# Patient Record
Sex: MALE | Race: WHITE | NOT HISPANIC OR LATINO | Employment: FULL TIME | URBAN - METROPOLITAN AREA
[De-identification: names, ages, dates, MRNs, and addresses within clinical notes are randomized per-mention and may not be internally consistent; named-entity substitution may affect disease eponyms.]

---

## 2018-01-11 ENCOUNTER — GENERIC CONVERSION - ENCOUNTER (OUTPATIENT)
Dept: OTHER | Facility: OTHER | Age: 53
End: 2018-01-11

## 2018-01-11 DIAGNOSIS — E66.01 MORBID (SEVERE) OBESITY DUE TO EXCESS CALORIES (HCC): ICD-10-CM

## 2018-01-11 DIAGNOSIS — E78.5 HYPERLIPIDEMIA: ICD-10-CM

## 2018-01-11 DIAGNOSIS — I10 ESSENTIAL (PRIMARY) HYPERTENSION: ICD-10-CM

## 2018-01-11 DIAGNOSIS — Z12.5 ENCOUNTER FOR SCREENING FOR MALIGNANT NEOPLASM OF PROSTATE: ICD-10-CM

## 2018-01-11 DIAGNOSIS — E55.9 VITAMIN D DEFICIENCY: ICD-10-CM

## 2018-01-24 VITALS
WEIGHT: 315 LBS | TEMPERATURE: 98.2 F | HEIGHT: 66 IN | RESPIRATION RATE: 20 BRPM | OXYGEN SATURATION: 99 % | BODY MASS INDEX: 50.62 KG/M2 | HEART RATE: 75 BPM | SYSTOLIC BLOOD PRESSURE: 138 MMHG | DIASTOLIC BLOOD PRESSURE: 80 MMHG

## 2018-07-29 ENCOUNTER — APPOINTMENT (EMERGENCY)
Dept: RADIOLOGY | Facility: HOSPITAL | Age: 53
End: 2018-07-29
Payer: OTHER MISCELLANEOUS

## 2018-07-29 ENCOUNTER — HOSPITAL ENCOUNTER (EMERGENCY)
Facility: HOSPITAL | Age: 53
Discharge: HOME | End: 2018-07-29
Admitting: EMERGENCY MEDICINE
Payer: OTHER MISCELLANEOUS

## 2018-07-29 VITALS
SYSTOLIC BLOOD PRESSURE: 139 MMHG | RESPIRATION RATE: 18 BRPM | BODY MASS INDEX: 55.51 KG/M2 | WEIGHT: 315 LBS | OXYGEN SATURATION: 96 % | DIASTOLIC BLOOD PRESSURE: 84 MMHG | TEMPERATURE: 97.5 F | HEART RATE: 89 BPM

## 2018-07-29 DIAGNOSIS — S39.012A LUMBAR STRAIN, INITIAL ENCOUNTER: Primary | ICD-10-CM

## 2018-07-29 PROCEDURE — 72100 X-RAY EXAM L-S SPINE 2/3 VWS: CPT

## 2018-07-29 PROCEDURE — 99283 EMERGENCY DEPT VISIT LOW MDM: CPT

## 2018-07-29 RX ORDER — CYCLOBENZAPRINE HCL 10 MG
10 TABLET ORAL 3 TIMES DAILY PRN
Qty: 20 TABLET | Refills: 0 | Status: SHIPPED | OUTPATIENT
Start: 2018-07-29 | End: 2019-07-15

## 2018-07-29 RX ORDER — TAMSULOSIN HYDROCHLORIDE 0.4 MG/1
CAPSULE ORAL
COMMUNITY
Start: 2015-05-27 | End: 2018-12-07 | Stop reason: SDUPTHER

## 2018-07-29 RX ORDER — NAPROXEN 500 MG/1
500 TABLET ORAL 2 TIMES DAILY WITH MEALS
Qty: 20 TABLET | Refills: 0 | Status: SHIPPED | OUTPATIENT
Start: 2018-07-29 | End: 2019-07-15

## 2018-07-29 NOTE — ED PROVIDER NOTES
History  Chief Complaint   Patient presents with    Back Pain     slipped on floor at work last night  complaining of low back pain     Patient presents to the emergency department today for evaluation of left lower back pain  He states while at work at 0 100 hours this morning he was ambulating into a bathroom, slipped on a wet floor, twisted his back  He states he did not fall to the ground  He states since that time he has been experiencing left lower back pain radiating toward the center of the back  Patient states he went home and took 600 mg of anti inflammatory and fell asleep and awoke at 0 500 hours with increased pain  He denies any incontinence of urine or stool  He denies lower extremity weakness  Denies saddle anesthesia  No history suggesting cauda equina syndrome  He denies fevers  No problems urinating  He states pain is worse with change in position such as standing up  He denies any prior back injuries  No history of current radiculopathies  Prior to Admission Medications   Prescriptions Last Dose Informant Patient Reported? Taking?   tamsulosin (FLOMAX) 0 4 mg   Yes Yes   Sig: Take by mouth      Facility-Administered Medications: None       Past Medical History:   Diagnosis Date    Kidney stone        History reviewed  No pertinent surgical history  History reviewed  No pertinent family history  I have reviewed and agree with the history as documented  Social History   Substance Use Topics    Smoking status: Never Smoker    Smokeless tobacco: Never Used    Alcohol use No        Review of Systems   Constitutional: Negative  HENT: Negative  Eyes: Negative  Respiratory: Negative  Cardiovascular: Negative  Gastrointestinal: Negative  Endocrine: Negative  Genitourinary: Negative  Musculoskeletal: Positive for back pain  Negative for arthralgias, gait problem, joint swelling, myalgias, neck pain and neck stiffness  Skin: Negative  Allergic/Immunologic: Negative  Neurological: Negative  Hematological: Negative  Psychiatric/Behavioral: Negative  All other systems reviewed and are negative  Physical Exam  Physical Exam   Constitutional: He is oriented to person, place, and time  He appears well-developed and well-nourished  No distress  HENT:   Head: Normocephalic  Right Ear: External ear normal    Left Ear: External ear normal    Nose: Nose normal    Mouth/Throat: Oropharynx is clear and moist  No oropharyngeal exudate  Eyes: EOM are normal  Pupils are equal, round, and reactive to light  Neck: Normal range of motion  Cardiovascular: Normal rate  Pulmonary/Chest: Effort normal    Abdominal: Soft  There is no tenderness  Musculoskeletal: He exhibits no edema or deformity  No evidence of central spinal tenderness contusion abrasion swelling step-offs or crepitus  Patient notes area of pain in the left paravertebral musculature in the area of L3-L4 however there is no reproducible tenderness  Normal patellar reflexes  Neurological: He is alert and oriented to person, place, and time  Skin: Skin is warm  He is not diaphoretic  Vitals reviewed  Vital Signs  ED Triage Vitals [07/29/18 1127]   Temperature Pulse Respirations Blood Pressure SpO2   97 5 °F (36 4 °C) 76 20 (!) 185/110 97 %      Temp Source Heart Rate Source Patient Position - Orthostatic VS BP [facility] FiO2 (%)   Temporal Right Sitting Right arm --      Pain Score       Worst Possible Pain           Vitals:    07/29/18 1127 07/29/18 1251   BP: (!) 185/110 139/84   Pulse: 76 89   Patient Position - Orthostatic VS: Sitting Sitting       Visual Acuity      ED Medications  Medications - No data to display    Diagnostic Studies  Results Reviewed     None                 XR spine lumbar 2 or 3 views injury   ED Interpretation by Imtiaz Mccall PA-C (07/29 1254)   No evidence of acute osseous abnormalities  Procedures  Procedures       Phone Contacts  ED Phone Contact    ED Course  ED Course as of Jul 29 1257   Sun Jul 29, 2018   1225 Pt at X-Ray    1231 Blood Pressure: (!) 185/110   1231 Temperature: 97 5 °F (36 4 °C)   1231 Pulse: 76   1231 Respirations: 20   1231 SpO2: 97 %                               MDM  CritCare Time    Disposition  Final diagnoses:   Lumbar strain, initial encounter     Time reflects when diagnosis was documented in both MDM as applicable and the Disposition within this note     Time User Action Codes Description Comment    7/29/2018 12:55 PM Julius Poster Add [S39 012A] Lumbar strain, initial encounter       ED Disposition     ED Disposition Condition Comment    Discharge  [place] discharge to home/self care  Condition at discharge: Good        Follow-up Information     Follow up With Specialties Details Why Contact Info    Occupational medicine  In 1 day            Patient's Medications   Discharge Prescriptions    CYCLOBENZAPRINE (FLEXERIL) 10 MG TABLET    Take 1 tablet (10 mg total) by mouth 3 (three) times a day as needed for muscle spasms       Start Date: 7/29/2018 End Date: --       Order Dose: 10 mg       Quantity: 20 tablet    Refills: 0    NAPROXEN (EC NAPROSYN) 500 MG EC TABLET    Take 1 tablet (500 mg total) by mouth 2 (two) times a day with meals       Start Date: 7/29/2018 End Date: --       Order Dose: 500 mg       Quantity: 20 tablet    Refills: 0     No discharge procedures on file      ED Provider  Electronically Signed by           Andreea Hamilton PA-C  07/29/18 1257

## 2018-07-29 NOTE — DISCHARGE INSTRUCTIONS
Low Back Strain, Ambulatory Care   GENERAL INFORMATION:   Low back strain  is an injury to your lower back muscles or tendons  Tendons are strong tissues that connect muscles to bones  The lower back supports most of your body weight and helps you move, twist, and bend  Low back strain is usually caused by activities that increase stress on the lower back, such as exercise or injury  Common symptoms include the following:   · Low back pain or muscle spasms    · Stiffness or limited movement    · Pain that goes down to the buttocks, groin, or legs    · Pain that is worse with activity  Seek immediate care for the following symptoms:   · A pop in your lower back    · Increased swelling or pain in your lower back    · Trouble moving your legs    · Numbness in your legs  Treatment for low back strain:   · NSAIDs  help decrease swelling and pain or fever  This medicine is available with or without a doctor's order  NSAIDs can cause stomach bleeding or kidney problems in certain people  If you take blood thinner medicine, always ask your healthcare provider if NSAIDs are safe for you  Always read the medicine label and follow directions  · Muscle relaxers  help decrease muscle spasms pain  · Prescription pain medicine  may be given  Ask how to take this medicine safely  Manage your symptoms:   · Rest  in bed after your injury  Slowly start to increase your activity as the pain decreases, or as directed  · Apply ice  on your lower back for 15 to 20 minutes every hour or as directed  Use an ice pack, or put crushed ice in a plastic bag  Cover it with a towel  Ice helps prevent tissue damage and decreases swelling and pain  You can alternate ice and heat  · Apply heat  on your lower back for 20 to 30 minutes every 2 hours for as many days as directed  Heat helps decrease pain and muscle spasms  Prevent another low back strain:   · Use proper body mechanics        ¨ Bend at the hips and knees when you  objects  Do not bend from the waist  Use your leg muscles as you lift the load  Do not use your back  Keep the object close to your chest as you lift it  Try not to twist or lift anything above your waist     ¨ Change your position often when you stand for long periods of time  Rest one foot on a small box or footrest, and then switch to the other foot often  ¨ Try not to sit for long periods of time  When you do, sit in a straight-backed chair with your feet flat on the floor  Never reach, pull, or push while you are sitting  · Exercise regularly  Warm up before you exercise  Do exercises that strengthen your back muscles  Ask about the best exercise plan for you  · Maintain a healthy weight  Ask your healthcare provider how much you should weigh  Ask him to help you create a weight loss plan if you are overweight  Follow up with your healthcare provider as directed:  Write down your questions so you remember to ask them during your visits  CARE AGREEMENT:   You have the right to help plan your care  Learn about your health condition and how it may be treated  Discuss treatment options with your caregivers to decide what care you want to receive  You always have the right to refuse treatment  The above information is an  only  It is not intended as medical advice for individual conditions or treatments  Talk to your doctor, nurse or pharmacist before following any medical regimen to see if it is safe and effective for you  © 2014 [address] is for End User's use only and may not be sold, redistributed or otherwise used for commercial purposes  All illustrations and images included in CareNotes® are the copyrighted property of [employer]  or Garfield Dugan

## 2018-12-07 DIAGNOSIS — N40.0 BENIGN PROSTATIC HYPERPLASIA, UNSPECIFIED WHETHER LOWER URINARY TRACT SYMPTOMS PRESENT: Primary | ICD-10-CM

## 2018-12-07 RX ORDER — TAMSULOSIN HYDROCHLORIDE 0.4 MG/1
CAPSULE ORAL
Qty: 90 CAPSULE | Refills: 3 | Status: SHIPPED | OUTPATIENT
Start: 2018-12-07 | End: 2019-11-11 | Stop reason: SDUPTHER

## 2019-07-11 ENCOUNTER — APPOINTMENT (EMERGENCY)
Dept: RADIOLOGY | Facility: HOSPITAL | Age: 54
End: 2019-07-11
Payer: COMMERCIAL

## 2019-07-11 ENCOUNTER — HOSPITAL ENCOUNTER (EMERGENCY)
Facility: HOSPITAL | Age: 54
Discharge: HOME | End: 2019-07-11
Attending: EMERGENCY MEDICINE | Admitting: EMERGENCY MEDICINE
Payer: COMMERCIAL

## 2019-07-11 VITALS
SYSTOLIC BLOOD PRESSURE: 172 MMHG | WEIGHT: 315 LBS | TEMPERATURE: 98.2 F | HEIGHT: 66 IN | DIASTOLIC BLOOD PRESSURE: 102 MMHG | RESPIRATION RATE: 20 BRPM | BODY MASS INDEX: 50.62 KG/M2 | HEART RATE: 72 BPM | OXYGEN SATURATION: 97 %

## 2019-07-11 DIAGNOSIS — L02.519 ABSCESS OF FINGER: ICD-10-CM

## 2019-07-11 DIAGNOSIS — M79.89 FINGER SWELLING: Primary | ICD-10-CM

## 2019-07-11 PROCEDURE — 99283 EMERGENCY DEPT VISIT LOW MDM: CPT | Performed by: PHYSICIAN ASSISTANT

## 2019-07-11 PROCEDURE — 87070 CULTURE OTHR SPECIMN AEROBIC: CPT | Performed by: PHYSICIAN ASSISTANT

## 2019-07-11 PROCEDURE — 26010 DRAINAGE OF FINGER ABSCESS: CPT | Performed by: PHYSICIAN ASSISTANT

## 2019-07-11 PROCEDURE — 87205 SMEAR GRAM STAIN: CPT | Performed by: PHYSICIAN ASSISTANT

## 2019-07-11 PROCEDURE — 73140 X-RAY EXAM OF FINGER(S): CPT

## 2019-07-11 PROCEDURE — 99283 EMERGENCY DEPT VISIT LOW MDM: CPT

## 2019-07-11 RX ORDER — CEPHALEXIN 500 MG/1
500 CAPSULE ORAL EVERY 6 HOURS SCHEDULED
Qty: 28 CAPSULE | Refills: 0 | Status: SHIPPED | OUTPATIENT
Start: 2019-07-11 | End: 2019-07-18

## 2019-07-11 RX ORDER — SULFAMETHOXAZOLE AND TRIMETHOPRIM 800; 160 MG/1; MG/1
1 TABLET ORAL 2 TIMES DAILY
Qty: 14 TABLET | Refills: 0 | Status: SHIPPED | OUTPATIENT
Start: 2019-07-11 | End: 2019-07-18

## 2019-07-11 RX ORDER — SULFAMETHOXAZOLE AND TRIMETHOPRIM 800; 160 MG/1; MG/1
1 TABLET ORAL ONCE
Status: COMPLETED | OUTPATIENT
Start: 2019-07-11 | End: 2019-07-11

## 2019-07-11 RX ORDER — LIDOCAINE HYDROCHLORIDE 10 MG/ML
5 INJECTION, SOLUTION EPIDURAL; INFILTRATION; INTRACAUDAL; PERINEURAL ONCE
Status: COMPLETED | OUTPATIENT
Start: 2019-07-11 | End: 2019-07-11

## 2019-07-11 RX ORDER — CEPHALEXIN 250 MG/1
500 CAPSULE ORAL ONCE
Status: COMPLETED | OUTPATIENT
Start: 2019-07-11 | End: 2019-07-11

## 2019-07-11 RX ADMIN — SULFAMETHOXAZOLE AND TRIMETHOPRIM 1 TABLET: 800; 160 TABLET ORAL at 13:22

## 2019-07-11 RX ADMIN — CEPHALEXIN 500 MG: 250 CAPSULE ORAL at 13:22

## 2019-07-11 RX ADMIN — LIDOCAINE HYDROCHLORIDE 5 ML: 10 INJECTION, SOLUTION EPIDURAL; INFILTRATION; INTRACAUDAL; PERINEURAL at 13:11

## 2019-07-11 NOTE — ED PROVIDER NOTES
History  Chief Complaint   Patient presents with    Finger Swelling     right third digit swelling since last wednesday  hx of gout  limited ROM  Patient presents to the emergency department today for evaluation of right 3rd finger pain and swelling  He states he traditionally has some pain and swelling of this digit chronically which he attributes to arthritis however never has this formally evaluated  States he also has a history of gout  He states about 7-10 days ago he was opening up a door on a tractor trailer when his hand slipped since that time has had increasing pain of the right 3rd digit and increasing swelling  Denies sensation deficits distally  Admits to increased range of motion deficits  No fever or other joint involvement  Prior to Admission Medications   Prescriptions Last Dose Informant Patient Reported? Taking? cyclobenzaprine (FLEXERIL) 10 mg tablet Not Taking at Unknown time  No No   Sig: Take 1 tablet (10 mg total) by mouth 3 (three) times a day as needed for muscle spasms   Patient not taking: Reported on 7/11/2019   naproxen (EC NAPROSYN) 500 MG EC tablet Not Taking at Unknown time  No No   Sig: Take 1 tablet (500 mg total) by mouth 2 (two) times a day with meals   Patient not taking: Reported on 7/11/2019   tamsulosin (FLOMAX) 0 4 mg   No Yes   Sig: TAKE 1 CAPSULE BY MOUTH EVERY 24 HOURS      Facility-Administered Medications: None       Past Medical History:   Diagnosis Date    Kidney stone        History reviewed  No pertinent surgical history  History reviewed  No pertinent family history  I have reviewed and agree with the history as documented  Social History     Tobacco Use    Smoking status: Never Smoker    Smokeless tobacco: Never Used   Substance Use Topics    Alcohol use: No    Drug use: No        Review of Systems   Constitutional: Negative  Respiratory: Negative  Cardiovascular: Negative      Musculoskeletal:        3rd finger pain swelling   Skin: Negative for rash  Neurological: Negative  Psychiatric/Behavioral: Negative  All other systems reviewed and are negative  Physical Exam  Physical Exam   Constitutional: He is oriented to person, place, and time  He appears well-developed and well-nourished  No distress  HENT:   Head: Normocephalic  Eyes: Pupils are equal, round, and reactive to light  Cardiovascular: Normal rate  Pulmonary/Chest: Effort normal and breath sounds normal    Abdominal: Soft  Musculoskeletal: He exhibits edema and tenderness  Edema and tenderness right 3rd digit  Normal sensation distally  No active bleeding or drainage  Neurological: He is alert and oriented to person, place, and time  Skin: Capillary refill takes less than 2 seconds  He is not diaphoretic  Right 3rd finger swelling  No warmth however on the palmar aspect there does appear to be area of potential developing abscess in the form of white papular region  Psychiatric: He has a normal mood and affect  Vitals reviewed        Vital Signs  ED Triage Vitals [07/11/19 1150]   Temperature Pulse Respirations Blood Pressure SpO2   98 2 °F (36 8 °C) 94 20 (!) 197/115 97 %      Temp Source Heart Rate Source Patient Position - Orthostatic VS BP [facility] FiO2 (%)   Temporal Monitor Sitting Right arm --      Pain Score       5           Vitals:    07/11/19 1150 07/11/19 1200   BP: (!) 197/115 (!) 206/105   Pulse: 94 77   Patient Position - Orthostatic VS: Sitting Sitting         Visual Acuity      ED Medications  Medications   lidocaine (PF) (XYLOCAINE-MPF) 1 % injection 5 mL (5 mL Infiltration Given by Other 7/11/19 1311)   cephalexin (KEFLEX) capsule 500 mg (500 mg Oral Given 7/11/19 1322)   sulfamethoxazole-trimethoprim (BACTRIM DS) 800-160 mg per tablet 1 tablet (1 tablet Oral Given 7/11/19 1322)       Diagnostic Studies  Results Reviewed     None                 XR finger third digit-middle RIGHT   ED Interpretation by Yary Tapia Tray Blanco PA-C (07/11 1896)   Soft tissue swelling without evidence of acute osseous abnormalities      Final Result by Israel Lake MD (07/11 1324)      Marked soft tissue swelling of the 3rd digit  No fracture or cortical destruction  Workstation performed: [id number]                    Procedures  Incision and drain  Date/Time: 7/11/2019 1:26 PM  Performed by: Leonora Kruse PA-C  Authorized by: Leonora Kruse PA-C     Patient location:  Bedside  Other Assisting Provider: No    Consent:     Consent obtained:  Verbal    Consent given by:  Patient    Risks discussed:  Bleeding    Alternatives discussed:  No treatment  Universal protocol:     Procedure explained and questions answered to patient or proxy's satisfaction: yes      Site/side marked: yes      Patient identity confirmed:  Verbally with patient and arm band  [facility]:     Type:  Abscess    Size:  3cm    [facility]:  Upper extremity    Upper extremity location:  R long finger  Pre-procedure details:     Skin preparation:  Betadine  Anesthesia (see MAR for exact dosages): Anesthesia method:  Local infiltration    Local anesthetic:  Lidocaine 1% w/o epi  Procedure details:     Complexity:  Simple    Needle aspiration: no      Incision types:  Stab incision    Scalpel blade:  11    Approach:  Open    Incision depth:  Subcutaneous    Wound management:  Probed and deloculated and irrigated with saline    Drainage:  Purulent    Drainage amount: Moderate    Wound treatment:  Wound left open    Packing materials:  None  Post-procedure details:     Patient tolerance of procedure:   Tolerated well, no immediate complications           ED Course  ED Course as of Jul 11 1327   Thu Jul 11, 2019   1155 Blood Pressure(!): 197/115   1155 Temperature: 98 2 °F (36 8 °C)   1155 Pulse: 94   1155 Respirations: 20   1155 SpO2: 97 %                               MDM    Disposition  Final diagnoses:   Finger swelling - right third   Abscess of finger     Time reflects when diagnosis was documented in both MDM as applicable and the Disposition within this note     Time User Action Codes Description Comment    7/11/2019 12:52 PM Patricia Fide Add [M79 89] Finger swelling     7/11/2019 12:52 PM Patricia Fide Modify [O44 74] Finger swelling right third    7/11/2019  1:26 PM Patricia Fide Add [L02 519] Abscess of finger       ED Disposition     ED Disposition Condition Date/Time Comment    Discharge Stable Thu Jul 11, 2019 12:53 PM Glorious Corona discharge to home/self care  Follow-up Information     Follow up With Specialties Details Why Arie Waite MD [facility] Schedule an appointment as soon as possible for a visit in 2 days For wound re-check [address] [place] [address]  [phone]            Patient's Medications   Discharge Prescriptions    CEPHALEXIN (KEFLEX) 500 MG CAPSULE    Take 1 capsule (500 mg total) by mouth every 6 (six) hours for 7 days       Start Date: 7/11/2019 End Date: 7/18/2019       Order Dose: 500 mg       Quantity: 28 capsule    Refills: 0    SULFAMETHOXAZOLE-TRIMETHOPRIM (BACTRIM DS) 800-160 MG PER TABLET    Take 1 tablet by mouth 2 (two) times a day for 7 days smx-tmp DS (BACTRIM) 800-160 mg tabs (1tab q12 D10)       Start Date: 7/11/2019 End Date: 7/18/2019       Order Dose: 1 tablet       Quantity: 14 tablet    Refills: 0     No discharge procedures on file      ED Provider  Electronically Signed by           Bonnie Palomo PA-C  07/11/19 6410

## 2019-07-14 LAB
BACTERIA WND AEROBE CULT: NO GROWTH
GRAM STN SPEC: NORMAL

## 2019-07-15 ENCOUNTER — HOSPITAL ENCOUNTER (OUTPATIENT)
Dept: RADIOLOGY | Facility: HOSPITAL | Age: 54
Discharge: HOME | End: 2019-07-15
Payer: COMMERCIAL

## 2019-07-15 ENCOUNTER — APPOINTMENT (OUTPATIENT)
Dept: LAB | Facility: HOSPITAL | Age: 54
End: 2019-07-15
Payer: COMMERCIAL

## 2019-07-15 ENCOUNTER — OFFICE VISIT (OUTPATIENT)
Dept: INTERNAL MEDICINE CLINIC | Facility: CLINIC | Age: 54
End: 2019-07-15
Payer: COMMERCIAL

## 2019-07-15 VITALS
HEIGHT: 66 IN | HEART RATE: 124 BPM | OXYGEN SATURATION: 96 % | BODY MASS INDEX: 50.62 KG/M2 | SYSTOLIC BLOOD PRESSURE: 166 MMHG | TEMPERATURE: 99.5 F | WEIGHT: 315 LBS | DIASTOLIC BLOOD PRESSURE: 100 MMHG

## 2019-07-15 DIAGNOSIS — M10.9 ACUTE GOUT OF MULTIPLE SITES, UNSPECIFIED CAUSE: Primary | ICD-10-CM

## 2019-07-15 DIAGNOSIS — Z87.39 HISTORY OF GOUT: ICD-10-CM

## 2019-07-15 DIAGNOSIS — L02.511 ABSCESS OF FINGER OF RIGHT HAND: ICD-10-CM

## 2019-07-15 DIAGNOSIS — M79.89 SWELLING OF LEFT HAND: ICD-10-CM

## 2019-07-15 LAB
ALBUMIN SERPL BCP-MCNC: 3.6 G/DL (ref 3.5–5)
ALP SERPL-CCNC: 80 U/L (ref 46–116)
ALT SERPL W P-5'-P-CCNC: 27 U/L (ref 12–78)
ANION GAP SERPL CALCULATED.3IONS-SCNC: 13 MMOL/L (ref 4–13)
AST SERPL W P-5'-P-CCNC: 16 U/L (ref 5–45)
BASOPHILS # BLD AUTO: 0.09 THOUSANDS/ΜL (ref 0–0.1)
BASOPHILS NFR BLD AUTO: 1 % (ref 0–1)
BILIRUB SERPL-MCNC: 0.6 MG/DL (ref 0.2–1)
BUN SERPL-MCNC: 12 MG/DL (ref 5–25)
CALCIUM SERPL-MCNC: 9.3 MG/DL (ref 8.3–10.1)
CHLORIDE SERPL-SCNC: 105 MMOL/L (ref 100–108)
CO2 SERPL-SCNC: 22 MMOL/L (ref 21–32)
CREAT SERPL-MCNC: 1.41 MG/DL (ref 0.6–1.3)
EOSINOPHIL # BLD AUTO: 0.32 THOUSAND/ΜL (ref 0–0.61)
EOSINOPHIL NFR BLD AUTO: 3 % (ref 0–6)
ERYTHROCYTE [DISTWIDTH] IN BLOOD BY AUTOMATED COUNT: 12.7 % (ref 11.6–15.1)
GFR SERPL CREATININE-BSD FRML MDRD: 56 ML/MIN/1.73SQ M
GLUCOSE SERPL-MCNC: 105 MG/DL (ref 65–140)
HCT VFR BLD AUTO: 50.6 % (ref 36.5–49.3)
HGB BLD-MCNC: 16.2 G/DL (ref 12–17)
IMM GRANULOCYTES # BLD AUTO: 0.06 THOUSAND/UL (ref 0–0.2)
IMM GRANULOCYTES NFR BLD AUTO: 1 % (ref 0–2)
LYMPHOCYTES # BLD AUTO: 2.06 THOUSANDS/ΜL (ref 0.6–4.47)
LYMPHOCYTES NFR BLD AUTO: 17 % (ref 14–44)
MCH RBC QN AUTO: 31.1 PG (ref 26.8–34.3)
MCHC RBC AUTO-ENTMCNC: 32 G/DL (ref 31.4–37.4)
MCV RBC AUTO: 97 FL (ref 82–98)
MONOCYTES # BLD AUTO: 0.89 THOUSAND/ΜL (ref 0.17–1.22)
MONOCYTES NFR BLD AUTO: 8 % (ref 4–12)
NEUTROPHILS # BLD AUTO: 8.42 THOUSANDS/ΜL (ref 1.85–7.62)
NEUTS SEG NFR BLD AUTO: 70 % (ref 43–75)
NRBC BLD AUTO-RTO: 0 /100 WBCS
PLATELET # BLD AUTO: 255 THOUSANDS/UL (ref 149–390)
PMV BLD AUTO: 9.9 FL (ref 8.9–12.7)
POTASSIUM SERPL-SCNC: 4.1 MMOL/L (ref 3.5–5.3)
PROT SERPL-MCNC: 8 G/DL (ref 6.4–8.2)
RBC # BLD AUTO: 5.21 MILLION/UL (ref 3.88–5.62)
SODIUM SERPL-SCNC: 140 MMOL/L (ref 136–145)
URATE SERPL-MCNC: 8.3 MG/DL (ref 4.2–8)
WBC # BLD AUTO: 11.84 THOUSAND/UL (ref 4.31–10.16)

## 2019-07-15 PROCEDURE — 36415 COLL VENOUS BLD VENIPUNCTURE: CPT

## 2019-07-15 PROCEDURE — 85025 COMPLETE CBC W/AUTO DIFF WBC: CPT

## 2019-07-15 PROCEDURE — 99214 OFFICE O/P EST MOD 30 MIN: CPT | Performed by: NURSE PRACTITIONER

## 2019-07-15 PROCEDURE — 84550 ASSAY OF BLOOD/URIC ACID: CPT

## 2019-07-15 PROCEDURE — 80053 COMPREHEN METABOLIC PANEL: CPT

## 2019-07-15 PROCEDURE — 73130 X-RAY EXAM OF HAND: CPT

## 2019-07-15 RX ORDER — COLCHICINE 0.6 MG/1
TABLET ORAL
Qty: 10 TABLET | Refills: 0 | Status: SHIPPED | OUTPATIENT
Start: 2019-07-15 | End: 2019-07-27 | Stop reason: SDUPTHER

## 2019-07-15 NOTE — PROGRESS NOTES
Assessment/Plan: Will discontinue Bactrim and continue patient on Keflex  Will obtain lab work with Uric acid  Symptoms are consistent with Gout  Will start on Colchicine 0 6 mg take one tablet now may repeat in one hour if still having pain may repeat again in one hour  BP up at 166/100 pulse 124 and temperature 99 5  Will also send for XR of the right hand  He was advised if any issues while taking the medication to call the office and will bring him back Friday for a recheck  No problem-specific Assessment & Plan notes found for this encounter  Problem List Items Addressed This Visit        Musculoskeletal and Integument    Acute gout of multiple sites - Primary    Relevant Medications    colchicine (COLCRYS) 0 6 mg tablet       Other    Abscess of finger of right hand    Relevant Orders    Comprehensive metabolic panel    CBC and differential    Swelling of left hand    Relevant Orders    XR hand 3+ vw left    Uric acid    Comprehensive metabolic panel    CBC and differential    History of gout    Relevant Orders    Uric acid    Comprehensive metabolic panel    CBC and differential            Subjective:      Patient ID: Britney Bowens is a 49 y o  male  Millyarnulfo Chandra is here today for an ER follow up visit  He was seen in the ER on 7/11 He states he traditionally has some pain and swelling of this digit chronically which he attributes to arthritis however never has this formally evaluated  States he also has a history of gout  He states about 7-10 days ago he was opening up a door on a tractor trailer when his hand slipped since that time has had increasing pain of the right 3rd digit and increasing swelling  Denies sensation deficits distally  Admits to increased range of motion deficits  No fever or other joint involvement  He was started on Keflex and bactrim  He is having continued swelling and pain now in his left hand   He states he is not sure what is going on and states in the past he did have gout  He state he was told in the ER that one of the antibiotics can cause gout  He state the right hand is now feeling better and is not having any pain  He offers no other issues             The following portions of the patient's history were reviewed and updated as appropriate:   He  has a past medical history of Gout and Kidney stone  He   Patient Active Problem List    Diagnosis Date Noted    Abscess of finger of right hand 07/15/2019    Swelling of left hand 07/15/2019    History of gout 07/15/2019    Acute gout of multiple sites 07/15/2019    Hyperlipidemia 09/15/2015    Vitamin D deficiency 06/10/2015    Hypertension 03/23/2015    Morbid obesity ([address] ) 03/23/2015    Cellulitis of hand 09/23/2013     He  has a past surgical history that includes No past surgeries (03/23/2015)  His family history includes Cancer in his mother  He  reports that he has never smoked  He has never used smokeless tobacco  He reports that he does not drink alcohol or use drugs  Current Outpatient Medications   Medication Sig Dispense Refill    cephalexin (KEFLEX) 500 mg capsule Take 1 capsule (500 mg total) by mouth every 6 (six) hours for 7 days 28 capsule 0    sulfamethoxazole-trimethoprim (BACTRIM DS) 800-160 mg per tablet Take 1 tablet by mouth 2 (two) times a day for 7 days smx-tmp DS (BACTRIM) 800-160 mg tabs (1tab q12 D10) 14 tablet 0    tamsulosin (FLOMAX) 0 4 mg TAKE 1 CAPSULE BY MOUTH EVERY 24 HOURS 90 capsule 3    colchicine (COLCRYS) 0 6 mg tablet Take one tablet by mouth now then repeat in one hour if still having pain may repeat again in one hour 10 tablet 0     No current facility-administered medications for this visit        Current Outpatient Medications on File Prior to Visit   Medication Sig    cephalexin (KEFLEX) 500 mg capsule Take 1 capsule (500 mg total) by mouth every 6 (six) hours for 7 days    sulfamethoxazole-trimethoprim (BACTRIM DS) 800-160 mg per tablet Take 1 tablet by mouth 2 (two) times a day for 7 days smx-tmp DS (BACTRIM) 800-160 mg tabs (1tab q12 D10)    tamsulosin (FLOMAX) 0 4 mg TAKE 1 CAPSULE BY MOUTH EVERY 24 HOURS    [DISCONTINUED] cyclobenzaprine (FLEXERIL) 10 mg tablet Take 1 tablet (10 mg total) by mouth 3 (three) times a day as needed for muscle spasms (Patient not taking: Reported on 7/11/2019)    [DISCONTINUED] naproxen (EC NAPROSYN) 500 MG EC tablet Take 1 tablet (500 mg total) by mouth 2 (two) times a day with meals (Patient not taking: Reported on 7/11/2019)     No current facility-administered medications on file prior to visit  He has No Known Allergies       Review of Systems   Constitutional: Positive for fever  HENT: Negative  Eyes: Negative  Respiratory: Negative  Cardiovascular: Negative  Gastrointestinal: Negative  Endocrine: Negative  Musculoskeletal: Negative  Skin: Positive for wound  Allergic/Immunologic: Negative  Neurological: Negative  Hematological: Negative  Psychiatric/Behavioral: Negative  Objective:      /100 (BP [facility]: Right arm, Patient Position: Sitting, Cuff Size: Large)   Pulse (!) 124   Temp 99 5 °F (37 5 °C) (Oral)   Ht 5' 6" (1 676 m)   Wt (!) 153 kg (337 lb 4 8 oz)   SpO2 96%   BMI 54 44 kg/m²          Physical Exam   Constitutional: He is oriented to person, place, and time  He appears well-developed and well-nourished  HENT:   Head: Normocephalic and atraumatic  Right Ear: External ear normal    Left Ear: External ear normal    Nose: Nose normal    Mouth/Throat: Oropharynx is clear and moist    Eyes: Pupils are equal, round, and reactive to light  Conjunctivae and EOM are normal    Neck: Normal range of motion  Neck supple  Cardiovascular: Normal rate, regular rhythm, normal heart sounds and intact distal pulses  Pulmonary/Chest: Effort normal and breath sounds normal    Abdominal: Soft  Bowel sounds are normal    Musculoskeletal: Normal range of motion     Neurological: He is alert and oriented to person, place, and time  Skin: Skin is warm and dry  Capillary refill takes less than 2 seconds  Small wound noted to right middle finger on posterior side, with tophi noted, left hand swollen and red to touch   Psychiatric: He has a normal mood and affect  His behavior is normal  Judgment and thought content normal    Vitals reviewed

## 2019-07-15 NOTE — PATIENT INSTRUCTIONS
Abscess   WHAT YOU NEED TO KNOW:   What is an abscess? An abscess is an area under the skin where pus (infected fluid) collects  An abscess is often caused by bacteria, fungi or other germs that get into an open wound  You can get an abscess anywhere on your body  What increases my risk for an abscess? · An animal bite    · A foreign object lodged under your skin    · Heavy or frequent sweating    · A health problem, such as diabetes or obesity    · Injecting illegal drugs  What are the signs and symptoms of an abscess? You may have a swollen mass that is red and painful  Pus may leak out of the mass  The pus will be white or yellow and may smell bad  You may have redness and pain days before the mass appears  You may have a fever and chills if the infection spreads  How is an abscess diagnosed? Your healthcare provider will examine the area  He will check to see if your abscess is draining  A sample of fluid from your abscess may show what is causing your infection  How is an abscess treated? · Incision and drainage  is a procedure used to remove pus and fluid from the abscess  Your healthcare provider will make a cut in the abscess so it can drain  Then gauze will be put into the wound and it will be covered with a bandage  · Surgery  may be needed to remove your abscess  Your healthcare provider may do this if the abscess is on your hands or buttocks  Surgery can decrease the risk that the abscess will come back  What can I do to care for my self? · Apply a warm compress to your abscess  This will help it open and drain  Wet a washcloth in warm, but not hot, water  Apply the compress for 10 minutes  Repeat this 4 times each day  Do not  press on an abscess or try to open it with a needle  You may push the bacteria deeper or into your blood  · Do not share your clothes, towels, or sheets with anyone  This can spread the infection to others  · Wash your hands often    This can help prevent the spread of germs  Use soap and water or an alcohol-based hand rub  What can I do to care for my wound after it is drained? · Care for your wound as directed  If your healthcare provider says it is okay, carefully remove the bandage and gauze packing  You may need to soak the gauze to get it out of your wound  Clean your wound and the area around it as directed  Dry the area and put on new, clean bandages  Change your bandages when they get wet or dirty  · Ask your healthcare provider how to change the gauze in your wound  Keep track of how many pieces of gauze are placed inside the wound  Do not put too much packing in the wound  Do not pack the gauze too tightly in your wound  When should I seek immediate care? · The area around your abscess becomes very painful, warm, or has red streaks  · You have a fever and chills  · Your heart is beating faster than usual      · You feel faint or confused  When should I contact my healthcare provider? · Your abscess gets bigger  · Your abscess returns  · You have questions or concerns about your condition or care  CARE AGREEMENT:   You have the right to help plan your care  Learn about your health condition and how it may be treated  Discuss treatment options with your caregivers to decide what care you want to receive  You always have the right to refuse treatment  The above information is an  only  It is not intended as medical advice for individual conditions or treatments  Talk to your doctor, nurse or pharmacist before following any medical regimen to see if it is safe and effective for you  © 2017 [employer]  Information is for End User's use only and may not be sold, redistributed or otherwise used for commercial purposes  All illustrations and images included in CareNotes® are the copyrighted property of [employer]  or [employer]

## 2019-07-15 NOTE — LETTER
July 15, 2019     Patient: Britney Bowens   YOB: 1965   Date of Visit: 7/15/2019       To Whom it May Concern:    Britney Bowens is under my professional care  He was seen in my office on 7/15/2019  Please excuse Diamond Loges from work  He may return to work on 7/22/19  If you have any questions or concerns, please don't hesitate to call           Sincerely,                Edith Chand

## 2019-07-16 DIAGNOSIS — M10.9 ACUTE GOUT OF MULTIPLE SITES, UNSPECIFIED CAUSE: Primary | ICD-10-CM

## 2019-07-16 RX ORDER — PREDNISONE 10 MG/1
TABLET ORAL
Qty: 18 TABLET | Refills: 0 | Status: ON HOLD | OUTPATIENT
Start: 2019-07-16 | End: 2019-08-05

## 2019-07-19 ENCOUNTER — OFFICE VISIT (OUTPATIENT)
Dept: INTERNAL MEDICINE CLINIC | Facility: CLINIC | Age: 54
End: 2019-07-19
Payer: COMMERCIAL

## 2019-07-19 VITALS
HEART RATE: 97 BPM | WEIGHT: 315 LBS | BODY MASS INDEX: 50.62 KG/M2 | HEIGHT: 66 IN | TEMPERATURE: 99.1 F | SYSTOLIC BLOOD PRESSURE: 140 MMHG | OXYGEN SATURATION: 98 % | DIASTOLIC BLOOD PRESSURE: 88 MMHG

## 2019-07-19 DIAGNOSIS — M10.9 ACUTE GOUT OF MULTIPLE SITES, UNSPECIFIED CAUSE: Primary | ICD-10-CM

## 2019-07-19 DIAGNOSIS — R79.89 ELEVATED SERUM CREATININE: ICD-10-CM

## 2019-07-19 DIAGNOSIS — L03.119 CELLULITIS OF HAND: ICD-10-CM

## 2019-07-19 DIAGNOSIS — I10 ESSENTIAL HYPERTENSION: ICD-10-CM

## 2019-07-19 DIAGNOSIS — E66.01 MORBID OBESITY WITH BMI OF 50.0-59.9, ADULT (HCC): ICD-10-CM

## 2019-07-19 PROCEDURE — 99213 OFFICE O/P EST LOW 20 MIN: CPT | Performed by: NURSE PRACTITIONER

## 2019-07-19 NOTE — PATIENT INSTRUCTIONS

## 2019-07-19 NOTE — PROGRESS NOTES
Assessment/Plan: Patient will  Prednisone tapered dose at [facility]   He was advised to finish his course of Keflex  Bp was up at 140/88 will bring back for a 3 week BP recheck  Patient is deferring Colonoscopy  Will check Bmp creatinine was up at 1 41  He was advised to cute back on his NSAID usage as this can be elevating his kidney function  He was advised if any worsening of symptoms to call the office  No problem-specific Assessment & Plan notes found for this encounter  Problem List Items Addressed This Visit        Cardiovascular and Mediastinum    Hypertension       Musculoskeletal and Integument    Acute gout of multiple sites - Primary       Other    Cellulitis of hand    Morbid obesity with BMI of 50 0-59 9, adult ([facility])    Elevated serum creatinine    Relevant Orders    Comprehensive metabolic panel            Subjective:      Patient ID: Dano Brannon is a 49 y o  male  Marin Powell is here today a follow up visit  He was diagnosed with Gout around one week ago and did have an I and D done of his right middle finger on his left hand in the ER  He did take Colchicine and did tolerate this well  He did not pick his Prednisone up due to not getting a call from [facility]   He states his hand is feeling much better but is still having some pain  He is going back to work Monday  He states he is taking Aleve 3 tablets every morning to get thru the day for his knee pain and joint pain in general  He denies any fever and states his pain is much better  He is deferring a colonoscopy  He offers no other issues  The following portions of the patient's history were reviewed and updated as appropriate:   He  has a past medical history of Gout and Kidney stone    He   Patient Active Problem List    Diagnosis Date Noted    Morbid obesity with BMI of 50 0-59 9, adult ([facility] [address] ) 07/19/2019    Elevated serum creatinine 07/19/2019    Abscess of finger of right hand 07/15/2019    Swelling of left hand 07/15/2019    History of gout 07/15/2019    Acute gout of multiple sites 07/15/2019    Hyperlipidemia 09/15/2015    Vitamin D deficiency 06/10/2015    Hypertension 2015    Morbid obesity ([address] ) 2015    Cellulitis of hand 2013     He  has a past surgical history that includes No past surgeries (2015)  His family history includes Cancer in his mother  He  reports that he has never smoked  He has never used smokeless tobacco  He reports that he does not drink alcohol or use drugs  Current Outpatient Medications   Medication Sig Dispense Refill    colchicine (COLCRYS) 0 6 mg tablet Take one tablet by mouth now then repeat in one hour if still having pain may repeat again in one hour 10 tablet 0    predniSONE 10 mg tablet 30 mg by mouth daily for 3 days, then 20 mg by mouth daily for 3 days, then 10 mg by mouth daily for 3 days, then stop 18 tablet 0    tamsulosin (FLOMAX) 0 4 mg TAKE 1 CAPSULE BY MOUTH EVERY 24 HOURS 90 capsule 3     No current facility-administered medications for this visit  Current Outpatient Medications on File Prior to Visit   Medication Sig    [] cephalexin (KEFLEX) 500 mg capsule Take 1 capsule (500 mg total) by mouth every 6 (six) hours for 7 days    colchicine (COLCRYS) 0 6 mg tablet Take one tablet by mouth now then repeat in one hour if still having pain may repeat again in one hour    predniSONE 10 mg tablet 30 mg by mouth daily for 3 days, then 20 mg by mouth daily for 3 days, then 10 mg by mouth daily for 3 days, then stop    [] sulfamethoxazole-trimethoprim (BACTRIM DS) 800-160 mg per tablet Take 1 tablet by mouth 2 (two) times a day for 7 days smx-tmp DS (BACTRIM) 800-160 mg tabs (1tab q12 D10)    tamsulosin (FLOMAX) 0 4 mg TAKE 1 CAPSULE BY MOUTH EVERY 24 HOURS     No current facility-administered medications on file prior to visit  He has No Known Allergies       Review of Systems   Constitutional: Negative  HENT: Negative      Eyes: Negative  Respiratory: Negative  Cardiovascular: Negative  Gastrointestinal: Negative  Endocrine: Negative  Genitourinary: Negative  Musculoskeletal: Negative  Skin:        Gout left hand    Allergic/Immunologic: Negative  Neurological: Negative  Hematological: Negative  Psychiatric/Behavioral: Negative  Below is the patient's most recent value for Albumin, ALT, AST, BUN, Calcium, Chloride, Cholesterol, CO2, Creatinine, GFR, Glucose, HDL, Hematocrit, Hemoglobin, Hemoglobin A1C, LDL, Magnesium, Phosphorus, Platelets, Potassium, PSA, Sodium, Triglycerides, and WBC  Lab Results   Component Value Date    ALT 27 07/15/2019    AST 16 07/15/2019    BUN 12 07/15/2019    CALCIUM 9 3 07/15/2019     07/15/2019    CHOL 235 08/31/2015    CO2 22 07/15/2019    CREATININE 1 41 (H) 07/15/2019    HDL 37 08/31/2015    HCT 50 6 (H) 07/15/2019    HGB 16 2 07/15/2019    HGBA1C 5 4 03/24/2015     07/15/2019    K 4 1 07/15/2019    PSA 0 4 03/24/2015     (L) 04/08/2015    TRIG 150 08/31/2015    WBC 11 84 (H) 07/15/2019     Note: for a comprehensive list of the patient's lab results, access the Results Review activity  Objective:      /88 (BP [facility]: Right arm, Patient Position: Sitting, Cuff Size: Large)   Pulse 97   Temp 99 1 °F (37 3 °C) (Temporal)   Ht 5' 6" (1 676 m)   Wt (!) 154 kg (339 lb 11 2 oz)   SpO2 98%   BMI 54 83 kg/m²          Physical Exam   Constitutional: He is oriented to person, place, and time  He appears well-developed and well-nourished  Cardiovascular: Normal rate, regular rhythm, normal heart sounds and intact distal pulses  Pulmonary/Chest: Effort normal and breath sounds normal    Musculoskeletal: Normal range of motion  Neurological: He is alert and oriented to person, place, and time  Skin: Skin is warm and dry  Capillary refill takes less than 2 seconds     Trace edema to left hand, small open area to posterior right middle finger no redness noted   Psychiatric: He has a normal mood and affect  His behavior is normal  Judgment and thought content normal    Vitals reviewed  BMI Counseling: Body mass index is 54 83 kg/m²  Discussed the patient's BMI with him  The BMI is above average  BMI counseling and education was provided to the patient  Nutrition recommendations include reducing portion sizes, decreasing overall calorie intake, 3-5 servings of fruits/vegetables daily, reducing fast food intake, consuming healthier snacks, decreasing soda and/or juice intake, moderation in carbohydrate intake, increasing intake of lean protein, reducing intake of saturated fat and trans fat and reducing intake of cholesterol

## 2019-07-22 ENCOUNTER — TELEPHONE (OUTPATIENT)
Dept: INTERNAL MEDICINE CLINIC | Facility: CLINIC | Age: 54
End: 2019-07-22

## 2019-07-22 NOTE — TELEPHONE ENCOUNTER
Patient called stating that he just picked up his prednisone yesterday and started taking it yesterday  He wanted to know if he takes all of the daily pills at once or spread them out through the day  I told him how to take it and then patient stated that he is in so much pain and that it's worse than before  He is not taking colchicin  After speaking with Dr Katt Phelps I told patient, as instructed, to start cholchicin now and take another one in 2 hours but also make sure that he takes his prednisone every day  He has not taken today's dose of prednisone but will take it with his supper  Dr Katt Phelps wants him to see you again this week but patient said no he can't be bothered going back and forth but will take his medications and just let him do that and he'll see you at his appointment on 8/9

## 2019-07-26 ENCOUNTER — TELEPHONE (OUTPATIENT)
Dept: OTHER | Facility: OTHER | Age: 54
End: 2019-07-26

## 2019-07-26 NOTE — TELEPHONE ENCOUNTER
Caller Name:  Relationship To  Patient: Self  Return Phone Number: [phone] (Cell)  Presenting Problem: "I have gout in my hand "  Service Type: Triage  Charged Service 1: N/A  Pharmacy Name and  Number: [facility] [address]  Nurse Assessment  Nurse: DARRELL Hatch Kandis Maki Date/Time: 7/26/2019 6:33:08 PM  Type of assessment required:  ---General (Adult or Child)  Duration of Current S/S  ---1 week  [facility]/Radiation  ---Left hand and index finger  Temperature (F) and route:  ---Not warm, not taken  Symptom Specific Meds (Dose/Time):  ---Colchcine, 0 6 mg @ 1530  Last dose of Prednisone, was yesterday @ 1030  Other S/S  ---Julian sized swelling on index finger  The lump is not red  Denies redness or  swelling of the rest of his hand  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, manual labor, 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  He has to work again tomorrow  Pain Scale on scale of 1-10, 10 being the worst:  ---Pain in finger 5/10  Symptom progression:  ---worse  Intake and Output  ---Normal appetite, fluid intake and urine output  Protocols  Protocol Title Nurse Date/Time  Finger Pain DARRELL Hatch, Kaylin Durán 7/26/2019 6:43:00 PM  Question Caller Affirmed  Disp  Time Disposition Final User  7/26/2019 6:51:04 PM See PCP When Office is Open (within 3  days)  Yes DARRELL Hatch, IKER BHATIA  [facility] Advice Given Per Protocol  SEE PCP WITHIN 3 DAYS: * You need to be seen within 2 or 3 days  Call your doctor during regular office hours and make an  appointment  An urgent care center is often the best source of care if your doctor's office is closed or you can't get an appointment  NOTE: If office will be open tomorrow, tell caller to call then, not in 3 days  CALL BACK IF: * Fever occurs * Redness appears * You  become worse  CARE ADVICE given per Finger Pain (Adult) guideline  Dr Santos Chaudhry was paged via AP @ [phone]: [phone]/ Kaylin Durán RN/ healthCall/ Darshan Maxwell [place]/ 1965/ flare of gout, wants refill of prednisone  Kolton Aviles returned call at [facility]   She said that Dr Gayle Salguero sent her the message because this is her patient  I relayed patient's symptoms and that he is requesting a refill of Prednisone  Ms Antolin Onofre said she will call the patient directly and give him care advice    Caller Understands: Yes  Caller Disagree/Comply: Comply  PreDisposition: Home Care

## 2019-07-27 DIAGNOSIS — M10.9 ACUTE GOUT OF MULTIPLE SITES, UNSPECIFIED CAUSE: ICD-10-CM

## 2019-07-27 RX ORDER — COLCHICINE 0.6 MG/1
TABLET ORAL
Qty: 3 TABLET | Refills: 0 | Status: SHIPPED | OUTPATIENT
Start: 2019-07-27 | End: 2019-08-15

## 2019-08-03 ENCOUNTER — HOSPITAL ENCOUNTER (EMERGENCY)
Facility: HOSPITAL | Age: 54
End: 2019-08-04
Attending: EMERGENCY MEDICINE | Admitting: EMERGENCY MEDICINE
Payer: COMMERCIAL

## 2019-08-03 ENCOUNTER — APPOINTMENT (EMERGENCY)
Dept: RADIOLOGY | Facility: HOSPITAL | Age: 54
End: 2019-08-03
Payer: COMMERCIAL

## 2019-08-03 ENCOUNTER — APPOINTMENT (EMERGENCY)
Dept: CT IMAGING | Facility: HOSPITAL | Age: 54
End: 2019-08-03
Payer: COMMERCIAL

## 2019-08-03 DIAGNOSIS — N20.0 RIGHT KIDNEY STONE: ICD-10-CM

## 2019-08-03 DIAGNOSIS — A41.9 SEPTIC SHOCK (HCC): Primary | ICD-10-CM

## 2019-08-03 DIAGNOSIS — N17.9 ACUTE RENAL FAILURE (ARF) (HCC): ICD-10-CM

## 2019-08-03 DIAGNOSIS — N39.0 URINARY TRACT INFECTION: ICD-10-CM

## 2019-08-03 DIAGNOSIS — R65.21 SEPTIC SHOCK (HCC): Primary | ICD-10-CM

## 2019-08-03 LAB
ALBUMIN SERPL BCP-MCNC: 2.8 G/DL (ref 3.5–5)
ALP SERPL-CCNC: 155 U/L (ref 46–116)
ALT SERPL W P-5'-P-CCNC: 39 U/L (ref 12–78)
ANION GAP SERPL CALCULATED.3IONS-SCNC: 19 MMOL/L (ref 4–13)
ANISOCYTOSIS BLD QL SMEAR: PRESENT
AST SERPL W P-5'-P-CCNC: 40 U/L (ref 5–45)
BACTERIA UR QL AUTO: ABNORMAL /HPF
BASOPHILS # BLD MANUAL: 0 THOUSAND/UL (ref 0–0.1)
BASOPHILS NFR MAR MANUAL: 0 % (ref 0–1)
BILIRUB SERPL-MCNC: 0.8 MG/DL (ref 0.2–1)
BILIRUB UR QL STRIP: NEGATIVE
BUN SERPL-MCNC: 28 MG/DL (ref 5–25)
CALCIUM SERPL-MCNC: 8.9 MG/DL (ref 8.3–10.1)
CHLORIDE SERPL-SCNC: 103 MMOL/L (ref 100–108)
CLARITY UR: ABNORMAL
CO2 SERPL-SCNC: 16 MMOL/L (ref 21–32)
COLOR UR: YELLOW
CREAT SERPL-MCNC: 3.58 MG/DL (ref 0.6–1.3)
EOSINOPHIL # BLD MANUAL: 0 THOUSAND/UL (ref 0–0.4)
EOSINOPHIL NFR BLD MANUAL: 0 % (ref 0–6)
ERYTHROCYTE [DISTWIDTH] IN BLOOD BY AUTOMATED COUNT: 12.8 % (ref 11.6–15.1)
GFR SERPL CREATININE-BSD FRML MDRD: 18 ML/MIN/1.73SQ M
GLUCOSE SERPL-MCNC: 129 MG/DL (ref 65–140)
GLUCOSE UR STRIP-MCNC: NEGATIVE MG/DL
HCT VFR BLD AUTO: 48.8 % (ref 36.5–49.3)
HGB BLD-MCNC: 15.7 G/DL (ref 12–17)
HGB UR QL STRIP.AUTO: ABNORMAL
KETONES UR STRIP-MCNC: NEGATIVE MG/DL
LACTATE SERPL-SCNC: 6.2 MMOL/L (ref 0.5–2)
LEUKOCYTE ESTERASE UR QL STRIP: ABNORMAL
LYMPHOCYTES # BLD AUTO: 0.44 THOUSAND/UL (ref 0.6–4.47)
LYMPHOCYTES # BLD AUTO: 8 % (ref 14–44)
MCH RBC QN AUTO: 30.7 PG (ref 26.8–34.3)
MCHC RBC AUTO-ENTMCNC: 32.2 G/DL (ref 31.4–37.4)
MCV RBC AUTO: 95 FL (ref 82–98)
MONOCYTES # BLD AUTO: 0.16 THOUSAND/UL (ref 0–1.22)
MONOCYTES NFR BLD: 3 % (ref 4–12)
NEUTROPHILS # BLD MANUAL: 4.85 THOUSAND/UL (ref 1.85–7.62)
NEUTS BAND NFR BLD MANUAL: 15 % (ref 0–8)
NEUTS SEG NFR BLD AUTO: 74 % (ref 43–75)
NITRITE UR QL STRIP: NEGATIVE
NON-SQ EPI CELLS URNS QL MICRO: ABNORMAL /HPF
NRBC BLD AUTO-RTO: 0 /100 WBCS
NT-PROBNP SERPL-MCNC: 2563 PG/ML
PH UR STRIP.AUTO: 6 [PH]
PLATELET # BLD AUTO: 128 THOUSANDS/UL (ref 149–390)
PLATELET BLD QL SMEAR: ABNORMAL
PMV BLD AUTO: 10.1 FL (ref 8.9–12.7)
POTASSIUM SERPL-SCNC: 3.1 MMOL/L (ref 3.5–5.3)
PROT SERPL-MCNC: 7.2 G/DL (ref 6.4–8.2)
PROT UR STRIP-MCNC: ABNORMAL MG/DL
RBC # BLD AUTO: 5.12 MILLION/UL (ref 3.88–5.62)
RBC #/AREA URNS AUTO: ABNORMAL /HPF
SODIUM SERPL-SCNC: 138 MMOL/L (ref 136–145)
SP GR UR STRIP.AUTO: 1.02 (ref 1–1.03)
TOTAL CELLS COUNTED SPEC: 100
TROPONIN I SERPL-MCNC: 0.03 NG/ML
UROBILINOGEN UR QL STRIP.AUTO: 0.2 E.U./DL
WBC # BLD AUTO: 5.45 THOUSAND/UL (ref 4.31–10.16)
WBC #/AREA URNS AUTO: ABNORMAL /HPF

## 2019-08-03 PROCEDURE — 81001 URINALYSIS AUTO W/SCOPE: CPT | Performed by: EMERGENCY MEDICINE

## 2019-08-03 PROCEDURE — 36415 COLL VENOUS BLD VENIPUNCTURE: CPT

## 2019-08-03 PROCEDURE — 99285 EMERGENCY DEPT VISIT HI MDM: CPT

## 2019-08-03 PROCEDURE — 87186 SC STD MICRODIL/AGAR DIL: CPT | Performed by: EMERGENCY MEDICINE

## 2019-08-03 PROCEDURE — 83880 ASSAY OF NATRIURETIC PEPTIDE: CPT | Performed by: EMERGENCY MEDICINE

## 2019-08-03 PROCEDURE — 83605 ASSAY OF LACTIC ACID: CPT | Performed by: EMERGENCY MEDICINE

## 2019-08-03 PROCEDURE — 84484 ASSAY OF TROPONIN QUANT: CPT

## 2019-08-03 PROCEDURE — 87077 CULTURE AEROBIC IDENTIFY: CPT | Performed by: EMERGENCY MEDICINE

## 2019-08-03 PROCEDURE — 99291 CRITICAL CARE FIRST HOUR: CPT | Performed by: EMERGENCY MEDICINE

## 2019-08-03 PROCEDURE — 80053 COMPREHEN METABOLIC PANEL: CPT

## 2019-08-03 PROCEDURE — 96361 HYDRATE IV INFUSION ADD-ON: CPT

## 2019-08-03 PROCEDURE — 93005 ELECTROCARDIOGRAM TRACING: CPT

## 2019-08-03 PROCEDURE — 74176 CT ABD & PELVIS W/O CONTRAST: CPT

## 2019-08-03 PROCEDURE — 87040 BLOOD CULTURE FOR BACTERIA: CPT | Performed by: EMERGENCY MEDICINE

## 2019-08-03 PROCEDURE — 85027 COMPLETE CBC AUTOMATED: CPT

## 2019-08-03 PROCEDURE — 85379 FIBRIN DEGRADATION QUANT: CPT | Performed by: EMERGENCY MEDICINE

## 2019-08-03 PROCEDURE — 71250 CT THORAX DX C-: CPT

## 2019-08-03 PROCEDURE — 85007 BL SMEAR W/DIFF WBC COUNT: CPT

## 2019-08-03 PROCEDURE — 96365 THER/PROPH/DIAG IV INF INIT: CPT

## 2019-08-03 PROCEDURE — 87086 URINE CULTURE/COLONY COUNT: CPT | Performed by: EMERGENCY MEDICINE

## 2019-08-03 RX ORDER — CEFEPIME HYDROCHLORIDE 2 G/50ML
2000 INJECTION, SOLUTION INTRAVENOUS ONCE
Status: COMPLETED | OUTPATIENT
Start: 2019-08-03 | End: 2019-08-03

## 2019-08-03 RX ADMIN — SODIUM CHLORIDE 2500 ML: 0.9 INJECTION, SOLUTION INTRAVENOUS at 22:06

## 2019-08-03 RX ADMIN — SODIUM CHLORIDE 250 ML: 0.9 INJECTION, SOLUTION INTRAVENOUS at 21:35

## 2019-08-03 RX ADMIN — CEFEPIME HYDROCHLORIDE 2000 MG: 2 INJECTION, SOLUTION INTRAVENOUS at 23:16

## 2019-08-03 RX ADMIN — SODIUM CHLORIDE 2250 ML: 0.9 INJECTION, SOLUTION INTRAVENOUS at 22:48

## 2019-08-04 ENCOUNTER — APPOINTMENT (INPATIENT)
Dept: RADIOLOGY | Facility: HOSPITAL | Age: 54
DRG: 853 | End: 2019-08-04
Payer: COMMERCIAL

## 2019-08-04 ENCOUNTER — ANESTHESIA EVENT (INPATIENT)
Dept: PERIOP | Facility: HOSPITAL | Age: 54
DRG: 853 | End: 2019-08-04
Payer: COMMERCIAL

## 2019-08-04 ENCOUNTER — HOSPITAL ENCOUNTER (INPATIENT)
Facility: HOSPITAL | Age: 54
LOS: 4 days | Discharge: HOME | DRG: 853 | End: 2019-08-08
Attending: EMERGENCY MEDICINE | Admitting: EMERGENCY MEDICINE
Payer: COMMERCIAL

## 2019-08-04 ENCOUNTER — ANESTHESIA (INPATIENT)
Dept: PERIOP | Facility: HOSPITAL | Age: 54
DRG: 853 | End: 2019-08-04
Payer: COMMERCIAL

## 2019-08-04 VITALS
OXYGEN SATURATION: 98 % | SYSTOLIC BLOOD PRESSURE: 127 MMHG | TEMPERATURE: 97.7 F | DIASTOLIC BLOOD PRESSURE: 64 MMHG | HEART RATE: 134 BPM | BODY MASS INDEX: 50.62 KG/M2 | RESPIRATION RATE: 22 BRPM | WEIGHT: 315 LBS | HEIGHT: 66 IN

## 2019-08-04 DIAGNOSIS — N17.9 AKI (ACUTE KIDNEY INJURY) (HCC): ICD-10-CM

## 2019-08-04 DIAGNOSIS — A41.9 SEPTIC SHOCK (HCC): Primary | ICD-10-CM

## 2019-08-04 DIAGNOSIS — R65.21 SEPTIC SHOCK (HCC): Primary | ICD-10-CM

## 2019-08-04 DIAGNOSIS — N20.1 RIGHT URETERAL STONE: ICD-10-CM

## 2019-08-04 PROBLEM — Z96.0 STATUS POST PLACEMENT OF URETERAL STENT: Status: ACTIVE | Noted: 2019-08-04

## 2019-08-04 PROBLEM — E87.2 METABOLIC ACIDOSIS: Status: ACTIVE | Noted: 2019-08-04

## 2019-08-04 PROBLEM — R78.81 GRAM-NEGATIVE BACTEREMIA: Status: ACTIVE | Noted: 2019-08-04

## 2019-08-04 PROBLEM — E87.20 LACTIC ACIDOSIS: Status: ACTIVE | Noted: 2019-08-04

## 2019-08-04 PROBLEM — E87.2 LACTIC ACIDOSIS: Status: ACTIVE | Noted: 2019-08-04

## 2019-08-04 PROBLEM — E87.20 METABOLIC ACIDOSIS: Status: ACTIVE | Noted: 2019-08-04

## 2019-08-04 LAB
ALBUMIN SERPL BCP-MCNC: 2.3 G/DL (ref 3.5–5)
ALP SERPL-CCNC: 79 U/L (ref 46–116)
ALT SERPL W P-5'-P-CCNC: 39 U/L (ref 12–78)
ANION GAP SERPL CALCULATED.3IONS-SCNC: 12 MMOL/L (ref 4–13)
ANION GAP SERPL CALCULATED.3IONS-SCNC: 12 MMOL/L (ref 4–13)
ANION GAP SERPL CALCULATED.3IONS-SCNC: 13 MMOL/L (ref 4–13)
AST SERPL W P-5'-P-CCNC: 51 U/L (ref 5–45)
BASE EX.OXY STD BLDV CALC-SCNC: 75.4 % (ref 60–80)
BASE EXCESS BLDA CALC-SCNC: -11.8 MMOL/L
BASE EXCESS BLDA CALC-SCNC: -13 MMOL/L (ref -2–3)
BASE EXCESS BLDA CALC-SCNC: -13.9 MMOL/L
BASE EXCESS BLDV CALC-SCNC: -5.6 MMOL/L
BASOPHILS # BLD AUTO: 0.02 THOUSANDS/ΜL (ref 0–0.1)
BASOPHILS # BLD MANUAL: 0 THOUSAND/UL (ref 0–0.1)
BASOPHILS NFR BLD AUTO: 1 % (ref 0–1)
BASOPHILS NFR MAR MANUAL: 0 % (ref 0–1)
BILIRUB DIRECT SERPL-MCNC: 0.33 MG/DL (ref 0–0.2)
BILIRUB SERPL-MCNC: 0.54 MG/DL (ref 0.2–1)
BUN SERPL-MCNC: 31 MG/DL (ref 5–25)
BUN SERPL-MCNC: 31 MG/DL (ref 5–25)
BUN SERPL-MCNC: 39 MG/DL (ref 5–25)
CA-I BLD-SCNC: 1.05 MMOL/L (ref 1.12–1.32)
CA-I BLD-SCNC: 1.16 MMOL/L (ref 1.12–1.32)
CALCIUM SERPL-MCNC: 6.9 MG/DL (ref 8.3–10.1)
CALCIUM SERPL-MCNC: 7.3 MG/DL (ref 8.3–10.1)
CALCIUM SERPL-MCNC: 7.4 MG/DL (ref 8.3–10.1)
CHLORIDE SERPL-SCNC: 112 MMOL/L (ref 100–108)
CHLORIDE SERPL-SCNC: 113 MMOL/L (ref 100–108)
CHLORIDE SERPL-SCNC: 115 MMOL/L (ref 100–108)
CO2 SERPL-SCNC: 14 MMOL/L (ref 21–32)
CREAT SERPL-MCNC: 3.28 MG/DL (ref 0.6–1.3)
CREAT SERPL-MCNC: 3.63 MG/DL (ref 0.6–1.3)
CREAT SERPL-MCNC: 3.7 MG/DL (ref 0.6–1.3)
DEPRECATED D DIMER PPP: ABNORMAL NG/ML (FEU)
EOSINOPHIL # BLD AUTO: 0.01 THOUSAND/ΜL (ref 0–0.61)
EOSINOPHIL # BLD MANUAL: 0 THOUSAND/UL (ref 0–0.4)
EOSINOPHIL NFR BLD AUTO: 0 % (ref 0–6)
EOSINOPHIL NFR BLD MANUAL: 0 % (ref 0–6)
ERYTHROCYTE [DISTWIDTH] IN BLOOD BY AUTOMATED COUNT: 13.1 % (ref 11.6–15.1)
ERYTHROCYTE [DISTWIDTH] IN BLOOD BY AUTOMATED COUNT: 13.1 % (ref 11.6–15.1)
GFR SERPL CREATININE-BSD FRML MDRD: 18 ML/MIN/1.73SQ M
GFR SERPL CREATININE-BSD FRML MDRD: 18 ML/MIN/1.73SQ M
GFR SERPL CREATININE-BSD FRML MDRD: 20 ML/MIN/1.73SQ M
GLUCOSE SERPL-MCNC: 106 MG/DL (ref 65–140)
GLUCOSE SERPL-MCNC: 120 MG/DL (ref 65–140)
GLUCOSE SERPL-MCNC: 123 MG/DL (ref 65–140)
GLUCOSE SERPL-MCNC: 169 MG/DL (ref 65–140)
HCO3 BLDA-SCNC: 11.8 MMOL/L (ref 22–28)
HCO3 BLDA-SCNC: 12.7 MMOL/L (ref 22–28)
HCO3 BLDA-SCNC: 13 MMOL/L (ref 24–30)
HCO3 BLDV-SCNC: 19.3 MMOL/L (ref 24–30)
HCT VFR BLD AUTO: 39.7 % (ref 36.5–49.3)
HCT VFR BLD AUTO: 40.3 % (ref 36.5–49.3)
HCT VFR BLD CALC: 35 % (ref 36.5–49.3)
HGB BLD-MCNC: 12.8 G/DL (ref 12–17)
HGB BLD-MCNC: 12.9 G/DL (ref 12–17)
HGB BLDA-MCNC: 11.9 G/DL (ref 12–17)
IMM GRANULOCYTES # BLD AUTO: 0.02 THOUSAND/UL (ref 0–0.2)
IMM GRANULOCYTES NFR BLD AUTO: 1 % (ref 0–2)
LACTATE SERPL-SCNC: 2.2 MMOL/L (ref 0.5–2)
LACTATE SERPL-SCNC: 3.2 MMOL/L (ref 0.5–2)
LACTATE SERPL-SCNC: 3.5 MMOL/L (ref 0.5–2)
LACTATE SERPL-SCNC: 4 MMOL/L (ref 0.5–2)
LACTATE SERPL-SCNC: 5 MMOL/L (ref 0.5–2)
LACTATE SERPL-SCNC: 5.8 MMOL/L (ref 0.5–2)
LG PLATELETS BLD QL SMEAR: PRESENT
LYMPHOCYTES # BLD AUTO: 0.31 THOUSAND/UL (ref 0.6–4.47)
LYMPHOCYTES # BLD AUTO: 0.39 THOUSANDS/ΜL (ref 0.6–4.47)
LYMPHOCYTES # BLD AUTO: 3 % (ref 14–44)
LYMPHOCYTES NFR BLD AUTO: 9 % (ref 14–44)
MAGNESIUM SERPL-MCNC: 1.3 MG/DL (ref 1.6–2.6)
MAGNESIUM SERPL-MCNC: 2.9 MG/DL (ref 1.6–2.6)
MCH RBC QN AUTO: 30.4 PG (ref 26.8–34.3)
MCH RBC QN AUTO: 30.6 PG (ref 26.8–34.3)
MCHC RBC AUTO-ENTMCNC: 31.8 G/DL (ref 31.4–37.4)
MCHC RBC AUTO-ENTMCNC: 32.5 G/DL (ref 31.4–37.4)
MCV RBC AUTO: 94 FL (ref 82–98)
MCV RBC AUTO: 96 FL (ref 82–98)
METAMYELOCYTES NFR BLD MANUAL: 4 % (ref 0–1)
MONOCYTES # BLD AUTO: 0 THOUSAND/UL (ref 0–1.22)
MONOCYTES # BLD AUTO: 0.03 THOUSAND/ΜL (ref 0.17–1.22)
MONOCYTES NFR BLD AUTO: 1 % (ref 4–12)
MONOCYTES NFR BLD: 0 % (ref 4–12)
NEUTROPHILS # BLD AUTO: 3.77 THOUSANDS/ΜL (ref 1.85–7.62)
NEUTROPHILS # BLD MANUAL: 9.75 THOUSAND/UL (ref 1.85–7.62)
NEUTS BAND NFR BLD MANUAL: 1 % (ref 0–8)
NEUTS SEG NFR BLD AUTO: 88 % (ref 43–75)
NEUTS SEG NFR BLD AUTO: 92 % (ref 43–75)
NRBC BLD AUTO-RTO: 0 /100 WBCS
NRBC BLD AUTO-RTO: 0 /100 WBCS
O2 CT BLDA-SCNC: 18.8 ML/DL (ref 16–23)
O2 CT BLDA-SCNC: 19.7 ML/DL (ref 16–23)
O2 CT BLDV-SCNC: 14.7 ML/DL
OXYHGB MFR BLDA: 94.6 % (ref 94–97)
OXYHGB MFR BLDA: 95.6 % (ref 94–97)
PCO2 BLD: 14 MMOL/L (ref 21–32)
PCO2 BLD: 28.7 MM HG (ref 42–50)
PCO2 BLDA: 22.7 MM HG (ref 36–44)
PCO2 BLDA: 32.1 MM HG (ref 36–44)
PCO2 BLDV: 36 MM HG (ref 42–50)
PH BLD: 7.26 [PH] (ref 7.3–7.4)
PH BLDA: 7.21 [PH] (ref 7.35–7.45)
PH BLDA: 7.33 [PH] (ref 7.35–7.45)
PH BLDV: 7.35 [PH] (ref 7.3–7.4)
PHOSPHATE SERPL-MCNC: 1.8 MG/DL (ref 2.7–4.5)
PHOSPHATE SERPL-MCNC: 5.4 MG/DL (ref 2.7–4.5)
PLATELET # BLD AUTO: 84 THOUSANDS/UL (ref 149–390)
PLATELET BLD QL SMEAR: ABNORMAL
PMV BLD AUTO: 10.6 FL (ref 8.9–12.7)
PMV BLD AUTO: 11.4 FL (ref 8.9–12.7)
PO2 BLD: 69 MM HG (ref 35–45)
PO2 BLDA: 76 MM HG (ref 75–129)
PO2 BLDA: 95.3 MM HG (ref 75–129)
PO2 BLDV: 41.9 MM HG (ref 35–45)
POLYCHROMASIA BLD QL SMEAR: PRESENT
POTASSIUM BLD-SCNC: 3.4 MMOL/L (ref 3.5–5.3)
POTASSIUM SERPL-SCNC: 3 MMOL/L (ref 3.5–5.3)
POTASSIUM SERPL-SCNC: 3.4 MMOL/L (ref 3.5–5.3)
POTASSIUM SERPL-SCNC: 4.8 MMOL/L (ref 3.5–5.3)
PROCALCITONIN SERPL-MCNC: 182.07 NG/ML
PROCALCITONIN SERPL-MCNC: 260.77 NG/ML
PROT SERPL-MCNC: 6.6 G/DL (ref 6.4–8.2)
RBC # BLD AUTO: 4.21 MILLION/UL (ref 3.88–5.62)
RBC # BLD AUTO: 4.21 MILLION/UL (ref 3.88–5.62)
RBC MORPH BLD: PRESENT
SAO2 % BLD FROM PO2: 91 % (ref 95–98)
SODIUM BLD-SCNC: 141 MMOL/L (ref 136–145)
SODIUM SERPL-SCNC: 138 MMOL/L (ref 136–145)
SODIUM SERPL-SCNC: 139 MMOL/L (ref 136–145)
SODIUM SERPL-SCNC: 142 MMOL/L (ref 136–145)
SPECIMEN SOURCE: ABNORMAL
WBC # BLD AUTO: 10.48 THOUSAND/UL (ref 4.31–10.16)
WBC # BLD AUTO: 4.24 THOUSAND/UL (ref 4.31–10.16)

## 2019-08-04 PROCEDURE — 83735 ASSAY OF MAGNESIUM: CPT | Performed by: EMERGENCY MEDICINE

## 2019-08-04 PROCEDURE — 80048 BASIC METABOLIC PNL TOTAL CA: CPT | Performed by: EMERGENCY MEDICINE

## 2019-08-04 PROCEDURE — 83605 ASSAY OF LACTIC ACID: CPT | Performed by: EMERGENCY MEDICINE

## 2019-08-04 PROCEDURE — BT1DYZZ FLUOROSCOPY OF RIGHT KIDNEY, URETER AND BLADDER USING OTHER CONTRAST: ICD-10-PCS | Performed by: UROLOGY

## 2019-08-04 PROCEDURE — 84100 ASSAY OF PHOSPHORUS: CPT | Performed by: PHYSICIAN ASSISTANT

## 2019-08-04 PROCEDURE — C1769 GUIDE WIRE: HCPCS | Performed by: UROLOGY

## 2019-08-04 PROCEDURE — 84132 ASSAY OF SERUM POTASSIUM: CPT

## 2019-08-04 PROCEDURE — 36415 COLL VENOUS BLD VENIPUNCTURE: CPT | Performed by: EMERGENCY MEDICINE

## 2019-08-04 PROCEDURE — 52332 CYSTOSCOPY AND TREATMENT: CPT | Performed by: UROLOGY

## 2019-08-04 PROCEDURE — NC001 PR NO CHARGE: Performed by: INTERNAL MEDICINE

## 2019-08-04 PROCEDURE — 82330 ASSAY OF CALCIUM: CPT | Performed by: PHYSICIAN ASSISTANT

## 2019-08-04 PROCEDURE — NC001 PR NO CHARGE: Performed by: EMERGENCY MEDICINE

## 2019-08-04 PROCEDURE — 85027 COMPLETE CBC AUTOMATED: CPT | Performed by: EMERGENCY MEDICINE

## 2019-08-04 PROCEDURE — 85014 HEMATOCRIT: CPT

## 2019-08-04 PROCEDURE — 80076 HEPATIC FUNCTION PANEL: CPT | Performed by: EMERGENCY MEDICINE

## 2019-08-04 PROCEDURE — 84100 ASSAY OF PHOSPHORUS: CPT | Performed by: EMERGENCY MEDICINE

## 2019-08-04 PROCEDURE — 99291 CRITICAL CARE FIRST HOUR: CPT | Performed by: EMERGENCY MEDICINE

## 2019-08-04 PROCEDURE — 87086 URINE CULTURE/COLONY COUNT: CPT | Performed by: UROLOGY

## 2019-08-04 PROCEDURE — 80048 BASIC METABOLIC PNL TOTAL CA: CPT | Performed by: PHYSICIAN ASSISTANT

## 2019-08-04 PROCEDURE — 03HY32Z INSERTION OF MONITORING DEVICE INTO UPPER ARTERY, PERCUTANEOUS APPROACH: ICD-10-PCS | Performed by: EMERGENCY MEDICINE

## 2019-08-04 PROCEDURE — 99292 CRITICAL CARE ADDL 30 MIN: CPT | Performed by: INTERNAL MEDICINE

## 2019-08-04 PROCEDURE — 87186 SC STD MICRODIL/AGAR DIL: CPT | Performed by: UROLOGY

## 2019-08-04 PROCEDURE — 71045 X-RAY EXAM CHEST 1 VIEW: CPT

## 2019-08-04 PROCEDURE — 82805 BLOOD GASES W/O2 SATURATION: CPT | Performed by: PHYSICIAN ASSISTANT

## 2019-08-04 PROCEDURE — 85007 BL SMEAR W/DIFF WBC COUNT: CPT | Performed by: EMERGENCY MEDICINE

## 2019-08-04 PROCEDURE — 74420 UROGRAPHY RTRGR +-KUB: CPT

## 2019-08-04 PROCEDURE — 0T768DZ DILATION OF RIGHT URETER WITH INTRALUMINAL DEVICE, VIA NATURAL OR ARTIFICIAL OPENING ENDOSCOPIC: ICD-10-PCS | Performed by: UROLOGY

## 2019-08-04 PROCEDURE — 36620 INSERTION CATHETER ARTERY: CPT | Performed by: EMERGENCY MEDICINE

## 2019-08-04 PROCEDURE — 99245 OFF/OP CONSLTJ NEW/EST HI 55: CPT | Performed by: UROLOGY

## 2019-08-04 PROCEDURE — 83605 ASSAY OF LACTIC ACID: CPT | Performed by: PHYSICIAN ASSISTANT

## 2019-08-04 PROCEDURE — 99024 POSTOP FOLLOW-UP VISIT: CPT | Performed by: UROLOGY

## 2019-08-04 PROCEDURE — 82947 ASSAY GLUCOSE BLOOD QUANT: CPT

## 2019-08-04 PROCEDURE — 82330 ASSAY OF CALCIUM: CPT

## 2019-08-04 PROCEDURE — 82803 BLOOD GASES ANY COMBINATION: CPT

## 2019-08-04 PROCEDURE — 84295 ASSAY OF SERUM SODIUM: CPT

## 2019-08-04 PROCEDURE — 87077 CULTURE AEROBIC IDENTIFY: CPT | Performed by: UROLOGY

## 2019-08-04 PROCEDURE — 36556 INSERT NON-TUNNEL CV CATH: CPT | Performed by: EMERGENCY MEDICINE

## 2019-08-04 PROCEDURE — 4A133J1 MONITORING OF ARTERIAL PULSE, PERIPHERAL, PERCUTANEOUS APPROACH: ICD-10-PCS | Performed by: EMERGENCY MEDICINE

## 2019-08-04 PROCEDURE — 02HV33Z INSERTION OF INFUSION DEVICE INTO SUPERIOR VENA CAVA, PERCUTANEOUS APPROACH: ICD-10-PCS | Performed by: EMERGENCY MEDICINE

## 2019-08-04 PROCEDURE — 84145 PROCALCITONIN (PCT): CPT | Performed by: EMERGENCY MEDICINE

## 2019-08-04 PROCEDURE — 82805 BLOOD GASES W/O2 SATURATION: CPT | Performed by: EMERGENCY MEDICINE

## 2019-08-04 PROCEDURE — NC001 PR NO CHARGE: Performed by: UROLOGY

## 2019-08-04 PROCEDURE — 85025 COMPLETE CBC W/AUTO DIFF WBC: CPT | Performed by: EMERGENCY MEDICINE

## 2019-08-04 PROCEDURE — 83735 ASSAY OF MAGNESIUM: CPT | Performed by: PHYSICIAN ASSISTANT

## 2019-08-04 PROCEDURE — 4A133B1 MONITORING OF ARTERIAL PRESSURE, PERIPHERAL, PERCUTANEOUS APPROACH: ICD-10-PCS | Performed by: EMERGENCY MEDICINE

## 2019-08-04 RX ORDER — NOREPINEPHRINE BITARTRATE 1 MG/ML
INJECTION, SOLUTION INTRAVENOUS
Status: DISPENSED
Start: 2019-08-04 | End: 2019-08-04

## 2019-08-04 RX ORDER — CALCIUM CHLORIDE 100 MG/ML
1 INJECTION INTRAVENOUS; INTRAVENTRICULAR ONCE
Status: DISCONTINUED | OUTPATIENT
Start: 2019-08-04 | End: 2019-08-04

## 2019-08-04 RX ORDER — LIDOCAINE HYDROCHLORIDE 10 MG/ML
INJECTION, SOLUTION EPIDURAL; INFILTRATION; INTRACAUDAL; PERINEURAL
Status: COMPLETED
Start: 2019-08-04 | End: 2019-08-04

## 2019-08-04 RX ORDER — ACETAMINOPHEN 650 MG/1
650 SUPPOSITORY RECTAL EVERY 4 HOURS PRN
Status: DISCONTINUED | OUTPATIENT
Start: 2019-08-04 | End: 2019-08-05

## 2019-08-04 RX ORDER — POTASSIUM CHLORIDE 29.8 MG/ML
40 INJECTION INTRAVENOUS ONCE
Status: COMPLETED | OUTPATIENT
Start: 2019-08-04 | End: 2019-08-04

## 2019-08-04 RX ORDER — FENTANYL CITRATE 50 UG/ML
25 INJECTION, SOLUTION INTRAMUSCULAR; INTRAVENOUS ONCE
Status: COMPLETED | OUTPATIENT
Start: 2019-08-04 | End: 2019-08-04

## 2019-08-04 RX ORDER — MAGNESIUM SULFATE HEPTAHYDRATE 40 MG/ML
4 INJECTION, SOLUTION INTRAVENOUS ONCE
Status: COMPLETED | OUTPATIENT
Start: 2019-08-04 | End: 2019-08-04

## 2019-08-04 RX ORDER — MAGNESIUM HYDROXIDE 1200 MG/15ML
LIQUID ORAL AS NEEDED
Status: DISCONTINUED | OUTPATIENT
Start: 2019-08-04 | End: 2019-08-04 | Stop reason: HOSPADM

## 2019-08-04 RX ORDER — FENTANYL CITRATE 50 UG/ML
INJECTION, SOLUTION INTRAMUSCULAR; INTRAVENOUS AS NEEDED
Status: DISCONTINUED | OUTPATIENT
Start: 2019-08-04 | End: 2019-08-04 | Stop reason: SURG

## 2019-08-04 RX ORDER — LANOLIN ALCOHOL/MO/W.PET/CERES
6 CREAM (GRAM) TOPICAL
Status: DISCONTINUED | OUTPATIENT
Start: 2019-08-04 | End: 2019-08-05

## 2019-08-04 RX ORDER — ONDANSETRON 2 MG/ML
INJECTION INTRAMUSCULAR; INTRAVENOUS AS NEEDED
Status: DISCONTINUED | OUTPATIENT
Start: 2019-08-04 | End: 2019-08-04 | Stop reason: SURG

## 2019-08-04 RX ORDER — KETAMINE HYDROCHLORIDE 50 MG/ML
INJECTION, SOLUTION, CONCENTRATE INTRAMUSCULAR; INTRAVENOUS AS NEEDED
Status: DISCONTINUED | OUTPATIENT
Start: 2019-08-04 | End: 2019-08-04 | Stop reason: SURG

## 2019-08-04 RX ORDER — MIDAZOLAM HYDROCHLORIDE 1 MG/ML
INJECTION INTRAMUSCULAR; INTRAVENOUS AS NEEDED
Status: DISCONTINUED | OUTPATIENT
Start: 2019-08-04 | End: 2019-08-04 | Stop reason: SURG

## 2019-08-04 RX ORDER — VASOPRESSIN 20 U/ML
INJECTION PARENTERAL AS NEEDED
Status: DISCONTINUED | OUTPATIENT
Start: 2019-08-04 | End: 2019-08-04 | Stop reason: SURG

## 2019-08-04 RX ORDER — SODIUM CHLORIDE, SODIUM GLUCONATE, SODIUM ACETATE, POTASSIUM CHLORIDE, MAGNESIUM CHLORIDE, SODIUM PHOSPHATE, DIBASIC, AND POTASSIUM PHOSPHATE .53; .5; .37; .037; .03; .012; .00082 G/100ML; G/100ML; G/100ML; G/100ML; G/100ML; G/100ML; G/100ML
1000 INJECTION, SOLUTION INTRAVENOUS ONCE
Status: COMPLETED | OUTPATIENT
Start: 2019-08-04 | End: 2019-08-04

## 2019-08-04 RX ORDER — SODIUM CHLORIDE 9 MG/ML
INJECTION, SOLUTION INTRAVENOUS CONTINUOUS PRN
Status: DISCONTINUED | OUTPATIENT
Start: 2019-08-04 | End: 2019-08-04 | Stop reason: SURG

## 2019-08-04 RX ORDER — FENTANYL CITRATE 50 UG/ML
INJECTION, SOLUTION INTRAMUSCULAR; INTRAVENOUS
Status: COMPLETED
Start: 2019-08-04 | End: 2019-08-04

## 2019-08-04 RX ORDER — HEPARIN SODIUM 5000 [USP'U]/ML
5000 INJECTION, SOLUTION INTRAVENOUS; SUBCUTANEOUS EVERY 8 HOURS SCHEDULED
Status: DISCONTINUED | OUTPATIENT
Start: 2019-08-04 | End: 2019-08-08 | Stop reason: HOSPADM

## 2019-08-04 RX ORDER — LANOLIN ALCOHOL/MO/W.PET/CERES
3 CREAM (GRAM) TOPICAL
Status: DISCONTINUED | OUTPATIENT
Start: 2019-08-04 | End: 2019-08-04

## 2019-08-04 RX ORDER — SODIUM CHLORIDE, SODIUM GLUCONATE, SODIUM ACETATE, POTASSIUM CHLORIDE, MAGNESIUM CHLORIDE, SODIUM PHOSPHATE, DIBASIC, AND POTASSIUM PHOSPHATE .53; .5; .37; .037; .03; .012; .00082 G/100ML; G/100ML; G/100ML; G/100ML; G/100ML; G/100ML; G/100ML
125 INJECTION, SOLUTION INTRAVENOUS CONTINUOUS
Status: DISCONTINUED | OUTPATIENT
Start: 2019-08-04 | End: 2019-08-04

## 2019-08-04 RX ORDER — CALCIUM CARBONATE 200(500)MG
500 TABLET,CHEWABLE ORAL DAILY PRN
Status: DISCONTINUED | OUTPATIENT
Start: 2019-08-04 | End: 2019-08-08 | Stop reason: HOSPADM

## 2019-08-04 RX ORDER — LABETALOL 20 MG/4 ML (5 MG/ML) INTRAVENOUS SYRINGE
10 EVERY 4 HOURS PRN
Status: DISCONTINUED | OUTPATIENT
Start: 2019-08-04 | End: 2019-08-08 | Stop reason: HOSPADM

## 2019-08-04 RX ORDER — SODIUM CHLORIDE, SODIUM GLUCONATE, SODIUM ACETATE, POTASSIUM CHLORIDE, MAGNESIUM CHLORIDE, SODIUM PHOSPHATE, DIBASIC, AND POTASSIUM PHOSPHATE .53; .5; .37; .037; .03; .012; .00082 G/100ML; G/100ML; G/100ML; G/100ML; G/100ML; G/100ML; G/100ML
1000 INJECTION, SOLUTION INTRAVENOUS ONCE
Status: DISCONTINUED | OUTPATIENT
Start: 2019-08-04 | End: 2019-08-04

## 2019-08-04 RX ADMIN — KETAMINE HYDROCHLORIDE 10 MG: 50 INJECTION, SOLUTION INTRAMUSCULAR; INTRAVENOUS at 02:48

## 2019-08-04 RX ADMIN — KETAMINE HYDROCHLORIDE 10 MG: 50 INJECTION, SOLUTION INTRAMUSCULAR; INTRAVENOUS at 02:44

## 2019-08-04 RX ADMIN — POTASSIUM CHLORIDE 40 MEQ: 400 INJECTION, SOLUTION INTRAVENOUS at 07:30

## 2019-08-04 RX ADMIN — SODIUM CHLORIDE, SODIUM GLUCONATE, SODIUM ACETATE, POTASSIUM CHLORIDE, MAGNESIUM CHLORIDE, SODIUM PHOSPHATE, DIBASIC, AND POTASSIUM PHOSPHATE 75 ML/HR: .53; .5; .37; .037; .03; .012; .00082 INJECTION, SOLUTION INTRAVENOUS at 14:46

## 2019-08-04 RX ADMIN — VASOPRESSIN 1 UNITS: 20 INJECTION INTRAVENOUS at 03:07

## 2019-08-04 RX ADMIN — Medication 50 MEQ: at 17:19

## 2019-08-04 RX ADMIN — ACETAMINOPHEN 650 MG: 650 SUPPOSITORY RECTAL at 03:54

## 2019-08-04 RX ADMIN — FENTANYL CITRATE 50 MCG: 50 INJECTION, SOLUTION INTRAMUSCULAR; INTRAVENOUS at 02:49

## 2019-08-04 RX ADMIN — KETAMINE HYDROCHLORIDE 20 MG: 50 INJECTION, SOLUTION INTRAMUSCULAR; INTRAVENOUS at 02:43

## 2019-08-04 RX ADMIN — LABETALOL 20 MG/4 ML (5 MG/ML) INTRAVENOUS SYRINGE 10 MG: at 20:32

## 2019-08-04 RX ADMIN — SODIUM CHLORIDE: 0.9 INJECTION, SOLUTION INTRAVENOUS at 02:33

## 2019-08-04 RX ADMIN — FENTANYL CITRATE 25 MCG: 50 INJECTION, SOLUTION INTRAMUSCULAR; INTRAVENOUS at 02:00

## 2019-08-04 RX ADMIN — ONDANSETRON 4 MG: 2 INJECTION INTRAMUSCULAR; INTRAVENOUS at 02:49

## 2019-08-04 RX ADMIN — VASOPRESSIN 0.04 UNITS/MIN: 20 INJECTION INTRAVENOUS at 03:10

## 2019-08-04 RX ADMIN — Medication 50 MEQ: at 11:45

## 2019-08-04 RX ADMIN — CEFEPIME HYDROCHLORIDE 2000 MG: 2 INJECTION, POWDER, FOR SOLUTION INTRAVENOUS at 11:45

## 2019-08-04 RX ADMIN — MIDAZOLAM 2 MG: 1 INJECTION INTRAMUSCULAR; INTRAVENOUS at 02:41

## 2019-08-04 RX ADMIN — SODIUM CHLORIDE, SODIUM GLUCONATE, SODIUM ACETATE, POTASSIUM CHLORIDE, MAGNESIUM CHLORIDE, SODIUM PHOSPHATE, DIBASIC, AND POTASSIUM PHOSPHATE 1000 ML: .53; .5; .37; .037; .03; .012; .00082 INJECTION, SOLUTION INTRAVENOUS at 08:58

## 2019-08-04 RX ADMIN — KETAMINE HYDROCHLORIDE 10 MG: 50 INJECTION, SOLUTION INTRAMUSCULAR; INTRAVENOUS at 02:41

## 2019-08-04 RX ADMIN — SODIUM CHLORIDE, SODIUM GLUCONATE, SODIUM ACETATE, POTASSIUM CHLORIDE, MAGNESIUM CHLORIDE, SODIUM PHOSPHATE, DIBASIC, AND POTASSIUM PHOSPHATE 75 ML/HR: .53; .5; .37; .037; .03; .012; .00082 INJECTION, SOLUTION INTRAVENOUS at 02:43

## 2019-08-04 RX ADMIN — NOREPINEPHRINE BITARTRATE 5 MCG/MIN: 1 INJECTION INTRAVENOUS at 08:30

## 2019-08-04 RX ADMIN — SODIUM BICARBONATE 125 ML/HR: 84 INJECTION, SOLUTION INTRAVENOUS at 20:46

## 2019-08-04 RX ADMIN — EPINEPHRINE 50 MCG: 0.1 INJECTION, SOLUTION ENDOTRACHEAL; INTRACARDIAC; INTRAVENOUS at 03:15

## 2019-08-04 RX ADMIN — MELATONIN 6 MG: 3 TAB ORAL at 22:50

## 2019-08-04 RX ADMIN — CALCIUM CHLORIDE 1 G: 100 INJECTION, SOLUTION INTRAVENOUS at 16:19

## 2019-08-04 RX ADMIN — Medication 50 MEQ: at 15:19

## 2019-08-04 RX ADMIN — EPINEPHRINE 50 MCG: 0.1 INJECTION, SOLUTION ENDOTRACHEAL; INTRACARDIAC; INTRAVENOUS at 03:10

## 2019-08-04 RX ADMIN — LIDOCAINE HYDROCHLORIDE 30 MG: 10 INJECTION, SOLUTION EPIDURAL; INFILTRATION; INTRACAUDAL; PERINEURAL at 02:41

## 2019-08-04 RX ADMIN — NOREPINEPHRINE BITARTRATE 2 MCG/MIN: 1 INJECTION INTRAVENOUS at 05:51

## 2019-08-04 RX ADMIN — FENTANYL CITRATE 50 MCG: 50 INJECTION, SOLUTION INTRAMUSCULAR; INTRAVENOUS at 02:51

## 2019-08-04 RX ADMIN — MAGNESIUM SULFATE HEPTAHYDRATE 4 G: 40 INJECTION, SOLUTION INTRAVENOUS at 07:30

## 2019-08-04 RX ADMIN — VASOPRESSIN 0.04 UNITS/MIN: 20 INJECTION INTRAVENOUS at 03:08

## 2019-08-04 RX ADMIN — NOREPINEPHRINE BITARTRATE 0.5 MCG/MIN: 1 INJECTION INTRAVENOUS at 02:00

## 2019-08-04 RX ADMIN — HEPARIN SODIUM 5000 UNITS: 5000 INJECTION INTRAVENOUS; SUBCUTANEOUS at 14:52

## 2019-08-04 RX ADMIN — SODIUM CHLORIDE, SODIUM GLUCONATE, SODIUM ACETATE, POTASSIUM CHLORIDE, MAGNESIUM CHLORIDE, SODIUM PHOSPHATE, DIBASIC, AND POTASSIUM PHOSPHATE 1000 ML: .53; .5; .37; .037; .03; .012; .00082 INJECTION, SOLUTION INTRAVENOUS at 15:20

## 2019-08-04 RX ADMIN — MIDAZOLAM 2 MG: 1 INJECTION INTRAMUSCULAR; INTRAVENOUS at 02:43

## 2019-08-04 RX ADMIN — POTASSIUM PHOSPHATE, MONOBASIC AND POTASSIUM PHOSPHATE, DIBASIC 30 MMOL: 224; 236 INJECTION, SOLUTION INTRAVENOUS at 08:28

## 2019-08-04 RX ADMIN — CEFEPIME HYDROCHLORIDE 2000 MG: 2 INJECTION, POWDER, FOR SOLUTION INTRAVENOUS at 23:56

## 2019-08-04 RX ADMIN — HEPARIN SODIUM 5000 UNITS: 5000 INJECTION INTRAVENOUS; SUBCUTANEOUS at 22:11

## 2019-08-04 NOTE — OP NOTE
Operative Note     PATIENT:  Twyla Rollins (MRN [id number])    DATE OF PROCEDURE:   8/4/2019    PRE-OP DIAGNOSIS:   1) right hydronephrosis  2) right ureteral calculus  3) septic shock    POST-OP DIAGNOSIS:   1) right hydronephrosis  2) right ureteral calculus  3) septic shock      PROCEDURES PERFORMED:  1) Cystoscopy  2) right retrograde pyelography with fluoroscopic interpretation  3)  right ureteral stent placement     SURGEON:  Brennan Kulkarni MD    ASSISTANTS:      NOTE:  There were no qualified teaching residents to assist with this case    ANESTHESIA: IV sedation    COMPLICATIONS:   None    ANTIBIOTICS:  Cefepime    INTRAOPERATIVE THROMBOEMBOLISM PROPHYLAXIS:  Pneumatic compression stockings     FINDINGS:  The stone was non radiopaque on fluoroscopy  Retrograde pyelogram reveals minimal hydroureteronephrosis  There was drainage of thick turbid semi purulence urine following successful stent placement which was submitted for culture    PROCEDURE IN DETAIL:   The patient was identified and brought to the OR  Antibiotic prophylaxis and DVT prophylaxis were administered  They were placed in the comfortable dorsal lithotomy position with care to pad all pressure points  They were prepped and draped in the usual sterile fashion using hibiclens  A surgical time out was performed with all in the room in agreement with the correct patient, procedure, indications, and laterality  A 21-Yi rigid cystoscope was used to enter the bladder  The bladder was inspected in its entirety and there were no lesions noted  The ureteral orifices were identified in their normal orthotopic positions  The right ureteral orifice was identified and a 5 Fr open ended catheter was placed into the ureteral orifice  The stone was not visible on  fluoroscopic guidance  A retrograde pyelogram was performed with injection of 50/50 Isovue which demonstrated findings as described above    A Sensor wire was up to the kidney under fluoroscopic guidance  A  JJ stent was then passed up the wire  under fluoroscopic guidance into the right  kidney with a good curl noted in the kidney and in the bladder  The bladder was drained  The patient was placed back supine, awakened from general anesthesia and brought to recovery room in stable condition  SPECIMENS:   Order Name Source Comment Collection Info Order Time   URINE CULTURE Urine, Other  Collected By: Symone Byers MD 8/4/2019  3:00 AM        IMPLANTS:   6 x 24 cm right double-J ureteral stent    COMPLICATIONS: none    DISPOSITION: extubated and stable to ICU    PLAN:   Patient will return to the medical ICU  He will require continued management of his urosepsis  Please follow up on the urine culture submitted from the operating room today  Ultimately will require a 14 day course of culture directed antibiotics  He will require secondary procedure to extract the stone via ureteroscopy  This will be coordinated as an outpatient when he has recovered from this acute septic event

## 2019-08-04 NOTE — H&P
History and Physical - Critical Care   Rosmery Prasad 49 y o  male MRN: [id number]  Unit/Bed#: MICU 12 Encounter: [id number]      Reason for Admission/Chief Complaint   Urosepsis/septic shock/obstructing ureteral stone      History of Present Illness   Rosmery Prasad is a 49 y o  male who presents as a transfer from [facility]  Patient presented to their ED today stating that for the last 2 days he has been having progressively worsening R flank pain, chills/sweats/difficulty urinating/dysuria with a hx of kidney stones  In ED found to have 4mm R sided obstructing stone in mid ureter with mild-moderate right-sided hydroureteronephrosis  Received fluid resuscitation in ED, flown to [place] for stabilization in MICU and urologic intervention tonight  Upon presentation to micu patient awake and alert, received CVC RIJ, R radial arterial line and placed on levophed/vasopressin prior to surgical intervention  History obtained from chart review and the patient      Past Medical History     Past Medical History:   Diagnosis Date    Gout     Kidney stone        Past Surgical History     Past Surgical History:   Procedure Laterality Date    NO PAST SURGERIES  03/23/2015    Last assessed         Family History     Family History   Problem Relation Age of Onset    Cancer Mother        Social History     Social History     Tobacco Use   Smoking Status Never Smoker   Smokeless Tobacco Never Used     Social History     Substance and Sexual Activity   Alcohol Use No     Social History     Substance and Sexual Activity   Drug Use No     Marital Status: Single    Medications:  Current Facility-Administered Medications   Medication Dose Route Frequency    cefepime (MAXIPIME) 2,000 mg in dextrose 5 % 50 mL IVPB  2,000 mg Intravenous Q12H    fentanyl citrate (PF) 100 MCG/2ML **ADS Override Pull**        fentanyl citrate (PF) 100 MCG/2ML 25 mcg  25 mcg Intravenous Once    lidocaine (PF) (XYLOCAINE-MPF) 1 % injection **ADS Override Pull**        multi-electrolyte (ISOLYTE-S PH 7 4 equivalent) IV solution  75 mL/hr Intravenous Continuous    norepinephrine (LEVOPHED) 1 mg/mL injection **ADS Override Pull**        norepinephrine (LEVOPHED) 4 mg (STANDARD CONCENTRATION) IV in sodium chloride 0 9% 250 mL  0 5 mcg/min Intravenous Continuous    vasopressin (PITRESSIN) 20 Units in sodium chloride 0 9 % 100 mL infusion  0 04 Units/min Intravenous Continuous       Medications Prior to Admission     Prior to Admission medications    Medication Sig Start Date End Date Taking? Authorizing Provider   colchicine (COLCRYS) 0 6 mg tablet Take one tablet by mouth now then repeat in one hour if still having pain may repeat again in one hour  Patient not taking: Reported on 8/3/2019 7/27/19   Claudean Stai Seitzinger, CRNP   predniSONE 10 mg tablet 30 mg by mouth daily for 3 days, then 20 mg by mouth daily for 3 days, then 10 mg by mouth daily for 3 days, then stop  Patient not taking: Reported on 8/3/2019 7/16/19   Claudean Stai Seitzinger, CRNP   tamsulosin (FLOMAX) 0 4 mg TAKE 1 CAPSULE BY MOUTH EVERY 24 HOURS 12/7/18   Claudean Stai Seitzinger, CRNP       Allergies   No Known Allergies    Review of Systems   Review of Systems   Constitutional: Positive for chills, diaphoresis and fatigue  Negative for fever  HENT: Negative  Respiratory: Positive for shortness of breath  Negative for chest tightness, wheezing and stridor  Cardiovascular: Negative for chest pain, palpitations and leg swelling  Gastrointestinal: Negative for abdominal distention, abdominal pain, constipation, diarrhea, nausea and vomiting  Genitourinary: Positive for decreased urine volume, difficulty urinating, dysuria, flank pain and urgency  Negative for frequency and hematuria  Skin: Negative  Neurological: Positive for dizziness and light-headedness  Negative for syncope, weakness and headaches  Psychiatric/Behavioral: Negative          Temperature:   Temp (24hrs), Av 4 °F (36 9 °C), Min:97 7 °F (36 5 °C), Max:99 °F (37 2 °C)    Current Temperature: 99 °F (37 2 °C)  Vitals:  Vitals:    19 0120 19 0130   BP: (!) 51/41 (!) 74/51   BP [facility]: Left arm    Pulse: (!) 137 (!) 134   Resp: (!) 25 (!) 37   Temp: 99 °F (37 2 °C)    TempSrc: Oral    SpO2: 95%    Height: 5' 6" (1 676 m)          Weights:   IBW: 63 8 kg  Body mass index is 54 98 kg/m²  Hemodynamic Monitoring:  N/A     Non-Invasive/Invasive Ventilation Settings:  Respiratory    Lab Data (Last 4 hours)    None         O2/Vent Data (Last 4 hours)    None              No results found for: PHART, KYU9ASN, PO2ART, HRH2GLV, [id number], BEART, SOURCE  SpO2: SpO2: 95 %, SpO2 Device: O2 Device: Nasal cannula       Physical Exam   Physical Exam   Constitutional: He is oriented to person, place, and time  He appears well-developed and well-nourished  No distress  HENT:   Head: Normocephalic and atraumatic  Right Ear: External ear normal    Left Ear: External ear normal    Nose: Nose normal    Mouth/Throat: Oropharynx is clear and moist  No oropharyngeal exudate  Eyes: Conjunctivae are normal  Right eye exhibits no discharge  Left eye exhibits no discharge  No scleral icterus  Neck: Normal range of motion  Neck supple  Cardiovascular: Regular rhythm, normal heart sounds and intact distal pulses  Tachycardia present  Exam reveals no gallop and no friction rub  No murmur heard  Pulmonary/Chest: Effort normal and breath sounds normal  No stridor  No respiratory distress  He has no wheezes  He has no rales  Abdominal: Soft  Normal appearance and bowel sounds are normal  He exhibits no distension and no mass  There is no tenderness  There is no rigidity, no rebound, no guarding and no CVA tenderness  Musculoskeletal: He exhibits no edema, tenderness or deformity  Neurological: He is alert and oriented to person, place, and time  No cranial nerve deficit or sensory deficit  Skin: Skin is warm  Capillary refill takes less than 2 seconds  No rash noted  He is diaphoretic  No erythema  No pallor  Psychiatric: He has a normal mood and affect  His behavior is normal  Judgment and thought content normal        Labs:  Results from last 7 days   Lab Units 19  2141   WBC Thousand/uL 5 45   HEMOGLOBIN g/dL 15 7   HEMATOCRIT % 48 8   PLATELETS Thousands/uL 128*   MONO PCT % 3*      Results from last 7 days   Lab Units 19  2141   SODIUM mmol/L 138   POTASSIUM mmol/L 3 1*   CHLORIDE mmol/L 103   CO2 mmol/L 16*   BUN mg/dL 28*   CREATININE mg/dL 3 58*   CALCIUM mg/dL 8 9   ALK PHOS U/L 155*   ALT U/L 39   AST U/L 40   ALBUMIN g/dL 2 8*                  Results from last 7 days   Lab Units 19  0008 19  2159   LACTIC ACID mmol/L 5 0* 6 2*     0   Lab Value Date/Time    TROPONINI 0 03 2019         Imagin/3/19 CT chest/abd/pelvis: Mild to moderate right-sided hydroureteronephrosis with a 4 mm obstructing stone in the mid right ureter  Nonobstructing bilateral intrarenal calculi  I have personally reviewed pertinent reports  ECG 12 Lead Documentation Only  Date/Time: 8/3/2019 10:12 PM  Performed by: Betty Monterroso MD  Authorized by: Betty Monterroso MD     Comments:      Sinus tachycardia at 132 beats per minute  T-wave abnormality laterally  No ST elevations  No ectopy  No old EKG  Micro:    Lab Results   Component Value Date    WOUNDCULT No growth 2019       ______________________________________________________________________    Assessment and Plan     Active Problems:    * No active hospital problems  *  Resolved Problems:    * No resolved hospital problems   *        Plan:  Neuro:   · Serial Neuro Exams  · Management of PAD  · Analgesia  · Sedation plan: Propofol will determine on return from OR mcg/kg/min  · RASS goal: 0 Alert and Calm  · Delirium monitoring/management  · Regulate Sleep-wake cycle/CAM-ICU daily  · Daily SAT  CV:   · Cardiac infusions: Levophed, 16 mcg/min, Vasopressin,  04 Units/min  · Wean as able  · MAP goal > 65  · Rhythm: Sinus Tachycardia  · Follow rhythm on telemetry  Lung:   · Assess daily readiness to extubate: SBT in coordination with SAT  · SBT plan:   · Chlorhexidine ordered: no  · Pulmonary toileting  GI:   · Stress ulcer prophylaxis: Protonix IV and No prophylaxis needed  · Bowel regimen: none needed at this time  FEN:   · Fluid/Diuretic plan:   · Goal 24 hour fluid balance: positive  · Nutrition/diet plan: NPO for OR  · Replete electrolytes with goals: K >4 0, Mag >2 0, and Phos >3 0  :   · Indwelling Berumen present: no   · Trend UOP and BUN/creat  · Strict I and O  · Obstructing R sided ureterolithiasis with mild-moderate hydronephrosis  · Urology taking to OR for stenting, and following  · ADRIANNE likely secondary to sepsis/hydro  · Creatinine 3 58/ GFR 18  · Most recent 7/15/2019: 1 41/56  ID:   · Abx ordered: Cefepime  · Receiving ureteral stenting  · Trend temps and WBC count  · Afebrile at this time initial WBC 5 45  · Lactic --> 6 2 initially down to 5 0 after 5L administered in ED  · UA: moderate epithelials, innumerable bacteria  Heme:   · Trend hgb and plts  · Transfuse as needed for goal hgb >7  Endo:   · Glycemic control plan: Blood glucose controlled on current regimen  · Goal -180mg/dL  · No hx of DM  MSK/Skin:  · Early Mobility/Exercise  · PT consult: yes  · OT consult: yes  · Turn and position patient Q2 hours  · Off load pressure points  · Allevyn preventative per protocol  Family:  · Family updated within 24 hours: no   VTE Prophylaxis:  · Pharmacologic Prophylaxis: Heparin  · Mechanical Prophylaxis: sequential compression device    Disposition: Continue ICU care      ______________________________________________________________________    Invasive lines and devices:   Invasive Devices     Central Venous Catheter Line            CVC Central Lines 08/04/19 Triple Right Internal jugular less than 1 day Peripheral Intravenous Line            Peripheral IV 08/03/19 Right Antecubital less than 1 day    Peripheral IV 08/03/19 Right Forearm less than 1 day          Arterial Line            Arterial Line 08/04/19 Right Radial less than 1 day                Code Status: Level 1 - Full Code  POA:    POLST:      Given critical illness, patient length of stay will require greater than two midnights  Portions of the record may have been created with voice recognition software  Occasional wrong word or "sound a like" substitutions may have occurred due to the inherent limitations of voice recognition software  Read the chart carefully and recognize, using context, where substitutions have occurred        Ángel Hitchcock DO

## 2019-08-04 NOTE — PROGRESS NOTES
Patient meets SIRS criteria with urinary source of infection, received fluid resuscitation of 5L in ED and given Cefepime for likely Urosepsis due to R mid ureter obstructing stone

## 2019-08-04 NOTE — PROGRESS NOTES
UROLOGY PROGRESS NOTE   Patient Identifiers: Sailaja Medrano (MRN [id number])  Date of Service: 8/4/2019        Assessment:     1  4 mm right proximal ureteral calculus  - status post ureteral stent insertion emergently this morning    2  Urosepsis with septic shock  - secondary to the above  - improving following decompression    I met with the patient's father at the bedside this morning  He is doing remarkably well given the acuity of his clinical status upon admission early this morning  We discussed that his ureteral stent will remain in place for the forseable future  We discussed that he will need a secondary procedure to extract the stone by ureteroscopy  This will be performed in the elective outpatient setting after he has completed a full 14 day course of antibiotics and has a negative preoperative urine culture  Plan:   -antibiotics per primary team   Please follow up on urine cultures  Patient will ultimately require a 14 day course of culture directed antibiotics  -appreciate excellent ICU management  -Berumen catheter can be removed when medically stable  -his ureteral stent will remain in place  - I requested follow-up with our office staff in [facility]  We will schedule him for a preoperative visit in approximately 2 weeks  At that visit we will check a urine culture and schedule him for elective right ureteroscopy    Thank you for allowing us participate in patient's care  Please not hesitate contact with any additional questions during his hospital stay        Subjective:     24 HR EVENTS:   no significant events  Patient has  no complaints        Objective:     VITALS:    Vitals:    08/04/19 1130   BP:    Pulse: (!) 110   Resp: (!) 33   Temp:    SpO2: 95%       INS & OUTS:  [unfilled]    LABS:  Lab Results   Component Value Date    HGB 12 9 08/04/2019    HCT 39 7 08/04/2019    WBC 10 48 (H) 08/04/2019    PLT 84 (L) 08/04/2019   ]    Lab Results   Component Value Date     (L) 04/08/2015    K 3 0 (L) 08/04/2019     (H) 08/04/2019    CO2 14 (L) 08/04/2019    BUN 31 (H) 08/04/2019    CREATININE 3 63 (H) 08/04/2019    CALCIUM 7 4 (L) 08/04/2019    GLUCOSE 120 08/04/2019   ]    INPATIENT MEDS:    Current Facility-Administered Medications:     acetaminophen (TYLENOL) rectal suppository 650 mg, 650 mg, Rectal, Q4H PRN, Manuel Alejo, , 650 mg at 08/04/19 0354    cefepime (MAXIPIME) 2,000 mg in dextrose 5 % 50 mL IVPB, 2,000 mg, Intravenous, Q12H, Lavonne Gram PA-C, Last Rate: 100 mL/hr at 08/04/19 1145, 2,000 mg at 08/04/19 1145    heparin (porcine) subcutaneous injection 5,000 Units, 5,000 Units, Subcutaneous, Q8H [facility], Tony Alejo DO    multi-electrolyte (ISOLYTE-S PH 7 4 equivalent) IV solution, 75 mL/hr, Intravenous, Continuous, Kate Lei DO, Last Rate: 75 mL/hr at 08/04/19 0701, 75 mL/hr at 08/04/19 0701    norepinephrine (LEVOPHED) 1 mg/mL injection **ADS Override Pull**, , , , , Stopped at 08/04/19 0243    norepinephrine (LEVOPHED) 4 mg (STANDARD CONCENTRATION) IV in sodium chloride 0 9% 250 mL, 1-30 mcg/min, Intravenous, Titrated, Love Burleson DO, Stopped at 08/04/19 1138    potassium phosphate 30 mmol in sodium chloride 0 9 % 250 mL infusion, 30 mmol, Intravenous, Once, Lavonne Gram, PA-C, Last Rate: 41 7 mL/hr at 08/04/19 0828, 30 mmol at 08/04/19 0828    sodium bicarbonate 8 4 % injection **ADS Override Pull**, , , ,       Physical Exam:   /67   Pulse (!) 110   Temp 98 3 °F (36 8 °C) (Rectal)   Resp (!) 33   Ht 5' 6" (1 676 m)   Wt (!) 154 kg (340 lb 9 8 oz)   SpO2 95%   BMI 54 98 kg/m²   GEN: resting comfortably  RESP: labored breathing  CV: bilateral peripheral edema  ABD: soft, non-tender, non-distended  INCISION: none  DRAINS: none  CRISOSTOMO: in place draining clear yellow urine

## 2019-08-04 NOTE — PROGRESS NOTES
Post-op evaluation     Patient presenting after ureteral stenting, in PACU patient requiring increased pressors to maintain BP, temperature in PACU 103 rectally, 104 in MICU  Patient received rectal tylenol and placed on cooling blanket  Maintaining airway on non-rebreather, did not require intubation for procedure  Patient's main complaint at this time is that he feels cold  Review of Systems - General ROS: positive for  - chills, fatigue and fever  Respiratory ROS: positive for - shortness of breath  Cardiovascular ROS: no chest pain or dyspnea on exertion  Gastrointestinal ROS: no abdominal pain, change in bowel habits, or black or bloody stools  Genito-Urinary ROS: positive for - cook in place, but no pain per patient  Neurological ROS: negative    Physical Exam   Constitutional: He is oriented to person, place, and time  He appears well-developed and well-nourished  No distress  He is not intubated  Face mask in place  HENT:   Head: Normocephalic and atraumatic  Right Ear: External ear normal    Left Ear: External ear normal    Nose: Nose normal    Mouth/Throat: Oropharynx is clear and moist  No oropharyngeal exudate  Eyes: Conjunctivae are normal  Right eye exhibits no discharge  Left eye exhibits no discharge  No scleral icterus  Neck: Normal range of motion  Neck supple  Cardiovascular: Regular rhythm, S1 normal, S2 normal and normal heart sounds  Tachycardia present  Exam reveals no distant heart sounds and no friction rub  Pulmonary/Chest: Effort normal and breath sounds normal  No accessory muscle usage or stridor  Tachypnea noted  No apnea and no bradypnea  He is not intubated  No respiratory distress  He has no decreased breath sounds  He has no wheezes  He has no rhonchi  He has no rales  Abdominal: Soft  Normal appearance  There is no tenderness     Genitourinary:   Genitourinary Comments: Cook in place with blood tinged urine noted   Neurological: He is alert and oriented to person, place, and time  He is not disoriented  No cranial nerve deficit  GCS eye subscore is 4  GCS verbal subscore is 5  GCS motor subscore is 6  Skin: He is diaphoretic  Nursing note and vitals reviewed

## 2019-08-04 NOTE — ANESTHESIA PREPROCEDURE EVALUATION
Review of Systems/Medical History  Patient summary reviewed  Chart reviewed  No history of anesthetic complications     Cardiovascular  Negative cardio ROS    Pulmonary  Shortness of breath,        GI/Hepatic  Negative GI/hepatic ROS          Kidney stones, Kidney disease ARF,   Comment: Severe urosepsis     Endo/Other  Negative endo/other ROS   Obesity (BMI 55)  super morbid obesity   GYN       Hematology  Negative hematology ROS      Musculoskeletal  Gout,        Neurology  Negative neurology ROS      Psychology   Negative psychology ROS              Physical Exam    Airway    Mallampati score: II  TM Distance: >3 FB       Dental   No notable dental hx     Cardiovascular  Comment: Negative ROS, Rate: abnormal,     Pulmonary      Other Findings      Lab Results   Component Value Date    WBC 5 45 08/03/2019    HGB 15 7 08/03/2019     (L) 08/03/2019     Lab Results   Component Value Date    SODIUM 138 08/03/2019    K 3 1 (L) 08/03/2019    BUN 28 (H) 08/03/2019    CREATININE 3 58 (H) 08/03/2019    GLUCOSE 104 04/08/2015     Lab Results   Component Value Date    HGBA1C 5 4 03/24/2015 08/03/19 2222   CT chest abdomen pelvis wo contrast (Final result)      Impression      Mild to moderate right-sided hydroureteronephrosis with a 4 mm obstructing stone in the mid right ureter  Nonobstructing bilateral intrarenal calculi  Anesthesia Plan  ASA Score- 4 Emergent    Anesthesia Type- IV sedation with anesthesia with ASA Monitors  Additional Monitors: central venous line and arterial line  Airway Plan:     Comment: Backup GETA  Plan Factors-    Induction- intravenous  Postoperative Plan-     Informed Consent- Anesthetic plan and risks discussed with patient  I personally reviewed this patient with the CRNA  Discussed and agreed on the Anesthesia Plan with the CRNA  Bennie Pierson

## 2019-08-04 NOTE — ANESTHESIA POSTPROCEDURE EVALUATION
Post-Op Assessment Note    CV Status:  Stable  Pain Score: 0    Pain management: adequate     Mental Status:  Alert and awake   Hydration Status:  Euvolemic and stable   PONV Controlled:  None   Airway Patency:  Patent   Post Op Vitals Reviewed: Yes      Staff: CRNA           BP (!) 104/41 (08/04/19 0304)    Temp      Pulse (!) 140 (08/04/19 0304)   Resp 19 (08/04/19 0304)    SpO2 96 % (08/04/19 0304)

## 2019-08-04 NOTE — EMTALA/ACUTE CARE TRANSFER
[facility] EMERGENCY DEPARTMENT  [address] [place] [id number]  Dept: [address] [place] TRANSFER CONSENT    NAME Luana Lancaster                                         1965                              MRN [id number]    I have been informed of my rights regarding examination, treatment, and transfer   by Dr Esteban Greer MD    Benefits: Specialized equipment and/or services available at the receiving facility (Include comment)________________________(Urology)    Risks: Potential for delay in receiving treatment, Potential deterioration of medical condition, Loss of IV, Increased discomfort during transfer, Possible worsening of condition or death during transfer      Consent for Transfer:  I acknowledge that my medical condition has been evaluated and explained to me by the emergency department physician or other qualified medical person and/or my attending physician, who has recommended that I be transferred to the service of  Accepting Physician: Isak Sheth at [address], [address] : [facility]  The above potential benefits of such transfer, the potential risks associated with such transfer, and the probable risks of not being transferred have been explained to me, and I fully understand them  The doctor has explained that, in my case, the benefits of transfer outweigh the risks  I agree to be transferred  I authorize the performance of emergency medical procedures and treatments upon me in both transit and upon arrival at the receiving facility  Additionally, I authorize the release of any and all medical records to the receiving facility and request they be transported with me, if possible  I understand that the safest mode of transportation during a medical emergency is an ambulance and that the Hospital advocates the use of this mode of transport   Risks of traveling to the receiving facility by car, including absence of medical control, life sustaining equipment, such as oxygen, and medical personnel has been explained to me and I fully understand them  (DESI CORRECT BOX BELOW)  [  ]  I consent to the stated transfer and to be transported by ambulance/helicopter  [  ]  I consent to the stated transfer, but refuse transportation by ambulance and accept full responsibility for my transportation by car  I understand the risks of non-ambulance transfers and I exonerate the Hospital and its staff from any deterioration in my condition that results from this refusal     X___________________________________________    DATE  19  TIME________  Signature of patient or legally responsible individual signing on patient behalf           RELATIONSHIP TO PATIENT_________________________          Provider Certification    NAME Britney Bowens                                        [facility] 1965                              MRN [id number]    A medical screening exam was performed on the above named patient  Based on the examination:    Condition Necessitating Transfer The primary encounter diagnosis was Septic shock ([address] )  Diagnoses of Urinary tract infection, Right kidney stone, and Acute renal failure (ARF) ([address] ) were also pertinent to this visit  Patient Condition:  An emergency transfer is being made prior to stabilization due to the need for definitive care and the benefit of transfer outweighs the risk    Reason for Transfer: Level of Care needed not available at this facility    Transfer Requirements: [facility]   · Space available and qualified personnel available for treatment as acknowledged by    · Agreed to accept transfer and to provide appropriate medical treatment as acknowledged by       Do  · Appropriate medical records of the examination and treatment of the patient are provided at the time of transfer   [address], [address] _______  · Transfer will be performed by qualified personnel from    and appropriate transfer equipment as required, including the use of necessary and appropriate life support measures  Provider Certification: I have examined the patient and explained the following risks and benefits of being transferred/refusing transfer to the patient/family:  General risk, such as traffic hazards, adverse weather conditions, rough terrain or turbulence, possible failure of equipment (including vehicle or aircraft), or consequences of actions of persons outside the control of the transport personnel, Unanticipated needs of medical equipment and personnel during transport, Risk of worsening condition, The possibility of a transport vehicle being unavailable      Based on these reasonable risks and benefits to the patient and/or the unborn child(raiza), and based upon the information available at the time of the patients examination, I certify that the medical benefits reasonably to be expected from the provision of appropriate medical treatments at another medical facility outweigh the increasing risks, if any, to the individuals medical condition, and in the case of labor to the unborn child, from effecting the transfer      X____________________________________________ DATE 08/03/19        TIME_______      ORIGINAL - SEND TO MEDICAL RECORDS   COPY - SEND WITH PATIENT DURING TRANSFER

## 2019-08-04 NOTE — PLAN OF CARE
Problem: Prexisting or High Potential for Compromised Skin Integrity  Goal: Skin integrity is maintained or improved  Description  INTERVENTIONS:  - Identify patients at risk for skin breakdown  - Assess and monitor skin integrity  - Assess and monitor nutrition and hydration status  - Monitor labs (i e  albumin)  - Assess for incontinence   - Turn and reposition patient  - Assist with mobility/ambulation  - Relieve pressure over bony prominences  - Avoid friction and shearing  - Provide appropriate hygiene as needed including keeping skin clean and dry  - Evaluate need for skin moisturizer/barrier cream  - Collaborate with interdisciplinary team (i e  Nutrition, Rehabilitation, etc )   - Patient/family teaching  Outcome: Not Progressing     Problem: Potential for Falls  Goal: Patient will remain free of falls  Description  INTERVENTIONS:  - Assess patient frequently for physical needs  -  Identify cognitive and physical deficits and behaviors that affect risk of falls    -  [place] fall precautions as indicated by assessment   - Educate patient/family on patient safety including physical limitations  - Instruct patient to call for assistance with activity based on assessment  - Modify environment to reduce risk of injury  - Consider OT/PT consult to assist with strengthening/mobility  Outcome: Not Progressing     Problem: PAIN - ADULT  Goal: Verbalizes/displays adequate comfort level or baseline comfort level  Description  Interventions:  - Encourage patient to monitor pain and request assistance  - Assess pain using appropriate pain scale  - Administer analgesics based on type and severity of pain and evaluate response  - Implement non-pharmacological measures as appropriate and evaluate response  - Consider cultural and social influences on pain and pain management  - Notify physician/advanced practitioner if interventions unsuccessful or patient reports new pain  Outcome: Not Progressing     Problem: INFECTION - ADULT  Goal: Absence or prevention of progression during hospitalization  Description  INTERVENTIONS:  - Assess and monitor for signs and symptoms of infection  - Monitor lab/diagnostic results  - Monitor all insertion sites, i e  indwelling lines, tubes, and drains  - Monitor endotracheal (as able) and nasal secretions for changes in amount and color  - [place] appropriate cooling/warming therapies per order  - Administer medications as ordered  - Instruct and encourage patient and family to use good hand hygiene technique  - Identify and instruct in appropriate isolation precautions for identified infection/condition  Outcome: Not Progressing  Goal: Absence of fever/infection during neutropenic period  Description  INTERVENTIONS:  - Monitor WBC  - Implement neutropenic guidelines  Outcome: Not Progressing     Problem: SAFETY ADULT  Goal: Maintain or return to baseline ADL function  Description  INTERVENTIONS:  -  Assess patient's ability to carry out ADLs; assess patient's baseline for ADL function and identify physical deficits which impact ability to perform ADLs (bathing, care of mouth/teeth, toileting, grooming, dressing, etc )  - Assess/evaluate cause of self-care deficits   - Assess range of motion  - Assess patient's mobility; develop plan if impaired  - Assess patient's need for assistive devices and provide as appropriate  - Encourage maximum independence but intervene and supervise when necessary  ¯ Involve family in performance of ADLs  ¯ Assess for home care needs following discharge   ¯ Request OT consult to assist with ADL evaluation and planning for discharge  ¯ Provide patient education as appropriate  Outcome: Not Progressing  Goal: Maintain or return mobility status to optimal level  Description  INTERVENTIONS:  - Assess patient's baseline mobility status (ambulation, transfers, stairs, etc )    - Identify cognitive and physical deficits and behaviors that affect mobility  - Identify mobility aids required to assist with transfers and/or ambulation (gait belt, sit-to-stand, lift, walker, cane, etc )  - [place] fall precautions as indicated by assessment  - Record patient progress and toleration of activity level on Mobility SBAR; progress patient to next Phase/Stage  - Instruct patient to call for assistance with activity based on assessment  - Request Rehabilitation consult to assist with strengthening/weightbearing, etc   Outcome: Not Progressing     Problem: DISCHARGE PLANNING  Goal: Discharge to home or other facility with appropriate resources  Description  INTERVENTIONS:  - Identify barriers to discharge w/patient and caregiver  - Arrange for needed discharge resources and transportation as appropriate  - Identify discharge learning needs (meds, wound care, etc )  - Arrange for interpretive services to assist at discharge as needed  - Refer to Case Management Department for coordinating discharge planning if the patient needs post-hospital services based on physician/advanced practitioner order or complex needs related to functional status, cognitive ability, or social support system  Outcome: Not Progressing     Problem: Knowledge Deficit  Goal: Patient/family/caregiver demonstrates understanding of disease process, treatment plan, medications, and discharge instructions  Description  Complete learning assessment and assess knowledge base    Interventions:  - Provide teaching at level of understanding  - Provide teaching via preferred learning methods  Outcome: Not Progressing

## 2019-08-04 NOTE — SOCIAL WORK
51yo male transferred from [address] with chest pain, SOB, fever, abdominal pain  Found to have obstructed right ureter  Temp was 104 8 last night  Currently oob in chair, is morbidly obese--340 lb  He is alert and oriented, independent ADLS, employed full time, drives  He is very SOB with any questioning  His father is present and is : Jaz Pabon phone [phone]  Pt  Has no hx of DME, HHC or rehab  He denies mental illness or D&A  His PCP is Dr Maricruz Smith and he uses [facility] in [place]  He does have a Rx plan  He is interested in [facility] meds to beds a discharge  Father able to transport when ready  Discharge needs to be determined:  Anticipate may need DME/HHC or rehab  CM following  CM reviewed d/c planning process including the following: identifying help at home, patient preference for d/c planning needs, Discharge Lounge, [facility] program, availability of treatment team to discuss questions or concerns patient and/or family may have regarding understanding medications and recognizing signs and symptoms once discharged  CM also encouraged patient to follow up with all recommended appointments after discharge  Patient advised of importance for patient and family to participate in managing patients medical well being  Patient/caregiver received discharge checklist  Content reviewed  Patient/caregiver encouraged to participate in discharge plan of care prior to discharge home

## 2019-08-04 NOTE — QUICK NOTE
Spoke w Dr Radha Fitzpatrick and the transfer center regarding Colten Ray at 23:42  Patient w morbid obesity, 4mm right ureteral calculus, sepsis with fluid-responsive hypotension and significant lactic acidosis  He clearly requires emergent decompression with a ureteral stent      Plan is for helicopter transport to [place] MICU followed immediately by cystoscopy with right ureteral stent placement upon arrival

## 2019-08-04 NOTE — PERIOPERATIVE NURSING NOTE
Patient arrived to pacu on monitor accompanied by anesthesia, stable upon arrival  Episode of hypotension and brief period of ariway obstruction noted, anesthesia remains at bedside throughout patients PACU stay  Levo running, vaso started  Vital signs managed by anesthesia, refer to their charting  Pt  had loose stool bowl movement  Patient responsive and oriented upon transfer to the MICU accompanies by 4 RNs

## 2019-08-04 NOTE — ED PROVIDER NOTES
History  Chief Complaint   Patient presents with    Shortness of Breath     pt c/o shortness of breath and abdominal pain since friday  pt also c/o diarrhea  pt states he feels like he is "burning up"     Patient is a 70-year-old male  Yesterday he developed fevers and chills  It was associated with shortness of breath  There is no cough  He is experiencing some chest tightness  He has also been experiencing some central abdominal pain  Today at 1 bout of diarrhea  No melena or hematochezia  There has been no nausea or vomiting  He has no history of cardiac or pulmonary disease  No history of thromboembolic disease  There has been no unilateral leg swelling or hemoptysis  No calf pain  Yesterday thought he might have a kidney stone  He was experiencing some flank pain and dysuria  He has a history of kidney stones  Symptoms today are severe  No aggravating or relieving factors  Prior to Admission Medications   Prescriptions Last Dose Informant Patient Reported? Taking?   colchicine (COLCRYS) 0 6 mg tablet Not Taking at Unknown time  No No   Sig: Take one tablet by mouth now then repeat in one hour if still having pain may repeat again in one hour   Patient not taking: Reported on 8/3/2019   predniSONE 10 mg tablet Not Taking at Unknown time  No No   Si mg by mouth daily for 3 days, then 20 mg by mouth daily for 3 days, then 10 mg by mouth daily for 3 days, then stop   Patient not taking: Reported on 8/3/2019   tamsulosin (FLOMAX) 0 4 mg   No Yes   Sig: TAKE 1 CAPSULE BY MOUTH EVERY 24 HOURS      Facility-Administered Medications: None       Past Medical History:   Diagnosis Date    Gout     Kidney stone        Past Surgical History:   Procedure Laterality Date    NO PAST SURGERIES  2015    Last assessed       Family History   Problem Relation Age of Onset    Cancer Mother      I have reviewed and agree with the history as documented      Social History     Tobacco Use    Smoking status: Never Smoker    Smokeless tobacco: Never Used   Substance Use Topics    Alcohol use: No    Drug use: No        Review of Systems   Constitutional: Positive for chills and fever  HENT: Negative for rhinorrhea and sore throat  Eyes: Negative for pain, redness and visual disturbance  Respiratory: Positive for shortness of breath  Negative for cough  Cardiovascular: Positive for chest pain  Negative for leg swelling  Gastrointestinal: Positive for abdominal pain and diarrhea  Negative for vomiting  Endocrine: Negative for polydipsia and polyuria  Genitourinary: Positive for flank pain  Negative for dysuria, frequency and hematuria  Musculoskeletal: Positive for back pain  Negative for neck pain  Skin: Negative for rash and wound  Allergic/Immunologic: Negative for immunocompromised state  Neurological: Negative for weakness, numbness and headaches  Psychiatric/Behavioral: Negative for hallucinations and suicidal ideas  All other systems reviewed and are negative  Physical Exam  Physical Exam   Constitutional: He is oriented to person, place, and time  He appears well-developed and well-nourished  No distress  Obese male  HENT:   Head: Normocephalic and atraumatic  Eyes: Right eye exhibits no discharge  Left eye exhibits no discharge  No scleral icterus  Neck: Normal range of motion  Neck supple  Cardiovascular: Regular rhythm, normal heart sounds and intact distal pulses  Exam reveals no gallop and no friction rub  No murmur heard  Tachycardic rate  Pulmonary/Chest: Effort normal and breath sounds normal  No respiratory distress  He has no wheezes  He has no rales  Abdominal: Soft  Bowel sounds are normal  He exhibits no distension  There is tenderness  There is no rebound and no guarding  Tenderness is localized to the right upper quadrant  Patient has a Crockett sign  There is no CVA tenderness  Musculoskeletal: Normal range of motion   He exhibits no edema, tenderness or deformity  Right lower leg: He exhibits no tenderness and no edema  Left lower leg: He exhibits no tenderness and no edema  Neurological: He is alert and oriented to person, place, and time  He has normal strength  No sensory deficit  GCS eye subscore is 4  GCS verbal subscore is 5  GCS motor subscore is 6  Skin: Skin is warm and dry  No rash noted  He is not diaphoretic  Psychiatric: He has a normal mood and affect  His behavior is normal    Vitals reviewed  Vital Signs  ED Triage Vitals [08/03/19 2122]   Temperature Pulse Respirations Blood Pressure SpO2   97 7 °F (36 5 °C) (!) 132 (!) 24 99/58 93 %      Temp Source Heart Rate Source Patient Position - Orthostatic VS BP [facility] FiO2 (%)   Oral Monitor Lying Left arm --      Pain Score       No Pain           Vitals:    08/03/19 2300 08/03/19 2315 08/03/19 2330 08/03/19 2345   BP: 93/56 98/60 (!) 89/51 92/52   Pulse: (!) 118 (!) 124 (!) 125 (!) 126   Patient Position - Orthostatic VS:             Visual Acuity      ED Medications  Medications   sodium chloride 0 9 % bolus 250 mL (0 mL Intravenous Stopped 8/3/19 2159)   sodium chloride 0 9 % bolus 2,500 mL (0 mL Intravenous Stopped 8/3/19 2248)   sodium chloride 0 9 % bolus 2,250 mL (0 mL Intravenous Stopped 8/3/19 2348)   cefepime (MAXIPIME) IVPB (premix) 2,000 mg (0 mg Intravenous Stopped 8/3/19 2347)       Diagnostic Studies  Results Reviewed     Procedure Component Value Units Date/Time    Urine Microscopic [[id number]]  (Abnormal) Collected:  08/03/19 2229    Lab Status:  Final result Specimen:  Urine, Clean Catch Updated:  08/03/19 2257     RBC, UA 20-30 /hpf      WBC, UA 30-50 /hpf      Epithelial Cells Moderate /hpf      Bacteria, UA Innumerable /hpf     Urine culture [[id number]] Collected:  08/03/19 2229    Lab Status:   In process Specimen:  Urine, Clean Catch Updated:  08/03/19 2257    UA w Reflex to Microscopic w Reflex to Culture [[id number]] (Abnormal) Collected:  08/03/19 2229    Lab Status:  Final result Specimen:  Urine, Clean Catch Updated:  08/03/19 2237     Color, UA Yellow     Clarity, UA Cloudy     Specific Gravity, UA 1 025     pH, UA 6 0     Leukocytes, UA Moderate     Nitrite, UA Negative     Protein,  (2+) mg/dl      Glucose, UA Negative mg/dl      Ketones, UA Negative mg/dl      Urobilinogen, UA 0 2 E U /dl      Bilirubin, UA Negative     Blood, UA Large    B-type natriuretic peptide [[id number]]  (Abnormal) Collected:  08/03/19 2141    Lab Status:  Final result Specimen:  Blood from Arm, Right Updated:  08/03/19 2229     NT-proBNP 2,563 pg/mL     Lactic acid, plasma x2 [[id number]]  (Abnormal) Collected:  08/03/19 2159    Lab Status:  Final result Specimen:  Blood from Arm, Right Updated:  08/03/19 2226     LACTIC ACID 6 2 mmol/L     Narrative:       Result may be elevated if tourniquet was used during collection  CBC and differential [[id number]]  (Abnormal) Collected:  08/03/19 2141    Lab Status:  Final result Specimen:  Blood from Arm, Right Updated:  08/03/19 2218     WBC 5 45 Thousand/uL      RBC 5 12 Million/uL      Hemoglobin 15 7 g/dL      Hematocrit 48 8 %      MCV 95 fL      MCH 30 7 pg      MCHC 32 2 g/dL      RDW 12 8 %      MPV 10 1 fL      Platelets 425 Thousands/uL      nRBC 0 /100 WBCs     D-Dimer [[id number]] Collected:  08/03/19 2141    Lab Status: In process Specimen:  Blood from Arm, Right Updated:  08/03/19 2209    Troponin I [[id number]]  (Normal) Collected:  08/03/19 2141    Lab Status:  Final result Specimen:  Blood from Arm, Right Updated:  08/03/19 2205     Troponin I 0 03 ng/mL     Blood culture #2 [[id number]] Collected:  08/03/19 2159    Lab Status: In process Specimen:  Blood from Arm, Right Updated:  08/03/19 2204    Blood culture #1 [[id number]] Collected:  08/03/19 2159    Lab Status:   In process Specimen:  Blood from Arm, Right Updated:  08/03/19 2204    Comprehensive metabolic panel [[id number]] (Abnormal) Collected:  08/03/19 2141    Lab Status:  Final result Specimen:  Blood from Arm, Right Updated:  08/03/19 2203     Sodium 138 mmol/L      Potassium 3 1 mmol/L      Chloride 103 mmol/L      CO2 16 mmol/L      ANION GAP 19 mmol/L      BUN 28 mg/dL      Creatinine 3 58 mg/dL      Glucose 129 mg/dL      Calcium 8 9 mg/dL      AST 40 U/L      ALT 39 U/L      Alkaline Phosphatase 155 U/L      Total Protein 7 2 g/dL      Albumin 2 8 g/dL      Total Bilirubin 0 80 mg/dL      eGFR 18 ml/min/1 73sq m     Narrative:       National Kidney Disease Foundation guidelines for Chronic Kidney Disease (CKD):     Stage 1 with normal or high GFR (GFR > 90 mL/min/1 73 square meters)    Stage 2 Mild CKD (GFR = 60-89 mL/min/1 73 square meters)    Stage 3A Moderate CKD (GFR = 45-59 mL/min/1 73 square meters)    Stage 3B Moderate CKD (GFR = 30-44 mL/min/1 73 square meters)    Stage 4 Severe CKD (GFR = 15-29 mL/min/1 73 square meters)    Stage 5 End Stage CKD (GFR <15 mL/min/1 73 square meters)  Note: GFR calculation is accurate only with a steady state creatinine    Lactic acid, plasma x2 [[id number]]     Lab Status:  No result Specimen:  Blood                  CT chest abdomen pelvis wo contrast   Final Result by Sugar Bowens DO (08/03 2247)      Mild to moderate right-sided hydroureteronephrosis with a 4 mm obstructing stone in the mid right ureter  Nonobstructing bilateral intrarenal calculi  Workstation performed: [id number]                    Procedures  ECG 12 Lead Documentation Only  Date/Time: 8/3/2019 10:12 PM  Performed by: Jackie Woods MD  Authorized by: Jackie Woods MD     ECG reviewed by me, the ED Provider: yes    Comments:      Sinus tachycardia at 132 beats per minute  T-wave abnormality laterally  No ST elevations  No ectopy  No old EKG      CriticalCare Time  Performed by: Jackie Woods MD  Authorized by: Jackie Woods MD     Critical care provider statement:     Critical care time (minutes):  75    Critical care time was exclusive of:  Separately billable procedures and treating other patients    Critical care was necessary to treat or prevent imminent or life-threatening deterioration of the following conditions:  Sepsis and shock    Critical care was time spent personally by me on the following activities:  Obtaining history from patient or surrogate, development of treatment plan with patient or surrogate, discussions with consultants, evaluation of patient's response to treatment, examination of patient, ordering and performing treatments and interventions, ordering and review of laboratory studies, ordering and review of radiographic studies and re-evaluation of patient's condition    I assumed direction of critical care for this patient from another provider in my specialty: no             ED Course  ED Course as of Aug 04 0005   Sat Aug 03, 2019   2345 Comprehensive metabolic panel(!)                               MDM  Number of Diagnoses or Management Options  Acute renal failure (ARF) [facility]):   Right kidney stone:   Septic shock [facility]):   Urinary tract infection:   Diagnosis management comments: Shortly after arrival in the emergency room patient became hypotensive  Patient has history of kidney stones and described renal colic yesterday  Patient ended up having a septic kidney stone  Urine is infected  There is a bandemia  He has a 4 mm obstructing stone in the mid right ureter  He was hydrated  Mean blood pressure currently is 70  Empiric antibiotics were ordered  There is no Urology here tonight  Patient needs an emergency stent  Consulted with Urology down at [facility] (Dr Gisell Pinzon)  Patient accepted for transfer  Will also consult with Critical Care  Patient had right upper quadrant tenderness  However there is no evidence of cholecystitis on the CT scan  There was no ruptured AAA    As patient did complain of some shortness of breath and was tachycardic considered pulmonary embolism  Could not pursue this because of acute renal failure  I did send a D-dimer, but I do believe that patient's symptoms are clearly due to a septic kidney stone and not pulmonary embolism  There is no STEMI on the EKG         Amount and/or Complexity of Data Reviewed  Clinical lab tests: ordered and reviewed  Tests in the radiology section of CPT®: ordered and reviewed  Discuss the patient with other providers: yes  Independent visualization of images, tracings, or specimens: yes        Disposition  Final diagnoses:   Septic shock ([facility] [address] )   Urinary tract infection   Right kidney stone   Acute renal failure (ARF) ([facility] [address] )     Time reflects when diagnosis was documented in both MDM as applicable and the Disposition within this note     Time User Action Codes Description Comment    8/3/2019 11:58 PM Param Dunbar [A41 9,  R65 21] Septic shock ([facility] [address] )     8/3/2019 11:58 PM Nini Kruger Add [N39 0] Urinary tract infection     8/3/2019 11:58 PM Shefalir Song Add [N20 0] Right kidney stone     8/3/2019 11:58 PM Nini Song Add [N17 9] Acute renal failure (ARF) [facility])       ED Disposition     ED Disposition Condition Date/Time Comment    Transfer to Another Facility-In Network  JRR Aug 3, 2019 11:57 PM Patrickfely Lancaster should be transferred out to [facility] MD Documentation      Most Recent Value   Patient Condition  An emergency transfer is being made prior to stabilization due to the need for definitive care and the benefit of transfer outweighs the risk   Reason for Transfer  Level of Care needed not available at this facility   Benefits of Transfer  Specialized equipment and/or services available at the receiving facility (Include comment)________________________ [Urology]   Risks of Transfer  Potential for delay in receiving treatment, Potential deterioration of medical condition, Loss of IV, Increased discomfort during transfer, Possible worsening of condition or death during transfer   Accepting Physician  [address] Name, [address] [facility]   Sending MD Garcia American   Provider Certification  General risk, such as traffic hazards, adverse weather conditions, rough terrain or turbulence, possible failure of equipment (including vehicle or aircraft), or consequences of actions of persons outside the control of the transport personnel, Unanticipated needs of medical equipment and personnel during transport, Risk of worsening condition, The possibility of a transport vehicle being unavailable      RN Documentation      [facility] Name, [facility]      Follow-up Information    None         Patient's Medications   Discharge Prescriptions    No medications on file     No discharge procedures on file      ED Provider  Electronically Signed by           Dorian Runner, MD  08/04/19 5623

## 2019-08-04 NOTE — PROCEDURES
Central Line Insertion  Date/Time: 8/4/2019 2:08 AM  Performed by: J Carlos Leary DO  Authorized by: J Carlos Leary DO     Patient location:  ED  Consent:     Consent obtained:  Verbal    Consent given by:  Patient    Risks discussed:  Arterial puncture, incorrect placement, bleeding, infection, pneumothorax and nerve damage    Alternatives discussed:  No treatment  Universal protocol:     Patient identity confirmed:  Verbally with patient, arm band and provided demographic data  Pre-procedure details:     Hand hygiene: Hand hygiene performed prior to insertion      Sterile barrier technique: All elements of maximal sterile technique followed      Skin preparation:  ChloraPrep  Indications:     Central line indications: medications requiring central line and hemodynamic monitoring    Anesthesia (see MAR for exact dosages): Anesthesia method:  Local infiltration    Local anesthetic:  Lidocaine 1% w/o epi  Procedure details:     [facility]:  Right internal jugular    Vessel type: vein      Laterality:  Right    Approach: percutaneous technique used      Patient position:  Trendelenburg    Catheter type:  Triple lumen 16cm    Catheter size:  7 Fr    Landmarks identified: yes      Ultrasound guidance: yes      Sterile ultrasound techniques: Sterile gel and sterile probe covers were used      Number of attempts:  1    Successful placement: yes    Post-procedure details:     Post-procedure:  Dressing applied and line sutured    Assessment:  Blood return through all ports, no pneumothorax on x-ray, free fluid flow and placement verified by x-ray    Post-procedure complications: none      Patient tolerance of procedure:   Tolerated well, no immediate complications

## 2019-08-04 NOTE — PLAN OF CARE
Problem: Prexisting or High Potential for Compromised Skin Integrity  Goal: Skin integrity is maintained or improved  Description  INTERVENTIONS:  - Identify patients at risk for skin breakdown  - Assess and monitor skin integrity  - Assess and monitor nutrition and hydration status  - Monitor labs (i e  albumin)  - Assess for incontinence   - Turn and reposition patient  - Assist with mobility/ambulation  - Relieve pressure over bony prominences  - Avoid friction and shearing  - Provide appropriate hygiene as needed including keeping skin clean and dry  - Evaluate need for skin moisturizer/barrier cream  - Collaborate with interdisciplinary team (i e  Nutrition, Rehabilitation, etc )   - Patient/family teaching  Outcome: Progressing     Problem: Potential for Falls  Goal: Patient will remain free of falls  Description  INTERVENTIONS:  - Assess patient frequently for physical needs  -  Identify cognitive and physical deficits and behaviors that affect risk of falls    -  [place] fall precautions as indicated by assessment   - Educate patient/family on patient safety including physical limitations  - Instruct patient to call for assistance with activity based on assessment  - Modify environment to reduce risk of injury  - Consider OT/PT consult to assist with strengthening/mobility  Outcome: Progressing     Problem: PAIN - ADULT  Goal: Verbalizes/displays adequate comfort level or baseline comfort level  Description  Interventions:  - Encourage patient to monitor pain and request assistance  - Assess pain using appropriate pain scale  - Administer analgesics based on type and severity of pain and evaluate response  - Implement non-pharmacological measures as appropriate and evaluate response  - Consider cultural and social influences on pain and pain management  - Notify physician/advanced practitioner if interventions unsuccessful or patient reports new pain  Outcome: Progressing     Problem: INFECTION - ADULT  Goal: Absence or prevention of progression during hospitalization  Description  INTERVENTIONS:  - Assess and monitor for signs and symptoms of infection  - Monitor lab/diagnostic results  - Monitor all insertion sites, i e  indwelling lines, tubes, and drains  - Monitor endotracheal (as able) and nasal secretions for changes in amount and color  - [place] appropriate cooling/warming therapies per order  - Administer medications as ordered  - Instruct and encourage patient and family to use good hand hygiene technique  - Identify and instruct in appropriate isolation precautions for identified infection/condition  Outcome: Progressing  Goal: Absence of fever/infection during neutropenic period  Description  INTERVENTIONS:  - Monitor WBC  - Implement neutropenic guidelines  Outcome: Progressing     Problem: SAFETY ADULT  Goal: Maintain or return to baseline ADL function  Description  INTERVENTIONS:  -  Assess patient's ability to carry out ADLs; assess patient's baseline for ADL function and identify physical deficits which impact ability to perform ADLs (bathing, care of mouth/teeth, toileting, grooming, dressing, etc )  - Assess/evaluate cause of self-care deficits   - Assess range of motion  - Assess patient's mobility; develop plan if impaired  - Assess patient's need for assistive devices and provide as appropriate  - Encourage maximum independence but intervene and supervise when necessary  ¯ Involve family in performance of ADLs  ¯ Assess for home care needs following discharge   ¯ Request OT consult to assist with ADL evaluation and planning for discharge  ¯ Provide patient education as appropriate  Outcome: Progressing  Goal: Maintain or return mobility status to optimal level  Description  INTERVENTIONS:  - Assess patient's baseline mobility status (ambulation, transfers, stairs, etc )    - Identify cognitive and physical deficits and behaviors that affect mobility  - Identify mobility aids required to assist with transfers and/or ambulation (gait belt, sit-to-stand, lift, walker, cane, etc )  - [place] fall precautions as indicated by assessment  - Record patient progress and toleration of activity level on Mobility SBAR; progress patient to next Phase/Stage  - Instruct patient to call for assistance with activity based on assessment  - Request Rehabilitation consult to assist with strengthening/weightbearing, etc   Outcome: Progressing     Problem: DISCHARGE PLANNING  Goal: Discharge to home or other facility with appropriate resources  Description  INTERVENTIONS:  - Identify barriers to discharge w/patient and caregiver  - Arrange for needed discharge resources and transportation as appropriate  - Identify discharge learning needs (meds, wound care, etc )  - Arrange for interpretive services to assist at discharge as needed  - Refer to Case Management Department for coordinating discharge planning if the patient needs post-hospital services based on physician/advanced practitioner order or complex needs related to functional status, cognitive ability, or social support system  Outcome: Progressing     Problem: Knowledge Deficit  Goal: Patient/family/caregiver demonstrates understanding of disease process, treatment plan, medications, and discharge instructions  Description  Complete learning assessment and assess knowledge base    Interventions:  - Provide teaching at level of understanding  - Provide teaching via preferred learning methods  Outcome: Progressing

## 2019-08-04 NOTE — CONSULTS
UROLOGY CONSULTATION NOTE     Patient Identifiers: Earl Huston (MRN [id number])  Service Requesting Consultation:  Emergency department  Service Providing Consultation:  Urology, Normajean Mortimer, MD    Date of Service: 8/4/2019    Reason for Consultation:  Obstructing right ureteral calculus with fulminant sepsis      ASSESSMENT:     This is a 59-year-old morbidly obese man with a 4 mm right proximal ureteral calculus, hydronephrosis, with secondary fulminant sepsis bordering on septic shock    Patient requires emergent decompression in the form of either a ureteral stent or a right nephrostomy tube  Given his significant obesity, tenuous respiratory status, and the relatively minor stone burden by size criteria, believe a ureteral stent would be the safest option for the patient  Given his critical status he has been assessed by the critical care intensivist   He has received a central line in order to optimize his perioperative management    At this point I have recommended the patient proceed to the operating room urgently for cystoscopy with right retrograde pyelogram and ureteral stent placement  Discussed the procedure in detail  He understands that he will require secondary procedure to extract the stone when and if he has recovered from this acute septic event  He understands the risks of surgery include but are not limited to worsening sepsis parameters, complications with the stent, damage to the bladder or ureter, possible need for nephrostomy  We also discussed that due to his critically ill status he is at an increased risk of severe complications including ventilatory dependent respiratory failure, venous thromboembolic events, cerebral vascular accident, myocardial infarction and possible death         PLAN:       - to OR emergently ASAP  - patient return to the medical ICU, possibly intubated following surgery    Please note that I coordinated care with the attending physician in the minor some urgency Department, the transfer center, critical care medicine at [facility], anesthesia, in the operating room team managing this critically ill patient    I have spent 60 minutes with Patient  today in which greater than 50% of this time was spent in counseling/coordination of care regarding Diagnostic results, Prognosis, Risks and benefits of tx options and Impressions  Thank you for allowing me to participate in this patients care  Please do not hesitate to call with any additional questions  Soledad Goodell, MD    History of Present Illness:     Boris Banks is a 49 y o  old with a history of morbid obesity and gout who developed right flank pain 24 hours ago  He ultimately presented to the emergency department at the [facility] with fever  On admission he was found to be markedly hypotensive, with lactic acidosis  He received fluid resuscitation the required no vasopressors at that time  I was contacted reviewed the clinical scenario with the attending physician  I recommended emergent transfer to the [facility] for prompt critical care optimization, and immediate surgical intervention in the form of a right ureteral stent  This was performed by ambulance  On evaluation the patient is rigorous though he is mentating well  Past Medical, Past Surgical History:     Past Medical History:   Diagnosis Date    Gout     Kidney stone    :    Past Surgical History:   Procedure Laterality Date    NO PAST SURGERIES  03/23/2015    Last assessed   :    Medications, Allergies:   No current facility-administered medications for this encounter  No current outpatient medications on file      Facility-Administered Medications Ordered in Other Encounters:     lidocaine (PF) (XYLOCAINE-MPF) 1 % injection **ADS Override Pull**, , , ,     norepinephrine (LEVOPHED) 1 mg/mL injection **ADS Override Pull**, , , ,     Allergies:  No Known Allergies:    Social and Family History:   Social History:   Social History     Tobacco Use    Smoking status: Never Smoker    Smokeless tobacco: Never Used   Substance Use Topics    Alcohol use: No    Drug use: No        Social History     Tobacco Use   Smoking Status Never Smoker   Smokeless Tobacco Never Used       Family History:  Family History   Problem Relation Age of Onset   Rafaela Lozoya Cancer Mother    :     Review of Systems:     General: Fever, chills, or night sweats: positive  Cardiac: Negative for chest pain  Pulmonary: Negative for shortness of breath  Gastrointestinal: Abdominal pain positive  Nausea, vomiting, or diarrhea positive,  Genitourinary: See HPI above  Patient does not have hematuria  All other systems queried were negative  Physical Exam:   General: Patient is pleasant and in NAD  Awake and alert  /64   Pulse (!) 134   Temp 97 7 °F (36 5 °C) (Oral)   Resp 22   Ht 5' 6" (1 676 m)   Wt (!) 154 kg (340 lb 9 8 oz)   SpO2 98%   BMI 54 98 kg/m²   Cardiac: Peripheral edema: positive  Pulmonary: Non-labored breathing  Abdomen: Soft, non-tender, non-distended  No surgical scars  No masses, tenderness, hernias noted  Genitourinary: Positive CVA tenderness, positive suprapubic tenderness  Labs:     Lab Results   Component Value Date    HGB 15 7 08/03/2019    HCT 48 8 08/03/2019    WBC 5 45 08/03/2019     (L) 08/03/2019   ]    Lab Results   Component Value Date     (L) 04/08/2015    K 3 1 (L) 08/03/2019     08/03/2019    CO2 16 (L) 08/03/2019    BUN 28 (H) 08/03/2019    CREATININE 3 58 (H) 08/03/2019    CALCIUM 8 9 08/03/2019    GLUCOSE 104 04/08/2015   ]    Imaging:   I personally reviewed the images and report of the following studies, and reviewed them with the patient:    FINDINGS:     CHEST     LUNGS:  Lungs are clear    There is no tracheal or endobronchial lesion      PLEURA:  Unremarkable      HEART/GREAT VESSELS:  Unremarkable for patient's age      MEDIASTINUM AND KRISTY:  Unremarkable      CHEST WALL AND LOWER NECK:   Unremarkable      ABDOMEN     LIVER/BILIARY TREE:  Hepatic steatosis  Moderate hepatomegaly      GALLBLADDER:  No calcified gallstones  No pericholecystic inflammatory change      SPLEEN:  Unremarkable      PANCREAS:  Unremarkable      ADRENAL GLANDS:  Unremarkable      KIDNEYS/URETERS:  Mild to moderate right-sided hydroureteronephrosis with a 4 mm obstructing stone in the mid right ureter  Nonobstructing bilateral intrarenal calculi are seen      STOMACH AND BOWEL:  Liquid stool is seen within the colon      APPENDIX:  No findings to suggest appendicitis      ABDOMINOPELVIC CAVITY:  Inflammatory changes in the right lower quadrant adjacent to the ureter is visualized      VESSELS:  Unremarkable for patient's age      PELVIS     REPRODUCTIVE ORGANS:  Unremarkable for patient's age      URINARY BLADDER:  Unremarkable      ABDOMINAL WALL/INGUINAL REGIONS:  Unremarkable      OSSEOUS STRUCTURES:  No acute fracture or destructive osseous lesion      IMPRESSION:     Mild to moderate right-sided hydroureteronephrosis with a 4 mm obstructing stone in the mid right ureter  Nonobstructing bilateral intrarenal calculi

## 2019-08-04 NOTE — PROCEDURES
Arterial Line Insertion  Date/Time: 8/4/2019 2:02 AM  Performed by: Caprice Puckett DO  Authorized by: Caprice Puckett DO     Patient location:  Bedside  Other Assisting Provider: No    Consent:     Consent obtained:  Verbal    Consent given by:  Patient    Risks discussed:  Bleeding, ischemia and infection  Universal protocol:     Procedure explained and questions answered to patient or proxy's satisfaction: yes      Immediately prior to procedure a time out was called: yes      Patient identity confirmed:  Verbally with patient  Indications:     Indications: hemodynamic monitoring, continuous blood pressure monitoring and frequent labs / infusion    Pre-procedure details:     Skin preparation:  Chlorhexidine    Preparation: Patient was prepped and draped in sterile fashion    Anesthesia (see MAR for exact dosages): Anesthesia method:  Local infiltration    Local anesthetic:  Lidocaine 1% w/o epi  Procedure details:     [facility] / Tip of Catheter:  Radial    Laterality:  Right    Cristobal's test performed: yes      Placement technique:  Percutaneous    Number of attempts:  1    Successful placement: yes      Transducer: waveform confirmed    Post-procedure details:     Post-procedure:  Sterile dressing applied and sutured    CMS:  Normal    Patient tolerance of procedure:   Tolerated well, no immediate complications  Comments:      Procedure does not include critical care time

## 2019-08-04 NOTE — RESPIRATORY THERAPY NOTE
RT Protocol Note  Dano Brannon 49 y o  male MRN: [id number]  Unit/Bed#: MICU 12 Encounter: [id number]    Assessment    Principal Problem:    Septic shock ([address] )      Home Pulmonary Medications:  None       Past Medical History:   Diagnosis Date    Gout     Kidney stone      Social History     Socioeconomic History    Marital status: Single     Spouse name: Not on file    Number of children: Not on file    Years of education: Not on file    Highest education level: Not on file   Occupational History    Not on file   Social Needs    Financial resource strain: Not on file    Food insecurity:     Worry: Not on file     Inability: Not on file    Transportation needs:     Medical: Not on file     Non-medical: Not on file   Tobacco Use    Smoking status: Never Smoker    Smokeless tobacco: Never Used   Substance and Sexual Activity    Alcohol use: No    Drug use: No    Sexual activity: Not on file   Lifestyle    Physical activity:     Days per week: Not on file     Minutes per session: Not on file    Stress: Not on file   Relationships    Social connections:     Talks on phone: Not on file     Gets together: Not on file     Attends Mandaeism service: Not on file     Active member of club or organization: Not on file     Attends meetings of clubs or organizations: Not on file     Relationship status: Not on file    Intimate partner violence:     Fear of current or ex partner: Not on file     Emotionally abused: Not on file     Physically abused: Not on file     Forced sexual activity: Not on file   Other Topics Concern    Not on file   Social History Narrative    Caffeine use        Subjective         Objective    Physical Exam:   Assessment Type: Assess only  General Appearance: Alert, Awake  Respiratory Pattern: Tachypneic, Dyspnea with exertion  Chest Assessment: Chest expansion symmetrical  Bilateral Breath Sounds: Diminished, Clear  Cough: None  O2 Device: simple mask    Vitals:  Blood pressure 157/67, pulse (!) 146, temperature (!) 102 4 °F (39 1 °C), resp  rate (!) 42, height 5' 6" (1 676 m), SpO2 97 %  Imaging and other studies: I have personally reviewed pertinent reports  O2 Device: simple mask     Plan    Respiratory Plan: No distress/Pulmonary history        Resp Comments: Pt states he was never a smoker, no pulmonary hx, no home respiratory meds  Pt states he does not have MARCE, does not wear cpap at home HS  However pt states he can not sleep on his back due to postnasal drip symptoms  Pt is currently tachypneic upron return from OR  Pt temp 104 5 deg  Will continue to monitor and assess for respiratory needs

## 2019-08-04 NOTE — PROGRESS NOTES
Progress Note - Critical Care   Angely Mask 49 y o  male MRN: [id number]  Unit/Bed#: MICU 12 Encounter: [id number]    ____________________________________________________________  Assessment and Plan:   · Gram-negative sepsis with shock secondary to urinary tract infection  Will continue fluid resuscitation  Continue cefepime  Follow-up culture sensitivity  Titrate norepinephrine to maintain mean arterial pressure of 65    · Obstructive uropathy secondary to ureteral stone  Status post stent placement  · Acute kidney injury  This is secondary to the sepsis and ATN  Will monitor urine output and creatinine  · Metabolic acidosis secondary to lactic acidosis  This is due to sepsis  The patient is compensating with tachypnea  Will start the patient on sodium bicarbonate IV infusion  · Hypocalcemia replete calcium  · DVT prophylaxis  Critical care time is 34 minutes excluding procedures  Code Status: Level 1 - Full Code    ____________________________________________________________    HPI/24hr events:   Events leading to the admission are noted  Events after the patient went to the OR also were reviewed  The patient is awake and alert  He denies pain  He is tachypneic   ____________________________________________________________    Physical Exam:  HEENT:  Normocephalic  Pupils are equal round and reactive to light  Oropharynx is clear  Lungs:  Coarse breath sounds  No wheezing  Heart:  Regular rate and rhythm  Abdomen:  Soft and nontender  Extremities:  No edema  Skin:  No rash    Neuro:  Grossly nonfocal     Lab Results   Component Value Date    WBC 10 48 (H) 08/04/2019    HGB 12 9 08/04/2019    HCT 39 7 08/04/2019    MCV 94 08/04/2019    PLT 84 (L) 08/04/2019     Lab Results   Component Value Date    SODIUM 139 08/04/2019    K 4 8 08/04/2019     (H) 08/04/2019    CO2 14 (L) 08/04/2019    BUN 39 (H) 08/04/2019    CREATININE 3 28 (H) 08/04/2019    GLUC 169 (H) 08/04/2019    CALCIUM 7 3 (L) 2019     Procalcitonin to 60  Blood gas, arterial [[id number]] (Abnormal) Collected: 19 1440   Lab Status: Final result Specimen: Blood, Arterial from Line, Arterial Updated: 19 1505    pH, Arterial 7 334Low     pCO2, Arterial 22 7Low Panic   mm Hg     pO2, Arterial 76 0 mm Hg     HCO3, Arterial 11 8Low  mmol/L     Base Excess, Arterial -11 8 mmol/L     O2 Content, Arterial 19 7 mL/dL     O2 HGB,Arterial  94 6 %     SOURCE Line, Arterial     Lactic acid 3 2   ______________________________________________________________________  Vitals:    19 1620 19 1630 19 1632 19 1645   BP: 143/87      BP [facility]:       Pulse: (!) 108      Resp: (!) 38      Temp:       TempSrc:       SpO2: 91% (!) 85% (!) 88% 97%   Weight:       Height:         Arterial Line BP: 138/84  Arterial Line MAP (mmHg): 102 mmHg     Temperature:   Temp (24hrs), Av 5 °F (38 6 °C), Min:97 7 °F (36 5 °C), Max:104 8 °F (40 4 °C)    Current Temperature: 98 3 °F (36 8 °C)  Weights:   IBW: 63 8 kg    Body mass index is 54 98 kg/m²    Weight (last 2 days)     Date/Time   Weight    19 0500   (!) 154 (340 61)               Non-Invasive/Invasive Ventilation Settings:  Respiratory    Lab Data (Last 4 hours)       1440            pH, Arterial       7 334           pCO2, Arterial       22 7           pO2, Arterial       76 0           HCO3, Arterial       11 8           Base Excess, Arterial       -11 8                O2/Vent Data     None              Lab Results   Component Value Date    PHART 7 334 (L) 2019    [id number] 22 7 (LL) 2019    PO2ART 76 0 2019    ZNQ4OAR 11 8 (L) 2019    BEART -11 8 2019    SOURCE Line, Arterial 2019       Intake and Outputs:  I/O       701 -  07 -  -  07    I V  (mL/kg)  300 (1 9) 2333 (15 1)    IV Piggyback   554 4    Total Intake(mL/kg)  300 (1 9) 2887 4 (18 7)    Urine (mL/kg/hr) 200 595 (0 4)    Stool   0    Blood  0     Total Output  200 595    Net  +100 +2292 4           Unmeasured Stool Occurrence   2 x        Labs:   Results from last 7 days   Lab Units 08/04/19  0551 08/04/19  0355 08/04/19 0322 08/03/19  2141   WBC Thousand/uL 10 48* 4 24*  --  5 45   HEMOGLOBIN g/dL 12 9 12 8  --  15 7   I STAT HEMOGLOBIN g/dl  --   --  11 9*  --    HEMATOCRIT % 39 7 40 3  --  48 8   HEMATOCRIT, ISTAT %  --   --  35*  --    PLATELETS Thousands/uL 84*  --   --  128*   NEUTROS PCT %  --  88*  --   --    MONOS PCT %  --  1*  --   --    MONO PCT % 0*  --   --  3*     Results from last 7 days   Lab Units 08/04/19  1441 08/04/19  0551 08/04/19  0355 08/04/19 0322 08/03/19  2141   POTASSIUM mmol/L 4 8 3 0* 3 4*  --  3 1*   CHLORIDE mmol/L 113* 112* 115*  --  103   CO2 mmol/L 14* 14* 14*  --  16*   CO2, I-STAT mmol/L  --   --   --  14*  --    BUN mg/dL 39* 31* 31*  --  28*   CREATININE mg/dL 3 28* 3 63* 3 70*  --  3 58*   CALCIUM mg/dL 7 3* 7 4* 6 9*  --  8 9   ALK PHOS U/L  --   --   --   --  155*   ALT U/L  --   --   --   --  39   AST U/L  --   --   --   --  40   GLUCOSE, ISTAT mg/dl  --   --   --  120  --      Results from last 7 days   Lab Units 08/04/19  1441 08/04/19  0551   MAGNESIUM mg/dL 2 9* 1 3*     Lab Results   Component Value Date    PHOS 5 4 (H) 08/04/2019    PHOS 1 8 (L) 08/04/2019          0   Lab Value Date/Time    TROPONINI 0 03 08/03/2019 2141     Results from last 7 days   Lab Units 08/04/19  1441 08/04/19  0853 08/04/19  0355   LACTIC ACID mmol/L 3 5* 3 2* 5 8*     ABG:  Lab Results   Component Value Date    PHART 7 334 (L) 08/04/2019    [id number] 22 7 (LL) 08/04/2019    PO2ART 76 0 08/04/2019    [id number] 11 8 (L) 08/04/2019    BEART -11 8 08/04/2019    SOURCE Line, Arterial 08/04/2019     Imaging:  Chest x-rays reviewed on the [facility] system  The central line catheter tip is in appropriate position  No acute pulmonary findings      Micro:  Lab Results   Component Value Date    Андрей Lobo No growth 07/11/2019     Allergies: No Known Allergies  Medications:   Scheduled Meds:  Current Facility-Administered Medications:  acetaminophen 650 mg Rectal Q4H PRN Jackson RageTank, DO    calcium carbonate 500 mg Oral Daily PRN Brigitte Marroquin PA-C    calcium chloride 1 g Intravenous Once MILO [place]    cefepime 2,000 mg Intravenous Q12H MILO [place] Last Rate: 2,000 mg (08/04/19 1145)   heparin (porcine) 5,000 Units Subcutaneous Q8H [address] Jackson RageTank, DO    multi-electrolyte 125 mL/hr Intravenous Continuous Brigitte Marroquin PA-C Last Rate: 125 mL/hr (08/04/19 1519)   norepinephrine 1-30 mcg/min Intravenous Titrated Tony Alejo DO Last Rate: Stopped (08/04/19 1138)   sodium bicarbonate        sodium bicarbonate        sodium bicarbonate 50 mEq Intravenous Once MILO [place]      Continuous Infusions:  multi-electrolyte 125 mL/hr Last Rate: 125 mL/hr (08/04/19 1519)   norepinephrine 1-30 mcg/min Last Rate: Stopped (08/04/19 1138)     PRN Meds:    acetaminophen 650 mg Q4H PRN   calcium carbonate 500 mg Daily PRN     Invasive lines and devices: Invasive Devices     Central Venous Catheter Line            CVC Central Lines 08/04/19 Triple Right Internal jugular less than 1 day          Peripheral Intravenous Line            Peripheral IV 08/03/19 Right Antecubital less than 1 day    Peripheral IV 08/03/19 Right Forearm less than 1 day          Drain            Ureteral Drain/Stent Right ureter 6 Fr  less than 1 day    Urethral Catheter Non-latex; Double-lumen 18 Fr  less than 1 day                     Portions of the record may have been created with voice recognition software  Occasional wrong word or "sound a like" substitutions may have occurred due to the inherent limitations of voice recognition software  Read the chart carefully and recognize, using context, where substitutions have occurred      Leonardo Pereira MD

## 2019-08-05 ENCOUNTER — TELEPHONE (OUTPATIENT)
Dept: UROLOGY | Facility: CLINIC | Age: 54
End: 2019-08-05

## 2019-08-05 PROBLEM — E83.51 HYPOCALCEMIA: Status: ACTIVE | Noted: 2019-08-05

## 2019-08-05 PROBLEM — N20.1 RIGHT URETERAL STONE: Status: RESOLVED | Noted: 2019-08-04 | Resolved: 2019-08-05

## 2019-08-05 PROBLEM — D69.6 THROMBOCYTOPENIA (HCC): Status: ACTIVE | Noted: 2019-08-05

## 2019-08-05 LAB
ALBUMIN SERPL BCP-MCNC: 1.9 G/DL (ref 3.5–5)
ALP SERPL-CCNC: 67 U/L (ref 46–116)
ALT SERPL W P-5'-P-CCNC: 34 U/L (ref 12–78)
ANION GAP SERPL CALCULATED.3IONS-SCNC: 9 MMOL/L (ref 4–13)
AST SERPL W P-5'-P-CCNC: 56 U/L (ref 5–45)
ATRIAL RATE: 132 BPM
BASE EX.OXY STD BLDV CALC-SCNC: 75.2 % (ref 60–80)
BASE EXCESS BLDV CALC-SCNC: -2 MMOL/L
BASOPHILS # BLD AUTO: 0.05 THOUSANDS/ΜL (ref 0–0.1)
BASOPHILS NFR BLD AUTO: 0 % (ref 0–1)
BILIRUB DIRECT SERPL-MCNC: 0.14 MG/DL (ref 0–0.2)
BILIRUB SERPL-MCNC: 0.49 MG/DL (ref 0.2–1)
BUN SERPL-MCNC: 35 MG/DL (ref 5–25)
CA-I BLD-SCNC: 1.05 MMOL/L (ref 1.12–1.32)
CALCIUM SERPL-MCNC: 8 MG/DL (ref 8.3–10.1)
CHLORIDE SERPL-SCNC: 108 MMOL/L (ref 100–108)
CO2 SERPL-SCNC: 22 MMOL/L (ref 21–32)
CREAT SERPL-MCNC: 2.43 MG/DL (ref 0.6–1.3)
EOSINOPHIL # BLD AUTO: 0.08 THOUSAND/ΜL (ref 0–0.61)
EOSINOPHIL NFR BLD AUTO: 1 % (ref 0–6)
ERYTHROCYTE [DISTWIDTH] IN BLOOD BY AUTOMATED COUNT: 13.4 % (ref 11.6–15.1)
GFR SERPL CREATININE-BSD FRML MDRD: 29 ML/MIN/1.73SQ M
GLUCOSE SERPL-MCNC: 124 MG/DL (ref 65–140)
HCO3 BLDV-SCNC: 22.3 MMOL/L (ref 24–30)
HCT VFR BLD AUTO: 37.7 % (ref 36.5–49.3)
HGB BLD-MCNC: 12.6 G/DL (ref 12–17)
IMM GRANULOCYTES # BLD AUTO: >0.5 THOUSAND/UL (ref 0–0.2)
IMM GRANULOCYTES NFR BLD AUTO: 5 % (ref 0–2)
LACTATE SERPL-SCNC: 2.1 MMOL/L (ref 0.5–2)
LACTATE SERPL-SCNC: 2.1 MMOL/L (ref 0.5–2)
LYMPHOCYTES # BLD AUTO: 0.56 THOUSANDS/ΜL (ref 0.6–4.47)
LYMPHOCYTES NFR BLD AUTO: 4 % (ref 14–44)
MAGNESIUM SERPL-MCNC: 2.6 MG/DL (ref 1.6–2.6)
MCH RBC QN AUTO: 31.4 PG (ref 26.8–34.3)
MCHC RBC AUTO-ENTMCNC: 33.4 G/DL (ref 31.4–37.4)
MCV RBC AUTO: 94 FL (ref 82–98)
MONOCYTES # BLD AUTO: 0.18 THOUSAND/ΜL (ref 0.17–1.22)
MONOCYTES NFR BLD AUTO: 1 % (ref 4–12)
NEUTROPHILS # BLD AUTO: 13.52 THOUSANDS/ΜL (ref 1.85–7.62)
NEUTS SEG NFR BLD AUTO: 89 % (ref 43–75)
NRBC BLD AUTO-RTO: 0 /100 WBCS
O2 CT BLDV-SCNC: 14.2 ML/DL
P AXIS: 56 DEGREES
PCO2 BLDV: 36.9 MM HG (ref 42–50)
PH BLDV: 7.4 [PH] (ref 7.3–7.4)
PHOSPHATE SERPL-MCNC: 3.5 MG/DL (ref 2.7–4.5)
PLATELET # BLD AUTO: 55 THOUSANDS/UL (ref 149–390)
PMV BLD AUTO: 11.9 FL (ref 8.9–12.7)
PO2 BLDV: 40.3 MM HG (ref 35–45)
POTASSIUM SERPL-SCNC: 3.8 MMOL/L (ref 3.5–5.3)
PR INTERVAL: 128 MS
PROT SERPL-MCNC: 5.8 G/DL (ref 6.4–8.2)
QRS AXIS: 1 DEGREES
QRSD INTERVAL: 82 MS
QT INTERVAL: 302 MS
QTC INTERVAL: 447 MS
RBC # BLD AUTO: 4.01 MILLION/UL (ref 3.88–5.62)
SODIUM SERPL-SCNC: 139 MMOL/L (ref 136–145)
T WAVE AXIS: 104 DEGREES
VENTRICULAR RATE: 132 BPM
WBC # BLD AUTO: 15.16 THOUSAND/UL (ref 4.31–10.16)

## 2019-08-05 PROCEDURE — 82330 ASSAY OF CALCIUM: CPT | Performed by: PHYSICIAN ASSISTANT

## 2019-08-05 PROCEDURE — 80076 HEPATIC FUNCTION PANEL: CPT | Performed by: NURSE PRACTITIONER

## 2019-08-05 PROCEDURE — 99232 SBSQ HOSP IP/OBS MODERATE 35: CPT | Performed by: PHYSICIAN ASSISTANT

## 2019-08-05 PROCEDURE — 87040 BLOOD CULTURE FOR BACTERIA: CPT | Performed by: PHYSICIAN ASSISTANT

## 2019-08-05 PROCEDURE — 80048 BASIC METABOLIC PNL TOTAL CA: CPT | Performed by: PHYSICIAN ASSISTANT

## 2019-08-05 PROCEDURE — 84100 ASSAY OF PHOSPHORUS: CPT | Performed by: PHYSICIAN ASSISTANT

## 2019-08-05 PROCEDURE — 85025 COMPLETE CBC W/AUTO DIFF WBC: CPT | Performed by: PHYSICIAN ASSISTANT

## 2019-08-05 PROCEDURE — 93010 ELECTROCARDIOGRAM REPORT: CPT | Performed by: INTERNAL MEDICINE

## 2019-08-05 PROCEDURE — 82805 BLOOD GASES W/O2 SATURATION: CPT | Performed by: EMERGENCY MEDICINE

## 2019-08-05 PROCEDURE — 99233 SBSQ HOSP IP/OBS HIGH 50: CPT | Performed by: INTERNAL MEDICINE

## 2019-08-05 PROCEDURE — 83735 ASSAY OF MAGNESIUM: CPT | Performed by: PHYSICIAN ASSISTANT

## 2019-08-05 PROCEDURE — 83605 ASSAY OF LACTIC ACID: CPT | Performed by: PHYSICIAN ASSISTANT

## 2019-08-05 RX ORDER — LANOLIN ALCOHOL/MO/W.PET/CERES
3 CREAM (GRAM) TOPICAL
Status: DISCONTINUED | OUTPATIENT
Start: 2019-08-05 | End: 2019-08-08 | Stop reason: HOSPADM

## 2019-08-05 RX ORDER — ACETAMINOPHEN 325 MG/1
975 TABLET ORAL EVERY 6 HOURS PRN
Status: DISCONTINUED | OUTPATIENT
Start: 2019-08-05 | End: 2019-08-08 | Stop reason: HOSPADM

## 2019-08-05 RX ORDER — AMOXICILLIN 250 MG
1 CAPSULE ORAL
Status: DISCONTINUED | OUTPATIENT
Start: 2019-08-05 | End: 2019-08-08 | Stop reason: HOSPADM

## 2019-08-05 RX ORDER — ACETAMINOPHEN 325 MG/1
650 TABLET ORAL EVERY 6 HOURS PRN
Status: DISCONTINUED | OUTPATIENT
Start: 2019-08-05 | End: 2019-08-05

## 2019-08-05 RX ORDER — POLYETHYLENE GLYCOL 3350 17 G/17G
17 POWDER, FOR SOLUTION ORAL DAILY PRN
Status: DISCONTINUED | OUTPATIENT
Start: 2019-08-05 | End: 2019-08-08 | Stop reason: HOSPADM

## 2019-08-05 RX ORDER — BISACODYL 10 MG
10 SUPPOSITORY, RECTAL RECTAL DAILY PRN
Status: DISCONTINUED | OUTPATIENT
Start: 2019-08-05 | End: 2019-08-08 | Stop reason: HOSPADM

## 2019-08-05 RX ADMIN — ACETAMINOPHEN 975 MG: 325 TABLET ORAL at 19:01

## 2019-08-05 RX ADMIN — SODIUM BICARBONATE 125 ML/HR: 84 INJECTION, SOLUTION INTRAVENOUS at 02:25

## 2019-08-05 RX ADMIN — CEFEPIME HYDROCHLORIDE 2000 MG: 2 INJECTION, POWDER, FOR SOLUTION INTRAVENOUS at 13:29

## 2019-08-05 RX ADMIN — HEPARIN SODIUM 5000 UNITS: 5000 INJECTION INTRAVENOUS; SUBCUTANEOUS at 21:47

## 2019-08-05 RX ADMIN — MELATONIN 3 MG: 3 TAB ORAL at 21:46

## 2019-08-05 RX ADMIN — HEPARIN SODIUM 5000 UNITS: 5000 INJECTION INTRAVENOUS; SUBCUTANEOUS at 13:29

## 2019-08-05 RX ADMIN — HEPARIN SODIUM 5000 UNITS: 5000 INJECTION INTRAVENOUS; SUBCUTANEOUS at 05:23

## 2019-08-05 RX ADMIN — CALCIUM GLUCONATE 1 G: 98 INJECTION, SOLUTION INTRAVENOUS at 07:48

## 2019-08-05 NOTE — UTILIZATION REVIEW
Initial Clinical Review    Admission: Date/Time/Statement: 8/4/19 @ 0120     Orders Placed This Encounter   Procedures    Inpatient Admission     Standing Status:   Standing     Number of Occurrences:   1     Order Specific Question:   Admitting Physician     Answer:   Veverly Mom [[id number]]     Order Specific Question:   Level of Care     Answer:   Critical Care [15]     Order Specific Question:   Bed Type     Answer:   Bariatric [1]     Order Specific Question:   Estimated length of stay     Answer:   More than 2 Midnights     Order Specific Question:   Certification     Answer:   I certify that inpatient services are medically necessary for this patient for a duration of greater than two midnights  See H&P and MD Progress Notes for additional information about the patient's course of treatment  Assessment/Plan:  48 yo m presented to the ER at [address] [place] [address] with complaints of R flank pain, chills , sweats, difficulty urinating, dysuria and a hx of kidney stones, ER found a 4 mm R side obstructing stone in mid ureter with mild - mod right side hydronephrosis  He was transferred to [address] [place] [address] for urology intervention  He was noted to have a HR of 130 and BP that began to decline  He received 5L IVF bolus  But remained hypotensive  Norepinephrine gtt started - and also required Vasopressin   Lactic acid 6 2-5 0 -  On Cefepime for sepsis, Bld Cx pending  He was admitted  Inpatient with a diagnosis of Septic  shock  - gram negative bacteremia - R  hydronephrosis  Urology - 8/4 -  46 yo morbidly obese man with a 4 mm proximal ureteral calculus, hydronephrosis  And secondary fulminant sepsis  Bordering on septic shock  Recommendation is for a ureteral stent  He was taken urgently to the OR on 8/4 @ 2:47 am  for cysto with right pyelogram and ureteral stent placement       Vitals [08/04/19 0120]   Temperature Pulse Respirations Blood Pressure SpO2   99 °F (37 2 °C) (!) 137 (!) 25 (!) 51/41 95 % Temp Source Heart Rate Source Patient Position - Orthostatic VS BP [facility] FiO2 (%)   Oral Monitor Lying Left arm --      Pain Score       No Pain        Wt Readings from Last 1 Encounters:   08/04/19 (!) 154 kg (340 lb 9 8 oz)     Additional Vital Signs:   Date/Time  Temp  Pulse  Resp  BP  MAP (mmHg)  Arterial Line BP  MAP  SpO2  O2 Device  Cardiac (WDL)  Patient Position - Orthostatic VS   08/05/19 0840  97 9 °F (36 6 °C)  102  29Abnormal           94 %  None (Room air)       08/05/19 0800    104  24Abnormal   117/84  96      96 %  Nasal cannula       08/05/19 0700    72  23Abnormal   107/60  82      97 %         08/05/19 0600    90  25Abnormal   106/61  82      96 %         08/05/19 0500  100 2 °F (37 9 °C)  106Abnormal   32Abnormal   130/72  91      96 %  Nasal cannula    Lying   08/05/19 0300    92  23Abnormal   117/79  95      99 %         08/05/19 0200    92  22  125/75  88      97 %         08/05/19 0100    92  24Abnormal   120/72  88      94 %         08/05/19 0000    98  26Abnormal   118/82  97      95 %  Nasal cannula       08/04/19 2300    104  30Abnormal   141/78  97      94 %         08/04/19 2200    82  28Abnormal   149/87  104      95 %         08/04/19 2100    100  38Abnormal   141/94  111      95 %         08/04/19 2051    102  36Abnormal   139/89  108      94 %         08/04/19 2000  99 3 °F (37 4 °C)  106Abnormal   34Abnormal   188/106Abnormal   141      95 %  Nasal cannula    Lying   08/04/19 1800    112Abnormal   33Abnormal   174/111Abnormal   129      90 %  None (Room air)       08/04/19 1730                  None (Room air)       08/04/19 1700    112Abnormal   33Abnormal   161/93  118      95 %         08/04/19 1645                97 %         08/04/19 1632                88 %Abnormal          08/04/19 1630                85 %Abnormal           SpO2: Dr Terrazas Alu aware keep pt o2 sats 88% greater at 08/04/19 1630   08/04/19 1620    108Abnormal   38Abnormal   143/87  112      91 %         08/04/19 1500  98 3 °F (36 8 °C)  108Abnormal   38Abnormal       138/84  102 mmHg  98 %         08/04/19 1400    108Abnormal   40Abnormal       132/80  98 mmHg  98 %         08/04/19 1200  98 3 °F (36 8 °C)  112Abnormal   35Abnormal       138/80  100 mmHg  95 %         08/04/19 1130    110Abnormal   33Abnormal       120/70  88 mmHg  95 %         08/04/19 1120    114Abnormal   37Abnormal       112/70  88 mmHg  95 %         08/04/19 1110    110Abnormal   33Abnormal       110/68  84 mmHg  97 %         08/04/19 1100    110Abnormal   38Abnormal       108/68  82 mmHg  96 %         08/04/19 1000  98 3 °F (36 8 °C)  106Abnormal   26Abnormal       94/54  70 mmHg  94 %  None (Room air)       08/04/19 0900    116Abnormal   34Abnormal       96/66  78 mmHg  97 %         08/04/19 0851  98 6 °F (37 °C)  112Abnormal   32Abnormal       104/64  78 mmHg  98 %         08/04/19 0800  98 9 °F (37 2 °C)  120Abnormal   32Abnormal       98/70  82 mmHg  96 %  None (Room air)       08/04/19 0756  98 9 °F (37 2 °C)  122Abnormal   36Abnormal       108/72  84 mmHg  98 %         08/04/19 0729  100 2 °F (37 9 °C)                       08/04/19 0630  101 4 °F (38 6 °C)Abnormal           84/54  66 mmHg           08/04/19 0615  101 8 °F (38 8 °C)Abnormal           82/50  64 mmHg           08/04/19 0600  102 1 °F (38 9 °C)Abnormal           84/52  64 mmHg           08/04/19 0545  102 3 °F (39 1 °C)Abnormal           92/56  70 mmHg           08/04/19 0530  102 5 °F (39 2 °C)Abnormal           90/58  70 mmHg           08/04/19 0525            92/58  72 mmHg           08/04/19 0520            96/60  74 mmHg           08/04/19 0515  102 7 °F (39 3 °C)Abnormal           102/64  78 mmHg          08/04/19 0510            108/68  82 mmHg           08/04/19 0505            106/68  80 mmHg           08/04/19 0500  103 °F (39 4 °C)Abnormal           104/68  80 mmHg           08/04/19 0455            112/72  86 mmHg           08/04/19 0450            104/68  80 mmHg           08/04/19 0445  103 6 °F (39 8 °C)Abnormal           106/66  80 mmHg           08/04/19 0430  103 8 °F (39 9 °C)Abnormal           120/72  90 mmHg           08/04/19 0415  104 °F (40 °C)Abnormal           132/74  94 mmHg           08/04/19 0400  104 6 °F (40 3 °C)Abnormal   142Abnormal   43Abnormal       122/68  88 mmHg  98 %  Nasal cannula       08/04/19 0355                  Nasal cannula       08/04/19 0345  104 8 °F (40 4 °C)Abnormal                        08/04/19 0334  102 4 °F (39 1 °C)Abnormal   146Abnormal   42Abnormal       180/60  86 mmHg  97 %  Simple mask       08/04/19 0330    146Abnormal   42Abnormal       176/54  90 mmHg  96 %         08/04/19 0315    138Abnormal   35Abnormal   157/67        95 %  Simple mask       08/04/19 0305  103 3 °F (39 6 °C)Abnormal   134Abnormal   38Abnormal   98/40Abnormal         95 %  Simple mask  X         Pertinent Labs/Diagnostic Test Results:   Results from last 7 days   Lab Units 08/05/19  0519 08/04/19  0551 08/04/19  0355 08/04/19  0322 08/03/19  2141   WBC Thousand/uL 15 16* 10 48* 4 24*  --  5 45   HEMOGLOBIN g/dL 12 6 12 9 12 8  --  15 7   I STAT HEMOGLOBIN g/dl  --   --   --  11 9*  --    HEMATOCRIT % 37 7 39 7 40 3  --  48 8   HEMATOCRIT, ISTAT %  --   --   --  35*  --    PLATELETS Thousands/uL 55* 84*  --   --  128*   NEUTROS ABS Thousands/µL 13 52*  --  3 77  --   --    BANDS PCT %  --  1  --   --  15*     Results from last 7 days   Lab Units 08/05/19  0519 08/04/19  1441 08/04/19  0551 08/04/19  0355 08/04/19  0322 08/03/19  2141   SODIUM mmol/L 139 139 138 142  -- 138   POTASSIUM mmol/L 3 8 4 8 3 0* 3 4*  --  3 1*   CHLORIDE mmol/L 108 113* 112* 115*  --  103   CO2 mmol/L 22 14* 14* 14*  --  16*   CO2, I-STAT mmol/L  --   --   --   --  14*  --    ANION GAP mmol/L 9 12 12 13  --  19*   BUN mg/dL 35* 39* 31* 31*  --  28*   CREATININE mg/dL 2 43* 3 28* 3 63* 3 70*  --  3 58*   EGFR ml/min/1 73sq m 29 20 18 18  --  18   CALCIUM mg/dL 8 0* 7 3* 7 4* 6 9*  --  8 9   CALCIUM, IONIZED mmol/L 1 05* 1 05*  --   --   --   --    CALCIUM, IONIZED, ISTAT mmol/L  --   --   --   --  1 16  --    MAGNESIUM mg/dL 2 6 2 9* 1 3*  --   --   --    PHOSPHORUS mg/dL 3 5 5 4* 1 8*  --   --   --      Results from last 7 days   Lab Units 08/05/19 0519 08/04/19  1441 08/03/19  2141   AST U/L 56* 51* 40   ALT U/L 34 39 39   ALK PHOS U/L 67 79 155*   TOTAL PROTEIN g/dL 5 8* 6 6 7 2   ALBUMIN g/dL 1 9* 2 3* 2 8*   TOTAL BILIRUBIN mg/dL 0 49 0 54 0 80   BILIRUBIN DIRECT mg/dL 0 14 0 33*  --      Results from last 7 days   Lab Units 08/05/19  0519 08/04/19  1441 08/04/19  0551 08/04/19  0355 08/03/19  2141   GLUCOSE RANDOM mg/dL 124 169* 106 123 129     Results from last 7 days   Lab Units 08/04/19  1440 08/04/19  0355   PH ART  7 334* 7 215*   PCO2 ART mm Hg 22 7* 32 1*   PO2 ART mm Hg 76 0 95 3   HCO3 ART mmol/L 11 8* 12 7*   BASE EXC ART mmol/L -11 8 -13 9   O2 CONTENT ART mL/dL 19 7 18 8   O2 HGB, ARTERIAL % 94 6 95 6   ABG SOURCE  Line, Arterial Line, Arterial     Results from last 7 days   Lab Units 08/05/19  0519 08/04/19  2251   PH DOLORES  7 400 7 348   PCO2 DOLORES mm Hg 36 9* 36 0*   PO2 DOLORES mm Hg 40 3 41 9   HCO3 DOLORES mmol/L 22 3* 19 3*   BASE EXC DOLORES mmol/L -2 0 -5 6   O2 CONTENT DOLORES ml/dL 14 2 14 7   O2 HGB, VENOUS % 75 2 75 4     Results from last 7 days   Lab Units 08/04/19  0322   PH, DOLORES I-STAT  7 265*   PCO2, DOLORES ISTAT mm HG 28 7*   PO2, DOLORES ISTAT mm HG 69 0*   HCO3, DOLORES ISTAT mmol/L 13 0*   I STAT BASE EXC mmol/L -13*   I STAT O2 SAT % 91*     Results from last 7 days   Lab Units 08/03/19  7080 TROPONIN I ng/mL 0 03     Results from last 7 days   Lab Units 08/03/19  2141   D DIMER QUANT ng/ml (FEU) >10,000*     Results from last 7 days   Lab Units 08/04/19  0551 08/04/19  0355   PROCALCITONIN ng/ml 260 77* 182 07*     Results from last 7 days   Lab Units 08/05/19  0521 08/05/19  0226 08/04/19  2211 08/04/19  1740 08/04/19  1441 08/04/19  0853 08/04/19  0355   LACTIC ACID mmol/L 2 1* 2 1* 2 2* 4 0* 3 5* 3 2* 5 8*     Results from last 7 days   Lab Units 08/03/19  2141   NT-PRO BNP pg/mL 2,563*     Results from last 7 days   Lab Units 08/03/19  2229   CLARITY UA  Cloudy   COLOR UA  Yellow   SPEC GRAV UA  1 025   PH UA  6 0   GLUCOSE UA mg/dl Negative   KETONES UA mg/dl Negative   BLOOD UA  Large*   PROTEIN UA mg/dl 100 (2+)*   NITRITE UA  Negative   BILIRUBIN UA  Negative   UROBILINOGEN UA E U /dl 0 2   LEUKOCYTES UA  Moderate*   WBC UA /hpf 30-50*   RBC UA /hpf 20-30*   BACTERIA UA /hpf Innumerable*   EPITHELIAL CELLS WET PREP /hpf Moderate*       Results from last 7 days   Lab Units 08/03/19  2159   GRAM STAIN RESULT  Gram negative rods*  Gram negative rods*     Results from last 7 days   Lab Units 08/03/19  2141   TOTAL COUNTED  100     CT A & P - 8/3 - Mild to moderate right-sided hydroureteronephrosis with a 4 mm obstructing stone in the mid right ureter   Nonobstructing bilateral intrarenal calculi  CXR 8/4 - No acute cardiopulmonary disease   Interval insertion of a right IJ catheter with tip in the region of the cavoatrial junction        Past Medical History:   Diagnosis Date    Gout     Kidney stone      Present on Admission:  **None**      Admitting Diagnosis: Septic shock ([facility] [address] ) [A41 9, R65 21]  Age/Sex: 49 y o  male  Admission Orders:    Current Facility-Administered Medications:  acetaminophen 650 mg Rectal Q4H PRN 8/4 x 1    bisacodyl 10 mg Rectal Daily PRN    calcium carbonate 500 mg Oral Daily PRN    cefepime 2,000 mg Intravenous Q12H    heparin (porcine) 5,000 Units Subcutaneous Q8H [address]    Labetalol HCl 10 mg Intravenous Q4H PRN 8/4 x 1    melatonin 6 mg Oral HS    polyethylene glycol 17 g Oral Daily PRN    senna-docusate sodium 1 tablet Oral HS     Calcium Chloride  1G IV Once  8/4  X 1  8/5 x 1      Fentanyl 25 mcg IV  Once 8/4 x 1     Mag Sulfate   4 G` IV Once  8/4     Isolyte 1000 ml IV Once  8/4 x 3   Potassium Chloride 40 meq IV Once  8/4   Potassium Phosphate  30 mmol IV Once  8/4Sodium Bicarb    Sodium Bicarbonate  50 meq IV  Once  8/4 x 3    LEVOPHED  4 mg IV Continuous  D/c;d  8/5 @ 07:51     Sodium Bicarbonate 150 MEQ IV  Continuous  D/c' d 8/5 @ 07:25        Vasopressin  20 units IV Continuous  d/c' d 8/4 @ 10:28              Nursing Orders -  VS q 1 - daily weights - I & O q shift  - Incentive spirometry -  O2 NC  - diet regular house     Network Utilization Review Department  Phone: [phone]; Fax [phone]  [email or web address]  ATTENTION: Please call with any questions or concerns to [phone]  and carefully listen to the prompts so that you are directed to the right person  Send all requests for admission clinical reviews, approved or denied determinations and any other requests to fax [phone]   All voicemails are confidential

## 2019-08-05 NOTE — PLAN OF CARE
Problem: Prexisting or High Potential for Compromised Skin Integrity  Goal: Skin integrity is maintained or improved  Description  INTERVENTIONS:  - Identify patients at risk for skin breakdown  - Assess and monitor skin integrity  - Assess and monitor nutrition and hydration status  - Monitor labs (i e  albumin)  - Assess for incontinence   - Turn and reposition patient  - Assist with mobility/ambulation  - Relieve pressure over bony prominences  - Avoid friction and shearing  - Provide appropriate hygiene as needed including keeping skin clean and dry  - Evaluate need for skin moisturizer/barrier cream  - Collaborate with interdisciplinary team (i e  Nutrition, Rehabilitation, etc )   - Patient/family teaching  Outcome: Not Progressing     Problem: Potential for Falls  Goal: Patient will remain free of falls  Description  INTERVENTIONS:  - Assess patient frequently for physical needs  -  Identify cognitive and physical deficits and behaviors that affect risk of falls    -  [place] fall precautions as indicated by assessment   - Educate patient/family on patient safety including physical limitations  - Instruct patient to call for assistance with activity based on assessment  - Modify environment to reduce risk of injury  - Consider OT/PT consult to assist with strengthening/mobility  Outcome: Not Progressing     Problem: PAIN - ADULT  Goal: Verbalizes/displays adequate comfort level or baseline comfort level  Description  Interventions:  - Encourage patient to monitor pain and request assistance  - Assess pain using appropriate pain scale  - Administer analgesics based on type and severity of pain and evaluate response  - Implement non-pharmacological measures as appropriate and evaluate response  - Consider cultural and social influences on pain and pain management  - Notify physician/advanced practitioner if interventions unsuccessful or patient reports new pain  Outcome: Not Progressing     Problem: INFECTION - ADULT  Goal: Absence or prevention of progression during hospitalization  Description  INTERVENTIONS:  - Assess and monitor for signs and symptoms of infection  - Monitor lab/diagnostic results  - Monitor all insertion sites, i e  indwelling lines, tubes, and drains  - Monitor endotracheal (as able) and nasal secretions for changes in amount and color  - [place] appropriate cooling/warming therapies per order  - Administer medications as ordered  - Instruct and encourage patient and family to use good hand hygiene technique  - Identify and instruct in appropriate isolation precautions for identified infection/condition  Outcome: Not Progressing  Goal: Absence of fever/infection during neutropenic period  Description  INTERVENTIONS:  - Monitor WBC  - Implement neutropenic guidelines  Outcome: Not Progressing     Problem: SAFETY ADULT  Goal: Maintain or return to baseline ADL function  Description  INTERVENTIONS:  -  Assess patient's ability to carry out ADLs; assess patient's baseline for ADL function and identify physical deficits which impact ability to perform ADLs (bathing, care of mouth/teeth, toileting, grooming, dressing, etc )  - Assess/evaluate cause of self-care deficits   - Assess range of motion  - Assess patient's mobility; develop plan if impaired  - Assess patient's need for assistive devices and provide as appropriate  - Encourage maximum independence but intervene and supervise when necessary  ¯ Involve family in performance of ADLs  ¯ Assess for home care needs following discharge   ¯ Request OT consult to assist with ADL evaluation and planning for discharge  ¯ Provide patient education as appropriate  Outcome: Not Progressing  Goal: Maintain or return mobility status to optimal level  Description  INTERVENTIONS:  - Assess patient's baseline mobility status (ambulation, transfers, stairs, etc )    - Identify cognitive and physical deficits and behaviors that affect mobility  - Identify mobility aids required to assist with transfers and/or ambulation (gait belt, sit-to-stand, lift, walker, cane, etc )  - [place] fall precautions as indicated by assessment  - Record patient progress and toleration of activity level on Mobility SBAR; progress patient to next Phase/Stage  - Instruct patient to call for assistance with activity based on assessment  - Request Rehabilitation consult to assist with strengthening/weightbearing, etc   Outcome: Not Progressing     Problem: DISCHARGE PLANNING  Goal: Discharge to home or other facility with appropriate resources  Description  INTERVENTIONS:  - Identify barriers to discharge w/patient and caregiver  - Arrange for needed discharge resources and transportation as appropriate  - Identify discharge learning needs (meds, wound care, etc )  - Arrange for interpretive services to assist at discharge as needed  - Refer to Case Management Department for coordinating discharge planning if the patient needs post-hospital services based on physician/advanced practitioner order or complex needs related to functional status, cognitive ability, or social support system  Outcome: Not Progressing     Problem: Knowledge Deficit  Goal: Patient/family/caregiver demonstrates understanding of disease process, treatment plan, medications, and discharge instructions  Description  Complete learning assessment and assess knowledge base    Interventions:  - Provide teaching at level of understanding  - Provide teaching via preferred learning methods  Outcome: Not Progressing

## 2019-08-05 NOTE — ASSESSMENT & PLAN NOTE
Urology following  S/p ureteral stent  Will need follow up as outpt for stone removal  This will be arranged by Urology

## 2019-08-05 NOTE — TELEPHONE ENCOUNTER
New stone patient, 4mm right obstructing stone w sepsis  Stent placed    He will need URS at [facility]  Please schedule preop with AP in 1-2 weeks for preop culture and to be scheduled for surgery    Thank you

## 2019-08-05 NOTE — ASSESSMENT & PLAN NOTE
Likely related to sepsis, less likely HIT as level was lower on admission compared to level in July 2019  Repeat level in am

## 2019-08-05 NOTE — ASSESSMENT & PLAN NOTE
Likely related to urosepsis  Lactic trended down to 2 1  No longer on pressors  Continue cefepime 2 g q 12 hours  Monitor cultures

## 2019-08-05 NOTE — UTILIZATION REVIEW
Notification of Inpatient Admission/Inpatient Authorization Request  This is a Notification of Inpatient Admission/Request for Inpatient Authorization for our facility [place] [address]  Be advised that this patient was admitted to our facility under Inpatient Status  Please contact the Utilization Review Department where the patient is receiving care services for additional admission information  Place of Service Code: 24   Place of Service Name: Inpatient Hospital  Presentation Date & Time: 8/4/2019  1:20 AM  Inpatient Admission Date & Time: 8/4/19 0120  Discharge Date & Time: No discharge date for patient encounter  Discharge Disposition (if discharged): [facility]  Attending Physician: Tamara Sue [[id number]]  Admission Orders (From admission, onward)     Ordered        08/04/19 0153  Inpatient Admission  Once                   Facility: [address])  [facility] Utilization Review Department  Phone: [phone]; Fax [phone]  [email or web address]  ATTENTION: Please call with any questions or concerns to [phone]  and carefully listen to the prompts so that you are directed to the right person  Send all requests for admission clinical reviews, approved or denied determinations and any other requests to fax [phone]   All voicemails are confidential

## 2019-08-05 NOTE — ASSESSMENT & PLAN NOTE
Urology following  S/P placement of right ureteral stent on 8/4/2019  Continue cefepime, will need total of 14 days abx per Urology  Plan for outpt stone removal

## 2019-08-05 NOTE — ASSESSMENT & PLAN NOTE
Urology following  S/P placement of right ureteral stent on 8/4/2019  Continue cefepime, will need total of 14 days abx per Urology

## 2019-08-05 NOTE — TRANSFER OF CARE
Progress Note - ICU to Med Surg transfer note   Carson Anderson 1965, 49 y o  male MRN: [id number]    Unit/Bed#: MICU 12 Encounter: [id number]    Primary Care Provider: Ruma Maya MD   Date and time admitted to hospital: 8/4/2019  1:20 AM        Thrombocytopenia ([facility] [address] )  Assessment & Plan  Likely related to sepsis, less likely HIT as level was lower on admission compared to level in July 2019  Repeat level in am    Hypocalcemia  Assessment & Plan  Repleted this am  For repeat level in am    Lactic acidosis  Assessment & Plan  Received fluid resuscitation  Level trended down, now 2 1  No further checks at this time    Metabolic acidosis  Assessment & Plan  Resolved  Bicarb gtt stopped this am     Status post placement of ureteral stent  Assessment & Plan  Urology following  Continue Cefepime 2gm q12  Follow cultures      Right ureteral stone  Assessment & Plan  Urology following  S/p ureteral stent  Will need follow up as outpt for stone removal  This will be arranged by Urology    Gram-negative bacteremia  Assessment & Plan  Follow-up with blood cultures  Repeat cultures pending  Continue cefepime 2 g q 12 hours    * Septic shock ([address] )  Assessment & Plan  Likely related to urosepsis  Lactic trended down to 2 1  No longer on pressors  Continue cefepime 2 g q 12 hours  Monitor cultures        Code Status: Level 1 - Full Code       Reason for ICU admission:   Sepsis     Active problems:   Principal Problem:    Septic shock ([address] )  Active Problems:    Gram-negative bacteremia    Right ureteral stone    Status post placement of ureteral stent    Metabolic acidosis    Lactic acidosis    Hypocalcemia    Thrombocytopenia (HCC)  Resolved Problems:    * No resolved hospital problems  *      Consultants:   Urology    History of Present Illness:   Patient is a 49 yr old male w PMH of gout, kidney stones, who presented to [address] [place] [address] with 3 day history of abdominal pain and fevers   Pt became hypotensive and received total of 5L NSS for Lactic acid of 6 2, which improved to 5 0  CT scan of the abd showed 4mm right side obstructing stone with mild to moderate hydroureteronephrosis  He was transferred to [facility] for urologic care not available at [facility]  Summary of clinical course:   Pt was transferred to [facility] MICU  Central access was obtained and Levo & Vasopressin started for BP support  He was taken to the OR and underwent cystoscopy and placement of right ureteral stent  He recovered back in MICU  Abx were continued  Levo was able to be weaned off  He continued with persisten lactic acidosis, received additional 2L Isolyte and 3 amps of Bicarb  He was started on bicarb gtt  His had improvement in his tachypnea, his lactic acid improved and bicarb gtt was stopped on am of 8/5  He had no further episodes of hypotension  Platelet count was low on admission and trended down to 55 on 8/5  This was likely due to sepsis and will continue to trend  He also had some intermittent episodes of desaturation while sleeping, concerning for MARCE  Will need outpt follow up with Pulm and possibly sleep study  He was medically stable for transfer to Med Surg unit  Recent or scheduled procedures:   Right  Ureteral stent placed 8/4    Outstanding/pending diagnostics:   None    Cultures:   Blood cultures positive for Gram negative rods, 8/3/2049  Repeat cultures pending from 8/5/2019  Urine culture prelim > 100,000 GNR       Mobilization Plan:   OOB with assistance  No therapy needs    Nutrition Plan:   Regular diet    VTE Pharmacologic Prophylaxis: Heparin  VTE Mechanical Prophylaxis: sequential compression device    Discharge Plan:   Patient should be ready for discharge, pending cultures  Will need outpt Pulmonary evaluation for possible MARCE    Initial /Plan: plan for return to home    Home medications that are not reordered and reason why:   Flomax: held preop, may be restarted when appropriate   Pt stated was not taking at home prior to admission because he needs new prescription  Spoke with Dr Trung Ang  regarding transfer  Please call [phone] any questions or concerns  Portions of the record may have been created with voice recognition software  Occasional wrong word or "sound a like" substitutions may have occurred due to the inherent limitations of voice recognition software  Read the chart carefully and recognize, using context, where substitutions have occurred

## 2019-08-05 NOTE — PROGRESS NOTES
Progress Note - Critical Care   Rosa Elena Coelho 49 y o  male MRN: [id number]  Unit/Bed#: [facility] 12 Encounter: [id number]    Attending Physician: Dar Israel DO      ______________________________________________________________________  Assessment and Plan:   Principal Problem:    Septic shock ([address] )  Active Problems:    Gram-negative bacteremia    Right ureteral stone    Status post placement of ureteral stent    Metabolic acidosis    Lactic acidosis    Hypocalcemia    Thrombocytopenia (HCC)  Resolved Problems:    * No resolved hospital problems  *        Neuro:   · Pain controlled with:  P r n  Tylenol  · Regulate sleep/wake cycle  · Sleep hygiene  · Melatonin at HS, will need to decrease dose as he feels too sleepy this am    · Delirium precautions  · CAM-ICU daily while in unit  CV:   · Cardiac infusions: None  · Weaned levo off yesterday at 1130  · MAP goal > 65  · Received additional 2L isolyte yesterday  · Rhythm: NSR  · Follow rhythm on telemetry  Lung:   · Wean nasal cannula as able  · SpO2 goal >92%  · Pulmonary toileting with resp protocol, IS  · Likely has component of MARCE, will need outpt follow-up and possible sleep study  GI:   · Stress ulcer prophylaxis: No prophylaxis needed  · Bowel regimen:none needed  Having BM  Will add PRN's  · Add LFT's to am labs due to continued elevated lactic  FEN:   · Nutrition/diet plan: Regular diet  · Hold bicarb gtt this am, (VBG 7 4/36 9/ 40 3/22  3)  · Replete electrolytes with goals: K >4 0, Mag >2 0, and Phos >3 0  · Replete calcium this morning, repeat level in am  :   · Indwelling Berumen present: yes   · Urinary catheter no longer needed  Will place order to D/C  · Trend UOP and BUN/creat  · Strict I and O  · Urology following  · Plan for 14 day course of antibiotic and outpt follow up     ID:   · Source of infection: Urosepsis, gram negative bacteremia  · Abx ordered: cefepime 2gm q12,  · Trend temps and WBC count  · Maintain normothermia  · Tmax 100 2  @ 0500 this am, otherwise afebrile  Heme:   · Trend hgb and plts  · Transfuse as needed for goal hgb >7 0  · SQ Heparin, SCD'd  · Thrombocytopenia, less likely HIT, more likely related to sepsis as was low on admission compared to platelet level in 2/7650  · Repeat CBC in am    Endo:   · Glycemic control plan: no issues  MSK/Skin:  · Mobility goal: mobilize out of bed  · No therapies evaluation at this time  · Frequent turning and pressure off-loading  · Local wound care as needed  Lines:  · Central venous access: Assessed  No longer needed  Order will be discontinued  VTE Prophylaxis:  · Pharmacologic Prophylaxis: Heparin  · Mechanical Prophylaxis: sequential compression device    Code Status: Level 1 - Full Code     Dispo: Downgrade later med/surg if next set of labs stable  Counseling / Coordination of Care  Total time spent today 38 minutes  Greater than 50% of total time was spent with the patient and / or family counseling and / or coordination of care  A description of the counseling / coordination of care:    ______________________________________________________________________    Date of admission: 8/4/2019    Reason for Admission: Urosepsis, septic shock, obstructing ureteral stone    HPI/24hr events:  Was able to be weaned off the Levophed yesterday  Continued to have a metabolic acidosis, lactic acidosis  Given 2 Liters of additional isolyte for elevated lactic acid  Review of Systems   Constitutional: Negative  HENT: Negative for sore throat and trouble swallowing  Respiratory: Negative for choking, chest tightness and shortness of breath  Cardiovascular: Negative for chest pain and palpitations  Gastrointestinal: Negative for abdominal distention, abdominal pain, diarrhea, nausea and vomiting  Genitourinary: Negative for flank pain  Musculoskeletal: Negative for neck pain and neck stiffness     Neurological: Negative for dizziness, speech difficulty, weakness, light-headedness, numbness and headaches  Psychiatric/Behavioral: Positive for sleep disturbance  Negative for confusion and hallucinations  States having "weird dreams"  Improved with better sleep after melatonin last evenign     ______________________________________________________________________    Physical Exam   Constitutional: He is oriented to person, place, and time  He appears well-developed and well-nourished  HENT:   Head: Normocephalic and atraumatic  Mouth/Throat: Oropharynx is clear and moist  No oropharyngeal exudate  Eyes: Pupils are equal, round, and reactive to light  Conjunctivae and EOM are normal  Right eye exhibits no discharge  Left eye exhibits no discharge  No scleral icterus  Neck: Normal range of motion  Neck supple  No tracheal deviation present  Cardiovascular: Normal rate, regular rhythm, normal heart sounds and intact distal pulses  Exam reveals no friction rub  No murmur heard  Pulmonary/Chest: Effort normal and breath sounds normal  No respiratory distress  He has no wheezes  He has no rales  Abdominal: Soft  He exhibits no distension  There is no tenderness  There is no guarding  Hypoactive bowel sounds   Musculoskeletal: He exhibits no edema  MORENO with ease, strength equal bilaterally   Lymphadenopathy:     He has no cervical adenopathy  Neurological: He is alert and oriented to person, place, and time  No cranial nerve deficit  Skin: Skin is warm and dry  Capillary refill takes less than 2 seconds  No rash noted  He is not diaphoretic     Psychiatric: He has a normal mood and affect      ______________________________________________________________________  Vitals:    08/05/19 0200 08/05/19 0300 08/05/19 0500 08/05/19 0600   BP: 125/75 117/79 130/72 106/61   BP [facility]:   Left arm    Pulse: 92 92 (!) 106 90   Resp: 22 (!) 23 (!) 32 (!) 25   Temp:   100 2 °F (37 9 °C)    TempSrc:   Oral    SpO2: 97% 99% 96% 96%   Weight:       Height: Temperature:   Temp (24hrs), Av 8 °F (37 1 °C), Min:98 3 °F (36 8 °C), Max:100 2 °F (37 9 °C)    Current Temperature: 100 2 °F (37 9 °C)    Weights:   IBW: 63 8 kg    Body mass index is 54 98 kg/m²  Weight (last 2 days)     Date/Time   Weight    19 0500   (!) 154 (340 61)              Hemodynamic Monitoring:  N/A       Non-Invasive/Invasive Ventilation Settings:  Respiratory    Lab Data (Last 4 hours)    None         O2/Vent Data (Last 4 hours)    None              Lab Results   Component Value Date    PHART 7 334 (L) 2019    SSJ8LFH 22 7 (LL) 2019    PO2ART 76 0 2019    DHH0LWF 11 8 (L) 2019    BEART -11 8 2019    SOURCE Line, Arterial 2019     SpO2: SpO2: 96 %, SpO2 Activity: SpO2 Activity: At Rest, SpO2 Device: O2 Device: Nasal cannula    Invasive lines and devices: Invasive Devices     Central Venous Catheter Line            CVC Central Lines 19 Triple Right Internal jugular 1 day          Peripheral Intravenous Line            Peripheral IV 19 Right Antecubital 1 day    Peripheral IV 19 Right Forearm 1 day          Drain            Ureteral Drain/Stent Right ureter 6 Fr  1 day    Urethral Catheter Non-latex; Double-lumen 18 Fr  1 day                     Intake and Outputs:    Intake/Output Summary (Last 24 hours) at 2019 0809  Last data filed at 2019 0525  Gross per 24 hour   Intake 3759 2 ml   Output 2045 ml   Net 1714 2 ml     I/O last 24 hours: In: 4463 3 [I V :3708 9; IV Piggyback:754 4]  Out: 0 [Urine:]    UOP: 100-150/hour   BM: 3 over last 24 hours    Nutrition:        Diet Orders   (From admission, onward)             Start     Ordered    19 1037  Diet Regular; Regular House  Diet effective now     Question Answer Comment   Diet Type Regular    Regular Regular House    RD to adjust diet per protocol?  Yes        19 1036              Labs:   Results from last 7 days   Lab Units 19  0519 19  0547 08/04/19  0355  08/03/19  2141   WBC Thousand/uL 15 16* 10 48* 4 24*  --  5 45   HEMOGLOBIN g/dL 12 6 12 9 12 8  --  15 7   I STAT HEMOGLOBIN   --   --   --    < >  --    HEMATOCRIT % 37 7 39 7 40 3  --  48 8   HEMATOCRIT, ISTAT   --   --   --    < >  --    PLATELETS Thousands/uL 55* 84*  --   --  128*   NEUTROS PCT % 89*  --  88*  --   --    MONOS PCT % 1*  --  1*  --   --    MONO PCT %  --  0*  --   --  3*    < > = values in this interval not displayed  Results from last 7 days   Lab Units 08/05/19  0519 08/04/19  1441 08/04/19  0551  08/04/19  0322 08/03/19  2141   SODIUM mmol/L 139 139 138   < >  --  138   POTASSIUM mmol/L 3 8 4 8 3 0*   < >  --  3 1*   CHLORIDE mmol/L 108 113* 112*   < >  --  103   CO2 mmol/L 22 14* 14*   < >  --  16*   CO2, I-STAT mmol/L  --   --   --   --  14*  --    BUN mg/dL 35* 39* 31*   < >  --  28*   CREATININE mg/dL 2 43* 3 28* 3 63*   < >  --  3 58*   CALCIUM mg/dL 8 0* 7 3* 7 4*   < >  --  8 9   ALK PHOS U/L  --  79  --   --   --  155*   ALT U/L  --  39  --   --   --  39   AST U/L  --  51*  --   --   --  40   GLUCOSE, ISTAT mg/dl  --   --   --   --  120  --     < > = values in this interval not displayed       Results from last 7 days   Lab Units 08/05/19  0519 08/04/19  1441 08/04/19  0551   MAGNESIUM mg/dL 2 6 2 9* 1 3*   PHOSPHORUS mg/dL 3 5 5 4* 1 8*          Results from last 7 days   Lab Units 08/05/19  0521 08/05/19  0226 08/04/19  2211 08/04/19  1740 08/04/19  1441   LACTIC ACID mmol/L 2 1* 2 1* 2 2* 4 0* 3 5*     Results from last 7 days   Lab Units 08/03/19  2141   TROPONIN I ng/mL 0 03     Results from last 7 days   Lab Units 08/04/19  1440 08/04/19  0355   PH ART  7 334* 7 215*   PCO2 ART mm Hg 22 7* 32 1*   PO2 ART mm Hg 76 0 95 3   HCO3 ART mmol/L 11 8* 12 7*   BASE EXC ART mmol/L -11 8 -13 9   ABG SOURCE  Line, Arterial Line, Arterial     VBG:  Results from last 7 days   Lab Units 08/05/19  0519  08/04/19  1440   PH DOLORES  7 400   < >  --    PCO2 DOLORES mm Hg 36 9*   < > --    PO2 DOLORES mm Hg 40 3   < >  --    HCO3 DOLORES mmol/L 22 3*   < >  --    BASE EXC DOLORES mmol/L -2 0   < >  --    ABG SOURCE   --   --  Line, Arterial    < > = values in this interval not displayed  Results from last 7 days   Lab Units 08/04/19  0551 08/04/19  0355   PROCALCITONIN ng/ml 260 77* 182 07*     No results found for: [facility]             Imaging: No new imaging to review    EKG: NSR on tele  Micro:  Results from last 7 days   Lab Units 08/03/19  2159   GRAM STAIN RESULT  Gram negative rods*  Gram negative rods*       Allergies: No Known Allergies    Medications:   Scheduled Meds:    Current Facility-Administered Medications:  acetaminophen 650 mg Rectal Q4H PRN Yon Alejo DO    bisacodyl 10 mg Rectal Daily PRN Jake Kevin, CRNP    calcium carbonate 500 mg Oral Daily PRN Zoraida Nightelvia, PA-C    calcium gluconate 1 g Intravenous Once JakeJOON GriffithNP Last Rate: 1 g (08/05/19 0748)   cefepime 2,000 mg Intravenous Q12H Zoraida Nightingale, PA-C Last Rate: Stopped (08/05/19 0100)   heparin (porcine) 5,000 Units Subcutaneous Q8H [facility] Tony Alejo DO    Labetalol HCl 10 mg Intravenous Q4H PRN Marlin Bal MD    melatonin 6 mg Oral HS Marlin Bal MD    polyethylene glycol 17 g Oral Daily PRN JakeRITA Griffith    senna-docusate sodium 1 tablet Oral HS RITA Garay      Continuous Infusions:   PRN Meds:    acetaminophen 650 mg Q4H PRN   bisacodyl 10 mg Daily PRN   calcium carbonate 500 mg Daily PRN   Labetalol HCl 10 mg Q4H PRN   polyethylene glycol 17 g Daily PRN             Portions of the record may have been created with voice recognition software  Occasional wrong word or "sound a like" substitutions may have occurred due to the inherent limitations of voice recognition software  Read the chart carefully and recognize, using context, where substitutions have occurred      [address] [facility]

## 2019-08-05 NOTE — PROGRESS NOTES
UROLOGY PROGRESS NOTE   Patient Identifiers: Ino Anaya (MRN [id number])  Date of Service: 8/5/2019    Subjective:     Awake and alert  Sitting out of bed in a chair  No complaints  Patient has  no complaints        Objective:     VITALS:    Vitals:    08/05/19 1200   BP: 111/80   Pulse: (!) 106   Resp: (!) 30   Temp:    SpO2: 95%           LABS:  Lab Results   Component Value Date    HGB 12 6 08/05/2019    HCT 37 7 08/05/2019    WBC 15 16 (H) 08/05/2019    PLT 55 (L) 08/05/2019   ]    Lab Results   Component Value Date     (L) 04/08/2015    K 3 8 08/05/2019     08/05/2019    CO2 22 08/05/2019    BUN 35 (H) 08/05/2019    CREATININE 2 43 (H) 08/05/2019    CALCIUM 8 0 (L) 08/05/2019    GLUCOSE 120 08/04/2019   ]        INPATIENT MEDS:    Current Facility-Administered Medications:     acetaminophen (TYLENOL) rectal suppository 650 mg, 650 mg, Rectal, Q4H PRN, Alena Alejo DO, 650 mg at 08/04/19 0354    bisacodyl (DULCOLAX) rectal suppository 10 mg, 10 mg, Rectal, Daily PRN, RITA Sepulveda    calcium carbonate (TUMS) chewable tablet 500 mg, 500 mg, Oral, Daily PRN, Severiano Loose, PA-C    cefepime (MAXIPIME) 2,000 mg in dextrose 5 % 50 mL IVPB, 2,000 mg, Intravenous, Q12H, Severiano Loose, PA-C, Stopped at 08/05/19 0100    heparin (porcine) subcutaneous injection 5,000 Units, 5,000 Units, Subcutaneous, Q8H [address], Arbie GibranDO, 5,000 Units at 08/05/19 0523    Labetalol HCl (NORMODYNE) injection 10 mg, 10 mg, Intravenous, Q4H PRN, Mimi Mcclendon MD, 10 mg at 08/04/19 2032    melatonin tablet 3 mg, 3 mg, Oral, HS, Lucy Brooms, CRNP    polyethylene glycol (MIRALAX) packet 17 g, 17 g, Oral, Daily PRN, Lucy Corbyoms, CRNP    senna-docusate sodium (SENOKOT S) 8 6-50 mg per tablet 1 tablet, 1 tablet, Oral, HS, RITA Sepulveda      Physical Exam:   /80   Pulse (!) 106   Temp 97 9 °F (36 6 °C) (Oral)   Resp (!) 30   Ht 5' 6" (1 676 m)   Wt (!) 154 kg (340 lb 9 8 oz)   SpO2 95%   BMI 54 98 kg/m²   GEN: no acute distress    RESP: breathing comfortably with no accessory muscle use    ABD: soft, non-tender, non-distended   INCISION:    EXT: no significant peripheral edema     CRISOSTOMO: in place draining clear yellow urine  no clots and       RADIOLOGY:   CT CHEST, ABDOMEN AND PELVIS WITHOUT IV CONTRAST      IMPRESSION:     Mild to moderate right-sided hydroureteronephrosis with a 4 mm obstructing stone in the mid right ureter  Nonobstructing bilateral intrarenal calculi      Assessment:     1  Sepsis from urinary tract source   2  Right ureteral calculus with hydronephrosis   3  Small bilateral nephrolithiasis   4  Morbid obesity   5  Acute kidney injury   6   Chronic kidney disease    Plan:   - currently stable from urologic standpoint  - outpatient urology followup for staged procedure  - repeat cysto ureteroscopy stone removal stent replacement in the outpatient setting  -  ongoing medical management and treatment for sepsis and UTI

## 2019-08-06 ENCOUNTER — TELEPHONE (OUTPATIENT)
Dept: OTHER | Facility: HOSPITAL | Age: 54
End: 2019-08-06

## 2019-08-06 PROBLEM — R51.9 HEADACHE: Status: ACTIVE | Noted: 2019-08-06

## 2019-08-06 PROBLEM — N17.9 ACUTE KIDNEY INJURY (HCC): Status: ACTIVE | Noted: 2019-08-06

## 2019-08-06 LAB
ALBUMIN SERPL BCP-MCNC: 1.9 G/DL (ref 3.5–5)
ALP SERPL-CCNC: 112 U/L (ref 46–116)
ALT SERPL W P-5'-P-CCNC: 44 U/L (ref 12–78)
ANION GAP SERPL CALCULATED.3IONS-SCNC: 7 MMOL/L (ref 4–13)
AST SERPL W P-5'-P-CCNC: 49 U/L (ref 5–45)
BACTERIA BLD CULT: ABNORMAL
BACTERIA BLD CULT: ABNORMAL
BACTERIA UR CULT: ABNORMAL
BACTERIA UR CULT: ABNORMAL
BILIRUB SERPL-MCNC: 0.45 MG/DL (ref 0.2–1)
BUN SERPL-MCNC: 35 MG/DL (ref 5–25)
CA-I BLD-SCNC: 1.09 MMOL/L (ref 1.12–1.32)
CALCIUM SERPL-MCNC: 8.5 MG/DL (ref 8.3–10.1)
CHLORIDE SERPL-SCNC: 106 MMOL/L (ref 100–108)
CO2 SERPL-SCNC: 23 MMOL/L (ref 21–32)
CREAT SERPL-MCNC: 2.16 MG/DL (ref 0.6–1.3)
ERYTHROCYTE [DISTWIDTH] IN BLOOD BY AUTOMATED COUNT: 13.3 % (ref 11.6–15.1)
GFR SERPL CREATININE-BSD FRML MDRD: 34 ML/MIN/1.73SQ M
GLUCOSE SERPL-MCNC: 122 MG/DL (ref 65–140)
GRAM STN SPEC: ABNORMAL
GRAM STN SPEC: ABNORMAL
HCT VFR BLD AUTO: 38.6 % (ref 36.5–49.3)
HGB BLD-MCNC: 12.5 G/DL (ref 12–17)
MAGNESIUM SERPL-MCNC: 2.6 MG/DL (ref 1.6–2.6)
MCH RBC QN AUTO: 30.3 PG (ref 26.8–34.3)
MCHC RBC AUTO-ENTMCNC: 32.4 G/DL (ref 31.4–37.4)
MCV RBC AUTO: 94 FL (ref 82–98)
PHOSPHATE SERPL-MCNC: 2.3 MG/DL (ref 2.7–4.5)
PLATELET # BLD AUTO: 57 THOUSANDS/UL (ref 149–390)
PMV BLD AUTO: 12.3 FL (ref 8.9–12.7)
POTASSIUM SERPL-SCNC: 3.5 MMOL/L (ref 3.5–5.3)
PROT SERPL-MCNC: 6.3 G/DL (ref 6.4–8.2)
RBC # BLD AUTO: 4.12 MILLION/UL (ref 3.88–5.62)
SODIUM SERPL-SCNC: 136 MMOL/L (ref 136–145)
WBC # BLD AUTO: 9.86 THOUSAND/UL (ref 4.31–10.16)

## 2019-08-06 PROCEDURE — 80053 COMPREHEN METABOLIC PANEL: CPT | Performed by: NURSE PRACTITIONER

## 2019-08-06 PROCEDURE — 82330 ASSAY OF CALCIUM: CPT | Performed by: NURSE PRACTITIONER

## 2019-08-06 PROCEDURE — 85027 COMPLETE CBC AUTOMATED: CPT | Performed by: NURSE PRACTITIONER

## 2019-08-06 PROCEDURE — 99223 1ST HOSP IP/OBS HIGH 75: CPT | Performed by: INTERNAL MEDICINE

## 2019-08-06 PROCEDURE — 99255 IP/OBS CONSLTJ NEW/EST HI 80: CPT | Performed by: INTERNAL MEDICINE

## 2019-08-06 PROCEDURE — 99232 SBSQ HOSP IP/OBS MODERATE 35: CPT | Performed by: INTERNAL MEDICINE

## 2019-08-06 PROCEDURE — 83735 ASSAY OF MAGNESIUM: CPT | Performed by: NURSE PRACTITIONER

## 2019-08-06 PROCEDURE — 84100 ASSAY OF PHOSPHORUS: CPT | Performed by: NURSE PRACTITIONER

## 2019-08-06 PROCEDURE — 99232 SBSQ HOSP IP/OBS MODERATE 35: CPT | Performed by: PHYSICIAN ASSISTANT

## 2019-08-06 RX ORDER — METOCLOPRAMIDE HYDROCHLORIDE 5 MG/ML
10 INJECTION INTRAMUSCULAR; INTRAVENOUS EVERY 8 HOURS SCHEDULED
Status: DISCONTINUED | OUTPATIENT
Start: 2019-08-06 | End: 2019-08-08

## 2019-08-06 RX ORDER — SODIUM CHLORIDE 9 MG/ML
60 INJECTION, SOLUTION INTRAVENOUS CONTINUOUS
Status: DISCONTINUED | OUTPATIENT
Start: 2019-08-06 | End: 2019-08-07

## 2019-08-06 RX ORDER — DIPHENHYDRAMINE HYDROCHLORIDE 50 MG/ML
25 INJECTION INTRAMUSCULAR; INTRAVENOUS EVERY 8 HOURS PRN
Status: DISCONTINUED | OUTPATIENT
Start: 2019-08-06 | End: 2019-08-08

## 2019-08-06 RX ORDER — TAMSULOSIN HYDROCHLORIDE 0.4 MG/1
0.4 CAPSULE ORAL
Status: DISCONTINUED | OUTPATIENT
Start: 2019-08-06 | End: 2019-08-08 | Stop reason: HOSPADM

## 2019-08-06 RX ORDER — MAGNESIUM SULFATE HEPTAHYDRATE 40 MG/ML
2 INJECTION, SOLUTION INTRAVENOUS
Status: DISCONTINUED | OUTPATIENT
Start: 2019-08-06 | End: 2019-08-08

## 2019-08-06 RX ORDER — HYDRALAZINE HYDROCHLORIDE 20 MG/ML
5 INJECTION INTRAMUSCULAR; INTRAVENOUS ONCE
Status: COMPLETED | OUTPATIENT
Start: 2019-08-06 | End: 2019-08-06

## 2019-08-06 RX ADMIN — SODIUM CHLORIDE 500 MG: 0.9 INJECTION, SOLUTION INTRAVENOUS at 13:05

## 2019-08-06 RX ADMIN — TAMSULOSIN HYDROCHLORIDE 0.4 MG: 0.4 CAPSULE ORAL at 17:25

## 2019-08-06 RX ADMIN — CEFTRIAXONE SODIUM 2000 MG: 10 INJECTION, POWDER, FOR SOLUTION INTRAVENOUS at 01:53

## 2019-08-06 RX ADMIN — HYDRALAZINE HYDROCHLORIDE 5 MG: 20 INJECTION INTRAMUSCULAR; INTRAVENOUS at 12:01

## 2019-08-06 RX ADMIN — HEPARIN SODIUM 5000 UNITS: 5000 INJECTION INTRAVENOUS; SUBCUTANEOUS at 14:51

## 2019-08-06 RX ADMIN — ACETAMINOPHEN 975 MG: 325 TABLET ORAL at 11:47

## 2019-08-06 RX ADMIN — SODIUM CHLORIDE 100 ML/HR: 0.9 INJECTION, SOLUTION INTRAVENOUS at 10:40

## 2019-08-06 RX ADMIN — HEPARIN SODIUM 5000 UNITS: 5000 INJECTION INTRAVENOUS; SUBCUTANEOUS at 21:07

## 2019-08-06 RX ADMIN — HEPARIN SODIUM 5000 UNITS: 5000 INJECTION INTRAVENOUS; SUBCUTANEOUS at 06:07

## 2019-08-06 RX ADMIN — MAGNESIUM SULFATE HEPTAHYDRATE 2 G: 40 INJECTION, SOLUTION INTRAVENOUS at 10:43

## 2019-08-06 RX ADMIN — METOCLOPRAMIDE 10 MG: 5 INJECTION, SOLUTION INTRAMUSCULAR; INTRAVENOUS at 10:39

## 2019-08-06 NOTE — ASSESSMENT & PLAN NOTE
· Presented with RLQ pain and nausea/vomiting in setting of septic shock  · CT showed mild-moderate R sided hydroureteronephrosis with a 4 mm obstructing stone in mid right ureter  · Urology following  S/P urgent ureteral stent placement 8/4/19 with Dr Reyes Comment  · Will need follow up as outpt for stone removal 1-2 weeks post dc   This will be arranged by Urology

## 2019-08-06 NOTE — TREATMENT PLAN
Treatment Plan - Urology   Luana Lancaster 49 y o  male MRN: [id number]  [facility] Encounter: [id number]    Treatment Plan:  Voiding well with cook out, on Flomax  PVR 87  Outpatient urology follow up arranged  Urology will sign off but remain available for any further inpatient needs  Please feel free to call with questions or concerns       Viv Garcia PA-C  Date: 8/6/2019 Time: 2:01 PM

## 2019-08-06 NOTE — PLAN OF CARE
Problem: Prexisting or High Potential for Compromised Skin Integrity  Goal: Skin integrity is maintained or improved  Description  INTERVENTIONS:  - Identify patients at risk for skin breakdown  - Assess and monitor skin integrity  - Assess and monitor nutrition and hydration status  - Monitor labs (i e  albumin)  - Assess for incontinence   - Turn and reposition patient  - Assist with mobility/ambulation  - Relieve pressure over bony prominences  - Avoid friction and shearing  - Provide appropriate hygiene as needed including keeping skin clean and dry  - Evaluate need for skin moisturizer/barrier cream  - Collaborate with interdisciplinary team (i e  Nutrition, Rehabilitation, etc )   - Patient/family teaching  Outcome: Progressing     Problem: Potential for Falls  Goal: Patient will remain free of falls  Description  INTERVENTIONS:  - Assess patient frequently for physical needs  -  Identify cognitive and physical deficits and behaviors that affect risk of falls    -  [place] fall precautions as indicated by assessment   - Educate patient/family on patient safety including physical limitations  - Instruct patient to call for assistance with activity based on assessment  - Modify environment to reduce risk of injury  - Consider OT/PT consult to assist with strengthening/mobility  Outcome: Progressing     Problem: PAIN - ADULT  Goal: Verbalizes/displays adequate comfort level or baseline comfort level  Description  Interventions:  - Encourage patient to monitor pain and request assistance  - Assess pain using appropriate pain scale  - Administer analgesics based on type and severity of pain and evaluate response  - Implement non-pharmacological measures as appropriate and evaluate response  - Consider cultural and social influences on pain and pain management  - Notify physician/advanced practitioner if interventions unsuccessful or patient reports new pain  Outcome: Progressing     Problem: INFECTION - ADULT  Goal: Absence or prevention of progression during hospitalization  Description  INTERVENTIONS:  - Assess and monitor for signs and symptoms of infection  - Monitor lab/diagnostic results  - Monitor all insertion sites, i e  indwelling lines, tubes, and drains  - Monitor endotracheal (as able) and nasal secretions for changes in amount and color  - [place] appropriate cooling/warming therapies per order  - Administer medications as ordered  - Instruct and encourage patient and family to use good hand hygiene technique  - Identify and instruct in appropriate isolation precautions for identified infection/condition  Outcome: Progressing  Goal: Absence of fever/infection during neutropenic period  Description  INTERVENTIONS:  - Monitor WBC  - Implement neutropenic guidelines  Outcome: Progressing     Problem: SAFETY ADULT  Goal: Maintain or return to baseline ADL function  Description  INTERVENTIONS:  -  Assess patient's ability to carry out ADLs; assess patient's baseline for ADL function and identify physical deficits which impact ability to perform ADLs (bathing, care of mouth/teeth, toileting, grooming, dressing, etc )  - Assess/evaluate cause of self-care deficits   - Assess range of motion  - Assess patient's mobility; develop plan if impaired  - Assess patient's need for assistive devices and provide as appropriate  - Encourage maximum independence but intervene and supervise when necessary  ¯ Involve family in performance of ADLs  ¯ Assess for home care needs following discharge   ¯ Request OT consult to assist with ADL evaluation and planning for discharge  ¯ Provide patient education as appropriate  Outcome: Progressing  Goal: Maintain or return mobility status to optimal level  Description  INTERVENTIONS:  - Assess patient's baseline mobility status (ambulation, transfers, stairs, etc )    - Identify cognitive and physical deficits and behaviors that affect mobility  - Identify mobility aids required to assist with transfers and/or ambulation (gait belt, sit-to-stand, lift, walker, cane, etc )  - [place] fall precautions as indicated by assessment  - Record patient progress and toleration of activity level on Mobility SBAR; progress patient to next Phase/Stage  - Instruct patient to call for assistance with activity based on assessment  - Request Rehabilitation consult to assist with strengthening/weightbearing, etc   Outcome: Progressing     Problem: DISCHARGE PLANNING  Goal: Discharge to home or other facility with appropriate resources  Description  INTERVENTIONS:  - Identify barriers to discharge w/patient and caregiver  - Arrange for needed discharge resources and transportation as appropriate  - Identify discharge learning needs (meds, wound care, etc )  - Arrange for interpretive services to assist at discharge as needed  - Refer to Case Management Department for coordinating discharge planning if the patient needs post-hospital services based on physician/advanced practitioner order or complex needs related to functional status, cognitive ability, or social support system  Outcome: Progressing     Problem: Knowledge Deficit  Goal: Patient/family/caregiver demonstrates understanding of disease process, treatment plan, medications, and discharge instructions  Description  Complete learning assessment and assess knowledge base    Interventions:  - Provide teaching at level of understanding  - Provide teaching via preferred learning methods  Outcome: Progressing     Problem: GENITOURINARY - ADULT  Goal: Maintains or returns to baseline urinary function  Description  INTERVENTIONS:  - Assess urinary function  - Encourage oral fluids to ensure adequate hydration  - Administer IV fluids as ordered to ensure adequate hydration  - Administer ordered medications as needed  - Offer frequent toileting  - Follow urinary retention protocol if ordered  Outcome: Progressing  Goal: Absence of urinary retention  Description  INTERVENTIONS:  - Assess patients ability to void and empty bladder  - Monitor I/O  - Bladder scan as needed  - Discuss with physician/AP medications to alleviate retention as needed  - Discuss catheterization for long term situations as appropriate  Outcome: Progressing     Problem: METABOLIC, FLUID AND ELECTROLYTES - ADULT  Goal: Electrolytes maintained within normal limits  Description  INTERVENTIONS:  - Monitor labs and assess patient for signs and symptoms of electrolyte imbalances  - Administer electrolyte replacement as ordered  - Monitor response to electrolyte replacements, including repeat lab results as appropriate  - Instruct patient on fluid and nutrition as appropriate  Outcome: Progressing  Goal: Fluid balance maintained  Description  INTERVENTIONS:  - Monitor labs and assess for signs and symptoms of volume excess or deficit  - Monitor I/O and WT  - Instruct patient on fluid and nutrition as appropriate  Outcome: Progressing

## 2019-08-06 NOTE — ASSESSMENT & PLAN NOTE
· POA with creat of 3 58, peaked at 3 7  · Likely in setting of sepsis/ATN and obstructing renal stone  · Now improving but still not back to baseline  · Will ask for renal input  Start on IVF   Monitor bladder scans  · Avoid hypotension/nephrotoxins

## 2019-08-06 NOTE — SOCIAL WORK
Cm reviewed patient during care coordination rounds  Patient is not medically stable for d/c today  CM informed that blood cultures are pending for patient  Anticipated that patient will transition to PO antibiotics  Cm awaiting finale medical clearance and discharge recommendations

## 2019-08-06 NOTE — ASSESSMENT & PLAN NOTE
· 2/2 initial blood cultures + for e coli, same as urine culture   Follow-up repeat blood cultures  · Was on IV cefepime, transitioned to IV ceftriaxone 8/5 by ICU team  · Will need 2 weeks total abx, can likely switch to oral once repeat blood cultures are negative and procal improved   · Will hold off on ID consultation for now unless clinically worsening

## 2019-08-06 NOTE — PROGRESS NOTES
UROLOGY PROGRESS NOTE   Patient Identifiers: Iram Oviedo (MRN [id number])  Date of Service: 8/6/2019    Subjective:     Resting comfortably      Patient has  complaints of   Difficulty starting the urine stream        Objective:     VITALS:    Vitals:    08/06/19 1144   BP: (!) 168/107   Pulse: (!) 108   Resp: 19   Temp: (!) 102 8 °F (39 3 °C)   SpO2: 98%           LABS:  Lab Results   Component Value Date    HGB 12 5 08/06/2019    HCT 38 6 08/06/2019    WBC 9 86 08/06/2019    PLT 57 (L) 08/06/2019   ]    Lab Results   Component Value Date     (L) 04/08/2015    K 3 5 08/06/2019     08/06/2019    CO2 23 08/06/2019    BUN 35 (H) 08/06/2019    CREATININE 2 16 (H) 08/06/2019    CALCIUM 8 5 08/06/2019    GLUCOSE 120 08/04/2019   ]        INPATIENT MEDS:    Current Facility-Administered Medications:     acetaminophen (TYLENOL) tablet 975 mg, 975 mg, Oral, Q6H PRN, Geni Thompson PA-C, 975 mg at 08/06/19 1147    bisacodyl (DULCOLAX) rectal suppository 10 mg, 10 mg, Rectal, Daily PRN, RITA Martino    calcium carbonate (TUMS) chewable tablet 500 mg, 500 mg, Oral, Daily PRN, RTIA Martino    cefepime (MAXIPIME) 2,000 mg in dextrose 5 % 50 mL IVPB, 2,000 mg, Intravenous, Q12H, Khalida Ramirez PA-C    diphenhydrAMINE (BENADRYL) injection 25 mg, 25 mg, Intravenous, Q8H PRN, VisionGaterandy Capps PA-C    heparin (porcine) subcutaneous injection 5,000 Units, 5,000 Units, Subcutaneous, Q8H [address], RITA Martino, 5,000 Units at 08/06/19 0607    hydrALAZINE (APRESOLINE) injection 5 mg, 5 mg, Intravenous, Once, Lexrandy Capps PA-C    Labetalol HCl (NORMODYNE) injection 10 mg, 10 mg, Intravenous, Q4H PRN, RITA Martino, 10 mg at 08/04/19 2032    magnesium sulfate 2 g/50 mL IVPB (premix) 2 g, 2 g, Intravenous, Q24H [address], Khalida Ramirez PA-C, 2 g at 08/06/19 1043    melatonin tablet 3 mg, 3 mg, Oral, HS, RITA Garay, 3 mg at 08/05/19 2146    methylPREDNISolone sodium succinate (Solu-MEDROL) 500 mg in sodium chloride 0 9 % 250 mL IVPB, 500 mg, Intravenous, Once, Rick Gant PA-C    metoclopramide (REGLAN) injection 10 mg, 10 mg, Intravenous, Q8H LATRICE, Khalida Ramirez PA-C, 10 mg at 08/06/19 1039    polyethylene glycol (MIRALAX) packet 17 g, 17 g, Oral, Daily PRN, RITA Rebolledo    senna-docusate sodium (SENOKOT S) 8 6-50 mg per tablet 1 tablet, 1 tablet, Oral, HS, RITA Garay    sodium chloride 0 9 % infusion, 60 mL/hr, Intravenous, Continuous, Hugo Buck PA-C, Last Rate: 60 mL/hr at 08/06/19 1142, 60 mL/hr at 08/06/19 1142      Physical Exam:   BP (!) 168/107 (BP [facility]: Left arm)   Pulse (!) 108   Temp (!) 102 8 °F (39 3 °C) (Oral)   Resp 19   Ht 5' 6" (1 676 m)   Wt (!) 153 kg (336 lb 6 8 oz)   SpO2 98%   BMI 54 30 kg/m²   GEN: no acute distress    RESP: breathing comfortably with no accessory muscle use    ABD: soft, non-tender, non-distended   INCISION:    EXT: no significant peripheral edema     RADIOLOGY:    none     Assessment:   1  Sepsis from urinary tract source   2  Right ureteral calculus with hydronephrosis   3  Small bilateral nephrolithiasis   4  Morbid obesity   5  Acute kidney injury   6  Chronic kidney disease   7   BPH      Plan:   - will add tamsulosin which he takes at home  - outpatient urology follow-up for repeat ureteroscopy stone removal and stent replacement  - ongoing medical management and treatment for sepsis UTI and acute kidney injury  - check PVR and call if greater than 350 mL

## 2019-08-06 NOTE — PLAN OF CARE
Problem: Prexisting or High Potential for Compromised Skin Integrity  Goal: Skin integrity is maintained or improved  Description  INTERVENTIONS:  - Identify patients at risk for skin breakdown  - Assess and monitor skin integrity  - Assess and monitor nutrition and hydration status  - Monitor labs (i e  albumin)  - Assess for incontinence   - Turn and reposition patient  - Assist with mobility/ambulation  - Relieve pressure over bony prominences  - Avoid friction and shearing  - Provide appropriate hygiene as needed including keeping skin clean and dry  - Evaluate need for skin moisturizer/barrier cream  - Collaborate with interdisciplinary team (i e  Nutrition, Rehabilitation, etc )   - Patient/family teaching  Outcome: Progressing     Problem: Potential for Falls  Goal: Patient will remain free of falls  Description  INTERVENTIONS:  - Assess patient frequently for physical needs  -  Identify cognitive and physical deficits and behaviors that affect risk of falls    -  [place] fall precautions as indicated by assessment   - Educate patient/family on patient safety including physical limitations  - Instruct patient to call for assistance with activity based on assessment  - Modify environment to reduce risk of injury  - Consider OT/PT consult to assist with strengthening/mobility  Outcome: Progressing     Problem: PAIN - ADULT  Goal: Verbalizes/displays adequate comfort level or baseline comfort level  Description  Interventions:  - Encourage patient to monitor pain and request assistance  - Assess pain using appropriate pain scale  - Administer analgesics based on type and severity of pain and evaluate response  - Implement non-pharmacological measures as appropriate and evaluate response  - Consider cultural and social influences on pain and pain management  - Notify physician/advanced practitioner if interventions unsuccessful or patient reports new pain  Outcome: Progressing     Problem: INFECTION - ADULT  Goal: Absence or prevention of progression during hospitalization  Description  INTERVENTIONS:  - Assess and monitor for signs and symptoms of infection  - Monitor lab/diagnostic results  - Monitor all insertion sites, i e  indwelling lines, tubes, and drains  - Monitor endotracheal (as able) and nasal secretions for changes in amount and color  - [place] appropriate cooling/warming therapies per order  - Administer medications as ordered  - Instruct and encourage patient and family to use good hand hygiene technique  - Identify and instruct in appropriate isolation precautions for identified infection/condition  Outcome: Progressing  Goal: Absence of fever/infection during neutropenic period  Description  INTERVENTIONS:  - Monitor WBC  - Implement neutropenic guidelines  Outcome: Progressing     Problem: SAFETY ADULT  Goal: Maintain or return to baseline ADL function  Description  INTERVENTIONS:  -  Assess patient's ability to carry out ADLs; assess patient's baseline for ADL function and identify physical deficits which impact ability to perform ADLs (bathing, care of mouth/teeth, toileting, grooming, dressing, etc )  - Assess/evaluate cause of self-care deficits   - Assess range of motion  - Assess patient's mobility; develop plan if impaired  - Assess patient's need for assistive devices and provide as appropriate  - Encourage maximum independence but intervene and supervise when necessary  ¯ Involve family in performance of ADLs  ¯ Assess for home care needs following discharge   ¯ Request OT consult to assist with ADL evaluation and planning for discharge  ¯ Provide patient education as appropriate  Outcome: Progressing  Goal: Maintain or return mobility status to optimal level  Description  INTERVENTIONS:  - Assess patient's baseline mobility status (ambulation, transfers, stairs, etc )    - Identify cognitive and physical deficits and behaviors that affect mobility  - Identify mobility aids required to assist with transfers and/or ambulation (gait belt, sit-to-stand, lift, walker, cane, etc )  - [place] fall precautions as indicated by assessment  - Record patient progress and toleration of activity level on Mobility SBAR; progress patient to next Phase/Stage  - Instruct patient to call for assistance with activity based on assessment  - Request Rehabilitation consult to assist with strengthening/weightbearing, etc   Outcome: Progressing     Problem: DISCHARGE PLANNING  Goal: Discharge to home or other facility with appropriate resources  Description  INTERVENTIONS:  - Identify barriers to discharge w/patient and caregiver  - Arrange for needed discharge resources and transportation as appropriate  - Identify discharge learning needs (meds, wound care, etc )  - Arrange for interpretive services to assist at discharge as needed  - Refer to Case Management Department for coordinating discharge planning if the patient needs post-hospital services based on physician/advanced practitioner order or complex needs related to functional status, cognitive ability, or social support system  Outcome: Progressing     Problem: Knowledge Deficit  Goal: Patient/family/caregiver demonstrates understanding of disease process, treatment plan, medications, and discharge instructions  Description  Complete learning assessment and assess knowledge base    Interventions:  - Provide teaching at level of understanding  - Provide teaching via preferred learning methods  Outcome: Progressing     Problem: GENITOURINARY - ADULT  Goal: Maintains or returns to baseline urinary function  Description  INTERVENTIONS:  - Assess urinary function  - Encourage oral fluids to ensure adequate hydration  - Administer IV fluids as ordered to ensure adequate hydration  - Administer ordered medications as needed  - Offer frequent toileting  - Follow urinary retention protocol if ordered  Outcome: Progressing  Goal: Absence of urinary retention  Description  INTERVENTIONS:  - Assess patients ability to void and empty bladder  - Monitor I/O  - Bladder scan as needed  - Discuss with physician/AP medications to alleviate retention as needed  - Discuss catheterization for long term situations as appropriate  Outcome: Progressing     Problem: METABOLIC, FLUID AND ELECTROLYTES - ADULT  Goal: Electrolytes maintained within normal limits  Description  INTERVENTIONS:  - Monitor labs and assess patient for signs and symptoms of electrolyte imbalances  - Administer electrolyte replacement as ordered  - Monitor response to electrolyte replacements, including repeat lab results as appropriate  - Instruct patient on fluid and nutrition as appropriate  Outcome: Progressing  Goal: Fluid balance maintained  Description  INTERVENTIONS:  - Monitor labs and assess for signs and symptoms of volume excess or deficit  - Monitor I/O and WT  - Instruct patient on fluid and nutrition as appropriate  Outcome: Progressing

## 2019-08-06 NOTE — CONSULTS
Consultation - Infectious Disease   Stella Bueno 49 y o  male MRN: [id number]  Unit/Bed#: [facility] 629-01 Encounter: [id number]      Consults      Assessment/Recommendations     1  Septic shock on admission:  Secondary to pyelonephritis, secondary to obstructing right-sided mid ureter stone  On presentation patient had acute kidney injury, was acidotic with leukocytosis  Now shock state resolved, but he remains febrile    2  Right-sided pyelonephritis:  Secondary to obstructing stone located in the right mid ureter  Urine analysis with 30-50 white blood cells, and urine culture growing E coli, sensitive to 1st generation cephalosporins and to fluoroquinolones  Pyelonephritis was complicated by bacteremia  Patient is status post cystoscopy and pyelogram done August 4th and right-sided double-J stent has been inserted  June preston, to be removed in the future as per urology note  3  E coli bacteremia:  Secondary to pyelonephritis and infected kidney stone  Blood culture August 3, 2019 positive in both sets for E coli  Repeat blood culture done on August 5th is still negative to date    - Patient remains febrile, 2 days after double-J stent insertion and initiation of antibiotics  This might be part of normal process of recovering from pyelonephritis and infected kidney stone complicated by bacteremia  E coli is sensitive to ceftriaxone and cefepime that the patient has been receiving the past 48 hours  - stop cefepime and start ceftriaxone 2g iv q24h   - will monitor clinically for the next 48 hours  - if fever persists beyond the next 74-52 hours complication such as formation of renal or perinephric abscess is to be considered and ruled out by imaging   - also stone needs to be extracted as it forms a nidus for further infections  Thank you for involving me in the care of your patient   Please call with questions, change in clinical status or if tests recommended above are abnormal      Discussed with the primary service  History:     HISTORY OF PRESENT ILLNESS:    Reason for Consult: fever, right pyelonephritis, obstructing stone, bacteremia  HPI: Lori King is a 49y o  year old male with history of BPH and kidney stones admitted as transfer from [facility] on August 4th after he presented there for fever chills and right-sided abdominal pain  Patient was acidotic on admission with a pH of 7 2, he had an ADRIANNE with a creatinine that peaked to 3 7 mg/dL, a leukocytosis was noted and the imaging including CT scan chest abdomen and pelvis showed a nonobstructing right mid ureter stone with right-sided hydroureteronephrosis  Patient was diagnosed to be in septic shock secondary to right side acute pyelonephritis complicating an obstructing kidney stone  He was taken to the OR and he underwent pyelogram and cystoscopy with right-sided double-J stent placement  He was started on cefepime and later was found to be bacteremic with E coli growing on the blood culture from August 3, 2019 E coli also grew in his urine culture  Patient now moved to general floor, his right IJ central line has been removed and overall he has been feeling better  Berumen  was also removed, and today he denies dysuria  His ADRIANNE has been slowly improving, and his leukocytosis has resolved  His fever persist and he has a T-max of 102 7° today, associated with chills and warmth  Denies nausea, vomiting, diarrhea or rash and is tolerating antibiotics well  Infectious disease is being consulted for diagnostic work up and antibiotic management  Review of Systems  A [id number] point system-based review of systems is otherwise negative      PAST MEDICAL HISTORY:  Past Medical History:   Diagnosis Date    Arthritis     Gout     Hypertension     Kidney stone      Past Surgical History:   Procedure Laterality Date    FL RETROGRADE PYELOGRAM  8/4/2019    NO PAST SURGERIES  03/23/2015    Last assessed    NC CYSTOURETHROSCOPY,URETER CATHETER Right 2019    Procedure: CYSTOSCOPY RETROGRADE PYELOGRAM WITH INSERTION STENT URETERAL;  Surgeon: Silvina Herron MD;  [facility];  Service: Urology       FAMILY HISTORY:  Non-contributory    SOCIAL HISTORY:  Social History   Single  Social History     Substance and Sexual Activity   Alcohol Use No    Frequency: Never    Binge frequency: Never     Social History     Substance and Sexual Activity   Drug Use No     Social History     Tobacco Use   Smoking Status Never Smoker   Smokeless Tobacco Never Used       ALLERGIES:  No Known Allergies    MEDICATIONS:  All current active medications have been reviewed  Physical exam:     Temp:  [98 1 °F (36 7 °C)-102 8 °F (39 3 °C)] 98 6 °F (37 °C)  HR:  [] 105  Resp:  [19-20] 19  BP: (116-168)/() 116/66  SpO2:  [90 %-100 %] 94 %  Temp (24hrs), Av 8 °F (37 7 °C), Min:98 1 °F (36 7 °C), Max:102 8 °F (39 3 °C)  Current: Temperature: 98 6 °F (37 °C)    Intake/Output Summary (Last 24 hours) at 2019 1408  Last data filed at 2019 1304  Gross per 24 hour   Intake 1228 33 ml   Output 1587 ml   Net -358 67 ml           General Appearance:  Appearing well, nontoxic, and in no distress, appears stated age   Head:  Normocephalic, without obvious abnormality, atraumatic   Eyes:  PERRL, conjunctiva pink and sclera anicteric, both eyes   Nose: Nares normal, mucosa normal, no drainage   Throat: Oropharynx moist without lesions; lips, mucosa, and tongue normal; teeth and gums normal   Neck: Supple, symmetrical, trachea midline, no adenopathy, no tenderness/mass/nodules  Right IJ CVC has been removed the site is not tender or warm there is no purulence     Back:   Symmetric, no curvature, ROM normal, no CVA tenderness   Lungs:   Clear to auscultation bilaterally, no audible wheezes, rhonchi and rales, respirations unlabored   Chest Wall:  No tenderness or deformity   Heart:  Regular rate and rhythm, S1, S2 normal, no murmur, rub or gallop   Abdomen:   Soft, non-tender, non-distended, positive bowel sounds, no masses, no organomegaly  Scattered ecchymosis noted on the abdominal wall    No CVA tenderness, no Berumen  Extremities: Extremities normal, atraumatic, no cyanosis, clubbing or edema   Skin: Skin color, texture, turgor normal, no rashes or lesions  No draining wounds noted  Lymph nodes: Cervical, supraclavicular, and axillary nodes normal   Neurologic: Alert and oriented times 3, extremity strength 5/5 and symmetric       Invasive Devices:   Peripheral IV 08/03/19 Right Antecubital (Active)   Site Assessment Clean;Dry; Intact 8/6/2019 10:00 AM   Dressing Type Transparent 8/6/2019 10:00 AM   Line Status Saline locked; Flushed 8/6/2019 10:00 AM   Dressing Status Clean;Dry; Intact 8/6/2019 10:00 AM   Dressing Change Due 08/07/19 8/6/2019 10:00 AM   Reason Not Rotated Anticipated discharge 8/6/2019 12:00 AM       Ureteral Drain/Stent Right ureter 6 Fr  (Active)   Dressing Status Clean;Dry; Intact 8/4/2019  4:00 AM   Collection Container Standard drainage bag 8/4/2019  4:00 AM   Securement Method Securing device (Describe) 8/4/2019  4:00 AM         Labs, Imaging, & Other Studies     Lab Results:    I have personally reviewed pertinent labs      Results from last 7 days   Lab Units 08/06/19  0602 08/05/19  0519 08/04/19  0551   WBC Thousand/uL 9 86 15 16* 10 48*   HEMOGLOBIN g/dL 12 5 12 6 12 9   PLATELETS Thousands/uL 57* 55* 84*     Results from last 7 days   Lab Units 08/06/19  0602 08/05/19  0519 08/04/19  1441  08/04/19  0322   POTASSIUM mmol/L 3 5 3 8 4 8   < >  --    CHLORIDE mmol/L 106 108 113*   < >  --    CO2 mmol/L 23 22 14*   < >  --    CO2, I-STAT mmol/L  --   --   --   --  14*   BUN mg/dL 35* 35* 39*   < >  --    CREATININE mg/dL 2 16* 2 43* 3 28*   < >  --    EGFR ml/min/1 73sq m 34 29 20   < >  --    GLUCOSE, ISTAT mg/dl  --   --   --   --  120   CALCIUM mg/dL 8 5 8 0* 7 3*   < >  --    AST U/L 49* 56* 51*  -- --    ALT U/L 44 34 39  --   --    ALK PHOS U/L 112 67 79  --   --     < > = values in this interval not displayed  Results from last 7 days   Lab Units 08/05/19  0520 08/04/19  0151 08/03/19  2229 08/03/19  2159   BLOOD CULTURE  No Growth at 24 hrs  No Growth at 24 hrs   --   --  Escherichia coli*  Escherichia coli*   GRAM STAIN RESULT   --   --   --  Gram negative rods*  Gram negative rods*   URINE CULTURE   --  >100,000 cfu/ml Escherichia coli* >100,000 cfu/ml Escherichia coli*  --        Imaging Studies:   I have personally reviewed pertinent imaging study reports and images in PACS  EKG, Pathology, and Other Studies:   I have personally reviewed pertinent reports  Counseling/Coordination of care:       Labs, medical tests and imaging studies were independently reviewed by me as noted above in HPI and old records were obtained and summarized as noted above in HPI

## 2019-08-06 NOTE — QUICK NOTE
Given persistent fever, will narrow abx again to IV cefepime  Continue IVF started earlier today  consult ID  Add PRN medication for BP       Treasure Chaidez PA-C

## 2019-08-06 NOTE — ASSESSMENT & PLAN NOTE
· POA, fever, tachycardia, hypotension, lactic acidosis  Secondary to urosepsis and bacteremia  Clinically improving  · No longer on pressors  Tx out of ICU 8/5  · Continue IV ceftriaxone  F/U repeat blood cultures   Will likely need 14 days total of antibiotics following urologic procedure (8/4)  · Trend procalcitonin  · Monitor temperature curve and cbc

## 2019-08-06 NOTE — CONSULTS
Consultation - Nephrology   Luana Lancaster 49 y o  male MRN: [id number]  Unit/Bed#: [facility] 629-01 Encounter: [id number]    ASSESSMENT/PLAN:   1  Acute kidney injury, POA:  Due to ATN from septic shock+ NSAIDs at home + obstruction with R hydroureteronephrosis   · Creatinine was 3 5 on admission and peaked at 3 7 and then started to improve  · Creatinine continues to improve down to 2 1 today  · UA on admission: large blood, moderate leuks, 2+ protein, 20-30 RBC, 30-50 WBC, moderate epithelial cells, innumerable bacteria   · Would repeat UA once ADRIANNE resolves to reassess proteinuria/hematuria   · CT:  Mild to moderate right hydroureteronephrosis with a 4 mm obstructing stone in the mid right ureter, bilateral intrarenal nonobstructing calculi  · Currently on saline at 100cc/h -- eating and drinking well and does not seem dry so will decrease rate to 60cc/h and trend  · Check am BMP   2  Nephrolithiasis with right obstructing stone and hydroureteronephrosis:  Status post ureteral stent placement by Urology  ·  recommend outpatient follow-up with 24 hour urine stone profile  · Needs outpatient Urology follow-up for stone removal  3  Septic shock with gram-negative selina bacteremia:  Due to urosepsis and currently on IV ceftriaxone  4  Anion gap metabolic acidosis due to lactic acidosis and renal failure:  Now improved with lactic acid down to 2 1 yesterday  · Previously on a bicarb drip which has been stopped and bicarb is 23 today  5  Hypokalemia:  Potassium 3 5 today and magnesium 2 6 so will continue to trend  6  BPH: on home flomax which is currently on hold      HISTORY OF PRESENT ILLNESS:  Requesting Physician: Preston Alonso MD  Reason for Consult:  Acute kidney injury    Luana Lancaster is a 49y o  year old male who originally presented to [facility] with 2 days of worsening right flank pain, fever/chills, difficulty urinating and dysuria    He does have a history of kidney stones and was found to have a 4 mm right-sided obstructing stone with moderate right-sided hydroureteronephrosis  He was also in septic shock and was transferred to [facility] for higher level of care and urgent urologic intervention  Patient was started on pressors and was taken to the OR for emergent ureteral stent insertion  On admission he was also found to be in acute renal failure with a creatinine of 3 5  His creatinine is now down to 2 1 but nephrology was consulted to help with management of his renal failure  Patient denies any history of medical renal disease but he does have a history of kidney stones in the past   Patient tells me he has uric acid stones  He was taking some NSAIDs at home and took 4 ibuprofen on the day of admission  He was not eating or drinking well at home because of the severe pain  He had no vomiting or diarrhea  He currently feels he is urinating normally although he is concerned because he is normally on Flomax but this has been on hold    He has some shortness of breath with exertion which he feels is getting better but no chest pain or shortness of breath at rest       PAST MEDICAL HISTORY:  Past Medical History:   Diagnosis Date    Arthritis     Gout     Hypertension     Kidney stone        PAST SURGICAL HISTORY:  Past Surgical History:   Procedure Laterality Date    FL RETROGRADE PYELOGRAM  8/4/2019    NO PAST SURGERIES  03/23/2015    Last assessed    MS CYSTOURETHROSCOPY,URETER CATHETER Right 8/4/2019    Procedure: CYSTOSCOPY RETROGRADE PYELOGRAM WITH INSERTION STENT URETERAL;  Surgeon: Jerry Contreras MD;  [facility];  Service: Urology       ALLERGIES:  No Known Allergies    SOCIAL HISTORY:  Social History     Substance and Sexual Activity   Alcohol Use No    Frequency: Never    Binge frequency: Never     Social History     Substance and Sexual Activity   Drug Use No     Social History     Tobacco Use   Smoking Status Never Smoker   Smokeless Tobacco Never Used FAMILY HISTORY:  Family History   Problem Relation Age of Onset    Cancer Mother        MEDICATIONS:  Scheduled Meds:  Current Facility-Administered Medications:  acetaminophen 975 mg Oral Q6H PRN Ismael Benavides PA-C    bisacodyl 10 mg Rectal Daily PRN Spencer Arnt, CRFINN    calcium carbonate 500 mg Oral Daily PRN Spencer Santiagot, CRNP    cefTRIAXone 2,000 mg Intravenous Q24H Spencer Loera, CRNP Last Rate: Stopped (08/06/19 0240)   diphenhydrAMINE 25 mg Intravenous Q8H PRN Khalida Ramirez PA-C    heparin (porcine) 5,000 Units Subcutaneous Q8H [facility] RITA Garay    Labetalol HCl 10 mg Intravenous Q4H PRN Spencer Arnt, RITA    magnesium sulfate 2 g Intravenous Q24H [facility] Khalida Ramirez PA-C    melatonin 3 mg Oral HS RITA Desouza    methylPREDNISolone sodium succinate 500 mg Intravenous Once Ashvin Carrasco PA-C    metoclopramide 10 mg Intravenous Q8H [facility] Khalida Ramirez PA-C    polyethylene glycol 17 g Oral Daily PRN Spencer Loera, RITA    senna-docusate sodium 1 tablet Oral HS RITA Barrientos    sodium chloride 100 mL/hr Intravenous Continuous Ashvin Carrasco PA-C Last Rate: 100 mL/hr (08/06/19 1040)       PRN Meds:   acetaminophen    bisacodyl    calcium carbonate    diphenhydrAMINE    Labetalol HCl    polyethylene glycol    Continuous Infusions:  sodium chloride 100 mL/hr Last Rate: 100 mL/hr (08/06/19 1040)       REVIEW OF SYSTEMS:  A complete review of systems was done  Pertinent positives and negatives noted in the HPI but otherwise the review of systems is negative      PHYSICAL EXAM:  Current Weight: Weight - Scale: (!) 153 kg (336 lb 6 8 oz)  First Weight: Weight - Scale: (!) 154 kg (340 lb 9 8 oz)  Vitals:    08/06/19 1023   BP:    Pulse: (!) 106   Resp:    Temp:    SpO2: 96%       Intake/Output Summary (Last 24 hours) at 8/6/2019 1109  Last data filed at 8/6/2019 1038  Gross per 24 hour   Intake 698 33 ml   Output 1712 ml   Net -1013 67 ml     General:  Obese male in no acute distress  Skin:  No rash  Eyes:  Sclerae anicteric  ENT:  Moist mucous membranes  Neck:  Supple, symmetric  Chest:  Decreased breath sounds  CVS:  Tachycardic  Abdomen:  Soft, obese, nontender  Extremities:  No pitting edema  Neuro:  Awake and alert  Psych:  Appropriate affect    Lab Results:   Results from last 7 days   Lab Units 08/06/19  0602 08/05/19  0519 08/04/19  1441 08/04/19  0551 08/04/19  0355 08/04/19  0322 08/03/19  2141   WBC Thousand/uL 9 86 15 16*  --  10 48* 4 24*  --  5 45   HEMOGLOBIN g/dL 12 5 12 6  --  12 9 12 8  --  15 7   I STAT HEMOGLOBIN g/dl  --   --   --   --   --  11 9*  --    HEMATOCRIT % 38 6 37 7  --  39 7 40 3  --  48 8   HEMATOCRIT, ISTAT %  --   --   --   --   --  35*  --    PLATELETS Thousands/uL 57* 55*  --  84*  --   --  128*   SODIUM mmol/L 136 139 139 138 142  --  138   POTASSIUM mmol/L 3 5 3 8 4 8 3 0* 3 4*  --  3 1*   CHLORIDE mmol/L 106 108 113* 112* 115*  --  103   CO2 mmol/L 23 22 14* 14* 14*  --  16*   CO2, I-STAT mmol/L  --   --   --   --   --  14*  --    BUN mg/dL 35* 35* 39* 31* 31*  --  28*   CREATININE mg/dL 2 16* 2 43* 3 28* 3 63* 3 70*  --  3 58*   CALCIUM mg/dL 8 5 8 0* 7 3* 7 4* 6 9*  --  8 9   MAGNESIUM mg/dL 2 6 2 6 2 9* 1 3*  --   --   --    PHOSPHORUS mg/dL 2 3* 3 5 5 4* 1 8*  --   --   --    GLUCOSE, ISTAT mg/dl  --   --   --   --   --  120  --

## 2019-08-06 NOTE — ASSESSMENT & PLAN NOTE
· Likely related to sepsis  · Will order headache cocktail and monitor response  · Start on IVF  · Consider further w/u if not improving

## 2019-08-06 NOTE — PROGRESS NOTES
Progress Note - Deisy Case 1965, 49 y o  male MRN: [id number]    Unit/Bed#: [facility] 629-01 Encounter: [id number]    Primary Care Provider: Leah Sunshine MD   Date and time admitted to hospital: 8/4/2019  1:20 AM    * Septic shock (HCC)  Assessment & Plan  · POA, fever, tachycardia, hypotension, lactic acidosis  Secondary to urosepsis and bacteremia  Clinically improving  · No longer on pressors  Tx out of ICU 8/5  · Continue IV ceftriaxone  F/U repeat blood cultures  Will likely need 14 days total of antibiotics following urologic procedure (8/4)  · Trend procalcitonin  · Monitor temperature curve and cbc    Headache  Assessment & Plan  · Likely related to sepsis  · Will order headache cocktail and monitor response  · Start on IVF  · Consider further w/u if not improving     Acute kidney injury ([facility] [address] )  Assessment & Plan  · POA with creat of 3 58, peaked at 3 7  · Likely in setting of sepsis/ATN and obstructing renal stone  · Now improving but still not back to baseline  · Will ask for renal input  Start on IVF  Monitor bladder scans  · Avoid hypotension/nephrotoxins     Right ureteral stone  Assessment & Plan  · Presented with RLQ pain and nausea/vomiting in setting of septic shock  · CT showed mild-moderate R sided hydroureteronephrosis with a 4 mm obstructing stone in mid right ureter  · Urology following  S/P urgent ureteral stent placement 8/4/19 with Dr Zo Gresham  · Will need follow up as outpt for stone removal 1-2 weeks post dc  This will be arranged by Urology    Gram-negative bacteremia  Assessment & Plan  · 2/2 initial blood cultures + for e coli, same as urine culture   Follow-up repeat blood cultures  · Was on IV cefepime, transitioned to IV ceftriaxone 8/5 by ICU team  · Will need 2 weeks total abx, can likely switch to oral once repeat blood cultures are negative and procal improved   · Will hold off on ID consultation for now unless clinically worsening     Thrombocytopenia ([facility] [address] )  Assessment & Plan  · Likely related to sepsis  · No evidence of bleeding  · Monitor platelets  · Consider stop dvt ppx if worsening     Metabolic acidosis  Assessment & Plan  · Resolved  Secondary to sepsis  · Bicarb gtt stopped     Morbid obesity ([facility] [address] )  Assessment & Plan  · Body mass index is 54 98 kg/m²  · Encourage weight loss via diet and exercise     VTE Pharmacologic Prophylaxis:   Pharmacologic: Heparin  Mechanical VTE Prophylaxis in Place: Yes    Patient Centered Rounds: I have performed bedside rounds with nursing staff today  Discussions with Specialists or Other Care Team Provider: case management  Appreciate specialists  Education and Discussions with Family / Patient: patient  Time Spent for Care: 30 minutes  More than 50% of total time spent on counseling and coordination of care as described above  Current Length of Stay: 2 day(s)    Current Patient Status: Inpatient   Certification Statement: The patient will continue to require additional inpatient hospital stay due to ongoing IV abx, following of cultures, etc     Discharge Plan: once clinically improved  Tent 48-72 hours? Code Status: Level 1 - Full Code      Subjective:   Doing okay today  C/O headache behind b/l eyes this AM  Denies nausea, vomiting, abdominal pain  No diarrhea but stools are mushy  Appetite improved  Admits to mild RAMÍREZ  Objective:     Vitals:   Temp (24hrs), Av 5 °F (37 5 °C), Min:98 1 °F (36 7 °C), Max:101 5 °F (38 6 °C)    Temp:  [98 1 °F (36 7 °C)-101 5 °F (38 6 °C)] 98 1 °F (36 7 °C)  HR:  [] 95  Resp:  [19-30] 19  BP: (111-138)/(65-90) 133/86  SpO2:  [92 %-97 %] 96 %  Body mass index is 54 98 kg/m²  Input and Output Summary (last 24 hours):        Intake/Output Summary (Last 24 hours) at 2019 0945  Last data filed at 2019 0500  Gross per 24 hour   Intake 1121 66 ml   Output 1500 ml   Net -378 34 ml       Physical Exam:     Physical Exam   Constitutional: He is oriented to person, place, and time  No distress  Morbidly obese, on 2L NC    HENT:   R neck bandaged    Cardiovascular: Normal rate and regular rhythm  Pulmonary/Chest: Effort normal and breath sounds normal    Abdominal: Soft  Bowel sounds are normal    Musculoskeletal: He exhibits no edema  Neurological: He is alert and oriented to person, place, and time  Psychiatric: He has a normal mood and affect  Nursing note and vitals reviewed  Additional Data:     Labs:    Results from last 7 days   Lab Units 08/06/19  0602 08/05/19  0519   WBC Thousand/uL 9 86 15 16*   HEMOGLOBIN g/dL 12 5 12 6   HEMATOCRIT % 38 6 37 7   PLATELETS Thousands/uL 57* 55*   NEUTROS PCT %  --  89*   LYMPHS PCT %  --  4*   MONOS PCT %  --  1*   EOS PCT %  --  1     Results from last 7 days   Lab Units 08/06/19  0602  08/04/19  0322   POTASSIUM mmol/L 3 5   < >  --    CHLORIDE mmol/L 106   < >  --    CO2 mmol/L 23   < >  --    CO2, I-STAT mmol/L  --   --  14*   BUN mg/dL 35*   < >  --    CREATININE mg/dL 2 16*   < >  --    CALCIUM mg/dL 8 5   < >  --    ALK PHOS U/L 112   < >  --    ALT U/L 44   < >  --    AST U/L 49*   < >  --    GLUCOSE, ISTAT mg/dl  --   --  120    < > = values in this interval not displayed  * I Have Reviewed All Lab Data Listed Above  * Additional Pertinent Lab Tests Reviewed:  All Labs Within Last 24 Hours Reviewed    Imaging:    Imaging Reports Reviewed Today Include: all  Imaging Personally Reviewed by Myself Includes:  none    Recent Cultures (last 7 days):     Results from last 7 days   Lab Units 08/04/19  0151 08/03/19  2229 08/03/19  2159   BLOOD CULTURE   --   --  Escherichia coli*  Escherichia coli*   GRAM STAIN RESULT   --   --  Gram negative rods*  Gram negative rods*   URINE CULTURE  >100,000 cfu/ml Escherichia coli* >100,000 cfu/ml Escherichia coli*  --        Last 24 Hours Medication List:     Current Facility-Administered Medications:  acetaminophen 975 mg Oral Q6H PRN Ajith Elliott PA-C    bisacodyl 10 mg Rectal Daily PRN Patience Frankel, RITA    calcium carbonate 500 mg Oral Daily PRN Patience Frankel, RITA    cefTRIAXone 2,000 mg Intravenous Q24H RITA Brown Last Rate: Stopped (08/06/19 0240)   diphenhydrAMINE 25 mg Intravenous Q8H PRN Khalida Ramirez PA-C    heparin (porcine) 5,000 Units Subcutaneous Q8H [facility] RITA Garay    Labetalol HCl 10 mg Intravenous Q4H PRN RITA Brown    magnesium sulfate 2 g Intravenous Q24H [facility] Khalida Ramirez PA-C    melatonin 3 mg Oral HS RITA Brown    methylPREDNISolone sodium succinate 500 mg Intravenous Once Jessika Arthur PA-C    metoclopramide 10 mg Intravenous Q8H [facility] Khalida Ramirez PA-C    polyethylene glycol 17 g Oral Daily PRN Patience Frankel, RITA    senna-docusate sodium 1 tablet Oral HS RITA Garay    sodium chloride 100 mL/hr Intravenous Continuous Jessika Arthur PA-C         Today, Patient Was Seen By: Jessika Arthur PA-C    ** Please Note: Dictation voice to text software may have been used in the creation of this document   **

## 2019-08-06 NOTE — ASSESSMENT & PLAN NOTE
· Likely related to sepsis  · No evidence of bleeding  · Monitor platelets  · Consider stop dvt ppx if worsening

## 2019-08-07 LAB
ANION GAP SERPL CALCULATED.3IONS-SCNC: 10 MMOL/L (ref 4–13)
BASOPHILS # BLD AUTO: 0.01 THOUSANDS/ΜL (ref 0–0.1)
BASOPHILS NFR BLD AUTO: 0 % (ref 0–1)
BUN SERPL-MCNC: 37 MG/DL (ref 5–25)
CALCIUM SERPL-MCNC: 8.1 MG/DL (ref 8.3–10.1)
CHLORIDE SERPL-SCNC: 108 MMOL/L (ref 100–108)
CO2 SERPL-SCNC: 23 MMOL/L (ref 21–32)
CREAT SERPL-MCNC: 1.94 MG/DL (ref 0.6–1.3)
EOSINOPHIL # BLD AUTO: 0 THOUSAND/ΜL (ref 0–0.61)
EOSINOPHIL NFR BLD AUTO: 0 % (ref 0–6)
ERYTHROCYTE [DISTWIDTH] IN BLOOD BY AUTOMATED COUNT: 13.3 % (ref 11.6–15.1)
GFR SERPL CREATININE-BSD FRML MDRD: 38 ML/MIN/1.73SQ M
GLUCOSE SERPL-MCNC: 182 MG/DL (ref 65–140)
HCT VFR BLD AUTO: 38.6 % (ref 36.5–49.3)
HGB BLD-MCNC: 12.5 G/DL (ref 12–17)
IMM GRANULOCYTES # BLD AUTO: 0.06 THOUSAND/UL (ref 0–0.2)
IMM GRANULOCYTES NFR BLD AUTO: 1 % (ref 0–2)
LYMPHOCYTES # BLD AUTO: 0.82 THOUSANDS/ΜL (ref 0.6–4.47)
LYMPHOCYTES NFR BLD AUTO: 13 % (ref 14–44)
MCH RBC QN AUTO: 30.1 PG (ref 26.8–34.3)
MCHC RBC AUTO-ENTMCNC: 32.4 G/DL (ref 31.4–37.4)
MCV RBC AUTO: 93 FL (ref 82–98)
MONOCYTES # BLD AUTO: 0.32 THOUSAND/ΜL (ref 0.17–1.22)
MONOCYTES NFR BLD AUTO: 5 % (ref 4–12)
NEUTROPHILS # BLD AUTO: 5.29 THOUSANDS/ΜL (ref 1.85–7.62)
NEUTS SEG NFR BLD AUTO: 81 % (ref 43–75)
NRBC BLD AUTO-RTO: 0 /100 WBCS
PLATELET # BLD AUTO: 51 THOUSANDS/UL (ref 149–390)
PMV BLD AUTO: 13.1 FL (ref 8.9–12.7)
POTASSIUM SERPL-SCNC: 3.5 MMOL/L (ref 3.5–5.3)
PROCALCITONIN SERPL-MCNC: 43.83 NG/ML
RBC # BLD AUTO: 4.15 MILLION/UL (ref 3.88–5.62)
SODIUM SERPL-SCNC: 141 MMOL/L (ref 136–145)
WBC # BLD AUTO: 6.5 THOUSAND/UL (ref 4.31–10.16)

## 2019-08-07 PROCEDURE — 85025 COMPLETE CBC W/AUTO DIFF WBC: CPT | Performed by: INTERNAL MEDICINE

## 2019-08-07 PROCEDURE — 99232 SBSQ HOSP IP/OBS MODERATE 35: CPT | Performed by: INTERNAL MEDICINE

## 2019-08-07 PROCEDURE — G8980 MOBILITY D/C STATUS: HCPCS

## 2019-08-07 PROCEDURE — 97163 PT EVAL HIGH COMPLEX 45 MIN: CPT

## 2019-08-07 PROCEDURE — G8979 MOBILITY GOAL STATUS: HCPCS

## 2019-08-07 PROCEDURE — G8978 MOBILITY CURRENT STATUS: HCPCS

## 2019-08-07 PROCEDURE — 84145 PROCALCITONIN (PCT): CPT | Performed by: INTERNAL MEDICINE

## 2019-08-07 PROCEDURE — 80048 BASIC METABOLIC PNL TOTAL CA: CPT | Performed by: INTERNAL MEDICINE

## 2019-08-07 RX ADMIN — HEPARIN SODIUM 5000 UNITS: 5000 INJECTION INTRAVENOUS; SUBCUTANEOUS at 13:41

## 2019-08-07 RX ADMIN — MELATONIN 3 MG: 3 TAB ORAL at 21:18

## 2019-08-07 RX ADMIN — CEFTRIAXONE SODIUM 2000 MG: 10 INJECTION, POWDER, FOR SOLUTION INTRAVENOUS at 00:44

## 2019-08-07 RX ADMIN — HEPARIN SODIUM 5000 UNITS: 5000 INJECTION INTRAVENOUS; SUBCUTANEOUS at 21:18

## 2019-08-07 RX ADMIN — MAGNESIUM SULFATE HEPTAHYDRATE 2 G: 40 INJECTION, SOLUTION INTRAVENOUS at 09:02

## 2019-08-07 RX ADMIN — SODIUM CHLORIDE 60 ML/HR: 0.9 INJECTION, SOLUTION INTRAVENOUS at 03:14

## 2019-08-07 RX ADMIN — TAMSULOSIN HYDROCHLORIDE 0.4 MG: 0.4 CAPSULE ORAL at 17:34

## 2019-08-07 RX ADMIN — HEPARIN SODIUM 5000 UNITS: 5000 INJECTION INTRAVENOUS; SUBCUTANEOUS at 06:13

## 2019-08-07 NOTE — SOCIAL WORK
CM reviewed patient during care coordination rounds, pt is not ready for d/c today  CM will continue to monitor

## 2019-08-07 NOTE — PROGRESS NOTES
NEPHROLOGY PROGRESS NOTE   Otis [place] 49 y o  male MRN: [id number]  Unit/Bed#: [facility] 629-01 Encounter: [id number]      ASSESSMENT/PLAN:  1  Acute kidney injury, POA on suspected CKD III:  Due to ATN from septic shock+ NSAIDs at home + obstruction with R hydroureteronephrosis   ? Old labs reveal creatinine was 1 4 in July 2019 and 1 8 in 2015   ? Creatinine was 3 5 on admission and peaked at 3 7 and then started to improve  ? Creatinine continues to improve down to 1 9 today  ? UA on admission: large blood, moderate leuks, 2+ protein, 20-30 RBC, 30-50 WBC, moderate epithelial cells, innumerable bacteria   ? Would repeat UA once ADRIANNE resolves to reassess proteinuria/hematuria   ? CT:  Mild to moderate right hydroureteronephrosis with a 4 mm obstructing stone in the mid right ureter, bilateral intrarenal nonobstructing calculi  ? Will d/c IVF as he has very good oral intake and he does not appear to have prerenal ADRIANNE   ? Check am BMP   2  Nephrolithiasis with right obstructing stone and hydroureteronephrosis:  Status post ureteral stent placement by Urology  ? recommend outpatient follow-up with 24 hour urine stone profile  ? Needs outpatient Urology follow-up for stone removal  3  Septic shock with gram-negative selina bacteremia:  Due to urosepsis and currently on IV ceftriaxone  · ID on board   4  Anion gap metabolic acidosis due to lactic acidosis and renal failure:  Now improved with lactic acid down to 2 1 yesterday  ? Previously on a bicarb drip which has been stopped and bicarb is 23 today  5  BPH: on home flomax which is currently on hold  but urinating well   6  Hypertension: in shock on admission and was on pressors but now BP on the high side  · D/c saline   · Has prn labetalol       SUBJECTIVE:  Feeling well today  Good urine output  No chest pain or shortness of breath  Eating and drinking well       OBJECTIVE:  Current Weight: Weight - Scale: (!) 153 kg (336 lb 6 8 oz)  Vitals:    08/07/19 0740   BP: (!) 154/106   Pulse: 90   Resp: 20   Temp: 97 5 °F (36 4 °C)   SpO2: 96%       Intake/Output Summary (Last 24 hours) at 8/7/2019 0943  Last data filed at 8/7/2019 0701  Gross per 24 hour   Intake 3444 33 ml   Output 3137 ml   Net 307 33 ml       General: no acute distres   Skin: no rash   Eyes: sclera anicteric   ENT: moist mucous membranes   Neck: supple ,symmetric   Chest: clear to auscultation    CVS: regular rate and rhythm  Abdomen: soft, non-tender, non-distended  Extremities: no edema    Neuro: awake and alert  Psych: appropriate affect    Medications:  Scheduled Meds:  Current Facility-Administered Medications:  acetaminophen 975 mg Oral Q6H PRN Josefina Green PA-C    bisacodyl 10 mg Rectal Daily PRN Christinia Lunch, CRNP    calcium carbonate 500 mg Oral Daily PRN Christinia Lunch, CRNP    cefTRIAXone 2,000 mg Intravenous Q24H Jere Wiley MD Last Rate: Stopped (08/07/19 0115)   diphenhydrAMINE 25 mg Intravenous Q8H PRN Khalida Ramirez PA-C    heparin (porcine) 5,000 Units Subcutaneous Q8H [facility] RITA Garay    Labetalol HCl 10 mg Intravenous Q4H PRN Christinia Lunch, CRNP    magnesium sulfate 2 g Intravenous Q24H [facility] Khalida Ramirez PA-C Last Rate: 0 g (08/06/19 1304)   melatonin 3 mg Oral HS RITA Garay    metoclopramide 10 mg Intravenous Q8H [facility] Khalida Ramirez PA-C    polyethylene glycol 17 g Oral Daily PRN Christinia Lunch, CRNP    senna-docusate sodium 1 tablet Oral HS RITA Garay    sodium chloride 60 mL/hr Intravenous Continuous Sandy Zavaleta PA-C Last Rate: 60 mL/hr (08/07/19 0314)   tamsulosin 0 4 mg Oral Daily With Ede Brumfield PA-C        PRN Meds:   acetaminophen    bisacodyl    calcium carbonate    diphenhydrAMINE    Labetalol HCl    polyethylene glycol    Continuous Infusions:  sodium chloride 60 mL/hr Last Rate: 60 mL/hr (08/07/19 0314)       Laboratory Results:  Results from last 7 days   Lab Units 08/07/19  0504 08/06/19  0602 08/05/19  5653 08/04/19  1441 08/04/19  0551 08/04/19  0355 08/04/19  0322 08/03/19  2141   WBC Thousand/uL 6 50 9 86 15 16*  --  10 48* 4 24*  --  5 45   HEMOGLOBIN g/dL 12 5 12 5 12 6  --  12 9 12 8  --  15 7   I STAT HEMOGLOBIN g/dl  --   --   --   --   --   --  11 9*  --    HEMATOCRIT % 38 6 38 6 37 7  --  39 7 40 3  --  48 8   HEMATOCRIT, ISTAT %  --   --   --   --   --   --  35*  --    PLATELETS Thousands/uL 51* 57* 55*  --  84*  --   --  128*   SODIUM mmol/L 141 136 139 139 138 142  --  138   POTASSIUM mmol/L 3 5 3 5 3 8 4 8 3 0* 3 4*  --  3 1*   CHLORIDE mmol/L 108 106 108 113* 112* 115*  --  103   CO2 mmol/L 23 23 22 14* 14* 14*  --  16*   CO2, I-STAT mmol/L  --   --   --   --   --   --  14*  --    BUN mg/dL 37* 35* 35* 39* 31* 31*  --  28*   CREATININE mg/dL 1 94* 2 16* 2 43* 3 28* 3 63* 3 70*  --  3 58*   CALCIUM mg/dL 8 1* 8 5 8 0* 7 3* 7 4* 6 9*  --  8 9   MAGNESIUM mg/dL  --  2 6 2 6 2 9* 1 3*  --   --   --    PHOSPHORUS mg/dL  --  2 3* 3 5 5 4* 1 8*  --   --   --    GLUCOSE, ISTAT mg/dl  --   --   --   --   --   --  120  --

## 2019-08-07 NOTE — PHYSICAL THERAPY NOTE
PHYSICAL THERAPY EVALUATION  NAME:  Janelle Ryan  DATE: 08/07/19    AGE:   49 y o  Mrn:   [id number]  ADMIT DX:  Septic shock ([address] ) [A41 9, R65 21]    Past Medical History:   Diagnosis Date    Arthritis     Gout     Hypertension     Kidney stone        Past Surgical History:   Procedure Laterality Date    FL RETROGRADE PYELOGRAM  8/4/2019    NO PAST SURGERIES  03/23/2015    Last assessed    GA CYSTOURETHROSCOPY,URETER CATHETER Right 8/4/2019    Procedure: CYSTOSCOPY RETROGRADE PYELOGRAM WITH INSERTION STENT URETERAL;  Surgeon: Vega Mcknight MD;  [facility];  Service: Urology       Length Of Stay: 3    PHYSICAL THERAPY EVALUATION:        08/07/19 1245   Note Type   Note type Eval only   Pain Assessment   Pain Assessment No/denies pain   Home Living   Type of [address] Two level;Stairs to enter with rails  (4 JENNY )   Home Equipment   (none as per pt )   Additional Comments Pt reports living alone, however has local parents who can assist pt if needed    Prior Function   Level of [place] Independent with ADLs and functional mobility   Lives With Alone   Receives Help From Family;Friend(s)   ADL Assistance Independent   Falls in the last 6 months 0   Vocational Full time employment   Comments Pt denies the use of an AD for ambulation PTA    Restrictions/Precautions   Weight Bearing Precautions Per Order No   Other Precautions Multiple lines; Fall Risk   General   Family/Caregiver Present Yes   Cognition   Overall Cognitive Status WFL   Arousal/Participation Alert   Orientation Level Oriented to person;Oriented to place;Oriented to time   Memory Within functional limits   Following Commands Follows one step commands without difficulty   RUE Assessment   RUE Assessment WFL   LUE Assessment   LUE Assessment WFL   RLE Assessment   RLE Assessment WFL   Strength RLE   RLE Overall Strength 4+/5   LLE Assessment   LLE Assessment WFL   Strength LLE   LLE Overall Strength 4+/5   Bed Mobility   Supine to Sit 6  Modified independent   Sit to Supine 6  Modified independent   Transfers   Sit to Stand 5  Supervision   Additional items Increased time required   Stand to Sit 5  Supervision   Additional items Increased time required   Additional Comments VC and TC needed for hand placement    Ambulation/Elevation   Gait pattern Short stride   Gait Assistance 5  Supervision   Assistive Device None   Distance 150ft   Stair Management Assistance 5  Supervision   Stair Management Technique One rail R;Nonreciprocal   Number of Stairs 7   Balance   Static Sitting Fair +   Static Standing Fair   Ambulatory Fair -   Endurance Deficit   Endurance Deficit No   Activity Tolerance   Activity Tolerance Patient tolerated treatment well   Nurse Made Aware Pt appropriate to be seen and mobilize per nsg    Assessment   Prognosis Good   Problem List Obesity; Decreased endurance   Assessment Pt is 49 y o  male seen for PT evaluation s/p admit [facility] on 8/4/2019  Two pt identifiers were used to confirm  Pt presented with R flank pain, and fever/ chills  Pt was admitted with a primary dx of: septic shock, R uretal stone, thrombocytopenia   Pt underwent   PT now consulted for assessment of mobility and d/c needs  Pts current co morbidities effecting treatment include: arthritis, gout, HTN, kidney stone, and personal factors including JENNY home and living alone  Pts current clinical presentation is Unstable/ Unpredictable (high complexity) due to Ongoing medical management for primary dx, Decreased activity tolerance compared to baseline, Trending lab values, Continuous pulse oximetry monitoring     Prior to admission, pt was I with ambulation without the use of an AD as per pt  Upon evaluation, pt currently is requiring mod I for bed mobility; S for transfers and S for ambulation w/ no AD   Pt denies any lightheadedness or dizziness with ambulation   Pt presents at PT eval functioning below baseline and currently w/ overall mobility deficits 2* to: decreased endurance, decreased activity tolerance compared to baseline  Pt denies any further questions at this time  PT is currently recommending home with family support  Pt/ family agreeable to plan and goals as stated on evaluation  D/C acute care PT at this time due to pt being at/ near baseline in terms of functional mobility  Pt denies any concerns about returning home      Barriers to Discharge None   Barriers to Discharge Comments Pt denies any mobility or safety concerns about returning home    Goals   Patient Goals " to get better"   Recommendation   Recommendation Home with family support   Equipment Recommended   (none at this time)   PT - OK to Discharge Yes  (when medically cleared )   Modified Teri Scale   Modified Teri Scale 2   Barthel Index   Feeding 10   Bathing 0   Grooming Score 5   Dressing Score 10   Bladder Score 10   Bowels Score 10   Toilet Use Score 10   Transfers (Bed/Chair) Score 15   Mobility (Level Surface) Score 10   Stairs Score 5   Barthel Index Score 85   Aure Gupta, PT

## 2019-08-07 NOTE — ASSESSMENT & PLAN NOTE
· 2/2 initial blood cultures + for e coli, same as urine culture   Repeat blood cultures negative   · Was on IV cefepime, transitioned to IV ceftriaxone 8/5 by ICU team   · Will need 2 weeks total abx, can likely switch to oral once repeat blood cultures are negative and procal improved   · Appreciate ID input

## 2019-08-07 NOTE — ASSESSMENT & PLAN NOTE
· POA, fever, tachycardia, hypotension, lactic acidosis  Secondary to urosepsis and bacteremia  Clinically improving  · No longer on pressors  Tx out of ICU 8/5  · Continue IV ceftriaxone  Repeat blood cultures negative   Will likely need 14 days total of antibiotics following urologic procedure (8/4)  · Trend procalcitonin, noted to be improving   · Monitor temperature curve and cbc

## 2019-08-07 NOTE — ASSESSMENT & PLAN NOTE
· Presented with RLQ pain and nausea/vomiting in setting of septic shock  · CT showed mild-moderate R sided hydroureteronephrosis with a 4 mm obstructing stone in mid right ureter  · Urology following  S/P urgent ureteral stent placement 8/4/19 with Dr Ramona Bhakta  · Will need follow up as outpt for stone removal 1-2 weeks post dc   This will be arranged by Urology

## 2019-08-07 NOTE — ASSESSMENT & PLAN NOTE
· Likely related to sepsis  · No evidence of bleeding  · Platelets low but relatively stable   · Consider stop dvt ppx if worsening

## 2019-08-07 NOTE — PROGRESS NOTES
Progress Note - Infectious Disease   Fredrica Stager 49 y o  male MRN: [id number]  Unit/Bed#: [facility] 629-01 Encounter: [id number]    Assessment:  1- septic shock on presentation now resolved  2- right-sided pyelonephritis:  Secondary to obstructing stone in the right mid ureter  3- E coli bacteremia  4- ADRIANNE    Patient has been afebrile in the past 24 hours, he denies pain, he denies dysuria or difficulty passing urine  His white count normal sized today it is 9800; his ADRIANNE is slowly improving today's creatinine is 1 9 mg/dL  He is tolerating IV antibiotics he denies diarrhea rash or other related side effects    Plan:  - continue ceftriaxone 2 g IV Q 24  - monitor fever curve and clinically  - urology follow-up to plan stone extraction  - if he remains afebrile in the next 24 hours, transition him to p o  Antibiotic to finish 2 weeks course (levaquin 750 mg po q24h, end date 19)    Subjective/Objective   Chief Complaint:  Septic shock E coli bacteremia E coli pyelonephritis complicating kidney stone    Subjective:   Today he denies fever chills denies any pain denies dysuria    Objective:     Temp:  [97 5 °F (36 4 °C)-102 8 °F (39 3 °C)] 97 5 °F (36 4 °C)  HR:  [] 90  Resp:  [19-20] 20  BP: (116-168)/() 154/106  SpO2:  [91 %-98 %] 96 %  Temp (24hrs), Av 9 °F (37 2 °C), Min:97 5 °F (36 4 °C), Max:102 8 °F (39 3 °C)  Current: Temperature: 97 5 °F (36 4 °C)    Physical Exam: BP (!) 154/106   Pulse 90   Temp 97 5 °F (36 4 °C)   Resp 20   Ht 5' 6" (1 676 m)   Wt (!) 153 kg (336 lb 6 8 oz)   SpO2 96%   BMI 54 30 kg/m²     General Appearance:    Alert, cooperative, no distress, appears stated age   Head:    Normocephalic, without obvious abnormality, atraumatic   Eyes:    PERRL, conjunctiva/corneas clear, EOM's intact, fundi     benign, both eyes        Ears:    Normal TM's and external ear canals, both ears   Nose:   Nares normal, septum midline, mucosa normal, no drainage    or sinus tenderness Throat:   Lips, mucosa, and tongue normal; teeth and gums normal   Neck:   Supple, symmetrical, trachea midline, no adenopathy;        thyroid:  No enlargement/tenderness/nodules; no carotid    bruit or JVD   Back:     Symmetric, no curvature, ROM normal, no CVA tenderness   Lungs:     Clear to auscultation bilaterally, respirations unlabored   Chest wall:    No tenderness or deformity   Heart:    Regular rate and rhythm, S1 and S2 normal, no murmur, rub   or gallop   Abdomen:     Soft, non-tender, bowel sounds active all four quadrants,     no masses, no organomegaly   Genitalia:    Normal male without lesion, discharge or tenderness   Rectal:    Normal tone, normal prostate, no masses or tenderness;    guaiac negative stool   Extremities:   Extremities normal, atraumatic, no cyanosis or edema   Pulses:   2+ and symmetric all extremities   Skin:   Skin color, texture, turgor normal, no rashes or lesions   Lymph nodes:   Cervical, supraclavicular, and axillary nodes normal   Neurologic:   CNII-XII intact   Normal strength, sensation and reflexes       throughout       Invasive Devices     Peripheral Intravenous Line            Peripheral IV 08/06/19 Right Forearm less than 1 day          Drain            Ureteral Drain/Stent Right ureter 6 Fr  3 days                Lab, Imaging and other studies: I have personally reviewed pertinent films in PACS

## 2019-08-07 NOTE — ASSESSMENT & PLAN NOTE
· POA with creat of 3 58, peaked at 3 7  · Likely in setting of sepsis/ATN and obstructing renal stone  · Now improving  · Appreciate nephrology input  · AM BMP   DC IVF per them   · Avoid hypotension/nephrotoxins

## 2019-08-07 NOTE — PLAN OF CARE
Problem: Prexisting or High Potential for Compromised Skin Integrity  Goal: Skin integrity is maintained or improved  Description  INTERVENTIONS:  - Identify patients at risk for skin breakdown  - Assess and monitor skin integrity  - Assess and monitor nutrition and hydration status  - Monitor labs (i e  albumin)  - Assess for incontinence   - Turn and reposition patient  - Assist with mobility/ambulation  - Relieve pressure over bony prominences  - Avoid friction and shearing  - Provide appropriate hygiene as needed including keeping skin clean and dry  - Evaluate need for skin moisturizer/barrier cream  - Collaborate with interdisciplinary team (i e  Nutrition, Rehabilitation, etc )   - Patient/family teaching  Outcome: Progressing     Problem: Potential for Falls  Goal: Patient will remain free of falls  Description  INTERVENTIONS:  - Assess patient frequently for physical needs  -  Identify cognitive and physical deficits and behaviors that affect risk of falls    -  [place] fall precautions as indicated by assessment   - Educate patient/family on patient safety including physical limitations  - Instruct patient to call for assistance with activity based on assessment  - Modify environment to reduce risk of injury  - Consider OT/PT consult to assist with strengthening/mobility  Outcome: Progressing     Problem: PAIN - ADULT  Goal: Verbalizes/displays adequate comfort level or baseline comfort level  Description  Interventions:  - Encourage patient to monitor pain and request assistance  - Assess pain using appropriate pain scale  - Administer analgesics based on type and severity of pain and evaluate response  - Implement non-pharmacological measures as appropriate and evaluate response  - Consider cultural and social influences on pain and pain management  - Notify physician/advanced practitioner if interventions unsuccessful or patient reports new pain  Outcome: Progressing     Problem: INFECTION - ADULT  Goal: Absence or prevention of progression during hospitalization  Description  INTERVENTIONS:  - Assess and monitor for signs and symptoms of infection  - Monitor lab/diagnostic results  - Monitor all insertion sites, i e  indwelling lines, tubes, and drains  - Monitor endotracheal (as able) and nasal secretions for changes in amount and color  - [place] appropriate cooling/warming therapies per order  - Administer medications as ordered  - Instruct and encourage patient and family to use good hand hygiene technique  - Identify and instruct in appropriate isolation precautions for identified infection/condition  Outcome: Progressing  Goal: Absence of fever/infection during neutropenic period  Description  INTERVENTIONS:  - Monitor WBC  - Implement neutropenic guidelines  Outcome: Progressing     Problem: SAFETY ADULT  Goal: Maintain or return to baseline ADL function  Description  INTERVENTIONS:  -  Assess patient's ability to carry out ADLs; assess patient's baseline for ADL function and identify physical deficits which impact ability to perform ADLs (bathing, care of mouth/teeth, toileting, grooming, dressing, etc )  - Assess/evaluate cause of self-care deficits   - Assess range of motion  - Assess patient's mobility; develop plan if impaired  - Assess patient's need for assistive devices and provide as appropriate  - Encourage maximum independence but intervene and supervise when necessary  ¯ Involve family in performance of ADLs  ¯ Assess for home care needs following discharge   ¯ Request OT consult to assist with ADL evaluation and planning for discharge  ¯ Provide patient education as appropriate  Outcome: Progressing  Goal: Maintain or return mobility status to optimal level  Description  INTERVENTIONS:  - Assess patient's baseline mobility status (ambulation, transfers, stairs, etc )    - Identify cognitive and physical deficits and behaviors that affect mobility  - Identify mobility aids required to assist with transfers and/or ambulation (gait belt, sit-to-stand, lift, walker, cane, etc )  - [place] fall precautions as indicated by assessment  - Record patient progress and toleration of activity level on Mobility SBAR; progress patient to next Phase/Stage  - Instruct patient to call for assistance with activity based on assessment  - Request Rehabilitation consult to assist with strengthening/weightbearing, etc   Outcome: Progressing     Problem: DISCHARGE PLANNING  Goal: Discharge to home or other facility with appropriate resources  Description  INTERVENTIONS:  - Identify barriers to discharge w/patient and caregiver  - Arrange for needed discharge resources and transportation as appropriate  - Identify discharge learning needs (meds, wound care, etc )  - Arrange for interpretive services to assist at discharge as needed  - Refer to Case Management Department for coordinating discharge planning if the patient needs post-hospital services based on physician/advanced practitioner order or complex needs related to functional status, cognitive ability, or social support system  Outcome: Progressing     Problem: Knowledge Deficit  Goal: Patient/family/caregiver demonstrates understanding of disease process, treatment plan, medications, and discharge instructions  Description  Complete learning assessment and assess knowledge base    Interventions:  - Provide teaching at level of understanding  - Provide teaching via preferred learning methods  Outcome: Progressing     Problem: GENITOURINARY - ADULT  Goal: Maintains or returns to baseline urinary function  Description  INTERVENTIONS:  - Assess urinary function  - Encourage oral fluids to ensure adequate hydration  - Administer IV fluids as ordered to ensure adequate hydration  - Administer ordered medications as needed  - Offer frequent toileting  - Follow urinary retention protocol if ordered  Outcome: Progressing  Goal: Absence of urinary retention  Description  INTERVENTIONS:  - Assess patients ability to void and empty bladder  - Monitor I/O  - Bladder scan as needed  - Discuss with physician/AP medications to alleviate retention as needed  - Discuss catheterization for long term situations as appropriate  Outcome: Progressing     Problem: METABOLIC, FLUID AND ELECTROLYTES - ADULT  Goal: Electrolytes maintained within normal limits  Description  INTERVENTIONS:  - Monitor labs and assess patient for signs and symptoms of electrolyte imbalances  - Administer electrolyte replacement as ordered  - Monitor response to electrolyte replacements, including repeat lab results as appropriate  - Instruct patient on fluid and nutrition as appropriate  Outcome: Progressing  Goal: Fluid balance maintained  Description  INTERVENTIONS:  - Monitor labs and assess for signs and symptoms of volume excess or deficit  - Monitor I/O and WT  - Instruct patient on fluid and nutrition as appropriate  Outcome: Progressing

## 2019-08-07 NOTE — PROGRESS NOTES
Progress Note - Twyla Rollins 1965, 49 y o  male MRN: [id number]    Unit/Bed#: [facility] 629-01 Encounter: [id number]    Primary Care Provider: Kendra Devi MD   Date and time admitted to hospital: 8/4/2019  1:20 AM    * Septic shock (HCC)  Assessment & Plan  · POA, fever, tachycardia, hypotension, lactic acidosis  Secondary to urosepsis and bacteremia  Clinically improving  · No longer on pressors  Tx out of ICU 8/5  · Continue IV ceftriaxone  Repeat blood cultures negative  Will likely need 14 days total of antibiotics following urologic procedure (8/4)  · Trend procalcitonin, noted to be improving   · Monitor temperature curve and cbc    Headache  Assessment & Plan  · Likely related to sepsis  Resolved with IVF and headache cocktail yesterday   · No further w/u for now     Acute kidney injury ([address] )  Assessment & Plan  · POA with creat of 3 58, peaked at 3 7  · Likely in setting of sepsis/ATN and obstructing renal stone  · Now improving  · Appreciate nephrology input  · AM BMP  DC IVF per them   · Avoid hypotension/nephrotoxins     Right ureteral stone  Assessment & Plan  · Presented with RLQ pain and nausea/vomiting in setting of septic shock  · CT showed mild-moderate R sided hydroureteronephrosis with a 4 mm obstructing stone in mid right ureter  · Urology following  S/P urgent ureteral stent placement 8/4/19 with Dr Vik Dela Cruz  · Will need follow up as outpt for stone removal 1-2 weeks post dc  This will be arranged by Urology    Gram-negative bacteremia  Assessment & Plan  · 2/2 initial blood cultures + for e coli, same as urine culture   Repeat blood cultures negative   · Was on IV cefepime, transitioned to IV ceftriaxone 8/5 by ICU team   · Will need 2 weeks total abx, can likely switch to oral once repeat blood cultures are negative and procal improved   · Appreciate ID input     Thrombocytopenia (HCC)  Assessment & Plan  · Likely related to sepsis  · No evidence of bleeding  · Platelets low but relatively stable   · Consider stop dvt ppx if worsening     Metabolic acidosis  Assessment & Plan  · Resolved  Secondary to sepsis  · Bicarb gtt stopped     Morbid obesity ([address] )  Assessment & Plan  · Body mass index is 54 3 kg/m²  · Encourage weight loss via diet and exercise       VTE Pharmacologic Prophylaxis:   Pharmacologic: Heparin  Mechanical VTE Prophylaxis in Place: Yes    Patient Centered Rounds: I have performed bedside rounds with nursing staff today  Discussions with Specialists or Other Care Team Provider: appreciate nephrology, ID input  dw Case management  Education and Discussions with Family / Patient: patient  Time Spent for Care: 30 minutes  More than 50% of total time spent on counseling and coordination of care as described above  Current Length of Stay: 3 day(s)    Current Patient Status: Inpatient   Certification Statement: The patient will continue to require additional inpatient hospital stay due to monitoring of creatinine and sepsis     Discharge Plan: when ok with specialists  Possibly tomorrow if continues to improve? Code Status: Level 1 - Full Code      Subjective:   Looks much better today  Denies headache or any other symptoms  Has been weaned off oxygen  Ambulating independently  Objective:     Vitals:   Temp (24hrs), Av 9 °F (37 2 °C), Min:97 5 °F (36 4 °C), Max:102 8 °F (39 3 °C)    Temp:  [97 5 °F (36 4 °C)-102 8 °F (39 3 °C)] 97 5 °F (36 4 °C)  HR:  [] 90  Resp:  [19-20] 20  BP: (116-168)/() 154/106  SpO2:  [91 %-98 %] 96 %  Body mass index is 54 3 kg/m²  Input and Output Summary (last 24 hours): Intake/Output Summary (Last 24 hours) at 2019 1006  Last data filed at 2019 0701  Gross per 24 hour   Intake 3444 33 ml   Output 2887 ml   Net 557 33 ml       Physical Exam:     Physical Exam   Constitutional:   Morbidly obese, no acute distress    Cardiovascular: Normal rate and regular rhythm     Pulmonary/Chest: Effort normal and breath sounds normal    Abdominal: Soft  Bowel sounds are normal  He exhibits no distension  Musculoskeletal: He exhibits no edema  Skin: Skin is warm and dry  Psychiatric: He has a normal mood and affect  Nursing note and vitals reviewed  Additional Data:     Labs:    Results from last 7 days   Lab Units 08/07/19  0504   WBC Thousand/uL 6 50   HEMOGLOBIN g/dL 12 5   HEMATOCRIT % 38 6   PLATELETS Thousands/uL 51*   NEUTROS PCT % 81*   LYMPHS PCT % 13*   MONOS PCT % 5   EOS PCT % 0     Results from last 7 days   Lab Units 08/07/19  0504 08/06/19  0602  08/04/19  0322   POTASSIUM mmol/L 3 5 3 5   < >  --    CHLORIDE mmol/L 108 106   < >  --    CO2 mmol/L 23 23   < >  --    CO2, I-STAT mmol/L  --   --   --  14*   BUN mg/dL 37* 35*   < >  --    CREATININE mg/dL 1 94* 2 16*   < >  --    CALCIUM mg/dL 8 1* 8 5   < >  --    ALK PHOS U/L  --  112   < >  --    ALT U/L  --  44   < >  --    AST U/L  --  49*   < >  --    GLUCOSE, ISTAT mg/dl  --   --   --  120    < > = values in this interval not displayed  * I Have Reviewed All Lab Data Listed Above  * Additional Pertinent Lab Tests Reviewed: All Labs Within Last 24 Hours Reviewed    Imaging:    Imaging Reports Reviewed Today Include: all  Imaging Personally Reviewed by Myself Includes:  none    Recent Cultures (last 7 days):     Results from last 7 days   Lab Units 08/05/19  0520 08/04/19  0151 08/03/19  2229 08/03/19  2159   BLOOD CULTURE  No Growth at 48 hrs    No Growth at 48 hrs   --   --  Escherichia coli*  Escherichia coli*   GRAM STAIN RESULT   --   --   --  Gram negative rods*  Gram negative rods*   URINE CULTURE   --  >100,000 cfu/ml Escherichia coli* >100,000 cfu/ml Escherichia coli*  --        Last 24 Hours Medication List:     Current Facility-Administered Medications:  acetaminophen 975 mg Oral Q6H PRN Jareth Andrews PA-C    bisacodyl 10 mg Rectal Daily PRN Kandi Conde CRNP    calcium carbonate 500 mg Oral Daily PRN Barbara Mcclure RITA Rosen    cefTRIAXone 2,000 mg Intravenous Q24H Jere Wiley MD Last Rate: Stopped (08/07/19 0115)   diphenhydrAMINE 25 mg Intravenous Q8H PRN Khalida Ramirez PA-C    heparin (porcine) 5,000 Units Subcutaneous Q8H [address] RITA Garay    Labetalol HCl 10 mg Intravenous Q4H PRN Isabela HailRITA    magnesium sulfate 2 g Intravenous Q24H [address] Khalida Ramirez PA-C Last Rate: 0 g (08/06/19 1304)   melatonin 3 mg Oral HS RITA Garay    metoclopramide 10 mg Intravenous Q8H [address] Khalida Ramirez PA-C    polyethylene glycol 17 g Oral Daily PRN Isabela HailRITA    senna-docusate sodium 1 tablet Oral HS Isabela RITA Jaeger    tamsulosin 0 4 mg Oral Daily With Emerson Brumfield PA-C         Today, Patient Was Seen By: Iza Downing PA-C    ** Please Note: Dictation voice to text software may have been used in the creation of this document   **

## 2019-08-07 NOTE — ASSESSMENT & PLAN NOTE
· Likely related to sepsis   Resolved with IVF and headache cocktail yesterday   · No further w/u for now

## 2019-08-08 ENCOUNTER — TRANSITIONAL CARE MANAGEMENT (OUTPATIENT)
Dept: INTERNAL MEDICINE CLINIC | Facility: CLINIC | Age: 54
End: 2019-08-08

## 2019-08-08 VITALS
WEIGHT: 315 LBS | DIASTOLIC BLOOD PRESSURE: 97 MMHG | TEMPERATURE: 97.8 F | RESPIRATION RATE: 20 BRPM | OXYGEN SATURATION: 95 % | SYSTOLIC BLOOD PRESSURE: 156 MMHG | BODY MASS INDEX: 50.62 KG/M2 | HEIGHT: 66 IN | HEART RATE: 85 BPM

## 2019-08-08 LAB
ANION GAP SERPL CALCULATED.3IONS-SCNC: 10 MMOL/L (ref 4–13)
BUN SERPL-MCNC: 49 MG/DL (ref 5–25)
CALCIUM SERPL-MCNC: 8.8 MG/DL (ref 8.3–10.1)
CHLORIDE SERPL-SCNC: 109 MMOL/L (ref 100–108)
CO2 SERPL-SCNC: 22 MMOL/L (ref 21–32)
CREAT SERPL-MCNC: 1.77 MG/DL (ref 0.6–1.3)
GFR SERPL CREATININE-BSD FRML MDRD: 43 ML/MIN/1.73SQ M
GLUCOSE SERPL-MCNC: 141 MG/DL (ref 65–140)
PLATELET # BLD AUTO: 73 THOUSANDS/UL (ref 149–390)
PMV BLD AUTO: 13.2 FL (ref 8.9–12.7)
POTASSIUM SERPL-SCNC: 3.4 MMOL/L (ref 3.5–5.3)
SODIUM SERPL-SCNC: 141 MMOL/L (ref 136–145)

## 2019-08-08 PROCEDURE — 99239 HOSP IP/OBS DSCHRG MGMT >30: CPT | Performed by: INTERNAL MEDICINE

## 2019-08-08 PROCEDURE — 85049 AUTOMATED PLATELET COUNT: CPT | Performed by: INTERNAL MEDICINE

## 2019-08-08 PROCEDURE — 99232 SBSQ HOSP IP/OBS MODERATE 35: CPT | Performed by: INTERNAL MEDICINE

## 2019-08-08 PROCEDURE — 80048 BASIC METABOLIC PNL TOTAL CA: CPT | Performed by: PHYSICIAN ASSISTANT

## 2019-08-08 RX ORDER — POTASSIUM CHLORIDE 20 MEQ/1
40 TABLET, EXTENDED RELEASE ORAL ONCE
Status: COMPLETED | OUTPATIENT
Start: 2019-08-08 | End: 2019-08-08

## 2019-08-08 RX ORDER — LEVOFLOXACIN 750 MG/1
750 TABLET ORAL EVERY 24 HOURS
Qty: 9 TABLET | Refills: 0 | Status: SHIPPED | OUTPATIENT
Start: 2019-08-09 | End: 2019-08-08 | Stop reason: SDUPTHER

## 2019-08-08 RX ORDER — LEVOFLOXACIN 750 MG/1
750 TABLET ORAL EVERY 24 HOURS
Qty: 9 TABLET | Refills: 0 | Status: SHIPPED | OUTPATIENT
Start: 2019-08-09 | End: 2019-08-18

## 2019-08-08 RX ADMIN — HEPARIN SODIUM 5000 UNITS: 5000 INJECTION INTRAVENOUS; SUBCUTANEOUS at 06:49

## 2019-08-08 RX ADMIN — CEFTRIAXONE SODIUM 2000 MG: 10 INJECTION, POWDER, FOR SOLUTION INTRAVENOUS at 00:50

## 2019-08-08 RX ADMIN — POTASSIUM CHLORIDE 40 MEQ: 1500 TABLET, EXTENDED RELEASE ORAL at 08:26

## 2019-08-08 NOTE — ASSESSMENT & PLAN NOTE
· 2/2 initial blood cultures + for e coli, same as urine culture   Repeat blood cultures negative   · Was on IV cefepime, transitioned to IV ceftriaxone 8/5 by ICU team   · ID okay with switching to oral levaquin at discharge through 8/17

## 2019-08-08 NOTE — SOCIAL WORK
Cm reviewed patient during care coordination rounds  Patient is medically stable for d/c today  Cm informed that patient is cleared to d/c home with no identified skilled needs  Per rounding, patient's father to transport him home  No other needs identified at time of d/c      CM reviewed d/c planning process including the following: identifying help at home, patient preference for d/c planning needs, Discharge Lounge, [facility] Meds to Bed program, availability of treatment team to discuss questions or concerns patient and/or family may have regarding understanding medications and recognizing signs and symptoms once discharged  CM also encouraged patient to follow up with all recommended appointments after discharge  Patient advised of importance for patient and family to participate in managing patients medical well being

## 2019-08-08 NOTE — ASSESSMENT & PLAN NOTE
· POA, fever, tachycardia, hypotension, lactic acidosis  Secondary to urosepsis and bacteremia  Initially on pressors in ICU   Clinically improving  · Tx out of ICU 8/5  · Per ID, okay to transition to oral levaquin 750 mg q24 through 8/17   · Repeat blood cultures negative  · Procalcitonin improving

## 2019-08-08 NOTE — PROGRESS NOTES
Progress Note - Infectious Disease   Luana Lancaster 49 y o  male MRN: [id number]  Unit/Bed#: [facility] 629-01 Encounter: [id number]    Assessment and plan:    1- septic shock on presentation now resolved:  Secondary to obstructing kidney stone and right-sided pyelonephritis    2- E coli bacteremia:  Secondary to #3  Repeat blood culture negative at 48 hours of incubation    3- right-sided pyelonephritis secondary to obstructing stone in right mid ureter  Status post cystoscopy pyelogram and a right-sided double-J stent placement  Pain now resolved patient has been afebrile for 48 hours, hemodynamically stable, leukocytosis resolved with WBC count today 6500  His urine and blood culture grew E coli  He is current on ceftriaxone IV  - urology follow-up for stone extraction  - continue antibiotic  - at discharge can switch him to levofloxacin 750 mg p o  Q 24 hours  End day of antibiotic 2019  Side effects of levofloxacin including tendinopathy, seizure, AAA have been discussed with the patient  Patient also advised to avoid calcium magnesium or develops signs while on levofloxacin to avoid malabsorption  4- ADRIANNE:      Subjective/Objective   Chief Complaint:  Right-sided abdominal pain fever    Right-sided pyelonephritis and obstructing stone    Subjective:  Denies pain denies dysuria or difficulty voiding denies fever or chills    Objective:     Temp:  [97 8 °F (36 6 °C)-98 °F (36 7 °C)] 97 8 °F (36 6 °C)  HR:  [] 85  Resp:  [20] 20  BP: (142-158)/() 156/97  SpO2:  [92 %-98 %] 95 %  Temp (24hrs), Av 9 °F (36 6 °C), Min:97 8 °F (36 6 °C), Max:98 °F (36 7 °C)  Current: Temperature: 97 8 °F (36 6 °C)    Physical Exam: /97   Pulse 85   Temp 97 8 °F (36 6 °C)   Resp 20   Ht 5' 6" (1 676 m)   Wt (!) 153 kg (336 lb 6 8 oz)   SpO2 95%   BMI 54 30 kg/m²     General Appearance:    Alert, cooperative, no distress, appears stated age   Head:    Normocephalic, without obvious abnormality, atraumatic   Eyes:    PERRL, conjunctiva/corneas clear, EOM's intact, fundi     benign, both eyes        Ears:    Normal TM's and external ear canals, both ears   Nose:   Nares normal, septum midline, mucosa normal, no drainage    or sinus tenderness   Throat:   Lips, mucosa, and tongue normal; teeth and gums normal   Neck:   Supple, symmetrical, trachea midline, no adenopathy;        thyroid:  No enlargement/tenderness/nodules; no carotid    bruit or JVD   Back:     Symmetric, no curvature, ROM normal, no CVA tenderness   Lungs:     Clear to auscultation bilaterally, respirations unlabored   Chest wall:    No tenderness or deformity   Heart:    Regular rate and rhythm, S1 and S2 normal, no murmur, rub   or gallop   Abdomen:     Soft, non-tender, bowel sounds active all four quadrants,     no masses, no organomegaly   Genitalia:    Normal male without lesion, discharge or tenderness   Rectal:    Normal tone, normal prostate, no masses or tenderness;    guaiac negative stool   Extremities:   Extremities normal, atraumatic, no cyanosis or edema   Pulses:   2+ and symmetric all extremities   Skin:   Skin color, texture, turgor normal, no rashes or lesions   Lymph nodes:   Cervical, supraclavicular, and axillary nodes normal   Neurologic:   CNII-XII intact   Normal strength, sensation and reflexes       throughout       Invasive Devices     Peripheral Intravenous Line            Peripheral IV 08/08/19 Right Antecubital less than 1 day          Drain            Ureteral Drain/Stent Right ureter 6 Fr  4 days                Lab, Imaging and other studies: I have personally reviewed pertinent films in PACS

## 2019-08-08 NOTE — ASSESSMENT & PLAN NOTE
· POA with creat of 3 58, peaked at 3 7  · Likely in setting of sepsis/ATN and obstructing renal stone  · Now improving and is 1 77 today   Nephrology signed off   · Avoid hypotension/nephrotoxins

## 2019-08-08 NOTE — DISCHARGE INSTRUCTIONS
Please call for follow up appointment with PCP  Urology office should contact you to schedule follow up appointment   Take antibiotics as directed

## 2019-08-08 NOTE — PROGRESS NOTES
NEPHROLOGY PROGRESS NOTE   Boris Banks 49 y o  male MRN: [id number]  Unit/Bed#: [facility] 629-01 Encounter: [id number]  Reason for Consult: ADRIANNE    ASSESSMENT/PLAN:  1  Acute kidney injury (POA):  Suspect ATN secondary to septic shock with E coli, home NSAID use and obstruction with renal stone with right hydroureteronephrosis   -peak creatinine 3 7 with improvement status post urgent ureteral stent placement on 08/04/2019 and IV fluids  -current creatinine 1 77 today off IV fluids  -CT scan reported;  mild to moderate right right hydroureteronephrosis with a 4 mm obstructing stone in the mid right ureter, bilateral intrarenal nonobstructing calculi-now status post urgent stent placement  -on admission UA revealed large blood, moderate leuks, +2 protein, 20-30 RBC, 30-50 WBCs  -recommend repeating urinalysis in steady state as outpatient  -avoid nephrotoxins/NSAID use  -avoid hypotension  -continue trend I/O, lab values in volume status while admitted    2  Suspected Chronic Kidney Disease III:  Suspect secondary to hypertension, obesity, kidney stones in the setting NSAID use  -baseline creatinine 1 4 in July 2019 as high as 1 8 in 2015  -no other blood values noted in between  -will need follow-up on discharge  -patient will see local nephrologist Dr Chriss Jimenez    3  Nephrolithiasis with right obstructing stone and hydroureteronephrosis:  -urology following a will need follow-up on discharge  -status post urgent ureteral stent placement on 8/4/2019  -will need 24 hour urine stone profile as outpatient with recurrent nephrolithiasis    4  Septic shock:  Gram-negative bacteremia with E coli  -status post IV antibiotic course  -currently on oral antibiotics for two weeks total-per primary team  -patient will require repeat blood cultures once completed per ID recommendation    5  Anion gap metabolic acidosis:  Lactic acidosis and renal failure  -improved and resolved  -previously treated with bicarb drip now off    6   BPH: On home Flomax and recently resumed on 08/06/2019  -currently no issues with urinating    7  Hypertension:  BP improved off pressors  -however, currently more on the higher side 140s to 150s/90s  -was not on home blood pressure medications  -will continue to follow as outpatient with primary care as well as nephrology follow-up      Disposition:  Renal function continues to improve and remains stable  Okay for discharge from renal standpoint with local follow-up with Nephrology  Spoke with office of Dr Maryse Patel and they will call patient to make a hospital follow-up appointment once information received from his hospitalization here at [facility] nephrology office made aware and will assist in sending information as requested  SUBJECTIVE:  Patient seen and examined  Denies chest pain shortness of breath  Looking forward to going home    Requests hospital follow up closer to his home town in [address] [place] [address]:  Current Weight: Weight - Scale: (!) 153 kg (336 lb 6 8 oz)  Vitals:    08/07/19 1500 08/07/19 1909 08/07/19 2349 08/08/19 0734   BP:  142/100 158/90 156/97   Pulse:  (!) 107 92 85   Resp:   20 20   Temp:   98 °F (36 7 °C) 97 8 °F (36 6 °C)   TempSrc:       SpO2: 98%  94% 95%   Weight:       Height:           Intake/Output Summary (Last 24 hours) at 8/8/2019 1123  Last data filed at 8/8/2019 0915  Gross per 24 hour   Intake 1030 ml   Output 1250 ml   Net -220 ml     General:  No acute distress, cooperative, lying flat in bed  Skin:  Warm and dry without rash  HEENT:  Mucous membranes moist  Neck:  Supple without JVD  Chest:  Essentially clear on auscultation without crackles, rhonchi, wheezes  Heart:  Regular rate and rhythm without rub  Abdomen:   Obese, soft, nontender, no distention, positive bowel sounds  Extremities: No significant edema noted bilaterally  Neuro:  Alert oriented and awake  Psych:  Appropriate affect    Medications:    Current Facility-Administered Medications:     acetaminophen (TYLENOL) tablet 975 mg, 975 mg, Oral, Q6H PRN, Mayank Valdez PA-C, 975 mg at 08/06/19 1147    bisacodyl (DULCOLAX) rectal suppository 10 mg, 10 mg, Rectal, Daily PRN, JOON JulianNP    calcium carbonate (TUMS) chewable tablet 500 mg, 500 mg, Oral, Daily PRN, Isabela Amadeol, CRNP    cefTRIAXone (ROCEPHIN) 2,000 mg in dextrose 5 % 50 mL IVPB, 2,000 mg, Intravenous, Q24H, Jere Wiley MD, Stopped at 08/08/19 0200    heparin (porcine) subcutaneous injection 5,000 Units, 5,000 Units, Subcutaneous, Q8H [address], Isabela Hail, CRNP, 5,000 Units at 08/08/19 1316    Labetalol HCl (NORMODYNE) injection 10 mg, 10 mg, Intravenous, Q4H PRN, Isabela Amadeol, CRNP, 10 mg at 08/04/19 2032    melatonin tablet 3 mg, 3 mg, Oral, HS, Isabela Hail, CRNP, 3 mg at 08/07/19 2118    polyethylene glycol (MIRALAX) packet 17 g, 17 g, Oral, Daily PRN, IsabelaJOON DanNP    senna-docusate sodium (SENOKOT S) 8 6-50 mg per tablet 1 tablet, 1 tablet, Oral, HS, Isabela Hail, CRNP    tamsulosin [facility]) capsule 0 4 mg, 0 4 mg, Oral, Daily With Lyudmila Brumfield PA-C, 0 4 mg at 08/07/19 1734    Laboratory Results:  Results from last 7 days   Lab Units 08/08/19  0512 08/07/19  0504 08/06/19  0602 08/05/19  0519 08/04/19  1441 08/04/19  0551 08/04/19  0355 08/04/19  0322 08/03/19  2141   WBC Thousand/uL  --  6 50 9 86 15 16*  --  10 48* 4 24*  --  5 45   HEMOGLOBIN g/dL  --  12 5 12 5 12 6  --  12 9 12 8  --  15 7   I STAT HEMOGLOBIN g/dl  --   --   --   --   --   --   --  11 9*  --    HEMATOCRIT %  --  38 6 38 6 37 7  --  39 7 40 3  --  48 8   HEMATOCRIT, ISTAT %  --   --   --   --   --   --   --  35*  --    PLATELETS Thousands/uL 73* 51* 57* 55*  --  84*  --   --  128*   POTASSIUM mmol/L 3 4* 3 5 3 5 3 8 4 8 3 0* 3 4*  --  3 1*   CHLORIDE mmol/L 109* 108 106 108 113* 112* 115*  --  103   CO2 mmol/L 22 23 23 22 14* 14* 14*  --  16*   CO2, I-STAT mmol/L  -- --   --   --   --   --   --  14*  --    BUN mg/dL 49* 37* 35* 35* 39* 31* 31*  --  28*   CREATININE mg/dL 1 77* 1 94* 2 16* 2 43* 3 28* 3 63* 3 70*  --  3 58*   CALCIUM mg/dL 8 8 8 1* 8 5 8 0* 7 3* 7 4* 6 9*  --  8 9   MAGNESIUM mg/dL  --   --  2 6 2 6 2 9* 1 3*  --   --   --    PHOSPHORUS mg/dL  --   --  2 3* 3 5 5 4* 1 8*  --   --   --    GLUCOSE, ISTAT mg/dl  --   --   --   --   --   --   --  120  --

## 2019-08-08 NOTE — PLAN OF CARE
Problem: Prexisting or High Potential for Compromised Skin Integrity  Goal: Skin integrity is maintained or improved  Description  INTERVENTIONS:  - Identify patients at risk for skin breakdown  - Assess and monitor skin integrity  - Assess and monitor nutrition and hydration status  - Monitor labs (i e  albumin)  - Assess for incontinence   - Turn and reposition patient  - Assist with mobility/ambulation  - Relieve pressure over bony prominences  - Avoid friction and shearing  - Provide appropriate hygiene as needed including keeping skin clean and dry  - Evaluate need for skin moisturizer/barrier cream  - Collaborate with interdisciplinary team (i e  Nutrition, Rehabilitation, etc )   - Patient/family teaching  Outcome: Progressing     Problem: Potential for Falls  Goal: Patient will remain free of falls  Description  INTERVENTIONS:  - Assess patient frequently for physical needs  -  Identify cognitive and physical deficits and behaviors that affect risk of falls    -  [place] fall precautions as indicated by assessment   - Educate patient/family on patient safety including physical limitations  - Instruct patient to call for assistance with activity based on assessment  - Modify environment to reduce risk of injury  - Consider OT/PT consult to assist with strengthening/mobility  Outcome: Progressing     Problem: PAIN - ADULT  Goal: Verbalizes/displays adequate comfort level or baseline comfort level  Description  Interventions:  - Encourage patient to monitor pain and request assistance  - Assess pain using appropriate pain scale  - Administer analgesics based on type and severity of pain and evaluate response  - Implement non-pharmacological measures as appropriate and evaluate response  - Consider cultural and social influences on pain and pain management  - Notify physician/advanced practitioner if interventions unsuccessful or patient reports new pain  Outcome: Progressing     Problem: INFECTION - ADULT  Goal: Absence or prevention of progression during hospitalization  Description  INTERVENTIONS:  - Assess and monitor for signs and symptoms of infection  - Monitor lab/diagnostic results  - Monitor all insertion sites, i e  indwelling lines, tubes, and drains  - Monitor endotracheal (as able) and nasal secretions for changes in amount and color  - [place] appropriate cooling/warming therapies per order  - Administer medications as ordered  - Instruct and encourage patient and family to use good hand hygiene technique  - Identify and instruct in appropriate isolation precautions for identified infection/condition  Outcome: Progressing  Goal: Absence of fever/infection during neutropenic period  Description  INTERVENTIONS:  - Monitor WBC  - Implement neutropenic guidelines  Outcome: Progressing     Problem: SAFETY ADULT  Goal: Maintain or return to baseline ADL function  Description  INTERVENTIONS:  -  Assess patient's ability to carry out ADLs; assess patient's baseline for ADL function and identify physical deficits which impact ability to perform ADLs (bathing, care of mouth/teeth, toileting, grooming, dressing, etc )  - Assess/evaluate cause of self-care deficits   - Assess range of motion  - Assess patient's mobility; develop plan if impaired  - Assess patient's need for assistive devices and provide as appropriate  - Encourage maximum independence but intervene and supervise when necessary  ¯ Involve family in performance of ADLs  ¯ Assess for home care needs following discharge   ¯ Request OT consult to assist with ADL evaluation and planning for discharge  ¯ Provide patient education as appropriate  Outcome: Progressing  Goal: Maintain or return mobility status to optimal level  Description  INTERVENTIONS:  - Assess patient's baseline mobility status (ambulation, transfers, stairs, etc )    - Identify cognitive and physical deficits and behaviors that affect mobility  - Identify mobility aids required to assist with transfers and/or ambulation (gait belt, sit-to-stand, lift, walker, cane, etc )  - [place] fall precautions as indicated by assessment  - Record patient progress and toleration of activity level on Mobility SBAR; progress patient to next Phase/Stage  - Instruct patient to call for assistance with activity based on assessment  - Request Rehabilitation consult to assist with strengthening/weightbearing, etc   Outcome: Progressing     Problem: DISCHARGE PLANNING  Goal: Discharge to home or other facility with appropriate resources  Description  INTERVENTIONS:  - Identify barriers to discharge w/patient and caregiver  - Arrange for needed discharge resources and transportation as appropriate  - Identify discharge learning needs (meds, wound care, etc )  - Arrange for interpretive services to assist at discharge as needed  - Refer to Case Management Department for coordinating discharge planning if the patient needs post-hospital services based on physician/advanced practitioner order or complex needs related to functional status, cognitive ability, or social support system  Outcome: Progressing     Problem: Knowledge Deficit  Goal: Patient/family/caregiver demonstrates understanding of disease process, treatment plan, medications, and discharge instructions  Description  Complete learning assessment and assess knowledge base    Interventions:  - Provide teaching at level of understanding  - Provide teaching via preferred learning methods  Outcome: Progressing     Problem: GENITOURINARY - ADULT  Goal: Maintains or returns to baseline urinary function  Description  INTERVENTIONS:  - Assess urinary function  - Encourage oral fluids to ensure adequate hydration  - Administer IV fluids as ordered to ensure adequate hydration  - Administer ordered medications as needed  - Offer frequent toileting  - Follow urinary retention protocol if ordered  Outcome: Progressing  Goal: Absence of urinary retention  Description  INTERVENTIONS:  - Assess patients ability to void and empty bladder  - Monitor I/O  - Bladder scan as needed  - Discuss with physician/AP medications to alleviate retention as needed  - Discuss catheterization for long term situations as appropriate  Outcome: Progressing     Problem: METABOLIC, FLUID AND ELECTROLYTES - ADULT  Goal: Electrolytes maintained within normal limits  Description  INTERVENTIONS:  - Monitor labs and assess patient for signs and symptoms of electrolyte imbalances  - Administer electrolyte replacement as ordered  - Monitor response to electrolyte replacements, including repeat lab results as appropriate  - Instruct patient on fluid and nutrition as appropriate  Outcome: Progressing  Goal: Fluid balance maintained  Description  INTERVENTIONS:  - Monitor labs and assess for signs and symptoms of volume excess or deficit  - Monitor I/O and WT  - Instruct patient on fluid and nutrition as appropriate  Outcome: Progressing

## 2019-08-08 NOTE — DISCHARGE SUMMARY
Discharge- Navi Lara 1965, 49 y o  male MRN: [id number]    Unit/Bed#: [facility] 629-01 Encounter: [id number]    Primary Care Provider: Marcos Osgood, MD   Date and time admitted to hospital: 8/4/2019  1:20 AM    * Septic shock (HCC)  Assessment & Plan  · POA, fever, tachycardia, hypotension, lactic acidosis  Secondary to urosepsis and bacteremia  Initially on pressors in ICU  Clinically improving  · Tx out of ICU 8/5  · Per ID, okay to transition to oral levaquin 750 mg q24 through 8/17   · Repeat blood cultures negative  · Procalcitonin improving     Headache  Assessment & Plan  · Likely related to sepsis  Resolved with IVF and headache cocktail   · No further w/u    Acute kidney injury ([facility] [address] )  Assessment & Plan  · POA with creat of 3 58, peaked at 3 7  · Likely in setting of sepsis/ATN and obstructing renal stone  · Now improving and is 1 77 today  Nephrology signed off   · Avoid hypotension/nephrotoxins     Right ureteral stone  Assessment & Plan  · Presented with RLQ pain and nausea/vomiting in setting of septic shock  · CT showed mild-moderate R sided hydroureteronephrosis with a 4 mm obstructing stone in mid right ureter  · Urology following  S/P urgent ureteral stent placement 8/4/19 with Dr Chucky Valdez  · Will need follow up as outpt for stone removal 1-2 weeks post dc  This will be arranged by Urology    Gram-negative bacteremia  Assessment & Plan  · 2/2 initial blood cultures + for e coli, same as urine culture  Repeat blood cultures negative   · Was on IV cefepime, transitioned to IV ceftriaxone 8/5 by ICU team   · ID okay with switching to oral levaquin at discharge through 8/17    Thrombocytopenia [facility])  Assessment & Plan  · Likely related to sepsis  · No evidence of bleeding  · Platelets improving       Metabolic acidosis  Assessment & Plan  · Resolved  Secondary to sepsis  · Bicarb gtt stopped 8/5    Morbid obesity ([facility] [address] )  Assessment & Plan  · Body mass index is 54 3 kg/m²     · Encourage weight loss via diet and exercise       Discharging Physician / Practitioner: Javier Julian PA-C  PCP: Ajith Kerns MD  Admission Date:   Admission Orders (From admission, onward)     Ordered        08/04/19 0153  Inpatient Admission  Once                   Discharge Date: 08/08/19    Resolved Problems  Date Reviewed: 8/8/2019    None          Consultations During Hospital Stay:  · ID   · Urology    Procedures Performed:   · 08/04/2019:  Cystoscopy, right retrograde pyelography with fluoroscopic interpretation, right ureteral stent placement  · Chest x-ray:  No acute cardiopulmonary disease  · CT chest abdomen pelvis without contrast: Mild to moderate right-sided hydroureteronephrosis with a 4 mm obstructing stone in the mid right ureter   Nonobstructing bilateral intrarenal calculi  · Blood cultures 8/5:  No growth times 72 hours x2  · Urine culture 8/4: >100,000 cfu/mL e coli  · Blood culture 8/3: E coli x 2  · EKG:  Sinus tach, nonspecific T-wave abnormality    Significant Findings / Test Results:   · See above    Incidental Findings:   · See above     Test Results Pending at Discharge (will require follow up): · None     Outpatient Tests Requested:  · Follow-up with Urology for cysto  · Follow up with PCP    Complications:  None    Reason for Admission:  Myriam Monique Course:     Saad Winchester is a 49 y o  male patient with past medical history of kidney stones who originally presented to the hospital on 8/4/2019 as a transfer from [facility] due to urosepsis  Patient had been complaining of 2 day history of progressively worsening right flank pain, chills/diaphoresis/dysuria  In ED found to have 4mm R sided obstructing stone in mid ureter with mild-moderate right-sided hydroureteronephrosis  Received fluid resuscitation in ED, flown to [place] for stabilization in MICU and urgent urologic intervention  Patient was initially quite ill in the ICU requiring vasopressors due to hypotension  He responded well after intervention along with antibiotics  He was found to be bacteremic  He was seen by infectious disease was transferred to the floor and was eventually medically stable and able to be discharged home on oral antibiotics  He will need follow-up with Urology in about 1-2 weeks for staged procedure  Please see above list of diagnoses and related plan for additional information  Condition at Discharge: stable     Discharge Day Visit / Exam:     Subjective:  Doing well today  Denies any acute complaints  Anxious to be discharged  Vitals: Blood Pressure: 156/97 (08/08/19 0734)  Pulse: 85 (08/08/19 0734)  Temperature: 97 8 °F (36 6 °C) (08/08/19 0734)  Temp Source: Oral (08/07/19 0318)  Respirations: 20 (08/08/19 0734)  Height: 5' 6" (167 6 cm) (08/04/19 0120)  Weight - Scale: (!) 153 kg (336 lb 6 8 oz) (08/06/19 1023)  SpO2: 95 % (08/08/19 0734)  Exam:   Physical Exam   Constitutional: He is oriented to person, place, and time  No distress  Morbidly obese   Cardiovascular: Normal rate and regular rhythm  Pulmonary/Chest: Effort normal and breath sounds normal    Abdominal: Soft  Bowel sounds are normal  He exhibits no distension  There is no tenderness  Musculoskeletal: He exhibits no edema  Neurological: He is alert and oriented to person, place, and time  Psychiatric: He has a normal mood and affect  Nursing note and vitals reviewed  Discussion with Family:  Patient    Discharge instructions/Information to patient and family:   See after visit summary for information provided to patient and family  Provisions for Follow-Up Care:  See after visit summary for information related to follow-up care and any pertinent home health orders        Disposition:     Home    For Discharges to [facility] SNF:   · Not Applicable to this Patient - Not Applicable to this Patient    Planned Readmission:  No     Discharge Statement:  I spent 37 minutes discharging the patient  This time was spent on the day of discharge  I had direct contact with the patient on the day of discharge  Greater than 50% of the total time was spent examining patient, answering all patient questions, arranging and discussing plan of care with patient as well as directly providing post-discharge instructions  Additional time then spent on discharge activities  Discharge Medications:  See after visit summary for reconciled discharge medications provided to patient and family        ** Please Note: This note has been constructed using a voice recognition system **

## 2019-08-08 NOTE — ASSESSMENT & PLAN NOTE
· Presented with RLQ pain and nausea/vomiting in setting of septic shock  · CT showed mild-moderate R sided hydroureteronephrosis with a 4 mm obstructing stone in mid right ureter  · Urology following  S/P urgent ureteral stent placement 8/4/19 with Dr Lenny Marin  · Will need follow up as outpt for stone removal 1-2 weeks post dc   This will be arranged by Urology

## 2019-08-08 NOTE — NURSING NOTE
Reviewed discharge instructions re: medications, diet, activity limits, and follow up appointments to be made  Patient indicates understanding of same

## 2019-08-09 NOTE — TELEPHONE ENCOUNTER
Called and left message for patient to give office a call back to schedule appointment  Patient tentatively scheduled 8/22/19 at 2:30 pm with Shelley Goldberg in [facility]

## 2019-08-09 NOTE — UTILIZATION REVIEW
Notification of Discharge  This is a Notification of Discharge from our facility [address]  Please be advised that this patient has been discharge from our facility  Below you will find the admission and discharge date and time including the patients disposition  PRESENTATION DATE: 8/4/2019  1:20 AM  IP ADMISSION DATE: 8/4/19 0120   DISCHARGE DATE: 8/8/2019  1:04 PM  DISPOSITION: Home/Self Care Home/Self Care   Admission Orders listed below:  Admission Orders (From admission, onward)     Ordered        08/04/19 0153  Inpatient Admission  Once                   Please contact the UR Department if additional information is required to close this patient's authorization/case  [address]  Utilization Review Department  Phone: [phone]; Fax [phone]  [email or web address]  ATTENTION: Please call with any questions or concerns to [phone]  and carefully listen to the prompts so that you are directed to the right person  Send all requests for admission clinical reviews, approved or denied determinations and any other requests to fax [phone]   All voicemails are confidential

## 2019-08-10 LAB
BACTERIA BLD CULT: NORMAL
BACTERIA BLD CULT: NORMAL

## 2019-08-15 ENCOUNTER — OFFICE VISIT (OUTPATIENT)
Dept: INTERNAL MEDICINE CLINIC | Facility: CLINIC | Age: 54
End: 2019-08-15
Payer: COMMERCIAL

## 2019-08-15 ENCOUNTER — APPOINTMENT (OUTPATIENT)
Dept: LAB | Facility: CLINIC | Age: 54
End: 2019-08-15
Payer: COMMERCIAL

## 2019-08-15 VITALS
DIASTOLIC BLOOD PRESSURE: 82 MMHG | OXYGEN SATURATION: 97 % | HEIGHT: 66 IN | BODY MASS INDEX: 50.62 KG/M2 | TEMPERATURE: 98.9 F | RESPIRATION RATE: 14 BRPM | HEART RATE: 98 BPM | SYSTOLIC BLOOD PRESSURE: 126 MMHG | WEIGHT: 315 LBS

## 2019-08-15 DIAGNOSIS — Z87.39 HISTORY OF GOUT: ICD-10-CM

## 2019-08-15 DIAGNOSIS — N20.1 RIGHT URETERAL STONE: ICD-10-CM

## 2019-08-15 DIAGNOSIS — A41.9 SEPTIC SHOCK (HCC): ICD-10-CM

## 2019-08-15 DIAGNOSIS — R65.21 SEPTIC SHOCK (HCC): ICD-10-CM

## 2019-08-15 DIAGNOSIS — R78.81 GRAM-NEGATIVE BACTEREMIA: ICD-10-CM

## 2019-08-15 DIAGNOSIS — N17.9 AKI (ACUTE KIDNEY INJURY) (HCC): ICD-10-CM

## 2019-08-15 DIAGNOSIS — N17.9 ACUTE KIDNEY INJURY (HCC): Primary | ICD-10-CM

## 2019-08-15 LAB
ANION GAP SERPL CALCULATED.3IONS-SCNC: 9 MMOL/L (ref 4–13)
BUN SERPL-MCNC: 19 MG/DL (ref 5–25)
CALCIUM SERPL-MCNC: 8.8 MG/DL (ref 8.3–10.1)
CHLORIDE SERPL-SCNC: 109 MMOL/L (ref 100–108)
CO2 SERPL-SCNC: 21 MMOL/L (ref 21–32)
CREAT SERPL-MCNC: 1.62 MG/DL (ref 0.6–1.3)
GFR SERPL CREATININE-BSD FRML MDRD: 48 ML/MIN/1.73SQ M
GLUCOSE P FAST SERPL-MCNC: 111 MG/DL (ref 65–99)
POTASSIUM SERPL-SCNC: 3.7 MMOL/L (ref 3.5–5.3)
SODIUM SERPL-SCNC: 139 MMOL/L (ref 136–145)

## 2019-08-15 PROCEDURE — 80048 BASIC METABOLIC PNL TOTAL CA: CPT

## 2019-08-15 PROCEDURE — 99496 TRANSJ CARE MGMT HIGH F2F 7D: CPT | Performed by: NURSE PRACTITIONER

## 2019-08-15 PROCEDURE — 36415 COLL VENOUS BLD VENIPUNCTURE: CPT

## 2019-08-15 PROCEDURE — 1111F DSCHRG MED/CURRENT MED MERGE: CPT | Performed by: NURSE PRACTITIONER

## 2019-08-15 NOTE — PATIENT INSTRUCTIONS
Gout   WHAT YOU NEED TO KNOW:   What is gout? Gout is a form of arthritis that causes severe joint pain and stiffness  Acute gout pain starts suddenly, gets worse quickly, and stops on its own  Acute gout can become chronic and cause permanent damage to your joints  What causes gout? Gout develops when uric acid builds up in your joints  Uric acid is made when your body breaks down purines  Purines are found in some medicines and foods  Your body gets rid of most uric acid through your urine  When your body cannot get rid of enough uric acid, it can build up and form crystals in your joints  The crystals cause your joints to become swollen and painful  This is called a gout attack  What increases my risk for gout? You may have been born with a decreased ability to break down and get rid of purines  Your body's ability to break down purines may be very slow  Gout is more common in men than in women  Any of the following can also increase your risk:  · A family history of gout    · Kidney disease or problems with how your kidneys work     · Eating foods that are high in purines, such as red meat    · Alcohol or tobacco use    · Diuretic medicine (water pills), or aspirin    · A medical condition such as diabetes, high blood pressure, or high cholesterol    · A condition such as an irregular heartbeat or a blood clot in your lungs  What are the stages of gout? · Hyperuricemia  is a high level of uric acid  Hyperuricemia is not gout, but it increases your risk for gout  You may have no symptoms at this stage, and it usually does not need treatment  · Acute gouty arthritis  starts with a sudden attack of pain and swelling, usually in 1 joint  This may be in your big toe  The attack may last from a few days to 2 weeks  · Intercritical gout  is the time between attacks  You may go months or years without another attack  You will not have joint pain or stiffness, but this does not mean your gout is cured  You will still need treatment to prevent chronic gout  · Chronic tophaceous gout  develops if gout is not treated  Large amounts of uric acid crystals, called tophi, collect around your joints  The crystals can destroy or deform the joints  Gout attacks occur more often, and last hours to weeks  More than 1 joint may be painful and swollen  At this stage, gout symptoms do not go away on their own  How is gout diagnosed? Your healthcare provider will ask about your medicines, health problems, and allergies  Tell him or her when your joint pain and swelling started  He or she will need to know if the swelling and pain were worst within 1 day or if got worse over time  He or she will check the skin over your joints for redness  You may also need any of the following:  · Blood tests  are used to check the level of uric acid  You may need to have blood tested more than once  · A synovial fluid test  is used to collect a sample of fluid from around your painful joint  The fluid is sent to a lab to check for uric acid crystals  Synovial fluid surrounds and protects your joints  · An x-ray, ultrasound, CT, or MRI  may show the gout or damage to bones caused by gout  You may be given contrast liquid to help your joints show up better in the pictures  Tell the healthcare provider if you have ever had an allergic reaction to contrast liquid  Do not enter the MRI room with anything metal  Metal can cause serious injury  Tell the healthcare provider if you have any metal in or on your body  How is gout treated? The following can make your symptoms stop sooner, prevent attacks, and decrease your risk for joint damage:  · Medicines:      ¨ NSAIDs , such as ibuprofen, help decrease swelling, pain, and fever  This medicine is available with or without a doctor's order  NSAIDs can cause stomach bleeding or kidney problems in certain people   If you take blood thinner medicine, always ask your healthcare provider if NSAIDs are safe for you  Always read the medicine label and follow directions  ¨ Gout medicine  decreases joint pain and swelling  It may also be given to prevent new gout attacks  ¨ Steroids  reduce inflammation and can help your joint stiffness and pain during gout attacks  ¨ Uric acid medicine  may be given to reduce the amount of uric acid your body makes  Some medicines may help you pass more uric acid when you urinate  · Surgery  called a bone graft may be needed for tophi that are painful or infected  Bone in the joint may be replaced with bone taken from another place in your body  Ask your healthcare provider for more information about bone graft surgery  How can I manage gout? · Rest your painful joint so it can heal   Your healthcare provider may recommend crutches or a walker if the affected joint is in a leg  · Apply ice to your joint  Ice decreases pain and swelling  Use an ice pack, or put crushed ice in a plastic bag  Cover the ice pack or bag with a towel before you apply it to your painful joint  Apply ice for 15 to 20 minutes every hour, or as directed  · Elevate your joint  Elevation helps reduce swelling and pain  Raise your joint above the level of your heart as often as you can  Prop your painful joint on pillows to keep it above your heart comfortably  · Go to physical therapy if directed  A physical therapist can teach you exercises to improve flexibility and range of motion  How can I prevent gout attacks? · Do not eat high-purine foods  These foods include meats, seafood, asparagus, spinach, cauliflower, and some types of beans  Healthcare providers may tell you to eat more low-fat milk products, such as yogurt  Milk products may decrease your risk for gout attacks  Vitamin C and coffee may also help  Your healthcare provider or dietitian can help you create a meal plan  · Drink more liquids as directed    Liquids such as water help remove uric acid from your body  Ask how much liquid to drink each day and which liquids are best for you  · Manage your weight  Weight loss may decrease the amount of uric acid in your body  Exercise can help you lose weight  Talk to your healthcare provider about the best exercises for you  · Control your blood sugar level if you have diabetes  Keep your blood sugar level in a normal range  This can help prevent gout attacks  · Limit or do not drink alcohol as directed  Alcohol can trigger a gout attack  Alcohol also increases your risk for dehydration  Ask your healthcare provider if alcohol is safe for you  When should I seek immediate care? · You have severe joint pain that you cannot tolerate  · You have a fever or redness that spreads beyond the joint area  When should I contact my healthcare provider? · You have a fever, chills, or body aches  · You are confused or more tired than usual      · You have new symptoms, such as a rash, after you start gout treatment  · Your joint pain and swelling do not go away, even after treatment  · You are not urinating as much or as often as you usually do  · You have trouble taking your gout medicines  CARE AGREEMENT:   You have the right to help plan your care  Learn about your health condition and how it may be treated  Discuss treatment options with your caregivers to decide what care you want to receive  You always have the right to refuse treatment  The above information is an  only  It is not intended as medical advice for individual conditions or treatments  Talk to your doctor, nurse or pharmacist before following any medical regimen to see if it is safe and effective for you  © 2017 [address] [employer]  Information is for End User's use only and may not be sold, redistributed or otherwise used for commercial purposes   All illustrations and images included in CareNotes® are the copyrighted property of A D [employer] M [employer]  or [employer] [facility] [employer] [facility] [employer] [employer]

## 2019-08-15 NOTE — PROGRESS NOTES
Assessment/Plan: Septic Shock- POA, fever, tachycardia, hypotension, lactic acidosis  Secondary to urosepsis and bacteremia  Was send home on Levaquin 750 mg q 24 hour through 8/17  Blood cultures are negative  Headache-resolved while inpatient with headache cocktail and IVF  Acute Kidney Injury-Elevated creat on admission 3 58 peaking at 3 7 most likely in the setting of sepsis and obstructing renal stone  Avoid nephrotoxin agents  Right Urethral Stone-Presented with RLQ pain and nausea/vomiting in setting in septic shock  CT showed mild moderate right sided hydroureteronephrosis with a 4 MM obstructing stone in mid right ureter  Urology following S/P urgent stent placement on 8/14 with Dr Mattie Yang  Will need follow up as outpatient for stone removal 1-2 weeks post discharge  This will be arranged by Urology  Gram Negative Bacteremia-2/2 initial blood cultures + ecoli same as urine culture  Repeat blood cultures negative  On Levaquin PO does have two more doses left  Is following up with Nephrology on the 23rd and Urology as well  Vital signs signs stable in no acute distress today in the office  Patient is deferring PT at this time but feels if he can not get up and do some walking on his own will let us know  He was advised to wear his compression stockings during the day and keep his legs elevated  He was advised to cut down on his fluid intake to avoid water intoxication  He was advised if any SOB, chest pain, or palpitations to go back to ER  Will follow up with him in one month or sooner if need be      Procedures Performed:   · 08/04/2019:  Cystoscopy, right retrograde pyelography with fluoroscopic interpretation, right ureteral stent placement  · Chest x-ray:  No acute cardiopulmonary disease  · CT chest abdomen pelvis without contrast: Mild to moderate right-sided hydroureteronephrosis with a 4 mm obstructing stone in the mid right ureter   Nonobstructing bilateral intrarenal calculi  · Blood cultures 8/5:  No growth times 72 hours x2  · Urine culture 8/4: >100,000 cfu/mL e coli  · Blood culture 8/3: E coli x 2  · EKG:  Sinus tach, nonspecific T-wave abnormality    No problem-specific Assessment & Plan notes found for this encounter  Problem List Items Addressed This Visit        Genitourinary    Right ureteral stone    Acute kidney injury ([address] ) - Primary       Other    History of gout    Septic shock (HCC)    Gram-negative bacteremia           Subjective:     Patient ID: Dano Brannon is a 49 y o  male  Marin Powell is here today for a ZEYAD visit  He was admitted from 8/4 to 8/8 with a past medical history of kidney stones who originally presented to the hospital on 8/4/2019 [facility]  Patient had been complaining of 2 day history of progressively worsening right flank pain, chills/diaphoresis/dysuria  In ED found to have 4mm R sided obstructing stone in mid ureter with mild-moderate right-sided hydroureteronephrosis  Received fluid resuscitation in ED, flown to [place] for stabilization in MICU and urgent urologic intervention  Patient was initially quite ill in the ICU requiring vasopressors due to hypotension  He responded well after intervention along with antibiotics  He was found to be bacteremic  He was seen by infectious disease was transferred to the floor and was eventually medically stable and able to be discharged home on oral antibiotics  He will need follow-up with Urology in about 1-2 weeks for staged procedure  He states since he is home he is feeling better but did start with yesterday some leg swelling and weakness  He is using a cane and wants to start exercising at home on his exercise bike  He states his legs are not warm or hot but just starting with some swelling  He is drinking around 1 gallon of water a day but is constantly up voiding at night   He is voiding in a jug and thinks he did pass a kidney stone but did drop this on his carpet and did try and get most of it up in a cup but is not sure if it is the stone  He denies any SOB, palpitations, or chest pain  He states he is feeling much better but just wants to get up and moving  He states was consuming a lot of ice tea and is now cutting this out completely he is drinking some diet sprite due to being sick of drinking water  He states he is going food shopping to get some new things to eat  He offers no other issues  Review of Systems   Constitutional: Negative  HENT: Negative  Eyes: Negative  Respiratory: Negative  Cardiovascular: Positive for leg swelling  Gastrointestinal: Negative  Genitourinary: Negative  Musculoskeletal: Negative  Skin: Negative  Allergic/Immunologic: Negative  Neurological: Negative  Hematological: Negative  Psychiatric/Behavioral: Negative  Below is the patient's most recent value for Albumin, ALT, AST, BUN, Calcium, Chloride, Cholesterol, CO2, Creatinine, GFR, Glucose, HDL, Hematocrit, Hemoglobin, Hemoglobin A1C, LDL, Magnesium, Phosphorus, Platelets, Potassium, PSA, Sodium, Triglycerides, and WBC  Lab Results   Component Value Date    ALT 44 08/06/2019    AST 49 (H) 08/06/2019    BUN 49 (H) 08/08/2019    CALCIUM 8 8 08/08/2019     (H) 08/08/2019    CHOL 235 08/31/2015    CO2 22 08/08/2019    CREATININE 1 77 (H) 08/08/2019    HDL 37 08/31/2015    HCT 38 6 08/07/2019    HGB 12 5 08/07/2019    HGBA1C 5 4 03/24/2015    MG 2 6 08/06/2019    PHOS 2 3 (L) 08/06/2019    PLT 73 (L) 08/08/2019    K 3 4 (L) 08/08/2019    PSA 0 4 03/24/2015     (L) 04/08/2015    TRIG 150 08/31/2015    WBC 6 50 08/07/2019     Note: for a comprehensive list of the patient's lab results, access the Results Review activity        Current Outpatient Medications:     levofloxacin (LEVAQUIN) 750 mg tablet, Take 1 tablet (750 mg total) by mouth every 24 hours for 9 days, Disp: 9 tablet, Rfl: 0    tamsulosin (FLOMAX) 0 4 mg, TAKE 1 CAPSULE BY MOUTH EVERY 24 HOURS, Disp: 90 capsule, Rfl: 3  Objective:     Physical Exam   Constitutional: He is oriented to person, place, and time  He appears well-developed and well-nourished  HENT:   Head: Normocephalic and atraumatic  Right Ear: External ear normal    Left Ear: External ear normal    Nose: Nose normal    Mouth/Throat: Oropharynx is clear and moist    Eyes: Pupils are equal, round, and reactive to light  Conjunctivae and EOM are normal    Neck: Normal range of motion  Neck supple  Cardiovascular: Normal rate, regular rhythm, normal heart sounds and intact distal pulses  Pulmonary/Chest: Effort normal and breath sounds normal    Abdominal: Soft  Bowel sounds are normal    Musculoskeletal: Normal range of motion  He exhibits edema  Trace LLE   Neurological: He is alert and oriented to person, place, and time  Skin: Skin is warm and dry  Capillary refill takes less than 2 seconds  Resolving ecchymosis to arms and lower abdomen   Psychiatric: He has a normal mood and affect  His behavior is normal  Judgment and thought content normal    Vitals reviewed  There were no vitals filed for this visit  Transitional Care Management Review:  Delonte Loera is a 49 y o  male here for TCM follow up  During the TCM phone call patient stated:    TCM Call (since 7/15/2019)     Date and time call was made  8/8/2019  3:17 PM    Hospital care reviewed  Records reviewed    Patient was hospitialized at  [facility]    Date of Admission  08/04/19    Date of discharge  08/08/19    Diagnosis  Septic Shock    Disposition  Home    Were the patients medications reviewed and updated  Yes    Current Symptoms  -- <img src='C:FILES (X86)      TCM Call (since 7/15/2019)     Post hospital issues  Reduced activity    Should patient be enrolled in anticoag monitoring? No    Scheduled for follow up?   Yes    Did you obtain your prescribed medications  Yes    Do you need help managing your prescriptions or medications  No    Is transportation to your appointment needed  No    I have advised the patient to call PCP with any new or worsening symptoms  Nicolas Llanes, [facility]  Parent    The type of support provided  Emotional; Physical; Financial    Do you have social support  Yes, as much as I need    Are you recieving any outpatient services  No    Are you recieving home care services  No    Are you using any community resources  No    Current waiver services  No    Have you fallen in the last 12 months  No    Interperter language line needed  No    Counseling  Patient    Counseling topics  patient and family education    Comments  Patient does have some leg swelling  patient did not want to keep his appointment for tomorrow, he did schedule for 1 week  He was advised to call or go directly to ER if there are any issues or concerns            Michael Sandovalor

## 2019-08-19 ENCOUNTER — TELEPHONE (OUTPATIENT)
Dept: INTERNAL MEDICINE CLINIC | Facility: CLINIC | Age: 54
End: 2019-08-19

## 2019-08-19 DIAGNOSIS — N20.0 KIDNEY STONE: Primary | ICD-10-CM

## 2019-08-19 NOTE — TELEPHONE ENCOUNTER
Pt states he's still having problems with his feet  His ankles are still swollen, and they are painful  Pt is wondering if there is anything that he may take for this to help  I did ask him if he wanted a referral to PT, and he stated "not yet, if my ankles would clear up, then I can go on this exercise bike I have here  That would help make my knees and ankles and everything work better "      He states that he would like to just see if there's something he can take  Please advise  Also he will be dropping his fmla paperwork off  He's attempted to have them sent multiple times, as well as us calling to have them sent

## 2019-08-19 NOTE — TELEPHONE ENCOUNTER
I would try PT is he having any other symptoms SOBor chest pain   I would have him let Nephrology know today about his legs it can be renal related is he up walking around??

## 2019-08-19 NOTE — TELEPHONE ENCOUNTER
Pt reviewed with Nephrology, and doesn't want to do PT until he's in less pain  Denies sob and chest pain         Pt did discuss further with pal when he came into the office

## 2019-08-22 ENCOUNTER — OFFICE VISIT (OUTPATIENT)
Dept: UROLOGY | Facility: CLINIC | Age: 54
End: 2019-08-22
Payer: COMMERCIAL

## 2019-08-22 VITALS
DIASTOLIC BLOOD PRESSURE: 70 MMHG | WEIGHT: 305 LBS | SYSTOLIC BLOOD PRESSURE: 138 MMHG | HEART RATE: 96 BPM | BODY MASS INDEX: 49.23 KG/M2

## 2019-08-22 DIAGNOSIS — N20.1 URETERAL STONE: Primary | ICD-10-CM

## 2019-08-22 PROCEDURE — 99204 OFFICE O/P NEW MOD 45 MIN: CPT | Performed by: PHYSICIAN ASSISTANT

## 2019-08-22 PROCEDURE — 82360 CALCULUS ASSAY QUANT: CPT | Performed by: PHYSICIAN ASSISTANT

## 2019-08-22 RX ORDER — ACETAMINOPHEN 500 MG
500 TABLET ORAL EVERY 6 HOURS PRN
COMMUNITY
End: 2019-10-14

## 2019-08-22 RX ORDER — COLCHICINE 0.6 MG/1
0.6 TABLET ORAL DAILY
COMMUNITY
End: 2019-08-30

## 2019-08-22 NOTE — PATIENT INSTRUCTIONS
Ureteroscopy   WHAT YOU NEED TO KNOW:   A ureteroscopy is a procedure to examine in the inside of your urinary tract, which includes your urethra, bladder, ureters, and kidneys  A ureteroscope is a small, thin tube with a light and camera on the end  Ureteroscopy can help your healthcare provider diagnose and treat problems in your urinary tract, such as kidney stones  HOW TO PREPARE:   The week before your procedure:   · Write down the correct date, time, and location of your procedure  · Ask your caregiver if you need to stop using aspirin or any other prescribed or over-the-counter medicine before your procedure or surgery  · Bring your medicine bottles or a list of your medicines when you see your caregiver  Tell your caregiver if you are allergic to any medicine  Tell your caregiver if you use any herbs, food supplements, or over-the-counter medicine  · Arrange a ride home  Ask a family member or friend to drive you home after your surgery or procedure  Do not drive yourself home  · You may need blood or urine tests before your procedure  You may also need an EKG  Ask your healthcare provider for more information about these and other tests that you may need  Write down the date, time, and location of each test   The night before your procedure:  Ask caregivers about directions for eating and drinking  The day of your procedure:   · Ask your caregiver before taking any medicine on the day of your procedure  These medicines include insulin, diabetic pills, high blood pressure pills, or heart pills  Bring a list of all the medicines you take, or your pill bottles, with you to the hospital     · You or a close family member will be asked to sign a legal document called a consent form  It gives caregivers permission to do the procedure or surgery  It also explains the problems that may happen, and your choices  Make sure all your questions are answered before you sign this form      · An anesthesiologist will talk to you before your surgery  You may need medicine to keep you asleep or numb an area of your body during surgery  Tell caregivers if you or anyone in your family has had a problem with anesthesia in the past     · Caregivers may insert an intravenous tube (IV) into your vein  A vein in the arm is usually chosen  Through the IV tube, you may be given liquids and medicine  WHAT WILL HAPPEN:   What will happen: Your healthcare provider will place the ureteroscope into your urethra  He will pass it through your bladder and into your ureters and kidneys  Your healthcare provider may place tools through the scope that will help him remove tissue or stones  The tools may also help him place stents or sheaths to help keep your ureters open  After your procedure: You will be taken to a room to rest until you are fully awake  Healthcare providers will monitor you closely for any problems  Do not get out of bed until your healthcare provider says it is okay  When your healthcare provider sees that you are okay, you will be able to go home or be taken to your hospital room  CONTACT YOUR HEALTHCARE PROVIDER IF:   · You cannot make it to your procedure  · You have a fever  · You get a cold or the flu  · You have questions or concerns about your procedure  SEEK CARE IMMEDIATELY IF:   · Your symptoms get worse  RISKS:   You may bleed more than expected or get an infection  One of your ureters may be injured  You may have a blockage in one of your ureters  You may need another procedure or surgery  CARE AGREEMENT:   You have the right to help plan your care  Learn about your health condition and how it may be treated  Discuss treatment options with your caregivers to decide what care you want to receive  You always have the right to refuse treatment     © 2017 [employer] [employer] Information is for End User's use only and may not be sold, redistributed or otherwise used for commercial purposes  All illustrations and images included in CareNotes® are the copyrighted property of [employer]  or [employer]  The above information is an  only  It is not intended as medical advice for individual conditions or treatments  Talk to your doctor, nurse or pharmacist before following any medical regimen to see if it is safe and effective for you

## 2019-08-22 NOTE — PROGRESS NOTES
8/22/2019      Chief Complaint   Patient presents with    Nephrolithiasis         Assessment and Plan    49 y o  male managed by - NEW PATIENT consulted in hospital    1  Right ureteral stone- 4mm, mid  - presenting with septic shock 8/3/19 [facility] [place] [facility] x 2 days  - s/p urgent right ureteral stent 8/4/19  - urine culture 8/4/9 >100k E  Coli  - completed oral levaquin 8/17  - need repeat preop ucx  - schedule definitive stone surgery    ** passed 3 grey/brown gravel appearing stones between 8/10-8/19 ** see photo above, largest is 4mm stone    Case requested for tentative right ureteroscopy laser lithotripsy however also Check CT stone study to assess stone presence  Consider office cystoscopic stent removal if no ureteral stone still present    History of Present Illness  Angely Guardado is a 49 y o  male here for evaluation of hospital followup  Admitted to [facility]U evening of 8/13/19 with septic shock on bipap and vasopressors, secondary to infected and obstructing right 4mm mid ureteral stone  Urgently stented 8/14/19  ADRIANNE creatine 3 7, down to 1 7  Discharged on oral levaquin  Here today to discuss and schedule definitive stone surgery  Since d/c from hospital he passed 3 grey/brown gravel appearing stones between 8/16-8/19 ** see photo above, largest is measuring 4mm in the office today  Patient has history of gout including chronic gouty arthropathy  History of stones- yes  History of prior stone surgeries- no        Review of Systems   Constitutional: Negative  Respiratory: Negative  Cardiovascular: Negative  Genitourinary: Negative for decreased urine volume, difficulty urinating, dysuria, flank pain, frequency, hematuria, scrotal swelling, testicular pain and urgency  Musculoskeletal: Positive for arthralgias               Urine Culture >100,000 cfu/ml Escherichia coli         Susceptibility      Escherichia coli     CASPER     Amoxicillin + Clavulanate 8/4 ug/ml Susceptible     Ampicillin ($$) >16 00 ug/ml Resistant     Ampicillin + Sulbactam ($) 16/8 ug/ml Intermediate     Aztreonam ($$$)  <=4 ug/ml Susceptible     Cefazolin ($) 4 00 ug/ml Susceptible     Ciprofloxacin ($)  <=1 00 ug/ml Susceptible     Ertapenem ($$$) <=0 5 ug/ml Susceptible     Gentamicin ($$) <=1 ug/ml Susceptible     Levofloxacin ($) 0 50 ug/ml Susceptible     Nitrofurantoin <=32 ug/ml Susceptible     Tetracycline >8 ug/ml Resistant     Tobramycin ($) <=1 ug/ml Susceptible     Trimethoprim + Sulfamethoxazole ($$$) >2/38 ug/ml Resistant     ZID Performed Yes                  Past Medical History  Past Medical History:   Diagnosis Date    Arthritis     Gout     Hypertension     Kidney stone      Past Social History  Past Surgical History:   Procedure Laterality Date    FL RETROGRADE PYELOGRAM  8/4/2019    NO PAST SURGERIES  03/23/2015    Last assessed    [place] CYSTOURETHROSCOPY,URETER CATHETER Right 8/4/2019    Procedure: CYSTOSCOPY RETROGRADE PYELOGRAM WITH INSERTION STENT URETERAL;  Surgeon: Carson Schneider MD;  [facility];  Service: Urology     Social History     Tobacco Use   Smoking Status Never Smoker   Smokeless Tobacco Never Used     Past Family History  Family History   Problem Relation Age of Onset    Cancer Mother      Past Social history  Social History     Socioeconomic History    Marital status: Single     Spouse name: Not on file    Number of children: Not on file    Years of education: Not on file    Highest education level: Not on file   Occupational History    Not on file   Social Needs    Financial resource strain: Not on file    Food insecurity:     Worry: Not on file     Inability: Not on file    Transportation needs:     Medical: Not on file     Non-medical: Not on file   Tobacco Use    Smoking status: Never Smoker    Smokeless tobacco: Never Used   Substance and Sexual Activity    Alcohol use: No     Frequency: Never     Binge frequency: Never    Drug use: No    Sexual activity: Not on file   Lifestyle    Physical activity:     Days per week: Not on file     Minutes per session: Not on file    Stress: Not on file   Relationships    Social connections:     Talks on phone: Not on file     Gets together: Not on file     Attends Synagogue service: Not on file     Active member of club or organization: Not on file     Attends meetings of clubs or organizations: Not on file     Relationship status: Not on file    Intimate partner violence:     Fear of current or ex partner: Not on file     Emotionally abused: Not on file     Physically abused: Not on file     Forced sexual activity: Not on file   Other Topics Concern    Not on file   Social History Narrative    Caffeine use      Current Medications  Current Outpatient Medications   Medication Sig Dispense Refill    acetaminophen (TYLENOL) 500 mg tablet Take 500 mg by mouth every 6 (six) hours as needed for mild pain      colchicine (COLCRYS) 0 6 mg tablet Take 0 6 mg by mouth daily      tamsulosin (FLOMAX) 0 4 mg TAKE 1 CAPSULE BY MOUTH EVERY 24 HOURS 90 capsule 3     No current facility-administered medications for this visit  Allergies  No Known Allergies      The following portions of the patient's history were reviewed and updated as appropriate: allergies, current medications, past medical history, past social history, past surgical history and problem list       Vitals  Vitals:    08/22/19 1348   BP: 138/70   Pulse: 96   Weight: (!) 138 kg (305 lb)       Physical Exam   Constitutional: He is oriented to person, place, and time  He appears well-developed and well-nourished  Obese white male   HENT:   Head: Normocephalic and atraumatic  Cardiovascular: Normal rate, regular rhythm and normal heart sounds  Pulmonary/Chest: Effort normal and breath sounds normal    Abdominal: Soft  Bowel sounds are normal    No suprapubic or CVA tenderness   Musculoskeletal: Normal range of motion  He exhibits no edema  Gouty/nodular arthropathy multiple fingers; trace edema bilateral lower extremities, nonpitting- walking with a walker secondary to joint pain   Neurological: He is alert and oriented to person, place, and time  Skin: Skin is warm and dry  Capillary refill takes less than 2 seconds  No pallor  Nursing note and vitals reviewed  Results  No results found for this or any previous visit (from the past 1 hour(s)) ]  Lab Results   Component Value Date    PSA 0 4 03/24/2015     Lab Results   Component Value Date    GLUCOSE 120 08/04/2019    CALCIUM 8 8 08/15/2019     (L) 04/08/2015    K 3 7 08/15/2019    CO2 21 08/15/2019     (H) 08/15/2019    BUN 19 08/15/2019    CREATININE 1 62 (H) 08/15/2019     Lab Results   Component Value Date    WBC 6 50 08/07/2019    HGB 12 5 08/07/2019    HCT 38 6 08/07/2019    MCV 93 08/07/2019    PLT 73 (L) 08/08/2019         Orders  Orders Placed This Encounter   Procedures    CT renal stone study abdomen pelvis wo contrast     Standing Status:   Future     Standing Expiration Date:   8/22/2023     Scheduling Instructions:      Nothing to eat 3 hours prior to your test   Clear liquids are also permitted up until the time of the scan  Clear liquids include water, black coffee or tea, apple juice or clear broth  If possible wear clothing without any metal in the abdomen area  Sweat suit, shorts, sports bra or bra without underwire may eliminate the need to change  Please bring your insurance cards, a form of photo ID and a list of your medications with you  Arrive 15 minutes prior to your appointment time in order to register  On the day of your test, please bring any prior CT or MRI studies of this area with you that were not performed at a [facility]  To schedule this appointment, please contact Central Scheduling at [phone]  Order Specific Question:   What is the patient's sedation requirement?      Answer:   No Sedation    Stone analysis

## 2019-08-23 ENCOUNTER — TELEPHONE (OUTPATIENT)
Dept: INTERNAL MEDICINE CLINIC | Facility: CLINIC | Age: 54
End: 2019-08-23

## 2019-08-23 ENCOUNTER — APPOINTMENT (EMERGENCY)
Dept: CT IMAGING | Facility: HOSPITAL | Age: 54
DRG: 698 | End: 2019-08-23
Payer: COMMERCIAL

## 2019-08-23 ENCOUNTER — HOSPITAL ENCOUNTER (INPATIENT)
Facility: HOSPITAL | Age: 54
LOS: 4 days | Discharge: HOME | DRG: 698 | End: 2019-08-27
Attending: EMERGENCY MEDICINE | Admitting: INTERNAL MEDICINE
Payer: COMMERCIAL

## 2019-08-23 DIAGNOSIS — I10 HYPERTENSION: ICD-10-CM

## 2019-08-23 DIAGNOSIS — E55.9 VITAMIN D DEFICIENCY: ICD-10-CM

## 2019-08-23 DIAGNOSIS — N39.0 ACUTE UTI: ICD-10-CM

## 2019-08-23 DIAGNOSIS — A41.9 SEPSIS (HCC): Primary | ICD-10-CM

## 2019-08-23 PROBLEM — N18.30 CHRONIC KIDNEY DISEASE, STAGE 3 (HCC): Chronic | Status: ACTIVE | Noted: 2019-08-23

## 2019-08-23 PROBLEM — N20.1 RIGHT URETERAL STONE: Status: RESOLVED | Noted: 2019-08-04 | Resolved: 2019-08-23

## 2019-08-23 PROBLEM — R65.21 SEPTIC SHOCK (HCC): Status: RESOLVED | Noted: 2019-08-03 | Resolved: 2019-08-23

## 2019-08-23 PROBLEM — D69.6 THROMBOCYTOPENIA (HCC): Status: RESOLVED | Noted: 2019-08-05 | Resolved: 2019-08-23

## 2019-08-23 LAB
ALBUMIN SERPL BCP-MCNC: 2.5 G/DL (ref 3.5–5)
ALP SERPL-CCNC: 92 U/L (ref 46–116)
ALT SERPL W P-5'-P-CCNC: 37 U/L (ref 12–78)
ANION GAP SERPL CALCULATED.3IONS-SCNC: 19 MMOL/L (ref 4–13)
APTT PPP: 32 SECONDS (ref 23–37)
AST SERPL W P-5'-P-CCNC: 47 U/L (ref 5–45)
BACTERIA UR QL AUTO: ABNORMAL /HPF
BASOPHILS # BLD AUTO: 0.06 THOUSANDS/ΜL (ref 0–0.1)
BASOPHILS NFR BLD AUTO: 0 % (ref 0–1)
BILIRUB SERPL-MCNC: 0.8 MG/DL (ref 0.2–1)
BILIRUB UR QL STRIP: ABNORMAL
BUN SERPL-MCNC: 23 MG/DL (ref 5–25)
CALCIUM SERPL-MCNC: 9.2 MG/DL (ref 8.3–10.1)
CHLORIDE SERPL-SCNC: 98 MMOL/L (ref 100–108)
CLARITY UR: ABNORMAL
CO2 SERPL-SCNC: 18 MMOL/L (ref 21–32)
COLOR UR: YELLOW
CREAT SERPL-MCNC: 2.14 MG/DL (ref 0.6–1.3)
EOSINOPHIL # BLD AUTO: 0.03 THOUSAND/ΜL (ref 0–0.61)
EOSINOPHIL NFR BLD AUTO: 0 % (ref 0–6)
ERYTHROCYTE [DISTWIDTH] IN BLOOD BY AUTOMATED COUNT: 13.3 % (ref 11.6–15.1)
GFR SERPL CREATININE-BSD FRML MDRD: 34 ML/MIN/1.73SQ M
GLUCOSE SERPL-MCNC: 102 MG/DL (ref 65–140)
GLUCOSE UR STRIP-MCNC: NEGATIVE MG/DL
HCT VFR BLD AUTO: 39 % (ref 36.5–49.3)
HGB BLD-MCNC: 12.2 G/DL (ref 12–17)
HGB UR QL STRIP.AUTO: ABNORMAL
HOLD SPECIMEN: NORMAL
IMM GRANULOCYTES # BLD AUTO: 0.13 THOUSAND/UL (ref 0–0.2)
IMM GRANULOCYTES NFR BLD AUTO: 1 % (ref 0–2)
INR PPP: 1.16 (ref 0.84–1.19)
KETONES UR STRIP-MCNC: ABNORMAL MG/DL
LACTATE SERPL-SCNC: 1.6 MMOL/L (ref 0.5–2)
LACTATE SERPL-SCNC: 1.7 MMOL/L (ref 0.5–2)
LEUKOCYTE ESTERASE UR QL STRIP: ABNORMAL
LYMPHOCYTES # BLD AUTO: 0.63 THOUSANDS/ΜL (ref 0.6–4.47)
LYMPHOCYTES NFR BLD AUTO: 4 % (ref 14–44)
MCH RBC QN AUTO: 30 PG (ref 26.8–34.3)
MCHC RBC AUTO-ENTMCNC: 31.3 G/DL (ref 31.4–37.4)
MCV RBC AUTO: 96 FL (ref 82–98)
MONOCYTES # BLD AUTO: 0.56 THOUSAND/ΜL (ref 0.17–1.22)
MONOCYTES NFR BLD AUTO: 4 % (ref 4–12)
NEUTROPHILS # BLD AUTO: 13.56 THOUSANDS/ΜL (ref 1.85–7.62)
NEUTS SEG NFR BLD AUTO: 91 % (ref 43–75)
NITRITE UR QL STRIP: NEGATIVE
NON-SQ EPI CELLS URNS QL MICRO: ABNORMAL /HPF
NRBC BLD AUTO-RTO: 0 /100 WBCS
PH UR STRIP.AUTO: 6 [PH]
PLATELET # BLD AUTO: 331 THOUSANDS/UL (ref 149–390)
PMV BLD AUTO: 10.3 FL (ref 8.9–12.7)
POTASSIUM SERPL-SCNC: 3.6 MMOL/L (ref 3.5–5.3)
PROT SERPL-MCNC: 7.7 G/DL (ref 6.4–8.2)
PROT UR STRIP-MCNC: >=300 MG/DL
PROTHROMBIN TIME: 14.9 SECONDS (ref 11.6–14.5)
RBC # BLD AUTO: 4.07 MILLION/UL (ref 3.88–5.62)
RBC #/AREA URNS AUTO: ABNORMAL /HPF
SODIUM SERPL-SCNC: 135 MMOL/L (ref 136–145)
SP GR UR STRIP.AUTO: 1.02 (ref 1–1.03)
UROBILINOGEN UR QL STRIP.AUTO: 0.2 E.U./DL
WBC # BLD AUTO: 14.97 THOUSAND/UL (ref 4.31–10.16)
WBC #/AREA URNS AUTO: ABNORMAL /HPF

## 2019-08-23 PROCEDURE — 83605 ASSAY OF LACTIC ACID: CPT

## 2019-08-23 PROCEDURE — 99223 1ST HOSP IP/OBS HIGH 75: CPT | Performed by: INTERNAL MEDICINE

## 2019-08-23 PROCEDURE — 85610 PROTHROMBIN TIME: CPT

## 2019-08-23 PROCEDURE — 74176 CT ABD & PELVIS W/O CONTRAST: CPT

## 2019-08-23 PROCEDURE — 96365 THER/PROPH/DIAG IV INF INIT: CPT

## 2019-08-23 PROCEDURE — 93005 ELECTROCARDIOGRAM TRACING: CPT

## 2019-08-23 PROCEDURE — 87086 URINE CULTURE/COLONY COUNT: CPT | Performed by: EMERGENCY MEDICINE

## 2019-08-23 PROCEDURE — 81001 URINALYSIS AUTO W/SCOPE: CPT | Performed by: EMERGENCY MEDICINE

## 2019-08-23 PROCEDURE — 85025 COMPLETE CBC W/AUTO DIFF WBC: CPT

## 2019-08-23 PROCEDURE — 36415 COLL VENOUS BLD VENIPUNCTURE: CPT

## 2019-08-23 PROCEDURE — 85730 THROMBOPLASTIN TIME PARTIAL: CPT

## 2019-08-23 PROCEDURE — 99285 EMERGENCY DEPT VISIT HI MDM: CPT | Performed by: EMERGENCY MEDICINE

## 2019-08-23 PROCEDURE — 83605 ASSAY OF LACTIC ACID: CPT | Performed by: EMERGENCY MEDICINE

## 2019-08-23 PROCEDURE — 96366 THER/PROPH/DIAG IV INF ADDON: CPT

## 2019-08-23 PROCEDURE — 99291 CRITICAL CARE FIRST HOUR: CPT

## 2019-08-23 PROCEDURE — 96361 HYDRATE IV INFUSION ADD-ON: CPT

## 2019-08-23 PROCEDURE — 84145 PROCALCITONIN (PCT): CPT | Performed by: INTERNAL MEDICINE

## 2019-08-23 PROCEDURE — 87040 BLOOD CULTURE FOR BACTERIA: CPT

## 2019-08-23 PROCEDURE — 71250 CT THORAX DX C-: CPT

## 2019-08-23 PROCEDURE — 80053 COMPREHEN METABOLIC PANEL: CPT

## 2019-08-23 PROCEDURE — 87077 CULTURE AEROBIC IDENTIFY: CPT | Performed by: EMERGENCY MEDICINE

## 2019-08-23 PROCEDURE — 87186 SC STD MICRODIL/AGAR DIL: CPT | Performed by: EMERGENCY MEDICINE

## 2019-08-23 RX ORDER — TAMSULOSIN HYDROCHLORIDE 0.4 MG/1
0.4 CAPSULE ORAL
Status: DISCONTINUED | OUTPATIENT
Start: 2019-08-23 | End: 2019-08-27 | Stop reason: HOSPADM

## 2019-08-23 RX ORDER — MAGNESIUM HYDROXIDE/ALUMINUM HYDROXICE/SIMETHICONE 120; 1200; 1200 MG/30ML; MG/30ML; MG/30ML
30 SUSPENSION ORAL EVERY 6 HOURS PRN
Status: DISCONTINUED | OUTPATIENT
Start: 2019-08-23 | End: 2019-08-27 | Stop reason: HOSPADM

## 2019-08-23 RX ORDER — COLCHICINE 0.6 MG/1
0.6 TABLET ORAL DAILY
Status: DISCONTINUED | OUTPATIENT
Start: 2019-08-24 | End: 2019-08-24

## 2019-08-23 RX ORDER — ACETAMINOPHEN 325 MG/1
500 TABLET ORAL EVERY 6 HOURS PRN
Status: DISCONTINUED | OUTPATIENT
Start: 2019-08-23 | End: 2019-08-24

## 2019-08-23 RX ORDER — SODIUM CHLORIDE 9 MG/ML
125 INJECTION, SOLUTION INTRAVENOUS CONTINUOUS
Status: DISCONTINUED | OUTPATIENT
Start: 2019-08-23 | End: 2019-08-25

## 2019-08-23 RX ORDER — CEFEPIME HYDROCHLORIDE 2 G/50ML
2000 INJECTION, SOLUTION INTRAVENOUS ONCE
Status: COMPLETED | OUTPATIENT
Start: 2019-08-23 | End: 2019-08-23

## 2019-08-23 RX ORDER — ACETAMINOPHEN 325 MG/1
650 TABLET ORAL ONCE
Status: COMPLETED | OUTPATIENT
Start: 2019-08-23 | End: 2019-08-23

## 2019-08-23 RX ADMIN — SODIUM CHLORIDE 1000 ML: 0.9 INJECTION, SOLUTION INTRAVENOUS at 12:50

## 2019-08-23 RX ADMIN — ACETAMINOPHEN 650 MG: 325 TABLET, FILM COATED ORAL at 12:50

## 2019-08-23 RX ADMIN — VANCOMYCIN HYDROCHLORIDE 2000 MG: 1 INJECTION, POWDER, LYOPHILIZED, FOR SOLUTION INTRAVENOUS at 13:26

## 2019-08-23 RX ADMIN — CEFEPIME HYDROCHLORIDE 2000 MG: 2 INJECTION, SOLUTION INTRAVENOUS at 12:50

## 2019-08-23 RX ADMIN — SODIUM CHLORIDE 125 ML/HR: 0.9 INJECTION, SOLUTION INTRAVENOUS at 18:05

## 2019-08-23 NOTE — TELEPHONE ENCOUNTER
Received a call from Johann Coelho stating that he was having his father come to him to take him to the hospital because at 4am he started having chills  He was laying on right side, and got pain in right side  So then he decided to sit up like he was in the hospital and it was still painful  RITA Edwards then took the phone and spoke with him quickly, and determined he needed to have the ambulance  They called 911, and I continued to speak with Johann Coelho  Drinking a lot of water, about a 1/2 gallon  When he went to the bathroom it hurt really bad  Was able to have a bm  [place] is [place] [address], and he's sweating and feels like he can't catch his breath  His feet feel pretty good  OX 96  Pulse 157    He states he feels a bit light headed  Advised him to stay sitting  He wanted to go to the door and sit outside because it's a little cooler outside  I advised him to not move because his breathing was not normal      Did stay on the phone with him until the police showed up  Johann Coelho was on speaker phone and I was able to let them know that I was from the PCP office and that he was in good hands with the officer  The officer did let me know that they would be there within a few minutes with the ambulance  Did then disconnect the phone

## 2019-08-23 NOTE — H&P
H&P- Navi Lara 1965, 49 y o  male MRN: [id number]    Unit/Bed#: 417-01 Encounter: [id number]    Primary Care Provider: Marcos Osgood, MD   Date and time admitted to hospital: 8/23/2019 11:41 AM        * Sepsis [facility])  Assessment & Plan  Sepsis, present on admission, suspected acute urinary tract infection, CT reviewed, follow up urine culture and blood cultures  Trend procalcitonin  Continue IV cefepime, patient received 1 dose of cefepime and vancomycin in the emergency room  Patient had transient hypotension which responded to IV fluid in the emergency room  Acute UTI  Assessment & Plan  Continue IV cefepime and follow up culture    Chronic kidney disease, stage 3 ([facility] [address] )  Assessment & Plan  Patient currently with mild acute kidney injury, baseline creatinine appears to be 1 6, currently elevated 2 1    Metabolic acidosis  Assessment & Plan  Patient with mild metabolic acidosis, supportive care, daily labs  History of gout  Assessment & Plan  Continue colchicine    Morbid obesity ([facility] [address] )  Assessment & Plan  BMI 51 2      VTE Prophylaxis: Heparin  / sequential compression device   Code Status:  Full code  POLST: POLST form is on file already (pre-hospital)  Discussion with family:  Plan of care discussed with his mother who was present at bedside    Anticipated Length of Stay:  Patient will be admitted on an Inpatient basis with an anticipated length of stay of  greater than 2 midnights  Justification for Hospital Stay:  Patient presents with sepsis, secondary to acute urinary tract infection  Total Time for Visit, including Counseling / Coordination of Care: 1 hour  Greater than 50% of this total time spent on direct patient counseling and coordination of care  Chief Complaint:   Fever    History of Present Illness:    Navi Lara is a 49 y o  male who presents with fever, decreased urination, right flank pain with urination    Patient was admitted to [facility] month ago, had bacteremia secondary to UTI, obstructing ureteral stone, status post right ureteral stent  Patient presented to the ER with fever chills, generalized weakness and fatigue, sudden onset day of admission  Review of Systems:    Review of Systems   Genitourinary: Positive for dysuria and flank pain  Negative for hematuria  Neurological: Positive for dizziness and weakness  All other systems reviewed and are negative  Past Medical and Surgical History:     Past Medical History:   Diagnosis Date    Arthritis     Gout     Hypertension     Kidney stone        Past Surgical History:   Procedure Laterality Date    FL RETROGRADE PYELOGRAM  8/4/2019    NO PAST SURGERIES  03/23/2015    Last assessed    [place] CYSTOURETHROSCOPY,URETER CATHETER Right 8/4/2019    Procedure: CYSTOSCOPY RETROGRADE PYELOGRAM WITH INSERTION STENT URETERAL;  Surgeon: Carmelina Bates MD;  [facility];  Service: Urology       Meds/Allergies:    Prior to Admission medications    Medication Sig Start Date End Date Taking? Authorizing Provider   acetaminophen (TYLENOL) 500 mg tablet Take 500 mg by mouth every 6 (six) hours as needed for mild pain   Yes Historical Provider, MD   colchicine (COLCRYS) 0 6 mg tablet Take 0 6 mg by mouth daily   Yes Historical Provider, MD   tamsulosin (FLOMAX) 0 4 mg TAKE 1 CAPSULE BY MOUTH EVERY 24 HOURS 12/7/18  Yes RITA Cabral     I have reviewed home medications with patient personally      Allergies: No Known Allergies    Social History:     Marital Status: Single   Substance Use History:   Social History     Substance and Sexual Activity   Alcohol Use Not Currently    Alcohol/week: 0 0 standard drinks    Frequency: Never    Binge frequency: Never     Social History     Tobacco Use   Smoking Status Never Smoker   Smokeless Tobacco Never Used     Social History     Substance and Sexual Activity   Drug Use No       Family History:    non-contributory    Physical Exam:     Vitals:   Blood Pressure: 133/85 (08/23/19 1653)  Pulse: (!) 110 (08/23/19 1653)  Temperature: 98 4 °F (36 9 °C) (08/23/19 1653)  Temp Source: Oral (08/23/19 1653)  Respirations: 20 (08/23/19 1653)  Height: 5' 6" (167 6 cm) (08/23/19 1653)  Weight - Scale: (!) 144 kg (317 lb 10 9 oz) (08/23/19 1653)  SpO2: 100 % (08/23/19 1653)    Physical Exam   Constitutional: He is oriented to person, place, and time  He appears well-developed and well-nourished  No distress  HENT:   Head: Normocephalic and atraumatic  Right Ear: External ear normal    Left Ear: External ear normal    Cardiovascular: Exam reveals no gallop and no friction rub  No murmur heard  Increased rate, regular   Pulmonary/Chest: Effort normal and breath sounds normal  No stridor  No respiratory distress  Abdominal: Soft  Bowel sounds are normal  He exhibits no distension  There is no tenderness  There is no guarding  Musculoskeletal: Normal range of motion  He exhibits no edema, tenderness or deformity  Neurological: He is alert and oriented to person, place, and time  No cranial nerve deficit  Coordination normal    Skin: Skin is warm  No rash noted  He is diaphoretic  No erythema  Psychiatric: He has a normal mood and affect  His behavior is normal  Judgment and thought content normal    Nursing note and vitals reviewed  Additional Data:     Lab Results: I have personally reviewed pertinent reports        Results from last 7 days   Lab Units 08/23/19  1207   WBC Thousand/uL 14 97*   HEMOGLOBIN g/dL 12 2   HEMATOCRIT % 39 0   PLATELETS Thousands/uL 331   NEUTROS PCT % 91*   LYMPHS PCT % 4*   MONOS PCT % 4   EOS PCT % 0     Results from last 7 days   Lab Units 08/23/19  1207   SODIUM mmol/L 135*   POTASSIUM mmol/L 3 6   CHLORIDE mmol/L 98*   CO2 mmol/L 18*   BUN mg/dL 23   CREATININE mg/dL 2 14*   ANION GAP mmol/L 19*   CALCIUM mg/dL 9 2   ALBUMIN g/dL 2 5*   TOTAL BILIRUBIN mg/dL 0 80   ALK PHOS U/L 92   ALT U/L 37   AST U/L 47*   GLUCOSE RANDOM mg/dL 102     Results from last 7 days   Lab Units 08/23/19  1207   INR  1 16             Results from last 7 days   Lab Units 08/23/19  1358 08/23/19  1207   LACTIC ACID mmol/L 1 6 1 7       Imaging: I have personally reviewed pertinent reports  CT chest abdomen pelvis wo contrast   Final Result by Tami Golden MD (08/23 1533)      No acute cardiopulmonary abnormality  Interval placement of right ureteral stent  No hydronephrosis identified  Bilateral nonobstructing renal calculi  Workstation performed: [id number]             Allscripts / Epic Records Reviewed: Yes     ** Please Note: This note has been constructed using a voice recognition system   **

## 2019-08-23 NOTE — ASSESSMENT & PLAN NOTE
Sepsis, present on admission, suspected acute urinary tract infection, CT reviewed, follow up urine culture and blood cultures  Trend procalcitonin  Continue IV cefepime, patient received 1 dose of cefepime and vancomycin in the emergency room  Patient had transient hypotension which responded to IV fluid in the emergency room

## 2019-08-23 NOTE — ED PROVIDER NOTES
History  Chief Complaint   Patient presents with    Fever - 76 years or older     pt was septic from kidney stone on 8-4-2019  today chills fever decreased urination and sob  has kidney stent in place       Fatigue   Severity:  Moderate  Onset quality:  Gradual  Duration:  8 hours  Timing:  Constant  Progression:  Worsening  Chronicity:  New  Context: recent infection    Relieved by:  Rest  Worsened by: Activity and standing  Ineffective treatments:  None tried  Associated symptoms: fever and shortness of breath    Associated symptoms: no abdominal pain, no arthralgias, no chest pain, no cough, no diarrhea, no dizziness, no headaches, no myalgias, no nausea, no seizures and no vomiting         Pt presents from home c/o fever, chills, generalized weakness and fatigue that began earlier today  Pt has a history of a renal stent placed on 8/4/2019, and he states that he had been feeling better  Pt has some mild sob  Pt is febrile and tachycardic here in the ED  Pt denies ha, cough, cp, n/v/d/c, abd pain, dysuria, focal def or syncope  Prior to Admission Medications   Prescriptions Last Dose Informant Patient Reported?  Taking?   acetaminophen (TYLENOL) 500 mg tablet 8/23/2019 at Unknown time  Yes Yes   Sig: Take 500 mg by mouth every 6 (six) hours as needed for mild pain   colchicine (COLCRYS) 0 6 mg tablet 8/23/2019 at Unknown time  Yes Yes   Sig: Take 0 6 mg by mouth daily   tamsulosin (FLOMAX) 0 4 mg 8/23/2019 at Unknown time  No Yes   Sig: TAKE 1 CAPSULE BY MOUTH EVERY 24 HOURS      Facility-Administered Medications: None       Past Medical History:   Diagnosis Date    Arthritis     Gout     Hypertension     Kidney stone        Past Surgical History:   Procedure Laterality Date    FL RETROGRADE PYELOGRAM  8/4/2019    NO PAST SURGERIES  03/23/2015    Last assessed    PA CYSTOURETHROSCOPY,URETER CATHETER Right 8/4/2019    Procedure: CYSTOSCOPY RETROGRADE PYELOGRAM WITH INSERTION STENT URETERAL; Surgeon: Jerry Contreras MD;  [facility];  Service: Urology       Family History   Problem Relation Age of Onset    Cancer Mother      I have reviewed and agree with the history as documented  Social History     Tobacco Use    Smoking status: Never Smoker    Smokeless tobacco: Never Used   Substance Use Topics    Alcohol use: Not Currently     Alcohol/week: 0 0 standard drinks     Frequency: Never     Binge frequency: Never    Drug use: No        Review of Systems   Constitutional: Positive for fatigue and fever  Negative for activity change and appetite change  HENT: Negative for congestion, nosebleeds and sore throat  Eyes: Negative for photophobia and discharge  Respiratory: Positive for shortness of breath  Negative for cough, wheezing and stridor  Cardiovascular: Negative for chest pain  Gastrointestinal: Negative for abdominal pain, constipation, diarrhea, nausea and vomiting  Endocrine: Negative for cold intolerance and polydipsia  Genitourinary: Negative for discharge, hematuria and penile pain  Musculoskeletal: Negative for arthralgias, myalgias and neck stiffness  Skin: Negative for color change and rash  Allergic/Immunologic: Negative for immunocompromised state  Neurological: Negative for dizziness, seizures and headaches  Hematological: Negative for adenopathy  Psychiatric/Behavioral: Negative for confusion and self-injury  The patient is not nervous/anxious  Physical Exam  Physical Exam   Constitutional: He is oriented to person, place, and time  He appears well-developed and well-nourished  No distress  Febrile but non-toxic appearing in NAD   HENT:   Head: Normocephalic and atraumatic  Right Ear: External ear normal    Left Ear: External ear normal    Nose: Nose normal    Mouth/Throat: Oropharynx is clear and moist  No oropharyngeal exudate  Eyes: Pupils are equal, round, and reactive to light   Conjunctivae and EOM are normal  Right eye exhibits no discharge  Left eye exhibits no discharge  No scleral icterus  Neck: Normal range of motion  Neck supple  No JVD present  No tracheal deviation present  No thyromegaly present  Cardiovascular: Regular rhythm, normal heart sounds and intact distal pulses  Tachycardia present  Exam reveals no gallop and no friction rub  No murmur heard  Pulmonary/Chest: Breath sounds normal  No stridor  No respiratory distress  He has no wheezes  He has no rales  He exhibits no tenderness  Abdominal: Soft  Bowel sounds are normal  He exhibits no distension and no mass  There is no tenderness  There is no rebound and no guarding  Musculoskeletal: Normal range of motion  He exhibits no edema, tenderness or deformity  Lymphadenopathy:     He has no cervical adenopathy  Neurological: He is alert and oriented to person, place, and time  He has normal reflexes  He displays normal reflexes  No cranial nerve deficit  He exhibits normal muscle tone  Coordination normal    Skin: Skin is warm and dry  No rash noted  He is not diaphoretic  No erythema  No pallor  Psychiatric: He has a normal mood and affect  His behavior is normal  Judgment and thought content normal    Nursing note and vitals reviewed        Vital Signs  ED Triage Vitals [08/23/19 1147]   Temperature Pulse Respirations Blood Pressure SpO2   99 7 °F (37 6 °C) (!) 134 20 106/72 98 %      Temp Source Heart Rate Source Patient Position - Orthostatic VS BP [facility] FiO2 (%)   Temporal Monitor Sitting Left arm --      Pain Score       3           Vitals:    08/23/19 1653 08/24/19 0000 08/24/19 0711 08/24/19 1516   BP: 133/85 146/91 116/86 117/84   Pulse: (!) 110 (!) 119 (!) 109 104   Patient Position - Orthostatic VS: Lying Lying           Visual Acuity      ED Medications  Medications   tamsulosin (FLOMAX) capsule 0 4 mg (0 4 mg Oral Given 8/24/19 0845)   aluminum-magnesium hydroxide-simethicone (MYLANTA) 200-200-20 mg/5 mL oral suspension 30 mL (has no administration in time range)   cefepime (MAXIPIME) 2,000 mg in dextrose 5 % 50 mL IVPB (2,000 mg Intravenous New Bag 8/24/19 1313)   sodium chloride 0 9 % infusion (125 mL/hr Intravenous New Bag 8/24/19 1035)   heparin (porcine) subcutaneous injection 5,000 Units (5,000 Units Subcutaneous Given 8/24/19 1121)   acetaminophen (TYLENOL) tablet 650 mg (has no administration in time range)   sodium chloride 0 9 % bolus 1,000 mL (0 mL Intravenous Stopped 8/23/19 1530)   sodium chloride 0 9 % bolus 1,000 mL (0 mL Intravenous Stopped 8/23/19 1530)   vancomycin (VANCOCIN) 2,000 mg in sodium chloride 0 9 % 500 mL IVPB (0 mg Intravenous Stopped 8/23/19 1544)   cefepime (MAXIPIME) IVPB (premix) 2,000 mg (0 mg Intravenous Stopped 8/23/19 1320)   acetaminophen (TYLENOL) tablet 650 mg (650 mg Oral Given 8/23/19 1250)       Diagnostic Studies  Results Reviewed     Procedure Component Value Units Date/Time    Urine culture [[id number]]  (Abnormal) Collected:  08/23/19 1252    Lab Status:  Preliminary result Specimen:  Urine, Clean Catch Updated:  08/24/19 1812     Urine Culture >100,000 cfu/ml Non lactose fermenting gram negative selina    Blood culture #1 [[id number]] Collected:  08/23/19 1207    Lab Status:  Preliminary result Specimen:  Blood from Arm, Left Updated:  08/24/19 1601     Blood Culture No Growth at 24 hrs  Blood culture #2 [[id number]] Collected:  08/23/19 1207    Lab Status:  Preliminary result Specimen:  Blood from Arm, Right Updated:  08/24/19 1601     Blood Culture No Growth at 24 hrs  Lactic acid x2 [[id number]]  (Normal) Collected:  08/23/19 1358    Lab Status:  Final result Specimen:  Blood from Hand, Right Updated:  08/23/19 1422     LACTIC ACID 1 6 mmol/L     Narrative:       Result may be elevated if tourniquet was used during collection      Urine Microscopic [[id number]]  (Abnormal) Collected:  08/23/19 1252    Lab Status:  Final result Specimen:  Urine, Clean Catch Updated:  08/23/19 1312     RBC, UA 10-20 /hpf      WBC, UA Innumerable /hpf      Epithelial Cells Moderate /hpf      Bacteria, UA Innumerable /hpf     UA w Reflex to Microscopic w Reflex to Culture [[id number]]  (Abnormal) Collected:  08/23/19 1252    Lab Status:  Final result Specimen:  Urine, Clean Catch Updated:  08/23/19 1259     Color, UA Yellow     Clarity, UA Cloudy     Specific Gravity, UA 1 025     pH, UA 6 0     Leukocytes, UA Small     Nitrite, UA Negative     Protein, UA >=300 mg/dl      Glucose, UA Negative mg/dl      Ketones, UA 15 (1+) mg/dl      Urobilinogen, UA 0 2 E U /dl      Bilirubin, UA Interference- unable to analyze     Blood, UA Moderate    CBC and differential [[id number]]  (Abnormal) Collected:  08/23/19 1207    Lab Status:  Final result Specimen:  Blood from Arm, Left Updated:  08/23/19 1240     WBC 14 97 Thousand/uL      RBC 4 07 Million/uL      Hemoglobin 12 2 g/dL      Hematocrit 39 0 %      MCV 96 fL      MCH 30 0 pg      MCHC 31 3 g/dL      RDW 13 3 %      MPV 10 3 fL      Platelets 853 Thousands/uL      nRBC 0 /100 WBCs      Neutrophils Relative 91 %      Immat GRANS % 1 %      Lymphocytes Relative 4 %      Monocytes Relative 4 %      Eosinophils Relative 0 %      Basophils Relative 0 %      Neutrophils Absolute 13 56 Thousands/µL      Immature Grans Absolute 0 13 Thousand/uL      Lymphocytes Absolute 0 63 Thousands/µL      Monocytes Absolute 0 56 Thousand/µL      Eosinophils Absolute 0 03 Thousand/µL      Basophils Absolute 0 06 Thousands/µL     Narrative: This is an appended report  These results have been appended to a previously verified report      APTT [[id number]]  (Normal) Collected:  08/23/19 1207    Lab Status:  Final result Specimen:  Blood from Arm, Left Updated:  08/23/19 1236     PTT 32 seconds     Protime-INR [[id number]]  (Abnormal) Collected:  08/23/19 1207    Lab Status:  Final result Specimen:  Blood from Arm, Left Updated:  08/23/19 1236     Protime 14 9 seconds      INR 1 16    Lactic acid x2 [[id number]]  (Normal) Collected:  08/23/19 1207    Lab Status:  Final result Specimen:  Blood from Arm, Left Updated:  08/23/19 1236     LACTIC ACID 1 7 mmol/L     Narrative:       Result may be elevated if tourniquet was used during collection  Comprehensive metabolic panel [[id number]]  (Abnormal) Collected:  08/23/19 1207    Lab Status:  Final result Specimen:  Blood from Arm, Left Updated:  08/23/19 1232     Sodium 135 mmol/L      Potassium 3 6 mmol/L      Chloride 98 mmol/L      CO2 18 mmol/L      ANION GAP 19 mmol/L      BUN 23 mg/dL      Creatinine 2 14 mg/dL      Glucose 102 mg/dL      Calcium 9 2 mg/dL      AST 47 U/L      ALT 37 U/L      Alkaline Phosphatase 92 U/L      Total Protein 7 7 g/dL      Albumin 2 5 g/dL      Total Bilirubin 0 80 mg/dL      eGFR 34 ml/min/1 73sq m     Narrative:       [place] guidelines for Chronic Kidney Disease (CKD):     Stage 1 with normal or high GFR (GFR > 90 mL/min/1 73 square meters)    Stage 2 Mild CKD (GFR = 60-89 mL/min/1 73 square meters)    Stage 3A Moderate CKD (GFR = 45-59 mL/min/1 73 square meters)    Stage 3B Moderate CKD (GFR = 30-44 mL/min/1 73 square meters)    Stage 4 Severe CKD (GFR = 15-29 mL/min/1 73 square meters)    Stage 5 End Stage CKD (GFR <15 mL/min/1 73 square meters)  Note: GFR calculation is accurate only with a steady state creatinine             EKG: sinus tachycardia, no acute ischemia  CT chest abdomen pelvis wo contrast   Final Result by Chelsea Mullins MD (08/23 1533)      No acute cardiopulmonary abnormality  Interval placement of right ureteral stent  No hydronephrosis identified  Bilateral nonobstructing renal calculi  Workstation performed: [id number]                    Procedures  Procedures       ED Course     4:30 PM - Urology will see the patient and pull his stent in the event that he is not responding to the antibiotic treatment    Pt is improved at this time on re-eval               Initial Sepsis Screening     Row Name 08/23/19 1824                Is the patient's history suggestive of a new or worsening infection? (!) Yes (Proceed)  -MS        Suspected source of infection  urinary tract infection  -MS        Are two or more of the following signs & symptoms of infection both present and new to the patient? (!) Yes (Proceed)  -MS        Indicate SIRS criteria  Hyperthemia > 38 3C (100 9F); Tachycardia > 90 bpm;Leukocytosis (WBC > 26670 IJL)  -MS        If the answer is yes to both questions, suspicion of sepsis is present          If severe sepsis is present AND tissue hypoperfusion perists in the hour after fluid resuscitation or lactate > 4, the patient meets criteria for SEPTIC SHOCK          Are any of the following organ dysfunction criteria present within 6 hours of suspected infection and SIRS criteria that are NOT considered to be chronic conditions? (!) Yes  -MS        Organ dysfunction          Date of presentation of severe sepsis          Time of presentation of severe sepsis          Tissue hypoperfusion persists in the hour after crystalloid fluid administration, evidenced, by either:          Was hypotension present within one hour of the conclusion of crystalloid fluid administration?         Date of presentation of septic shock          Time of presentation of septic shock            User Key  (r) = Recorded By, (t) = Taken By, (c) = Cosigned By    Initials Name Provider Type    MS Cecelia Whelan DO Physician                  MDM  Number of Diagnoses or Management Options  Sepsis [facility]):   Diagnosis management comments: IMP: uti versus post-op infection, bacteremia, pneumonia  Doubt acs, pe, dissection, tamponade, cva, bacteremia, surgical abd process    Plan: cardiac labs, ekg, cxr, urinalysis, give ivf, iv abx and iv narcotic pain meds prn   - leukocytosis 14 9  - acute on chronic CKD with Cr 2 1  - urinalysis shows wbc's innumerable  - ekg sinus tachy but no acute ischemia  - Pt with SIRS and sepsis from a UTI and indwelling stent  Will admit to medicine  Amount and/or Complexity of Data Reviewed  Clinical lab tests: reviewed and ordered  Tests in the radiology section of CPT®: ordered and reviewed  Tests in the medicine section of CPT®: ordered and reviewed  Decide to obtain previous medical records or to obtain history from someone other than the patient: yes  Review and summarize past medical records: yes  Discuss the patient with other providers: yes ([facility] and Urology)  Independent visualization of images, tracings, or specimens: yes    Risk of Complications, Morbidity, and/or Mortality  Presenting problems: high  Diagnostic procedures: high  Management options: high    Patient Progress  Patient progress: stable      Disposition  Final diagnoses:   Sepsis ([address] )     Time reflects when diagnosis was documented in both MDM as applicable and the Disposition within this note     Time User Action Codes Description Comment    8/23/2019  3:46 PM Hanna Llanes Add [A41 9] Sepsis [facility])       ED Disposition     ED Disposition Condition Date/Time Comment    Admit Stable Fri Aug 23, 2019  3:46 PM Case was discussed with [facility] and the patient's admission status was agreed to be Admission Status: inpatient status to the service of Dr Fredo Park  Follow-up Information    None         Current Discharge Medication List      CONTINUE these medications which have NOT CHANGED    Details   acetaminophen (TYLENOL) 500 mg tablet Take 500 mg by mouth every 6 (six) hours as needed for mild pain      colchicine (COLCRYS) 0 6 mg tablet Take 0 6 mg by mouth daily      tamsulosin (FLOMAX) 0 4 mg TAKE 1 CAPSULE BY MOUTH EVERY 24 HOURS  Qty: 90 capsule, Refills: 3    Associated Diagnoses: Benign prostatic hyperplasia, unspecified whether lower urinary tract symptoms present           No discharge procedures on file      ED Provider  Electronically Signed by           Yuliana Cabrales,   08/24/19 2028

## 2019-08-23 NOTE — ASSESSMENT & PLAN NOTE
Patient currently with mild acute kidney injury, baseline creatinine appears to be 1 6, currently elevated 2 1

## 2019-08-24 LAB
ALBUMIN SERPL BCP-MCNC: 2.2 G/DL (ref 3.5–5)
ALP SERPL-CCNC: 75 U/L (ref 46–116)
ALT SERPL W P-5'-P-CCNC: 34 U/L (ref 12–78)
ANION GAP SERPL CALCULATED.3IONS-SCNC: 15 MMOL/L (ref 4–13)
AST SERPL W P-5'-P-CCNC: 41 U/L (ref 5–45)
BASOPHILS # BLD AUTO: 0.06 THOUSANDS/ΜL (ref 0–0.1)
BASOPHILS NFR BLD AUTO: 1 % (ref 0–1)
BILIRUB SERPL-MCNC: 0.4 MG/DL (ref 0.2–1)
BUN SERPL-MCNC: 22 MG/DL (ref 5–25)
CALCIUM SERPL-MCNC: 8.8 MG/DL (ref 8.3–10.1)
CHLORIDE SERPL-SCNC: 104 MMOL/L (ref 100–108)
CO2 SERPL-SCNC: 18 MMOL/L (ref 21–32)
CREAT SERPL-MCNC: 2.31 MG/DL (ref 0.6–1.3)
EOSINOPHIL # BLD AUTO: 0.17 THOUSAND/ΜL (ref 0–0.61)
EOSINOPHIL NFR BLD AUTO: 1 % (ref 0–6)
ERYTHROCYTE [DISTWIDTH] IN BLOOD BY AUTOMATED COUNT: 13.7 % (ref 11.6–15.1)
GFR SERPL CREATININE-BSD FRML MDRD: 31 ML/MIN/1.73SQ M
GLUCOSE SERPL-MCNC: 103 MG/DL (ref 65–140)
HCT VFR BLD AUTO: 32.8 % (ref 36.5–49.3)
HGB BLD-MCNC: 10.1 G/DL (ref 12–17)
IMM GRANULOCYTES # BLD AUTO: 0.06 THOUSAND/UL (ref 0–0.2)
IMM GRANULOCYTES NFR BLD AUTO: 1 % (ref 0–2)
INR PPP: 1.33 (ref 0.84–1.19)
LYMPHOCYTES # BLD AUTO: 0.6 THOUSANDS/ΜL (ref 0.6–4.47)
LYMPHOCYTES NFR BLD AUTO: 5 % (ref 14–44)
MAGNESIUM SERPL-MCNC: 1.8 MG/DL (ref 1.6–2.6)
MCH RBC QN AUTO: 29.9 PG (ref 26.8–34.3)
MCHC RBC AUTO-ENTMCNC: 30.8 G/DL (ref 31.4–37.4)
MCV RBC AUTO: 97 FL (ref 82–98)
MONOCYTES # BLD AUTO: 0.6 THOUSAND/ΜL (ref 0.17–1.22)
MONOCYTES NFR BLD AUTO: 5 % (ref 4–12)
NEUTROPHILS # BLD AUTO: 10.9 THOUSANDS/ΜL (ref 1.85–7.62)
NEUTS SEG NFR BLD AUTO: 87 % (ref 43–75)
NRBC BLD AUTO-RTO: 0 /100 WBCS
PHOSPHATE SERPL-MCNC: 2.9 MG/DL (ref 2.7–4.5)
PLATELET # BLD AUTO: 258 THOUSANDS/UL (ref 149–390)
PMV BLD AUTO: 10.6 FL (ref 8.9–12.7)
POTASSIUM SERPL-SCNC: 3.9 MMOL/L (ref 3.5–5.3)
PROCALCITONIN SERPL-MCNC: 4.67 NG/ML
PROCALCITONIN SERPL-MCNC: 7.17 NG/ML
PROT SERPL-MCNC: 7.2 G/DL (ref 6.4–8.2)
PROTHROMBIN TIME: 16.6 SECONDS (ref 11.6–14.5)
RBC # BLD AUTO: 3.38 MILLION/UL (ref 3.88–5.62)
SODIUM SERPL-SCNC: 137 MMOL/L (ref 136–145)
WBC # BLD AUTO: 12.39 THOUSAND/UL (ref 4.31–10.16)

## 2019-08-24 PROCEDURE — 80053 COMPREHEN METABOLIC PANEL: CPT | Performed by: INTERNAL MEDICINE

## 2019-08-24 PROCEDURE — 84100 ASSAY OF PHOSPHORUS: CPT | Performed by: INTERNAL MEDICINE

## 2019-08-24 PROCEDURE — 85610 PROTHROMBIN TIME: CPT | Performed by: INTERNAL MEDICINE

## 2019-08-24 PROCEDURE — 84145 PROCALCITONIN (PCT): CPT | Performed by: INTERNAL MEDICINE

## 2019-08-24 PROCEDURE — 83735 ASSAY OF MAGNESIUM: CPT | Performed by: INTERNAL MEDICINE

## 2019-08-24 PROCEDURE — 85025 COMPLETE CBC W/AUTO DIFF WBC: CPT | Performed by: INTERNAL MEDICINE

## 2019-08-24 PROCEDURE — 99232 SBSQ HOSP IP/OBS MODERATE 35: CPT | Performed by: PHYSICIAN ASSISTANT

## 2019-08-24 RX ORDER — ACETAMINOPHEN 325 MG/1
650 TABLET ORAL EVERY 6 HOURS PRN
Status: DISCONTINUED | OUTPATIENT
Start: 2019-08-24 | End: 2019-08-27 | Stop reason: HOSPADM

## 2019-08-24 RX ORDER — HEPARIN SODIUM 5000 [USP'U]/ML
5000 INJECTION, SOLUTION INTRAVENOUS; SUBCUTANEOUS EVERY 8 HOURS SCHEDULED
Status: DISCONTINUED | OUTPATIENT
Start: 2019-08-24 | End: 2019-08-27 | Stop reason: HOSPADM

## 2019-08-24 RX ADMIN — HEPARIN SODIUM 5000 UNITS: 5000 INJECTION INTRAVENOUS; SUBCUTANEOUS at 21:02

## 2019-08-24 RX ADMIN — COLCHICINE 0.6 MG: 0.6 TABLET, FILM COATED ORAL at 08:45

## 2019-08-24 RX ADMIN — SODIUM CHLORIDE 125 ML/HR: 0.9 INJECTION, SOLUTION INTRAVENOUS at 02:24

## 2019-08-24 RX ADMIN — CEFEPIME HYDROCHLORIDE 2000 MG: 2 INJECTION, POWDER, FOR SOLUTION INTRAVENOUS at 13:13

## 2019-08-24 RX ADMIN — TAMSULOSIN HYDROCHLORIDE 0.4 MG: 0.4 CAPSULE ORAL at 08:45

## 2019-08-24 RX ADMIN — SODIUM CHLORIDE 125 ML/HR: 0.9 INJECTION, SOLUTION INTRAVENOUS at 10:35

## 2019-08-24 RX ADMIN — HEPARIN SODIUM 5000 UNITS: 5000 INJECTION INTRAVENOUS; SUBCUTANEOUS at 11:21

## 2019-08-24 RX ADMIN — SODIUM CHLORIDE 125 ML/HR: 0.9 INJECTION, SOLUTION INTRAVENOUS at 23:39

## 2019-08-24 NOTE — PROGRESS NOTES
Progress Note - Nataly Easley 1965, 49 y o  male MRN: [id number]  Unit/Bed#: 417-01 Encounter: [id number]  Primary Care Provider: Sarah Beltran MD   Date and time admitted to hospital: 8/23/2019 11:41 AM    * Sepsis [facility])  Assessment & Plan  Sepsis, present on admission, suspected acute urinary tract infection, CT reviewed, follow up urine culture and blood cultures  Trend procalcitonin  Continue IV cefepime, patient received 1 dose of cefepime and vancomycin in the emergency room  Patient had transient hypotension which responded to IV fluid in the emergency room  Chronic kidney disease, stage 3 (HCC)  Assessment & Plan  Patient currently with mild acute kidney injury, baseline creatinine appears to be 1 6, currently elevated 2 3  Monitor BMP  Hold colchicine and nephrotoxins     Acute UTI  Assessment & Plan  Continue IV cefepime and follow up culture    Metabolic acidosis  Assessment & Plan  Patient with mild metabolic acidosis, supportive care, daily labs  History of gout  Assessment & Plan  Will hold colchicine given sight ADRIANNE    Morbid obesity ([facility] [address] )  Assessment & Plan  BMI 51 2      VTE Pharmacologic Prophylaxis:   Pharmacologic: Heparin  Mechanical VTE Prophylaxis in Place: Yes    Patient Centered Rounds: I have performed bedside rounds with nursing staff today  Discussions with Specialists or Other Care Team Provider: d/w nursing    Education and Discussions with Family / Patient: d/w patient and patient's mother at bedside     Time Spent for Care: 30 minutes  More than 50% of total time spent on counseling and coordination of care as described above      Current Length of Stay: 1 day(s)    Current Patient Status: Inpatient   Certification Statement: The patient will continue to require additional inpatient hospital stay due to sepsis 2/2 UTI    Discharge Plan: pending medical course, resolution of ADRIANNE    Code Status: Level 1 - Full Code      Subjective:   Patient feels better than yesterday  Had an episode of dysuria during the night however this has resolved  Denies abdominal pain  Denies feeling feverish  Denies chest pain; no longer with shortness of breath  Objective:     Vitals:   Temp (24hrs), Av 1 °F (37 3 °C), Min:98 4 °F (36 9 °C), Max:99 8 °F (37 7 °C)    Temp:  [98 4 °F (36 9 °C)-99 8 °F (37 7 °C)] 98 6 °F (37 °C)  HR:  [104-149] 109  Resp:  [17-28] 18  BP: ()/(47-91) 116/86  SpO2:  [93 %-100 %] 96 %  Body mass index is 51 28 kg/m²  Input and Output Summary (last 24 hours): Intake/Output Summary (Last 24 hours) at 2019 1225  Last data filed at 2019 1035  Gross per 24 hour   Intake 4568 75 ml   Output 600 ml   Net 3968 75 ml       Physical Exam:     Physical Exam   Constitutional: He appears well-developed and well-nourished  No distress  HENT:   Head: Normocephalic and atraumatic  Mouth/Throat: No oropharyngeal exudate  Neck:   Short neck    Cardiovascular: Regular rhythm  Exam reveals no gallop and no friction rub  No murmur heard  Pulmonary/Chest: Effort normal and breath sounds normal  No respiratory distress  He has no wheezes  He has no rales  He exhibits no tenderness  Abdominal: Soft  Bowel sounds are normal  He exhibits no distension  There is no tenderness  There is no rebound  Obese abdomen    Musculoskeletal: He exhibits no edema, tenderness or deformity  Skin: Skin is warm and dry  He is not diaphoretic  No erythema  No pallor  Psychiatric: He has a normal mood and affect   His behavior is normal        Additional Data:     Labs:    Results from last 7 days   Lab Units 19  0459   WBC Thousand/uL 12 39*   HEMOGLOBIN g/dL 10 1*   HEMATOCRIT % 32 8*   PLATELETS Thousands/uL 258   NEUTROS PCT % 87*   LYMPHS PCT % 5*   MONOS PCT % 5   EOS PCT % 1     Results from last 7 days   Lab Units 19  0459   SODIUM mmol/L 137   POTASSIUM mmol/L 3 9   CHLORIDE mmol/L 104   CO2 mmol/L 18*   BUN mg/dL 22   CREATININE mg/dL 2 31*   ANION GAP mmol/L 15*   CALCIUM mg/dL 8 8   ALBUMIN g/dL 2 2*   TOTAL BILIRUBIN mg/dL 0 40   ALK PHOS U/L 75   ALT U/L 34   AST U/L 41   GLUCOSE RANDOM mg/dL 103     Results from last 7 days   Lab Units 08/24/19  0504   INR  1 33*             Results from last 7 days   Lab Units 08/23/19  1358 08/23/19  1207   LACTIC ACID mmol/L 1 6 1 7           * I Have Reviewed All Lab Data Listed Above  * Additional Pertinent Lab Tests Reviewed: All Labs Within Last 24 Hours Reviewed      Recent Cultures (last 7 days):           Last 24 Hours Medication List:     Current Facility-Administered Medications:  acetaminophen 650 mg Oral Q6H PRN Claudia Senegal, DO    aluminum-magnesium hydroxide-simethicone 30 mL Oral Q6H PRN Claudia Senegal, DO    cefepime 2,000 mg Intravenous Q24H Claudia Senegal, DO    colchicine 0 6 mg Oral Daily Claudia Senegal, DO    heparin (porcine) 5,000 Units Subcutaneous [facility] Claudia Senegal, DO    sodium chloride 125 mL/hr Intravenous Continuous Claudia Senegal, DO Last Rate: 125 mL/hr (08/24/19 1035)   tamsulosin 0 4 mg Oral Daily With Fadi Pierre DO         Today, Patient Was Seen By: Florentin Nieves PA-C    ** Please Note: Dictation voice to text software may have been used in the creation of this document   **

## 2019-08-24 NOTE — ASSESSMENT & PLAN NOTE
Patient currently with mild acute kidney injury, baseline creatinine appears to be 1 6, currently elevated 2 3  Monitor BMP  Hold colchicine and nephrotoxins

## 2019-08-24 NOTE — RESPIRATORY THERAPY NOTE
RT Protocol Note  Stella Bueno 49 y o  male MRN: [id number]  Unit/Bed#: 417-01 Encounter: [id number]    Assessment    Principal Problem:    Sepsis [facility])  Active Problems:     Morbid obesity ([address] )    History of gout    Metabolic acidosis    Acute UTI    Chronic kidney disease, stage 3 (HCC)      Home Pulmonary Medications:  N/A       Past Medical History:   Diagnosis Date    Arthritis     Gout     Hypertension     Kidney stone      Social History     Socioeconomic History    Marital status: Single     Spouse name: None    Number of children: None    Years of education: None    Highest education level: None   Occupational History    None   Social Needs    Financial resource strain: None    Food insecurity:     Worry: None     Inability: None    Transportation needs:     Medical: None     Non-medical: None   Tobacco Use    Smoking status: Never Smoker    Smokeless tobacco: Never Used   Substance and Sexual Activity    Alcohol use: Not Currently     Alcohol/week: 0 0 standard drinks     Frequency: Never     Binge frequency: Never    Drug use: No    Sexual activity: None   Lifestyle    Physical activity:     Days per week: None     Minutes per session: None    Stress: None   Relationships    Social connections:     Talks on phone: None     Gets together: None     Attends [employer] service: None     Active member of club or organization: None     Attends meetings of clubs or organizations: None     Relationship status: None    Intimate partner violence:     Fear of current or ex partner: None     Emotionally abused: None     Physically abused: None     Forced sexual activity: None   Other Topics Concern    None   Social History Narrative    Caffeine use        Subjective         Objective    Physical Exam:   Assessment Type: Assess only  General Appearance: Alert, Awake  Respiratory Pattern: Normal  Chest Assessment: Chest expansion symmetrical  Bilateral Breath Sounds: Diminished    Vitals:  Blood pressure 133/85, pulse (!) 110, temperature 98 4 °F (36 9 °C), temperature source Oral, resp  rate 18, height 5' 6" (1 676 m), weight (!) 144 kg (317 lb 10 9 oz), SpO2 100 %  Imaging and other studies: I have personally reviewed pertinent reports  Plan    Respiratory Plan: Discontinue Protocol          D/C protocol   Pt does not take any home respiratory meds or wear home O2 or CPAP

## 2019-08-24 NOTE — UTILIZATION REVIEW
Initial Clinical Review    Admission: Date/Time/Statement: Inpatient Admission Orders (From admission, onward)     Ordered        08/23/19 1543  Inpatient Admission (expected length of stay for this patient Order details is greater than two midnights)  Once                   Orders Placed This Encounter   Procedures    Inpatient Admission (expected length of stay for this patient Order details is greater than two midnights)     Standing Status:   Standing     Number of Occurrences:   1     Order Specific Question:   Admitting Physician     Answer:   Madison Pedraza     Order Specific Question:   Level of Care     Answer:   Med Surg [16]     Order Specific Question:   Estimated length of stay     Answer:   More than 2 Midnights     Order Specific Question:   Certification     Answer:   I certify that inpatient services are medically necessary for this patient for a duration of greater than two midnights  See H&P and MD Progress Notes for additional information about the patient's course of treatment  ED Arrival Information     Expected Arrival Acuity Means of Arrival Escorted By Service Admission Type    - 8/23/2019 11:38 Urgent [facility] WITH [facility] Hospitalist Urgent    Arrival Complaint    -        Chief Complaint   Patient presents with    Fever - 76 years or older     pt was septic from kidney stone on 8-4-2019  today chills fever decreased urination and sob  has kidney stent in place     Assessment/Plan: 50 y/o male presents to ED from home by EMS with fever, chills, generalized weakness, fatigue beginning earlier today  Recent hospitalization for sepsis due to urosepsis, bacteremia  Tachycardia, diaphoresis on exam   Admitted as inpatient due to sepsis due to suspected acute UTI, metabolic acidosis  Urine cx, blood cx pending  Continue IV antibiotic  Continue IVF  Mild ADRIANNE with baseline Cr 1 6, now 2 1      ED Triage Vitals [08/23/19 1147]   Temperature Pulse Respirations Blood Pressure SpO2   99 7 °F (37 6 °C) (!) 134 20 106/72 98 %      Temp Source Heart Rate Source Patient Position - Orthostatic VS BP [facility] FiO2 (%)   Temporal Monitor Sitting Left arm --      Pain Score       3        Wt Readings from Last 1 Encounters:   08/23/19 (!) 144 kg (317 lb 10 9 oz)     Additional Vital Signs:   08/24/19 07:11:40 98 6 °F (37 °C) 109Abnormal  18 116/86 96 %    08/24/19 00:00:09 99 8 °F (37 7 °C) 119Abnormal  17 146/91 100 % None (Room air)   08/23/19 16:53:49 98 4 °F (36 9 °C) 110Abnormal  20 133/85 100 % None (Room air)   08/23/19 1630  109Abnormal  19 111/64 99 % None (Room air)   08/23/19 1615  108Abnormal  19 112/63 99 % None (Room air)   08/23/19 1600  111Abnormal  18 113/61 100 % None (Room air)   08/23/19 1545  110Abnormal  19 110/63 98 % None (Room air)   08/23/19 1515  107Abnormal  19 109/64 98 % None (Room air)   08/23/19 1500  108Abnormal  19 109/65 97 % None (Room air)   08/23/19 1445  110Abnormal  19 107/59 94 % None (Room air)   08/23/19 1430  111Abnormal  19 107/57 93 % None (Room air)   08/23/19 1415  111Abnormal  18 107/55 95 % None (Room air)   08/23/19 1400  114Abnormal  19 101/59 95 % None (Room air)   08/23/19 1345 99 6 °F (37 6 °C) 124Abnormal  18 106/58 96 % None (Room air)   08/23/19 1315  125Abnormal  19 71/47Abnormal  95 % None (Room air)   08/23/19 1300  132Abnormal  23 89/51Abnormal  95 % None (Room air)   08/23/19 1245  149Abnormal  28 83/53Abnormal  95 % None (Room air)   08/23/19 1230  130Abnormal  20 89/50Abnormal  94 % None (Room air)   08/23/19 1215  127Abnormal  20 100/51 95 % None (Room air)   08/23/19 1204 101 9 °F (38 8 °C)Abnormal             Pertinent Labs/Diagnostic Test Results:   Results from last 7 days   Lab Units 08/24/19  0459 08/23/19  1207   WBC Thousand/uL 12 39* 14 97*   HEMOGLOBIN g/dL 10 1* 12 2   HEMATOCRIT % 32 8* 39 0   PLATELETS Thousands/uL 258 331   NEUTROS ABS Thousands/µL 10 90* 13 56*     Results from last 7 days   Lab Units 08/24/19  0459 08/23/19  1207   SODIUM mmol/L 137 135*   POTASSIUM mmol/L 3 9 3 6   CHLORIDE mmol/L 104 98*   CO2 mmol/L 18* 18*   ANION GAP mmol/L 15* 19*   BUN mg/dL 22 23   CREATININE mg/dL 2 31* 2 14*   EGFR ml/min/1 73sq m 31 34   CALCIUM mg/dL 8 8 9 2   MAGNESIUM mg/dL 1 8  --    PHOSPHORUS mg/dL 2 9  --      Results from last 7 days   Lab Units 08/24/19  0459 08/23/19  1207   AST U/L 41 47*   ALT U/L 34 37   ALK PHOS U/L 75 92   TOTAL PROTEIN g/dL 7 2 7 7   ALBUMIN g/dL 2 2* 2 5*   TOTAL BILIRUBIN mg/dL 0 40 0 80     Results from last 7 days   Lab Units 08/24/19  0459 08/23/19  1207   GLUCOSE RANDOM mg/dL 103 102     Results from last 7 days   Lab Units 08/24/19  0504 08/23/19  1207   PROTIME seconds 16 6* 14 9*   INR  1 33* 1 16   PTT seconds  --  32     Results from last 7 days   Lab Units 08/23/19  1358 08/23/19  1207   LACTIC ACID mmol/L 1 6 1 7     Results from last 7 days   Lab Units 08/23/19  1252   CLARITY UA  Cloudy   COLOR UA  Yellow   SPEC GRAV UA  1 025   PH UA  6 0   GLUCOSE UA mg/dl Negative   KETONES UA mg/dl 15 (1+)*   BLOOD UA  Moderate*   PROTEIN UA mg/dl >=300*   NITRITE UA  Negative   BILIRUBIN UA  Interference- unable to analyze*   UROBILINOGEN UA E U /dl 0 2   LEUKOCYTES UA  Small*   WBC UA /hpf Innumerable*   RBC UA /hpf 10-20*   BACTERIA UA /hpf Innumerable*   EPITHELIAL CELLS WET PREP /hpf Moderate*     8/23 CT chest/abd/pelvis:  No acute cardiopulmonary abnormality  Interval placement of right ureteral stent   No hydronephrosis identified   Bilateral nonobstructing renal calculi      ED Treatment:   Medication Administration from 08/23/2019 1138 to 08/23/2019 1650       Date/Time Order Dose Route Action     08/23/2019 1250 sodium chloride 0 9 % bolus 1,000 mL 1,000 mL Intravenous New Bag     08/23/2019 1250 sodium chloride 0 9 % bolus 1,000 mL 1,000 mL Intravenous New Bag     08/23/2019 1326 vancomycin (VANCOCIN) 2,000 mg in sodium chloride 0 9 % 500 mL IVPB 2,000 mg Intravenous New Bag     08/23/2019 1250 cefepime (MAXIPIME) IVPB (premix) 2,000 mg 2,000 mg Intravenous New Bag     08/23/2019 1250 acetaminophen (TYLENOL) tablet 650 mg 650 mg Oral Given        Past Medical History:   Diagnosis Date    Arthritis     Gout     Hypertension     Kidney stone      Present on Admission:   Sepsis ([facility] )   History of gout   Metabolic acidosis   Morbid obesity ([facility] )   Acute UTI   Chronic kidney disease, stage 3 (HCC)      Admitting Diagnosis: Fever [R50 9]  Sepsis ([facility] ) [A41 9]  Age/Sex: 49 y o  male  Admission Orders:    Current Facility-Administered Medications:  acetaminophen 650 mg Oral Q6H PRN   aluminum-magnesium hydroxide-simethicone 30 mL Oral Q6H PRN   cefepime 2,000 mg Intravenous Q24H   colchicine 0 6 mg Oral Daily   heparin (porcine) 5,000 Units Subcutaneous Q8H [facility]   sodium chloride 125 mL/hr Intravenous Continuous   tamsulosin 0 4 mg Oral Daily With Dinner       SCDs  VS  Blood cx x2  Urine cx    Network Utilization Review Department  Phone: [phone]; Fax [phone]  [email or web address]  ATTENTION: Please call with any questions or concerns to [phone]  and carefully listen to the prompts so that you are directed to the right person  Send all requests for admission clinical reviews, approved or denied determinations and any other requests to fax [phone]   All voicemails are confidential

## 2019-08-24 NOTE — SOCIAL WORK
Cm met with the patient to evaluate the patients prior function and living situation and any barriers to d/c and form a safe d/c plan  Cm also evaluated the patient for any services in the home or needs for services  Pt resides at home alone in a 2 story house  1 [address] then has a 1/2 bath on the 1st floor, full bath and bedroom on the 2nd floor (12 steps)  Pt is independent with his adls and ambulation  No services and doesn't use any DME (states theres an old walker and cane there that were his mothers)  Pt's father has been doing the driving at present, pt typically can drive but since he was dc'd from [facility] his father has been doing the driving  PCP is Leah Sunshine and pharmacy is [facility] in [place]  Pt plans home on dc with outpatient follow up  Pt is a 30 day readmission  Was at [facility] from 8/4/19-8/8/19 for septic shock secondary to urosepsis and bacteremia ( was dc'd home on po Levaquin to continue through 8/17); ADRIANNE; right ureteral stone-s/p right ureteral stent; gram negative bacteremia  Pt did follow up with Urology as well as his PCP after dc from [facility]  Pt now being admitted with sepsis likely due to acute UTI; CKD stage III  Admissions are related due to pt's recurrent UTI's/sepsis

## 2019-08-24 NOTE — UTILIZATION REVIEW
Notification of Inpatient Admission/Inpatient Authorization Request  This is a Notification of Inpatient Admission/Request for Inpatient Authorization for our facility [address] [facility] [address]  Be advised that this patient was admitted to our facility under Inpatient Status  Please contact the Utilization Review Department where the patient is receiving care services for additional admission information  Place of Service Code: 24   Place of Service Name: Inpatient Hospital  Presentation Date & Time: 8/23/2019 11:41 AM  Inpatient Admission Date & Time: 8/23/19 1543  Discharge Date & Time: No discharge date for patient encounter  Discharge Disposition (if discharged): Home/Self Care  Attending Physician: Marjan Gallagher [[id number]]  Admission Orders (From admission, onward)     Ordered        08/23/19 1543  Inpatient Admission (expected length of stay for this patient Order details is greater than two midnights)  Once                   Facility: [facility]   Utilization Review Department  Phone: [phone]; Fax [phone]  [email or web address]  ATTENTION: Please call with any questions or concerns to [phone]  and carefully listen to the prompts so that you are directed to the right person  Send all requests for admission clinical reviews, approved or denied determinations and any other requests to fax [phone]   All voicemails are confidential

## 2019-08-25 LAB
ANION GAP SERPL CALCULATED.3IONS-SCNC: 11 MMOL/L (ref 4–13)
ATRIAL RATE: 129 BPM
BACTERIA UR CULT: ABNORMAL
BASOPHILS # BLD AUTO: 0.04 THOUSANDS/ΜL (ref 0–0.1)
BASOPHILS NFR BLD AUTO: 0 % (ref 0–1)
BUN SERPL-MCNC: 18 MG/DL (ref 5–25)
CALCIUM SERPL-MCNC: 8.7 MG/DL (ref 8.3–10.1)
CHLORIDE SERPL-SCNC: 109 MMOL/L (ref 100–108)
CO2 SERPL-SCNC: 21 MMOL/L (ref 21–32)
CREAT SERPL-MCNC: 1.78 MG/DL (ref 0.6–1.3)
EOSINOPHIL # BLD AUTO: 0.72 THOUSAND/ΜL (ref 0–0.61)
EOSINOPHIL NFR BLD AUTO: 8 % (ref 0–6)
ERYTHROCYTE [DISTWIDTH] IN BLOOD BY AUTOMATED COUNT: 13.5 % (ref 11.6–15.1)
GFR SERPL CREATININE-BSD FRML MDRD: 43 ML/MIN/1.73SQ M
GLUCOSE SERPL-MCNC: 136 MG/DL (ref 65–140)
HCT VFR BLD AUTO: 32.2 % (ref 36.5–49.3)
HGB BLD-MCNC: 9.9 G/DL (ref 12–17)
IMM GRANULOCYTES # BLD AUTO: 0.04 THOUSAND/UL (ref 0–0.2)
IMM GRANULOCYTES NFR BLD AUTO: 0 % (ref 0–2)
LYMPHOCYTES # BLD AUTO: 0.74 THOUSANDS/ΜL (ref 0.6–4.47)
LYMPHOCYTES NFR BLD AUTO: 8 % (ref 14–44)
MCH RBC QN AUTO: 30.2 PG (ref 26.8–34.3)
MCHC RBC AUTO-ENTMCNC: 30.7 G/DL (ref 31.4–37.4)
MCV RBC AUTO: 98 FL (ref 82–98)
MONOCYTES # BLD AUTO: 0.55 THOUSAND/ΜL (ref 0.17–1.22)
MONOCYTES NFR BLD AUTO: 6 % (ref 4–12)
NEUTROPHILS # BLD AUTO: 7.51 THOUSANDS/ΜL (ref 1.85–7.62)
NEUTS SEG NFR BLD AUTO: 78 % (ref 43–75)
NRBC BLD AUTO-RTO: 0 /100 WBCS
P AXIS: 53 DEGREES
PLATELET # BLD AUTO: 241 THOUSANDS/UL (ref 149–390)
PMV BLD AUTO: 10.7 FL (ref 8.9–12.7)
POTASSIUM SERPL-SCNC: 3.3 MMOL/L (ref 3.5–5.3)
PR INTERVAL: 128 MS
PROCALCITONIN SERPL-MCNC: 1.04 NG/ML
QRS AXIS: -6 DEGREES
QRSD INTERVAL: 74 MS
QT INTERVAL: 294 MS
QTC INTERVAL: 430 MS
RBC # BLD AUTO: 3.28 MILLION/UL (ref 3.88–5.62)
SODIUM SERPL-SCNC: 141 MMOL/L (ref 136–145)
T WAVE AXIS: 58 DEGREES
VENTRICULAR RATE: 129 BPM
WBC # BLD AUTO: 9.6 THOUSAND/UL (ref 4.31–10.16)

## 2019-08-25 PROCEDURE — 99232 SBSQ HOSP IP/OBS MODERATE 35: CPT | Performed by: PHYSICIAN ASSISTANT

## 2019-08-25 PROCEDURE — 85025 COMPLETE CBC W/AUTO DIFF WBC: CPT | Performed by: PHYSICIAN ASSISTANT

## 2019-08-25 PROCEDURE — 80048 BASIC METABOLIC PNL TOTAL CA: CPT | Performed by: PHYSICIAN ASSISTANT

## 2019-08-25 PROCEDURE — 84145 PROCALCITONIN (PCT): CPT | Performed by: INTERNAL MEDICINE

## 2019-08-25 PROCEDURE — 93010 ELECTROCARDIOGRAM REPORT: CPT | Performed by: INTERNAL MEDICINE

## 2019-08-25 RX ORDER — POTASSIUM CHLORIDE 20 MEQ/1
40 TABLET, EXTENDED RELEASE ORAL ONCE
Status: COMPLETED | OUTPATIENT
Start: 2019-08-25 | End: 2019-08-25

## 2019-08-25 RX ADMIN — POTASSIUM CHLORIDE 40 MEQ: 20 TABLET, EXTENDED RELEASE ORAL at 17:47

## 2019-08-25 RX ADMIN — SODIUM CHLORIDE 125 ML/HR: 0.9 INJECTION, SOLUTION INTRAVENOUS at 13:47

## 2019-08-25 RX ADMIN — HEPARIN SODIUM 5000 UNITS: 5000 INJECTION INTRAVENOUS; SUBCUTANEOUS at 13:46

## 2019-08-25 RX ADMIN — HEPARIN SODIUM 5000 UNITS: 5000 INJECTION INTRAVENOUS; SUBCUTANEOUS at 05:36

## 2019-08-25 RX ADMIN — HEPARIN SODIUM 5000 UNITS: 5000 INJECTION INTRAVENOUS; SUBCUTANEOUS at 23:13

## 2019-08-25 RX ADMIN — ACETAMINOPHEN 650 MG: 325 TABLET, FILM COATED ORAL at 18:05

## 2019-08-25 RX ADMIN — CEFEPIME HYDROCHLORIDE 2000 MG: 2 INJECTION, POWDER, FOR SOLUTION INTRAVENOUS at 11:44

## 2019-08-25 RX ADMIN — TAMSULOSIN HYDROCHLORIDE 0.4 MG: 0.4 CAPSULE ORAL at 08:38

## 2019-08-25 RX ADMIN — SODIUM CHLORIDE 125 ML/HR: 0.9 INJECTION, SOLUTION INTRAVENOUS at 05:40

## 2019-08-25 RX ADMIN — ACETAMINOPHEN 650 MG: 325 TABLET, FILM COATED ORAL at 08:38

## 2019-08-25 NOTE — ASSESSMENT & PLAN NOTE
Preliminary urine culture results shows->100,000 cfu/ml Non lactose fermenting gram negative rodAbnormal   Will Continue IV cefepime   Monitor for final urine culture report

## 2019-08-25 NOTE — PLAN OF CARE
Problem: Potential for Falls  Goal: Patient will remain free of falls  Description  INTERVENTIONS:  - Assess patient frequently for physical needs  -  Identify cognitive and physical deficits and behaviors that affect risk of falls  -  [place] fall precautions as indicated by assessment  Medium fall risk    - Educate patient/family on patient safety including physical limitations  - Instruct patient to call for assistance with activity based on assessment  - Modify environment to reduce risk of injury  - Consider OT/PT consult to assist with strengthening/mobility   Outcome: Progressing     Problem: PAIN - ADULT  Goal: Verbalizes/displays adequate comfort level or baseline comfort level  Description  Interventions:  - Encourage patient to monitor pain and request assistance  - Assess pain using appropriate pain scale  - Administer analgesics based on type and severity of pain and evaluate response  - Implement non-pharmacological measures as appropriate and evaluate response  - Consider cultural and social influences on pain and pain management  - Notify physician/advanced practitioner if interventions unsuccessful or patient reports new pain  Outcome: Progressing     Problem: INFECTION - ADULT  Goal: Absence or prevention of progression during hospitalization  Description  INTERVENTIONS:  - Assess and monitor for signs and symptoms of infection  - Monitor lab/diagnostic results  - Monitor all insertion sites, i e  indwelling lines, tubes, and drains  - Administer medications as ordered  - Instruct and encourage patient and family to use good hand hygiene technique  - Identify and instruct in appropriate isolation precautions for identified infection/condition   Outcome: Progressing     Problem: SAFETY ADULT  Goal: Patient will remain free of falls  Description  INTERVENTIONS:  - Assess patient frequently for physical needs  -  Identify cognitive and physical deficits and behaviors that affect risk of falls    - [place] fall precautions as indicated by assessment   Medium fall risk    - Educate patient/family on patient safety including physical limitations  - Instruct patient to call for assistance with activity based on assessment  - Modify environment to reduce risk of injury  - Consider OT/PT consult to assist with strengthening/mobility   Outcome: Progressing  Goal: Maintain or return to baseline ADL function  Description  INTERVENTIONS:  -  Assess patient's ability to carry out ADLs; assess patient's baseline for ADL function and identify physical deficits which impact ability to perform ADLs (bathing, care of mouth/teeth, toileting, grooming, dressing, etc )  - Assess/evaluate cause of self-care deficits   - Assess range of motion  - Assess patient's mobility; develop plan if impaired  - Assess patient's need for assistive devices and provide as appropriate  - Encourage maximum independence but intervene and supervise when necessary  - Involve family in performance of ADLs  - Assess for home care needs following discharge   - Consider OT consult to assist with ADL evaluation and planning for discharge  - Provide patient education as appropriate  Outcome: Progressing  Goal: Maintain or return mobility status to optimal level  Description  INTERVENTIONS:  - Assess patient's baseline mobility status (ambulation, transfers, stairs, etc )    - Identify cognitive and physical deficits and behaviors that affect mobility  - Identify mobility aids required to assist with transfers and/or ambulation (gait belt, sit-to-stand, lift, walker, cane, etc )  - [place] fall precautions as indicated by assessment  - Record patient progress and toleration of activity level on Mobility SBAR; progress patient to next Phase/Stage  - Instruct patient to call for assistance with activity based on assessment  - Consider rehabilitation consult to assist with strengthening/weightbearing, etc   Outcome: Progressing     Problem: DISCHARGE PLANNING  Goal: Discharge to home or other facility with appropriate resources  Description  INTERVENTIONS:  - Identify barriers to discharge w/patient and caregiver  - Arrange for needed discharge resources and transportation as appropriate  - Identify discharge learning needs (meds, wound care, etc )  - Arrange for interpretive services to assist at discharge as needed  - Refer to Case Management Department for coordinating discharge planning if the patient needs post-hospital services based on physician/advanced practitioner order or complex needs related to functional status, cognitive ability, or social support system  Outcome: Progressing     Problem: Knowledge Deficit  Goal: Patient/family/caregiver demonstrates understanding of disease process, treatment plan, medications, and discharge instructions  Description  Complete learning assessment and assess knowledge base    Interventions:  - Provide teaching at level of understanding  - Provide teaching via preferred learning methods  Outcome: Progressing     Problem: DISCHARGE PLANNING - CARE MANAGEMENT  Goal: Discharge to post-acute care or home with appropriate resources  Description  INTERVENTIONS:  - Conduct assessment to determine patient/family and health care team treatment goals, and need for post-acute services based on payer coverage, community resources, and patient preferences, and barriers to discharge  - Address psychosocial, clinical, and financial barriers to discharge as identified in assessment in conjunction with the patient/family and health care team  - Arrange appropriate level of post-acute services according to patient's   needs and preference and payer coverage in collaboration with the physician and health care team  - Communicate with and update the patient/family, physician, and health care team regarding progress on the discharge plan  - Arrange appropriate transportation to post-acute venues  -out patient follow up after dc Outcome: Progressing     Problem: METABOLIC, FLUID AND ELECTROLYTES - ADULT  Goal: Electrolytes maintained within normal limits  Description  INTERVENTIONS:  - Monitor labs and assess patient for signs and symptoms of electrolyte imbalances  - Administer electrolyte replacement as ordered  - Monitor response to electrolyte replacements, including repeat lab results as appropriate  - Instruct patient on fluid and nutrition as appropriate  Outcome: Progressing     Problem: HEMATOLOGIC - ADULT  Goal: Maintains hematologic stability  Description  INTERVENTIONS  - Assess for signs and symptoms of bleeding or hemorrhage  - Monitor labs  - Administer supportive blood products/factors as ordered and appropriate  Outcome: Progressing     Problem: GENITOURINARY - ADULT  Goal: Maintains or returns to baseline urinary function  Description  INTERVENTIONS:  - Assess urinary function  - Encourage oral fluids to ensure adequate hydration if ordered  - Administer IV fluids as ordered to ensure adequate hydration  - Administer ordered medications as needed  - Offer frequent toileting  - Follow urinary retention protocol if ordered  Outcome: Progressing

## 2019-08-25 NOTE — ASSESSMENT & PLAN NOTE
Sepsis, present on admission, suspected acute urinary tract infection  Probably resolved   Blood cultures pending  Preliminary report for urine cultures shows->100,000 cfu/ml Non lactose fermenting gram negative rodAbnormal   Procalcitonin trending down  WBC within normal limits  Continue IV cefepime,  DC IV fluids  Continue to monitor

## 2019-08-25 NOTE — PROGRESS NOTES
[facility] - [facility] Internal Medicine  Progress Note - Saad Winchester 1965, 49 y o  male MRN: [id number]    Unit/Bed#: 417-01 Encounter: [id number]    Primary Care Provider: Ajith Kerns MD   Date and time admitted to hospital: 8/23/2019 11:41 AM        * Sepsis [facility])  Assessment & Plan  Sepsis, present on admission, suspected acute urinary tract infection  Probably resolved   Blood cultures pending  Preliminary report for urine cultures shows->100,000 cfu/ml Non lactose fermenting gram negative rodAbnormal   Procalcitonin trending down  WBC within normal limits  Continue IV cefepime,  DC IV fluids  Continue to monitor        Chronic kidney disease, stage 3 (HCC)  Assessment & Plan  Patient currently with mild acute kidney injury, baseline creatinine appears to be 1 6, currently elevated 2 3  Monitor BMP  Hold colchicine and nephrotoxins     Acute UTI  Assessment & Plan  Preliminary urine culture results shows->100,000 cfu/ml Non lactose fermenting gram negative rodAbnormal   Will Continue IV cefepime   Monitor for final urine culture report     Metabolic acidosis  Assessment & Plan  Patient with mild metabolic acidosis  Resolved        History of gout  Assessment & Plan  Will hold colchicine given sight ADRIANNE    Morbid obesity (HCC)  Assessment & Plan  BMI 51 2  Will benefit from Nutrition consult        VTE Pharmacologic Prophylaxis:   Pharmacologic: Heparin  Mechanical VTE Prophylaxis in Place: Yes    Patient Centered Rounds: I have performed bedside rounds with nursing staff today  Discussions with Specialists or Other Care Team Provider: SLIM Attending    Education and Discussions with Family / Patient: Discuss with patient plan to continue IV antibiotics pending urine culture results    Time Spent for Care: 20 minutes  More than 50% of total time spent on counseling and coordination of care as described above      Current Length of Stay: 2 day(s)    Current Patient Status: Inpatient   Certification Statement: The patient will continue to require additional inpatient hospital stay due to Continue IV antibiotics pending culture results transition to p o  Discharge Plan:  Possible discharge within 24-48 hours    Code Status: Level 1 - Full Code      Subjective:   Patient seen and examined at bedside  Has no specific complaints reports that he feels much better this morning  Objective:     Vitals:   Temp (24hrs), Av 8 °F (37 1 °C), Min:97 9 °F (36 6 °C), Max:99 7 °F (37 6 °C)    Temp:  [97 9 °F (36 6 °C)-99 7 °F (37 6 °C)] 97 9 °F (36 6 °C)  HR:  [] 93  Resp:  [18-20] 18  BP: (117-170)/() 170/104  SpO2:  [95 %-99 %] 95 %  Body mass index is 51 28 kg/m²  Input and Output Summary (last 24 hours): Intake/Output Summary (Last 24 hours) at 2019 1514  Last data filed at 2019 1355  Gross per 24 hour   Intake 3304 58 ml   Output 1375 ml   Net 1929 58 ml       Physical Exam:     Physical Exam    Additional Data:     Labs:    Results from last 7 days   Lab Units 19  0459   WBC Thousand/uL 9 60   HEMOGLOBIN g/dL 9 9*   HEMATOCRIT % 32 2*   PLATELETS Thousands/uL 241   NEUTROS PCT % 78*   LYMPHS PCT % 8*   MONOS PCT % 6   EOS PCT % 8*     Results from last 7 days   Lab Units 19  0459 19  0459   POTASSIUM mmol/L 3 3* 3 9   CHLORIDE mmol/L 109* 104   CO2 mmol/L 21 18*   BUN mg/dL 18 22   CREATININE mg/dL 1 78* 2 31*   CALCIUM mg/dL 8 7 8 8   ALK PHOS U/L  --  75   ALT U/L  --  34   AST U/L  --  41     Results from last 7 days   Lab Units 19  0504   INR  1 33*       * I Have Reviewed All Lab Data Listed Above  * Additional Pertinent Lab Tests Reviewed: All Labs Within Last 24 Hours Reviewed    Imaging:    Imaging Reports Reviewed Today Include:  Urine culture  Imaging Personally Reviewed by Myself Includes: None    Recent Cultures (last 7 days):     Results from last 7 days   Lab Units 19  1252 19  1207   BLOOD CULTURE   --  No Growth at 24 hrs    No Growth at 24 hrs  URINE CULTURE  >100,000 cfu/ml Non lactose fermenting gram negative selina*  --        Last 24 Hours Medication List:     Current Facility-Administered Medications:  acetaminophen 650 mg Oral Q6H PRN Claudia Senegal, DO    aluminum-magnesium hydroxide-simethicone 30 mL Oral Q6H PRN Claudia [place], DO    cefepime 2,000 mg Intravenous Q24H Claudia Senegal, DO Last Rate: 2,000 mg (08/25/19 1144)   heparin (porcine) 5,000 Units Subcutaneous [facility] Claudia Senegal, DO    potassium chloride 40 mEq Oral Once Claudia Senegal, DO    tamsulosin 0 4 mg Oral Daily With Fadi Pierre DO         Today, Patient Was Seen By: Arianna Palacio PA-C    ** Please Note: Dictation voice to text software may have been used in the creation of this document   **

## 2019-08-26 LAB
ANION GAP SERPL CALCULATED.3IONS-SCNC: 9 MMOL/L (ref 4–13)
BASOPHILS # BLD AUTO: 0.06 THOUSANDS/ΜL (ref 0–0.1)
BASOPHILS NFR BLD AUTO: 1 % (ref 0–1)
BUN SERPL-MCNC: 13 MG/DL (ref 5–25)
CALCIUM SERPL-MCNC: 8.9 MG/DL (ref 8.3–10.1)
CHLORIDE SERPL-SCNC: 108 MMOL/L (ref 100–108)
CO2 SERPL-SCNC: 23 MMOL/L (ref 21–32)
CREAT SERPL-MCNC: 1.6 MG/DL (ref 0.6–1.3)
EOSINOPHIL # BLD AUTO: 0.8 THOUSAND/ΜL (ref 0–0.61)
EOSINOPHIL NFR BLD AUTO: 10 % (ref 0–6)
ERYTHROCYTE [DISTWIDTH] IN BLOOD BY AUTOMATED COUNT: 13.6 % (ref 11.6–15.1)
GFR SERPL CREATININE-BSD FRML MDRD: 48 ML/MIN/1.73SQ M
GLUCOSE SERPL-MCNC: 101 MG/DL (ref 65–140)
HCT VFR BLD AUTO: 33.7 % (ref 36.5–49.3)
HGB BLD-MCNC: 10.4 G/DL (ref 12–17)
IMM GRANULOCYTES # BLD AUTO: 0.06 THOUSAND/UL (ref 0–0.2)
IMM GRANULOCYTES NFR BLD AUTO: 1 % (ref 0–2)
LYMPHOCYTES # BLD AUTO: 0.71 THOUSANDS/ΜL (ref 0.6–4.47)
LYMPHOCYTES NFR BLD AUTO: 9 % (ref 14–44)
MAGNESIUM SERPL-MCNC: 1.8 MG/DL (ref 1.6–2.6)
MCH RBC QN AUTO: 30 PG (ref 26.8–34.3)
MCHC RBC AUTO-ENTMCNC: 30.9 G/DL (ref 31.4–37.4)
MCV RBC AUTO: 97 FL (ref 82–98)
MONOCYTES # BLD AUTO: 0.53 THOUSAND/ΜL (ref 0.17–1.22)
MONOCYTES NFR BLD AUTO: 7 % (ref 4–12)
NEUTROPHILS # BLD AUTO: 5.8 THOUSANDS/ΜL (ref 1.85–7.62)
NEUTS SEG NFR BLD AUTO: 72 % (ref 43–75)
NRBC BLD AUTO-RTO: 0 /100 WBCS
PHOSPHATE SERPL-MCNC: 2.4 MG/DL (ref 2.7–4.5)
PLATELET # BLD AUTO: 249 THOUSANDS/UL (ref 149–390)
PMV BLD AUTO: 10.5 FL (ref 8.9–12.7)
POTASSIUM SERPL-SCNC: 3.6 MMOL/L (ref 3.5–5.3)
RBC # BLD AUTO: 3.47 MILLION/UL (ref 3.88–5.62)
SODIUM SERPL-SCNC: 140 MMOL/L (ref 136–145)
WBC # BLD AUTO: 7.96 THOUSAND/UL (ref 4.31–10.16)

## 2019-08-26 PROCEDURE — 83735 ASSAY OF MAGNESIUM: CPT | Performed by: INTERNAL MEDICINE

## 2019-08-26 PROCEDURE — 84100 ASSAY OF PHOSPHORUS: CPT | Performed by: INTERNAL MEDICINE

## 2019-08-26 PROCEDURE — 99254 IP/OBS CNSLTJ NEW/EST MOD 60: CPT | Performed by: PHYSICIAN ASSISTANT

## 2019-08-26 PROCEDURE — 80048 BASIC METABOLIC PNL TOTAL CA: CPT | Performed by: INTERNAL MEDICINE

## 2019-08-26 PROCEDURE — 99232 SBSQ HOSP IP/OBS MODERATE 35: CPT | Performed by: PHYSICIAN ASSISTANT

## 2019-08-26 PROCEDURE — 85025 COMPLETE CBC W/AUTO DIFF WBC: CPT | Performed by: INTERNAL MEDICINE

## 2019-08-26 RX ORDER — HYDRALAZINE HYDROCHLORIDE 20 MG/ML
10 INJECTION INTRAMUSCULAR; INTRAVENOUS EVERY 6 HOURS PRN
Status: DISCONTINUED | OUTPATIENT
Start: 2019-08-26 | End: 2019-08-27 | Stop reason: HOSPADM

## 2019-08-26 RX ORDER — CEFAZOLIN SODIUM 2 G/50ML
2000 SOLUTION INTRAVENOUS EVERY 8 HOURS
Status: DISCONTINUED | OUTPATIENT
Start: 2019-08-26 | End: 2019-08-27 | Stop reason: HOSPADM

## 2019-08-26 RX ORDER — MELATONIN
1000 DAILY
Status: DISCONTINUED | OUTPATIENT
Start: 2019-08-26 | End: 2019-08-27 | Stop reason: HOSPADM

## 2019-08-26 RX ADMIN — HYDRALAZINE HYDROCHLORIDE 10 MG: 20 INJECTION INTRAMUSCULAR; INTRAVENOUS at 20:37

## 2019-08-26 RX ADMIN — TAMSULOSIN HYDROCHLORIDE 0.4 MG: 0.4 CAPSULE ORAL at 10:33

## 2019-08-26 RX ADMIN — HEPARIN SODIUM 5000 UNITS: 5000 INJECTION INTRAVENOUS; SUBCUTANEOUS at 15:43

## 2019-08-26 RX ADMIN — HEPARIN SODIUM 5000 UNITS: 5000 INJECTION INTRAVENOUS; SUBCUTANEOUS at 05:28

## 2019-08-26 RX ADMIN — HEPARIN SODIUM 5000 UNITS: 5000 INJECTION INTRAVENOUS; SUBCUTANEOUS at 21:30

## 2019-08-26 RX ADMIN — ACETAMINOPHEN 650 MG: 325 TABLET, FILM COATED ORAL at 05:27

## 2019-08-26 RX ADMIN — CEFEPIME HYDROCHLORIDE 2000 MG: 2 INJECTION, POWDER, FOR SOLUTION INTRAVENOUS at 15:43

## 2019-08-26 RX ADMIN — CEFAZOLIN SODIUM 2000 MG: 2 SOLUTION INTRAVENOUS at 20:14

## 2019-08-26 RX ADMIN — VITAMIN D, TAB 1000IU (100/BT) 1000 UNITS: 25 TAB at 15:43

## 2019-08-26 NOTE — CONSULTS
Assessment   49 y o  male who was admitted on 8/23/19 with sepsis and UTI  Nephrolithiasis  History of Vitamin D def, last level checked about 5 years ago  Hypokalemia    Plan    CT Renal stone study today   Continue antibiotics per primary  Maintain stent for now  I/O's  Follow AM labs to include CBC and CMP  Check Vitamin D level  K+ supplementation ordered      Subjective   Sailaja Medrano is a 49 y o  male known to the urology service who was admitted for sepsis and suspected UTI on 8/23/19  Patient was stented earlier this month (8/4/19) in MICU for right ureteral stone  He completed a 7 day  course of Levaquin PO  He was seen in clinic and he passed a stone last week  He is currently awaiting uereteroscopy once infection clears  Patient admits to history of CKD stage 3, gout, morbid obesity and HTN  Patient denies history of bladder CA  Urine culture this admission grew E  Coli  He is currently on Cefepime IV as well as Flomax  He denies fevers/chills  Denies SOB and cough  No urinary complaints at this time  No bowel complaints  Review of Systems    A comprehensive ROS was negative except for right flank pain, dysuria, and weakness    Objective   Physicial Exam  Gen: A&O x3, no distress  Mouth: clear and moist  Heart: RRR, no murmur  Lungs: CTA without WRR  Abd: obese, soft, non tender, BS positive, no masses  Back: Minimal right CVA tenderness  Extremities: 1+ edema bilateral ankles, no tenderness or deformity  Skin: warm and dry      Imaging - pertinent reports reviewed  8/23/19  CT chest/abd/pelvis without contrast  Impression:       No acute cardiopulmonary abnormality  Interval placement of right ureteral stent   No hydronephrosis identified   Bilateral nonobstructing renal calculi          Labs - pertinent labs reviewed  Cr at baseline: 1 60  WBC: 7 96  K: 3 3  Procalcitonin 1 04    VTE Prophylaxis:  Heparin  SCD's    Flaca Hopson PA-C

## 2019-08-26 NOTE — ASSESSMENT & PLAN NOTE
Urine culture: >100,000 cfu/ml Escherichia coli  Will discontinue IV cefepime and switch to IV ancef per urine culture results  Urology consultation appreciated     Ureteral stent to stay in place  Will need outpatient follow-up with Urology on discharge

## 2019-08-26 NOTE — PROGRESS NOTES
Progress Note - [place] 1965, 49 y o  male MRN: [id number]    Unit/Bed#: 417-01 Encounter: [id number]    Primary Care Provider: Nithin Mckeon MD   Date and time admitted to hospital: 8/23/2019 11:41 AM        Acute UTI  Assessment & Plan  Urine culture: >100,000 cfu/ml Escherichia coli  Will discontinue IV cefepime and switch to IV ancef per urine culture results  Urology consultation appreciated  Ureteral stent to stay in place  Will need outpatient follow-up with Urology on discharge    * Sepsis [facility])  Assessment & Plan  Resolved  Sepsis, present on admission, suspected acute urinary tract infection  Blood cultures: no growth at 72 hours  Urine cultures: >100,000 cfu/ml Escherichia coli  Procalcitonin trending down  WBC within normal limits  Will discontinue IV cefepime and switch to IV Ancef per culture results  Metabolic acidosis  Assessment & Plan  Patient with mild metabolic acidosis  Resolved        Chronic kidney disease, stage 3 (HCC)  Assessment & Plan  Baseline creatinine appears to be 1 8 to 2 0  Creatinine at baseline  Monitor BMP  avoid nephrotoxins     Morbid obesity (HCC)  Assessment & Plan  BMI 51 2  Will benefit from Nutrition consult      VTE Pharmacologic Prophylaxis:   Pharmacologic: Heparin  Mechanical VTE Prophylaxis in Place: Yes    Patient Centered Rounds: I have performed bedside rounds with nursing staff today  Discussions with Specialists or Other Care Team Provider: SLIM attending      Time Spent for Care: 30 minutes  More than 50% of total time spent on counseling and coordination of care as described above  Current Length of Stay: 3 day(s)    Current Patient Status: Inpatient   Certification Statement: The patient will continue to require additional inpatient hospital stay due to continued need for IV antibiotics    Discharge Plan: likely discharge tomorrow    Code Status: Level 1 - Full Code      Subjective: The patient was seen and examined   The patient denies any complaints    Objective:     Vitals:   Temp (24hrs), Av 8 °F (37 1 °C), Min:98 4 °F (36 9 °C), Max:99 1 °F (37 3 °C)    Temp:  [98 4 °F (36 9 °C)-99 1 °F (37 3 °C)] 98 7 °F (37 1 °C)  HR:  [] 105  Resp:  [18] 18  BP: (137-178)/() 178/112  SpO2:  [96 %-100 %] 99 %  Body mass index is 51 24 kg/m²  Input and Output Summary (last 24 hours): Intake/Output Summary (Last 24 hours) at 2019 1834  Last data filed at 2019 1738  Gross per 24 hour   Intake 2385 ml   Output 1700 ml   Net 685 ml       Physical Exam:     Physical Exam   Constitutional: He is oriented to person, place, and time  He is active and cooperative  Morbidly obese   Cardiovascular: Normal rate and regular rhythm  Pulmonary/Chest: Effort normal and breath sounds normal  He has no wheezes  He has no rhonchi  He has no rales  Abdominal: Soft  Normal appearance and bowel sounds are normal  There is no tenderness  Neurological: He is alert and oriented to person, place, and time  No cranial nerve deficit  Skin: Skin is warm, dry and intact  Nursing note and vitals reviewed  Additional Data:     Labs:    Results from last 7 days   Lab Units 19  0502   WBC Thousand/uL 7 96   HEMOGLOBIN g/dL 10 4*   HEMATOCRIT % 33 7*   PLATELETS Thousands/uL 249   NEUTROS PCT % 72   LYMPHS PCT % 9*   MONOS PCT % 7   EOS PCT % 10*     Results from last 7 days   Lab Units 19  0502  19  0459   SODIUM mmol/L 140   < > 137   POTASSIUM mmol/L 3 6   < > 3 9   CHLORIDE mmol/L 108   < > 104   CO2 mmol/L 23   < > 18*   BUN mg/dL 13   < > 22   CREATININE mg/dL 1 60*   < > 2 31*   ANION GAP mmol/L 9   < > 15*   CALCIUM mg/dL 8 9   < > 8 8   ALBUMIN g/dL  --   --  2 2*   TOTAL BILIRUBIN mg/dL  --   --  0 40   ALK PHOS U/L  --   --  75   ALT U/L  --   --  34   AST U/L  --   --  41   GLUCOSE RANDOM mg/dL 101   < > 103    < > = values in this interval not displayed       Results from last 7 days   Lab Units 08/24/19  0504   INR  1 33*             Results from last 7 days   Lab Units 08/25/19  0459 08/24/19  0459 08/23/19  1856 08/23/19  1358 08/23/19  1207   LACTIC ACID mmol/L  --   --   --  1 6 1 7   PROCALCITONIN ng/ml 1 04* 4 67* 7 17*  --   --            * I Have Reviewed All Lab Data Listed Above  * Additional Pertinent Lab Tests Reviewed: All Labs Within Last 24 Hours Reviewed    Imaging:    Imaging Reports Reviewed Today Include: none  Imaging Personally Reviewed by Myself Includes:  none    Recent Cultures (last 7 days):     Results from last 7 days   Lab Units 08/23/19  1252 08/23/19  1207   BLOOD CULTURE   --  No Growth at 72 hrs  No Growth at 72 hrs  URINE CULTURE  >100,000 cfu/ml Escherichia coli*  --        Last 24 Hours Medication List:     Current Facility-Administered Medications:  acetaminophen 650 mg Oral Q6H PRN Jessie Lockwood DO   aluminum-magnesium hydroxide-simethicone 30 mL Oral Q6H PRN Jessie Lockwood DO   cefazolin 2,000 mg Intravenous Q8H Franki Caba PA-C   cholecalciferol 1,000 Units Oral Daily Negrito Mac PA-C   heparin (porcine) 5,000 Units Subcutaneous [facility] Jessie Lockwood DO   hydrALAZINE 10 mg Intravenous Q6H PRN Franki Caba PA-C   tamsulosin 0 4 mg Oral Daily With Kandi Reyna DO        Today, Patient Was Seen By: Franki Caba PA-C    ** Please Note: Dictation voice to text software may have been used in the creation of this document   **

## 2019-08-26 NOTE — ASSESSMENT & PLAN NOTE
Baseline creatinine appears to be 1 8 to 2 0  Creatinine at baseline  Monitor BMP  avoid nephrotoxins

## 2019-08-26 NOTE — ASSESSMENT & PLAN NOTE
Resolved  Sepsis, present on admission, suspected acute urinary tract infection  Blood cultures: no growth at 72 hours  Urine cultures: >100,000 cfu/ml Escherichia coli  Procalcitonin trending down  WBC within normal limits  Will discontinue IV cefepime and switch to IV Ancef per culture results

## 2019-08-27 ENCOUNTER — TELEPHONE (OUTPATIENT)
Dept: UROLOGY | Facility: MEDICAL CENTER | Age: 54
End: 2019-08-27

## 2019-08-27 VITALS
OXYGEN SATURATION: 96 % | HEIGHT: 66 IN | RESPIRATION RATE: 18 BRPM | BODY MASS INDEX: 50.62 KG/M2 | HEART RATE: 96 BPM | WEIGHT: 315 LBS | SYSTOLIC BLOOD PRESSURE: 164 MMHG | TEMPERATURE: 98.3 F | DIASTOLIC BLOOD PRESSURE: 82 MMHG

## 2019-08-27 PROBLEM — I10 HYPERTENSION: Status: ACTIVE | Noted: 2019-08-27

## 2019-08-27 PROBLEM — N20.1 URETERAL STONE: Status: ACTIVE | Noted: 2019-08-27

## 2019-08-27 LAB
25(OH)D3 SERPL-MCNC: 18.6 NG/ML (ref 30–100)
ANION GAP SERPL CALCULATED.3IONS-SCNC: 11 MMOL/L (ref 4–13)
BASOPHILS # BLD AUTO: 0.04 THOUSANDS/ΜL (ref 0–0.1)
BASOPHILS NFR BLD AUTO: 1 % (ref 0–1)
BUN SERPL-MCNC: 12 MG/DL (ref 5–25)
CALCIUM SERPL-MCNC: 8.9 MG/DL (ref 8.3–10.1)
CHLORIDE SERPL-SCNC: 104 MMOL/L (ref 100–108)
CO2 SERPL-SCNC: 23 MMOL/L (ref 21–32)
CREAT SERPL-MCNC: 1.65 MG/DL (ref 0.6–1.3)
EOSINOPHIL # BLD AUTO: 0.93 THOUSAND/ΜL (ref 0–0.61)
EOSINOPHIL NFR BLD AUTO: 12 % (ref 0–6)
ERYTHROCYTE [DISTWIDTH] IN BLOOD BY AUTOMATED COUNT: 13.4 % (ref 11.6–15.1)
GFR SERPL CREATININE-BSD FRML MDRD: 47 ML/MIN/1.73SQ M
GLUCOSE SERPL-MCNC: 106 MG/DL (ref 65–140)
HCT VFR BLD AUTO: 32.9 % (ref 36.5–49.3)
HGB BLD-MCNC: 10.1 G/DL (ref 12–17)
IMM GRANULOCYTES # BLD AUTO: 0.15 THOUSAND/UL (ref 0–0.2)
IMM GRANULOCYTES NFR BLD AUTO: 2 % (ref 0–2)
LYMPHOCYTES # BLD AUTO: 1.17 THOUSANDS/ΜL (ref 0.6–4.47)
LYMPHOCYTES NFR BLD AUTO: 15 % (ref 14–44)
MCH RBC QN AUTO: 29.4 PG (ref 26.8–34.3)
MCHC RBC AUTO-ENTMCNC: 30.7 G/DL (ref 31.4–37.4)
MCV RBC AUTO: 96 FL (ref 82–98)
MONOCYTES # BLD AUTO: 0.63 THOUSAND/ΜL (ref 0.17–1.22)
MONOCYTES NFR BLD AUTO: 8 % (ref 4–12)
NEUTROPHILS # BLD AUTO: 4.83 THOUSANDS/ΜL (ref 1.85–7.62)
NEUTS SEG NFR BLD AUTO: 62 % (ref 43–75)
NRBC BLD AUTO-RTO: 0 /100 WBCS
PLATELET # BLD AUTO: 253 THOUSANDS/UL (ref 149–390)
PMV BLD AUTO: 10.4 FL (ref 8.9–12.7)
POTASSIUM SERPL-SCNC: 3.2 MMOL/L (ref 3.5–5.3)
RBC # BLD AUTO: 3.44 MILLION/UL (ref 3.88–5.62)
SODIUM SERPL-SCNC: 138 MMOL/L (ref 136–145)
WBC # BLD AUTO: 7.75 THOUSAND/UL (ref 4.31–10.16)

## 2019-08-27 PROCEDURE — 99238 HOSP IP/OBS DSCHRG MGMT 30/<: CPT | Performed by: PHYSICIAN ASSISTANT

## 2019-08-27 PROCEDURE — 80048 BASIC METABOLIC PNL TOTAL CA: CPT | Performed by: PHYSICIAN ASSISTANT

## 2019-08-27 PROCEDURE — 85025 COMPLETE CBC W/AUTO DIFF WBC: CPT | Performed by: PHYSICIAN ASSISTANT

## 2019-08-27 PROCEDURE — 82306 VITAMIN D 25 HYDROXY: CPT | Performed by: PHYSICIAN ASSISTANT

## 2019-08-27 RX ORDER — METOPROLOL SUCCINATE 25 MG/1
25 TABLET, EXTENDED RELEASE ORAL DAILY
Qty: 30 TABLET | Refills: 0 | Status: SHIPPED | OUTPATIENT
Start: 2019-08-27 | End: 2019-10-14

## 2019-08-27 RX ORDER — CEPHALEXIN 500 MG/1
500 CAPSULE ORAL EVERY 6 HOURS SCHEDULED
Qty: 28 CAPSULE | Refills: 0 | Status: SHIPPED | OUTPATIENT
Start: 2019-08-27 | End: 2019-09-03

## 2019-08-27 RX ADMIN — CEFAZOLIN SODIUM 2000 MG: 2 SOLUTION INTRAVENOUS at 12:29

## 2019-08-27 RX ADMIN — HEPARIN SODIUM 5000 UNITS: 5000 INJECTION INTRAVENOUS; SUBCUTANEOUS at 14:04

## 2019-08-27 RX ADMIN — ACETAMINOPHEN 650 MG: 325 TABLET, FILM COATED ORAL at 09:48

## 2019-08-27 RX ADMIN — CEFAZOLIN SODIUM 2000 MG: 2 SOLUTION INTRAVENOUS at 03:17

## 2019-08-27 RX ADMIN — VITAMIN D, TAB 1000IU (100/BT) 1000 UNITS: 25 TAB at 09:47

## 2019-08-27 RX ADMIN — TAMSULOSIN HYDROCHLORIDE 0.4 MG: 0.4 CAPSULE ORAL at 09:47

## 2019-08-27 RX ADMIN — HEPARIN SODIUM 5000 UNITS: 5000 INJECTION INTRAVENOUS; SUBCUTANEOUS at 05:24

## 2019-08-27 RX ADMIN — ACETAMINOPHEN 650 MG: 325 TABLET, FILM COATED ORAL at 01:22

## 2019-08-27 NOTE — NURSING NOTE
Patient discharged to home in stable condition  All discharge instructions explained to patient prior to discharge with patient stating verbal understanding of same  Patient then transported to the hospital exit via wheelchair and was escorted there by Astrid Ribeiro

## 2019-08-27 NOTE — TELEPHONE ENCOUNTER
Patient managed by Dr Manisha Gottlieb at the [facility] office  Patient seen 08/22/2019 by Nika Bright for right ureteral stone 4mm, s/p urgent right ureteral stent 08/04/2019 due to septic shock  Patient admitted to [facility] 08/23/2019 for sepsis  CT obtained 08/23/2019 results showing bilateral nonobstructing renal calculi  Ureteral stone not seen  Patient scheduled for ureteroscopy 09/26/2019 by Dr Manisha Gottlieb  's requesting a one week follow up  Will route to Vickie Auguste to determine if surgery is still indicated   If not, should patient be scheduled for stent removal

## 2019-08-27 NOTE — ASSESSMENT & PLAN NOTE
Urine culture: >100,000 cfu/ml Escherichia coli  The patient was started on IV cefepime on admission and this was switch to IV ancef per urine culture results  He was transitioned to PO Keflex x 7 days to complete a total 10 day course per culture results  Urology consultation appreciated  Urology recommended a 10 day course of antibiotics  Ureteral stent to stay in place  The patient will be scheduled for outpatient cystoscopy with stent removal on Sept 26

## 2019-08-27 NOTE — DISCHARGE SUMMARY
Discharge- [facility] 1965, 49 y o  male MRN: [id number]    Unit/Bed#: 417-01 Encounter: [id number]    Primary Care Provider: Radha Rhoades MD   Date and time admitted to hospital: 8/23/2019 11:41 AM        Acute UTI  Assessment & Plan  Urine culture: >100,000 cfu/ml Escherichia coli  The patient was started on IV cefepime on admission and this was switch to IV ancef per urine culture results  He was transitioned to PO Keflex x 7 days to complete a total 10 day course per culture results  Urology consultation appreciated  Urology recommended a 10 day course of antibiotics  Ureteral stent to stay in place  The patient will be scheduled for outpatient cystoscopy with stent removal on Sept 26  * Sepsis ([facility] [address] )  Assessment & Plan  Resolved  Sepsis, present on admission, suspected acute urinary tract infection  Blood cultures: no growth at 72 hours  Urine cultures: >100,000 cfu/ml Escherichia coli  The patient was started on IV cefepime on admission add switch to IV Ancef per culture results  He was discharged on PO Keflex x 7 days to complete a total 10 day course  Metabolic acidosis  Assessment & Plan  Patient with mild metabolic acidosis  Resolved        Chronic kidney disease, stage 3 (HCC)  Assessment & Plan  Baseline creatinine appears to be 1 8 to 2 0  Creatinine at baseline  avoid nephrotoxins     Morbid obesity (HCC)  Assessment & Plan  BMI 51 2  Will benefit from Nutrition consult    Hypertension  Assessment & Plan  The patient was noted to have a consistently elevated blood pressure ranging from 528'E to 551'E systolic and up to 800 diastolic  He was started on Toprol XL 25 mg po daily  Outpatient follow-up with PCP for continued management       Discharging Physician / Practitioner: Tucker Ness PA-C  PCP: Radha Rhoades MD  Admission Date:   Admission Orders (From admission, onward)     Ordered        08/23/19 1543  Inpatient Admission (expected length of stay for this patient Order details is greater than two midnights)  Once                   Discharge Date: 08/27/19    Resolved Problems  Date Reviewed: 8/27/2019    None          Consultations During Hospital Stay:  · Urology    Procedures Performed:   · none    Significant Findings / Test Results:   Ct Chest Abdomen Pelvis Wo Contrast    Result Date: 8/23/2019  · Impression: No acute cardiopulmonary abnormality  Interval placement of right ureteral stent  No hydronephrosis identified  Bilateral nonobstructing renal calculi  Workstation performed: [id number]     Incidental Findings:   · none     Test Results Pending at Discharge (will require follow up):   · none     Outpatient Tests Requested:  · none    Complications:  none    Reason for Admission: Sepsis, UTI    Hospital Course:     Britney Bowens is a 49 y o  male patient who originally presented to the hospital on 8/23/2019 due to  fever, decreased urination, right flank pain with urination  Patient was admitted to [facility] about month ago, had bacteremia secondary to UTI, obstructing ureteral stone, status post right ureteral stent      Patient presented to the ER with fever chills, generalized weakness and fatigue, sudden onset day of admission  Please see above list of diagnoses and related plan for additional information  Condition at Discharge: good     Discharge Day Visit / Exam:     Subjective:    Vitals: Blood Pressure: 164/82 (08/27/19 1146)  Pulse: 96 (08/27/19 1141)  Temperature: 98 3 °F (36 8 °C) (08/27/19 1141)  Temp Source: Oral (08/27/19 1141)  Respirations: 18 (08/27/19 1141)  Height: 5' 6" (167 6 cm) (08/26/19 1425)  Weight - Scale: (!) 144 kg (317 lb 7 4 oz) (08/26/19 1425)  SpO2: 96 % (08/27/19 1141)  Exam:   Physical Exam   Constitutional: He is oriented to person, place, and time  Morbidly obese   HENT:   Head: Normocephalic and atraumatic  Cardiovascular: Normal rate and regular rhythm     Pulmonary/Chest: Effort normal and breath sounds normal  He has no wheezes  He has no rales  Abdominal: Soft  Bowel sounds are normal  He exhibits no distension  There is no tenderness  Neurological: He is alert and oriented to person, place, and time  No cranial nerve deficit  Skin: Skin is warm and dry  Nursing note and vitals reviewed  Discussion with Family: mother updated at bedside prior to discharge    Discharge instructions/Information to patient and family:   See after visit summary for information provided to patient and family  Provisions for Follow-Up Care:  See after visit summary for information related to follow-up care and any pertinent home health orders  Disposition:     Home    For Discharges to [facility] SNF:   · Not Applicable to this Patient - Not Applicable to this Patient    Planned Readmission: no     Discharge Statement:  I spent 25 minutes discharging the patient  This time was spent on the day of discharge  I had direct contact with the patient on the day of discharge  Greater than 50% of the total time was spent examining patient, answering all patient questions, arranging and discussing plan of care with patient as well as directly providing post-discharge instructions  Additional time then spent on discharge activities  Discharge Medications:  See after visit summary for reconciled discharge medications provided to patient and family        ** Please Note: This note has been constructed using a voice recognition system **

## 2019-08-27 NOTE — SOCIAL WORK
Spoke with [facility] Urology department who will forward to their clinical department Adonay Sorto) and schedule a follow up appointment for pt as their schedule is booked and they need to see where they can fit pt in  CM also called pt's PCP office, got pt a sooner appointment   Appointment will be on Friday August 30th at 12:45pm

## 2019-08-27 NOTE — TELEPHONE ENCOUNTER
Lucy BRENNAN Case Management calling to schedule 1 week follow up appointment please review to schedule

## 2019-08-27 NOTE — ASSESSMENT & PLAN NOTE
The patient was noted to have a consistently elevated blood pressure ranging from 754'C to 313'W systolic and up to 509 diastolic  He was started on Toprol XL 25 mg po daily  Outpatient follow-up with PCP for continued management

## 2019-08-27 NOTE — SOCIAL WORK
Discussed with patient preferences on discharge;understanding how to manage health at home; purpose of taking medications; importance of follow up care/appointments; and symptoms to watch out for once discharged home  Pt is being dc'd home on this date and needs outpatient follow up with Urology as well as his [address] [facility]  Pt doesn't have an appointment until September 16th with his PCP, CM is calling to get pt a sooner appointment and also will be calling [facility] Urology to schedule a follow up appointment for pt

## 2019-08-27 NOTE — UTILIZATION REVIEW
Continued Stay Review    Date: 8/27/2019                        Current Patient Class: inpatient  Current Level of Care: m/s    HPI:54 y o  male initially admitted on 7 19 46    Assessment/Plan: 49 yr old with acute uti -->100,000 e coli   Switch iv abx to ancef per urine culture results, urology consult  Resolved needed to stay due to iv abx and may d/c today  Pertinent Labs/Diagnostic Results:   Results from last 7 days   Lab Units 08/27/19  0448 08/26/19  0502 08/25/19 0459 08/24/19 0459 08/23/19  1207   WBC Thousand/uL 7 75 7 96 9 60 12 39* 14 97*   HEMOGLOBIN g/dL 10 1* 10 4* 9 9* 10 1* 12 2   HEMATOCRIT % 32 9* 33 7* 32 2* 32 8* 39 0   PLATELETS Thousands/uL 253 249 241 258 331   NEUTROS ABS Thousands/µL 4 83 5 80 7 51 10 90* 13 56*         Results from last 7 days   Lab Units 08/27/19 0448 08/26/19  0502 08/25/19 0459 08/24/19  0459 08/23/19  1207   SODIUM mmol/L 138 140 141 137 135*   POTASSIUM mmol/L 3 2* 3 6 3 3* 3 9 3 6   CHLORIDE mmol/L 104 108 109* 104 98*   CO2 mmol/L 23 23 21 18* 18*   ANION GAP mmol/L 11 9 11 15* 19*   BUN mg/dL 12 13 18 22 23   CREATININE mg/dL 1 65* 1 60* 1 78* 2 31* 2 14*   EGFR ml/min/1 73sq m 47 48 43 31 34   CALCIUM mg/dL 8 9 8 9 8 7 8 8 9 2   MAGNESIUM mg/dL  --  1 8  --  1 8  --    PHOSPHORUS mg/dL  --  2 4*  --  2 9  --      Results from last 7 days   Lab Units 08/24/19  0459 08/23/19  1207   AST U/L 41 47*   ALT U/L 34 37   ALK PHOS U/L 75 92   TOTAL PROTEIN g/dL 7 2 7 7   ALBUMIN g/dL 2 2* 2 5*   TOTAL BILIRUBIN mg/dL 0 40 0 80         Results from last 7 days   Lab Units 08/27/19  0448 08/26/19  0502 08/25/19  0459 08/24/19  0459 08/23/19  1207   GLUCOSE RANDOM mg/dL 106 101 136 103 102           Results from last 7 days   Lab Units 08/24/19  0504 08/23/19  1207   PROTIME seconds 16 6* 14 9*   INR  1 33* 1 16   PTT seconds  --  32         Results from last 7 days   Lab Units 08/25/19  0459 08/24/19  0459 08/23/19  1856   PROCALCITONIN ng/ml 1 04* 4 67* 7 17* Results from last 7 days   Lab Units 08/23/19  1358 08/23/19  1207   LACTIC ACID mmol/L 1 6 1 7     Results from last 7 days   Lab Units 08/23/19  1252   CLARITY UA  Cloudy   COLOR UA  Yellow   SPEC GRAV UA  1 025   PH UA  6 0   GLUCOSE UA mg/dl Negative   KETONES UA mg/dl 15 (1+)*   BLOOD UA  Moderate*   PROTEIN UA mg/dl >=300*   NITRITE UA  Negative   BILIRUBIN UA  Interference- unable to analyze*   UROBILINOGEN UA E U /dl 0 2   LEUKOCYTES UA  Small*   WBC UA /hpf Innumerable*   RBC UA /hpf 10-20*   BACTERIA UA /hpf Innumerable*   EPITHELIAL CELLS WET PREP /hpf Moderate*       Results from last 7 days   Lab Units 08/23/19  1252 08/23/19  1207   BLOOD CULTURE   --  No Growth at 72 hrs  No Growth at 72 hrs  URINE CULTURE  >100,000 cfu/ml Escherichia coli*  --        vital Signs:   08/27/19 11:41:56  98 3 °F (36 8 °C)  96  18  161/102Abnormal   122  96 %  None (Room air)  Sitting   08/27/19 0651        158/74           08/27/19 0628        178/102Abnormal            08/27/19 06:26:33  98 3 °F (36 8 °C)  105  18  173/114Abnormal   134  97 %  None (Room air)  Lying   08/27/19 0457        154/94           08/27/19 0325        153/85        Lying   08/27/19 03:18:44  98 2 °F (36 8 °C)  105  18  157/99  118  97 %  None (Room air)  Lying   08/27/19 0125        156/99                 Medications:   Scheduled Meds:   Current Facility-Administered Medications:  acetaminophen 650 mg Oral Q6H PRN x2   aluminum-magnesium hydroxide-simethicone 30 mL Oral Q6H PRN    cefazolin 2,000 mg Intravenous Q8H Last Rate: 2,000 mg (08/27/19 1229)   cholecalciferol 1,000 Units Oral Daily    heparin (porcine) 5,000 Units Subcutaneous Q8H [facility]    hydrALAZINE 10 mg Intravenous Q6H PRN x1 8/26   tamsulosin 0 4 mg Oral Daily With Dinner        Discharge Plan: home with op to see urology    Network Utilization Review Department  Phone: [phone];  Fax [phone]  [email or web address]  ATTENTION: Please call with any questions or concerns to [phone]  and carefully listen to the prompts so that you are directed to the right person  Send all requests for admission clinical reviews, approved or denied determinations and any other requests to fax [phone]   All voicemails are confidential

## 2019-08-27 NOTE — TELEPHONE ENCOUNTER
Yes needs surgery- keep as scheduled with JOHNNY 9/26  Was seen while inpatient  I also spoke with KP today- planning for culture directed antibiotics for 7 days  I will follow culture then pre-treat for surgery

## 2019-08-27 NOTE — TELEPHONE ENCOUNTER
Called [phone] and left message for Lucy stating Sudhir Calle saw patient as inpatient and she also spoke with SLIM  Will follow urine culture and pre treat for surgery  Ask Lucy if she has any questions to please contact office

## 2019-08-27 NOTE — ASSESSMENT & PLAN NOTE
Resolved  Sepsis, present on admission, suspected acute urinary tract infection  Blood cultures: no growth at 72 hours  Urine cultures: >100,000 cfu/ml Escherichia coli  The patient was started on IV cefepime on admission add switch to IV Ancef per culture results  He was discharged on PO Keflex x 7 days to complete a total 10 day course

## 2019-08-28 ENCOUNTER — TRANSITIONAL CARE MANAGEMENT (OUTPATIENT)
Dept: INTERNAL MEDICINE CLINIC | Facility: CLINIC | Age: 54
End: 2019-08-28

## 2019-08-28 LAB
BACTERIA BLD CULT: NORMAL
BACTERIA BLD CULT: NORMAL
CA PHOS MFR STONE: 5 %
CALCIUM OXALATE DIHYDRATE MFR STONE IR: 2 %
COLOR STONE: NORMAL
COM MFR STONE: 93 %
COMMENT-STONE3: NORMAL
COMPOSITION: NORMAL
LABORATORY COMMENT REPORT: NORMAL
NIDUS STONE QL: NORMAL
PHOTO: NORMAL
SIZE STONE: NORMAL MM
STONE ANALYSIS-IMP: NORMAL
WT STONE: 48.2 MG

## 2019-08-28 NOTE — UTILIZATION REVIEW
Notification of Discharge  This is a Notification of Discharge from our facility [address]  Please be advised that this patient has been discharge from our facility  Below you will find the admission and discharge date and time including the patients disposition  PRESENTATION DATE: 8/23/2019 11:41 AM  OBS ADMISSION DATE:   IP ADMISSION DATE: 8/23/19 1543   DISCHARGE DATE: 8/27/2019  2:55 PM  DISPOSITION: Home/Self Care Home/Self Care   Admission Orders listed below:  Admission Orders (From admission, onward)     Ordered        08/23/19 1543  Inpatient Admission (expected length of stay for this patient Order details is greater than two midnights)  Once                   Please contact the UR Department if additional information is required to close this patient's authorization/case  [address]  Utilization Review Department  Phone: [phone]; Fax [phone]  [email or web address]  ATTENTION: Please call with any questions or concerns to [phone]  and carefully listen to the prompts so that you are directed to the right person  Send all requests for admission clinical reviews, approved or denied determinations and any other requests to fax [phone]   All voicemails are confidential

## 2019-08-30 ENCOUNTER — TELEPHONE (OUTPATIENT)
Dept: INTERNAL MEDICINE CLINIC | Facility: CLINIC | Age: 54
End: 2019-08-30

## 2019-08-30 ENCOUNTER — HOSPITAL ENCOUNTER (OUTPATIENT)
Dept: NON INVASIVE DIAGNOSTICS | Facility: HOSPITAL | Age: 54
Discharge: HOME | End: 2019-08-30
Payer: COMMERCIAL

## 2019-08-30 ENCOUNTER — OFFICE VISIT (OUTPATIENT)
Dept: INTERNAL MEDICINE CLINIC | Facility: CLINIC | Age: 54
End: 2019-08-30
Payer: COMMERCIAL

## 2019-08-30 VITALS
OXYGEN SATURATION: 97 % | HEART RATE: 104 BPM | HEIGHT: 66 IN | BODY MASS INDEX: 49.98 KG/M2 | WEIGHT: 311 LBS | DIASTOLIC BLOOD PRESSURE: 84 MMHG | SYSTOLIC BLOOD PRESSURE: 142 MMHG | TEMPERATURE: 98.2 F

## 2019-08-30 DIAGNOSIS — Z87.39 HISTORY OF GOUT: ICD-10-CM

## 2019-08-30 DIAGNOSIS — N18.30 CHRONIC KIDNEY DISEASE, STAGE 3 (HCC): Chronic | ICD-10-CM

## 2019-08-30 DIAGNOSIS — N39.0 ACUTE UTI: Primary | ICD-10-CM

## 2019-08-30 DIAGNOSIS — M79.89 SWELLING OF RIGHT FOOT: ICD-10-CM

## 2019-08-30 DIAGNOSIS — A41.51 SEPSIS DUE TO ESCHERICHIA COLI (HCC): ICD-10-CM

## 2019-08-30 DIAGNOSIS — I10 ESSENTIAL HYPERTENSION: ICD-10-CM

## 2019-08-30 PROCEDURE — 99215 OFFICE O/P EST HI 40 MIN: CPT | Performed by: NURSE PRACTITIONER

## 2019-08-30 PROCEDURE — 93971 EXTREMITY STUDY: CPT

## 2019-08-30 RX ORDER — PROBENECID AND COLCHICINE 500; .5 MG/1; MG/1
1 TABLET ORAL DAILY
Refills: 3 | COMMUNITY
Start: 2019-08-19 | End: 2020-01-17

## 2019-08-30 RX ORDER — ALLOPURINOL 100 MG/1
100 TABLET ORAL DAILY
Refills: 5 | COMMUNITY
Start: 2019-08-19 | End: 2019-09-16

## 2019-08-30 NOTE — PROGRESS NOTES
Assessment/Plan:   Acute UTI  Assessment & Plan  Urine culture: >100,000 cfu/ml Escherichia coli  The patient was started on IV cefepime on admission and this was switch to IV ancef per urine culture results  He was transitioned to PO Keflex x 7 days to complete a total 10 day course per culture results  Urology consultation appreciated  Urology recommended a 10 day course of antibiotics  Ureteral stent to stay in place  The patient will be scheduled for outpatient cystoscopy with stent removal on Sept 26     Stone analysis did show calcium oxalate       Sepsis [facility])  Assessment & Plan  Resolved  Sepsis, present on admission, suspected acute urinary tract infection  Blood cultures: no growth at 72 hours  Urine cultures: >100,000 cfu/ml Escherichia coli  The patient was started on IV cefepime on admission add switch to IV Ancef per culture results  He was discharged on PO Keflex x 7 days to complete a total 10 day course  He is currently still taking his antibiotic and was advised if his diarrhea continues will check a C diff        Metabolic acidosis  Assessment & Plan  Patient with mild metabolic acidosis  Resolved      Chronic kidney disease, stage 3 ([facility])  Assessment & Plan  Baseline creatinine appears to be 1 8 to 2 0  Creatinine at baseline  avoid nephrotoxins      Morbid obesity (HCC)  Assessment & Plan  BMI 51 2  Will benefit from Nutrition consult patient is considering disability regarding his weight      Hypertension  Assessment & Plan  The patient was noted to have a consistently elevated blood pressure ranging from 675'S to 826'M systolic and up to 900 diastolic  He was started on Toprol XL 25 mg po daily  Outpatient follow-up with PCP for continued management  Today BP was 142/84 but patient is having pain     Will send for doppler of his right leg due to his continued pain, swelling, and redness  Differential diagnoses are gout/phlebitis/DVT  Patient is taking Zyloprim and Colchicine-probenecid  He is to continue his Toprol 25 mg daily  I feel his Bp maybe up due to his pain in his foot  Will notify patient once imaging is back  He was advised if any issues or concerns to call the office  No problem-specific Assessment & Plan notes found for this encounter  Problem List Items Addressed This Visit        Cardiovascular and Mediastinum    Hypertension       Genitourinary    Chronic kidney disease, stage 3 (HCC) (Chronic)    Acute UTI - Primary       Other    History of gout    Sepsis (HCC)    Swelling of right foot    Relevant Orders    VAS lower limb venous duplex study, unilateral/limited           Subjective:     Patient ID: Belinda Ramirez is a 49 y o  male  Belinda Ramirez is a 49 y o  male patient who originally presented to the hospital on 8/23/2019 due to  fever, decreased urination, right flank pain with urination   Patient was admitted to [facility] about month ago, had bacteremia secondary to UTI, obstructing ureteral stone, status post right ureteral stent      Patient presented to the ER with fever chills, generalized weakness and fatigue, sudden onset day of admission  He was found to have sepsis secondary to UTI  He is still taking Keflex and is having on and off diarrhea  He states his main issue is swelling on and pain in his right ankle that is causing pain in the upper leg  He states he feels this is why his BP is up  He states he did tell the nurses and doctors about this in the hospital and they told him to elevate it  He states he is having redness and can barely but his shoe on  He states he is taking Tylenol but nothing is helping  He states he also would like to go on disability due to his issues and that 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 issues  He is drinking fluids and is taking his Toprol he can feel at times his HR is up   He states he is having a hard time walking due to his ankle and leg hurting           Review of Systems   Constitutional: Negative  HENT: Negative  Eyes: Negative  Respiratory: Negative  Cardiovascular: Positive for leg swelling  Gastrointestinal: Negative  Endocrine: Negative  Genitourinary: Negative  Musculoskeletal: Negative  Skin: Negative  Allergic/Immunologic: Negative  Neurological: Negative  Hematological: Negative  Psychiatric/Behavioral: Negative  Below is the patient's most recent value for Albumin, ALT, AST, BUN, Calcium, Chloride, Cholesterol, CO2, Creatinine, GFR, Glucose, HDL, Hematocrit, Hemoglobin, Hemoglobin A1C, LDL, Magnesium, Phosphorus, Platelets, Potassium, PSA, Sodium, Triglycerides, and WBC  Lab Results   Component Value Date    ALT 34 08/24/2019    AST 41 08/24/2019    BUN 12 08/27/2019    CALCIUM 8 9 08/27/2019     08/27/2019    CHOL 235 08/31/2015    CO2 23 08/27/2019    CREATININE 1 65 (H) 08/27/2019    HDL 37 08/31/2015    HCT 32 9 (L) 08/27/2019    HGB 10 1 (L) 08/27/2019    HGBA1C 5 4 03/24/2015    MG 1 8 08/26/2019    PHOS 2 4 (L) 08/26/2019     08/27/2019    K 3 2 (L) 08/27/2019    PSA 0 4 03/24/2015     (L) 04/08/2015    TRIG 150 08/31/2015    WBC 7 75 08/27/2019     Note: for a comprehensive list of the patient's lab results, access the Results Review activity        Current Outpatient Medications:     acetaminophen (TYLENOL) 500 mg tablet, Take 500 mg by mouth every 6 (six) hours as needed for mild pain, Disp: , Rfl:     cephalexin (KEFLEX) 500 mg capsule, Take 1 capsule (500 mg total) by mouth every 6 (six) hours for 7 days, Disp: 28 capsule, Rfl: 0    cholecalciferol 1000 units tablet, Take 2 tablets (2,000 Units total) by mouth daily, Disp: 60 tablet, Rfl: 0    colchicine-probenecid 0 5-500 MG per tablet, Take 1 tablet by mouth daily, Disp: , Rfl: 3    metoprolol succinate (TOPROL-XL) 25 mg 24 hr tablet, Take 1 tablet (25 mg total) by mouth daily, Disp: 30 tablet, Rfl: 0    tamsulosin (FLOMAX) 0 4 mg, TAKE 1 CAPSULE BY MOUTH EVERY 24 HOURS, Disp: 90 capsule, Rfl: 3    allopurinol (ZYLOPRIM) 100 mg tablet, Take 100 mg by mouth daily, Disp: , Rfl: 5  Objective:     Physical Exam   Constitutional: He is oriented to person, place, and time  He appears well-developed and well-nourished  HENT:   Head: Normocephalic and atraumatic  Right Ear: External ear normal    Left Ear: External ear normal    Nose: Nose normal    Mouth/Throat: Oropharynx is clear and moist    Eyes: Pupils are equal, round, and reactive to light  Conjunctivae and EOM are normal    Neck: Normal range of motion  Neck supple  Cardiovascular: Normal rate, regular rhythm, normal heart sounds and intact distal pulses  Pulmonary/Chest: Effort normal and breath sounds normal    Abdominal: Soft  Bowel sounds are normal    Musculoskeletal: Normal range of motion  He exhibits edema  +1 Edema noted to his right ankle and leg with redness noted in his inner ankle   Neurological: He is alert and oriented to person, place, and time  Skin: Skin is warm and dry  Capillary refill takes less than 2 seconds  There is erythema  Psychiatric: He has a normal mood and affect  His behavior is normal  Judgment and thought content normal    Vitals reviewed  Vitals:    08/30/19 1220   BP: 142/84   BP [facility]: Left arm   Patient Position: Sitting   Cuff Size: Large   Pulse: 104   Temp: 98 2 °F (36 8 °C)   TempSrc: Oral   SpO2: 97%   Weight: (!) 141 kg (311 lb)   Height: 5' 6" (1 676 m)       Transitional Care Management Review:  Lori King is a 49 y o  male here for TCM follow up       During the TCM phone call patient stated:    TCM Call (since 7/30/2019)     Date and time call was made  8/28/2019  7:56 AM    Hospital care reviewed  Records reviewed    Patient was hospitialized at  [address] [place] [address]    Date of Admission  08/23/19    Date of discharge  08/27/19    Diagnosis  sepsis    Disposition  Home    Were the patients medications reviewed and updated  Yes    Current Symptoms  None      TCM Call (since 7/30/2019)     Post hospital issues  None    Should patient be enrolled in anticoag monitoring? No    Scheduled for follow up? Yes    Referrals needed  SLM 9/26/19 remove stent     Did you obtain your prescribed medications  Yes    Do you need help managing your prescriptions or medications  No    Is transportation to your appointment needed  Yes    Specify why  Can't drive right now  I have advised the patient to call PCP with any new or worsening symptoms  Keegan Ann, [facility]  Family    The type of support provided  Emotional; Physical    Do you have social support  Yes, as much as I need    Are you recieving any outpatient services  No    Are you recieving home care services  No    Are you using any community resources  No    Current waiver services  No    Have you fallen in the last 12 months  No    Interperter language line needed  No    Counseling  Patient    Counseling topics  Diagnostic results; Activities of daily living    Comments  Patient has diarrhea  He is drinking alot of water and still on antibiotics            Catrina Espana

## 2019-08-30 NOTE — TELEPHONE ENCOUNTER
I called patient, as instructed, and told him that results of test are negative for clots and he was happy  Patient is requesting a walker which I believe the hospital called here when he was last in and they were going to get him one  Patient stated that he did not receive a walker from the hospital and desperately needs one

## 2019-08-30 NOTE — PATIENT INSTRUCTIONS
Kidney Stones   WHAT YOU NEED TO KNOW:   What is a kidney stone? Kidney stones form in the urinary system when the water and waste in your urine are out of balance  When this happens, certain types of waste crystals separate from the urine  The crystals build up and form kidney stones  Kidney stones can be made of uric acid, calcium, phosphate, or oxalate crystals  You may have 1 or more kidney stones  What increases my risk for kidney stones? · You do not drink enough liquids (especially water) each day  · You have urinary tract infections often  · You follow a certain type of diet  For example, people who eat a diet high in meat or salt may be at higher risk for kidney stones  People who eat foods high in oxalate may also be at higher risk  Foods that are high in oxalate include nuts, chocolate, coffee, and green leafy vegetables  · You take certain medicines such as diuretics, steroids, and antacids  · A family member has had kidney stones  · You were born with a kidney or bowel disorder, or you have other medical problems such as gout  What are the signs and symptoms of kidney stones? · Pain in the middle of your back that moves across to your side or that may spread to your groin    · Nausea and vomiting    · Urge to urinate often, burning feeling when you urinate, or pink or red urine    · Tenderness in your lower back, side, or stomach  How are kidney stones diagnosed? Your healthcare provider will ask about your health, diet, and lifestyle  He may refer you to a urologist  Destiney Esparza may need tests to find out what type of kidney stones you have  Tests can show the size of your kidney stones and where they are in your urinary system  You may have one or more of the following:  · Urine tests  may show if you have blood in your urine  They may also show high amounts of the substances that form kidney stones, such as uric acid  · Blood tests  show how well your kidneys are working   They may also be used to check the levels of calcium or uric acid in your blood  · A noncontrast helical CT scan  is a type of x-ray that uses a computer to take pictures of your kidneys  Healthcare providers use the pictures to check for kidney stones or other problems  · X-rays  of your kidneys, bladder, and ureters may be done  You may be given a dye before the pictures are taken to help healthcare providers see the pictures better  You may need to have more than one x-ray  Tell the healthcare provider if you have ever had an allergic reaction to contrast dye  · An abdominal ultrasound  uses sound waves to show pictures of your abdomen on a monitor  How are kidney stones treated? · NSAIDs , such as ibuprofen, help decrease swelling, pain, and fever  This medicine is available with or without a doctor's order  NSAIDs can cause stomach bleeding or kidney problems in certain people  If you take blood thinner medicine, always ask your healthcare provider if NSAIDs are safe for you  Always read the medicine label and follow directions  · Prescription medicine  may be given  Ask how to take this medicine safely  · Medicines  to balance your electrolytes may be needed  · A procedure or surgery  to remove the kidney stones may be needed if they do not pass on their own  Your treatment will depend on the size and location of your kidney stones  How can I manage my symptoms? · Drink plenty of liquids  Your healthcare provider may tell you to drink at least 8 to 12 (eight-ounce) cups of liquids each day  This helps flush out the kidney stones when you urinate  Water is the best liquid to drink  · Strain your urine every time you go to the bathroom  Urinate through a strainer or a piece of thin cloth to catch the stones  Take the stones to your healthcare provider so they can be sent to the lab for tests  This will help your healthcare providers plan the best treatment for you             · Eat a variety of healthy foods  Healthy foods include fruits, vegetables, whole-grain breads, low-fat dairy products, beans, and fish  You may need to limit how much sodium (salt) or protein you eat  Ask for information about the best foods for you  · Exercise regularly  Your stones may pass more easily if you stay active  Ask about the best activities for you  When should I seek immediate care? · You have vomiting that is not relieved by medicine  When should I contact my healthcare provider? · You have a fever  · You have trouble passing urine  · You see blood in your urine  · You have severe pain  · You have any questions or concerns about your condition or care  CARE AGREEMENT:   You have the right to help plan your care  Learn about your health condition and how it may be treated  Discuss treatment options with your caregivers to decide what care you want to receive  You always have the right to refuse treatment  The above information is an  only  It is not intended as medical advice for individual conditions or treatments  Talk to your doctor, nurse or pharmacist before following any medical regimen to see if it is safe and effective for you  © 2017 [employer] Information is for End User's use only and may not be sold, redistributed or otherwise used for commercial purposes  All illustrations and images included in CareNotes® are the copyrighted property of A [employer] M [employer]  or [employer]

## 2019-09-11 ENCOUNTER — APPOINTMENT (OUTPATIENT)
Dept: LAB | Facility: HOSPITAL | Age: 54
End: 2019-09-11
Attending: UROLOGY
Payer: COMMERCIAL

## 2019-09-11 DIAGNOSIS — N20.1 URETERAL STONE: ICD-10-CM

## 2019-09-11 PROCEDURE — 87086 URINE CULTURE/COLONY COUNT: CPT

## 2019-09-11 NOTE — PRE-PROCEDURE INSTRUCTIONS
Pre-Surgery Instructions:   Medication Instructions    acetaminophen (TYLENOL) 500 mg tablet Instructed patient per Anesthesia Guidelines   cholecalciferol 1000 units tablet Instructed patient per Anesthesia Guidelines   colchicine-probenecid 0 5-500 MG per tablet Instructed patient per Anesthesia Guidelines   metoprolol succinate (TOPROL-XL) 25 mg 24 hr tablet Instructed patient per Anesthesia Guidelines   tamsulosin (FLOMAX) 0 4 mg Instructed patient per Anesthesia Guidelines

## 2019-09-12 LAB — BACTERIA UR CULT: NORMAL

## 2019-09-16 ENCOUNTER — OFFICE VISIT (OUTPATIENT)
Dept: INTERNAL MEDICINE CLINIC | Facility: CLINIC | Age: 54
End: 2019-09-16
Payer: COMMERCIAL

## 2019-09-16 VITALS
TEMPERATURE: 98.3 F | HEIGHT: 66 IN | HEART RATE: 91 BPM | WEIGHT: 303.3 LBS | BODY MASS INDEX: 48.74 KG/M2 | RESPIRATION RATE: 18 BRPM | SYSTOLIC BLOOD PRESSURE: 132 MMHG | DIASTOLIC BLOOD PRESSURE: 86 MMHG | OXYGEN SATURATION: 96 %

## 2019-09-16 DIAGNOSIS — N20.1 URETERAL STONE: ICD-10-CM

## 2019-09-16 DIAGNOSIS — N18.30 CHRONIC KIDNEY DISEASE, STAGE 3 (HCC): Primary | Chronic | ICD-10-CM

## 2019-09-16 DIAGNOSIS — G47.00 INSOMNIA, UNSPECIFIED TYPE: ICD-10-CM

## 2019-09-16 DIAGNOSIS — Z87.39 HISTORY OF GOUT: ICD-10-CM

## 2019-09-16 PROCEDURE — 99213 OFFICE O/P EST LOW 20 MIN: CPT | Performed by: NURSE PRACTITIONER

## 2019-09-16 NOTE — PROGRESS NOTES
Assessment/Plan: Will bring patient back after his procedure and reevaluate his FMLA status  He will look into disability paperwork on his own time and he was explained in detail what he would did to do if he does go that route  He was advised if any issues or concerns to call the office and will bring him back in sooner  No problem-specific Assessment & Plan notes found for this encounter  Problem List Items Addressed This Visit        Genitourinary    Chronic kidney disease, stage 3 ([address] ) - Primary (Chronic)    Ureteral stone       Other    History of gout    Insomnia            Subjective:      Patient ID: Carson Anderson is a 49 y o  male  Que Kennedy is here today for a follow up visit  He has over the past month been in and out of the hospital with obstructing kidney stones with sepsis and a history of gout  He is having a cysto with ureteroscopy with lithotripsy with holmium laser retrograde pyelogram with stent placement  He states he is doing much better and is walking great now  His walker is coming today  He states he did loose a lot of weight and feels much better  He is worried about his upcoming procedure and is not sleeping well  He states he just wants it over with  He is looking about getting disability due to all these issues going on  He states he does know his FMLA does run out in October  He states he has been afebrile and offers no other issues  The following portions of the patient's history were reviewed and updated as appropriate:   He  has a past medical history of Arthritis, Gout, Hypertension, and Kidney stone    He   Patient Active Problem List    Diagnosis Date Noted    Insomnia 09/16/2019    Swelling of right foot 08/30/2019    Ureteral stone 08/27/2019    Hypertension 08/27/2019    Sepsis ([facility] ) 08/23/2019    Acute UTI 08/23/2019    Chronic kidney disease, stage 3 ([address] ) 08/23/2019    Acute kidney injury ([address] ) 08/06/2019    Headache 08/06/2019    Gram-negative bacteremia 74/36/3293    Metabolic acidosis 13/46/8238    History of gout 07/15/2019    Vitamin D deficiency 06/10/2015    Morbid obesity ([address] ) 03/23/2015     He  has a past surgical history that includes No past surgeries (03/23/2015); [place] retrograde pyelogram (8/4/2019); and pr cystourethroscopy,ureter catheter (Right, 8/4/2019)  His family history includes Cancer in his mother  He  reports that he has never smoked  He has never used smokeless tobacco  He reports that he does not use drugs  His alcohol history is not on file  Current Outpatient Medications   Medication Sig Dispense Refill    acetaminophen (TYLENOL) 500 mg tablet Take 500 mg by mouth every 6 (six) hours as needed for mild pain      cholecalciferol 1000 units tablet Take 2 tablets (2,000 Units total) by mouth daily (Patient taking differently: Take 1,000 Units by mouth 2 (two) times a day ) 60 tablet 0    colchicine-probenecid 0 5-500 MG per tablet Take 1 tablet by mouth daily  3    metoprolol succinate (TOPROL-XL) 25 mg 24 hr tablet Take 1 tablet (25 mg total) by mouth daily 30 tablet 0    tamsulosin (FLOMAX) 0 4 mg TAKE 1 CAPSULE BY MOUTH EVERY 24 HOURS 90 capsule 3     No current facility-administered medications for this visit        Current Outpatient Medications on File Prior to Visit   Medication Sig    acetaminophen (TYLENOL) 500 mg tablet Take 500 mg by mouth every 6 (six) hours as needed for mild pain    cholecalciferol 1000 units tablet Take 2 tablets (2,000 Units total) by mouth daily (Patient taking differently: Take 1,000 Units by mouth 2 (two) times a day )    colchicine-probenecid 0 5-500 MG per tablet Take 1 tablet by mouth daily    metoprolol succinate (TOPROL-XL) 25 mg 24 hr tablet Take 1 tablet (25 mg total) by mouth daily    tamsulosin (FLOMAX) 0 4 mg TAKE 1 CAPSULE BY MOUTH EVERY 24 HOURS    [DISCONTINUED] allopurinol (ZYLOPRIM) 100 mg tablet Take 100 mg by mouth daily     No current facility-administered medications on file prior to visit  He has No Known Allergies       Review of Systems   All other systems reviewed and are negative  Below is the patient's most recent value for Albumin, ALT, AST, BUN, Calcium, Chloride, Cholesterol, CO2, Creatinine, GFR, Glucose, HDL, Hematocrit, Hemoglobin, Hemoglobin A1C, LDL, Magnesium, Phosphorus, Platelets, Potassium, PSA, Sodium, Triglycerides, and WBC  Lab Results   Component Value Date    ALT 34 08/24/2019    AST 41 08/24/2019    BUN 12 08/27/2019    CALCIUM 8 9 08/27/2019     08/27/2019    CHOL 235 08/31/2015    CO2 23 08/27/2019    CREATININE 1 65 (H) 08/27/2019    HDL 37 08/31/2015    HCT 32 9 (L) 08/27/2019    HGB 10 1 (L) 08/27/2019    HGBA1C 5 4 03/24/2015    MG 1 8 08/26/2019    PHOS 2 4 (L) 08/26/2019     08/27/2019    K 3 2 (L) 08/27/2019    PSA 0 4 03/24/2015     (L) 04/08/2015    TRIG 150 08/31/2015    WBC 7 75 08/27/2019     Note: for a comprehensive list of the patient's lab results, access the Results Review activity  Objective:      /86 (BP [facility]: Right arm, Patient Position: Sitting, Cuff Size: Large)   Pulse 91   Temp 98 3 °F (36 8 °C) (Oral)   Resp 18   Ht 5' 6" (1 676 m)   Wt (!) 138 kg (303 lb 4 8 oz)   SpO2 96%   BMI 48 95 kg/m²          Physical Exam   Constitutional: He is oriented to person, place, and time  He appears well-developed and well-nourished  HENT:   Head: Normocephalic and atraumatic  Right Ear: External ear normal    Left Ear: External ear normal    Nose: Nose normal    Mouth/Throat: Oropharynx is clear and moist    Eyes: Pupils are equal, round, and reactive to light  Conjunctivae and EOM are normal    Neck: Normal range of motion  Neck supple  Cardiovascular: Normal rate, regular rhythm, normal heart sounds and intact distal pulses  Pulmonary/Chest: Effort normal and breath sounds normal    Abdominal: Soft  Bowel sounds are normal    Musculoskeletal: Normal range of motion     Neurological: He is alert and oriented to person, place, and time  Skin: Skin is warm and dry  Capillary refill takes less than 2 seconds  Psychiatric: He has a normal mood and affect  His behavior is normal  Judgment and thought content normal    Vitals reviewed

## 2019-09-25 ENCOUNTER — ANESTHESIA EVENT (OUTPATIENT)
Dept: PERIOP | Facility: HOSPITAL | Age: 54
End: 2019-09-25
Payer: COMMERCIAL

## 2019-09-26 ENCOUNTER — APPOINTMENT (OUTPATIENT)
Dept: RADIOLOGY | Facility: HOSPITAL | Age: 54
End: 2019-09-26
Payer: COMMERCIAL

## 2019-09-26 ENCOUNTER — TELEPHONE (OUTPATIENT)
Dept: UROLOGY | Facility: CLINIC | Age: 54
End: 2019-09-26

## 2019-09-26 ENCOUNTER — ANESTHESIA (OUTPATIENT)
Dept: PERIOP | Facility: HOSPITAL | Age: 54
End: 2019-09-26
Payer: COMMERCIAL

## 2019-09-26 ENCOUNTER — HOSPITAL ENCOUNTER (OUTPATIENT)
Facility: HOSPITAL | Age: 54
Setting detail: OUTPATIENT SURGERY
Discharge: HOME | End: 2019-09-26
Attending: UROLOGY | Admitting: UROLOGY
Payer: COMMERCIAL

## 2019-09-26 VITALS
BODY MASS INDEX: 48.21 KG/M2 | TEMPERATURE: 98.1 F | HEIGHT: 66 IN | OXYGEN SATURATION: 98 % | WEIGHT: 300 LBS | SYSTOLIC BLOOD PRESSURE: 148 MMHG | DIASTOLIC BLOOD PRESSURE: 70 MMHG | HEART RATE: 77 BPM | RESPIRATION RATE: 18 BRPM

## 2019-09-26 DIAGNOSIS — N20.1 URETERAL STONE: ICD-10-CM

## 2019-09-26 DIAGNOSIS — N20.1 URETERAL STONE: Primary | ICD-10-CM

## 2019-09-26 PROCEDURE — 52352 CYSTOURETERO W/STONE REMOVE: CPT | Performed by: UROLOGY

## 2019-09-26 PROCEDURE — C2617 STENT, NON-COR, TEM W/O DEL: HCPCS | Performed by: UROLOGY

## 2019-09-26 PROCEDURE — 52332 CYSTOSCOPY AND TREATMENT: CPT | Performed by: UROLOGY

## 2019-09-26 PROCEDURE — C1769 GUIDE WIRE: HCPCS | Performed by: UROLOGY

## 2019-09-26 PROCEDURE — 82360 CALCULUS ASSAY QUANT: CPT | Performed by: UROLOGY

## 2019-09-26 PROCEDURE — NC001 PR NO CHARGE: Performed by: UROLOGY

## 2019-09-26 PROCEDURE — 74420 UROGRAPHY RTRGR +-KUB: CPT

## 2019-09-26 DEVICE — STENT URETERAL 6FR 24CML INLAY OPTIMA: Type: IMPLANTABLE DEVICE | Status: FUNCTIONAL

## 2019-09-26 RX ORDER — METOPROLOL TARTRATE 5 MG/5ML
INJECTION INTRAVENOUS AS NEEDED
Status: DISCONTINUED | OUTPATIENT
Start: 2019-09-26 | End: 2019-09-26 | Stop reason: SURG

## 2019-09-26 RX ORDER — LIDOCAINE HYDROCHLORIDE 10 MG/ML
INJECTION, SOLUTION INFILTRATION; PERINEURAL AS NEEDED
Status: DISCONTINUED | OUTPATIENT
Start: 2019-09-26 | End: 2019-09-26 | Stop reason: SURG

## 2019-09-26 RX ORDER — DEXAMETHASONE SODIUM PHOSPHATE 10 MG/ML
INJECTION, SOLUTION INTRAMUSCULAR; INTRAVENOUS AS NEEDED
Status: DISCONTINUED | OUTPATIENT
Start: 2019-09-26 | End: 2019-09-26 | Stop reason: SURG

## 2019-09-26 RX ORDER — ONDANSETRON 2 MG/ML
4 INJECTION INTRAMUSCULAR; INTRAVENOUS ONCE AS NEEDED
Status: DISCONTINUED | OUTPATIENT
Start: 2019-09-26 | End: 2019-09-26 | Stop reason: HOSPADM

## 2019-09-26 RX ORDER — SODIUM CHLORIDE, SODIUM LACTATE, POTASSIUM CHLORIDE, CALCIUM CHLORIDE 600; 310; 30; 20 MG/100ML; MG/100ML; MG/100ML; MG/100ML
125 INJECTION, SOLUTION INTRAVENOUS CONTINUOUS
Status: DISCONTINUED | OUTPATIENT
Start: 2019-09-26 | End: 2019-09-26 | Stop reason: HOSPADM

## 2019-09-26 RX ORDER — PHENAZOPYRIDINE HYDROCHLORIDE 100 MG/1
100 TABLET, FILM COATED ORAL 3 TIMES DAILY PRN
Qty: 20 TABLET | Refills: 0 | Status: SHIPPED | OUTPATIENT
Start: 2019-09-26 | End: 2019-09-30

## 2019-09-26 RX ORDER — HYDROMORPHONE HCL/PF 1 MG/ML
0.2 SYRINGE (ML) INJECTION
Status: DISCONTINUED | OUTPATIENT
Start: 2019-09-26 | End: 2019-09-26 | Stop reason: HOSPADM

## 2019-09-26 RX ORDER — PROPOFOL 10 MG/ML
INJECTION, EMULSION INTRAVENOUS AS NEEDED
Status: DISCONTINUED | OUTPATIENT
Start: 2019-09-26 | End: 2019-09-26 | Stop reason: SURG

## 2019-09-26 RX ORDER — MAGNESIUM HYDROXIDE 1200 MG/15ML
LIQUID ORAL AS NEEDED
Status: DISCONTINUED | OUTPATIENT
Start: 2019-09-26 | End: 2019-09-26 | Stop reason: HOSPADM

## 2019-09-26 RX ORDER — CEFUROXIME AXETIL 500 MG/1
500 TABLET ORAL EVERY 12 HOURS SCHEDULED
Qty: 6 TABLET | Refills: 0 | Status: SHIPPED | OUTPATIENT
Start: 2019-09-26 | End: 2019-09-30

## 2019-09-26 RX ORDER — FENTANYL CITRATE/PF 50 MCG/ML
50 SYRINGE (ML) INJECTION
Status: DISCONTINUED | OUTPATIENT
Start: 2019-09-26 | End: 2019-09-26 | Stop reason: HOSPADM

## 2019-09-26 RX ORDER — TAMSULOSIN HYDROCHLORIDE 0.4 MG/1
0.4 CAPSULE ORAL ONCE
Status: COMPLETED | OUTPATIENT
Start: 2019-09-26 | End: 2019-09-26

## 2019-09-26 RX ORDER — FENTANYL CITRATE/PF 50 MCG/ML
25 SYRINGE (ML) INJECTION
Status: DISCONTINUED | OUTPATIENT
Start: 2019-09-26 | End: 2019-09-26 | Stop reason: HOSPADM

## 2019-09-26 RX ORDER — MIDAZOLAM HYDROCHLORIDE 1 MG/ML
INJECTION INTRAMUSCULAR; INTRAVENOUS AS NEEDED
Status: DISCONTINUED | OUTPATIENT
Start: 2019-09-26 | End: 2019-09-26 | Stop reason: SURG

## 2019-09-26 RX ORDER — LIDOCAINE HYDROCHLORIDE 20 MG/ML
JELLY TOPICAL AS NEEDED
Status: DISCONTINUED | OUTPATIENT
Start: 2019-09-26 | End: 2019-09-26 | Stop reason: HOSPADM

## 2019-09-26 RX ORDER — FENTANYL CITRATE 50 UG/ML
INJECTION, SOLUTION INTRAMUSCULAR; INTRAVENOUS AS NEEDED
Status: DISCONTINUED | OUTPATIENT
Start: 2019-09-26 | End: 2019-09-26 | Stop reason: SURG

## 2019-09-26 RX ORDER — ONDANSETRON 2 MG/ML
INJECTION INTRAMUSCULAR; INTRAVENOUS AS NEEDED
Status: DISCONTINUED | OUTPATIENT
Start: 2019-09-26 | End: 2019-09-26 | Stop reason: SURG

## 2019-09-26 RX ORDER — PHENAZOPYRIDINE HYDROCHLORIDE 100 MG/1
100 TABLET, FILM COATED ORAL ONCE
Status: COMPLETED | OUTPATIENT
Start: 2019-09-26 | End: 2019-09-26

## 2019-09-26 RX ADMIN — PROPOFOL 200 MG: 10 INJECTION, EMULSION INTRAVENOUS at 07:36

## 2019-09-26 RX ADMIN — FENTANYL CITRATE 25 MCG: 50 INJECTION, SOLUTION INTRAMUSCULAR; INTRAVENOUS at 07:56

## 2019-09-26 RX ADMIN — CEFAZOLIN SODIUM 3000 MG: 1 INJECTION, POWDER, FOR SOLUTION INTRAMUSCULAR; INTRAVENOUS at 07:32

## 2019-09-26 RX ADMIN — FENTANYL CITRATE 25 MCG: 50 INJECTION, SOLUTION INTRAMUSCULAR; INTRAVENOUS at 07:54

## 2019-09-26 RX ADMIN — PHENAZOPYRIDINE 100 MG: 100 TABLET ORAL at 08:38

## 2019-09-26 RX ADMIN — DEXAMETHASONE SODIUM PHOSPHATE 4 MG: 10 INJECTION, SOLUTION INTRAMUSCULAR; INTRAVENOUS at 07:40

## 2019-09-26 RX ADMIN — ONDANSETRON HYDROCHLORIDE 4 MG: 2 INJECTION, SOLUTION INTRAVENOUS at 07:40

## 2019-09-26 RX ADMIN — MIDAZOLAM HYDROCHLORIDE 2 MG: 1 INJECTION, SOLUTION INTRAMUSCULAR; INTRAVENOUS at 07:32

## 2019-09-26 RX ADMIN — METOPROLOL TARTRATE 2 MG: 1 INJECTION, SOLUTION INTRAVENOUS at 07:48

## 2019-09-26 RX ADMIN — SODIUM CHLORIDE, SODIUM LACTATE, POTASSIUM CHLORIDE, AND CALCIUM CHLORIDE 125 ML/HR: .6; .31; .03; .02 INJECTION, SOLUTION INTRAVENOUS at 07:29

## 2019-09-26 RX ADMIN — LIDOCAINE HYDROCHLORIDE 50 MG: 10 INJECTION, SOLUTION INFILTRATION; PERINEURAL at 07:36

## 2019-09-26 RX ADMIN — TAMSULOSIN HYDROCHLORIDE 0.4 MG: 0.4 CAPSULE ORAL at 08:38

## 2019-09-26 NOTE — OP NOTE
OPERATIVE REPORT  PATIENT NAME: Twyla Rollins    :  1965  MRN: [id number]  Pt [facility]: [facility] OR ROOM 01    SURGERY DATE: 2019    Surgeon(s) and Role:     * Lucita Siegel MD - Primary    Preop Diagnosis:  Ureteral stone [N20 1]    Post-Op Diagnosis Codes:     * Ureteral stone [N20 1]    Procedure(s) (LRB):  CYSTOSCOPY URETEROSCOPY WITH LITHOTRIPSY HOLMIUM LASER, RETROGRADE PYELOGRAM AND INSERTION STENT URETERAL (Right)    Specimen(s):  ID Type Source Tests Collected by Time Destination   A : CALCULUS FROM RIGHT KIDNEY Calculus Kidney, Right STONE ANALYSIS Lucita Siegel MD 2019 [id number]        Estimated Blood Loss:   Minimal    Drains:  Ureteral Drain/Stent Right ureter 6 Fr  (Active)   Number of days: 53       Anesthesia Type:   General    Operative Indications:  Ureteral stone [N20 1]      Operative Findings:  1  No ureteral stone  2  Small Roberto's plaques in kidney, free floating 2-3mm stone in the upper pole, basket extracted    Complications:   None    Procedure and Technique:  Twyla Rollins is a 49y o -year-old male with a 3-4 mm right mid ureteral stone  Previous underwent stent placement during a admission for urosepsis  They are proceeding to the operating room on 2019 to undergo right-sided ureteroscopy with holmium laser lithotripsy  Risk and benefits of the procedure were discussed and reviewed  Informed consent was obtained  After the smooth induction of general LMA anesthesia, the patient was placed in the dorsal lithotomy position  His genitalia was prepped and draped in a sterile fashion  Intravenous antibiotics were administered in the form of Ancef  A timeout was performed with all members of the operative team confirming the patient's identity, procedure to be performed, and laterality of the case  A 22 [place] rigid cystoscope with 30° lens was inserted  The bladder was thoroughly inspected  Attention was focused on the right ureteral orifice   There was a previously placed stent present  Because there was some difficulty passing the wire through the stent, cystoscope was performed and the wire was passed alongside the stent up to the level of the renal pelvis  Stent was removed intact  A long semi rigid ureteral scope was placed into the right ureter and was passed up to the ureteropelvic junction  No ureteral stones were noted  A 2nd wire was placed through the scope and the scope was removed in a push-pull fashion  A 5 3 Emir[place] flexible ureteral scope was then passed into the kidney  Contrast was placed to outline the upper interpolar and lower pole regions  We carefully inspected each  In the lower and interpolar regions there were some small Roberto's plaques  In the upper pole there was a free-floating 2-3 mm stone  [employer] light basket was used to grasp the stone and remove it intact  This was sent off for analysis  The ureteral scope was then replaced into the ureter and renal pelvis careful reinspection was performed  No additional large stones were seen  The flexible scope was slowly withdrawn through the ureter during a pull-out ureteroscopy  There were no residual stones within the ureter  The wire was backloaded through the cystoscope and a 6 Emirati 24 cm double-J ureteral stent was then placed in the standard fashion  The proximal coil was appreciated in the right  renal pelvis and the distal coil was visualized within the bladder  A string was left in place  The bladder was emptied and the cystoscope was removed  The string was secured to the dorsal penis with Tegaderm  2% viscous lidocaine was placed per urethra  Overall the patient tolerated the procedure well and there were no complications  The patient was taken out of dorsal lithotomy, extubated in the operating room and transferred to the PACU in stable condition at the conclusion of the case      Plan-stent on string removal in 3-5 days     I was present for the entire procedure    Patient Disposition:  PACU     SIGNATURE: Jessica Vazquez MD  DATE: September 26, 2019  TIME: 8:07 AM

## 2019-09-26 NOTE — TELEPHONE ENCOUNTER
Contacted and spoke with patient to schedule visit for stent removal   Patient scheduled for stent removal 09/30/2019 at 0800 at the [facility]

## 2019-09-26 NOTE — ANESTHESIA PREPROCEDURE EVALUATION
Review of Systems/Medical History  Patient summary reviewed  Chart reviewed  No history of anesthetic complications     Cardiovascular  Hypertension ,    Pulmonary  Shortness of breath,        GI/Hepatic  Negative GI/hepatic ROS          Kidney stones, Kidney disease CKD,   Comment: Severe urosepsis     Endo/Other    Comment: BMI 48 Obesity (BMI 55)  super morbid obesity   GYN       Hematology  Negative hematology ROS      Musculoskeletal  Gout,        Neurology  Negative neurology ROS      Psychology   Negative psychology ROS              Physical Exam    Airway    Mallampati score: II  TM Distance: >3 FB  Neck ROM: full     Dental   No notable dental hx     Cardiovascular      Pulmonary      Other Findings      Lab Results   Component Value Date    WBC 7 75 08/27/2019    HGB 10 1 (L) 08/27/2019     08/27/2019     Lab Results   Component Value Date    SODIUM 138 08/27/2019    K 3 2 (L) 08/27/2019    BUN 12 08/27/2019    CREATININE 1 65 (H) 08/27/2019    GLUCOSE 120 08/04/2019     Lab Results   Component Value Date    HGBA1C 5 4 03/24/2015 08/03/19 2222   CT chest abdomen pelvis wo contrast (Final result)      Impression      Mild to moderate right-sided hydroureteronephrosis with a 4 mm obstructing stone in the mid right ureter  Nonobstructing bilateral intrarenal calculi  Anesthesia Plan  ASA Score- 3     Anesthesia Type- general with ASA Monitors  Additional Monitors:   Airway Plan: LMA  Comment: General anesthesia, LMA; standard ASA monitors  Risks and benefits discussed with patient; patient consented and agrees to proceed  I saw and evaluated the patient  If seen with CRNA, we have discussed the anesthetic plan and I am in agreement that the plan is appropriate for the patient        Plan Factors-    Induction- intravenous  Postoperative Plan-     Informed Consent- Anesthetic plan and risks discussed with patient  I personally reviewed this patient with the CRNA   Discussed and agreed on the Anesthesia Plan with the CRNA  Millicent Enriquez

## 2019-09-26 NOTE — ANESTHESIA POSTPROCEDURE EVALUATION
Post-Op Assessment Note    CV Status:  Stable  Pain Score: 0    Pain management: adequate     Mental Status:  Alert and awake   Hydration Status:  Euvolemic   PONV Controlled:  Controlled   Airway Patency:  Patent   Post Op Vitals Reviewed: Yes      Staff: Anesthesiologist, CRNA           BP   110/52   Temp   98 8   Pulse  97   Resp   16   SpO2   97%

## 2019-09-26 NOTE — DISCHARGE INSTRUCTIONS
You have stent on a string, which will be removed in 3-5 days  Please take care not to remove with dressing or undressing  Our office staff will contact you to arrange an appointment for removal     Ureteroscopy   WHAT YOU NEED TO KNOW:   A ureteroscopy is a procedure to examine in the inside of your urinary tract, which includes your urethra, bladder, ureters, and kidneys  A ureteroscope is a small, thin tube with a light and camera on the end  Ureteroscopy can help your healthcare provider diagnose and treat problems in your urinary tract, such as kidney stones  DISCHARGE INSTRUCTIONS:   Medicine:   · Antibiotics  may be given to treat or prevent an infection  · Take your medicine as directed  Contact your healthcare provider if you think your medicine is not helping or if you have side effects  Tell him or her if you are allergic to any medicine  Keep a list of the medicines, vitamins, and herbs you take  Include the amounts, and when and why you take them  Bring the list or the pill bottles to follow-up visits  Carry your medicine list with you in case of an emergency  Follow up with your healthcare provider as directed:  Write down your questions so you remember to ask them during your visits  Drink liquids as directed  Liquids can help prevent kidney stones and urinary tract infections  Drink water and limit the amount of caffeine you drink  Caffeine may be found in coffee, tea, soda, sports drinks, and foods  Ask your healthcare provider how much liquid to drink each day  Contact your healthcare provider if:   · You have a fever  · You cannot urinate  · You have blood in your urine  · You are vomiting  · You have pain in your abdomen or side  · You have questions or concerns about your condition or care  © 2017 [employer] [employer] Information is for End User's use only and may not be sold, redistributed or otherwise used for commercial purposes   All illustrations and images included in CareNotes® are the copyrighted property of A D A M , Inc  or [employer]  The above information is an  only  It is not intended as medical advice for individual conditions or treatments  Talk to your doctor, nurse or pharmacist before following any medical regimen to see if it is safe and effective for you

## 2019-09-26 NOTE — H&P
UROLOGY NEW CONSULT NOTE     CHIEF COMPLAINT   Herminia Licea is a 49 y o  male with a complaint of No chief complaint on file  History of Present Illness:     49 y o  male who presented with urosepsis in early August   Patient underwent a right-sided stent for a 4 mm mid ureteral stone  He was discharged home  He returned with flank pain and repeat CT scan was performed demonstrating persistence of stone  He presents today for definitive surgical treatment  He is accompanied by his father      Past Medical History:     Past Medical History:   Diagnosis Date    Arthritis     Gout     Hypertension     Kidney stone        PAST SURGICAL HISTORY:     Past Surgical History:   Procedure Laterality Date    FL RETROGRADE PYELOGRAM  8/4/2019    NO PAST SURGERIES  03/23/2015    Last assessed    IA CYSTOURETHROSCOPY,URETER CATHETER Right 8/4/2019    Procedure: CYSTOSCOPY RETROGRADE PYELOGRAM WITH INSERTION STENT URETERAL;  Surgeon: Alvarez Woods MD;  [facility];  Service: Urology       CURRENT MEDICATIONS:     Current Facility-Administered Medications   Medication Dose Route Frequency Provider Last Rate Last Dose    ceFAZolin (ANCEF) 3,000 mg in dextrose 5 % 100 mL IVPB  3,000 mg Intravenous Once Wayne Fall PA-C           ALLERGIES:   No Known Allergies    SOCIAL HISTORY:     Social History     Socioeconomic History    Marital status: Single     Spouse name: None    Number of children: None    Years of education: None    Highest education level: None   Occupational History    None   Social Needs    Financial resource strain: None    Food insecurity:     Worry: None     Inability: None    Transportation needs:     Medical: None     Non-medical: None   Tobacco Use    Smoking status: Never Smoker    Smokeless tobacco: Never Used   Substance and Sexual Activity    Alcohol use: None     Comment: rarely    Drug use: No    Sexual activity: None   Lifestyle    Physical activity: Days per week: None     Minutes per session: None    Stress: None   Relationships    Social connections:     Talks on phone: None     Gets together: None     Attends Yazidism service: None     Active member of club or organization: None     Attends meetings of clubs or organizations: None     Relationship status: None    Intimate partner violence:     Fear of current or ex partner: None     Emotionally abused: None     Physically abused: None     Forced sexual activity: None   Other Topics Concern    None   Social History Narrative    Caffeine use        SOCIAL HISTORY:     Family History   Problem Relation Age of Onset    Cancer Mother        REVIEW OF SYSTEMS:     Review of Systems   Constitutional: Negative  Respiratory: Negative  Cardiovascular: Negative  Gastrointestinal: Negative  Genitourinary: Negative  Musculoskeletal: Negative  Skin: Negative  PHYSICAL EXAM:     /96   Pulse (!) 108   Temp 99 1 °F (37 3 °C) (Tympanic)   Resp 20   Ht 5' 6" (1 676 m)   Wt 136 kg (300 lb)   SpO2 99%   BMI 48 42 kg/m²     General:  Healthy appearing but obese male in no acute distress  They have a normal affect  There is not appear to be any gross neurologic defects or abnormalities  HEENT:  Normocephalic, atraumatic  Neck is supple without any palpable lymphadenopathy  Cardiovascular:  Patient has normal palpable distal radial pulses  There is no significant peripheral edema  No JVD is noted  Respiratory:  Patient has unlabored respirations  There is no audible wheeze or rhonchi  Abdomen:    Abdomen is soft and nontender  There is no tympany  Inguinal and umbilical hernia are not appreciated  Genitourinary:  Buried phallus    Musculoskeletal:  Patient does have significant CVA tenderness in the right flank with palpation or percussion  They full range of motion in all 4 extremities  Strength in all 4 extremities appears congruent    Patient is able to ambulate without assistance or difficulty  Dermatologic:  Patient has no skin abnormalities or rashes  LABS:     CBC:   Lab Results   Component Value Date    WBC 7 75 2019    HGB 10 1 (L) 2019    HCT 32 9 (L) 2019    MCV 96 2019     2019         BMP:   Lab Results   Component Value Date    GLUCOSE 120 2019    CALCIUM 8 9 2019     (L) 2015    K 3 2 (L) 2019    CO2 23 2019     2019    BUN 12 2019    CREATININE 1 65 (H) 2019     Urine Culture No Growth <1000 cfu/mL                Specimen Collected: 19         IMAGIN/23/19  CT CHEST, ABDOMEN AND PELVIS WITHOUT IV CONTRAST     INDICATION:   fever and chest/flank pain ; r/o basilar pneumonia, intra-abdominal infection      COMPARISON:  8/3/2019     TECHNIQUE: CT examination of the chest, abdomen and pelvis was performed without intravenous contrast   Axial, sagittal, and coronal 2D reformatted images were created from the source data and submitted for interpretation       Radiation dose length product (DLP) for this visit:  2837 72 mGy-cm   This examination, like all CT scans performed in the [facility], was performed utilizing techniques to minimize radiation dose exposure, including the use of   iterative reconstruction and automated exposure control       Enteric contrast was administered       FINDINGS:     CHEST     LUNGS:  Lungs are clear  There is no tracheal or endobronchial lesion      PLEURA:  Unremarkable      HEART/GREAT VESSELS:  Unremarkable for patient's age      MEDIASTINUM AND KRISTY:  Unremarkable      CHEST WALL AND LOWER NECK:   Unremarkable      ABDOMEN     LIVER/BILIARY TREE:  Unremarkable      GALLBLADDER:  No calcified gallstones  No pericholecystic inflammatory change      SPLEEN:  Unremarkable      PANCREAS:  Unremarkable      ADRENAL GLANDS:  Unremarkable      KIDNEYS/URETERS:  Right ureteral stent has been placed    No hydronephrosis is identified  3 mm nonobstructing right renal calculus is present  Nonobstructing left renal calculi measuring up to 3 mm are identified      STOMACH AND BOWEL:  Unremarkable      APPENDIX:  No findings to suggest appendicitis      ABDOMINOPELVIC CAVITY:  No ascites or free intraperitoneal air  No lymphadenopathy      VESSELS:  Unremarkable for patient's age      PELVIS     REPRODUCTIVE ORGANS:  Unremarkable for patient's age      URINARY BLADDER:  Unremarkable      ABDOMINAL WALL/INGUINAL REGIONS:  Unremarkable      OSSEOUS STRUCTURES:  No acute fracture or destructive osseous lesion      IMPRESSION:     No acute cardiopulmonary abnormality      Interval placement of right ureteral stent  No hydronephrosis identified  Bilateral nonobstructing renal calculi  ASSESSMENT:     49 y o  male with RIGHT ureteral stone, s/p stent    PLAN:     Discussed with patient plan for surgery today  Detailed how the surgery is performed and its inherent risks and benefits  Patient understands the need for postoperative stent replacement and removal in our office  Consent for cystoscopy, [RIGHT] retrograde pyelogram, ureteroscopy with possible laser lithotripsy and basket extraction of stone, stent placement  Patient's recent urine culture is negative but admission culture from early August shows pansensitive E coli in the urine and blood  Patient will receive Ancef preoperatively

## 2019-09-27 NOTE — TELEPHONE ENCOUNTER
Called patient he was concerned about the string being to long and does not want to get it caught on anything  Patient requested if he could cut it shorter I advised not to do it   Informed patient that he could use a Band-Aid to secure string in place

## 2019-09-27 NOTE — TELEPHONE ENCOUNTER
Patient called in and advised that his stent sting goes down to his knees   Patient would like to know if this is normal? Please advise

## 2019-09-30 ENCOUNTER — TELEPHONE (OUTPATIENT)
Dept: UROLOGY | Facility: CLINIC | Age: 54
End: 2019-09-30

## 2019-09-30 ENCOUNTER — CLINICAL SUPPORT (OUTPATIENT)
Dept: UROLOGY | Facility: CLINIC | Age: 54
End: 2019-09-30
Payer: COMMERCIAL

## 2019-09-30 VITALS
HEIGHT: 66 IN | DIASTOLIC BLOOD PRESSURE: 90 MMHG | RESPIRATION RATE: 20 BRPM | HEART RATE: 80 BPM | WEIGHT: 302.47 LBS | SYSTOLIC BLOOD PRESSURE: 130 MMHG | BODY MASS INDEX: 48.61 KG/M2

## 2019-09-30 DIAGNOSIS — N20.1 URETERAL STONE: Primary | ICD-10-CM

## 2019-09-30 DIAGNOSIS — N20.0 CALCULUS OF KIDNEY: Primary | ICD-10-CM

## 2019-09-30 PROCEDURE — 99211 OFF/OP EST MAY X REQ PHY/QHP: CPT

## 2019-09-30 RX ORDER — CEPHALEXIN 500 MG/1
500 CAPSULE ORAL 2 TIMES DAILY
Qty: 6 CAPSULE | Refills: 0 | Status: SHIPPED | OUTPATIENT
Start: 2019-09-30 | End: 2019-10-03

## 2019-09-30 NOTE — TELEPHONE ENCOUNTER
Patient s/p right ureteroscopic stone extraction, right stent placement 09/26/2019 by Dr Ryan Bloom  Patient's stent's removed today  Please prescribe antibiotics for stent removal coverage to [facility] pharmacy    Thank You!!!

## 2019-10-01 ENCOUNTER — OFFICE VISIT (OUTPATIENT)
Dept: INTERNAL MEDICINE CLINIC | Facility: CLINIC | Age: 54
End: 2019-10-01
Payer: COMMERCIAL

## 2019-10-01 VITALS
OXYGEN SATURATION: 98 % | SYSTOLIC BLOOD PRESSURE: 120 MMHG | DIASTOLIC BLOOD PRESSURE: 82 MMHG | HEART RATE: 75 BPM | TEMPERATURE: 98.6 F | WEIGHT: 306.8 LBS | BODY MASS INDEX: 49.31 KG/M2 | RESPIRATION RATE: 18 BRPM | HEIGHT: 66 IN

## 2019-10-01 DIAGNOSIS — I10 ESSENTIAL HYPERTENSION: ICD-10-CM

## 2019-10-01 DIAGNOSIS — N18.30 CHRONIC KIDNEY DISEASE, STAGE 3 (HCC): Chronic | ICD-10-CM

## 2019-10-01 DIAGNOSIS — N20.1 URETERAL STONE: Primary | ICD-10-CM

## 2019-10-01 PROBLEM — M79.89 SWELLING OF RIGHT FOOT: Status: RESOLVED | Noted: 2019-08-30 | Resolved: 2019-10-01

## 2019-10-01 PROCEDURE — 99213 OFFICE O/P EST LOW 20 MIN: CPT | Performed by: NURSE PRACTITIONER

## 2019-10-01 PROCEDURE — 3079F DIAST BP 80-89 MM HG: CPT | Performed by: NURSE PRACTITIONER

## 2019-10-01 PROCEDURE — 3074F SYST BP LT 130 MM HG: CPT | Performed by: NURSE PRACTITIONER

## 2019-10-01 NOTE — PROGRESS NOTES
Assessment/Plan: Patient Is doing well today and in good spirits  He is following back up with Urology in December  He was advised if any issues or concerns to call the office and will bring him back sooner then 3 months  No problem-specific Assessment & Plan notes found for this encounter  Problem List Items Addressed This Visit        Cardiovascular and Mediastinum    Hypertension       Genitourinary    Chronic kidney disease, stage 3 (HCC) (Chronic)    Ureteral stone - Primary            Subjective:      Patient ID: Shade Alvarez is a 49 y o  male  Luis Story is here today for a follow up visit  He was seen by Urology on 9/26 for a cysto ureteroscopy with retrograde pyelogram and insertion of stent removal  He has had a history over the past couple of months of recurrent kidney stones and UTI  He did have his stent removed on 9/30 with Urology  He is on three days of Keflex and states he is feeling wonderful  He will be returning to work on October 15th  He is waiting his diet and trying to stay well hydrated  He is denying any pain or fever  He states he is exercising on his exercise bike at home and trying to keep moving  He offers no other issues  The following portions of the patient's history were reviewed and updated as appropriate:   He  has a past medical history of Arthritis, Gout, Hypertension, and Kidney stone  He   Patient Active Problem List    Diagnosis Date Noted    Insomnia 09/16/2019    Ureteral stone 08/27/2019    Hypertension 08/27/2019    Sepsis ([facility] [address] ) 08/23/2019    Acute UTI 08/23/2019    Chronic kidney disease, stage 3 ([facility] [address] ) 08/23/2019    Acute kidney injury ([facility] [address] ) 08/06/2019    Headache 08/06/2019    Gram-negative bacteremia 22/07/1415    Metabolic acidosis 29/83/9485    History of gout 07/15/2019    Vitamin D deficiency 06/10/2015    Morbid obesity ([facility] [address] ) 03/23/2015     He  has a past surgical history that includes No past surgeries (03/23/2015);  FL retrograde pyelogram (8/4/2019); pr cystourethroscopy,ureter catheter (Right, 8/4/2019); pr cysto/uretero w/lithotripsy &indwell stent insrt (Right, 9/26/2019); and FL retrograde pyelogram (9/26/2019)  His family history includes Cancer in his mother  He  reports that he has never smoked  He has never used smokeless tobacco  He reports that he does not use drugs  His alcohol history is not on file  Current Outpatient Medications   Medication Sig Dispense Refill    acetaminophen (TYLENOL) 500 mg tablet Take 500 mg by mouth every 6 (six) hours as needed for mild pain      cephalexin (KEFLEX) 500 mg capsule Take 1 capsule (500 mg total) by mouth 2 (two) times a day for 3 days 6 capsule 0    cholecalciferol 1000 units tablet Take 2 tablets (2,000 Units total) by mouth daily (Patient taking differently: Take 1,000 Units by mouth 2 (two) times a day ) 60 tablet 0    colchicine-probenecid 0 5-500 MG per tablet Take 1 tablet by mouth daily  3    tamsulosin (FLOMAX) 0 4 mg TAKE 1 CAPSULE BY MOUTH EVERY 24 HOURS 90 capsule 3    metoprolol succinate (TOPROL-XL) 25 mg 24 hr tablet Take 1 tablet (25 mg total) by mouth daily (Patient not taking: Reported on 10/1/2019) 30 tablet 0     No current facility-administered medications for this visit        Current Outpatient Medications on File Prior to Visit   Medication Sig    acetaminophen (TYLENOL) 500 mg tablet Take 500 mg by mouth every 6 (six) hours as needed for mild pain    cephalexin (KEFLEX) 500 mg capsule Take 1 capsule (500 mg total) by mouth 2 (two) times a day for 3 days    cholecalciferol 1000 units tablet Take 2 tablets (2,000 Units total) by mouth daily (Patient taking differently: Take 1,000 Units by mouth 2 (two) times a day )    colchicine-probenecid 0 5-500 MG per tablet Take 1 tablet by mouth daily    tamsulosin (FLOMAX) 0 4 mg TAKE 1 CAPSULE BY MOUTH EVERY 24 HOURS    metoprolol succinate (TOPROL-XL) 25 mg 24 hr tablet Take 1 tablet (25 mg total) by mouth daily (Patient not taking: Reported on 10/1/2019)     No current facility-administered medications on file prior to visit  He has No Known Allergies       Review of Systems   All other systems reviewed and are negative  Objective:      /82 (BP [facility]: Right arm, Patient Position: Sitting, Cuff Size: Large)   Pulse 75   Temp 98 6 °F (37 °C) (Temporal)   Resp 18   Ht 5' 6" (1 676 m)   Wt (!) 139 kg (306 lb 12 8 oz)   SpO2 98%   BMI 49 52 kg/m²          Physical Exam   Constitutional: He is oriented to person, place, and time  He appears well-developed and well-nourished  Cardiovascular: Normal rate, regular rhythm, normal heart sounds and intact distal pulses  Pulmonary/Chest: Effort normal and breath sounds normal    Abdominal: Soft  Bowel sounds are normal    Musculoskeletal: Normal range of motion  Neurological: He is alert and oriented to person, place, and time  Skin: Skin is warm and dry  Capillary refill takes less than 2 seconds  Psychiatric: He has a normal mood and affect  His behavior is normal  Judgment and thought content normal    Vitals reviewed

## 2019-10-02 LAB
CA PHOS MFR STONE: 2 %
COLOR STONE: NORMAL
COM MFR STONE: 13 %
COMMENT-STONE3: NORMAL
COMPOSITION: NORMAL
LABORATORY COMMENT REPORT: NORMAL
NIDUS STONE QL: NORMAL
PHOTO: NORMAL
SIZE STONE: NORMAL MM
STONE ANALYSIS-IMP: NORMAL
STONE ANALYSIS-IMP: NORMAL
URATE MFR STONE: 85 %
WT STONE: 9.3 MG

## 2019-10-10 ENCOUNTER — TELEPHONE (OUTPATIENT)
Dept: INTERNAL MEDICINE CLINIC | Facility: CLINIC | Age: 54
End: 2019-10-10

## 2019-10-10 ENCOUNTER — HOSPITAL ENCOUNTER (EMERGENCY)
Facility: HOSPITAL | Age: 54
Discharge: HOME | End: 2019-10-10
Attending: FAMILY MEDICINE
Payer: COMMERCIAL

## 2019-10-10 ENCOUNTER — APPOINTMENT (EMERGENCY)
Dept: CT IMAGING | Facility: HOSPITAL | Age: 54
End: 2019-10-10
Payer: COMMERCIAL

## 2019-10-10 VITALS
RESPIRATION RATE: 18 BRPM | SYSTOLIC BLOOD PRESSURE: 165 MMHG | OXYGEN SATURATION: 97 % | BODY MASS INDEX: 49.39 KG/M2 | HEART RATE: 123 BPM | WEIGHT: 307.32 LBS | TEMPERATURE: 98.6 F | DIASTOLIC BLOOD PRESSURE: 95 MMHG | HEIGHT: 66 IN

## 2019-10-10 DIAGNOSIS — N20.0 KIDNEY STONE: ICD-10-CM

## 2019-10-10 DIAGNOSIS — R10.9 RIGHT FLANK PAIN: Primary | ICD-10-CM

## 2019-10-10 LAB
ANION GAP SERPL CALCULATED.3IONS-SCNC: 9 MMOL/L (ref 4–13)
BACTERIA UR QL AUTO: ABNORMAL /HPF
BASOPHILS # BLD AUTO: 0.06 THOUSANDS/ΜL (ref 0–0.1)
BASOPHILS NFR BLD AUTO: 1 % (ref 0–1)
BILIRUB UR QL STRIP: NEGATIVE
BUN SERPL-MCNC: 12 MG/DL (ref 5–25)
CALCIUM SERPL-MCNC: 9.4 MG/DL (ref 8.3–10.1)
CHLORIDE SERPL-SCNC: 105 MMOL/L (ref 100–108)
CLARITY UR: ABNORMAL
CO2 SERPL-SCNC: 23 MMOL/L (ref 21–32)
COLOR UR: YELLOW
CREAT SERPL-MCNC: 1.54 MG/DL (ref 0.6–1.3)
EOSINOPHIL # BLD AUTO: 0.44 THOUSAND/ΜL (ref 0–0.61)
EOSINOPHIL NFR BLD AUTO: 5 % (ref 0–6)
ERYTHROCYTE [DISTWIDTH] IN BLOOD BY AUTOMATED COUNT: 14.9 % (ref 11.6–15.1)
GFR SERPL CREATININE-BSD FRML MDRD: 51 ML/MIN/1.73SQ M
GLUCOSE SERPL-MCNC: 110 MG/DL (ref 65–140)
GLUCOSE UR STRIP-MCNC: NEGATIVE MG/DL
HCT VFR BLD AUTO: 41.2 % (ref 36.5–49.3)
HGB BLD-MCNC: 12.9 G/DL (ref 12–17)
HGB UR QL STRIP.AUTO: ABNORMAL
HYALINE CASTS #/AREA URNS LPF: ABNORMAL /LPF
IMM GRANULOCYTES # BLD AUTO: 0.05 THOUSAND/UL (ref 0–0.2)
IMM GRANULOCYTES NFR BLD AUTO: 1 % (ref 0–2)
KETONES UR STRIP-MCNC: NEGATIVE MG/DL
LEUKOCYTE ESTERASE UR QL STRIP: NEGATIVE
LYMPHOCYTES # BLD AUTO: 1.87 THOUSANDS/ΜL (ref 0.6–4.47)
LYMPHOCYTES NFR BLD AUTO: 19 % (ref 14–44)
MCH RBC QN AUTO: 30.1 PG (ref 26.8–34.3)
MCHC RBC AUTO-ENTMCNC: 31.3 G/DL (ref 31.4–37.4)
MCV RBC AUTO: 96 FL (ref 82–98)
MONOCYTES # BLD AUTO: 0.75 THOUSAND/ΜL (ref 0.17–1.22)
MONOCYTES NFR BLD AUTO: 8 % (ref 4–12)
MUCOUS THREADS UR QL AUTO: ABNORMAL
NEUTROPHILS # BLD AUTO: 6.52 THOUSANDS/ΜL (ref 1.85–7.62)
NEUTS SEG NFR BLD AUTO: 66 % (ref 43–75)
NITRITE UR QL STRIP: NEGATIVE
NON-SQ EPI CELLS URNS QL MICRO: ABNORMAL /HPF
NRBC BLD AUTO-RTO: 0 /100 WBCS
PH UR STRIP.AUTO: 5 [PH]
PLATELET # BLD AUTO: 215 THOUSANDS/UL (ref 149–390)
PMV BLD AUTO: 9.3 FL (ref 8.9–12.7)
POTASSIUM SERPL-SCNC: 3.6 MMOL/L (ref 3.5–5.3)
PROT UR STRIP-MCNC: NEGATIVE MG/DL
RBC # BLD AUTO: 4.28 MILLION/UL (ref 3.88–5.62)
RBC #/AREA URNS AUTO: ABNORMAL /HPF
SODIUM SERPL-SCNC: 137 MMOL/L (ref 136–145)
SP GR UR STRIP.AUTO: 1.02 (ref 1–1.03)
UROBILINOGEN UR QL STRIP.AUTO: 0.2 E.U./DL
WBC # BLD AUTO: 9.69 THOUSAND/UL (ref 4.31–10.16)
WBC #/AREA URNS AUTO: ABNORMAL /HPF

## 2019-10-10 PROCEDURE — 85025 COMPLETE CBC W/AUTO DIFF WBC: CPT | Performed by: FAMILY MEDICINE

## 2019-10-10 PROCEDURE — 99284 EMERGENCY DEPT VISIT MOD MDM: CPT

## 2019-10-10 PROCEDURE — 96374 THER/PROPH/DIAG INJ IV PUSH: CPT

## 2019-10-10 PROCEDURE — 74176 CT ABD & PELVIS W/O CONTRAST: CPT

## 2019-10-10 PROCEDURE — 36415 COLL VENOUS BLD VENIPUNCTURE: CPT | Performed by: FAMILY MEDICINE

## 2019-10-10 PROCEDURE — 81001 URINALYSIS AUTO W/SCOPE: CPT | Performed by: FAMILY MEDICINE

## 2019-10-10 PROCEDURE — 99284 EMERGENCY DEPT VISIT MOD MDM: CPT | Performed by: FAMILY MEDICINE

## 2019-10-10 PROCEDURE — 96361 HYDRATE IV INFUSION ADD-ON: CPT

## 2019-10-10 PROCEDURE — 80048 BASIC METABOLIC PNL TOTAL CA: CPT | Performed by: FAMILY MEDICINE

## 2019-10-10 RX ORDER — KETOROLAC TROMETHAMINE 30 MG/ML
30 INJECTION, SOLUTION INTRAMUSCULAR; INTRAVENOUS ONCE
Status: COMPLETED | OUTPATIENT
Start: 2019-10-10 | End: 2019-10-10

## 2019-10-10 RX ADMIN — SODIUM CHLORIDE 1000 ML: 0.9 INJECTION, SOLUTION INTRAVENOUS at 09:28

## 2019-10-10 RX ADMIN — KETOROLAC TROMETHAMINE 30 MG: 30 INJECTION, SOLUTION INTRAMUSCULAR at 09:33

## 2019-10-10 NOTE — TELEPHONE ENCOUNTER
Pt walked into the office stating he has a sharp pain in the low R side of his back  Stated he did not know if it has something to do with his kidney  I asked how was the pain and he stated it takes his breath away  Informed him pal at provider's meeting and could not see him to later this morning  Informed him to go to ED secondary to past kidney history and that he may need imaging     Pt agreed and was going to [facility]  Stated he was OK to drive himself

## 2019-10-10 NOTE — ED PROVIDER NOTES
History  Chief Complaint   Patient presents with    Flank Pain     Right sided flank pain, hx of stones to same side       History provided by:  Patient   used: No    Flank Pain   Pain location:  R flank  Pain quality: sharp    Pain radiates to:  Does not radiate  Pain severity:  Mild  Onset quality:  Sudden  Duration:  1 day  Timing:  Intermittent  Progression:  Waxing and waning  Chronicity:  New  Context: not alcohol use, not awakening from sleep, not retching, not sick contacts and not suspicious food intake    Relieved by: Tylenol  Worsened by:  Nothing  Ineffective treatments:  Acetaminophen  Associated symptoms: no anorexia, no chest pain, no chills, no constipation, no melena, no nausea and no shortness of breath    Risk factors: recent hospitalization    Risk factors: no NSAID use    This is a 28-year-old male presented with complain of right flank pain x1 day  Patient states that he thinks that he strained his back muscle  He states that he has been taking Tylenol which has helped with the pain  Denies any nausea vomiting or diarrhea at this time  He is denying any abdominal pain  Patient states that he recently had lithotripsy done and his stent was removed about 2 weeks ago  Patient rating his pain 4/10 at this time  Nothing makes the pain better or worse  Prior to Admission Medications   Prescriptions Last Dose Informant Patient Reported?  Taking?   acetaminophen (TYLENOL) 500 mg tablet  Self Yes No   Sig: Take 500 mg by mouth every 6 (six) hours as needed for mild pain   cholecalciferol 1000 units tablet  Self No No   Sig: Take 2 tablets (2,000 Units total) by mouth daily   Patient taking differently: Take 1,000 Units by mouth 2 (two) times a day    colchicine-probenecid 0 5-500 MG per tablet  Self Yes No   Sig: Take 1 tablet by mouth daily   metoprolol succinate (TOPROL-XL) 25 mg 24 hr tablet  Self No No   Sig: Take 1 tablet (25 mg total) by mouth daily   Patient not taking: Reported on 10/1/2019   tamsulosin (FLOMAX) 0 4 mg  Self No No   Sig: TAKE 1 CAPSULE BY MOUTH EVERY 24 HOURS      Facility-Administered Medications: None       Past Medical History:   Diagnosis Date    Arthritis     Gout     Hypertension     Kidney stone        Past Surgical History:   Procedure Laterality Date    FL RETROGRADE PYELOGRAM  8/4/2019    FL RETROGRADE PYELOGRAM  9/26/2019    NO PAST SURGERIES  03/23/2015    Last assessed    UT CYSTO/URETERO W/LITHOTRIPSY &INDWELL STENT INSRT Right 9/26/2019    Procedure: CYSTOSCOPY URETEROSCOPY WITH RETROGRADE PYELOGRAM AND INSERTION STENT URETERAL;  Surgeon: Josefina Garcia MD;  [facility]: [facility];  Service: Urology    UT CYSTOURETHROSCOPY,URETER CATHETER Right 8/4/2019    Procedure: CYSTOSCOPY RETROGRADE PYELOGRAM WITH INSERTION STENT URETERAL;  Surgeon: Tess Jacobs MD;  [facility]:  MAIN OR;  Service: Urology       Family History   Problem Relation Age of Onset    Cancer Mother      I have reviewed and agree with the history as documented  Social History     Tobacco Use    Smoking status: Never Smoker    Smokeless tobacco: Never Used   Substance Use Topics    Alcohol use: Never     Alcohol/week: 0 0 standard drinks     Frequency: Never     Comment: rarely    Drug use: No        Review of Systems   Constitutional: Negative for chills  HENT: Negative  Respiratory: Negative for shortness of breath  Cardiovascular: Negative for chest pain  Gastrointestinal: Negative for anorexia, constipation, melena and nausea  Genitourinary: Positive for flank pain  Physical Exam  Physical Exam   Constitutional: He is oriented to person, place, and time  He appears well-developed and well-nourished  Obese   HENT:   Head: Normocephalic and atraumatic  Neck: Normal range of motion  Neck supple  Cardiovascular: Normal rate, regular rhythm and normal heart sounds     Pulmonary/Chest: Effort normal and breath sounds normal  Abdominal: Soft  Bowel sounds are normal    Right flank there is mild tenderness to palpation at the right flank her region no deformities noted  Musculoskeletal: Normal range of motion  Neurological: He is alert and oriented to person, place, and time  Nursing note and vitals reviewed        Vital Signs  ED Triage Vitals [10/10/19 0849]   Temperature Pulse Respirations Blood Pressure SpO2   98 6 °F (37 °C) (!) 123 18 165/95 97 %      Temp Source Heart Rate Source Patient Position - Orthostatic VS BP [facility] FiO2 (%)   Temporal Monitor Lying Left arm --      Pain Score       8           Vitals:    10/10/19 0849   BP: 165/95   Pulse: (!) 123   Patient Position - Orthostatic VS: Lying         Visual Acuity      ED Medications  Medications   sodium chloride 0 9 % bolus 1,000 mL (1,000 mL Intravenous New Bag 10/10/19 0928)   ketorolac (TORADOL) injection 30 mg (30 mg Intravenous Given 10/10/19 0933)       Diagnostic Studies  Results Reviewed     Procedure Component Value Units Date/Time    Urine Microscopic [[id number]]  (Abnormal) Collected:  10/10/19 0928    Lab Status:  Final result Specimen:  Urine, Clean Catch Updated:  10/10/19 1009     RBC, UA 0-1 /hpf      WBC, UA 2-4 /hpf      Epithelial Cells Moderate /hpf      Bacteria, UA Occasional /hpf      Hyaline Casts, UA 0-1 /lpf      MUCUS THREADS Occasional    UA w Reflex to Microscopic w Reflex to Culture [[id number]]  (Abnormal) Collected:  10/10/19 0928    Lab Status:  Final result Specimen:  Urine, Clean Catch Updated:  10/10/19 0942     Color, UA Yellow     Clarity, UA Slightly Cloudy     Specific [place], UA 1 020     pH, UA 5 0     Leukocytes, UA Negative     Nitrite, UA Negative     Protein, UA Negative mg/dl      Glucose, UA Negative mg/dl      Ketones, UA Negative mg/dl      Urobilinogen, UA 0 2 E U /dl      Bilirubin, UA Negative     Blood, UA Trace-Intact    Basic metabolic panel [[id number]]  (Abnormal) Collected:  10/10/19 0911    Lab Status: Final result Specimen:  Blood from Arm, Left Updated:  10/10/19 0928     Sodium 137 mmol/L      Potassium 3 6 mmol/L      Chloride 105 mmol/L      CO2 23 mmol/L      ANION GAP 9 mmol/L      BUN 12 mg/dL      Creatinine 1 54 mg/dL      Glucose 110 mg/dL      Calcium 9 4 mg/dL      eGFR 51 ml/min/1 73sq m     Narrative:       [place] guidelines for Chronic Kidney Disease (CKD):     Stage 1 with normal or high GFR (GFR > 90 mL/min/1 73 square meters)    Stage 2 Mild CKD (GFR = 60-89 mL/min/1 73 square meters)    Stage 3A Moderate CKD (GFR = 45-59 mL/min/1 73 square meters)    Stage 3B Moderate CKD (GFR = 30-44 mL/min/1 73 square meters)    Stage 4 Severe CKD (GFR = 15-29 mL/min/1 73 square meters)    Stage 5 End Stage CKD (GFR <15 mL/min/1 73 square meters)  Note: GFR calculation is accurate only with a steady state creatinine    CBC and differential [[id number]]  (Abnormal) Collected:  10/10/19 0911    Lab Status:  Final result Specimen:  Blood from Arm, Left Updated:  10/10/19 0917     WBC 9 69 Thousand/uL      RBC 4 28 Million/uL      Hemoglobin 12 9 g/dL      Hematocrit 41 2 %      MCV 96 fL      MCH 30 1 pg      MCHC 31 3 g/dL      RDW 14 9 %      MPV 9 3 fL      Platelets 202 Thousands/uL      nRBC 0 /100 WBCs      Neutrophils Relative 66 %      Immat GRANS % 1 %      Lymphocytes Relative 19 %      Monocytes Relative 8 %      Eosinophils Relative 5 %      Basophils Relative 1 %      Neutrophils Absolute 6 52 Thousands/µL      Immature Grans Absolute 0 05 Thousand/uL      Lymphocytes Absolute 1 87 Thousands/µL      Monocytes Absolute 0 75 Thousand/µL      Eosinophils Absolute 0 44 Thousand/µL      Basophils Absolute 0 06 Thousands/µL                  CT renal stone study abdomen pelvis wo contrast   Final Result by Michelle Marx MD (10/10 3898)      No ureteral calculi or hydronephrosis  No acute CT abnormality in the abdomen or pelvis to definitively account for right flank pain  Nonobstructing intrarenal calculi in left kidney on the order of 2 to 3 mm in size  Right basilar atelectasis  Small hiatal hernia  Fat-containing umbilical and right inguinal hernias  Workstation performed: [id number]                    Procedures  Procedures       ED Course  ED Course as of Oct 10 1017   Thu Oct 10, 2019   1011 Patient states his pain is better  The CT shows her renal stone on left side  MDM    Disposition  Final diagnoses:   Right flank pain   Kidney stone     Time reflects when diagnosis was documented in both MDM as applicable and the Disposition within this note     Time User Action Codes Description Comment    10/10/2019 10:17 AM Sarahy Knapp Add [R10 9] Right flank pain     10/10/2019 10:17 AM Sarahy Knapp Add [N20 0] Kidney stone       ED Disposition     ED Disposition Condition Date/Time Comment    Discharge Stable Thu Oct 10, 2019 10:17 AM Maryjo Thao discharge to home/self care  Follow-up Information     Follow up With Specialties Details Why [facility] Tate, Nurse Practitioner Schedule an appointment as soon as possible for a visit in 2 days If symptoms worsen [address] [place] [address]  [phone]            Patient's Medications   Discharge Prescriptions    No medications on file     No discharge procedures on file      ED Provider  Electronically Signed by           Omid Hernandez MD  10/10/19 1017

## 2019-10-14 ENCOUNTER — OFFICE VISIT (OUTPATIENT)
Dept: INTERNAL MEDICINE CLINIC | Facility: CLINIC | Age: 54
End: 2019-10-14
Payer: COMMERCIAL

## 2019-10-14 VITALS
WEIGHT: 310.5 LBS | OXYGEN SATURATION: 99 % | HEART RATE: 87 BPM | RESPIRATION RATE: 16 BRPM | SYSTOLIC BLOOD PRESSURE: 130 MMHG | TEMPERATURE: 99 F | BODY MASS INDEX: 49.9 KG/M2 | HEIGHT: 66 IN | DIASTOLIC BLOOD PRESSURE: 84 MMHG

## 2019-10-14 DIAGNOSIS — Z87.39 HISTORY OF GOUT: ICD-10-CM

## 2019-10-14 DIAGNOSIS — N20.1 URETERAL STONE: ICD-10-CM

## 2019-10-14 DIAGNOSIS — N18.30 CHRONIC KIDNEY DISEASE, STAGE 3 (HCC): Primary | Chronic | ICD-10-CM

## 2019-10-14 DIAGNOSIS — R10.9 RIGHT FLANK PAIN: ICD-10-CM

## 2019-10-14 PROBLEM — N17.9 ACUTE KIDNEY INJURY (HCC): Status: RESOLVED | Noted: 2019-08-06 | Resolved: 2019-10-14

## 2019-10-14 PROBLEM — N39.0 ACUTE UTI: Status: RESOLVED | Noted: 2019-08-23 | Resolved: 2019-10-14

## 2019-10-14 PROBLEM — R51.9 HEADACHE: Status: RESOLVED | Noted: 2019-08-06 | Resolved: 2019-10-14

## 2019-10-14 PROCEDURE — 99213 OFFICE O/P EST LOW 20 MIN: CPT | Performed by: NURSE PRACTITIONER

## 2019-10-14 PROCEDURE — 3008F BODY MASS INDEX DOCD: CPT | Performed by: NURSE PRACTITIONER

## 2019-10-14 NOTE — PATIENT INSTRUCTIONS
Kidney Stones   WHAT YOU NEED TO KNOW:   What is a kidney stone? Kidney stones form in the urinary system when the water and waste in your urine are out of balance  When this happens, certain types of waste crystals separate from the urine  The crystals build up and form kidney stones  Kidney stones can be made of uric acid, calcium, phosphate, or oxalate crystals  You may have 1 or more kidney stones  What increases my risk for kidney stones? · You do not drink enough liquids (especially water) each day  · You have urinary tract infections often  · You follow a certain type of diet  For example, people who eat a diet high in meat or salt may be at higher risk for kidney stones  People who eat foods high in oxalate may also be at higher risk  Foods that are high in oxalate include nuts, chocolate, coffee, and green leafy vegetables  · You take certain medicines such as diuretics, steroids, and antacids  · A family member has had kidney stones  · You were born with a kidney or bowel disorder, or you have other medical problems such as gout  What are the signs and symptoms of kidney stones? · Pain in the middle of your back that moves across to your side or that may spread to your groin    · Nausea and vomiting    · Urge to urinate often, burning feeling when you urinate, or pink or red urine    · Tenderness in your lower back, side, or stomach  How are kidney stones diagnosed? Your healthcare provider will ask about your health, diet, and lifestyle  He may refer you to a urologist  Sarahy Farfan may need tests to find out what type of kidney stones you have  Tests can show the size of your kidney stones and where they are in your urinary system  You may have one or more of the following:  · Urine tests  may show if you have blood in your urine  They may also show high amounts of the substances that form kidney stones, such as uric acid  · Blood tests  show how well your kidneys are working   They may also be used to check the levels of calcium or uric acid in your blood  · A noncontrast helical CT scan  is a type of x-ray that uses a computer to take pictures of your kidneys  Healthcare providers use the pictures to check for kidney stones or other problems  · X-rays  of your kidneys, bladder, and ureters may be done  You may be given a dye before the pictures are taken to help healthcare providers see the pictures better  You may need to have more than one x-ray  Tell the healthcare provider if you have ever had an allergic reaction to contrast dye  · An abdominal ultrasound  uses sound waves to show pictures of your abdomen on a monitor  How are kidney stones treated? · NSAIDs , such as ibuprofen, help decrease swelling, pain, and fever  This medicine is available with or without a doctor's order  NSAIDs can cause stomach bleeding or kidney problems in certain people  If you take blood thinner medicine, always ask your healthcare provider if NSAIDs are safe for you  Always read the medicine label and follow directions  · Prescription medicine  may be given  Ask how to take this medicine safely  · Medicines  to balance your electrolytes may be needed  · A procedure or surgery  to remove the kidney stones may be needed if they do not pass on their own  Your treatment will depend on the size and location of your kidney stones  How can I manage my symptoms? · Drink plenty of liquids  Your healthcare provider may tell you to drink at least 8 to 12 (eight-ounce) cups of liquids each day  This helps flush out the kidney stones when you urinate  Water is the best liquid to drink  · Strain your urine every time you go to the bathroom  Urinate through a strainer or a piece of thin cloth to catch the stones  Take the stones to your healthcare provider so they can be sent to the lab for tests  This will help your healthcare providers plan the best treatment for you             · Eat a variety of healthy foods  Healthy foods include fruits, vegetables, whole-grain breads, low-fat dairy products, beans, and fish  You may need to limit how much sodium (salt) or protein you eat  Ask for information about the best foods for you  · Exercise regularly  Your stones may pass more easily if you stay active  Ask about the best activities for you  When should I seek immediate care? · You have vomiting that is not relieved by medicine  When should I contact my healthcare provider? · You have a fever  · You have trouble passing urine  · You see blood in your urine  · You have severe pain  · You have any questions or concerns about your condition or care  CARE AGREEMENT:   You have the right to help plan your care  Learn about your health condition and how it may be treated  Discuss treatment options with your caregivers to decide what care you want to receive  You always have the right to refuse treatment  The above information is an  only  It is not intended as medical advice for individual conditions or treatments  Talk to your doctor, nurse or pharmacist before following any medical regimen to see if it is safe and effective for you  © 2017 [employer] Information is for End User's use only and may not be sold, redistributed or otherwise used for commercial purposes  All illustrations and images included in CareNotes® are the copyrighted property of A D A M [employer]  or [employer]

## 2019-10-14 NOTE — PROGRESS NOTES
Assessment/Plan: Will release patient back to work  He did have imaging in the ER showing No ureteral calculi or hydronephrosis  No acute CT abnormality in the abdomen or pelvis to definitively account for right flank pain  Nonobstructing intrarenal calculi in left kidney on the order of 2 to 3 mm in size Right basilar atelectasis  Small hiatal hernia  Fat-containing umbilical and right inguinal hernias  He does follow back up with Urology in January  He does have a follow up appointment here in 6 months  He was advised about the hernias and if he starts developing any symptoms to contact our office and will send to a surgeon         No problem-specific Assessment & Plan notes found for this encounter  Problem List Items Addressed This Visit        Genitourinary    Chronic kidney disease, stage 3 ([facility] [address] ) - Primary (Chronic)    Ureteral stone       Other    History of gout    Right flank pain            Subjective:      Patient ID: Ellie Wilson is a 49 y o  male  Drena Issa is here today for an ER follow up  He  presented with complain of right flank pain x1 day  Patient states that he thinks that he strained his back muscle  He states that he has been taking Tylenol which has helped with the pain  Denies any nausea vomiting or diarrhea at this time  He is denying any abdominal pain  Patient states that he recently had lithotripsy done and his stent was removed about 2 weeks ago  Patient rating his pain 4/10 at this time  Nothing makes the pain better or worse  He did have imaging done showing nonobstructing intrarenal calculi in left kidney on the order of 2 to 3 MM in size  He is doing well and was told that he did have a muscle spasm and to take Motrin or apply heat  He states the pain is gone and he is flushing his system with lots of water  He states he is ready to go back to work and does need his paperwork releasing him to go back  He denies any other symptoms         The following portions of the patient's history were reviewed and updated as appropriate:   He  has a past medical history of Arthritis, Gout, Hypertension, and Kidney stone  He   Patient Active Problem List    Diagnosis Date Noted    Right flank pain 10/14/2019    Insomnia 09/16/2019    Ureteral stone 08/27/2019    Hypertension 08/27/2019    Sepsis ([facility] [address] ) 08/23/2019    Chronic kidney disease, stage 3 ([facility] ) 08/23/2019    Gram-negative bacteremia 94/42/0975    Metabolic acidosis 04/54/2715    History of gout 07/15/2019    Vitamin D deficiency 06/10/2015    [facility] 03/23/2015     He  has a past surgical history that includes No past surgeries (03/23/2015); FL retrograde pyelogram (8/4/2019); pr cystourethroscopy,ureter catheter (Right, 8/4/2019); pr cysto/uretero w/lithotripsy &indwell stent insrt (Right, 9/26/2019); and FL retrograde pyelogram (9/26/2019)  His family history includes Cancer in his mother  He  reports that he has never smoked  He has never used smokeless tobacco  He reports that he does not drink alcohol or use drugs  Current Outpatient Medications   Medication Sig Dispense Refill    cholecalciferol 1000 units tablet Take 2 tablets (2,000 Units total) by mouth daily (Patient taking differently: Take 1,000 Units by mouth 2 (two) times a day ) 60 tablet 0    colchicine-probenecid 0 5-500 MG per tablet Take 1 tablet by mouth daily  3    tamsulosin (FLOMAX) 0 4 mg TAKE 1 CAPSULE BY MOUTH EVERY 24 HOURS 90 capsule 3     No current facility-administered medications for this visit        Current Outpatient Medications on File Prior to Visit   Medication Sig    cholecalciferol 1000 units tablet Take 2 tablets (2,000 Units total) by mouth daily (Patient taking differently: Take 1,000 Units by mouth 2 (two) times a day )    colchicine-probenecid 0 5-500 MG per tablet Take 1 tablet by mouth daily    tamsulosin (FLOMAX) 0 4 mg TAKE 1 CAPSULE BY MOUTH EVERY 24 HOURS    [DISCONTINUED] acetaminophen (TYLENOL) 500 mg tablet Take 500 mg by mouth every 6 (six) hours as needed for mild pain    [DISCONTINUED] metoprolol succinate (TOPROL-XL) 25 mg 24 hr tablet Take 1 tablet (25 mg total) by mouth daily (Patient not taking: Reported on 10/1/2019)     No current facility-administered medications on file prior to visit  He has No Known Allergies       Review of Systems   All other systems reviewed and are negative  Below is the patient's most recent value for Albumin, ALT, AST, BUN, Calcium, Chloride, Cholesterol, CO2, Creatinine, GFR, Glucose, HDL, Hematocrit, Hemoglobin, Hemoglobin A1C, LDL, Magnesium, Phosphorus, Platelets, Potassium, PSA, Sodium, Triglycerides, and WBC  Lab Results   Component Value Date    ALT 34 08/24/2019    AST 41 08/24/2019    BUN 12 10/10/2019    CALCIUM 9 4 10/10/2019     10/10/2019    CHOL 235 08/31/2015    CO2 23 10/10/2019    CREATININE 1 54 (H) 10/10/2019    HDL 37 08/31/2015    HCT 41 2 10/10/2019    HGB 12 9 10/10/2019    HGBA1C 5 4 03/24/2015    MG 1 8 08/26/2019    PHOS 2 4 (L) 08/26/2019     10/10/2019    K 3 6 10/10/2019    PSA 0 4 03/24/2015     (L) 04/08/2015    TRIG 150 08/31/2015    WBC 9 69 10/10/2019     Note: for a comprehensive list of the patient's lab results, access the Results Review activity  Objective:      /84 (BP [facility]: Right arm, Patient Position: Sitting, Cuff Size: Large)   Pulse 87   Temp 99 °F (37 2 °C) (Temporal)   Resp 16   Ht 5' 6" (1 676 m)   Wt (!) 141 kg (310 lb 8 oz)   SpO2 99%   BMI 50 12 kg/m²          Physical Exam   Constitutional: He is oriented to person, place, and time  He appears well-developed and well-nourished  Cardiovascular: Normal rate, regular rhythm, normal heart sounds and intact distal pulses  Pulmonary/Chest: Effort normal and breath sounds normal    Abdominal: Soft  Bowel sounds are normal    Musculoskeletal: Normal range of motion     Neurological: He is alert and oriented to person, place, and time    Skin: Skin is warm and dry  Capillary refill takes less than 2 seconds  Psychiatric: He has a normal mood and affect  His behavior is normal  Judgment and thought content normal    Vitals reviewed

## 2019-11-11 DIAGNOSIS — N40.0 BENIGN PROSTATIC HYPERPLASIA, UNSPECIFIED WHETHER LOWER URINARY TRACT SYMPTOMS PRESENT: ICD-10-CM

## 2019-11-11 RX ORDER — TAMSULOSIN HYDROCHLORIDE 0.4 MG/1
CAPSULE ORAL
Qty: 30 CAPSULE | Refills: 11 | Status: SHIPPED | OUTPATIENT
Start: 2019-11-11 | End: 2020-10-12

## 2019-12-30 ENCOUNTER — TELEPHONE (OUTPATIENT)
Dept: INTERNAL MEDICINE CLINIC | Facility: CLINIC | Age: 54
End: 2019-12-30

## 2019-12-30 NOTE — TELEPHONE ENCOUNTER
Patient came in to office on Friday requesting a letter for jury duty stating that he cannot sit that long due to bladder issues and he wrote it on paper and sent it back but they want a letter from his doctor  After speaking with you I told patient, as instructed, that it should come from his urologist   Patient called back and said that urology will not write letter

## 2020-01-17 ENCOUNTER — OFFICE VISIT (OUTPATIENT)
Dept: INTERNAL MEDICINE CLINIC | Facility: CLINIC | Age: 55
End: 2020-01-17
Payer: COMMERCIAL

## 2020-01-17 ENCOUNTER — APPOINTMENT (OUTPATIENT)
Dept: LAB | Facility: CLINIC | Age: 55
End: 2020-01-17
Payer: COMMERCIAL

## 2020-01-17 VITALS
DIASTOLIC BLOOD PRESSURE: 82 MMHG | OXYGEN SATURATION: 98 % | HEIGHT: 66 IN | TEMPERATURE: 99.1 F | HEART RATE: 88 BPM | RESPIRATION RATE: 14 BRPM | BODY MASS INDEX: 50.62 KG/M2 | SYSTOLIC BLOOD PRESSURE: 136 MMHG | WEIGHT: 315 LBS

## 2020-01-17 DIAGNOSIS — I10 ESSENTIAL HYPERTENSION: ICD-10-CM

## 2020-01-17 DIAGNOSIS — Z13.29 SCREENING FOR THYROID DISORDER: ICD-10-CM

## 2020-01-17 DIAGNOSIS — E55.9 VITAMIN D DEFICIENCY: ICD-10-CM

## 2020-01-17 DIAGNOSIS — Z13.6 SCREENING FOR CARDIOVASCULAR CONDITION: ICD-10-CM

## 2020-01-17 DIAGNOSIS — Z87.39 HISTORY OF GOUT: ICD-10-CM

## 2020-01-17 DIAGNOSIS — I10 ESSENTIAL HYPERTENSION: Primary | ICD-10-CM

## 2020-01-17 DIAGNOSIS — N18.30 CHRONIC KIDNEY DISEASE, STAGE 3 (HCC): Chronic | ICD-10-CM

## 2020-01-17 DIAGNOSIS — R79.89 ELEVATED SERUM CREATININE: ICD-10-CM

## 2020-01-17 LAB
25(OH)D3 SERPL-MCNC: 35.2 NG/ML (ref 30–100)
ALBUMIN SERPL BCP-MCNC: 3.7 G/DL (ref 3.5–5)
ALP SERPL-CCNC: 66 U/L (ref 46–116)
ALT SERPL W P-5'-P-CCNC: 21 U/L (ref 12–78)
ANION GAP SERPL CALCULATED.3IONS-SCNC: 4 MMOL/L (ref 4–13)
AST SERPL W P-5'-P-CCNC: 14 U/L (ref 5–45)
BASOPHILS # BLD AUTO: 0.07 THOUSANDS/ΜL (ref 0–0.1)
BASOPHILS NFR BLD AUTO: 1 % (ref 0–1)
BILIRUB SERPL-MCNC: 0.43 MG/DL (ref 0.2–1)
BUN SERPL-MCNC: 18 MG/DL (ref 5–25)
CALCIUM SERPL-MCNC: 9.3 MG/DL (ref 8.3–10.1)
CHLORIDE SERPL-SCNC: 109 MMOL/L (ref 100–108)
CHOLEST SERPL-MCNC: 228 MG/DL (ref 50–200)
CO2 SERPL-SCNC: 28 MMOL/L (ref 21–32)
CREAT SERPL-MCNC: 1.55 MG/DL (ref 0.6–1.3)
EOSINOPHIL # BLD AUTO: 0.47 THOUSAND/ΜL (ref 0–0.61)
EOSINOPHIL NFR BLD AUTO: 6 % (ref 0–6)
ERYTHROCYTE [DISTWIDTH] IN BLOOD BY AUTOMATED COUNT: 12.9 % (ref 11.6–15.1)
GFR SERPL CREATININE-BSD FRML MDRD: 50 ML/MIN/1.73SQ M
GLUCOSE P FAST SERPL-MCNC: 95 MG/DL (ref 65–99)
HCT VFR BLD AUTO: 46.5 % (ref 36.5–49.3)
HDLC SERPL-MCNC: 39 MG/DL
HGB BLD-MCNC: 14.9 G/DL (ref 12–17)
IMM GRANULOCYTES # BLD AUTO: 0.04 THOUSAND/UL (ref 0–0.2)
IMM GRANULOCYTES NFR BLD AUTO: 1 % (ref 0–2)
LDLC SERPL CALC-MCNC: 166 MG/DL (ref 0–100)
LYMPHOCYTES # BLD AUTO: 2.15 THOUSANDS/ΜL (ref 0.6–4.47)
LYMPHOCYTES NFR BLD AUTO: 26 % (ref 14–44)
MCH RBC QN AUTO: 30.7 PG (ref 26.8–34.3)
MCHC RBC AUTO-ENTMCNC: 32 G/DL (ref 31.4–37.4)
MCV RBC AUTO: 96 FL (ref 82–98)
MONOCYTES # BLD AUTO: 0.48 THOUSAND/ΜL (ref 0.17–1.22)
MONOCYTES NFR BLD AUTO: 6 % (ref 4–12)
NEUTROPHILS # BLD AUTO: 5.2 THOUSANDS/ΜL (ref 1.85–7.62)
NEUTS SEG NFR BLD AUTO: 60 % (ref 43–75)
NONHDLC SERPL-MCNC: 189 MG/DL
NRBC BLD AUTO-RTO: 0 /100 WBCS
PLATELET # BLD AUTO: 257 THOUSANDS/UL (ref 149–390)
PMV BLD AUTO: 10.6 FL (ref 8.9–12.7)
POTASSIUM SERPL-SCNC: 4.7 MMOL/L (ref 3.5–5.3)
PROT SERPL-MCNC: 7.5 G/DL (ref 6.4–8.2)
RBC # BLD AUTO: 4.86 MILLION/UL (ref 3.88–5.62)
SODIUM SERPL-SCNC: 141 MMOL/L (ref 136–145)
TRIGL SERPL-MCNC: 117 MG/DL
TSH SERPL DL<=0.05 MIU/L-ACNC: 1.24 UIU/ML (ref 0.36–3.74)
WBC # BLD AUTO: 8.41 THOUSAND/UL (ref 4.31–10.16)

## 2020-01-17 PROCEDURE — 99213 OFFICE O/P EST LOW 20 MIN: CPT | Performed by: NURSE PRACTITIONER

## 2020-01-17 PROCEDURE — 3075F SYST BP GE 130 - 139MM HG: CPT | Performed by: NURSE PRACTITIONER

## 2020-01-17 PROCEDURE — 85025 COMPLETE CBC W/AUTO DIFF WBC: CPT

## 2020-01-17 PROCEDURE — 80053 COMPREHEN METABOLIC PANEL: CPT

## 2020-01-17 PROCEDURE — 82306 VITAMIN D 25 HYDROXY: CPT

## 2020-01-17 PROCEDURE — 3079F DIAST BP 80-89 MM HG: CPT | Performed by: NURSE PRACTITIONER

## 2020-01-17 PROCEDURE — 1036F TOBACCO NON-USER: CPT | Performed by: NURSE PRACTITIONER

## 2020-01-17 PROCEDURE — 80061 LIPID PANEL: CPT

## 2020-01-17 PROCEDURE — 84443 ASSAY THYROID STIM HORMONE: CPT

## 2020-01-17 PROCEDURE — 3008F BODY MASS INDEX DOCD: CPT | Performed by: NURSE PRACTITIONER

## 2020-01-17 PROCEDURE — 36415 COLL VENOUS BLD VENIPUNCTURE: CPT

## 2020-01-17 NOTE — PROGRESS NOTES
Assessment/Plan: Will repeat fasting on patient along with Vitamin D  He is taking OTC dose  He was advised to watch taking this due to increased risk of kidney stones  He is deferring any screenings and the Flu vaccine  He does see Urology again in February  He is continuing to stay well hydrated  Will bring him back in one year or sooner if need be  No problem-specific Assessment & Plan notes found for this encounter  Problem List Items Addressed This Visit        Cardiovascular and Mediastinum    Hypertension - Primary    Relevant Orders    Comprehensive metabolic panel    CBC and differential       Genitourinary    Chronic kidney disease, stage 3 (HCC) (Chronic)    Relevant Orders    Comprehensive metabolic panel    CBC and differential       Other    Vitamin D deficiency    Relevant Orders    Vitamin D 25 hydroxy    History of gout    Relevant Orders    Comprehensive metabolic panel    CBC and differential      Other Visit Diagnoses     Screening for cardiovascular condition        Relevant Orders    Lipid panel    Screening for thyroid disorder        Relevant Orders    TSH, 3rd generation with Free T4 reflex            Subjective:      Patient ID: Evens Saenz is a 49 y o  male  Roscoe Brian is here today for a follow up visit  He was having over the Summer and Fall recurrent kidney stones with sepsis  He did have a cysto ureteroscopy with retrograde pyelogram and insertion of stent removal back in September  He states he is doing well and has not had any problems since the Fall  He is back to work and is only having some joint pain on and off  He is deferring a Cologuard or any colon testing  He denies any chest pain, SOB, or palpitations  He denies any depression or anxiety  He is taking Vitamin D OTC but does not remember the dose   S      The following portions of the patient's history were reviewed and updated as appropriate:   He  has a past medical history of Arthritis, Gout, Hypertension, and Kidney stone  He   Patient Active Problem List    Diagnosis Date Noted    Right flank pain 10/14/2019    Insomnia 09/16/2019    Ureteral stone 08/27/2019    Hypertension 08/27/2019    Sepsis ([facility] ) 08/23/2019    Chronic kidney disease, stage 3 ([facility] ) 08/23/2019    Gram-negative bacteremia 37/78/0148    Metabolic acidosis 44/15/0648    History of gout 07/15/2019    Vitamin D deficiency 06/10/2015    Morbid obesity ([facility] ) 03/23/2015     He  has a past surgical history that includes No past surgeries (03/23/2015); FL retrograde pyelogram (8/4/2019); pr cystourethroscopy,ureter catheter (Right, 8/4/2019); pr cysto/uretero w/lithotripsy &indwell stent insrt (Right, 9/26/2019); and FL retrograde pyelogram (9/26/2019)  His family history includes Cancer in his mother  He  reports that he has never smoked  He has never used smokeless tobacco  He reports that he does not drink alcohol or use drugs  Current Outpatient Medications   Medication Sig Dispense Refill    tamsulosin (FLOMAX) 0 4 mg TAKE 1 CAPSULE BY MOUTH EVERY 24 HOURS 30 capsule 11     No current facility-administered medications for this visit  Current Outpatient Medications on File Prior to Visit   Medication Sig    tamsulosin (FLOMAX) 0 4 mg TAKE 1 CAPSULE BY MOUTH EVERY 24 HOURS    [DISCONTINUED] cholecalciferol 1000 units tablet Take 2 tablets (2,000 Units total) by mouth daily (Patient not taking: Reported on 1/17/2020)    [DISCONTINUED] colchicine-probenecid 0 5-500 MG per tablet Take 1 tablet by mouth daily     No current facility-administered medications on file prior to visit  He has No Known Allergies       Review of Systems   All other systems reviewed and are negative          Objective:      /82 (BP [facility]: Right arm, Patient Position: Sitting, Cuff Size: Large)   Pulse 88   Temp 99 1 °F (37 3 °C) (Temporal)   Resp 14   Ht 5' 6" (1 676 m)   Wt (!) 148 kg (326 lb 9 6 oz)   SpO2 98%   BMI 52 71 kg/m² Physical Exam   Constitutional: He is oriented to person, place, and time  He appears well-developed and well-nourished  HENT:   Head: Normocephalic and atraumatic  Right Ear: External ear normal    Left Ear: External ear normal    Nose: Nose normal    Mouth/Throat: Oropharynx is clear and moist    Eyes: Pupils are equal, round, and reactive to light  Conjunctivae and EOM are normal    Neck: Normal range of motion  Neck supple  Cardiovascular: Normal rate, regular rhythm, normal heart sounds and intact distal pulses  Pulmonary/Chest: Effort normal and breath sounds normal    Abdominal: Soft  Bowel sounds are normal    Musculoskeletal: Normal range of motion  Neurological: He is alert and oriented to person, place, and time  Skin: Skin is warm and dry  Capillary refill takes less than 2 seconds  Psychiatric: He has a normal mood and affect  His behavior is normal  Judgment and thought content normal    Vitals reviewed

## 2020-01-17 NOTE — PATIENT INSTRUCTIONS
Kidney Stones   WHAT YOU NEED TO KNOW:   What is a kidney stone? Kidney stones form in the urinary system when the water and waste in your urine are out of balance  When this happens, certain types of waste crystals separate from the urine  The crystals build up and form kidney stones  Kidney stones can be made of uric acid, calcium, phosphate, or oxalate crystals  You may have 1 or more kidney stones  What increases my risk for kidney stones? · You do not drink enough liquids (especially water) each day  · You have urinary tract infections often  · You follow a certain type of diet  For example, people who eat a diet high in meat or salt may be at higher risk for kidney stones  People who eat foods high in oxalate may also be at higher risk  Foods that are high in oxalate include nuts, chocolate, coffee, and green leafy vegetables  · You take certain medicines such as diuretics, steroids, and antacids  · A family member has had kidney stones  · You were born with a kidney or bowel disorder, or you have other medical problems such as gout  What are the signs and symptoms of kidney stones? · Pain in the middle of your back that moves across to your side or that may spread to your groin    · Nausea and vomiting    · Urge to urinate often, burning feeling when you urinate, or pink or red urine    · Tenderness in your lower back, side, or stomach  How are kidney stones diagnosed? Your healthcare provider will ask about your health, diet, and lifestyle  He may refer you to a urologist  Lola Still may need tests to find out what type of kidney stones you have  Tests can show the size of your kidney stones and where they are in your urinary system  You may have one or more of the following:  · Urine tests  may show if you have blood in your urine  They may also show high amounts of the substances that form kidney stones, such as uric acid  · Blood tests  show how well your kidneys are working   They may also be used to check the levels of calcium or uric acid in your blood  · A noncontrast helical CT scan  is a type of x-ray that uses a computer to take pictures of your kidneys  Healthcare providers use the pictures to check for kidney stones or other problems  · X-rays  of your kidneys, bladder, and ureters may be done  You may be given a dye before the pictures are taken to help healthcare providers see the pictures better  You may need to have more than one x-ray  Tell the healthcare provider if you have ever had an allergic reaction to contrast dye  · An abdominal ultrasound  uses sound waves to show pictures of your abdomen on a monitor  How are kidney stones treated? · NSAIDs , such as ibuprofen, help decrease swelling, pain, and fever  This medicine is available with or without a doctor's order  NSAIDs can cause stomach bleeding or kidney problems in certain people  If you take blood thinner medicine, always ask your healthcare provider if NSAIDs are safe for you  Always read the medicine label and follow directions  · Prescription medicine  may be given  Ask how to take this medicine safely  · Medicines  to balance your electrolytes may be needed  · A procedure or surgery  to remove the kidney stones may be needed if they do not pass on their own  Your treatment will depend on the size and location of your kidney stones  How can I manage my symptoms? · Drink plenty of liquids  Your healthcare provider may tell you to drink at least 8 to 12 (eight-ounce) cups of liquids each day  This helps flush out the kidney stones when you urinate  Water is the best liquid to drink  · Strain your urine every time you go to the bathroom  Urinate through a strainer or a piece of thin cloth to catch the stones  Take the stones to your healthcare provider so they can be sent to the lab for tests  This will help your healthcare providers plan the best treatment for you             · Eat a variety of healthy foods  Healthy foods include fruits, vegetables, whole-grain breads, low-fat dairy products, beans, and fish  You may need to limit how much sodium (salt) or protein you eat  Ask for information about the best foods for you  · Exercise regularly  Your stones may pass more easily if you stay active  Ask about the best activities for you  When should I seek immediate care? · You have vomiting that is not relieved by medicine  When should I contact my healthcare provider? · You have a fever  · You have trouble passing urine  · You see blood in your urine  · You have severe pain  · You have any questions or concerns about your condition or care  CARE AGREEMENT:   You have the right to help plan your care  Learn about your health condition and how it may be treated  Discuss treatment options with your caregivers to decide what care you want to receive  You always have the right to refuse treatment  The above information is an  only  It is not intended as medical advice for individual conditions or treatments  Talk to your doctor, nurse or pharmacist before following any medical regimen to see if it is safe and effective for you  © 2017 [employer] [employer] Information is for End User's use only and may not be sold, redistributed or otherwise used for commercial purposes  All illustrations and images included in CareNotes® are the copyrighted property of [employer]  or [employer]

## 2020-02-04 DIAGNOSIS — E78.2 MIXED HYPERLIPIDEMIA: Primary | ICD-10-CM

## 2020-02-04 RX ORDER — ATORVASTATIN CALCIUM 10 MG/1
10 TABLET, FILM COATED ORAL DAILY
Qty: 30 TABLET | Refills: 3 | Status: SHIPPED | OUTPATIENT
Start: 2020-02-04 | End: 2022-04-21 | Stop reason: ALTCHOICE

## 2020-03-23 ENCOUNTER — TELEMEDICINE (OUTPATIENT)
Dept: INTERNAL MEDICINE CLINIC | Facility: CLINIC | Age: 55
End: 2020-03-23
Payer: COMMERCIAL

## 2020-03-23 DIAGNOSIS — Z20.828 EXPOSURE TO SARS-ASSOCIATED CORONAVIRUS: Primary | ICD-10-CM

## 2020-03-23 DIAGNOSIS — Z20.828 EXPOSURE TO SARS-ASSOCIATED CORONAVIRUS: ICD-10-CM

## 2020-03-23 PROCEDURE — G2012 BRIEF CHECK IN BY MD/QHP: HCPCS | Performed by: FAMILY MEDICINE

## 2020-03-23 PROCEDURE — 87635 SARS-COV-2 COVID-19 AMP PRB: CPT

## 2020-03-23 NOTE — PROGRESS NOTES
COVID-19 Virtual Visit     This virtual check-in was done via telephone  Encounter provider Gigi North MD    Provider located at [address]  [address] [facility] [address]    Recent Visits  No visits were found meeting these conditions  Showing recent visits within past 7 days and meeting all other requirements     Future Appointments  No visits were found meeting these conditions  Showing future appointments within next 150 days and meeting all other requirements        Patient agrees to participate in a virtual check in via telephone or video visit instead of presenting to the office to address urgent/immediate medical needs  Patient is aware this is a billable service  After connecting through telephone, the patient was identified by name and date of birth  Chelsea Eid was informed that this was a telemedicine visit and that the exam was being conducted confidentially over secure lines  Other methods to assure confidentiality were taken - was alone in the room  No one else was in the room  Chelsea Eid acknowledged consent and understanding of privacy and security of the telemedicine visit  I informed the patient that I have reviewed his record in Epic and presented the opportunity for him to ask any questions regarding the visit today  The patient agreed to participate  Chelsea Eid is a 49 y o  male who is concerned about COVID-19  He reports cough and shortness of breath  He has not traveled outside the [place]  within the last 14 days    He has not had contact with a person who is under investigation for or who is positive for COVID-19 within the last 14 days  He has not been hospitalized recently for fever and/or lower respiratory symptoms  He has started over night with significant cough productive of yellowish sputum, sore throat, headache, and extreme fatigue    Has been out of the [facility] but not to any of the locations of significant concern  Does not have a thermometer at home to check his temperature  Has had some chest tightness especially with deep breathing  Denies any true chest pain  Past Medical History:   Diagnosis Date    Arthritis     Gout     Hypertension     Kidney stone        Past Surgical History:   Procedure Laterality Date    FL RETROGRADE PYELOGRAM  8/4/2019    FL RETROGRADE PYELOGRAM  9/26/2019    NO PAST SURGERIES  03/23/2015    Last assessed    [place] CYSTO/URETERO W/LITHOTRIPSY &INDWELL STENT INSRT Right 9/26/2019    Procedure: CYSTOSCOPY URETEROSCOPY WITH RETROGRADE PYELOGRAM AND INSERTION STENT URETERAL;  Surgeon: Silverio Suero MD;  [facility]: [facility];  Service: Urology    GA CYSTOURETHROSCOPY,URETER CATHETER Right 8/4/2019    Procedure: CYSTOSCOPY RETROGRADE PYELOGRAM WITH INSERTION STENT URETERAL;  Surgeon: Josie Borges MD;  [facility]:  MAIN OR;  Service: Urology       Current Outpatient Medications   Medication Sig Dispense Refill    atorvastatin (LIPITOR) 10 mg tablet Take 1 tablet (10 mg total) by mouth daily 30 tablet 3    tamsulosin (FLOMAX) 0 4 mg TAKE 1 CAPSULE BY MOUTH EVERY 24 HOURS 30 capsule 11     No current facility-administered medications for this visit  No Known Allergies    Video Exam    Carlin Cortez appears alert and answers questions appropriately via phone  Has hoarse voice  Disposition:      I referred Carlin Cortez to one of our centralized sites for a COVID-19 swab  I spent 5 minutes with the patient during this virtual check-in visit

## 2020-03-27 ENCOUNTER — TELEPHONE (OUTPATIENT)
Dept: INTERNAL MEDICINE CLINIC | Facility: CLINIC | Age: 55
End: 2020-03-27

## 2020-03-28 LAB — SARS-COV-2 RNA SPEC QL NAA+PROBE: NOT DETECTED

## 2020-03-30 ENCOUNTER — TELEMEDICINE (OUTPATIENT)
Dept: INTERNAL MEDICINE CLINIC | Facility: CLINIC | Age: 55
End: 2020-03-30
Payer: COMMERCIAL

## 2020-03-30 DIAGNOSIS — J06.9 ACUTE UPPER RESPIRATORY INFECTION: Primary | ICD-10-CM

## 2020-03-30 DIAGNOSIS — Z20.822 EXPOSURE TO COVID-19 VIRUS: ICD-10-CM

## 2020-03-30 PROCEDURE — G2012 BRIEF CHECK IN BY MD/QHP: HCPCS | Performed by: NURSE PRACTITIONER

## 2020-03-30 RX ORDER — AZITHROMYCIN 250 MG/1
TABLET, FILM COATED ORAL
Qty: 6 TABLET | Refills: 0 | Status: SHIPPED | OUTPATIENT
Start: 2020-03-30 | End: 2020-04-03

## 2020-03-30 NOTE — PROGRESS NOTES
Virtual Brief Visit    Problem List Items Addressed This Visit        Respiratory    Acute upper respiratory infection - Primary    Relevant Medications    azithromycin (ZITHROMAX) 250 mg tablet       Other    Exposure to Covid-19 Virus                Reason for visit is follow up for COVID symptoms    Encounter provider Alysa [address] [place] [address]    Provider located at [address] [place] [address]  [address] [address]      Recent Visits  Date Type Provider Dept   03/27/20 Telephone Phillip Aguirre [facility]   03/23/20 Lucho Chapa MD [facility]   Showing recent visits within past 7 days and meeting all other requirements     Today's Visits  Date Type Provider Dept   03/30/20 Telemedicine RITA Sandoval [facility]   Showing today's visits and meeting all other requirements     Future Appointments  No visits were found meeting these conditions  Showing future appointments within next 150 days and meeting all other requirements        After connecting through telephone, the patient was identified by name and date of birth  Misbah Beckham was informed that this is a telemedicine visit and that the visit is being conducted through telephone  My office door was closed  No one else was in the room  He acknowledged consent and understanding of privacy and security of the video platform  The patient has agreed to participate and understands they can discontinue the visit at any time  Patient is aware this is a billable service  Subjective  Misbah Beckham is a 49 y o  male patient who is a follow up after testing for COVID  He states he is feeling better but is having chest tightness but no other symptoms  He would like something called and is concerned about going back to work  He states he does not want to be exposed at work to someone with [employer]   He states he is not having any other symptoms  He offers no other issues  Past Medical History:   Diagnosis Date    Arthritis     Gout     Hypertension     Kidney stone        Past Surgical History:   Procedure Laterality Date    FL RETROGRADE PYELOGRAM  8/4/2019    FL RETROGRADE PYELOGRAM  9/26/2019    NO PAST SURGERIES  03/23/2015    Last assessed    [place] CYSTO/URETERO W/LITHOTRIPSY &INDWELL STENT INSRT Right 9/26/2019    Procedure: CYSTOSCOPY URETEROSCOPY WITH RETROGRADE PYELOGRAM AND INSERTION STENT URETERAL;  Surgeon: Carina Gil MD;  [facility]: [facility];  Service: Urology    NV CYSTOURETHROSCOPY,URETER CATHETER Right 8/4/2019    Procedure: CYSTOSCOPY RETROGRADE PYELOGRAM WITH INSERTION STENT URETERAL;  Surgeon: Elisa Schaffer MD;  [facility]:  MAIN OR;  Service: Urology       Current Outpatient Medications   Medication Sig Dispense Refill    atorvastatin (LIPITOR) 10 mg tablet Take 1 tablet (10 mg total) by mouth daily 30 tablet 3    azithromycin (ZITHROMAX) 250 mg tablet Take 2 tablets today then 1 tablet daily x 4 days 6 tablet 0    tamsulosin (FLOMAX) 0 4 mg TAKE 1 CAPSULE BY MOUTH EVERY 24 HOURS 30 capsule 11     No current facility-administered medications for this visit  No Known Allergies    Review of Systems   Constitutional: Negative  HENT: Positive for congestion  Eyes: Negative  Respiratory: Negative  Cardiovascular: Negative  Gastrointestinal: Negative  Endocrine: Negative  Genitourinary: Negative  Musculoskeletal: Negative  Skin: Negative  Allergic/Immunologic: Negative  Neurological: Negative  Hematological: Negative  Psychiatric/Behavioral: Negative  I did advise patient his COVID did come back negative  He states he would like a Z pack take as directed and he was advised from my standpoint he is good to go back to work  He states he wanted more time off and did not want to be around anyone sick   I did let him know to call Wednesday and let me know how is he feeling  I spent 15  minutes with the patient during this visit

## 2020-03-30 NOTE — PATIENT INSTRUCTIONS
Upper Respiratory Infection   WHAT YOU NEED TO KNOW:   What is an upper respiratory infection? An upper respiratory infection is also called a common cold  It can affect your nose, throat, ears, and sinuses  What causes a cold? The common cold is caused by a virus  There are many different cold viruses, and each is contagious  This means the virus can be easily spread to another person when the sick person coughs or sneezes  The virus can also be spread if you touch something that a person with a cold has touched  You are more likely to get a cold in the winter  Your risk of getting a cold may be increased if you smoke cigarettes or have allergies, such as hay fever  What are the signs and symptoms of a cold? Cold symptoms are usually worst for the first 3 to 5 days  You may have any of the following:  · Runny or stuffy nose    · Sneezing and coughing    · Sore throat or hoarseness    · Red, watery, and sore eyes    · Fatigue     · Chills and fever    · Headache, body aches, or sore muscles  How is a cold treated? There is no cure for the common cold  Colds are caused by viruses and do not get better with antibiotics  Most people get better in 7 to 14 days  You may continue to cough for 2 to 3 weeks  The following may help decrease your symptoms:  · Decongestants  help reduce nasal congestion and help you breathe more easily  If you take decongestant pills, they may make you feel restless or cause problems with your sleep  Do not use decongestant sprays for more than a few days  · Cough suppressants  help reduce coughing  Ask your healthcare provider which type of cough medicine is best for you  · NSAIDs , such as ibuprofen, help decrease swelling, pain, and fever  NSAIDs can cause stomach bleeding or kidney problems in certain people  If you take blood thinner medicine, always ask your healthcare provider if NSAIDs are safe for you   Always read the medicine label and follow directions  · Acetaminophen  decreases pain and fever  It is available without a doctor's order  Ask how much to take and how often to take it  Follow directions  Read the labels of all other medicines you are using to see if they also contain acetaminophen, or ask your doctor or pharmacist  Acetaminophen can cause liver damage if not taken correctly  Do not use more than 4 grams (4,000 milligrams) total of acetaminophen in one day  How can I manage my cold? · Rest as much as possible  Slowly start to do more each day  · Drink more liquids as directed  Liquids will help thin and loosen mucus so you can cough it up  Liquids will also help prevent dehydration  Liquids that help prevent dehydration include water, fruit juice, and broth  Do not drink liquids that contain caffeine  Caffeine can increase your risk for dehydration  Ask your healthcare provider how much liquid to drink each day  · Soothe a sore throat  Gargle with warm salt water  This helps your sore throat feel better  Make salt water by dissolving ¼ teaspoon salt in 1 cup warm water  You may also suck on hard candy or throat lozenges  You may use a sore throat spray  · Use a humidifier or vaporizer  Use a cool mist humidifier or a vaporizer to increase air moisture in your home  This may make it easier for you to breathe and help decrease your cough  · Use saline nasal drops as directed  These help relieve congestion  · Apply petroleum-based jelly around the outside of your nostrils  This can decrease irritation from blowing your nose  · Do not smoke  Nicotine and other chemicals in cigarettes and cigars can make your symptoms worse  They can also cause infections such as bronchitis or pneumonia  Ask your healthcare provider for information if you currently smoke and need help to quit  E-cigarettes or smokeless tobacco still contain nicotine  Talk to your healthcare provider before you use these products    What can I do to prevent the spread of the common cold? · Try to stay away from other people during the first 2 to 3 days of your cold when it is more easily spread  · Do not share food or drinks  · Do not share hand towels with household members  · Wash your hands often, especially after you blow your nose  Turn away from other people and cover your mouth and nose with a tissue when you sneeze or cough  When should I seek immediate care? · You have chest pain or trouble breathing  When should I contact my healthcare provider? · You have a fever over 102ºF (39ºC)  · Your sore throat gets worse or you see white or yellow spots in your throat  · Your symptoms get worse after 3 to 5 days or your cold is not better in 14 days  · You have a rash anywhere on your skin  · You have large, tender lumps in your neck  · You have thick, green, or yellow drainage from your nose  · You cough up thick yellow, green, or bloody mucus  · You are vomiting for more than 24 hours and cannot keep fluids down  · You have a bad earache  · You have questions or concerns about your condition or care  CARE AGREEMENT:   You have the right to help plan your care  Learn about your health condition and how it may be treated  Discuss treatment options with your caregivers to decide what care you want to receive  You always have the right to refuse treatment  The above information is an  only  It is not intended as medical advice for individual conditions or treatments  Talk to your doctor, nurse or pharmacist before following any medical regimen to see if it is safe and effective for you  © 2017 [address] [employer]  Information is for End User's use only and may not be sold, redistributed or otherwise used for commercial purposes  All illustrations and images included in CareNotes® are the copyrighted property of [employer]  or [employer]

## 2020-04-03 ENCOUNTER — TELEMEDICINE (OUTPATIENT)
Dept: INTERNAL MEDICINE CLINIC | Facility: CLINIC | Age: 55
End: 2020-04-03
Payer: COMMERCIAL

## 2020-04-03 DIAGNOSIS — J06.9 ACUTE UPPER RESPIRATORY INFECTION: Primary | ICD-10-CM

## 2020-04-03 PROCEDURE — G2012 BRIEF CHECK IN BY MD/QHP: HCPCS | Performed by: NURSE PRACTITIONER

## 2020-09-30 NOTE — ASSESSMENT & PLAN NOTE
30-Sep-2020 12:41 Likely Urosepsis from obstructing renal stone  Now off pressors  Lactic improved  Continue Cefepime  Follow cultures

## 2020-10-11 DIAGNOSIS — N40.0 BENIGN PROSTATIC HYPERPLASIA, UNSPECIFIED WHETHER LOWER URINARY TRACT SYMPTOMS PRESENT: ICD-10-CM

## 2020-10-12 RX ORDER — TAMSULOSIN HYDROCHLORIDE 0.4 MG/1
CAPSULE ORAL
Qty: 30 CAPSULE | Refills: 11 | Status: SHIPPED | OUTPATIENT
Start: 2020-10-12 | End: 2021-09-22

## 2020-11-09 ENCOUNTER — APPOINTMENT (EMERGENCY)
Dept: RADIOLOGY | Facility: HOSPITAL | Age: 55
End: 2020-11-09
Payer: COMMERCIAL

## 2020-11-09 ENCOUNTER — HOSPITAL ENCOUNTER (EMERGENCY)
Facility: HOSPITAL | Age: 55
Discharge: HOME | End: 2020-11-09
Attending: EMERGENCY MEDICINE | Admitting: EMERGENCY MEDICINE
Payer: COMMERCIAL

## 2020-11-09 VITALS
TEMPERATURE: 98.1 F | SYSTOLIC BLOOD PRESSURE: 191 MMHG | HEART RATE: 102 BPM | RESPIRATION RATE: 18 BRPM | OXYGEN SATURATION: 98 % | BODY MASS INDEX: 60.06 KG/M2 | WEIGHT: 315 LBS | DIASTOLIC BLOOD PRESSURE: 98 MMHG

## 2020-11-09 DIAGNOSIS — G56.01 CARPAL TUNNEL SYNDROME OF RIGHT WRIST: ICD-10-CM

## 2020-11-09 DIAGNOSIS — S56.911A FOREARM STRAIN, RIGHT, INITIAL ENCOUNTER: Primary | ICD-10-CM

## 2020-11-09 PROCEDURE — 99283 EMERGENCY DEPT VISIT LOW MDM: CPT

## 2020-11-09 PROCEDURE — 99284 EMERGENCY DEPT VISIT MOD MDM: CPT | Performed by: EMERGENCY MEDICINE

## 2020-11-09 PROCEDURE — 73110 X-RAY EXAM OF WRIST: CPT

## 2020-11-09 PROCEDURE — 73090 X-RAY EXAM OF FOREARM: CPT

## 2020-11-09 RX ORDER — PREDNISONE 20 MG/1
20 TABLET ORAL DAILY
Qty: 15 TABLET | Refills: 0 | Status: SHIPPED | OUTPATIENT
Start: 2020-11-09 | End: 2021-06-02 | Stop reason: ALTCHOICE

## 2020-11-27 DIAGNOSIS — G56.01 CARPAL TUNNEL SYNDROME OF RIGHT WRIST: Primary | ICD-10-CM

## 2020-12-22 RX ORDER — PREDNISONE 20 MG/1
20 TABLET ORAL DAILY
Qty: 15 TABLET | Refills: 0 | OUTPATIENT
Start: 2020-12-22

## 2021-06-02 ENCOUNTER — APPOINTMENT (OUTPATIENT)
Dept: LAB | Facility: CLINIC | Age: 56
End: 2021-06-02
Payer: COMMERCIAL

## 2021-06-02 ENCOUNTER — HOSPITAL ENCOUNTER (OUTPATIENT)
Dept: RADIOLOGY | Facility: HOSPITAL | Age: 56
Discharge: HOME | End: 2021-06-02
Payer: COMMERCIAL

## 2021-06-02 ENCOUNTER — OFFICE VISIT (OUTPATIENT)
Dept: INTERNAL MEDICINE CLINIC | Facility: CLINIC | Age: 56
End: 2021-06-02
Payer: COMMERCIAL

## 2021-06-02 VITALS
DIASTOLIC BLOOD PRESSURE: 86 MMHG | TEMPERATURE: 98.1 F | OXYGEN SATURATION: 97 % | WEIGHT: 315 LBS | SYSTOLIC BLOOD PRESSURE: 132 MMHG | HEIGHT: 66 IN | HEART RATE: 99 BPM | BODY MASS INDEX: 50.62 KG/M2

## 2021-06-02 DIAGNOSIS — Z87.39 HISTORY OF GOUT: ICD-10-CM

## 2021-06-02 DIAGNOSIS — E66.01 MORBID OBESITY WITH BMI OF 50.0-59.9, ADULT (HCC): ICD-10-CM

## 2021-06-02 DIAGNOSIS — M25.562 ACUTE PAIN OF LEFT KNEE: ICD-10-CM

## 2021-06-02 DIAGNOSIS — E55.9 VITAMIN D DEFICIENCY: ICD-10-CM

## 2021-06-02 DIAGNOSIS — I10 ESSENTIAL HYPERTENSION: ICD-10-CM

## 2021-06-02 DIAGNOSIS — E78.2 MIXED HYPERLIPIDEMIA: ICD-10-CM

## 2021-06-02 DIAGNOSIS — I10 ESSENTIAL HYPERTENSION: Primary | ICD-10-CM

## 2021-06-02 PROBLEM — Z20.822 EXPOSURE TO COVID-19 VIRUS: Status: RESOLVED | Noted: 2020-03-30 | Resolved: 2021-06-02

## 2021-06-02 PROBLEM — R10.9 RIGHT FLANK PAIN: Status: RESOLVED | Noted: 2019-10-14 | Resolved: 2021-06-02

## 2021-06-02 PROBLEM — J06.9 ACUTE UPPER RESPIRATORY INFECTION: Status: RESOLVED | Noted: 2020-03-30 | Resolved: 2021-06-02

## 2021-06-02 LAB
ALBUMIN SERPL BCP-MCNC: 3.6 G/DL (ref 3.5–5)
ALP SERPL-CCNC: 70 U/L (ref 46–116)
ALT SERPL W P-5'-P-CCNC: 37 U/L (ref 12–78)
ANION GAP SERPL CALCULATED.3IONS-SCNC: 5 MMOL/L (ref 4–13)
AST SERPL W P-5'-P-CCNC: 28 U/L (ref 5–45)
BASOPHILS # BLD AUTO: 0.08 THOUSANDS/ΜL (ref 0–0.1)
BASOPHILS NFR BLD AUTO: 1 % (ref 0–1)
BILIRUB SERPL-MCNC: 0.38 MG/DL (ref 0.2–1)
BUN SERPL-MCNC: 26 MG/DL (ref 5–25)
CALCIUM SERPL-MCNC: 8.9 MG/DL (ref 8.3–10.1)
CHLORIDE SERPL-SCNC: 108 MMOL/L (ref 100–108)
CHOLEST SERPL-MCNC: 255 MG/DL (ref 50–200)
CO2 SERPL-SCNC: 27 MMOL/L (ref 21–32)
CREAT SERPL-MCNC: 1.4 MG/DL (ref 0.6–1.3)
EOSINOPHIL # BLD AUTO: 0.45 THOUSAND/ΜL (ref 0–0.61)
EOSINOPHIL NFR BLD AUTO: 5 % (ref 0–6)
ERYTHROCYTE [DISTWIDTH] IN BLOOD BY AUTOMATED COUNT: 13.5 % (ref 11.6–15.1)
GFR SERPL CREATININE-BSD FRML MDRD: 57 ML/MIN/1.73SQ M
GLUCOSE P FAST SERPL-MCNC: 106 MG/DL (ref 65–99)
HCT VFR BLD AUTO: 48.7 % (ref 36.5–49.3)
HDLC SERPL-MCNC: 48 MG/DL
HGB BLD-MCNC: 15.7 G/DL (ref 12–17)
IMM GRANULOCYTES # BLD AUTO: 0.03 THOUSAND/UL (ref 0–0.2)
IMM GRANULOCYTES NFR BLD AUTO: 0 % (ref 0–2)
LDLC SERPL CALC-MCNC: 173 MG/DL (ref 0–100)
LYMPHOCYTES # BLD AUTO: 1.77 THOUSANDS/ΜL (ref 0.6–4.47)
LYMPHOCYTES NFR BLD AUTO: 20 % (ref 14–44)
MCH RBC QN AUTO: 31.1 PG (ref 26.8–34.3)
MCHC RBC AUTO-ENTMCNC: 32.2 G/DL (ref 31.4–37.4)
MCV RBC AUTO: 96 FL (ref 82–98)
MONOCYTES # BLD AUTO: 0.46 THOUSAND/ΜL (ref 0.17–1.22)
MONOCYTES NFR BLD AUTO: 5 % (ref 4–12)
NEUTROPHILS # BLD AUTO: 5.93 THOUSANDS/ΜL (ref 1.85–7.62)
NEUTS SEG NFR BLD AUTO: 69 % (ref 43–75)
NONHDLC SERPL-MCNC: 207 MG/DL
NRBC BLD AUTO-RTO: 0 /100 WBCS
PLATELET # BLD AUTO: 218 THOUSANDS/UL (ref 149–390)
PMV BLD AUTO: 10.6 FL (ref 8.9–12.7)
POTASSIUM SERPL-SCNC: 4.7 MMOL/L (ref 3.5–5.3)
PROT SERPL-MCNC: 7.4 G/DL (ref 6.4–8.2)
RBC # BLD AUTO: 5.05 MILLION/UL (ref 3.88–5.62)
SODIUM SERPL-SCNC: 140 MMOL/L (ref 136–145)
TRIGL SERPL-MCNC: 169 MG/DL
TSH SERPL DL<=0.05 MIU/L-ACNC: 2.61 UIU/ML (ref 0.36–3.74)
URATE SERPL-MCNC: 9.2 MG/DL (ref 4.2–8)
WBC # BLD AUTO: 8.72 THOUSAND/UL (ref 4.31–10.16)

## 2021-06-02 PROCEDURE — 80061 LIPID PANEL: CPT

## 2021-06-02 PROCEDURE — 84550 ASSAY OF BLOOD/URIC ACID: CPT

## 2021-06-02 PROCEDURE — 3725F SCREEN DEPRESSION PERFORMED: CPT | Performed by: NURSE PRACTITIONER

## 2021-06-02 PROCEDURE — 36415 COLL VENOUS BLD VENIPUNCTURE: CPT

## 2021-06-02 PROCEDURE — 73562 X-RAY EXAM OF KNEE 3: CPT

## 2021-06-02 PROCEDURE — 84443 ASSAY THYROID STIM HORMONE: CPT

## 2021-06-02 PROCEDURE — 85025 COMPLETE CBC W/AUTO DIFF WBC: CPT

## 2021-06-02 PROCEDURE — 1036F TOBACCO NON-USER: CPT | Performed by: NURSE PRACTITIONER

## 2021-06-02 PROCEDURE — 3008F BODY MASS INDEX DOCD: CPT | Performed by: NURSE PRACTITIONER

## 2021-06-02 PROCEDURE — 99213 OFFICE O/P EST LOW 20 MIN: CPT | Performed by: NURSE PRACTITIONER

## 2021-06-02 PROCEDURE — 80053 COMPREHEN METABOLIC PANEL: CPT

## 2021-06-02 NOTE — PATIENT INSTRUCTIONS
Low Fat Diet   AMBULATORY CARE:   A low-fat diet  is an eating plan that is low in total fat, unhealthy fat, and cholesterol  You may need to follow a low-fat diet if you have trouble digesting or absorbing fat  You may also need to follow this diet if you have high cholesterol  You can also lower your cholesterol by increasing the amount of fiber in your diet  Soluble fiber is a type of fiber that helps to decrease cholesterol levels  Different types of fat in food:   · Limit unhealthy fats  A diet that is high in cholesterol, saturated fat, and trans fat may cause unhealthy cholesterol levels  Unhealthy cholesterol levels increase your risk of heart disease  ? Cholesterol:  Limit intake of cholesterol to less than 200 mg per day  Cholesterol is found in meat, eggs, and dairy  ? Saturated fat:  Limit saturated fat to less than 7% of your total daily calories  Ask your dietitian how many calories you need each day  Saturated fat is found in butter, cheese, ice cream, whole milk, and palm oil  Saturated fat is also found in meat, such as beef, pork, chicken skin, and processed meats  Processed meats include sausage, hot dogs, and bologna  ? Trans fat:  Avoid trans fat as much as possible  Trans fat is used in fried and baked foods  Foods that say trans fat free on the label may still have up to 0 5 grams of trans fat per serving  · Include healthy fats  Replace foods that are high in saturated and trans fat with foods high in healthy fats  This may help to decrease high cholesterol levels  ? Monounsaturated fats: These are found in avocados, nuts, and vegetable oils, such as olive, canola, and sunflower oil  ? Polyunsaturated fats: These can be found in vegetable oils, such as soybean or corn oil  Omega-3 fats can help to decrease the risk of heart disease  Omega-3 fats are found in fish, such as salmon, herring, trout, and tuna   Omega-3 fats can also be found in plant foods, such as walnuts, flaxseed, soybeans, and canola oil  Foods to limit or avoid:   · Grains:      ? Snacks that are made with partially hydrogenated oils, such as chips, regular crackers, and butter-flavored popcorn    ? High-fat baked goods, such as biscuits, croissants, doughnuts, pies, cookies, and pastries    · Dairy:      ? Whole milk, 2% milk, and yogurt and ice cream made with whole milk    ? Half and half creamer, heavy cream, and whipping cream    ? Cheese, cream cheese, and sour cream    · Meats and proteins:      ? High-fat cuts of meat (T-bone steak, regular hamburger, and ribs)    ? Fried meat, poultry (turkey and chicken), and fish    ? Poultry (chicken and turkey) with skin    ? Cold cuts (salami or bologna), hot dogs, yo, and sausage    ? Whole eggs and egg yolks    · Vegetables and fruits with added fat:      ? Fried vegetables or vegetables in butter or high-fat sauces, such as cream or cheese sauces    ? Fried fruit or fruit served with butter or cream    · Fats:      ? Butter, stick margarine, and shortening    ? Coconut, palm oil, and palm kernel oil    Foods to include:   · Grains:      ? Whole-grain breads, cereals, pasta, and brown rice    ? Low-fat crackers and pretzels    · Vegetables and fruits:      ? Fresh, frozen, or canned vegetables (no salt or low-sodium)    ? Fresh, frozen, dried, or canned fruit (canned in light syrup or fruit juice)    ? Avocado    · Low-fat dairy products:      ? Nonfat (skim) or 1% milk    ? Nonfat or low-fat cheese, yogurt, and cottage cheese    · Meats and proteins:      ? Chicken or turkey with no skin    ? Baked or broiled fish    ? Lean beef and pork (loin, round, extra lean hamburger)    ? Beans and peas, unsalted nuts, soy products    ? Egg whites and substitutes    ? Seeds and nuts    · Fats:      ? Unsaturated oil, such as canola, olive, peanut, soybean, or sunflower oil    ? Soft or liquid margarine and vegetable oil spread    ?  Low-fat salad dressing    Other ways to decrease fat:   · Read food labels before you buy foods  Choose foods that have less than 30% of calories from fat  Choose low-fat or fat-free dairy products  Remember that fat free does not mean calorie free  These foods still contain calories, and too many calories can lead to weight gain  · Trim fat from meat and avoid fried food  Trim all visible fat from meat before you cook it  Remove the skin from poultry  Do not larsen meat, fish, or poultry  Bake, roast, boil, or broil these foods instead  Avoid fried foods  Eat a baked potato instead of [place] fries  Steam vegetables instead of sautéing them in butter  · Add less fat to foods  Use imitation yo bits on salads and baked potatoes instead of regular yo bits  Use fat-free or low-fat salad dressings instead of regular dressings  Use low-fat or nonfat butter-flavored topping instead of regular butter or margarine on popcorn and other foods  Ways to decrease fat in recipes:  Replace high-fat ingredients with low-fat or nonfat ones  This may cause baked goods to be drier than usual  You may need to use nonfat cooking spray on pans to prevent food from sticking  You also may need to change the amount of other ingredients, such as water, in the recipe  Try the following:  · Use low-fat or light margarine instead of regular margarine or shortening  · Use lean ground turkey breast or chicken, or lean ground beef (less than 5% fat) instead of hamburger  · Add 1 teaspoon of canola oil to 8 ounces of skim milk instead of using cream or half and half  · Use grated zucchini, carrots, or apples in breads instead of coconut  · Use blenderized, low-fat cottage cheese, plain tofu, or low-fat ricotta cheese instead of cream cheese  · Use 1 egg white and 1 teaspoon of canola oil, or use ¼ cup (2 ounces) of fat-free egg substitute instead of a whole egg       · Replace half of the oil that is called for in a recipe with applesauce when you bake  Use 3 tablespoons of cocoa powder and 1 tablespoon of canola oil instead of a square of baking chocolate  How to increase fiber:  Eat enough high-fiber foods to get 20 to 30 grams of fiber every day  Slowly increase your fiber intake to avoid stomach cramps, gas, and other problems  · Eat 3 ounces of whole-grain foods each day  An ounce is about 1 slice of bread  Eat whole-grain breads, such as whole-wheat bread  Whole wheat, whole-wheat flour, or other whole grains should be listed as the first ingredient on the food label  Replace white flour with whole-grain flour or use half of each in recipes  Whole-grain flour is heavier than white flour, so you may have to add more yeast or baking powder  · Eat a high-fiber cereal for breakfast   Oatmeal is a good source of soluble fiber  Look for cereals that have bran or fiber in the name  Choose whole-grain products, such as brown rice, barley, and whole-wheat pasta  · Eat more beans, peas, and lentils  For example, add beans to soups or salads  Eat at least 5 cups of fruits and vegetables each day  Eat fruits and vegetables with the peel because the peel is high in fiber  © Copyright [address] Information is for End User's use only and may not be sold, redistributed or otherwise used for commercial purposes  All illustrations and images included in CareNotes® are the copyrighted property of A D A M , Inc  or [address] [place] [address]  The above information is an  only  It is not intended as medical advice for individual conditions or treatments  Talk to your doctor, nurse or pharmacist before following any medical regimen to see if it is safe and effective for you  Heart Healthy Diet   AMBULATORY CARE:   A heart healthy diet  is an eating plan low in unhealthy fats and sodium (salt)  The plan is high in healthy fats and fiber   A heart healthy diet helps improve your cholesterol levels and lowers your risk for heart disease and stroke  A dietitian will teach you how to read and understand food labels  Heart healthy diet guidelines to follow:   · Choose foods that contain healthy fats  ? Unsaturated fats  include monounsaturated and polyunsaturated fats  Unsaturated fat is found in foods such as soybean, canola, olive, corn, and safflower oils  It is also found in soft tub margarine that is made with liquid vegetable oil  ? Omega-3 fat  is found in certain fish, such as salmon, tuna, and trout, and in walnuts and flaxseed  Eat fish high in omega-3 fats at least 2 times a week  · Get 20 to 30 grams of fiber each day  Fruits, vegetables, whole-grain foods, and legumes (cooked beans) are good sources of fiber  · Limit or do not have unhealthy fats  ? Cholesterol  is found in animal foods, such as eggs and lobster, and in dairy products made from whole milk  Limit cholesterol to less than 200 mg each day  ? Saturated fat  is found in meats, such as yo and hamburger  It is also found in chicken or turkey skin, whole milk, and butter  Limit saturated fat to less than 7% of your total daily calories  ? Trans fat  is found in packaged foods, such as potato chips and cookies  It is also in hard margarine, some fried foods, and shortening  Do not eat foods that contain trans fats  · Limit sodium as directed  You may be told to limit sodium to 2,000 to 2,300 mg each day  Choose low-sodium or no-salt-added foods  Add little or no salt to food you prepare  Use herbs and spices in place of salt  Include the following in your heart healthy plan:  Ask your dietitian or healthcare provider how many servings to have from each of the following food groups:  · Grains:      ? Whole-wheat breads, cereals, and pastas, and brown rice    ? Low-fat, low-sodium crackers and chips    · Vegetables:      ? Broccoli, green beans, green peas, and spinach    ? Collards, kale, and lima beans    ?  Carrots, sweet potatoes, tomatoes, and peppers    ? Canned vegetables with no salt added    · Fruits:      ? Bananas, peaches, pears, and pineapple    ? Grapes, raisins, and dates    ? Oranges, tangerines, grapefruit, orange juice, and grapefruit juice    ? Apricots, mangoes, melons, and papaya    ? Raspberries and strawberries    ? Canned fruit with no added sugar    · Low-fat dairy:      ? Nonfat (skim) milk, 1% milk, and low-fat almond, cashew, or soy milks fortified with calcium    ? Low-fat cheese, regular or frozen yogurt, and cottage cheese    · Meats and proteins:      ? Lean cuts of beef and pork (loin, leg, round), skinless chicken and turkey    ? Legumes, soy products, egg whites, or nuts    Limit or do not include the following in your heart healthy plan:   · Unhealthy fats and oils:      ? Whole or 2% milk, cream cheese, sour cream, or cheese    ? High-fat cuts of beef (T-bone steaks, ribs), chicken or turkey with skin, and organ meats such as liver    ? Butter, stick margarine, shortening, and cooking oils such as coconut or palm oil    · Foods and liquids high in sodium:      ? Packaged foods, such as frozen dinners, cookies, macaroni and cheese, and cereals with more than 300 mg of sodium per serving    ? Vegetables with added sodium, such as instant potatoes, vegetables with added sauces, or regular canned vegetables    ? Cured or smoked meats, such as hot dogs, yo, and sausage    ? High-sodium ketchup, barbecue sauce, salad dressing, pickles, olives, soy sauce, or miso    · Foods and liquids high in sugar:      ? Candy, cake, cookies, pies, or doughnuts    ? Soft drinks (soda), sports drinks, or sweetened tea    ? Canned or dry mixes for cakes, soups, sauces, or gravies    Other healthy heart guidelines:   · Do not smoke  Nicotine and other chemicals in cigarettes and cigars can cause lung and heart damage  Ask your healthcare provider for information if you currently smoke and need help to quit  E-cigarettes or smokeless tobacco still contain nicotine  Talk to your healthcare provider before you use these products  · Limit or do not drink alcohol as directed  Alcohol can damage your heart and raise your blood pressure  Your healthcare provider may give you specific daily and weekly limits  The general recommended limit is 1 drink a day for women 21 or older and for men 72 or older  Do not have more than 3 drinks in a day or 7 in a week  The recommended limit is 2 drinks a day for men 24to 59years of age  Do not have more than 4 drinks in a day or 14 in a week  A drink of alcohol is 12 ounces of beer, 5 ounces of wine, or 1½ ounces of liquor  · Exercise regularly  Exercise can help you maintain a healthy weight and improve your blood pressure and cholesterol levels  Regular exercise can also decrease your risk for heart problems  Ask your healthcare provider about the best exercise plan for you  Do not start an exercise program without asking your healthcare provider  Follow up with your doctor or cardiologist as directed:  Write down your questions so you remember to ask them during your visits  © Copyright [address] Information is for End User's use only and may not be sold, redistributed or otherwise used for commercial purposes  All illustrations and images included in CareNotes® are the copyrighted property of A D A M , Inc  or [address] [place] [address]  The above information is an  only  It is not intended as medical advice for individual conditions or treatments  Talk to your doctor, nurse or pharmacist before following any medical regimen to see if it is safe and effective for you

## 2021-06-02 NOTE — PROGRESS NOTES
Assessment/Plan: Will repeat fasting labs with uric acid  Will order an XR of his left knee  Did advised staying off the knee and keeping it elevated  Will give referral to weight management as well  He is deferring a colonoscopy as well  Will notify once labs and XR is back  No problem-specific Assessment & Plan notes found for this encounter  Problem List Items Addressed This Visit        Cardiovascular and Mediastinum    Hypertension - Primary    Relevant Orders    Comprehensive metabolic panel    CBC and differential    TSH, 3rd generation with Free T4 reflex       Other    Vitamin D deficiency    Relevant Orders    Comprehensive metabolic panel    CBC and differential    TSH, 3rd generation with Free T4 reflex    History of gout    Relevant Orders    Uric acid    Mixed hyperlipidemia    Relevant Orders    Comprehensive metabolic panel    CBC and differential    TSH, 3rd generation with Free T4 reflex    Lipid panel    Morbid obesity with BMI of 50 0-59 9, adult ([facility])    Acute pain of left knee    Relevant Orders    XR knee 3 vw left non injury            Subjective:      Patient ID: Arbie Saint is a 48 y o  male  Katey Force is for an acute visit  He states he is having pain in his left knee for several weeks  He states he thought he had gout in his right knee and was limping and feels he may have agitated his left knee  He denies any redness or warmth  He states his right knee is now feeling better  He also would like a referral to weight management due to his continued weight gain  He states it is getting out of hand  He is not eating the best at home  The following portions of the patient's history were reviewed and updated as appropriate:   He  has a past medical history of Arthritis, Gout, Hypertension, and Kidney stone    He   Patient Active Problem List    Diagnosis Date Noted    Mixed hyperlipidemia 06/02/2021    Morbid obesity with BMI of 50 0-59 9, adult ([facility] ) 06/02/2021    Acute pain of left knee 06/02/2021    Insomnia 09/16/2019    Ureteral stone 08/27/2019    Hypertension 08/27/2019    Sepsis ([facility] ) 08/23/2019    [facility] 08/23/2019    Gram-negative bacteremia 60/61/6320    Metabolic acidosis 97/92/7443    History of gout 07/15/2019    Vitamin D deficiency 06/10/2015    Morbid obesity ([facility] ) 03/23/2015     He  has a past surgical history that includes No past surgeries (03/23/2015); [place] retrograde pyelogram (8/4/2019); pr cystourethroscopy,ureter catheter (Right, 8/4/2019); pr cysto/uretero w/lithotripsy &indwell stent insrt (Right, 9/26/2019); and [place] retrograde pyelogram (9/26/2019)  His family history includes Cancer in his mother  He  reports that he has never smoked  He has never used smokeless tobacco  He reports that he does not drink alcohol or use drugs  Current Outpatient Medications   Medication Sig Dispense Refill    tamsulosin (FLOMAX) 0 4 mg TAKE 1 CAPSULE BY MOUTH EVERY 24 HOURS 30 capsule 11    atorvastatin (LIPITOR) 10 mg tablet Take 1 tablet (10 mg total) by mouth daily (Patient not taking: Reported on 11/9/2020) 30 tablet 3     No current facility-administered medications for this visit  Current Outpatient Medications on File Prior to Visit   Medication Sig    tamsulosin (FLOMAX) 0 4 mg TAKE 1 CAPSULE BY MOUTH EVERY 24 HOURS    atorvastatin (LIPITOR) 10 mg tablet Take 1 tablet (10 mg total) by mouth daily (Patient not taking: Reported on 11/9/2020)    [DISCONTINUED] predniSONE 20 mg tablet Take 1 tablet (20 mg total) by mouth daily (Patient not taking: Reported on 6/2/2021)     No current facility-administered medications on file prior to visit  He has No Known Allergies       Review of Systems   Musculoskeletal:        Knee pain   All other systems reviewed and are negative          Objective:      /86 (BP [facility]: Right arm, Patient Position: Sitting, Cuff Size: Large)   Pulse 99   Temp 98 1 °F (36 7 °C)   Ht 5' 6" (1 676 m)   Wt (!) 168 kg (370 lb)   SpO2 97%   BMI 59 72 kg/m²          Physical Exam  Vitals signs reviewed  Constitutional:       Appearance: Normal appearance  He is obese  HENT:      Head: Normocephalic and atraumatic  Right Ear: Tympanic membrane, ear canal and external ear normal       Left Ear: Tympanic membrane, ear canal and external ear normal       Nose: Nose normal       Mouth/Throat:      Mouth: Mucous membranes are moist       Pharynx: Oropharynx is clear  Eyes:      Extraocular Movements: Extraocular movements intact  Conjunctiva/sclera: Conjunctivae normal    Neck:      Musculoskeletal: Normal range of motion and neck supple  Cardiovascular:      Rate and Rhythm: Normal rate and regular rhythm  Pulses: Normal pulses  Heart sounds: Normal heart sounds  Pulmonary:      Effort: Pulmonary effort is normal       Breath sounds: Normal breath sounds  Abdominal:      General: Abdomen is flat  Bowel sounds are normal       Palpations: Abdomen is soft  Musculoskeletal: Normal range of motion  Skin:     General: Skin is warm and dry  Capillary Refill: Capillary refill takes less than 2 seconds  Neurological:      General: No focal deficit present  Mental Status: He is alert and oriented to person, place, and time  Mental status is at baseline  Psychiatric:         Mood and Affect: Mood normal          Behavior: Behavior normal          Thought Content: Thought content normal          Judgment: Judgment normal          BMI Counseling: Body mass index is 59 72 kg/m²   The BMI is above normal  Nutrition recommendations include reducing portion sizes, decreasing overall calorie intake, 3-5 servings of fruits/vegetables daily, reducing fast food intake, consuming healthier snacks, decreasing soda and/or juice intake, moderation in carbohydrate intake, increasing intake of lean protein, reducing intake of saturated fat and trans fat and reducing intake of cholesterol

## 2021-06-02 NOTE — LETTER
June 2, 2021     Patient: Norina Kanner   YOB: 1965   Date of Visit: 6/2/2021       To Whom it May Concern:    Norina Kanner is under my professional care  He was seen in my office on 6/2/2021  Please excuse Joanna Motley from work on 6/3/21  Joanna Motley will return to work on 6/4/21  If you have any questions or concerns, please don't hesitate to call             Sincerely,            Juan Oliveira

## 2021-09-21 DIAGNOSIS — N40.0 BENIGN PROSTATIC HYPERPLASIA, UNSPECIFIED WHETHER LOWER URINARY TRACT SYMPTOMS PRESENT: ICD-10-CM

## 2021-09-22 RX ORDER — TAMSULOSIN HYDROCHLORIDE 0.4 MG/1
CAPSULE ORAL
Qty: 30 CAPSULE | Refills: 8 | Status: ON HOLD | OUTPATIENT
Start: 2021-09-22 | End: 2022-05-24 | Stop reason: SDUPTHER

## 2021-10-02 ENCOUNTER — OFFICE VISIT (OUTPATIENT)
Dept: URGENT CARE | Facility: CLINIC | Age: 56
End: 2021-10-02
Payer: COMMERCIAL

## 2021-10-02 VITALS
DIASTOLIC BLOOD PRESSURE: 90 MMHG | OXYGEN SATURATION: 99 % | HEIGHT: 66 IN | BODY MASS INDEX: 50.62 KG/M2 | WEIGHT: 315 LBS | SYSTOLIC BLOOD PRESSURE: 148 MMHG | HEART RATE: 71 BPM | RESPIRATION RATE: 18 BRPM | TEMPERATURE: 98.3 F

## 2021-10-02 DIAGNOSIS — M65.4 DE QUERVAIN'S TENOSYNOVITIS, RIGHT: Primary | ICD-10-CM

## 2021-10-02 PROCEDURE — G0383 LEV 4 HOSP TYPE B ED VISIT: HCPCS | Performed by: PHYSICIAN ASSISTANT

## 2021-10-07 ENCOUNTER — OFFICE VISIT (OUTPATIENT)
Dept: OBGYN CLINIC | Facility: CLINIC | Age: 56
End: 2021-10-07
Payer: COMMERCIAL

## 2021-10-07 VITALS
BODY MASS INDEX: 50.62 KG/M2 | DIASTOLIC BLOOD PRESSURE: 84 MMHG | SYSTOLIC BLOOD PRESSURE: 138 MMHG | HEIGHT: 66 IN | HEART RATE: 78 BPM | WEIGHT: 315 LBS

## 2021-10-07 DIAGNOSIS — M65.4 DE QUERVAIN'S TENOSYNOVITIS, RIGHT: ICD-10-CM

## 2021-10-07 PROCEDURE — 3008F BODY MASS INDEX DOCD: CPT | Performed by: ORTHOPAEDIC SURGERY

## 2021-10-07 PROCEDURE — 20550 NJX 1 TENDON SHEATH/LIGAMENT: CPT | Performed by: ORTHOPAEDIC SURGERY

## 2021-10-07 PROCEDURE — 1036F TOBACCO NON-USER: CPT | Performed by: ORTHOPAEDIC SURGERY

## 2021-10-07 PROCEDURE — 99203 OFFICE O/P NEW LOW 30 MIN: CPT | Performed by: ORTHOPAEDIC SURGERY

## 2021-10-07 RX ORDER — BETAMETHASONE SODIUM PHOSPHATE AND BETAMETHASONE ACETATE 3; 3 MG/ML; MG/ML
3 INJECTION, SUSPENSION INTRA-ARTICULAR; INTRALESIONAL; INTRAMUSCULAR; SOFT TISSUE
Status: COMPLETED | OUTPATIENT
Start: 2021-10-07 | End: 2021-10-07

## 2021-10-07 RX ORDER — BUPIVACAINE HYDROCHLORIDE 2.5 MG/ML
1 INJECTION, SOLUTION EPIDURAL; INFILTRATION; INTRACAUDAL
Status: COMPLETED | OUTPATIENT
Start: 2021-10-07 | End: 2021-10-07

## 2021-10-07 RX ADMIN — BETAMETHASONE SODIUM PHOSPHATE AND BETAMETHASONE ACETATE 3 MG: 3; 3 INJECTION, SUSPENSION INTRA-ARTICULAR; INTRALESIONAL; INTRAMUSCULAR; SOFT TISSUE at 08:35

## 2021-10-07 RX ADMIN — BUPIVACAINE HYDROCHLORIDE 1 ML: 2.5 INJECTION, SOLUTION EPIDURAL; INFILTRATION; INTRACAUDAL at 08:35

## 2021-10-12 ENCOUNTER — EVALUATION (OUTPATIENT)
Dept: PHYSICAL THERAPY | Facility: CLINIC | Age: 56
End: 2021-10-12
Payer: COMMERCIAL

## 2021-10-12 DIAGNOSIS — M65.4 DE QUERVAIN'S TENOSYNOVITIS, RIGHT: ICD-10-CM

## 2021-10-12 PROCEDURE — 97140 MANUAL THERAPY 1/> REGIONS: CPT | Performed by: PHYSICAL THERAPIST

## 2021-10-12 PROCEDURE — 97161 PT EVAL LOW COMPLEX 20 MIN: CPT | Performed by: PHYSICAL THERAPIST

## 2021-10-12 PROCEDURE — 97035 APP MDLTY 1+ULTRASOUND EA 15: CPT | Performed by: PHYSICAL THERAPIST

## 2021-10-12 PROCEDURE — 97112 NEUROMUSCULAR REEDUCATION: CPT | Performed by: PHYSICAL THERAPIST

## 2021-10-18 ENCOUNTER — OFFICE VISIT (OUTPATIENT)
Dept: PHYSICAL THERAPY | Facility: CLINIC | Age: 56
End: 2021-10-18
Payer: COMMERCIAL

## 2021-10-18 DIAGNOSIS — M65.4 DE QUERVAIN'S TENOSYNOVITIS, RIGHT: Primary | ICD-10-CM

## 2021-10-18 PROCEDURE — 97140 MANUAL THERAPY 1/> REGIONS: CPT

## 2021-10-18 PROCEDURE — 97112 NEUROMUSCULAR REEDUCATION: CPT

## 2021-10-18 PROCEDURE — 97035 APP MDLTY 1+ULTRASOUND EA 15: CPT

## 2021-10-25 ENCOUNTER — OFFICE VISIT (OUTPATIENT)
Dept: PHYSICAL THERAPY | Facility: CLINIC | Age: 56
End: 2021-10-25
Payer: COMMERCIAL

## 2021-10-25 DIAGNOSIS — M65.4 DE QUERVAIN'S TENOSYNOVITIS, RIGHT: Primary | ICD-10-CM

## 2021-10-25 PROCEDURE — 97035 APP MDLTY 1+ULTRASOUND EA 15: CPT

## 2021-10-25 PROCEDURE — 97112 NEUROMUSCULAR REEDUCATION: CPT

## 2021-10-25 PROCEDURE — 97140 MANUAL THERAPY 1/> REGIONS: CPT

## 2021-11-01 ENCOUNTER — APPOINTMENT (OUTPATIENT)
Dept: PHYSICAL THERAPY | Facility: CLINIC | Age: 56
End: 2021-11-01
Payer: COMMERCIAL

## 2021-11-08 ENCOUNTER — APPOINTMENT (OUTPATIENT)
Dept: PHYSICAL THERAPY | Facility: CLINIC | Age: 56
End: 2021-11-08
Payer: COMMERCIAL

## 2021-11-08 ENCOUNTER — OFFICE VISIT (OUTPATIENT)
Dept: PHYSICAL THERAPY | Facility: CLINIC | Age: 56
End: 2021-11-08
Payer: COMMERCIAL

## 2021-11-08 DIAGNOSIS — M65.4 DE QUERVAIN'S TENOSYNOVITIS, RIGHT: Primary | ICD-10-CM

## 2021-11-08 PROCEDURE — 97112 NEUROMUSCULAR REEDUCATION: CPT | Performed by: PHYSICAL THERAPIST

## 2021-11-08 PROCEDURE — 97140 MANUAL THERAPY 1/> REGIONS: CPT | Performed by: PHYSICAL THERAPIST

## 2021-11-08 PROCEDURE — 97110 THERAPEUTIC EXERCISES: CPT | Performed by: PHYSICAL THERAPIST

## 2021-11-15 ENCOUNTER — APPOINTMENT (OUTPATIENT)
Dept: PHYSICAL THERAPY | Facility: CLINIC | Age: 56
End: 2021-11-15
Payer: COMMERCIAL

## 2021-11-22 ENCOUNTER — APPOINTMENT (OUTPATIENT)
Dept: PHYSICAL THERAPY | Facility: CLINIC | Age: 56
End: 2021-11-22
Payer: COMMERCIAL

## 2022-04-21 ENCOUNTER — APPOINTMENT (EMERGENCY)
Dept: CT IMAGING | Facility: HOSPITAL | Age: 57
DRG: 872 | End: 2022-04-21
Payer: COMMERCIAL

## 2022-04-21 ENCOUNTER — APPOINTMENT (INPATIENT)
Dept: ULTRASOUND IMAGING | Facility: HOSPITAL | Age: 57
DRG: 872 | End: 2022-04-21
Payer: COMMERCIAL

## 2022-04-21 ENCOUNTER — HOSPITAL ENCOUNTER (INPATIENT)
Facility: HOSPITAL | Age: 57
LOS: 1 days | DRG: 872 | End: 2022-04-22
Attending: EMERGENCY MEDICINE | Admitting: INTERNAL MEDICINE
Payer: COMMERCIAL

## 2022-04-21 DIAGNOSIS — E87.2 METABOLIC ACIDOSIS: ICD-10-CM

## 2022-04-21 DIAGNOSIS — N20.1 URETEROLITHIASIS: Primary | ICD-10-CM

## 2022-04-21 DIAGNOSIS — N39.0 UTI (URINARY TRACT INFECTION): ICD-10-CM

## 2022-04-21 DIAGNOSIS — E87.2 METABOLIC ACIDOSIS, INCREASED ANION GAP: ICD-10-CM

## 2022-04-21 DIAGNOSIS — N17.9 AKI (ACUTE KIDNEY INJURY) (HCC): ICD-10-CM

## 2022-04-21 PROBLEM — M10.9 GOUT: Status: ACTIVE | Noted: 2019-07-15

## 2022-04-21 PROBLEM — N13.9 OBSTRUCTIVE UROPATHY: Status: ACTIVE | Noted: 2022-04-21

## 2022-04-21 PROBLEM — E87.29 METABOLIC ACIDOSIS, INCREASED ANION GAP: Status: ACTIVE | Noted: 2019-08-04

## 2022-04-21 LAB
ALBUMIN SERPL BCP-MCNC: 2.9 G/DL (ref 3.5–5)
ALBUMIN SERPL BCP-MCNC: 3.3 G/DL (ref 3.5–5)
ALP SERPL-CCNC: 67 U/L (ref 46–116)
ALP SERPL-CCNC: 74 U/L (ref 46–116)
ALT SERPL W P-5'-P-CCNC: 22 U/L (ref 12–78)
ALT SERPL W P-5'-P-CCNC: 25 U/L (ref 12–78)
ANION GAP SERPL CALCULATED.3IONS-SCNC: 13 MMOL/L (ref 4–13)
ANION GAP SERPL CALCULATED.3IONS-SCNC: 16 MMOL/L (ref 4–13)
ANION GAP SERPL CALCULATED.3IONS-SCNC: 16 MMOL/L (ref 4–13)
ANISOCYTOSIS BLD QL SMEAR: PRESENT
ARTERIAL PATENCY WRIST A: YES
AST SERPL W P-5'-P-CCNC: 21 U/L (ref 5–45)
AST SERPL W P-5'-P-CCNC: 24 U/L (ref 5–45)
BACTERIA UR QL AUTO: ABNORMAL /HPF
BASE EX.OXY STD BLDV CALC-SCNC: 90.4 % (ref 60–80)
BASE EX.OXY STD BLDV CALC-SCNC: 97.8 % (ref 60–80)
BASE EXCESS BLDA CALC-SCNC: -9 MMOL/L
BASE EXCESS BLDV CALC-SCNC: -6.4 MMOL/L
BASE EXCESS BLDV CALC-SCNC: -8.8 MMOL/L
BASOPHILS # BLD AUTO: 0.06 THOUSANDS/ΜL (ref 0–0.1)
BASOPHILS # BLD MANUAL: 0 THOUSAND/UL (ref 0–0.1)
BASOPHILS NFR BLD AUTO: 1 % (ref 0–1)
BASOPHILS NFR MAR MANUAL: 0 % (ref 0–1)
BILIRUB SERPL-MCNC: 0.41 MG/DL (ref 0.2–1)
BILIRUB SERPL-MCNC: 0.51 MG/DL (ref 0.2–1)
BILIRUB UR QL STRIP: NEGATIVE
BUN SERPL-MCNC: 28 MG/DL (ref 5–25)
BUN SERPL-MCNC: 28 MG/DL (ref 5–25)
BUN SERPL-MCNC: 30 MG/DL (ref 5–25)
CALCIUM ALBUM COR SERPL-MCNC: 9.5 MG/DL (ref 8.3–10.1)
CALCIUM ALBUM COR SERPL-MCNC: 9.8 MG/DL (ref 8.3–10.1)
CALCIUM SERPL-MCNC: 8.6 MG/DL (ref 8.3–10.1)
CALCIUM SERPL-MCNC: 8.6 MG/DL (ref 8.3–10.1)
CALCIUM SERPL-MCNC: 9.2 MG/DL (ref 8.3–10.1)
CHLORIDE SERPL-SCNC: 106 MMOL/L (ref 100–108)
CHLORIDE SERPL-SCNC: 106 MMOL/L (ref 100–108)
CHLORIDE SERPL-SCNC: 109 MMOL/L (ref 100–108)
CLARITY UR: ABNORMAL
CO2 SERPL-SCNC: 17 MMOL/L (ref 21–32)
CO2 SERPL-SCNC: 18 MMOL/L (ref 21–32)
CO2 SERPL-SCNC: 20 MMOL/L (ref 21–32)
COLOR UR: YELLOW
CREAT SERPL-MCNC: 1.93 MG/DL (ref 0.6–1.3)
CREAT SERPL-MCNC: 2.66 MG/DL (ref 0.6–1.3)
CREAT SERPL-MCNC: 3.32 MG/DL (ref 0.6–1.3)
DACRYOCYTES BLD QL SMEAR: PRESENT
EOSINOPHIL # BLD AUTO: 0.28 THOUSAND/ΜL (ref 0–0.61)
EOSINOPHIL # BLD MANUAL: 0 THOUSAND/UL (ref 0–0.4)
EOSINOPHIL NFR BLD AUTO: 3 % (ref 0–6)
EOSINOPHIL NFR BLD MANUAL: 0 % (ref 0–6)
ERYTHROCYTE [DISTWIDTH] IN BLOOD BY AUTOMATED COUNT: 13.2 % (ref 11.6–15.1)
ERYTHROCYTE [DISTWIDTH] IN BLOOD BY AUTOMATED COUNT: 13.3 % (ref 11.6–15.1)
FLUAV RNA RESP QL NAA+PROBE: NEGATIVE
FLUBV RNA RESP QL NAA+PROBE: NEGATIVE
GFR SERPL CREATININE-BSD FRML MDRD: 19 ML/MIN/1.73SQ M
GFR SERPL CREATININE-BSD FRML MDRD: 25 ML/MIN/1.73SQ M
GFR SERPL CREATININE-BSD FRML MDRD: 38 ML/MIN/1.73SQ M
GLUCOSE P FAST SERPL-MCNC: 118 MG/DL (ref 65–99)
GLUCOSE SERPL-MCNC: 116 MG/DL (ref 65–140)
GLUCOSE SERPL-MCNC: 118 MG/DL (ref 65–140)
GLUCOSE SERPL-MCNC: 145 MG/DL (ref 65–140)
GLUCOSE UR STRIP-MCNC: NEGATIVE MG/DL
HCO3 BLDA-SCNC: 15.2 MMOL/L (ref 22–28)
HCO3 BLDV-SCNC: 10.9 MMOL/L (ref 24–30)
HCO3 BLDV-SCNC: 18.5 MMOL/L (ref 24–30)
HCT VFR BLD AUTO: 42.5 % (ref 36.5–49.3)
HCT VFR BLD AUTO: 45.9 % (ref 36.5–49.3)
HGB BLD-MCNC: 13.9 G/DL (ref 12–17)
HGB BLD-MCNC: 14.9 G/DL (ref 12–17)
HGB UR QL STRIP.AUTO: ABNORMAL
IMM GRANULOCYTES # BLD AUTO: 0.06 THOUSAND/UL (ref 0–0.2)
IMM GRANULOCYTES NFR BLD AUTO: 1 % (ref 0–2)
KETONES UR STRIP-MCNC: NEGATIVE MG/DL
LACTATE SERPL-SCNC: 0.9 MMOL/L (ref 0.5–2)
LEUKOCYTE ESTERASE UR QL STRIP: ABNORMAL
LG PLATELETS BLD QL SMEAR: PRESENT
LIPASE SERPL-CCNC: 112 U/L (ref 73–393)
LYMPHOCYTES # BLD AUTO: 0.4 THOUSAND/UL (ref 0.6–4.47)
LYMPHOCYTES # BLD AUTO: 1.14 THOUSANDS/ΜL (ref 0.6–4.47)
LYMPHOCYTES # BLD AUTO: 3 % (ref 14–44)
LYMPHOCYTES NFR BLD AUTO: 11 % (ref 14–44)
MCH RBC QN AUTO: 30.5 PG (ref 26.8–34.3)
MCH RBC QN AUTO: 30.8 PG (ref 26.8–34.3)
MCHC RBC AUTO-ENTMCNC: 32.5 G/DL (ref 31.4–37.4)
MCHC RBC AUTO-ENTMCNC: 32.7 G/DL (ref 31.4–37.4)
MCV RBC AUTO: 94 FL (ref 82–98)
MCV RBC AUTO: 94 FL (ref 82–98)
MONOCYTES # BLD AUTO: 0.03 THOUSAND/ΜL (ref 0.17–1.22)
MONOCYTES # BLD AUTO: 0.13 THOUSAND/UL (ref 0–1.22)
MONOCYTES NFR BLD AUTO: 0 % (ref 4–12)
MONOCYTES NFR BLD: 1 % (ref 4–12)
NEUTROPHILS # BLD AUTO: 9.18 THOUSANDS/ΜL (ref 1.85–7.62)
NEUTROPHILS # BLD MANUAL: 12.95 THOUSAND/UL (ref 1.85–7.62)
NEUTS BAND NFR BLD MANUAL: 39 % (ref 0–8)
NEUTS SEG NFR BLD AUTO: 57 % (ref 43–75)
NEUTS SEG NFR BLD AUTO: 84 % (ref 43–75)
NITRITE UR QL STRIP: POSITIVE
NON VENT ROOM AIR: 21 %
NON-SQ EPI CELLS URNS QL MICRO: ABNORMAL /HPF
NRBC BLD AUTO-RTO: 0 /100 WBCS
O2 CT BLDA-SCNC: 19.1 ML/DL (ref 16–23)
O2 CT BLDV-SCNC: 18.8 ML/DL
O2 CT BLDV-SCNC: 22.2 ML/DL
OXYHGB MFR BLDA: 94.8 % (ref 94–97)
PCO2 BLDA: 28.9 MM HG (ref 36–44)
PCO2 BLDV: 14.7 MM HG (ref 42–50)
PCO2 BLDV: 35.4 MM HG (ref 42–50)
PH BLDA: 7.34 [PH] (ref 7.35–7.45)
PH BLDV: 7.34 [PH] (ref 7.3–7.4)
PH BLDV: 7.49 [PH] (ref 7.3–7.4)
PH UR STRIP.AUTO: 6 [PH]
PLATELET # BLD AUTO: 219 THOUSANDS/UL (ref 149–390)
PLATELET # BLD AUTO: 238 THOUSANDS/UL (ref 149–390)
PLATELET BLD QL SMEAR: ADEQUATE
PMV BLD AUTO: 9.7 FL (ref 8.9–12.7)
PMV BLD AUTO: 9.9 FL (ref 8.9–12.7)
PO2 BLDA: 82.8 MM HG (ref 75–129)
PO2 BLDV: 216.3 MM HG (ref 35–45)
PO2 BLDV: 65.7 MM HG (ref 35–45)
POTASSIUM SERPL-SCNC: 3.9 MMOL/L (ref 3.5–5.3)
POTASSIUM SERPL-SCNC: 4 MMOL/L (ref 3.5–5.3)
POTASSIUM SERPL-SCNC: 4.7 MMOL/L (ref 3.5–5.3)
PROT SERPL-MCNC: 6.7 G/DL (ref 6.4–8.2)
PROT SERPL-MCNC: 7.3 G/DL (ref 6.4–8.2)
PROT UR STRIP-MCNC: ABNORMAL MG/DL
RBC # BLD AUTO: 4.51 MILLION/UL (ref 3.88–5.62)
RBC # BLD AUTO: 4.89 MILLION/UL (ref 3.88–5.62)
RBC #/AREA URNS AUTO: ABNORMAL /HPF
RSV RNA RESP QL NAA+PROBE: NEGATIVE
SARS-COV-2 RNA RESP QL NAA+PROBE: NEGATIVE
SODIUM SERPL-SCNC: 139 MMOL/L (ref 136–145)
SODIUM SERPL-SCNC: 139 MMOL/L (ref 136–145)
SODIUM SERPL-SCNC: 143 MMOL/L (ref 136–145)
SP GR UR STRIP.AUTO: 1.02 (ref 1–1.03)
SPECIMEN SOURCE: ABNORMAL
URATE SERPL-MCNC: 10.6 MG/DL (ref 4.2–8)
UROBILINOGEN UR QL STRIP.AUTO: 0.2 E.U./DL
WBC # BLD AUTO: 10.75 THOUSAND/UL (ref 4.31–10.16)
WBC # BLD AUTO: 13.49 THOUSAND/UL (ref 4.31–10.16)
WBC #/AREA URNS AUTO: ABNORMAL /HPF

## 2022-04-21 PROCEDURE — 36600 WITHDRAWAL OF ARTERIAL BLOOD: CPT

## 2022-04-21 PROCEDURE — 99220 PR INITIAL OBSERVATION CARE/DAY 70 MINUTES: CPT | Performed by: INTERNAL MEDICINE

## 2022-04-21 PROCEDURE — 87040 BLOOD CULTURE FOR BACTERIA: CPT | Performed by: EMERGENCY MEDICINE

## 2022-04-21 PROCEDURE — 99285 EMERGENCY DEPT VISIT HI MDM: CPT | Performed by: EMERGENCY MEDICINE

## 2022-04-21 PROCEDURE — 96361 HYDRATE IV INFUSION ADD-ON: CPT

## 2022-04-21 PROCEDURE — 83605 ASSAY OF LACTIC ACID: CPT | Performed by: EMERGENCY MEDICINE

## 2022-04-21 PROCEDURE — 82805 BLOOD GASES W/O2 SATURATION: CPT | Performed by: EMERGENCY MEDICINE

## 2022-04-21 PROCEDURE — 83690 ASSAY OF LIPASE: CPT | Performed by: EMERGENCY MEDICINE

## 2022-04-21 PROCEDURE — 80053 COMPREHEN METABOLIC PANEL: CPT

## 2022-04-21 PROCEDURE — 99239 HOSP IP/OBS DSCHRG MGMT >30: CPT | Performed by: PHYSICIAN ASSISTANT

## 2022-04-21 PROCEDURE — 96375 TX/PRO/DX INJ NEW DRUG ADDON: CPT

## 2022-04-21 PROCEDURE — 80053 COMPREHEN METABOLIC PANEL: CPT | Performed by: EMERGENCY MEDICINE

## 2022-04-21 PROCEDURE — 36415 COLL VENOUS BLD VENIPUNCTURE: CPT | Performed by: EMERGENCY MEDICINE

## 2022-04-21 PROCEDURE — NC001 PR NO CHARGE

## 2022-04-21 PROCEDURE — 96365 THER/PROPH/DIAG IV INF INIT: CPT

## 2022-04-21 PROCEDURE — 87186 SC STD MICRODIL/AGAR DIL: CPT | Performed by: EMERGENCY MEDICINE

## 2022-04-21 PROCEDURE — 85027 COMPLETE CBC AUTOMATED: CPT

## 2022-04-21 PROCEDURE — 85007 BL SMEAR W/DIFF WBC COUNT: CPT

## 2022-04-21 PROCEDURE — 82805 BLOOD GASES W/O2 SATURATION: CPT | Performed by: PHYSICIAN ASSISTANT

## 2022-04-21 PROCEDURE — 87086 URINE CULTURE/COLONY COUNT: CPT | Performed by: EMERGENCY MEDICINE

## 2022-04-21 PROCEDURE — 99285 EMERGENCY DEPT VISIT HI MDM: CPT

## 2022-04-21 PROCEDURE — 87154 CUL TYP ID BLD PTHGN 6+ TRGT: CPT | Performed by: EMERGENCY MEDICINE

## 2022-04-21 PROCEDURE — G1004 CDSM NDSC: HCPCS

## 2022-04-21 PROCEDURE — 82805 BLOOD GASES W/O2 SATURATION: CPT

## 2022-04-21 PROCEDURE — 99223 1ST HOSP IP/OBS HIGH 75: CPT

## 2022-04-21 PROCEDURE — 87077 CULTURE AEROBIC IDENTIFY: CPT | Performed by: EMERGENCY MEDICINE

## 2022-04-21 PROCEDURE — 76770 US EXAM ABDO BACK WALL COMP: CPT

## 2022-04-21 PROCEDURE — 84550 ASSAY OF BLOOD/URIC ACID: CPT

## 2022-04-21 PROCEDURE — 80048 BASIC METABOLIC PNL TOTAL CA: CPT | Performed by: PHYSICIAN ASSISTANT

## 2022-04-21 PROCEDURE — 74176 CT ABD & PELVIS W/O CONTRAST: CPT

## 2022-04-21 PROCEDURE — 85025 COMPLETE CBC W/AUTO DIFF WBC: CPT | Performed by: EMERGENCY MEDICINE

## 2022-04-21 PROCEDURE — 81001 URINALYSIS AUTO W/SCOPE: CPT | Performed by: EMERGENCY MEDICINE

## 2022-04-21 PROCEDURE — 99223 1ST HOSP IP/OBS HIGH 75: CPT | Performed by: PHYSICIAN ASSISTANT

## 2022-04-21 PROCEDURE — 0241U HB NFCT DS VIR RESP RNA 4 TRGT: CPT | Performed by: INTERNAL MEDICINE

## 2022-04-21 RX ORDER — TAMSULOSIN HYDROCHLORIDE 0.4 MG/1
0.4 CAPSULE ORAL
Status: DISCONTINUED | OUTPATIENT
Start: 2022-04-21 | End: 2022-04-21

## 2022-04-21 RX ORDER — HYDROMORPHONE HCL/PF 1 MG/ML
1 SYRINGE (ML) INJECTION EVERY 4 HOURS PRN
Status: DISCONTINUED | OUTPATIENT
Start: 2022-04-21 | End: 2022-04-22 | Stop reason: HOSPADM

## 2022-04-21 RX ORDER — ACETAMINOPHEN 325 MG/1
650 TABLET ORAL EVERY 6 HOURS PRN
Status: CANCELLED | OUTPATIENT
Start: 2022-04-21

## 2022-04-21 RX ORDER — TAMSULOSIN HYDROCHLORIDE 0.4 MG/1
0.4 CAPSULE ORAL
Status: DISCONTINUED | OUTPATIENT
Start: 2022-04-21 | End: 2022-04-22 | Stop reason: HOSPADM

## 2022-04-21 RX ORDER — ONDANSETRON 2 MG/ML
4 INJECTION INTRAMUSCULAR; INTRAVENOUS EVERY 6 HOURS PRN
Status: CANCELLED | OUTPATIENT
Start: 2022-04-21

## 2022-04-21 RX ORDER — CEFTRIAXONE 2 G/50ML
2000 INJECTION, SOLUTION INTRAVENOUS ONCE
Status: COMPLETED | OUTPATIENT
Start: 2022-04-21 | End: 2022-04-21

## 2022-04-21 RX ORDER — SODIUM CHLORIDE, SODIUM GLUCONATE, SODIUM ACETATE, POTASSIUM CHLORIDE, MAGNESIUM CHLORIDE, SODIUM PHOSPHATE, DIBASIC, AND POTASSIUM PHOSPHATE .53; .5; .37; .037; .03; .012; .00082 G/100ML; G/100ML; G/100ML; G/100ML; G/100ML; G/100ML; G/100ML
75 INJECTION, SOLUTION INTRAVENOUS CONTINUOUS
Status: DISCONTINUED | OUTPATIENT
Start: 2022-04-21 | End: 2022-04-22

## 2022-04-21 RX ORDER — ACETAMINOPHEN 325 MG/1
650 TABLET ORAL EVERY 6 HOURS PRN
Status: DISCONTINUED | OUTPATIENT
Start: 2022-04-21 | End: 2022-04-22 | Stop reason: HOSPADM

## 2022-04-21 RX ORDER — HYDROMORPHONE HCL/PF 1 MG/ML
0.5 SYRINGE (ML) INJECTION
Status: DISCONTINUED | OUTPATIENT
Start: 2022-04-21 | End: 2022-04-21

## 2022-04-21 RX ORDER — HYDROMORPHONE HCL/PF 1 MG/ML
1 SYRINGE (ML) INJECTION EVERY 4 HOURS PRN
Status: CANCELLED | OUTPATIENT
Start: 2022-04-21

## 2022-04-21 RX ORDER — HYDROMORPHONE HCL/PF 1 MG/ML
0.5 SYRINGE (ML) INJECTION ONCE
Status: COMPLETED | OUTPATIENT
Start: 2022-04-21 | End: 2022-04-21

## 2022-04-21 RX ORDER — CEFTRIAXONE 2 G/50ML
2000 INJECTION, SOLUTION INTRAVENOUS EVERY 24 HOURS
Status: CANCELLED | OUTPATIENT
Start: 2022-04-22

## 2022-04-21 RX ORDER — HYDROMORPHONE HCL/PF 1 MG/ML
1 SYRINGE (ML) INJECTION EVERY 4 HOURS PRN
Status: DISCONTINUED | OUTPATIENT
Start: 2022-04-21 | End: 2022-04-21

## 2022-04-21 RX ORDER — CEFTRIAXONE 2 G/50ML
2000 INJECTION, SOLUTION INTRAVENOUS EVERY 24 HOURS
Status: DISCONTINUED | OUTPATIENT
Start: 2022-04-22 | End: 2022-04-22

## 2022-04-21 RX ORDER — ONDANSETRON 2 MG/ML
4 INJECTION INTRAMUSCULAR; INTRAVENOUS ONCE
Status: COMPLETED | OUTPATIENT
Start: 2022-04-21 | End: 2022-04-21

## 2022-04-21 RX ORDER — HEPARIN SODIUM 5000 [USP'U]/ML
5000 INJECTION, SOLUTION INTRAVENOUS; SUBCUTANEOUS EVERY 8 HOURS SCHEDULED
Status: CANCELLED | OUTPATIENT
Start: 2022-04-22

## 2022-04-21 RX ORDER — TAMSULOSIN HYDROCHLORIDE 0.4 MG/1
0.4 CAPSULE ORAL
Status: CANCELLED | OUTPATIENT
Start: 2022-04-22

## 2022-04-21 RX ORDER — ONDANSETRON 2 MG/ML
4 INJECTION INTRAMUSCULAR; INTRAVENOUS EVERY 6 HOURS PRN
Status: DISCONTINUED | OUTPATIENT
Start: 2022-04-21 | End: 2022-04-22 | Stop reason: HOSPADM

## 2022-04-21 RX ORDER — SODIUM CHLORIDE, SODIUM GLUCONATE, SODIUM ACETATE, POTASSIUM CHLORIDE, MAGNESIUM CHLORIDE, SODIUM PHOSPHATE, DIBASIC, AND POTASSIUM PHOSPHATE .53; .5; .37; .037; .03; .012; .00082 G/100ML; G/100ML; G/100ML; G/100ML; G/100ML; G/100ML; G/100ML
75 INJECTION, SOLUTION INTRAVENOUS CONTINUOUS
Status: CANCELLED | OUTPATIENT
Start: 2022-04-21

## 2022-04-21 RX ORDER — HEPARIN SODIUM 5000 [USP'U]/ML
5000 INJECTION, SOLUTION INTRAVENOUS; SUBCUTANEOUS EVERY 8 HOURS SCHEDULED
Status: DISCONTINUED | OUTPATIENT
Start: 2022-04-22 | End: 2022-04-22 | Stop reason: HOSPADM

## 2022-04-21 RX ADMIN — SODIUM BICARBONATE 25 MEQ: 84 INJECTION, SOLUTION INTRAVENOUS at 05:46

## 2022-04-21 RX ADMIN — ENOXAPARIN SODIUM 40 MG: 40 INJECTION SUBCUTANEOUS at 08:01

## 2022-04-21 RX ADMIN — CEFTRIAXONE 2000 MG: 2 INJECTION, SOLUTION INTRAVENOUS at 03:19

## 2022-04-21 RX ADMIN — ONDANSETRON 4 MG: 2 INJECTION INTRAMUSCULAR; INTRAVENOUS at 02:13

## 2022-04-21 RX ADMIN — TAMSULOSIN HYDROCHLORIDE 0.4 MG: 0.4 CAPSULE ORAL at 15:31

## 2022-04-21 RX ADMIN — HYDROMORPHONE HYDROCHLORIDE 0.5 MG: 1 INJECTION, SOLUTION INTRAMUSCULAR; INTRAVENOUS; SUBCUTANEOUS at 05:30

## 2022-04-21 RX ADMIN — SODIUM CHLORIDE 1000 ML: 0.9 INJECTION, SOLUTION INTRAVENOUS at 02:12

## 2022-04-21 RX ADMIN — HYDROMORPHONE HYDROCHLORIDE 1 MG: 1 INJECTION, SOLUTION INTRAMUSCULAR; INTRAVENOUS; SUBCUTANEOUS at 15:32

## 2022-04-21 RX ADMIN — ACETAMINOPHEN 650 MG: 325 TABLET ORAL at 05:30

## 2022-04-21 RX ADMIN — HYDROMORPHONE HYDROCHLORIDE 0.5 MG: 1 INJECTION, SOLUTION INTRAMUSCULAR; INTRAVENOUS; SUBCUTANEOUS at 02:14

## 2022-04-21 RX ADMIN — SODIUM CHLORIDE, SODIUM GLUCONATE, SODIUM ACETATE, POTASSIUM CHLORIDE, MAGNESIUM CHLORIDE, SODIUM PHOSPHATE, DIBASIC, AND POTASSIUM PHOSPHATE 100 ML/HR: .53; .5; .37; .037; .03; .012; .00082 INJECTION, SOLUTION INTRAVENOUS at 05:31

## 2022-04-21 NOTE — QUICK NOTE
Patient with increased left flank pain and discomfort, repeat BMP at 2:00 p m  Today showed increased creatinine now up to 3 32 from 2 66 and 1 93 in the ED  Patient has 3 mm mid left ureteral stone with moderate hydronephrosis noted on CT renal stone protocol done in the ED yesterday  Prior kidney stone on right side 2019 required cysto stent x2 by Dr Shmuel Angela  Concern over worsening kidney function increased left flank pain  Gentle IV fluids, patient on IV Rocephin 2 g Q 24 hours  He did have lunch today  No fever or chills  CBC this morning 10 75, repeated 13 49  Worsening leukocytosis  Will obtain renal ultrasound, make nothing by mouth  Patient likely will need cystoscopy with left ureteral stent  Will give Dilaudid at this time for pain control  Discussed at length with hospitalist providers, conservative non operative management for this patient in light of worsening kidney function is not improving  The patient needs tertiary transfer to urology service, will discuss personally with Dr Edi Longoria, on-call urologist covering [facility] this week      Shayne Landis PA-C      Basic metabolic panel  Order: [id number]   Status: Final result     Visible to patient: Yes (not seen)     Next appt: None     0 Result Notes    Component Ref Range & Units 4/21/22 1357 4/21/22 0746 4/21/22 0215 6/2/21 1039 1/17/20 1017 10/10/19 0911 8/27/19 0448   Sodium 136 - 145 mmol/L 139  143  139  140  141  137  138    Potassium 3 5 - 5 3 mmol/L 4 7  4 0  3 9  4 7  4 7  3 6  3 2 Low     Chloride 100 - 108 mmol/L 106  109 High   106  108  109 High   105  104    CO2 21 - 32 mmol/L 20 Low   18 Low   17 Low   27  28  23  23    ANION GAP 4 - 13 mmol/L 13  16 High   16 High   5  4  9  11    BUN 5 - 25 mg/dL 30 High   28 High   28 High   26 High   18  12  12    Creatinine 0 60 - 1 30 mg/dL 3 32 High   2 66 High  CM  1 93 High  CM  1 40 High  CM  1 55 High  CM  1 54 High  CM  1 65 High  CM    Comment: Standardized to IDMS reference method   Glucose 65 - 140 mg/dL 145 High   118 CM  116 CM

## 2022-04-21 NOTE — ASSESSMENT & PLAN NOTE
Pre-Injection Assessment: Vital signs entered., Allergies assessed as required for injection type., Pt states feeling well, no complaints., Pt denies signs and symptoms of infection. and Authorization completed if applicable.      Refer to MAR (medication administration record) for type of injection and medication given.  Needle Size: Manufactured Needle  Patient tolerated well: Stable and Follow up appointment scheduled     Dr. Kellogg is supervising provider today.         · VBG PH 3 37, Anion gap slightly elevated at 16, Bicarb 18  5  · Suspect due to bicarbonate losses associated with ADRIANNE  · received 25 mEq sodium bicarb on admission  · ABG demonstrated showing ongoing metabolic acidosis, will continue to treat underlying cause - ADRIANNE, sepsis  · Nephrology input appreciated

## 2022-04-21 NOTE — ASSESSMENT & PLAN NOTE
· Patient presented with elevated creatinine of 1 93  · Patient does have history of CKD stage 3 with baseline creatinine of 1 5  · Suspect post renal cause as there is a left ureteral stone  · Patient started on isolyte 100 mL/hr  · Will monitor with a m   Metabolic panel  · Nephrology consult

## 2022-04-21 NOTE — QUICK NOTE
Patient seen and examined  H&P from early this morning reviewed  Agree with overall assessment and plan  Unfortunately patient's ADRIANNE is worsening, likely due to L ureteral stone causing hydroureteronephrosis  Will repeat BMP at 1400  Nephrology consult pending  Continue IVF hydration, repeat ABG now to follow up metabolic acidosis  Telemetry monitoring added due to worsening sinus tachycardia, likely due to sepsis  Urology consult in progress  May potentially require OR intervention if ADRIANNE continues to worsen, stone does not pass soon  Overall, patient meets criteria for inpatient status due to ongoing ADRIANNE, sepsis, need for IV antibiotics, IV fluids

## 2022-04-21 NOTE — TREATMENT PLAN
Discussed directly with Dr Marybel Arias via TT messaging  He recommends tertiary transfer to [facility], admit to [facility] and consult Urology there  Keep the patient nothing by mouth  He likely will need cysto/cystoscopy performed  Will update hospitalist AP and attending physician at this time      Jason Ramirez PA-C

## 2022-04-21 NOTE — ASSESSMENT & PLAN NOTE
· Patient meeting sepsis criteria for tachycardia with heart rate of 105 and leukocytosis with white blood cell count of 10 75  · Source determined to be urinary tract infection  · Lactic acid normal  · Ceftriaxone 2000 mg IV Q 24  · Urology consult

## 2022-04-21 NOTE — ASSESSMENT & PLAN NOTE
· Patient has history of recurrent nephrolithiasis with ureteral stones  · CT scan showed left ureteral nonobstructing stone    · Patient currently on Flomax 0 4 mg oral daily  · Will continue Flomax 0 4 mg oral daily  · Urology

## 2022-04-21 NOTE — ASSESSMENT & PLAN NOTE
· Patient presented with elevated creatinine of 1 93, now worsening to as high as 4 22 in the setting or L ureteral stone with hydroureteronephrosis  · Patient does have history of CKD stage 3 with baseline creatinine of 1 5  · Patient started on isolyte 100 mL/hr  · Nephrology consult appreciated

## 2022-04-21 NOTE — CASE MANAGEMENT
Case Management Assessment & Discharge Planning Note    Patient name Kimmie Vizcaino  [facility] [address] [id number] MRN [id number]  : 1965 Date 2022       Current Admission Date: 2022  Current Admission Diagnosis:Sepsis [facility])   Patient Active Problem List    Diagnosis Date Noted    Mixed hyperlipidemia 2021    Morbid obesity with BMI of 50 0-59 9, adult ([facility] ) 2021    Acute pain of left knee 2021    Insomnia 2019    Ureteral stone 2019    Hypertension 2019    Sepsis ([place] [address] ) 2019    UTI (urinary tract infection) 2019    Chronic kidney disease, stage 3 ([place] [address] ) 2019    ADRIANNE (acute kidney injury) ([place] [address] ) 2019    Gram-negative bacteremia     Metabolic acidosis, increased anion gap 2019    History of gout 07/15/2019    Vitamin D deficiency 06/10/2015    Morbid obesity ([place] [address] ) 2015      LOS (days): 0  Geometric Mean LOS (GMLOS) (days): 3 50  Days to GMLOS:3 2     OBJECTIVE:    Risk of Unplanned Readmission Score: 9         Current admission status: Inpatient       Preferred Pharmacy:   [facility]/pharmacy #[id number]- [address] [address]  Phone: [phone] Fax: [phone] [place] [address]  Phone: [phone] Fax: [phone]    Primary Care Provider: RITA Yousif    Primary Insurance: BLUE CROSS  Secondary Insurance:     ASSESSMENT:  [address] 26 Proxies    There are no active Health Care Proxies on file                   Readmission Root Cause  30 Day Readmission: No    Patient Information  Admitted from[de-identified] Home  Mental Status: Alert  During Assessment patient was accompanied by: Not accompanied during assessment  Assessment information provided by[de-identified] Patient  Primary Caregiver: Self  Support Systems: [address] [facility] Residence: [facility] [address] do you live in?: [place]  Home entry access options   Select all that apply : Stairs  Number of steps to enter home : 3  Do the steps have railings?: Yes  Type of Current Residence: 2 story home  Upon entering residence, is there a bedroom on the main floor (no further steps)?: No  A bedroom is located on the following floor levels of residence (select all that apply):: 2nd Floor  Upon entering residence, is there a bathroom on the main floor (no further steps)?: Yes (There is a bathroom on the 1rst and 2nd floor )  Number of steps to 2nd floor from main floor: One Flight  In the last 12 months, was there a time when you were not able to pay the mortgage or rent on time?: No  In the last 12 months, how many places have you lived?: 1  In the last 12 months, was there a time when you did not have a steady place to sleep or slept in a shelter (including now)?: No  Homeless/housing insecurity resource given?: N/A  Living Arrangements: Lives Alone  Is patient a ?: No    Activities of Daily Living Prior to Admission  Functional Status: Independent  Completes ADLs independently?: Yes  Ambulates independently?: Yes  Does patient use assisted devices?: No  Does patient currently own DME?: Yes  What DME does the patient currently own?: [employer]  Does patient have a history of Outpatient Therapy (PT/OT)?: Yes  Does the patient have a history of Short-Term Rehab?: No  Does patient have a history of HHC?: No  Does patient currently have [address]?: No         Patient Information Continued  Income Source: Employed (Pt moves trailers)  Within the past 12 months, you worried that your food would run out before you got the money to buy more : Never true  Within the past 12 months, the food you bought just didnt last and you didnt have money to get more : Never true  Food insecurity resource given?: N/A  Does patient receive dialysis treatments?: No  Does patient have a history of substance abuse?: No  Does patient have a history of Mental Health Diagnosis?: No         Means of Transportation  Means of Transport to Appts[de-identified] Drives Self  In the past 12 months, has lack of transportation kept you from medical appointments or from getting medications?: No  In the past 12 months, has lack of transportation kept you from meetings, work, or from getting things needed for daily living?: No  Was application for public transport provided?: N/A        DISCHARGE DETAILS:    Discharge planning discussed with[de-identified] Pt  Freedom of Choice: Yes     CM contacted family/caregiver?: No- see comments (Pt is alert and oriented x3)        Were patient/caregiver advised of the risks associated with not following Treatment Team discharge recommendations?: Yes       I will continue to follow for any Case Management needs

## 2022-04-21 NOTE — ASSESSMENT & PLAN NOTE
· Patient found on urinalysis to have UTI with innumerable bacteria in urine  · Patient is septic in setting of UTI  · Lactic acid normal  · White blood cells elevated at 10 75  · ED started patient on ceftriaxone 2000 mg Q 24 IV  · Neurology consult

## 2022-04-21 NOTE — CONSULTS
Consultation - Nephrology   Shayy Leal 55 y o  male MRN: [id number]  Unit/Bed#: 419-01 Encounter: [id number]      Assessment and Plan    1  Acute kidney injury (POA) secondary to obstructive uropathy, superimposed on chronic kidney disease  · With presenting creatinine 1 93 mg/dL worsened to 2 66 mg/dL  · This compared with baseline creatinine around 1 4 mg/dL  · Recommend urological intervention of obstructing stone  · May continue plasma 100 mL/hr  Trend renal function  Continue to provide supportive care  Optimize hemodynamics  Maintain mean arterial pressure greater than 65 mmHg  Renally dose medications  Avoid nephrotoxins  Please discuss with renal any diuretics or possible nephrotoxic agents, such as NSAIDs or IV contrast  Recommend avoidance of PPIs in favor of H2 blocker, if appropriate for clinical scenario  Monitor for urinary retention- strict I&Os, record bladder scan PVRs and follow urinary retention protocol  2  Left hydroureteronephrosis secondary to a 3 mm obstructing stone  · On tamsulosin 0 4 mg daily  Awaiting Urology input  3  Recurrent nephrolithiasis  · Needs to follow up for outpatient stone risk profile  Risk factors include insufficient water intake, iced tea intake, high sodium diet, and untreated gout  4  Increased anion gap metabolic acidosis  · In the setting of acute kidney injury  Lactic acid normal   Request arterial blood gas to assist with determination of etiology  Defer bicarbonate supplementation at this time  5  Gout with hyperuricemia  · Recommend discontinuation of turmeric containing herbal supplement  Uric acid 10 6 mg/dL on 04/10/2022  Recommend initiation of allopurinol 100 mg daily  Monitor for rash or mouth sores and discontinue if indicated  Please avoid colchicine given acute kidney injury  6  Hypertension  · Currently tending towards hypotension  We will follow off antihypertensives    7  Morbid obesity with BMI 56  · BMI greater than 40 is associated with greater risk of progression of renal disease secondary to glomerular hyperfiltration  Recommend weight loss  8  Poor medical follow-up  · Discussed with patient the need for outpatient follow-up for stone risk profile for prevention of recurrent nephrolithiasis  9  Sepsis (POA) secondary to urinary tract infection  · Please renally dose antibiotics  10  Chronic kidney disease, stage IIIA with baseline creatinine 1 4 to 1 5 mg/dL  · Risk factors include morbid obesity, hypertension, significant NSAID use, turmeric use  No updated hemoglobin A1c available for review  1+ proteinuria on urinalysis  Plan to repeat UA and quantify when patient is in usual state of health  Thank you for allowing us to participate in the care of your patient  Please feel free to contact us regarding the care of this patient, or any other questions/concerns that may be applicable  Patient Active Problem List   Diagnosis    Morbid obesity ([facility] )    Vitamin D deficiency    History of gout    Gram-negative bacteremia    Metabolic acidosis, increased anion gap    ADRIANNE (acute kidney injury) ([facility] )    Sepsis ([facility] )    UTI (urinary tract infection)    Chronic kidney disease, stage 3 ([facility] )    Ureteral stone    Hypertension    Insomnia    Mixed hyperlipidemia    Morbid obesity with BMI of 50 0-59 9, adult ([facility] )    Acute pain of left knee       History of Present Illness     Physician Requesting Consult: Prince Pelletier DO    Reason for Consult / Principal Problem:  Acute kidney injury / Nephrolithiasis    Hx and PE limited by: None    HPI: Maryjo Thao is a 55y o  year old male with with past medical history of HTN, gout, ureterolithiasis, OA who presents to [address] [place] [address] Emergency Department on 04/21/2022 with complaint of left-sided flank pain    CT stone protocol performed 04/21/2022 reveals "left hydroureteronephrosis secondary to 3 mm stone at mid left ureter "    Nephrology is consulted for evaluation management of acute kidney injury with presenting creatinine 1 93 mg/dL worsened to 2 66 mg/dL  This compared with baseline creatinine around 1 4 mg/dL  Patient reports adherence with home medication which is only tamsulosin  However, he takes over-the-counter vitamin D, ibuprofen daily for osteoarthritis and an herbal supplement for gout that includes turmeric  He denies any flares since he has been taking this supplement  He believes that he drinks a lot of fluids but this is primarily sugar containing energy drinks, ice tea or soda  He tells me that he was told that woman lying drinks are good for prevention of kidney stones so he will drink 2 L Sprite in a weekend  Patient believes that he has a low-sodium diet because he uses Clement's light salt  He was previously seen in August of 2019 for acute kidney injury secondary to obstructive nephrolithiasis  Patient did not follow up in the outpatient setting  History obtained from chart review and the patient  Review of Systems    Reports improvement in left-sided flank pain pain down to about a 3 or 4/10  A complete 10 point review of systems was performed and is otherwise negative         Historical Information   Past Medical History:   Diagnosis Date    Arthritis     Gout     Hypertension     Kidney stone      Past Surgical History:   Procedure Laterality Date    FL RETROGRADE PYELOGRAM  8/4/2019    FL RETROGRADE PYELOGRAM  9/26/2019    NO PAST SURGERIES  03/23/2015    Last assessed    MD CYSTO/URETERO W/LITHOTRIPSY &INDWELL STENT INSRT Right 9/26/2019    Procedure: CYSTOSCOPY URETEROSCOPY WITH RETROGRADE PYELOGRAM AND INSERTION STENT URETERAL;  Surgeon: Elana Birmingham MD;  [facility]: [facility];  Service: Urology    MD CYSTOURETHROSCOPY,URETER CATHETER Right 8/4/2019    Procedure: CYSTOSCOPY RETROGRADE PYELOGRAM WITH INSERTION STENT URETERAL;  Surgeon: Rio Camp MD;  [facility]:  MAIN OR;  Service: Urology Social History   Social History     Substance and Sexual Activity   Alcohol Use Never    Alcohol/week: 0 0 standard drinks    Comment: rarely     Social History     Substance and Sexual Activity   Drug Use No     Social History     Tobacco Use   Smoking Status Never Smoker   Smokeless Tobacco Never Used     Family History   Problem Relation Age of Onset    Cancer Mother        Meds/Allergies   all current active meds have been reviewed, current meds:   Current Facility-Administered Medications   Medication Dose Route Frequency    acetaminophen (TYLENOL) tablet 650 mg  650 mg Oral Q6H PRN    [START ON 4/22/2022] cefTRIAXone (ROCEPHIN) IVPB (premix in dextrose) 2,000 mg 50 mL  2,000 mg Intravenous Q24H    [START ON 4/22/2022] heparin (porcine) subcutaneous injection 5,000 Units  5,000 Units Subcutaneous Q8H [facility]    HYDROmorphone (DILAUDID) injection 0 5 mg  0 5 mg Intravenous Q3H PRN    multi-electrolyte (PLASMALYTE-A/ISOLYTE-S PH 7 4) IV solution  100 mL/hr Intravenous Continuous    ondansetron (ZOFRAN) injection 4 mg  4 mg Intravenous Q6H PRN    tamsulosin (FLOMAX) capsule 0 4 mg  0 4 mg Oral Daily With Dinner    and PTA meds:    Medications Prior to Admission   Medication    tamsulosin (FLOMAX) 0 4 mg         No Known Allergies    Objective     Intake/Output Summary (Last 24 hours) at 4/21/2022 1025  Last data filed at 4/21/2022 0825  Gross per 24 hour   Intake 260 ml   Output --   Net 260 ml       Invasive Devices:        Physical Exam  Vitals and nursing note reviewed  Constitutional:       General: He is awake  He is not in acute distress  Appearance: Normal appearance  He is well-developed  He is morbidly obese  He is not ill-appearing, toxic-appearing or diaphoretic  HENT:      Head: Normocephalic and atraumatic  Jaw: There is normal jaw occlusion  Nose: Nose normal       Mouth/Throat:      Mouth: Mucous membranes are moist       Pharynx: Oropharynx is clear   No oropharyngeal exudate or posterior oropharyngeal erythema  Eyes:      General: Lids are normal  Vision grossly intact  Gaze aligned appropriately  No scleral icterus  Right eye: No discharge  Left eye: No discharge  Extraocular Movements: Extraocular movements intact  Conjunctiva/sclera: Conjunctivae normal       Pupils: Pupils are equal, round, and reactive to light  Neck:      Thyroid: No thyroid mass or thyromegaly  Trachea: Trachea and phonation normal    Cardiovascular:      Rate and Rhythm: Normal rate and regular rhythm  Heart sounds: Normal heart sounds, S1 normal and S2 normal  No murmur heard  No friction rub  No gallop  Comments: Compression socks in place  No pitting edema elicited  May be difficult due to adiposity  Pulmonary:      Effort: Pulmonary effort is normal  No respiratory distress  Breath sounds: Normal breath sounds  No stridor  No wheezing, rhonchi or rales  Abdominal:      General: Abdomen is flat  Bowel sounds are normal  There is no distension  Palpations: Abdomen is soft  There is no mass  Tenderness: There is no abdominal tenderness  There is left CVA tenderness  There is no guarding  Hernia: No hernia is present  Musculoskeletal:         General: Normal range of motion  Cervical back: Normal range of motion and neck supple  No rigidity or tenderness  Right lower leg: No edema  Left lower leg: No edema  Lymphadenopathy:      Cervical: No cervical adenopathy  Skin:     General: Skin is warm and dry  Coloration: Skin is not jaundiced  Findings: No bruising  Nails: There is no clubbing  Neurological:      General: No focal deficit present  Mental Status: He is alert and oriented to person, place, and time  Mental status is at baseline     Psychiatric:         Attention and Perception: Attention and perception normal          Mood and Affect: Mood and affect normal          Speech: Speech normal          Behavior: Behavior normal  Behavior is cooperative  Thought Content: Thought content normal          Judgment: Judgment normal            I/O last 3 completed shifts: In: 48 [IV Piggyback:50]  Out: -     Vitals:    04/21/22 0650   BP: 102/59   Pulse: (!) 129   Resp: 18   Temp: 99 6 °F (37 6 °C)   SpO2: 94%         Current Weight: Weight - Scale: (!) 158 kg (348 lb 15 8 oz)  First Weight: Weight - Scale: (!) 153 kg (337 lb 11 9 oz)    Lab Results:  I have personally reviewed pertinent labs      CBC:   Lab Results   Component Value Date    WBC 13 49 (H) 04/21/2022    HGB 13 9 04/21/2022    HCT 42 5 04/21/2022    MCV 94 04/21/2022     04/21/2022    MCH 30 8 04/21/2022    MCHC 32 7 04/21/2022    RDW 13 3 04/21/2022    MPV 9 7 04/21/2022    NRBC 0 04/21/2022     CMP:   Lab Results   Component Value Date    K 4 0 04/21/2022     (H) 04/21/2022    CO2 18 (L) 04/21/2022    BUN 28 (H) 04/21/2022    CREATININE 2 66 (H) 04/21/2022    CALCIUM 8 6 04/21/2022    AST 24 04/21/2022    ALT 22 04/21/2022    ALKPHOS 67 04/21/2022    EGFR 25 04/21/2022     Phosphorus: No results found for: PHOS  Magnesium: No results found for: MG  Urinalysis:   Lab Results   Component Value Date    COLORU Yellow 04/21/2022    CLARITYU Cloudy 04/21/2022    SPECGRAV 1 025 04/21/2022    PHUR 6 0 04/21/2022    LEUKOCYTESUR Moderate (A) 04/21/2022    NITRITE Positive (A) 04/21/2022    GLUCOSEU Negative 04/21/2022    KETONESU Negative 04/21/2022    BILIRUBINUR Negative 04/21/2022    BLOODU Small (A) 04/21/2022     Ionized Calcium: No results found for: CAION  Coagulation: No results found for: PT, INR, APTT  Troponin: No results found for: TROPONINI  ABG: No results found for: PHART, EAN3WNQ, PO2ART, FXK3TJQ, U6WTMWVI, BEART, SOURCE    Results from last 7 days   Lab Units 04/21/22  0746 04/21/22  0215   POTASSIUM mmol/L 4 0 3 9   CHLORIDE mmol/L 109* 106   CO2 mmol/L 18* 17*   BUN mg/dL 28* 28*   CREATININE mg/dL 2 66* 1 93*   CALCIUM mg/dL 8 6 9 2   ALK PHOS U/L 67 74   ALT U/L 22 25   AST U/L 24 21       Radiology review:  Procedure: CT renal stone study abdomen pelvis wo contrast    Result Date: 4/21/2022  Narrative: CT ABDOMEN AND PELVIS WITHOUT IV CONTRAST - LOW DOSE RENAL STONE INDICATION:   Flank pain, kidney stone suspected L flank pain +dysuria  hx ureterolithiasis  COMPARISON:  None  TECHNIQUE:  Low radiation dose thin section CT examination of the abdomen and pelvis was performed without intravenous or oral contrast according to a protocol specifically designed to evaluate for urinary tract calculus  Axial, sagittal, and coronal 2D  reformatted images were created from the source data and submitted for interpretation  Evaluation for pathology in the abdomen and pelvis that is unrelated to urinary tract calculi is limited  Radiation dose length product (DLP) for this visit:  1867 01 mGy-cm   This examination, like all CT scans performed in the [facility], was performed utilizing techniques to minimize radiation dose exposure, including the use of iterative reconstruction and automated exposure control  URINARY TRACT FINDINGS: RIGHT KIDNEY AND URETER: There are nonobstructing intrarenal calculi measuring on the order of 4 mm at the interpolar region  No hydronephrosis or hydroureter  One or more simple appearing renal cyst(s) is identified  LEFT KIDNEY AND URETER:  Left hydroureteronephrosis  3 x 3 mm stone at the mid left ureter at the level of the L4-5 disc space  Additional nonobstructing stones throughout the left kidney measuring up to 4 mm at the superior pole URINARY BLADDER:  Dependent bladder calculus 5 mm  Diffuse bladder wall thickening  ADDITIONAL FINDINGS: LOWER CHEST:  No clinically significant abnormality identified in the visualized lower chest  SOLID VISCERA: Visualized portion of the liver appears to be diffusely decreased in density suggestive of steatosis    Otherwise, limited low radiation dose CT demonstrates no clinically significant abnormality of visualized solid abdominal viscera  GALLBLADDER/BILIARY TREE:  No calcified gallstones  No pericholecystic inflammatory change  No biliary dilatation  STOMACH AND BOWEL:  Unremarkable  APPENDIX:  No findings to suggest appendicitis  ABDOMINOPELVIC CAVITY:  No ascites  No pneumoperitoneum  No lymphadenopathy  REPRODUCTIVE ORGANS:  Unremarkable for patient's age  ABDOMINAL WALL/INGUINAL REGIONS:  Unremarkable  OSSEOUS STRUCTURES:  No acute fracture or destructive osseous lesion  Impression: Left hydroureteronephrosis secondary to a 3 mm stone at the mid left ureter at the L4-L5 disc space level  Nonobstructing bilateral nephrolithiasis  Layering bladder calculus  Bladder wall thickening secondary to underdistention or trabecular hypertrophy secondary to outlet obstruction  Workstation performed: [id number]         Tiki Breen PA-C      Portions of the record may have been created with voice recognition software  Occasional wrong word or "sound a like" substitutions may have occurred due to the inherent limitations of voice recognition software  Read the chart carefully and recognize, using context, where substitutions have occurred

## 2022-04-21 NOTE — DISCHARGE SUMMARY
[address] [facility] [address]  Discharge- Slava English 1965, 55 y o  male MRN: [id number]  Unit/Bed#: 398-64 Encounter: [id number]  Primary Care Provider: RITA Dinero Res   Date and time admitted to hospital: 4/21/2022  1:41 AM    * Sepsis [facility])  Assessment & Plan  · Patient meeting sepsis criteria for tachycardia , leukocytosis  · Source determined to be urinary tract infection  · Lactic acid normal  · Ceftriaxone 2000 mg IV Q 24 started initially, transitioned to IV cefepime and vancomycin in the setting of now bacteremia - initial cultures suggestive of strep and e coli  · Urine cultures > 100,000 colony count of gram negative rods - to follow  · Urology consult appreciated  · D/w ID as well  · Ultimately patient's worsening clinical status including leukocytosis, fever, is not due to antibiotic failure  Patient requires urgent transfer for Urology intervention to treat obstructing L ureteral stone  Ureteral stone  Assessment & Plan  · Patient presented with L flank pain, chills  · Patient has history of recurrent nephrolithiasis with ureteral stones  · CT scan showed left ureteral stone with hydroureteronephrosis  · Patient currently on Flomax 0 4 mg oral daily - continue  · Will continue IV fluid hydration  · Follow BMP - worsening as noted below, which is further prompting necessity for intervention  · Urology consult appreciated -  Plan for transfer to [facility] for ureteral stent placement  Accepted under [facility] service, Dr Katelyn Nolasco as of 4/21/22  · Transfer delayed due to bed availability at accepting campus  [facility] ADRIANNE (acute kidney injury) ([facility] [address] )  Assessment & Plan  · Patient presented with elevated creatinine of 1 93, now worsening to as high as 4 22 in the setting or L ureteral stone with hydroureteronephrosis  · Patient does have history of CKD stage 3 with baseline creatinine of 1 5  · Patient started on isolyte 100 mL/hr    · Nephrology consult appreciated  Bacteremia  Assessment & Plan  Refer to plan for "sepsis"    UTI (urinary tract infection)  Assessment & Plan  · Urine culture preliminary - >100,000 colony count gram negative rods   · Patient is septic in setting of UTI  · Lactic acid normal  · White blood cells elevated at 10 75 on admission, now worsening to 19  · ED started patient on ceftriaxone 2000 mg Q 24 IV --> transitioned to IV vancomycin / cefepime in the setting of bacteremia    Metabolic acidosis, increased anion gap  Assessment & Plan  · VBG PH 3 37, Anion gap slightly elevated at 16, Bicarb 18  5  · Suspect due to bicarbonate losses associated with ADRIANNE  · received 25 mEq sodium bicarb on admission  · ABG demonstrated showing ongoing metabolic acidosis, will continue to treat underlying cause - ADRIANNE, sepsis  · Nephrology input appreciated  Medical Problems             Resolved Problems  Date Reviewed: 4/21/2022    None              Discharging Physician / Practitioner: Augusto Boucher PA-C  PCP: Alysa [address] [place] [address]  Admission Date:   Admission Orders (From admission, onward)     Ordered        04/21/22 0931  Inpatient Admission  Once            04/21/22 0426  Place in Observation  Once                      Discharge/Transfer Date: 04/22/22    Disposition:   Transfer to: [facility]  Reason for Transfer: need for urology OR intervention - ureteral stent placement  Accepting Provider at [facility]: Dr Darryn Handy Stay:  · Urology  · Nephrology    Procedures Performed:   · none    Significant Findings / Test Results:   CT renal stone study abdomen pelvis wo contrast  Result Date: 4/21/2022  Impression: Left hydroureteronephrosis secondary to a 3 mm stone at the mid left ureter at the L4-L5 disc space level  Nonobstructing bilateral nephrolithiasis  Layering bladder calculus   Bladder wall thickening secondary to underdistention or trabecular hypertrophy secondary to outlet obstruction  Workstation performed: [id number]      kidney and bladder  Result Date: 4/21/2022  Impression: Mild left hydronephrosis  Workstation performed: [id number]       Incidental Findings:   · none    Test Results Pending at Discharge (will require follow up):   · none     Outpatient Tests Requested:  · n/a    Complications:  none    Reason for Admission: sepsis due to UTI, L ureteral stone    Hospital Course:   Sera Clark is a 55 y o  male patient who originally presented to the hospital on 4/21/2022 due to Flank Pain (Patient states left sided flank pain  Patient denies nausea/vomiting/diarrhea  Pt states uncomfortable feeling while urinating, which has resolved for about a week, but has returned again today  )  Please refer to the admission H&P for further details regarding the initial assessment and plan  Patient came in septic from L ureteral stone with hydroureteronephrosis  Creatinine was also elevated on admission 1 9, increasing to as high as 4 22 on the day of transfer  His sepsis criteria worsened - leukocytosis worsened and he developed fever  Despite IV antibiotics, IV fluids, Flomax the patient unfortunately did not pass this ureteral stone and requires urgent transfer for urology intervention - left ureteral stent is needed  Transfer was unfortunately delayed due to lack of bed availability, but was escalated once patient's clinical condition worsened  He is hemodynamically stable on discharge  Pain 2/10 at present  Please see above list of diagnoses and related plan for additional information  Condition at Discharge: good    Discharge Day Visit / Exam:   Subjective:  Patient is having some ongoing left flank pain today  No fevers or chills reported    Vitals: Blood Pressure: 110/73 (04/21/22 1518)  Pulse: (!) 131 (04/21/22 1518)  Temperature: 99 2 °F (37 3 °C) (04/21/22 1518)  Temp Source: Oral (04/21/22 0450)  Respirations: 18 (04/21/22 0650)  Height: 5' 6" (167 6 cm) (04/21/22 0144)  Weight - Scale: (!) 158 kg (348 lb 15 8 oz) (04/21/22 0600)  SpO2: 99 % (04/21/22 1518)  Exam:   Physical Exam  Vitals and nursing note reviewed  Constitutional:       General: He is not in acute distress  Appearance: He is obese  He is ill-appearing  HENT:      Head: Normocephalic  Mouth/Throat:      Mouth: Mucous membranes are moist    Cardiovascular:      Rate and Rhythm: Normal rate and regular rhythm  Heart sounds: Normal heart sounds  Pulmonary:      Effort: Pulmonary effort is normal       Breath sounds: Normal breath sounds  Abdominal:      General: Abdomen is flat  Musculoskeletal:      Right lower leg: No edema  Left lower leg: No edema  Skin:     General: Skin is warm and dry  Neurological:      Mental Status: He is alert and oriented to person, place, and time  Discharge Statement:  I spent 45 minutes discharging the patient  This time was spent on the day of discharge  I had direct contact with the patient on the day of discharge  Greater than 50% of the total time was spent examining patient, answering all patient questions, arranging and discussing plan of care with patient as well as directly providing post-discharge instructions  Additional time then spent on discharge activities      ** Please Note: This note may have been constructed using a voice recognition system **

## 2022-04-21 NOTE — H&P
[address] [facility] [address]  H&P- Rico Chapincito 1965, 55 y o  male MRN: [id number]  Unit/Bed#: 349-68 Encounter: [id number]  Primary Care Provider: RITA Garcia   Date and time admitted to hospital: 4/21/2022  1:41 AM    * Sepsis [facility])  Assessment & Plan  · Patient meeting sepsis criteria for tachycardia with heart rate of 105 and leukocytosis with white blood cell count of 10 75  · Source determined to be urinary tract infection  · Lactic acid normal  · Ceftriaxone 2000 mg IV Q 24  · Urology consult    UTI (urinary tract infection)  Assessment & Plan  · Patient found on urinalysis to have UTI with innumerable bacteria in urine  · Patient is septic in setting of UTI  · Lactic acid normal  · White blood cells elevated at 10 75  · ED started patient on ceftriaxone 2000 mg Q 24 IV  · Neurology consult    Ureteral stone  Assessment & Plan  · Patient has history of recurrent nephrolithiasis with ureteral stones  · CT scan showed left ureteral nonobstructing stone  · Patient currently on Flomax 0 4 mg oral daily  · Will continue Flomax 0 4 mg oral daily  · Urology     ADRIANNE (acute kidney injury) [facility])  Assessment & Plan  · Patient presented with elevated creatinine of 1 93  · Patient does have history of CKD stage 3 with baseline creatinine of 1 5  · Suspect post renal cause as there is a left ureteral stone  · Patient started on isolyte 100 mL/hr  · Will monitor with a m  Metabolic panel  · Nephrology consult     Metabolic acidosis, increased anion gap  Assessment & Plan  · VBG PH 3 37  · Anion gap slightly elevated at 16  · Bicarb 18 5  · Suspect due to bicarbonate losses associated with ADRIANNE  · Will give 25 mEq sodium bicarb IV now  · Will recheck VBG in 2 hours      VTE Pharmacologic Prophylaxis: VTE Score: 5 High Risk (Score >/= 5) - Pharmacological DVT Prophylaxis Ordered: enoxaparin (Lovenox)  Sequential Compression Devices Ordered    Code Status: Level 1 - Full Code   Discussion with family: Patient declined call to   Anticipated Length of Stay: Patient will be admitted on an observation basis with an anticipated length of stay of less than 2 midnights secondary to Sepsis, UTI, metabolic acidosis, nephrolithiasis   Total Time for Visit, including Counseling / Coordination of Care: 45 minutes Greater than 50% of this total time spent on direct patient counseling and coordination of care  Chief Complaint:  Flank pain    History of Present Illness:  Karyn Harvey is a 55 y o  male with a PMH of recurrent kidney stones, hypertension who presents with flank pain x6 hours  Patient states that he was woken up from sleep at approximately 12:00 a m  Due to severe flank pain  He states that the pain was a 12/10 at its peak but had subsided at home  He states that it is now approximately a 7/10  The pain was localized on the right flank  No nausea or vomiting associated with the pain  He states that he does feel significant chills and significant tremors  No identifiable fever  He reports decreased urinary output  The patient denies any hematuria, abdominal pain, nausea, vomiting, shortness of breath, chest pain  He does report some dysuria  The patient is on Flomax at home  CT scan showed left-sided ureteral stone with bilateral nephrolithiasis  Urinalysis did show evidence of UTI  The patient started on ceftriaxone in the ED  On the time my encounter the patient had significant chills and tremors  The patient states that his pain was beginning to worsen again in his flank  Patient was also noted to have an increased anion gap metabolic acidosis  Suspect this is due to bicarbonate loss from ADRIANNE  ED provider discussed with Urology who recommended admission here for antibiotics and urology consult  Review of Systems:  Review of Systems   Constitutional: Positive for chills  Negative for fatigue and fever  HENT: Negative for ear pain and sore throat      Eyes: Negative for pain and visual disturbance  Respiratory: Negative for cough, shortness of breath and wheezing  Cardiovascular: Negative for chest pain, palpitations and leg swelling  Gastrointestinal: Negative for abdominal pain, constipation, diarrhea, nausea and vomiting  Endocrine: Negative for polydipsia and polyuria  Genitourinary: Positive for dysuria and flank pain  Negative for hematuria  Musculoskeletal: Negative for arthralgias and back pain  Skin: Negative for color change and rash  Neurological: Positive for tremors  Negative for dizziness, seizures, syncope, facial asymmetry, weakness, light-headedness, numbness and headaches  Psychiatric/Behavioral: Negative for agitation and confusion  All other systems reviewed and are negative  Past Medical and Surgical History:   Past Medical History:   Diagnosis Date    Arthritis     Gout     Hypertension     Kidney stone        Past Surgical History:   Procedure Laterality Date    FL RETROGRADE PYELOGRAM  8/4/2019    FL RETROGRADE PYELOGRAM  9/26/2019    NO PAST SURGERIES  03/23/2015    Last assessed    [place] CYSTO/URETERO W/LITHOTRIPSY &INDWELL STENT INSRT Right 9/26/2019    Procedure: CYSTOSCOPY URETEROSCOPY WITH RETROGRADE PYELOGRAM AND INSERTION STENT URETERAL;  Surgeon: Francis Puentes MD;  [facility]: [facility];  Service: Urology    [place] CYSTOURETHROSCOPY,URETER CATHETER Right 8/4/2019    Procedure: CYSTOSCOPY RETROGRADE PYELOGRAM WITH INSERTION STENT URETERAL;  Surgeon: Tc Welch MD;  [facility];  Service: Urology       Meds/Allergies:  Prior to Admission medications    Medication Sig Start Date End Date Taking?  Authorizing Provider   tamsulosin (FLOMAX) 0 4 mg TAKE 1 CAPSULE BY MOUTH EVERY 24 HOURS 9/22/21  Yes RITA Marino   atorvastatin (LIPITOR) 10 mg tablet Take 1 tablet (10 mg total) by mouth daily  Patient not taking: Reported on 11/9/2020 2/4/20 4/21/22  RITA Cabral     I have reviewed home medications with patient personally  Allergies: No Known Allergies    Social History:  Marital Status: Single   Occupation:   Patient Pre-hospital Living Situation: Home  Patient Pre-hospital Level of Mobility: walks  Patient Pre-hospital Diet Restrictions: none  Substance Use History:   Social History     Substance and Sexual Activity   Alcohol Use Never    Alcohol/week: 0 0 standard drinks    Comment: rarely     Social History     Tobacco Use   Smoking Status Never Smoker   Smokeless Tobacco Never Used     Social History     Substance and Sexual Activity   Drug Use No       Family History:  Family History   Problem Relation Age of Onset    Cancer Mother        Physical Exam:     Vitals:   Blood Pressure: 150/93 (04/21/22 0450)  Pulse: 105 (04/21/22 0450)  Temperature: 97 9 °F (36 6 °C) (04/21/22 0450)  Temp Source: Oral (04/21/22 0450)  Respirations: 18 (04/21/22 0430)  Height: 5' 6" (167 6 cm) (04/21/22 0144)  Weight - Scale: (!) 158 kg (348 lb 15 8 oz) (04/21/22 0503)  SpO2: 98 % (04/21/22 0450)    Physical Exam  Vitals and nursing note reviewed  Constitutional:       General: He is in acute distress  Appearance: He is well-developed  He is obese  He is ill-appearing  HENT:      Head: Normocephalic and atraumatic  Nose: Nose normal  No congestion or rhinorrhea  Mouth/Throat:      Mouth: Mucous membranes are moist       Pharynx: Oropharynx is clear  No oropharyngeal exudate or posterior oropharyngeal erythema  Eyes:      General: No scleral icterus  Right eye: No discharge  Left eye: No discharge  Extraocular Movements: Extraocular movements intact  Conjunctiva/sclera: Conjunctivae normal       Pupils: Pupils are equal, round, and reactive to light  Cardiovascular:      Rate and Rhythm: Normal rate and regular rhythm  Heart sounds: No murmur heard  No friction rub  No gallop      Pulmonary:      Effort: Pulmonary effort is normal  No respiratory distress  Breath sounds: Normal breath sounds  No wheezing, rhonchi or rales  Abdominal:      General: Bowel sounds are normal  There is no distension  Palpations: Abdomen is soft  Tenderness: There is no abdominal tenderness  There is right CVA tenderness and left CVA tenderness  There is no guarding  Musculoskeletal:      Cervical back: Normal range of motion and neck supple  No rigidity  Right lower leg: No edema  Left lower leg: No edema  Skin:     General: Skin is warm and dry  Capillary Refill: Capillary refill takes less than 2 seconds  Neurological:      General: No focal deficit present  Mental Status: He is alert and oriented to person, place, and time  Mental status is at baseline  Cranial Nerves: No cranial nerve deficit  Sensory: No sensory deficit  Motor: No weakness        Coordination: Coordination normal    Psychiatric:         Mood and Affect: Mood normal          Behavior: Behavior normal           Additional Data:     Lab Results:  Results from last 7 days   Lab Units 04/21/22  0215   WBC Thousand/uL 10 75*   HEMOGLOBIN g/dL 14 9   HEMATOCRIT % 45 9   PLATELETS Thousands/uL 238   NEUTROS PCT % 84*   LYMPHS PCT % 11*   MONOS PCT % 0*   EOS PCT % 3     Results from last 7 days   Lab Units 04/21/22  0215   SODIUM mmol/L 139   POTASSIUM mmol/L 3 9   CHLORIDE mmol/L 106   CO2 mmol/L 17*   BUN mg/dL 28*   CREATININE mg/dL 1 93*   ANION GAP mmol/L 16*   CALCIUM mg/dL 9 2   ALBUMIN g/dL 3 3*   TOTAL BILIRUBIN mg/dL 0 41   ALK PHOS U/L 74   ALT U/L 25   AST U/L 21   GLUCOSE RANDOM mg/dL 116                 Results from last 7 days   Lab Units 04/21/22  0317   LACTIC ACID mmol/L 0 9       Imaging: Reviewed radiology reports from this admission including: abdominal/pelvic CT  CT renal stone study abdomen pelvis wo contrast   Final Result by Kimberly Hernández MD (04/21 0327)      Left hydroureteronephrosis secondary to a 3 mm stone at the mid left ureter at the L4-L5 disc space level  Nonobstructing bilateral nephrolithiasis  Layering bladder calculus  Bladder wall thickening secondary to underdistention or trabecular hypertrophy secondary to outlet obstruction  Workstation performed: [id number]             EKG and Other Studies Reviewed on Admission:   · EKG: Sinus Tachycardia       ** Please Note: This note has been constructed using a voice recognition system   **

## 2022-04-21 NOTE — ASSESSMENT & PLAN NOTE
· Patient presented with L flank pain, chills  · Patient has history of recurrent nephrolithiasis with ureteral stones  · CT scan showed left ureteral stone with hydroureteronephrosis  · Patient currently on Flomax 0 4 mg oral daily - continue  · Will continue IV fluid hydration  · Follow BMP - worsening as noted below, which is further prompting necessity for intervention  · Urology consult appreciated -  [facility] [place] [facility] placement  Accepted under [facility] service, Dr Leydi Faye as of 4/21/22  · Transfer delayed due to bed availability at accepting [facility]

## 2022-04-21 NOTE — ED PROVIDER NOTES
History  Chief Complaint   Patient presents with    Flank Pain     Patient states left sided flank pain  Patient denies nausea/vomiting/diarrhea  Pt states uncomfortable feeling while urinating, which has resolved for about a week, but has returned again today  This is a very pleasant 55-year-old man with history ureterolithiasis, hypertension, gout, osteoarthritis presenting with left flank pain beginning this morning and waking him from sleep approximately 0000  States the pain was 12/10 at peak but has subsided spontaneously home down to approximately 5/10 in the ED  Pain was localized in the right flank the entire time  No nausea or vomiting with pain  He was continuously chilled and slightly tremulous earlier in the evening at home without any identifiable fever per se  This had been preceded by about one week of end-urethral dysuria and sensation of incomplete emptying; those symptoms had resolved in their entirety several days prior to the onset of chills and flank pain that he experienced today  No antibiotic use in past month  Has required procedural intervention for a number of his ureterolithiasis episodes including one episode complicated by sepsis in 2019 that was quite severe  He has otherwise been in his normal state of health  He has been attempting to lose weight for the past 2 months and has lost approx 10 lb by diet change  He does not typically have any difficulty with urination    A/p:  Acute left flank preceded by urinary symptoms  Possible ureterolithiasis plus/minus urinary infection  Check CBC/CMP/lipase/UA  CT renal stone study  Symptomatic treatment while workup ongoing        History provided by:  Medical records and patient  Flank Pain  Associated symptoms: chills and dysuria    Associated symptoms: no chest pain, no cough, no diarrhea, no fever, no hematuria, no nausea, no shortness of breath and no vomiting        Prior to Admission Medications   Prescriptions Last Dose Informant Patient Reported? Taking?   tamsulosin (FLOMAX) 0 4 mg   No Yes   Sig: TAKE 1 CAPSULE BY MOUTH EVERY 24 HOURS      Facility-Administered Medications: None       Past Medical History:   Diagnosis Date    Arthritis     Gout     Hypertension     Kidney stone        Past Surgical History:   Procedure Laterality Date    FL RETROGRADE PYELOGRAM  8/4/2019    FL RETROGRADE PYELOGRAM  9/26/2019    NO PAST SURGERIES  03/23/2015    Last assessed    [facility] CYSTO/URETERO W/LITHOTRIPSY &INDWELL STENT INSRT Right 9/26/2019    Procedure: CYSTOSCOPY URETEROSCOPY WITH RETROGRADE PYELOGRAM AND INSERTION STENT URETERAL;  Surgeon: Warren Ramirez MD;  [facility]: [facility];  Service: Urology    MI CYSTOURETHROSCOPY,URETER CATHETER Right 8/4/2019    Procedure: CYSTOSCOPY RETROGRADE PYELOGRAM WITH INSERTION STENT URETERAL;  Surgeon: Joaquina Baker MD;  [facility]:  MAIN OR;  Service: Urology       Family History   Problem Relation Age of Onset    Cancer Mother      I have reviewed and agree with the history as documented  E-Cigarette/Vaping    E-Cigarette Use Never User      E-Cigarette/Vaping Substances     Social History     Tobacco Use    Smoking status: Never Smoker    Smokeless tobacco: Never Used   Vaping Use    Vaping Use: Never used   Substance Use Topics    Alcohol use: Never     Alcohol/week: 0 0 standard drinks     Comment: rarely    Drug use: No       Review of Systems   Constitutional: Positive for chills  Negative for fever  Respiratory: Negative for cough and shortness of breath  Cardiovascular: Negative for chest pain and palpitations  Gastrointestinal: Negative for abdominal pain, diarrhea, nausea and vomiting  Genitourinary: Positive for dysuria, flank pain and penile pain  Negative for difficulty urinating and hematuria  Neurological: Negative for dizziness, weakness, numbness and headaches  All other systems reviewed and are negative        Physical Exam  Physical Exam  Vitals and nursing note reviewed  Constitutional:       General: He is awake  He is not in acute distress  Appearance: Normal appearance  He is well-developed  HENT:      Head: Normocephalic and atraumatic  Right Ear: Hearing and external ear normal       Left Ear: Hearing and external ear normal    Neck:      Trachea: Trachea and phonation normal    Cardiovascular:      Rate and Rhythm: Normal rate and regular rhythm  Pulses:           Radial pulses are 2+ on the right side and 2+ on the left side  Dorsalis pedis pulses are 2+ on the right side and 2+ on the left side  Posterior tibial pulses are 2+ on the right side and 2+ on the left side  Heart sounds: Normal heart sounds, S1 normal and S2 normal  No murmur heard  No friction rub  No gallop  Pulmonary:      Effort: Pulmonary effort is normal  No respiratory distress  Breath sounds: Normal breath sounds  No stridor  No decreased breath sounds, wheezing, rhonchi or rales  Abdominal:      Tenderness: There is no abdominal tenderness  There is left CVA tenderness  There is no right CVA tenderness, guarding or rebound  Comments: Obese; nondistended   Skin:     General: Skin is warm and dry  Neurological:      Mental Status: He is alert and oriented to person, place, and time  GCS: GCS eye subscore is 4  GCS verbal subscore is 5  GCS motor subscore is 6  Cranial Nerves: No cranial nerve deficit  Sensory: No sensory deficit  Motor: No abnormal muscle tone  Comments: PERRLA; EOMI  Sensation intact to light touch over face in V1-V3 distribution bilaterally  Facial expressions symmetric  Tongue/uvula midline  Shoulder shrug equal bilaterally  Strength 5/5 in UE/LE bilaterally  Sensation intact to light touch in UE/LE bilaterally           Vital Signs  ED Triage Vitals   Temperature Pulse Respirations Blood Pressure SpO2   04/21/22 0144 04/21/22 0144 04/21/22 0144 04/21/22 0144 04/21/22 0144   98 1 °F (36 7 °C) (!) 110 20 (!) 209/126 98 %      Temp Source Heart Rate Source Patient Position - Orthostatic VS BP [facility] FiO2 (%)   04/21/22 0319 04/21/22 0319 04/21/22 0144 04/21/22 0144 --   Tympanic Monitor Sitting Left arm       Pain Score       04/21/22 0144       7           Vitals:    04/21/22 0319 04/21/22 0400 04/21/22 0430 04/21/22 0450   BP: 143/85 (!) 129/101 138/90 150/93   Pulse: 100 (!) 107 (!) 107 105   Patient Position - Orthostatic VS: Lying Lying Lying          Visual Acuity      ED Medications  Medications   ondansetron (ZOFRAN) injection 4 mg (4 mg Intravenous Given 4/21/22 0213)   sodium chloride 0 9 % bolus 1,000 mL (1,000 mL Intravenous New Bag 4/21/22 0212)   HYDROmorphone (DILAUDID) injection 0 5 mg (0 5 mg Intravenous Given 4/21/22 0214)   cefTRIAXone (ROCEPHIN) IVPB (premix in dextrose) 2,000 mg 50 mL (0 mg Intravenous Stopped 4/21/22 0349)       Diagnostic Studies  Results Reviewed     Procedure Component Value Units Date/Time    Blood gas, venous [[id number]]  (Abnormal) Collected: 04/21/22 0408    Lab Status: Final result Specimen: Blood from Arm, Left Updated: 04/21/22 0413     pH, Yuri 7 337     pCO2, Yuri 35 4 mm Hg      pO2, Yuri 65 7 mm Hg      HCO3, Yuri 18 5 mmol/L      Base Excess, Yuri -6 4 mmol/L      O2 Content, Yuri 18 8 ml/dL      O2 HGB, VENOUS 90 4 %     Lactic acid, plasma [[id number]]  (Normal) Collected: 04/21/22 0317    Lab Status: Final result Specimen: Blood from Arm, Left Updated: 04/21/22 0344     LACTIC ACID 0 9 mmol/L     Narrative:      Result may be elevated if tourniquet was used during collection  Blood culture [[id number]] Collected: 04/21/22 0317    Lab Status:  In process Specimen: Blood from Arm, Left Updated: 04/21/22 0323    CMP [[id number]]  (Abnormal) Collected: 04/21/22 0215    Lab Status: Final result Specimen: Blood from Arm, Left Updated: 04/21/22 0244     Sodium 139 mmol/L      Potassium 3 9 mmol/L      Chloride 106 mmol/L      CO2 17 mmol/L      ANION GAP 16 mmol/L      BUN 28 mg/dL      Creatinine 1 93 mg/dL      Glucose 116 mg/dL      Calcium 9 2 mg/dL      Corrected Calcium 9 8 mg/dL      AST 21 U/L      ALT 25 U/L      Alkaline Phosphatase 74 U/L      Total Protein 7 3 g/dL      Albumin 3 3 g/dL      Total Bilirubin 0 41 mg/dL      eGFR 38 ml/min/1 73sq m     Narrative:      [place] guidelines for Chronic Kidney Disease (CKD):     Stage 1 with normal or high GFR (GFR > 90 mL/min/1 73 square meters)    Stage 2 Mild CKD (GFR = 60-89 mL/min/1 73 square meters)    Stage 3A Moderate CKD (GFR = 45-59 mL/min/1 73 square meters)    Stage 3B Moderate CKD (GFR = 30-44 mL/min/1 73 square meters)    Stage 4 Severe CKD (GFR = 15-29 mL/min/1 73 square meters)    Stage 5 End Stage CKD (GFR <15 mL/min/1 73 square meters)  Note: GFR calculation is accurate only with a steady state creatinine    Lipase [[id number]]  (Normal) Collected: 04/21/22 0215    Lab Status: Final result Specimen: Blood from Arm, Left Updated: 04/21/22 0244     Lipase 112 u/L     Urine Microscopic [[id number]]  (Abnormal) Collected: 04/21/22 0216    Lab Status: Final result Specimen: Urine, Clean Catch Updated: 04/21/22 0240     RBC, UA       Field obscured, unable to enumerate     /hpf     WBC, UA Innumerable /hpf      Epithelial Cells       Field obscured, unable to enumerate     /hpf     Bacteria, UA Innumerable /hpf     Urine culture [[id number]] Collected: 04/21/22 0216    Lab Status:  In process Specimen: Urine, Clean Catch Updated: 04/21/22 0239    UA w Reflex to Microscopic w Reflex to Culture [[id number]]  (Abnormal) Collected: 04/21/22 0216    Lab Status: Final result Specimen: Urine, Clean Catch Updated: 04/21/22 0230     Color, UA Yellow     Clarity, UA Cloudy     Specific Gravity, UA 1 025     pH, UA 6 0     Leukocytes, UA Moderate     Nitrite, UA Positive     Protein, UA 30 (1+) mg/dl      Glucose, UA Negative mg/dl      Ketones, UA Negative mg/dl      Urobilinogen, UA 0 2 E U /dl      Bilirubin, UA Negative     Blood, UA Small    CBC and differential [[id number]]  (Abnormal) Collected: 04/21/22 0215    Lab Status: Final result Specimen: Blood from Arm, Left Updated: 04/21/22 0223     WBC 10 75 Thousand/uL      RBC 4 89 Million/uL      Hemoglobin 14 9 g/dL      Hematocrit 45 9 %      MCV 94 fL      MCH 30 5 pg      MCHC 32 5 g/dL      RDW 13 2 %      MPV 9 9 fL      Platelets 791 Thousands/uL      nRBC 0 /100 WBCs      Neutrophils Relative 84 %      Immat GRANS % 1 %      Lymphocytes Relative 11 %      Monocytes Relative 0 %      Eosinophils Relative 3 %      Basophils Relative 1 %      Neutrophils Absolute 9 18 Thousands/µL      Immature Grans Absolute 0 06 Thousand/uL      Lymphocytes Absolute 1 14 Thousands/µL      Monocytes Absolute 0 03 Thousand/µL      Eosinophils Absolute 0 28 Thousand/µL      Basophils Absolute 0 06 Thousands/µL                  CT renal stone study abdomen pelvis wo contrast   Final Result by Jairon Perdomo MD (04/21 0327)      Left hydroureteronephrosis secondary to a 3 mm stone at the mid left ureter at the L4-L5 disc space level  Nonobstructing bilateral nephrolithiasis  Layering bladder calculus  Bladder wall thickening secondary to underdistention or trabecular hypertrophy secondary to outlet obstruction  Workstation performed: [id number]                    Procedures  Procedures         ED Course  ED Course as of 04/21/22 0458   Thu Apr 21, 2022   0226 CBC and differential(!)  Mild leukocytosis   Hemoglobin/hematocrit normal  Platelets normal   1017 UA w Reflex to Microscopic w Reflex to Culture(!)  Moderate leukocytes and positive nitrites with small blood  Certainly suggestive of infection given his clinical symptoms  0600 Patient will need antibiotic therapy for urinary tract infection independent of CT findings regarding ureterolithiasis     0236 Urine Culture   >100,000 cfu/ml Escherichia coli Abnormal            Susceptibility       Escherichia coli    CASPER    Amoxicillin + Clavulanate 8/4 ug/ml Susceptible    Ampicillin ($$) >16 00 ug/ml Resistant    Ampicillin + Sulbactam ($) 16/8 ug/ml Intermediate    Aztreonam ($$$)  <=4 ug/ml Susceptible    Cefazolin ($) 4 00 ug/ml Susceptible    Ciprofloxacin ($)  <=1 00 ug/ml Susceptible    Ertapenem ($$$) <=0 5 ug/ml Susceptible    Gentamicin ($$) <=1 ug/ml Susceptible    Levofloxacin ($) 0 50 ug/ml Susceptible    Nitrofurantoin <=32 ug/ml Susceptible    Tetracycline >8 ug/ml Resistant    Tobramycin ($) <=1 ug/ml Susceptible    Trimethoprim + Sulfamethoxazole ($$$) >2/38 ug/ml Resistant    ZID Performed Yes                Specimen Collected: 08/23/19 12:52 Last Resulted: 08/25/19 17:56           0244 Lipase  Normal   0244 CMP(!)  BUN/creatinine increased (mild ADRIANNE) with elevated anion gap  Transaminases normal   0258 Patient had complete resolution of pain but feels somewhat flushed and diaphoretic  Reviewed results of workup thus far--patient to CT scan now   0315 CT completed and awaiting interpretation  2800 CT renal stone study abdomen pelvis wo contrast  IMPRESSION:     Left hydroureteronephrosis secondary to a 3 mm stone at the mid left ureter at the L4-L5 disc space level      Nonobstructing bilateral nephrolithiasis  Layering bladder calculus      Bladder wall thickening secondary to underdistention or trabecular hypertrophy secondary to outlet obstruction         Workstation performed: [id number]   0350 Lactic acid, plasma  Normal   0403 Patient updated regarding results of workup thus far  Tiger text to [facility] Rand for urology   4781 Per urology [facility] [place], no immediate procedural intervention required but outpatient f/u in 1-2 wk   0420 D/w patient regarding my recommendations for brief admission with IVF/IV abx and correction of electrolyte/metabolic disturbances  He was in agreement with this   Tiger Text to [facility]   0423 Blood gas, venous(!)  Metabolic acidosis with respiratory compensation  Hyperoxia   0427 D/w KP Lyn observation to Dr Johnson Pack         MDM    Disposition  Final diagnoses:   Ureterolithiasis   UTI (urinary tract infection)   Metabolic acidosis     Time reflects when diagnosis was documented in both MDM as applicable and the Disposition within this note     Time User Action Codes Description Comment    4/21/2022  4:27 AM [place] Add [N20 1] Ureterolithiasis     4/21/2022  4:27 AM [place] Add [N39 0] UTI (urinary tract infection)     4/21/2022  4:27 AM [place] Add [A21 5] Metabolic acidosis       ED Disposition     ED Disposition Condition Date/Time Comment    Admit Stable Thu Apr 21, 2022  4:27 AM Case was discussed with KP Clayton and the patient's admission status was agreed to be Admission Status: observation status to the service of Dr Johnson Pack   Follow-up Information    None         Current Discharge Medication List      CONTINUE these medications which have NOT CHANGED    Details   tamsulosin (FLOMAX) 0 4 mg TAKE 1 CAPSULE BY MOUTH EVERY 24 HOURS  Qty: 30 capsule, Refills: 8    Associated Diagnoses: Benign prostatic hyperplasia, unspecified whether lower urinary tract symptoms present             No discharge procedures on file      PDMP Review     None          ED Provider  Electronically Signed by           Amilcar Jain DO  04/21/22 9603

## 2022-04-21 NOTE — PLAN OF CARE
Problem: PAIN - ADULT  Goal: Verbalizes/displays adequate comfort level or baseline comfort level  Description: Interventions:  - Encourage patient to monitor pain and request assistance  - Assess pain using appropriate pain scale  - Administer analgesics based on type and severity of pain and evaluate response  - Implement non-pharmacological measures as appropriate and evaluate response  - Consider cultural and social influences on pain and pain management  - Notify physician/advanced practitioner if interventions unsuccessful or patient reports new pain  Outcome: Progressing     Problem: INFECTION - ADULT  Goal: Absence or prevention of progression during hospitalization  Description: INTERVENTIONS:  - Assess and monitor for signs and symptoms of infection  - Monitor lab/diagnostic results  - Monitor all insertion sites, i e  indwelling lines, tubes, and drains  - Monitor endotracheal if appropriate and nasal secretions for changes in amount and color  - [place] appropriate cooling/warming therapies per order  - Administer medications as ordered  - Instruct and encourage patient and family to use good hand hygiene technique  - Identify and instruct in appropriate isolation precautions for identified infection/condition  Outcome: Progressing  Goal: Absence of fever/infection during neutropenic period  Description: INTERVENTIONS:  - Monitor WBC    Outcome: Progressing     Problem: SAFETY ADULT  Goal: Patient will remain free of falls  Description: INTERVENTIONS:  - Educate patient/family on patient safety including physical limitations  - Instruct patient to call for assistance with activity   - Consult OT/PT to assist with strengthening/mobility   - Keep Call bell within reach  - Keep bed low and locked with side rails adjusted as appropriate  - Keep care items and personal belongings within reach  - Initiate and maintain comfort rounds  - Make Fall Risk Sign visible to staff  - Offer Toileting every 2 Hours, in advance of need  - Initiate/Maintain bed alarm  - Obtain necessary fall risk management equipment: slip resistant socks  - Apply yellow socks and bracelet for high fall risk patients  - Consider moving patient to room near nurses station  Outcome: Progressing  Goal: Maintain or return to baseline ADL function  Description: INTERVENTIONS:  -  Assess patient's ability to carry out ADLs; assess patient's baseline for ADL function and identify physical deficits which impact ability to perform ADLs (bathing, care of mouth/teeth, toileting, grooming, dressing, etc )  - Assess/evaluate cause of self-care deficits   - Assess range of motion  - Assess patient's mobility; develop plan if impaired  - Assess patient's need for assistive devices and provide as appropriate  - Encourage maximum independence but intervene and supervise when necessary  - Involve family in performance of ADLs  - Assess for home care needs following discharge   - Consider OT consult to assist with ADL evaluation and planning for discharge  - Provide patient education as appropriate  Outcome: Progressing  Goal: Maintains/Returns to pre admission functional level  Description: INTERVENTIONS:  - Perform BMAT or MOVE assessment daily    - Set and communicate daily mobility goal to care team and patient/family/caregiver  - Collaborate with rehabilitation services on mobility goals if consulted  - Perform Range of Motion 3 times a day  - Reposition patient every 2 hours    - Dangle patient 3 times a day  - Stand patient 3 times a day  - Ambulate patient 3 times a day  - Out of bed to chair 3 times a day   - Out of bed for meals 3 times a day  - Out of bed for toileting  - Record patient progress and toleration of activity level   Outcome: Progressing     Problem: DISCHARGE PLANNING  Goal: Discharge to home or other facility with appropriate resources  Description: INTERVENTIONS:  - Identify barriers to discharge w/patient and caregiver  - Arrange for needed discharge resources and transportation as appropriate  - Identify discharge learning needs (meds, wound care, etc )  - Arrange for interpretive services to assist at discharge as needed  - Refer to Case Management Department for coordinating discharge planning if the patient needs post-hospital services based on physician/advanced practitioner order or complex needs related to functional status, cognitive ability, or social support system  Outcome: Progressing     Problem: Knowledge Deficit  Goal: Patient/family/caregiver demonstrates understanding of disease process, treatment plan, medications, and discharge instructions  Description: Complete learning assessment and assess knowledge base    Interventions:  - Provide teaching at level of understanding  - Provide teaching via preferred learning methods  Outcome: Progressing

## 2022-04-21 NOTE — ASSESSMENT & PLAN NOTE
· VBG PH 3 37  · Anion gap slightly elevated at 16  · Bicarb 18 5  · Suspect due to bicarbonate losses associated with ADRIANNE  · Will give 25 mEq sodium bicarb IV now  · Will recheck VBG in 2 hours

## 2022-04-21 NOTE — PLAN OF CARE
Problem: PAIN - ADULT  Goal: Verbalizes/displays adequate comfort level or baseline comfort level  Description: Interventions:  - Encourage patient to monitor pain and request assistance  - Assess pain using appropriate pain scale  - Administer analgesics based on type and severity of pain and evaluate response  - Implement non-pharmacological measures as appropriate and evaluate response  - Consider cultural and social influences on pain and pain management  - Notify physician/advanced practitioner if interventions unsuccessful or patient reports new pain  Outcome: Progressing     Problem: INFECTION - ADULT  Goal: Absence or prevention of progression during hospitalization  Description: INTERVENTIONS:  - Assess and monitor for signs and symptoms of infection  - Monitor lab/diagnostic results  - Monitor all insertion sites, i e  indwelling lines, tubes, and drains  - Monitor endotracheal if appropriate and nasal secretions for changes in amount and color  - [place] appropriate cooling/warming therapies per order  - Administer medications as ordered  - Instruct and encourage patient and family to use good hand hygiene technique  - Identify and instruct in appropriate isolation precautions for identified infection/condition  Outcome: Progressing  Goal: Absence of fever/infection during neutropenic period  Description: INTERVENTIONS:  - Monitor WBC    Outcome: Progressing     Problem: SAFETY ADULT  Goal: Patient will remain free of falls  Description: INTERVENTIONS:  - Educate patient/family on patient safety including physical limitations  - Instruct patient to call for assistance with activity   - Consult OT/PT to assist with strengthening/mobility   - Keep Call bell within reach  - Keep bed low and locked with side rails adjusted as appropriate  - Keep care items and personal belongings within reach  - Initiate and maintain comfort rounds  - Make Fall Risk Sign visible to staff  - Offer Toileting every 1-2 Hours, in advance of need  - Initiate/Maintain bed and chair alarm  - Obtain necessary fall risk management equipment: fall socks and call bell within reach  - Apply yellow socks and bracelet for high fall risk patients  - Consider moving patient to room near nurses station  Outcome: Progressing  Goal: Maintain or return to baseline ADL function  Description: INTERVENTIONS:  -  Assess patient's ability to carry out ADLs; assess patient's baseline for ADL function and identify physical deficits which impact ability to perform ADLs (bathing, care of mouth/teeth, toileting, grooming, dressing, etc )  - Assess/evaluate cause of self-care deficits   - Assess range of motion  - Assess patient's mobility; develop plan if impaired  - Assess patient's need for assistive devices and provide as appropriate  - Encourage maximum independence but intervene and supervise when necessary  - Involve family in performance of ADLs  - Assess for home care needs following discharge   - Consider OT consult to assist with ADL evaluation and planning for discharge  - Provide patient education as appropriate  Outcome: Progressing  Goal: Maintains/Returns to pre admission functional level  Description: INTERVENTIONS:  - Perform BMAT or MOVE assessment daily    - Set and communicate daily mobility goal to care team and patient/family/caregiver  - Collaborate with rehabilitation services on mobility goals if consulted  - Perform Range of Motion 3-4 times a day  - Reposition patient every 1-2 hours    - Dangle patient 3-4 times a day  - Stand patient 3-4 times a day  - Ambulate patient 3-4 times a day  - Out of bed to chair 3-4 times a day   - Out of bed for meals 3-4 times a day  - Out of bed for toileting  - Record patient progress and toleration of activity level   Outcome: Progressing     Problem: DISCHARGE PLANNING  Goal: Discharge to home or other facility with appropriate resources  Description: INTERVENTIONS:  - Identify barriers to discharge w/patient and caregiver  - Arrange for needed discharge resources and transportation as appropriate  - Identify discharge learning needs (meds, wound care, etc )  - Arrange for interpretive services to assist at discharge as needed  - Refer to Case Management Department for coordinating discharge planning if the patient needs post-hospital services based on physician/advanced practitioner order or complex needs related to functional status, cognitive ability, or social support system  Outcome: Progressing     Problem: Knowledge Deficit  Goal: Patient/family/caregiver demonstrates understanding of disease process, treatment plan, medications, and discharge instructions  Description: Complete learning assessment and assess knowledge base    Interventions:  - Provide teaching at level of understanding  - Provide teaching via preferred learning methods  Outcome: Progressing     Problem: METABOLIC, FLUID AND ELECTROLYTES - ADULT  Goal: Electrolytes maintained within normal limits  Description: INTERVENTIONS:  - Monitor labs and assess patient for signs and symptoms of electrolyte imbalances  - Administer electrolyte replacement as ordered  - Monitor response to electrolyte replacements, including repeat lab results as appropriate  - Instruct patient on fluid and nutrition as appropriate  Outcome: Progressing  Goal: Fluid balance maintained  Description: INTERVENTIONS:  - Monitor labs   - Monitor I/O and WT  - Instruct patient on fluid and nutrition as appropriate  - Assess for signs & symptoms of volume excess or deficit  Outcome: Progressing  Goal: Glucose maintained within target range  Description: INTERVENTIONS:  - Monitor Blood Glucose as ordered  - Assess for signs and symptoms of hyperglycemia and hypoglycemia  - Administer ordered medications to maintain glucose within target range  - Assess nutritional intake and initiate nutrition service referral as needed  Outcome: Progressing     Problem: GASTROINTESTINAL - ADULT  Goal: Minimal or absence of nausea and/or vomiting  Description: INTERVENTIONS:  - Administer IV fluids if ordered to ensure adequate hydration  - Maintain NPO status until nausea and vomiting are resolved  - Nasogastric tube if ordered  - Administer ordered antiemetic medications as needed  - Provide nonpharmacologic comfort measures as appropriate  - Advance diet as tolerated, if ordered  - Consider nutrition services referral to assist patient with adequate nutrition and appropriate food choices  Outcome: Progressing  Goal: Maintains or returns to baseline bowel function  Description: INTERVENTIONS:  - Assess bowel function  - Encourage oral fluids to ensure adequate hydration  - Administer IV fluids if ordered to ensure adequate hydration  - Administer ordered medications as needed  - Encourage mobilization and activity  - Consider nutritional services referral to assist patient with adequate nutrition and appropriate food choices  Outcome: Progressing  Goal: Maintains adequate nutritional intake  Description: INTERVENTIONS:  - Monitor percentage of each meal consumed  - Identify factors contributing to decreased intake, treat as appropriate  - Assist with meals as needed  - Monitor I&O, weight, and lab values if indicated  - Obtain nutrition services referral as needed  Outcome: Progressing  Goal: Establish and maintain optimal ostomy function  Description: INTERVENTIONS:  - Assess bowel function  - Encourage oral fluids to ensure adequate hydration  - Administer IV fluids if ordered to ensure adequate hydration   - Administer ordered medications as needed  - Encourage mobilization and activity  - Nutrition services referral to assist patient with appropriate food choices  Outcome: Progressing  Goal: Oral mucous membranes remain intact  Description: INTERVENTIONS  - Assess oral mucosa and hygiene practices  - Implement preventative oral hygiene regimen  - Implement oral medicated treatments as ordered  - Initiate Nutrition services referral as needed  Outcome: Progressing     Problem: Potential for Falls  Goal: Patient will remain free of falls  Description: INTERVENTIONS:  - Educate patient/family on patient safety including physical limitations  - Instruct patient to call for assistance with activity   - Consult OT/PT to assist with strengthening/mobility   - Keep Call bell within reach  - Keep bed low and locked with side rails adjusted as appropriate  - Keep care items and personal belongings within reach  - Initiate and maintain comfort rounds  - Make Fall Risk Sign visible to staff  - Offer Toileting every 1-2 Hours, in advance of need  - Initiate/Maintain bed and chair alarm  - Obtain necessary fall risk management equipment: fall socks and call bell within reach  - Apply yellow socks and bracelet for high fall risk patients  - Consider moving patient to room near nurses station  Outcome: Progressing

## 2022-04-21 NOTE — CONSULTS
Consultation - UROLOGY  Suzanna Nelson 55 y o  male MRN: [id number]  Unit/Bed#: 419-01 Encounter: [id number]    Assessment/Plan     Assessment:  Obstructing left 3 mm stone at mid ureter with moderate hydronephrosis  Nonobstructing bilateral nephrolithiasis and layering bladder calculus on CT scan imaging  Recurrent nephrolithiasis, previous right kidney stone with cysto, stent in September 2019   Urinalysis shows innumerable bacteria, urine culture pending  Suspect urinary tract infection  Acute on chronic kidney injury, secondary to bladder outlet obstruction, hydronephrosis  Gout, uric acid today elevated 10 6  Osteoarthritis  Accelerated hypertension present on admission likely secondary to pain, now blood pressure under much better control 102/59  Morbid obesity, BMI 56 33    Plan:  Overnight the ED physician contacted the urology AP who recommended non operative management to facilitate stone passage with IV fluids, analgesics, and initiate tamsulosin  Nephrology consultation  Treat cystitis with IV antibiotics, Rocephin 2 g daily  Monitor creatinine, repeat CBC and BMP in a m  Urinary retention protocol, monitor urine output  If the patient's kidney injury worsens tomorrow or he has increased flank pain, development hematuria or fever then the patient would require tertiary transfer to urology for cystoscopy and left ureteral stent placement  Will place the patient on nothing by mouth after midnight should the patient require urological intervention tomorrow, Friday  Dr Lucio Pike is the urologist covering this hospital [facility], he is available via telephone or tiger text messaging any concerns  History of Present Illness     HPI:  Suzanna Nelson is a 55 y o  male who presents with recurrent kidney stones  The patient started with crampy intermittent left flank pain which began early this morning just after midnight and awoke him from his sleep because of the discomfort which became worse    He evaluated emergency department and his pain was described as a 12 on a 10 point pain scale  Seems to be improved with analgesics, right now his left flank discomfort is rated as a 5 on a 10 point scale  He had no nausea or vomiting no fever or chills  No bowel movements  Patient is known to Dr Levis Meigs in 2019 when he had a ureteral stone on the right side for which she required 2 separate urological procedures with cystoscopy and stent placement  Patient denies any blood in his urine  CT scan abdomen pelvis done without IV contrast in the ED showed an obstructing 3 mm left mid ureteral stone with hydronephrosis  Concern over the patient's development of acute kidney injury with a creatinine of 1 94  The patient has been evaluated by Nephrology  Gentle IV fluids initiated and the patient has been started on IV Rocephin because of findings of innumerable bacteria and leukocytes on his urinalysis, blood and urine cultures are currently pending  Urological consultation requested at this time by the admitting hospitalist physician with regard to the obstruction and development of hydronephrosis on the left side  Right now the patient's pain is controlled with current analgesics  Tamsulosin has been initiated  Inpatient consult to Urology  Consult performed by: Nichole Lubin PA-C  Consult ordered by: Kenneth Elmore PA-C        Review of Systems   Constitutional: Negative for activity change, appetite change, chills, diaphoresis, fatigue, fever and unexpected weight change  HENT: Negative  Eyes: Negative  Respiratory: Negative  Cardiovascular: Negative  Gastrointestinal: Positive for abdominal pain and nausea  Negative for abdominal distention, blood in stool, constipation, diarrhea, rectal pain and vomiting  Endocrine: Negative  Genitourinary: Positive for decreased urine volume and flank pain   Negative for difficulty urinating, frequency, genital sores, hematuria, penile pain, penile swelling, scrotal swelling, testicular pain and urgency  Skin: Negative  Allergic/Immunologic: Negative  Neurological: Negative  Negative for dizziness, light-headedness and headaches  Hematological: Negative  Does not bruise/bleed easily  Psychiatric/Behavioral: Negative           Historical Information   Past Medical History:   Diagnosis Date    Arthritis     Gout     Hypertension     Kidney stone      Past Surgical History:   Procedure Laterality Date    FL RETROGRADE PYELOGRAM  8/4/2019    FL RETROGRADE PYELOGRAM  9/26/2019    NO PAST SURGERIES  03/23/2015    Last assessed    [place] CYSTO/URETERO W/LITHOTRIPSY &INDWELL STENT INSRT Right 9/26/2019    Procedure: CYSTOSCOPY URETEROSCOPY WITH RETROGRADE PYELOGRAM AND INSERTION STENT URETERAL;  Surgeon: Karlene Harper MD;  [facility]: [facility];  Service: Urology    [place] CYSTOURETHROSCOPY,URETER CATHETER Right 8/4/2019    Procedure: CYSTOSCOPY RETROGRADE PYELOGRAM WITH INSERTION STENT URETERAL;  Surgeon: Belén Garza MD;  [facility]:  MAIN OR;  Service: Urology     Social History   Social History     Substance and Sexual Activity   Alcohol Use Never    Alcohol/week: 0 0 standard drinks    Comment: rarely     Social History     Substance and Sexual Activity   Drug Use No     E-Cigarette/Vaping    E-Cigarette Use Never User      E-Cigarette/Vaping Substances     Social History     Tobacco Use   Smoking Status Never Smoker   Smokeless Tobacco Never Used     Family History: non-contributory    Meds/Allergies   current meds:   Current Facility-Administered Medications   Medication Dose Route Frequency    acetaminophen (TYLENOL) tablet 650 mg  650 mg Oral Q6H PRN    [START ON 4/22/2022] cefTRIAXone (ROCEPHIN) IVPB (premix in dextrose) 2,000 mg 50 mL  2,000 mg Intravenous Q24H    enoxaparin (LOVENOX) subcutaneous injection 40 mg  40 mg Subcutaneous Daily    HYDROmorphone (DILAUDID) injection 0 5 mg  0 5 mg Intravenous Q3H PRN    multi-electrolyte (PLASMALYTE-A/ISOLYTE-S PH 7 4) IV solution  100 mL/hr Intravenous Continuous    ondansetron (ZOFRAN) injection 4 mg  4 mg Intravenous Q6H PRN    tamsulosin (FLOMAX) capsule 0 4 mg  0 4 mg Oral Daily With Dinner     No Known Allergies    Objective   First Vitals:   Blood Pressure: (!) 209/126 (04/21/22 0144)  Pulse: (!) 110 (04/21/22 0144)  Temperature: 98 1 °F (36 7 °C) (04/21/22 0144)  Temp Source: Tympanic (04/21/22 0319)  Respirations: 20 (04/21/22 0144)  Height: 5' 6" (167 6 cm) (04/21/22 0144)  Weight - Scale: (!) 153 kg (337 lb 11 9 oz) (04/21/22 0144)  SpO2: 98 % (04/21/22 0144)    Current Vitals:   Blood Pressure: 102/59 (04/21/22 0650)  Pulse: (!) 129 (04/21/22 0650)  Temperature: 99 6 °F (37 6 °C) (04/21/22 0650)  Temp Source: Oral (04/21/22 0450)  Respirations: 18 (04/21/22 0650)  Height: 5' 6" (167 6 cm) (04/21/22 0144)  Weight - Scale: (!) 158 kg (348 lb 15 8 oz) (04/21/22 0600)  SpO2: 94 % (04/21/22 0650)      Intake/Output Summary (Last 24 hours) at 4/21/2022 0703  Last data filed at 4/21/2022 0349  Gross per 24 hour   Intake 50 ml   Output --   Net 50 ml       Invasive Devices  Report    Peripheral Intravenous Line            Peripheral IV 04/21/22 Left Forearm <1 day                Physical Exam     /59   Pulse (!) 129   Temp 99 6 °F (37 6 °C)   Resp 18   Ht 5' 6" (1 676 m)   Wt (!) 158 kg (348 lb 15 8 oz)   SpO2 92%   BMI 56 33 kg/m²     Patient is awake, he appears uncomfortable  No acute distress  He is not toxic appearing  ENT clear  No jaundice or rash  Normocephalic  Oral mucosa moist   Heart regular rate and rhythm  Neck supple  No adenopathy, trachea midline  Lungs clear to auscultation  No rales or rhonchi  Obese  Positive bowel sounds, soft, some left lower quadrant abdominal discomfort to deep palpation, no peritoneal signs  Normal male genitalia, no hernias noted    Definite left positive Moises sign with percussion over the flank   Extremities no calf tenderness, no peripheral edema  Ambulation not observed  Neurological exam shows the patient fully oriented  No tremor noted  No obvious coordination problems  Mental status is entirely appropriate  Lab Results:   I have personally reviewed pertinent lab results  , CBC:   Lab Results   Component Value Date    WBC 10 75 (H) 04/21/2022    HGB 14 9 04/21/2022    HCT 45 9 04/21/2022    MCV 94 04/21/2022     04/21/2022    MCH 30 5 04/21/2022    MCHC 32 5 04/21/2022    RDW 13 2 04/21/2022    MPV 9 9 04/21/2022    NRBC 0 04/21/2022   , CMP:   Lab Results   Component Value Date    SODIUM 139 04/21/2022    K 3 9 04/21/2022     04/21/2022    CO2 17 (L) 04/21/2022    BUN 28 (H) 04/21/2022    CREATININE 1 93 (H) 04/21/2022    CALCIUM 9 2 04/21/2022    AST 21 04/21/2022    ALT 25 04/21/2022    ALKPHOS 74 04/21/2022    EGFR 38 04/21/2022   , Coagulation: No results found for: PT, INR, APTT, Urinalysis:   Lab Results   Component Value Date    COLORU Yellow 04/21/2022    CLARITYU Cloudy 04/21/2022    SPECGRAV 1 025 04/21/2022    PHUR 6 0 04/21/2022    LEUKOCYTESUR Moderate (A) 04/21/2022    NITRITE Positive (A) 04/21/2022    GLUCOSEU Negative 04/21/2022    KETONESU Negative 04/21/2022    BILIRUBINUR Negative 04/21/2022    BLOODU Small (A) 04/21/2022     Imaging: I have personally reviewed pertinent films in PACS     CT ABDOMEN AND PELVIS WITHOUT IV CONTRAST - LOW DOSE RENAL STONE      INDICATION:   Flank pain, kidney stone suspected  L flank pain +dysuria  hx ureterolithiasis      COMPARISON:  None      TECHNIQUE:  Low radiation dose thin section CT examination of the abdomen and pelvis was performed without intravenous or oral contrast according to a protocol specifically designed to evaluate for urinary tract calculus  Axial, sagittal, and coronal 2D   reformatted images were created from the source data and submitted for interpretation    Evaluation for pathology in the abdomen and pelvis that is unrelated to urinary tract calculi is limited        Radiation dose length product (DLP) for this visit:  1867 01 mGy-cm   This examination, like all CT scans performed in the [facility], was performed utilizing techniques to minimize radiation dose exposure, including the use of   iterative reconstruction and automated exposure control      URINARY TRACT FINDINGS:     RIGHT KIDNEY AND URETER: There are nonobstructing intrarenal calculi measuring on the order of 4 mm at the interpolar region  No hydronephrosis or hydroureter  One or more simple appearing renal cyst(s) is identified      LEFT KIDNEY AND URETER:  Left hydroureteronephrosis  3 x 3 mm stone at the mid left ureter at the level of the L4-5 disc space  Additional nonobstructing stones throughout the left kidney measuring up to 4 mm at the superior pole     URINARY BLADDER:  Dependent bladder calculus 5 mm  Diffuse bladder wall thickening         ADDITIONAL FINDINGS:     LOWER CHEST:  No clinically significant abnormality identified in the visualized lower chest      SOLID VISCERA: Visualized portion of the liver appears to be diffusely decreased in density suggestive of steatosis  Otherwise, limited low radiation dose CT demonstrates no clinically significant abnormality of visualized solid abdominal viscera       GALLBLADDER/BILIARY TREE:  No calcified gallstones  No pericholecystic inflammatory change  No biliary dilatation      STOMACH AND BOWEL:  Unremarkable      APPENDIX:  No findings to suggest appendicitis      ABDOMINOPELVIC CAVITY:  No ascites  No pneumoperitoneum    No lymphadenopathy      REPRODUCTIVE ORGANS:  Unremarkable for patient's age      ABDOMINAL WALL/INGUINAL REGIONS:  Unremarkable      OSSEOUS STRUCTURES:  No acute fracture or destructive osseous lesion      IMPRESSION:     Left hydroureteronephrosis secondary to a 3 mm stone at the mid left ureter at the L4-L5 disc space level      Nonobstructing bilateral nephrolithiasis  Layering bladder calculus      Bladder wall thickening secondary to underdistention or trabecular hypertrophy secondary to outlet obstruction         EKG, Pathology, and Other Studies: I have personally reviewed pertinent reports  Counseling / Coordination of Care  Total floor / unit time spent today 35 minutes  Greater than 50% of total time was spent with the patient and / or family counseling and / or coordination of care  A description of the counseling / coordination of care:  Review of diagnostic imaging laboratory studies, review of old medical records, personal examination patient, discussion with the attending hospitalist physician, review of prior urological operative reports from 2019 all conducted in the urological evaluation of the patient at this time  Will trial conservative non operative management and initiate stone expulsion therapy, monitor kidney function  Debora Garcia PA-C

## 2022-04-22 ENCOUNTER — ANESTHESIA EVENT (OUTPATIENT)
Dept: PERIOP | Facility: HOSPITAL | Age: 57
DRG: 872 | End: 2022-04-22
Payer: COMMERCIAL

## 2022-04-22 ENCOUNTER — APPOINTMENT (OUTPATIENT)
Dept: RADIOLOGY | Facility: HOSPITAL | Age: 57
DRG: 872 | End: 2022-04-22
Payer: COMMERCIAL

## 2022-04-22 ENCOUNTER — HOSPITAL ENCOUNTER (INPATIENT)
Facility: HOSPITAL | Age: 57
LOS: 8 days | Discharge: HOME | DRG: 854 | End: 2022-04-30
Attending: GENERAL PRACTICE | Admitting: GENERAL PRACTICE
Payer: COMMERCIAL

## 2022-04-22 ENCOUNTER — TELEPHONE (OUTPATIENT)
Dept: UROLOGY | Facility: AMBULATORY SURGERY CENTER | Age: 57
End: 2022-04-22

## 2022-04-22 ENCOUNTER — PREP FOR PROCEDURE (OUTPATIENT)
Dept: UROLOGY | Facility: AMBULATORY SURGERY CENTER | Age: 57
End: 2022-04-22

## 2022-04-22 ENCOUNTER — ANESTHESIA (OUTPATIENT)
Dept: PERIOP | Facility: HOSPITAL | Age: 57
DRG: 872 | End: 2022-04-22
Payer: COMMERCIAL

## 2022-04-22 VITALS
RESPIRATION RATE: 20 BRPM | WEIGHT: 315 LBS | TEMPERATURE: 98.4 F | SYSTOLIC BLOOD PRESSURE: 106 MMHG | HEIGHT: 66 IN | HEART RATE: 107 BPM | BODY MASS INDEX: 50.62 KG/M2 | DIASTOLIC BLOOD PRESSURE: 68 MMHG | OXYGEN SATURATION: 92 %

## 2022-04-22 DIAGNOSIS — N20.1 LEFT URETERAL STONE: Primary | ICD-10-CM

## 2022-04-22 DIAGNOSIS — R78.81 BACTEREMIA: ICD-10-CM

## 2022-04-22 DIAGNOSIS — N39.0 URINARY TRACT INFECTION WITHOUT HEMATURIA, SITE UNSPECIFIED: ICD-10-CM

## 2022-04-22 DIAGNOSIS — E66.01 MORBID OBESITY (HCC): ICD-10-CM

## 2022-04-22 DIAGNOSIS — N20.1 URETERAL STONE: Primary | ICD-10-CM

## 2022-04-22 DIAGNOSIS — N17.9 AKI (ACUTE KIDNEY INJURY) (HCC): ICD-10-CM

## 2022-04-22 DIAGNOSIS — I10 PRIMARY HYPERTENSION: ICD-10-CM

## 2022-04-22 DIAGNOSIS — M25.562 ACUTE PAIN OF LEFT KNEE: ICD-10-CM

## 2022-04-22 DIAGNOSIS — Z87.39 HISTORY OF GOUT: ICD-10-CM

## 2022-04-22 LAB
ANION GAP SERPL CALCULATED.3IONS-SCNC: 16 MMOL/L (ref 4–13)
ANION GAP SERPL CALCULATED.3IONS-SCNC: 8 MMOL/L (ref 4–13)
ANISOCYTOSIS BLD QL SMEAR: PRESENT
BASOPHILS # BLD AUTO: 0.05 THOUSANDS/ΜL (ref 0–0.1)
BASOPHILS # BLD MANUAL: 0.18 THOUSAND/UL (ref 0–0.1)
BASOPHILS NFR BLD AUTO: 0 % (ref 0–1)
BASOPHILS NFR MAR MANUAL: 1 % (ref 0–1)
BUN SERPL-MCNC: 40 MG/DL (ref 5–25)
BUN SERPL-MCNC: 43 MG/DL (ref 5–25)
CALCIUM SERPL-MCNC: 8.4 MG/DL (ref 8.3–10.1)
CALCIUM SERPL-MCNC: 9.1 MG/DL (ref 8.3–10.1)
CHLORIDE SERPL-SCNC: 105 MMOL/L (ref 100–108)
CHLORIDE SERPL-SCNC: 110 MMOL/L (ref 100–108)
CO2 SERPL-SCNC: 18 MMOL/L (ref 21–32)
CO2 SERPL-SCNC: 19 MMOL/L (ref 21–32)
CREAT SERPL-MCNC: 4.22 MG/DL (ref 0.6–1.3)
CREAT SERPL-MCNC: 4.39 MG/DL (ref 0.6–1.3)
EOSINOPHIL # BLD AUTO: 0.21 THOUSAND/ΜL (ref 0–0.61)
EOSINOPHIL # BLD MANUAL: 0.18 THOUSAND/UL (ref 0–0.4)
EOSINOPHIL NFR BLD AUTO: 1 % (ref 0–6)
EOSINOPHIL NFR BLD MANUAL: 1 % (ref 0–6)
ERYTHROCYTE [DISTWIDTH] IN BLOOD BY AUTOMATED COUNT: 14 % (ref 11.6–15.1)
ERYTHROCYTE [DISTWIDTH] IN BLOOD BY AUTOMATED COUNT: 14 % (ref 11.6–15.1)
GFR SERPL CREATININE-BSD FRML MDRD: 14 ML/MIN/1.73SQ M
GFR SERPL CREATININE-BSD FRML MDRD: 14 ML/MIN/1.73SQ M
GLUCOSE SERPL-MCNC: 118 MG/DL (ref 65–140)
GLUCOSE SERPL-MCNC: 120 MG/DL (ref 65–140)
HCT VFR BLD AUTO: 40.2 % (ref 36.5–49.3)
HCT VFR BLD AUTO: 43.4 % (ref 36.5–49.3)
HGB BLD-MCNC: 12.6 G/DL (ref 12–17)
HGB BLD-MCNC: 13.3 G/DL (ref 12–17)
IMM GRANULOCYTES # BLD AUTO: 0.17 THOUSAND/UL (ref 0–0.2)
IMM GRANULOCYTES NFR BLD AUTO: 1 % (ref 0–2)
LYMPHOCYTES # BLD AUTO: 0 % (ref 14–44)
LYMPHOCYTES # BLD AUTO: 0 THOUSAND/UL (ref 0.6–4.47)
LYMPHOCYTES # BLD AUTO: 0.92 THOUSANDS/ΜL (ref 0.6–4.47)
LYMPHOCYTES NFR BLD AUTO: 5 % (ref 14–44)
MCH RBC QN AUTO: 30.1 PG (ref 26.8–34.3)
MCH RBC QN AUTO: 30.6 PG (ref 26.8–34.3)
MCHC RBC AUTO-ENTMCNC: 30.6 G/DL (ref 31.4–37.4)
MCHC RBC AUTO-ENTMCNC: 31.3 G/DL (ref 31.4–37.4)
MCV RBC AUTO: 100 FL (ref 82–98)
MCV RBC AUTO: 96 FL (ref 82–98)
METAMYELOCYTES NFR BLD MANUAL: 1 % (ref 0–1)
MONOCYTES # BLD AUTO: 0.36 THOUSAND/UL (ref 0–1.22)
MONOCYTES # BLD AUTO: 0.76 THOUSAND/ΜL (ref 0.17–1.22)
MONOCYTES NFR BLD AUTO: 4 % (ref 4–12)
MONOCYTES NFR BLD: 2 % (ref 4–12)
NEUTROPHILS # BLD AUTO: 17.61 THOUSANDS/ΜL (ref 1.85–7.62)
NEUTROPHILS # BLD MANUAL: 17.19 THOUSAND/UL (ref 1.85–7.62)
NEUTS BAND NFR BLD MANUAL: 27 % (ref 0–8)
NEUTS SEG NFR BLD AUTO: 68 % (ref 43–75)
NEUTS SEG NFR BLD AUTO: 89 % (ref 43–75)
NRBC BLD AUTO-RTO: 0 /100 WBCS
PLATELET # BLD AUTO: 178 THOUSANDS/UL (ref 149–390)
PLATELET # BLD AUTO: 188 THOUSANDS/UL (ref 149–390)
PLATELET BLD QL SMEAR: ABNORMAL
PMV BLD AUTO: 10.3 FL (ref 8.9–12.7)
PMV BLD AUTO: 10.3 FL (ref 8.9–12.7)
POLYCHROMASIA BLD QL SMEAR: PRESENT
POTASSIUM SERPL-SCNC: 4.4 MMOL/L (ref 3.5–5.3)
POTASSIUM SERPL-SCNC: 4.8 MMOL/L (ref 3.5–5.3)
RBC # BLD AUTO: 4.18 MILLION/UL (ref 3.88–5.62)
RBC # BLD AUTO: 4.34 MILLION/UL (ref 3.88–5.62)
RBC MORPH BLD: PRESENT
SODIUM SERPL-SCNC: 136 MMOL/L (ref 136–145)
SODIUM SERPL-SCNC: 140 MMOL/L (ref 136–145)
WBC # BLD AUTO: 18.09 THOUSAND/UL (ref 4.31–10.16)
WBC # BLD AUTO: 19.72 THOUSAND/UL (ref 4.31–10.16)

## 2022-04-22 PROCEDURE — 80048 BASIC METABOLIC PNL TOTAL CA: CPT | Performed by: PHYSICIAN ASSISTANT

## 2022-04-22 PROCEDURE — 80048 BASIC METABOLIC PNL TOTAL CA: CPT | Performed by: UROLOGY

## 2022-04-22 PROCEDURE — BT1F1ZZ FLUOROSCOPY OF LEFT KIDNEY, URETER AND BLADDER USING LOW OSMOLAR CONTRAST: ICD-10-PCS | Performed by: UROLOGY

## 2022-04-22 PROCEDURE — 99223 1ST HOSP IP/OBS HIGH 75: CPT | Performed by: GENERAL PRACTICE

## 2022-04-22 PROCEDURE — C2617 STENT, NON-COR, TEM W/O DEL: HCPCS | Performed by: UROLOGY

## 2022-04-22 PROCEDURE — 0T778DZ DILATION OF LEFT URETER WITH INTRALUMINAL DEVICE, VIA NATURAL OR ARTIFICIAL OPENING ENDOSCOPIC: ICD-10-PCS | Performed by: UROLOGY

## 2022-04-22 PROCEDURE — 85025 COMPLETE CBC W/AUTO DIFF WBC: CPT | Performed by: PHYSICIAN ASSISTANT

## 2022-04-22 PROCEDURE — NC001 PR NO CHARGE: Performed by: INTERNAL MEDICINE

## 2022-04-22 PROCEDURE — 52332 CYSTOSCOPY AND TREATMENT: CPT | Performed by: UROLOGY

## 2022-04-22 PROCEDURE — 99232 SBSQ HOSP IP/OBS MODERATE 35: CPT | Performed by: INTERNAL MEDICINE

## 2022-04-22 PROCEDURE — 74420 UROGRAPHY RTRGR +-KUB: CPT

## 2022-04-22 PROCEDURE — C1769 GUIDE WIRE: HCPCS | Performed by: UROLOGY

## 2022-04-22 PROCEDURE — 87040 BLOOD CULTURE FOR BACTERIA: CPT | Performed by: GENERAL PRACTICE

## 2022-04-22 PROCEDURE — 99232 SBSQ HOSP IP/OBS MODERATE 35: CPT | Performed by: UROLOGY

## 2022-04-22 PROCEDURE — 85007 BL SMEAR W/DIFF WBC COUNT: CPT | Performed by: PHYSICIAN ASSISTANT

## 2022-04-22 PROCEDURE — 99223 1ST HOSP IP/OBS HIGH 75: CPT | Performed by: UROLOGY

## 2022-04-22 PROCEDURE — NC001 PR NO CHARGE

## 2022-04-22 PROCEDURE — 99223 1ST HOSP IP/OBS HIGH 75: CPT | Performed by: INTERNAL MEDICINE

## 2022-04-22 PROCEDURE — 85027 COMPLETE CBC AUTOMATED: CPT | Performed by: PHYSICIAN ASSISTANT

## 2022-04-22 DEVICE — INLAY OPTIMA URETERAL STENT W/O GUIDEWIRE
Type: IMPLANTABLE DEVICE | Site: URETER | Status: FUNCTIONAL
Brand: BARD® INLAY OPTIMA® URETERAL STENT

## 2022-04-22 RX ORDER — TAMSULOSIN HYDROCHLORIDE 0.4 MG/1
0.4 CAPSULE ORAL
Status: DISCONTINUED | OUTPATIENT
Start: 2022-04-22 | End: 2022-04-30 | Stop reason: HOSPADM

## 2022-04-22 RX ORDER — ONDANSETRON 2 MG/ML
4 INJECTION INTRAMUSCULAR; INTRAVENOUS EVERY 6 HOURS PRN
Status: DISCONTINUED | OUTPATIENT
Start: 2022-04-22 | End: 2022-04-30 | Stop reason: HOSPADM

## 2022-04-22 RX ORDER — MAGNESIUM HYDROXIDE 1200 MG/15ML
LIQUID ORAL AS NEEDED
Status: DISCONTINUED | OUTPATIENT
Start: 2022-04-22 | End: 2022-04-22 | Stop reason: HOSPADM

## 2022-04-22 RX ORDER — FENTANYL CITRATE 50 UG/ML
INJECTION, SOLUTION INTRAMUSCULAR; INTRAVENOUS AS NEEDED
Status: DISCONTINUED | OUTPATIENT
Start: 2022-04-22 | End: 2022-04-22

## 2022-04-22 RX ORDER — FENTANYL CITRATE/PF 50 MCG/ML
25 SYRINGE (ML) INJECTION
Status: DISCONTINUED | OUTPATIENT
Start: 2022-04-22 | End: 2022-04-22 | Stop reason: HOSPADM

## 2022-04-22 RX ORDER — CEFEPIME HYDROCHLORIDE 2 G/50ML
2000 INJECTION, SOLUTION INTRAVENOUS EVERY 24 HOURS
Status: DISCONTINUED | OUTPATIENT
Start: 2022-04-22 | End: 2022-04-22 | Stop reason: HOSPADM

## 2022-04-22 RX ORDER — SODIUM CHLORIDE, SODIUM GLUCONATE, SODIUM ACETATE, POTASSIUM CHLORIDE, MAGNESIUM CHLORIDE, SODIUM PHOSPHATE, DIBASIC, AND POTASSIUM PHOSPHATE .53; .5; .37; .037; .03; .012; .00082 G/100ML; G/100ML; G/100ML; G/100ML; G/100ML; G/100ML; G/100ML
125 INJECTION, SOLUTION INTRAVENOUS CONTINUOUS
Status: DISCONTINUED | OUTPATIENT
Start: 2022-04-22 | End: 2022-04-24

## 2022-04-22 RX ORDER — HYDROMORPHONE HCL/PF 1 MG/ML
1 SYRINGE (ML) INJECTION EVERY 4 HOURS PRN
Status: DISCONTINUED | OUTPATIENT
Start: 2022-04-22 | End: 2022-04-30 | Stop reason: HOSPADM

## 2022-04-22 RX ORDER — HYDROMORPHONE HCL IN WATER/PF 6 MG/30 ML
0.2 PATIENT CONTROLLED ANALGESIA SYRINGE INTRAVENOUS
Status: DISCONTINUED | OUTPATIENT
Start: 2022-04-22 | End: 2022-04-22 | Stop reason: HOSPADM

## 2022-04-22 RX ORDER — PROPOFOL 10 MG/ML
INJECTION, EMULSION INTRAVENOUS AS NEEDED
Status: DISCONTINUED | OUTPATIENT
Start: 2022-04-22 | End: 2022-04-22

## 2022-04-22 RX ORDER — ONDANSETRON 2 MG/ML
4 INJECTION INTRAMUSCULAR; INTRAVENOUS ONCE AS NEEDED
Status: DISCONTINUED | OUTPATIENT
Start: 2022-04-22 | End: 2022-04-22 | Stop reason: HOSPADM

## 2022-04-22 RX ORDER — ACETAMINOPHEN 325 MG/1
975 TABLET ORAL ONCE
Status: CANCELLED | OUTPATIENT
Start: 2022-04-22 | End: 2022-04-22

## 2022-04-22 RX ORDER — MIDAZOLAM HYDROCHLORIDE 2 MG/2ML
INJECTION, SOLUTION INTRAMUSCULAR; INTRAVENOUS AS NEEDED
Status: DISCONTINUED | OUTPATIENT
Start: 2022-04-22 | End: 2022-04-22

## 2022-04-22 RX ORDER — ACETAMINOPHEN 325 MG/1
650 TABLET ORAL ONCE
Status: DISCONTINUED | OUTPATIENT
Start: 2022-04-22 | End: 2022-04-22 | Stop reason: HOSPADM

## 2022-04-22 RX ORDER — SODIUM CHLORIDE, SODIUM LACTATE, POTASSIUM CHLORIDE, CALCIUM CHLORIDE 600; 310; 30; 20 MG/100ML; MG/100ML; MG/100ML; MG/100ML
INJECTION, SOLUTION INTRAVENOUS CONTINUOUS PRN
Status: DISCONTINUED | OUTPATIENT
Start: 2022-04-22 | End: 2022-04-22

## 2022-04-22 RX ORDER — ACETAMINOPHEN 325 MG/1
650 TABLET ORAL EVERY 6 HOURS PRN
Status: DISCONTINUED | OUTPATIENT
Start: 2022-04-22 | End: 2022-04-30 | Stop reason: HOSPADM

## 2022-04-22 RX ORDER — SODIUM CHLORIDE 9 MG/ML
125 INJECTION, SOLUTION INTRAVENOUS CONTINUOUS
Status: DISCONTINUED | OUTPATIENT
Start: 2022-04-22 | End: 2022-04-22 | Stop reason: HOSPADM

## 2022-04-22 RX ORDER — HYDROMORPHONE HCL/PF 1 MG/ML
1 SYRINGE (ML) INJECTION EVERY 4 HOURS PRN
Status: DISCONTINUED | OUTPATIENT
Start: 2022-04-22 | End: 2022-04-22

## 2022-04-22 RX ORDER — OXYCODONE HYDROCHLORIDE 5 MG/1
2.5 TABLET ORAL EVERY 4 HOURS PRN
Status: DISCONTINUED | OUTPATIENT
Start: 2022-04-22 | End: 2022-04-30 | Stop reason: HOSPADM

## 2022-04-22 RX ORDER — DOCUSATE SODIUM 100 MG/1
100 CAPSULE, LIQUID FILLED ORAL 3 TIMES DAILY
Qty: 21 CAPSULE | Refills: 0 | Status: ON HOLD | OUTPATIENT
Start: 2022-04-22 | End: 2022-05-24 | Stop reason: ALTCHOICE

## 2022-04-22 RX ORDER — OXYCODONE HYDROCHLORIDE 5 MG/1
5 TABLET ORAL EVERY 4 HOURS PRN
Status: DISCONTINUED | OUTPATIENT
Start: 2022-04-22 | End: 2022-04-30 | Stop reason: HOSPADM

## 2022-04-22 RX ORDER — HEPARIN SODIUM 5000 [USP'U]/ML
5000 INJECTION, SOLUTION INTRAVENOUS; SUBCUTANEOUS EVERY 8 HOURS SCHEDULED
Status: DISCONTINUED | OUTPATIENT
Start: 2022-04-22 | End: 2022-04-30 | Stop reason: HOSPADM

## 2022-04-22 RX ORDER — LIDOCAINE HYDROCHLORIDE 10 MG/ML
INJECTION, SOLUTION EPIDURAL; INFILTRATION; INTRACAUDAL; PERINEURAL AS NEEDED
Status: DISCONTINUED | OUTPATIENT
Start: 2022-04-22 | End: 2022-04-22

## 2022-04-22 RX ORDER — ONDANSETRON 4 MG/1
4 TABLET, ORALLY DISINTEGRATING ORAL EVERY 6 HOURS PRN
Qty: 20 TABLET | Refills: 0 | Status: SHIPPED | OUTPATIENT
Start: 2022-04-22 | End: 2022-05-19

## 2022-04-22 RX ORDER — SODIUM CHLORIDE, SODIUM LACTATE, POTASSIUM CHLORIDE, CALCIUM CHLORIDE 600; 310; 30; 20 MG/100ML; MG/100ML; MG/100ML; MG/100ML
50 INJECTION, SOLUTION INTRAVENOUS CONTINUOUS
Status: DISCONTINUED | OUTPATIENT
Start: 2022-04-22 | End: 2022-04-22

## 2022-04-22 RX ADMIN — LIDOCAINE HYDROCHLORIDE 50 MG: 10 INJECTION, SOLUTION EPIDURAL; INFILTRATION; INTRACAUDAL; PERINEURAL at 13:33

## 2022-04-22 RX ADMIN — Medication 3000 MG: at 13:42

## 2022-04-22 RX ADMIN — SODIUM CHLORIDE, SODIUM GLUCONATE, SODIUM ACETATE, POTASSIUM CHLORIDE, MAGNESIUM CHLORIDE, SODIUM PHOSPHATE, DIBASIC, AND POTASSIUM PHOSPHATE 75 ML/HR: .53; .5; .37; .037; .03; .012; .00082 INJECTION, SOLUTION INTRAVENOUS at 15:28

## 2022-04-22 RX ADMIN — ACETAMINOPHEN 650 MG: 325 TABLET ORAL at 22:38

## 2022-04-22 RX ADMIN — CEFEPIME HYDROCHLORIDE 2000 MG: 2 INJECTION, SOLUTION INTRAVENOUS at 08:30

## 2022-04-22 RX ADMIN — HEPARIN SODIUM 5000 UNITS: 5000 INJECTION INTRAVENOUS; SUBCUTANEOUS at 15:32

## 2022-04-22 RX ADMIN — HEPARIN SODIUM 5000 UNITS: 5000 INJECTION INTRAVENOUS; SUBCUTANEOUS at 22:38

## 2022-04-22 RX ADMIN — SODIUM CHLORIDE 125 ML/HR: 0.9 INJECTION, SOLUTION INTRAVENOUS at 08:38

## 2022-04-22 RX ADMIN — FENTANYL CITRATE 50 MCG: 50 INJECTION INTRAMUSCULAR; INTRAVENOUS at 13:39

## 2022-04-22 RX ADMIN — CEFTRIAXONE 2000 MG: 2 INJECTION, SOLUTION INTRAVENOUS at 04:00

## 2022-04-22 RX ADMIN — OXYCODONE HYDROCHLORIDE 5 MG: 5 TABLET ORAL at 15:32

## 2022-04-22 RX ADMIN — FENTANYL CITRATE 50 MCG: 50 INJECTION INTRAMUSCULAR; INTRAVENOUS at 13:47

## 2022-04-22 RX ADMIN — PROPOFOL 200 MG: 10 INJECTION, EMULSION INTRAVENOUS at 13:33

## 2022-04-22 RX ADMIN — HYDROMORPHONE HYDROCHLORIDE 1 MG: 1 INJECTION, SOLUTION INTRAMUSCULAR; INTRAVENOUS; SUBCUTANEOUS at 10:00

## 2022-04-22 RX ADMIN — SODIUM CHLORIDE, SODIUM LACTATE, POTASSIUM CHLORIDE, AND CALCIUM CHLORIDE: .6; .31; .03; .02 INJECTION, SOLUTION INTRAVENOUS at 12:33

## 2022-04-22 RX ADMIN — HYDROMORPHONE HYDROCHLORIDE 1 MG: 1 INJECTION, SOLUTION INTRAMUSCULAR; INTRAVENOUS; SUBCUTANEOUS at 00:40

## 2022-04-22 RX ADMIN — TAMSULOSIN HYDROCHLORIDE 0.4 MG: 0.4 CAPSULE ORAL at 17:16

## 2022-04-22 RX ADMIN — MIDAZOLAM 2 MG: 1 INJECTION INTRAMUSCULAR; INTRAVENOUS at 13:26

## 2022-04-22 RX ADMIN — ACETAMINOPHEN 650 MG: 325 TABLET ORAL at 00:40

## 2022-04-22 RX ADMIN — SODIUM CHLORIDE, SODIUM GLUCONATE, SODIUM ACETATE, POTASSIUM CHLORIDE, MAGNESIUM CHLORIDE, SODIUM PHOSPHATE, DIBASIC, AND POTASSIUM PHOSPHATE 125 ML/HR: .53; .5; .37; .037; .03; .012; .00082 INJECTION, SOLUTION INTRAVENOUS at 22:47

## 2022-04-22 NOTE — PROGRESS NOTES
Progress Note - UROLOGY   Maria Victoria Balderas 55 y o  male MRN: [id number]  Unit/Bed#: 419-01 Encounter: [id number]    Assessment:  Continues with left flank and lower abdominal pain, relates as a 7 or 8 on a 10 point pain scale  Patient spiked a temperature 101° early this morning, tachycardic  Increased leukocytosis 19 72  Worsening acute kidney injury, creatinine now up to 4 22 (3 32, 2 66  1 93)  Left mid ureteral stone with mild-to-moderate hydronephrosis  Sepsis secondary to urinary tract source now with Gram-negative bacteremia on blood culture  Urine culture growing greater than 100,000 Gram-negative rods enteric like  Blood culture growing Gram-negative rods and Gram-positive cocci in chains  Previous history of right ureterolithiasis, underwent cysto stent in 2019 by Kaylen Neely  Morbid obesity, BMI 56 90    Plan:  Personally discussed via telephone with Dr Matt Bennett this morning to update him about in coming transfer to [place]  Additional conversation spanning 15 minutes with both attending hospitalist physician Dr Erwin Padilla, and repeated with hospitalist MILO Aguilar this morning  [facility]ally called the [facility] to expedite transfer to [facility] this morning, now a 2nd time with request for transfer  The patient is now septic and febrile with worsening kidney function and is scheduled for cysto left ureteral stent placement in the OR today already on the schedule for 12:20 p m  in [place]  Discussed with Dr Sonia Barnes and updated him  Phone conversation yesterday afternoon with the  [facility] representative Benita Yanez and documented recommendation that the patient needed tertiary transfer for closer urological surveillance overnight  In addition, personal documentation and conversation directly with Dr Grupo Ritchie also recommended transfer to [place]      Discussed with medicine about increasing broad-spectrum antibiotic coverage, patient now on IV vancomycin and cefepime  Nothing by mouth since midnight  Tamsulosin was initiated yesterday  IV fluids for hydration ongoing  Analgesics and antiemetics as ordered p r n  Nephrology consultation was reviewed from yesterday    Subjective/Objective      Chief Complaint:  Patient spiked a temperature 101 early this morning  Continues with left flank discomfort  Subjective:  67-year-old obese male presented with left flank and lower abdominal pain  He has been passing urine, no blood present  Last night he spiked a temperature just after midnight  He has been nothing by mouth  CT scan on admission showed obstructing 3 mm left mid ureteral stone  There was mild to moderate hydronephrosis, renal ultrasound was also performed and showed mild hydronephrosis  The patient has worsening kidney function with his creatinine this morning 4 2  Significant jump in leukocytosis and development of tachycardia and fever, blood in urine cultures with the below mentioned findings  Discussed at length in person with the attending hospitalist physician and PA, plan is for transfer to [facility] this morning, the patient is scheduled for cystoscopy left ureteral stent placement early this afternoon      Scheduled Meds:  Current Facility-Administered Medications   Medication Dose Route Frequency Provider Last Rate    acetaminophen  650 mg Oral Q6H PRN Josy Gomez PA-C      acetaminophen  650 mg Oral Once Austen Cai PA-C      cefepime  2,000 mg Intravenous Q24H Alex Conner PA-C 2,000 mg (04/22/22 0830)    heparin (porcine)  5,000 Units Subcutaneous Q8H [facility] Alex Conner PA-C      HYDROmorphone  1 mg Intravenous Q4H PRN Vero Douglas PA-C      ondansetron  4 mg Intravenous Q6H PRN Josy Gomez PA-C      sodium chloride  125 mL/hr Intravenous Continuous Vero Douglas PA-C 125 mL/hr (04/22/22 4300)    tamsulosin  0 4 mg Oral Daily With Jamie Howell MD      vancomycin  12 5 mg/kg (Adjusted) Intravenous Q24H Alex Conner PA-C       Continuous Infusions:sodium chloride, 125 mL/hr, Last Rate: 125 mL/hr (04/22/22 0838)      PRN Meds:   acetaminophen    HYDROmorphone    ondansetron      Objective:     Blood pressure 106/68, pulse (!) 107, temperature 98 4 °F (36 9 °C), resp  rate 20, height 5' 6" (1 676 m), weight (!) 160 kg (352 lb 8 3 oz), SpO2 92 %  ,Body mass index is 56 9 kg/m²  Intake/Output Summary (Last 24 hours) at 4/22/2022 0841  Last data filed at 4/22/2022 0606  Gross per 24 hour   Intake 0 ml   Output 1325 ml   Net -1325 ml       Invasive Devices  Report    Peripheral Intravenous Line            Peripheral IV 04/21/22 Left Forearm 1 day                Physical Exam:     General patient appears uncomfortable, general body habitus is morbidly obese  Weight 160 kg  Head normocephalic  Full facial beard  Oral mucosa is pink and moist   Sclera white both eyes  Neck without goiter or carotid bruit heard  Chest symmetric  Heart regular rate and rhythm  No murmur or gallop is heard  Lungs clear to auscultation  No rales or rhonchi  Wide abdominal girth  Positive bowel sounds are heard in 4 quadrants  The abdomen is soft  Positive Moises sign left flank to percussion  Patient has mild tenderness in left lower quadrant  No abdominal hernias are noted  Normal male genitalia  Extremities no calf tenderness, no peripheral edema  Ambulation not observed  Neurological exam shows normal mental status  Fully oriented  No tremor noted  No obvious coordination problems  Lab, Imaging and other studies:  I have personally reviewed pertinent lab results    , CBC:   Lab Results   Component Value Date    WBC 19 72 (H) 04/22/2022    HGB 12 6 04/22/2022    HCT 40 2 04/22/2022    MCV 96 04/22/2022     04/22/2022    MCH 30 1 04/22/2022    MCHC 31 3 (L) 04/22/2022    RDW 14 0 04/22/2022    MPV 10 3 04/22/2022    NRBC 0 04/22/2022   , CMP:   Lab Results   Component Value Date SODIUM 140 04/22/2022    K 4 4 04/22/2022     04/22/2022    CO2 19 (L) 04/22/2022    BUN 40 (H) 04/22/2022    CREATININE 4 22 (H) 04/22/2022    CALCIUM 8 4 04/22/2022    EGFR 14 04/22/2022   , Urinalysis: No results found for: Naila Kevin, SPECGRAV, PHUR, LEUKOCYTESUR, NITRITE, PROTEINUA, GLUCOSEU, KETONESU, BILIRUBINUR, BLOODU     Contains abnormal data Blood culture  Order: [id number]   Status: Preliminary result     Visible to patient: No (not released)     Next appt: Today at 09:00 AM in Radiology (BE CYSTO 1)    Specimen Information: Arm, Left; Blood         0 Result Notes    GRAM STAIN RESULT   Abnormal   Gram negative rods   This is an appended report  These results have been appended to a previously preliminary verified report  Gram positive cocci in chains   This is an appended report  These results have been appended to a previously preliminary verified report  Specimen Collected: 04/21/22 03:17 Last Resulted: 04/21/22 22:43           Contains abnormal data Blood Culture Identification Panel  Order: [id number] - Reflex for Order [id number]   Status: Preliminary result     Visible to patient: No (not released)     Next appt: Today at 09:00 AM in Radiology (BE CYSTO 1)    Specimen Information: Arm, Left; Blood         0 Result Notes     Ref Range & Units    Escherichia coli Not Detected Detected Abnormal  P    Comment: Ceftriaxone recommended; await susceptibilities for further de-escalation         Contains abnormal data Urine culture  Order: [id number] - Reflex for Order [id number]   Status: Preliminary result     Visible to patient: No (not released)     Next appt:  Today at 09:00 AM in Radiology (BE CYSTO 1)    Specimen Information: Urine, Clean Catch         0 Result Notes    Urine Culture >100,000 cfu/ml Gram Negative Anselmo Enteric Like Abnormal                   Specimen Collected: 04/21/22 02:16 Last Resulted: 04/22/22 07:56           RENAL ULTRASOUND     INDICATION:   Worsening kidney function, obstructing left mid ureteral stone      COMPARISON: CT April 21, 2022     TECHNIQUE:   Ultrasound of the retroperitoneum was performed with a curvilinear transducer utilizing volumetric sweeps and still imaging techniques  Evaluation of the right kidney is difficult due to severe hepatic steatosis      FINDINGS:     KIDNEYS:  Symmetric and normal size  Right kidney:  10 6 x 6 4 x 5 0 cm  Volume 178 4 mL  Left kidney:  14 2 x 6 7 x 5 5 cm  Volume 274 3 mL     Right kidney  Normal echogenicity and contour  No mass is identified  No hydronephrosis  No shadowing calculi  No perinephric fluid collections      Left kidney  Normal echogenicity and contour  No mass is identified  Mild hydronephrosis  No shadowing calculi  No perinephric fluid collections      URETERS:  Nonvisualized      BLADDER:   Normally distended  No focal thickening or mass lesions  Ureteral jets were not detected      IMPRESSION:     Mild left hydronephrosis        CT ABDOMEN AND PELVIS WITHOUT IV CONTRAST - LOW DOSE RENAL STONE      INDICATION:   Flank pain, kidney stone suspected  L flank pain +dysuria  hx ureterolithiasis      COMPARISON:  None      TECHNIQUE:  Low radiation dose thin section CT examination of the abdomen and pelvis was performed without intravenous or oral contrast according to a protocol specifically designed to evaluate for urinary tract calculus  Axial, sagittal, and coronal 2D   reformatted images were created from the source data and submitted for interpretation  Evaluation for pathology in the abdomen and pelvis that is unrelated to urinary tract calculi is limited        Radiation dose length product (DLP) for this visit:  1867 01 mGy-cm     This examination, like all CT scans performed in the [facility], was performed utilizing techniques to minimize radiation dose exposure, including the use of   iterative reconstruction and automated exposure control      URINARY TRACT FINDINGS:     RIGHT KIDNEY AND URETER: There are nonobstructing intrarenal calculi measuring on the order of 4 mm at the interpolar region  No hydronephrosis or hydroureter  One or more simple appearing renal cyst(s) is identified      LEFT KIDNEY AND URETER:  Left hydroureteronephrosis  3 x 3 mm stone at the mid left ureter at the level of the L4-5 disc space  Additional nonobstructing stones throughout the left kidney measuring up to 4 mm at the superior pole     URINARY BLADDER:  Dependent bladder calculus 5 mm  Diffuse bladder wall thickening         ADDITIONAL FINDINGS:     LOWER CHEST:  No clinically significant abnormality identified in the visualized lower chest      SOLID VISCERA: Visualized portion of the liver appears to be diffusely decreased in density suggestive of steatosis  Otherwise, limited low radiation dose CT demonstrates no clinically significant abnormality of visualized solid abdominal viscera       GALLBLADDER/BILIARY TREE:  No calcified gallstones  No pericholecystic inflammatory change  No biliary dilatation      STOMACH AND BOWEL:  Unremarkable      APPENDIX:  No findings to suggest appendicitis      ABDOMINOPELVIC CAVITY:  No ascites  No pneumoperitoneum  No lymphadenopathy      REPRODUCTIVE ORGANS:  Unremarkable for patient's age      ABDOMINAL WALL/INGUINAL REGIONS:  Unremarkable      OSSEOUS STRUCTURES:  No acute fracture or destructive osseous lesion      IMPRESSION:     Left hydroureteronephrosis secondary to a 3 mm stone at the mid left ureter at the L4-L5 disc space level      Nonobstructing bilateral nephrolithiasis   Layering bladder calculus      Bladder wall thickening secondary to underdistention or trabecular hypertrophy secondary to outlet obstruction        VTE Pharmacologic Prophylaxis: Heparin  VTE Mechanical Prophylaxis: sequential compression device     Elza Zuleta PA-C

## 2022-04-22 NOTE — H&P
[facility]  H&P- Myrl Reshma 1965, 55 y o  male MRN: [id number]  Unit/Bed#: [facility] 812-01 Encounter: [id number]  Primary Care Provider: RITA Beltran   Date and time admitted to hospital: 4/22/2022 11:33 AM    Sepsis ([facility] [address] )  Assessment & Plan  · POA SLMI  · 2/2 Pyleo w/ Step and E Coli bacteremia  · Rocephin  · ID Consult  · Recheck B Cx here    * Ureteral stone  Assessment & Plan  · S/p ureteral stent today  · Will need second stage procedure after d/c  · Berumen in place - defer to urology on when this can be removed    ADRIANNE (acute kidney injury) ([facility] )  Assessment & Plan  Estimated Creatinine Clearance: 27 4 mL/min (A) (by C-G formula based on SCr of 4 39 mg/dL (H))  · POA SLMI  · C/w isolyte  · Renal consult  · Likely postrenal and also ATN from sepsis    Morbid obesity with BMI of 50 0-59 9, adult ([facility])  Assessment & Plan  · BMI 57  · RD consult    UTI (urinary tract infection)  Assessment & Plan  · Rocephin  · U Cx pos for GNR    Metabolic acidosis, increased anion gap  Assessment & Plan  · Likely uremic  · C/w isolyte    Bacteremia  Assessment & Plan  · B Cx pos for strep and E coli  · Recheck B Cx  · Rocephin  · ID consult    Gout  Assessment & Plan  · uric acid 11  · Pt refusing allopurinol due to diarrhea    VTE Pharmacologic Prophylaxis: VTE Score: 5 High Risk (Score >/= 5) - Pharmacological DVT Prophylaxis Ordered: heparin  Sequential Compression Devices Ordered  Code Status: Level 1 - Full Code   Discussion with family: Attempted to update  (father) via phone  Left voicemail  Anticipated Length of Stay: Patient will be admitted on an inpatient basis with an anticipated length of stay of greater than 2 midnights secondary to sepsis and bacteremia  Total Time for Visit, including Counseling / Coordination of Care: 45 minutes Greater than 50% of this total time spent on direct patient counseling and coordination of care      Chief Complaint: stone    History of Present Illness:  Deborah Justice is a 55 y o  male with a PMH of morbid obesity who presents with kidney stone  [facility] with flank pain  Also found to have sepsis and bacteremia  Pt today went to OR and had stent placed  At this time, pt offers no acute complaints  Denies any nausea or vomiting or pain since procedure  Berumen in place  Review of Systems:  Review of Systems   Constitutional: Negative  HENT: Negative  Eyes: Negative  Respiratory: Negative  Cardiovascular: Negative  Gastrointestinal: Negative  Endocrine: Negative  Genitourinary: Positive for flank pain  Skin: Negative  Allergic/Immunologic: Negative  Neurological: Negative  Hematological: Negative  Psychiatric/Behavioral: Negative  Past Medical and Surgical History:   Past Medical History:   Diagnosis Date    Arthritis     Gout     Hypertension     Kidney stone        Past Surgical History:   Procedure Laterality Date    FL RETROGRADE PYELOGRAM  8/4/2019    FL RETROGRADE PYELOGRAM  9/26/2019    NO PAST SURGERIES  03/23/2015    Last assessed    PA CYSTO/URETERO W/LITHOTRIPSY &INDWELL STENT INSRT Right 9/26/2019    Procedure: CYSTOSCOPY URETEROSCOPY WITH RETROGRADE PYELOGRAM AND INSERTION STENT URETERAL;  Surgeon: Ave Macias MD;  [facility]: [facility];  Service: Urology    PA CYSTOURETHROSCOPY,URETER CATHETER Right 8/4/2019    Procedure: CYSTOSCOPY RETROGRADE PYELOGRAM WITH INSERTION STENT URETERAL;  Surgeon: Mikaela Avalos MD;  [facility]:  MAIN OR;  Service: Urology       Meds/Allergies:  Prior to Admission medications    Medication Sig Start Date End Date Taking?  Authorizing Provider   docusate sodium (COLACE) 100 mg capsule Take 1 capsule (100 mg total) by mouth 3 (three) times a day for 7 days 4/22/22 4/29/22  Franki Orta MD   ondansetron (Zofran ODT) 4 mg disintegrating tablet Take 1 tablet (4 mg total) by mouth every 6 (six) hours as needed for nausea or vomiting 4/22/22   Ines Carreno MD   tamsulosin (FLOMAX) 0 4 mg TAKE 1 CAPSULE BY MOUTH EVERY 24 HOURS 9/22/21   Eden RITA Crain     I have reviewed home medications with patient personally  Allergies: No Known Allergies    Social History:  Marital Status: Single     Patient Pre-hospital Living Situation: Home  Patient Pre-hospital Level of Mobility: walks    Substance Use History:   Social History     Substance and Sexual Activity   Alcohol Use Not Currently    Alcohol/week: 0 0 standard drinks    Comment: rarely     Social History     Tobacco Use   Smoking Status Never Smoker   Smokeless Tobacco Never Used     Social History     Substance and Sexual Activity   Drug Use No       Family History:  Family History   Problem Relation Age of Onset    Cancer Mother        Physical Exam:     Vitals:   Blood Pressure: (!) 168/102 (04/22/22 1533)  Pulse: 100 (04/22/22 1533)  Temperature: 98 1 °F (36 7 °C) (04/22/22 1533)  Respirations: 16 (04/22/22 1533)  Height: 5' 6" (167 6 cm) (04/22/22 1501)  Weight - Scale: (!) 160 kg (352 lb) (04/22/22 1501)  SpO2: 96 % (04/22/22 1533)    Physical Exam  HENT:      Head: Normocephalic and atraumatic  Nose: Nose normal       Mouth/Throat:      Mouth: Mucous membranes are moist    Eyes:      Extraocular Movements: Extraocular movements intact  Conjunctiva/sclera: Conjunctivae normal    Cardiovascular:      Rate and Rhythm: Normal rate and regular rhythm  Pulmonary:      Effort: Pulmonary effort is normal       Breath sounds: Normal breath sounds  No wheezing or rales  Abdominal:      General: Bowel sounds are normal  There is no distension  Palpations: Abdomen is soft  Tenderness: There is no abdominal tenderness  Musculoskeletal:         General: Normal range of motion  Cervical back: Normal range of motion and neck supple  Right lower leg: No edema  Left lower leg: No edema     Skin: General: Skin is warm and dry  Neurological:      Mental Status: He is alert and oriented to person, place, and time  Additional Data:     Lab Results:  Results from last 7 days   Lab Units 04/22/22  1433 04/22/22  0536 04/22/22  0536 04/21/22  0746 04/21/22  0746   WBC Thousand/uL 18 09*   < > 19 72*   < > 13 49*   HEMOGLOBIN g/dL 13 3   < > 12 6   < > 13 9   HEMATOCRIT % 43 4   < > 40 2   < > 42 5   PLATELETS Thousands/uL 178   < > 188   < > 219   BANDS PCT %  --   --   --   --  39*   NEUTROS PCT %  --   --  89*  --   --    LYMPHS PCT %  --   --  5*  --   --    LYMPHO PCT %  --   --   --   --  3*   MONOS PCT %  --   --  4  --   --    MONO PCT %  --   --   --   --  1*   EOS PCT %  --   --  1  --  0    < > = values in this interval not displayed  Results from last 7 days   Lab Units 04/22/22  1433 04/21/22  1357 04/21/22  0746   SODIUM mmol/L 136   < > 143   POTASSIUM mmol/L 4 8   < > 4 0   CHLORIDE mmol/L 110*   < > 109*   CO2 mmol/L 18*   < > 18*   BUN mg/dL 43*   < > 28*   CREATININE mg/dL 4 39*   < > 2 66*   ANION GAP mmol/L 8   < > 16*   CALCIUM mg/dL 9 1   < > 8 6   ALBUMIN g/dL  --   --  2 9*   TOTAL BILIRUBIN mg/dL  --   --  0 51   ALK PHOS U/L  --   --  67   ALT U/L  --   --  22   AST U/L  --   --  24   GLUCOSE RANDOM mg/dL 118   < > 118    < > = values in this interval not displayed  Results from last 7 days   Lab Units 04/21/22  0317   LACTIC ACID mmol/L 0 9       Imaging: Reviewed radiology reports from this admission including: abdominal/pelvic CT  No orders to display       EKG and Other Studies Reviewed on Admission:   · EKG: No EKG obtained  ** Please Note: This note has been constructed using a voice recognition system   **

## 2022-04-22 NOTE — ASSESSMENT & PLAN NOTE
Estimated Creatinine Clearance: 27 4 mL/min (A) (by C-G formula based on SCr of 4 39 mg/dL (H))    · POA SLMI  · C/w isolyte  · Renal consult  · Likely postrenal and also ATN from sepsis

## 2022-04-22 NOTE — ASSESSMENT & PLAN NOTE
· Urine culture preliminary - >100,000 colony count gram negative rods   · Patient is septic in setting of UTI  · Lactic acid normal  · White blood cells elevated at 10 75 on admission, now worsening to 19  · ED started patient on ceftriaxone 2000 mg Q 24 IV --> transitioned to IV vancomycin / cefepime in the setting of bacteremia

## 2022-04-22 NOTE — CONSULTS
Consults: UROLOGY  Vince Gusman 55 y o  male [id number]   Unit/Bed #: OR POOL  Encounter: [id number]        Assessment  & Plan  :    Nephrolithiasis:  -CT scan reveals a left hydro true ureteral nephrosis with 3 x 3 mm stone at the mid left ureter at the level of L4-L5 disc placed  -UA positive for infection, urine culture sent yesterday positive for Gram-negative rods  -patient awaiting transfer for stent placement to see due to septic stone  Febrile us night at 101  -leukocytosis of 19 today  -creatinine continues to increase, on admission 1 93 currently trending up to 4 today   -patient NPO  -plan for emergent cystoscopy retrograde pyelogram and left ureteral stent placement   -discussed risks and benefits of procedure including bleeding, infection, damage to nearby structures, need for additional stone procedures  Discussed with patient that he will require 2nd procedure due to infection  He would like to stay admitted to Medicine for medical optimization postoperatively  Subjective :    Vince Gusman  is a 55 y o  male with a past urologic history of nephrolithiasis  Presenting for intermittent left flank pain which began our early on 04/21  He presented to the emergency room with 12/10 pain  No nausea or vomiting but presented due to chills   Denies any fevers  He reports that his symptoms began on Thursday night which he recalls are similar to prior stones in the past which required emergent stent insertion  Has required surgical intervention in the past for removal of ureteral stones  CT of abdomen and pelvis on admission revealed a 3 x 3 left mid ureteral calculus with hydronephrosis  Patient was kept at [facility] for observation and medical expulsive therapy however pain continued to worsen and creatinine continued to trend upwards  Patient eventually became febrile last night  There for requiring transfer to [facility] for surgical intervention    He is currently lying comfortably in stretcher in no acute distress denying any nausea, vomiting, mild pain well controlled  With mild chills          No Known Allergies   Current Outpatient Medications   Medication Instructions    tamsulosin (FLOMAX) 0 4 mg TAKE 1 CAPSULE BY MOUTH EVERY 24 HOURS      Past Medical History:   Diagnosis Date    Arthritis     Gout     Hypertension     Kidney stone      Past Surgical History:   Procedure Laterality Date    FL RETROGRADE PYELOGRAM  8/4/2019    FL RETROGRADE PYELOGRAM  9/26/2019    NO PAST SURGERIES  03/23/2015    Last assessed    DE CYSTO/URETERO W/LITHOTRIPSY &INDWELL STENT INSRT Right 9/26/2019    Procedure: CYSTOSCOPY URETEROSCOPY WITH RETROGRADE PYELOGRAM AND INSERTION STENT URETERAL;  Surgeon: Peter Kam MD;  [facility]: [facility];  Service: Urology    DE CYSTOURETHROSCOPY,URETER CATHETER Right 8/4/2019    Procedure: CYSTOSCOPY RETROGRADE PYELOGRAM WITH INSERTION STENT URETERAL;  Surgeon: Harmony Travis MD;  [facility]:  MAIN OR;  Service: Urology     Family History   Problem Relation Age of Onset    Cancer Mother      Social History     Socioeconomic History    Marital status: Single     Spouse name: Not on file    Number of children: Not on file    Years of education: Not on file    Highest education level: Not on file   Occupational History    Not on file   Tobacco Use    Smoking status: Never Smoker    Smokeless tobacco: Never Used   Vaping Use    Vaping Use: Never used   Substance and Sexual Activity    Alcohol use: Never     Alcohol/week: 0 0 standard drinks     Comment: rarely    Drug use: No    Sexual activity: Not on file   Other Topics Concern    Not on file   Social History Narrative    Caffeine use      Social Determinants of Health     Financial Resource Strain: Not on file   Food Insecurity: No Food Insecurity    Worried About Running Out of Food in the Last Year: Never true    Isis of Food in the Last Year: Never true   Transportation Needs: No Transportation Needs    Lack of Transportation (Medical): No    Lack of Transportation (Non-Medical): No   Physical Activity: Not on file   Stress: Not on file   Social Connections: Not on file   Intimate Partner Violence: Not on file   Housing Stability: Low Risk     Unable to Pay for Housing in the Last Year: No    Number of Places Lived in the Last Year: 1    Unstable Housing in the Last Year: No        Review of Systems   Constitutional: Positive for chills and fever  HENT: Negative  Eyes: Negative  Respiratory: Negative  Cardiovascular: Negative  Gastrointestinal: Positive for abdominal pain  Negative for diarrhea, nausea and vomiting  Endocrine: Negative  Genitourinary: Positive for flank pain  Negative for difficulty urinating, frequency, hematuria and urgency  Skin: Negative  Allergic/Immunologic: Negative  Neurological: Negative  Hematological: Negative  Psychiatric/Behavioral: Negative  Objective     Physical Exam  Constitutional:       General: He is not in acute distress  Appearance: He is obese  He is not ill-appearing, toxic-appearing or diaphoretic  HENT:      Head: Normocephalic and atraumatic  Right Ear: External ear normal       Left Ear: External ear normal       Mouth/Throat:      Pharynx: Oropharynx is clear  Eyes:      General: No scleral icterus  Conjunctiva/sclera: Conjunctivae normal    Cardiovascular:      Rate and Rhythm: Normal rate and regular rhythm  Pulses: Normal pulses  Heart sounds: No murmur heard  No friction rub  No gallop  Pulmonary:      Effort: Pulmonary effort is normal  No respiratory distress  Breath sounds: No wheezing, rhonchi or rales  Abdominal:      General: Bowel sounds are normal  There is no distension  Tenderness: There is abdominal tenderness  There is no right CVA tenderness or left CVA tenderness        Comments: Mild tenderness on left Musculoskeletal:         General: Normal range of motion  Cervical back: Normal range of motion  Skin:     General: Skin is warm and dry  Neurological:      General: No focal deficit present  Mental Status: He is alert and oriented to person, place, and time  Psychiatric:         Mood and Affect: Mood normal          Behavior: Behavior normal          Thought Content: Thought content normal          Judgment: Judgment normal                 Imaging:  CT ABDOMEN AND PELVIS WITHOUT IV CONTRAST - LOW DOSE RENAL STONE      INDICATION:   Flank pain, kidney stone suspected  L flank pain +dysuria  hx ureterolithiasis      COMPARISON:  None      TECHNIQUE:  Low radiation dose thin section CT examination of the abdomen and pelvis was performed without intravenous or oral contrast according to a protocol specifically designed to evaluate for urinary tract calculus  Axial, sagittal, and coronal 2D   reformatted images were created from the source data and submitted for interpretation  Evaluation for pathology in the abdomen and pelvis that is unrelated to urinary tract calculi is limited        Radiation dose length product (DLP) for this visit:  1867 01 mGy-cm   This examination, like all CT scans performed in the [facility], was performed utilizing techniques to minimize radiation dose exposure, including the use of   iterative reconstruction and automated exposure control      URINARY TRACT FINDINGS:     RIGHT KIDNEY AND URETER: There are nonobstructing intrarenal calculi measuring on the order of 4 mm at the interpolar region  No hydronephrosis or hydroureter  One or more simple appearing renal cyst(s) is identified      LEFT KIDNEY AND URETER:  Left hydroureteronephrosis  3 x 3 mm stone at the mid left ureter at the level of the L4-5 disc space   Additional nonobstructing stones throughout the left kidney measuring up to 4 mm at the superior pole     URINARY BLADDER:  Dependent bladder calculus 5 mm  Diffuse bladder wall thickening         ADDITIONAL FINDINGS:     LOWER CHEST:  No clinically significant abnormality identified in the visualized lower chest      SOLID VISCERA: Visualized portion of the liver appears to be diffusely decreased in density suggestive of steatosis  Otherwise, limited low radiation dose CT demonstrates no clinically significant abnormality of visualized solid abdominal viscera       GALLBLADDER/BILIARY TREE:  No calcified gallstones  No pericholecystic inflammatory change  No biliary dilatation      STOMACH AND BOWEL:  Unremarkable      APPENDIX:  No findings to suggest appendicitis      ABDOMINOPELVIC CAVITY:  No ascites  No pneumoperitoneum  No lymphadenopathy      REPRODUCTIVE ORGANS:  Unremarkable for patient's age      ABDOMINAL WALL/INGUINAL REGIONS:  Unremarkable      OSSEOUS STRUCTURES:  No acute fracture or destructive osseous lesion      IMPRESSION:     Left hydroureteronephrosis secondary to a 3 mm stone at the mid left ureter at the L4-L5 disc space level      Nonobstructing bilateral nephrolithiasis   Layering bladder calculus      Bladder wall thickening secondary to underdistention or trabecular hypertrophy secondary to outlet obstruction        Labs:  Lab Results   Component Value Date    SODIUM 140 04/22/2022    K 4 4 04/22/2022     04/22/2022    CO2 19 (L) 04/22/2022    BUN 40 (H) 04/22/2022    CREATININE 4 22 (H) 04/22/2022    GLUC 120 04/22/2022    CALCIUM 8 4 04/22/2022         Lab Results   Component Value Date    WBC 19 72 (H) 04/22/2022    HGB 12 6 04/22/2022    HCT 40 2 04/22/2022    MCV 96 04/22/2022     04/22/2022         VTE Pharmacologic Prophylaxis: none   VTE Mechanical Prophylaxis: sequential compression device     Jerome Clemens PA-C

## 2022-04-22 NOTE — PLAN OF CARE
Problem: PAIN - ADULT  Goal: Verbalizes/displays adequate comfort level or baseline comfort level  Description: Interventions:  - Encourage patient to monitor pain and request assistance  - Assess pain using appropriate pain scale  - Administer analgesics based on type and severity of pain and evaluate response  - Implement non-pharmacological measures as appropriate and evaluate response  - Consider cultural and social influences on pain and pain management  - Notify physician/advanced practitioner if interventions unsuccessful or patient reports new pain  Outcome: Progressing     Problem: INFECTION - ADULT  Goal: Absence or prevention of progression during hospitalization  Description: INTERVENTIONS:  - Assess and monitor for signs and symptoms of infection  - Monitor lab/diagnostic results  - Monitor all insertion sites, i e  indwelling lines, tubes, and drains  - Monitor endotracheal if appropriate and nasal secretions for changes in amount and color  - [place] appropriate cooling/warming therapies per order  - Administer medications as ordered  - Instruct and encourage patient and family to use good hand hygiene technique  - Identify and instruct in appropriate isolation precautions for identified infection/condition  Outcome: Progressing  Goal: Absence of fever/infection during neutropenic period  Description: INTERVENTIONS:  - Monitor WBC    Outcome: Progressing     Problem: SAFETY ADULT  Goal: Patient will remain free of falls  Description: INTERVENTIONS:  - Educate patient/family on patient safety including physical limitations  - Instruct patient to call for assistance with activity   - Consult OT/PT to assist with strengthening/mobility   - Keep Call bell within reach  - Keep bed low and locked with side rails adjusted as appropriate  - Keep care items and personal belongings within reach  - Initiate and maintain comfort rounds  - Make Fall Risk Sign visible to staff  - Offer Toileting every 1-2 Hours, in advance of need  - Initiate/Maintain bed and chair alarm  - Obtain necessary fall risk management equipment: fall socks and call bell within reach  - Apply yellow socks and bracelet for high fall risk patients  - Consider moving patient to room near nurses station  Outcome: Progressing  Goal: Maintain or return to baseline ADL function  Description: INTERVENTIONS:  -  Assess patient's ability to carry out ADLs; assess patient's baseline for ADL function and identify physical deficits which impact ability to perform ADLs (bathing, care of mouth/teeth, toileting, grooming, dressing, etc )  - Assess/evaluate cause of self-care deficits   - Assess range of motion  - Assess patient's mobility; develop plan if impaired  - Assess patient's need for assistive devices and provide as appropriate  - Encourage maximum independence but intervene and supervise when necessary  - Involve family in performance of ADLs  - Assess for home care needs following discharge   - Consider OT consult to assist with ADL evaluation and planning for discharge  - Provide patient education as appropriate  Outcome: Progressing  Goal: Maintains/Returns to pre admission functional level  Description: INTERVENTIONS:  - Perform BMAT or MOVE assessment daily    - Set and communicate daily mobility goal to care team and patient/family/caregiver  - Collaborate with rehabilitation services on mobility goals if consulted  - Perform Range of Motion 3-4 times a day  - Reposition patient every 1-2 hours    - Dangle patient 3-4 times a day  - Stand patient 3-4 times a day  - Ambulate patient 3-4 times a day  - Out of bed to chair 3-4 times a day   - Out of bed for meals 3-4 times a day  - Out of bed for toileting  - Record patient progress and toleration of activity level   Outcome: Progressing     Problem: DISCHARGE PLANNING  Goal: Discharge to home or other facility with appropriate resources  Description: INTERVENTIONS:  - Identify barriers to discharge w/patient and caregiver  - Arrange for needed discharge resources and transportation as appropriate  - Identify discharge learning needs (meds, wound care, etc )  - Arrange for interpretive services to assist at discharge as needed  - Refer to Case Management Department for coordinating discharge planning if the patient needs post-hospital services based on physician/advanced practitioner order or complex needs related to functional status, cognitive ability, or social support system  Outcome: Progressing     Problem: Knowledge Deficit  Goal: Patient/family/caregiver demonstrates understanding of disease process, treatment plan, medications, and discharge instructions  Description: Complete learning assessment and assess knowledge base    Interventions:  - Provide teaching at level of understanding  - Provide teaching via preferred learning methods  Outcome: Progressing     Problem: METABOLIC, FLUID AND ELECTROLYTES - ADULT  Goal: Electrolytes maintained within normal limits  Description: INTERVENTIONS:  - Monitor labs and assess patient for signs and symptoms of electrolyte imbalances  - Administer electrolyte replacement as ordered  - Monitor response to electrolyte replacements, including repeat lab results as appropriate  - Instruct patient on fluid and nutrition as appropriate  Outcome: Progressing  Goal: Fluid balance maintained  Description: INTERVENTIONS:  - Monitor labs   - Monitor I/O and WT  - Instruct patient on fluid and nutrition as appropriate  - Assess for signs & symptoms of volume excess or deficit  Outcome: Progressing  Goal: Glucose maintained within target range  Description: INTERVENTIONS:  - Monitor Blood Glucose as ordered  - Assess for signs and symptoms of hyperglycemia and hypoglycemia  - Administer ordered medications to maintain glucose within target range  - Assess nutritional intake and initiate nutrition service referral as needed  Outcome: Progressing     Problem: GASTROINTESTINAL - ADULT  Goal: Minimal or absence of nausea and/or vomiting  Description: INTERVENTIONS:  - Administer IV fluids if ordered to ensure adequate hydration  - Maintain NPO status until nausea and vomiting are resolved  - Nasogastric tube if ordered  - Administer ordered antiemetic medications as needed  - Provide nonpharmacologic comfort measures as appropriate  - Advance diet as tolerated, if ordered  - Consider nutrition services referral to assist patient with adequate nutrition and appropriate food choices  Outcome: Progressing  Goal: Maintains or returns to baseline bowel function  Description: INTERVENTIONS:  - Assess bowel function  - Encourage oral fluids to ensure adequate hydration  - Administer IV fluids if ordered to ensure adequate hydration  - Administer ordered medications as needed  - Encourage mobilization and activity  - Consider nutritional services referral to assist patient with adequate nutrition and appropriate food choices  Outcome: Progressing  Goal: Maintains adequate nutritional intake  Description: INTERVENTIONS:  - Monitor percentage of each meal consumed  - Identify factors contributing to decreased intake, treat as appropriate  - Assist with meals as needed  - Monitor I&O, weight, and lab values if indicated  - Obtain nutrition services referral as needed  Outcome: Progressing  Goal: Establish and maintain optimal ostomy function  Description: INTERVENTIONS:  - Assess bowel function  - Encourage oral fluids to ensure adequate hydration  - Administer IV fluids if ordered to ensure adequate hydration   - Administer ordered medications as needed  - Encourage mobilization and activity  - Nutrition services referral to assist patient with appropriate food choices  Outcome: Progressing  Goal: Oral mucous membranes remain intact  Description: INTERVENTIONS  - Assess oral mucosa and hygiene practices  - Implement preventative oral hygiene regimen  - Implement oral medicated treatments as ordered  - Initiate Nutrition services referral as needed  Outcome: Progressing     Problem: Potential for Falls  Goal: Patient will remain free of falls  Description: INTERVENTIONS:  - Educate patient/family on patient safety including physical limitations  - Instruct patient to call for assistance with activity   - Consult OT/PT to assist with strengthening/mobility   - Keep Call bell within reach  - Keep bed low and locked with side rails adjusted as appropriate  - Keep care items and personal belongings within reach  - Initiate and maintain comfort rounds  - Make Fall Risk Sign visible to staff  - Offer Toileting every 1-2 Hours, in advance of need  - Initiate/Maintain bed and chair alarm  - Obtain necessary fall risk management equipment: fall socks and call bell within reach  - Apply yellow socks and bracelet for high fall risk patients  - Consider moving patient to room near nurses station  Outcome: Progressing

## 2022-04-22 NOTE — ANESTHESIA POSTPROCEDURE EVALUATION
Post-Op Assessment Note    CV Status:  Stable    Pain management: adequate     Mental Status:  Alert and awake   Hydration Status:  Euvolemic   PONV Controlled:  Controlled   Airway Patency:  Patent      Post Op Vitals Reviewed: Yes      Staff: CRNA         No complications documented      BP      Temp   101 2   Pulse  109   Resp  119/66   SpO2   95%

## 2022-04-22 NOTE — PROGRESS NOTES
NEPHROLOGY PROGRESS NOTE   Marjan Dietrich 55 y o  male MRN: [id number]  Unit/Bed#: [facility] 812-01 Encounter: [id number]  Reason for Consult:  Acute kidney injury    ASSESSMENT/PLAN:  1  Acute kidney injury, secondary to obstructive uropathy, possible component from intravascular volume depletion as well as NSAID use  2  Chronic kidney disease with previous baseline creatinine 1 4-1 5  3  Sepsis, secondary to strep and E coli bacteremia, antibiotics as per primary service  4  Left-sided hydro ureteral nephrosis secondary to an obstructing stone status post stent placement  5  Metabolic acidosis, predominantly non gap current anion gap 8  6  History of recurrent nephrolithiasis  7  Morbid obesity with elevated BMI greater than 50  8  Gout/hyperuricemia, agree with allopurinol 100 mg daily    PLAN:  · Continue with pH balanced IV fluids, currently on Isolyte  · Antibiotics as per primary service  · Continue to avoid NSAIDs  · Urine output appears to be improving    SUBJECTIVE:  Seen and examined  No further back or flank discomfort  Status post ureteral stent placement  Denies any chest pain shortness of breath  Denies abdominal pain  Review of Systems    OBJECTIVE:  Current Weight: Weight - Scale: (!) 160 kg (352 lb)  Vitals:    04/22/22 1430 04/22/22 1501 04/22/22 1533 04/22/22 1533   BP: 156/82  (!) 168/102 (!) 168/102   Pulse: (!) 106  99 100   Resp: 16  16    Temp: 98 1 °F (36 7 °C)  98 1 °F (36 7 °C) 98 1 °F (36 7 °C)   SpO2: 96%  96% 96%   Weight:  (!) 160 kg (352 lb)     Height:  5' 6" (1 676 m)         Intake/Output Summary (Last 24 hours) at 4/22/2022 1607  Last data filed at 4/22/2022 1357  Gross per 24 hour   Intake 350 ml   Output --   Net 350 ml       Physical Exam  Constitutional:       Appearance: He is obese  He is not ill-appearing  Eyes:      General: No scleral icterus  Cardiovascular:      Rate and Rhythm: Normal rate and regular rhythm     Pulmonary:      Effort: Pulmonary effort is normal  Breath sounds: Normal breath sounds  Abdominal:      General: There is distension  Palpations: Abdomen is soft  Tenderness: There is no abdominal tenderness  Musculoskeletal:      Right lower leg: No edema  Left lower leg: No edema  Skin:     General: Skin is warm and dry  Findings: No rash  Neurological:      Mental Status: He is alert and oriented to person, place, and time           Medications:    Current Facility-Administered Medications:     acetaminophen (TYLENOL) tablet 650 mg, 650 mg, Oral, Q6H PRN, Lorrie Brooke DO    [START ON 4/23/2022] cefTRIAXone (ROCEPHIN) 2,000 mg in dextrose 5 % 50 mL IVPB, 2,000 mg, Intravenous, Q24H, Lorrie Brooke DO    heparin (porcine) subcutaneous injection 5,000 Units, 5,000 Units, Subcutaneous, Q8H [address], Lorrie Brooke DO, 5,000 Units at 04/22/22 1532    HYDROmorphone (DILAUDID) injection 1 mg, 1 mg, Intravenous, Q4H PRN, Lorrie Brooke DO    multi-electrolyte (PLASMALYTE-A/ISOLYTE-S PH 7 4) IV solution, 75 mL/hr, Intravenous, Continuous, Lorrie Brooke DO, Last Rate: 75 mL/hr at 04/22/22 1528, 75 mL/hr at 04/22/22 1528    ondansetron (ZOFRAN) injection 4 mg, 4 mg, Intravenous, Q6H PRN, Lorrie Brooke DO    oxyCODONE (ROXICODONE) IR tablet 2 5 mg, 2 5 mg, Oral, Q4H PRN **OR** oxyCODONE (ROXICODONE) IR tablet 5 mg, 5 mg, Oral, Q4H PRN, Lorrie Brooke DO, 5 mg at 04/22/22 1532    tamsulosin (FLOMAX) capsule 0 4 mg, 0 4 mg, Oral, After Dave Dunbar DO    Laboratory Results:  Results from last 7 days   Lab Units 04/22/22  1433 04/22/22  0536 04/21/22  1357 04/21/22  0746 04/21/22  0215   WBC Thousand/uL 18 09* 19 72*  --  13 49* 10 75*   HEMOGLOBIN g/dL 13 3 12 6  --  13 9 14 9   HEMATOCRIT % 43 4 40 2  --  42 5 45 9   PLATELETS Thousands/uL 178 188  --  219 238   POTASSIUM mmol/L 4 8 4 4 4 7 4 0 3 9   CHLORIDE mmol/L 110* 105 106 109* 106   CO2 mmol/L 18* 19* 20* 18* 17*   BUN mg/dL 43* 40* 30* 28* 28*   CREATININE mg/dL 4 39* 4 22* 3 32* 2 66* 1 93*   CALCIUM mg/dL 9 1 8 4 8 6 8 6 9 2

## 2022-04-22 NOTE — UTILIZATION REVIEW
Initial Clinical Review    Admission: Date/Time/Statement:   Admission Orders (From admission, onward)     Ordered        22 0931  Inpatient Admission  Once            22 0426  Place in Observation  Once                      Orders Placed This Encounter   Procedures    Place in Observation     Standing Status:   Standing     Number of Occurrences:   1     Order Specific Question:   Level of Care     Answer:   Med Surg [16]    Inpatient Admission     Standing Status:   Standing     Number of Occurrences:   1     Order Specific Question:   Level of Care     Answer:   Med Surg [16]     Order Specific Question:   Estimated length of stay     Answer:   More than 2 Midnights     Order Specific Question:   Certification     Answer:   I certify that inpatient services are medically necessary for this patient for a duration of greater than two midnights  See H&P and MD Progress Notes for additional information about the patient's course of treatment  ED Arrival Information     Expected Arrival Acuity    - 2022 01:39 Emergent         Means of arrival Escorted by Service Admission type    [facility] Emergency         Arrival complaint    Flank Pain        Chief Complaint   Patient presents with    Flank Pain     Patient states left sided flank pain  Patient denies nausea/vomiting/diarrhea  Pt states uncomfortable feeling while urinating, which has resolved for about a week, but has returned again today  Initial Presentation: 55 y o  male presents to ed from home for evaluation and treatment of left sided flank pain  Pain on urination  Chills  Clinical assessment significant for L CVA tenderness, temp 99 6 tachycardia, hypertension, pain 7/10  Vb 337 / 35 4 65 7, 18 5  BUN 28, CR 1 93, UA: mod leukocytes , + nitrites, WBC 10 75  Imaging shows left hydroureteronephrosis r/t 3 mm ureteral stone  Blood cultures drawn   Initially treated with iv zofran x1, iv  9% ns bolus , iv dilaudid x1, iv ceftriaxone  Admit to inpatient med surg for sepsis, urinary tract infection, ureteral stone, acute kidney injury ([id number] [id number]), metabolic acidosis with increased anion gap  Plan: iv sodium bicarb and repeat VBG, iv isolyte 100/hr, continue flomax, consult urology and nephrology, iv ceftriaxone, follow up blood and urine cultures  Consult urology  non operative management to facilitate stone passage with IV fluids, analgesics, and initiate tamsulosin  Nephrology consultation  Treat cystitis with IV antibiotics, Rocephin 2 g daily  Monitor creatinine, repeat CBC and BMP in a m  Urinary retention protocol, monitor urine output  If the patient's kidney injury worsens tomorrow or he has increased flank pain, development hematuria or fever then the patient would require tertiary transfer to urology for cystoscopy and left ureteral stent placement  Consult nephrology  1  Acute kidney injury (POA) secondary to obstructive uropathy, superimposed on chronic kidney disease  ? With presenting creatinine 1 93 mg/dL worsened to 2 66 mg/dL  ? This compared with baseline creatinine around 1 4 mg/dL  ? Recommend urological intervention of obstructing stone  ? May continue plasma 100 mL/hr  · Trend renal function  Continue to provide supportive care  · Optimize hemodynamics  Maintain mean arterial pressure greater than 65 mmHg  · Renally dose medications  · Avoid nephrotoxins  Please discuss with renal any diuretics or possible nephrotoxic agents, such as NSAIDs or IV contrast  Recommend avoidance of PPIs in favor of H2 blocker, if appropriate for clinical scenario  · Monitor for urinary retention- strict I&Os, record bladder scan PVRs and follow urinary retention protocol  Date: 4-22-22   Day 2: inpatient   Patient with worsening acute kidney injury 2 66 /3 32 /4 22  WBC 13 49/ 19  Temp 101, ht rate 119, softening blood pressure 106/88, O2 sat 89%    Preliminary blood culture #1 and urine culture positive gram neg rods and gram positive cocci  Blood culture #2 shows streptococcus and e coli -possibly contaminant  Continue iv fluid hydration  Monitor on telemetry for worsening tachycardia  Continue iv antibiotics  Due to worsening kidney function transfer to [facility] for higher level of care      ED Triage Vitals   04/21/22 0144 04/21/22 0144 04/21/22 0144 04/21/22 0144 04/21/22 0144   98 1 °F (36 7 °C) (!) 110 20 (!) 209/126 98 %      Tympanic Monitor         Pain Score       7          04/22/22 (!) 160 kg (352 lb 8 3 oz)     Additional Vital Signs:         Date/Time Temp Pulse Resp BP MAP (mmHg) SpO2 O2 Device   04/22/22 06:06:28 98 4 °F (36 9 °C) 107 Abnormal  -- 106/68 81 92 % --   04/22/22 04:56:26 97 7 °F (36 5 °C) 102 -- 106/68 81 97 % --   04/22/22 02:33:52 98 1 °F (36 7 °C) 105 20 107/68 81 96 % --   04/22/22 00:33:45 101 °F (38 3 °C)   Abnormal  119 Abnormal  20 109/69 82 97 % --   04/21/22 23:03:22 98 9 °F (37 2 °C) 115 Abnormal  20 109/69 82 89 % Abnormal  --   04/21/22 20:47:22 100 °F (37 8 °C) 120 Abnormal  22 109/71 84 94 % --   04/21/22 18:38:57 99 4 °F (37 4 °C) 128 Abnormal  -- 108/71 83 92 % --   04/21/22 15:18:05 99 2 °F (37 3 °C) 131 Abnormal  18 110/73 85 99 % None (Room air)   04/21/22 0801 -- -- -- -- -- 92 % None (Room air)   04/21/22 06:50:30 99 6 °F (37 6 °C) 129 Abnormal  18 102/59 73 94 % --   04/21/22 04:50:18 97 9 °F (36 6 °C) 105 -- 150/93 112 98 % None (Room air)   04/21/22 0430 -- 107 Abnormal  18 138/90 110 92 % None (Room air)   04/21/22 0400 -- 107 Abnormal  18 129/101   Abnormal  111 92 % None (Room air)   04/21/22 0319 98 °F (36 7 °C) 100 18 143/85 -- 98 % None (Room air)   04/21/22 0239 -- -- -- -- -- -- None (Room air)   04/21/22 0144 98 1 °F (36 7 °C) 110 Abnormal  20 209/126   Abnormal  -- 98 % None (Room air)                   Pertinent Labs/Diagnostic Test Results:     US kidney and bladder   Final  (04/21 1606)      Mild left hydronephrosis  CT renal stone study abdomen pelvis wo contrast   Final  (04/21 0327)      Left hydroureteronephrosis secondary to a 3 mm stone at the mid left ureter at the L4-L5 disc space level  Nonobstructing bilateral nephrolithiasis  Layering bladder calculus  Bladder wall thickening secondary to underdistention or trabecular hypertrophy secondary to outlet obstruction          Results from last 7 days   Lab Units 04/21/22  0805   SARS-COV-2  Negative     Results from last 7 days   Lab Units 04/22/22  0536 04/21/22  0746 04/21/22  0215   WBC Thousand/uL 19 72* 13 49* 10 75*   HEMOGLOBIN g/dL 12 6 13 9 14 9   HEMATOCRIT % 40 2 42 5 45 9   PLATELETS Thousands/uL 188 219 238   NEUTROS ABS Thousands/µL 17 61*  --  9 18*   BANDS PCT %  --  39*  --          Results from last 7 days   Lab Units 04/22/22  0536 04/21/22  1357 04/21/22  0746 04/21/22  0215   SODIUM mmol/L 140 139 143 139   POTASSIUM mmol/L 4 4 4 7 4 0 3 9   CHLORIDE mmol/L 105 106 109* 106   CO2 mmol/L 19* 20* 18* 17*   ANION GAP mmol/L 16* 13 16* 16*   BUN mg/dL 40* 30* 28* 28*   CREATININE mg/dL 4 22* 3 32* 2 66* 1 93*   EGFR ml/min/1 73sq m 14 19 25 38   CALCIUM mg/dL 8 4 8 6 8 6 9 2     Results from last 7 days   Lab Units 04/21/22  0746 04/21/22  0215   AST U/L 24 21   ALT U/L 22 25   ALK PHOS U/L 67 74   TOTAL PROTEIN g/dL 6 7 7 3   ALBUMIN g/dL 2 9* 3 3*   TOTAL BILIRUBIN mg/dL 0 51 0 41         Results from last 7 days   Lab Units 04/22/22  0536 04/21/22  1357 04/21/22  0746 04/21/22  0215   GLUCOSE RANDOM mg/dL 120 145* 118 116       Results from last 7 days   Lab Units 04/21/22  1051   PH ART  7 340*   PCO2 ART mm Hg 28 9*   PO2 ART mm Hg 82 8   HCO3 ART mmol/L 15 2*   BASE EXC ART mmol/L -9 0   O2 CONTENT ART mL/dL 19 1   O2 HGB, ARTERIAL % 94 8   ABG SOURCE  Radial, Left     Results from last 7 days   Lab Units 04/21/22  0758 04/21/22  0408   PH DOLORES  7 487* 7 337   PCO2 DOLORES mm Hg 14 7* 35 4*   PO2 DOLORES mm Hg 216 3* 65 7* HCO3 DOLORES mmol/L 10 9* 18 5*   BASE EXC DOLORES mmol/L -8 8 -6 4   O2 CONTENT DOLORES ml/dL 22 2 18 8   O2 HGB, VENOUS % 97 8* 90 4*         Results from last 7 days   Lab Units 04/21/22  0317   LACTIC ACID mmol/L 0 9         Results from last 7 days   Lab Units 04/21/22  0215   LIPASE u/L 112       Results from last 7 days   Lab Units 04/21/22  0216   CLARITY UA  Cloudy   COLOR UA  Yellow   SPEC GRAV UA  1 025   PH UA  6 0   GLUCOSE UA mg/dl Negative   KETONES UA mg/dl Negative   BLOOD UA  Small*   PROTEIN UA mg/dl 30 (1+)*   NITRITE UA  Positive*   BILIRUBIN UA  Negative   UROBILINOGEN UA E U /dl 0 2   LEUKOCYTES UA  Moderate*   WBC UA /hpf Innumerable*   RBC UA /hpf Field obscured, unable to enumerate*   BACTERIA UA /hpf Innumerable*   EPITHELIAL CELLS WET PREP /hpf Field obscured, unable to enumerate*     Results from last 7 days   Lab Units 04/21/22  0805   INFLUENZA A PCR  Negative   INFLUENZA B PCR  Negative   RSV PCR  Negative         04/21/2022 0317 04/21/2022 2243 Blood culture [[id number]]   (Abnormal)   Blood from Arm, Left    Preliminary result Component Value   Gram Stain Result Gram negative rods Abnormal  P        Gram positive cocci in chains Abnormal  P              04/21/2022 0317 04/22/2022 0500 Blood Culture Identification Panel [[id number]]    (Abnormal)   Blood from Arm, Left    Preliminary result Component Value   Escherichia coli Detected Abnormal  P    Ceftriaxone recommended; await susceptibilities for further de-escalation     Film Array panel tests for 11 gram positive organisms, 15 gram negative organisms, 7 yeast species and 10 resistance genes        Streptococcus Detected Abnormal  P    Pathogen is not Enterococcus, Streptococcus agalactiae, or Streptococcus pyogenes     Possible contaminant; consider ceftriaxone if critically ill OR growing in 2/2 OR suspect true bacteremia     Film Array panel tests for 11 gram positive organisms, 15 gram negative organisms, 7 yeast species and 10 resistance genes              04/21/2022 0216 04/22/2022 0756 Urine culture [[id number]]   (Abnormal)   Urine, Clean Catch    Preliminary result Component Value   Urine Culture >100,000 cfu/ml Gram Negative           ED Treatment:   Medication Administration from 04/21/2022 0138 to 04/21/2022 0445       Date/Time Order Dose Route Action     04/21/2022 0213 ondansetron (ZOFRAN) injection 4 mg 4 mg Intravenous Given     04/21/2022 3574 sodium chloride 0 9 % bolus 1,000 mL 1,000 mL Intravenous New Bag     04/21/2022 0214 HYDROmorphone (DILAUDID) injection 0 5 mg 0 5 mg Intravenous Given     04/21/2022 0319 cefTRIAXone (ROCEPHIN) IVPB (premix in dextrose) 2,000 mg 50 mL 2,000 mg Intravenous New Bag        Past Medical History:   Diagnosis    Arthritis    Gout    Hypertension    Kidney stone     Present on Admission:   Sepsis ([facility] )   Ureteral stone   ADRIANNE (acute kidney injury) ([facility] )   Metabolic acidosis, increased anion gap      Admitting Diagnosis:     Ureterolithiasis [N20 1]  UTI (urinary tract infection) [T63 4]  Metabolic acidosis [T55 5]  Flank pain [R10 9]    Age/Sex: 55 y o  male    Scheduled Medications:    acetaminophen, 650 mg, Oral, Once  cefepime, 2,000 mg, Intravenous, Q24H  heparin (porcine), 5,000 Units, Subcutaneous, Q8H LATRICE  tamsulosin, 0 4 mg, Oral, Daily With Dinner      Continuous IV Infusions:  multi-electrolyte, 75 mL/hr, Intravenous, Continuous      PRN Meds:  acetaminophen, 650 mg, Oral, Q6H PRN  HYDROmorphone, 1 mg, Intravenous, Q4H PRN  ondansetron, 4 mg, Intravenous, Q6H PRN        IP CONSULT TO UROLOGY  IP CONSULT TO NEPHROLOGY    Network Utilization Review Department  ATTENTION: Please call with any questions or concerns to [phone] and carefully listen to the prompts so that you are directed to the right person   All voicemails are confidential   Purvi Perla all requests for admission clinical reviews, approved or denied determinations and any other requests to dedicated fax number below belonging to the campus where the patient is receiving treatment   List of dedicated fax numbers for the Facilities:  [address] DENIALS (Administrative/Medical Necessity) [phone]   [address]  (Maternity/NICU/Pediatrics) [phone]   [address]  [address] [facility] [place] [address] [id number] [address] [facility] [phone]

## 2022-04-22 NOTE — CONSULTS
Consultation - Infectious Disease   Miriam Moreau 55 y o  male MRN: [id number]  Unit/Bed#: FDFX 333-09 Encounter: [id number]      IMPRESSION & RECOMMENDATIONS:   Impression/Recommendations:  1  Sepsis  Evolving: Fever, WBC  Due to # 2/3  No other appreciable source  ROS and exam otherwise benign  Hemodynamically stable and nontoxic  Rec:  · Continue antibiotics as below  · Follow up blood and urine cultures as below  · Follow temperatures closely  · Recheck CBC in a m  · Supportive care as per the primary service    2   E coli/streptococcal bacteremia  Likely due to # 3  No other appreciable source  Repeat blood cultures pending  No history of MDRO  Rec:  · Continue ceftriaxone for now  · Follow up final blood cultures and tailor antibiotics as indicated  · Follow up repeat blood cultures    3  Obstructive pyelonephritis  Due to # 4  Urine culture with Gram-negative selina  Rec:  · Continue antibiotics as above  · Follow-up urine culture and tailor antibiotics as indicated    4  Obstructive nephrolithiasis  With left hydroureteronephrosis  Status post cystoscopy, retrograde pyelogram, and left ureteral stent placement 4/22/22  Intraoperative findings were notable for purulence in the bladder and left ureter  Rec:  · Follow urine output closely  · Definitive outpatient stone management once infection improved per urology with close follow-up ongoing    5  ADRIANNE on CKD  Baseline Cr 1 4-1 5  Rec:  · No dose adjustment of ceftriaxone indicated  · Recheck BMP in AM  · Close nephrology follow-up ongoing    6  MO  The above impression and plan was discussed in detail with the patient  Antibiotics:  Ceftriaxone # 2    Thank you for this consultation  We will follow along with you  HISTORY OF PRESENT ILLNESS:  Reason for Consult:  Sepsis    HPI: Miriam Moreau is a 55 y o  male who initially presented to [facility] on 04/21 due to left-sided flank pain    Upon presentation he was initially afebrile with a normal WBC  A CT showed left ureteral stone with hydroureteronephrosis  He had mild AKA on admission which subsequently worsened  He was transferred to [facility] and was taken to the OR today for cystoscopy, retrograde pyelogram, and left ureteral stent placement  Intraoperative findings were notable for purulence in the bladder and left ureter  Blood cultures have come back growing E coli and strep  Urine cultures are growing a Gram-negative selina  We are asked to comment on further evaluation and management  In performing this consult, I have reviewed prior admission and outpatient visit records in detail  REVIEW OF SYSTEMS:  Patient feels tired  Feels a pinching around his Berumen catheter  Denies urine leakage  Denies flank pain  A complete system-based review of systems is otherwise negative      PAST MEDICAL HISTORY:  Past Medical History:   Diagnosis Date    Arthritis     Gout     Hypertension     Kidney stone      Past Surgical History:   Procedure Laterality Date    FL RETROGRADE PYELOGRAM  8/4/2019    FL RETROGRADE PYELOGRAM  9/26/2019    NO PAST SURGERIES  03/23/2015    Last assessed    OH CYSTO/URETERO W/LITHOTRIPSY &INDWELL STENT INSRT Right 9/26/2019    Procedure: CYSTOSCOPY URETEROSCOPY WITH RETROGRADE PYELOGRAM AND INSERTION STENT URETERAL;  Surgeon: Elana Birmingham MD;  [facility]: [facility];  Service: Urology    OH CYSTOURETHROSCOPY,URETER CATHETER Right 8/4/2019    Procedure: CYSTOSCOPY RETROGRADE PYELOGRAM WITH INSERTION STENT URETERAL;  Surgeon: Rio Camp MD;  [facility]:  MAIN OR;  Service: Urology       FAMILY HISTORY:  Non-contributory    SOCIAL HISTORY:  Social History     Substance and Sexual Activity   Alcohol Use Not Currently    Alcohol/week: 0 0 standard drinks    Comment: rarely     Social History     Substance and Sexual Activity   Drug Use No     Social History     Tobacco Use   Smoking Status Never Smoker   Smokeless Tobacco Never Used ALLERGIES:  No Known Allergies    MEDICATIONS:  All current active medications have been reviewed  PHYSICAL EXAM:  Vitals:  Temp:  [97 7 °F (36 5 °C)-101 2 °F (38 4 °C)] 98 1 °F (36 7 °C)  HR:  [] 100  Resp:  [16-22] 16  BP: (106-168)/() 168/102  SpO2:  [89 %-98 %] 96 %  Temp (24hrs), Av °F (37 2 °C), Min:97 7 °F (36 5 °C), Max:101 2 °F (38 4 °C)  Current: Temperature: 98 1 °F (36 7 °C)     Physical Exam:  General:  Well-nourished, well-developed, in no acute distress  Eyes:  Conjunctive clear with no hemorrhages or effusions  Oropharynx:  No ulcers, no lesions  Neck:  Supple, no lymphadenopathy  Lungs:  Normal respiratory excursion, no accessory muscle use  Cardiac:  Regular rate and rhythm, extremities well perfused  Abdomen:  Soft, non-tender, non-distended  Extremities:  No peripheral cyanosis, clubbing, or edema  Skin:  No rashes, no ulcers  Neurological:  Moves all four extremities spontaneously, sensation grossly intact    LABS, IMAGING, & OTHER STUDIES:  Lab Results:  I have personally reviewed pertinent labs  Results from last 7 days   Lab Units 22  1433 22  0536 22  1357 22  0746 22  0746 22  0215 22  0215   POTASSIUM mmol/L 4 8 4 4 4 7   < > 4 0   < > 3 9   CHLORIDE mmol/L 110* 105 106   < > 109*   < > 106   CO2 mmol/L 18* 19* 20*   < > 18*   < > 17*   BUN mg/dL 43* 40* 30*   < > 28*   < > 28*   CREATININE mg/dL 4 39* 4 22* 3 32*   < > 2 66*   < > 1 93*   EGFR ml/min/1 73sq m 14 14 19   < > 25   < > 38   CALCIUM mg/dL 9 1 8 4 8 6   < > 8 6   < > 9 2   AST U/L  --   --   --   --  24  --  21   ALT U/L  --   --   --   --  22  --  25   ALK PHOS U/L  --   --   --   --  67  --  74    < > = values in this interval not displayed       Results from last 7 days   Lab Units 22  1433 22  0536 22  0746   WBC Thousand/uL 18 09* 19 72* 13 49*   HEMOGLOBIN g/dL 13 3 12 6 13 9   PLATELETS Thousands/uL 178 188 219     Results from last 7 days   Lab Units 04/21/22  0317 04/21/22  0216   BLOOD CULTURE  Escherichia coli*  --    GRAM STAIN RESULT  Gram negative rods*  Gram positive cocci in chains*  --    URINE CULTURE   --  >100,000 cfu/ml Gram Negative Anselmo Enteric Like*       Imaging Studies:   I have personally reviewed pertinent imaging study reports and images in PACS  CT A/P reviewed personally obstructive left ureteral stone with hydronephrosis    EKG, Pathology, and Other Studies:   I have personally reviewed pertinent reports

## 2022-04-22 NOTE — ASSESSMENT & PLAN NOTE
· POA [facility]  · 2/2 Pyleo w/ Step and E Coli bacteremia  · Rocephin  · ID Consult  · Recheck B Cx here

## 2022-04-22 NOTE — ANESTHESIA PREPROCEDURE EVALUATION
Procedure:  CYSTOSCOPY RETROGRADE PYELOGRAM WITH INSERTION STENT URETERAL (Left Bladder)    Relevant Problems   CARDIO   (+) Hypertension   (+) Mixed hyperlipidemia      /RENAL   (+) ADRIANNE (acute kidney injury) ([address] )   (+) Chronic kidney disease, stage 3 (HCC)      MUSCULOSKELETAL   (+) Gout      Extreme morbid obesity BMI 57  Physical Exam    Airway    Mallampati score: II  TM Distance: >3 FB  Neck ROM: full     Dental   No notable dental hx     Cardiovascular  Cardiovascular exam normal    Pulmonary  Pulmonary exam normal     Other Findings  beard      Anesthesia Plan  ASA Score- 4     Anesthesia Type- general with ASA Monitors  Additional Monitors:   Airway Plan: LMA  Plan Factors-    Chart reviewed  EKG reviewed  Imaging results reviewed  Existing labs reviewed  Patient summary reviewed  Patient is not a current smoker  Induction- intravenous  Postoperative Plan-     Informed Consent- Anesthetic plan and risks discussed with patient  I personally reviewed this patient with the CRNA  Discussed and agreed on the Anesthesia Plan with the CRNA  Mell Reyes

## 2022-04-22 NOTE — CASE MANAGEMENT
Case Management Discharge Planning Note    Patient name Shade Alvarez  [facility] [address] [id number] MRN [id number]  : 1965 Date 2022       Current Admission Date: 2022  Current Admission Diagnosis:Sepsis [facility])   Patient Active Problem List    Diagnosis Date Noted    Obstructive uropathy 2022    Mixed hyperlipidemia 2021    Morbid obesity with BMI of 50 0-59 9, adult ([facility] ) 2021    Acute pain of left knee 2021    Insomnia 2019    Ureteral stone 2019    Hypertension 2019    Sepsis ([place] [address] ) 2019    UTI (urinary tract infection) 2019    Chronic kidney disease, stage 3 ([place] [address] ) 2019    ADRIANNE (acute kidney injury) ([place] [address] ) 2019    Bacteremia     Metabolic acidosis, increased anion gap 2019    Gout 07/15/2019    Vitamin D deficiency 06/10/2015    Morbid obesity ([place] [address] ) 2015      LOS (days): 1  Geometric Mean LOS (GMLOS) (days): 3 50  Days to GMLOS:2 5     OBJECTIVE:  Risk of Unplanned Readmission Score: 8         Current admission status: Inpatient   Preferred Pharmacy:   [id number]- [address] [address]  Phone: [phone] Fax: [phone] [facility] [place] [address] [address]  Phone: [phone] Fax: [phone]    Primary Care Provider: RITA Cabral    Primary Insurance: BLUE CROSS  Secondary Insurance:     DISCHARGE DETAILS:  Pt is being transferred to [facility] to a higher a level of care   As per physician patient with L ureteral stone with hydronephrosis  Pt with worsening Creat   I called [place] an to start transportation auth but no one is in office

## 2022-04-22 NOTE — ASSESSMENT & PLAN NOTE
· S/p ureteral stent today  · Will need second stage procedure after d/c  · Berumen in place - defer to urology on when this can be removed

## 2022-04-22 NOTE — RESULT ENCOUNTER NOTE
Patient currently admitted the E coli sepsis has been acknowledged by the admitting team   receiving IV antibiotics

## 2022-04-22 NOTE — TELEPHONE ENCOUNTER
Currently undergoing stent placement, please update the stent database, please book for ureteroscopy with laser lithotripsy with me or Dr Ora Jeffrey in 4 weeks or so

## 2022-04-22 NOTE — OP NOTE
OPERATIVE REPORT  PATIENT NAME: Stacie Harvey    :  1965  MRN: [id number]  Pt [facility]: [facility] 01    SURGERY DATE: 2022    Surgeon(s) and Role:     * Iliana Verduzco MD - Primary    Preop Diagnosis:  UTI (urinary tract infection) [N39 0]  Ureterolithiasis [N20 1]  ADRIANNE (acute kidney injury) ([address] ) [N17 9]    Post-Op Diagnosis Codes:     * UTI (urinary tract infection) [N39 0]     * Ureterolithiasis [N20 1]     * ADRIANNE (acute kidney injury) ([address] ) [N17 9]    Procedure(s) (LRB):  CYSTOSCOPY RETROGRADE PYELOGRAM WITH INSERTION STENT URETERAL (Left)    Specimen(s):  * No specimens in log *    Estimated Blood Loss:   Minimal    Drains:  Urethral Catheter Latex 20 Fr  (Active)   Number of days: 0       Anesthesia Type:   General    Operative Indications:  UTI (urinary tract infection) [N39 0]  Ureterolithiasis [N20 1]  ADRIANNE (acute kidney injury) ([facility] [address] ) [N17 9]      Operative Findings:  Pus is noted within the bladder, pus is also seen to exit from the collecting system when a wires placed  Successful placement of a 6 [place] by 24 centimeter left ureteral stent  A Berumen catheter was placed as well given significant pyuria for a siphon effect to drain the infected left kidney  The patient will require a staged ureteroscopic stone intervention, planned, at a later date    Complications:   None    Procedure and Technique:      PROCEDURES PERFORMED:  1) Cystoscopy  2) left retrograde pyelography with fluoroscopic interpretation  3) left ureteral stent placement (6 [place] Hal[place] by 24 centimeter)    SURGEON:  Iliana Vedruzco MD    ASSISTANTS:  None    NOTE:  There were no qualified teaching residents to assist with this case    ANESTHESIA: General     COMPLICATIONS:   None    ANTIBIOTICS:  Ancef    INTRAOPERATIVE THROMBOEMBOLISM PROPHYLAXIS:  Pneumatic compression stockings     RADIOLOGIC FINDINGS:    1  Retrograde pyelogram was performed on the left side using a 5 Fr open ended catheter    Approximately 10 milliliters of contrast was utilized today (distal attempts at retrograde pyelography showed passage of contrast distally into the bladder, limited contrast was placed into the collecting system to confirm catheter placement)        2  The following findings were noted:  Hydronephrosis noted          INDICATIONS FOR PROCEDURE:  Shade Alvarez is an 55 y o  old male with a left ureteral stone and complicated UTI with left hydronephrosis  After discussing the options, the patient elected to undergo ureteral stent placement  We discussed the procedure in detail, the alternatives, and the risks, and they signed informed consent to proceed  PROCEDURE IN DETAIL:   The patient was identified and brought to the OR  Antibiotic prophylaxis and DVT prophylaxis were administered  They were placed in the comfortable dorsal lithotomy position with care to pad all pressure points  They were prepped and draped in the usual sterile fashion using hibiclens  A surgical time out was performed with all in the room in agreement with the correct patient, procedure, indications, and laterality  A 21-Portuguese rigid cystoscope was used to enter the bladder  The bladder was inspected in its entirety and there were no lesions noted  The ureteral orifices were identified in their orthotopic positions  Pus is noted within the bladder    The left ureteral orifice was identified and a 5 Fr open ended catheter was placed into the ureteral orifice  The stone was not visible on  fluoroscopic guidance  A retrograde pyelogram was performed with injection of 50/50 Isovue which demonstrated moderate hydroureteronephrosis  A wire was up to the kidney under fluoroscopic guidance  A 6 Portuguese by 24 centimeter left JJ stent was then passed up the wire  under fluoroscopic guidance into the left  kidney with a good curl noted in the kidney and in the bladder  The bladder was drained          The patient was placed back in the supine position, awakened from general anesthesia and brought to the recovery room in stable condition  SPECIMENS:   * No orders in the log *     IMPLANTS:   Implant Name Type Inv  Item Serial No   Lot No  LRB No  Used Action   STENT URETERAL 6FR 24CML INLAY OPTIMA - [id number]  STENT URETERAL 6FR 24CML INLAY OPTIMA  [place] [employer]  Left 1 Implanted        COMPLICATIONS:  None    DISPOSITION:  PACU    PLAN:  The patient is admitted to the [facility] Internal Medicine Service for management of complicated UTI  He will require overnight Berumen catheter drainage for drainage of the left renal unit    We will arrange for a subsequent ureteroscopic stone intervention in some weeks       I was present for the entire procedure and A qualified resident physician was not available    Patient Disposition:  PACU       SIGNATURE: Theresa Garcia MD  DATE: April 22, 2022  TIME: 1:51 PM

## 2022-04-23 PROBLEM — N11.1 OBSTRUCTIVE PYELONEPHRITIS: Status: ACTIVE | Noted: 2022-04-23

## 2022-04-23 PROBLEM — B96.20 BACTEREMIA DUE TO ESCHERICHIA COLI: Status: ACTIVE | Noted: 2019-08-04

## 2022-04-23 LAB
ANION GAP SERPL CALCULATED.3IONS-SCNC: 7 MMOL/L (ref 4–13)
BACTERIA UR CULT: ABNORMAL
BUN SERPL-MCNC: 43 MG/DL (ref 5–25)
CALCIUM SERPL-MCNC: 9.3 MG/DL (ref 8.3–10.1)
CHLORIDE SERPL-SCNC: 107 MMOL/L (ref 100–108)
CO2 SERPL-SCNC: 24 MMOL/L (ref 21–32)
CREAT SERPL-MCNC: 3.22 MG/DL (ref 0.6–1.3)
ERYTHROCYTE [DISTWIDTH] IN BLOOD BY AUTOMATED COUNT: 14 % (ref 11.6–15.1)
GFR SERPL CREATININE-BSD FRML MDRD: 20 ML/MIN/1.73SQ M
GLUCOSE SERPL-MCNC: 126 MG/DL (ref 65–140)
HCT VFR BLD AUTO: 40.5 % (ref 36.5–49.3)
HGB BLD-MCNC: 12.8 G/DL (ref 12–17)
MAGNESIUM SERPL-MCNC: 2.3 MG/DL (ref 1.6–2.6)
MCH RBC QN AUTO: 30.5 PG (ref 26.8–34.3)
MCHC RBC AUTO-ENTMCNC: 31.6 G/DL (ref 31.4–37.4)
MCV RBC AUTO: 97 FL (ref 82–98)
PHOSPHATE SERPL-MCNC: 3.7 MG/DL (ref 2.7–4.5)
PLATELET # BLD AUTO: 173 THOUSANDS/UL (ref 149–390)
PMV BLD AUTO: 10.3 FL (ref 8.9–12.7)
POTASSIUM SERPL-SCNC: 4.3 MMOL/L (ref 3.5–5.3)
RBC # BLD AUTO: 4.19 MILLION/UL (ref 3.88–5.62)
SODIUM SERPL-SCNC: 138 MMOL/L (ref 136–145)
WBC # BLD AUTO: 16.47 THOUSAND/UL (ref 4.31–10.16)

## 2022-04-23 PROCEDURE — 85027 COMPLETE CBC AUTOMATED: CPT | Performed by: GENERAL PRACTICE

## 2022-04-23 PROCEDURE — 80048 BASIC METABOLIC PNL TOTAL CA: CPT | Performed by: GENERAL PRACTICE

## 2022-04-23 PROCEDURE — 99232 SBSQ HOSP IP/OBS MODERATE 35: CPT | Performed by: INTERNAL MEDICINE

## 2022-04-23 PROCEDURE — 84100 ASSAY OF PHOSPHORUS: CPT | Performed by: GENERAL PRACTICE

## 2022-04-23 PROCEDURE — 99232 SBSQ HOSP IP/OBS MODERATE 35: CPT | Performed by: UROLOGY

## 2022-04-23 PROCEDURE — 83735 ASSAY OF MAGNESIUM: CPT | Performed by: GENERAL PRACTICE

## 2022-04-23 RX ADMIN — SODIUM CHLORIDE, SODIUM GLUCONATE, SODIUM ACETATE, POTASSIUM CHLORIDE, MAGNESIUM CHLORIDE, SODIUM PHOSPHATE, DIBASIC, AND POTASSIUM PHOSPHATE 125 ML/HR: .53; .5; .37; .037; .03; .012; .00082 INJECTION, SOLUTION INTRAVENOUS at 22:28

## 2022-04-23 RX ADMIN — HEPARIN SODIUM 5000 UNITS: 5000 INJECTION INTRAVENOUS; SUBCUTANEOUS at 05:28

## 2022-04-23 RX ADMIN — OXYCODONE HYDROCHLORIDE 5 MG: 5 TABLET ORAL at 22:39

## 2022-04-23 RX ADMIN — SODIUM CHLORIDE, SODIUM GLUCONATE, SODIUM ACETATE, POTASSIUM CHLORIDE, MAGNESIUM CHLORIDE, SODIUM PHOSPHATE, DIBASIC, AND POTASSIUM PHOSPHATE 125 ML/HR: .53; .5; .37; .037; .03; .012; .00082 INJECTION, SOLUTION INTRAVENOUS at 05:29

## 2022-04-23 RX ADMIN — HEPARIN SODIUM 5000 UNITS: 5000 INJECTION INTRAVENOUS; SUBCUTANEOUS at 13:22

## 2022-04-23 RX ADMIN — OXYCODONE HYDROCHLORIDE 5 MG: 5 TABLET ORAL at 13:22

## 2022-04-23 RX ADMIN — SODIUM CHLORIDE, SODIUM GLUCONATE, SODIUM ACETATE, POTASSIUM CHLORIDE, MAGNESIUM CHLORIDE, SODIUM PHOSPHATE, DIBASIC, AND POTASSIUM PHOSPHATE 125 ML/HR: .53; .5; .37; .037; .03; .012; .00082 INJECTION, SOLUTION INTRAVENOUS at 14:09

## 2022-04-23 RX ADMIN — OXYCODONE HYDROCHLORIDE 5 MG: 5 TABLET ORAL at 00:26

## 2022-04-23 RX ADMIN — CEFTRIAXONE SODIUM 2000 MG: 10 INJECTION, POWDER, FOR SOLUTION INTRAVENOUS at 05:28

## 2022-04-23 RX ADMIN — HEPARIN SODIUM 5000 UNITS: 5000 INJECTION INTRAVENOUS; SUBCUTANEOUS at 22:30

## 2022-04-23 RX ADMIN — OXYCODONE HYDROCHLORIDE 5 MG: 5 TABLET ORAL at 08:09

## 2022-04-23 RX ADMIN — TAMSULOSIN HYDROCHLORIDE 0.4 MG: 0.4 CAPSULE ORAL at 17:02

## 2022-04-23 NOTE — ASSESSMENT & PLAN NOTE
· Presentation as above  · Blood culture - E  Coli  · Strep seen on Biofire but no strep growth on blood culture  · Urine culture - E   Coli  · Due to obstructive pyelonephritis  · On Ceftriaxone (D#3)  · F/u final and repeat blood cultures

## 2022-04-23 NOTE — PROGRESS NOTES
[facility]  Progress Note - Slava English 1965, 55 y o  male MRN: [id number]  Unit/Bed#: [facility] 812-01 Encounter: [id number]  Primary Care Provider: RITA Dinero Res   Date and time admitted to hospital: 4/22/2022 11:33 AM    * Sepsis ([facility] [address] )  Assessment & Plan  · Fever and leucocytosis  · Presented to [address] on 4/21/22 with left flank pain   · Noted to have left obstructive uropathy from calculus with pyelonephritis, ADRIANNE, bacteremia   · Transferred to [facility] on 4/22/22 and underwent left ureteral stent placement on 4/22  · On Ceftriaxone (D#3)  · Monitor temp, WBC  · ID following - input appreciated    Bacteremia due to Escherichia coli  Assessment & Plan  · Presentation as above  · Blood culture - E  Coli  · Strep seen on Biofire but no strep growth on blood culture  · Urine culture - E   Coli  · Due to obstructive pyelonephritis  · On Ceftriaxone (D#3)  · F/u final and repeat blood cultures    Obstructive pyelonephritis  Assessment & Plan  · Ceftriaxone as above  · S/p left ureteral stent on 4/22/22    Acute unilateral obstructive uropathy  Assessment & Plan  · Presented to [address] on 4/21 with left flank pain  · CT A/P - "Left hydroureteronephrosis secondary to a 3 mm stone at the mid left ureter at the L4-L5 disc space level "  · S/p left ureteral stent on 4/22  · Ct Tamsulosin  · Needs staged ureteroscopic intervention for stone removal    ADRIANNE (acute kidney injury) ([facility] [address] )  Assessment & Plan  · Baseline creatinine 1 4 to 1 5  · Creatinine 1 93 on presentation to [facility] and peaked at 4 39 on 4/22 with improvement to 3 22 today  · Due to obstructive uropathy, element of hypovolemia  · On IVF's  · Monitor creatinine  · Avoid nephrotoxic medications and hypotension  · Nephrology following - input appreciated    History of gout  Assessment & Plan  · With hyperuricemia at 10 6  · Refusing Allopurinol - attempt again on 4/24    Morbid obesity with BMI of 50  0-59 9, adult ([facility] [address] )  Assessment & Plan  · BMI 56 81  · Lifestyle modification    Metabolic acidosis  Assessment & Plan  · Resolved    VTE Pharmacologic Prophylaxis: VTE Score: 5 High Risk (Score >/= 5) - Pharmacological DVT Prophylaxis Ordered: heparin  Sequential Compression Devices Ordered  Patient Centered Rounds: Discussed with RN    Education and Discussions with Family / Patient: Attempted to update  (mother) via phone  Left voicemail  Time Spent for Care: 20 minutes  More than 50% of total time spent on counseling and coordination of care as described above  Current Length of Stay: 1 day(s)  Current Patient Status: Inpatient   Certification Statement: The patient will continue to require additional inpatient hospital stay due to as above    Code Status: Level 1 - Full Code    Subjective:   Minimal left flank discomfort  No fever  No nausea, vomiting or diarrhea  Objective:     Vitals:   Temp (24hrs), Av 7 °F (37 1 °C), Min:98 3 °F (36 8 °C), Max:99 5 °F (37 5 °C)    Temp:  [98 3 °F (36 8 °C)-99 5 °F (37 5 °C)] 98 3 °F (36 8 °C)  HR:  [102-119] 102  Resp:  [18-20] 19  BP: (151-172)/() 151/89  SpO2:  [92 %-95 %] 95 %  Body mass index is 56 81 kg/m²  Physical Exam:   Physical Exam  Vitals reviewed  HENT:      Head: Normocephalic  Nose: Nose normal       Mouth/Throat:      Mouth: Mucous membranes are moist    Eyes:      Extraocular Movements: Extraocular movements intact  Cardiovascular:      Rate and Rhythm: Normal rate and regular rhythm  Pulmonary:      Effort: Pulmonary effort is normal  No respiratory distress  Breath sounds: Normal breath sounds  No wheezing  Abdominal:      General: Bowel sounds are normal  There is no distension  Palpations: Abdomen is soft  Tenderness: There is no abdominal tenderness  Musculoskeletal:         General: No swelling  Cervical back: Neck supple  Skin:     General: Skin is warm and dry  Neurological:      General: No focal deficit present  Mental Status: He is alert and oriented to person, place, and time  Psychiatric:         Mood and Affect: Mood normal          Behavior: Behavior normal           Additional Data:     Labs:  Results from last 7 days   Lab Units 04/23/22  0508 04/22/22  1433 04/22/22  1433 04/22/22  0536 04/21/22  0746   WBC Thousand/uL 16 47*   < > 18 09* 19 72*   < >   HEMOGLOBIN g/dL 12 8   < > 13 3 12 6   < >   HEMATOCRIT % 40 5   < > 43 4 40 2   < >   PLATELETS Thousands/uL 173   < > 178 188   < >   BANDS PCT %  --   --  27*  --    < >   NEUTROS PCT %  --   --   --  89*  --    LYMPHS PCT %  --   --   --  5*  --    LYMPHO PCT %  --   --  0*  --    < >   MONOS PCT %  --   --   --  4  --    MONO PCT %  --   --  2*  --    < >   EOS PCT %  --   --  1 1   < >    < > = values in this interval not displayed  Results from last 7 days   Lab Units 04/23/22  0508 04/21/22  1357 04/21/22  0746   SODIUM mmol/L 138   < > 143   POTASSIUM mmol/L 4 3   < > 4 0   CHLORIDE mmol/L 107   < > 109*   CO2 mmol/L 24   < > 18*   BUN mg/dL 43*   < > 28*   CREATININE mg/dL 3 22*   < > 2 66*   ANION GAP mmol/L 7   < > 16*   CALCIUM mg/dL 9 3   < > 8 6   ALBUMIN g/dL  --   --  2 9*   TOTAL BILIRUBIN mg/dL  --   --  0 51   ALK PHOS U/L  --   --  67   ALT U/L  --   --  22   AST U/L  --   --  24   GLUCOSE RANDOM mg/dL 126   < > 118    < > = values in this interval not displayed  Results from last 7 days   Lab Units 04/21/22  0317   LACTIC ACID mmol/L 0 9       Lines/Drains:  Invasive Devices  Report    Peripheral Intravenous Line            Peripheral IV 04/21/22 Left Forearm 2 days    Peripheral IV 04/23/22 Right Forearm <1 day              Recent Cultures (last 7 days):   Results from last 7 days   Lab Units 04/22/22  1617 04/22/22  1616 04/21/22  0317 04/21/22  0216   BLOOD CULTURE  Received in Microbiology Lab  Culture in Progress  Received in Microbiology Lab   Culture in Progress  Escherichia coli*  --    GRAM STAIN RESULT   --   --  Gram negative rods*  Gram positive cocci in chains*  --    URINE CULTURE   --   --   --  >100,000 cfu/ml Escherichia coli*       Last 24 Hours Medication List:   Current Facility-Administered Medications   Medication Dose Route Frequency Provider Last Rate    acetaminophen  650 mg Oral Q6H PRN Matthew Violette, DO      cefTRIAXone  2,000 mg Intravenous Q24H Matthew Violette, DO 2,000 mg (04/23/22 0528)    heparin (porcine)  5,000 Units Subcutaneous [facility] Matthew Violette, DO      HYDROmorphone  1 mg Intravenous Q4H PRN Matthew Violette, DO      multi-electrolyte  125 mL/hr Intravenous Continuous Tomasa Sauce Bell,  mL/hr (04/23/22 1409)    ondansetron  4 mg Intravenous Q6H PRN Matthew Violette, DO      oxyCODONE  2 5 mg Oral Q4H PRN Matthew Violette, DO      Or    oxyCODONE  5 mg Oral Q4H PRN Matthew Violette, DO      tamsulosin  0 4 mg Oral After [facility], DO          Today, Patient Was Seen By: Rosalia Pereira MD    **Please Note: This note may have been constructed using a voice recognition system  **

## 2022-04-23 NOTE — ASSESSMENT & PLAN NOTE
· Presented to [facility] on 4/21 with left flank pain  · CT A/P - "Left hydroureteronephrosis secondary to a 3 mm stone at the mid left ureter at the L4-L5 disc space level "  · S/p left ureteral stent on 4/22  · Ct Tamsulosin  · Needs staged ureteroscopic intervention for stone removal

## 2022-04-23 NOTE — PROGRESS NOTES
Urology virtual progress note:    S/p stent for complicated uti  Cr improving  WBC downtrending    Berumen can be removed for TOV    Patient to follow up for staged ureteroscopy and laser lithotripsy  Urology signing off

## 2022-04-23 NOTE — ASSESSMENT & PLAN NOTE
· Fever and leucocytosis     · Presented to [facility] [address] on 4/21/22 with left flank pain   · Noted to have left obstructive uropathy from calculus with pyelonephritis, ADRIANNE, bacteremia   · Transferred to [facility] on 4/22/22 and underwent left ureteral stent placement on 4/22  · On Ceftriaxone (D#3)  · Monitor temp, WBC  · ID following - input appreciated

## 2022-04-23 NOTE — ASSESSMENT & PLAN NOTE
· Baseline creatinine 1 4 to 1 5  · Creatinine 1 93 on presentation to [facility] and peaked at 4 39 on 4/22 with improvement to 3 22 today  · Due to obstructive uropathy, element of hypovolemia  · On IVF's  · Monitor creatinine  · Avoid nephrotoxic medications and hypotension  · Nephrology following - input appreciated [id number] Comprehensive

## 2022-04-23 NOTE — PROGRESS NOTES
Progress Note - Infectious Disease   Madeline Prime 55 y o  male MRN: [id number]  Unit/Bed#: [facility] 812-01 Encounter: [id number]      Impression/Recommendations:  1  Sepsis  Evolving: Fever, WBC  Due to # 2/3  No other appreciable source  ROS and exam otherwise benign  Hemodynamically stable and nontoxic  Improving  Rec:  · Continue antibiotics as below  · Follow up blood and urine cultures as below  · Follow temperatures closely  · Recheck CBC in a m  · Supportive care as per the primary service     2   E coli bacteremia  Likely due to # 3  No other appreciable source  Repeat blood cultures pending  No history of MDRO  BioFire also detected Strep but no growth on cultures  Rec:  · Continue ceftriaxone for now  · Follow up final blood cultures and tailor antibiotics as indicated  · Follow up repeat blood cultures  · Once improved anticipate change to PO antibiotics     3  Obstructive pyelonephritis  Due to # 4  Urine culture with e  coli  Rec:  · Continue antibiotics as above     4  Obstructive nephrolithiasis  With left hydroureteronephrosis  Status post cystoscopy, retrograde pyelogram, and left ureteral stent placement 4/22/22  Intraoperative findings were notable for purulence in the bladder and left ureter  Rec:  · Follow urine output closely  · Definitive outpatient stone management once infection improved per urology with close follow-up ongoing     5  ADRIANNE on CKD  Baseline Cr 1 4-1 5  Rec:  · No dose adjustment of ceftriaxone indicated  · Recheck BMP in AM  · Close nephrology follow-up ongoing     6  MO      The above impression and plan was discussed in detail with the patient      Antibiotics:  Ceftriaxone # 3    Subjective:  Patient seen on PM rounds  Urinating fine after Berumen removed  Mild left flank pain with movement  Denies fevers, chills, sweats, nausea, vomiting, or diarrhea      24 Hour Events:  No documented fevers, chills, sweats, nausea, vomiting, or diarrhea  Objective:  Vitals:  Temp:  [98 °F (36 7 °C)-99 5 °F (37 5 °C)] 98 3 °F (36 8 °C)  HR:  [] 105  Resp:  [16-20] 18  BP: (158-172)/() 158/90  SpO2:  [92 %-96 %] 94 %  Temp (24hrs), Av 5 °F (36 9 °C), Min:98 °F (36 7 °C), Max:99 5 °F (37 5 °C)  Current: Temperature: 98 3 °F (36 8 °C)    Physical Exam:   General:  No acute distress  Psychiatric:  Awake and alert  Pulmonary:  Normal respiratory excursion without accessory muscle use  Abdomen:  Soft, nontender  Extremities:  No edema  Skin:  No rashes    Lab Results:  I have personally reviewed pertinent labs  Results from last 7 days   Lab Units 22  0508 22  1433 22  0536 22  1357 22  0746 22  0215 22  0215   POTASSIUM mmol/L 4 3 4 8 4 4   < > 4 0   < > 3 9   CHLORIDE mmol/L 107 110* 105   < > 109*   < > 106   CO2 mmol/L 24 18* 19*   < > 18*   < > 17*   BUN mg/dL 43* 43* 40*   < > 28*   < > 28*   CREATININE mg/dL 3 22* 4 39* 4 22*   < > 2 66*   < > 1 93*   EGFR ml/min/1 73sq m 20 14 14   < > 25   < > 38   CALCIUM mg/dL 9 3 9 1 8 4   < > 8 6   < > 9 2   AST U/L  --   --   --   --  24  --  21   ALT U/L  --   --   --   --  22  --  25   ALK PHOS U/L  --   --   --   --  67  --  74    < > = values in this interval not displayed  Results from last 7 days   Lab Units 22  0508 22  1433 22  0536   WBC Thousand/uL 16 47* 18 09* 19 72*   HEMOGLOBIN g/dL 12 8 13 3 12 6   PLATELETS Thousands/uL 173 178 188     Results from last 7 days   Lab Units 22  1617 22  1616 22  0317 22  0216   BLOOD CULTURE  Received in Microbiology Lab  Culture in Progress  Received in Microbiology Lab  Culture in Progress   Escherichia coli*  --    GRAM STAIN RESULT   --   --  Gram negative rods*  Gram positive cocci in chains*  --    URINE CULTURE   --   --   --  >100,000 cfu/ml Escherichia coli*       Imaging Studies:   I have personally reviewed pertinent imaging study reports and images in PACS     EKG, Pathology, and Other Studies:   I have personally reviewed pertinent reports

## 2022-04-23 NOTE — PROGRESS NOTES
NEPHROLOGY PROGRESS NOTE   Evens Saenz 55 y o  male MRN: [id number]  Unit/Bed#: [facility] 812-01 Encounter: [id number]  Reason for Consult:  Acute kidney injury    ASSESSMENT/PLAN:  1  Acute kidney injury, secondary to obstructive uropathy, possible component from intravascular volume depletion as well as NSAID use  2  Chronic kidney disease with previous baseline creatinine 1 4-1 5  3  Sepsis, secondary to strep and E coli bacteremia, antibiotics as per primary service  4  Left-sided hydro ureteral nephrosis secondary to an obstructing stone status post stent placement  5  Metabolic acidosis, predominantly non gap current anion gap 8  6  History of recurrent nephrolithiasis  7  Morbid obesity with elevated BMI greater than 50  8  Gout/hyperuricemia, agree with allopurinol 100 mg daily  PLAN:  · Overall renal function is improving  · Would continue with current IV fluids  · Acidosis has resolved  · No other changes in current regimen    SUBJECTIVE:  Seen and examined  Patient awake alert  Berumen catheter has been removed  Some groin discomfort  Urine output has improved  Denies any chest pain shortness of breath  Review of Systems    OBJECTIVE:  Current Weight: Weight - Scale: (!) 160 kg (352 lb)  Vitals:    04/22/22 1842 04/22/22 2236 04/23/22 0734 04/23/22 0809   BP:  160/92 (!) 172/108 158/90   Pulse: (!) 112 (!) 108 105    Resp: 20 20 18    Temp: 99 5 °F (37 5 °C) 98 3 °F (36 8 °C)     SpO2: 92% 94% 94%    Weight:       Height:           Intake/Output Summary (Last 24 hours) at 4/23/2022 1356  Last data filed at 4/23/2022 1325  Gross per 24 hour   Intake 2012 92 ml   Output 3075 ml   Net -1062 08 ml       Physical Exam  Constitutional:       Appearance: He is obese  He is not ill-appearing  Eyes:      General: No scleral icterus  Cardiovascular:      Rate and Rhythm: Normal rate and regular rhythm  Pulmonary:      Effort: Pulmonary effort is normal       Breath sounds: Normal breath sounds     Abdominal: General: There is distension  Palpations: Abdomen is soft  Musculoskeletal:      Right lower leg: No edema  Left lower leg: No edema  Skin:     General: Skin is warm and dry  Neurological:      Mental Status: He is alert and oriented to person, place, and time           Medications:    Current Facility-Administered Medications:     acetaminophen (TYLENOL) tablet 650 mg, 650 mg, Oral, Q6H PRN, Red Goon, DO, 650 mg at 04/22/22 2238    cefTRIAXone (ROCEPHIN) 2,000 mg in dextrose 5 % 50 mL IVPB, 2,000 mg, Intravenous, Q24H, Red Goon, DO, Last Rate: 100 mL/hr at 04/23/22 0528, 2,000 mg at 04/23/22 0528    heparin (porcine) subcutaneous injection 5,000 Units, 5,000 Units, Subcutaneous, Q8H [address], Red Goon, DO, 5,000 Units at 04/23/22 1322    HYDROmorphone (DILAUDID) injection 1 mg, 1 mg, Intravenous, Q4H PRN, Red Goon, DO    multi-electrolyte (PLASMALYTE-A/ISOLYTE-S PH 7 4) IV solution, 125 mL/hr, Intravenous, Continuous, Cab[facility] Bell, DO, Last Rate: 125 mL/hr at 04/23/22 0529, 125 mL/hr at 04/23/22 0529    ondansetron (ZOFRAN) injection 4 mg, 4 mg, Intravenous, Q6H PRN, Red Goon, DO    oxyCODONE (ROXICODONE) IR tablet 2 5 mg, 2 5 mg, Oral, Q4H PRN **OR** oxyCODONE (ROXICODONE) IR tablet 5 mg, 5 mg, Oral, Q4H PRN, Red Goon, DO, 5 mg at 04/23/22 1322    tamsulosin (FLOMAX) capsule 0 4 mg, 0 4 mg, Oral, After Dinner, Red Goon, DO, 0 4 mg at 04/22/22 1716    Laboratory Results:  Results from last 7 days   Lab Units 04/23/22  0508 04/22/22  1433 04/22/22  0536 04/21/22  1357 04/21/22  0746 04/21/22  0215   WBC Thousand/uL 16 47* 18 09* 19 72*  --  13 49* 10 75*   HEMOGLOBIN g/dL 12 8 13 3 12 6  --  13 9 14 9   HEMATOCRIT % 40 5 43 4 40 2  --  42 5 45 9   PLATELETS Thousands/uL 173 178 188  --  219 238   POTASSIUM mmol/L 4 3 4 8 4 4 4 7 4 0 3 9   CHLORIDE mmol/L 107 110* 105 106 109* 106   CO2 mmol/L 24 18* 19* 20* 18* 17*   BUN mg/dL 43* 43* 40* 30* 28* 28* CREATININE mg/dL 3 22* 4 39* 4 22* 3 32* 2 66* 1 93*   CALCIUM mg/dL 9 3 9 1 8 4 8 6 8 6 9 2   MAGNESIUM mg/dL 2 3  --   --   --   --   --    PHOSPHORUS mg/dL 3 7  --   --   --   --   --

## 2022-04-23 NOTE — PLAN OF CARE
Problem: Potential for Falls  Goal: Patient will remain free of falls  Description: INTERVENTIONS:  - Educate patient/family on patient safety including physical limitations  - Instruct patient to call for assistance with activity   - Consult OT/PT to assist with strengthening/mobility   - Keep Call bell within reach  - Keep bed low and locked with side rails adjusted as appropriate  - Keep care items and personal belongings within reach  - Initiate and maintain comfort rounds  - Make Fall Risk Sign visible to staff  - Apply yellow socks and bracelet for high fall risk patients  - Consider moving patient to room near nurses station  4/23/2022 0320 by Isabella Motley  Outcome: Progressing  4/23/2022 0319 by Isabella Motley  Outcome: Progressing     Problem: MOBILITY - ADULT  Goal: Maintain or return to baseline ADL function  Description: INTERVENTIONS:  -  Assess patient's ability to carry out ADLs; assess patient's baseline for ADL function and identify physical deficits which impact ability to perform ADLs (bathing, care of mouth/teeth, toileting, grooming, dressing, etc )  - Assess/evaluate cause of self-care deficits   - Assess range of motion  - Assess patient's mobility; develop plan if impaired  - Assess patient's need for assistive devices and provide as appropriate  - Encourage maximum independence but intervene and supervise when necessary  - Involve family in performance of ADLs  - Assess for home care needs following discharge   - Consider OT consult to assist with ADL evaluation and planning for discharge  - Provide patient education as appropriate  4/23/2022 0320 by Isabella Motley  Outcome: Progressing  4/23/2022 0319 by Isabella Motley  Outcome: Progressing  Goal: Maintains/Returns to pre admission functional level  Description: INTERVENTIONS:  - Perform BMAT or MOVE assessment daily    - Set and communicate daily mobility goal to care team and patient/family/caregiver     - Collaborate with rehabilitation services on mobility goals if consulted  - Out of bed for toileting  - Record patient progress and toleration of activity level   4/23/2022 0320 by Estella Fritz  Outcome: Progressing  4/23/2022 0319 by Estella Fritz  Outcome: Progressing     Problem: PAIN - ADULT  Goal: Verbalizes/displays adequate comfort level or baseline comfort level  Description: Interventions:  - Encourage patient to monitor pain and request assistance  - Assess pain using appropriate pain scale  - Administer analgesics based on type and severity of pain and evaluate response  - Implement non-pharmacological measures as appropriate and evaluate response  - Consider cultural and social influences on pain and pain management  - Notify physician/advanced practitioner if interventions unsuccessful or patient reports new pain  Outcome: Progressing     Problem: GENITOURINARY - ADULT  Goal: Urinary catheter remains patent  Description: INTERVENTIONS:  - Assess patency of urinary catheter  - If patient has a chronic cook, consider changing catheter if non-functioning  - Follow guidelines for intermittent irrigation of non-functioning urinary catheter  Outcome: Progressing

## 2022-04-24 ENCOUNTER — TELEPHONE (OUTPATIENT)
Dept: UROLOGY | Facility: AMBULATORY SURGERY CENTER | Age: 57
End: 2022-04-24

## 2022-04-24 DIAGNOSIS — N20.0 NEPHROLITHIASIS: Primary | ICD-10-CM

## 2022-04-24 LAB
ANION GAP SERPL CALCULATED.3IONS-SCNC: 7 MMOL/L (ref 4–13)
BUN SERPL-MCNC: 44 MG/DL (ref 5–25)
CALCIUM SERPL-MCNC: 9.1 MG/DL (ref 8.3–10.1)
CHLORIDE SERPL-SCNC: 105 MMOL/L (ref 100–108)
CO2 SERPL-SCNC: 25 MMOL/L (ref 21–32)
CREAT SERPL-MCNC: 2.71 MG/DL (ref 0.6–1.3)
ERYTHROCYTE [DISTWIDTH] IN BLOOD BY AUTOMATED COUNT: 14 % (ref 11.6–15.1)
GFR SERPL CREATININE-BSD FRML MDRD: 25 ML/MIN/1.73SQ M
GLUCOSE SERPL-MCNC: 121 MG/DL (ref 65–140)
HCT VFR BLD AUTO: 40.7 % (ref 36.5–49.3)
HGB BLD-MCNC: 13 G/DL (ref 12–17)
MCH RBC QN AUTO: 30.4 PG (ref 26.8–34.3)
MCHC RBC AUTO-ENTMCNC: 31.9 G/DL (ref 31.4–37.4)
MCV RBC AUTO: 95 FL (ref 82–98)
PLATELET # BLD AUTO: 176 THOUSANDS/UL (ref 149–390)
PMV BLD AUTO: 10.5 FL (ref 8.9–12.7)
POTASSIUM SERPL-SCNC: 4 MMOL/L (ref 3.5–5.3)
RBC # BLD AUTO: 4.27 MILLION/UL (ref 3.88–5.62)
SODIUM SERPL-SCNC: 137 MMOL/L (ref 136–145)
WBC # BLD AUTO: 12.57 THOUSAND/UL (ref 4.31–10.16)

## 2022-04-24 PROCEDURE — 99232 SBSQ HOSP IP/OBS MODERATE 35: CPT | Performed by: INTERNAL MEDICINE

## 2022-04-24 PROCEDURE — 85027 COMPLETE CBC AUTOMATED: CPT | Performed by: INTERNAL MEDICINE

## 2022-04-24 PROCEDURE — 80048 BASIC METABOLIC PNL TOTAL CA: CPT | Performed by: INTERNAL MEDICINE

## 2022-04-24 PROCEDURE — 82360 CALCULUS ASSAY QUANT: CPT | Performed by: UROLOGY

## 2022-04-24 RX ADMIN — HEPARIN SODIUM 5000 UNITS: 5000 INJECTION INTRAVENOUS; SUBCUTANEOUS at 22:13

## 2022-04-24 RX ADMIN — SODIUM CHLORIDE, SODIUM GLUCONATE, SODIUM ACETATE, POTASSIUM CHLORIDE, MAGNESIUM CHLORIDE, SODIUM PHOSPHATE, DIBASIC, AND POTASSIUM PHOSPHATE 125 ML/HR: .53; .5; .37; .037; .03; .012; .00082 INJECTION, SOLUTION INTRAVENOUS at 06:57

## 2022-04-24 RX ADMIN — TAMSULOSIN HYDROCHLORIDE 0.4 MG: 0.4 CAPSULE ORAL at 17:04

## 2022-04-24 RX ADMIN — HEPARIN SODIUM 5000 UNITS: 5000 INJECTION INTRAVENOUS; SUBCUTANEOUS at 05:00

## 2022-04-24 RX ADMIN — OXYCODONE HYDROCHLORIDE 5 MG: 5 TABLET ORAL at 22:12

## 2022-04-24 RX ADMIN — AMPICILLIN 2000 MG: 2 INJECTION, POWDER, FOR SOLUTION INTRAVENOUS at 23:55

## 2022-04-24 RX ADMIN — OXYCODONE HYDROCHLORIDE 5 MG: 5 TABLET ORAL at 14:50

## 2022-04-24 RX ADMIN — HEPARIN SODIUM 5000 UNITS: 5000 INJECTION INTRAVENOUS; SUBCUTANEOUS at 13:01

## 2022-04-24 RX ADMIN — CEFTRIAXONE SODIUM 2000 MG: 10 INJECTION, POWDER, FOR SOLUTION INTRAVENOUS at 04:54

## 2022-04-24 NOTE — PROGRESS NOTES
NEPHROLOGY PROGRESS NOTE   Chelsea Eid 55 y o  male MRN: [id number]  Unit/Bed#: [facility] 812-01 Encounter: [id number]  Reason for Consult:  Acute kidney injury      ASSESSMENT/PLAN:  1  Acute kidney injury, secondary to obstructive uropathy, possible component from intravascular volume depletion as well as NSAID use  2  Chronic kidney disease with previous baseline creatinine 1 4-1 5  3  Sepsis, secondary to strep and E coli bacteremia, antibiotics as per primary service  4  Left-sided hydro ureteral nephrosis secondary to an obstructing stone status post stent placement  5  Metabolic acidosis, resolved  6  History of recurrent nephrolithiasis  7   Morbid obesity with elevated BMI greater than 50    Given improvement in renal function, would repeat uric acid  Given good oral intake, would discontinue IV fluids  Will continue to monitor renal function closely    SUBJECTIVE:  Seen and examined  Patient awake alert  Overall is doing well  No significant pain or discomfort  Denies any chest pain or shortness of breath    Review of Systems    OBJECTIVE:  Current Weight: Weight - Scale: (!) 160 kg (352 lb)  Vitals:    04/23/22 0809 04/23/22 1546 04/23/22 2222 04/24/22 0704   BP: 158/90 151/89 136/76 (!) 168/101   Pulse:  102 96 96   Resp:  19 19    Temp:  98 3 °F (36 8 °C) 98 8 °F (37 1 °C) 98 6 °F (37 °C)   SpO2:  95% 94% 93%   Weight:       Height:           Intake/Output Summary (Last 24 hours) at 4/24/2022 1140  Last data filed at 4/24/2022 0745  Gross per 24 hour   Intake 3810 42 ml   Output 3200 ml   Net 610 42 ml       Physical Exam  Constitutional:       Appearance: He is not ill-appearing  HENT:      Head: Normocephalic and atraumatic  Eyes:      General: No scleral icterus  Cardiovascular:      Rate and Rhythm: Normal rate and regular rhythm  Pulmonary:      Effort: Pulmonary effort is normal       Breath sounds: Normal breath sounds  Abdominal:      General: There is no distension        Palpations: Abdomen is soft  Tenderness: There is no abdominal tenderness  Musculoskeletal:      Right lower leg: No edema  Left lower leg: No edema  Skin:     General: Skin is warm and dry  Neurological:      Mental Status: He is alert and oriented to person, place, and time           Medications:    Current Facility-Administered Medications:     acetaminophen (TYLENOL) tablet 650 mg, 650 mg, Oral, Q6H PRN, Frank Shirts, DO, 650 mg at 04/22/22 2238    [START ON 4/25/2022] ampicillin (OMNIPEN) 2,000 mg in sodium chloride 0 9 % 100 mL IVPB, 2,000 mg, Intravenous, Q6H, Matt Dewitt MD    heparin (porcine) subcutaneous injection 5,000 Units, 5,000 Units, Subcutaneous, Q8H [address], Frank Shirts, DO, 5,000 Units at 04/24/22 0500    HYDROmorphone (DILAUDID) injection 1 mg, 1 mg, Intravenous, Q4H PRN, Frank Shirts, DO    multi-electrolyte (PLASMALYTE-A/ISOLYTE-S PH 7 4) IV solution, 125 mL/hr, Intravenous, Continuous, Rhunette Davy Bell DO, Last Rate: 125 mL/hr at 04/24/22 0657, 125 mL/hr at 04/24/22 0657    ondansetron (ZOFRAN) injection 4 mg, 4 mg, Intravenous, Q6H PRN, Frank Shirts, DO    oxyCODONE (ROXICODONE) IR tablet 2 5 mg, 2 5 mg, Oral, Q4H PRN **OR** oxyCODONE (ROXICODONE) IR tablet 5 mg, 5 mg, Oral, Q4H PRN, Frank Shirts, DO, 5 mg at 04/23/22 2239    tamsulosin (FLOMAX) capsule 0 4 mg, 0 4 mg, Oral, After Dinner, Frank Shirts, DO, 0 4 mg at 04/23/22 1702    Laboratory Results:  Results from last 7 days   Lab Units 04/24/22  0454 04/23/22  0508 04/22/22  1433 04/22/22  0536 04/21/22  1357 04/21/22  0746 04/21/22  0215   WBC Thousand/uL 12 57* 16 47* 18 09* 19 72*  --  13 49* 10 75*   HEMOGLOBIN g/dL 13 0 12 8 13 3 12 6  --  13 9 14 9   HEMATOCRIT % 40 7 40 5 43 4 40 2  --  42 5 45 9   PLATELETS Thousands/uL 176 173 178 188  --  219 238   POTASSIUM mmol/L 4 0 4 3 4 8 4 4 4 7 4 0 3 9   CHLORIDE mmol/L 105 107 110* 105 106 109* 106   CO2 mmol/L 25 24 18* 19* 20* 18* 17*   BUN mg/dL 44* 43* 43* 40* 30* 28* 28*   CREATININE mg/dL 2 71* 3 22* 4 39* 4 22* 3 32* 2 66* 1 93*   CALCIUM mg/dL 9 1 9 3 9 1 8 4 8 6 8 6 9 2   MAGNESIUM mg/dL  --  2 3  --   --   --   --   --    PHOSPHORUS mg/dL  --  3 7  --   --   --   --   --

## 2022-04-24 NOTE — UTILIZATION REVIEW
Initial Clinical Review    Admission: Date/Time/Statement:   Admission Orders (From admission, onward)     Ordered        04/22/22 1521  Inpatient Admission  Once                      Orders Placed This Encounter   Procedures    Inpatient Admission     Standing Status:   Standing     Number of Occurrences:   1     Order Specific Question:   Level of Care     Answer:   Med Surg [16]     Order Specific Question:   Estimated length of stay     Answer:   More than 2 Midnights     Order Specific Question:   Certification     Answer:   I certify that inpatient services are medically necessary for this patient for a duration of greater than two midnights  See H&P and MD Progress Notes for additional information about the patient's course of treatment  Initial Presentation: 55 y o  male PMH morbid obesity ,kidney stone transferred [facility] hospitals from [facility] for Urologic intervention  Pt presented with flank pain also found to have sepsis with bacteremia  Pt to day went to OR and had stent placed  Berumen in place  Sepsis 2/2 Pyelo w Padmaja Bolden and E coli bacteremia , continue IVF and IV Rocephin, consulted ED , recheck B CX here   UROLOGY CONSULT  CT scan reveals a left hydro true ureteral nephrosis with 3 x 3 mm stone at the mid left ureter at the level of L4-L5 disc placed  -UA positive for infection, urine culture sent yesterday positive for Gram-negative rods  -patient awaiting transfer for stent placement to see due to septic stone  Febrile us night at 101  -leukocytosis of 19 today  -creatinine continues to increase, on admission 1 93 currently trending up to 4 today   -patient NPO  -plan for emergent cystoscopy retrograde pyelogram and left ureteral stent placement  OP NOTE:   Procedure(s) (LRB):  CYSTOSCOPY RETROGRADE PYELOGRAM WITH INSERTION STENT URETERAL (Left)  Operative Findings:  Pus is noted within the bladder, pus is also seen to exit from the collecting system when a wires placed    Successful placement of a 6 [facility] by 24 centimeter left ureteral stent  A Berumen catheter was placed as well given significant pyuria for a siphon effect to drain the infected left kidney  The patient will require a staged ureteroscopic stone intervention, planned, at a later date   per Urology Status post ureteral stent placement  Denies any chest pain shortness of breath  Denies abdominal pain  Continue IVF Isolyte, abx and avoid NSAIDs    ID CONSULT  Sepsis due to E coli/stretococcal bacteremia due to Obstructive pyelonephritis  Continue IV Ceftriaxone , follow urine ouput closely  F/u cultures     Date: 4/23/22   Day 2:   Urology s/p stent for complicated uti , cr improving wbc downtrending   Berumen can be removed for TOV  Pt to f/u for staged ureteroscopy and laser lithotripsy  Per Nephrology Patient awake alert  Berumen catheter has been removed  Some groin discomfort  Urine output has improved  Renal function improving continue IVF , acidosis has resolved  Attending , pt with minimal flank discomfort  sepsis with bacteremia E coli escherichia strep, continue IV Ceftriaxone , f/u final cx  S/p stent placement 4/22 for Obstructive pyelonephritis     ADRIANNE continue ivf  Morbid obesity BMI 50-59 9 lifestyle modifications  ED Triage Vitals [04/22/22 1404]   Temperature Pulse Respirations Blood Pressure SpO2   (!) 101 2 °F (38 4 °C) (!) 116 21 119/66 98 %      Temp src Heart Rate Source Patient Position - Orthostatic VS BP [facility] FiO2 (%)   -- -- -- -- --      Pain Score       No Pain          Wt Readings from Last 1 Encounters:   04/22/22 (!) 160 kg (352 lb)     Additional Vital Signs:   04/24/22 07:04:38 98 6 °F (37 °C) 96 -- 168/101 Abnormal  123 93 % -- --   04/23/22 22:22:03 98 8 °F (37 1 °C) 96 19 136/76 96 94 % -- --   04/23/22 1925 -- -- -- -- -- -- -- None (Room air)   04/23/22 15:46:50 98 3 °F (36 8 °C) 102 19 151/89 110 95 % -- --   04/23/22 0809 -- -- -- 158/90 -- -- -- --   04/23/22 07:34:42 -- 105 18 172/108 Abnormal  129 94 % -- --   04/22/22 22:36:49 98 3 °F (36 8 °C) 108 Abnormal  20 160/92 115 94 % -- --   04/22/22 2000 -- -- -- -- -- -- -- None (Room air)   04/22/22 18:42:46 99 5 °F (37 5 °C) 112 Abnormal  20 -- -- 92 % -- --   04/22/22 17:51:04 98 7 °F (37 1 °C) 119 Abnormal  20 160/90 -- 93 % -- --   04/22/22 1633 98 °F (36 7 °C) 100 -- 165/95 -- 96 % -- --   04/22/22 15:33:17 98 1 °F (36 7 °C) 100 -- 168/102 Abnormal  124 96 % -- --   04/22/22 15:33:10 98 1 °F (36 7 °C) 99 16 168/102 Abnormal  124 96 %         Pertinent Labs/Diagnostic Test Results:   4/23 FL retrograde pyelogram Fluoroscopic guidance provided for retrograde study  Please see procedure report for further details  4/21/22 US kidney bladder Mild left hydronephrosis  4/21 CT renal stone abd pelvis Left hydroureteronephrosis secondary to a 3 mm stone at the mid left ureter at the L4-L5 disc space level  Nonobstructing bilateral nephrolithiasis  Layering bladder calculus  Bladder wall thickening secondary to underdistention or trabecular hypertrophy secondary to outlet obstruction  Results from last 7 days   Lab Units 04/21/22  0805   SARS-COV-2  Negative     Results from last 7 days   Lab Units 04/24/22  0454 04/23/22  0508 04/22/22  1433 04/22/22  0536 04/22/22  0536 04/21/22  0746 04/21/22  0746 04/21/22  0215 04/21/22  0215   WBC Thousand/uL 12 57* 16 47* 18 09*   < > 19 72*   < > 13 49*   < > 10 75*   HEMOGLOBIN g/dL 13 0 12 8 13 3  --  12 6  --  13 9   < > 14 9   HEMATOCRIT % 40 7 40 5 43 4  --  40 2  --  42 5   < > 45 9   PLATELETS Thousands/uL 176 173 178   < > 188   < > 219   < > 238   NEUTROS ABS Thousands/µL  --   --   --   --  17 61*  --   --   --  9 18*   BANDS PCT %  --   --  27*  --   --   --  39*  --   --     < > = values in this interval not displayed       Results from last 7 days   Lab Units 04/24/22  0454 04/23/22  0508 04/22/22  1433 04/22/22  0536 04/21/22  1357   SODIUM mmol/L 137 138 136 140 139   POTASSIUM mmol/L 4 0 4 3 4 8 4 4 4 7 CHLORIDE mmol/L 105 107 110* 105 106   CO2 mmol/L 25 24 18* 19* 20*   ANION GAP mmol/L 7 7 8 16* 13   BUN mg/dL 44* 43* 43* 40* 30*   CREATININE mg/dL 2 71* 3 22* 4 39* 4 22* 3 32*   EGFR ml/min/1 73sq m 25 20 14 14 19   CALCIUM mg/dL 9 1 9 3 9 1 8 4 8 6   MAGNESIUM mg/dL  --  2 3  --   --   --    PHOSPHORUS mg/dL  --  3 7  --   --   --      Results from last 7 days   Lab Units 04/21/22  0746 04/21/22  0215   AST U/L 24 21   ALT U/L 22 25   ALK PHOS U/L 67 74   TOTAL PROTEIN g/dL 6 7 7 3   ALBUMIN g/dL 2 9* 3 3*   TOTAL BILIRUBIN mg/dL 0 51 0 41     Results from last 7 days   Lab Units 04/24/22  0454 04/23/22  0508 04/22/22  1433 04/22/22  0536 04/21/22  1357 04/21/22  0746 04/21/22  0215   GLUCOSE RANDOM mg/dL 121 126 118 120 145* 118 116     Results from last 7 days   Lab Units 04/21/22  1051   PH ART  7 340*   PCO2 ART mm Hg 28 9*   PO2 ART mm Hg 82 8   HCO3 ART mmol/L 15 2*   BASE EXC ART mmol/L -9 0   O2 CONTENT ART mL/dL 19 1   O2 HGB, ARTERIAL % 94 8   ABG SOURCE  Radial, Left     Results from last 7 days   Lab Units 04/21/22  0758 04/21/22  0408   PH DOLORES  7 487* 7 337   PCO2 DOLORES mm Hg 14 7* 35 4*   PO2 DOLORES mm Hg 216 3* 65 7*   HCO3 DOLORES mmol/L 10 9* 18 5*   BASE EXC DOLORES mmol/L -8 8 -6 4   O2 CONTENT DOLORES ml/dL 22 2 18 8   O2 HGB, VENOUS % 97 8* 90 4*     Results from last 7 days   Lab Units 04/21/22  0317   LACTIC ACID mmol/L 0 9     Results from last 7 days   Lab Units 04/21/22  0215   LIPASE u/L 112     Results from last 7 days   Lab Units 04/21/22  0216   CLARITY UA  Cloudy   COLOR UA  Yellow   SPEC GRAV UA  1 025   PH UA  6 0   GLUCOSE UA mg/dl Negative   KETONES UA mg/dl Negative   BLOOD UA  Small*   PROTEIN UA mg/dl 30 (1+)*   NITRITE UA  Positive*   BILIRUBIN UA  Negative   UROBILINOGEN UA E U /dl 0 2   LEUKOCYTES UA  Moderate*   WBC UA /hpf Innumerable*   RBC UA /hpf Field obscured, unable to enumerate*   BACTERIA UA /hpf Innumerable*   EPITHELIAL CELLS WET PREP /hpf Field obscured, unable to enumerate*     Results from last 7 days   Lab Units 04/21/22  0805   INFLUENZA A PCR  Negative   INFLUENZA B PCR  Negative   RSV PCR  Negative     Results from last 7 days   Lab Units 04/22/22  1617 04/22/22  1616 04/21/22  0317 04/21/22  0216   BLOOD CULTURE  No Growth at 24 hrs  No Growth at 24 hrs  Escherichia coli*  Streptococcus mitis*  --    GRAM STAIN RESULT   --   --  Gram negative rods*  Gram positive cocci in chains*  --    URINE CULTURE   --   --   --  >100,000 cfu/ml Escherichia coli*       Past Medical History:   Diagnosis Date    Arthritis     Gout     Hypertension     Kidney stone      Present on Admission:   Sepsis ([facility] )   ADRIANNE (acute kidney injury) ([facility] )   Metabolic acidosis   History of gout   Obstructive pyelonephritis      Admitting Diagnosis: UTI (urinary tract infection) [N39 0]  Ureterolithiasis [N20 1]  [facility]NE [facility] ([facility] [N17 9]  Age/Sex: 55 y o  male  Admission Orders:  gmf  Npo  OR cysto  Daily weight I/o  Strain all urine     Scheduled Medications:  cefTRIAXone, 2,000 mg, Intravenous, Q24H  heparin (porcine), 5,000 Units, Subcutaneous, Q8H LATRICE  tamsulosin, 0 4 mg, Oral, After Dinner      Continuous IV Infusions:  multi-electrolyte, 125 mL/hr, Intravenous, Continuous      PRN Meds:  acetaminophen, 650 mg, Oral, Q6H PRN  HYDROmorphone, 1 mg, Intravenous, Q4H PRN  ondansetron, 4 mg, Intravenous, Q6H PRN  oxyCODONE, 2 5 mg, Oral, Q4H PRN   Or  oxyCODONE, 5 mg, Oral, Q4H PRN        IP CONSULT TO INFECTIOUS DISEASES  IP CONSULT TO NEPHROLOGY  IP CONSULT TO NUTRITION SERVICES    Network Utilization Review Department  ATTENTION: Please call with any questions or concerns to [phone] and carefully listen to the prompts so that you are directed to the right person   All voicemails are confidential   Miladys Dinero all requests for admission clinical reviews, approved or denied determinations and any other requests to dedicated fax number below belonging to the campus where the patient is receiving treatment   List of dedicated fax numbers for the Facilities:  [address] [place] [address] DENIALS (Administrative/Medical Necessity) [phone]   [address]  [address] (Maternity/NICU/Pediatrics) [phone]   [address] [place] [address]  [address] [place] [id number] [address] [facility] [phone]

## 2022-04-24 NOTE — ASSESSMENT & PLAN NOTE
· Fever and leucocytosis     · Presented to [address] on 4/21/22 with left flank pain   · Noted to have left obstructive uropathy from calculus with pyelonephritis, ADRIANNE, bacteremia   · Transferred to [facility] on 4/22/22 and underwent left ureteral stent placement on 4/22  · IV ampicillin 2 g q 6 hours per Infectious Disease  · Infectious Disease following

## 2022-04-24 NOTE — ASSESSMENT & PLAN NOTE
· Presented to [facility] on 4/21 with left flank pain  · CT A/P - "Left hydroureteronephrosis secondary to a 3 mm stone at the mid left ureter at the L4-L5 disc space level "  · S/p left ureteral stent on 4/22  · Ct Tamsulosin  · Urology following

## 2022-04-24 NOTE — PROGRESS NOTES
[address] [facility]  Progress Note - Brian Rodriguez 1965, 55 y o  male MRN: [id number]  Unit/Bed#: [facility] 812-01 Encounter: [id number]  Primary Care Provider: RITA Chen   Date and time admitted to hospital: 4/22/2022 11:33 AM    * Sepsis ([facility] [address] )  Assessment & Plan  · Fever and leucocytosis  · Presented to [address] on 4/21/22 with left flank pain   · Noted to have left obstructive uropathy from calculus with pyelonephritis, ADRIANNE, bacteremia   · Transferred to [facility] on 4/22/22 and underwent left ureteral stent placement on 4/22  · IV ampicillin 2 g q 6 hours per Infectious Disease  · Infectious Disease following    Bacteremia due to Escherichia coli  Assessment & Plan  · Blood culture - E  Coli  · Strep seen on Biofire but no strep growth on blood culture  · Urine culture - E   Coli  · Due to obstructive pyelonephritis  · IV ampicillin 2 g q 6 hours per Infectious Disease  · Infectious Disease following    Obstructive pyelonephritis  Assessment & Plan  · Status post ureteral stent placement  · IV ampicillin per Infectious Disease  · Supportive care    Acute unilateral obstructive uropathy  Assessment & Plan  · Presented to [address] on 4/21 with left flank pain  · CT A/P - "Left hydroureteronephrosis secondary to a 3 mm stone at the mid left ureter at the L4-L5 disc space level "  · S/p left ureteral stent on 4/22  · Ct Tamsulosin  · Urology following    Morbid obesity with BMI of 50 0-59 9, adult ([facility] )  Assessment & Plan  · BMI 56 81  · Lifestyle modification    ADRIANNE (acute kidney injury) ([facility] )  Assessment & Plan  · Baseline creatinine 1 4 to 1 5  · Creatinine 1 93 on presentation to [facility] and peaked at 4 39 on 4/22  · Kidney function is improving  · Due to obstructive uropathy, element of hypovolemia  · Monitor creatinine  · Avoid nephrotoxic medications and hypotension  · Nephrology following     Metabolic acidosis  Assessment & Plan  · Resolved    History of gout  Assessment & Plan  · With hyperuricemia at 10 6  · Refusing allopurinol  · Outpatient follow-up    Morbid obesity ([address] )  Assessment & Plan  Therapeutic lifestyle modification encouraged        VTE Pharmacologic Prophylaxis: VTE Score: 5 High Risk (Score >/= 5) - Pharmacological DVT Prophylaxis Ordered: heparin  Sequential Compression Devices Ordered  Patient Centered Rounds: I performed bedside rounds with nursing staff today  Discussions with Specialists or Other Care Team Provider:     Education and Discussions with Family / Patient: Discussed with the patient, reports he is keeping his family updated  Time Spent for Care: 30 minutes  More than 50% of total time spent on counseling and coordination of care as described above  Current Length of Stay: 2 day(s)  Current Patient Status: Inpatient   Certification Statement: The patient will continue to require additional inpatient hospital stay due to As outlined  Discharge Plan: Awaiting clinical and symptomatic improvement    Code Status: Level 1 - Full Code    Subjective:     Sitting up in bed  Reports overall feeling better  Flank pain improving  He reports he was able to expel as stone  Encouraged incentive spirometry  Encouraged ambulation as able  Discussed with RN    Objective:     Vitals:   Temp (24hrs), Av 7 °F (37 1 °C), Min:98 6 °F (37 °C), Max:98 8 °F (37 1 °C)    Temp:  [98 6 °F (37 °C)-98 8 °F (37 1 °C)] 98 6 °F (37 °C)  HR:  [96-97] 97  Resp:  [19-20] 20  BP: (136-172)/() 172/101  SpO2:  [93 %-96 %] 96 %  Body mass index is 56 81 kg/m²  Input and Output Summary (last 24 hours):      Intake/Output Summary (Last 24 hours) at 2022 1554  Last data filed at 2022 1535  Gross per 24 hour   Intake 3810 42 ml   Output 3300 ml   Net 510 42 ml       Physical Exam:   Physical Exam     Comfortably sitting up in bed  Obese  Short thick neck  Lungs diminished breath sounds at the bases  No additional sounds  Heart sounds S1 and S2 noted, distant  Abdomen soft nontender  Abdominal obesity noted  Awake alert obeys simple commands  No pedal edema  No rash    Additional Data:     Labs:  Results from last 7 days   Lab Units 04/24/22  0454 04/23/22  0508 04/22/22  1433 04/22/22  0536 04/21/22  0746   WBC Thousand/uL 12 57*   < > 18 09* 19 72*   < >   HEMOGLOBIN g/dL 13 0   < > 13 3 12 6   < >   HEMATOCRIT % 40 7   < > 43 4 40 2   < >   PLATELETS Thousands/uL 176   < > 178 188   < >   BANDS PCT %  --   --  27*  --    < >   NEUTROS PCT %  --   --   --  89*  --    LYMPHS PCT %  --   --   --  5*  --    LYMPHO PCT %  --   --  0*  --    < >   MONOS PCT %  --   --   --  4  --    MONO PCT %  --   --  2*  --    < >   EOS PCT %  --   --  1 1   < >    < > = values in this interval not displayed  Results from last 7 days   Lab Units 04/24/22  0454 04/21/22  1357 04/21/22  0746   SODIUM mmol/L 137   < > 143   POTASSIUM mmol/L 4 0   < > 4 0   CHLORIDE mmol/L 105   < > 109*   CO2 mmol/L 25   < > 18*   BUN mg/dL 44*   < > 28*   CREATININE mg/dL 2 71*   < > 2 66*   ANION GAP mmol/L 7   < > 16*   CALCIUM mg/dL 9 1   < > 8 6   ALBUMIN g/dL  --   --  2 9*   TOTAL BILIRUBIN mg/dL  --   --  0 51   ALK PHOS U/L  --   --  67   ALT U/L  --   --  22   AST U/L  --   --  24   GLUCOSE RANDOM mg/dL 121   < > 118    < > = values in this interval not displayed  Results from last 7 days   Lab Units 04/21/22  0317   LACTIC ACID mmol/L 0 9       Lines/Drains:  Invasive Devices  Report    Peripheral Intravenous Line            Peripheral IV 04/21/22 Left Forearm 3 days    Peripheral IV 04/23/22 Right Forearm 1 day                  Imaging: Reviewed radiology reports from this admission including: abdominal/pelvic CT and ultrasound(s)    Recent Cultures (last 7 days):   Results from last 7 days   Lab Units 04/22/22  1617 04/22/22  1616 04/21/22  0317 04/21/22  0216   BLOOD CULTURE  No Growth at 24 hrs  No Growth at 24 hrs   Escherichia coli*  Streptococcus mitis*  --    GRAM STAIN RESULT   --   --  Gram negative rods*  Gram positive cocci in chains*  --    URINE CULTURE   --   --   --  >100,000 cfu/ml Escherichia coli*       Last 24 Hours Medication List:   Current Facility-Administered Medications   Medication Dose Route Frequency Provider Last Rate    acetaminophen  650 mg Oral Q6H PRN Rodo Duggan,       [START ON 4/25/2022] ampicillin  2,000 mg Intravenous Q6H Aleja Arce MD      heparin (porcine)  5,000 Units Subcutaneous [facility] Rodo Duggan [place]      HYDROmorphone  1 mg Intravenous Q4H PRN Rodo Duggan,       ondansetron  4 mg Intravenous Q6H PRN Rodo Duggan, DO      oxyCODONE  2 5 mg Oral Q4H PRN Rodo Duggan,       Or    oxyCODONE  5 mg Oral Q4H PRN Rodo Duggan, DO      tamsulosin  0 4 mg Oral After Earmon Linnette,           Today, Patient Was Seen By: Gonzalo Agrawal MD    **Please Note: This note may have been constructed using a voice recognition system  **

## 2022-04-24 NOTE — PROGRESS NOTES
Progress Note - Infectious Disease   Maria Victoria Balderas 55 y o  male MRN: [id number]  Unit/Bed#: [facility] 812-01 Encounter: [id number]      Impression/Recommendations:  1   Sepsis   Evolving: Fever, WBC   Due to # 2/3   No other appreciable source   ROS and exam otherwise benign   Hemodynamically stable and nontoxic  Improving  Rec:  · Continue antibiotics as below  · Follow temperatures and WBC closely  · Supportive care as per the primary service     2   E coli bacteremia   Likely due to # 3   No other appreciable source   Repeat blood cultures pending   No history of MDRO  Suspect Strep mitis may be contaminant  Repeat blood cultures negative  Low suspicion for endocarditis  Rec:  · Change antibiotics to ampicillin  · Follow up repeat blood cultures  · Once improved anticipate change to PO antibiotics with plan to complete 10 days total antibiotics     3   Obstructive pyelonephritis   Due to # 4   Urine culture with e  coli  Rec:  · Continue antibiotics as above     4   Obstructive nephrolithiasis   With left hydroureteronephrosis   Status post cystoscopy, retrograde pyelogram, and left ureteral stent placement 4/22/22   Intraoperative findings were notable for purulence in the bladder and left ureter  Rec:  · Follow urine output closely  · Definitive outpatient stone management once infection improved per urology with close follow-up ongoing     5   ADRIANNE on CKD   Baseline Cr 1 4-1 5  Improving  Rec:  · Follow creatinine closely and dose-adjust antibiotics as indicated  · Recheck BMP in AM  · Close nephrology follow-up ongoing     6   MO      The above impression and plan was discussed in detail with the patient      Antibiotics:  Ceftriaxone # 4    Subjective:  Patient seen on AM rounds  Doing well  Passed a small kidney stone this morning  Denies fevers, chills, sweats, nausea, vomiting, or diarrhea      24 Hour Events:  No documented fevers, chills, sweats, nausea, vomiting, or diarrhea  Objective:  Vitals:  Temp:  [98 3 °F (36 8 °C)-98 8 °F (37 1 °C)] 98 6 °F (37 °C)  HR:  [] 96  Resp:  [19] 19  BP: (136-168)/() 168/101  SpO2:  [93 %-95 %] 93 %  Temp (24hrs), Av 6 °F (37 °C), Min:98 3 °F (36 8 °C), Max:98 8 °F (37 1 °C)  Current: Temperature: 98 6 °F (37 °C)    Physical Exam:   General:  No acute distress  Psychiatric:  Awake and alert  Pulmonary:  Normal respiratory excursion without accessory muscle use  Abdomen:  Soft, nontender  Extremities:  No edema  Skin:  No rashes    Lab Results:  I have personally reviewed pertinent labs  Results from last 7 days   Lab Units 22  0454 22  0508 22  1433 22  1357 22  0746 22  0215 22  0215   POTASSIUM mmol/L 4 0 4 3 4 8   < > 4 0   < > 3 9   CHLORIDE mmol/L 105 107 110*   < > 109*   < > 106   CO2 mmol/L 25 24 18*   < > 18*   < > 17*   BUN mg/dL 44* 43* 43*   < > 28*   < > 28*   CREATININE mg/dL 2 71* 3 22* 4 39*   < > 2 66*   < > 1 93*   EGFR ml/min/1 73sq m 25 20 14   < > 25   < > 38   CALCIUM mg/dL 9 1 9 3 9 1   < > 8 6   < > 9 2   AST U/L  --   --   --   --  24  --  21   ALT U/L  --   --   --   --  22  --  25   ALK PHOS U/L  --   --   --   --  67  --  74    < > = values in this interval not displayed  Results from last 7 days   Lab Units 22  0454 22  0508 22  1433   WBC Thousand/uL 12 57* 16 47* 18 09*   HEMOGLOBIN g/dL 13 0 12 8 13 3   PLATELETS Thousands/uL 176 173 178     Results from last 7 days   Lab Units 22  1617 22  1616 22  0317 22  0216   BLOOD CULTURE  No Growth at 24 hrs  No Growth at 24 hrs  Escherichia coli*  Streptococcus mitis*  --    GRAM STAIN RESULT   --   --  Gram negative rods*  Gram positive cocci in chains*  --    URINE CULTURE   --   --   --  >100,000 cfu/ml Escherichia coli*       Imaging Studies:   I have personally reviewed pertinent imaging study reports and images in PACS      EKG, Pathology, and Other Studies:   I have personally reviewed pertinent reports

## 2022-04-24 NOTE — ASSESSMENT & PLAN NOTE
· Blood culture - E  Coli  · Strep seen on Biofire but no strep growth on blood culture  · Urine culture - E   Coli  · Due to obstructive pyelonephritis  · IV ampicillin 2 g q 6 hours per Infectious Disease  · Infectious Disease following

## 2022-04-24 NOTE — ASSESSMENT & PLAN NOTE
· Baseline creatinine 1 4 to 1 5  · Creatinine 1 93 [facility] and peaked at 4 39 on 4/22  · Kidney function is improving  · Due to obstructive uropathy, element of hypovolemia  · Monitor creatinine  · Avoid nephrotoxic medications and hypotension  · Nephrology following

## 2022-04-24 NOTE — TELEPHONE ENCOUNTER
The following has been sent to our office:    The patient passed a stone while voiding, please change the original plan for staged ureteroscopic stone intervention to the following:  CT stone protocol in 2 weeks, arrange for cystoscopy and potential ureteral stent removal pending CT scan results in 4 weeks

## 2022-04-24 NOTE — CASE MANAGEMENT
Case Management Assessment & Discharge Planning Note    Patient name Evens Saenz  [facility] [address] [place] [address] 812/PPHP [id number] MRN [id number]  : 1965 Date 2022       Current Admission Date: 2022  Current Admission Diagnosis:Sepsis [facility])   Patient Active Problem List    Diagnosis Date Noted    Obstructive pyelonephritis 2022    Acute unilateral obstructive uropathy 2022    Mixed hyperlipidemia 2021    Morbid obesity with BMI of 50 0-59 9, adult ([facility] ) 2021    Acute pain of left knee 2021    Insomnia 2019    Hypertension 2019    Sepsis ([facility] ) 2019    Chronic kidney disease, stage 3 ([place] [address] ) 2019    ADRIANNE (acute kidney injury) ([place] [address] ) 2019    Bacteremia due to Escherichia coli     Metabolic acidosis 8654    History of gout 07/15/2019    Vitamin D deficiency 06/10/2015    Morbid obesity ([place] [address] ) 2015      LOS (days): 2  Geometric Mean LOS (GMLOS) (days): 3 50  Days to GMLOS:1 5     OBJECTIVE:  PATIENT READMITTED TO HOSPITAL  Risk of Unplanned Readmission Score: 11         Current admission status: Inpatient       Preferred Pharmacy:   [facility]/pharmacy #[id number]- [address] - [address] [place] [id number]  Phone: [phone] Fax: [phone] [facility] [phone]  Phone: [phone] Fax: [phone]    Primary Care Provider: RITA Desir    Primary Insurance: BLUE CROSS  Secondary Insurance:     ASSESSMENT:  [address] 26 Proxies    There are no active Health Care Proxies on file                   Readmission Root Cause  30 Day Readmission: No (Transfer from [facility])    Patient Information  Admitted from[de-identified] Home  Mental Status: Alert  During Assessment patient was accompanied by: Not accompanied during assessment  Assessment information provided by[de-identified] Patient (from [address] notes)  Primary Caregiver: Self  Support Systems: Self  County of Residence: [facility] Food entry access options   Select all that apply : Stairs  Number of steps to enter home : 3  Do the steps have railings?: Yes  Type of Current Residence: 2 story home  Upon entering residence, is there a bedroom on the main floor (no further steps)?: No  A bedroom is located on the following floor levels of residence (select all that apply):: 2nd Floor  Upon entering residence, is there a bathroom on the main floor (no further steps)?: Yes (Bathroom on 1st & 2nd floor)  Number of steps to 2nd floor from main floor: One Flight  In the last 12 months, was there a time when you were not able to pay the mortgage or rent on time?: No  In the last 12 months, was there a time when you did not have a steady place to sleep or slept in a shelter (including now)?: No  Homeless/housing insecurity resource given?: N/A  Living Arrangements: Lives Alone  Is patient a ?: No    Activities of Daily Living Prior to Admission  Functional Status: Independent  Ambulates independently?: Yes  Does patient use assisted devices?: No  Does patient currently own DME?: Yes  What DME does the patient currently own?: [employer]  Does patient have a history of Outpatient Therapy (PT/OT)?: Yes  Does the patient have a history of Short-Term Rehab?: No  Does patient have a history of HHC?: No  Does patient currently have [facility]?: No         Patient Information Continued  Income Source: Employed  Food insecurity resource given?: N/A  Does patient receive dialysis treatments?: No  Does patient have a history of substance abuse?: No  Does patient have a history of Mental Health Diagnosis?: No         Means of Transportation  Means of Transport to Appts[de-identified] Drives Self        DISCHARGE DETAILS:    Discharge planning discussed with[de-identified] patient  Freedom of Choice: Yes                   Contacts  Patient Contacts: Carmelo Crawford  Relationship to Patient[de-identified] Family  Contact Method: Phone  Phone Number: [phone]  Reason/Outcome: Emergency Contact

## 2022-04-25 ENCOUNTER — TRANSITIONAL CARE MANAGEMENT (OUTPATIENT)
Dept: INTERNAL MEDICINE CLINIC | Facility: CLINIC | Age: 57
End: 2022-04-25

## 2022-04-25 PROBLEM — E87.20 METABOLIC ACIDOSIS: Status: RESOLVED | Noted: 2019-08-04 | Resolved: 2022-04-25

## 2022-04-25 PROBLEM — E87.2 METABOLIC ACIDOSIS: Status: RESOLVED | Noted: 2019-08-04 | Resolved: 2022-04-25

## 2022-04-25 LAB
ANION GAP SERPL CALCULATED.3IONS-SCNC: 2 MMOL/L (ref 4–13)
BACTERIA BLD CULT: ABNORMAL
BACTERIA BLD CULT: ABNORMAL
BUN SERPL-MCNC: 44 MG/DL (ref 5–25)
CALCIUM SERPL-MCNC: 9.5 MG/DL (ref 8.3–10.1)
CHLORIDE SERPL-SCNC: 108 MMOL/L (ref 100–108)
CO2 SERPL-SCNC: 28 MMOL/L (ref 21–32)
CREAT SERPL-MCNC: 2.55 MG/DL (ref 0.6–1.3)
E COLI DNA BLD POS QL NAA+NON-PROBE: DETECTED
GFR SERPL CREATININE-BSD FRML MDRD: 27 ML/MIN/1.73SQ M
GLUCOSE SERPL-MCNC: 113 MG/DL (ref 65–140)
GRAM STN SPEC: ABNORMAL
GRAM STN SPEC: ABNORMAL
POTASSIUM SERPL-SCNC: 3.9 MMOL/L (ref 3.5–5.3)
SODIUM SERPL-SCNC: 138 MMOL/L (ref 136–145)
STREPTOCOCCUS DNA BLD POS NAA+NON-PROBE: DETECTED

## 2022-04-25 PROCEDURE — 99232 SBSQ HOSP IP/OBS MODERATE 35: CPT | Performed by: INTERNAL MEDICINE

## 2022-04-25 PROCEDURE — 99232 SBSQ HOSP IP/OBS MODERATE 35: CPT | Performed by: FAMILY MEDICINE

## 2022-04-25 PROCEDURE — 80048 BASIC METABOLIC PNL TOTAL CA: CPT | Performed by: INTERNAL MEDICINE

## 2022-04-25 RX ORDER — AMLODIPINE BESYLATE 5 MG/1
5 TABLET ORAL DAILY
Status: DISCONTINUED | OUTPATIENT
Start: 2022-04-25 | End: 2022-04-28

## 2022-04-25 RX ADMIN — AMPICILLIN 2000 MG: 2 INJECTION, POWDER, FOR SOLUTION INTRAVENOUS at 23:10

## 2022-04-25 RX ADMIN — AMLODIPINE BESYLATE 5 MG: 5 TABLET ORAL at 12:13

## 2022-04-25 RX ADMIN — OXYCODONE HYDROCHLORIDE 2.5 MG: 5 TABLET ORAL at 10:40

## 2022-04-25 RX ADMIN — AMPICILLIN 2000 MG: 2 INJECTION, POWDER, FOR SOLUTION INTRAVENOUS at 17:51

## 2022-04-25 RX ADMIN — AMPICILLIN 2000 MG: 2 INJECTION, POWDER, FOR SOLUTION INTRAVENOUS at 12:13

## 2022-04-25 RX ADMIN — OXYCODONE HYDROCHLORIDE 2.5 MG: 5 TABLET ORAL at 04:06

## 2022-04-25 RX ADMIN — HEPARIN SODIUM 5000 UNITS: 5000 INJECTION INTRAVENOUS; SUBCUTANEOUS at 13:41

## 2022-04-25 RX ADMIN — AMPICILLIN 2000 MG: 2 INJECTION, POWDER, FOR SOLUTION INTRAVENOUS at 05:56

## 2022-04-25 RX ADMIN — HEPARIN SODIUM 5000 UNITS: 5000 INJECTION INTRAVENOUS; SUBCUTANEOUS at 22:06

## 2022-04-25 RX ADMIN — HEPARIN SODIUM 5000 UNITS: 5000 INJECTION INTRAVENOUS; SUBCUTANEOUS at 05:56

## 2022-04-25 RX ADMIN — TAMSULOSIN HYDROCHLORIDE 0.4 MG: 0.4 CAPSULE ORAL at 17:51

## 2022-04-25 RX ADMIN — OXYCODONE HYDROCHLORIDE 5 MG: 5 TABLET ORAL at 19:57

## 2022-04-25 NOTE — ASSESSMENT & PLAN NOTE
· Status post ureteral stent placement  · IV ampicillin per Infectious Disease  · Supportive care  ·  transition to p o  amoxicillin at the time of discharge to finish a total of 10 day course

## 2022-04-25 NOTE — ASSESSMENT & PLAN NOTE
· Fever and leucocytosis     · Presented to [address] on 4/21/22 with left flank pain   · Noted to have left obstructive uropathy from calculus with pyelonephritis, ADRIANNE, bacteremia   · Transferred to [facility] on 4/22/22 and underwent left ureteral stent placement on 4/22  · IV ampicillin 2 g q 6 hours per Infectious Disease  · Infectious Disease following

## 2022-04-25 NOTE — PROGRESS NOTES
[address] [facility]  Progress Note - Lindie Route 1965, 55 y o  male MRN: [id number]  Unit/Bed#: [facility] 812-01 Encounter: [id number]  Primary Care Provider: RITA Hammond   Date and time admitted to hospital: 4/22/2022 11:33 AM    Obstructive pyelonephritis  Assessment & Plan  · Status post ureteral stent placement  · IV ampicillin per Infectious Disease  · Supportive care  ·  transition to p o  amoxicillin at the time of discharge to finish a total of 10 day course    Acute unilateral obstructive uropathy  Assessment & Plan  · Presented to [address] [place] [address] Drive on 4/21 with left flank pain  · CT A/P - "Left hydroureteronephrosis secondary to a 3 mm stone at the mid left ureter at the L4-L5 disc space level "  · S/p left ureteral stent on 4/22  · Ct Tamsulosin  · Urology following    Morbid obesity with BMI of 50 0-59 9, adult ([facility] )  Assessment & Plan  · BMI 56 81  · Lifestyle modification    ADRIANNE (acute kidney injury) ([facility] )  Assessment & Plan  · Baseline creatinine 1 4 to 1 5  · Creatinine 1 93 on presentation to [facility] and peaked at 4 39 on 4/22  · Kidney function is improving- down  to 2 5  · Due to obstructive uropathy, element of hypovolemia  · Monitor creatinine  · Avoid nephrotoxic medications and hypotension  · Nephrology following     Bacteremia due to Escherichia coli  Assessment & Plan  · Blood culture - E  Coli  · Strep seen on Biofire but no strep growth on blood culture  · Urine culture - E   Coli  · Due to obstructive pyelonephritis  · IV ampicillin 2 g q 6 hours per Infectious Disease  · Infectious Disease following  ·   Plan to transition to p o  amoxicillin at the time of discharge to finish a total of 10 day course    History of gout  Assessment & Plan  · With hyperuricemia at 10 6  · Refusing allopurinol  · Outpatient follow-up    Morbid obesity [facility])  Assessment & Plan  Therapeutic lifestyle modification encouraged    * Sepsis ([facility] [address] )  Assessment & Plan  · Fever and leucocytosis  · Presented to [address] [place] [address] Drive on 22 with left flank pain   · Noted to have left obstructive uropathy from calculus with pyelonephritis, ADRIANNE, bacteremia   · Transferred to \[facility]"" on 22 and underwent left ureteral stent placement on   · IV ampicillin 2 g q 6 hours per Infectious Disease  · Infectious Disease following    Metabolic acidosis-resolved as of 2022  Assessment & Plan  · Resolved      VTE Pharmacologic Prophylaxis:   Pharmacologic: Heparin  Mechanical VTE Prophylaxis in Place: Yes    Patient Centered Rounds: I have performed bedside rounds with nursing staff today  Discussions with Specialists or Other Care Team Provider:     Education and Discussions with Family / Patient: father-    Time Spent for Care: 30 minutes  More than 50% of total time spent on counseling and coordination of care as described above  Current Length of Stay: 3 day(s)    Current Patient Status: Inpatient   Certification Statement: The patient will continue to require additional inpatient hospital stay due to iv abx    Discharge Plan:     Code Status: Level 1 - Full Code      Subjective:   Patient seen and examined  Feeling better   no specific complaints offered   no pain    Objective:     Vitals:   Temp (24hrs), Av 1 °F (37 3 °C), Min:97 8 °F (36 6 °C), Max:99 7 °F (37 6 °C)    Temp:  [97 8 °F (36 6 °C)-99 7 °F (37 6 °C)] 98 4 °F (36 9 °C)  HR:  [] 99  Resp:  [18-20] 18  BP: (134-178)/() 154/82  SpO2:  [94 %-96 %] 95 %  Body mass index is 56 81 kg/m²  Input and Output Summary (last 24 hours): Intake/Output Summary (Last 24 hours) at 2022 1853  Last data filed at 2022 1345  Gross per 24 hour   Intake 1580 ml   Output 3675 ml   Net -2095 ml       Physical Exam:     Physical Exam  Constitutional:       General: He is not in acute distress  Appearance: He is obese  HENT:      Head: Normocephalic and atraumatic        Nose: Nose normal  Eyes:      General: No scleral icterus  Cardiovascular:      Rate and Rhythm: Normal rate and regular rhythm  Pulses: Normal pulses  Pulmonary:      Effort: Pulmonary effort is normal       Comments: Decreased breath sounds bilateral  Abdominal:      General: Abdomen is flat  There is no distension  Musculoskeletal:         General: Normal range of motion  Cervical back: Normal range of motion and neck supple  Skin:     General: Skin is warm  Neurological:      General: No focal deficit present  Mental Status: He is alert  Additional Data:     Labs:    Results from last 7 days   Lab Units 04/24/22  0454 04/23/22  0508 04/22/22  1433 04/22/22  0536 04/21/22  0746   WBC Thousand/uL 12 57*   < > 18 09* 19 72*   < >   HEMOGLOBIN g/dL 13 0   < > 13 3 12 6   < >   HEMATOCRIT % 40 7   < > 43 4 40 2   < >   PLATELETS Thousands/uL 176   < > 178 188   < >   NEUTROS PCT %  --   --   --  89*  --    LYMPHS PCT %  --   --   --  5*  --    LYMPHO PCT %  --   --  0*  --    < >   MONOS PCT %  --   --   --  4  --    MONO PCT %  --   --  2*  --    < >   EOS PCT %  --   --  1 1   < >    < > = values in this interval not displayed  Results from last 7 days   Lab Units 04/25/22  1041 04/21/22  1357 04/21/22  0746   POTASSIUM mmol/L 3 9   < > 4 0   CHLORIDE mmol/L 108   < > 109*   CO2 mmol/L 28   < > 18*   BUN mg/dL 44*   < > 28*   CREATININE mg/dL 2 55*   < > 2 66*   CALCIUM mg/dL 9 5   < > 8 6   ALK PHOS U/L  --   --  67   ALT U/L  --   --  22   AST U/L  --   --  24    < > = values in this interval not displayed  * I Have Reviewed All Lab Data Listed Above  * Additional Pertinent Lab Tests Reviewed:  [address] Admission Reviewed    Imaging:    Imaging Reports Reviewed Today Include:   Imaging Personally Reviewed by Myself Includes:      Recent Cultures (last 7 days):     Results from last 7 days   Lab Units 04/22/22  1617 04/22/22  1616 04/21/22  0317 04/21/22  0216 BLOOD CULTURE  No Growth at 48 hrs  No Growth at 48 hrs  Escherichia coli*  Streptococcus mitis*  --    GRAM STAIN RESULT   --   --  Gram negative rods*  Gram positive cocci in chains*  --    URINE CULTURE   --   --   --  >100,000 cfu/ml Escherichia coli*       Last 24 Hours Medication List:   Current Facility-Administered Medications   Medication Dose Route Frequency Provider Last Rate    acetaminophen  650 mg Oral Q6H PRN Alexander Longest, DO      amLODIPine  5 mg Oral Daily Reina Sullivan MD      ampicillin  2,000 mg Intravenous Q6H Peyton Napoles MD 2,000 mg (04/25/22 2234)    heparin (porcine)  5,000 Units Subcutaneous [facility] [place] Longest, DO      HYDROmorphone  1 mg Intravenous Q4H PRN Alexander Longest, DO      ondansetron  4 mg Intravenous Q6H PRN [place] Longest, DO      oxyCODONE  2 5 mg Oral Q4H PRN Alexander Longest, DO      Or    oxyCODONE  5 mg Oral Q4H PRN [place] Longest, DO      tamsulosin  0 4 mg Oral After Storm Butler DO          Today, Patient Was Seen By: Comfort De Paz MD    ** Please Note: Dictation voice to text software may have been used in the creation of this document   **

## 2022-04-25 NOTE — PLAN OF CARE
Problem: Potential for Falls  Goal: Patient will remain free of falls  Description: INTERVENTIONS:  - Educate patient/family on patient safety including physical limitations  - Instruct patient to call for assistance with activity   - Consult OT/PT to assist with strengthening/mobility   - Keep Call bell within reach  - Keep bed low and locked with side rails adjusted as appropriate  - Keep care items and personal belongings within reach  - Initiate and maintain comfort rounds  - Make Fall Risk Sign visible to staff  - Apply yellow socks and bracelet for high fall risk patients  - Consider moving patient to room near nurses station  Outcome: Progressing     Problem: MOBILITY - ADULT  Goal: Maintain or return to baseline ADL function  Description: INTERVENTIONS:  -  Assess patient's ability to carry out ADLs; assess patient's baseline for ADL function and identify physical deficits which impact ability to perform ADLs (bathing, care of mouth/teeth, toileting, grooming, dressing, etc )  - Assess/evaluate cause of self-care deficits   - Assess range of motion  - Assess patient's mobility; develop plan if impaired  - Assess patient's need for assistive devices and provide as appropriate  - Encourage maximum independence but intervene and supervise when necessary  - Involve family in performance of ADLs  - Assess for home care needs following discharge   - Consider OT consult to assist with ADL evaluation and planning for discharge  - Provide patient education as appropriate  Outcome: Progressing  Goal: Maintains/Returns to pre admission functional level  Description: INTERVENTIONS:  - Perform BMAT or MOVE assessment daily    - Set and communicate daily mobility goal to care team and patient/family/caregiver     - Collaborate with rehabilitation services on mobility goals if consulted  - Out of bed for toileting  - Record patient progress and toleration of activity level   Outcome: Progressing     Problem: PAIN - ADULT  Goal: Verbalizes/displays adequate comfort level or baseline comfort level  Description: Interventions:  - Encourage patient to monitor pain and request assistance  - Assess pain using appropriate pain scale  - Administer analgesics based on type and severity of pain and evaluate response  - Implement non-pharmacological measures as appropriate and evaluate response  - Consider cultural and social influences on pain and pain management  - Notify physician/advanced practitioner if interventions unsuccessful or patient reports new pain  Outcome: Progressing     Problem: GENITOURINARY - ADULT  Goal: Urinary catheter remains patent  Description: INTERVENTIONS:  - Assess patency of urinary catheter  - If patient has a chronic cook, consider changing catheter if non-functioning  - Follow guidelines for intermittent irrigation of non-functioning urinary catheter  Outcome: Progressing     Problem: Nutrition/Hydration-ADULT  Goal: Nutrient/Hydration intake appropriate for improving, restoring or maintaining nutritional needs  Description: Monitor and assess patient's nutrition/hydration status for malnutrition  Collaborate with interdisciplinary team and initiate plan and interventions as ordered  Monitor patient's weight and dietary intake as ordered or per policy  Utilize nutrition screening tool and intervene as necessary  Determine patient's food preferences and provide high-protein, high-caloric foods as appropriate       INTERVENTIONS:  - Monitor oral intake, urinary output, labs, and treatment plans  - Assess nutrition and hydration status and recommend course of action  - Evaluate amount of meals eaten  - Assist patient with eating if necessary   - Allow adequate time for meals  - Recommend/ encourage appropriate diets, oral nutritional supplements, and vitamin/mineral supplements  - Order, calculate, and assess calorie counts as needed  - Recommend, monitor, and adjust tube feedings and TPN/PPN based on assessed needs  - Assess need for intravenous fluids  - Provide specific nutrition/hydration education as appropriate  - Include patient/family/caregiver in decisions related to nutrition  Outcome: Progressing

## 2022-04-25 NOTE — ASSESSMENT & PLAN NOTE
· Presented to [facility] on 4/21 with left flank pain  · CT A/P - "Left hydroureteronephrosis secondary to a 3 mm stone at the mid left ureter at the L4-L5 disc space level "  · S/p left ureteral stent on 4/22  · Ct Tamsulosin  · Urology following

## 2022-04-25 NOTE — TELEPHONE ENCOUNTER
Patient currently admitted   Will monitor for d/c and call patient with update plan per Dr Lester Felix

## 2022-04-25 NOTE — UTILIZATION REVIEW
[address] [facility] [address] [place] [address] 186  [facility]  [phone]     Tax ID: [id number]     NPI: [id number]      Patient Demographics    Name Patient ID SSN Gender Identity Birth Date   Pramod Small [id number]  Male [birth year] (55 yrs)     Address Phone Email     [address] [place] [address] [phone] (M)   [phone] (H) [email or web address]       Reg Status PCP Date Last Verified Next Review Date    Verified Marcia Gibsonkelsey, [facility] 04/21/22 05/21/22        Admission Information    Arrival Date/Time: 04/21/2022 0139 Admit Date/Time: 04/21/2022 0139 IP Adm  Date/Time: 04/21/2022 0931   Admission Type: Emergency Point of Origin: Home/work/non-health Care Facilty Admit Category:    Means of Arrival: Walk-in Primary Service: Hospitalist Secondary Service:    Transfer Source:  Service Area: [place] [address] Unit: [place] [address] Surgical Unit   Admit Provider: Karri Smith DO Attending Provider: Young Bond DO Referring Provider:      Attending NPI#    Trish Re [facility] Account [de-identified]  CVG-F/O Precert Number 1300 [employer] Phone Authorization Number   1   BLUE CROSS   MISC BLUE CROSS   [phone] [id number]      Active Insurance as of 4/21/2022      Primary Coverage    Payor Plan Insurance Group Employer/Plan Group   BLUE CROSS MISC BLUE CROSS [id number]    Coverage Address Coverage Phone Number Coverage Fax Number Effective Dates   [address] [phone]  1/1/2022 - None Entered   CARIDAD Wilkes Name Subscriber Birth Date Member ID    [facility] 1965 [id number]        Generated on 4/25/22 11:53 AM

## 2022-04-25 NOTE — ASSESSMENT & PLAN NOTE
· Baseline creatinine 1 4 to 1 5  · Creatinine 1 93 on presentation to [facility] and peaked at 4 39 on 4/22  · Kidney function is improving- down  to 2 5  · Due to obstructive uropathy, element of hypovolemia  · Monitor creatinine  · Avoid nephrotoxic medications and hypotension  · Nephrology following

## 2022-04-25 NOTE — UTILIZATION REVIEW
[facility]  300 [facility], [address]   [phone]     Tax ID: [id number]         NPI: [id number]      Patient Demographics    Name Patient ID [id number] Gender Identity Birth Date   Daphney Marina [id number]  Male [birth year] (55 yrs)     Address Phone Email     [address] [place] [address] [phone] (M)   [phone] (H) [email or web address]       Reg Status PCP Date Last Verified Next Review Date    Verified Kody Jones, [facility] 04/21/22 05/21/22        Admission Information    Arrival Date/Time:  Admit Date/Time: 04/22/2022 1133 IP Adm  Date/Time: 04/21/2022 0931   Admission Type: Elective Point of Origin: [address] Category:    Means of Arrival:  Primary Service: Hospitalist Secondary Service:    Transfer Source:  Service Area: [place] [address] Unit: [address] [place] [address]   Admit Provider: Roxanne Arora DO Attending Provider: Roxanne Arora DO Referring Provider:      Attending NPI#    Saida Orta Md [address] Account [de-identified]  CVG-F/O Precert Number 1300 [employer] Phone Authorization Number   1   BLUE CROSS   MISC BLUE CROSS    [id number]     Active Insurance as of 4/22/2022      Primary Coverage    Payor Plan Insurance Group Employer/Plan Group   BLUE CROSS MISC BLUE CROSS [id number]    Coverage Address Coverage Phone Number Coverage Fax Number Effective Dates   [address] [phone]  1/1/2022 - None Entered   [place] MILO Wilkes Name Subscriber Birth Date Member ID    Lina Guerrero 1965 [id number]

## 2022-04-25 NOTE — PROGRESS NOTES
Progress Note - Infectious Disease   Abel Karimi 55 y o  male MRN: [id number]  Unit/Bed#: [facility] 812-01 Encounter: [id number]      Impression/Recommendations:  1   Sepsis   Evolving: Fever, WBC   Due to # 2/3   No other appreciable source   ROS and exam otherwise benign   Hemodynamically stable and nontoxic   Improving  Rec:  · Continue antibiotics as below  · Follow temperatures and WBC closely  · Supportive care as per the primary service     2   E coli bacteremia   Likely due to # 3   No other appreciable source   Repeat blood cultures pending   No history of MDRO   Suspect Strep mitis may be contaminant as not isolated in urine cultures  Repeat blood cultures negative  Low suspicion for endocarditis  Rec:  · Continue ampicillin while in-house  · Follow up repeat blood cultures  · Once ready for D/C can change to Amoxicillin 500 mg PO Q8 with plan to continue through 4/30 to complete 10 days total antibiotics     3   Obstructive pyelonephritis   Due to # 4   Urine culture with e  coli  Rec:  · Continue antibiotics as above     4   Obstructive nephrolithiasis   With left hydroureteronephrosis   Status post cystoscopy, retrograde pyelogram, and left ureteral stent placement 4/22/22   Intraoperative findings were notable for purulence in the bladder and left ureter  Rec:  · Follow urine output closely  · Definitive outpatient stone management once infection improved per urology with close follow-up ongoing     5   ADRIANNE on CKD   Baseline Cr 1 4-1 5  Improving  Rec:  · Follow creatinine closely and dose-adjust antibiotics as indicated  · Recheck BMP in AM  · Close nephrology follow-up ongoing     6   MO      The above impression and plan was discussed in detail with the patient  The patient is stable from an ID standpoint      Antibiotics:  Ampicillin #1  Antibiotics #5    Subjective:  Patient seen on AM rounds  Denies fevers, chills, sweats, nausea, vomiting, or diarrhea      24 Hour Events:  No documented fevers, chills, sweats, nausea, vomiting, or diarrhea  Objective:  Vitals:  Temp:  [97 8 °F (36 6 °C)-99 7 °F (37 6 °C)] 97 8 °F (36 6 °C)  HR:  [] 98  Resp:  [19-20] 19  BP: (134-178)/() 134/73  SpO2:  [94 %-96 %] 96 %  Temp (24hrs), Av 2 °F (37 3 °C), Min:97 8 °F (36 6 °C), Max:99 7 °F (37 6 °C)  Current: Temperature: 97 8 °F (36 6 °C)    Physical Exam:   General:  No acute distress  Psychiatric:  Awake and alert  Pulmonary:  Normal respiratory excursion without accessory muscle use  Abdomen:  Soft, nontender  Extremities:  No edema  Skin:  No rashes    Lab Results:  I have personally reviewed pertinent labs  Results from last 7 days   Lab Units 22  1041 22  0454 22  0508 22  1357 22  0746 22  0215 22  0215   POTASSIUM mmol/L 3 9 4 0 4 3   < > 4 0   < > 3 9   CHLORIDE mmol/L 108 105 107   < > 109*   < > 106   CO2 mmol/L 28 25 24   < > 18*   < > 17*   BUN mg/dL 44* 44* 43*   < > 28*   < > 28*   CREATININE mg/dL 2 55* 2 71* 3 22*   < > 2 66*   < > 1 93*   EGFR ml/min/1 73sq m 27 25 20   < > 25   < > 38   CALCIUM mg/dL 9 5 9 1 9 3   < > 8 6   < > 9 2   AST U/L  --   --   --   --  24  --  21   ALT U/L  --   --   --   --  22  --  25   ALK PHOS U/L  --   --   --   --  67  --  74    < > = values in this interval not displayed  Results from last 7 days   Lab Units 22  0454 22  0508 22  1433   WBC Thousand/uL 12 57* 16 47* 18 09*   HEMOGLOBIN g/dL 13 0 12 8 13 3   PLATELETS Thousands/uL 176 173 178     Results from last 7 days   Lab Units 22  1617 22  1616 22  0317 22  0216   BLOOD CULTURE  No Growth at 48 hrs  No Growth at 48 hrs   Escherichia coli*  Streptococcus mitis*  --    GRAM STAIN RESULT   --   --  Gram negative rods*  Gram positive cocci in chains*  --    URINE CULTURE   --   --   --  >100,000 cfu/ml Escherichia coli*       Imaging Studies:   I have personally reviewed pertinent imaging study reports and images in PACS  EKG, Pathology, and Other Studies:   I have personally reviewed pertinent reports

## 2022-04-25 NOTE — ASSESSMENT & PLAN NOTE
· Blood culture - E  Coli  · Strep seen on Biofire but no strep growth on blood culture  · Urine culture - E   Coli  · Due to obstructive pyelonephritis  · IV ampicillin 2 g q 6 hours per Infectious Disease  · Infectious Disease following  ·   Plan to transition to p o  amoxicillin at the time of discharge to finish a total of 10 day course

## 2022-04-25 NOTE — UTILIZATION REVIEW
Inpatient Admission Authorization Request   NOTIFICATION OF INPATIENT ADMISSION/INPATIENT AUTHORIZATION REQUEST   SERVICING FACILITY:   [facility]  Address: [address] [place] [address], [address] [place] [address]  Tax ID: [id number]  NPI: [id number]  Place of Service: Inpatient [address] [facility] Code: 24     ATTENDING PROVIDER:  Attending Name and NPI#: Dela Rubinstein, [place] [[id number]]  Address: [address] [place] [address], [address] [place] [address]  Phone: [phone]     UTILIZATION REVIEW CONTACT:  Narciso Renteria Utilization   Network Utilization Review Department  Phone: [phone]  Fax: [phone]  Email: [email or web address]     PHYSICIAN ADVISORY SERVICES:  FOR PJXY-[id number] REVIEW - MEDICAL NECESSITY DENIAL  Phone: [phone]  Fax: [phone]  Email: [email or web address]     TYPE OF REQUEST:  Inpatient Status     ADMISSION INFORMATION:  ADMISSION DATE/TIME: 4/22/22  3:21 PM  PATIENT DIAGNOSIS CODE/DESCRIPTION:  UTI (urinary tract infection) [N39 0]  Ureterolithiasis [N20 1]  ADRIANNE (acute kidney injury) ([facility] [address] ) [N17 9]  DISCHARGE DATE/TIME: No discharge date for patient encounter  IMPORTANT INFORMATION:  Please contact the Narciso Renteria directly with any questions or concerns regarding this request  Department voicemails are confidential     Send requests for admission clinical reviews, concurrent reviews, approvals, and administrative denials due to lack of clinical to fax [phone]

## 2022-04-26 LAB
ANION GAP SERPL CALCULATED.3IONS-SCNC: 5 MMOL/L (ref 4–13)
BUN SERPL-MCNC: 40 MG/DL (ref 5–25)
CALCIUM SERPL-MCNC: 9.5 MG/DL (ref 8.3–10.1)
CHLORIDE SERPL-SCNC: 102 MMOL/L (ref 100–108)
CO2 SERPL-SCNC: 28 MMOL/L (ref 21–32)
CREAT SERPL-MCNC: 2.54 MG/DL (ref 0.6–1.3)
ERYTHROCYTE [DISTWIDTH] IN BLOOD BY AUTOMATED COUNT: 14 % (ref 11.6–15.1)
GFR SERPL CREATININE-BSD FRML MDRD: 27 ML/MIN/1.73SQ M
GLUCOSE SERPL-MCNC: 121 MG/DL (ref 65–140)
HCT VFR BLD AUTO: 40.4 % (ref 36.5–49.3)
HGB BLD-MCNC: 12.7 G/DL (ref 12–17)
MCH RBC QN AUTO: 30 PG (ref 26.8–34.3)
MCHC RBC AUTO-ENTMCNC: 31.4 G/DL (ref 31.4–37.4)
MCV RBC AUTO: 96 FL (ref 82–98)
PLATELET # BLD AUTO: 212 THOUSANDS/UL (ref 149–390)
PMV BLD AUTO: 10 FL (ref 8.9–12.7)
POTASSIUM SERPL-SCNC: 3.8 MMOL/L (ref 3.5–5.3)
RBC # BLD AUTO: 4.23 MILLION/UL (ref 3.88–5.62)
SODIUM SERPL-SCNC: 135 MMOL/L (ref 136–145)
WBC # BLD AUTO: 11.15 THOUSAND/UL (ref 4.31–10.16)

## 2022-04-26 PROCEDURE — 97166 OT EVAL MOD COMPLEX 45 MIN: CPT

## 2022-04-26 PROCEDURE — 99232 SBSQ HOSP IP/OBS MODERATE 35: CPT | Performed by: INTERNAL MEDICINE

## 2022-04-26 PROCEDURE — 97535 SELF CARE MNGMENT TRAINING: CPT

## 2022-04-26 PROCEDURE — 80048 BASIC METABOLIC PNL TOTAL CA: CPT | Performed by: INTERNAL MEDICINE

## 2022-04-26 PROCEDURE — 97163 PT EVAL HIGH COMPLEX 45 MIN: CPT

## 2022-04-26 PROCEDURE — 85027 COMPLETE CBC AUTOMATED: CPT | Performed by: FAMILY MEDICINE

## 2022-04-26 RX ADMIN — AMPICILLIN 2000 MG: 2 INJECTION, POWDER, FOR SOLUTION INTRAVENOUS at 13:20

## 2022-04-26 RX ADMIN — AMPICILLIN 2000 MG: 2 INJECTION, POWDER, FOR SOLUTION INTRAVENOUS at 23:20

## 2022-04-26 RX ADMIN — HEPARIN SODIUM 5000 UNITS: 5000 INJECTION INTRAVENOUS; SUBCUTANEOUS at 06:27

## 2022-04-26 RX ADMIN — OXYCODONE HYDROCHLORIDE 5 MG: 5 TABLET ORAL at 17:50

## 2022-04-26 RX ADMIN — HEPARIN SODIUM 5000 UNITS: 5000 INJECTION INTRAVENOUS; SUBCUTANEOUS at 22:40

## 2022-04-26 RX ADMIN — AMPICILLIN 2000 MG: 2 INJECTION, POWDER, FOR SOLUTION INTRAVENOUS at 06:25

## 2022-04-26 RX ADMIN — AMPICILLIN 2000 MG: 2 INJECTION, POWDER, FOR SOLUTION INTRAVENOUS at 17:50

## 2022-04-26 RX ADMIN — TAMSULOSIN HYDROCHLORIDE 0.4 MG: 0.4 CAPSULE ORAL at 17:57

## 2022-04-26 RX ADMIN — AMLODIPINE BESYLATE 5 MG: 5 TABLET ORAL at 10:14

## 2022-04-26 RX ADMIN — HEPARIN SODIUM 5000 UNITS: 5000 INJECTION INTRAVENOUS; SUBCUTANEOUS at 13:20

## 2022-04-26 RX ADMIN — ACETAMINOPHEN 650 MG: 325 TABLET ORAL at 10:14

## 2022-04-26 NOTE — ASSESSMENT & PLAN NOTE
· Blood culture - E  Coli  · Strep seen on Biofire but no strep growth on blood culture  · Urine culture - E   Coli  · Due to obstructive pyelonephritis  · IV ampicillin 2 g q 6 hours per Infectious Disease  · Infectious Disease following  ·   Plan to transition to p o  amoxicillin at the time of discharge to finish a total of 10 day course until 4/30

## 2022-04-26 NOTE — OCCUPATIONAL THERAPY NOTE
Occupational Therapy Evaluation     Patient Name: Caridad Rowley  Today's Date: 4/26/2022  Problem List  Principal Problem:    Sepsis [facility])  Active Problems: Morbid obesity ([address] )    History of gout    Bacteremia due to Escherichia coli    ADRIANNE (acute kidney injury) ([facility] )    Morbid obesity with BMI of 50 0-59 9, adult ([facility] )    Acute unilateral obstructive uropathy    Obstructive pyelonephritis    Past Medical History  Past Medical History:   Diagnosis Date    Arthritis     Gout     Hypertension     Kidney stone      Past Surgical History  Past Surgical History:   Procedure Laterality Date    FL RETROGRADE PYELOGRAM  8/4/2019    FL RETROGRADE PYELOGRAM  9/26/2019    FL RETROGRADE PYELOGRAM  4/22/2022    NO PAST SURGERIES  03/23/2015    Last assessed    TN CYSTO/URETERO W/LITHOTRIPSY &INDWELL STENT INSRT Right 9/26/2019    Procedure: CYSTOSCOPY URETEROSCOPY WITH RETROGRADE PYELOGRAM AND INSERTION STENT URETERAL;  Surgeon: Marly North MD;  [facility]: [facility];  Service: Urology    TN CYSTOURETHROSCOPY,URETER CATHETER Right 8/4/2019    Procedure: CYSTOSCOPY RETROGRADE PYELOGRAM WITH INSERTION STENT URETERAL;  Surgeon: Quin Ag MD;  [facility];  Service: Urology    TN CYSTOURETHROSCOPY,URETER CATHETER Left 4/22/2022    Procedure: CYSTOSCOPY RETROGRADE PYELOGRAM WITH INSERTION STENT URETERAL;  Surgeon: Eusebio Desouza MD;  [facility];  Service: Urology             04/26/22 0945   OT Last Visit   OT Visit Date 04/26/22   Note Type   Note type Evaluation   Restrictions/Precautions   Weight Bearing Precautions Per Order No   Other Precautions Pain   Pain Assessment   Pain Assessment Tool 0-10   Pain Score 7   Pain [facility]/Orientation Orientation: Right;[facility]: [facility] Pain Intervention(s) Repositioned; Ambulation/increased activity;Cold applied   Home Living   Type of [address] [place] [address] Two level;1/2 bath on main level;Bed/bath upstairs   Bathroom Shower/Tub Tub/shower unit   Bathroom Toilet Standard   Bathroom Equipment   (denies)   Home Equipment Walker   Additional Comments Pt lives alone in a [facility] with 3 JENNY  Pt able to have [address] with recliner  Prior Function   Level of [place] Independent with ADLs and functional mobility   Lives With Alone   Receives Help From Family  (local father)   ADL Assistance Independent   IADLs Independent   Falls in the last 6 months 0   Vocational Full time employment   Comments PTA, Pt I with ADLs/IADLs/no AD/ +  Lifestyle   Autonomy I with ADLs/IADLs/no AD/ +  Reciprocal Relationships Supportive father   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   Intrinsic Gratification 4-wheeling   Psychosocial   Psychosocial (WDL) WDL   ADL   Where Assessed Edge of bed   Eating Assistance 7  Independent   Grooming Assistance 5  [address] [address]  Supervision/Setup   LB [address] 4  [address] 5  [address] 4  Minimal Assistance  (seated on EOB)   LB Dressing Deficit Don/doff L sock; Don/doff R sock; Supervision/safety; Increased time to complete;Setup; Thread LLE into pants; Thread RLE into pants   Toileting Assistance  5  Supervision/Setup   Functional Assistance 5  Supervision/Setup   Additional Comments Pt limited by pain in L shoulder  Bed Mobility   Supine to Sit 5  Supervision   Additional items Increased time required   Sit to Supine Unable to assess   Additional Comments Pt greeted supine in bed and left OOB in recliner chair  All needs met and call bell nearby     Transfers   Sit to Stand 5  Supervision   Additional items Increased time required   Stand to Sit 5  Supervision   Additional items Increased time required   Additional Comments no AD   Functional Mobility   Functional Mobility 5  Supervision   Additional Comments no AD   Balance   Static Sitting Good   Dynamic Sitting Fair   Static Standing Fair Dynamic Standing Fair -   Ambulatory Fair -   Activity Tolerance   Activity Tolerance Patient tolerated treatment well   Medical Staff Made Aware Co-eval with REBECCA Snyder 2* to Pt's medical complexity, post-surgical, and decreased endurance  RUE Assessment   RUE Assessment X   RUE Overall AROM   R Shoulder Flexion 40* shoulder flexion- limited by pain - RN UPDATED   R Elbow Flexion 80* elbow flexion- limited by pain   R Mass Grasp 5/5 WFL   R Wrist Flexion 5/5 WFL   LUE Assessment   LUE Assessment WFL   Hand Function   Gross Motor Coordination Functional   Fine Motor Coordination Functional   Sensation   Light Touch No apparent deficits   Cognition   Overall Cognitive Status WFL   Arousal/Participation Alert; Cooperative   Attention Within functional limits   Orientation Level Oriented X4   Memory Within functional limits   Following Commands Follows all commands and directions without difficulty   Comments Pt pleasant and cooperative during OT session  Assessment   Limitation Decreased ADL status; Decreased endurance;Decreased high-level ADLs   Prognosis Fair   Assessment Pt is a 56 yo Male who presented to [facility] on 4/22/2022 (transfer from [facility]) with flank pain x6 hrs  Pt with diagnosis of sepsis and L sided ureteral stone  Pt s/p "CYSTOSCOPY RETROGRADE PYELOGRAM WITH INSERTION STENT URETERAL (Left)" on 4/22/2022  Pt  has a past medical history of Arthritis, Gout, Hypertension, and Kidney stone  Pt greeted bedside for OT evaluation on 4/26/2022  Pt lives alone in a [facility] with 3 JEN[address]  Pt able to have [address] with recliner  PTA, Pt I with ADLs/IADLs/no AD/ +  Pt demonstrating the following occupational deficits: S with UB ADLs, min A with LB ADLs, S with bed mobility, S with functional transfers, and S with functional mobility with no AD  Limitations that impact functional performance include decreased ADL status, decreased safe judgement during ADLs, decreased self care transfers and decreased high level ADLs  Occupational performance areas to address ADL retraining, cognitive reorientation, Pt/caregiver education, compensatory technique education and activity engagement   Pt would benefit from continued skilled OT services while in hospital to maximize independence with ADLs  Will continue to follow Pt's progress  Pt would benefit from returning home with increased social support upon DC to maximize safety and independence with ADLs and functional tasks of choice  Goals   Patient Goals To go home today  LTG Time Frame 10-14   Long Term Goal #1 See goals listed below   Plan   Treatment Interventions ADL retraining;UE strengthening/ROM; Compensatory technique education; Activityengagement   Goal Expiration Date 04/26/22   OT Treatment Day 1   OT Frequency 1-2x/wk  (DC OT S/P TODAY'S TREATMENT)   Additional Treatment Session   Start Time 0930   End Time 0945   Treatment Assessment Pt greeted for follow up treatment session focusing on maximizing independence with LB ADLs with LHAE  Therapist demonstrated use of sock aide, reacher, and shoe horn seated on EOB  Pt also educated on potential need for long handled sponge/brush and where to purchase LHAE  Pt with no further acute OT concerns and to return home with increased social support  DC skilled OT services  Additional Treatment Day 1   Recommendation   OT Discharge Recommendation No rehabilitation needs   Equipment Recommended Hip Kit ($)   OT - OK to Discharge Yes   Additional Comments  The patient's raw score on the AM-PAC Daily Activity inpatient short form is 20, standardized score is 42 03, greater than 39 4  Patients at this level are likely to benefit from discharge to home  Please refer to the recommendation of the Occupational Therapist for safe discharge planning     AM-PAC Daily Activity Inpatient   Lower Body Dressing 3   Bathing 3   Toileting 3   Upper Body Dressing 3   Grooming 4   Eating 4   Daily Activity Raw Score 20   Daily Activity Standardized Score (Calc for Raw Score >=11) 42 03   AM-PAC Applied Cognition Inpatient   Following a Speech/Presentation 4   Understanding Ordinary Conversation 4   Taking Medications 4   Remembering Where Things Are Placed or Put Away 4   Remembering List of 4-5 Errands 4   Taking Care of Complicated Tasks 4   Applied Cognition Raw Score 24   Applied Cognition Standardized Score 62 21     Goals:  1  Pt will complete UB ADLs with I in order to maximize participation with ADLs  2  Pt will complete LB ADLs with I in order to maximize safety with ADLs  3  Pt will complete toileting routine (transfer, hygiene, and clothing management) with I in order to return to prior level of function  4  Pt will complete bed mobility with I in order to maximize participation with ADLs  5  Pt will complete functional transfers at I level in order to increase participation with ADLs  6  Pt will increase dynamic standing balance to F+ in order to increase safety with ADLs  7  Pt will increase standing tolerance x5 min in order to increase participation with ADLs  8  Pt will complete functional mobility with AD PRN for item retrieval task at Carla level in order to increase participation with ADLs  9  Pt will complete IADL tasks/simulation of IADLs tasks with I in order to return to PLOF  10  Pt will demonstrate G energy conservation techniques with ADLs/IADLs in order to reduce the risk of falls  11  Pt will be attentive 100% of the time for ongoing functional/formal cognitive assessment to assist with safe dc planning prn      Anita Ward MS, OTR/L

## 2022-04-26 NOTE — PROGRESS NOTES
Progress Note - Infectious Disease   Abel Karimi 55 y o  male MRN: [id number]  Unit/Bed#: [facility] 812-01 Encounter: [id number]      Impression/Recommendations:  1   Sepsis   Evolving: Fever, WBC   Due to # 2/3   No other appreciable source   ROS and exam otherwise benign   Hemodynamically stable and nontoxic   Improving  Suspect intermittent low-grade temps due to atelectasis as spending most of time in bed  Rec:  · Continue antibiotics as below  · Follow temperatures closely  · Encouraged OOB, activity as tolerated  · Supportive care as per the primary service     2   E coli bacteremia   Likely due to # 3   No other appreciable source   Repeat blood cultures pending   No history of MDRO   Suspect Strep mitis may be contaminant as not isolated in urine cultures   Repeat blood cultures negative   Low suspicion for endocarditis  Rec:  · Continue ampicillin while in-house  · Once ready for D/C can change to Amoxicillin 500 mg PO Q8 with plan to continue through 4/30 to complete 10 days total antibiotics     3   Obstructive pyelonephritis   Due to # 4   Urine culture with E  coli  Rec:  · Continue antibiotics as above     4   Obstructive nephrolithiasis   With left hydroureteronephrosis   Status post cystoscopy, retrograde pyelogram, and left ureteral stent placement 4/22/22   Intraoperative findings were notable for purulence in the bladder and left ureter  Rec:  · Definitive outpatient stone management once infection improved per urology with close follow-up ongoing     5  Michael Anne on CKD   Baseline Cr 1 4-1 5   Improving but not back to baseline      6   MO      The above impression and plan was discussed in detail with the patient  The patient is stable from an ID standpoint for D/C home at any time      Antibiotics:  Ampicillin #2  Antibiotics #6    Subjective:  Patient seen on AM rounds  First time OOB to chair, walking around room only minimally  Denies fevers, chills, sweats, nausea, vomiting, or diarrhea  Denies rash  24 Hour Events:  No documented fevers, chills, sweats, nausea, vomiting, or diarrhea  Low-grade temps  Objective:  Vitals:  Temp:  [98 4 °F (36 9 °C)-100 2 °F (37 9 °C)] 98 4 °F (36 9 °C)  HR:  [] 94  Resp:  [16-18] 18  BP: (134-157)/(73-82) 157/82  SpO2:  [94 %-96 %] 94 %  Temp (24hrs), Av °F (37 2 °C), Min:98 4 °F (36 9 °C), Max:100 2 °F (37 9 °C)  Current: Temperature: 98 4 °F (36 9 °C)    Physical Exam:   General:  No acute distress  Psychiatric:  Awake and alert  Pulmonary:  Normal respiratory excursion without accessory muscle use  Abdomen:  Soft, nontender  Extremities:  No edema  Skin:  No rashes    Lab Results:  I have personally reviewed pertinent labs  Results from last 7 days   Lab Units 22  0455 22  1041 22  0454 22  1357 22  0746 22  0215 22  0215   POTASSIUM mmol/L 3 8 3 9 4 0   < > 4 0   < > 3 9   CHLORIDE mmol/L 102 108 105   < > 109*   < > 106   CO2 mmol/L 28 28 25   < > 18*   < > 17*   BUN mg/dL 40* 44* 44*   < > 28*   < > 28*   CREATININE mg/dL 2 54* 2 55* 2 71*   < > 2 66*   < > 1 93*   EGFR ml/min/1 73sq m 27 27 25   < > 25   < > 38   CALCIUM mg/dL 9 5 9 5 9 1   < > 8 6   < > 9 2   AST U/L  --   --   --   --  24  --  21   ALT U/L  --   --   --   --  22  --  25   ALK PHOS U/L  --   --   --   --  67  --  74    < > = values in this interval not displayed  Results from last 7 days   Lab Units 22  0455 22  0454 22  0508   WBC Thousand/uL 11 15* 12 57* 16 47*   HEMOGLOBIN g/dL 12 7 13 0 12 8   PLATELETS Thousands/uL 212 176 173     Results from last 7 days   Lab Units 22  1617 22  1616 22  0317 22  0216   BLOOD CULTURE  No Growth at 72 hrs  No Growth at 72 hrs   Escherichia coli*  Streptococcus mitis*  --    GRAM STAIN RESULT   --   --  Gram negative rods*  Gram positive cocci in chains*  --    URINE CULTURE   --   --   --  >100,000 cfu/ml Escherichia coli*       Imaging Studies: I have personally reviewed pertinent imaging study reports and images in PACS  EKG, Pathology, and Other Studies:   I have personally reviewed pertinent reports

## 2022-04-26 NOTE — PLAN OF CARE
Problem: OCCUPATIONAL THERAPY ADULT  Goal: Performs self-care activities at highest level of function for planned discharge setting  See evaluation for individualized goals  Description: Treatment Interventions: ADL retraining,UE strengthening/ROM,Compensatory technique education,Activityengagement  Equipment Recommended: Hip Kit ($)       See flowsheet documentation for full assessment, interventions and recommendations  Outcome: Adequate for Discharge  Note: Limitation: Decreased ADL status,Decreased endurance,Decreased high-level ADLs  Prognosis: Fair  Assessment: Pt is a 54 yo Male who presented to [facility] on 4/22/2022 (transfer from [facility]) with flank pain x6 hrs  Pt with diagnosis of sepsis and L sided ureteral stone  Pt s/p "CYSTOSCOPY RETROGRADE PYELOGRAM WITH INSERTION STENT URETERAL (Left)" on 4/22/2022  Pt  has a past medical history of Arthritis, Gout, Hypertension, and Kidney stone  Pt greeted bedside for OT evaluation on 4/26/2022  Pt lives alone in a [facility] with 3 JENNY  Pt able to have [address] with recliner  PTA, Pt I with ADLs/IADLs/no AD/ +  Pt demonstrating the following occupational deficits: S with UB ADLs, min A with LB ADLs, S with bed mobility, S with functional transfers, and S with functional mobility with no AD  Limitations that impact functional performance include decreased ADL status, decreased safe judgement during ADLs, decreased self care transfers and decreased high level ADLs  Occupational performance areas to address ADL retraining, cognitive reorientation, Pt/caregiver education, compensatory technique education and activity engagement   Pt would benefit from continued skilled OT services while in hospital to maximize independence with ADLs  Will continue to follow Pt's progress  Pt would benefit from returning home with increased social support upon DC to maximize safety and independence with ADLs and functional tasks of choice        OT Discharge Recommendation: No rehabilitation needs  OT - OK to Discharge:  Yes

## 2022-04-26 NOTE — PLAN OF CARE
Problem: Potential for Falls  Goal: Patient will remain free of falls  Description: INTERVENTIONS:  - Educate patient/family on patient safety including physical limitations  - Instruct patient to call for assistance with activity   - Consult OT/PT to assist with strengthening/mobility   - Keep Call bell within reach  - Keep bed low and locked with side rails adjusted as appropriate  - Keep care items and personal belongings within reach  - Initiate and maintain comfort rounds  - Make Fall Risk Sign visible to staff  - Apply yellow socks and bracelet for high fall risk patients  - Consider moving patient to room near nurses station  Outcome: Progressing     Problem: MOBILITY - ADULT  Goal: Maintain or return to baseline ADL function  Description: INTERVENTIONS:  -  Assess patient's ability to carry out ADLs; assess patient's baseline for ADL function and identify physical deficits which impact ability to perform ADLs (bathing, care of mouth/teeth, toileting, grooming, dressing, etc )  - Assess/evaluate cause of self-care deficits   - Assess range of motion  - Assess patient's mobility; develop plan if impaired  - Assess patient's need for assistive devices and provide as appropriate  - Encourage maximum independence but intervene and supervise when necessary  - Involve family in performance of ADLs  - Assess for home care needs following discharge   - Consider OT consult to assist with ADL evaluation and planning for discharge  - Provide patient education as appropriate  Outcome: Progressing  Goal: Maintains/Returns to pre admission functional level  Description: INTERVENTIONS:  - Perform BMAT or MOVE assessment daily    - Set and communicate daily mobility goal to care team and patient/family/caregiver     - Collaborate with rehabilitation services on mobility goals if consulted  - Record patient progress and toleration of activity level   Outcome: Progressing     Problem: PAIN - ADULT  Goal: Verbalizes/displays adequate comfort level or baseline comfort level  Description: Interventions:  - Encourage patient to monitor pain and request assistance  - Assess pain using appropriate pain scale  - Administer analgesics based on type and severity of pain and evaluate response  - Implement non-pharmacological measures as appropriate and evaluate response  - Consider cultural and social influences on pain and pain management  - Notify physician/advanced practitioner if interventions unsuccessful or patient reports new pain  Outcome: Progressing     Problem: GENITOURINARY - ADULT  Goal: Urinary catheter remains patent  Description: INTERVENTIONS:  - Assess patency of urinary catheter  - If patient has a chronic cook, consider changing catheter if non-functioning  - Follow guidelines for intermittent irrigation of non-functioning urinary catheter  Outcome: Progressing     Problem: Nutrition/Hydration-ADULT  Goal: Nutrient/Hydration intake appropriate for improving, restoring or maintaining nutritional needs  Description: Monitor and assess patient's nutrition/hydration status for malnutrition  Collaborate with interdisciplinary team and initiate plan and interventions as ordered  Monitor patient's weight and dietary intake as ordered or per policy  Utilize nutrition screening tool and intervene as necessary  Determine patient's food preferences and provide high-protein, high-caloric foods as appropriate       INTERVENTIONS:  - Monitor oral intake, urinary output, labs, and treatment plans  - Assess nutrition and hydration status and recommend course of action  - Evaluate amount of meals eaten  - Assist patient with eating if necessary   - Allow adequate time for meals  - Recommend/ encourage appropriate diets, oral nutritional supplements, and vitamin/mineral supplements  - Order, calculate, and assess calorie counts as needed  - Recommend, monitor, and adjust tube feedings and TPN/PPN based on assessed needs  - Assess need for intravenous fluids  - Provide specific nutrition/hydration education as appropriate  - Include patient/family/caregiver in decisions related to nutrition  Outcome: Progressing

## 2022-04-26 NOTE — PLAN OF CARE
Problem: Potential for Falls  Goal: Patient will remain free of falls  Description: INTERVENTIONS:  - Educate patient/family on patient safety including physical limitations  - Instruct patient to call for assistance with activity   - Consult OT/PT to assist with strengthening/mobility   - Keep Call bell within reach  - Keep bed low and locked with side rails adjusted as appropriate  - Keep care items and personal belongings within reach  - Initiate and maintain comfort rounds  - Make Fall Risk Sign visible to staff  - Apply yellow socks and bracelet for high fall risk patients  - Consider moving patient to room near nurses station  Outcome: Progressing     Problem: MOBILITY - ADULT  Goal: Maintain or return to baseline ADL function  Description: INTERVENTIONS:  -  Assess patient's ability to carry out ADLs; assess patient's baseline for ADL function and identify physical deficits which impact ability to perform ADLs (bathing, care of mouth/teeth, toileting, grooming, dressing, etc )  - Assess/evaluate cause of self-care deficits   - Assess range of motion  - Assess patient's mobility; develop plan if impaired  - Assess patient's need for assistive devices and provide as appropriate  - Encourage maximum independence but intervene and supervise when necessary  - Involve family in performance of ADLs  - Assess for home care needs following discharge   - Consider OT consult to assist with ADL evaluation and planning for discharge  - Provide patient education as appropriate  Outcome: Progressing  Goal: Maintains/Returns to pre admission functional level  Description: INTERVENTIONS:  - Perform BMAT or MOVE assessment daily    - Set and communicate daily mobility goal to care team and patient/family/caregiver     - Collaborate with rehabilitation services on mobility goals if consulted  - Out of bed for toileting  - Record patient progress and toleration of activity level   Outcome: Progressing     Problem: PAIN - ADULT  Goal: Verbalizes/displays adequate comfort level or baseline comfort level  Description: Interventions:  - Encourage patient to monitor pain and request assistance  - Assess pain using appropriate pain scale  - Administer analgesics based on type and severity of pain and evaluate response  - Implement non-pharmacological measures as appropriate and evaluate response  - Consider cultural and social influences on pain and pain management  - Notify physician/advanced practitioner if interventions unsuccessful or patient reports new pain  Outcome: Progressing

## 2022-04-26 NOTE — ASSESSMENT & PLAN NOTE
· Sepsis present on admission as evidenced by Fever and leucocytosis     · Presented to [facility] [address] on 4/21/22 with left flank pain   · Noted to have left obstructive uropathy from calculus with pyelonephritis, ADRIANNE, bacteremia   · Transferred to [facility] on 4/22/22 and underwent left ureteral stent placement on 4/22  · IV ampicillin 2 g q 6 hours per Infectious Disease  · Infectious Disease following

## 2022-04-26 NOTE — PROGRESS NOTES
[facility]  Progress Note - [place] Friend 1965, 55 y o  male MRN: [id number]  Unit/Bed#: [facility] 812-01 Encounter: [id number]  Primary Care Provider: RITA Mittal   Date and time admitted to hospital: 4/22/2022 11:33 AM    Obstructive pyelonephritis  Assessment & Plan  · Status post ureteral stent placement  · IV ampicillin per Infectious Disease  · Supportive care  ·  transition to p o  amoxicillin at the time of discharge to finish a total of 10 day course    Acute unilateral obstructive uropathy  Assessment & Plan  · Presented to [facility] [address] on 4/21 with left flank pain  · CT A/P - "Left hydroureteronephrosis secondary to a 3 mm stone at the mid left ureter at the L4-L5 disc space level "  · S/p left ureteral stent on 4/22  · Ct Tamsulosin  · Urology following    Morbid obesity with BMI of 50 0-59 9, adult ([facility] [address] )  Assessment & Plan  · BMI 56 81  · Lifestyle modification    ADRIANNE (acute kidney injury) ([facility] [address] )  Assessment & Plan  · Baseline creatinine 1 4 to 1 5  · Creatinine 1 93 on presentation to [facility] and peaked at 4 39 on 4/22  · Kidney function is improving- down  to 2 5-flatline over past 24-48, hope for further downtrend tomorrow AM as patient continues to make good urine output  · Due to obstructive uropathy, element of hypovolemia  · Monitor creatinine  · Avoid nephrotoxic medications and hypotension  · Nephrology following     Bacteremia due to Escherichia coli  Assessment & Plan  · Blood culture - E  Coli  · Strep seen on Biofire but no strep growth on blood culture  · Urine culture - E   Coli  · Due to obstructive pyelonephritis  · IV ampicillin 2 g q 6 hours per Infectious Disease  · Infectious Disease following  ·   Plan to transition to p o  amoxicillin at the time of discharge to finish a total of 10 day course until 4/30    History of gout  Assessment & Plan  · With hyperuricemia at 10 6  · Refusing allopurinol  · Outpatient follow-up    Morbid obesity ([facility] [address] )  Assessment & Plan  Therapeutic lifestyle modification encouraged    * [facility]  Assessment & Plan  · Sepsis present on admission as evidenced by Fever and leucocytosis  · Presented to [facility] [address] on 22 with left flank pain   · Noted to have left obstructive uropathy from calculus with pyelonephritis, ADRIANNE, bacteremia   · Transferred to [facility] on 22 and underwent left ureteral stent placement on   · IV ampicillin 2 g q 6 hours per Infectious Disease  · Infectious Disease following    Metabolic acidosis-resolved as of 2022  Assessment & Plan  · Resolved          VTE Pharmacologic Prophylaxis: VTE Score: 5 High Risk (Score >/= 5) - Pharmacological DVT Prophylaxis Ordered: heparin  Sequential Compression Devices Ordered  Patient Centered Rounds: I performed bedside rounds with nursing staff today  Discussions with Specialists or Other Care Team Provider: n/a    Education and Discussions with Family / Patient: Attempted to update  (father) via phone  Left voicemail  Time Spent for Care: 45 minutes  More than 50% of total time spent on counseling and coordination of care as described above  Current Length of Stay: 4 day(s)  Current Patient Status: Inpatient   Certification Statement: The patient will continue to require additional inpatient hospital stay due to Pending improvement in creatinine, monitoring for recurrence of fever, likely discharge within 24 hours on   Discharge Plan: Anticipate discharge tomorrow to home with home services  Code Status: Level 1 - Full Code    Subjective:   Patient denies any acute complaints today, continues to make good urine  Denies any febrile episodes since last night    Tolerating diet denies nausea vomiting pain diarrhea    Objective:     Vitals:   Temp (24hrs), Av 3 °F (37 4 °C), Min:98 4 °F (36 9 °C), Max:100 2 °F (37 9 °C)    Temp:  [98 4 °F (36 9 °C)-100 2 °F (37 9 °C)] 98 4 °F (36 9 °C)  HR: [] 94  Resp:  [16-18] 18  BP: (154-157)/(82) 157/82  SpO2:  [94 %-96 %] 94 %  Body mass index is 56 81 kg/m²  Input and Output Summary (last 24 hours): Intake/Output Summary (Last 24 hours) at 4/26/2022 1440  Last data filed at 4/26/2022 0900  Gross per 24 hour   Intake 480 ml   Output 2500 ml   Net -2020 ml       Physical Exam:   Physical Exam  Vitals and nursing note reviewed  Constitutional:       General: He is not in acute distress  Appearance: He is well-developed  He is not ill-appearing, toxic-appearing or diaphoretic  HENT:      Head: Normocephalic and atraumatic  Eyes:      General: No scleral icterus  Conjunctiva/sclera: Conjunctivae normal    Cardiovascular:      Rate and Rhythm: Normal rate and regular rhythm  Heart sounds: No murmur heard  No friction rub  No gallop  Pulmonary:      Effort: Pulmonary effort is normal  No respiratory distress  Breath sounds: Normal breath sounds  No stridor  No wheezing, rhonchi or rales  Chest:      Chest wall: No tenderness  Abdominal:      General: There is no distension  Palpations: Abdomen is soft  There is no mass  Tenderness: There is no abdominal tenderness  There is no guarding or rebound  Hernia: No hernia is present  Musculoskeletal:         General: No swelling, tenderness, deformity or signs of injury  Cervical back: Neck supple  Right lower leg: No edema  Left lower leg: No edema  Skin:     General: Skin is warm and dry  Coloration: Skin is not jaundiced or pale  Findings: No bruising, erythema, lesion or rash  Neurological:      Mental Status: He is alert and oriented to person, place, and time            Additional Data:     Labs:  Results from last 7 days   Lab Units 04/26/22  0455 04/23/22  0508 04/22/22  1433 04/22/22  0536 04/21/22  0746   WBC Thousand/uL 11 15*   < > 18 09* 19 72*   < >   HEMOGLOBIN g/dL 12 7   < > 13 3 12 6   < >   HEMATOCRIT % 40 4   < > 43 4 40 2   < >   PLATELETS Thousands/uL 212   < > 178 188   < >   BANDS PCT %  --   --  27*  --    < >   NEUTROS PCT %  --   --   --  89*  --    LYMPHS PCT %  --   --   --  5*  --    LYMPHO PCT %  --   --  0*  --    < >   MONOS PCT %  --   --   --  4  --    MONO PCT %  --   --  2*  --    < >   EOS PCT %  --   --  1 1   < >    < > = values in this interval not displayed  Results from last 7 days   Lab Units 04/26/22  0455 04/21/22  1357 04/21/22  0746   SODIUM mmol/L 135*   < > 143   POTASSIUM mmol/L 3 8   < > 4 0   CHLORIDE mmol/L 102   < > 109*   CO2 mmol/L 28   < > 18*   BUN mg/dL 40*   < > 28*   CREATININE mg/dL 2 54*   < > 2 66*   ANION GAP mmol/L 5   < > 16*   CALCIUM mg/dL 9 5   < > 8 6   ALBUMIN g/dL  --   --  2 9*   TOTAL BILIRUBIN mg/dL  --   --  0 51   ALK PHOS U/L  --   --  67   ALT U/L  --   --  22   AST U/L  --   --  24   GLUCOSE RANDOM mg/dL 121   < > 118    < > = values in this interval not displayed  Results from last 7 days   Lab Units 04/21/22  0317   LACTIC ACID mmol/L 0 9       Lines/Drains:  Invasive Devices  Report    Peripheral Intravenous Line            Peripheral IV 04/25/22 Proximal;Right;Ventral (anterior) Forearm <1 day                      Imaging: No pertinent imaging reviewed  Recent Cultures (last 7 days):   Results from last 7 days   Lab Units 04/22/22  1617 04/22/22  1616 04/21/22  0317 04/21/22  0216   BLOOD CULTURE  No Growth at 72 hrs  No Growth at 72 hrs   Escherichia coli*  Streptococcus mitis*  --    GRAM STAIN RESULT   --   --  Gram negative rods*  Gram positive cocci in chains*  --    URINE CULTURE   --   --   --  >100,000 cfu/ml Escherichia coli*       Last 24 Hours Medication List:   Current Facility-Administered Medications   Medication Dose Route Frequency Provider Last Rate    acetaminophen  650 mg Oral Q6H PRN Evon Kehr, DO      amLODIPine  5 mg Oral Daily Luis Angel Padilla MD      ampicillin  2,000 mg Intravenous Q6H Hannah Concepcion MD 2,000 mg (04/26/22 1320)    heparin (porcine)  5,000 Units Subcutaneous [facility] Tania Hope, DO      HYDROmorphone  1 mg Intravenous Q4H PRN Tania Hope, DO      ondansetron  4 mg Intravenous Q6H PRN Tania Hope, DO      oxyCODONE  2 5 mg Oral Q4H PRN Tania Hope, DO      Or    oxyCODONE  5 mg Oral Q4H PRN Tania Hope, DO      tamsulosin  0 4 mg Oral After Ale Yadav DO          Today, Patient Was Seen By: Ruma Cao DO    **Please Note: This note may have been constructed using a voice recognition system  **

## 2022-04-26 NOTE — ASSESSMENT & PLAN NOTE
· Baseline creatinine 1 4 to 1 5  · Creatinine 1 93 on presentation to [facility] and peaked at 4 39 on 4/22  · Kidney function is improving- down  to 2 5-flatline over past 24-48, hope for further downtrend tomorrow AM as patient continues to make good urine output  · Due to obstructive uropathy, element of hypovolemia  · Monitor creatinine  · Avoid nephrotoxic medications and hypotension  · Nephrology following

## 2022-04-26 NOTE — PHYSICAL THERAPY NOTE
Physical Therapy Evaluation    Patient's Name: Anastacio Watters    Admitting Diagnosis  UTI (urinary tract infection) [N39 0]  Ureterolithiasis [N20 1]  ADRIANNE (acute kidney injury) ([place] [address] ) [N17 9]    Problem List  Patient Active Problem List   Diagnosis    Morbid obesity ([place] [address] )    Vitamin D deficiency    History of gout    Bacteremia due to Escherichia coli    ADRIANNE (acute kidney injury) ([place] [address] )    Sepsis ([place] [address] )    Chronic kidney disease, stage 3 ([place] [address] )    Hypertension    Insomnia    Mixed hyperlipidemia    Morbid obesity with BMI of 50 0-59 9, adult ([place] [address] )    Acute pain of left knee    Acute unilateral obstructive uropathy    Obstructive pyelonephritis       Past Medical History  Past Medical History:   Diagnosis Date    Arthritis     Gout     Hypertension     Kidney stone        Past Surgical History  Past Surgical History:   Procedure Laterality Date    FL RETROGRADE PYELOGRAM  8/4/2019    FL RETROGRADE PYELOGRAM  9/26/2019    FL RETROGRADE PYELOGRAM  4/22/2022    NO PAST SURGERIES  03/23/2015    Last assessed    GA CYSTO/URETERO W/LITHOTRIPSY &INDWELL STENT INSRT Right 9/26/2019    Procedure: CYSTOSCOPY URETEROSCOPY WITH RETROGRADE PYELOGRAM AND INSERTION STENT URETERAL;  Surgeon: Malena Pike MD;  [facility]: [facility];  Service: Urology    GA CYSTOURETHROSCOPY,URETER CATHETER Right 8/4/2019    Procedure: CYSTOSCOPY RETROGRADE PYELOGRAM WITH INSERTION STENT URETERAL;  Surgeon: Josh Jimenez MD;  [facility]:  MAIN OR;  Service: Urology    GA CYSTOURETHROSCOPY,URETER CATHETER Left 4/22/2022    Procedure: CYSTOSCOPY RETROGRADE PYELOGRAM WITH INSERTION STENT URETERAL;  Surgeon: Shan Pierre MD;  [facility];  Service: Urology        04/26/22 0936   PT Last Visit   PT Visit Date 04/26/22   Note Type   Note type Evaluation   Pain Assessment   Pain Assessment Tool 0-10   Pain Score 7   Pain [facility]/Orientation Orientation: Right;[facility]: [facility] Pain Intervention(s) Repositioned; Ambulation/increased activity   Restrictions/Precautions   Weight Bearing Precautions Per Order No   Other Precautions Pain   Home Living   Type of [address] Two level; Able to live on main level with bedroom/bathroom;Stairs to enter with rails  (4 JENNY R HR, FFSU with recliner)   Prior Function   Level of [place] Independent with ADLs and functional mobility   Lives With Alone   Receives Help From Family  (local father)   ADL Assistance Independent   IADLs Independent   Falls in the last 6 months 0   Vocational Full time employment   Comments Pt reports no AD at baseline, works in active job   General   Family/Caregiver Present No   Cognition   Overall Cognitive Status WFL   Orientation Level Oriented X4   Following [address] all commands and directions without difficulty   Comments Pt pleasant and cooperative throughout session   RUE Assessment   RUE Assessment X  (new onset R shoulder pain since admission, see OT eval)   RLE Assessment   RLE Assessment WFL   LLE Assessment   LLE Assessment WFL   Light Touch   RLE Light Touch Grossly intact   LLE Light Touch Grossly intact   Bed Mobility   Supine to Sit 5  Supervision   Additional items HOB elevated; Increased time required   Transfers   Sit to Stand 5  Supervision   Additional items Increased time required   Stand to Sit 5  Supervision   Additional items Increased time required   Additional Comments no AD   Ambulation/Elevation   Gait pattern Decreased foot clearance; Wide TEO; Short stride   Gait Assistance 5  Supervision   Additional items Assist x 1   Assistive Device None   Distance 130 ft, initially with RW, however pt with good balance and able to complete 100 ft without AD, no LOB throughout, slight lateral sway due to body habitus  Able to climb 7 steps, reciprocal ascent, nonreciprocal descent    Unable to utilize R HR secondary to shoulder pain, educated on sideways climb with L UE on R HR for 4 JENNY home   Stair Management Assistance 5  Supervision   Stair Management Technique One rail L   Number of Stairs 7   Balance   Static Sitting Good   Dynamic Sitting Fair   Static Standing Fair   Dynamic Standing Fair -   Ambulatory Fair -   Activity Tolerance   Activity Tolerance Patient tolerated treatment well   Medical Staff Made Aware Co-evaluation with OT given medical complexity   Nurse Made Aware RN updated   Assessment   Assessment Pt is a 55 y o  male seen for PT evaluation s/p admit to [facility] on 4/22/2022  Pt was admitted with a primary dx of: Sepsis, obstructive nephrolithiasis s/p cystoscopy, retrograde pyelogram and L ureteral stent placement on 4/22  PT now consulted for assessment of mobility and d/c needs  Pts current comorbidities and personal factors effecting treatment include: Morbid Obesity, ADRIANNE, Gout, resides alone, stairs to enter home  Pts current clinical presentation is Unstable/ Unpredictable (high complexity) due to Ongoing medical management for primary dx, Increased assistance needed from caregiver at current time, Trending lab values, s/p surgical intervention  Prior to admission, pt was independent with all mobility, no AD  Upon evaluation, pt currently is requiring supervision for bed mobility; supervision for transfers and supervision for ambulation 130 ft w/ no AD  Pt able to climb 7 steps with supervision  Pt limited by R shoulder pain, new onset since reaching back to bed functions  Would recommend OPPT to address R shoulder pain  No acute PT needs identified  At conclusion of PT session pt returned back in chair with phone and call bell within reach  Pt denies any further questions at this time  Recommend home with OPPT upon hospital D/C     Goals   Patient Goals to go home   Recommendation   PT Discharge Recommendation Home with outpatient rehabilitation   AM-PAC Basic Mobility Inpatient   Turning in Bed Without Bedrails 4   Lying on Back to Sitting on Edge of Flat Bed 3 Moving Bed to Chair 3   Standing Up From Chair 3   Walk in Room 3   Climb 3-5 Stairs 3   Basic Mobility Inpatient Raw Score 19   Basic Mobility Standardized Score 42 48   Highest Level Of Mobility   [facility] Goal 6: Walk 10 steps or more       Miriam Reis, PT, DPT, GCS

## 2022-04-26 NOTE — PROGRESS NOTES
NEPHROLOGY PROGRESS NOTE   Kallie Shore 55 y o  male MRN: [id number]  Unit/Bed#: [facility] 812-01 Encounter: [id number]    ASSESSMENT & PLAN:  78-year-old male initially presented to [facility] emergency department with complain of left flank pain, was found to have left-sided hydroureteronephrosis secondary to 3 mm stone at the mid left ureter on CT scan on 04/21 and was transferred to [facility]  Acute kidney injury  -Baseline creatinine:  1 4-1 6 mg/dl from June 2021  -Admission creatinine:  1 93 mg/dl  -peak creatinine 4 39 on 04/22  -Etiology:  Acute kidney injuries due to obstructive uropathy, finding of left hydroureteronephrosis  -Hospital Course:  Status post cystoscopy, insertion left ureteral stent on 4/22 by Urology  -Plan:   · Renal function continue to improve with creatinine trending down to 2 5 mg/dL and stable for last two days  Stable electrolytes  · Good UOP around 2 9 L/day  · Stressed on oral hydration  · Avoid nephrotoxins and dose all medications per EGFR  · Avoid hypotension  Chronic kidney disease stage 3a  -baseline creatinine 1 4-1 6 in 2021  -will need outpatient follow up  BP/ HTN   -BP improving  Continue amlodipine 5 mg daily which was started on 04/25  Avoid hypotension    Left hydronephrosis, status post left ureteral stent placement by Urology on 04/22, follow up Urology recommendations    Metabolic acidosis:  Resolved    Sepsis secondary to E coli bacteremia in the setting of obstructive uropathy/pyelonephritis:  Antibiotics per primary team and ID  Urine culture positive for E coli  Follow-up repeat blood cultures-so far negative at 72 hours  Reviewed ID note, noted plan to change to amoxicillin at the time of discharge continue through 4/30      History of recurrent nephrolithiasis:  Follow-up stone analysis    Discussed with primary team   Overall stable for outpatient care from Nephrology side      SUBJECTIVE:  C/o right shoulder pain and applying ICE  No vomiting     OBJECTIVE:  Current Weight: Weight - Scale: (!) 160 kg (352 lb)  Vitals:    04/26/22 0610   BP: 157/82   Pulse: 94   Resp: 18   Temp: 98 4 °F (36 9 °C)   SpO2: 94%       Intake/Output Summary (Last 24 hours) at 4/26/2022 1017  Last data filed at 4/26/2022 0900  Gross per 24 hour   Intake 840 ml   Output 3175 ml   Net -2335 ml       Physical Exam  General:  Ill looking, awake  Eyes: Conjunctivae pink,  Sclera anicteric  ENT: lips and mucous membranes moist  Neck: supple   Chest: Clear to Auscultation both lungs,  no crackles, ronchus or wheezing  CVS: S1 & S2 present, normal rate, regular rhythm, no murmur    Abdomen: soft, non-tender, non-distended, Bowel sounds normoactive  Extremities: no edema of  legs  Skin: no rash  Neuro: awake, alert, oriented x 3   Psych: Mood and affect appropriate     Medications:    Current Facility-Administered Medications:     acetaminophen (TYLENOL) tablet 650 mg, 650 mg, Oral, Q6H PRN, Jessie Baxter DO, 650 mg at 04/26/22 1014    amLODIPine (NORVASC) tablet 5 mg, 5 mg, Oral, Daily, Jose G Sloan MD, 5 mg at 04/26/22 1014    ampicillin (OMNIPEN) 2,000 mg in sodium chloride 0 9 % 100 mL IVPB, 2,000 mg, Intravenous, Q6H, Claribel Reynoso MD, Last Rate: 200 mL/hr at 04/26/22 0625, 2,000 mg at 04/26/22 0625    heparin (porcine) subcutaneous injection 5,000 Units, 5,000 Units, Subcutaneous, Q8H [address], Jessie Baxter, DO, 5,000 Units at 04/26/22 7058    HYDROmorphone (DILAUDID) injection 1 mg, 1 mg, Intravenous, Q4H PRN, Jessie Ganner, DO    ondansetron [facility]) injection 4 mg, 4 mg, Intravenous, Q6H PRN, Jessie Ganner, DO    oxyCODONE (ROXICODONE) IR tablet 2 5 mg, 2 5 mg, Oral, Q4H PRN, 2 5 mg at 04/25/22 1040 **OR** oxyCODONE (ROXICODONE) IR tablet 5 mg, 5 mg, Oral, Q4H PRN, Jessie Baxter DO, 5 mg at 04/25/22 1957    tamsulosin (FLOMAX) capsule 0 4 mg, 0 4 mg, Oral, After Dinner, Jessie Baxter DO, 0 4 mg at 04/25/22 1751    Invasive Devices:        Lab Results:   Results from last 7 days   Lab Units 04/26/22  0455 04/25/22  1041 04/24/22  0454 04/23/22  0508 04/23/22  0508 04/21/22  1357 04/21/22  0746 04/21/22  0215 04/21/22  0215   WBC Thousand/uL 11 15*  --  12 57*  --  16 47*   < > 13 49*   < > 10 75*   HEMOGLOBIN g/dL 12 7  --  13 0  --  12 8   < > 13 9   < > 14 9   HEMATOCRIT % 40 4  --  40 7  --  40 5   < > 42 5   < > 45 9   PLATELETS Thousands/uL 212  --  176  --  173   < > 219   < > 238   POTASSIUM mmol/L 3 8 3 9 4 0   < > 4 3   < > 4 0   < > 3 9   CHLORIDE mmol/L 102 108 105   < > 107   < > 109*   < > 106   CO2 mmol/L 28 28 25   < > 24   < > 18*   < > 17*   BUN mg/dL 40* 44* 44*   < > 43*   < > 28*   < > 28*   CREATININE mg/dL 2 54* 2 55* 2 71*   < > 3 22*   < > 2 66*   < > 1 93*   CALCIUM mg/dL 9 5 9 5 9 1   < > 9 3   < > 8 6   < > 9 2   MAGNESIUM mg/dL  --   --   --   --  2 3  --   --   --   --    PHOSPHORUS mg/dL  --   --   --   --  3 7  --   --   --   --    ALK PHOS U/L  --   --   --   --   --   --  67  --  74   ALT U/L  --   --   --   --   --   --  22  --  25   AST U/L  --   --   --   --   --   --  24  --  21    < > = values in this interval not displayed  Previous work up:        Portions of the record may have been created with voice recognition software  Occasional wrong word or "sound a like" substitutions may have occurred due to the inherent limitations of voice recognition software  Read the chart carefully and recognize, using context, where substitutions have occurred  If you have any questions, please contact the dictating provider

## 2022-04-26 NOTE — UTILIZATION REVIEW
Notification of Discharge   This is a Notification of Discharge from our facility [address]  Please be advised that this patient has been discharge from our facility  Below you will find the admission and discharge date and time including the patients disposition  UTILIZATION REVIEW CONTACT:  Rach Starr  Utilization   Network Utilization Review Department  Phone: [phone] x carefully listen to the prompts  All voicemails are confidential   Email: [email or web address]     PHYSICIAN ADVISORY SERVICES:  FOR [id number] REVIEW - MEDICAL NECESSITY DENIAL  Phone: [phone]  Fax: [phone]  Email: [email or web address]     PRESENTATION DATE: 4/21/2022  1:41 AM  OBERVATION ADMISSION DATE: 04/21/22  INPATIENT ADMISSION DATE: 4/21/22  9:31 AM   DISCHARGE DATE: 4/22/2022 10:13 AM  DISPOSITION: [address] [facility]      IMPORTANT INFORMATION:  Send all requests for admission clinical reviews, approved or denied determinations and any other requests to dedicated fax number below belonging to the campus where the patient is receiving treatment   List of dedicated fax numbers:  [address] DENIALS (Administrative/Medical Necessity) [phone]   [address] (Maternity/NICU/Pediatrics) [phone]   [facility] [phone]   [address] [facility] [phone]   [address] [place] [address] [phone]   2000 [facility] [address] [place] [address],[address] [phone]   [facility]  [phone]   [address] [facility] [address] [facility]  [address] [facility] 1000 [address] [facility] [phone]

## 2022-04-26 NOTE — ASSESSMENT & PLAN NOTE
· Presented to [facility] on 4/21 with left flank pain  · CT A/P - "Left hydroureteronephrosis secondary to a 3 mm stone at the mid left ureter at the L4-L5 disc space level "  · S/p left ureteral stent on 4/22  · Ct Tamsulosin  · Urology following

## 2022-04-27 ENCOUNTER — APPOINTMENT (INPATIENT)
Dept: RADIOLOGY | Facility: HOSPITAL | Age: 57
DRG: 872 | End: 2022-04-27
Payer: COMMERCIAL

## 2022-04-27 LAB
ANION GAP SERPL CALCULATED.3IONS-SCNC: 8 MMOL/L (ref 4–13)
BACTERIA BLD CULT: NORMAL
BACTERIA BLD CULT: NORMAL
BASOPHILS # BLD MANUAL: 0.14 THOUSAND/UL (ref 0–0.1)
BASOPHILS NFR MAR MANUAL: 1 % (ref 0–1)
BUN SERPL-MCNC: 45 MG/DL (ref 5–25)
CALCIUM SERPL-MCNC: 9.2 MG/DL (ref 8.3–10.1)
CHLORIDE SERPL-SCNC: 104 MMOL/L (ref 100–108)
CO2 SERPL-SCNC: 25 MMOL/L (ref 21–32)
CREAT SERPL-MCNC: 2.55 MG/DL (ref 0.6–1.3)
EOSINOPHIL # BLD MANUAL: 0.42 THOUSAND/UL (ref 0–0.4)
EOSINOPHIL NFR BLD MANUAL: 3 % (ref 0–6)
ERYTHROCYTE [DISTWIDTH] IN BLOOD BY AUTOMATED COUNT: 14 % (ref 11.6–15.1)
GFR SERPL CREATININE-BSD FRML MDRD: 27 ML/MIN/1.73SQ M
GLUCOSE SERPL-MCNC: 122 MG/DL (ref 65–140)
HCT VFR BLD AUTO: 39.8 % (ref 36.5–49.3)
HGB BLD-MCNC: 12.5 G/DL (ref 12–17)
LYMPHOCYTES # BLD AUTO: 0.83 THOUSAND/UL (ref 0.6–4.47)
LYMPHOCYTES # BLD AUTO: 6 % (ref 14–44)
MCH RBC QN AUTO: 30.2 PG (ref 26.8–34.3)
MCHC RBC AUTO-ENTMCNC: 31.4 G/DL (ref 31.4–37.4)
MCV RBC AUTO: 96 FL (ref 82–98)
MONOCYTES # BLD AUTO: 0.56 THOUSAND/UL (ref 0–1.22)
MONOCYTES NFR BLD: 4 % (ref 4–12)
NEUTROPHILS # BLD MANUAL: 10.7 THOUSAND/UL (ref 1.85–7.62)
NEUTS SEG NFR BLD AUTO: 77 % (ref 43–75)
PLATELET # BLD AUTO: 228 THOUSANDS/UL (ref 149–390)
PLATELET BLD QL SMEAR: ADEQUATE
PMV BLD AUTO: 10.2 FL (ref 8.9–12.7)
POTASSIUM SERPL-SCNC: 3.7 MMOL/L (ref 3.5–5.3)
RBC # BLD AUTO: 4.14 MILLION/UL (ref 3.88–5.62)
RBC MORPH BLD: NORMAL
SODIUM SERPL-SCNC: 137 MMOL/L (ref 136–145)
VARIANT LYMPHS # BLD AUTO: 9 %
WBC # BLD AUTO: 13.89 THOUSAND/UL (ref 4.31–10.16)

## 2022-04-27 PROCEDURE — 99232 SBSQ HOSP IP/OBS MODERATE 35: CPT | Performed by: INTERNAL MEDICINE

## 2022-04-27 PROCEDURE — 85027 COMPLETE CBC AUTOMATED: CPT | Performed by: INTERNAL MEDICINE

## 2022-04-27 PROCEDURE — 85007 BL SMEAR W/DIFF WBC COUNT: CPT | Performed by: INTERNAL MEDICINE

## 2022-04-27 PROCEDURE — G1004 CDSM NDSC: HCPCS

## 2022-04-27 PROCEDURE — 74176 CT ABD & PELVIS W/O CONTRAST: CPT

## 2022-04-27 PROCEDURE — 80048 BASIC METABOLIC PNL TOTAL CA: CPT | Performed by: INTERNAL MEDICINE

## 2022-04-27 RX ADMIN — TAMSULOSIN HYDROCHLORIDE 0.4 MG: 0.4 CAPSULE ORAL at 17:03

## 2022-04-27 RX ADMIN — AMLODIPINE BESYLATE 5 MG: 5 TABLET ORAL at 08:20

## 2022-04-27 RX ADMIN — OXYCODONE HYDROCHLORIDE 5 MG: 5 TABLET ORAL at 20:00

## 2022-04-27 RX ADMIN — ACETAMINOPHEN 650 MG: 325 TABLET ORAL at 18:26

## 2022-04-27 RX ADMIN — AMPICILLIN 2000 MG: 2 INJECTION, POWDER, FOR SOLUTION INTRAVENOUS at 05:18

## 2022-04-27 RX ADMIN — HEPARIN SODIUM 5000 UNITS: 5000 INJECTION INTRAVENOUS; SUBCUTANEOUS at 05:19

## 2022-04-27 RX ADMIN — HEPARIN SODIUM 5000 UNITS: 5000 INJECTION INTRAVENOUS; SUBCUTANEOUS at 14:42

## 2022-04-27 RX ADMIN — HEPARIN SODIUM 5000 UNITS: 5000 INJECTION INTRAVENOUS; SUBCUTANEOUS at 21:24

## 2022-04-27 RX ADMIN — CEFTRIAXONE 2000 MG: 2 INJECTION, POWDER, FOR SOLUTION INTRAMUSCULAR; INTRAVENOUS at 12:34

## 2022-04-27 NOTE — ASSESSMENT & PLAN NOTE
· Blood culture - E  Coli  · Strep seen on Biofire but no strep growth on blood culture  · Urine culture - E   Coli  · Due to obstructive pyelonephritis  ·   · CT abdomen pelvis pending for today due to persistent leukocytosis, transition back to ceftriaxone for now

## 2022-04-27 NOTE — TELEPHONE ENCOUNTER
Patient currently admitted   Per SLIM note from yesterday       " Current Length of Stay: 4 day(s)  Current Patient Status: Inpatient   Certification Statement: The patient will continue to require additional inpatient hospital stay due to Pending improvement in creatinine, monitoring for recurrence of fever, likely discharge within 24 hours on 04/27  Discharge Plan: Anticipate discharge tomorrow to home with home services "    Will monitor

## 2022-04-27 NOTE — ASSESSMENT & PLAN NOTE
· Sepsis present on admission as evidenced by Fever and leucocytosis     · Presented to [address] on 4/21/22 with left flank pain   · Noted to have left obstructive uropathy from calculus with pyelonephritis, ADRIANNE, bacteremia   · Transferred to [facility] on 4/22/22 and underwent left ureteral stent placement on 4/22  · IV ampicillin 2 g q 6 hours per Infectious Disease  adjusted back to ceftriaxone today  ·

## 2022-04-27 NOTE — ASSESSMENT & PLAN NOTE
· Status post ureteral stent placement  · Patient transition to ceftriaxone today due to persistent leukocytosis, CT abdomen pelvis pending  · Supportive care  ·

## 2022-04-27 NOTE — ASSESSMENT & PLAN NOTE
· Presented to [facility] on 4/21 with left flank pain  · CT A/P - "Left hydroureteronephrosis secondary to a 3 mm stone at the mid left ureter at the L4-L5 disc space level "  · S/p left ureteral stent on 4/22  · Ct Tamsulosin  · Urology following

## 2022-04-27 NOTE — ASSESSMENT & PLAN NOTE
· Baseline creatinine 1 4 to 1 5  · Creatinine 1 93 on presentation to [facility] and peaked at 4 39 on 4/22  · Kidney function is improving- down  to 2 5-flatline over past 24-48, hope for further downtrend tomorrow AM as patient continues to make good urine output  · Due to obstructive uropathy, element of hypovolemia  · Monitor creatinine  · Avoid nephrotoxic medications and hypotension  · Nephrology following

## 2022-04-27 NOTE — PLAN OF CARE
Problem: Potential for Falls  Goal: Patient will remain free of falls  Description: INTERVENTIONS:  - Educate patient/family on patient safety including physical limitations  - Instruct patient to call for assistance with activity   - Consult OT/PT to assist with strengthening/mobility   - Keep Call bell within reach  - Keep bed low and locked with side rails adjusted as appropriate  - Keep care items and personal belongings within reach  - Initiate and maintain comfort rounds  - Make Fall Risk Sign visible to staff  - Apply yellow socks and bracelet for high fall risk patients  - Consider moving patient to room near nurses station  Outcome: Progressing     Problem: MOBILITY - ADULT  Goal: Maintain or return to baseline ADL function  Description: INTERVENTIONS:  -  Assess patient's ability to carry out ADLs; assess patient's baseline for ADL function and identify physical deficits which impact ability to perform ADLs (bathing, care of mouth/teeth, toileting, grooming, dressing, etc )  - Assess/evaluate cause of self-care deficits   - Assess range of motion  - Assess patient's mobility; develop plan if impaired  - Assess patient's need for assistive devices and provide as appropriate  - Encourage maximum independence but intervene and supervise when necessary  - Involve family in performance of ADLs  - Assess for home care needs following discharge   - Consider OT consult to assist with ADL evaluation and planning for discharge  - Provide patient education as appropriate  Outcome: Progressing  Goal: Maintains/Returns to pre admission functional level  Description: INTERVENTIONS:  - Perform BMAT or MOVE assessment daily    - Set and communicate daily mobility goal to care team and patient/family/caregiver     - Collaborate with rehabilitation services on mobility goals if consulted  - Out of bed for toileting  - Record patient progress and toleration of activity level   Outcome: Progressing     Problem: PAIN - ADULT  Goal: Verbalizes/displays adequate comfort level or baseline comfort level  Description: Interventions:  - Encourage patient to monitor pain and request assistance  - Assess pain using appropriate pain scale  - Administer analgesics based on type and severity of pain and evaluate response  - Implement non-pharmacological measures as appropriate and evaluate response  - Consider cultural and social influences on pain and pain management  - Notify physician/advanced practitioner if interventions unsuccessful or patient reports new pain  Outcome: Progressing     Problem: GENITOURINARY - ADULT  Goal: Urinary catheter remains patent  Description: INTERVENTIONS:  - Assess patency of urinary catheter  - If patient has a chronic cook, consider changing catheter if non-functioning  - Follow guidelines for intermittent irrigation of non-functioning urinary catheter  Outcome: Progressing

## 2022-04-27 NOTE — PROGRESS NOTES
[facility]  Progress Note - Jordi Pink 1965, 55 y o  male MRN: [id number]  Unit/Bed#: [facility] 812-01 Encounter: [id number]  Primary Care Provider: RITA Zapien   Date and time admitted to hospital: 4/22/2022 11:33 AM    Obstructive pyelonephritis  Assessment & Plan  · Status post ureteral stent placement  · Patient transition to ceftriaxone today due to persistent leukocytosis, CT abdomen pelvis pending  · Supportive care  ·      Acute unilateral obstructive uropathy  Assessment & Plan  · Presented to [address] [place] [address] Drive on 4/21 with left flank pain  · CT A/P - "Left hydroureteronephrosis secondary to a 3 mm stone at the mid left ureter at the L4-L5 disc space level "  · S/p left ureteral stent on 4/22  · Ct Tamsulosin  · Urology following    Morbid obesity with BMI of 50 0-59 9, adult ([facility] [address] )  Assessment & Plan  · BMI 56 81  · Lifestyle modification    ADRIANNE (acute kidney injury) ([facility] [address] )  Assessment & Plan  · Baseline creatinine 1 4 to 1 5  · Creatinine 1 93 on presentation to [facility] and peaked at 4 39 on 4/22  · Kidney function is improving- down  to 2 5-flatline over past 24-48, hope for further downtrend tomorrow AM as patient continues to make good urine output  · Due to obstructive uropathy, element of hypovolemia  · Monitor creatinine  · Avoid nephrotoxic medications and hypotension  · Nephrology following     Bacteremia due to Escherichia coli  Assessment & Plan  · Blood culture - E  Coli  · Strep seen on [employer] but no strep growth on blood culture  · Urine culture - E   Coli  · Due to obstructive pyelonephritis  ·   · CT abdomen pelvis pending for today due to persistent leukocytosis, transition back to ceftriaxone for now    History of gout  Assessment & Plan  · With hyperuricemia at 10 6  · Refusing allopurinol  · Outpatient follow-up    Morbid obesity [facility])  Assessment & Plan  Therapeutic lifestyle modification encouraged    * Sepsis ([facility] [address] )  Assessment & Plan  · Sepsis present on admission as evidenced by Fever and leucocytosis  · Presented to [facility] [address] on 22 with left flank pain   · Noted to have left obstructive uropathy from calculus with pyelonephritis, ADRIANNE, bacteremia   · [facility] and underwent left ureteral stent placement on   · IV ampicillin 2 g q 6 hours per Infectious Disease  adjusted back to ceftriaxone today  ·     Metabolic acidosis-resolved as of 2022  Assessment & Plan  · Resolved        VTE Pharmacologic Prophylaxis: VTE Score: 5 High Risk (Score >/= 5) - Pharmacological DVT Prophylaxis Ordered: heparin  Sequential Compression Devices Ordered  Patient Centered Rounds: I performed bedside rounds with nursing staff today  Discussions with Specialists or Other Care Team Provider: n/a    Education and Discussions with Family / Patient: Patient declined call to   Time Spent for Care: 30 minutes  More than 50% of total time spent on counseling and coordination of care as described above  Current Length of Stay: 5 day(s)  Current Patient Status: Inpatient   Certification Statement: The patient will continue to require additional inpatient hospital stay due to Continuing antibiotics due to leukocytosis, pending CT abdomen pelvis  Discharge Plan: Anticipate discharge in 24-48 hrs to rehab facility  Code Status: Level 1 - Full Code    Subjective:   Patient reports chills overnight, denies any fevers nausea vomiting abdominal pain chest pain shortness of breath hematuria    Objective:     Vitals:   Temp (24hrs), Av 2 °F (36 8 °C), Min:97 5 °F (36 4 °C), Max:99 3 °F (37 4 °C)    Temp:  [97 5 °F (36 4 °C)-99 3 °F (37 4 °C)] 97 9 °F (36 6 °C)  HR:  [] 93  Resp:  [18] 18  BP: (138-158)/(90-95) 146/95  SpO2:  [94 %-98 %] 96 %  Body mass index is 56 81 kg/m²  Input and Output Summary (last 24 hours):      Intake/Output Summary (Last 24 hours) at 2022 0424  Last data filed at 4/27/2022 1430  Gross per 24 hour   Intake 720 ml   Output 3600 ml   Net -2880 ml       Physical Exam:   Physical Exam  Vitals and nursing note reviewed  Constitutional:       General: He is not in acute distress  Appearance: He is well-developed  He is not ill-appearing, toxic-appearing or diaphoretic  HENT:      Head: Normocephalic and atraumatic  Eyes:      General: No scleral icterus  Conjunctiva/sclera: Conjunctivae normal    Cardiovascular:      Rate and Rhythm: Normal rate and regular rhythm  Heart sounds: No murmur heard  No friction rub  No gallop  Pulmonary:      Effort: Pulmonary effort is normal  No respiratory distress  Breath sounds: Normal breath sounds  No stridor  No wheezing, rhonchi or rales  Chest:      Chest wall: No tenderness  Abdominal:      General: There is no distension  Palpations: Abdomen is soft  There is no mass  Tenderness: There is no abdominal tenderness  There is no guarding or rebound  Hernia: No hernia is present  Musculoskeletal:         General: No swelling, tenderness, deformity or signs of injury  Cervical back: Neck supple  Right lower leg: No edema  Left lower leg: No edema  Skin:     General: Skin is warm and dry  Coloration: Skin is not jaundiced or pale  Findings: No bruising, erythema, lesion or rash  Neurological:      Mental Status: He is alert            Additional Data:     Labs:  Results from last 7 days   Lab Units 04/27/22  0535 04/23/22  0508 04/22/22  1433 04/22/22  0536 04/21/22  0746   WBC Thousand/uL 13 89*   < > 18 09* 19 72*   < >   HEMOGLOBIN g/dL 12 5   < > 13 3 12 6   < >   HEMATOCRIT % 39 8   < > 43 4 40 2   < >   PLATELETS Thousands/uL 228   < > 178 188   < >   BANDS PCT %  --   --  27*  --    < >   NEUTROS PCT %  --   --   --  89*  --    LYMPHS PCT %  --   --   --  5*  --    LYMPHO PCT % 6*  --  0*  --    < >   MONOS PCT %  --   --   --  4  --    MONO PCT % 4  --  2*  -- < >   EOS PCT % 3  --  1 1   < >    < > = values in this interval not displayed  Results from last 7 days   Lab Units 04/27/22  0535 04/21/22  1357 04/21/22  0746   SODIUM mmol/L 137   < > 143   POTASSIUM mmol/L 3 7   < > 4 0   CHLORIDE mmol/L 104   < > 109*   CO2 mmol/L 25   < > 18*   BUN mg/dL 45*   < > 28*   CREATININE mg/dL 2 55*   < > 2 66*   ANION GAP mmol/L 8   < > 16*   CALCIUM mg/dL 9 2   < > 8 6   ALBUMIN g/dL  --   --  2 9*   TOTAL BILIRUBIN mg/dL  --   --  0 51   ALK PHOS U/L  --   --  67   ALT U/L  --   --  22   AST U/L  --   --  24   GLUCOSE RANDOM mg/dL 122   < > 118    < > = values in this interval not displayed  Results from last 7 days   Lab Units 04/21/22  0317   LACTIC ACID mmol/L 0 9       Lines/Drains:  Invasive Devices  Report    Peripheral Intravenous Line            Peripheral IV 04/25/22 Proximal;Right;Ventral (anterior) Forearm 1 day                      Imaging: No pertinent imaging reviewed  Recent Cultures (last 7 days):   Results from last 7 days   Lab Units 04/22/22  1617 04/22/22  1616 04/21/22  0317 04/21/22  0216   BLOOD CULTURE  No Growth After 4 Days  No Growth After 4 Days   Escherichia coli*  Streptococcus mitis*  --    GRAM STAIN RESULT   --   --  Gram negative rods*  Gram positive cocci in chains*  --    URINE CULTURE   --   --   --  >100,000 cfu/ml Escherichia coli*       Last 24 Hours Medication List:   Current Facility-Administered Medications   Medication Dose Route Frequency Provider Last Rate    acetaminophen  650 mg Oral Q6H PRN Nithya Guardado DO      amLODIPine  5 mg Oral Daily Elio Ortez MD      cefTRIAXone  2,000 mg Intravenous Q24H Juan Jaramillo MD 2,000 mg (04/27/22 1234)    heparin (porcine)  5,000 Units Subcutaneous [address] Kun Guardado DO      HYDROmorphone  1 mg Intravenous Q4H PRN Nithya Guardado DO      ondansetron  4 mg Intravenous Q6H PRN Nithya Guardado DO      oxyCODONE  2 5 mg Oral Q4H PRN Nithya Guardado DO      Or   Jareth Flower oxyCODONE  5 mg Oral Q4H PRN Chico Mcfarlane DO      tamsulosin  0 4 mg Oral After Niki Mondragon DO          Today, Patient Was Seen By: Rishi Lopez DO    **Please Note: This note may have been constructed using a voice recognition system  **

## 2022-04-27 NOTE — PROGRESS NOTES
NEPHROLOGY PROGRESS NOTE   Abel Karimi 55 y o  male MRN: [id number]  Unit/Bed#: [facility] 812-01 Encounter: [id number]    ASSESSMENT & PLAN:  55-year-old male initially presented to [facility] emergency department with complain of left flank pain, was found to have left-sided hydroureteronephrosis secondary to 3 mm stone at the mid left ureter on CT scan on 04/21 and was transferred to [facility]  Acute kidney injury  -Baseline creatinine:  1 4-1 6 mg/dl from June 2021  -Admission creatinine:  1 93 mg/dl  -peak creatinine 4 39 on 04/22  -Etiology:  Acute kidney injuries due to obstructive uropathy, finding of left hydroureteronephrosis  -Hospital Course:  Status post cystoscopy, insertion left ureteral stent on 4/22 by Urology  -Plan:   · Renal function improved to creatinine 2 5 and stable for last 3 days  Expect slow recovery  · Stressed on oral hydration  · Avoid nephrotoxins and dose all medications per EGFR  · Avoid hypotension  Chronic kidney disease stage 3a  -baseline creatinine 1 4-1 6 in 2021  -will need outpatient follow up  BP/ HTN   -BP acceptable  Continue amlodipine 5 mg daily which was started on 04/25  Avoid hypotension    Left hydronephrosis, status post left ureteral stent placement by Urology on 04/22, follow up Urology recommendations    Sepsis secondary to E coli bacteremia in the setting of obstructive uropathy/pyelonephritis:  Antibiotics per primary team and ID  Urine culture positive for E coli  Follow-up repeat blood cultures-so far negative at 4 days    -noted plan for CT abdomen in the setting of worsening leukocytosis    History of recurrent nephrolithiasis:  Follow-up stone analysis-still under process    Discussed with primary team   Overall stable for outpatient care from Nephrology side      SUBJECTIVE:  Still with right shoulder pain  No SOB or chest pain       OBJECTIVE:  Current Weight: Weight - Scale: (!) 160 kg (352 lb)  Vitals:    04/27/22 1103   BP: 146/95   Pulse: 93   Resp: 18   Temp: 97 9 °F (36 6 °C)   SpO2: 96%       Intake/Output Summary (Last 24 hours) at 4/27/2022 1137  Last data filed at 4/27/2022 0525  Gross per 24 hour   Intake 240 ml   Output 3700 ml   Net -3460 ml       Physical Exam  General:  Ill looking, awake  Eyes: Conjunctivae pink,  Sclera anicteric  ENT: lips and mucous membranes moist  Neck: supple   Chest: Clear to Auscultation both lungs,  no crackles, ronchus or wheezing  CVS: S1 & S2 present, normal rate, regular rhythm, no murmur    Abdomen: soft, non-tender, non-distended, Bowel sounds normoactive  Extremities: no edema of  legs  Skin: no rash  Neuro: awake, alert, oriented  Psych: Mood and affect appropriate     Medications:    Current Facility-Administered Medications:     acetaminophen (TYLENOL) tablet 650 mg, 650 mg, Oral, Q6H PRN, Fuentes Search, DO, 650 mg at 04/26/22 1014    amLODIPine (NORVASC) tablet 5 mg, 5 mg, Oral, Daily, Karen Britton MD, 5 mg at 04/27/22 0820    cefTRIAXone (ROCEPHIN) 2,000 mg in dextrose 5 % 50 mL IVPB, 2,000 mg, Intravenous, Q24H, Alejandra Bwoie MD    heparin (porcine) subcutaneous injection 5,000 Units, 5,000 Units, Subcutaneous, Q8H [facility], Fuentes Search, DO, 5,000 Units at 04/27/22 0519    HYDROmorphone (DILAUDID) injection 1 mg, 1 mg, Intravenous, Q4H PRN, Fuentes Search, DO    [facility]ron [facility] injection 4 mg, 4 mg, Intravenous, Q6H PRN, Fuentes Search, DO    oxyCODONE (ROXICODONE) IR tablet 2 5 mg, 2 5 mg, Oral, Q4H PRN, 2 5 mg at 04/25/22 1040 **OR** oxyCODONE (ROXICODONE) IR tablet 5 mg, 5 mg, Oral, Q4H PRN, Fuentes Search, DO, 5 mg at 04/26/22 1750    tamsulosin (FLOMAX) capsule 0 4 mg, 0 4 mg, Oral, After Lonell Lanes, DO, 0 4 mg at 04/26/22 1757    Invasive Devices:        Lab Results:   Results from last 7 days   Lab Units 04/27/22  0535 04/26/22  0455 04/25/22  1041 04/24/22  0454 04/24/22  0454 04/23/22  0508 04/23/22  0508 04/21/22  1357 04/21/22  0746 04/21/22  0215 04/21/22  0215   WBC Thousand/uL 13 89* 11 15*  --   --  12 57*   < > 16 47*   < > 13 49*   < > 10 75*   HEMOGLOBIN g/dL 12 5 12 7  --   --  13 0   < > 12 8   < > 13 9   < > 14 9   HEMATOCRIT % 39 8 40 4  --   --  40 7   < > 40 5   < > 42 5   < > 45 9   PLATELETS Thousands/uL 228 212  --   --  176   < > 173   < > 219   < > 238   POTASSIUM mmol/L 3 7 3 8 3 9   < > 4 0   < > 4 3   < > 4 0   < > 3 9   CHLORIDE mmol/L 104 102 108   < > 105   < > 107   < > 109*   < > 106   CO2 mmol/L 25 28 28   < > 25   < > 24   < > 18*   < > 17*   BUN mg/dL 45* 40* 44*   < > 44*   < > 43*   < > 28*   < > 28*   CREATININE mg/dL 2 55* 2 54* 2 55*   < > 2 71*   < > 3 22*   < > 2 66*   < > 1 93*   CALCIUM mg/dL 9 2 9 5 9 5   < > 9 1   < > 9 3   < > 8 6   < > 9 2   MAGNESIUM mg/dL  --   --   --   --   --   --  2 3  --   --   --   --    PHOSPHORUS mg/dL  --   --   --   --   --   --  3 7  --   --   --   --    ALK PHOS U/L  --   --   --   --   --   --   --   --  67  --  74   ALT U/L  --   --   --   --   --   --   --   --  22  --  25   AST U/L  --   --   --   --   --   --   --   --  24  --  21    < > = values in this interval not displayed  Previous work up:        Portions of the record may have been created with voice recognition software  Occasional wrong word or "sound a like" substitutions may have occurred due to the inherent limitations of voice recognition software  Read the chart carefully and recognize, using context, where substitutions have occurred  If you have any questions, please contact the dictating provider

## 2022-04-27 NOTE — PROGRESS NOTES
Progress Note - Infectious Disease   Shade Alvarez 55 y o  male MRN: [id number]  Unit/Bed#: [facility] 812-01 Encounter: [id number]      Impression/Recommendations:  1   Sepsis   Evolving: Fever, WBC   Due to # 2/3   No other appreciable source   ROS and exam otherwise benign   Hemodynamically stable and nontoxic   Improved but still with sweats, bump in WBC today  Consider role of Strep mitis (intermediate to PCN)  CT A/P shows no abscess (although non-contrast) or hydronephrosis  Rec:  · Continue antibiotics as below  · Follow temperatures closely  · Recheck CBC in AM  · Encouraged OOB, activity as tolerated  · Supportive care as per the primary service     2   E coli bacteremia   Likely due to # 3   No other appreciable source   Repeat blood cultures pending   No history of MDRO   Suspect Strep mitis may be contaminant as not isolated in urine cultures although rising WBC on ampicillin   Repeat blood cultures negative   Low suspicion for endocarditis  Rec:  · Change antibiotics back to ceftriaxone  · Follow temperatures and WBC as above  · Once improved can change to cefpodoxime with plan to continue through 5/4 to complete 14 days total antibiotics     3   Obstructive pyelonephritis   Due to # 4   Urine culture with E  coli  Rec:  · Continue antibiotics as above     4   Obstructive nephrolithiasis   With left hydroureteronephrosis   Status post cystoscopy, retrograde pyelogram, and left ureteral stent placement 4/22/22   Intraoperative findings were notable for purulence in the bladder and left ureter  Patient passed stone in-house  Repeat CT A/P shows no stone or hydronephrosis  Rec:  · Outpatient Urology follow-up for stent management     5   ADRIANNE on CKD   Baseline Cr 1 4-1 5   Improving but not back to baseline  CT A/P shows no residual hydronephrosis    Rec:  · Recheck BMP in AM  · Close Nephrology follow-up ongoing     6   MO      The above impression and plan was discussed in detail with the patient and   Willy      Antibiotics:  Ampicillin #3  Antibiotics #7    Subjective:  Patient seen on AM rounds  Had sweats overnight but no fever  Denies fevers, chills, sweats, nausea, vomiting, or diarrhea  Denies rash  24 Hour Events:  No documented fevers, chills, sweats, nausea, vomiting, or diarrhea  WBC up today and Cr has stalled  Objective:  Vitals:  Temp:  [97 5 °F (36 4 °C)-99 3 °F (37 4 °C)] 97 9 °F (36 6 °C)  HR:  [] 93  Resp:  [18] 18  BP: (138-159)/(90-95) 146/95  SpO2:  [94 %-98 %] 96 %  Temp (24hrs), Av 2 °F (36 8 °C), Min:97 5 °F (36 4 °C), Max:99 3 °F (37 4 °C)  Current: Temperature: 97 9 °F (36 6 °C)    Physical Exam:   General:  No acute distress  Eyes:  Normal lids and conjunctivae  ENT:  Normal external ears and nose  Neck:  Neck symmetric with midline trachea  Pulmonary:  Normal respiratory effort without accessory muscle use  Cardiovascular:  Regular rate and rhythm; no peripheral edema  Gastrointestinal:  No tenderness or distention  Musculoskeletal:  No digital clubbing or cyanosis  Skin:  No visible rashes; No palpable nodules  Neurologic:  Sensation grossly intact to light touch  Psychiatric:  Alert and oriented; Normal mood     Lab Results:  I have personally reviewed pertinent labs  Results from last 7 days   Lab Units 22  0535 22  0455 22  1041 22  1357 22  0746 22  0215 22  0215   POTASSIUM mmol/L 3 7 3 8 3 9   < > 4 0   < > 3 9   CHLORIDE mmol/L 104 102 108   < > 109*   < > 106   CO2 mmol/L 25 28 28   < > 18*   < > 17*   BUN mg/dL 45* 40* 44*   < > 28*   < > 28*   CREATININE mg/dL 2 55* 2 54* 2 55*   < > 2 66*   < > 1 93*   EGFR ml/min/1 73sq m 27 27 27   < > 25   < > 38   CALCIUM mg/dL 9 2 9 5 9 5   < > 8 6   < > 9 2   AST U/L  --   --   --   --  24  --  21   ALT U/L  --   --   --   --  22  --  25   ALK PHOS U/L  --   --   --   --  67  --  74    < > = values in this interval not displayed       Results from last 7 days   Lab Units 04/27/22  0535 04/26/22  0455 04/24/22  0454   WBC Thousand/uL 13 89* 11 15* 12 57*   HEMOGLOBIN g/dL 12 5 12 7 13 0   PLATELETS Thousands/uL 228 212 176     Results from last 7 days   Lab Units 04/22/22  1617 04/22/22  1616 04/21/22  0317 04/21/22  0216   BLOOD CULTURE  No Growth After 4 Days  No Growth After 4 Days  Escherichia coli*  Streptococcus mitis*  --    GRAM STAIN RESULT   --   --  Gram negative rods*  Gram positive cocci in chains*  --    URINE CULTURE   --   --   --  >100,000 cfu/ml Escherichia coli*       Imaging Studies:   I have personally reviewed pertinent imaging study reports and images in PACS  CT A/P reviewed personally left periureteral stranding, passed kidney stone, no hydronephrosis    EKG, Pathology, and Other Studies:   I have personally reviewed pertinent reports

## 2022-04-27 NOTE — UTILIZATION REVIEW
Continued Stay Review    Date: 4/25/2022                        Current Patient Class: inpatient  Current Level of Care: med/surg  HPI:56 y o  male initially admitted on 4/22 with sepsis d/t e-coli bacteremia and obstructive pyelonephritis and nephrolithiasis  S/p cystoscopy, retrograde pyelogram, and left ureteral stent placement 4/22/22   Intraoperative findings were notable for purulence in the bladder and left ureter  Assessment/Plan: pt with no specific complaints on exam today  Stating he is starting to feel better  T max 100 2  ID consulted for pos  Blood cxs  IV ampicillin, (transition to p o  amoxicillin at the time of discharge to finish a total of 10 day course)  Follow repeat blood cxs  Follow temps/WBCs  Cr 2 5 today, improving (baseline 1 4-1 6)  Nephrology following  UOP ~4 L d/t postobstructive diuresis has been off IV fluids since 04/24  BMP in AM  Continue previous home po meds  Analgesics prn  Supportive care  Reg diet  I/Os  OOB/ambulate  SCDs  PT/OT evals for d/c needs       Vital Signs:   Date/Time Temp Pulse Resp BP MAP (mmHg) SpO2 O2 Device   04/25/22 21:27:14 100 2 °F (37 9 °C) 106 Abnormal  16 154/82 106 96 % --   04/25/22 2000 -- -- -- -- -- -- None (Room air)   04/25/22 14:32:32 98 4 °F (36 9 °C) 99 18 154/82 106 95 % --   04/25/22 12:15:38 -- 98 -- 134/73 93 96 % --   04/25/22 1213 -- -- -- 134/73 -- -- --   04/25/22 07:54:23 97 8 °F (36 6 °C) 109 Abnormal  19 165/108 Abnormal  127 96 % --   04/25/22 00:07:56 -- 107 Abnormal  -- 144/88 107 95 % --       Pertinent Labs/Diagnostic Results:   Results from last 7 days   Lab Units 04/24/22  0454 04/23/22  0508 04/23/22  0508 04/22/22  1433 04/22/22  1433 04/22/22  0536 04/22/22  0536 04/21/22  0746 04/21/22  0746 04/21/22  0215 04/21/22 0215   WBC Thousand/uL 12 57*   < > 16 47*   < > 18 09*   < > 19 72*   < > 13 49*   < > 10 75*   HEMOGLOBIN g/dL 13 0  --  12 8  --  13 3   < > 12 6   < > 13 9   < > 14 9   HEMATOCRIT % 40 7  --  40 5 --  43 4   < > 40 2   < > 42 5   < > 45 9   PLATELETS Thousands/uL 176   < > 173   < > 178   < > 188   < > 219   < > 238   NEUTROS ABS Thousands/µL  --   --   --   --   --   --  17 61*  --   --   --  9 18*   BANDS PCT %  --   --   --   --  27*  --   --   --  39*  --   --      Results from last 7 days   Lab Units 04/25/22  1041 04/24/22  0454 04/23/22  0508   SODIUM mmol/L 138 137 138   POTASSIUM mmol/L 3 9 4 0 4 3   CHLORIDE mmol/L 108 105 107   CO2 mmol/L 28 25 24   ANION GAP mmol/L 2* 7 7   BUN mg/dL 44* 44* 43*   CREATININE mg/dL 2 55* 2 71* 3 22*   EGFR ml/min/1 73sq m 27 25 20   CALCIUM mg/dL 9 5 9 1 9 3   MAGNESIUM mg/dL  --   --  2 3   PHOSPHORUS mg/dL  --   --  3 7     Results from last 7 days   Lab Units 04/25/22  1041 04/24/22  0454 04/23/22  0508 04/22/22  1433 04/22/22  0536 04/21/22  1357 04/21/22  0746 04/21/22  0215   GLUCOSE RANDOM mg/dL 113 121 126 118 120 145* 118 116     Results from last 7 days   Lab Units 04/22/22  1617 04/22/22  1616 04/21/22  0317 04/21/22  0216   BLOOD CULTURE  No Growth After 4 Days  No Growth After 4 Days  Escherichia coli*  Streptococcus mitis*  --    GRAM STAIN RESULT   --   --  Gram negative rods*  Gram positive cocci in chains*  --    URINE CULTURE   --   --   --  >100,000 cfu/ml Escherichia coli*     Medications:   Scheduled Medications:  amLODIPine, 5 mg, Oral, Daily  cefTRIAXone, 2,000 mg, Intravenous, Q24H  heparin (porcine), 5,000 Units, Subcutaneous, Q8H LATRICE  tamsulosin, 0 4 mg, Oral, After Dinner    PRN Meds:  acetaminophen, 650 mg, Oral, Q6H PRN  HYDROmorphone, 1 mg, Intravenous, Q4H PRN  ondansetron, 4 mg, Intravenous, Q6H PRN  oxyCODONE, 2 5 mg, Oral, Q4H PRN 4/25 x2   Or  oxyCODONE, 5 mg, Oral, Q4H PRN 4/24 x2, 4/25 x1        Discharge Plan: TBD    Network Utilization Review Department  ATTENTION: Please call with any questions or concerns to [phone] and carefully listen to the prompts so that you are directed to the right person   All voicemails are confidential   Jonathan Mueller all requests for admission clinical reviews, approved or denied determinations and any other requests to dedicated fax number below belonging to the campus where the patient is receiving treatment   List of dedicated fax numbers for the Facilities:  [address] [place] [address] DENIALS (Administrative/Medical Necessity) [phone]   [address]  (Maternity/NICU/Pediatrics) [phone]   [address]  [address] [place] [address] [address] [place] [address] [address] [facility] [facility] [phone]

## 2022-04-28 ENCOUNTER — APPOINTMENT (INPATIENT)
Dept: RADIOLOGY | Facility: HOSPITAL | Age: 57
DRG: 854 | End: 2022-04-28
Payer: COMMERCIAL

## 2022-04-28 LAB
ANION GAP SERPL CALCULATED.3IONS-SCNC: 10 MMOL/L (ref 4–13)
BUN SERPL-MCNC: 43 MG/DL (ref 5–25)
CALCIUM SERPL-MCNC: 9.3 MG/DL (ref 8.3–10.1)
CHLORIDE SERPL-SCNC: 102 MMOL/L (ref 100–108)
CO2 SERPL-SCNC: 22 MMOL/L (ref 21–32)
COLOR STONE: YELLOW
COMMENT-STONE3: NORMAL
COMPOSITION: NORMAL
CREAT SERPL-MCNC: 2.8 MG/DL (ref 0.6–1.3)
ERYTHROCYTE [DISTWIDTH] IN BLOOD BY AUTOMATED COUNT: 14.2 % (ref 11.6–15.1)
GFR SERPL CREATININE-BSD FRML MDRD: 24 ML/MIN/1.73SQ M
GLUCOSE SERPL-MCNC: 130 MG/DL (ref 65–140)
HCT VFR BLD AUTO: 38.9 % (ref 36.5–49.3)
HGB BLD-MCNC: 12.2 G/DL (ref 12–17)
LABORATORY COMMENT REPORT: NORMAL
MCH RBC QN AUTO: 29.9 PG (ref 26.8–34.3)
MCHC RBC AUTO-ENTMCNC: 31.4 G/DL (ref 31.4–37.4)
MCV RBC AUTO: 95 FL (ref 82–98)
PHOTO: NORMAL
PLATELET # BLD AUTO: 276 THOUSANDS/UL (ref 149–390)
PMV BLD AUTO: 10.7 FL (ref 8.9–12.7)
POTASSIUM SERPL-SCNC: 3.9 MMOL/L (ref 3.5–5.3)
RBC # BLD AUTO: 4.08 MILLION/UL (ref 3.88–5.62)
SIZE STONE: NORMAL MM
SODIUM SERPL-SCNC: 134 MMOL/L (ref 136–145)
SPEC SOURCE SUBJ: NORMAL
STONE ANALYSIS-IMP: NORMAL
URATE MFR STONE: 100 %
WBC # BLD AUTO: 14.31 THOUSAND/UL (ref 4.31–10.16)
WT STONE: 44 MG

## 2022-04-28 PROCEDURE — 73030 X-RAY EXAM OF SHOULDER: CPT

## 2022-04-28 PROCEDURE — 85027 COMPLETE CBC AUTOMATED: CPT | Performed by: INTERNAL MEDICINE

## 2022-04-28 PROCEDURE — 99233 SBSQ HOSP IP/OBS HIGH 50: CPT | Performed by: INTERNAL MEDICINE

## 2022-04-28 PROCEDURE — 99232 SBSQ HOSP IP/OBS MODERATE 35: CPT | Performed by: INTERNAL MEDICINE

## 2022-04-28 PROCEDURE — 80048 BASIC METABOLIC PNL TOTAL CA: CPT | Performed by: INTERNAL MEDICINE

## 2022-04-28 RX ORDER — AMLODIPINE BESYLATE 5 MG/1
5 TABLET ORAL ONCE
Status: COMPLETED | OUTPATIENT
Start: 2022-04-28 | End: 2022-04-28

## 2022-04-28 RX ORDER — AMLODIPINE BESYLATE 10 MG/1
10 TABLET ORAL DAILY
Status: DISCONTINUED | OUTPATIENT
Start: 2022-04-29 | End: 2022-04-30 | Stop reason: HOSPADM

## 2022-04-28 RX ORDER — PREDNISONE 20 MG/1
40 TABLET ORAL DAILY
Status: DISCONTINUED | OUTPATIENT
Start: 2022-04-28 | End: 2022-04-30 | Stop reason: HOSPADM

## 2022-04-28 RX ORDER — SODIUM CHLORIDE, SODIUM GLUCONATE, SODIUM ACETATE, POTASSIUM CHLORIDE, MAGNESIUM CHLORIDE, SODIUM PHOSPHATE, DIBASIC, AND POTASSIUM PHOSPHATE .53; .5; .37; .037; .03; .012; .00082 G/100ML; G/100ML; G/100ML; G/100ML; G/100ML; G/100ML; G/100ML
75 INJECTION, SOLUTION INTRAVENOUS CONTINUOUS
Status: DISCONTINUED | OUTPATIENT
Start: 2022-04-28 | End: 2022-04-30

## 2022-04-28 RX ADMIN — AMPICILLIN 2000 MG: 2 INJECTION, POWDER, FOR SOLUTION INTRAVENOUS at 18:51

## 2022-04-28 RX ADMIN — OXYCODONE HYDROCHLORIDE 5 MG: 5 TABLET ORAL at 00:29

## 2022-04-28 RX ADMIN — AMPICILLIN 2000 MG: 2 INJECTION, POWDER, FOR SOLUTION INTRAVENOUS at 13:29

## 2022-04-28 RX ADMIN — HEPARIN SODIUM 5000 UNITS: 5000 INJECTION INTRAVENOUS; SUBCUTANEOUS at 13:29

## 2022-04-28 RX ADMIN — HEPARIN SODIUM 5000 UNITS: 5000 INJECTION INTRAVENOUS; SUBCUTANEOUS at 04:38

## 2022-04-28 RX ADMIN — TAMSULOSIN HYDROCHLORIDE 0.4 MG: 0.4 CAPSULE ORAL at 17:52

## 2022-04-28 RX ADMIN — AMLODIPINE BESYLATE 5 MG: 5 TABLET ORAL at 17:52

## 2022-04-28 RX ADMIN — HEPARIN SODIUM 5000 UNITS: 5000 INJECTION INTRAVENOUS; SUBCUTANEOUS at 22:02

## 2022-04-28 RX ADMIN — PREDNISONE 40 MG: 20 TABLET ORAL at 11:21

## 2022-04-28 RX ADMIN — OXYCODONE HYDROCHLORIDE 5 MG: 5 TABLET ORAL at 11:21

## 2022-04-28 RX ADMIN — AMLODIPINE BESYLATE 5 MG: 5 TABLET ORAL at 09:16

## 2022-04-28 RX ADMIN — CEFTRIAXONE 2000 MG: 2 INJECTION, POWDER, FOR SOLUTION INTRAMUSCULAR; INTRAVENOUS at 11:22

## 2022-04-28 RX ADMIN — OXYCODONE HYDROCHLORIDE 5 MG: 5 TABLET ORAL at 04:38

## 2022-04-28 RX ADMIN — SODIUM CHLORIDE, SODIUM GLUCONATE, SODIUM ACETATE, POTASSIUM CHLORIDE, MAGNESIUM CHLORIDE, SODIUM PHOSPHATE, DIBASIC, AND POTASSIUM PHOSPHATE 100 ML/HR: .53; .5; .37; .037; .03; .012; .00082 INJECTION, SOLUTION INTRAVENOUS at 11:20

## 2022-04-28 RX ADMIN — SODIUM CHLORIDE, SODIUM GLUCONATE, SODIUM ACETATE, POTASSIUM CHLORIDE, MAGNESIUM CHLORIDE, SODIUM PHOSPHATE, DIBASIC, AND POTASSIUM PHOSPHATE 100 ML/HR: .53; .5; .37; .037; .03; .012; .00082 INJECTION, SOLUTION INTRAVENOUS at 22:04

## 2022-04-28 NOTE — ASSESSMENT & PLAN NOTE
· Status post ureteral stent placement  · Continue ampicillin with plan to transition to amoxicillin for treatment until 5/for  · Supportive care  ·

## 2022-04-28 NOTE — PROGRESS NOTES
Progress Note - Infectious Disease   Brian Rodriguez 55 y o  male MRN: [id number]  Unit/Bed#: [facility] 812-01 Encounter: [id number]      Impression/Recommendations:  1   Sepsis   Evolving: Fever, WBC   Due to # 2/3   No other appreciable source   ROS and exam otherwise benign   Hemodynamically stable and nontoxic   Improved  Suspect recent LGT, WBC due to evolving gout flare  CT A/P shows no abscess (although non-contrast) or hydronephrosis  Rec:  · Continue antibiotics as below  · Follow temperatures closely  · Recheck CBC in AM  · Encouraged OOB, activity as tolerated  · Supportive care as per the primary service     2   E coli bacteremia   Likely due to # 3   No other appreciable source   Repeat blood cultures pending   No history of MDRO   Suspect Strep mitis may be contaminant as not isolated in urine cultures   Repeat blood cultures negative   Low suspicion for endocarditis  Rec:  · Change antibiotics back to ampicillin  · Follow temperatures and WBC as above  · Once improved can change to amoxicillin 500 mg PO Q8 with plan to continue through 5/4 to complete 14 days total antibiotics     3   Obstructive pyelonephritis   Due to # 4   Urine culture with E  coli  Rec:  · Continue antibiotics as above     4   Obstructive nephrolithiasis   With left hydroureteronephrosis   Status post cystoscopy, retrograde pyelogram, and left ureteral stent placement 4/22/22   Intraoperative findings were notable for purulence in the bladder and left ureter  Patient passed stone in-house  Repeat CT A/P shows no stone or hydronephrosis  Rec:  · Outpatient Urology follow-up for stent management     5   ADRIANNE on CKD   Baseline Cr 1 4-1 5   Improving but not back to baseline  CT A/P shows no residual hydronephrosis  Consider role of acute gout exacerbation  Rec:  · Recheck BMP in AM  · Close Nephrology follow-up ongoing     6   Acute gout exacerbation  New    Right shoulder pain and new left 1st MTPJ pain, redness, swelling  Rec:  · OK from ID perspective for low-dose prednisone     The above impression and plan was discussed in detail with the patient and Dr Kieran Ugarte and Dr Jackie Mcconnell      Antibiotics:  Ceftriaxone  Antibiotics #8    Subjective:  Patient seen on AM rounds  Right shoulder continues to hurt but now left foot at 1st MTPJ hurts like it usually does with gout  Denies fevers, chills, sweats, nausea, vomiting, or diarrhea  24 Hour Events:  No documented fevers, chills, sweats, nausea, vomiting, or diarrhea  Objective:  Vitals:  Temp:  [97 5 °F (36 4 °C)-98 9 °F (37 2 °C)] 98 1 °F (36 7 °C)  HR:  [] 105  Resp:  [18-22] 20  BP: (141-153)/(84-98) 141/84  SpO2:  [94 %-98 %] 95 %  Temp (24hrs), Av 4 °F (36 9 °C), Min:97 5 °F (36 4 °C), Max:98 9 °F (37 2 °C)  Current: Temperature: 98 1 °F (36 7 °C)    Physical Exam:   General:  No acute distress  Eyes:  Normal lids and conjunctivae  ENT:  Normal external ears and nose  Neck:  Neck symmetric with midline trachea  Pulmonary:  Normal respiratory effort without accessory muscle use  Cardiovascular:  Regular rate and rhythm; no peripheral edema  Gastrointestinal:  No tenderness or distention  Musculoskeletal:  No digital clubbing or cyanosis  Skin:  No visible rashes; No palpable nodules  Neurologic:  Sensation grossly intact to light touch  Psychiatric:  Alert and oriented; Normal mood  Left foot:  Pain, erythema, swelling left 1st MTPJ    Lab Results:  I have personally reviewed pertinent labs    Results from last 7 days   Lab Units 22  0535 22  0455   POTASSIUM mmol/L 3 9 3 7 3 8   CHLORIDE mmol/L 102 104 102   CO2 mmol/L    BUN mg/dL 43* 45* 40*   CREATININE mg/dL 2 80* 2 55* 2 54*   EGFR ml/min/1 73sq m  27   CALCIUM mg/dL 9 3 9 2 9 5     Results from last 7 days   Lab Units 22  0535 22  0455   WBC Thousand/uL 14 31* 13 89* 11 15*   HEMOGLOBIN g/dL 12 2 12 5 12 7   PLATELETS Thousands/uL 276 228 212     Results from last 7 days   Lab Units 04/22/22  1617 04/22/22  1616   BLOOD CULTURE  No Growth After 5 Days  No Growth After 5 Days  Imaging Studies:   I have personally reviewed pertinent imaging study reports and images in PACS  EKG, Pathology, and Other Studies:   I have personally reviewed pertinent reports

## 2022-04-28 NOTE — ASSESSMENT & PLAN NOTE
· Blood culture - E  Coli  · Strep seen on Biofire but no strep growth on blood culture  · Urine culture - E   Coli  · Due to obstructive pyelonephritis  · Transitioning back to ampicillin per Infectious Disease, plan to treat until 5/4 transitioning to oral agents once improved as we suspect his recent leukocytosis and fevers were driven by acute gout rather than on resolved infection

## 2022-04-28 NOTE — ASSESSMENT & PLAN NOTE
· Presented to [address] on 4/21 with left flank pain  · CT A/P - "Left hydroureteronephrosis secondary to a 3 mm stone at the mid left ureter at the L4-L5 disc space level "  · S/p left ureteral stent on 4/22  · Ct Tamsulosin  ·

## 2022-04-28 NOTE — PROGRESS NOTES
[facility]  Progress Note - Noble Sachi 1965, 55 y o  male MRN: [id number]  Unit/Bed#: [facility] 812-01 Encounter: [id number]  Primary Care Provider: RITA Mcbride   Date and time admitted to hospital: 4/22/2022 11:33 AM    Obstructive pyelonephritis  Assessment & Plan  · Status post ureteral stent placement  · Continue ampicillin with plan to transition to amoxicillin for treatment until 5/for  · Supportive care  ·      Acute unilateral obstructive uropathy  Assessment & Plan  · Presented to [address] [place] [address] Drive on 4/21 with left flank pain  · CT A/P - "Left hydroureteronephrosis secondary to a 3 mm stone at the mid left ureter at the L4-L5 disc space level "  · S/p left ureteral stent on 4/22  · Ct Tamsulosin  ·     Morbid obesity with BMI of 50 0-59 9, adult ([facility])  Assessment & Plan  · BMI 56 81  · Lifestyle modification    ADRIANNE (acute kidney injury) ([facility] [address] )  Assessment & Plan  · Baseline creatinine 1 4 to 1 5  · Creatinine 1 93 on presentation to [facility] and peaked at 4 39 on 4/22  · Due to obstructive uropathy, element of hypovolemia  · Monitor creatinine  · Avoid nephrotoxic medications and hypotension  · Nephrology following   · 4/28/22:  Creatinine worsened to 2 8 today after flat lining at 2 5 for several days, discussed with Nephrology who will give the patient fluids and repeat BMP tomorrow  CT scan reviewed from yesterday without any obstructive stones    Bacteremia due to Escherichia coli  Assessment & Plan  · Blood culture - E  Coli  · Strep seen on Biofire but no strep growth on blood culture  · Urine culture - E   Coli  · Due to obstructive pyelonephritis  · Transitioning back to ampicillin per Infectious Disease, plan to treat until 5/4 transitioning to oral agents once improved as we suspect his recent leukocytosis and fevers were driven by acute gout rather than on resolved infection    History of gout  Assessment & Plan  · With hyperuricemia at 10 6 on admission with chronically elevated levels  · Refused allopurinol  · Now with acute onset of left 1st MTP pain and swelling erythema consistent with prior gout attacks  Also with right-sided shoulder pain  · Suspect his rising white count and mild fevers prior could possibly be related to gout  Due to his presentation with renal failure will avoid colchicine and NSAIDs will start on prednisone 40 mg daily plan for 7-10 day course  Morbid obesity ([facility] [address] )  Assessment & Plan  Therapeutic lifestyle modification encouraged    * Sepsis ([facility] [address] )  Assessment & Plan  · Sepsis present on admission as evidenced by Fever and leucocytosis  · Presented to [address] [place] [address] Drive on 4/21/22 with left flank pain   · Noted to have left obstructive uropathy from calculus with pyelonephritis, ADRIANNE, bacteremia   · Transferred to [facility] on 4/22/22 and underwent left ureteral stent placement on 4/22  · IV ampicillin 2 g q 6 hours per Infectious Disease  planning to transition to oral agent once stable and continue until 5/4  ·     Metabolic acidosis-resolved as of 4/25/2022  Assessment & Plan  · Resolved          VTE Pharmacologic Prophylaxis: VTE Score: 5 High Risk (Score >/= 5) - Pharmacological DVT Prophylaxis Ordered: heparin  Sequential Compression Devices Ordered  Patient Centered Rounds: I performed bedside rounds with nursing staff today  Discussions with Specialists or Other Care Team Provider: ID and nephro    Education and Discussions with Family / Patient: Patient declined call to   Time Spent for Care: 30 minutes  More than 50% of total time spent on counseling and coordination of care as described above      Current Length of Stay: 6 day(s)  Current Patient Status: Inpatient   Certification Statement: The patient will continue to require additional inpatient hospital stay due to Pending discharge within 24-48 hours based on response to treatment for suspected  Discharge Plan: Anticipate discharge in 24-48 hrs to home with home services  Code Status: Level 1 - Full Code    Subjective:   Patient reports pain in his left great toe consistent with prior gout flares  Denies any fevers chills nausea vomiting abdominal pain chest pain shortness of breath    Objective:     Vitals:   Temp (24hrs), Av 4 °F (36 9 °C), Min:97 5 °F (36 4 °C), Max:98 9 °F (37 2 °C)    Temp:  [97 5 °F (36 4 °C)-98 9 °F (37 2 °C)] 98 7 °F (37 1 °C)  HR:  [] 100  Resp:  [16-22] 16  BP: (141-153)/() 144/105  SpO2:  [94 %-98 %] 95 %  Body mass index is 56 81 kg/m²  Input and Output Summary (last 24 hours): Intake/Output Summary (Last 24 hours) at 2022 1655  Last data filed at 2022 1300  Gross per 24 hour   Intake 3440 ml   Output 2350 ml   Net 1090 ml       Physical Exam:   Physical Exam  Vitals and nursing note reviewed  Constitutional:       General: He is not in acute distress  Appearance: He is well-developed  He is not ill-appearing, toxic-appearing or diaphoretic  HENT:      Head: Normocephalic and atraumatic  Eyes:      General: No scleral icterus  Conjunctiva/sclera: Conjunctivae normal    Cardiovascular:      Rate and Rhythm: Normal rate and regular rhythm  Heart sounds: No murmur heard  No friction rub  No gallop  Pulmonary:      Effort: Pulmonary effort is normal  No respiratory distress  Breath sounds: Normal breath sounds  No stridor  No wheezing, rhonchi or rales  Chest:      Chest wall: No tenderness  Abdominal:      General: There is no distension  Palpations: Abdomen is soft  There is no mass  Tenderness: There is no abdominal tenderness  There is no guarding or rebound  Hernia: No hernia is present  Musculoskeletal:         General: No swelling, tenderness, deformity or signs of injury  Cervical back: Neck supple  Right lower leg: No edema  Left lower leg: No edema        Comments: Left great toe/hallux erythematous swollen and tender to the touch  Right shoulder tender to palpation without any obvious erythema   Skin:     General: Skin is warm and dry  Coloration: Skin is not jaundiced or pale  Findings: No bruising, erythema, lesion or rash  Neurological:      Mental Status: He is alert and oriented to person, place, and time  Additional Data:     Labs:  Results from last 7 days   Lab Units 04/28/22  0442 04/27/22  0535 04/27/22  0535 04/23/22  0508 04/22/22  1433 04/22/22  0536 04/22/22  0536   WBC Thousand/uL 14 31*   < > 13 89*   < > 18 09*   < > 19 72*   HEMOGLOBIN g/dL 12 2   < > 12 5   < > 13 3   < > 12 6   HEMATOCRIT % 38 9   < > 39 8   < > 43 4   < > 40 2   PLATELETS Thousands/uL 276   < > 228   < > 178   < > 188   BANDS PCT %  --   --   --   --  27*  --   --    NEUTROS PCT %  --   --   --   --   --   --  89*   LYMPHS PCT %  --   --   --   --   --   --  5*   LYMPHO PCT %  --   --  6*  --  0*   < >  --    MONOS PCT %  --   --   --   --   --   --  4   MONO PCT %  --   --  4  --  2*   < >  --    EOS PCT %  --   --  3  --  1   < > 1    < > = values in this interval not displayed  Results from last 7 days   Lab Units 04/28/22 0442   SODIUM mmol/L 134*   POTASSIUM mmol/L 3 9   CHLORIDE mmol/L 102   CO2 mmol/L 22   BUN mg/dL 43*   CREATININE mg/dL 2 80*   ANION GAP mmol/L 10   CALCIUM mg/dL 9 3   GLUCOSE RANDOM mg/dL 130                       Lines/Drains:  Invasive Devices  Report    Peripheral Intravenous Line            Peripheral IV 04/27/22 Dorsal (posterior); Left;Proximal Forearm <1 day                      Imaging: No pertinent imaging reviewed  Recent Cultures (last 7 days):   Results from last 7 days   Lab Units 04/22/22  1617 04/22/22  1616   BLOOD CULTURE  No Growth After 5 Days  No Growth After 5 Days         Last 24 Hours Medication List:   Current Facility-Administered Medications   Medication Dose Route Frequency Provider Last Rate    acetaminophen  650 mg Oral Q6H PRN Kevin Crews, DO      amLODIPine 5 mg Oral Daily Vicie Krabbe, MD      ampicillin  2,000 mg Intravenous Q6H Marie Drew MD 2,000 mg (04/28/22 1329)    heparin (porcine)  5,000 Units Subcutaneous [facility] Gerri Landeros DO      HYDROmorphone  1 mg Intravenous Q4H PRN Gerri Landeros DO      multi-electrolyte  100 mL/hr Intravenous Continuous Vicie Krabbe,  mL/hr (04/28/22 1120)    ondansetron  4 mg Intravenous Q6H PRN Gerri Landeros DO      oxyCODONE  2 5 mg Oral Q4H PRN Gerri Landeros DO      Or    oxyCODONE  5 mg Oral Q4H PRN Gerri Landeros DO      predniSONE  40 mg Oral Daily Braden Aguilera DO      tamsulosin  0 4 mg Oral After Dinner Gerri Landeros DO          Today, Patient Was Seen By: Socorro Stinson DO    **Please Note: This note may have been constructed using a voice recognition system  **

## 2022-04-28 NOTE — PROGRESS NOTES
NEPHROLOGY PROGRESS NOTE   Roz Garcia 55 y o  male MRN: [id number]  Unit/Bed#: [facility] 812-01 Encounter: [id number]    ASSESSMENT & PLAN:  45-year-old male initially presented to [facility] emergency department with complain of left flank pain, was found to have left-sided hydroureteronephrosis secondary to 3 mm stone at the mid left ureter on CT scan on 04/21 and was transferred to [facility]  Acute kidney injury  -Baseline creatinine:  1 4-1 6 mg/dl from June 2021  -Admission creatinine:  1 93 mg/dl  -peak creatinine 4 39 on 04/22  -Etiology:  Acute kidney injuries due to obstructive uropathy, finding of left hydroureteronephrosis  -Hospital Course:  Status post cystoscopy, insertion left ureteral stent on 4/22 by Urology  -Plan:   · Renal function improved to creatinine 2 5 and was stable at that range from 04/25 to 4/27 but worsened today to creatinine 2 8 mg/dL likely due to intravascular volume depletion  Also has gout flare and possibility of elevated uric acid causing intratubular crystals disposition  Started on Plasma-Lyte at 100 mL/hour, will monitor response  CT from 04/27 with no hydronephrosis but finding of bilateral nephrolithiasis  · Stressed on oral hydration  · Avoid nephrotoxins and dose all medications per EGFR  · Avoid hypotension  Chronic kidney disease stage 3a  -baseline creatinine 1 4-1 6 in 2021  -will need outpatient follow up  BP/ primary hypertension  -BP elevated likely due to pain  Continue amlodipine 5 mg daily which was started on 04/25  Avoid hypotension    Acute gout flare involving right shoulder and left foot  Past history of recurrent gout attack and history of hyperuricemia  Uric acid level 10 6 on 4/21/22  -was started on prednisone 40 mg daily on 04/28, will monitor response  Once acute attack subsides, recommend starting on allopurinol low dose  Also recommend low purine diet       Left hydronephrosis, status post left ureteral stent placement by Urology on 04/22, follow up Urology recommendations  -status post CT on 04/27:  Finding of bilateral nephrolithiasis is and mild cortical atrophy of right kidney as well as cortical scarring left kidney but no hydronephrosis  Sepsis secondary to E coli bacteremia in the setting of obstructive uropathy/pyelonephritis:  Antibiotics per primary team and ID  Urine culture positive for E coli  Follow-up repeat blood cultures-so far negative  Antibiotics changed to ceftriaxone by ID on 04/28 in the setting WBC trending up  -leukocytosis suspected to be due to gout flare    History of recurrent nephrolithiasis:  Follow-up stone analysis-still under process  -will consider 24 hr urine stone risk profile as outpatient      Discussed with Primary Team         SUBJECTIVE:  Complaining of right shoulder pain and also left foot pain    OBJECTIVE:  Current Weight: Weight - Scale: (!) 160 kg (352 lb)  Vitals:    04/28/22 0736   BP: 143/98   Pulse: 91   Resp: 18   Temp: 98 9 °F (37 2 °C)   SpO2: 94%       Intake/Output Summary (Last 24 hours) at 4/28/2022 1053  Last data filed at 4/28/2022 0830  Gross per 24 hour   Intake 1120 ml   Output 2550 ml   Net -1430 ml       Physical Exam  General:  Ill looking, awake  Eyes: Conjunctivae pink,  Sclera anicteric  ENT: lips and mucous membranes moist  Neck: supple   Chest: Clear to Auscultation both lungs,  no crackles, ronchus or wheezing  CVS: S1 & S2 present, normal rate, regular rhythm, no murmur  Abdomen: soft, non-tender, non-distended, Bowel sounds normoactive  Extremities: left  over great toe and swollen and warm     Skin: no rash  Neuro: awake, alert, oriented x 3   Psych: Mood and affect appropriate     Medications:    Current Facility-Administered Medications:     acetaminophen (TYLENOL) tablet 650 mg, 650 mg, Oral, Q6H PRN, Matt Simmons DO, 650 mg at 04/27/22 1826    amLODIPine (NORVASC) tablet 5 mg, 5 mg, Oral, Daily, Luis Angel Padilla MD, 5 mg at 04/28/22 0916    cefTRIAXone (ROCEPHIN) 2,000 mg in dextrose 5 % 50 mL IVPB, 2,000 mg, Intravenous, Q24H, Hannah Concepcion MD, Last Rate: 100 mL/hr at 04/27/22 1234, 2,000 mg at 04/27/22 1234    heparin (porcine) subcutaneous injection 5,000 Units, 5,000 Units, Subcutaneous, Q8H [address], Evon Kehr, DO, 5,000 Units at 04/28/22 0438    HYDROmorphone (DILAUDID) injection 1 mg, 1 mg, Intravenous, Q4H PRN, Evon Kehr, DO    multi-electrolyte (PLASMALYTE-A/ISOLYTE-S PH 7 4) IV solution, 100 mL/hr, Intravenous, Continuous, Luis Angel Padilla MD    ondansetron [facility]) injection 4 mg, 4 mg, Intravenous, Q6H PRN, Evon Kehr, DO    oxyCODONE (ROXICODONE) IR tablet 2 5 mg, 2 5 mg, Oral, Q4H PRN, 2 5 mg at 04/25/22 1040 **OR** oxyCODONE (ROXICODONE) IR tablet 5 mg, 5 mg, Oral, Q4H PRN, Evon Kehr, DO, 5 mg at 04/28/22 0438    predniSONE tablet 40 mg, 40 mg, Oral, Daily, Braden Aguilera DO    tamsulosin (FLOMAX) capsule 0 4 mg, 0 4 mg, Oral, After Dinner, Evon Kehr, DO, 0 4 mg at 04/27/22 1703    Invasive Devices:        Lab Results:   Results from last 7 days   Lab Units 04/28/22  0442 04/27/22  0535 04/26/22  0455 04/24/22  0454 04/23/22  0508   WBC Thousand/uL 14 31* 13 89* 11 15*   < > 16 47*   HEMOGLOBIN g/dL 12 2 12 5 12 7   < > 12 8   HEMATOCRIT % 38 9 39 8 40 4   < > 40 5   PLATELETS Thousands/uL 276 228 212   < > 173   POTASSIUM mmol/L 3 9 3 7 3 8   < > 4 3   CHLORIDE mmol/L 102 104 102   < > 107   CO2 mmol/L 22 25 28   < > 24   BUN mg/dL 43* 45* 40*   < > 43*   CREATININE mg/dL 2 80* 2 55* 2 54*   < > 3 22*   CALCIUM mg/dL 9 3 9 2 9 5   < > 9 3   MAGNESIUM mg/dL  --   --   --   --  2 3   PHOSPHORUS mg/dL  --   --   --   --  3 7    < > = values in this interval not displayed  Previous work up:        Portions of the record may have been created with voice recognition software   Occasional wrong word or "sound a like" substitutions may have occurred due to the inherent limitations of voice recognition software  Read the chart carefully and recognize, using context, where substitutions have occurred  If you have any questions, please contact the dictating provider

## 2022-04-28 NOTE — ASSESSMENT & PLAN NOTE
· With hyperuricemia at 10 6 on admission with chronically elevated levels  · Refused allopurinol  · Now with acute onset of left 1st MTP pain and swelling erythema consistent with prior gout attacks  Also with right-sided shoulder pain  · Suspect his rising white count and mild fevers prior could possibly be related to gout  Due to his presentation with renal failure will avoid colchicine and NSAIDs will start on prednisone 40 mg daily plan for 7-10 day course

## 2022-04-28 NOTE — ASSESSMENT & PLAN NOTE
· Baseline creatinine 1 4 to 1 5  · Creatinine 1 93 on presentation to [facility] and peaked at 4 39 on 4/22  · Due to obstructive uropathy, element of hypovolemia  · Monitor creatinine  · Avoid nephrotoxic medications and hypotension  · Nephrology following   · 4/28/22:  Creatinine worsened to 2 8 today after flat lining at 2 5 for several days, discussed with Nephrology who will give the patient fluids and repeat BMP tomorrow    CT scan reviewed from yesterday without any obstructive stones

## 2022-04-28 NOTE — ASSESSMENT & PLAN NOTE
· Sepsis present on admission as evidenced by Fever and leucocytosis     · Presented to [address] on 4/21/22 with left flank pain   · Noted to have left obstructive uropathy from calculus with pyelonephritis, ADRIANNE, bacteremia   · Transferred to [facility] on 4/22/22 and underwent left ureteral stent placement on 4/22  · IV ampicillin 2 g q 6 hours per Infectious Disease  planning to transition to oral agent once stable and continue until 5/4  ·

## 2022-04-29 LAB
ANION GAP SERPL CALCULATED.3IONS-SCNC: 8 MMOL/L (ref 4–13)
BUN SERPL-MCNC: 42 MG/DL (ref 5–25)
CALCIUM SERPL-MCNC: 9.2 MG/DL (ref 8.3–10.1)
CHLORIDE SERPL-SCNC: 104 MMOL/L (ref 100–108)
CO2 SERPL-SCNC: 25 MMOL/L (ref 21–32)
CREAT SERPL-MCNC: 2.31 MG/DL (ref 0.6–1.3)
ERYTHROCYTE [DISTWIDTH] IN BLOOD BY AUTOMATED COUNT: 13.7 % (ref 11.6–15.1)
GFR SERPL CREATININE-BSD FRML MDRD: 30 ML/MIN/1.73SQ M
GLUCOSE SERPL-MCNC: 110 MG/DL (ref 65–140)
GLUCOSE SERPL-MCNC: 175 MG/DL (ref 65–140)
HCT VFR BLD AUTO: 39.1 % (ref 36.5–49.3)
HGB BLD-MCNC: 12.1 G/DL (ref 12–17)
MCH RBC QN AUTO: 29.7 PG (ref 26.8–34.3)
MCHC RBC AUTO-ENTMCNC: 30.9 G/DL (ref 31.4–37.4)
MCV RBC AUTO: 96 FL (ref 82–98)
PLATELET # BLD AUTO: 298 THOUSANDS/UL (ref 149–390)
PMV BLD AUTO: 10.4 FL (ref 8.9–12.7)
POTASSIUM SERPL-SCNC: 3.8 MMOL/L (ref 3.5–5.3)
RBC # BLD AUTO: 4.08 MILLION/UL (ref 3.88–5.62)
SODIUM SERPL-SCNC: 137 MMOL/L (ref 136–145)
URATE SERPL-MCNC: 8.8 MG/DL (ref 4.2–8)
WBC # BLD AUTO: 13.67 THOUSAND/UL (ref 4.31–10.16)

## 2022-04-29 PROCEDURE — 80048 BASIC METABOLIC PNL TOTAL CA: CPT | Performed by: INTERNAL MEDICINE

## 2022-04-29 PROCEDURE — 99232 SBSQ HOSP IP/OBS MODERATE 35: CPT | Performed by: INTERNAL MEDICINE

## 2022-04-29 PROCEDURE — 84550 ASSAY OF BLOOD/URIC ACID: CPT | Performed by: INTERNAL MEDICINE

## 2022-04-29 PROCEDURE — 85027 COMPLETE CBC AUTOMATED: CPT | Performed by: INTERNAL MEDICINE

## 2022-04-29 PROCEDURE — 82948 REAGENT STRIP/BLOOD GLUCOSE: CPT

## 2022-04-29 RX ORDER — AMOXICILLIN 500 MG/1
500 CAPSULE ORAL EVERY 8 HOURS SCHEDULED
Status: DISCONTINUED | OUTPATIENT
Start: 2022-04-29 | End: 2022-04-30 | Stop reason: HOSPADM

## 2022-04-29 RX ADMIN — AMOXICILLIN 500 MG: 500 CAPSULE ORAL at 10:52

## 2022-04-29 RX ADMIN — HEPARIN SODIUM 5000 UNITS: 5000 INJECTION INTRAVENOUS; SUBCUTANEOUS at 22:58

## 2022-04-29 RX ADMIN — AMPICILLIN 2000 MG: 2 INJECTION, POWDER, FOR SOLUTION INTRAVENOUS at 06:30

## 2022-04-29 RX ADMIN — HEPARIN SODIUM 5000 UNITS: 5000 INJECTION INTRAVENOUS; SUBCUTANEOUS at 14:19

## 2022-04-29 RX ADMIN — AMOXICILLIN 500 MG: 500 CAPSULE ORAL at 22:58

## 2022-04-29 RX ADMIN — HEPARIN SODIUM 5000 UNITS: 5000 INJECTION INTRAVENOUS; SUBCUTANEOUS at 06:30

## 2022-04-29 RX ADMIN — SODIUM CHLORIDE, SODIUM GLUCONATE, SODIUM ACETATE, POTASSIUM CHLORIDE, MAGNESIUM CHLORIDE, SODIUM PHOSPHATE, DIBASIC, AND POTASSIUM PHOSPHATE 75 ML/HR: .53; .5; .37; .037; .03; .012; .00082 INJECTION, SOLUTION INTRAVENOUS at 18:16

## 2022-04-29 RX ADMIN — AMOXICILLIN 500 MG: 500 CAPSULE ORAL at 14:19

## 2022-04-29 RX ADMIN — AMLODIPINE BESYLATE 10 MG: 10 TABLET ORAL at 08:31

## 2022-04-29 RX ADMIN — TAMSULOSIN HYDROCHLORIDE 0.4 MG: 0.4 CAPSULE ORAL at 18:16

## 2022-04-29 RX ADMIN — AMPICILLIN 2000 MG: 2 INJECTION, POWDER, FOR SOLUTION INTRAVENOUS at 01:24

## 2022-04-29 RX ADMIN — PREDNISONE 40 MG: 20 TABLET ORAL at 08:31

## 2022-04-29 NOTE — PROGRESS NOTES
[address] [facility]  Progress Note - Thai Phlegm 1965, 55 y o  male MRN: [id number]  Unit/Bed#: [facility] 812-01 Encounter: [id number]  Primary Care Provider: RITA Flower   Date and time admitted to hospital: 4/22/2022 11:33 AM    Obstructive pyelonephritis  Assessment & Plan  · Status post ureteral stent placement  · Supportive care  ·      Acute unilateral obstructive uropathy  Assessment & Plan  · Presented to [address] [place] [address] Drive on 4/21 with left flank pain  · CT A/P - "Left hydroureteronephrosis secondary to a 3 mm stone at the mid left ureter at the L4-L5 disc space level "  · S/p left ureteral stent on 4/22  · Ct Tamsulosin  ·     Morbid obesity with BMI of 50 0-59 9, adult ([facility])  Assessment & Plan  · BMI 56 81  · Lifestyle modification    ADRIANNE (acute kidney injury) ([address] )  Assessment & Plan  · Baseline creatinine 1 4 to 1 5  · Creatinine 1 93 on presentation to [facility] and peaked at 4 39 on 4/22  · Due to obstructive uropathy, element of hypovolemia  · Monitor creatinine  · Avoid nephrotoxic medications and hypotension  · Nephrology following   · 4/28/22:  Creatinine worsened to 2 8 today after flat lining at 2 5 for several days, discussed with Nephrology who will give the patient fluids and repeat BMP tomorrow  CT scan reviewed from yesterday without any obstructive stones    4/29/22:  After addition of prednisone and IV fluids yesterday patient's creatinine has improved to 2 3 today  Vigorous urine output  Bacteremia due to Escherichia coli  Assessment & Plan  · Blood culture - E  Coli  · Strep seen on [employer] but no strep growth on blood culture  · Urine culture - E  Coli  · Due to obstructive pyelonephritis  · 4/29/22:  Started on amoxicillin by Infectious Disease today, will monitor clinical response and leukocytosis    If improving acute will likely discharge tomorrow will complete treatment until 5/4    History of gout  Assessment & Plan  · With hyperuricemia at 10 6 on admission with chronically elevated levels  · Refused allopurinol  · Now with acute onset of left 1st MTP pain and swelling erythema consistent with prior gout attacks  Also with right-sided shoulder pain  · Suspect his rising white count and mild fevers prior could possibly be related to gout  Due to his presentation with renal failure will avoid colchicine and NSAIDs will start on prednisone 40 mg daily plan for 7-10 day course  ·   · 4/29/22: Currently day 2 of prednisone, patient reports pain is significantly improving in his toe and shoulder  Leukocytosis trending down, creatinine improving as well  Will continue prednisone, start allopurinol once out of acute flare in order to avoid changes in uric acid and precipitation/worsening of flare  Mor[facility] ([facility]  Assessment & Plan  Therapeutic lifestyle modification encouraged    * Sepsis ([facility] )  Assessment & Plan  · Sepsis present on admission as evidenced by Fever and leucocytosis  · Presented to [address] [place] [address] Drive on 4/21/22 with left flank pain   · Noted to have left obstructive uropathy from calculus with pyelonephritis, ADRIANNE, bacteremia   · Transferred to [facility] on 4/22/22 and underwent left ureteral stent placement on 4/22  · Amoxicillin until 5/4  ·     Metabolic acidosis-resolved as of 4/25/2022  Assessment & Plan  · Resolved          VTE Pharmacologic Prophylaxis: VTE Score: 5 High Risk (Score >/= 5) - Pharmacological DVT Prophylaxis Ordered: heparin  Sequential Compression Devices Ordered  Patient Centered Rounds: I performed bedside rounds with nursing staff today  Discussions with Specialists or Other Care Team Provider: id and nephro    Education and Discussions with Family / Patient: Patient declined call to   Time Spent for Care: 45 minutes  More than 50% of total time spent on counseling and coordination of care as described above      Current Length of Stay: 7 day(s)  Current Patient Status: Inpatient   Certification Statement: The patient will continue to require additional inpatient hospital stay due to Pending discharge tomorrow with improvement in leukocytosis and kidney function  Discharge Plan: Anticipate discharge tomorrow to home with home services  Code Status: Level 1 - Full Code    Subjective:   Patient reports improvement in left toe pain and right shoulder with addition of prednisone yesterday  Reports making significant urine output yesterday which kept him up at night  Objective:     Vitals:   Temp (24hrs), Av 5 °F (36 9 °C), Min:97 8 °F (36 6 °C), Max:98 7 °F (37 1 °C)    Temp:  [97 8 °F (36 6 °C)-98 7 °F (37 1 °C)] 97 8 °F (36 6 °C)  HR:  [] 103  Resp:  [14-20] 14  BP: (132-158)/() 142/89  SpO2:  [94 %-97 %] 94 %  Body mass index is 56 81 kg/m²  Input and Output Summary (last 24 hours): Intake/Output Summary (Last 24 hours) at 2022 1245  Last data filed at 2022 1130  Gross per 24 hour   Intake 3595 ml   Output 2800 ml   Net 795 ml       Physical Exam:   Physical Exam  Vitals and nursing note reviewed  Constitutional:       General: He is not in acute distress  Appearance: He is well-developed  He is not ill-appearing, toxic-appearing or diaphoretic  HENT:      Head: Normocephalic and atraumatic  Eyes:      General: No scleral icterus  Conjunctiva/sclera: Conjunctivae normal    Cardiovascular:      Rate and Rhythm: Normal rate and regular rhythm  Heart sounds: No murmur heard  No friction rub  No gallop  Pulmonary:      Effort: Pulmonary effort is normal  No respiratory distress  Breath sounds: Normal breath sounds  No stridor  No wheezing, rhonchi or rales  Chest:      Chest wall: No tenderness  Abdominal:      General: There is no distension  Palpations: Abdomen is soft  There is no mass  Tenderness: There is no abdominal tenderness  There is no guarding or rebound  Hernia: No hernia is present  Musculoskeletal:         General: No swelling, tenderness, deformity or signs of injury  Cervical back: Neck supple  Right lower leg: No edema  Left lower leg: No edema  Comments: Erythema and swelling around left great toe improving  Right shoulder tender to palpation without any obvious erythema   Skin:     General: Skin is warm and dry  Coloration: Skin is not jaundiced or pale  Findings: No bruising, erythema, lesion or rash  Neurological:      Mental Status: He is alert and oriented to person, place, and time  Additional Data:     Labs:  Results from last 7 days   Lab Units 04/29/22  0634 04/28/22  0442 04/27/22  0535 04/23/22  0508 04/22/22  1433   WBC Thousand/uL 13 67*   < > 13 89*   < > 18 09*   HEMOGLOBIN g/dL 12 1   < > 12 5   < > 13 3   HEMATOCRIT % 39 1   < > 39 8   < > 43 4   PLATELETS Thousands/uL 298   < > 228   < > 178   BANDS PCT %  --   --   --   --  27*   LYMPHO PCT %  --   --  6*  --  0*   MONO PCT %  --   --  4  --  2*   EOS PCT %  --   --  3  --  1    < > = values in this interval not displayed  Results from last 7 days   Lab Units 04/29/22  0634   SODIUM mmol/L 137   POTASSIUM mmol/L 3 8   CHLORIDE mmol/L 104   CO2 mmol/L 25   BUN mg/dL 42*   CREATININE mg/dL 2 31*   ANION GAP mmol/L 8   CALCIUM mg/dL 9 2   GLUCOSE RANDOM mg/dL 110         Results from last 7 days   Lab Units 04/29/22  1236   POC GLUCOSE mg/dl 175*               Lines/Drains:  Invasive Devices  Report    Peripheral Intravenous Line            Peripheral IV 04/27/22 Dorsal (posterior); Left;Proximal Forearm 1 day                      Imaging: No pertinent imaging reviewed  Recent Cultures (last 7 days):   Results from last 7 days   Lab Units 04/22/22  1617 04/22/22  1616   BLOOD CULTURE  No Growth After 5 Days  No Growth After 5 Days         Last 24 Hours Medication List:   Current Facility-Administered Medications   Medication Dose Route Frequency Provider Last Rate    acetaminophen  650 mg Oral Q6H PRN Chico Woodbridge, DO      amLODIPine  10 mg Oral Daily Braden Banai, DO      amoxicillin  500 mg Oral [facility] hospital Kaylyn Ellison MD      heparin (porcine)  5,000 Units Subcutaneous [facility] hospital Chico Mcf[place], [place]      HYDROmorphone  1 mg Intravenous Q4H PRN Chico Woodbridge, DO      multi-electrolyte  75 mL/hr Intravenous Continuous Luke MD Kelly 75 mL/hr (04/29/22 1130)    ondansetron  4 mg Intravenous Q6H PRN Chico Woodbridge, DO      oxyCODONE  2 5 mg Oral Q4H PRN Chico Woodbridge, DO      Or    oxyCODONE  5 mg Oral Q4H PRN Chico Woodbridge, DO      predniSONE  40 mg Oral Daily Braden Banai, DO      tamsulosin  0 4 mg Oral After Dinner Chico Mcfarlane, DO          Today, Patient Was Seen By: Rishi Lopez DO    **Please Note: This note may have been constructed using a voice recognition system  **

## 2022-04-29 NOTE — UTILIZATION REVIEW
Continued Stay Review    Date: 4/29/2022                        Current Patient Class: inpatient  Current Level of Care: med/surg  HPI:56 y o  male initially admitted on 4/22 with sepsis, e-coli bacteremia, obstructive pyelonephritis and nephrolithiasis, ADRIANNE  Assessment/Plan: Pt states he very tired d/t being up all night urinating d/t IVF's  States pain in R shoulder and L foot improving  Continues on po amoxicillin per ID with plan to continue through 5/4  Follow temps  Cr 2 31 today, baseline Cr 1 4-1 5   Improving but not back to baseline   CT A/P shows no residual hydronephrosis   Consider role of acute gout exacerbation  Improving with IV fluids, addition of prednisone  Will continue prednisone, start allopurinol once out of acute flare in order to avoid changes in uric acid and precipitation/worsening of flare  continue supportive care  Plan to d/c home with Randialexandru Harden        Vital Signs:   Date/Time Temp Pulse Resp BP MAP (mmHg) SpO2 O2 Device Patient Position - Orthostatic VS   04/29/22 15:51:09 99 2 °F (37 3 °C) 95 20 148/88 108 92 % -- --   04/29/22 07:33:21 97 8 °F (36 6 °C) 103 14 142/89 107 94 % -- --   04/28/22 22:26:16 98 6 °F (37 °C) 93 20 132/77 95 95 % -- Lying   04/28/22 22:25:39 98 6 °F (37 °C) 92 -- 132/77 95 97 % -- --   04/28/22 19:38:54 98 6 °F (37 °C) 105 20 158/87 111 94 % -- Lying   04/28/22 1932 -- -- -- -- -- -- None (Room air) --   04/28/22 15:09:08 98 7 °F (37 1 °C) 100 16 144/105 Abnormal  118 95 % -- --   04/28/22 11:14:24 98 1 °F (36 7 °C) 105 20 141/84 103 95 % -- --   04/28/22 07:36:13 98 9 °F (37 2 °C) 91 18 143/98 113 94 % -- --   04/27/22 23:43:43 97 5 °F (36 4 °C) 111 Abnormal  22 153/87 109 98 % -- --   04/27/22 2326 -- -- -- -- -- -- None (Room air) --   04/27/22 18:16:19 98 7 °F (37 1 °C) 115 Abnormal  18 151/87 108 98 % -- --   04/27/22 18:15:53 98 7 °F (37 1 °C) 114 Abnormal  18 151/87 108 98 % -- --   04/27/22 11:03:51 97 9 °F (36 6 °C) 93 18 146/95 112 96 % -- --   04/27/22 07:06:13 97 5 °F (36 4 °C) 100 18 138/92 107 95 % -- --   04/27/22 05:24:58 98 °F (36 7 °C) 96 -- -- -- 94 % -- --       Pertinent Labs/Diagnostic Results:     Results from last 7 days   Lab Units 04/29/22  0634 04/28/22  0442 04/27/22  0535 04/26/22  0455 04/26/22  0455 04/24/22  0454 04/24/22  0454   WBC Thousand/uL 13 67* 14 31* 13 89*   < > 11 15*   < > 12 57*   HEMOGLOBIN g/dL 12 1 12 2 12 5  --  12 7  --  13 0   HEMATOCRIT % 39 1 38 9 39 8  --  40 4  --  40 7   PLATELETS Thousands/uL 298 276 228   < > 212   < > 176    < > = values in this interval not displayed  Results from last 7 days   Lab Units 04/29/22  0634 04/28/22 0442 04/27/22  0535 04/26/22  0455 04/25/22  1041 04/24/22  0454 04/23/22  0508   SODIUM mmol/L 137 134* 137 135* 138   < > 138   POTASSIUM mmol/L 3 8 3 9 3 7 3 8 3 9   < > 4 3   CHLORIDE mmol/L 104 102 104 102 108   < > 107   CO2 mmol/L 25 22 25 28 28   < > 24   ANION GAP mmol/L 8 10 8 5 2*   < > 7   BUN mg/dL 42* 43* 45* 40* 44*   < > 43*   CREATININE mg/dL 2 31* 2 80* 2 55* 2 54* 2 55*   < > 3 22*   EGFR ml/min/1 73sq m 30 24 27 27 27   < > 20   CALCIUM mg/dL 9 2 9 3 9 2 9 5 9 5   < > 9 3   MAGNESIUM mg/dL  --   --   --   --   --   --  2 3   PHOSPHORUS mg/dL  --   --   --   --   --   --  3 7    < > = values in this interval not displayed       Results from last 7 days   Lab Units 04/29/22  1236   POC GLUCOSE mg/dl 175*     Results from last 7 days   Lab Units 04/29/22  0634 04/28/22 0442 04/27/22  0535 04/26/22  0455 04/25/22  1041 04/24/22  0454 04/23/22  0508   GLUCOSE RANDOM mg/dL 110 130 122 121 113 121 126       Medications:   Scheduled Medications:  amLODIPine, 10 mg, Oral, Daily  amoxicillin, 500 mg, Oral, Q8H LATRICE  heparin (porcine), 5,000 Units, Subcutaneous, Q8H LATRICE  predniSONE, 40 mg, Oral, Daily  tamsulosin, 0 4 mg, Oral, After Dinner    Continuous IV Infusions:  multi-electrolyte, 75 mL/hr, Intravenous, Continuous    PRN Meds:  acetaminophen, 650 mg, Oral, Q6H PRN  HYDROmorphone, 1 mg, Intravenous, Q4H PRN  ondansetron, 4 mg, Intravenous, Q6H PRN  oxyCODONE, 2 5 mg, Oral, Q4H PRN   Or  oxyCODONE, 5 mg, Oral, Q4H PRN 4/28 x3        Discharge Plan: D    Network Utilization Review Department  ATTENTION: Please call with any questions or concerns to [phone] and carefully listen to the prompts so that you are directed to the right person  All voicemails are confidential   Suzanne Aguilar all requests for admission clinical reviews, approved or denied determinations and any other requests to dedicated fax number below belonging to the campus where the patient is receiving treatment   List of dedicated fax numbers for the Facilities:  [address] DENIALS (Administrative/Medical Necessity) [phone]   [address] (Maternity/NICU/Pediatrics) [phone]   [address]  [address] Medical [place] [address] [place] [address] [address] [facility] [phone]

## 2022-04-29 NOTE — ASSESSMENT & PLAN NOTE
· Sepsis present on admission as evidenced by Fever and leucocytosis     · Presented to [address] on 4/21/22 with left flank pain   · Noted to have left obstructive uropathy from calculus with pyelonephritis, ADRIANNE, bacteremia   · Transferred to [facility] on 4/22/22 and underwent left ureteral stent placement on 4/22  · Amoxicillin until 5/4  ·

## 2022-04-29 NOTE — ASSESSMENT & PLAN NOTE
· With hyperuricemia at 10 6 on admission with chronically elevated levels  · Refused allopurinol  · Now with acute onset of left 1st MTP pain and swelling erythema consistent with prior gout attacks  Also with right-sided shoulder pain  · Suspect his rising white count and mild fevers prior could possibly be related to gout  Due to his presentation with renal failure will avoid colchicine and NSAIDs will start on prednisone 40 mg daily plan for 7-10 day course  ·   · 4/29/22: Currently day 2 of prednisone, patient reports pain is significantly improving in his toe and shoulder  Leukocytosis trending down, creatinine improving as well  Will continue prednisone, start allopurinol once out of acute flare in order to avoid changes in uric acid and precipitation/worsening of flare

## 2022-04-29 NOTE — PLAN OF CARE
Problem: PAIN - ADULT  Goal: Verbalizes/displays adequate comfort level or baseline comfort level  Description: Interventions:  - Encourage patient to monitor pain and request assistance  - Assess pain using appropriate pain scale  - Administer analgesics based on type and severity of pain and evaluate response  - Implement non-pharmacological measures as appropriate and evaluate response  - Consider cultural and social influences on pain and pain management  - Notify physician/advanced practitioner if interventions unsuccessful or patient reports new pain  Outcome: Progressing     Problem: GENITOURINARY - ADULT  Goal: Urinary catheter remains patent  Description: INTERVENTIONS:  - Assess patency of urinary catheter  - If patient has a chronic cook, consider changing catheter if non-functioning  - Follow guidelines for intermittent irrigation of non-functioning urinary catheter  Outcome: Progressing

## 2022-04-29 NOTE — PROGRESS NOTES
Progress Note - Infectious Disease   Cheryl Oliveira 55 y o  male MRN: [id number]  [facility] Encounter: [id number]      Impression/Recommendations:  1   Sepsis   Evolving: Fever, WBC   Due to # 2/3   No other appreciable source   ROS and exam otherwise benign   Hemodynamically stable and nontoxic   Improved  Suspect recent LGT, WBC due to evolving gout flare   CT A/P shows no abscess (although non-contrast) or hydronephrosis  Improving with addition of prednisone  Rec:  · Continue antibiotics as below  · Follow temperatures closely  · Supportive care as per the primary service     2   E coli bacteremia   Likely due to # 3   No other appreciable source   Repeat blood cultures pending   No history of MDRO   Suspect Strep mitis may be contaminant as not isolated in urine cultures   Repeat blood cultures negative   Low suspicion for endocarditis  Rec:  · Change antibiotics to amoxicillin with plan to continue through 5/4 to complete 14 days total antibiotics     3   Obstructive pyelonephritis   Due to # 4   Urine culture with E  coli  Rec:  · Continue antibiotics as above     4   Obstructive nephrolithiasis   With left hydroureteronephrosis   Status post cystoscopy, retrograde pyelogram, and left ureteral stent placement 4/22/22   Intraoperative findings were notable for purulence in the bladder and left ureter   Patient passed stone in-house   Repeat CT A/P shows no stone or hydronephrosis  Rec:  · Outpatient Urology follow-up for stent management     5   ADRIANNE on CKD   Baseline Cr 1 4-1 5   Improving but not back to baseline   CT A/P shows no residual hydronephrosis  Consider role of acute gout exacerbation  Improving with IV fluids, addition of prednisone  Rec:  · Close Nephrology follow-up ongoing     6   Acute gout exacerbation  New  Right shoulder pain and new left 1st MTPJ pain, redness, swelling    Rec:  · Continue low-dose prednisone per SLIM     The above impression and plan was discussed in detail with the patient  The patient is stable from an ID standpoint for D/C  If the patient remains in-house, I will reassess the patient on   Please call in the interim with new questions      Antibiotics:  Ampicillin  Antibiotics #9    Subjective:  Patient seen on AM rounds  Denies fevers, chills, sweats, nausea, vomiting, or diarrhea  Exhausted because he was up all night urinating due to IV fluids  Pain in right shoulder and left foot improving  24 Hour Events:  No documented fevers, chills, sweats, nausea, vomiting, or diarrhea  Objective:  Vitals:  Temp:  [97 8 °F (36 6 °C)-98 7 °F (37 1 °C)] 97 8 °F (36 6 °C)  HR:  [] 103  Resp:  [14-20] 14  BP: (132-158)/() 142/89  SpO2:  [94 %-97 %] 94 %  Temp (24hrs), Av 4 °F (36 9 °C), Min:97 8 °F (36 6 °C), Max:98 7 °F (37 1 °C)  Current: Temperature: 97 8 °F (36 6 °C)    Physical Exam:   General:  No acute distress  Psychiatric:  Awake and alert  Pulmonary:  Normal respiratory excursion without accessory muscle use  Abdomen:  Soft, nontender  Extremities:  No edema  Skin:  No rashes    Lab Results:  I have personally reviewed pertinent labs  Results from last 7 days   Lab Units 22  0634 22  0442 22  0535   POTASSIUM mmol/L 3 8 3 9 3 7   CHLORIDE mmol/L 104 102 104   CO2 mmol/L 25 22 25   BUN mg/dL 42* 43* 45*   CREATININE mg/dL 2 31* 2 80* 2 55*   EGFR ml/min/1 73sq m 30 24 27   CALCIUM mg/dL 9 2 9 3 9 2     Results from last 7 days   Lab Units 22  0634 22  0442 22  0535   WBC Thousand/uL 13 67* 14 31* 13 89*   HEMOGLOBIN g/dL 12 1 12 2 12 5   PLATELETS Thousands/uL 298 276 228     Results from last 7 days   Lab Units 22  1617 22  1616   BLOOD CULTURE  No Growth After 5 Days  No Growth After 5 Days  Imaging Studies:   I have personally reviewed pertinent imaging study reports and images in PACS      EKG, Pathology, and Other Studies:   I have personally reviewed pertinent reports

## 2022-04-29 NOTE — ASSESSMENT & PLAN NOTE
· Blood culture - E  Coli  · Strep seen on [employer] but no strep growth on blood culture  · Urine culture - E  Coli  · Due to obstructive pyelonephritis  · 4/29/22:  Started on amoxicillin by Infectious Disease today, will monitor clinical response and leukocytosis    If improving acute will likely discharge tomorrow will complete treatment until 5/4

## 2022-04-29 NOTE — ASSESSMENT & PLAN NOTE
· Baseline creatinine 1 4 to 1 5  · Creatinine 1 93 on presentation to [facility] and peaked at 4 39 on 4/22  · Due to obstructive uropathy, element of hypovolemia  · Monitor creatinine  · Avoid nephrotoxic medications and hypotension  · Nephrology following   · 4/28/22:  Creatinine worsened to 2 8 today after flat lining at 2 5 for several days, discussed with Nephrology who will give the patient fluids and repeat BMP tomorrow  CT scan reviewed from yesterday without any obstructive stones    4/29/22:  After addition of prednisone and IV fluids yesterday patient's creatinine has improved to 2 3 today  Vigorous urine output

## 2022-04-29 NOTE — PROGRESS NOTES
NEPHROLOGY PROGRESS NOTE   Adam Chris 55 y o  male MRN: [id number]  Unit/Bed#: [facility] 812-01 Encounter: [id number]    ASSESSMENT & PLAN:  55-year-old male initially presented to [facility] emergency department with complain of left flank pain, was found to have left-sided hydroureteronephrosis secondary to 3 mm stone at the mid left ureter on CT scan on 04/21 and was transferred to [facility]  Acute kidney injury  -Baseline creatinine:  1 4-1 6 mg/dl from June 2021  -Admission creatinine:  1 93 mg/dl  -peak creatinine 4 39 on 04/22  -Etiology:  Acute kidney injuries due to obstructive uropathy, finding of left hydroureteronephrosis  -Hospital Course:  Status post cystoscopy, insertion left ureteral stent on 4/22 by Urology  - renal function initially improved and was stable at creatinine 2 5 but then worsened to creatinine 2 8 on 04/28 after which patient was started on IV fluids and was also started on oral prednisone for gout involving the left great toe and possibly right shoulder  Also possibility of gout flares/elevated uric acid causing worsening of renal function with intratubular crystal deposition  -Plan:   · Renal function now improving with IV fluids to creatinine 2 3  Clinically euvolemic  Will decrease IV fluid rate to 75 mL/hour for next 24 hours  Stressed on oral hydration  · Repeat CT from 04/27 with no hydronephrosis but finding of bilateral nephrolithiasis  · Avoid nephrotoxins and dose all medications per EGFR  · Avoid hypotension  · Will need outpatient follow-up after discharge  Office informed to arrange for outpatient follow-up in case patient gets discharged over the weekend  Will need repeat BMP in 1 week to monitor renal function                                     Chronic kidney disease stage 3a  -baseline creatinine 1 4-1 6 in 2021  -will need outpatient follow up  BP/ primary hypertension  -BP acceptable  Continue amlodipine 5 mg daily which was started on 04/25  Avoid hypotension    Acute gout flare involving right shoulder and left foot  Past history of recurrent gout attack and history of hyperuricemia  Uric acid level 10 6 on 4/21/22  -was started on prednisone 40 mg daily on 04/28, will monitor response  Once acute attack subsides, recommend starting on allopurinol low dose  Also recommend low purine diet  Left hydronephrosis, status post left ureteral stent placement by Urology on 04/22, follow up Urology recommendations  -status post CT on 04/27:  Finding of bilateral nephrolithiasis  and mild cortical atrophy of right kidney as well as cortical scarring left kidney but no hydronephrosis  -repeat CT scan without any hydronephrosis but finding of bilateral nephrolithiasis    Sepsis secondary to E coli bacteremia in the setting of obstructive uropathy/pyelonephritis:  Antibiotics per primary team and ID  Urine culture positive for E coli  Follow-up repeat blood cultures-so far negative  Antibiotics changed to ceftriaxone by ID on 04/28 in the setting WBC trending up and since then WBC count trending down today  -leukocytosis also suspected to be due to gout flare    History of recurrent nephrolithiasis:    -stone analysis suggestive uric acid 100% stone  -will consider 24 hours stone risk profile as outpatient, need to start on allopurinol once gout under control    Discussed with Primary Team         SUBJECTIVE:  Improving pain over the left foot, no shortness of breath    OBJECTIVE:  Current Weight: Weight - Scale: (!) 160 kg (352 lb)  Vitals:    04/29/22 0733   BP: 142/89   Pulse: 103   Resp: 14   Temp: 97 8 °F (36 6 °C)   SpO2: 94%       Intake/Output Summary (Last 24 hours) at 4/29/2022 1101  Last data filed at 4/29/2022 1001  Gross per 24 hour   Intake 4800 ml   Output 2800 ml   Net 2000 ml       Physical Exam  General:  Ill looking, awake    Eyes: Conjunctivae pink,  Sclera anicteric  ENT: lips and mucous membranes moist  Neck: supple   Chest: Clear to Auscultation both lungs,  no crackles, ronchus or wheezing  CVS: S1 & S2 present, normal rate, regular rhythm, no murmur  Abdomen: soft, non-tender, non-distended, Bowel sounds normoactive  Extremities:  Trace edema over the left foot and tenderness about the left great toe but improving  Skin: no rash  Neuro: awake, alert, oriented x 3   Psych: Mood and affect appropriate      Medications:    Current Facility-Administered Medications:     acetaminophen (TYLENOL) tablet 650 mg, 650 mg, Oral, Q6H PRN, Meenakshi Notch, DO, 650 mg at 04/27/22 1826    amLODIPine (NORVASC) tablet 10 mg, 10 mg, Oral, Daily, Braden Banai, DO, 10 mg at 04/29/22 0831    amoxicillin (AMOXIL) capsule 500 mg, 500 mg, Oral, Q8H [address], Ignacio Acosta MD, 500 mg at 04/29/22 1052    heparin (porcine) subcutaneous injection 5,000 Units, 5,000 Units, Subcutaneous, Q8H [address], Meenakshi Notch, DO, 5,000 Units at 04/29/22 0630    HYDROmorphone (DILAUDID) injection 1 mg, 1 mg, Intravenous, Q4H PRN, [place] Notch, DO    multi-electrolyte (PLASMALYTE-A/ISOLYTE-S PH 7 4) IV solution, 100 mL/hr, Intravenous, Continuous, Jaison Cabrera MD, Last Rate: 100 mL/hr at 04/28/22 2204, 100 mL/hr at 04/28/22 2204    ondansetron (ZOFRAN) injection 4 mg, 4 mg, Intravenous, Q6H PRN, Meenakshi Notch, DO    oxyCODONE (ROXICODONE) IR tablet 2 5 mg, 2 5 mg, Oral, Q4H PRN, 2 5 mg at 04/25/22 1040 **OR** oxyCODONE (ROXICODONE) IR tablet 5 mg, 5 mg, Oral, Q4H PRN, [place] Notch, DO, 5 mg at 04/28/22 1121    predniSONE tablet 40 mg, 40 mg, Oral, Daily, Braden Banai, DO, 40 mg at 04/29/22 0831    tamsulosin (FLOMAX) capsule 0 4 mg, 0 4 mg, Oral, After Dinner, Meenakshi Notch, DO, 0 4 mg at 04/28/22 1752    Invasive Devices:        Lab Results:   Results from last 7 days   Lab Units 04/29/22  0634 04/28/22  0442 04/27/22  0535 04/24/22  0454 04/23/22  0508   WBC Thousand/uL 13 67* 14 31* 13 89*   < > 16 47*   HEMOGLOBIN g/dL 12 1 12 2 12 5   < > 12 8   HEMATOCRIT % 39 1 38 9 39 8   < > 40 5   PLATELETS Thousands/uL 298 276 228   < > 173   POTASSIUM mmol/L 3 8 3 9 3 7   < > 4 3   CHLORIDE mmol/L 104 102 104   < > 107   CO2 mmol/L 25 22 25   < > 24   BUN mg/dL 42* 43* 45*   < > 43*   CREATININE mg/dL 2 31* 2 80* 2 55*   < > 3 22*   CALCIUM mg/dL 9 2 9 3 9 2   < > 9 3   MAGNESIUM mg/dL  --   --   --   --  2 3   PHOSPHORUS mg/dL  --   --   --   --  3 7    < > = values in this interval not displayed  Previous work up:        Portions of the record may have been created with voice recognition software  Occasional wrong word or "sound a like" substitutions may have occurred due to the inherent limitations of voice recognition software  Read the chart carefully and recognize, using context, where substitutions have occurred  If you have any questions, please contact the dictating provider

## 2022-04-29 NOTE — DISCHARGE INSTRUCTIONS
Ureteral Stent Placement   WHAT YOU NEED TO KNOW:     Corrinne Chum,    Today we placed a ureteral stent, you have had this before  We will arrange for staged ureteroscopic stone surgery in some weeks  If you have questions or concerns please let me know    Thank you for allowing us to take care of you today,    Dr Nelly Gould    Ureteral stent placement is a procedure to open a blocked or narrow ureter  The ureter is the tube that carries urine from your kidney into your bladder  A stent is a thin hollow plastic tube used to hold your ureter open and allow urine to flow  The stent may stay in for several weeks  DISCHARGE INSTRUCTIONS:   Medicines:   · Pain medicine  may be given to take away or decrease pain  Do not wait until the pain is severe before you take your medicine  · Antibiotics  help prevent infections  Your healthcare provider may prescribe these for you while your stent remains in  · Take your medicine as directed  Contact your healthcare provider if you think your medicine is not helping or if you have side effects  Tell him or her if you are allergic to any medicine  Keep a list of the medicines, vitamins, and herbs you take  Include the amounts, and when and why you take them  Bring the list or the pill bottles to follow-up visits  Carry your medicine list with you in case of an emergency  Follow up with your urologist as directed: You will need regular follow-up visits with your urologist as long as the stent remains in  He will check to make sure the stent is working properly  He may do urine cultures to check for infection  Write down your questions so you remember to ask them during your visits  Self-care:   · Drink liquids  as directed  Ask your healthcare provider how much liquid to drink each day and which liquids are best for you  Fluids such as cranberry or apple juice may be especially helpful to prevent urinary infections       · Return to normal activities  the day after your stent placement or as directed by your healthcare provider  · You may take a shower  the day after your stent placement if your healthcare provider says it is okay  Contact your healthcare provider or urologist if:   · You have a fever or chills  · You feel like you need to urinate often  · You have pain when you urinate or pain around your bladder or kidney  · You see blood in your urine or it looks cloudy  · You have questions or concerns about your condition or care  Seek care immediately or call 911 if:   · You urinate little or not at all  · You have severe pain in your abdomen  © 2017 [address] [employer]  Information is for End User's use only and may not be sold, redistributed or otherwise used for commercial purposes  All illustrations and images included in CareNotes® are the copyrighted property of A D A M , Inc  or [employer]  The above information is an  only  It is not intended as medical advice for individual conditions or treatments  Talk to your doctor, nurse or pharmacist before following any medical regimen to see if it is safe and effective for you  Acute Kidney Injury (ADRIANNE)  You have been diagnosed with Acute Kidney Injury (ADRIANNE)  The following information has been developed to provide you with information about ADRIANNE and treatment  What is Acute Kidney Injury (ADRIANNE)? ADRIANNE occurs when kidney function decreases over a short period of time  This condition causes a buildup of waste products in the blood and can cause fluid to build up causing swelling in the legs and shortness of breath  Sometimes called Acute Kidney Failure or Acute Renal Failure, ADRIANNE is often reversible if it is found and treated quickly  How do you know if you have ADRIANNE? ADRIANNE is diagnosed by assessing kidney function  This is done by obtaining a blood test to measure the blood level of creatinine  Decreased urine output can sometimes also indicate ADRIANNE      Who is at risk for ADRIANNE? ADRIANNE can happen to anyone but usually happens to people who are already sick and may be in the hospital  People are at higher risk for ADRIANNE if they have any of the following:   age 72 years or older   high or low blood pressure   underlying kidney disease (e g , Chronic Kidney Disease (CKD)   peripheral vascular disease (hardening of arteries)   chronic diseases such as liver disease, heart disease and diabetes   a single kidney    What are the symptoms of ADRIANNE? You may or may not have the symptoms to suggest you have kidney injury until the ADRIANNE has progressed  Some of the symptoms are listed below:   Not making enough urine   Increased swelling in legs    Feeling tired   Trouble breathing or shortness of breath   Nausea   New or worsening confusion    What causes ADRIANNE? The causes are divided into three categories:   Not enough blood flowing to the kidneys (e g , low blood pressure, bleeding, diarrhea, dehydration)   Injury directly to the kidneys (e g , blood clots, severe infections such as sepsis, medicine toxicity, IV contrast dye used for cardiac catheterization or CT scans)   Blockage to the tubes (ureters) that drain the urine from the kidneys (e g , enlarged prostate, kidney stones, blood clots)    What is the treatment for ADRIANNE? The treatment for ADRIANNE depends on correcting what caused it  Treatment usually involves removing the cause and measures to prevent further injury to the kidneys  This may require the use of intravenous fluids or medications and/or temporary dialysis  Dialysis is a process using a machine that does the job of the kidneys to remove waste and help correct the electrolyte and fluid balance while the kidneys are recovering  If dialysis is needed to treat ADRIANNE, the doctor will assess daily to see if the kidneys are showing signs of recovery  The daily assessments determine how long dialysis needs to continue      Depending on the cause and the extent of damage, an episode of ADRIANNE may resolve in a few days to several weeks to several months  What are the long term effects of having an episode of ADRIANNE?  People who have one episode of ADRIANNE are at an increased risk of having another episode of ADRIANNE as well as other health problems such as kidney disease, stroke, and heart disease   In a small number of people who had unrecognized kidney disease, an ADRIANNE episode may result in Chronic Kidney Disease (CKD) which requires lifelong monitoring and treatment  How do you prevent future episodes of ADRIANNE?  Make sure to follow up with the kidney doctor after hospital discharge and obtain blood work to reassess and monitor kidney function   If you have diabetes, keep your blood sugar in goal range and keep appointments with your diabetes specialist    Kevyn Haas If you have high blood pressure, have your blood pressure checked regularly to make sure it is in target range  o If you take blood pressure medicine called ACEIs or ARBs (e g , Lisinopril, Enalapril, Diovan, Losartan), your doctor may tell you to skip a dose or two if you have severe dehydration and your blood pressure is running low   Avoid using medicines such as NSAIDs (Nonsteroidal Anti-inflammatory Drugs) and Garza-2 Inhibitors (a type of NSAID) that may be harmful to kidney function  These may include the medicines listed in the table that follows  Examples of NSAIDS and Garza-2 Inhibitors   Talk to your doctor or healthcare provider before stopping any medicine ordered for you     Celecoxib (CELEBREX) Ketoprofen (ORUDIS, ORUVAIL)   Diclofenac (VOLTAREN, CATAFLAM) Ketorolac (TORADOL)   Diflunisal (DOLOBID) Meloxicam (MOBIC)   Etodolac (LODINE) Nabumetone (RELAFEN)   Fenoprofen (NALFON) Naproxen (ALEVE, NAPROSYN,    NAPRELAN, ANAPROX)   Flurbiprofen (ANSAID) Oxaprozin (DAYPRO)   Ibuprofen (MOTRIN, ADVIL) Piroxicam (FELDENE)   Indomethacin (INDOCIN) Sulindac (CLINORIL)    Tolmetin (Hedwig Nails)     Is there a special diet for people with ADRIANNE? People with ADRIANNE and/or other kidney disease often have high potassium and phosphorus levels in their blood  To protect the kidneys from further injury and to avoid complications, most doctors recommend following a healthy diet choosing foods low potassium and low phosphorus   Limiting dietary potassium to 2 5 grams/day and phosphorus to 800 milligrams/day is recommended   A dietitian can help you with learning more about this type of diet   The tables on the following page may help you to choose lower potassium and phosphorus foods  The following websites are also good sources of information:  Alejandra at  org/nutrition/Kidney-Disease-Stages-1-4   [employer]   https://www niddk nih gov/health-information/kidney-disease/chronic-kidney-disease-ckd/eating-nutrition  https://[facility] [address]/[employer]/articles/15641-renal-diet-basics  [employer] cy    If you have any questions or concerns about your condition, please contact your doctor or healthcare provider  These tables may help you to choose lower potassium and phosphorus foods    AVOID these higher phosphorus* foods: CHOOSE these lower phosphorus* foods:   Milk, pudding , yogurt made from animals and from many soy varieties Rice milk (unfortified), nondairy creamer   Hard cheeses, ricotta, cottage cheese, fat-free cream cheese Regular and low-fat cream cheese   Ice cream, frozen yogurt Sherbet, frozen fruit pops, sorbet   Soups made with milk, dried peas, beans, lentils or other high phosphorus ingredients Soups made with broth, are water-based, or other lower phosphorus ingredients   Whole grains, including whole-grain breads, crackers, cereal, rice and pasta, corn tortillas Refined grains, including white bread, crackers, cereals, rice and pasta   Quick breads, biscuits, cornbread, muffins, pancakes, waffles, granola, wheat germ Homemade refined (white) dinner rolls, bagels, English muffins, sugar cookies, shortbread cookies, gladys food cake   Dried peas (split, black-eyed), beans (black, garbanzo, lima, kidney, navy, self), lentils Green peas (canned, frozen), green beans, wax beans   Organ meats, walleye, [place], sardines Lean beef, pork, lamb, poultry, other fish   Nuts and seeds Popcorn   Peanut butter, other nut butters; tofu, veggie or soy burgers Jam, jelly, honey   Chocolate, including chocolate drinks Carob (chocolate-flavored) candy, hard candy,  gumdrops   Nancy, pepper-type sodas, flavored fitch, bottled teas, beer Lemon-lime soda, ginger ale or root beer, plain water, cream soda, grape soda   *Always read labels and avoid foods with ingredients containing "phos"  AVOID these higher potassium foods: CHOOSE these lower potassium foods:      Milk (fat free, low fat, whole, buttermilk, Soy), yogurt    Regular and low-fat cream cheese      Beans (white, [place]), [place] sprouts, spinach Swiss       chard, broccoli, avocado, artichoke, potatoes, sweet      potatoes, tomatoes/tomato sauce, beet greens      Green beans, alfalfa sprouts, bamboo shoots (canned),       cabbage, carrots, cauliflower, corn, cucumber, eggplant,      endive, lettuce, mushrooms, onions, radishes,  watercress,       water chestnuts (canned), rice, peas      Halibut, tuna, cod, snapper, tuna fish, turkey    Egg, lean beef, pork, lamb, shellfish, chicken       Banana, papaya, orange, cantaloupe, dates, raisins and      other dried fruit, pomegranate, avocado      Apple, applesauce, blackberries, raspberries, pears,     watermelon, cucumbers, blueberries, cranberries,       peaches      Almonds, peanuts, hazelnuts, [place], cashew, mixed,      seeds (sunflower, pumpkin)     Homemade refined (white) dinner rolls, bagels,      English muffins, flour tortilla, crackers, gennaro      crackers, popcorn, pretzels, spaghetti or macaroni,       hummus Tomato or vegetable juice, prune juice    Papaya, chris, or pear nectar, cranberry juice cocktail      Molasses

## 2022-04-30 VITALS
TEMPERATURE: 98.8 F | BODY MASS INDEX: 50.62 KG/M2 | HEIGHT: 66 IN | SYSTOLIC BLOOD PRESSURE: 153 MMHG | DIASTOLIC BLOOD PRESSURE: 94 MMHG | HEART RATE: 72 BPM | OXYGEN SATURATION: 96 % | RESPIRATION RATE: 19 BRPM | WEIGHT: 315 LBS

## 2022-04-30 LAB
ANION GAP SERPL CALCULATED.3IONS-SCNC: 8 MMOL/L (ref 4–13)
BASOPHILS # BLD MANUAL: 0.16 THOUSAND/UL (ref 0–0.1)
BASOPHILS NFR MAR MANUAL: 1 % (ref 0–1)
BUN SERPL-MCNC: 41 MG/DL (ref 5–25)
CALCIUM SERPL-MCNC: 9.3 MG/DL (ref 8.3–10.1)
CHLORIDE SERPL-SCNC: 105 MMOL/L (ref 100–108)
CO2 SERPL-SCNC: 25 MMOL/L (ref 21–32)
CREAT SERPL-MCNC: 2.06 MG/DL (ref 0.6–1.3)
EOSINOPHIL # BLD MANUAL: 0.32 THOUSAND/UL (ref 0–0.4)
EOSINOPHIL NFR BLD MANUAL: 2 % (ref 0–6)
ERYTHROCYTE [DISTWIDTH] IN BLOOD BY AUTOMATED COUNT: 13.8 % (ref 11.6–15.1)
GFR SERPL CREATININE-BSD FRML MDRD: 35 ML/MIN/1.73SQ M
GLUCOSE SERPL-MCNC: 107 MG/DL (ref 65–140)
HCT VFR BLD AUTO: 37.1 % (ref 36.5–49.3)
HGB BLD-MCNC: 11.8 G/DL (ref 12–17)
LYMPHOCYTES # BLD AUTO: 1.92 THOUSAND/UL (ref 0.6–4.47)
LYMPHOCYTES # BLD AUTO: 12 % (ref 14–44)
MCH RBC QN AUTO: 30.3 PG (ref 26.8–34.3)
MCHC RBC AUTO-ENTMCNC: 31.8 G/DL (ref 31.4–37.4)
MCV RBC AUTO: 95 FL (ref 82–98)
MONOCYTES # BLD AUTO: 0.8 THOUSAND/UL (ref 0–1.22)
MONOCYTES NFR BLD: 5 % (ref 4–12)
NEUTROPHILS # BLD MANUAL: 12.78 THOUSAND/UL (ref 1.85–7.62)
NEUTS BAND NFR BLD MANUAL: 3 % (ref 0–8)
NEUTS SEG NFR BLD AUTO: 77 % (ref 43–75)
PLATELET # BLD AUTO: 366 THOUSANDS/UL (ref 149–390)
PLATELET BLD QL SMEAR: ADEQUATE
PMV BLD AUTO: 10.9 FL (ref 8.9–12.7)
POLYCHROMASIA BLD QL SMEAR: PRESENT
POTASSIUM SERPL-SCNC: 3.9 MMOL/L (ref 3.5–5.3)
RBC # BLD AUTO: 3.9 MILLION/UL (ref 3.88–5.62)
RBC MORPH BLD: PRESENT
SODIUM SERPL-SCNC: 138 MMOL/L (ref 136–145)
WBC # BLD AUTO: 15.98 THOUSAND/UL (ref 4.31–10.16)

## 2022-04-30 PROCEDURE — 80048 BASIC METABOLIC PNL TOTAL CA: CPT | Performed by: INTERNAL MEDICINE

## 2022-04-30 PROCEDURE — 99239 HOSP IP/OBS DSCHRG MGMT >30: CPT | Performed by: INTERNAL MEDICINE

## 2022-04-30 PROCEDURE — 85007 BL SMEAR W/DIFF WBC COUNT: CPT | Performed by: INTERNAL MEDICINE

## 2022-04-30 PROCEDURE — 85027 COMPLETE CBC AUTOMATED: CPT | Performed by: INTERNAL MEDICINE

## 2022-04-30 RX ORDER — AMLODIPINE BESYLATE 10 MG/1
10 TABLET ORAL DAILY
Qty: 60 TABLET | Refills: 1 | Status: SHIPPED | OUTPATIENT
Start: 2022-04-30 | End: 2022-11-22

## 2022-04-30 RX ORDER — PREDNISONE 20 MG/1
40 TABLET ORAL DAILY
Qty: 14 TABLET | Refills: 0 | Status: SHIPPED | OUTPATIENT
Start: 2022-04-30 | End: 2022-05-07

## 2022-04-30 RX ORDER — AMOXICILLIN 500 MG/1
500 CAPSULE ORAL EVERY 8 HOURS SCHEDULED
Qty: 15 CAPSULE | Refills: 0 | Status: SHIPPED | OUTPATIENT
Start: 2022-04-30 | End: 2022-05-05

## 2022-04-30 RX ADMIN — AMLODIPINE BESYLATE 10 MG: 10 TABLET ORAL at 07:33

## 2022-04-30 RX ADMIN — HEPARIN SODIUM 5000 UNITS: 5000 INJECTION INTRAVENOUS; SUBCUTANEOUS at 06:23

## 2022-04-30 RX ADMIN — AMOXICILLIN 500 MG: 500 CAPSULE ORAL at 06:23

## 2022-04-30 RX ADMIN — PREDNISONE 40 MG: 20 TABLET ORAL at 07:30

## 2022-04-30 RX ADMIN — SODIUM CHLORIDE, SODIUM GLUCONATE, SODIUM ACETATE, POTASSIUM CHLORIDE, MAGNESIUM CHLORIDE, SODIUM PHOSPHATE, DIBASIC, AND POTASSIUM PHOSPHATE 75 ML/HR: .53; .5; .37; .037; .03; .012; .00082 INJECTION, SOLUTION INTRAVENOUS at 07:30

## 2022-04-30 NOTE — ASSESSMENT & PLAN NOTE
· With hyperuricemia at 10 6 on admission with chronically elevated levels  · Refused allopurinol  · Now with acute onset of left 1st MTP pain and swelling erythema consistent with prior gout attacks  Also with right-sided shoulder pain  · Suspect his rising white count and mild fevers prior could possibly be related to gout  Due to his presentation with renal failure will avoid colchicine and NSAIDs will start on prednisone 40 mg daily plan for 7-10 day course  ·   · 4/29/22: Currently day 2 of prednisone, patient reports pain is significantly improving in his toe and shoulder  Leukocytosis trending down, creatinine improving as well  Will continue prednisone, start allopurinol once out of acute flare in order to avoid changes in uric acid and precipitation/worsening of flare  4/30/22:  Patient with significant improved pain erythema and swelling to left toe and right shoulder, will continue prednisone for 1 week  Instructed follow-up with PCP and to start allopurinol for chronic treatment of gout after flares resolved

## 2022-04-30 NOTE — DISCHARGE SUMMARY
[address] [facility]  Discharge- Kallie Shore 1965, 55 y o  male MRN: [id number]  Unit/Bed#: [facility] 812-01 Encounter: [id number]  Primary Care Provider: RITA Ring   Date and time admitted to hospital: 4/22/2022 11:33 AM    Obstructive pyelonephritis  Assessment & Plan  · Status post ureteral stent placement  · Supportive care  ·      Acute unilateral obstructive uropathy  Assessment & Plan  · Presented to [facility] [address] on 4/21 with left flank pain  · CT A/P - "Left hydroureteronephrosis secondary to a 3 mm stone at the mid left ureter at the L4-L5 disc space level "  · S/p left ureteral stent on 4/22  · Ct Tamsulosin  ·     Morbid obesity with BMI of 50 0-59 9, adult ([facility])  Assessment & Plan  · BMI 56 81  · Lifestyle modification    ADRIANNE (acute kidney injury) ([facility] )  Assessment & Plan  · Baseline creatinine 1 4 to 1 5  · Creatinine 1 93 on presentation to [facility] and peaked at 4 39 on 4/22  · Due to obstructive uropathy, element of hypovolemia  · Monitor creatinine  · Avoid nephrotoxic medications and hypotension  · Nephrology following   · 4/28/22:  Creatinine worsened to 2 8 today after flat lining at 2 5 for several days, discussed with Nephrology who will give the patient fluids and repeat BMP tomorrow  CT scan reviewed from yesterday without any obstructive stones    4/29/22:  After addition of prednisone and IV fluids yesterday patient's creatinine has improved to 2 3 today  Vigorous urine output  4/30/22:  Creatinine improved to 2 this morning, continues to have good urinary output, medically she is clear for discharge, will repeat BMP in 1 week to monitor creatinine    Bacteremia due to Escherichia coli  Assessment & Plan  · Blood culture - E  Coli  · Strep seen on Biofire but no strep growth on blood culture  · Urine culture - E   Coli  · Due to obstructive pyelonephritis  · 4/29/22:  Started on amoxicillin by Infectious Disease today, will monitor clinical response and leukocytosis  If improving acute will likely discharge tomorrow will complete treatment until 5/4 4/30/22:  Patient stable for discharge, rising leukocytosis secondary to steroids with no clinical concerns for infection will repeat CBC once steroids are complete 1 week  Will discharge on amoxicillin until 5/4/22    History of gout  Assessment & Plan  · With hyperuricemia at 10 6 on admission with chronically elevated levels  · Refused allopurinol  · Now with acute onset of left 1st MTP pain and swelling erythema consistent with prior gout attacks  Also with right-sided shoulder pain  · Suspect his rising white count and mild fevers prior could possibly be related to gout  Due to his presentation with renal failure will avoid colchicine and NSAIDs will start on prednisone 40 mg daily plan for 7-10 day course  ·   · 4/29/22: Currently day 2 of prednisone, patient reports pain is significantly improving in his toe and shoulder  Leukocytosis trending down, creatinine improving as well  Will continue prednisone, start allopurinol once out of acute flare in order to avoid changes in uric acid and precipitation/worsening of flare  4/30/22:  Patient with significant improved pain erythema and swelling to left toe and right shoulder, will continue prednisone for 1 week  Instructed follow-up with PCP and to start allopurinol for chronic treatment of gout after flares resolved  Morbid obesity ([facility] )  Assessment & Plan  Therapeutic lifestyle modification encouraged    * Sepsis ([facility] [address] [address] )  Assessment & Plan  · Sepsis present on admission as evidenced by Fever and leucocytosis     · Presented to [facility] [address] on 4/21/22 with left flank pain   · Noted to have left obstructive uropathy from calculus with pyelonephritis, ADRIANNE, bacteremia   · Transferred to [facility] on 4/22/22 and underwent left ureteral stent placement on 4/22  · Amoxicillin until 5/4  ·     Metabolic acidosis-resolved as of 4/25/2022  Assessment & Plan  · Resolved        Medical Problems             Resolved Problems  Date Reviewed: 4/30/2022          Resolved    Metabolic acidosis 7/41/6742     Resolved by  Fabricio Polanco MD              Discharging Physician / Practitioner: Mary Bright DO  PCP: Alysa 1690, [address]  Admission Date:   Admission Orders (From admission, onward)     Ordered        04/22/22 1521  Inpatient Admission  Once                      Discharge Date: 04/30/22    Consultations During Hospital Stay:  · ID  · Nephrology  · urology    Procedures Performed:   · As under procedures    Significant Findings / Test Results:   XR shoulder 2+ vw right   Final Result by Mushtaq Duong MD (04/29 0516)      No acute osseous abnormality  Workstation performed: [id number]         CT renal stone study abdomen pelvis wo contrast   Final Result by Paul Cosme MD (04/27 1236)         1  Left sided ureteral stent in place  Periureteral fat stranding could be related to recent procedure or urinary tract infection/pyelitis  Recommend correlation with urinalysis  2   Previously noted ureteral calculus on the left is no longer present  3   Nonobstructing bilateral renal calculi  The study was marked in [facility] for immediate notification  Workstation performed: [id number]         ·     Incidental Findings:   · As under imaging     Test Results Pending at Discharge (will require follow up):   · none     Outpatient Tests Requested:  · Cbc and bmp    Complications:  none    Reason for Admission:  Nephrolithiasis    Hospital Course:   Adam Chris is a 55 y o  male patient who originally presented to the hospital on 4/22/2022 due to left-sided flank pain found to have a left ureteral stone and a KI  Patient underwent cystoscopy with retrograde pyelogram and left ureteral stent placement    Intraoperatively there were findings concerning for infection including purulence in the bladder and left ureter  Blood cultures grew E coli and strep with urine cultures growing E coli  Patient improved with IV antibiotics and was discharged on amoxicillin  Patient also experienced a gout flare and improved with prednisone which was continued on discharge as well  His kidney function improved with fluids and on discharge his creatinine was down to 2    Please see above list of diagnoses and related plan for additional information  Condition at Discharge: stable    Discharge Day Visit / Exam:   Subjective:  Patient denies any complaints on day of discharge reports pain and swelling in toe and right shoulder are much improved  Vitals: Blood Pressure: 153/94 (04/30/22 0733)  Pulse: 96 (04/30/22 0733)  Temperature: 98 °F (36 7 °C) (04/30/22 0733)  Temp Source: Oral (04/28/22 2226)  Respirations: 19 (04/30/22 0245)  Height: 5' 6" (167 6 cm) (04/22/22 1501)  Weight - Scale: (!) 160 kg (352 lb) (04/22/22 1501)  SpO2: 95 % (04/30/22 0733)  Exam:   Physical Exam  Vitals and nursing note reviewed  Constitutional:       General: He is not in acute distress  Appearance: He is well-developed  He is obese  He is not ill-appearing, toxic-appearing or diaphoretic  HENT:      Head: Normocephalic and atraumatic  Eyes:      General: No scleral icterus  Conjunctiva/sclera: Conjunctivae normal    Cardiovascular:      Rate and Rhythm: Normal rate and regular rhythm  Heart sounds: No murmur heard  No friction rub  No gallop  Pulmonary:      Effort: Pulmonary effort is normal  No respiratory distress  Breath sounds: No stridor  No wheezing, rhonchi or rales  Chest:      Chest wall: No tenderness  Abdominal:      General: There is no distension  Palpations: Abdomen is soft  There is no mass  Tenderness: There is no abdominal tenderness  There is no guarding or rebound  Hernia: No hernia is present  Musculoskeletal:         General: No swelling, tenderness, deformity or signs of injury  Cervical back: Neck supple  Right lower leg: No edema  Left lower leg: No edema  Comments: Resolving erythema over left great toe, no tenderness to palpation   Skin:     General: Skin is warm and dry  Coloration: Skin is not jaundiced or pale  Findings: No bruising, erythema, lesion or rash  Neurological:      Mental Status: He is alert and oriented to person, place, and time  Discussion with Family: Patient declined call to   Discharge instructions/Information to patient and family:   See after visit summary for information provided to patient and family  Provisions for Follow-Up Care:  See after visit summary for information related to follow-up care and any pertinent home health orders  Disposition:   Home    Planned Readmission: no     Discharge Statement:  I spent 35 minutes discharging the patient  This time was spent on the day of discharge  I had direct contact with the patient on the day of discharge  Greater than 50% of the total time was spent examining patient, answering all patient questions, arranging and discussing plan of care with patient as well as directly providing post-discharge instructions  Additional time then spent on discharge activities  Discharge Medications:  See after visit summary for reconciled discharge medications provided to patient and/or family        **Please Note: This note may have been constructed using a voice recognition system**

## 2022-04-30 NOTE — ASSESSMENT & PLAN NOTE
· Blood culture - E  Coli  · Strep seen on Biofire but no strep growth on blood culture  · Urine culture - E  Coli  · Due to obstructive pyelonephritis  · 4/29/22:  Started on amoxicillin by Infectious Disease today, will monitor clinical response and leukocytosis  If improving acute will likely discharge tomorrow will complete treatment until 5/4 4/30/22:  Patient stable for discharge, rising leukocytosis secondary to steroids with no clinical concerns for infection will repeat CBC once steroids are complete 1 week    Will discharge on amoxicillin until 5/4/22

## 2022-04-30 NOTE — CASE MANAGEMENT
Case Management Discharge Planning Note    Patient name Vidal Kennedy  [facility] [facility] 812/PPHP 215-90 MRN [id number]  : 1965 Date 2022       Current Admission Date: 2022  Current Admission Diagnosis:Sepsis [facility])   Patient Active Problem List    Diagnosis Date Noted    Obstructive pyelonephritis 2022    Acute unilateral obstructive uropathy 2022    Mixed hyperlipidemia 2021    Morbid obesity with BMI of 50 0-59 9, adult ([facility] ) 2021    Acute pain of left knee 2021    Insomnia 2019    Hypertension 2019    Sepsis ([facility] ) 2019    Chronic kidney disease, stage 3 ([place] [address] ) 2019    ADIRANNE (acute kidney injury) ([place] [address] ) 2019    Bacteremia due to Escherichia coli 2019    History of gout 07/15/2019    Vitamin D deficiency 06/10/2015    Morbid obesity ([place] [address] ) 2015      LOS (days): 8  Geometric Mean LOS (GMLOS) (days):   Days to GMLOS:     OBJECTIVE:  Risk of Unplanned Readmission Score: 12         Current admission status: Inpatient   Preferred Pharmacy:   [facility]/pharmacy #[id number]- [address] [address]  Phone: [phone] Fax: [phone] [facility] [address]  Phone: [phone] Fax: [phone]    Primary Care Provider: RITA Cabral    Primary Insurance: BLUE CROSS  Secondary Insurance:     DISCHARGE DETAILS:     Per DO Aidan Stamford pt needs a lyft to [employer]     CM confirmed this with pt, CM scheduled a 130pm lyft ride  CM informed nurse Lluvia Lucia

## 2022-04-30 NOTE — PLAN OF CARE
Problem: GENITOURINARY - ADULT  Goal: Urinary catheter remains patent  Description: INTERVENTIONS:  - Assess patency of urinary catheter  - If patient has a chronic cook, consider changing catheter if non-functioning  - Follow guidelines for intermittent irrigation of non-functioning urinary catheter  Outcome: Progressing

## 2022-04-30 NOTE — PLAN OF CARE
Problem: GENITOURINARY - ADULT  Goal: Urinary catheter remains patent  Description: INTERVENTIONS:  - Assess patency of urinary catheter  - If patient has a chronic cook, consider changing catheter if non-functioning  - Follow guidelines for intermittent irrigation of non-functioning urinary catheter  4/30/2022 1159 by Ricardo Liz RN  Outcome: Adequate for Discharge  4/30/2022 0744 by Ricardo Liz RN  Outcome: Progressing  4/30/2022 0743 by Ricardo Liz RN  Outcome: Progressing

## 2022-04-30 NOTE — ASSESSMENT & PLAN NOTE
· Baseline creatinine 1 4 to 1 5  · Creatinine 1 93 on presentation to [facility] and peaked at 4 39 on 4/22  · Due to obstructive uropathy, element of hypovolemia  · Monitor creatinine  · Avoid nephrotoxic medications and hypotension  · Nephrology following   · 4/28/22:  Creatinine worsened to 2 8 today after flat lining at 2 5 for several days, discussed with Nephrology who will give the patient fluids and repeat BMP tomorrow  CT scan reviewed from yesterday without any obstructive stones    4/29/22:  After addition of prednisone and IV fluids yesterday patient's creatinine has improved to 2 3 today  Vigorous urine output       4/30/22:  Creatinine improved to 2 this morning, continues to have good urinary output, medically she is clear for discharge, will repeat BMP in 1 week to monitor creatinine

## 2022-04-30 NOTE — ASSESSMENT & PLAN NOTE
· Sepsis present on admission as evidenced by Fever and leucocytosis     · Presented to [address] on 4/21/22 with left flank pain   · Noted to have left obstructive uropathy from calculus with pyelonephritis, ADRIANNE, bacteremia   · Transferred to [facility] on 4/22/22 and underwent left ureteral stent placement on 4/22  · Amoxicillin until 5/4  ·

## 2022-05-02 ENCOUNTER — PREP FOR PROCEDURE (OUTPATIENT)
Dept: UROLOGY | Facility: CLINIC | Age: 57
End: 2022-05-02

## 2022-05-02 ENCOUNTER — TELEPHONE (OUTPATIENT)
Dept: NEPHROLOGY | Facility: CLINIC | Age: 57
End: 2022-05-02

## 2022-05-02 ENCOUNTER — TRANSITIONAL CARE MANAGEMENT (OUTPATIENT)
Dept: INTERNAL MEDICINE CLINIC | Facility: CLINIC | Age: 57
End: 2022-05-02

## 2022-05-02 DIAGNOSIS — N20.0 NEPHROLITHIASIS: Primary | ICD-10-CM

## 2022-05-02 NOTE — TELEPHONE ENCOUNTER
Pt unaware of CT results -or that his surgery is being canceled - can this and please be explain to him cysto /stent removal process to him , ty   This is a Terra gutierrez or Oleg  pt

## 2022-05-02 NOTE — TELEPHONE ENCOUNTER
----- Message from Cameron Garcia MD sent at 4/29/2022 11:11 AM EDT -----  Patient may be discharged from [address] [facility] [address] over the weekend  Was seen for acute kidney injury due to left hydronephrosis and underwent stent placement  Please order for repeat BMP to be done in 1 week for ADRIANNE and arrange for follow-up with an advanced practitioner or with me in 2-3 weeks  Repeat BMP again before the office visit    Thank you

## 2022-05-02 NOTE — TELEPHONE ENCOUNTER
----- Message from Jimy Traylor sent at 5/2/2022  8:36 AM EDT -----  Hello! Please schedule this patient for a hospital follow up, he would like to be seen in [facility]   ----- Message -----  From: Kaylie Salas MD  Sent: 4/29/2022  11:12 AM EDT  To: Neph[facility] [place] [facility]    Patient may be discharged from [facility] over the weekend  Was seen for acute kidney injury due to left hydronephrosis and underwent stent placement  Please order for repeat BMP to be done in 1 week for ADRIANNE and arrange for follow-up with an advanced practitioner or with me in 2-3 weeks  Repeat BMP again before the office visit    Thank you

## 2022-05-02 NOTE — UTILIZATION REVIEW
Notification of Discharge   This is a Notification of Discharge from our facility [address]  Please be advised that this patient has been discharge from our facility  Below you will find the admission and discharge date and time including the patients disposition  UTILIZATION REVIEW CONTACT:  Ronni Antoine  Utilization   Network Utilization Review Department  Phone: [phone] x carefully listen to the prompts  All voicemails are confidential   Email: [email or web address]     PHYSICIAN ADVISORY SERVICES:  FOR [id number] REVIEW - MEDICAL NECESSITY DENIAL  Phone: [phone]  Fax: [phone]  Email: [email or web address]     PRESENTATION DATE: 4/22/2022 11:33 AM  OBERVATION ADMISSION DATE:   INPATIENT ADMISSION DATE: 4/22/22  3:21 PM   DISCHARGE DATE: 4/30/2022  1:44 PM  DISPOSITION: Home/Self Care Home/Self Care      IMPORTANT INFORMATION:  Send all requests for admission clinical reviews, approved or denied determinations and any other requests to dedicated fax number below belonging to the campus where the patient is receiving treatment   List of dedicated fax numbers:  [address] DENIALS (Administrative/Medical Necessity) [phone]   [address] (Maternity/NICU/Pediatrics) [phone]   [facility] [phone]   130 [facility] [phone]   [address] [place] [address] [phone]   [address] [place] [address] [address] [place] [address],[address] [phone]   [facility]  [phone]   [address] [place] [address], [facility]  [address] [facility] [address] [facility] [phone]

## 2022-05-02 NOTE — TELEPHONE ENCOUNTER
Called and spoke to patient  Informed patient that our provider in the office reviewed imaging from 4/27 that is showing no stone  Informed patient per Cammie Watkins PA-C surgery on 5/25 can be cancelled  Patients stated he has a CT scheduled for 5/16 to check for stones  Informed patient that may not be needed  Patient wanted to ensure that we are double and triple checking that he does not nee surgery because he has been through this before and he does not want anything to go wrong  Patient stated Suzanne Jordan called regarding surgery and now he doesn't need it  Informed patient that this was under a different encounter and she was not aware at that time, but now she is aware  Patient would like a call back after everyone is on the same page with patient's plan of care  Spoke with [facility] again, she is ok with patient obtaining CT if that is his wishes  We will monitor results and if no stone then patient can have stent removed in appropriate time frame per DV 4 weeks from 4/24  Called and spoke to patient  Updated him on this information  Patient is happy about this decision  Patient will go for CT as scheduled on 5/16

## 2022-05-02 NOTE — TELEPHONE ENCOUNTER
Surgery still on until 5/16 CT scan resulted to clarify no stone present   Clinical pls still find tentative date for stent removal  Thank you

## 2022-05-02 NOTE — TELEPHONE ENCOUNTER
Spoke w patient and he is sched for 5/25 at the [facility] w Dr Damien Nuñez  Patient is aware he needs a  and hospital will contact him day prior w time of arrival  He claims he has passed one stone and has caught it  He is sched for Nephrology on 5/13 and suggested he can bring to a lab to be sent out or continue to see if he is passing stone and bring them to neph appt to be sent out  I did advise that he sched his CT for week of May 16th and the order is in there because MD knew there was a good possibility he would be passing stones and may not need surgery  He does prefer [address] office for any appointments that may need to be sched in future  Clinical - pls contact patient in re to pain mgmnt and multiple questions he has  He did state that he does not want to return to work until this is allover and I did warn him that is not possible that we can not give patient time off until date of procedure and if so that would need to be approved my MD  Patient is also dealing w gout and shoulder pain and I did advise him that has nothing to do w Urology and needs to be addressed w PCP  He is attempting to sched w one currently   Thank you

## 2022-05-02 NOTE — TELEPHONE ENCOUNTER
Patient had CT done on 4/27 while admitted  Usama Aguilar PA-C reviewed imaging in office  Usama Aguilar stated no stone was now present   Please assist in scheduling patient for cysto/stent removal  Can cancel 5/25 surgery with Jasmin Torres

## 2022-05-02 NOTE — TELEPHONE ENCOUNTER
Spoke with patient to schedule a hospital follow up, patient stated he would like to go to [facility] for a follow up  Provided patient with [facility] phone number and sent task to [facility] Nephrology pool

## 2022-05-03 ENCOUNTER — APPOINTMENT (OUTPATIENT)
Dept: LAB | Facility: CLINIC | Age: 57
End: 2022-05-03
Payer: COMMERCIAL

## 2022-05-03 ENCOUNTER — TELEPHONE (OUTPATIENT)
Dept: OTHER | Facility: OTHER | Age: 57
End: 2022-05-03

## 2022-05-03 DIAGNOSIS — N20.0 CALCULUS OF KIDNEY: Primary | ICD-10-CM

## 2022-05-03 DIAGNOSIS — N20.1 URETERAL STONE: ICD-10-CM

## 2022-05-03 DIAGNOSIS — N17.9 AKI (ACUTE KIDNEY INJURY) (HCC): ICD-10-CM

## 2022-05-03 LAB
ANION GAP SERPL CALCULATED.3IONS-SCNC: 7 MMOL/L (ref 4–13)
BASOPHILS # BLD AUTO: 0.07 THOUSANDS/ΜL (ref 0–0.1)
BASOPHILS NFR BLD AUTO: 0 % (ref 0–1)
BUN SERPL-MCNC: 51 MG/DL (ref 5–25)
CALCIUM SERPL-MCNC: 9.5 MG/DL (ref 8.3–10.1)
CHLORIDE SERPL-SCNC: 107 MMOL/L (ref 100–108)
CO2 SERPL-SCNC: 22 MMOL/L (ref 21–32)
CREAT SERPL-MCNC: 2.32 MG/DL (ref 0.6–1.3)
EOSINOPHIL # BLD AUTO: 0.19 THOUSAND/ΜL (ref 0–0.61)
EOSINOPHIL NFR BLD AUTO: 1 % (ref 0–6)
ERYTHROCYTE [DISTWIDTH] IN BLOOD BY AUTOMATED COUNT: 13.6 % (ref 11.6–15.1)
GFR SERPL CREATININE-BSD FRML MDRD: 30 ML/MIN/1.73SQ M
GLUCOSE P FAST SERPL-MCNC: 143 MG/DL (ref 65–99)
HCT VFR BLD AUTO: 45 % (ref 36.5–49.3)
HGB BLD-MCNC: 14.1 G/DL (ref 12–17)
IMM GRANULOCYTES # BLD AUTO: 0.36 THOUSAND/UL (ref 0–0.2)
IMM GRANULOCYTES NFR BLD AUTO: 2 % (ref 0–2)
LYMPHOCYTES # BLD AUTO: 3.25 THOUSANDS/ΜL (ref 0.6–4.47)
LYMPHOCYTES NFR BLD AUTO: 20 % (ref 14–44)
MCH RBC QN AUTO: 29.5 PG (ref 26.8–34.3)
MCHC RBC AUTO-ENTMCNC: 31.3 G/DL (ref 31.4–37.4)
MCV RBC AUTO: 94 FL (ref 82–98)
MONOCYTES # BLD AUTO: 0.91 THOUSAND/ΜL (ref 0.17–1.22)
MONOCYTES NFR BLD AUTO: 6 % (ref 4–12)
NEUTROPHILS # BLD AUTO: 11.25 THOUSANDS/ΜL (ref 1.85–7.62)
NEUTS SEG NFR BLD AUTO: 71 % (ref 43–75)
NRBC BLD AUTO-RTO: 0 /100 WBCS
PLATELET # BLD AUTO: 548 THOUSANDS/UL (ref 149–390)
PMV BLD AUTO: 10.7 FL (ref 8.9–12.7)
POTASSIUM SERPL-SCNC: 3.9 MMOL/L (ref 3.5–5.3)
RBC # BLD AUTO: 4.78 MILLION/UL (ref 3.88–5.62)
SODIUM SERPL-SCNC: 136 MMOL/L (ref 136–145)
WBC # BLD AUTO: 16.03 THOUSAND/UL (ref 4.31–10.16)

## 2022-05-03 PROCEDURE — 80048 BASIC METABOLIC PNL TOTAL CA: CPT

## 2022-05-03 PROCEDURE — 36415 COLL VENOUS BLD VENIPUNCTURE: CPT

## 2022-05-03 PROCEDURE — 85025 COMPLETE CBC W/AUTO DIFF WBC: CPT

## 2022-05-03 PROCEDURE — 82360 CALCULUS ASSAY QUANT: CPT

## 2022-05-03 NOTE — TELEPHONE ENCOUNTER
Lab is requesting a call back  Tacho Fritz stated that patient dropped off a kidney stone @ lab and no order was placed in chart for specimen

## 2022-05-07 LAB
COLOR STONE: NORMAL
COM MFR STONE: 10 %
COMMENT-STONE3: NORMAL
COMPOSITION: NORMAL
LABORATORY COMMENT REPORT: NORMAL
PHOTO: NORMAL
SIZE STONE: NORMAL MM
SPEC SOURCE SUBJ: NORMAL
STONE ANALYSIS-IMP: NORMAL
URATE MFR STONE: 90 %
WT STONE: 19 MG

## 2022-05-10 ENCOUNTER — ANESTHESIA EVENT (INPATIENT)
Dept: RADIOLOGY | Facility: HOSPITAL | Age: 57
DRG: 698 | End: 2022-05-10
Payer: COMMERCIAL

## 2022-05-10 ENCOUNTER — HOSPITAL ENCOUNTER (EMERGENCY)
Facility: HOSPITAL | Age: 57
Discharge: STILL ADMITTED OR ADMINISTRATIVE | End: 2022-05-10
Attending: EMERGENCY MEDICINE
Payer: COMMERCIAL

## 2022-05-10 ENCOUNTER — HOSPITAL ENCOUNTER (INPATIENT)
Facility: HOSPITAL | Age: 57
LOS: 9 days | DRG: 698 | End: 2022-05-19
Attending: ANESTHESIOLOGY | Admitting: ANESTHESIOLOGY
Payer: COMMERCIAL

## 2022-05-10 ENCOUNTER — APPOINTMENT (INPATIENT)
Dept: NON INVASIVE DIAGNOSTICS | Facility: HOSPITAL | Age: 57
DRG: 698 | End: 2022-05-10
Payer: COMMERCIAL

## 2022-05-10 ENCOUNTER — APPOINTMENT (INPATIENT)
Dept: RADIOLOGY | Facility: HOSPITAL | Age: 57
DRG: 698 | End: 2022-05-10
Payer: COMMERCIAL

## 2022-05-10 ENCOUNTER — APPOINTMENT (EMERGENCY)
Dept: RADIOLOGY | Facility: HOSPITAL | Age: 57
End: 2022-05-10
Payer: COMMERCIAL

## 2022-05-10 ENCOUNTER — ANESTHESIA (INPATIENT)
Dept: RADIOLOGY | Facility: HOSPITAL | Age: 57
DRG: 698 | End: 2022-05-10
Payer: COMMERCIAL

## 2022-05-10 ENCOUNTER — APPOINTMENT (EMERGENCY)
Dept: CT IMAGING | Facility: HOSPITAL | Age: 57
End: 2022-05-10
Payer: COMMERCIAL

## 2022-05-10 VITALS
BODY MASS INDEX: 50.62 KG/M2 | OXYGEN SATURATION: 100 % | HEART RATE: 123 BPM | SYSTOLIC BLOOD PRESSURE: 130 MMHG | WEIGHT: 315 LBS | HEIGHT: 66 IN | DIASTOLIC BLOOD PRESSURE: 66 MMHG | RESPIRATION RATE: 22 BRPM | TEMPERATURE: 98 F

## 2022-05-10 DIAGNOSIS — N12 PYELONEPHRITIS: Primary | ICD-10-CM

## 2022-05-10 DIAGNOSIS — R79.89 ELEVATED D-DIMER: ICD-10-CM

## 2022-05-10 DIAGNOSIS — N18.30 CHRONIC KIDNEY DISEASE, STAGE 3 (HCC): ICD-10-CM

## 2022-05-10 DIAGNOSIS — J96.01 ACUTE RESPIRATORY FAILURE WITH HYPOXIA (HCC): ICD-10-CM

## 2022-05-10 DIAGNOSIS — N11.1 OBSTRUCTIVE PYELONEPHRITIS: Primary | ICD-10-CM

## 2022-05-10 DIAGNOSIS — E87.2 LACTIC ACIDOSIS: ICD-10-CM

## 2022-05-10 DIAGNOSIS — N17.9 AKI (ACUTE KIDNEY INJURY) (HCC): ICD-10-CM

## 2022-05-10 DIAGNOSIS — M25.562 LEFT KNEE PAIN: ICD-10-CM

## 2022-05-10 LAB
2HR DELTA HS TROPONIN: 1 NG/L
4HR DELTA HS TROPONIN: 0 NG/L
ABO GROUP BLD: NORMAL
ABO GROUP BLD: NORMAL
ALBUMIN SERPL BCP-MCNC: 2.3 G/DL (ref 3.5–5)
ALBUMIN SERPL BCP-MCNC: 3.4 G/DL (ref 3.5–5)
ALP SERPL-CCNC: 54 U/L (ref 46–116)
ALP SERPL-CCNC: 78 U/L (ref 46–116)
ALT SERPL W P-5'-P-CCNC: 38 U/L (ref 12–78)
ALT SERPL W P-5'-P-CCNC: 50 U/L (ref 12–78)
ANION GAP SERPL CALCULATED.3IONS-SCNC: 11 MMOL/L (ref 4–13)
ANION GAP SERPL CALCULATED.3IONS-SCNC: 17 MMOL/L (ref 4–13)
ANION GAP SERPL CALCULATED.3IONS-SCNC: 8 MMOL/L (ref 4–13)
APTT PPP: 26 SECONDS (ref 23–37)
APTT PPP: 99 SECONDS (ref 23–37)
AST SERPL W P-5'-P-CCNC: 31 U/L (ref 5–45)
AST SERPL W P-5'-P-CCNC: 33 U/L (ref 5–45)
BACTERIA UR QL AUTO: ABNORMAL /HPF
BACTERIA UR QL AUTO: ABNORMAL /HPF
BASE EX.OXY STD BLDV CALC-SCNC: 70.2 % (ref 60–80)
BASE EXCESS BLDA CALC-SCNC: -13 MMOL/L (ref -2–3)
BASE EXCESS BLDA CALC-SCNC: -7 MMOL/L (ref -2–3)
BASE EXCESS BLDA CALC-SCNC: -7.7 MMOL/L
BASE EXCESS BLDV CALC-SCNC: -10.8 MMOL/L
BASOPHILS # BLD AUTO: 0.12 THOUSANDS/ΜL (ref 0–0.1)
BASOPHILS # BLD MANUAL: 0 THOUSAND/UL (ref 0–0.1)
BASOPHILS NFR BLD AUTO: 0 % (ref 0–1)
BASOPHILS NFR MAR MANUAL: 0 % (ref 0–1)
BILIRUB SERPL-MCNC: 0.59 MG/DL (ref 0.2–1)
BILIRUB SERPL-MCNC: 0.66 MG/DL (ref 0.2–1)
BILIRUB UR QL STRIP: NEGATIVE
BILIRUB UR QL STRIP: NEGATIVE
BLD GP AB SCN SERPL QL: NEGATIVE
BUN SERPL-MCNC: 52 MG/DL (ref 5–25)
BUN SERPL-MCNC: 52 MG/DL (ref 5–25)
BUN SERPL-MCNC: 53 MG/DL (ref 5–25)
CA-I BLD-SCNC: 1.02 MMOL/L (ref 1.12–1.32)
CA-I BLD-SCNC: 1.32 MMOL/L (ref 1.12–1.32)
CA-I BLD-SCNC: 1.36 MMOL/L (ref 1.12–1.32)
CA-I BLD-SCNC: 1.41 MMOL/L (ref 1.12–1.32)
CALCIUM ALBUM COR SERPL-MCNC: 10.3 MG/DL (ref 8.3–10.1)
CALCIUM ALBUM COR SERPL-MCNC: 9.8 MG/DL (ref 8.3–10.1)
CALCIUM SERPL-MCNC: 10.5 MG/DL (ref 8.3–10.1)
CALCIUM SERPL-MCNC: 8.4 MG/DL (ref 8.3–10.1)
CALCIUM SERPL-MCNC: 9.8 MG/DL (ref 8.3–10.1)
CARDIAC TROPONIN I PNL SERPL HS: 6 NG/L
CARDIAC TROPONIN I PNL SERPL HS: 6 NG/L
CARDIAC TROPONIN I PNL SERPL HS: 7 NG/L
CHLORIDE SERPL-SCNC: 102 MMOL/L (ref 100–108)
CHLORIDE SERPL-SCNC: 110 MMOL/L (ref 100–108)
CHLORIDE SERPL-SCNC: 110 MMOL/L (ref 100–108)
CLARITY UR: ABNORMAL
CLARITY UR: ABNORMAL
CO2 SERPL-SCNC: 14 MMOL/L (ref 21–32)
CO2 SERPL-SCNC: 17 MMOL/L (ref 21–32)
CO2 SERPL-SCNC: 21 MMOL/L (ref 21–32)
COLOR UR: ABNORMAL
COLOR UR: YELLOW
CREAT SERPL-MCNC: 2.92 MG/DL (ref 0.6–1.3)
CREAT SERPL-MCNC: 3.65 MG/DL (ref 0.6–1.3)
CREAT SERPL-MCNC: 3.72 MG/DL (ref 0.6–1.3)
D DIMER PPP FEU-MCNC: 2.05 UG/ML FEU
EOSINOPHIL # BLD AUTO: 0.01 THOUSAND/ΜL (ref 0–0.61)
EOSINOPHIL # BLD MANUAL: 0 THOUSAND/UL (ref 0–0.4)
EOSINOPHIL NFR BLD AUTO: 0 % (ref 0–6)
EOSINOPHIL NFR BLD MANUAL: 0 % (ref 0–6)
ERYTHROCYTE [DISTWIDTH] IN BLOOD BY AUTOMATED COUNT: 13.5 % (ref 11.6–15.1)
ERYTHROCYTE [DISTWIDTH] IN BLOOD BY AUTOMATED COUNT: 13.6 % (ref 11.6–15.1)
ERYTHROCYTE [DISTWIDTH] IN BLOOD BY AUTOMATED COUNT: 14 % (ref 11.6–15.1)
GFR SERPL CREATININE-BSD FRML MDRD: 17 ML/MIN/1.73SQ M
GFR SERPL CREATININE-BSD FRML MDRD: 17 ML/MIN/1.73SQ M
GFR SERPL CREATININE-BSD FRML MDRD: 23 ML/MIN/1.73SQ M
GLUCOSE SERPL-MCNC: 123 MG/DL (ref 65–140)
GLUCOSE SERPL-MCNC: 124 MG/DL (ref 65–140)
GLUCOSE SERPL-MCNC: 125 MG/DL (ref 65–140)
GLUCOSE SERPL-MCNC: 125 MG/DL (ref 65–140)
GLUCOSE SERPL-MCNC: 129 MG/DL (ref 65–140)
GLUCOSE SERPL-MCNC: 133 MG/DL (ref 65–140)
GLUCOSE SERPL-MCNC: 142 MG/DL (ref 65–140)
GLUCOSE UR STRIP-MCNC: NEGATIVE MG/DL
GLUCOSE UR STRIP-MCNC: NEGATIVE MG/DL
HCO3 BLDA-SCNC: 15.7 MMOL/L (ref 22–28)
HCO3 BLDA-SCNC: 18.9 MMOL/L (ref 22–28)
HCO3 BLDA-SCNC: 20.9 MMOL/L (ref 22–28)
HCO3 BLDV-SCNC: 14.8 MMOL/L (ref 24–30)
HCT VFR BLD AUTO: 36.6 % (ref 36.5–49.3)
HCT VFR BLD AUTO: 45.6 % (ref 36.5–49.3)
HCT VFR BLD AUTO: 53.2 % (ref 36.5–49.3)
HCT VFR BLD CALC: 35 % (ref 36.5–49.3)
HCT VFR BLD CALC: 37 % (ref 36.5–49.3)
HGB BLD-MCNC: 11.5 G/DL (ref 12–17)
HGB BLD-MCNC: 13.7 G/DL (ref 12–17)
HGB BLD-MCNC: 16.6 G/DL (ref 12–17)
HGB BLDA-MCNC: 11.9 G/DL (ref 12–17)
HGB BLDA-MCNC: 12.6 G/DL (ref 12–17)
HGB UR QL STRIP.AUTO: ABNORMAL
HGB UR QL STRIP.AUTO: ABNORMAL
IMM GRANULOCYTES # BLD AUTO: >0.5 THOUSAND/UL (ref 0–0.2)
IMM GRANULOCYTES NFR BLD AUTO: 3 % (ref 0–2)
INR PPP: 1.09 (ref 0.84–1.19)
INR PPP: 1.27 (ref 0.84–1.19)
KETONES UR STRIP-MCNC: NEGATIVE MG/DL
KETONES UR STRIP-MCNC: NEGATIVE MG/DL
LACTATE SERPL-SCNC: 3.2 MMOL/L (ref 0.5–2)
LACTATE SERPL-SCNC: 3.9 MMOL/L (ref 0.5–2)
LACTATE SERPL-SCNC: 4 MMOL/L (ref 0.5–2)
LACTATE SERPL-SCNC: 4.2 MMOL/L (ref 0.5–2)
LACTATE SERPL-SCNC: 5.4 MMOL/L (ref 0.5–2)
LEUKOCYTE ESTERASE UR QL STRIP: ABNORMAL
LEUKOCYTE ESTERASE UR QL STRIP: ABNORMAL
LYMPHOCYTES # BLD AUTO: 0 % (ref 14–44)
LYMPHOCYTES # BLD AUTO: 0 THOUSAND/UL (ref 0.6–4.47)
LYMPHOCYTES # BLD AUTO: 0.55 THOUSANDS/ΜL (ref 0.6–4.47)
LYMPHOCYTES NFR BLD AUTO: 1 % (ref 14–44)
MAGNESIUM SERPL-MCNC: 1.5 MG/DL (ref 1.6–2.6)
MAGNESIUM SERPL-MCNC: 2.9 MG/DL (ref 1.6–2.6)
MCH RBC QN AUTO: 29.7 PG (ref 26.8–34.3)
MCH RBC QN AUTO: 29.8 PG (ref 26.8–34.3)
MCH RBC QN AUTO: 30.2 PG (ref 26.8–34.3)
MCHC RBC AUTO-ENTMCNC: 30 G/DL (ref 31.4–37.4)
MCHC RBC AUTO-ENTMCNC: 31.2 G/DL (ref 31.4–37.4)
MCHC RBC AUTO-ENTMCNC: 31.4 G/DL (ref 31.4–37.4)
MCV RBC AUTO: 95 FL (ref 82–98)
MCV RBC AUTO: 96 FL (ref 82–98)
MCV RBC AUTO: 99 FL (ref 82–98)
MONOCYTES # BLD AUTO: 0.52 THOUSAND/UL (ref 0–1.22)
MONOCYTES # BLD AUTO: 1.08 THOUSAND/ΜL (ref 0.17–1.22)
MONOCYTES NFR BLD AUTO: 3 % (ref 4–12)
MONOCYTES NFR BLD: 2 % (ref 4–12)
NEUTROPHILS # BLD AUTO: 40.06 THOUSANDS/ΜL (ref 1.85–7.62)
NEUTROPHILS # BLD MANUAL: 25.62 THOUSAND/UL (ref 1.85–7.62)
NEUTS BAND NFR BLD MANUAL: 11 % (ref 0–8)
NEUTS SEG NFR BLD AUTO: 87 % (ref 43–75)
NEUTS SEG NFR BLD AUTO: 93 % (ref 43–75)
NITRITE UR QL STRIP: NEGATIVE
NITRITE UR QL STRIP: NEGATIVE
NON-SQ EPI CELLS URNS QL MICRO: ABNORMAL /HPF
NON-SQ EPI CELLS URNS QL MICRO: ABNORMAL /HPF
NRBC BLD AUTO-RTO: 0 /100 WBCS
NT-PROBNP SERPL-MCNC: 126 PG/ML
O2 CT BLDA-SCNC: 17.1 ML/DL (ref 16–23)
O2 CT BLDV-SCNC: 14.3 ML/DL
OXYHGB MFR BLDA: 96.4 % (ref 94–97)
PCO2 BLD: 17 MMOL/L (ref 21–32)
PCO2 BLD: 23 MMOL/L (ref 21–32)
PCO2 BLD: 45.4 MM HG (ref 36–44)
PCO2 BLD: 53.1 MM HG (ref 36–44)
PCO2 BLDA: 42.7 MM HG (ref 36–44)
PCO2 BLDV: 32.5 MM HG (ref 42–50)
PH BLD: 7.15 [PH] (ref 7.35–7.45)
PH BLD: 7.2 [PH] (ref 7.35–7.45)
PH BLDA: 7.26 [PH] (ref 7.35–7.45)
PH BLDV: 7.28 [PH] (ref 7.3–7.4)
PH UR STRIP.AUTO: 6 [PH]
PH UR STRIP.AUTO: 6 [PH]
PHOSPHATE SERPL-MCNC: 2.7 MG/DL (ref 2.7–4.5)
PHOSPHATE SERPL-MCNC: 7.1 MG/DL (ref 2.7–4.5)
PLATELET # BLD AUTO: 289 THOUSANDS/UL (ref 149–390)
PLATELET # BLD AUTO: 382 THOUSANDS/UL (ref 149–390)
PLATELET # BLD AUTO: 450 THOUSANDS/UL (ref 149–390)
PLATELET BLD QL SMEAR: ABNORMAL
PMV BLD AUTO: 10.1 FL (ref 8.9–12.7)
PMV BLD AUTO: 10.1 FL (ref 8.9–12.7)
PMV BLD AUTO: 9.8 FL (ref 8.9–12.7)
PO2 BLD: 193 MM HG (ref 75–129)
PO2 BLD: 264 MM HG (ref 75–129)
PO2 BLDA: 97.7 MM HG (ref 75–129)
PO2 BLDV: 38.7 MM HG (ref 35–45)
POTASSIUM BLD-SCNC: 5.1 MMOL/L (ref 3.5–5.3)
POTASSIUM BLD-SCNC: 5.3 MMOL/L (ref 3.5–5.3)
POTASSIUM SERPL-SCNC: 4.5 MMOL/L (ref 3.5–5.3)
POTASSIUM SERPL-SCNC: 5.5 MMOL/L (ref 3.5–5.3)
POTASSIUM SERPL-SCNC: 5.7 MMOL/L (ref 3.5–5.3)
POTASSIUM SERPL-SCNC: 6.3 MMOL/L (ref 3.5–5.3)
PROCALCITONIN SERPL-MCNC: 2.3 NG/ML
PROT SERPL-MCNC: 6.7 G/DL (ref 6.4–8.2)
PROT SERPL-MCNC: 8.2 G/DL (ref 6.4–8.2)
PROT UR STRIP-MCNC: >=300 MG/DL
PROT UR STRIP-MCNC: ABNORMAL MG/DL
PROTHROMBIN TIME: 13.5 SECONDS (ref 11.6–14.5)
PROTHROMBIN TIME: 15.4 SECONDS (ref 11.6–14.5)
RBC # BLD AUTO: 3.81 MILLION/UL (ref 3.88–5.62)
RBC # BLD AUTO: 4.59 MILLION/UL (ref 3.88–5.62)
RBC # BLD AUTO: 5.59 MILLION/UL (ref 3.88–5.62)
RBC #/AREA URNS AUTO: ABNORMAL /HPF
RBC #/AREA URNS AUTO: ABNORMAL /HPF
RBC MORPH BLD: NORMAL
RH BLD: POSITIVE
RH BLD: POSITIVE
SAO2 % BLD FROM PO2: 100 % (ref 60–85)
SAO2 % BLD FROM PO2: 99 % (ref 60–85)
SODIUM BLD-SCNC: 137 MMOL/L (ref 136–145)
SODIUM BLD-SCNC: 138 MMOL/L (ref 136–145)
SODIUM SERPL-SCNC: 135 MMOL/L (ref 136–145)
SODIUM SERPL-SCNC: 136 MMOL/L (ref 136–145)
SODIUM SERPL-SCNC: 139 MMOL/L (ref 136–145)
SP GR UR STRIP.AUTO: 1.01 (ref 1–1.03)
SP GR UR STRIP.AUTO: 1.02 (ref 1–1.03)
SPECIMEN EXPIRATION DATE: NORMAL
SPECIMEN SOURCE: ABNORMAL
UROBILINOGEN UR QL STRIP.AUTO: 0.2 E.U./DL
UROBILINOGEN UR STRIP-ACNC: <2 MG/DL
WBC # BLD AUTO: 26.14 THOUSAND/UL (ref 4.31–10.16)
WBC # BLD AUTO: 29.78 THOUSAND/UL (ref 4.31–10.16)
WBC # BLD AUTO: 42.89 THOUSAND/UL (ref 4.31–10.16)
WBC #/AREA URNS AUTO: ABNORMAL /HPF
WBC #/AREA URNS AUTO: ABNORMAL /HPF

## 2022-05-10 PROCEDURE — 96361 HYDRATE IV INFUSION ADD-ON: CPT

## 2022-05-10 PROCEDURE — 84484 ASSAY OF TROPONIN QUANT: CPT | Performed by: EMERGENCY MEDICINE

## 2022-05-10 PROCEDURE — 85007 BL SMEAR W/DIFF WBC COUNT: CPT | Performed by: EMERGENCY MEDICINE

## 2022-05-10 PROCEDURE — 96375 TX/PRO/DX INJ NEW DRUG ADDON: CPT

## 2022-05-10 PROCEDURE — 85730 THROMBOPLASTIN TIME PARTIAL: CPT | Performed by: EMERGENCY MEDICINE

## 2022-05-10 PROCEDURE — 94002 VENT MGMT INPAT INIT DAY: CPT

## 2022-05-10 PROCEDURE — 93970 EXTREMITY STUDY: CPT

## 2022-05-10 PROCEDURE — 84132 ASSAY OF SERUM POTASSIUM: CPT

## 2022-05-10 PROCEDURE — 83735 ASSAY OF MAGNESIUM: CPT

## 2022-05-10 PROCEDURE — 36415 COLL VENOUS BLD VENIPUNCTURE: CPT | Performed by: EMERGENCY MEDICINE

## 2022-05-10 PROCEDURE — 87186 SC STD MICRODIL/AGAR DIL: CPT | Performed by: EMERGENCY MEDICINE

## 2022-05-10 PROCEDURE — 82805 BLOOD GASES W/O2 SATURATION: CPT

## 2022-05-10 PROCEDURE — 82947 ASSAY GLUCOSE BLOOD QUANT: CPT

## 2022-05-10 PROCEDURE — 82330 ASSAY OF CALCIUM: CPT | Performed by: PHYSICIAN ASSISTANT

## 2022-05-10 PROCEDURE — 85027 COMPLETE CBC AUTOMATED: CPT | Performed by: PHYSICIAN ASSISTANT

## 2022-05-10 PROCEDURE — 83880 ASSAY OF NATRIURETIC PEPTIDE: CPT | Performed by: EMERGENCY MEDICINE

## 2022-05-10 PROCEDURE — 83605 ASSAY OF LACTIC ACID: CPT | Performed by: PHYSICIAN ASSISTANT

## 2022-05-10 PROCEDURE — 84295 ASSAY OF SERUM SODIUM: CPT

## 2022-05-10 PROCEDURE — 71045 X-RAY EXAM CHEST 1 VIEW: CPT

## 2022-05-10 PROCEDURE — 82948 REAGENT STRIP/BLOOD GLUCOSE: CPT

## 2022-05-10 PROCEDURE — 87154 CUL TYP ID BLD PTHGN 6+ TRGT: CPT | Performed by: EMERGENCY MEDICINE

## 2022-05-10 PROCEDURE — 87077 CULTURE AEROBIC IDENTIFY: CPT | Performed by: EMERGENCY MEDICINE

## 2022-05-10 PROCEDURE — C1894 INTRO/SHEATH, NON-LASER: HCPCS

## 2022-05-10 PROCEDURE — 94664 DEMO&/EVAL PT USE INHALER: CPT

## 2022-05-10 PROCEDURE — 87040 BLOOD CULTURE FOR BACTERIA: CPT

## 2022-05-10 PROCEDURE — 82805 BLOOD GASES W/O2 SATURATION: CPT | Performed by: PHYSICIAN ASSISTANT

## 2022-05-10 PROCEDURE — 80048 BASIC METABOLIC PNL TOTAL CA: CPT | Performed by: PHYSICIAN ASSISTANT

## 2022-05-10 PROCEDURE — 93970 EXTREMITY STUDY: CPT | Performed by: SURGERY

## 2022-05-10 PROCEDURE — 84132 ASSAY OF SERUM POTASSIUM: CPT | Performed by: PHYSICIAN ASSISTANT

## 2022-05-10 PROCEDURE — 82803 BLOOD GASES ANY COMBINATION: CPT

## 2022-05-10 PROCEDURE — 86850 RBC ANTIBODY SCREEN: CPT | Performed by: NURSE ANESTHETIST, CERTIFIED REGISTERED

## 2022-05-10 PROCEDURE — 96366 THER/PROPH/DIAG IV INF ADDON: CPT

## 2022-05-10 PROCEDURE — 85730 THROMBOPLASTIN TIME PARTIAL: CPT

## 2022-05-10 PROCEDURE — 87040 BLOOD CULTURE FOR BACTERIA: CPT | Performed by: EMERGENCY MEDICINE

## 2022-05-10 PROCEDURE — G1004 CDSM NDSC: HCPCS

## 2022-05-10 PROCEDURE — 85379 FIBRIN DEGRADATION QUANT: CPT | Performed by: EMERGENCY MEDICINE

## 2022-05-10 PROCEDURE — 94640 AIRWAY INHALATION TREATMENT: CPT

## 2022-05-10 PROCEDURE — 87086 URINE CULTURE/COLONY COUNT: CPT | Performed by: EMERGENCY MEDICINE

## 2022-05-10 PROCEDURE — C1729 CATH, DRAINAGE: HCPCS

## 2022-05-10 PROCEDURE — 87186 SC STD MICRODIL/AGAR DIL: CPT

## 2022-05-10 PROCEDURE — 82330 ASSAY OF CALCIUM: CPT

## 2022-05-10 PROCEDURE — 50432 PLMT NEPHROSTOMY CATHETER: CPT | Performed by: RADIOLOGY

## 2022-05-10 PROCEDURE — 84100 ASSAY OF PHOSPHORUS: CPT

## 2022-05-10 PROCEDURE — C1887 CATHETER, GUIDING: HCPCS

## 2022-05-10 PROCEDURE — 0T9430Z DRAINAGE OF LEFT KIDNEY PELVIS WITH DRAINAGE DEVICE, PERCUTANEOUS APPROACH: ICD-10-PCS | Performed by: RADIOLOGY

## 2022-05-10 PROCEDURE — 86901 BLOOD TYPING SEROLOGIC RH(D): CPT | Performed by: NURSE ANESTHETIST, CERTIFIED REGISTERED

## 2022-05-10 PROCEDURE — 84484 ASSAY OF TROPONIN QUANT: CPT

## 2022-05-10 PROCEDURE — 99245 OFF/OP CONSLTJ NEW/EST HI 55: CPT

## 2022-05-10 PROCEDURE — 93005 ELECTROCARDIOGRAM TRACING: CPT

## 2022-05-10 PROCEDURE — 85610 PROTHROMBIN TIME: CPT | Performed by: EMERGENCY MEDICINE

## 2022-05-10 PROCEDURE — 85027 COMPLETE CBC AUTOMATED: CPT | Performed by: EMERGENCY MEDICINE

## 2022-05-10 PROCEDURE — 96367 TX/PROPH/DG ADDL SEQ IV INF: CPT

## 2022-05-10 PROCEDURE — 87077 CULTURE AEROBIC IDENTIFY: CPT

## 2022-05-10 PROCEDURE — NC001 PR NO CHARGE: Performed by: UROLOGY

## 2022-05-10 PROCEDURE — 96365 THER/PROPH/DIAG IV INF INIT: CPT

## 2022-05-10 PROCEDURE — 87086 URINE CULTURE/COLONY COUNT: CPT

## 2022-05-10 PROCEDURE — 86900 BLOOD TYPING SEROLOGIC ABO: CPT | Performed by: NURSE ANESTHETIST, CERTIFIED REGISTERED

## 2022-05-10 PROCEDURE — 80053 COMPREHEN METABOLIC PANEL: CPT | Performed by: EMERGENCY MEDICINE

## 2022-05-10 PROCEDURE — 94760 N-INVAS EAR/PLS OXIMETRY 1: CPT

## 2022-05-10 PROCEDURE — 85610 PROTHROMBIN TIME: CPT

## 2022-05-10 PROCEDURE — 83605 ASSAY OF LACTIC ACID: CPT | Performed by: EMERGENCY MEDICINE

## 2022-05-10 PROCEDURE — NC001 PR NO CHARGE: Performed by: NURSE PRACTITIONER

## 2022-05-10 PROCEDURE — C1769 GUIDE WIRE: HCPCS

## 2022-05-10 PROCEDURE — 84145 PROCALCITONIN (PCT): CPT | Performed by: EMERGENCY MEDICINE

## 2022-05-10 PROCEDURE — NC001 PR NO CHARGE: Performed by: PHYSICIAN ASSISTANT

## 2022-05-10 PROCEDURE — 85014 HEMATOCRIT: CPT

## 2022-05-10 PROCEDURE — 83735 ASSAY OF MAGNESIUM: CPT | Performed by: PHYSICIAN ASSISTANT

## 2022-05-10 PROCEDURE — 99292 CRITICAL CARE ADDL 30 MIN: CPT | Performed by: ANESTHESIOLOGY

## 2022-05-10 PROCEDURE — 74176 CT ABD & PELVIS W/O CONTRAST: CPT

## 2022-05-10 PROCEDURE — 99285 EMERGENCY DEPT VISIT HI MDM: CPT

## 2022-05-10 PROCEDURE — NC001 PR NO CHARGE: Performed by: RADIOLOGY

## 2022-05-10 PROCEDURE — 99291 CRITICAL CARE FIRST HOUR: CPT | Performed by: EMERGENCY MEDICINE

## 2022-05-10 PROCEDURE — 81001 URINALYSIS AUTO W/SCOPE: CPT

## 2022-05-10 PROCEDURE — 94644 CONT INHLJ TX 1ST HOUR: CPT

## 2022-05-10 PROCEDURE — 99291 CRITICAL CARE FIRST HOUR: CPT

## 2022-05-10 PROCEDURE — 83605 ASSAY OF LACTIC ACID: CPT

## 2022-05-10 PROCEDURE — 50432 PLMT NEPHROSTOMY CATHETER: CPT

## 2022-05-10 PROCEDURE — 85025 COMPLETE CBC W/AUTO DIFF WBC: CPT

## 2022-05-10 PROCEDURE — 80053 COMPREHEN METABOLIC PANEL: CPT

## 2022-05-10 PROCEDURE — 84100 ASSAY OF PHOSPHORUS: CPT | Performed by: PHYSICIAN ASSISTANT

## 2022-05-10 PROCEDURE — NC001 PR NO CHARGE

## 2022-05-10 PROCEDURE — 81001 URINALYSIS AUTO W/SCOPE: CPT | Performed by: EMERGENCY MEDICINE

## 2022-05-10 RX ORDER — SODIUM CHLORIDE FOR INHALATION 0.9 %
VIAL, NEBULIZER (ML) INHALATION
Status: COMPLETED
Start: 2022-05-10 | End: 2022-05-10

## 2022-05-10 RX ORDER — AMOXICILLIN 250 MG
2 CAPSULE ORAL 2 TIMES DAILY
Status: DISCONTINUED | OUTPATIENT
Start: 2022-05-10 | End: 2022-05-19 | Stop reason: HOSPADM

## 2022-05-10 RX ORDER — LIDOCAINE HYDROCHLORIDE 10 MG/ML
INJECTION, SOLUTION EPIDURAL; INFILTRATION; INTRACAUDAL; PERINEURAL AS NEEDED
Status: DISCONTINUED | OUTPATIENT
Start: 2022-05-10 | End: 2022-05-10

## 2022-05-10 RX ORDER — HEPARIN SODIUM 1000 [USP'U]/ML
10000 INJECTION, SOLUTION INTRAVENOUS; SUBCUTANEOUS
Status: DISCONTINUED | OUTPATIENT
Start: 2022-05-10 | End: 2022-05-10 | Stop reason: HOSPADM

## 2022-05-10 RX ORDER — SODIUM CHLORIDE, SODIUM LACTATE, POTASSIUM CHLORIDE, CALCIUM CHLORIDE 600; 310; 30; 20 MG/100ML; MG/100ML; MG/100ML; MG/100ML
INJECTION, SOLUTION INTRAVENOUS CONTINUOUS PRN
Status: DISCONTINUED | OUTPATIENT
Start: 2022-05-10 | End: 2022-05-10

## 2022-05-10 RX ORDER — FUROSEMIDE 10 MG/ML
40 INJECTION INTRAMUSCULAR; INTRAVENOUS ONCE
Status: COMPLETED | OUTPATIENT
Start: 2022-05-10 | End: 2022-05-10

## 2022-05-10 RX ORDER — MAGNESIUM SULFATE HEPTAHYDRATE 40 MG/ML
4 INJECTION, SOLUTION INTRAVENOUS ONCE
Status: COMPLETED | OUTPATIENT
Start: 2022-05-10 | End: 2022-05-10

## 2022-05-10 RX ORDER — PROPOFOL 10 MG/ML
INJECTION, EMULSION INTRAVENOUS CONTINUOUS PRN
Status: DISCONTINUED | OUTPATIENT
Start: 2022-05-10 | End: 2022-05-10

## 2022-05-10 RX ORDER — ONDANSETRON 2 MG/ML
4 INJECTION INTRAMUSCULAR; INTRAVENOUS EVERY 6 HOURS PRN
Status: DISCONTINUED | OUTPATIENT
Start: 2022-05-10 | End: 2022-05-19 | Stop reason: HOSPADM

## 2022-05-10 RX ORDER — DEXTROSE MONOHYDRATE 25 G/50ML
25 INJECTION, SOLUTION INTRAVENOUS ONCE
Status: COMPLETED | OUTPATIENT
Start: 2022-05-10 | End: 2022-05-11

## 2022-05-10 RX ORDER — ACETAMINOPHEN 325 MG/1
650 TABLET ORAL EVERY 6 HOURS PRN
Status: DISCONTINUED | OUTPATIENT
Start: 2022-05-10 | End: 2022-05-19 | Stop reason: HOSPADM

## 2022-05-10 RX ORDER — HYDROMORPHONE HCL/PF 1 MG/ML
1 SYRINGE (ML) INJECTION ONCE
Status: COMPLETED | OUTPATIENT
Start: 2022-05-10 | End: 2022-05-10

## 2022-05-10 RX ORDER — ONDANSETRON 2 MG/ML
4 INJECTION INTRAMUSCULAR; INTRAVENOUS ONCE
Status: COMPLETED | OUTPATIENT
Start: 2022-05-10 | End: 2022-05-10

## 2022-05-10 RX ORDER — ETOMIDATE 2 MG/ML
INJECTION INTRAVENOUS AS NEEDED
Status: DISCONTINUED | OUTPATIENT
Start: 2022-05-10 | End: 2022-05-10

## 2022-05-10 RX ORDER — FENTANYL CITRATE 50 UG/ML
50 INJECTION, SOLUTION INTRAMUSCULAR; INTRAVENOUS
Status: DISCONTINUED | OUTPATIENT
Start: 2022-05-10 | End: 2022-05-11

## 2022-05-10 RX ORDER — HEPARIN SODIUM 5000 [USP'U]/ML
5000 INJECTION, SOLUTION INTRAVENOUS; SUBCUTANEOUS EVERY 8 HOURS SCHEDULED
Status: DISCONTINUED | OUTPATIENT
Start: 2022-05-10 | End: 2022-05-10

## 2022-05-10 RX ORDER — CALCIUM CHLORIDE 100 MG/ML
INJECTION INTRAVENOUS; INTRAVENTRICULAR AS NEEDED
Status: DISCONTINUED | OUTPATIENT
Start: 2022-05-10 | End: 2022-05-10

## 2022-05-10 RX ORDER — ONDANSETRON 2 MG/ML
INJECTION INTRAMUSCULAR; INTRAVENOUS AS NEEDED
Status: DISCONTINUED | OUTPATIENT
Start: 2022-05-10 | End: 2022-05-10

## 2022-05-10 RX ORDER — CEFTRIAXONE 1 G/50ML
1000 INJECTION, SOLUTION INTRAVENOUS ONCE
Status: COMPLETED | OUTPATIENT
Start: 2022-05-10 | End: 2022-05-10

## 2022-05-10 RX ORDER — FENTANYL CITRATE 50 UG/ML
INJECTION, SOLUTION INTRAMUSCULAR; INTRAVENOUS
Status: COMPLETED
Start: 2022-05-10 | End: 2022-05-10

## 2022-05-10 RX ORDER — KETAMINE HCL IN NACL, ISO-OSM 100MG/10ML
SYRINGE (ML) INJECTION AS NEEDED
Status: DISCONTINUED | OUTPATIENT
Start: 2022-05-10 | End: 2022-05-10

## 2022-05-10 RX ORDER — HEPARIN SODIUM 10000 [USP'U]/100ML
3-30 INJECTION, SOLUTION INTRAVENOUS
Status: DISCONTINUED | OUTPATIENT
Start: 2022-05-10 | End: 2022-05-10

## 2022-05-10 RX ORDER — HEPARIN SODIUM 5000 [USP'U]/ML
7500 INJECTION, SOLUTION INTRAVENOUS; SUBCUTANEOUS EVERY 8 HOURS SCHEDULED
Status: DISCONTINUED | OUTPATIENT
Start: 2022-05-10 | End: 2022-05-19 | Stop reason: HOSPADM

## 2022-05-10 RX ORDER — HEPARIN SODIUM 1000 [USP'U]/ML
10000 INJECTION, SOLUTION INTRAVENOUS; SUBCUTANEOUS ONCE
Status: COMPLETED | OUTPATIENT
Start: 2022-05-10 | End: 2022-05-10

## 2022-05-10 RX ORDER — HEPARIN SODIUM 10000 [USP'U]/100ML
3-30 INJECTION, SOLUTION INTRAVENOUS
Status: DISCONTINUED | OUTPATIENT
Start: 2022-05-10 | End: 2022-05-10 | Stop reason: HOSPADM

## 2022-05-10 RX ORDER — HEPARIN SODIUM 5000 [USP'U]/ML
7500 INJECTION, SOLUTION INTRAVENOUS; SUBCUTANEOUS EVERY 8 HOURS SCHEDULED
Status: DISCONTINUED | OUTPATIENT
Start: 2022-05-10 | End: 2022-05-10

## 2022-05-10 RX ORDER — TAMSULOSIN HYDROCHLORIDE 0.4 MG/1
0.4 CAPSULE ORAL
Status: DISCONTINUED | OUTPATIENT
Start: 2022-05-10 | End: 2022-05-10

## 2022-05-10 RX ORDER — ALBUMIN, HUMAN INJ 5% 5 %
SOLUTION INTRAVENOUS CONTINUOUS PRN
Status: DISCONTINUED | OUTPATIENT
Start: 2022-05-10 | End: 2022-05-10

## 2022-05-10 RX ORDER — DEXTROSE MONOHYDRATE 25 G/50ML
INJECTION, SOLUTION INTRAVENOUS AS NEEDED
Status: DISCONTINUED | OUTPATIENT
Start: 2022-05-10 | End: 2022-05-10

## 2022-05-10 RX ORDER — SODIUM CHLORIDE, SODIUM GLUCONATE, SODIUM ACETATE, POTASSIUM CHLORIDE, MAGNESIUM CHLORIDE, SODIUM PHOSPHATE, DIBASIC, AND POTASSIUM PHOSPHATE .53; .5; .37; .037; .03; .012; .00082 G/100ML; G/100ML; G/100ML; G/100ML; G/100ML; G/100ML; G/100ML
75 INJECTION, SOLUTION INTRAVENOUS CONTINUOUS
Status: DISCONTINUED | OUTPATIENT
Start: 2022-05-10 | End: 2022-05-11

## 2022-05-10 RX ORDER — MIDAZOLAM HYDROCHLORIDE 2 MG/2ML
INJECTION, SOLUTION INTRAMUSCULAR; INTRAVENOUS AS NEEDED
Status: DISCONTINUED | OUTPATIENT
Start: 2022-05-10 | End: 2022-05-10

## 2022-05-10 RX ORDER — DEXTROSE MONOHYDRATE 25 G/50ML
25 INJECTION, SOLUTION INTRAVENOUS ONCE
Status: COMPLETED | OUTPATIENT
Start: 2022-05-10 | End: 2022-05-10

## 2022-05-10 RX ORDER — SODIUM CHLORIDE, SODIUM GLUCONATE, SODIUM ACETATE, POTASSIUM CHLORIDE, MAGNESIUM CHLORIDE, SODIUM PHOSPHATE, DIBASIC, AND POTASSIUM PHOSPHATE .53; .5; .37; .037; .03; .012; .00082 G/100ML; G/100ML; G/100ML; G/100ML; G/100ML; G/100ML; G/100ML
500 INJECTION, SOLUTION INTRAVENOUS ONCE
Status: COMPLETED | OUTPATIENT
Start: 2022-05-10 | End: 2022-05-10

## 2022-05-10 RX ORDER — FUROSEMIDE 10 MG/ML
40 INJECTION INTRAMUSCULAR; INTRAVENOUS ONCE
Status: COMPLETED | OUTPATIENT
Start: 2022-05-10 | End: 2022-05-11

## 2022-05-10 RX ORDER — CHLORHEXIDINE GLUCONATE 0.12 MG/ML
15 RINSE ORAL EVERY 12 HOURS SCHEDULED
Status: DISCONTINUED | OUTPATIENT
Start: 2022-05-10 | End: 2022-05-18

## 2022-05-10 RX ORDER — OXYCODONE HYDROCHLORIDE 5 MG/1
2.5 TABLET ORAL EVERY 6 HOURS PRN
Status: DISCONTINUED | OUTPATIENT
Start: 2022-05-10 | End: 2022-05-19 | Stop reason: HOSPADM

## 2022-05-10 RX ORDER — PROPOFOL 10 MG/ML
5-50 INJECTION, EMULSION INTRAVENOUS
Status: DISCONTINUED | OUTPATIENT
Start: 2022-05-10 | End: 2022-05-11

## 2022-05-10 RX ORDER — SODIUM CHLORIDE, SODIUM GLUCONATE, SODIUM ACETATE, POTASSIUM CHLORIDE, MAGNESIUM CHLORIDE, SODIUM PHOSPHATE, DIBASIC, AND POTASSIUM PHOSPHATE .53; .5; .37; .037; .03; .012; .00082 G/100ML; G/100ML; G/100ML; G/100ML; G/100ML; G/100ML; G/100ML
125 INJECTION, SOLUTION INTRAVENOUS CONTINUOUS
Status: DISCONTINUED | OUTPATIENT
Start: 2022-05-10 | End: 2022-05-10

## 2022-05-10 RX ORDER — ROCURONIUM BROMIDE 10 MG/ML
INJECTION, SOLUTION INTRAVENOUS AS NEEDED
Status: DISCONTINUED | OUTPATIENT
Start: 2022-05-10 | End: 2022-05-10

## 2022-05-10 RX ORDER — OXYCODONE HYDROCHLORIDE 5 MG/1
5 TABLET ORAL EVERY 6 HOURS PRN
Status: DISCONTINUED | OUTPATIENT
Start: 2022-05-10 | End: 2022-05-10

## 2022-05-10 RX ORDER — SODIUM CHLORIDE, SODIUM GLUCONATE, SODIUM ACETATE, POTASSIUM CHLORIDE, MAGNESIUM CHLORIDE, SODIUM PHOSPHATE, DIBASIC, AND POTASSIUM PHOSPHATE .53; .5; .37; .037; .03; .012; .00082 G/100ML; G/100ML; G/100ML; G/100ML; G/100ML; G/100ML; G/100ML
250 INJECTION, SOLUTION INTRAVENOUS CONTINUOUS
Status: DISCONTINUED | OUTPATIENT
Start: 2022-05-10 | End: 2022-05-10 | Stop reason: HOSPADM

## 2022-05-10 RX ORDER — HEPARIN SODIUM 1000 [USP'U]/ML
5000 INJECTION, SOLUTION INTRAVENOUS; SUBCUTANEOUS
Status: DISCONTINUED | OUTPATIENT
Start: 2022-05-10 | End: 2022-05-10 | Stop reason: HOSPADM

## 2022-05-10 RX ADMIN — HYDROMORPHONE HYDROCHLORIDE 1 MG: 1 INJECTION, SOLUTION INTRAMUSCULAR; INTRAVENOUS; SUBCUTANEOUS at 05:16

## 2022-05-10 RX ADMIN — CALCIUM GLUCONATE 3 G: 98 INJECTION, SOLUTION INTRAVENOUS at 15:46

## 2022-05-10 RX ADMIN — FENTANYL CITRATE 50 MCG: 50 INJECTION INTRAMUSCULAR; INTRAVENOUS at 23:06

## 2022-05-10 RX ADMIN — ONDANSETRON 4 MG: 2 INJECTION INTRAMUSCULAR; INTRAVENOUS at 05:16

## 2022-05-10 RX ADMIN — CALCIUM CHLORIDE 1 G: 100 INJECTION INTRAVENOUS; INTRAVENTRICULAR at 20:24

## 2022-05-10 RX ADMIN — SODIUM BICARBONATE 50 MEQ: 84 INJECTION, SOLUTION INTRAVENOUS at 19:35

## 2022-05-10 RX ADMIN — IODIXANOL 20 ML: 320 INJECTION, SOLUTION INTRAVASCULAR at 20:46

## 2022-05-10 RX ADMIN — ALBUMIN (HUMAN): 12.5 INJECTION, SOLUTION INTRAVENOUS at 19:58

## 2022-05-10 RX ADMIN — SODIUM CHLORIDE, SODIUM GLUCONATE, SODIUM ACETATE, POTASSIUM CHLORIDE, MAGNESIUM CHLORIDE, SODIUM PHOSPHATE, DIBASIC, AND POTASSIUM PHOSPHATE 125 ML/HR: .53; .5; .37; .037; .03; .012; .00082 INJECTION, SOLUTION INTRAVENOUS at 09:16

## 2022-05-10 RX ADMIN — INSULIN HUMAN 10 UNITS: 100 INJECTION, SOLUTION PARENTERAL at 15:57

## 2022-05-10 RX ADMIN — SODIUM CHLORIDE 1000 ML: 0.9 INJECTION, SOLUTION INTRAVENOUS at 05:24

## 2022-05-10 RX ADMIN — ROCURONIUM BROMIDE 100 MG: 50 INJECTION INTRAVENOUS at 18:31

## 2022-05-10 RX ADMIN — DEXTROSE MONOHYDRATE 12.5 G: 500 INJECTION PARENTERAL at 18:22

## 2022-05-10 RX ADMIN — CALCIUM CHLORIDE 1 G: 100 INJECTION INTRAVENOUS; INTRAVENTRICULAR at 18:22

## 2022-05-10 RX ADMIN — ONDANSETRON 4 MG: 2 INJECTION INTRAMUSCULAR; INTRAVENOUS at 18:44

## 2022-05-10 RX ADMIN — SODIUM CHLORIDE, SODIUM GLUCONATE, SODIUM ACETATE, POTASSIUM CHLORIDE, MAGNESIUM CHLORIDE, SODIUM PHOSPHATE, DIBASIC, AND POTASSIUM PHOSPHATE 250 ML/HR: .53; .5; .37; .037; .03; .012; .00082 INJECTION, SOLUTION INTRAVENOUS at 10:36

## 2022-05-10 RX ADMIN — ALBUMIN (HUMAN): 12.5 INJECTION, SOLUTION INTRAVENOUS at 19:43

## 2022-05-10 RX ADMIN — SODIUM CHLORIDE 1000 ML: 0.9 INJECTION, SOLUTION INTRAVENOUS at 07:35

## 2022-05-10 RX ADMIN — CEFTRIAXONE 2000 MG: 2 INJECTION, POWDER, FOR SOLUTION INTRAMUSCULAR; INTRAVENOUS at 19:00

## 2022-05-10 RX ADMIN — SODIUM BICARBONATE 50 MEQ: 84 INJECTION, SOLUTION INTRAVENOUS at 19:36

## 2022-05-10 RX ADMIN — PHENYLEPHRINE HYDROCHLORIDE 30 MCG/MIN: 10 INJECTION INTRAVENOUS at 18:57

## 2022-05-10 RX ADMIN — ETOMIDATE 20 MG: 20 INJECTION, SOLUTION INTRAVENOUS at 18:31

## 2022-05-10 RX ADMIN — CALCIUM CHLORIDE 1 G: 100 INJECTION INTRAVENOUS; INTRAVENTRICULAR at 18:11

## 2022-05-10 RX ADMIN — MAGNESIUM SULFATE HEPTAHYDRATE 4 G: 40 INJECTION, SOLUTION INTRAVENOUS at 15:55

## 2022-05-10 RX ADMIN — PROPOFOL 20 MCG/KG/MIN: 10 INJECTION, EMULSION INTRAVENOUS at 21:17

## 2022-05-10 RX ADMIN — SODIUM CHLORIDE, SODIUM GLUCONATE, SODIUM ACETATE, POTASSIUM CHLORIDE, MAGNESIUM CHLORIDE, SODIUM PHOSPHATE, DIBASIC, AND POTASSIUM PHOSPHATE 125 ML/HR: .53; .5; .37; .037; .03; .012; .00082 INJECTION, SOLUTION INTRAVENOUS at 13:37

## 2022-05-10 RX ADMIN — HYDROMORPHONE HYDROCHLORIDE 1 MG: 1 INJECTION, SOLUTION INTRAMUSCULAR; INTRAVENOUS; SUBCUTANEOUS at 07:56

## 2022-05-10 RX ADMIN — ALBUTEROL SULFATE 10 MG: 2.5 SOLUTION RESPIRATORY (INHALATION) at 16:47

## 2022-05-10 RX ADMIN — HEPARIN SODIUM 18 UNITS/KG/HR: 10000 INJECTION, SOLUTION INTRAVENOUS at 06:40

## 2022-05-10 RX ADMIN — Medication 50 MG: at 18:31

## 2022-05-10 RX ADMIN — DEXTROSE MONOHYDRATE 12.5 G: 500 INJECTION PARENTERAL at 20:24

## 2022-05-10 RX ADMIN — ALBUMIN (HUMAN): 12.5 INJECTION, SOLUTION INTRAVENOUS at 20:11

## 2022-05-10 RX ADMIN — CEFTRIAXONE 1000 MG: 1 INJECTION, SOLUTION INTRAVENOUS at 05:33

## 2022-05-10 RX ADMIN — FENTANYL CITRATE 50 MCG: 50 INJECTION INTRAMUSCULAR; INTRAVENOUS at 22:03

## 2022-05-10 RX ADMIN — FUROSEMIDE 40 MG: 10 INJECTION, SOLUTION INTRAMUSCULAR; INTRAVENOUS at 17:45

## 2022-05-10 RX ADMIN — ISODIUM CHLORIDE 3 ML: 0.03 SOLUTION RESPIRATORY (INHALATION) at 16:47

## 2022-05-10 RX ADMIN — LIDOCAINE HYDROCHLORIDE 50 MG: 10 INJECTION, SOLUTION EPIDURAL; INFILTRATION; INTRACAUDAL; PERINEURAL at 18:31

## 2022-05-10 RX ADMIN — HEPARIN SODIUM 10000 UNITS: 1000 INJECTION INTRAVENOUS; SUBCUTANEOUS at 06:42

## 2022-05-10 RX ADMIN — VANCOMYCIN HYDROCHLORIDE 2000 MG: 5 INJECTION, POWDER, LYOPHILIZED, FOR SOLUTION INTRAVENOUS at 07:35

## 2022-05-10 RX ADMIN — SODIUM BICARBONATE 50 MEQ: 84 INJECTION, SOLUTION INTRAVENOUS at 19:37

## 2022-05-10 RX ADMIN — MAGNESIUM SULFATE HEPTAHYDRATE 4 G: 40 INJECTION, SOLUTION INTRAVENOUS at 19:55

## 2022-05-10 RX ADMIN — IOHEXOL 18 ML: 350 INJECTION, SOLUTION INTRAVENOUS at 20:45

## 2022-05-10 RX ADMIN — MIDAZOLAM 2 MG: 1 INJECTION INTRAMUSCULAR; INTRAVENOUS at 18:28

## 2022-05-10 RX ADMIN — SODIUM CHLORIDE, SODIUM GLUCONATE, SODIUM ACETATE, POTASSIUM CHLORIDE, MAGNESIUM CHLORIDE, SODIUM PHOSPHATE, DIBASIC, AND POTASSIUM PHOSPHATE 500 ML: .53; .5; .37; .037; .03; .012; .00082 INJECTION, SOLUTION INTRAVENOUS at 15:47

## 2022-05-10 RX ADMIN — SODIUM CHLORIDE 1000 ML: 0.9 INJECTION, SOLUTION INTRAVENOUS at 06:24

## 2022-05-10 RX ADMIN — AMPICILLIN SODIUM 2000 MG: 2 INJECTION, POWDER, FOR SOLUTION INTRAMUSCULAR; INTRAVENOUS at 10:09

## 2022-05-10 RX ADMIN — DEXTROSE MONOHYDRATE 25 ML: 25 INJECTION, SOLUTION INTRAVENOUS at 15:58

## 2022-05-10 RX ADMIN — ALBUTEROL SULFATE 10 MG: 2.5 SOLUTION RESPIRATORY (INHALATION) at 23:47

## 2022-05-10 RX ADMIN — SODIUM CHLORIDE, SODIUM LACTATE, POTASSIUM CHLORIDE, AND CALCIUM CHLORIDE: .6; .31; .03; .02 INJECTION, SOLUTION INTRAVENOUS at 18:16

## 2022-05-10 RX ADMIN — PROPOFOL 30 MCG/KG/MIN: 10 INJECTION, EMULSION INTRAVENOUS at 22:06

## 2022-05-10 NOTE — CONSULTS
Consultation - UROLOGY  Remington Estes 55 y o  male MRN: [id number]  Unit/Bed#: RM01 Encounter: [id number]    Assessment/Plan     Assessment:  Patient with a history of obstructive pyelonephritis, hospitalized in [place] over a week ago, status post cystoscopy and left ureteral stent placement on April 22nd by Dr Tessie Santa  Sepsis, significant leukocytosis and tachycardia  CBC from earlier this morning jump to 26k  Worsening acute kidney injury, creatinine 2 92  Lactic acidosis increasing 4 2  CT scan abdomen pelvis without IV contrast showed mild hydronephrosis and left ureteral stent in good position  Plan:  Discussed at length via multiple tiger text messages with urology AP Carie Mcknight and also personal telephone conversation with Dr Jess Davila about proposed urological care and management of the patient at this time  In addition, also discussed with hospitalist physician Dr Regis Marroquin  Recommendation that the patient needs transfer to critical care to either [facility] or [facility]  Also discussed with the attending ED physician caring for the patient  Patient will be transferred to Medicine Service upon bed availability and consult Urology there  Dr Shauna Velázquez stated that he did not think that ureteral stent exchange should be done, rather he is recommending interventional radiology placement of a percutaneous nephrostomy tube  IV fluids, ongoing  Trend lactic acid level  Nothing by mouth  Analgesics and antiemetics as ordered  Patient received 1 time doses of IV Rocephin 1 g and also IV vancomycin 2 g in the ED, followed by 2 g of IV ampicillin ordered at 10:00 a m  prior to tertiary transfer ordered by the ED physician  History of Present Illness     HPI:  Remington Estes is a 55 y o  male who presents with   Severe left flank pain with shortness of breath and shaking chills  His symptoms developed over the past couple days    He had a history of a left stent placement with cystoscopy and retrograde pyelogram performed by Dr Quoc Benson on April 22nd in [place]  He was discharged from the hospital on April 30th  He has been having intermittent crampy type of pain non radiating in the left flank  He has had nausea and diminished appetite  He currently complaining of shaking chills  He has been afebrile  The patient denies any blood in his urine  He thinks that he has been voiding smaller amounts  No burning with urination  His left flank pain became worse any side evaluation emergency department  Patient was noted to be tachycardic and tachypneic  O2 saturations in the upper 90s on room air  The patient is restless and very uncomfortable, complaining of chills at this time  Personal discussion via multiple tiger text messages with the urology AP in addition to Dr Nita Yo conducted this morning about proposed urological care and management at this time  The patient has been recommended for tertiary transfer to critical care bed in either [facility] or [address] [place] [address] to be seen by Urology at that time, disposition likely to include Interventional Radiology for placement of a left PCN tube  Patient was given 1 time dosages of IV vancomycin and cefepime in the ED after 5:00 a m , then ordered 2 g of IV Omnicef by the ED physician  Patient currently is nothing by mouth        Inpatient consult to Urology  Consult performed by: Shital Khalil PA-C  Consult ordered by: Kannan Muñoz MD          Review of Systems   Constitutional: Positive for activity change, appetite change and chills  Negative for fatigue, fever and unexpected weight change  HENT: Negative  Eyes: Negative  Respiratory: Positive for chest tightness and shortness of breath  Negative for cough, wheezing and stridor  Cardiovascular: Positive for chest pain  Negative for palpitations and leg swelling  Gastrointestinal: Negative  Endocrine: Negative      Genitourinary: Positive for decreased urine volume and flank pain  Negative for difficulty urinating, dysuria, frequency, hematuria, penile discharge, scrotal swelling, testicular pain and urgency  Skin: Negative  Allergic/Immunologic: Negative  Neurological: Positive for weakness  Negative for dizziness, tremors, syncope, numbness and headaches  Hematological: Negative  Psychiatric/Behavioral: Negative               Historical Information   Past Medical History:   Diagnosis Date    Arthritis     Gout     Hypertension     Kidney stone      Past Surgical History:   Procedure Laterality Date    FL RETROGRADE PYELOGRAM  8/4/2019    FL RETROGRADE PYELOGRAM  9/26/2019    FL RETROGRADE PYELOGRAM  4/22/2022    NO PAST SURGERIES  03/23/2015    Last assessed    NH CYSTO/URETERO W/LITHOTRIPSY &INDWELL STENT INSRT Right 9/26/2019    Procedure: CYSTOSCOPY URETEROSCOPY WITH RETROGRADE PYELOGRAM AND INSERTION STENT URETERAL;  Surgeon: Allie Banks MD;  [facility]: [facility];  Service: Urology    NH CYSTOURETHROSCOPY,URETER CATHETER Right 8/4/2019    Procedure: CYSTOSCOPY RETROGRADE PYELOGRAM WITH INSERTION STENT URETERAL;  Surgeon: Ti Morocho MD;  [facility]:  MAIN OR;  Service: Urology    NH CYSTOURETHROSCOPY,URETER CATHETER Left 4/22/2022    Procedure: CYSTOSCOPY RETROGRADE PYELOGRAM WITH INSERTION STENT URETERAL;  Surgeon: Grecia Vaca MD;  [facility];  Service: Urology     Social History   Social History     Substance and Sexual Activity   Alcohol Use Not Currently    Alcohol/week: 0 0 standard drinks    Comment: rarely     Social History     Substance and Sexual Activity   Drug Use No     E-Cigarette/Vaping    E-Cigarette Use Never User      E-Cigarette/Vaping Substances     Social History     Tobacco Use   Smoking Status Never Smoker   Smokeless Tobacco Never Used     Family History: non-contributory    Meds/Allergies   current meds:   Current Facility-Administered Medications Medication Dose Route Frequency    heparin (porcine) 25,000 units in 0 45% NaCl 250 mL infusion (premix)  3-30 Units/kg/hr (Order-Specific) Intravenous Titrated    heparin (porcine) injection 10,000 Units  10,000 Units Intravenous Q1H PRN    heparin (porcine) injection 5,000 Units  5,000 Units Intravenous Q1H PRN    sodium chloride 0 9 % bolus 1,000 mL  1,000 mL Intravenous Once     No Known Allergies    Objective   First Vitals:   Blood Pressure: 131/87 (05/10/22 0504)  Pulse: (!) 138 (05/10/22 0504)  Temperature: (!) 97 4 °F (36 3 °C) (05/10/22 0504)  Temp Source: Temporal (05/10/22 0504)  Respirations: 22 (05/10/22 0504)  SpO2: 98 % (05/10/22 0504)    Current Vitals:   Blood Pressure: 113/75 (05/10/22 0600)  Pulse: (!) 135 (05/10/22 0600)  Temperature: (!) 97 4 °F (36 3 °C) (05/10/22 0504)  Temp Source: Temporal (05/10/22 0504)  Respirations: 19 (05/10/22 0600)  SpO2: 92 % (05/10/22 0600)      Intake/Output Summary (Last 24 hours) at 5/10/2022 0708  Last data filed at 5/10/2022 0603  Gross per 24 hour   Intake 50 ml   Output --   Net 50 ml       Invasive Devices  Report    Peripheral Intravenous Line            Peripheral IV 05/10/22 Left Antecubital <1 day    Peripheral IV 05/10/22 Right Antecubital <1 day                Physical Exam     Patient is morbidly obese  He has obvious distress, restless  ENT clear  Oral mucosa pink and moist   Pharynx not erythematous  Chest symmetric  Normal expansion  Heart tachycardic  No murmur heard  Regular rate and rhythm  Lungs no rales rhonchi  Clear to auscultation  Abdomen wide girth  Positive bowel sounds  Tenderness in left lower quadrant without guarding or rebound  No masses felt  Flank positive left sided tenderness to percussion  Genitalia, normal male penis  No discharge or bleeding noted  No scrotal edema  Extremities no calf tenderness, no peripheral edema  Ambulation not observed    Neurological exam shows no focal motor sensory neurologic weakness  Cranial is intact  Mental status appropriate  Skin no diaphoresis, does not feel febrile to touch  No rash or pallor  Lab Results:   I have personally reviewed pertinent lab results    , CBC:   Lab Results   Component Value Date    WBC 26 14 (H) 05/10/2022    HGB 16 6 05/10/2022    HCT 53 2 (H) 05/10/2022    MCV 95 05/10/2022     (H) 05/10/2022    MCH 29 7 05/10/2022    MCHC 31 2 (L) 05/10/2022    RDW 13 5 05/10/2022    MPV 10 1 05/10/2022   , CMP:   Lab Results   Component Value Date    SODIUM 136 05/10/2022    K 4 5 05/10/2022     05/10/2022    CO2 17 (L) 05/10/2022    BUN 52 (H) 05/10/2022    CREATININE 2 92 (H) 05/10/2022    CALCIUM 9 8 05/10/2022    AST 31 05/10/2022    ALT 50 05/10/2022    ALKPHOS 78 05/10/2022    EGFR 23 05/10/2022   , Coagulation:   Lab Results   Component Value Date    INR 1 09 05/10/2022   , Urinalysis:   Lab Results   Component Value Date    COLORU Yellow 05/10/2022    CLARITYU Cloudy 05/10/2022    SPECGRAV 1 025 05/10/2022    PHUR 6 0 05/10/2022    LEUKOCYTESUR Small (A) 05/10/2022    NITRITE Negative 05/10/2022    GLUCOSEU Negative 05/10/2022    KETONESU Negative 05/10/2022    BILIRUBINUR Negative 05/10/2022    BLOODU Large (A) 05/10/2022     Contains critical data Lactic acid 2 Hours  Order: [id number] - Reflex for Order [id number]   Status: Final result     Visible to patient: Yes (not seen)     Next appt: 05/12/2022 at 09:00 AM in Family Medicine Wenceslao Borjas DO)     0 Result Notes    Component Ref Range & Units 5/10/22 0816 5/10/22 0518 4/21/22 0317 8/23/19 1358 8/23/19 1207 8/5/19 0521 8/5/19 0226   LACTIC ACID 0 5 - 2 0 mmol/L 4 2 High Panic   3 9 High Panic                  Imaging: I have personally reviewed pertinent films in PACS     CT ABDOMEN AND PELVIS WITHOUT IV CONTRAST     INDICATION:   Flank pain, kidney stone suspected  left flank pain, elevated wbc, kidney stone resulting in e  coli bacteremia s/p stent on 4/22      COMPARISON:  CT 4/27/2022      TECHNIQUE:  CT examination of the abdomen and pelvis was performed without intravenous contrast  Axial, sagittal, and coronal 2D reformatted images were created from the source data and submitted for interpretation       Radiation dose length product (DLP) for this visit:  2003 16 mGy-cm   This examination, like all CT scans performed in the [facility], was performed utilizing techniques to minimize radiation dose exposure, including the use of   iterative reconstruction and automated exposure control       Enteric contrast was not administered       FINDINGS:  Absence of intravenous contrast enhancement limits evaluation of the solid abdominal viscera      ABDOMEN     LOWER CHEST:  No clinically significant abnormality identified in the visualized lower chest      LIVER/BILIARY TREE:  Unremarkable      GALLBLADDER:  No calcified gallstones  No pericholecystic inflammatory change      SPLEEN:  Unremarkable      PANCREAS:  Unremarkable      ADRENAL GLANDS:  Unremarkable      KIDNEYS/URETERS:  Left ureteral stent in satisfactory position  There is mild hydronephrosis  There is moderate perinephric stranding  3 mm calculus adjacent to the stent in the mid ureter, best seen on coronal series 601 image 98  Multiple   intrarenal calculi, largest measuring 5 mm in the upper pole      No right hydronephrosis  Multiple intrarenal calculi, largest measuring 4 mm in the midpole  Small cyst at the lower pole      STOMACH AND BOWEL:  Unremarkable      APPENDIX:  No findings to suggest appendicitis      ABDOMINOPELVIC CAVITY:  No ascites  No pneumoperitoneum    No lymphadenopathy      VESSELS:  Unremarkable for patient's age      PELVIS     REPRODUCTIVE ORGANS:  Unremarkable for patient's age      URINARY BLADDER:  Unremarkable      ABDOMINAL WALL/INGUINAL REGIONS:  Small fat-containing right inguinal hernia      OSSEOUS STRUCTURES:  No acute fracture or destructive osseous lesion      IMPRESSION:     Mild left hydronephrosis status post ureteral stent placement, with 3 mm calculus in the mid ureter adjacent to the stent      Multiple bilateral renal calculi        EKG, Pathology, and Other Studies: I have personally reviewed pertinent reports  Counseling / Coordination of Care  Total floor / unit time spent today 55 minutes  Greater than 50% of total time was spent with the patient and / or family counseling and / or coordination of care  A description of the counseling / coordination of care:  Extensive you with patient's past medical records and diagnostic imaging, tiger text messaging and personal telephone call followed up with the on-call urologist covering this hospital [facility], discussion with the attending ED physician in addition to the hospitalist physician  The patient has been recommended tertiary transfer to critical care bed in either [facility] or [place] and to be evaluated by Urology and potential for possible ID placement of a left percutaneous nephrostomy tube      Joleen Mora PA-C

## 2022-05-10 NOTE — ASSESSMENT & PLAN NOTE
· LA 4 2 at 's, repeat pending  · S/p 4L crystalloid resuscitation and maintenance IVF, hold further volume resuscitation

## 2022-05-10 NOTE — ANESTHESIA PROCEDURE NOTES
Arterial Line Insertion    Date/Time: 5/10/2022 7:24 PM  Performed by: Patricia Choi MD  Authorized by: Richard Rodney MD   Consent: Verbal consent obtained  Written consent obtained  Risks and benefits: risks, benefits and alternatives were discussed  Consent given by: patient  Patient understanding: patient states understanding of the procedure being performed  Patient consent: the patient's understanding of the procedure matches consent given  Procedure consent: procedure consent matches procedure scheduled  Relevant documents: relevant documents present and verified  Test results: test results available and properly labeled  Site marked: the operative site was marked  Radiology Images: Radiology Images displayed and confirmed  If images not available, report reviewed  Patient identity confirmed: verbally with patient, arm band and provided demographic data  Time out: Immediately prior to procedure a "time out" was called to verify the correct patient, procedure, equipment, support staff and site/side marked as required  Preparation: Patient was prepped and draped in the usual sterile fashion  Indications: hemodynamic monitoring  Orientation:  Right  [facility]: radial artery  Sedation:  Patient sedated: yes (GETA provided prior to start of A-line )    Procedure Details:  Needle gauge: 20  Seldinger technique: Seldinger technique used      Post-procedure:  Post-procedure: dressing applied  Waveform: good waveform  Patient tolerance: patient tolerated the procedure well with no immediate complications  Comments: A-line started after Induction, and prior to start of Surgery

## 2022-05-10 NOTE — ED NOTES
Patient requesting to leave gown off as feels more comfortable with shorts       Alessandra Gonzalez, [facility]  05/10/22 [id number]

## 2022-05-10 NOTE — ED NOTES
Inpatient team at bedside     Adeola Gongora, [facility]  05/10/22 0869

## 2022-05-10 NOTE — CONSULTS
Consult - Urology   Ellie Wilson 1965, 55 y o  male MRN: [id number]    Unit/Bed#: [facility] 510-01 Encounter: [id number]    No new Assessment & Plan notes have been filed under this hospital service since the last note was generated  Service: Urology      Assessment and Plan  1 Obstructive pyelonephritis  2 ADRIANNE   5/10/22 CT abdomen pelvis: mild left hydronephrosis s/p ureteral stent placement with 3 mm calculus in the mid ureter adjacent to the stent    Urine culture in progress    Continue IV antibiotics, IVF   Pain management   Continue Tamsulosin 0 4 mg daily    Bowel regimen    Trend BMP, CBC    BUN 52, Cr 2 92    WBC 26 14   IR consulted for left PCN placement     Subjective:   Ellie Wilson 55year old male who was transferred to [facility] from [address] [place] [address] today for urgent urology management of obstructed left stent with ADRIANNE  He was recently admitted 4/21/22 and had cystoscopy with left ureteral stent placed due to 3 mm obstructing stone with hydronephrosis  He presented to the ED early this morning with reports of left flank pain, nausea, fever, chills and shortness of breath  He was tachypneic and tachycardic on arrival  Resting in bed at this time  He is being transported to IR for left PCN  Patient endorses left-sided flank pain, gross hematuria which began this morning, and intermittent fever/chills  He denies nausea, vomiting  Review of Systems   Constitutional: Positive for chills  Negative for activity change, appetite change, diaphoresis, fatigue and fever  HENT: Negative for congestion, ear pain and sore throat  Eyes: Negative for pain and visual disturbance  Respiratory: Negative for cough, chest tightness and shortness of breath  Cardiovascular: Negative for chest pain and palpitations  Gastrointestinal: Negative for abdominal distention, abdominal pain, diarrhea, nausea and vomiting  Endocrine: Negative  Genitourinary: Positive for flank pain and hematuria   Negative for decreased urine volume, difficulty urinating, dysuria, penile discharge, penile pain, penile swelling, scrotal swelling, testicular pain and urgency  Musculoskeletal: Negative for arthralgias and back pain  Skin: Negative for color change, pallor and rash  Allergic/Immunologic: Negative  Neurological: Negative for seizures and syncope  Hematological: Negative  Psychiatric/Behavioral: Negative  All other systems reviewed and are negative  Objective:  Vitals: Blood pressure 132/59, pulse (!) 118, temperature 99 °F (37 2 °C), temperature source Oral, resp  rate 22, height 5' 6" (1 676 m), weight (!) 151 kg (333 lb 1 8 oz), SpO2 92 %  ,Body mass index is 53 77 kg/m²  Physical Exam  Vitals and nursing note reviewed  Constitutional:       General: He is awake  He is not in acute distress  Appearance: Normal appearance  He is morbidly obese  He is not ill-appearing, toxic-appearing or diaphoretic  HENT:      Head: Normocephalic and atraumatic  Cardiovascular:      Rate and Rhythm: Normal rate and regular rhythm  Pulses: Normal pulses  Heart sounds: Normal heart sounds  Pulmonary:      Effort: Pulmonary effort is normal       Breath sounds: Normal breath sounds  Abdominal:      General: Bowel sounds are normal       Palpations: Abdomen is soft  Tenderness: There is left CVA tenderness  There is no right CVA tenderness  Genitourinary:     Penis: Normal     Musculoskeletal:         General: Normal range of motion  Cervical back: Normal range of motion  Right lower leg: No edema  Left lower leg: No edema  Skin:     General: Skin is warm and dry  Neurological:      General: No focal deficit present  Mental Status: He is alert and oriented to person, place, and time  Mental status is at baseline  Psychiatric:         Mood and Affect: Mood normal          Behavior: Behavior normal  Behavior is cooperative  Thought Content:  Thought content normal  Judgment: Judgment normal          Imaging:    CT ABDOMEN AND PELVIS WITHOUT IV CONTRAST     INDICATION:   Flank pain, kidney stone suspected   left flank pain, elevated wbc, kidney stone resulting in e  coli bacteremia s/p stent on 4/22  COMPARISON:  CT 4/27/2022  TECHNIQUE:  CT examination of the abdomen and pelvis was performed without intravenous contrast  Axial, sagittal, and coronal 2D reformatted images were created from the source data and submitted for interpretation  Radiation dose length product (DLP) for this visit:  2003 16 mGy-cm    This examination, like all CT scans performed in the [facility], was performed utilizing techniques to minimize radiation dose exposure, including the use of   iterative reconstruction and automated exposure control  Enteric contrast was not administered  FINDINGS:   Absence of intravenous contrast enhancement limits evaluation of the solid abdominal viscera  ABDOMEN     LOWER CHEST:  No clinically significant abnormality identified in the visualized lower chest      LIVER/BILIARY TREE:  Unremarkable  GALLBLADDER:  No calcified gallstones  No pericholecystic inflammatory change  SPLEEN:  Unremarkable  PANCREAS:  Unremarkable  ADRENAL GLANDS:  Unremarkable  KIDNEYS/URETERS:  Left ureteral stent in satisfactory position   There is mild hydronephrosis   There is moderate perinephric stranding   3 mm calculus adjacent to the stent in the mid ureter, best seen on coronal series 601 image 98   Multiple   intrarenal calculi, largest measuring 5 mm in the upper pole  No right hydronephrosis   Multiple intrarenal calculi, largest measuring 4 mm in the midpole   Small cyst at the lower pole  STOMACH AND BOWEL:  Unremarkable  APPENDIX:  No findings to suggest appendicitis  ABDOMINOPELVIC CAVITY:  No ascites   No pneumoperitoneum   No lymphadenopathy  VESSELS:  Unremarkable for patient's age       PELVIS REPRODUCTIVE ORGANS:  Unremarkable for patient's age  URINARY BLADDER:  Unremarkable  ABDOMINAL WALL/INGUINAL REGIONS:  Small fat-containing right inguinal hernia  OSSEOUS STRUCTURES:  No acute fracture or destructive osseous lesion  Impression:       Mild left hydronephrosis status post ureteral stent placement, with 3 mm calculus in the mid ureter adjacent to the stent  Multiple bilateral renal calculi  Imaging reviewed - both report and images personally reviewed  Labs:  Recent Labs     05/10/22  0518 05/10/22  1346   WBC 26 14* 42 89*     Recent Labs     05/10/22  0518 05/10/22  1346   HGB 16 6 13 7     Recent Labs     05/10/22  0518 05/10/22  1346   CREATININE 2 92* 3 72*         History:  Social History     Socioeconomic History    Marital status: Single     Spouse name: Not on file    Number of children: Not on file    Years of education: Not on file    Highest education level: Not on file   Occupational History    Not on file   Tobacco Use    Smoking status: Never Smoker    Smokeless tobacco: Never Used   Vaping Use    Vaping Use: Never used   Substance and Sexual Activity    Alcohol use: Not Currently     Alcohol/week: 0 0 standard drinks     Comment: rarely    Drug use: No    Sexual activity: Not on file   Other Topics Concern    Not on file   Social History Narrative    Caffeine use      Social Determinants of Health     Financial Resource Strain: Not on file   Food Insecurity: No Food Insecurity    Worried About Running Out of Food in the Last Year: Never true    Isis of Food in the Last Year: Never true   Transportation Needs: No Transportation Needs    Lack of Transportation (Medical): No    Lack of Transportation (Non-Medical):  No   Physical Activity: Not on file   Stress: Not on file   Social Connections: Not on file   Intimate Partner Violence: Not on file   Housing Stability: Low Risk     Unable to Pay for Housing in the Last Year: No    Number of Places Lived in the Last Year: 1    Unstable Housing in the Last Year: No     Past Medical History:   Diagnosis Date    Arthritis     Gout     Hypertension     Kidney stone      Past Surgical History:   Procedure Laterality Date    FL RETROGRADE PYELOGRAM  8/4/2019    FL RETROGRADE PYELOGRAM  9/26/2019    FL RETROGRADE PYELOGRAM  4/22/2022    NO PAST SURGERIES  03/23/2015    Last assessed    CT CYSTO/URETERO W/LITHOTRIPSY &INDWELL STENT INSRT Right 9/26/2019    Procedure: CYSTOSCOPY URETEROSCOPY WITH RETROGRADE PYELOGRAM AND INSERTION STENT URETERAL;  Surgeon: Kathya Valdivia MD;  [facility]: [facility];  Service: Urology    CT CYSTOURETHROSCOPY,URETER CATHETER Right 8/4/2019    Procedure: CYSTOSCOPY RETROGRADE PYELOGRAM WITH INSERTION STENT URETERAL;  Surgeon: Abram Hernandez MD;  [facility];  Service: Urology    CT CYSTOURETHROSCOPY,URETER CATHETER Left 4/22/2022    Procedure: CYSTOSCOPY RETROGRADE PYELOGRAM WITH INSERTION STENT URETERAL;  Surgeon: Dennise Marlow MD;  [facility];  Service: Urology     Family History   Problem Relation Age of Onset    Cancer Mother        RITA Yañez  Date: 5/10/2022 Time: 3:23 PM

## 2022-05-10 NOTE — ASSESSMENT & PLAN NOTE
· Hx of E   Coli and Strep Mitis bacteremia in prior hospitalization on 4/21  · Sensitive to Ceftriaxone and Ancef  · Repeat blood/urine cx pending  · Empirically starting Ceftriaxone 2g q12h  · IR consulted for source control via PCN placement

## 2022-05-10 NOTE — ASSESSMENT & PLAN NOTE
· Recent admission on 4/21 for obstructive pyelonephritis w/ L sided ureteral stent placement  · Worsening pain at home since discharge w/ return to ED on 5/10, found to have obstruction of stent, transferred to [facility] for urgent urology management     · Urology consulted, appreciate recs  · Continue home flomax  · IR consulted for L sided PCN placement for source control  · Continue Ceftriaxone 2g q12h

## 2022-05-10 NOTE — PROGRESS NOTES
Patient called Fr Hannah Wagner his room for a visit   Janice Kelsey provided prayer( he is concerned by his ongoing medical issue)     05/10/22 1400   Clinical Encounter Type   Visited With Patient   Referral From Patient   [facility] Encounters   [facility] Needs Prayer   Sacramental Encounters   Sacrament Other Other (Comment)  (blessing)

## 2022-05-10 NOTE — ED NOTES
Provider made aware of delay in repeat lactic acid and troponin as lab calling stating labs hemolyzed for second time       Shelby Chaves, [address] [facility]  05/10/22 6638

## 2022-05-10 NOTE — ED NOTES
Patient calling mother at this time to inform her of presence in hospital and transfer       Luis Manuel Chowdhury RN  05/10/22 1002 Currently NPO < 5 days

## 2022-05-10 NOTE — H&P
[facility]  H&P- Suzanna Nelson 1965, 55 y o  male MRN: [id number]  Unit/Bed#: [facility] 510-01 Encounter: [id number]  Primary Care Provider: RITA Alexander   Date and time admitted to hospital: 5/10/2022 12:42 PM    HTN (hypertension)  Assessment & Plan  · Holding home norvasc in setting of sepsis, restart if BP improved    Chronic kidney disease, stage 3 [facility])  Assessment & Plan  Lab Results   Component Value Date    EGFR 23 05/10/2022    EGFR 30 05/03/2022    EGFR 35 04/30/2022    CREATININE 2 92 (H) 05/10/2022    CREATININE 2 32 (H) 05/03/2022    CREATININE 2 06 (H) 04/30/2022       · Baseline Cr 1 4-1 6  · Cr on admission 2 92    Sepsis ([facility] [address] )  Assessment & Plan  · Hx of E  Coli and Strep Mitis bacteremia in prior hospitalization on 4/21  · Sensitive to Ceftriaxone and Ancef  · Repeat blood/urine cx pending  · Empirically starting Ceftriaxone 2g q12h  · IR consulted for source control via PCN placement    ADRIANNE (acute kidney injury) ([facility] [address] )  Assessment & Plan  · Continue /hr  · IR consulted for possible perc nephrostomy tube placement  Lactic acidosis  Assessment & Plan  · LA 4 2 at [facility], repeat pending  · S/p 4L crystalloid resuscitation and maintenance IVF, hold further volume resuscitation    * Obstructive pyelonephritis  Assessment & Plan  · Recent admission on 4/21 for obstructive pyelonephritis w/ L sided ureteral stent placement  · Worsening pain at home since discharge w/ return to ED on 5/10, found to have obstruction of stent, transferred to [facility] for urgent urology management     · Urology consulted, appreciate recs  · Continue home flomax  · IR consulted for L sided PCN placement for source control  · Continue Ceftriaxone 2g q12h      -------------------------------------------------------------------------------------------------------------  Chief Complaint: "My stent closed and my kidney is infected again"    History of Present Illness   HX and PE limited by: Medina Solorio is a 55 y o  male who presented to [facility] ED with worsening left-sided flank pain and generalized malaise/fatigue which was not improved since discharge from recent hospital stay at [facility] for obstructive pyelonephritis secondary to 3 mm stone with ureteral stent placement  Patient also developed E coli bacteremia during hospital stay and was ultimately discharged on a course of amoxicillin until 5/4  Prior cultures were sensitive for Ancef and ceftriaxone  In the ED at [facility] patient was found to have lactic acidosis, leukocytosis, worsening ADRIANNE, CT A/P findings concerning for obstruction left ureteral stent with persistent hydronephrosis  History obtained from chart review and the patient   -------------------------------------------------------------------------------------------------------------  Dispo: Admit to Stepdown Level 1    Code Status: Level 1 - Full Code  --------------------------------------------------------------------------------------------------------------  Review of Systems   Constitutional: Positive for fatigue  Negative for chills and fever  Respiratory: Positive for shortness of breath  Negative for chest tightness  Cardiovascular: Negative for chest pain  Gastrointestinal: Positive for abdominal distention  Negative for abdominal pain  Genitourinary: Positive for dysuria and flank pain  Musculoskeletal: Negative for arthralgias  Neurological: Negative for dizziness, light-headedness and headaches  A 12-point, complete review of systems was reviewed and negative except as stated above     Physical Exam  Constitutional:       Appearance: He is obese  Interventions: Nasal cannula in place  HENT:      Head: Normocephalic and atraumatic  Eyes:      General: Lids are normal       Conjunctiva/sclera: Conjunctivae normal    Cardiovascular:      Rate and Rhythm: Regular rhythm  Tachycardia present        Heart sounds: Normal heart sounds  Pulmonary:      Effort: Pulmonary effort is normal       Breath sounds: Normal breath sounds  Abdominal:      General: Abdomen is protuberant  There is distension  Palpations: Abdomen is soft  Tenderness: There is no abdominal tenderness  Skin:     General: Skin is warm and dry  Capillary Refill: Capillary refill takes less than 2 seconds  Neurological:      General: No focal deficit present  Mental Status: He is alert and oriented to person, place, and time  GCS: GCS eye subscore is 4  GCS verbal subscore is 5  GCS motor subscore is 6        --------------------------------------------------------------------------------------------------------------  Vitals:   Vitals:    05/10/22 1247   BP: 113/62   BP [facility]: Left arm   Pulse: (!) 125   Resp: (!) 28   Temp: 99 °F (37 2 °C)   TempSrc: Oral   SpO2: 92%   Weight: (!) 151 kg (333 lb 1 8 oz)   Height: 5' 6" (1 676 m)     Temp  Min: 97 4 °F (36 3 °C)  Max: 99 °F (37 2 °C)  IBW (Ideal Body Weight): 63 8 kg  Height: 5' 6" (167 6 cm)  Body mass index is 53 77 kg/m²      Laboratory and Diagnostics:  Results from last 7 days   Lab Units 05/10/22  0518   WBC Thousand/uL 26 14*   HEMOGLOBIN g/dL 16 6   HEMATOCRIT % 53 2*   PLATELETS Thousands/uL 450*   BANDS PCT % 11*   MONO PCT % 2*     Results from last 7 days   Lab Units 05/10/22  0518   SODIUM mmol/L 136   POTASSIUM mmol/L 4 5   CHLORIDE mmol/L 102   CO2 mmol/L 17*   ANION GAP mmol/L 17*   BUN mg/dL 52*   CREATININE mg/dL 2 92*   CALCIUM mg/dL 9 8   GLUCOSE RANDOM mg/dL 142*   ALT U/L 50   AST U/L 31   ALK PHOS U/L 78   ALBUMIN g/dL 3 4*   TOTAL BILIRUBIN mg/dL 0 59          Results from last 7 days   Lab Units 05/10/22  0518   INR  1 09   PTT seconds 26          Results from last 7 days   Lab Units 05/10/22  0816 05/10/22  0518   LACTIC ACID mmol/L 4 2* 3 9*     ABG:    VBG:    Results from last 7 days   Lab Units 05/10/22  0518   PROCALCITONIN ng/ml 2 30*       Micro:  Results from last 7 days   Lab Units 05/10/22  0523   BLOOD CULTURE  Received in Microbiology Lab  Culture in Progress  EKG: Sinus tach on tele, rate 120  Imaging: I have personally reviewed pertinent reports  Historical Information   Past Medical History:   Diagnosis Date    Arthritis     Gout     Hypertension     Kidney stone      Past Surgical History:   Procedure Laterality Date    FL RETROGRADE PYELOGRAM  8/4/2019    FL RETROGRADE PYELOGRAM  9/26/2019    FL RETROGRADE PYELOGRAM  4/22/2022    NO PAST SURGERIES  03/23/2015    Last assessed    MD CYSTO/URETERO W/LITHOTRIPSY &INDWELL STENT INSRT Right 9/26/2019    Procedure: CYSTOSCOPY URETEROSCOPY WITH RETROGRADE PYELOGRAM AND INSERTION STENT URETERAL;  Surgeon: Philippe Mcnally MD;  [facility]: [facility];  Service: Urology    MD CYSTOURETHROSCOPY,URETER CATHETER Right 8/4/2019    Procedure: CYSTOSCOPY RETROGRADE PYELOGRAM WITH INSERTION STENT URETERAL;  Surgeon: Mary Leon MD;  [facility]:  MAIN OR;  Service: Urology    MD CYSTOURETHROSCOPY,URETER CATHETER Left 4/22/2022    Procedure: CYSTOSCOPY RETROGRADE PYELOGRAM WITH INSERTION STENT URETERAL;  Surgeon: Carolin Cedillo MD;  [facility];  Service: Urology     Social History   Social History     Substance and Sexual Activity   Alcohol Use Not Currently    Alcohol/week: 0 0 standard drinks    Comment: rarely     Social History     Substance and Sexual Activity   Drug Use No     Social History     Tobacco Use   Smoking Status Never Smoker   Smokeless Tobacco Never Used     Exercise History: Ambulates without assistance at baseline     Family History:   Family History   Problem Relation Age of Onset   Judithe Spruce Cancer Mother      I have reviewed this patient's family history and commented on sigificant items within the HPI      Medications:  Current Facility-Administered Medications   Medication Dose Route Frequency    cefTRIAXone (ROCEPHIN) 2,000 mg in dextrose 5 % 50 mL IVPB 2,000 mg Intravenous Q12H    heparin (porcine) subcutaneous injection 7,500 Units  7,500 Units Subcutaneous Q8H [address]    multi-electrolyte (PLASMALYTE-A/ISOLYTE-S PH 7 4) IV solution  125 mL/hr Intravenous Continuous    senna-docusate sodium (SENOKOT S) 8 6-50 mg per tablet 2 tablet  2 tablet Oral BID    tamsulosin (FLOMAX) capsule 0 4 mg  0 4 mg Oral Daily With Dinner     Home medications:  Prior to Admission Medications   Prescriptions Last Dose Informant Patient Reported? Taking? amLODIPine (NORVASC) 10 mg tablet   No No   Sig: Take 1 tablet (10 mg total) by mouth daily   docusate sodium (COLACE) 100 mg capsule   No No   Sig: Take 1 capsule (100 mg total) by mouth 3 (three) times a day for 7 days   ondansetron (Zofran ODT) 4 mg disintegrating tablet   No No   Sig: Take 1 tablet (4 mg total) by mouth every 6 (six) hours as needed for nausea or vomiting   tamsulosin (FLOMAX) 0 4 mg   No No   Sig: TAKE 1 CAPSULE BY MOUTH EVERY 24 HOURS      Facility-Administered Medications: None     Allergies:  No Known Allergies    ------------------------------------------------------------------------------------------------------------  Advance Directive and Living Will: Yes    Power of :    POLST:    ------------------------------------------------------------------------------------------------------------  Anticipated Length of Stay is > 2 midnights    Care Time Delivered:   Upon my evaluation, this patient had a high probability of imminent or life-threatening deterioration due to Severe sepsis, which required my direct attention, intervention, and personal management  I have personally provided 30 minutes (1350 to 1420) of critical care time, exclusive of procedures, teaching, family meetings, and any prior time recorded by providers other than myself  Mickey Bolaños PA-C        Portions of the record may have been created with voice recognition software    Occasional wrong word or "sound a like" substitutions may have occurred due to the inherent limitations of voice recognition software    Read the chart carefully and recognize, using context, where substitutions have occurred

## 2022-05-10 NOTE — QUICK NOTE
Pt w/ pyelonephritis and rising lactate  S/p VTE protocol heparin by [facility] ED for c/f PE, stopped at 1:30 PM  B/L LE duplex negative for DVT  Informed by IR that patient being taken for nephrostomy tube imminently  50 mg IV protamine given by CC attending as infusion in 100 mL NS over 30 min

## 2022-05-10 NOTE — SEPSIS NOTE
Sepsis Note   Maria Victoria Balderas 55 y o  male MRN: [id number]  Unit/Bed#: RM01 Encounter: [id number]       qSOFA     [address] Name 05/10/22 0600 05/10/22 0504             Altered mental status GCS < 15 -- --       Respiratory Rate > / =43 0 1       Systolic BP < / =861 0 0       Q Sofa Score 0 1                  Initial Sepsis Screening     Row Name 05/10/22 0510                Is the patient's history suggestive of a new or worsening infection? Yes (Proceed)  -CC        Suspected source of infection urinary tract infection  -CC        Are two or more of the following signs & symptoms of infection both present and new to the patient? Yes (Proceed)  -CC        Indicate SIRS criteria Tachycardia > 90 bpm;Tachypnea > 20 resp per min;Leukocytosis (WBC > 38186 IJL)  -CC        If the answer is yes to both questions, suspicion of sepsis is present --        If severe sepsis is present AND tissue hypoperfusion perists in the hour after fluid resuscitation or lactate > 4, the patient meets criteria for SEPTIC SHOCK --        Are any of the following organ dysfunction criteria present within 6 hours of suspected infection and SIRS criteria that are NOT considered to be chronic conditions? Yes  -CC        Organ dysfunction Creatinine > 2 0 mg/dl AND > 0 5 mg/dl above baseline; Lactate > 2 0 mmol/L  -CC        Date of presentation of severe sepsis 05/10/22  -CC        Time of presentation of severe sepsis 0545  -CC        Tissue hypoperfusion persists in the hour after crystalloid fluid administration, evidenced, by either: --        Was hypotension present within one hour of the conclusion of crystalloid fluid administration?  No  -CC        Date of presentation of septic shock --        Time of presentation of septic shock --              User Key  (r) = Recorded By, (t) = Taken By, (c) = Cosigned By    [address] Name Provider Type    CC Quynh Fernández MD Physician                  Default Flowsheet Data (last 720 hours)     Sepsis 100[facility] Name 05/10/22 0645                   Repeat Volume Status and Tissue Perfusion Assessment Performed    Repeat Volume Status and Tissue Perfusion Assessment Performed Yes  -CC                  Volume Status and Tissue Perfusion Post Fluid Resuscitation * Must Document All *    Vital Signs Reviewed (HR, RR, BP, T) Yes  -CC        Shock Index Reviewed Yes  -CC        Arterial Oxygen Saturation Reviewed (POx, SaO2 or SpO2) Yes (comment %)  95  -CC        Cardio Normal S1/S2; No murmor; Tachycardia  -CC        Pulmonary Clear to auscultation; Tachypnea  -CC        Capillary Refill Brisk  -CC        Peripheral Pulses Radial  -CC        Peripheral Pulse +2  -CC        Skin Warm;Dry;Normal  -CC        Urine output assessed Adequate  -CC                  *OR*   Intensive Monitoring- Must Document One of the Following Four *:    Vital Signs Reviewed --        * Central Venous Pressure (CVP or RAP) --        * Central Venous Oxygen (SVO2, ScvO2 or Oxygen saturation via central catheter) --        * Bedside Cardiovascular US in IVC diameter and % collapse --        * Passive Leg Raise OR Crystalloid Challenge --              User Key  (r) = Recorded By, (t) = Taken By, (c) = Cosigned By    Initials Name Provider Type    CC Jennifer Wang MD Physician

## 2022-05-10 NOTE — TELEMEDICINE
e-Consult (IPC)  - Interventional Radiology  Suzanna Nelson 55 y o  male MRN: [id number]  Unit/Bed#: [facility] 510-01 Encounter: [id number]          Interventional Radiology has been consulted to evaluate Suzanna Nelson    We were consulted by critical care concerning this patient with obstructed ureteral stent  IP Consult to IR  Consult performed by: RITA Salmeron  Consult ordered by: Concha Greene MD        05/10/22    Assessment/Recommendation:     55year old male transferred from [facility] with left flank plain after stent placement on 4/22/2022 s/p cystoscopy  CT demonstrated left ureteral stent in satisfactory position, however there is moderate perinephric stranding and mild hydronephrosis in the left kidney and multiple bilateral renal calculi  Patient has elevated lactic acid, is tachycardic, tachypnic, leukocytosis, elevated procal  Given these findings, will plan for percutaneous nephrostomy tube placement  - keep NPO  - appreciate anesthesia support  - plan for L PCN placement    Total time spent in review of data, discussion with requesting provider and rendering advice was 25 minutes  Thank you for allowing Interventional Radiology to participate in the care of Suzanna Nelson  Please don't hesitate to call or TigerText us with any questions       [address] [place] [address] Erin Hall

## 2022-05-10 NOTE — ED CARE HANDOFF
Emergency Department Sign Out Note        Sign out and transfer of care from Dr Sara Zuluaga  See Separate Emergency Department note  The patient, Suzanna Nelson, was evaluated by the previous provider for severe sepsis, pyelonephritis with ureteral stent, elevated D-dimer, ADRIANNE    Workup Completed:  Labs/radiology  IVF initiated  Antibiotics initiated  Discussion with KP regarding admission vs  Transfer  ED Course / Workup Pending (followup):   see ED course  Sepsis Reassessment      Most Recent Value   Volume Status and Tissue Perfusion Post Fluid Resuscitation * Must Document All *    Vital Signs Reviewed (HR, RR, BP, T) Yes Filed at: 05/10/2022 0645   Shock Index Reviewed Yes Filed at: 05/10/2022 2943   Arterial Oxygen Saturation Reviewed (POx, SaO2 or SpO2) Yes (comment %)  [95] Filed at: 05/10/2022 0645   Cardio Normal S1/S2, No murmor, Tachycardia Filed at: 05/10/2022 0645   Pulmonary Clear to auscultation, Tachypnea Filed at: 05/10/2022 0645   Capillary Refill Brisk Filed at: 05/10/2022 0645   Peripheral Pulses Radial Filed at: 05/10/2022 0645   Peripheral Pulse +2 Filed at: 05/10/2022 0645   Dorsalis Pedis --   Posterior Tibialis --   Skin Warm, Dry, Normal Filed at: 05/10/2022 5895   Urine output assessed Adequate Filed at: 05/10/2022 0645   *OR*   Intensive Monitoring- Must Document One of the Following Four *:    Vital Signs Reviewed --   * Central Venous Pressure (CVP or RAP) --   * Central Venous Oxygen (SVO2, ScvO2 or Oxygen saturation via central catheter) --   * Bedside Cardiovascular US in IVC diameter and % collapse --   CO --   EF --   IVC Diameter --   % Collapse --   * Passive Leg Raise OR Crystalloid Challenge --   Passive leg exam --   Crystalloid fluid challenge completed --                    ED Course as of 05/10/22 1103 [facility] May 10, 2022   0718 Spoke with hospitalist  He is contacting urology  We will add vancomycin and additional liter of NSS    0845 Lactate 4 2  patient has received 2 liters NSS  Receiving additional 1 liter NSS at this time  Awaiting transfer information  6618 Ideal body weight was used for calculation of 30mL/kg bolus  3917 Patient completed 3rd liter of NSS  Switched to FireScope for 4th liter  HR now 115-120s  BP remains in 542 systolic range  Updated PAC about lactate 4 2  They are working on [employer] bed  1003 Isolyte (4th liter) switched to wide open  Poor urine output while in the ED  Just passed about 80 mL    1009 I spoke with Dr Santosh Palacio, states patient should have a percutaneous nephrostomy tube by IR  This was relayed to critical care  Patient accepted by critical care to stepdown level 1 bed  PAC arranging transport  1019 1045 pickup   1047 Isolyte (5th liter) now hanging at 250mL/hr  HR improved and is consistently in low 120s  Awaiting transfer       Procedures  MDM        Disposition  Final diagnoses:   Pyelonephritis   ADRIANNE (acute kidney injury) ([facility] [address] )   Acute respiratory failure with hypoxia ([facility])   Lactic acidosis   Elevated d-dimer     Time reflects when diagnosis was documented in both MDM as applicable and the Disposition within this note     Time User Action Codes Description Comment    5/10/2022  6:47 AM Dong Garay Add [N12] Pyelonephritis     5/10/2022  6:47 AM Louise Dust P Add [N17 9] ADRIANNE (acute kidney injury) ([facility] [address] )     5/10/2022  6:47 AM Louise Dust P Add [J96 01] Acute respiratory failure with hypoxia ([facility] [address] )     5/10/2022  6:47 AM Louise Dust P Add [E87 2] Lactic acidosis     5/10/2022  6:47 AM Dong Garay Add [R79 89] Elevated d-dimer       ED Disposition     ED Disposition Condition Date/Time Comment    Transfer to Another [address] May 10, 2022 10:17 AM Cheryl Oliveira should be transferred out to [facility] - stepdown 1 - Dr Brandi Borja MD Documentation      Most Recent Value   Patient Condition The patient has been stabilized such that within reasonable medical probability, no material deterioration of the patient condition or the condition of the unborn child(raiza) is likely to result from the transfer   Reason for Transfer Level of Care needed not available at this facility  [IR, stepdown 1, urology availability]   Benefits of Transfer Specialized equipment and/or services available at the receiving facility (Include comment)________________________  [urology availability  stepdown 1]   Risks of Transfer Potential for delay in receiving treatment, Potential deterioration of medical condition, Loss of IV, Increased discomfort during transfer, Possible worsening of condition or death during transfer   Accepting Physician Genoveva Johnston    (Name & Tel number) PAC   Transported by (Company and Unit #) [employer] or designee   Sending MD canales   Provider Certification General risk, such as traffic hazards, adverse weather conditions, rough terrain or turbulence, possible failure of equipment (including vehicle or aircraft), or consequences of actions of persons outside the control of the transport personnel, Unanticipated needs of medical equipment and personnel during transport, Risk of worsening condition, The possibility of a transport vehicle being unavailable      RN Documentation      [address] Genoveva Cornelius [place]    (Name & Tel number) PAC   Transported by (Company and Unit #) [employer] or designee      Follow-up Information    None       Patient's Medications   Discharge Prescriptions    No medications on file     No discharge procedures on file         ED Provider  Electronically Signed by     Catherine Palma MD  05/10/22 [address] Kishan Ott MD  05/10/22 5373

## 2022-05-10 NOTE — PROGRESS NOTES
Patient arrived to IR for L PCN placement  The procedure and risks were discussed with the patient  All questions were answered  Informed consent was obtained

## 2022-05-10 NOTE — ED NOTES
Provider changing plasmalyte to infuse over 1 hour at this time  Previous order for fluids to be infused at 125ml/hr       Jeyson Canseco, RN  05/10/22 1007

## 2022-05-10 NOTE — ED PROVIDER NOTES
History  Chief Complaint   Patient presents with    Flank Pain     left flank pain was seen here recently for same, stent was placed  having increase in pain today  This is a 78-year-old male with a history of obstructive pyelonephritis resulting in a coli bacteremia status post cystoscopy and stent placement on 4/22/22, chronic kidney disease, hypertension, hyperlipidemia who presents with left flank pain and shortness of breath  Since his discharge on 4/30/22, patient states that he has been experiencing intermittent waves of left flank pain described as sharp, nonradiating, associated with lightheadedness and nausea without vomiting  The pain is becoming more severe  He is taking his medications as prescribed  States that he ran out of his pain medication  Over the past few days, patient also admits to worsening shortness of breath  He does not wear oxygen at home  Shortness of breath is associated with a mild chest pressure  Patient denies any history of DVT/PE  However, he was recently admitted to the hospital   On arrival, the patient is tachypneic and tachycardic  He is saturating in the upper 90s on room air  He appears uncomfortable  Prior to Admission Medications   Prescriptions Last Dose Informant Patient Reported? Taking?    amLODIPine (NORVASC) 10 mg tablet   No No   Sig: Take 1 tablet (10 mg total) by mouth daily   docusate sodium (COLACE) 100 mg capsule   No No   Sig: Take 1 capsule (100 mg total) by mouth 3 (three) times a day for 7 days   ondansetron (Zofran ODT) 4 mg disintegrating tablet   No No   Sig: Take 1 tablet (4 mg total) by mouth every 6 (six) hours as needed for nausea or vomiting   tamsulosin (FLOMAX) 0 4 mg   No No   Sig: TAKE 1 CAPSULE BY MOUTH EVERY 24 HOURS      Facility-Administered Medications: None       Past Medical History:   Diagnosis Date    Arthritis     Gout     Hypertension     Kidney stone        Past Surgical History:   Procedure Laterality Date    FL RETROGRADE PYELOGRAM  8/4/2019    FL RETROGRADE PYELOGRAM  9/26/2019    FL RETROGRADE PYELOGRAM  4/22/2022    NO PAST SURGERIES  03/23/2015    Last assessed    OK CYSTO/URETERO W/LITHOTRIPSY &INDWELL STENT INSRT Right 9/26/2019    Procedure: CYSTOSCOPY URETEROSCOPY WITH RETROGRADE PYELOGRAM AND INSERTION STENT URETERAL;  Surgeon: Linnette Ohara MD;  [facility]: [facility];  Service: Urology    OK CYSTOURETHROSCOPY,URETER CATHETER Right 8/4/2019    Procedure: CYSTOSCOPY RETROGRADE PYELOGRAM WITH INSERTION STENT URETERAL;  Surgeon: Pro Garza MD;  [facility];  Service: Urology    OK CYSTOURETHROSCOPY,URETER CATHETER Left 4/22/2022    Procedure: CYSTOSCOPY RETROGRADE PYELOGRAM WITH INSERTION STENT URETERAL;  Surgeon: Sylvia Kaufman MD;  [facility];  Service: Urology       Family History   Problem Relation Age of Onset    Cancer Mother      I have reviewed and agree with the history as documented  E-Cigarette/Vaping    E-Cigarette Use Never User      E-Cigarette/Vaping Substances     Social History     Tobacco Use    Smoking status: Never Smoker    Smokeless tobacco: Never Used   Vaping Use    Vaping Use: Never used   Substance Use Topics    Alcohol use: Not Currently     Alcohol/week: 0 0 standard drinks     Comment: rarely    Drug use: No       Review of Systems   Constitutional: Negative for chills, fatigue and fever  HENT: Negative for rhinorrhea, sore throat and trouble swallowing  Eyes: Negative for photophobia and visual disturbance  Respiratory: Positive for chest tightness and shortness of breath  Negative for cough  Cardiovascular: Negative for chest pain, palpitations and leg swelling  Gastrointestinal: Positive for nausea  Negative for abdominal pain, blood in stool, diarrhea and vomiting  Endocrine: Negative for polyuria  Genitourinary: Positive for flank pain  Negative for dysuria and hematuria     Musculoskeletal: Negative for back pain and neck pain  Skin: Negative for color change and rash  Allergic/Immunologic: Negative for immunocompromised state  Neurological: Negative for dizziness, weakness, light-headedness, numbness and headaches  All other systems reviewed and are negative  Physical Exam  Physical Exam  Constitutional:       General: He is not in acute distress  Appearance: Normal appearance  He is well-developed  He is morbidly obese  He is ill-appearing  HENT:      Mouth/Throat:      Pharynx: Uvula midline  Eyes:      General: Lids are normal       Conjunctiva/sclera: Conjunctivae normal       Pupils: Pupils are equal, round, and reactive to light  Neck:      Thyroid: No thyroid mass or thyromegaly  Trachea: Trachea normal    Cardiovascular:      Rate and Rhythm: Regular rhythm  Tachycardia present  Heart sounds: Normal heart sounds  No murmur heard  Pulmonary:      Effort: Pulmonary effort is normal  Tachypnea present  Breath sounds: Normal breath sounds  Abdominal:      General: Bowel sounds are normal       Palpations: Abdomen is soft  Tenderness: There is generalized abdominal tenderness  There is no guarding or rebound  Skin:     General: Skin is warm and dry  Neurological:      Mental Status: He is alert  Psychiatric:         Speech: Speech normal          Behavior: Behavior normal  Behavior is cooperative  Thought Content:  Thought content normal          Vital Signs  ED Triage Vitals   Temperature Pulse Respirations Blood Pressure SpO2   05/10/22 0504 05/10/22 0504 05/10/22 0504 05/10/22 0504 05/10/22 0504   (!) 97 4 °F (36 3 °C) (!) 138 22 131/87 98 %      Temp Source Heart Rate Source Patient Position - Orthostatic VS BP [facility] FiO2 (%)   05/10/22 0504 05/10/22 0715 05/10/22 0504 05/10/22 0504 --   Temporal Monitor Lying Left arm       Pain Score       05/10/22 0516       10 - Worst Possible Pain           Vitals:    05/10/22 0930 05/10/22 0958 05/10/22 1015 05/10/22 1030   BP: 159/91  124/62 130/66   Pulse: (!) 130 (!) 126 (!) 123 (!) 123   Patient Position - Orthostatic VS: Sitting   Sitting         Visual Acuity      ED Medications  Medications   ondansetron (ZOFRAN) injection 4 mg (4 mg Intravenous Given 5/10/22 0516)   HYDROmorphone (DILAUDID) injection 1 mg (1 mg Intravenous Given 5/10/22 0516)   sodium chloride 0 9 % bolus 1,000 mL (0 mL Intravenous Stopped 5/10/22 0800)   cefTRIAXone (ROCEPHIN) IVPB (premix in dextrose) 1,000 mg 50 mL (0 mg Intravenous Stopped 5/10/22 0603)   sodium chloride 0 9 % bolus 1,000 mL (0 mL Intravenous Stopped 5/10/22 0800)   heparin (porcine) injection 10,000 Units (10,000 Units Intravenous Given 5/10/22 0642)   vancomycin (VANCOCIN) 2,000 mg in sodium chloride 0 9 % 500 mL IVPB (0 mg Intravenous Stopped 5/10/22 0935)   sodium chloride 0 9 % bolus 1,000 mL (0 mL Intravenous Stopped 5/10/22 0835)   HYDROmorphone (DILAUDID) injection 1 mg (1 mg Intravenous Given 5/10/22 0756)   ampicillin (OMNIPEN) 2,000 mg in sodium chloride 0 9 % 100 mL IVPB (0 mg Intravenous Stopped 5/10/22 1039)       Diagnostic Studies  Results Reviewed     Procedure Component Value Units Date/Time    Blood culture #1 [[id number]] Collected: 05/10/22 0523    Lab Status: Preliminary result Specimen: Blood from Arm, Right Updated: 05/10/22 1301     Blood Culture Received in Microbiology Lab  Culture in Progress      HS Troponin I 4hr [[id number]]  (Normal) Collected: 05/10/22 1038    Lab Status: Final result Specimen: Blood from Arm, Left Updated: 05/10/22 1105     hs TnI 4hr 6 ng/L      Delta 4hr hsTnI 0 ng/L     HS Troponin I 2hr [[id number]]  (Normal) Collected: 05/10/22 0816    Lab Status: Final result Specimen: Blood from Arm, Right Updated: 05/10/22 0844     hs TnI 2hr 7 ng/L      Delta 2hr hsTnI 1 ng/L     Lactic acid 2 Hours [[id number]]  (Abnormal) Collected: 05/10/22 0816    Lab Status: Final result Specimen: Blood from Arm, Right Updated: 05/10/22 5415 LACTIC ACID 4 2 mmol/L     Narrative:      Result may be elevated if tourniquet was used during collection  D-Dimer [[id number]]  (Abnormal) Collected: 05/10/22 0518    Lab Status: Final result Specimen: Blood from Arm, Left Updated: 05/10/22 8316     D-Dimer, Quant 2 05 ug/ml FEU     Narrative: In the evaluation for possible pulmonary embolism, in the appropriate (Well's Score of 4 or less) patient, the age adjusted d-dimer cutoff for this patient can be calculated as:    Age x 0 01 (in ug/mL) for Age-adjusted D-dimer exclusion threshold for a patient over 50 years  Urine Microscopic [[id number]]  (Abnormal) Collected: 05/10/22 0531    Lab Status: Final result Specimen: Urine, Clean Catch Updated: 05/10/22 0609     RBC, UA Innumerable /hpf      WBC, UA Innumerable /hpf      Epithelial Cells Occasional /hpf      Bacteria, UA Innumerable /hpf     Urine culture [[id number]] Collected: 05/10/22 0531    Lab Status: In process Specimen: Urine, Clean Catch Updated: 05/10/22 0609    Procalcitonin [[id number]]  (Abnormal) Collected: 05/10/22 0518    Lab Status: Final result Specimen: Blood from Arm, Left Updated: 05/10/22 0600     Procalcitonin 2 30 ng/ml     Lactic Acid [[id number]]  (Abnormal) Collected: 05/10/22 0518    Lab Status: Final result Specimen: Blood from Arm, Left Updated: 05/10/22 0559     LACTIC ACID 3 9 mmol/L     Narrative:      Result may be elevated if tourniquet was used during collection      HS Troponin 0hr (reflex protocol) [[id number]]  (Normal) Collected: 05/10/22 0518    Lab Status: Final result Specimen: Blood from Arm, Left Updated: 05/10/22 0557     hs TnI 0hr 6 ng/L     NT-BNP PRO [[id number]]  (Abnormal) Collected: 05/10/22 0518    Lab Status: Final result Specimen: Blood from Arm, Left Updated: 05/10/22 0555     NT-proBNP 126 pg/mL     UA w Reflex to Microscopic w Reflex to Culture [[id number]]  (Abnormal) Collected: 05/10/22 0531    Lab Status: Final result Specimen: Urine, Clean Catch Updated: 05/10/22 0555     Color, UA Yellow     Clarity, UA Cloudy     Specific Gravity, UA 1 025     pH, UA 6 0     Leukocytes, UA Small     Nitrite, UA Negative     Protein, UA >=300 mg/dl      Glucose, UA Negative mg/dl      Ketones, UA Negative mg/dl      Urobilinogen, UA 0 2 E U /dl      Bilirubin, UA Negative     Blood, UA Large    CBC and differential [[id number]]  (Abnormal) Collected: 05/10/22 0518    Lab Status: Final result Specimen: Blood from Arm, Left Updated: 05/10/22 0548     WBC 26 14 Thousand/uL      RBC 5 59 Million/uL      Hemoglobin 16 6 g/dL      Hematocrit 53 2 %      MCV 95 fL      MCH 29 7 pg      MCHC 31 2 g/dL      RDW 13 5 %      MPV 10 1 fL      Platelets 209 Thousands/uL     Narrative: This is an appended report  These results have been appended to a previously verified report      Comprehensive metabolic panel [[id number]]  (Abnormal) Collected: 05/10/22 0518    Lab Status: Final result Specimen: Blood from Arm, Left Updated: 05/10/22 0548     Sodium 136 mmol/L      Potassium 4 5 mmol/L      Chloride 102 mmol/L      CO2 17 mmol/L      ANION GAP 17 mmol/L      BUN 52 mg/dL      Creatinine 2 92 mg/dL      Glucose 142 mg/dL      Calcium 9 8 mg/dL      Corrected Calcium 10 3 mg/dL      AST 31 U/L      ALT 50 U/L      Alkaline Phosphatase 78 U/L      Total Protein 8 2 g/dL      Albumin 3 4 g/dL      Total Bilirubin 0 59 mg/dL      eGFR 23 ml/min/1 73sq m     Narrative:      [facility] guidelines for Chronic Kidney Disease (CKD):     Stage 1 with normal or high GFR (GFR > 90 mL/min/1 73 square meters)    Stage 2 Mild CKD (GFR = 60-89 mL/min/1 73 square meters)    Stage 3A Moderate CKD (GFR = 45-59 mL/min/1 73 square meters)    Stage 3B Moderate CKD (GFR = 30-44 mL/min/1 73 square meters)    Stage 4 Severe CKD (GFR = 15-29 mL/min/1 73 square meters)    Stage 5 End Stage CKD (GFR <15 mL/min/1 73 square meters)  Note: GFR calculation is accurate only with a steady state creatinine    Manual Differential(PHLEBS Do Not Order) [[id number]]  (Abnormal) Collected: 05/10/22 0518    Lab Status: Final result Specimen: Blood from Arm, Left Updated: 05/10/22 0548     Segmented % 87 %      Bands % 11 %      Lymphocytes % 0 %      Monocytes % 2 %      Eosinophils, % 0 %      Basophils % 0 %      Absolute Neutrophils 25 62 Thousand/uL      Lymphocytes Absolute 0 00 Thousand/uL      Monocytes Absolute 0 52 Thousand/uL      Eosinophils Absolute 0 00 Thousand/uL      Basophils Absolute 0 00 Thousand/uL      Total Counted --     RBC Morphology Normal     Platelet Estimate Increased    Protime-INR [[id number]]  (Normal) Collected: 05/10/22 0518    Lab Status: Final result Specimen: Blood from Arm, Left Updated: 05/10/22 0539     Protime 13 5 seconds      INR 1 09    APTT [[id number]]  (Normal) Collected: 05/10/22 0518    Lab Status: Final result Specimen: Blood from Arm, Left Updated: 05/10/22 0539     PTT 26 seconds     Blood culture #2 [[id number]] Collected: 05/10/22 0518    Lab Status: In process Specimen: Blood from Arm, Left Updated: 05/10/22 0527                 CT abdomen pelvis wo contrast   Final Result by Dre Newman MD (05/10 5822)      Mild left hydronephrosis status post ureteral stent placement, with 3 mm calculus in the mid ureter adjacent to the stent  Multiple bilateral renal calculi  Workstation performed: [id number]         XR chest 1 view portable   ED Interpretation by Serene Mendez MD (05/10 2294)   No acute cardiopulmonary disease as interpreted by myself  Final Result by Matt Nice MD (05/10 5544)      No focal airspace consolidation                    Workstation performed: [id number]                    Procedures  ECG 12 Lead Documentation Only    Date/Time: 5/10/2022 5:21 AM  Performed by: Serene Mendez MD  Authorized by: Serene Mendez MD     ECG reviewed by me, the ED Provider: yes    Patient location:  ED  Previous ECG:     Previous ECG:  Compared to current    Comparison ECG info:  8/23/19    Similarity:  Changes noted    Comparison to cardiac monitor: Yes    Interpretation:     Interpretation: non-specific    Rate:     ECG rate:  137    ECG rate assessment: tachycardic    Rhythm:     Rhythm: sinus tachycardia    Ectopy:     Ectopy: none    QRS:     QRS axis:  Normal    QRS intervals:  Normal  Conduction:     Conduction: normal    ST segments:     ST segments:  Normal  T waves:     T waves: non-specific and inverted      Inverted:  I and aVL    CriticalCare Time  Performed by: Santosh Shafer MD  Authorized by: Santosh Shafer MD     Critical care provider statement:     Critical care time (minutes):  45    Critical care start time:  5/10/2022 5:45 AM    Critical care end time:  5/10/2022 6:30 AM    Critical care time was exclusive of:  Separately billable procedures and treating other patients    Critical care was necessary to treat or prevent imminent or life-threatening deterioration of the following conditions:  Sepsis and circulatory failure    Critical care was time spent personally by me on the following activities:  Obtaining history from patient or surrogate, development of treatment plan with patient or surrogate, evaluation of patient's response to treatment, examination of patient, review of old charts, re-evaluation of patient's condition, ordering and review of radiographic studies, ordering and review of laboratory studies and discussions with consultants    I assumed direction of critical care for this patient from another provider in my specialty: no               ED Course  ED Course as of 05/10/22 2102   Tue May 10, 2022   0553 eGFR: 23  Worsening kidney function  Will check dimer  Unable to obtain adequate views of the right ventricle on bedside echo to evaluate right heart strain due to the patient's body habitus    Study changed to abdomen/pelvis w/out contrast    0622 CO2(!): 17  Metabolic acidosis 2/2 sepsis/lactic acidosis? Receiving fluids and abx   0628 D-Dimer, Quant(!): 2 05  Suspicion for PE is high enough to start heparin empirically   0644 TT sent to yajaira with urology regarding management of the stent and pyelonephritis  1569 Patient currently requiring 4 L nasal cannula    [id number] Yajaira spoke with her attending  Patient can remain here for abx  No need to remove stent at this time  Recommended placing urology consult so surgery AP can see him  Initial Sepsis Screening     Row Name 05/10/22 0510                Is the patient's history suggestive of a new or worsening infection? Yes (Proceed)  -CC        Suspected source of infection urinary tract infection  -CC        Are two or more of the following signs & symptoms of infection both present and new to the patient? Yes (Proceed)  -CC        Indicate SIRS criteria Tachycardia > 90 bpm;Tachypnea > 20 resp per min;Leukocytosis (WBC > 36185 IJL)  -CC        If the answer is yes to both questions, suspicion of sepsis is present --        If severe sepsis is present AND tissue hypoperfusion perists in the hour after fluid resuscitation or lactate > 4, the patient meets criteria for SEPTIC SHOCK --        Are any of the following organ dysfunction criteria present within 6 hours of suspected infection and SIRS criteria that are NOT considered to be chronic conditions? Yes  -CC        Organ dysfunction Creatinine > 2 0 mg/dl AND > 0 5 mg/dl above baseline; Lactate > 2 0 mmol/L  -CC        Date of presentation of severe sepsis 05/10/22  -CC        Time of presentation of severe sepsis 0545  -CC        Tissue hypoperfusion persists in the hour after crystalloid fluid administration, evidenced, by either: --        Was hypotension present within one hour of the conclusion of crystalloid fluid administration?  No  -CC        Date of presentation of septic shock --        Time of presentation of septic shock --              User Key  (r) = Recorded By, (t) = Taken By, (c) = Cosigned By    Initials Name Provider Type    GOLD Morgan MD Physician              Default Flowsheet Data (last 720 hours)     Sepsis Reassess     Row Name 05/10/22 0645                   Repeat Volume Status and Tissue Perfusion Assessment Performed    Repeat Volume Status and Tissue Perfusion Assessment Performed Yes  -CC                  Volume Status and Tissue Perfusion Post Fluid Resuscitation * Must Document All *    Vital Signs Reviewed (HR, RR, BP, T) Yes  -CC        Shock Index Reviewed Yes  -CC        Arterial Oxygen Saturation Reviewed (POx, SaO2 or SpO2) Yes (comment %)  95  -CC        Cardio Normal S1/S2; No murmor; Tachycardia  -CC        Pulmonary Clear to auscultation; Tachypnea  -CC        Capillary Refill Brisk  -CC        Peripheral Pulses Radial  -CC        Peripheral Pulse +2  -CC        Skin Warm;Dry;Normal  -CC        Urine output assessed Adequate  -CC                  *OR*   Intensive Monitoring- Must Document One of the Following Four *:    Vital Signs Reviewed --        * Central Venous Pressure (CVP or RAP) --        * Central Venous Oxygen (SVO2, ScvO2 or Oxygen saturation via central catheter) --        * Bedside Cardiovascular US in IVC diameter and % collapse --        * Passive Leg Raise OR Crystalloid Challenge --              User Key  (r) = Recorded By, (t) = Taken By, (c) = Cosigned By    Initials Name Provider Type    GOLD Morgan MD Physician              SBIRT 20yo+      Most Recent Value   SBIRT (25 yo +)    In order to provide better care to our patients, we are screening all of our patients for alcohol and drug use  Would it be okay to ask you these screening questions? Yes Filed at: 05/10/2022 9852   Initial Alcohol Screen: US AUDIT-C     1  How often do you have a drink containing alcohol? 0 Filed at: 05/10/2022 0712   2  How many drinks containing alcohol do you have on a typical day you are drinking?   0 Filed at: 05/10/2022 0712   3a  Male UNDER 65: How often do you have five or more drinks on one occasion? 0 Filed at: 05/10/2022 0712   3b  FEMALE Any Age, or MALE 65+: How often do you have 4 or more drinks on one occassion? 0 Filed at: 05/10/2022 7217   Audit-C Score 0 Filed at: 05/10/2022 9399   NERISSA: How many times in the past year have you    Used an illegal drug or used a prescription medication for non-medical reasons? Never Filed at: 05/10/2022 8647                    MDM  Number of Diagnoses or Management Options  Diagnosis management comments: Will check labs, EKG, chest x-ray  Urinalysis  Given patient's overall appearance, tachypnea, recent hospital stay will obtain a PE study        Disposition  Final diagnoses:   Pyelonephritis   ADRIANNE [facility]acute kidney [facility]) [facility] )   Acute respiratory failure with hypoxia ([facility])   Lactic acidosis   Elevated d-dimer     Time reflects when diagnosis was documented in both MDM as applicable and the Disposition within this note     Time User Action Codes Description Comment    5/10/2022  6:47 AM Danube Prier P Add [N12] Pyelonephritis     5/10/2022  6:47 AM Mario Prier P Add [N17 9] ADRIANNE (acute kidney injury) ([facility] )     5/10/2022  6:47 AM Mario Prier P Add [J96 01] Acute respiratory failure with hypoxia ([facility] )     5/10/2022  6:47 AM Danube Prier P Add [E87 2] Lactic acidosis     5/10/2022  6:47 AM Josey Bainsald Add [R79 89] Elevated d-dimer       ED Disposition     ED Disposition Condition Date/Time Comment    Transfer to Another [address] May 10, 2022 10:17 AM [place] Phlegm should be transferred out to [facility] - stepdown 1 - Dr Rachel Hicks MD Documentation      Most Recent Value   Patient Condition The patient has been stabilized such that within reasonable medical probability, no material deterioration of the patient condition or the condition of the unborn child(raiza) is likely to result from the transfer   Reason for Transfer Level of Care needed not available at this facility  [IR, stepdown 1, urology availability]   Benefits of Transfer Specialized equipment and/or services available at the receiving facility (Include comment)________________________  [urology availability   stepdown 1]   Risks of Transfer Potential for delay in receiving treatment, Potential deterioration of medical condition, Loss of IV, Increased discomfort during transfer, Possible worsening of condition or death during transfer   Accepting Physician Dr Hong Payne Name, Davie Bosworth PA    (Name & Tel number) PAC   Transported by (Company and Unit #) [employer] or designee   Sending MD canales   Provider Certification General risk, such as traffic hazards, adverse weather conditions, rough terrain or turbulence, possible failure of equipment (including vehicle or aircraft), or consequences of actions of persons outside the control of the transport personnel, Unanticipated needs of medical equipment and personnel during transport, Risk of worsening condition, The possibility of a transport vehicle being unavailable      RN Documentation      [facility] Name, Davie Bosworth [place]    (Name & Tel number) PAC   Transported by (Company and Unit #) [employer] or designee      Follow-up Information    None         Discharge Medication List as of 5/10/2022 11:26 AM      CONTINUE these medications which have NOT CHANGED    Details   amLODIPine (NORVASC) 10 mg tablet Take 1 tablet (10 mg total) by mouth daily, Starting Sat 4/30/2022, Normal      docusate sodium (COLACE) 100 mg capsule Take 1 capsule (100 mg total) by mouth 3 (three) times a day for 7 days, Starting Fri 4/22/2022, Until Fri 4/29/2022, Normal      ondansetron (Zofran ODT) 4 mg disintegrating tablet Take 1 tablet (4 mg total) by mouth every 6 (six) hours as needed for nausea or vomiting, Starting Fri 4/22/2022, Normal      tamsulosin (FLOMAX) 0 4 mg TAKE 1 CAPSULE BY MOUTH EVERY 24 HOURS, Normal             No discharge procedures on file      PDMP Review       Value Time User    PDMP Reviewed  Yes 4/22/2022  1:32 PM Sharon Hutchinson MD          ED Provider  Electronically Signed by           Armen Jeronimo MD  05/10/22 3999

## 2022-05-10 NOTE — EMTALA/ACUTE CARE TRANSFER
[facility] EMERGENCY DEPARTMENT  [address] [place] [id number]  Dept: [phone]      EMTALA TRANSFER CONSENT    NAME Kallie Shore                                         1965                              MRN [id number]    I have been informed of my rights regarding examination, treatment, and transfer   by Dr Thompson Schroeder MD    Benefits: Specialized equipment and/or services available at the receiving facility (Include comment)________________________ (urology availability  stepdown 1)    Risks: Potential for delay in receiving treatment,Potential deterioration of medical condition,Loss of IV,Increased discomfort during transfer,Possible worsening of condition or death during transfer      Consent for Transfer:  I acknowledge that my medical condition has been evaluated and explained to me by the emergency department physician or other qualified medical person and/or my attending physician, who has recommended that I be transferred to the service of  Accepting Physician: Dr Edenilson Walekr at [address] [place] [address] Name, [address] : Jes  The above potential benefits of such transfer, the potential risks associated with such transfer, and the probable risks of not being transferred have been explained to me, and I fully understand them  The doctor has explained that, in my case, the benefits of transfer outweigh the risks  I agree to be transferred  I authorize the performance of emergency medical procedures and treatments upon me in both transit and upon arrival at the receiving facility  Additionally, I authorize the release of any and all medical records to the receiving facility and request they be transported with me, if possible  I understand that the safest mode of transportation during a medical emergency is an ambulance and that the Hospital advocates the use of this mode of transport   Risks of traveling to the receiving facility by car, including absence of medical control, life sustaining equipment, such as oxygen, and medical personnel has been explained to me and I fully understand them  (DESI CORRECT BOX BELOW)  [  ]  I consent to the stated transfer and to be transported by ambulance/helicopter  [  ]  I consent to the stated transfer, but refuse transportation by ambulance and accept full responsibility for my transportation by car  I understand the risks of non-ambulance transfers and I exonerate the Hospital and its staff from any deterioration in my condition that results from this refusal     X___________________________________________    DATE  05/10/22  TIME________  Signature of patient or legally responsible individual signing on patient behalf           RELATIONSHIP TO PATIENT_________________________          Provider Certification    NAME Carmen DEJESUS 1965                              MRN [id number]    A medical screening exam was performed on the above named patient  Based on the examination:    Condition Necessitating Transfer The primary encounter diagnosis was Pyelonephritis  Diagnoses of ADRIANNE (acute kidney injury) ([address] ), Acute respiratory failure with hypoxia ([address] ), Lactic acidosis, and Elevated d-dimer were also pertinent to this visit      Patient Condition: The patient has been stabilized such that within reasonable medical probability, no material deterioration of the patient condition or the condition of the unborn child(raiza) is likely to result from the transfer    Reason for Transfer: Level of Care needed not available at this facility (IR, stepdown 1, urology availability)    Transfer Requirements: [address]   · Space available and qualified personnel available for treatment as acknowledged by [facility]  · Agreed to accept transfer and to provide appropriate medical treatment as acknowledged by       Dr Luciano Marin  · Appropriate medical records of the examination and treatment of the patient are provided at the time of transfer   [facility], [address] _______  · Transfer will be performed by qualified personnel from [facility]  and appropriate transfer equipment as required, including the use of necessary and appropriate life support measures  Provider Certification: I have examined the patient and explained the following risks and benefits of being transferred/refusing transfer to the patient/family:  General risk, such as traffic hazards, adverse weather conditions, rough terrain or turbulence, possible failure of equipment (including vehicle or aircraft), or consequences of actions of persons outside the control of the transport personnel,Unanticipated needs of medical equipment and personnel during transport,Risk of worsening condition,The possibility of a transport vehicle being unavailable      Based on these reasonable risks and benefits to the patient and/or the unborn child(raiza), and based upon the information available at the time of the patients examination, I certify that the medical benefits reasonably to be expected from the provision of appropriate medical treatments at another medical facility outweigh the increasing risks, if any, to the individuals medical condition, and in the case of labor to the unborn child, from effecting the transfer      X____________________________________________ DATE 05/10/22        TIME_______      ORIGINAL - SEND TO MEDICAL RECORDS   COPY - SEND WITH PATIENT DURING TRANSFER

## 2022-05-10 NOTE — ASSESSMENT & PLAN NOTE
Lab Results   Component Value Date    EGFR 23 05/10/2022    EGFR 30 05/03/2022    EGFR 35 04/30/2022    CREATININE 2 92 (H) 05/10/2022    CREATININE 2 32 (H) 05/03/2022    CREATININE 2 06 (H) 04/30/2022       · Baseline Cr 1 4-1 6  · Cr on admission 2 92

## 2022-05-10 NOTE — QUICK NOTE
Rising lactate despite fluid resuscitation  Needs emergent source control with PCN, likely followed by RRT  Repeat K pending, value of 6 3 hemolyzed      Yajaira San MD

## 2022-05-10 NOTE — ED NOTES
Report called to CARLOS, nurse Finesse at this time         Luis Manuel Chowdhury, RN  05/10/22 6240

## 2022-05-11 LAB
ANION GAP SERPL CALCULATED.3IONS-SCNC: 11 MMOL/L (ref 4–13)
ANION GAP SERPL CALCULATED.3IONS-SCNC: 9 MMOL/L (ref 4–13)
ATRIAL RATE: 137 BPM
ATRIAL RATE: 138 BPM
BASE EXCESS BLDA CALC-SCNC: -1.9 MMOL/L
BODY TEMPERATURE: 99.9 DEGREES FEHRENHEIT
BUN SERPL-MCNC: 47 MG/DL (ref 5–25)
BUN SERPL-MCNC: 49 MG/DL (ref 5–25)
CA-I BLD-SCNC: 1.25 MMOL/L (ref 1.12–1.32)
CALCIUM SERPL-MCNC: 10.2 MG/DL (ref 8.3–10.1)
CALCIUM SERPL-MCNC: 9.5 MG/DL (ref 8.3–10.1)
CHLORIDE SERPL-SCNC: 106 MMOL/L (ref 100–108)
CHLORIDE SERPL-SCNC: 109 MMOL/L (ref 100–108)
CO2 SERPL-SCNC: 21 MMOL/L (ref 21–32)
CO2 SERPL-SCNC: 22 MMOL/L (ref 21–32)
CREAT SERPL-MCNC: 3.57 MG/DL (ref 0.6–1.3)
CREAT SERPL-MCNC: 3.78 MG/DL (ref 0.6–1.3)
ERYTHROCYTE [DISTWIDTH] IN BLOOD BY AUTOMATED COUNT: 14 % (ref 11.6–15.1)
GFR SERPL CREATININE-BSD FRML MDRD: 16 ML/MIN/1.73SQ M
GFR SERPL CREATININE-BSD FRML MDRD: 18 ML/MIN/1.73SQ M
GLUCOSE SERPL-MCNC: 110 MG/DL (ref 65–140)
GLUCOSE SERPL-MCNC: 134 MG/DL (ref 65–140)
GLUCOSE SERPL-MCNC: 200 MG/DL (ref 65–140)
HCO3 BLDA-SCNC: 21.6 MMOL/L (ref 22–28)
HCT VFR BLD AUTO: 33.9 % (ref 36.5–49.3)
HGB BLD-MCNC: 11.1 G/DL (ref 12–17)
HOROWITZ INDEX BLDA+IHG-RTO: 60 MM[HG]
LACTATE SERPL-SCNC: 3.1 MMOL/L (ref 0.5–2)
LACTATE SERPL-SCNC: 3.5 MMOL/L (ref 0.5–2)
LACTATE SERPL-SCNC: 3.5 MMOL/L (ref 0.5–2)
MAGNESIUM SERPL-MCNC: 2.5 MG/DL (ref 1.6–2.6)
MCH RBC QN AUTO: 30.4 PG (ref 26.8–34.3)
MCHC RBC AUTO-ENTMCNC: 32.7 G/DL (ref 31.4–37.4)
MCV RBC AUTO: 93 FL (ref 82–98)
NRBC BLD AUTO-RTO: 0 /100 WBCS
O2 CT BLDA-SCNC: 17.1 ML/DL (ref 16–23)
OXYHGB MFR BLDA: 98.6 % (ref 94–97)
P AXIS: 51 DEGREES
P AXIS: 62 DEGREES
PCO2 BLDA: 32.8 MM HG (ref 36–44)
PCO2 TEMP ADJ BLDA: 33.8 MM HG (ref 36–44)
PEEP RESPIRATORY: 8 CM[H2O]
PH BLD: 7.43 [PH] (ref 7.35–7.45)
PH BLDA: 7.44 [PH] (ref 7.35–7.45)
PHOSPHATE SERPL-MCNC: 4.8 MG/DL (ref 2.7–4.5)
PLATELET # BLD AUTO: 241 THOUSANDS/UL (ref 149–390)
PMV BLD AUTO: 10.1 FL (ref 8.9–12.7)
PO2 BLD: 171.5 MM HG (ref 75–129)
PO2 BLDA: 167.5 MM HG (ref 75–129)
POTASSIUM SERPL-SCNC: 4.6 MMOL/L (ref 3.5–5.3)
POTASSIUM SERPL-SCNC: 4.8 MMOL/L (ref 3.5–5.3)
PR INTERVAL: 130 MS
PR INTERVAL: 130 MS
QRS AXIS: 16 DEGREES
QRS AXIS: 2 DEGREES
QRSD INTERVAL: 74 MS
QRSD INTERVAL: 74 MS
QT INTERVAL: 286 MS
QT INTERVAL: 296 MS
QTC INTERVAL: 433 MS
QTC INTERVAL: 446 MS
RBC # BLD AUTO: 3.65 MILLION/UL (ref 3.88–5.62)
SODIUM SERPL-SCNC: 138 MMOL/L (ref 136–145)
SODIUM SERPL-SCNC: 140 MMOL/L (ref 136–145)
SPECIMEN SOURCE: ABNORMAL
T WAVE AXIS: 123 DEGREES
T WAVE AXIS: 128 DEGREES
VENT AC: 24
VENT- AC: AC
VENTRICULAR RATE: 137 BPM
VENTRICULAR RATE: 138 BPM
VT SETTING VENT: 520 ML
WBC # BLD AUTO: 21.88 THOUSAND/UL (ref 4.31–10.16)

## 2022-05-11 PROCEDURE — 99291 CRITICAL CARE FIRST HOUR: CPT

## 2022-05-11 PROCEDURE — 83735 ASSAY OF MAGNESIUM: CPT | Performed by: PHYSICIAN ASSISTANT

## 2022-05-11 PROCEDURE — 85027 COMPLETE CBC AUTOMATED: CPT | Performed by: PHYSICIAN ASSISTANT

## 2022-05-11 PROCEDURE — 83605 ASSAY OF LACTIC ACID: CPT | Performed by: PHYSICIAN ASSISTANT

## 2022-05-11 PROCEDURE — 80048 BASIC METABOLIC PNL TOTAL CA: CPT | Performed by: PHYSICIAN ASSISTANT

## 2022-05-11 PROCEDURE — 94003 VENT MGMT INPAT SUBQ DAY: CPT

## 2022-05-11 PROCEDURE — 82330 ASSAY OF CALCIUM: CPT | Performed by: PHYSICIAN ASSISTANT

## 2022-05-11 PROCEDURE — 82805 BLOOD GASES W/O2 SATURATION: CPT | Performed by: PHYSICIAN ASSISTANT

## 2022-05-11 PROCEDURE — 99232 SBSQ HOSP IP/OBS MODERATE 35: CPT | Performed by: NURSE PRACTITIONER

## 2022-05-11 PROCEDURE — 94760 N-INVAS EAR/PLS OXIMETRY 1: CPT

## 2022-05-11 PROCEDURE — 99232 SBSQ HOSP IP/OBS MODERATE 35: CPT | Performed by: PHYSICIAN ASSISTANT

## 2022-05-11 PROCEDURE — 82948 REAGENT STRIP/BLOOD GLUCOSE: CPT

## 2022-05-11 PROCEDURE — 93010 ELECTROCARDIOGRAM REPORT: CPT | Performed by: INTERNAL MEDICINE

## 2022-05-11 PROCEDURE — 84100 ASSAY OF PHOSPHORUS: CPT | Performed by: PHYSICIAN ASSISTANT

## 2022-05-11 RX ORDER — ALBUMIN, HUMAN INJ 5% 5 %
12.5 SOLUTION INTRAVENOUS ONCE
Status: COMPLETED | OUTPATIENT
Start: 2022-05-11 | End: 2022-05-11

## 2022-05-11 RX ORDER — SODIUM CHLORIDE 9 MG/ML
10 INJECTION INTRAVENOUS DAILY
Qty: 300 ML | Refills: 0 | Status: SHIPPED | OUTPATIENT
Start: 2022-05-11 | End: 2022-06-30

## 2022-05-11 RX ORDER — SODIUM CHLORIDE, SODIUM GLUCONATE, SODIUM ACETATE, POTASSIUM CHLORIDE, MAGNESIUM CHLORIDE, SODIUM PHOSPHATE, DIBASIC, AND POTASSIUM PHOSPHATE .53; .5; .37; .037; .03; .012; .00082 G/100ML; G/100ML; G/100ML; G/100ML; G/100ML; G/100ML; G/100ML
1000 INJECTION, SOLUTION INTRAVENOUS ONCE
Status: COMPLETED | OUTPATIENT
Start: 2022-05-11 | End: 2022-05-11

## 2022-05-11 RX ADMIN — ALBUMIN (HUMAN) 12.5 G: 12.5 INJECTION, SOLUTION INTRAVENOUS at 06:52

## 2022-05-11 RX ADMIN — DEXTROSE MONOHYDRATE 25 ML: 25 INJECTION, SOLUTION INTRAVENOUS at 00:30

## 2022-05-11 RX ADMIN — SODIUM CHLORIDE, SODIUM GLUCONATE, SODIUM ACETATE, POTASSIUM CHLORIDE, MAGNESIUM CHLORIDE, SODIUM PHOSPHATE, DIBASIC, AND POTASSIUM PHOSPHATE 75 ML/HR: .53; .5; .37; .037; .03; .012; .00082 INJECTION, SOLUTION INTRAVENOUS at 10:36

## 2022-05-11 RX ADMIN — FENTANYL CITRATE 50 MCG: 50 INJECTION INTRAMUSCULAR; INTRAVENOUS at 05:28

## 2022-05-11 RX ADMIN — PROPOFOL 20 MCG/KG/MIN: 10 INJECTION, EMULSION INTRAVENOUS at 02:11

## 2022-05-11 RX ADMIN — CEFTRIAXONE 2000 MG: 2 INJECTION, POWDER, FOR SOLUTION INTRAMUSCULAR; INTRAVENOUS at 06:04

## 2022-05-11 RX ADMIN — ACETAMINOPHEN 650 MG: 325 TABLET, FILM COATED ORAL at 17:43

## 2022-05-11 RX ADMIN — FUROSEMIDE 40 MG: 10 INJECTION, SOLUTION INTRAMUSCULAR; INTRAVENOUS at 00:30

## 2022-05-11 RX ADMIN — INSULIN HUMAN 10 UNITS: 100 INJECTION, SOLUTION PARENTERAL at 00:30

## 2022-05-11 RX ADMIN — FENTANYL CITRATE 50 MCG: 50 INJECTION INTRAMUSCULAR; INTRAVENOUS at 02:13

## 2022-05-11 RX ADMIN — PROPOFOL 20 MCG/KG/MIN: 10 INJECTION, EMULSION INTRAVENOUS at 04:03

## 2022-05-11 RX ADMIN — CEFTRIAXONE 2000 MG: 2 INJECTION, POWDER, FOR SOLUTION INTRAMUSCULAR; INTRAVENOUS at 16:45

## 2022-05-11 RX ADMIN — CHLORHEXIDINE GLUCONATE 15 ML: 1.2 SOLUTION ORAL at 11:45

## 2022-05-11 RX ADMIN — HEPARIN SODIUM 7500 UNITS: 5000 INJECTION INTRAVENOUS; SUBCUTANEOUS at 13:40

## 2022-05-11 RX ADMIN — CHLORHEXIDINE GLUCONATE 15 ML: 1.2 SOLUTION ORAL at 20:29

## 2022-05-11 RX ADMIN — SODIUM CHLORIDE, SODIUM GLUCONATE, SODIUM ACETATE, POTASSIUM CHLORIDE, MAGNESIUM CHLORIDE, SODIUM PHOSPHATE, DIBASIC, AND POTASSIUM PHOSPHATE 1000 ML: .53; .5; .37; .037; .03; .012; .00082 INJECTION, SOLUTION INTRAVENOUS at 15:00

## 2022-05-11 RX ADMIN — HEPARIN SODIUM 7500 UNITS: 5000 INJECTION INTRAVENOUS; SUBCUTANEOUS at 00:00

## 2022-05-11 RX ADMIN — FENTANYL CITRATE 50 MCG: 50 INJECTION INTRAMUSCULAR; INTRAVENOUS at 00:30

## 2022-05-11 RX ADMIN — CHLORHEXIDINE GLUCONATE 15 ML: 1.2 SOLUTION ORAL at 00:30

## 2022-05-11 RX ADMIN — HEPARIN SODIUM 7500 UNITS: 5000 INJECTION INTRAVENOUS; SUBCUTANEOUS at 22:46

## 2022-05-11 RX ADMIN — HEPARIN SODIUM 7500 UNITS: 5000 INJECTION INTRAVENOUS; SUBCUTANEOUS at 05:28

## 2022-05-11 NOTE — PROGRESS NOTES
Progress Note -Interventional Radiology NP  Stacie Harvey 55 y o  male MRN: [id number]  [facility] Encounter: [id number]    Assessment/Plan:    55year old transferred from [facility] 5/10/22 d/t worsening left sided flank pain, hematuria/bacteremia, tachycardia, lactic acidosis, and needed emergent PCN placement  Had 8f L PCN placed in IR and remained intubated d/t multiple derangements and sent to the ICU post procedure  He was extubated this morning to bipap  Output lighted to dark yellow  - record I/O from L PCN  - flush with 10 mL normal saline daily  - routine tube change Q3 months as an outpatient - ordered, unless urology has different plan, will discuss    Subjective: Stacie Harvey is a 55 y o  male who presented with left flank pain  Patient feels better this morning, has slight pain from left side, wishes to get something to drink, but unfortunately has bipap mask on currently  Patient Active Problem List   Diagnosis    Morbid obesity ([facility] )    Vitamin D deficiency    History of gout    Bacteremia due to Escherichia coli    Lactic acidosis    ADRIANNE (acute kidney injury) ([address] )    Sepsis ([address] )    Chronic kidney disease, stage 3 ([address] )    HTN (hypertension)    Insomnia    Mixed hyperlipidemia    Morbid obesity with BMI of 50 0-59 9, adult ([address] )    Acute pain of left knee    Acute unilateral obstructive uropathy    Obstructive pyelonephritis          Objective:    Vitals:  /55   Pulse (!) 116   Temp 99 86 °F (37 7 °C)   Resp (!) 25   Ht 5' 6" (1 676 m)   Wt (!) 157 kg (345 lb 14 4 oz)   SpO2 92%   BMI 55 83 kg/m²   Body mass index is 55 83 kg/m²    Weight (last 2 days)     Date/Time Weight    05/11/22 0600 157 (345 9)    05/10/22 1247 151 (333 12)          I/Os:    Intake/Output Summary (Last 24 hours) at 5/11/2022 1046  Last data filed at 5/11/2022 0801  Gross per 24 hour   Intake 3800 92 ml   Output 4500 ml   Net -699 08 ml       Invasive Devices  Report Peripheral Intravenous Line  Duration           Peripheral IV 05/10/22 Left Antecubital 1 day    Peripheral IV 05/10/22 Left;Upper Arm 1 day    Peripheral IV 05/10/22 Right Antecubital 1 day    Peripheral IV 05/10/22 Left Arm <1 day          Arterial Line  Duration           Arterial Line 05/10/22 <1 day          Drain  Duration           Nephrostomy Left 8 Fr  <1 day    Urethral Catheter <1 day                Physical Exam:  Physical Exam  Vitals and nursing note reviewed  Constitutional:       Appearance: He is well-developed  He is obese  He is ill-appearing  Interventions: Face mask in place  Comments: bipap   HENT:      Head: Normocephalic and atraumatic  Eyes:      Conjunctiva/sclera: Conjunctivae normal    Cardiovascular:      Rate and Rhythm: Regular rhythm  Tachycardia present  Pulses:           Radial pulses are 2+ on the right side and 2+ on the left side  Dorsalis pedis pulses are 1+ on the right side and 1+ on the left side  Heart sounds: No murmur heard  Pulmonary:      Effort: Pulmonary effort is normal  No respiratory distress  Breath sounds: Normal breath sounds  Decreased air movement present  Abdominal:      Palpations: Abdomen is soft  Tenderness: There is no abdominal tenderness  Genitourinary:     Comments: L PCN- dark yellow  Musculoskeletal:      Cervical back: Neck supple  Skin:     General: Skin is warm and dry  Neurological:      Mental Status: He is alert  Psychiatric:         Behavior: Behavior is cooperative                      Lab Results and Cultures:   CBC with diff:   Lab Results   Component Value Date    WBC 21 88 (H) 05/11/2022    HGB 11 1 (L) 05/11/2022    HCT 33 9 (L) 05/11/2022    MCV 93 05/11/2022     05/11/2022    MCH 30 4 05/11/2022    MCHC 32 7 05/11/2022    RDW 14 0 05/11/2022    MPV 10 1 05/11/2022    NRBC 0 05/11/2022      BMP/CMP:  Lab Results   Component Value Date     (L) 04/08/2015    K 4 6 05/11/2022    K 3 6 04/08/2015     (H) 05/11/2022    CL 99 04/08/2015    CO2 22 05/11/2022    CO2 23 05/10/2022    ANIONGAP 7 04/08/2015    BUN 49 (H) 05/11/2022    BUN 15 04/08/2015    CREATININE 3 57 (H) 05/11/2022    CREATININE 1 80 (H) 04/08/2015    GLUCOSE 125 05/10/2022    GLUCOSE 104 04/08/2015    CALCIUM 10 2 (H) 05/11/2022    CALCIUM 8 9 04/08/2015    AST 33 05/10/2022    AST 70 (H) 04/08/2015    ALT 38 05/10/2022     (H) 04/08/2015    ALKPHOS 54 05/10/2022    ALKPHOS 89 04/08/2015    PROT 7 7 04/08/2015    BILITOT 0 7 04/08/2015    EGFR 18 05/11/2022   ,     Coags:   Lab Results   Component Value Date    PTT 99 (H) 05/10/2022    INR 1 27 (H) 05/10/2022   ,   Results from last 7 days   Lab Units 05/10/22  1346 05/10/22  0518   PTT seconds 99* 26   INR  1 27* 1 09        Lipid Panel:   Lab Results   Component Value Date    CHOL 235 08/31/2015     Lab Results   Component Value Date    HDL 48 06/02/2021     Lab Results   Component Value Date    HDL 48 06/02/2021     Lab Results   Component Value Date    LDLCALC 173 (H) 06/02/2021     Lab Results   Component Value Date    TRIG 169 (H) 06/02/2021       HgbA1c:   Lab Results   Component Value Date    HGBA1C 5 4 03/24/2015       Blood Culture:   Lab Results   Component Value Date    BLOODCX Received in Microbiology Lab  Culture in Progress   05/10/2022   ,   Urinalysis:   Lab Results   Component Value Date    Bo Dominguez 05/10/2022    COLORU Yellow 04/08/2015    CLARITYU Cloudy 05/10/2022    CLARITYU Clear 04/08/2015    SPECGRAV 1 015 05/10/2022    SPECGRAV 1 015 04/08/2015    PHUR 6 0 05/10/2022    PHUR 6 0 04/08/2015    LEUKOCYTESUR Large (A) 05/10/2022    LEUKOCYTESUR Negative 04/08/2015    NITRITE Negative 05/10/2022    NITRITE Negative 04/08/2015    PROTEINUA Negative 04/08/2015    GLUCOSEU Negative 05/10/2022    GLUCOSEU Negative 04/08/2015    KETONESU Negative 05/10/2022    KETONESU Trace (A) 04/08/2015    BILIRUBINUR Negative 05/10/2022 BILIRUBINUR Negative 04/08/2015    BLOODU Large (A) 05/10/2022    BLOODU Small (A) 04/08/2015   ,   Urine Culture:   Lab Results   Component Value Date    URINECX >100,000 cfu/ml Gram Negative Anselmo Enteric Like (A) 05/10/2022   ,   Wound Culure:    Lab Results   Component Value Date    WOUNDCULT No growth 07/11/2019       VTE Pharmacologic Prophylaxis: Sequential compression device (Venodyne)       Thank you for allowing me to participate in the care of Adam Chris  Please don't hesitate to call, text, email, or TigerText with any questions  This text is generated with voice recognition software  There may be translation, syntax,  or grammatical errors  If you have any questions, please contact the dictating provider

## 2022-05-11 NOTE — ANESTHESIA PREPROCEDURE EVALUATION
Procedure:  IR NEPHROSTOMY TUBE PLACEMENT    Relevant Problems   CARDIO   (+) HTN (hypertension)   (+) Mixed hyperlipidemia      /RENAL   (+) ADRIANNE (acute kidney injury) ([facility] )   (+) Chronic kidney disease, stage 3 (HCC)   (+) Obstructive pyelonephritis      NEURO/PSYCH   (+) History of gout      Genitourinary   (+) Acute unilateral obstructive uropathy      Other   (+) Acute pain of left knee   (+) Bacteremia due to Escherichia coli   (+) Insomnia   (+) Lactic acidosis   (+) Morbid obesity (HCC)   (+) Morbid obesity with BMI of 50 0-59 9, adult ([place] [address] )   (+) Sepsis ([place] [address] )   (+) Vitamin D deficiency       Latest Reference Range & Units 05/10/22 13:46 05/10/22 13:48 05/10/22 13:50 05/10/22 16:22   pH, Yuri 7 300 - 7 400    7 276 (L)    pCO2, Yuri 42 0 - 50 0 mm Hg   32 5 (L)    pO2, Yuri 35 0 - 45 0 mm Hg   38 7    HCO3, Yuri 24 - 30 mmol/L   14 8 (L)    Base Excess, Yuri mmol/L   -10 8    O2 Content, Yuri ml/dL   14 3    O2 HGB, VENOUS 60 0 - 80 0 %   70 2    Sodium 136 - 145 mmol/L 135 (L)      Potassium 3 5 - 5 3 mmol/L 6 3 (HH) [1]   5 5 (H)   Chloride 100 - 108 mmol/L 110 (H)      CO2 21 - 32 mmol/L 14 (L)      Anion Gap 4 - 13 mmol/L 11      BUN 5 - 25 mg/dL 53 (H)      Creatinine 0 60 - 1 30 mg/dL 3 72 (H) [2]      Glucose, Random 65 - 140 mg/dL 133 [3]      Calcium 8 3 - 10 1 mg/dL 8 4      CORRECTED CALCIUM 8 3 - 10 1 mg/dL 9 8      AST 5 - 45 U/L 33 [4]      ALT 12 - 78 U/L 38 [5]      Alkaline Phosphatase 46 - 116 U/L 54      Total Protein 6 4 - 8 2 g/dL 6 7      Albumin 3 5 - 5 0 g/dL 2 3 (L)      TOTAL BILIRUBIN 0 20 - 1 00 mg/dL 0 66 [6]      eGFR ml/min/1 73sq m 17      Phosphorus 2 7 - 4 5 mg/dL 2 7      Magnesium 1 6 - 2 6 mg/dL 1 5 (L) [7]      LACTIC ACID 0 5 - 2 0 mmol/L  5 4 (HH)     WBC 4 31 - 10 16 Thousand/uL 42 89 (HH) [8]      Red Blood Cell Count 3 88 - 5 62 Million/uL 4 59      Hemoglobin 12 0 - 17 0 g/dL 13 7      HCT 36 5 - 49 3 % 45 6      MCV 82 - 98 fL 99 (H)      MCH 26 8 - 34 3 pg 29 8 MCHC 31 4 - 37 4 g/dL 30 0 (L)      RDW 11 6 - 15 1 % 13 6      Platelet Count 529 - 390 Thousands/uL 382        Physical Exam    Airway    Mallampati score: II  TM Distance: <3 FB  Neck ROM: full     Dental   No notable dental hx     Cardiovascular      Pulmonary      Other Findings        Anesthesia Plan  ASA Score- 4 Emergent    Anesthesia Type- general with ASA Monitors  Additional Monitors: arterial line and central venous line  Airway Plan: ETT  Plan Factors-Exercise tolerance (METS): <4 METS  Chart reviewed  EKG reviewed  Imaging results reviewed  Existing labs reviewed  Patient summary reviewed  Induction- intravenous  Postoperative Plan- Plan for postoperative opioid use  Informed Consent- Anesthetic plan and risks discussed with patient  I personally reviewed this patient with the CRNA  Discussed and agreed on the Anesthesia Plan with the CRNA  Romario Koroma

## 2022-05-11 NOTE — BRIEF OP NOTE (RAD/CATH)
IR NEPHROSTOMY TUBE PLACEMENT  Procedure Note    PATIENT NAME: Karyn Harvey  : 1965  MRN: [id number]     Pre-op Diagnosis:   1  Obstructive pyelonephritis    2  Chronic kidney disease, stage 3 (HCC)      Post-op Diagnosis:   1  Obstructive pyelonephritis    2  Chronic kidney disease, stage 3 [facility])        Surgeon:   Demetria Matos DO  Assistants:     No qualified resident was available  Estimated Blood Loss: Minimal  Findings:   · No left hydronephrosis  · Challenging case related to large body habitus and no hydro  Used 2 stick technique, eventual 8F pigtail placed       Specimens: None    Complications:  Hematuria    Anesthesia: general    Demetria Matos DO     Date: 5/10/2022  Time: 8:37 PM

## 2022-05-11 NOTE — ASSESSMENT & PLAN NOTE
· Hx of E   Coli and Strep Mitis bacteremia in prior hospitalization on 4/21  · Sensitive to Ceftriaxone and Ancef  · Repeat blood/urine cx pending  · Empirically starting Ceftriaxone 2g q12h  · IR PCN placed on 5/10  · Extubated in AM on 5/11 post-procedure

## 2022-05-11 NOTE — RESTORATIVE TECHNICIAN NOTE
Restorative Technician Note      Patient Name: Roz Garcia     Note Type: Mobility  Patient Position Upon Consult: Supine  Activity Performed: Transferred; Dangled; Stood  Assistive Device: Roller walker  Patient Position at [employer] of Consult: Bedside chair;  All needs within reach

## 2022-05-11 NOTE — PROGRESS NOTES
[address] [facility]  Progress Note - Misbah Beckham 1965, 55 y o  male MRN: [id number]  Unit/Bed#: [facility] 236-35 Encounter: [id number]  Primary Care Provider: RITA Garcia   Date and time admitted to hospital: 5/10/2022 12:42 PM    HTN (hypertension)  Assessment & Plan  · Holding home norvasc in setting of sepsis, restart if BP improved    Chronic kidney disease, stage 3 [facility])  Assessment & Plan  Lab Results   Component Value Date    EGFR 18 05/11/2022    EGFR 17 05/10/2022    EGFR 17 05/10/2022    CREATININE 3 57 (H) 05/11/2022    CREATININE 3 65 (H) 05/10/2022    CREATININE 3 72 (H) 05/10/2022       · Baseline Cr 1 4-1 6  · Cr on admission 2 92  · Improved to 3 57 from 3 72    Sepsis ([facility] [address] )  Assessment & Plan  · Hx of E  Coli and Strep Mitis bacteremia in prior hospitalization on 4/21  · Sensitive to Ceftriaxone and Ancef  · Repeat blood/urine cx pending  · Empirically starting Ceftriaxone 2g q12h  · IR PCN placed on 5/10  · Extubated in AM on 5/11 post-procedure    ADRIANNE (acute kidney injury) ([facility] [address] )  Assessment & Plan  · IVF 75/hr consider d/c and starting diet if able  · IR consulted for possible perc nephrostomy tube placement  Lactic acidosis  Assessment & Plan  · LA 4 2 at [facility], repeat 3 1 after PCN placement  · S/p large volume crystalloid resuscitation and maintenance IVF, hold further volume resuscitation    * Obstructive pyelonephritis  Assessment & Plan  · Recent admission on 4/21 for obstructive pyelonephritis w/ L sided ureteral stent placement  · Worsening pain at home since discharge w/ return to ED on 5/10, found to have obstruction of stent, transferred to [facility] for urgent urology management     · Urology consulted, appreciate recs  · Continue home flomax  · IR PCN on 5/10  · Continue Ceftriaxone 2g q12h        ----------------------------------------------------------------------------------------  HPI/24hr events: L jelani PCN placed yesterday, Hyperkalemia improved with multiple rounds of medical treatment  Good UOP, extubated in AM to BiPAP without respiratory concerns      Disposition: Transfer to Stepdown Level 1   Code Status: Level 1 - Full Code  ---------------------------------------------------------------------------------------  SUBJECTIVE  C/o sore throat and dry mouth  Review of Systems   Constitutional: Negative for chills and fever  HENT: Positive for congestion  Respiratory: Negative for chest tightness and shortness of breath  Cardiovascular: Negative for chest pain  Genitourinary: Negative for difficulty urinating  Neurological: Negative for dizziness and numbness  Review of systems was reviewed and negative unless stated above in HPI/24-hour events   ---------------------------------------------------------------------------------------  OBJECTIVE    Vitals   Vitals:    22 0530 22 0600 22 0727 22 0800   BP: 97/54 (!) 92/49  101/54   Pulse: (!) 112 (!) 116 (!) 122 (!) 122   Resp: (!) 24 21 21 (!) 27   Temp: 100 04 °F (37 8 °C) 100 04 °F (37 8 °C)  100 04 °F (37 8 °C)   TempSrc:       SpO2: 97% 96% 92% 91%   Weight:  (!) 157 kg (345 lb 14 4 oz)     Height:         Temp (24hrs), Av 2 °F (37 3 °C), Min:97 5 °F (36 4 °C), Max:100 04 °F (37 8 °C)  Current: Temperature: 100 04 °F (37 8 °C)  Arterial Line BP: 99/45  Arterial Line MAP (mmHg): 63 mmHg    Respiratory:  BiPAP  12/6/ 2L-02       Invasive/non-invasive ventilation settings   Respiratory  Report   Lab Data (Last 4 hours)    None         O2/Vent Data (Last 4 hours)       05 0727        Non-Invasive Ventilation Mode  BiPAP      Patient safety screen outcome: Passed                   Physical Exam  Constitutional:       Interventions: Face mask in place  Comments: Comfortable on biPAP   HENT:      Head: Normocephalic and atraumatic     Eyes:      General: Lids are normal       Conjunctiva/sclera: Conjunctivae normal  Cardiovascular:      Rate and Rhythm: Regular rhythm  Tachycardia present  Heart sounds: Normal heart sounds  Pulmonary:      Breath sounds: Rales present  Abdominal:      General: Abdomen is protuberant  Palpations: Abdomen is soft  Tenderness: There is no abdominal tenderness  Genitourinary:     Comments: L  PCN, cook in place  Skin:     General: Skin is cool and dry  Capillary Refill: Capillary refill takes 2 to 3 seconds  Neurological:      Mental Status: He is alert and oriented to person, place, and time  GCS: GCS eye subscore is 4  GCS verbal subscore is 5  GCS motor subscore is 6  Psychiatric:         Behavior: Behavior is cooperative               Laboratory and Diagnostics:  Results from last 7 days   Lab Units 05/11/22  0434 05/10/22  2155 05/10/22  2019 05/10/22  1932 05/10/22  1346 05/10/22  0518   WBC Thousand/uL 21 88* 29 78*  --   --  42 89* 26 14*   HEMOGLOBIN g/dL 11 1* 11 5*  --   --  13 7 16 6   I STAT HEMOGLOBIN g/dl  --   --  11 9* 12 6  --   --    HEMATOCRIT % 33 9* 36 6  --   --  45 6 53 2*   HEMATOCRIT, ISTAT %  --   --  35* 37  --   --    PLATELETS Thousands/uL 241 289  --   --  382 450*   NEUTROS PCT %  --   --   --   --  93*  --    BANDS PCT %  --   --   --   --   --  11*   MONOS PCT %  --   --   --   --  3*  --    MONO PCT %  --   --   --   --   --  2*     Results from last 7 days   Lab Units 05/11/22  0434 05/10/22  2155 05/10/22  2019 05/10/22  1932 05/10/22  1622 05/10/22  1346 05/10/22  0518   SODIUM mmol/L 140 139  --   --   --  135* 136   POTASSIUM mmol/L 4 6 5 7*  --   --  5 5* 6 3* 4 5   CHLORIDE mmol/L 109* 110*  --   --   --  110* 102   CO2 mmol/L 22 21  --   --   --  14* 17*   CO2, I-STAT mmol/L  --   --  23 17*  --   --   --    ANION GAP mmol/L 9 8  --   --   --  11 17*   BUN mg/dL 49* 52*  --   --   --  53* 52*   CREATININE mg/dL 3 57* 3 65*  --   --   --  3 72* 2 92*   CALCIUM mg/dL 10 2* 10 5*  --   --   --  8 4 9 8   GLUCOSE RANDOM mg/dL 134 124  --   --   --  133 142*   ALT U/L  --   --   --   --   --  38 50   AST U/L  --   --   --   --   --  33 31   ALK PHOS U/L  --   --   --   --   --  54 78   ALBUMIN g/dL  --   --   --   --   --  2 3* 3 4*   TOTAL BILIRUBIN mg/dL  --   --   --   --   --  0 66 0 59     Results from last 7 days   Lab Units 05/11/22  0434 05/10/22  2155 05/10/22  1346   MAGNESIUM mg/dL 2 5 2 9* 1 5*   PHOSPHORUS mg/dL 4 8* 7 1* 2 7      Results from last 7 days   Lab Units 05/10/22  1346 05/10/22  0518   INR  1 27* 1 09   PTT seconds 99* 26          Results from last 7 days   Lab Units 05/11/22  0218 05/10/22  2155 05/10/22  1745 05/10/22  1348 05/10/22  0816 05/10/22  0518   LACTIC ACID mmol/L 3 1* 3 2* 4 0* 5 4* 4 2* 3 9*     ABG:  Results from last 7 days   Lab Units 05/11/22  0433   PH ART  7 436   PCO2 ART mm Hg 32 8*   PO2 ART mm Hg 167 5*   HCO3 ART mmol/L 21 6*   BASE EXC ART mmol/L -1 9   ABG SOURCE  Line, Arterial     VBG:  Results from last 7 days   Lab Units 05/11/22  0433 05/10/22  2156 05/10/22  1350   PH DOLORES   --   --  7 276*   PCO2 DOLORES mm Hg  --   --  32 5*   PO2 DOLORES mm Hg  --   --  38 7   HCO3 DOLORES mmol/L  --   --  14 8*   BASE EXC DOLORES mmol/L  --   --  -10 8   ABG SOURCE  Line, Arterial   < >  --     < > = values in this interval not displayed  Results from last 7 days   Lab Units 05/10/22  0518   PROCALCITONIN ng/ml 2 30*       Micro  Results from last 7 days   Lab Units 05/10/22  1353 05/10/22  1350 05/10/22  0531 05/10/22  0523   BLOOD CULTURE  Received in Microbiology Lab  Culture in Progress  Received in Microbiology Lab  Culture in Progress  --  Received in Microbiology Lab  Culture in Progress  URINE CULTURE   --   --  >100,000 cfu/ml Gram Negative Anselmo Enteric Like*  --        EKG: Sinus tach on tele rate, 115  Imaging: I have personally reviewed pertinent reports  Intake and Output  I/O       05/09 0701  05/10 0700 05/10 0701  05/11 0700 05/11 0701  05/12 0700    P  O   0 0    I V  (mL/kg) 2486 4 (15 8) 214 6 (1 4)    IV Piggyback  800 300    Total Intake(mL/kg)  3286 4 (20 9) 514 6 (3 3)    Urine (mL/kg/hr)  4050 450 (1 6)    Total Output  4050 450    Net  -763 7 +64 6                 Height and Weights   Height: 5' 6" (167 6 cm)  IBW (Ideal Body Weight): 63 8 kg  Body mass index is 55 83 kg/m²  Weight (last 2 days)     Date/Time Weight    05/11/22 0600 157 (345 9)    05/10/22 1247 151 (333 12)            Nutrition       Diet Orders   (From admission, onward)             Start     Ordered    05/10/22 1248  Diet NPO  Diet effective now        References:    Nutrtion Support Algorithm Enteral vs  Parenteral   Question Answer Comment   Diet Type NPO    RD to adjust diet per protocol?  No        05/10/22 1252                  Active Medications  Scheduled Meds:  Current Facility-Administered Medications   Medication Dose Route Frequency Provider Last Rate    acetaminophen  650 mg Oral Q6H PRN Sangeetha Tavera PA-C      cefTRIAXone  2,000 mg Intravenous Q12H Sangeetha Martin PA-C Stopped (05/11/22 0801)    chlorhexidine  15 mL Mouth/Throat Q12H [address]      fentanyl citrate (PF)  50 mcg Intravenous Q1H PRN Sangeetha Tavera PA-C      heparin (porcine)  7,500 Units Subcutaneous [facility] Sangeetha [place], [place]      multi-electrolyte  75 mL/hr Intravenous Continuous [place], [place] 75 mL/hr (05/11/22 0000)    ondansetron  4 mg Intravenous Q6H PRN Sangeetha Martin PA-C      oxyCODONE  2 5 mg Oral Q6H PRN Sangeetha Martin PA-C      propofol  5-50 mcg/kg/min Intravenous Titrated Sangeetha Martin PA-C Stopped (05/11/22 0745)    senna-docusate sodium  2 tablet Oral BID Sangeetha Tavera PA-C       Continuous Infusions:  multi-electrolyte, 75 mL/hr, Last Rate: 75 mL/hr (05/11/22 0000)  propofol, 5-50 mcg/kg/min, Last Rate: Stopped (05/11/22 0745)      PRN Meds:   acetaminophen, 650 mg, Q6H PRN  fentanyl citrate (PF), 50 mcg, Q1H PRN  ondansetron, 4 mg, Q6H PRN  oxyCODONE, 2 5 mg, Q6H PRN        Invasive Devices Review  Invasive Devices  Report    Peripheral Intravenous Line  Duration           Peripheral IV 05/10/22 Left Antecubital 1 day    Peripheral IV 05/10/22 Left;Upper Arm 1 day    Peripheral IV 05/10/22 Right Antecubital 1 day    Peripheral IV 05/10/22 Left Arm <1 day          Arterial Line  Duration           Arterial Line 05/10/22 <1 day          Drain  Duration           Nephrostomy Left 8 Fr  <1 day    Urethral Catheter <1 day                Rationale for remaining devices: D/c A-line if not working,    ---------------------------------------------------------------------------------------  Advance Directive and Living Will: Yes    Power of :    POLST:    ---------------------------------------------------------------------------------------  Care Time Delivered:   Upon my evaluation, this patient had a high probability of imminent or life-threatening deterioration due to sepsis, which required my direct attention, intervention, and personal management  I have personally provided 30 minutes (830 to 900) of critical care time, exclusive of procedures, teaching, family meetings, and any prior time recorded by providers other than myself  Karolina Holt PA-C      Portions of the record may have been created with voice recognition software  Occasional wrong word or "sound a like" substitutions may have occurred due to the inherent limitations of voice recognition software    Read the chart carefully and recognize, using context, where substitutions have occurred

## 2022-05-11 NOTE — SEPSIS NOTE
Sepsis Note   Mosotho Phlegm 55 y o  male MRN: [id number]  Unit/Bed#: [facility] 518-01 Encounter: [id number]       qSOFA     Row Name 05/10/22 2152 05/10/22 1822 05/10/22 1730 05/10/22 1647 05/10/22 1600    Altered mental status GCS < 15 -- 0 -- -- 0    Respiratory Rate > / =22 1 -- -- 0 --    Systolic BP < / =016 -- -- 0 -- --    Q Sofa Score -- 0 0 0 1    Row Name 05/10/22 1439 05/10/22 1426 05/10/22 1247          Altered mental status GCS < 15 -- -- 0      Respiratory Rate > / =22 -- 1 1      Systolic BP < / =346 0 0 0      Q Sofa Score 1 1 1                 Initial Sepsis Screening     Row Name 05/10/22 2157                Is the patient's history suggestive of a new or worsening infection? No  not worsening, already being treated  -EB        Suspected source of infection --        Are two or more of the following signs & symptoms of infection both present and new to the patient? --        Indicate SIRS criteria --        If the answer is yes to both questions, suspicion of sepsis is present --        If severe sepsis is present AND tissue hypoperfusion perists in the hour after fluid resuscitation or lactate > 4, the patient meets criteria for SEPTIC SHOCK --        Are any of the following organ dysfunction criteria present within 6 hours of suspected infection and SIRS criteria that are NOT considered to be chronic conditions?  --        Organ dysfunction --        Date of presentation of severe sepsis --        Time of presentation of severe sepsis --        Tissue hypoperfusion persists in the hour after crystalloid fluid administration, evidenced, by either: --        Was hypotension present within one hour of the conclusion of crystalloid fluid administration? --        Date of presentation of septic shock --        Time of presentation of septic shock --              User Key  (r) = Recorded By, (t) = Taken By, (c) = Cosigned By    [address] Name Provider Type    MANISH Vital PA-C Physician Assistant

## 2022-05-11 NOTE — ASSESSMENT & PLAN NOTE
Lab Results   Component Value Date    EGFR 18 05/11/2022    EGFR 17 05/10/2022    EGFR 17 05/10/2022    CREATININE 3 57 (H) 05/11/2022    CREATININE 3 65 (H) 05/10/2022    CREATININE 3 72 (H) 05/10/2022       · Baseline Cr 1 4-1 6  · Cr on admission 2 92  · Improved to 3 57 from 3 72

## 2022-05-11 NOTE — DISCHARGE INSTRUCTIONS
Nephrostomy Tube Care     WHAT YOU NEED TO KNOW:   A nephrostomy tube is a catheter (thin plastic tube) that is inserted through your skin and into your kidney  The nephrostomy tube drains urine from your kidney into a collecting bag outside your body  You may need a nephrostomy tube when something is blocking the normal flow of urine  A nephrostomy tube may be used for a short or a long period of time  The nephrostomy tube comes out of your back, so you will need someone to help care for your nephrostomy tube  DISCHARGE INSTRUCTIONS:      How to clean the skin around the nephrostomy tube and change the bandage:  Since the nephrostomy tube comes out of your back, you will not be able to care for it by yourself  Ask someone to follow the general directions below to check and care for your nephrostomy tube  Gather the items you will need  Disposable (single use) under-pad, and a clean washcloth  ¨ Plain soap, warm water, and new medical gloves  ¨ Sterile gauze bandages  ¨ Clear adhesive dressing or medical tape  ¨ Skin barrier  ¨ Protective skin film  ¨ Trash bag  · Remove the old bandage, and check the tube entry site  ¨ Have the patient lie on his side with the nephrostomy tube entry site facing up  Place the under-pad where it will catch drainage as you are working with the nephrostomy tube  ¨ Wash your hands with soap and water  Put on new medical gloves  ¨ Gently remove the old bandage, without pulling on the tube  Do this by holding the skin beside the tube with one hand  With the other hand, gently remove sticky tape and the skin barrier by pulling in the same direction as hair growth  Do not touch the side of the bandage that is placed over or around the tube  Throw the bandage and skin barrier away in a trash bag  ¨ Look for signs of infection, such as skin redness and swelling  Report any skin changes to healthcare providers  ¨ Clean the tube entry site      ¨ Hold the tube in place to keep it from being pulled out while you are cleaning around it  ¨ You will need to clean the area twice  For the first cleaning, wet a new gauze bandage with soap and water  Begin at the entry site of the tube  Wipe the skin in circles, moving away from the entry site  Remove blood and any other material with the gauze  Do this as often as needed  Use a new gauze bandage each time you clean the area, moving away from the entry site  ¨ For the second cleaning, wet a new gauze bandage with water  Begin at the entry site of the tube  Wipe the skin in circles, moving away from the entry site  Use a new gauze bandage each time you clean the area, moving away from the entry site  ¨ Gently pat the skin with a clean washcloth to dry it  · Apply the skin barrier and bandages  ¨ Roll up a bandage to make it thick, and place it under  the place where the tube enters the skin  Place it to support the tube, and stop it from kinking or bending  Tape the bandage in place, and apply more bandages if directed by a healthcare provider  ¨ Bring the tubing forward to the front and tape it to the skin  Do not stretch the tube tight, because this may pull the nephrostomy tube out  How often to change the bandage  Change the bandage around the tube, every other day  If your bandages  get dirty or wet, change them right away, and as often as needed  If your nephrostomy tube is to be used for a long period of time, the tube needs to be changed every 2 to 3 months  Healthcare providers will tell you when you need to make an appointment to have your tube changed  How to care for the urine drainage bag:   · Ask if you need to measure and write down how much urine is in the bag before you empty it  Drain urine out of the drainage bag when it is ½ to ? full  Open the spout at the bottom of the bag to empty the urine into the toilet  · You may need to detach the drainage bag from the nephrostomy tube to change it    If so, attach a new drainage bag tightly to the nephrostomy tube  ·   How to prevent problems with your nephrostomy tube:   · Change bandages, directed  This helps to prevent infection  Throw away or clean your drainage bag as directed by your healthcare provider  · Wipe the connecting ends of the drainage bag with alcohol before you reconnect the bag to the tube  This helps prevent infection  Keep the tube taped to your skin and connected to a drainage bag placed below the level of your kidneys  This helps prevent urine from backing up into your kidneys  You may wear a small drainage bag strapped to your leg to let you move around more easily  · Check the catheter to be sure it is in place after you change your clothes or do other activities  Do not wear tight clothing over the tube  Place the tubing over your thigh rather than under it when you are sitting down  Be sure that nothing is pulling on the nephrostomy tube when you move around  · Change positions if you see little or no urine in your drainage bag  Check to see if the urine tube is twisted or bent  Be sure that you are not sitting or lying on the tube  If there are no kinks and there is little or no urine in the drainage bag, tell your healthcare provider  · Flush out the tube as directed  Some tubes get flushed one time a day with 10 mls of NSS You will be given a prescription for the flushes  To flush the nephrostomy tube, clean both connections with alcohol swap  Twist off the drainage bag tube and twist the saline syringe into the nephrostomy tube and flush briskly  Remove the syringe and twist the drainage bag tube back into the nephrostomy tube  · Keep the site covered while you shower  Tape a piece of clear adhesive plastic over the dressing to keep it dry while you shower  Do not take tub baths      Contact Interventional Radiology at [phone] [facility] PATIENTS: Contact Interventional Radiology at [phone]) Conchita Shabazz PATIENTS: Contact Interventional Radiology at [phone]) if:  · The skin around the nephrostomy tube is red, swollen, itches, or has a rash  · You have a fever greater than 101 or chills  · You have lower back or hip pain  · There are changes in how your urine looks or smells  · You have little or no urine draining from the nephrostomy tube  · You have nausea and are vomiting  · The black randy on your tube has moved, or the tube is longer than when it was put in    · You have questions or concerns about your condition or care  · The nephrostomy tube comes out completely  · There is blood, pus, or a bad smell coming from the place where the tube enters your skin  · Urine is leaking around the tube  The following pharmacies carry the flush syringes  [facility]                     [place]       [address] [facility]                    [id number] [place] [address]  Phone [phone]            Phone [phone]  [address] & [facility]                      [address]                                 [phone]  Phone [phone]            Phone [phone]    [facility]                                                                         [facility] [phone]  [address]   Phone [phone]

## 2022-05-11 NOTE — ASSESSMENT & PLAN NOTE
· Recent admission on 4/21 for obstructive pyelonephritis w/ L sided ureteral stent placement  · Worsening pain at home since discharge w/ return to ED on 5/10, found to have obstruction of stent, transferred to [facility] for urgent urology management     · Urology consulted, appreciate recs  · Continue home flomax  · IR PCN on 5/10  · Continue Ceftriaxone 2g q12h

## 2022-05-11 NOTE — UTILIZATION REVIEW
Initial Clinical Review    Admission: Date/Time/Statement:   Admission Orders (From admission, onward)     Ordered        05/10/22 1246  Inpatient Admission  Once                      Orders Placed This Encounter   Procedures    Inpatient Admission     Standing Status:   Standing     Number of Occurrences:   1     Order Specific Question:   Level of Care     Answer:   Critical Care [15]     Order Specific Question:   Estimated length of stay     Answer:   More than 2 Midnights     Order Specific Question:   Certification     Answer:   I certify that inpatient services are medically necessary for this patient for a duration of greater than two midnights  See H&P and MD Progress Notes for additional information about the patient's course of treatment  Initial Presentation: 55 y o  male transferred from [facility] to [address] [place] [address] for a higher level of care  PMH of morbid obesity, HTN, CKD3, and obstructive pyelonephritis s/p ureteral stent 4/22/2022  Pt initially presented to [facility] with worsening flank pain and generalized malaise/fatigue since discharged from recent hospital admission on 04/21 for obstructive pyelonephritis s/p ureteral stent placement  Pt also had E coli bacteremia during admission and was discharged on po amoxicillin  At [facility] ED she was found to have lactic acidosis, leukocytosis, worsening ADRIANNE, CT A/P findings concerning for obstruction left ureteral stent with persistent hydronephrosis  Given total of 4L IVF  Taken to IR for emergent PCN  Transferred to [facility] for urgent urology management  On exam at [facility], Pt in acute distress, tachycardic, distant bs heard  1-2+ pitting edema on b/l LE present  Plan: Inpatient admission for evaluation and treatment of sepsis, obstructive pyelonephritis, ADRIANNE, lactic acidosis: Urology Consulted, IR consulted, cont IV Ceftriaxone  Cont IVF  Repeat blood and urine cx  NPO  Pain/nausea control prn      05/10   Urology Consult;  Obstructive pyelonephritis: Urine culture in progress  Continue IV antibiotics, IVF  Pain management  Continue Tamsulosin 0 4 mg daily  Trend BMP, CBC  IR consulted for left PCN placement  Date: 05/11  Day 2:   Critical Care Notes: Pt underwent PCN placement by IR in 05/10  He remained intubated after procedure for low P/F ration  UOP increased s/p PCN  Febrile Tmax- 100 4  Hyperkalemia improved  WBC down trending  He was extubated to BIPAP this am, off pressors  Hgb stable  Lactate downtrending  Cont IV abx  Wean BIPAP to HFNC if able  Pain/nausea control prn  DC IVF if starting diet if able  PE: AAO x 4, Distant bs heard  Abdomen soft, obese       ED Triage Vitals   Temperature Pulse Respirations Blood Pressure SpO2   05/10/22 1247 05/10/22 1247 05/10/22 1247 05/10/22 1247 05/10/22 1247   99 °F (37 2 °C) (!) 125 (!) 28 113/62 92 %      Temp Source Heart Rate Source Patient Position - Orthostatic VS BP [facility] FiO2 (%)   05/10/22 1247 05/10/22 1247 05/10/22 1247 05/10/22 1247 05/10/22 2215   Oral Monitor Sitting Left arm 80      Pain Score       05/10/22 1247       No Pain          Wt Readings from Last 1 Encounters:   05/11/22 (!) 157 kg (345 lb 14 4 oz)     Additional Vital Signs:   Date/Time Temp Pulse Resp BP MAP (mmHg) Arterial Line BP MAP SpO2 FiO2 (%) Calculated FIO2 (%) - Nasal Cannula Nasal Cannula O2 Flow Rate (L/min) O2 Device O2 Interface Device Patient Position - Orthostatic VS   05/11/22 1100 100 04 °F (37 8 °C) 107 Abnormal  33 Abnormal  110/57 77 113/50 69 mmHg 97 % -- 32 3 L/min Nasal cannula -- --   05/11/22 1000 100 04 °F (37 8 °C) 115 Abnormal  24 Abnormal  100/57 75 100/43 62 mmHg 95 % -- -- -- -- -- --   05/11/22 0900 99 86 °F (37 7 °C) 116 Abnormal  25 Abnormal  101/55 75 105/44 64 mmHg 92 % -- -- -- -- -- --   05/11/22 0800 100 04 °F (37 8 °C) 122 Abnormal  27 Abnormal  101/54 72 99/45 63 mmHg 91 % -- -- -- BiPAP  -- --   O2 Device: 14/8 at 05/11/22 0800   05/11/22 0727 -- 122 Abnormal  21 -- -- -- -- 92 % -- -- -- -- Face mask --   05/11/22 0600 100 04 °F (37 8 °C) 116 Abnormal  21 92/49 Abnormal  66 80/40 52 mmHg 96 % -- -- -- -- -- --   05/11/22 0530 100 04 °F (37 8 °C) 112 Abnormal  24 Abnormal  97/54 72 102/50 65 mmHg 97 % -- -- -- -- -- --   05/11/22 0500 100 04 °F (37 8 °C) 112 Abnormal  24 Abnormal  91/54 69 88/43 58 mmHg 98 % -- -- -- -- -- --   05/11/22 0400 99 68 °F (37 6 °C) 110 Abnormal  24 Abnormal  108/53 76 115/48 67 mmHg 100 % -- -- -- -- -- --   05/11/22 0300 98 96 °F (37 2 °C) 107 Abnormal  24 Abnormal  103/59 77 116/48 67 mmHg 100 % -- -- -- -- -- --   05/11/22 0230 98 78 °F (37 1 °C) 105 24 Abnormal  95/52 68 91/47 61 mmHg 98 % -- -- -- -- -- --   05/11/22 0200 -- 105 24 Abnormal  104/55 72 101/51 66 mmHg 100 % -- -- -- -- -- --   05/11/22 0130 -- 110 Abnormal  24 Abnormal  110/53 76 102/50 67 mmHg 99 % -- -- -- -- -- --   05/11/22 0100 -- 114 Abnormal  24 Abnormal  169/74 106 107/54 67 mmHg 100 % -- -- -- -- -- --   05/11/22 0000 -- 102 28 Abnormal  -- -- 123/75 89 mmHg 100 % -- -- -- -- -- --   05/10/22 2330 -- 97 24 Abnormal  -- -- 110/55 70 mmHg 100 % -- -- -- -- -- --   05/10/22 2300 -- 102 26 Abnormal  156/72 104 104/55 68 mmHg 100 % -- -- -- -- -- --   05/10/22 2230 -- 100 24 Abnormal  146/70 100 89/43 56 mmHg 100 % -- -- -- -- -- --   05/10/22 2215 -- -- -- -- -- -- -- 99 % 80 -- -- Ventilator -- --   05/10/22 2200 -- 106 Abnormal  25 Abnormal  148/85 108 110/52 68 mmHg 98 % -- -- -- Ventilator -- --   05/10/22 2152 97 5 °F (36 4 °C) 105 32 Abnormal  -- -- 158/101 118 mmHg 98 % -- -- -- -- -- --   05/10/22 1730 -- -- -- 117/69 -- -- -- -- -- -- -- -- -- --   05/10/22 1647 -- -- 20 -- -- -- -- 93 % -- -- -- -- -- --   05/10/22 1439 -- -- -- 132/59 -- -- -- -- -- -- -- -- -- --   05/10/22 1426 -- 118 Abnormal  22 105/58 -- -- -- -- -- -- -- -- -- Lying     Pertinent Labs/Diagnostic Test Results:   XR chest portable   Final Result by Marcelo Mcgregor MD (05/11 3115)      Left perihilar infiltrate/atelectasis                       Workstation performed: [id number]         VAS lower limb venous duplex study, complete bilateral   Final Result by Carolyn Garza MD (05/10 1846)      IR nephrostomy tube placement    (Results Pending)   IR nephrostomy tube check/change/reposition/reinsertion/upsize    (Results Pending)     05/10 EKG result: Sinus tachycardia  T wave abnormality, consider lateral ischemia      Results from last 7 days   Lab Units 05/11/22  0434 05/10/22  2155 05/10/22  2019 05/10/22  1932 05/10/22  1346 05/10/22  0518   WBC Thousand/uL 21 88* 29 78*  --   --  42 89* 26 14*   HEMOGLOBIN g/dL 11 1* 11 5*  --   --  13 7 16 6   I STAT HEMOGLOBIN g/dl  --   --  11 9* 12 6  --   --    HEMATOCRIT % 33 9* 36 6  --   --  45 6 53 2*   HEMATOCRIT, ISTAT %  --   --  35* 37  --   --    PLATELETS Thousands/uL 241 289  --   --  382 450*   NEUTROS ABS Thousands/µL  --   --   --   --  40 06*  --    BANDS PCT %  --   --   --   --   --  11*         Results from last 7 days   Lab Units 05/11/22  0434 05/10/22  2155 05/10/22  2019 05/10/22  1932 05/10/22  1622 05/10/22  1346 05/10/22  0518   SODIUM mmol/L 140 139  --   --   --  135* 136   POTASSIUM mmol/L 4 6 5 7*  --   --  5 5* 6 3* 4 5   CHLORIDE mmol/L 109* 110*  --   --   --  110* 102   CO2 mmol/L 22 21  --   --   --  14* 17*   CO2, I-STAT mmol/L  --   --  23 17*  --   --   --    ANION GAP mmol/L 9 8  --   --   --  11 17*   BUN mg/dL 49* 52*  --   --   --  53* 52*   CREATININE mg/dL 3 57* 3 65*  --   --   --  3 72* 2 92*   EGFR ml/min/1 73sq m 18 17  --   --   --  17 23   CALCIUM mg/dL 10 2* 10 5*  --   --   --  8 4 9 8   CALCIUM, IONIZED mmol/L 1 25 1 36*  --   --   --  1 02*  --    CALCIUM, IONIZED, ISTAT mmol/L  --   --  1 32 1 41*  --   --   --    MAGNESIUM mg/dL 2 5 2 9*  --   --   --  1 5*  --    PHOSPHORUS mg/dL 4 8* 7 1*  --   --   --  2 7  --      Results from last 7 days   Lab Units 05/10/22  1346 05/10/22  0518   AST U/L 33 31   ALT U/L 38 50   ALK PHOS U/L 54 78   TOTAL PROTEIN g/dL 6 7 8 2   ALBUMIN g/dL 2 3* 3 4*   TOTAL BILIRUBIN mg/dL 0 66 0 59     Results from last 7 days   Lab Units 05/11/22  0226 05/10/22  2157 05/10/22  1627   POC GLUCOSE mg/dl 110 123 129     Results from last 7 days   Lab Units 05/11/22  0434 05/10/22  2155 05/10/22  1346 05/10/22  0518   GLUCOSE RANDOM mg/dL 134 124 133 142*     Results from last 7 days   Lab Units 05/11/22  0433 05/10/22  2156   PH ART  7 436 7 265*   PCO2 ART mm Hg 32 8* 42 7   PO2 ART mm Hg 167 5* 97 7   HCO3 ART mmol/L 21 6* 18 9*   BASE EXC ART mmol/L -1 9 -7 7   O2 CONTENT ART mL/dL 17 1 17 1   O2 HGB, ARTERIAL % 98 6* 96 4   ABG SOURCE  Line, Arterial Line, Arterial     Results from last 7 days   Lab Units 05/10/22  1350   PH DOLORES  7 276*   PCO2 DOLORES mm Hg 32 5*   PO2 DOLORES mm Hg 38 7   HCO3 DOLORES mmol/L 14 8*   BASE EXC DOLORES mmol/L -10 8   O2 CONTENT DOLORES ml/dL 14 3   O2 HGB, VENOUS % 70 2     Results from last 7 days   Lab Units 05/10/22  2019 05/10/22  1932   I STAT BASE EXC mmol/L -7* -13*   I STAT O2 SAT % 100* 99*   ISTAT PH ART  7 203* 7 148*   I STAT ART PCO2 mm HG 53 1* 45 4*   I STAT ART PO2 mm  0* 193 0*   I STAT ART HCO3 mmol/L 20 9* 15 7*         Results from last 7 days   Lab Units 05/10/22  1038 05/10/22  0816 05/10/22  0518   HS TNI 0HR ng/L  --   --  6   HS TNI 2HR ng/L  --  7  --    HSTNI D2 ng/L  --  1  --    HS TNI 4HR ng/L 6  --   --    HSTNI D4 ng/L 0  --   --      Results from last 7 days   Lab Units 05/10/22  0518   D-DIMER QUANTITATIVE ug/ml FEU 2 05*     Results from last 7 days   Lab Units 05/10/22  1346 05/10/22  0518   PROTIME seconds 15 4* 13 5   INR  1 27* 1 09   PTT seconds 99* 26         Results from last 7 days   Lab Units 05/10/22  0518   PROCALCITONIN ng/ml 2 30*     Results from last 7 days   Lab Units 05/11/22  0218 05/10/22  2155 05/10/22  1745 05/10/22  1348 05/10/22  0816 05/10/22  0518   LACTIC ACID mmol/L 3 1* 3 2* 4 0* 5 4* 4 2* 3 9* Results from last 7 days   Lab Units 05/10/22  0518   NT-PRO BNP pg/mL 126*       Results from last 7 days   Lab Units 05/10/22  1307 05/10/22  0531   CLARITY UA  Cloudy Cloudy   COLOR UA  Brown Yellow   SPEC GRAV UA  1 015 1 025   PH UA  6 0 6 0   GLUCOSE UA mg/dl Negative Negative   KETONES UA mg/dl Negative Negative   BLOOD UA  Large* Large*   PROTEIN UA mg/dl 300 (3+)* >=300*   NITRITE UA  Negative Negative   BILIRUBIN UA  Negative Negative   UROBILINOGEN UA E U /dl  --  0 2   UROBILINOGEN UA (BE) mg/dl <2 0  --    LEUKOCYTES UA  Large* Small*   WBC UA /hpf Innumerable* Innumerable*   RBC UA /hpf Innumerable* Innumerable*   BACTERIA UA /hpf None Seen Innumerable*   EPITHELIAL CELLS WET PREP /hpf None Seen Occasional       Results from last 7 days   Lab Units 05/10/22  1353 05/10/22  1350 05/10/22  1307 05/10/22  0531 05/10/22  0523 05/10/22  0518   BLOOD CULTURE  Received in Microbiology Lab  Culture in Progress  Received in Microbiology Lab  Culture in Progress  --   --  No Growth at 24 hrs  Escherichia coli*   URINE CULTURE   --   --  Culture results to follow   >100,000 cfu/ml Gram Negative Anselmo Enteric Like*  --   --        Past Medical History:   Diagnosis Date    Arthritis     Gout     Hypertension     Kidney stone      Present on Admission:   Obstructive pyelonephritis   [facility]   ADRIANNE (acute kidney injury) ([facility] )   Chronic kidney disease, stage 3 (HCC)   Lactic acidosis      Admitting Diagnosis: Pyelonephritis [N12]  Age/Sex: 55 y o  male  Admission Orders:  NPO  SCD  Cardio-Pulmonary Monitoring, Neuro Checks, Nursing dysphagia assesment, I/O, Daily weights, Vital signs    Scheduled Medications:  cefTRIAXone, 2,000 mg, Intravenous, Q12H  chlorhexidine, 15 mL, Mouth/Throat, Q12H [address]  heparin (porcine), 7,500 Units, Subcutaneous, Q8H [address]  senna-docusate sodium, 2 tablet, Oral, BID      Continuous IV Infusions:  propofol (DIPRIVAN) 1000 mg in 100 mL infusion (premix)  Rate: 4 5-45 3 mL/hr Dose: 5-50 mcg/kg/min  Weight Dosing Info: 151 kg  Freq: Titrated Route: IV  Last Dose: Stopped (05/11/22 0745)  Start: 05/10/22 2200 End: 05/11/22 1038  multi-electrolyte (PLASMALYTE-A/ISOLYTE-S PH 7 4) IV solution  Rate: 75 mL/hr Dose: 75 mL/hr  Freq: Continuous Route: IV  Indications of Use: IV Hydration  Last Dose: 75 mL/hr (05/11/22 1036)  Start: 05/10/22 1330 End: 05/11/22 1039     PRN Meds:  acetaminophen, 650 mg, Oral, Q6H PRN  ondansetron, 4 mg, Intravenous, Q6H PRN  oxyCODONE, 2 5 mg, Oral, Q6H PRN  fentanyl citrate (PF) 100 MCG/2ML 50 mcg q1h IV prn 05/10 x 2, 05/11 x 3      IP CONSULT TO CASE MANAGEMENT  IP CONSULT TO UROLOGY  INPATIENT CONSULT TO IR    Network Utilization Review Department  ATTENTION: Please call with any questions or concerns to [phone] and carefully listen to the prompts so that you are directed to the right person  All voicemails are confidential   Alayna Anderson all requests for admission clinical reviews, approved or denied determinations and any other requests to dedicated fax number below belonging to the campus where the patient is receiving treatment   List of dedicated fax numbers for the Facilities:  [address] DENIALS (Administrative/Medical Necessity) [phone]   [address] (Maternity/NICU/Pediatrics) [phone]   [address] [place] [address]  [phone]   [address] [address] [place] [address] [address] [phone]

## 2022-05-11 NOTE — RESPIRATORY THERAPY NOTE
05/10/22 2215   Respiratory Protocol   Protocol Initiated? Yes   Protocol Selection Respiratory   Language Barrier? Yes  (Intubated)   Medical & Social History Reviewed? Yes   Diagnostic Studies Reviewed? Yes   Physical Assessment Performed? Yes   Respiratory Plan Vent/NIV/HFNC   Respiratory Assessment   Assessment Type Assess only   General Appearance Sedated   Respiratory Pattern Assisted   Chest Assessment Chest expansion symmetrical   Bilateral Breath Sounds Diminished   Cough None   Resp Comments Patient presents orally intubated from I R  s/p stent placement for obstructive pyelonephritis  He has # 8 ETT secured at 22cm after a 2cm withdraw  He is on (S)CMV, RR 24, Vt 520 (8 1cc/kg), PEEP 8, FiO2 weaned to 80%, Pplat 20  He has a metabolic acidosis  He has no documented history of lung disease or use of inhaled medications  Will follow closely overnight  O2 Device VENT   ETT  Cuffed;Oral 8 mm   Placement Date/Time: 05/10/22 1833   Mask Ventilation: Ventilated by mask with oral airway (2)  Preoxygenated: Yes  Technique: Video laryngoscopy;bougie  Type: Cuffed;Oral  Tube Size: 8 mm  Laryngoscope: Champion  Blade Size: 4  [facility]: Oral  Grade V  Secured at (cm) 22   Measured from [address] by Commercial tube saxena   Site Condition Dry   Cuff Pressure (cm H2O)   (Green zone)   Additional Assessments   SpO2 99 %   RT Ventilator Management Note  Cheryl Oliveira 55 y o  male MRN: [id number]  Unit/Bed#: [facility] 518-01 Encounter: [id number]      Daily Screen    No data found in the last 10 encounters  Physical Exam:   Assessment Type: Assess only  General Appearance: Sedated  Respiratory Pattern: Assisted  Chest Assessment: Chest expansion symmetrical  Bilateral Breath Sounds: Diminished  Cough: None  O2 Device: VENT      Resp Comments: (P) Patient presents orally intubated from I R  s/p stent placement for obstructive pyelonephritis   He has # 8 ETT secured at 22cm after a 2cm withdraw  He is on (S)CMV, RR 24, Vt 520 (8 1cc/kg), PEEP 8, FiO2 weaned to 80%, Pplat 20  He has a metabolic acidosis  He has no documented history of lung disease or use of inhaled medications  Will follow closely overnight

## 2022-05-11 NOTE — ASSESSMENT & PLAN NOTE
· LA 4 2 at 's, repeat 3 1 after PCN placement    · S/p large volume crystalloid resuscitation and maintenance IVF, hold further volume resuscitation

## 2022-05-11 NOTE — ANESTHESIA POSTPROCEDURE EVALUATION
Post-Op Assessment Note    CV Status:  Stable  Pain scale: PATRICK  Pain management: adequate     Mental Status:  Unresponsive (sedated)   Hydration Status:  Euvolemic   PONV Controlled:  Controlled   Airway Patency:  Patent  Airway: intubated      Post Op Vitals Reviewed: Yes      Staff: CRNA   Comments: vssmian- report to ICU team        No complications documented      BP   115/76   Temp   97 5   Pulse 107   Resp   26   SpO2   100% ventilator

## 2022-05-11 NOTE — ANESTHESIA PROCEDURE NOTES
Central Line Insertion  Performed by: Yesi Bills CRNA  Authorized by: Carie Gaines MD     Date/Time: 5/10/2022 9:02 PM  Catheter Type:  triple lumen  Consent: Written consent obtained  Risks and benefits: risks, benefits and alternatives were discussed  Consent given by: patient  Patient understanding: patient states understanding of the procedure being performed  Patient consent: the patient's understanding of the procedure matches consent given  Procedure consent: procedure consent matches procedure scheduled  Relevant documents: relevant documents present and verified  Required items: required blood products, implants, devices, and special equipment available  Patient identity confirmed: arm band, provided demographic data and hospital-assigned identification number  Time out: Immediately prior to procedure a "time out" was called to verify the correct patient, procedure, equipment, support staff and site/side marked as required  Indications: vascular access  [facility] details: right internal jugular  Catheter size: 12 Fr  Patient position: Trendelenburg    Sedation:  Patient sedated: GA      Assessment: blood return through all ports  Preparation: skin prepped with ChloraPrep  Skin prep agent dried: skin prep agent completely dried prior to procedure  Sterile barriers: all five maximum sterile barriers used - cap, mask, sterile gown, sterile gloves, and large sterile sheet  Hand hygiene: hand hygiene performed prior to central venous catheter insertion  Ultrasound guidance: yes  sterile gel and probe cover used in ultrasound-guided central venous catheter insertionPre-procedure: landmarks identified  Vessel of Catheter Tip End: SVC  Number of attempts: 1  Successful placement: yes  Post-procedure: line sutured and dressing applied  Patient tolerance: Patient tolerated the procedure well with no immediate complications

## 2022-05-12 PROBLEM — N18.9 ACUTE KIDNEY INJURY SUPERIMPOSED ON CKD (HCC): Status: ACTIVE | Noted: 2019-08-06

## 2022-05-12 PROBLEM — A41.9 SEPSIS (HCC): Status: RESOLVED | Noted: 2019-08-23 | Resolved: 2022-05-12

## 2022-05-12 LAB
ANION GAP SERPL CALCULATED.3IONS-SCNC: 5 MMOL/L (ref 4–13)
BACTERIA UR CULT: ABNORMAL
BACTERIA UR CULT: ABNORMAL
BUN SERPL-MCNC: 37 MG/DL (ref 5–25)
CALCIUM SERPL-MCNC: 7.7 MG/DL (ref 8.3–10.1)
CHLORIDE SERPL-SCNC: 112 MMOL/L (ref 100–108)
CO2 SERPL-SCNC: 25 MMOL/L (ref 21–32)
CREAT SERPL-MCNC: 2.42 MG/DL (ref 0.6–1.3)
ERYTHROCYTE [DISTWIDTH] IN BLOOD BY AUTOMATED COUNT: 14.3 % (ref 11.6–15.1)
GFR SERPL CREATININE-BSD FRML MDRD: 28 ML/MIN/1.73SQ M
GLUCOSE SERPL-MCNC: 93 MG/DL (ref 65–140)
HCT VFR BLD AUTO: 30.1 % (ref 36.5–49.3)
HGB BLD-MCNC: 9.1 G/DL (ref 12–17)
LACTATE SERPL-SCNC: 0.8 MMOL/L (ref 0.5–2)
MAGNESIUM SERPL-MCNC: 2.1 MG/DL (ref 1.6–2.6)
MCH RBC QN AUTO: 29.5 PG (ref 26.8–34.3)
MCHC RBC AUTO-ENTMCNC: 30.2 G/DL (ref 31.4–37.4)
MCV RBC AUTO: 98 FL (ref 82–98)
PLATELET # BLD AUTO: 169 THOUSANDS/UL (ref 149–390)
PMV BLD AUTO: 10.2 FL (ref 8.9–12.7)
POTASSIUM SERPL-SCNC: 3.5 MMOL/L (ref 3.5–5.3)
RBC # BLD AUTO: 3.08 MILLION/UL (ref 3.88–5.62)
SODIUM SERPL-SCNC: 142 MMOL/L (ref 136–145)
WBC # BLD AUTO: 14.69 THOUSAND/UL (ref 4.31–10.16)

## 2022-05-12 PROCEDURE — NC001 PR NO CHARGE: Performed by: PHYSICIAN ASSISTANT

## 2022-05-12 PROCEDURE — 83735 ASSAY OF MAGNESIUM: CPT | Performed by: PHYSICIAN ASSISTANT

## 2022-05-12 PROCEDURE — 83605 ASSAY OF LACTIC ACID: CPT | Performed by: PHYSICIAN ASSISTANT

## 2022-05-12 PROCEDURE — 85027 COMPLETE CBC AUTOMATED: CPT | Performed by: PHYSICIAN ASSISTANT

## 2022-05-12 PROCEDURE — 80048 BASIC METABOLIC PNL TOTAL CA: CPT | Performed by: PHYSICIAN ASSISTANT

## 2022-05-12 PROCEDURE — 99233 SBSQ HOSP IP/OBS HIGH 50: CPT | Performed by: ANESTHESIOLOGY

## 2022-05-12 RX ORDER — TAMSULOSIN HYDROCHLORIDE 0.4 MG/1
0.4 CAPSULE ORAL
Status: DISCONTINUED | OUTPATIENT
Start: 2022-05-12 | End: 2022-05-19 | Stop reason: HOSPADM

## 2022-05-12 RX ORDER — SODIUM CHLORIDE, SODIUM GLUCONATE, SODIUM ACETATE, POTASSIUM CHLORIDE, MAGNESIUM CHLORIDE, SODIUM PHOSPHATE, DIBASIC, AND POTASSIUM PHOSPHATE .53; .5; .37; .037; .03; .012; .00082 G/100ML; G/100ML; G/100ML; G/100ML; G/100ML; G/100ML; G/100ML
1000 INJECTION, SOLUTION INTRAVENOUS ONCE
Status: COMPLETED | OUTPATIENT
Start: 2022-05-12 | End: 2022-05-12

## 2022-05-12 RX ORDER — AMLODIPINE BESYLATE 10 MG/1
10 TABLET ORAL DAILY
Status: DISCONTINUED | OUTPATIENT
Start: 2022-05-12 | End: 2022-05-19 | Stop reason: HOSPADM

## 2022-05-12 RX ORDER — POLYVINYL ALCOHOL 14 MG/ML
1 SOLUTION/ DROPS OPHTHALMIC
Status: DISCONTINUED | OUTPATIENT
Start: 2022-05-12 | End: 2022-05-12

## 2022-05-12 RX ADMIN — HEPARIN SODIUM 7500 UNITS: 5000 INJECTION INTRAVENOUS; SUBCUTANEOUS at 21:31

## 2022-05-12 RX ADMIN — ACETAMINOPHEN 650 MG: 325 TABLET, FILM COATED ORAL at 00:04

## 2022-05-12 RX ADMIN — SODIUM CHLORIDE, SODIUM GLUCONATE, SODIUM ACETATE, POTASSIUM CHLORIDE, MAGNESIUM CHLORIDE, SODIUM PHOSPHATE, DIBASIC, AND POTASSIUM PHOSPHATE 1000 ML: .53; .5; .37; .037; .03; .012; .00082 INJECTION, SOLUTION INTRAVENOUS at 00:17

## 2022-05-12 RX ADMIN — ACETAMINOPHEN 650 MG: 325 TABLET, FILM COATED ORAL at 09:33

## 2022-05-12 RX ADMIN — OXYCODONE HYDROCHLORIDE 2.5 MG: 5 TABLET ORAL at 21:45

## 2022-05-12 RX ADMIN — OXYCODONE HYDROCHLORIDE 2.5 MG: 5 TABLET ORAL at 12:49

## 2022-05-12 RX ADMIN — TAMSULOSIN HYDROCHLORIDE 0.4 MG: 0.4 CAPSULE ORAL at 16:39

## 2022-05-12 RX ADMIN — ACETAMINOPHEN 650 MG: 325 TABLET, FILM COATED ORAL at 16:38

## 2022-05-12 RX ADMIN — CEFTRIAXONE 2000 MG: 2 INJECTION, POWDER, FOR SOLUTION INTRAMUSCULAR; INTRAVENOUS at 20:22

## 2022-05-12 RX ADMIN — HEPARIN SODIUM 7500 UNITS: 5000 INJECTION INTRAVENOUS; SUBCUTANEOUS at 05:36

## 2022-05-12 RX ADMIN — OXYCODONE HYDROCHLORIDE 2.5 MG: 5 TABLET ORAL at 00:04

## 2022-05-12 RX ADMIN — HEPARIN SODIUM 7500 UNITS: 5000 INJECTION INTRAVENOUS; SUBCUTANEOUS at 14:12

## 2022-05-12 RX ADMIN — AMLODIPINE BESYLATE 10 MG: 10 TABLET ORAL at 08:41

## 2022-05-12 RX ADMIN — CEFTRIAXONE 2000 MG: 2 INJECTION, POWDER, FOR SOLUTION INTRAMUSCULAR; INTRAVENOUS at 04:59

## 2022-05-12 RX ADMIN — CHLORHEXIDINE GLUCONATE 15 ML: 1.2 SOLUTION ORAL at 08:31

## 2022-05-12 NOTE — ASSESSMENT & PLAN NOTE
· S/p large volume crystalloid resuscitation and maintenance IVF  · Given additional 1L fluid during the day and 1L overnight   · Recheck this AM

## 2022-05-12 NOTE — ASSESSMENT & PLAN NOTE
· Secondary to obstructive pyelonephritis  · Baseline Cr 1 4-1 6  · Uptrending previously however making adequate urine, may have some degree of non-oliguric ATN   · Continue to trend UOP and Cr closely  · No additional episodes of hyperkalemia  · Consider d/c cook and initiate urinary retention protocol

## 2022-05-12 NOTE — ASSESSMENT & PLAN NOTE
· Recent admission on 4/21 for obstructive pyelonephritis w/ L sided ureteral stent placement  Worsening pain at home since discharge w/ return to ED on 5/10, found to have obstruction of stent, transferred to [facility] for urgent urology management     · Urology consulted, appreciate recs  · Continue home flomax  · IR PCN on 5/10  · Continue Ceftriaxone 2g q12h

## 2022-05-12 NOTE — ASSESSMENT & PLAN NOTE
· Hx of E   Coli and Strep Mitis bacteremia in prior hospitalization on 4/21  · Sensitive to Ceftriaxone and Ancef  · Repeat blood/urine cx pending  · Empirically starting Ceftriaxone 2g q12h  · IR PCN placed on 5/10

## 2022-05-12 NOTE — PLAN OF CARE
Problem: Potential for Falls  Goal: Patient will remain free of falls  Description: INTERVENTIONS:  - Educate patient/family on patient safety including physical limitations  - Instruct patient to call for assistance with activity   - Consult OT/PT to assist with strengthening/mobility   - Keep Call bell within reach  - Keep bed low and locked with side rails adjusted as appropriate  - Keep care items and personal belongings within reach  - Initiate and maintain comfort rounds  - Make Fall Risk Sign visible to staff  - Consider moving patient to room near nurses station  Outcome: Progressing     Problem: Prexisting or High Potential for Compromised Skin Integrity  Goal: Skin integrity is maintained or improved  Description: INTERVENTIONS:  - Identify patients at risk for skin breakdown  - Assess and monitor skin integrity  - Assess and monitor nutrition and hydration status  - Monitor labs   - Assess for incontinence   - Turn and reposition patient  - Assist with mobility/ambulation  - Relieve pressure over bony prominences  - Avoid friction and shearing  - Provide appropriate hygiene as needed including keeping skin clean and dry  - Evaluate need for skin moisturizer/barrier cream  - Collaborate with interdisciplinary team   - Patient/family teaching  - Consider wound care consult   Outcome: Progressing     Problem: Nutrition/Hydration-ADULT  Goal: Nutrient/Hydration intake appropriate for improving, restoring or maintaining nutritional needs  Description: Monitor and assess patient's nutrition/hydration status for malnutrition  Collaborate with interdisciplinary team and initiate plan and interventions as ordered  Monitor patient's weight and dietary intake as ordered or per policy  Utilize nutrition screening tool and intervene as necessary  Determine patient's food preferences and provide high-protein, high-caloric foods as appropriate       INTERVENTIONS:  - Monitor oral intake, urinary output, labs, and treatment plans  - Assess nutrition and hydration status and recommend course of action  - Evaluate amount of meals eaten  - Assist patient with eating if necessary   - Allow adequate time for meals  - Recommend/ encourage appropriate diets, oral nutritional supplements, and vitamin/mineral supplements  - Order, calculate, and assess calorie counts as needed  - Recommend, monitor, and adjust tube feedings and TPN/PPN based on assessed needs  - Assess need for intravenous fluids  - Provide specific nutrition/hydration education as appropriate  - Include patient/family/caregiver in decisions related to nutrition  Outcome: Progressing

## 2022-05-12 NOTE — PROGRESS NOTES
[address] [facility]  Progress Note - Anastacio Watters 1965, 55 y o  male MRN: [id number]  Unit/Bed#: [facility] 248-89 Encounter: [id number]  Primary Care Provider: RITA Cabral   Date and time admitted to hospital: 5/10/2022 12:42 PM    * Obstructive pyelonephritis  Assessment & Plan  · Recent admission on 4/21 for obstructive pyelonephritis w/ L sided ureteral stent placement  Worsening pain at home since discharge w/ return to ED on 5/10, found to have obstruction of stent, transferred to [facility] for urgent urology management  · Urology consulted, appreciate recs  · Continue home flomax  · IR PCN on 5/10  · Continue Ceftriaxone 2g q12h    Acute kidney injury superimposed on CKD [facility])  Assessment & Plan  · Secondary to obstructive pyelonephritis  · Baseline Cr 1 4-1 6  · Uptrending previously however making adequate urine, may have some degree of non-oliguric ATN   · Continue to trend UOP and Cr closely  · No additional episodes of hyperkalemia  · Consider d/c cook and initiate urinary retention protocol    Lactic acidosis  Assessment & Plan  · S/p large volume crystalloid resuscitation and maintenance IVF  · Given additional 1L fluid during the day and 1L overnight   · Recheck this AM     Sepsis (HCC)-resolved as of 5/12/2022  Assessment & Plan  · Hx of E   Coli and Strep Mitis bacteremia in prior hospitalization on 4/21  · Sensitive to Ceftriaxone and Ancef  · Repeat blood/urine cx pending  · Empirically starting Ceftriaxone 2g q12h  · IR PCN placed on 5/10      HTN (hypertension)  Assessment & Plan  · Holding home norvasc in setting of sepsis, restart if BP improved    Bacteremia due to Escherichia coli  Assessment & Plan  · History of this previously  · Cultures positive at [facility]  · Currently NG x 24hrs  · Continue ABX above and follow up on repeat cultures  · Follow up on sensitivities ----------------------------------------------------------------------------------------  HPI/24hr events: POD 2 s/p PCN placement, extubated yesterday to 3LPM NC  Given 2L IVF bolus for increasing lactic acidosis  Patient appropriate for transfer out of the ICU today?: Patient does not meet criteria for referral to the ICU Follow-Up Clinic; referral has not been made  Disposition: Transfer to Stepdown Level 2  Code Status: Level 1 - Full Code  ---------------------------------------------------------------------------------------  SUBJECTIVE  Reports left ankle pain, denies any recent trauma  Has had issues with that ankle in the past   Has some discomfort at site of PCN and is tired, but otherwise reports he feels much better  Review of Systems  Review of systems was reviewed and negative unless stated above in HPI/24-hour events   ---------------------------------------------------------------------------------------  OBJECTIVE    Vitals   Vitals:    22 2300 22 0000 22 0100 22 0200   BP: 127/62 134/68 115/58 127/63   BP [facility]:       Pulse: (!) 116 (!) 114 (!) 114 (!) 108   Resp: (!) 27 (!) 27 (!) 27 (!) 24   Temp: (!) 100 58 °F (38 1 °C) (!) 100 76 °F (38 2 °C) 100 04 °F (37 8 °C) 99 14 °F (37 3 °C)   TempSrc:       SpO2: 98% 98% 94% 93%   Weight:       Height:         Temp (24hrs), Av 1 °F (37 8 °C), Min:98 96 °F (37 2 °C), Max:101 3 °F (38 5 °C)  Current: Temperature: 99 14 °F (37 3 °C)  Arterial Line BP: 77/71  Arterial Line MAP (mmHg): 73 mmHg    Respiratory:  SpO2: SpO2: 93 %  Nasal Cannula O2 Flow Rate (L/min): 2 L/min    Invasive/non-invasive ventilation settings   Respiratory  Report   Lab Data (Last 4 hours)    None         O2/Vent Data (Last 4 hours)    None                Physical Exam  Vitals reviewed  Constitutional:       General: He is not in acute distress  Appearance: He is well-developed  HENT:      Head: Normocephalic and atraumatic  Eyes:      Pupils: Pupils are equal, round, and reactive to light  Neck:      Vascular: No JVD  Trachea: No tracheal deviation  Cardiovascular:      Rate and Rhythm: Normal rate and regular rhythm  Heart sounds: Normal heart sounds  No murmur heard  No friction rub  No gallop  Pulmonary:      Effort: Pulmonary effort is normal  No respiratory distress  Breath sounds: Normal breath sounds  No wheezing or rales  Chest:      Chest wall: No tenderness  Abdominal:      General: Bowel sounds are normal  There is no distension  Palpations: Abdomen is soft  Tenderness: There is no abdominal tenderness  Musculoskeletal:      Right lower leg: Edema present  Left lower leg: Edema present  Comments: Left ankle pain with dorsiflexion and palpation of entire ankle  No ecchymosis or obvious edema   Skin:     General: Skin is warm and dry  Neurological:      Mental Status: He is alert and oriented to person, place, and time        Comments: Nonfocal exam  Follows 2 step commands briskly               Laboratory and Diagnostics:  Results from last 7 days   Lab Units 05/11/22  0434 05/10/22  2155 05/10/22  2019 05/10/22  1932 05/10/22  1346 05/10/22  0518   WBC Thousand/uL 21 88* 29 78*  --   --  42 89* 26 14*   HEMOGLOBIN g/dL 11 1* 11 5*  --   --  13 7 16 6   I STAT HEMOGLOBIN g/dl  --   --  11 9* 12 6  --   --    HEMATOCRIT % 33 9* 36 6  --   --  45 6 53 2*   HEMATOCRIT, ISTAT %  --   --  35* 37  --   --    PLATELETS Thousands/uL 241 289  --   --  382 450*   NEUTROS PCT %  --   --   --   --  93*  --    BANDS PCT %  --   --   --   --   --  11*   MONOS PCT %  --   --   --   --  3*  --    MONO PCT %  --   --   --   --   --  2*     Results from last 7 days   Lab Units 05/11/22  1340 05/11/22  0434 05/10/22  2155 05/10/22  2019 05/10/22  1932 05/10/22  1622 05/10/22  1346 05/10/22  0518   SODIUM mmol/L 138 140 139  --   --   --  135* 136   POTASSIUM mmol/L 4 8 4 6 5 7*  --   --  5 5* 6  3* 4 5   CHLORIDE mmol/L 106 109* 110*  --   --   --  110* 102   CO2 mmol/L 21 22 21  --   --   --  14* 17*   CO2, I-STAT mmol/L  --   --   --  23 17*  --   --   --    ANION GAP mmol/L 11 9 8  --   --   --  11 17*   BUN mg/dL 47* 49* 52*  --   --   --  53* 52*   CREATININE mg/dL 3 78* 3 57* 3 65*  --   --   --  3 72* 2 92*   CALCIUM mg/dL 9 5 10 2* 10 5*  --   --   --  8 4 9 8   GLUCOSE RANDOM mg/dL 200* 134 124  --   --   --  133 142*   ALT U/L  --   --   --   --   --   --  38 50   AST U/L  --   --   --   --   --   --  33 31   ALK PHOS U/L  --   --   --   --   --   --  54 78   ALBUMIN g/dL  --   --   --   --   --   --  2 3* 3 4*   TOTAL BILIRUBIN mg/dL  --   --   --   --   --   --  0 66 0 59     Results from last 7 days   Lab Units 05/11/22  0434 05/10/22  2155 05/10/22  1346   MAGNESIUM mg/dL 2 5 2 9* 1 5*   PHOSPHORUS mg/dL 4 8* 7 1* 2 7      Results from last 7 days   Lab Units 05/10/22  1346 05/10/22  0518   INR  1 27* 1 09   PTT seconds 99* 26          Results from last 7 days   Lab Units 05/11/22 2029 05/11/22  1340 05/11/22  0218 05/10/22  2155 05/10/22  1745 05/10/22  1348 05/10/22  0816   LACTIC ACID mmol/L 3 5* 3 5* 3 1* 3 2* 4 0* 5 4* 4 2*     ABG:  Results from last 7 days   Lab Units 05/11/22 0433   PH ART  7 436   PCO2 ART mm Hg 32 8*   PO2 ART mm Hg 167 5*   HCO3 ART mmol/L 21 6*   BASE EXC ART mmol/L -1 9   ABG SOURCE  Line, Arterial     VBG:  Results from last 7 days   Lab Units 05/11/22  0433 05/10/22  2156 05/10/22  1350   PH DOLORES   --   --  7 276*   PCO2 DOLORES mm Hg  --   --  32 5*   PO2 DOLORES mm Hg  --   --  38 7   HCO3 DOLORES mmol/L  --   --  14 8*   BASE EXC DOLORES mmol/L  --   --  -10 8   ABG SOURCE  Line, Arterial   < >  --     < > = values in this interval not displayed       Results from last 7 days   Lab Units 05/10/22  0518   PROCALCITONIN ng/ml 2 30*       Micro  Results from last 7 days   Lab Units 05/10/22  1353 05/10/22  1350 05/10/22  1307 05/10/22  0531 05/10/22  0523 05/10/22  0518 BLOOD CULTURE  No Growth at 24 hrs  No Growth at 24 hrs   --   --  No Growth at 24 hrs  Escherichia coli*   URINE CULTURE   --   --  Culture results to follow  >100,000 cfu/ml Gram Negative Anselmo Enteric Like*  --   --        EKG: no new  Imaging: no new    Intake and Output  I/O       05/10 0701 05/11 0700 05/11 0701 05/12 0700    P  O  0 2760    I V  (mL/kg) 2486 4 (15 8) 1622 1 (10 3)    Other  10    IV Piggyback 800 350    Total Intake(mL/kg) 3286 4 (20 9) 4742 1 (30 2)    Urine (mL/kg/hr) 4050 3800 (1)    Stool  0    Total Output 4050 3800    Net -763 7 +942 1          Unmeasured Stool Occurrence  1 x          Height and Weights   Height: 5' 6" (167 6 cm)  IBW (Ideal Body Weight): 63 8 kg  Body mass index is 55 83 kg/m²  Weight (last 2 days)     Date/Time Weight    05/11/22 0600 157 (345 9)    05/10/22 1247 151 (333 12)            Nutrition       Diet Orders   (From admission, onward)             Start     Ordered    05/11/22 1039  Diet Cardiovascular; Cardiac  Diet effective now        References:    Nutrtion Support Algorithm Enteral vs  Parenteral   Question Answer Comment   Diet Type Cardiovascular    Cardiac Cardiac    RD to adjust diet per protocol?  No        05/11/22 1039                  Active Medications  Scheduled Meds:  Current Facility-Administered Medications   Medication Dose Route Frequency Provider Last Rate    acetaminophen  650 mg Oral Q6H PRN Colon Rommel Tavera PA-C      cefTRIAXone  2,000 mg Intravenous Q12H Colon Rommel Martin PA-C Stopped (05/11/22 1045)    chlorhexidine  15 mL Mouth/Throat Q12H [facility] Colon [facility] VERÓNICA Martin      heparin (porcine)  7,500 Units Subcutaneous [facility] Colon Vaugh[place], [place]      multi-electrolyte  1,000 mL Intravenous Once Colon Rommel Martin PA-C      ondansetron  4 mg Intravenous Q6H PRN Colon Vaugh[place], [place]      oxyCODONE  2 5 mg Oral Q6H PRN Colon Vaugh[place], [place]      senna-docusate sodium  2 tablet Oral BID Jg Puckett PA-C Continuous Infusions:     PRN Meds:   acetaminophen, 650 mg, Q6H PRN  ondansetron, 4 mg, Q6H PRN  oxyCODONE, 2 5 mg, Q6H PRN        Invasive Devices Review  Invasive Devices  Report    Peripheral Intravenous Line  Duration           Peripheral IV 05/10/22 Left Antecubital 1 day    Peripheral IV 05/10/22 Left Arm 1 day    Peripheral IV 05/10/22 Left;Upper Arm 1 day    Peripheral IV 05/10/22 Right Antecubital 1 day          Hemodialysis Catheter  Duration           HD Temporary Double Catheter 1 day          Drain  Duration           Nephrostomy Left 8 Fr  1 day    Urethral Catheter 1 day                Rationale for remaining devices: d/c HD catheter and cook   ---------------------------------------------------------------------------------------  Advance Directive and Living Will: Yes    Power of :    POLST:    ---------------------------------------------------------------------------------------  Care Time Delivered:   No Critical Care time spent       [facility]      Portions of the record may have been created with voice recognition software  Occasional wrong word or "sound a like" substitutions may have occurred due to the inherent limitations of voice recognition software    Read the chart carefully and recognize, using context, where substitutions have occurred

## 2022-05-12 NOTE — RESPIRATORY THERAPY NOTE
05/12/22 0740   Respiratory Assessment   Assessment Type Assess only   General Appearance Sleeping   Resp Comments pt is resting at this time, he appears comfortable and in no distress, satting 95% on 2 lpm nc   O2 Device 2 lpm nc   Additional Assessments   Pulse 105   SpO2 95 %

## 2022-05-12 NOTE — PROGRESS NOTES
Progress Note - Urology  Minta Lennox 55 y o  male MRN: [id number]  Unit/Bed#: [facility] 625-44 Encounter: [id number]    Assessment & Plan:    Nephrolithiasis:  -patient presenting with sepsis most likely from  source   -patient status post ureteral stent placement with Urology on 4/22 currently scheduled for 2nd definitive stone treatment on 05/25  -CT scan on admission revealed hydronephrosis to fight ureteral stent in place, given patient's sepsis status along with hydronephrosis recommended left PCN placement with Interventional Radiology  -patient now status post PCN placement, draining blaise colored urine  -continue with medical optimization per primary team with IV fluids and antibiotics, adjust based off of culture data   -urethral Berumen catheter in place, draining clear yellow urine, may discontinue urethral Berumen catheter patient no longer meets sepsis criteria  No further  intervention required this admission  Urology will sign off  We are always available for additional questions or concerns regarding this patient  Maintain PCN to drainage, flush daily, plan for 2nd definitive stone treatment on 05/25 if patient improves  May be pushed back if patient continues to be hospitalized  Subjective/Objective   Chief Complaint:  Soreness    Subjective:   Patient lying comfortably in bed no acute distress attached to BiPAP  He currently denies any nausea, vomiting,chills, flank pain or abdominal pain  Does report mild soreness at site of insertion of nephrostomy tube  Otherwise is doing well    Any additional complaints    Objective:   05/11/22 0900 99 86 °F (37 7 °C) 116 Abnormal  25 Abnormal  101/55 75 105/44 64 mmHg 92 % -- -- -- -- --           Invasive Devices  Report    Peripheral Intravenous Line  Duration           Peripheral IV 05/10/22 Left Antecubital 2 days    Peripheral IV 05/10/22 Left;Upper Arm 2 days    Peripheral IV 05/10/22 Right Antecubital 2 days    Peripheral IV 05/10/22 Left Arm 1 day          Drain  Duration           Nephrostomy Left 8 Fr  1 day    Urethral Catheter 1 day                Physical Exam  Constitutional:       General: He is not in acute distress  Appearance: He is obese  He is not ill-appearing, toxic-appearing or diaphoretic  HENT:      Head: Normocephalic and atraumatic  Right Ear: External ear normal       Left Ear: External ear normal       Nose: Nose normal       Mouth/Throat:      Pharynx: Oropharynx is clear  Eyes:      General: No scleral icterus  Conjunctiva/sclera: Conjunctivae normal    Cardiovascular:      Rate and Rhythm: Normal rate  Pulses: Normal pulses  Pulmonary:      Effort: Pulmonary effort is normal    Abdominal:      General: Bowel sounds are normal    Musculoskeletal:      Cervical back: Normal range of motion  Neurological:      General: No focal deficit present  Mental Status: He is alert and oriented to person, place, and time  Psychiatric:         Mood and Affect: Mood normal          Behavior: Behavior normal          Thought Content: Thought content normal          Judgment: Judgment normal            Lab, Imaging and other studies:I have personally reviewed pertinent lab results       Latest Reference Range & Units 05/11/22 04:34   Sodium 136 - 145 mmol/L 140   Potassium 3 5 - 5 3 mmol/L 4 6   Chloride 100 - 108 mmol/L 109 (H)   CO2 21 - 32 mmol/L 22   Anion Gap 4 - 13 mmol/L 9   BUN 5 - 25 mg/dL 49 (H)   Creatinine 0 60 - 1 30 mg/dL 3 57 (H) [1]   Glucose, Random 65 - 140 mg/dL 134 [2]   Calcium 8 3 - 10 1 mg/dL 10 2 (H)   eGFR ml/min/1 73sq m 18   Phosphorus 2 7 - 4 5 mg/dL 4 8 (H)   Magnesium 1 6 - 2 6 mg/dL 2 5   WBC 4 31 - 10 16 Thousand/uL 21 88 (H)   Red Blood Cell Count 3 88 - 5 62 Million/uL 3 65 (L)   Hemoglobin 12 0 - 17 0 g/dL 11 1 (L)   HCT 36 5 - 49 3 % 33 9 (L)   MCV 82 - 98 fL 93   MCH 26 8 - 34 3 pg 30 4   MCHC 31 4 - 37 4 g/dL 32 7   RDW 11 6 - 15 1 % 14 0   Platelet Count 929 - 390 Thousands/uL 241   MPV 8 9 - 12 7 fL 10 1   nRBC /100 WBCs 0 [3]     VTE Pharmacologic Prophylaxis: Heparin  VTE Mechanical Prophylaxis: sequential compression device    Mary Alexander PA-C

## 2022-05-12 NOTE — PROGRESS NOTES
Patient seen and evaluated by Critical Care today and deemed to be appropriate for transfer to Med-Surg   Spoke to Dr Uma Alan from [address] [place] [address]  Critical care can be contacted via [employer] with any questions or concerns

## 2022-05-12 NOTE — ASSESSMENT & PLAN NOTE
· History of this previously  · Cultures positive at [facility]  · Currently NG x 24hrs  · Continue ABX above and follow up on repeat cultures  · Follow up on sensitivities

## 2022-05-13 ENCOUNTER — TELEPHONE (OUTPATIENT)
Dept: UROLOGY | Facility: MEDICAL CENTER | Age: 57
End: 2022-05-13

## 2022-05-13 ENCOUNTER — APPOINTMENT (INPATIENT)
Dept: RADIOLOGY | Facility: HOSPITAL | Age: 57
DRG: 698 | End: 2022-05-13
Payer: COMMERCIAL

## 2022-05-13 PROBLEM — D64.9 ANEMIA: Status: ACTIVE | Noted: 2022-05-13

## 2022-05-13 PROBLEM — R65.20 SEVERE SEPSIS (HCC): Status: ACTIVE | Noted: 2019-08-23

## 2022-05-13 LAB
ANION GAP SERPL CALCULATED.3IONS-SCNC: 7 MMOL/L (ref 4–13)
BACTERIA BLD CULT: ABNORMAL
BUN SERPL-MCNC: 35 MG/DL (ref 5–25)
CALCIUM SERPL-MCNC: 9.4 MG/DL (ref 8.3–10.1)
CHLORIDE SERPL-SCNC: 103 MMOL/L (ref 100–108)
CO2 SERPL-SCNC: 27 MMOL/L (ref 21–32)
CREAT SERPL-MCNC: 2.55 MG/DL (ref 0.6–1.3)
CRP SERPL QL: 230 MG/L
E COLI DNA BLD POS QL NAA+NON-PROBE: DETECTED
ERYTHROCYTE [SEDIMENTATION RATE] IN BLOOD: 118 MM/HOUR (ref 0–19)
GFR SERPL CREATININE-BSD FRML MDRD: 27 ML/MIN/1.73SQ M
GLUCOSE SERPL-MCNC: 101 MG/DL (ref 65–140)
MAGNESIUM SERPL-MCNC: 2.3 MG/DL (ref 1.6–2.6)
POTASSIUM SERPL-SCNC: 3.7 MMOL/L (ref 3.5–5.3)
SODIUM SERPL-SCNC: 137 MMOL/L (ref 136–145)

## 2022-05-13 PROCEDURE — 73560 X-RAY EXAM OF KNEE 1 OR 2: CPT

## 2022-05-13 PROCEDURE — 85652 RBC SED RATE AUTOMATED: CPT

## 2022-05-13 PROCEDURE — 99233 SBSQ HOSP IP/OBS HIGH 50: CPT | Performed by: INTERNAL MEDICINE

## 2022-05-13 PROCEDURE — 86140 C-REACTIVE PROTEIN: CPT

## 2022-05-13 PROCEDURE — 80048 BASIC METABOLIC PNL TOTAL CA: CPT | Performed by: PHYSICIAN ASSISTANT

## 2022-05-13 PROCEDURE — 83735 ASSAY OF MAGNESIUM: CPT | Performed by: PHYSICIAN ASSISTANT

## 2022-05-13 RX ORDER — LIDOCAINE HYDROCHLORIDE 10 MG/ML
20 INJECTION, SOLUTION EPIDURAL; INFILTRATION; INTRACAUDAL; PERINEURAL ONCE
Status: COMPLETED | OUTPATIENT
Start: 2022-05-13 | End: 2022-05-13

## 2022-05-13 RX ORDER — LABETALOL HYDROCHLORIDE 5 MG/ML
10 INJECTION, SOLUTION INTRAVENOUS EVERY 4 HOURS PRN
Status: DISCONTINUED | OUTPATIENT
Start: 2022-05-13 | End: 2022-05-19 | Stop reason: HOSPADM

## 2022-05-13 RX ADMIN — ACETAMINOPHEN 650 MG: 325 TABLET, FILM COATED ORAL at 01:41

## 2022-05-13 RX ADMIN — AMLODIPINE BESYLATE 10 MG: 10 TABLET ORAL at 08:25

## 2022-05-13 RX ADMIN — LIDOCAINE HYDROCHLORIDE 20 ML: 10 INJECTION, SOLUTION EPIDURAL; INFILTRATION; INTRACAUDAL; PERINEURAL at 17:00

## 2022-05-13 RX ADMIN — HEPARIN SODIUM 7500 UNITS: 5000 INJECTION INTRAVENOUS; SUBCUTANEOUS at 16:12

## 2022-05-13 RX ADMIN — CEFTRIAXONE 2000 MG: 2 INJECTION, POWDER, FOR SOLUTION INTRAMUSCULAR; INTRAVENOUS at 10:37

## 2022-05-13 RX ADMIN — HEPARIN SODIUM 7500 UNITS: 5000 INJECTION INTRAVENOUS; SUBCUTANEOUS at 05:42

## 2022-05-13 RX ADMIN — OXYCODONE HYDROCHLORIDE 2.5 MG: 5 TABLET ORAL at 05:41

## 2022-05-13 RX ADMIN — CEFTRIAXONE 2000 MG: 2 INJECTION, POWDER, FOR SOLUTION INTRAMUSCULAR; INTRAVENOUS at 22:27

## 2022-05-13 RX ADMIN — HEPARIN SODIUM 7500 UNITS: 5000 INJECTION INTRAVENOUS; SUBCUTANEOUS at 22:38

## 2022-05-13 RX ADMIN — OXYCODONE HYDROCHLORIDE 2.5 MG: 5 TABLET ORAL at 22:48

## 2022-05-13 RX ADMIN — TAMSULOSIN HYDROCHLORIDE 0.4 MG: 0.4 CAPSULE ORAL at 16:12

## 2022-05-13 NOTE — ASSESSMENT & PLAN NOTE
Presented w/ lactic acidosis coupled with fever/tachycardia/tachypnea/leukocytosis in the setting of E  coli pyelonephritis/bacteremia (see below)  Once hemodynamically stabilized, transferred out of the ICU  Monitor vitals and maintain hemodynamics

## 2022-05-13 NOTE — PROGRESS NOTES
[address] [facility]  Progress Note - Cheryl Oliveira 1965, 55 y o  male MRN: [id number]  Unit/Bed#: [facility] 912-01 Encounter: [id number]  Primary Care Provider: RITA Cabral   Date and time admitted to hospital: 5/10/2022 12:42 PM      * Severe sepsis on admission  Assessment & Plan  Presented w/ lactic acidosis coupled with fever/tachycardia/tachypnea/leukocytosis in the setting of E  coli pyelonephritis/bacteremia (see below)  Once hemodynamically stabilized, transferred out of the ICU  Monitor vitals and maintain hemodynamics    Obstructive pyelonephritis  Assessment & Plan  CT imaging on admission revealed "Mild left hydronephrosis status post ureteral stent placement, with 3 mm calculus in the mid ureter adjacent to the stent   Multiple bilateral renal calculi "  Recently underwent left-sided ureteral stenting on 4/21 -> found to have stent obstruction requiring revisit to hospital w/ subsequent IR guided left PCN placement on 5/10  PRN pain/emesis control  Appreciate urology input -> continue Flomax and outpatient follow-up for secondary stone treatment on 5/25    Bacteremia due to Escherichia coli  Assessment & Plan  Positive growth of E coli in 1 blood culture bottle from 5/10 - likely associated w/ UTI/pyelonephritis  Continue IV Rocephin (bacteremic dose)    Acute kidney injury superimposed on CKD stage 3  Assessment & Plan  Baseline creatinine approximately 1 4-1 6 - remains elevated @ 2 5 from prior peak of 3 72  Likely secondary to severe sepsis and obstructive pyelonephritis (see above)  Monitor renal function and urine output - limit/avoid nephrotoxins if possible - avoid hypotension as possible    Recurrent left knee pain  Assessment & Plan  Ongoing issue over the last several weeks now leading to difficulty bearing weight secondary to pain  Encourage weight loss in the setting of morbid obesity  XR of knee without acute osseous abnormality, however, did reveal tricompartmental osteoarthritis similar to imaging last year  Will appreciate orthopedic surgery input  Ongoing PT/OT evaluations    Morbid obesity  Assessment & Plan  BMI of 55 30  Lifestyle/diet modifications    Essential hypertension  Assessment & Plan  Low-sodium diet  Continue Norvasc - PRN IV Labetalol on board for BP spikes    Anemia  Assessment & Plan  Consider dilutional effect from aggressive IV fluid hydration  Check stool occult blood - Check iron panel/reticulocyte count - check B12/folate  Monitor H/H      DVT Prophylaxis:  Heparin SC       Patient Centered Rounds:  I have performed bedside rounds and discussed plan of care with nursing today  Discussions with Specialists or Other Care Team Provider:  see above assessments if applicable    Education and Discussions with Family / Patient:  Patient, along with father, at bedside    Time Spent for Care:  32 minutes  More than 50% of total time spent on counseling and coordination of care as described above  Current Length of Stay: 3 day(s)    Current Patient Status: Inpatient   Certification Statement:  Patient will continue to require additional hospital stay due to assessments as noted above  Code Status: Level 1 - Full Code        Subjective:     Seen/examined earlier today  Remains weak/fatigued  Still complains of waxing/waning but persistent left knee discomfort with ambulatory dysfunction  Currently denies fever/chills this time  Objective:     Vitals:   Temp (24hrs), Av 5 °F (37 5 °C), Min:98 9 °F (37 2 °C), Max:100 7 °F (38 2 °C)    Temp:  [98 9 °F (37 2 °C)-100 7 °F (38 2 °C)] 98 9 °F (37 2 °C)  HR:  [105-107] 107  Resp:  [18-22] 18  BP: (143-154)/(82-95) 143/89  SpO2:  [95 %-96 %] 95 %  Body mass index is 55 3 kg/m²  Input and Output Summary (last 24 hours):        Intake/Output Summary (Last 24 hours) at 2022 1643  Last data filed at 2022 1315  Gross per 24 hour   Intake 420 ml   Output 2650 ml   Net -2230 ml       Physical Exam:     GENERAL:  Obese - weak/fatigued  HEAD:  Normocephalic - atraumatic  EYES: PERRL - EOMI   MOUTH:  Mucosa moist  NECK:  Supple - full range of motion  CARDIAC:  Rate controlled - S1/S2 positive  PULMONARY:  Fairly clear to auscultation but diminished respiratory effort due to body habitus - nonlabored respirations  ABDOMEN:  Soft - nontender/nondistended - active bowel sounds - left PCN tube in place  MUSCULOSKELETAL:  Motor strength/range of motion deconditioned - limited distal LLE mobility secondary to knee discomfort  NEUROLOGIC:  Alert/oriented at baseline  SKIN:  Chronic wrinkles/blemishes   PSYCHIATRIC:  Mood/affect stable      Additional Data:     Labs & Recent Cultures:    Results from last 7 days   Lab Units 05/12/22  0507 05/10/22  1932 05/10/22  1346 05/10/22  0518   WBC Thousand/uL 14 69*   < > 42 89* 26 14*   HEMOGLOBIN g/dL 9 1*   < > 13 7 16 6   I STAT HEMOGLOBIN   --    < >  --   --    HEMATOCRIT % 30 1*   < > 45 6 53 2*   HEMATOCRIT, ISTAT   --    < >  --   --    PLATELETS Thousands/uL 169   < > 382 450*   BANDS PCT %  --   --   --  11*   NEUTROS PCT %  --   --  93*  --    LYMPHS PCT %  --   --  1*  --    LYMPHO PCT %  --   --   --  0*   MONOS PCT %  --   --  3*  --    MONO PCT %  --   --   --  2*   EOS PCT %  --   --  0 0    < > = values in this interval not displayed  Results from last 7 days   Lab Units 05/13/22  0533 05/10/22  2155 05/10/22  2019 05/10/22  1622 05/10/22  1346   POTASSIUM mmol/L 3 7   < >  --    < > 6 3*   CHLORIDE mmol/L 103   < >  --   --  110*   CO2 mmol/L 27   < >  --   --  14*   CO2, I-STAT mmol/L  --   --  23   < >  --    BUN mg/dL 35*   < >  --   --  53*   CREATININE mg/dL 2 55*   < >  --   --  3 72*   CALCIUM mg/dL 9 4   < >  --   --  8 4   ALK PHOS U/L  --   --   --   --  54   ALT U/L  --   --   --   --  38   AST U/L  --   --   --   --  33   GLUCOSE, ISTAT mg/dl  --   --  125   < >  --     < > = values in this interval not displayed  Results from last 7 days   Lab Units 05/10/22  1346   INR  1 27*     Results from last 7 days   Lab Units 05/11/22  0226 05/10/22  2157 05/10/22  1627   POC GLUCOSE mg/dl 110 123 129         Results from last 7 days   Lab Units 05/12/22  0619 05/11/22  2029 05/11/22  1340 05/11/22  0218 05/10/22  0816 05/10/22  0518   LACTIC ACID mmol/L 0 8 3 5* 3 5* 3 1*   < > 3 9*   PROCALCITONIN ng/ml  --   --   --   --   --  2 30*    < > = values in this interval not displayed  Results from last 7 days   Lab Units 05/10/22  1353 05/10/22  1350 05/10/22  1307 05/10/22  0531 05/10/22  0523 05/10/22  0518   BLOOD CULTURE  No Growth at 72 hrs  No Growth at 72 hrs   --   --  No Growth at 72 hrs  Escherichia coli*   URINE CULTURE   --   --  20,000-29,000 cfu/ml Escherichia coli* >100,000 cfu/ml Escherichia coli*  --   --          Last 24 Hours Medication List:   Current Facility-Administered Medications   Medication Dose Route Frequency Provider Last Rate    acetaminophen  650 mg Oral Q6H PRN Lita Cary PA-C      amLODIPine  10 mg Oral Daily Lita Cary PA-C      cefTRIAXone  2,000 mg Intravenous Q12H Lita Cary PA-C 2,000 mg (05/13/22 1037)    [MAR Hold] chlorhexidine  15 mL Mouth/Throat Q12H [address] [place]      [facility] Hold] glycerin-hypromellose-  1 drop Both Eyes Q3H PRN Gurpreet Thao PA-C      heparin (porcine)  7,500 Units Subcutaneous [facility] Lita Cary PA-C      labetalol  10 mg Intravenous Q4H PRN Arnol Bernal MD      ondansetron  4 mg Intravenous Q6H PRN Lita Cary PA-C      oxyCODONE  2 5 mg Oral Q6H PRN Lita Cary PA-C      senna-docusate sodium  2 tablet Oral BID Lita Cary PA-C      tamsulosin  0 4 mg Oral Daily With [employer], PA-C                      ** Please Note: This note is constructed using a voice recognition dictation system    An occasional wrong word/phrase or sound-a-like substitution may have been picked up by dictation device due to the inherent limitations of voice recognition software  Read the chart carefully and recognize, using reasonable context, where substitutions may have occurred  **

## 2022-05-13 NOTE — CASE MANAGEMENT
Case Management Assessment & Discharge Planning Note    Patient name Lucia Alex  [facility] [address] [place] [address] 912/PPHP [id number] MRN [id number]  : 1965 Date 2022       Current Admission Date: 5/10/2022  Current Admission Diagnosis:Obstructive pyelonephritis   Patient Active Problem List    Diagnosis Date Noted    Obstructive pyelonephritis 2022    Acute unilateral obstructive uropathy 2022    Mixed hyperlipidemia 2021    Morbid obesity with BMI of 50 0-59 9, adult ([facility] ) 2021    Acute pain of left knee 2021    Insomnia 2019    HTN (hypertension) 2019    Chronic kidney disease, stage 3 ([place] [address] ) 2019    Acute kidney injury superimposed on CKD ([place] [address] ) 2019    Bacteremia due to Escherichia coli 2019    Lactic acidosis 2019    History of gout 07/15/2019    Vitamin D deficiency 06/10/2015    Morbid obesity ([place] [address] ) 2015      LOS (days): 3  Geometric Mean LOS (GMLOS) (days): 4 80  Days to GMLOS:1 8     OBJECTIVE:  PATIENT READMITTED [facility] 35  of Unplanned Readmission Score: 24 6         Current admission status: Inpatient  Referral Reason: Follow Up, Rehab    Preferred Pharmacy:   [facility]/pharmacy #[id number]- [place] - [address]  [address]  [place] [address]  Phone: [phone] Fax: [phone] [place] Vel[place], [place] [address] [facility] [address]  Phone: [phone] Fax: [phone]    Primary Care Provider: RITA Kitchen    Primary Insurance: BLUE CROSS  Secondary Insurance:     ASSESSMENT:  [place] Proxies    There are no active Health Care Proxies on file                   Readmission Root Cause  30 Day Readmission: Yes  Who directed you to return to the hospital?: Self  During previous admission, was a post-acute recommendation made?: No  Patient was readmitted due to: left sided hydronephrosis, hematuria/bacteruria, ADRIANNE, tachycardia to 130s    Patient Information  Admitted from[de-identified] Facility (transfer from [facility])  Mental Status: Alert  During Assessment patient was accompanied by: Not accompanied during assessment  Assessment information provided by[de-identified] Patient  Primary Caregiver: Self  Support Systems: [address] [facility] of Residence: [address] do you live in?: Yolanda Kimmanjinder 1729 entry access options   Select all that apply : Stairs  Number of steps to enter home : 3  Type of Current Residence: 2 story home  Upon entering residence, is there a bedroom on the main floor (no further steps)?: No  A bedroom is located on the following floor levels of residence (select all that apply):: 2nd Floor  Upon entering residence, is there a bathroom on the main floor (no further steps)?: Yes  Number of steps to 2nd floor from main floor: One Flight  In the last 12 months, was there a time when you were not able to pay the mortgage or rent on time?: No  Living Arrangements: Lives Alone    Activities of Daily Living Prior to Admission  Functional Status: Independent  Completes ADLs independently?: Yes  Ambulates independently?: Yes  Does patient use assisted devices?: No  Does patient currently own DME?: Yes  What DME does the patient currently own?: [employer]  Does patient have a history of Outpatient Therapy (PT/OT)?: No  Does the patient have a history of Short-Term Rehab?: No  Does patient have a history of HHC?: No  [facility]         Patient Information Continued  Income Source: Employed (currently on FMLA, applying for disability)  Does patient have prescription coverage?: Yes  Within the past 12 months, you worried that your food would run out before you got the money to buy more : Never true  Within the past 12 months, the food you bought just didn't last and you didn't have money to get more : Never true  Food insecurity resource given?: N/A  Does patient receive dialysis treatments?: No  Does patient have a history of substance abuse?: No  Does patient have a history of Mental Health Diagnosis?: No         Means of Transportation  Means of Transport to Appts[de-identified] Drives Self  In the past 12 months, has lack of transportation kept you from medical appointments or from getting medications?: No  In the past 12 months, has lack of transportation kept you from meetings, work, or from getting things needed for daily living?: No  Was application for public transport provided?: N/A        DISCHARGE DETAILS:    Discharge planning discussed with[de-identified] patient  Freedom of Choice: Yes  Comments - Freedom of Choice: patient feels that he will need rehab                Contacts  Patient Contacts: Vidal VelazquezLogan, father  Relationship to Patient[de-identified] Family  Contact Method: Phone  Phone Number: [phone]  Reason/Outcome: Emergency [address]         Is the patient interested in [place] [address] at discharge?: No    DME Referral Provided  Referral made for DME?: No              Treatment Team Recommendation: Other (TBD)  Discharge Destination Plan[de-identified] Other (TBD)  Transport at Discharge : Other (Comment) (TBD depending dispo)        Patient/caregiver received discharge checklist   Content reviewed  Patient/caregiver encouraged to participate in discharge plan of care prior to discharge home  CM reviewed d/c planning process including the following: identifying help at home, patient preference for d/c planning needs, Discharge Lounge, [facility] Meds to Bed program, availability of treatment team to discuss questions or concerns patient and/or family may have regarding understanding medications and recognizing signs and symptoms once discharged  CM also encouraged patient to follow up with all recommended appointments after discharge  Patient advised of importance for patient and family to participate in managing patients medical well being  Additional Comments: Patient is independent at baseline    Patient expressed that he is unable to move well and feels that he may need rehab upon discharge  Awaiting PT/OT recommendations, assigned CM to follow

## 2022-05-13 NOTE — ASSESSMENT & PLAN NOTE
Consider dilutional effect from aggressive IV fluid hydration  Check stool occult blood - Check iron panel/reticulocyte count - check B12/folate  Monitor H/H

## 2022-05-13 NOTE — ASSESSMENT & PLAN NOTE
Baseline creatinine approximately 1 4-1 6 - remains elevated @ 2 5 from prior peak of 3 72  Likely secondary to severe sepsis and obstructive pyelonephritis (see above)  Monitor renal function and urine output - limit/avoid nephrotoxins if possible - avoid hypotension as possible

## 2022-05-13 NOTE — ASSESSMENT & PLAN NOTE
Ongoing issue over the last several weeks now leading to difficulty bearing weight secondary to pain  Encourage weight loss in the setting of morbid obesity  XR of knee without acute osseous abnormality, however, did reveal tricompartmental osteoarthritis similar to imaging last year  Will appreciate orthopedic surgery input  Ongoing PT/OT evaluations

## 2022-05-13 NOTE — CONSULTS
Orthopedics   Carmen Friend 55 y o  male MRN: [id number]  Unit/Bed#: [facility] 912-01      Chief Complaint:   left knee pain    HPI:   55 y  o male complaining of left knee pain  Patient has past medical history of CKD stage 3, morbid obesity, vitamin-D deficiency, history of gout, insomnia, hyperlipidemia, and unilateral obstructive uropathy  Patient normally ambulates without assistance, though he has been using a rolling walker while inpatient  Patient does not take any blood thinners as an outpatient  Patient denies any inciting trauma or recent injury to the left lower extremity  He states that he first noticed the pain in his left knee following his cystoscopy and stent placement on 4/22, though it was more tolerable then  He recently underwent nephrostomy tube placement with IR on 5/10, and says he developed pain in the left knee again, this time being worse  Orthopedics was consulted for intractable left knee pain  Patient denies any numbness or tingling to the left lower extremity      Review Of Systems:   · Skin: Normal  · Neuro: See HPI  · Musculoskeletal: See HPI  · 14 point review of systems negative except as stated above     Past Medical History:   Past Medical History:   Diagnosis Date    Arthritis     Gout     Hypertension     Kidney stone        Past Surgical History:   Past Surgical History:   Procedure Laterality Date    FL RETROGRADE PYELOGRAM  8/4/2019    FL RETROGRADE PYELOGRAM  9/26/2019    FL RETROGRADE PYELOGRAM  4/22/2022    IR NEPHROSTOMY TUBE PLACEMENT  5/10/2022    NO PAST SURGERIES  03/23/2015    Last assessed    PA CYSTO/URETERO W/LITHOTRIPSY &INDWELL STENT INSRT Right 9/26/2019    Procedure: CYSTOSCOPY URETEROSCOPY WITH RETROGRADE PYELOGRAM AND INSERTION STENT URETERAL;  Surgeon: Karlene Harper MD;  [facility];  Service: Urology    PA CYSTOURETHROSCOPY,URETER CATHETER Right 8/4/2019    Procedure: CYSTOSCOPY RETROGRADE PYELOGRAM WITH INSERTION STENT URETERAL; Surgeon: Pennie Hearn MD;  [facility];  Service: Urology    MN CYSTOURETHROSCOPY,URETER CATHETER Left 4/22/2022    Procedure: CYSTOSCOPY RETROGRADE PYELOGRAM WITH INSERTION STENT URETERAL;  Surgeon: Nika Jenkins MD;  [facility];  Service: Urology       Family History:  Family history reviewed and non-contributory  Family History   Problem Relation Age of Onset    Cancer Mother        Social History:  Social History     Socioeconomic History    Marital status: Single     Spouse name: Not on file    Number of children: Not on file    Years of education: Not on file    Highest education level: Not on file   Occupational History    Not on file   Tobacco Use    Smoking status: Never Smoker    Smokeless tobacco: Never Used   Vaping Use    Vaping Use: Never used   Substance and Sexual Activity    Alcohol use: Not Currently     Alcohol/week: 0 0 standard drinks     Comment: rarely    Drug use: No    Sexual activity: Not on file   Other Topics Concern    Not on file   Social History Narrative    Caffeine use      Social Determinants of Health     Financial Resource Strain: Not on file   Food Insecurity: No Food Insecurity    Worried About Running Out of Food in the Last Year: Never true    Isis of Food in the Last Year: Never true   Transportation Needs: No Transportation Needs    Lack of Transportation (Medical): No    Lack of Transportation (Non-Medical):  No   Physical Activity: Not on file   Stress: Not on file   Social Connections: Not on file   Intimate Partner Violence: Not on file   Housing Stability: Low Risk     Unable to Pay for Housing in the Last Year: No    Number of Places Lived in the Last Year: 1    Unstable Housing in the Last Year: No       Allergies:   No Known Allergies        Labs:  0   Lab Value Date/Time    HCT 30 1 (L) 05/12/2022 0507    HCT 33 9 (L) 05/11/2022 0434    HCT 36 6 05/10/2022 2155    HCT 35 (L) 05/10/2022 2019    HCT 37 05/10/2022 1932    HCT 43 4 04/08/2015 2037    HCT 49 0 03/24/2015 0953    HCT 47 2 09/23/2013 1506    HGB 9 1 (L) 05/12/2022 0507    HGB 11 1 (L) 05/11/2022 0434    HGB 11 5 (L) 05/10/2022 2155    HGB 11 9 (L) 05/10/2022 2019    HGB 12 6 05/10/2022 1932    HGB 14 3 04/08/2015 2037    HGB 16 1 03/24/2015 0953    HGB 16 0 09/23/2013 1506    INR 1 27 (H) 05/10/2022 1346    WBC 14 69 (H) 05/12/2022 0507    WBC 21 88 (H) 05/11/2022 0434    WBC 29 78 (H) 05/10/2022 2155    WBC 12 98 (H) 04/08/2015 2037    WBC 8 68 03/24/2015 0953    WBC 10 92 (H) 09/23/2013 1506       Meds:    Current Facility-Administered Medications:     acetaminophen (TYLENOL) tablet 650 mg, 650 mg, Oral, Q6H PRN, Lita Cary PA-C, 650 mg at 05/13/22 0141    amLODIPine (NORVASC) tablet 10 mg, 10 mg, Oral, Daily, Lita Cary PA-C, 10 mg at 05/13/22 0825    cefTRIAXone (ROCEPHIN) 2,000 mg in dextrose 5 % 50 mL IVPB, 2,000 mg, Intravenous, Q12H, Lita Cary PA-C, Last Rate: 100 mL/hr at 05/13/22 1037, 2,000 mg at 05/13/22 1037    [MAR Hold] chlorhexidine (PERIDEX) 0 12 % oral rinse 15 mL, 15 mL, Mouth/Throat, Q12H [address] PA-C, 15 mL at 05/12/22 0831    [MAR Hold] glycerin-hypromellose- (ARTIFICIAL TEARS) ophthalmic solution 1 drop, 1 drop, Both Eyes, Q3H PRN, Lita Cary PA-C    heparin (porcine) subcutaneous injection 7,500 Units, 7,500 Units, Subcutaneous, Q8H [address], Lita Cary PA-C, 7,500 Units at 05/13/22 0542    labetalol (NORMODYNE) injection 10 mg, 10 mg, Intravenous, Q4H PRN, Nain Pro MD    ondansetron (ZOFRAN) injection 4 mg, 4 mg, Intravenous, Q6H PRN, Lita Cary PA-C    oxyCODONE (ROXICODONE) IR tablet 2 5 mg, 2 5 mg, Oral, Q6H PRN, 2 5 mg at 05/13/22 0541 **OR** [DISCONTINUED] oxyCODONE (ROXICODONE) IR tablet 5 mg, 5 mg, Oral, Q6H PRN, Tanvir Kim PA-C    senna-docusate sodium (SENOKOT S) 8 6-50 mg per tablet 2 tablet, 2 tablet, Oral, BID, Lita Cary PA-C    tamsulosin (FLOMAX) capsule 0 4 mg, 0 4 mg, Oral, Daily With [employer], Lita Cary PA-C, 0 4 mg at 05/12/22 1639    Blood Culture:   Lab Results   Component Value Date    BLOODCX No Growth at 48 hrs  05/10/2022       Wound Culture:   Lab Results   Component Value Date    WOUNDCULT No growth 07/11/2019       Ins and Outs:  I/O last 24 hours: In: 12 [P O :660; Other:10]  Out: 3500 [Urine:3500]          Physical Exam:   /94   Pulse (!) 107   Temp 98 9 °F (37 2 °C)   Resp 18   Ht 5' 6" (1 676 m)   Wt (!) 155 kg (342 lb 9 5 oz)   SpO2 95%   BMI 55 30 kg/m²     Musculoskeletal: left lower extremity  · Skin intact, no visible deformity  · Tender to palpation globally about knee  · No effusion  · Can perform straight leg raise  · Stable to varus/valgus stress, negative lachmans, posterior draw  · Positive medial and joint line tenderness  · Sensation intact L3-S1  · 5/5 strength to hip flexion/extension, knee flexion/extension, ankle dorsi/plantar flexion, EHL/FHL  · 2+ DP pulse    Radiology:   I personally reviewed the films  X-rays left knee shows tricompartmental degenerative changes, including joint space narrowing, subchondral sclerosis, and osteophytes  Enthesophyte noted anteriorly over tibial tubercle  No fracture or dislocation evident  Procedure- Orthopedics   Carmen Friend 55 y o  male MRN: [id number]  Unit/Bed#: [facility] 912-01    Procedure: left knee aspiration    After sterile preparation of the skin overlying the knee local anesthetic was provided with 5cc of 1% lidocaine  An 18 gauge spinal needle was then  inserted via a superior lateral portal   Approx less than 1 cc of hemorrhagic fluid was aspirated  Given the very little amount of aspirate, these were not sent for gram stain, culture, synovial WBC/RBC, and crystals due to very low concern of infectious etiology of knee pain  Sterile dressing was then applied  Pt tolerated the procedure well and was neurovascularly intact both pre and post procedure  Assessment:  55 y  o  male with left knee degenerative joint disease  Clinically low suspicion for septic arthritis but given elevated CRP, knee aspiration was performed  Given scant amount of blood and no effusion aspirated, very low concern for infection in the joint at this time  Plan:   · Weight bearing as tolerated to left lower extremity  · PT  · Pain control  · Body mass index is 55 3 kg/m²  morbidly obese  Recommend behavior modifications, nutrition and physical activity    · Dispo: Ortho will follow  · Patient seen in conjunction with senior resident    Juvenal Dee MD

## 2022-05-13 NOTE — UTILIZATION REVIEW
Continued Stay Review    Date: 5/13/2022                        Current Patient Class: inpatient  Current Level of Care: med/surg as of 5/12  HPI:56 y o  male initially admitted on 5/10 with obstructive pyelonephritis, ADRIANNE  Assessment/Plan: POD #3 s/p PCN placement, extubated on 5/11 to 3L NC, currently maintaining sat on room air     Given 2L IVF bolus for increasing lactic acidosis on the 11th  Pt still with intermittent elevated temps and remains tachycardic  Cr  2 55 today from 2 42 yesterday (baseline 1 4-1 6)  Continue to trend UOP and Cr closely  Continue IV abx  C/o pain L knee with difficulty ambulating  X-ray with DJD  Ortho consulted with no surgical intervention warranted  Recommend WBAT LLE  PT  Pain control  Pt feels he is not able to ambulate well and feels he may need rehab on d/c   PT eval for d/c rehab recommendation  Continue pain management  Cardiac diet  I/O's Po meds  Supportive care      Vital Signs:   Date/Time Temp Pulse Resp BP MAP (mmHg) Arterial Line BP MAP SpO2 Calculated FIO2 (%) - Nasal Cannula Nasal Cannula O2 Flow Rate (L/min) O2 Device O2 Interface Device Patient Position - Orthostatic VS   05/13/22 15:10:10 98 9 °F (37 2 °C) -- -- 143/89 107 -- -- -- -- -- -- -- --   05/13/22 0800 -- -- -- 148/82 104 -- -- -- -- -- -- -- --   05/13/22 07:55:28 98 9 °F (37 2 °C) 107 Abnormal  18 148/82 104 -- -- 95 % -- -- -- -- --   05/12/22 22:27:34 100 7 °F (38 2 °C) Abnormal  105 22 154/95 115 -- -- 96 % -- -- -- -- --   05/12/22 16:42:16 97 9 °F (36 6 °C) 113 Abnormal  24 Abnormal  157/97 117 -- -- 96 % -- -- None (Room air) -- Lying   05/12/22 1000 100 6 °F (38 1 °C) Abnormal  118 Abnormal  31 Abnormal  147/68 98 -- -- 92 % -- -- -- -- --   05/12/22 0900 100 °F (37 8 °C) 116 Abnormal  28 Abnormal  -- -- -- -- 96 % -- -- -- -- --   05/12/22 0830 99 9 °F (37 7 °C) 108 Abnormal  24 Abnormal  -- -- -- -- 98 % -- -- -- -- --   05/12/22 0800 99 9 °F (37 7 °C) 108 Abnormal  26 Abnormal  131/59 85 -- -- 96 % -- -- -- -- --   05/12/22 0600 99 86 °F (37 7 °C) 107 Abnormal  27 Abnormal  139/64 92 -- -- 98 % -- -- -- -- --   05/12/22 0100 100 04 °F (37 8 °C) 114 Abnormal  27 Abnormal  115/58 82 -- -- 94 % 28 2 L/min Nasal cannula -- --   05/12/22 0000 100 76 °F (38 2 °C) Abnormal  114 Abnormal  27 Abnormal  134/68 95 -- -- 98 % -- -- -- -- --       Pertinent Labs/Diagnostic Results:      XR L knee 5/13 -- No acute osseous abnormality     Tricompartmental osteoarthrosis is similar to June 2, 2021     Results from last 7 days   Lab Units 05/12/22  0507 05/11/22  0434 05/10/22  2155 05/10/22  2019 05/10/22  1932 05/10/22  1346 05/10/22  0518   WBC Thousand/uL 14 69* 21 88* 29 78*  --   --  42 89* 26 14*   HEMOGLOBIN g/dL 9 1* 11 1* 11 5*  --   --  13 7 16 6   I STAT HEMOGLOBIN g/dl  --   --   --  11 9* 12 6  --   --    HEMATOCRIT % 30 1* 33 9* 36 6  --   --  45 6 53 2*   HEMATOCRIT, ISTAT %  --   --   --  35* 37  --   --    PLATELETS Thousands/uL 169 241 289  --   --  382 450*   NEUTROS ABS Thousands/µL  --   --   --   --   --  40 06*  --    BANDS PCT %  --   --   --   --   --   --  11*     Results from last 7 days   Lab Units 05/13/22  0533 05/12/22  0507 05/11/22  1340 05/11/22  0434 05/10/22  2155 05/10/22  2019 05/10/22  1932 05/10/22  1622 05/10/22  1346   SODIUM mmol/L 137 142 138 140 139  --   --   --  135*   POTASSIUM mmol/L 3 7 3 5 4 8 4 6 5 7*  --   --    < > 6 3*   CHLORIDE mmol/L 103 112* 106 109* 110*  --   --   --  110*   CO2 mmol/L 27 25 21 22 21  --   --   --  14*   CO2, I-STAT mmol/L  --   --   --   --   --  23 17*   < >  --    ANION GAP mmol/L 7 5 11 9 8  --   --   --  11   BUN mg/dL 35* 37* 47* 49* 52*  --   --   --  53*   CREATININE mg/dL 2 55* 2 42* 3 78* 3 57* 3 65*  --   --   --  3 72*   EGFR ml/min/1 73sq m 27 28 16 18 17  --   --   --  17   CALCIUM mg/dL 9 4 7 7* 9 5 10 2* 10 5*  --   --   --  8 4   CALCIUM, IONIZED mmol/L  --   --   --  1 25 1 36*  --   --   --  1 02*   CALCIUM, IONIZED, ISTAT mmol/L  --   --   --   --   --  1 32 1 41*  --   --    MAGNESIUM mg/dL 2 3 2 1  --  2 5 2 9*  --   --   --  1 5*   PHOSPHORUS mg/dL  --   --   --  4 8* 7 1*  --   --   --  2 7    < > = values in this interval not displayed  Results from last 7 days   Lab Units 05/13/22  0533 05/12/22  0507 05/11/22  1340 05/11/22  0434 05/10/22  2155 05/10/22  1346 05/10/22  0518   GLUCOSE RANDOM mg/dL 101 93 200* 134 124 133 142*     Results from last 7 days   Lab Units 05/11/22  0433 05/10/22  2156   PH ART  7 436 7 265*   PCO2 ART mm Hg 32 8* 42 7   PO2 ART mm Hg 167 5* 97 7   HCO3 ART mmol/L 21 6* 18 9*   BASE EXC ART mmol/L -1 9 -7 7   O2 CONTENT ART mL/dL 17 1 17 1   O2 HGB, ARTERIAL % 98 6* 96 4   ABG SOURCE  Line, Arterial Line, Arterial     Results from last 7 days   Lab Units 05/12/22  0619 05/11/22 2029 05/11/22  1340 05/11/22  0218 05/10/22  2155   LACTIC ACID mmol/L 0 8 3 5* 3 5* 3 1* 3 2*       Results from last 7 days   Lab Units 05/10/22  1307 05/10/22  0531   CLARITY UA  Cloudy Cloudy   COLOR UA  Brown Yellow   SPEC GRAV UA  1 015 1 025   PH UA  6 0 6 0   GLUCOSE UA mg/dl Negative Negative   KETONES UA mg/dl Negative Negative   BLOOD UA  Large* Large*   PROTEIN UA mg/dl 300 (3+)* >=300*   NITRITE UA  Negative Negative   BILIRUBIN UA  Negative Negative   UROBILINOGEN UA E U /dl  --  0 2   UROBILINOGEN UA (BE) mg/dl <2 0  --    LEUKOCYTES UA  Large* Small*   WBC UA /hpf Innumerable* Innumerable*   RBC UA /hpf Innumerable* Innumerable*   BACTERIA UA /hpf None Seen Innumerable*   EPITHELIAL CELLS WET PREP /hpf None Seen Occasional     Results from last 7 days   Lab Units 05/10/22  1353 05/10/22  1350 05/10/22  1307 05/10/22  0531 05/10/22  0523 05/10/22  0518   BLOOD CULTURE  No Growth at 72 hrs  No Growth at 72 hrs   --   --  No Growth at 72 hrs   Escherichia coli*   URINE CULTURE   --   --  20,000-29,000 cfu/ml Escherichia coli* >100,000 cfu/ml Escherichia coli*  --   --        Medications:   Scheduled Medications:  amLODIPine, 10 mg, Oral, Daily  cefTRIAXone, 2,000 mg, Intravenous, Q12H  [MAR Hold] chlorhexidine, 15 mL, Mouth/Throat, Q12H LATRICE  heparin (porcine), 7,500 Units, Subcutaneous, Q8H LATRICE  senna-docusate sodium, 2 tablet, Oral, BID  tamsulosin, 0 4 mg, Oral, Daily With Dinner     PRN Meds:  acetaminophen, 650 mg, Oral, Q6H PRN 5/12 x3, 5/13 x1  [MAR Hold] glycerin-hypromellose-, 1 drop, Both Eyes, Q3H PRN  labetalol, 10 mg, Intravenous, Q4H PRN  ondansetron, 4 mg, Intravenous, Q6H PRN  oxyCODONE, 2 5 mg, Oral, Q6H PRN 5/12 x3, 5/13 x1        Discharge Plan: D    Network Utilization Review Department  ATTENTION: Please call with any questions or concerns to [phone] and carefully listen to the prompts so that you are directed to the right person  All voicemails are confidential   Lavern Ornelas all requests for admission clinical reviews, approved or denied determinations and any other requests to dedicated fax number below belonging to the campus where the patient is receiving treatment   List of dedicated fax numbers for the Facilities:  [address] DENIALS (Administrative/Medical Necessity) [phone]   [address] (Maternity/NICU/Pediatrics) [phone]   [address] [place] [address] 40 905 [facility] [address] [facility] [address] [address] [address] [phone]

## 2022-05-13 NOTE — ASSESSMENT & PLAN NOTE
Positive growth of E coli in 1 blood culture bottle from 5/10 - likely associated w/ UTI/pyelonephritis  Continue IV Rocephin (bacteremic dose)

## 2022-05-13 NOTE — ASSESSMENT & PLAN NOTE
CT imaging on admission revealed "Mild left hydronephrosis status post ureteral stent placement, with 3 mm calculus in the mid ureter adjacent to the stent   Multiple bilateral renal calculi "  Recently underwent left-sided ureteral stenting on 4/21 -> found to have stent obstruction requiring revisit to hospital w/ subsequent IR guided left PCN placement on 5/10  PRN pain/emesis control  Appreciate urology input -> continue Flomax and outpatient follow-up for secondary stone treatment on 5/25

## 2022-05-14 ENCOUNTER — APPOINTMENT (INPATIENT)
Dept: RADIOLOGY | Facility: HOSPITAL | Age: 57
DRG: 698 | End: 2022-05-14
Payer: COMMERCIAL

## 2022-05-14 LAB
ANION GAP SERPL CALCULATED.3IONS-SCNC: 9 MMOL/L (ref 4–13)
BASOPHILS # BLD AUTO: 0.06 THOUSANDS/ΜL (ref 0–0.1)
BASOPHILS NFR BLD AUTO: 1 % (ref 0–1)
BUN SERPL-MCNC: 35 MG/DL (ref 5–25)
CALCIUM SERPL-MCNC: 9.3 MG/DL (ref 8.3–10.1)
CHLORIDE SERPL-SCNC: 100 MMOL/L (ref 100–108)
CO2 SERPL-SCNC: 25 MMOL/L (ref 21–32)
CREAT SERPL-MCNC: 2.44 MG/DL (ref 0.6–1.3)
EOSINOPHIL # BLD AUTO: 0.32 THOUSAND/ΜL (ref 0–0.61)
EOSINOPHIL NFR BLD AUTO: 4 % (ref 0–6)
ERYTHROCYTE [DISTWIDTH] IN BLOOD BY AUTOMATED COUNT: 14 % (ref 11.6–15.1)
FERRITIN SERPL-MCNC: 1619 NG/ML (ref 8–388)
FOLATE SERPL-MCNC: 6 NG/ML (ref 3.1–17.5)
GFR SERPL CREATININE-BSD FRML MDRD: 28 ML/MIN/1.73SQ M
GLUCOSE SERPL-MCNC: 122 MG/DL (ref 65–140)
HCT VFR BLD AUTO: 37.3 % (ref 36.5–49.3)
HGB BLD-MCNC: 11.4 G/DL (ref 12–17)
IMM GRANULOCYTES # BLD AUTO: 0.15 THOUSAND/UL (ref 0–0.2)
IMM GRANULOCYTES NFR BLD AUTO: 2 % (ref 0–2)
IRON SATN MFR SERPL: 24 % (ref 20–50)
IRON SERPL-MCNC: 42 UG/DL (ref 65–175)
LYMPHOCYTES # BLD AUTO: 0.83 THOUSANDS/ΜL (ref 0.6–4.47)
LYMPHOCYTES NFR BLD AUTO: 9 % (ref 14–44)
MCH RBC QN AUTO: 29.2 PG (ref 26.8–34.3)
MCHC RBC AUTO-ENTMCNC: 30.6 G/DL (ref 31.4–37.4)
MCV RBC AUTO: 96 FL (ref 82–98)
MONOCYTES # BLD AUTO: 0.79 THOUSAND/ΜL (ref 0.17–1.22)
MONOCYTES NFR BLD AUTO: 9 % (ref 4–12)
NEUTROPHILS # BLD AUTO: 6.84 THOUSANDS/ΜL (ref 1.85–7.62)
NEUTS SEG NFR BLD AUTO: 75 % (ref 43–75)
NRBC BLD AUTO-RTO: 0 /100 WBCS
PLATELET # BLD AUTO: 158 THOUSANDS/UL (ref 149–390)
PMV BLD AUTO: 11.1 FL (ref 8.9–12.7)
POTASSIUM SERPL-SCNC: 3.7 MMOL/L (ref 3.5–5.3)
RBC # BLD AUTO: 3.9 MILLION/UL (ref 3.88–5.62)
RETICS # AUTO: NORMAL 10*3/UL (ref 14356–105094)
RETICS # CALC: 1.15 % (ref 0.37–1.87)
SODIUM SERPL-SCNC: 134 MMOL/L (ref 136–145)
TIBC SERPL-MCNC: 174 UG/DL (ref 250–450)
VIT B12 SERPL-MCNC: 283 PG/ML (ref 100–900)
WBC # BLD AUTO: 8.99 THOUSAND/UL (ref 4.31–10.16)

## 2022-05-14 PROCEDURE — NC001 PR NO CHARGE: Performed by: ORTHOPAEDIC SURGERY

## 2022-05-14 PROCEDURE — 97163 PT EVAL HIGH COMPLEX 45 MIN: CPT

## 2022-05-14 PROCEDURE — 85025 COMPLETE CBC W/AUTO DIFF WBC: CPT | Performed by: INTERNAL MEDICINE

## 2022-05-14 PROCEDURE — 83540 ASSAY OF IRON: CPT | Performed by: INTERNAL MEDICINE

## 2022-05-14 PROCEDURE — 83550 IRON BINDING TEST: CPT | Performed by: INTERNAL MEDICINE

## 2022-05-14 PROCEDURE — 82746 ASSAY OF FOLIC ACID SERUM: CPT | Performed by: INTERNAL MEDICINE

## 2022-05-14 PROCEDURE — 80048 BASIC METABOLIC PNL TOTAL CA: CPT | Performed by: INTERNAL MEDICINE

## 2022-05-14 PROCEDURE — 99233 SBSQ HOSP IP/OBS HIGH 50: CPT | Performed by: INTERNAL MEDICINE

## 2022-05-14 PROCEDURE — 73610 X-RAY EXAM OF ANKLE: CPT

## 2022-05-14 PROCEDURE — 82607 VITAMIN B-12: CPT | Performed by: INTERNAL MEDICINE

## 2022-05-14 PROCEDURE — 85045 AUTOMATED RETICULOCYTE COUNT: CPT | Performed by: INTERNAL MEDICINE

## 2022-05-14 PROCEDURE — 99222 1ST HOSP IP/OBS MODERATE 55: CPT | Performed by: ORTHOPAEDIC SURGERY

## 2022-05-14 PROCEDURE — 82728 ASSAY OF FERRITIN: CPT | Performed by: INTERNAL MEDICINE

## 2022-05-14 RX ORDER — LIDOCAINE 50 MG/G
1 PATCH TOPICAL ONCE
Status: COMPLETED | OUTPATIENT
Start: 2022-05-14 | End: 2022-05-14

## 2022-05-14 RX ADMIN — ACETAMINOPHEN 650 MG: 325 TABLET, FILM COATED ORAL at 18:04

## 2022-05-14 RX ADMIN — HEPARIN SODIUM 7500 UNITS: 5000 INJECTION INTRAVENOUS; SUBCUTANEOUS at 06:09

## 2022-05-14 RX ADMIN — HEPARIN SODIUM 7500 UNITS: 5000 INJECTION INTRAVENOUS; SUBCUTANEOUS at 22:37

## 2022-05-14 RX ADMIN — HEPARIN SODIUM 7500 UNITS: 5000 INJECTION INTRAVENOUS; SUBCUTANEOUS at 13:50

## 2022-05-14 RX ADMIN — CEFTRIAXONE 2000 MG: 2 INJECTION, POWDER, FOR SOLUTION INTRAMUSCULAR; INTRAVENOUS at 22:37

## 2022-05-14 RX ADMIN — LIDOCAINE 5% 1 PATCH: 700 PATCH TOPICAL at 09:04

## 2022-05-14 RX ADMIN — TAMSULOSIN HYDROCHLORIDE 0.4 MG: 0.4 CAPSULE ORAL at 18:04

## 2022-05-14 RX ADMIN — AMLODIPINE BESYLATE 10 MG: 10 TABLET ORAL at 09:04

## 2022-05-14 RX ADMIN — ACETAMINOPHEN 650 MG: 325 TABLET, FILM COATED ORAL at 09:04

## 2022-05-14 RX ADMIN — CEFTRIAXONE 2000 MG: 2 INJECTION, POWDER, FOR SOLUTION INTRAMUSCULAR; INTRAVENOUS at 09:59

## 2022-05-14 NOTE — ASSESSMENT & PLAN NOTE
Baseline creatinine approximately 1 4-1 6 - remains elevated @ 2 44 from a prior peak of 3 72  Likely secondary to severe sepsis and obstructive pyelonephritis (see above)  Monitor renal function and urine output - limit/avoid nephrotoxins if possible - avoid hypotension as possible

## 2022-05-14 NOTE — PROGRESS NOTES
[facility]  Progress Note - Vince Gusman 1965, 55 y o  male MRN: [id number]  Unit/Bed#: [facility] 912-01 Encounter: [id number]  Primary Care Provider: RITA Phelps   Date and time admitted to hospital: 5/10/2022 12:42 PM      * Severe sepsis on admission  Assessment & Plan  Presented w/ lactic acidosis coupled with fever/tachycardia/tachypnea/leukocytosis in the setting of E  coli pyelonephritis/bacteremia (see below)  Once hemodynamically stabilized, transferred out of the ICU  Monitor vitals and maintain hemodynamics    Obstructive pyelonephritis  Assessment & Plan  CT imaging on admission revealed "Mild left hydronephrosis status post ureteral stent placement, with 3 mm calculus in the mid ureter adjacent to the stent   Multiple bilateral renal calculi "  Recently underwent left-sided ureteral stenting on 4/21 -> found to have stent obstruction requiring revisit to hospital w/ subsequent IR guided left PCN placement on 5/10  PRN pain/emesis control  Appreciate urology input -> continue Flomax and outpatient follow-up for secondary stone treatment on 5/25    Bacteremia due to Escherichia coli  Assessment & Plan  Positive growth of E coli in one blood culture bottle from 5/10 - likely associated w/ UTI/pyelonephritis  Continue IV Rocephin (bacteremic dose)    Acute kidney injury superimposed on CKD stage 3  Assessment & Plan  Baseline creatinine approximately 1 4-1 6 - remains elevated @ 2 44 from a prior peak of 3 72  Likely secondary to severe sepsis and obstructive pyelonephritis (see above)  Monitor renal function and urine output - limit/avoid nephrotoxins if possible - avoid hypotension as possible    Recurrent left knee pain  Assessment & Plan  Ongoing issue over the last several weeks now leading to difficulty bearing weight secondary to pain  Encourage weight loss in the setting of morbid obesity  XR of knee without acute osseous abnormality, however, did reveal tricompartmental osteoarthritis similar to imaging last year  Appreciate orthopedic surgery input -> unsuccessful attempts at fluid aspiration -> recommending optimization of pain control -> may consider future joint steroid injection once acute sepsis/infection resolves    Morbid obesity  Assessment & Plan  BMI of 55 30 currently  Lifestyle/diet modifications    Essential hypertension  Assessment & Plan  Low-sodium diet  Continue Norvasc - PRN IV Labetalol on board for BP spikes    Anemia  Assessment & Plan  Consider dilutional effect from aggressive IV fluid hydration  Iron panel indicative of chronic disease (elevated ferritin stores) - B12/folate sufficient  Monitor H/H      DVT Prophylaxis:  Heparin SC      Patient Centered Rounds:  I have performed bedside rounds and discussed plan of care with nursing today  Discussions with Specialists or Other Care Team Provider:  see above assessments if applicable    Education and Discussions with Family / Patient:  Patient at bedside, who will self-update family today - discussed, in-person, with father yesterday  Time Spent for Care:  32 minutes  More than 50% of total time spent on counseling and coordination of care as described above  Current Length of Stay: 4 day(s)    Current Patient Status: Inpatient   Certification Statement:  Patient will continue to require additional hospital stay due to assessments as noted above  Code Status: Level 1 - Full Code        Subjective:     Seen/examined earlier today  Sitting upright in a chair at the time my encounter  States his left knee pain/discomfort is waxing/waning, aggravated by various range of movements on orthopedic surgery encounter earlier today  Denies fever/chills this time  States appetite is waxing/waning but starting to improve          Objective:     Vitals:   Temp (24hrs), Av °F (37 2 °C), Min:98 7 °F (37 1 °C), Max:99 5 °F (37 5 °C)    Temp:  [98 7 °F (37 1 °C)-99 5 °F (37 5 °C)] 98 7 °F (37 1 °C)  HR:  [116] 116  Resp:  [16-21] 20  BP: (131-152)/() 131/83  SpO2:  [93 %] 93 %  Body mass index is 55 3 kg/m²  Input and Output Summary (last 24 hours): Intake/Output Summary (Last 24 hours) at 5/14/2022 1636  Last data filed at 5/14/2022 0901  Gross per 24 hour   Intake 480 ml   Output 1625 ml   Net -1145 ml       Physical Exam:      GENERAL:  Obese - waxing/waning but improved distress from pain  HEAD:  Normocephalic - atraumatic  EYES: PERRL - EOMI   MOUTH:  Mucosa moist  NECK:  Supple - full range of motion  CARDIAC:  Rate controlled - S1/S2 positive  PULMONARY:  Clear but diminished breath sounds - nonlabored respirations  ABDOMEN:  Soft - nontender/nondistended - active bowel sounds - left PCN placed  MUSCULOSKELETAL:  Motor strength/range of motion deconditioned - distal LLE immobility secondary to the discomfort  NEUROLOGIC:  Alert/oriented at baseline  SKIN:  Chronic wrinkles/blemishes   PSYCHIATRIC:  Mood/affect stable      Additional Data:     Labs & Recent Cultures:    Results from last 7 days   Lab Units 05/14/22  0523 05/10/22  1346 05/10/22  0518   WBC Thousand/uL 8 99   < > 26 14*   HEMOGLOBIN g/dL 11 4*   < > 16 6   I STAT HEMOGLOBIN   --    < >  --    HEMATOCRIT % 37 3   < > 53 2*   HEMATOCRIT, ISTAT   --    < >  --    PLATELETS Thousands/uL 158   < > 450*   BANDS PCT %  --   --  11*   NEUTROS PCT % 75   < >  --    LYMPHS PCT % 9*   < >  --    LYMPHO PCT %  --   --  0*   MONOS PCT % 9   < >  --    MONO PCT %  --   --  2*   EOS PCT % 4   < > 0    < > = values in this interval not displayed       Results from last 7 days   Lab Units 05/14/22  0523 05/10/22  2155 05/10/22  2019 05/10/22  1622 05/10/22  1346   POTASSIUM mmol/L 3 7   < >  --    < > 6 3*   CHLORIDE mmol/L 100   < >  --   --  110*   CO2 mmol/L 25   < >  --   --  14*   CO2, I-STAT mmol/L  --   --  23   < >  --    BUN mg/dL 35*   < >  --   --  53*   CREATININE mg/dL 2 44*   < >  --   --  3 72*   CALCIUM mg/dL 9 3   < >  --   --  8 4   ALK PHOS U/L  --   --   --   --  54   ALT U/L  --   --   --   --  38   AST U/L  --   --   --   --  33   GLUCOSE, ISTAT mg/dl  --   --  125   < >  --     < > = values in this interval not displayed  Results from last 7 days   Lab Units 05/10/22  1346   INR  1 27*     Results from last 7 days   Lab Units 05/11/22  0226 05/10/22  2157 05/10/22  1627   POC GLUCOSE mg/dl 110 123 129         Results from last 7 days   Lab Units 05/12/22  0619 05/11/22  2029 05/11/22  1340 05/11/22  0218 05/10/22  0816 05/10/22  0518   LACTIC ACID mmol/L 0 8 3 5* 3 5* 3 1*   < > 3 9*   PROCALCITONIN ng/ml  --   --   --   --   --  2 30*    < > = values in this interval not displayed  Results from last 7 days   Lab Units 05/10/22  1353 05/10/22  1350 05/10/22  1307 05/10/22  0531 05/10/22  0523 05/10/22  0518   BLOOD CULTURE  No Growth After 4 Days  No Growth After 4 Days  --   --  No Growth After 4 Days   Escherichia coli*   URINE CULTURE   --   --  20,000-29,000 cfu/ml Escherichia coli* >100,000 cfu/ml Escherichia coli*  --   --          Last 24 Hours Medication List:   Current Facility-Administered Medications   Medication Dose Route Frequency Provider Last Rate    acetaminophen  650 mg Oral Q6H PRN Lita Cary PA-C      amLODIPine  10 mg Oral Daily Lita Cary PA-C      cefTRIAXone  2,000 mg Intravenous Q12H Lita Cary PA-C 2,000 mg (05/14/22 0959)    [MAR Hold] chlorhexidine  15 mL Mouth/Throat Q12H [address] VERÓNICA Martin      [facility] Hold] glycerin-hypromellose-  1 drop Both Eyes Q3H PRN Breonna Collins PA-C      heparin (porcine)  7,500 Units Subcutaneous [facility] Lita Cary PA-C      labetalol  10 mg Intravenous Q4H PRN Jennifer Vargas MD      lidocaine  1 patch Topical Once Uzair Webb MD      ondansetron  4 mg Intravenous Q6H PRN Lita Cary PA-C      oxyCODONE  2 5 mg Oral Q6H PRN Lita Cary PA-C      senna-docusate sodium  2 tablet Oral BID [employer] VERÓNICA Cary      tamsulosin  0 4 mg Oral Daily With [employer], PA-C                      ** Please Note: This note is constructed using a voice recognition dictation system  An occasional wrong word/phrase or sound-a-like substitution may have been picked up by dictation device due to the inherent limitations of voice recognition software  Read the chart carefully and recognize, using reasonable context, where substitutions may have occurred  **

## 2022-05-14 NOTE — PROGRESS NOTES
Progress Note - Orthopedics   Conception Halley 55 y o  male MRN: [id number]  [facility]      Subjective:    55 y  o male with left knee osteoarthritis  Patient states that he was able to ambulate to the bathroom with assistance overnight  His knee continues to give him pain and now he is developing left ankle pain      Labs:  0   Lab Value Date/Time    HCT 37 3 05/14/2022 0523    HCT 30 1 (L) 05/12/2022 0507    HCT 33 9 (L) 05/11/2022 0434    HCT 43 4 04/08/2015 2037    HCT 49 0 03/24/2015 0953    HCT 47 2 09/23/2013 1506    HGB 11 4 (L) 05/14/2022 0523    HGB 9 1 (L) 05/12/2022 0507    HGB 11 1 (L) 05/11/2022 0434    HGB 14 3 04/08/2015 2037    HGB 16 1 03/24/2015 0953    HGB 16 0 09/23/2013 1506    INR 1 27 (H) 05/10/2022 1346    WBC 8 99 05/14/2022 0523    WBC 14 69 (H) 05/12/2022 0507    WBC 21 88 (H) 05/11/2022 0434    WBC 12 98 (H) 04/08/2015 2037    WBC 8 68 03/24/2015 0953    WBC 10 92 (H) 09/23/2013 1506     (H) 05/13/2022 1637     0 (H) 05/13/2022 1555       Meds:    Current Facility-Administered Medications:     acetaminophen (TYLENOL) tablet 650 mg, 650 mg, Oral, Q6H PRN, Lita Cary PA-C, 650 mg at 05/13/22 0141    amLODIPine (NORVASC) tablet 10 mg, 10 mg, Oral, Daily, Lita Cary PA-C, 10 mg at 05/13/22 0825    cefTRIAXone (ROCEPHIN) 2,000 mg in dextrose 5 % 50 mL IVPB, 2,000 mg, Intravenous, Q12H, Lita Cary PA-C, Last Rate: 100 mL/hr at 05/13/22 2227, 2,000 mg at 05/13/22 2227    [MAR Hold] chlorhexidine (PERIDEX) 0 12 % oral rinse 15 mL, 15 mL, Mouth/Throat, Q12H [address], Theresa Tavera PA-C, 15 mL at 05/12/22 0831    [MAR Hold] glycerin-hypromellose- (ARTIFICIAL TEARS) ophthalmic solution 1 drop, 1 drop, Both Eyes, Q3H PRN, Lita Cary PA-C    heparin (porcine) subcutaneous injection 7,500 Units, 7,500 Units, Subcutaneous, Q8H [address], Lita Cary PA-C, 7,500 Units at 05/14/22 0609    labetalol (NORMODYNE) injection 10 mg, 10 mg, Intravenous, Q4H PRN, Linda Mckinnon Clifton Cloud MD    ondansetron [facility]) injection 4 mg, 4 mg, Intravenous, Q6H PRN, Lita Cary PA-C    oxyCODONE (ROXICODONE) IR tablet 2 5 mg, 2 5 mg, Oral, Q6H PRN, 2 5 mg at 05/13/22 2248 **OR** [DISCONTINUED] oxyCODONE (ROXICODONE) IR tablet 5 mg, 5 mg, Oral, Q6H PRN, Velma Balderas PA-C    senna-docusate sodium (SENOKOT S) 8 6-50 mg per tablet 2 tablet, 2 tablet, Oral, BID, Lita Cary PA-C    tamsulosin (FLOMAX) capsule 0 4 mg, 0 4 mg, Oral, Daily With Dinner, Lita Cary PA-C, 0 4 mg at 05/13/22 1612    Blood Culture:   Lab Results   Component Value Date    BLOODCX No Growth at 72 hrs  05/10/2022       Wound Culture:   Lab Results   Component Value Date    WOUNDCULT No growth 07/11/2019       Ins and Outs:  I/O last 24 hours: In: 900 [P O :900]  Out: 3025 [Urine:3025]          Physical:  Vitals:    05/13/22 2147   BP: (!) 152/105   Pulse: (!) 116   Resp: 21   Temp: 99 5 °F (37 5 °C)   SpO2: 93%     Musculoskeletal: left Lower Extremity  · Skin intact, no effusion of the knee or ankle   · No warmth of the knee or ankle  · Tenderness over the lateral and posterior knee and ankel  · ROM of the knee and ankle limited by pain  · SILT s/s/sp/dp/t  +fhl/ehl, +ankle dorsi/plantar flexion  2+ DP pulse    Assessment:    55 y  o male with left knee osteoarthritis with dry tap and new ankle pain    Will plan to hold off on any injection at this time due to patient's recent urological procedure and urosepsis  Plan:  · WBAT LLE  · Ankle x-ray   · PT/OT  · Pain control  · DVT ppx  · Dispo: Ortho will follow    Iris Diego MD

## 2022-05-14 NOTE — ASSESSMENT & PLAN NOTE
Ongoing issue over the last several weeks now leading to difficulty bearing weight secondary to pain  Encourage weight loss in the setting of morbid obesity  XR of knee without acute osseous abnormality, however, did reveal tricompartmental osteoarthritis similar to imaging last year  Appreciate orthopedic surgery input -> unsuccessful attempts at fluid aspiration -> recommending optimization of pain control -> may consider future joint steroid injection once acute sepsis/infection resolves

## 2022-05-14 NOTE — ASSESSMENT & PLAN NOTE
Consider dilutional effect from aggressive IV fluid hydration  Iron panel indicative of chronic disease (elevated ferritin stores) - B12/folate sufficient  Monitor H/H

## 2022-05-14 NOTE — PHYSICAL THERAPY NOTE
Physical Therapy Evaluation     Patient's Name: Joaquina Lauren    Admitting Diagnosis  Pyelonephritis [N12]    Problem List  Patient Active Problem List   Diagnosis    Morbid obesity ([facility] )    Vitamin D deficiency    History of gout    Bacteremia due to Escherichia coli    Lactic acidosis    Acute kidney injury superimposed on CKD stage 3    Severe sepsis on admission    Chronic kidney disease, stage 3 ([facility] [address] )    Essential hypertension    Insomnia    Mixed hyperlipidemia    Morbid obesity    Recurrent left knee pain    Acute unilateral obstructive uropathy    Obstructive pyelonephritis    Anemia       Past Medical History  Past Medical History:   Diagnosis Date    Arthritis     Gout     Hypertension     Kidney stone        Past Surgical History  Past Surgical History:   Procedure Laterality Date    FL RETROGRADE PYELOGRAM  8/4/2019    FL RETROGRADE PYELOGRAM  9/26/2019    FL RETROGRADE PYELOGRAM  4/22/2022    IR NEPHROSTOMY TUBE PLACEMENT  5/10/2022    NO PAST SURGERIES  03/23/2015    Last assessed    LA CYSTO/URETERO W/LITHOTRIPSY &INDWELL STENT INSRT Right 9/26/2019    Procedure: CYSTOSCOPY URETEROSCOPY WITH RETROGRADE PYELOGRAM AND INSERTION STENT URETERAL;  Surgeon: Warren Ramirez MD;  [facility]: [facility];  Service: Urology    LA CYSTOURETHROSCOPY,URETER CATHETER Right 8/4/2019    Procedure: CYSTOSCOPY RETROGRADE PYELOGRAM WITH INSERTION STENT URETERAL;  Surgeon: Joaquina Baker MD;  [facility];  Service: Urology    LA CYSTOURETHROSCOPY,URETER CATHETER Left 4/22/2022    Procedure: CYSTOSCOPY RETROGRADE PYELOGRAM WITH INSERTION STENT URETERAL;  Surgeon: China Flor MD;  [facility];  Service: Urology          05/14/22 1040   PT Last Visit   PT Visit Date 05/14/22   Note Type   Note type Evaluation   End of Consult   Patient Position at End of Consult Bedside chair; All needs within reach   Pain Assessment   Pain Assessment Tool 0-10   Pain Score 8   Pain [facility]/Orientation Orientation: Left; [facility]: Leg   Hospital Pain Intervention(s) Repositioned; Ambulation/increased activity; Emotional support   Restrictions/Precautions   Weight Bearing Precautions Per Order Yes   LLE Weight Bearing Per Order (S)  WBAT   Other Precautions Multiple lines; Fall Risk;Pain   Home Living   Type of [address] Two level;Performs ADLs on one level; Able to live on main level with bedroom/bathroom;Stairs to enter with rails   Prior Function   Level of [place] Independent with ADLs and functional mobility   Lives With Alone   Receives Help From Family  (Father lives close)   ADL Assistance Independent   IADLs Independent   Falls in the last 6 months 0   Vocational Full time employment  (Works at [employer])   General   Family/Caregiver Present No   Cognition   Overall Cognitive Status WFL   Arousal/Participation Cooperative   Orientation Level Oriented X4   Following Commands Follows all commands and directions without difficulty   Comments Pt pleasant and corporative  Agreable to work with PT after ensuring no ROM of the left knee would be performed  RUE Assessment   RUE Assessment X  (limited AROM)   RLE Assessment   RLE Assessment WNL  (Grossly 4/5 MMT acessed with mobility)   LLE Assessment   LLE Assessment X  (Grossly 4-/5 MMT acessed with mobility)   Bed Mobility   Supine to Sit 4  Minimal assistance   Additional items HOB elevated; Increased time required   Additional Comments Pt left OOB in bedside recliner at end of today's session   Transfers   Sit to Stand 4  Minimal assistance  (CGA)   Additional items Assist x 1; Increased time required   Stand to Sit 4  Minimal assistance   Additional items Assist x 1; Increased time required  (CGA)   Ambulation/Elevation   Gait pattern Decreased foot clearance; Wide TEO; Excessively slow   Gait Assistance 4  Minimal assist   Additional items Assist x 1  (CGA)   Assistive Device Rolling walker   Distance 4' bed to chair Ambulation/Elevation Additional Comments Pt declined further ambulation at this time  Pt reports that during the night when he went to the bathroom his walking was much better  Balance   Static Sitting Good   Dynamic Sitting Fair +   Static Standing Fair   Dynamic Standing Fair   Ambulatory Fair -   Endurance Deficit   Endurance Deficit Yes   Endurance Deficit Description   (Generalized weakness)   Activity Tolerance   Activity Tolerance Patient limited by pain; Patient limited by fatigue   Medical Staff Made Aware RN cleared patient for PT evaluation   Assessment   Prognosis Good   Problem List Decreased strength;Decreased range of motion;Decreased endurance; Impaired balance;Decreased mobility;Obesity;Pain;Orthopedic restrictions   Assessment Pt seen for high complexity PT evaluation  Pt with active PT eval/treat and ambulate pt orders  Pt is a 55 y o  female who was admitted to [facility] on 5/10/2022  Pt's current dx/ problem list include: sepsis, obstructive pyelonephritis and bacteremia  Comorbidities affecting pt's physical performance at time of assessment are as follows:   has a past medical history of Arthritis, Gout, Hypertension, and Kidney stone  Personal factors affecting pt at time of initial examination include: steps to enter environment, limited home support, past experience, inability to perform IADLs, inability to perform ADLs, inability to ambulate household distances  Due to acute medical issues, ongoing medical workup for primary dx; pain, fall risk, increased reliance on more restrictive AD compared to baseline;  decreased activity tolerance compared to baseline, increased assistance needed from caregiver at current time, multiple lines, decline in overall functional mobility status; health management issues; note unstable clinical picture (high complexity)  At baseline, pt resides alone in a [facility] with 4 JENNY and was independent with ADLs/ IADLs   Prior to hospital admission pt was working full time and I with ADLs/IADLs  Currently, upon initial examination, pt  is requiring min Ax1 for supine to sit and min A for sit to stand and ambulation  PT to continue to follow and assess functional transfers and ambulation as appropriate and able  Currently, pt presents functioning below baseline and with overall mobility deficits 2* to: decreased LE strength/AROM; limited flexibility;  generalized weakness/ deconditioning; decreased endurance; decreased activity tolerance; impaired balance; gait deviations  Pt currently at increased risk for falls  At the end of evaluation, pt was left seated in bed side recliner with all needs in reach  Pt would benefit from continued skilled PT services while in hospital to address remaining limitations and maximize functional potential  The patient's AM-PAC Basic Mobility Inpatient Short Form Raw Score is 17  A Raw score of greater than 16 suggests the patient may benefit from discharge to post-acute rehabilitation services  Please also refer to the recommendation of the Physical Therapist for safe discharge planning  Barriers to Discharge Inaccessible home environment;Decreased caregiver support   Goals   Patient Goals To get stronger   STG Expiration Date 05/24/22   Short Term Goal #1 STG 1  Pt will be able to perform bed mobility with mod I in order to improve overall functional mobility and assist in safe d/c  STG 2  Pt will be able to perform functional transfer with mod I in order to improve overall functional mobility and assist in safe d/c  STG 3  Pt will be able to ambulate at least 200 feet with least restrictive device with mod I A in order to improve overall functional mobility and assist in safe d/c  STG 4  Pt will improve sitting/standing static/dynamic balance 1 grade in order to improve functional mobility and assist in safe d/c  STG 5  Pt will improve LE strength by one grade in order to improve functional mobility and assist in safe d/c  STG 6  Pt will negotiate at least 1 step at mod I level A in order to improve overall funcitonal mobility and assist in safe d/c   Plan   Treatment/Interventions ADL retraining;Functional transfer training;LE strengthening/ROM; Elevations; Therapeutic exercise; Endurance training;Equipment eval/education; Bed mobility;Gait training;Spoke to nursing   PT Frequency 3-5x/wk   Recommendation   PT Discharge Recommendation Post acute rehabilitation services  (vs HHPT pending patient's progress)   Equipment Recommended [facility] Recommended HD Bariatric wheeled walker   [address]   Turning in Bed Without Bedrails 3   Lying on Back to Sitting on Edge of Flat Bed 3   Moving Bed to Chair 3   Standing Up From Chair 3   Walk in Room 3   Climb 3-5 Stairs 2   Basic Mobility Inpatient Raw Score 17   Basic Mobility Standardized Score 39 67   Highest Level Of Mobility   -[facility] Goal 5: Stand one or more mins   End of Consult   Patient Position at End of Consult All needs within reach; Bedside chair         Timmy Simpson, PT

## 2022-05-14 NOTE — PLAN OF CARE
Problem: PHYSICAL THERAPY ADULT  Goal: Performs mobility at highest level of function for planned discharge setting  See evaluation for individualized goals  Description: Treatment/Interventions: ADL retraining, Functional transfer training, LE strengthening/ROM, Elevations, Therapeutic exercise, Endurance training, Equipment eval/education, Bed mobility, Gait training, Spoke to nursing  Equipment Recommended: Ronan       See flowsheet documentation for full assessment, interventions and recommendations  Note: Prognosis: Good  Problem List: Decreased strength, Decreased range of motion, Decreased endurance, Impaired balance, Decreased mobility, Obesity, Pain, Orthopedic restrictions  Assessment: Pt seen for high complexity PT evaluation  Pt with active PT eval/treat and ambulate pt orders  Pt is a 55 y o  female who was admitted to [facility] on 5/10/2022  Pt's current dx/ problem list include: sepsis, obstructive pyelonephritis and bacteremia  Comorbidities affecting pt's physical performance at time of assessment are as follows:   has a past medical history of Arthritis, Gout, Hypertension, and Kidney stone  Personal factors affecting pt at time of initial examination include: steps to enter environment, limited home support, past experience, inability to perform IADLs, inability to perform ADLs, inability to ambulate household distances  Due to acute medical issues, ongoing medical workup for primary dx; pain, fall risk, increased reliance on more restrictive AD compared to baseline;  decreased activity tolerance compared to baseline, increased assistance needed from caregiver at current time, multiple lines, decline in overall functional mobility status; health management issues; note unstable clinical picture (high complexity)  At baseline, pt resides alone in a 2  with 4 JENNY and was independent with ADLs/ IADLs  Prior to hospital admission pt was working full time and I with ADLs/IADLs  Currently, upon initial examination, pt  is requiring min Ax1 for supine to sit and min A for sit to stand and ambulation  PT to continue to follow and assess functional transfers and ambulation as appropriate and able  Currently, pt presents functioning below baseline and with overall mobility deficits 2* to: decreased LE strength/AROM; limited flexibility;  generalized weakness/ deconditioning; decreased endurance; decreased activity tolerance; impaired balance; gait deviations  Pt currently at increased risk for falls  At the end of evaluation, pt was left seated in bed side recliner with all needs in reach  Pt would benefit from continued skilled PT services while in hospital to address remaining limitations and maximize functional potential  The patient's AM-PAC Basic Mobility Inpatient Short Form Raw Score is 17  A Raw score of greater than 16 suggests the patient may benefit from discharge to post-acute rehabilitation services  Please also refer to the recommendation of the Physical Therapist for safe discharge planning  Barriers to Discharge: Inaccessible home environment, Decreased caregiver support        PT Discharge Recommendation: Post acute rehabilitation services (vs HHPT pending patient's progress)          See flowsheet documentation for full assessment

## 2022-05-14 NOTE — PLAN OF CARE
Problem: MOBILITY - ADULT  Goal: Maintain or return to baseline ADL function  Description: INTERVENTIONS:  -  Assess patient's ability to carry out ADLs; assess patient's baseline for ADL function and identify physical deficits which impact ability to perform ADLs (bathing, care of mouth/teeth, toileting, grooming, dressing, etc )  - Assess/evaluate cause of self-care deficits   - Assess range of motion  - Assess patient's mobility; develop plan if impaired  - Assess patient's need for assistive devices and provide as appropriate  - Encourage maximum independence but intervene and supervise when necessary  - Involve family in performance of ADLs  - Assess for home care needs following discharge   - Consider OT consult to assist with ADL evaluation and planning for discharge  - Provide patient education as appropriate  Outcome: Progressing 26 Y/O male w/ PMHx. of seizures, polysubstance abuse, and navjot presents to ED s/p discharge from ED approximately x1 hour ago. PT previously in ED s/p fentanyl use. PT returns now for x2 witnessed seizures and was given 10mg of Versed by EMS PTA. PT endorses noncompliance with his seizure medication.

## 2022-05-14 NOTE — ASSESSMENT & PLAN NOTE
Positive growth of E coli in one blood culture bottle from 5/10 - likely associated w/ UTI/pyelonephritis  Continue IV Rocephin (bacteremic dose)

## 2022-05-15 LAB
ANION GAP SERPL CALCULATED.3IONS-SCNC: 10 MMOL/L (ref 4–13)
BACTERIA BLD CULT: NORMAL
BASOPHILS # BLD AUTO: 0.04 THOUSANDS/ΜL (ref 0–0.1)
BASOPHILS NFR BLD AUTO: 1 % (ref 0–1)
BUN SERPL-MCNC: 37 MG/DL (ref 5–25)
CALCIUM SERPL-MCNC: 9.3 MG/DL (ref 8.3–10.1)
CHLORIDE SERPL-SCNC: 103 MMOL/L (ref 100–108)
CO2 SERPL-SCNC: 23 MMOL/L (ref 21–32)
CREAT SERPL-MCNC: 2.14 MG/DL (ref 0.6–1.3)
EOSINOPHIL # BLD AUTO: 0.43 THOUSAND/ΜL (ref 0–0.61)
EOSINOPHIL NFR BLD AUTO: 5 % (ref 0–6)
ERYTHROCYTE [DISTWIDTH] IN BLOOD BY AUTOMATED COUNT: 14 % (ref 11.6–15.1)
GFR SERPL CREATININE-BSD FRML MDRD: 33 ML/MIN/1.73SQ M
GLUCOSE SERPL-MCNC: 118 MG/DL (ref 65–140)
HCT VFR BLD AUTO: 34.8 % (ref 36.5–49.3)
HGB BLD-MCNC: 10.9 G/DL (ref 12–17)
IMM GRANULOCYTES # BLD AUTO: 0.18 THOUSAND/UL (ref 0–0.2)
IMM GRANULOCYTES NFR BLD AUTO: 2 % (ref 0–2)
LYMPHOCYTES # BLD AUTO: 1 THOUSANDS/ΜL (ref 0.6–4.47)
LYMPHOCYTES NFR BLD AUTO: 12 % (ref 14–44)
MCH RBC QN AUTO: 29.4 PG (ref 26.8–34.3)
MCHC RBC AUTO-ENTMCNC: 31.3 G/DL (ref 31.4–37.4)
MCV RBC AUTO: 94 FL (ref 82–98)
MONOCYTES # BLD AUTO: 0.58 THOUSAND/ΜL (ref 0.17–1.22)
MONOCYTES NFR BLD AUTO: 7 % (ref 4–12)
NEUTROPHILS # BLD AUTO: 5.96 THOUSANDS/ΜL (ref 1.85–7.62)
NEUTS SEG NFR BLD AUTO: 73 % (ref 43–75)
NRBC BLD AUTO-RTO: 0 /100 WBCS
PLATELET # BLD AUTO: 151 THOUSANDS/UL (ref 149–390)
PMV BLD AUTO: 10.7 FL (ref 8.9–12.7)
POTASSIUM SERPL-SCNC: 3.5 MMOL/L (ref 3.5–5.3)
RBC # BLD AUTO: 3.71 MILLION/UL (ref 3.88–5.62)
SODIUM SERPL-SCNC: 136 MMOL/L (ref 136–145)
WBC # BLD AUTO: 8.19 THOUSAND/UL (ref 4.31–10.16)

## 2022-05-15 PROCEDURE — 80048 BASIC METABOLIC PNL TOTAL CA: CPT | Performed by: INTERNAL MEDICINE

## 2022-05-15 PROCEDURE — 99233 SBSQ HOSP IP/OBS HIGH 50: CPT | Performed by: INTERNAL MEDICINE

## 2022-05-15 PROCEDURE — 85025 COMPLETE CBC W/AUTO DIFF WBC: CPT | Performed by: INTERNAL MEDICINE

## 2022-05-15 RX ORDER — CALCIUM CARBONATE 200(500)MG
1000 TABLET,CHEWABLE ORAL 2 TIMES DAILY PRN
Status: DISCONTINUED | OUTPATIENT
Start: 2022-05-15 | End: 2022-05-19 | Stop reason: HOSPADM

## 2022-05-15 RX ADMIN — HEPARIN SODIUM 7500 UNITS: 5000 INJECTION INTRAVENOUS; SUBCUTANEOUS at 05:49

## 2022-05-15 RX ADMIN — HEPARIN SODIUM 7500 UNITS: 5000 INJECTION INTRAVENOUS; SUBCUTANEOUS at 21:22

## 2022-05-15 RX ADMIN — ACETAMINOPHEN 650 MG: 325 TABLET, FILM COATED ORAL at 12:00

## 2022-05-15 RX ADMIN — AMLODIPINE BESYLATE 10 MG: 10 TABLET ORAL at 09:01

## 2022-05-15 RX ADMIN — CALCIUM CARBONATE (ANTACID) CHEW TAB 500 MG 1000 MG: 500 CHEW TAB at 06:41

## 2022-05-15 RX ADMIN — CEFTRIAXONE 2000 MG: 2 INJECTION, POWDER, FOR SOLUTION INTRAMUSCULAR; INTRAVENOUS at 10:09

## 2022-05-15 RX ADMIN — TAMSULOSIN HYDROCHLORIDE 0.4 MG: 0.4 CAPSULE ORAL at 17:02

## 2022-05-15 RX ADMIN — ACETAMINOPHEN 650 MG: 325 TABLET, FILM COATED ORAL at 21:29

## 2022-05-15 RX ADMIN — CEFTRIAXONE 2000 MG: 2 INJECTION, POWDER, FOR SOLUTION INTRAMUSCULAR; INTRAVENOUS at 20:20

## 2022-05-15 RX ADMIN — CALCIUM CARBONATE (ANTACID) CHEW TAB 500 MG 1000 MG: 500 CHEW TAB at 20:23

## 2022-05-15 RX ADMIN — HEPARIN SODIUM 7500 UNITS: 5000 INJECTION INTRAVENOUS; SUBCUTANEOUS at 14:09

## 2022-05-15 RX ADMIN — ACETAMINOPHEN 650 MG: 325 TABLET, FILM COATED ORAL at 05:50

## 2022-05-15 NOTE — ASSESSMENT & PLAN NOTE
Baseline creatinine approximately 1 4-1 6 - remains elevated but improving @ 2 14 from a prior peak of 3 72  Likely secondary to severe sepsis and obstructive pyelonephritis (see above)  Monitor renal function and urine output - limit/avoid nephrotoxins if possible - avoid hypotension as possible

## 2022-05-15 NOTE — ASSESSMENT & PLAN NOTE
CT imaging on admission revealed "Mild left hydronephrosis status post ureteral stent placement, with 3 mm calculus in the mid ureter adjacent to the stent   Multiple bilateral renal calculi "  Recently underwent left-sided ureteral stenting on 4/21 -> found to have stent obstruction requiring revisit to hospital w/ subsequent IR guided left PCN placement on 5/10  PRN pain/emesis control  Appreciate urology input -> continue Flomax and outpatient follow-up for secondary stone treatment on 5/25  Antibiotic regimen as noted under plan for bacteremia below

## 2022-05-15 NOTE — PROGRESS NOTES
[facility]  Progress Note - Roz Garcia 1965, 55 y o  male MRN: [id number]  [facility] En[facility]: [id number]  Primary Care Provider: RITA Hilliard   Date and time admitted to hospital: 5/10/2022 12:42 PM       * Severe sepsis on admission  Assessment & Plan  Presented w/ lactic acidosis coupled with fever/tachycardia/tachypnea/leukocytosis in the setting of E  coli pyelonephritis/bacteremia (see below)  Once hemodynamically stabilized, transferred out of the ICU  Monitor vitals and maintain hemodynamics    Obstructive pyelonephritis  Assessment & Plan  CT imaging on admission revealed "Mild left hydronephrosis status post ureteral stent placement, with 3 mm calculus in the mid ureter adjacent to the stent   Multiple bilateral renal calculi "  Recently underwent left-sided ureteral stenting on 4/21 -> found to have stent obstruction requiring revisit to hospital w/ subsequent IR guided left PCN placement on 5/10  PRN pain/emesis control  Appreciate urology input -> continue Flomax and outpatient follow-up for secondary stone treatment on 5/25  Antibiotic regimen as noted under plan for bacteremia below    Bacteremia due to Escherichia coli  Assessment & Plan  Positive growth of E coli in one blood culture bottle from 5/10 - likely associated w/ UTI/pyelonephritis  Continue IV Rocephin (bacteremic dose)    Acute kidney injury superimposed on CKD stage 3  Assessment & Plan  Baseline creatinine approximately 1 4-1 6 - remains elevated but improving @ 2 14 from a prior peak of 3 72  Likely secondary to severe sepsis and obstructive pyelonephritis (see above)  Monitor renal function and urine output - limit/avoid nephrotoxins if possible - avoid hypotension as possible    Recurrent left knee pain  Assessment & Plan  Ongoing issue over the last several weeks now leading to difficulty bearing weight secondary to pain  Encourage weight loss in the setting of morbid obesity  XR of knee without acute osseous abnormality, however, did reveal tricompartmental osteoarthritis similar to imaging last year  Appreciate orthopedic surgery input -> unsuccessful attempts at fluid aspiration -> recommending optimization of pain control -> may consider future joint steroid injection once acute sepsis/infection resolves  PT/OT recommending skilled rehab placement and patient agreeable - will appreciate case management assistance with placement    Morbid obesity  Assessment & Plan  BMI of 55 30 currently  Lifestyle/diet modifications    Essential hypertension  Assessment & Plan  Low-sodium diet  Continue Norvasc - PRN IV Labetalol on board for BP spikes    Anemia  Assessment & Plan  Consider dilutional effect from aggressive IV fluid hydration  Iron panel indicative of chronic disease (elevated ferritin stores) - B12/folate sufficient  Monitor H/H      DVT Prophylaxis:  Heparin SC     Patient Centered Rounds:  I have performed bedside rounds and discussed plan of care with nursing today  Discussions with Specialists or Other Care Team Provider:  see above assessments if applicable    Education and Discussions with Family / Patient:  Patient at bedside    Time Spent for Care:  32 minutes  More than 50% of total time spent on counseling and coordination of care as described above  Current Length of Stay: 5 day(s)    Current Patient Status: Inpatient   Certification Statement:  Patient will continue to require additional hospital stay due to assessments as noted above  Code Status: Level 1 - Full Code        Subjective:     Seen/examined earlier today  No acute complaints this time  Pain is waxing/waning but somewhat better controlled today  Remains weak/fatigued          Objective:     Vitals:   Temp (24hrs), Av 2 °F (36 8 °C), Min:97 3 °F (36 3 °C), Max:98 7 °F (37 1 °C)    Temp:  [97 3 °F (36 3 °C)-98 7 °F (37 1 °C)] 97 3 °F (36 3 °C)  HR:  [101] 101  Resp:  [20] 20  BP: (128-131)/(76-83) 128/76  SpO2:  [92 %-95 %] 92 %  Body mass index is 55 3 kg/m²  Input and Output Summary (last 24 hours): Intake/Output Summary (Last 24 hours) at 5/15/2022 1503  Last data filed at 5/15/2022 1405  Gross per 24 hour   Intake 600 ml   Output 2450 ml   Net -1850 ml       Physical Exam:     GENERAL:  Waxing/waning distress from pain - obese  HEAD:  Normocephalic - atraumatic  EYES: PERRL - EOMI   MOUTH:  Mucosa moist  NECK:  Supple - full range of motion  CARDIAC:  Rate controlled - S1/S2 positive  PULMONARY:  Mildly diminished at the bases but fairly clear breath sounds otherwise - nonlabored respirations  ABDOMEN:  Soft - nontender/nondistended - active bowel sounds - left PCN in place  MUSCULOSKELETAL:  Motor strength/range of motion deconditioned of the LLE secondary to knee discomfort  NEUROLOGIC:  Alert/oriented at baseline  SKIN:  Chronic wrinkles/blemishes   PSYCHIATRIC:  Mood/affect stable      Additional Data:     Labs & Recent Cultures:    Results from last 7 days   Lab Units 05/15/22  0551 05/10/22  1346 05/10/22  0518   WBC Thousand/uL 8 19   < > 26 14*   HEMOGLOBIN g/dL 10 9*   < > 16 6   I STAT HEMOGLOBIN   --    < >  --    HEMATOCRIT % 34 8*   < > 53 2*   HEMATOCRIT, ISTAT   --    < >  --    PLATELETS Thousands/uL 151   < > 450*   BANDS PCT %  --   --  11*   NEUTROS PCT % 73   < >  --    LYMPHS PCT % 12*   < >  --    LYMPHO PCT %  --   --  0*   MONOS PCT % 7   < >  --    MONO PCT %  --   --  2*   EOS PCT % 5   < > 0    < > = values in this interval not displayed       Results from last 7 days   Lab Units 05/15/22  0551 05/10/22  2155 05/10/22  2019 05/10/22  1622 05/10/22  1346   POTASSIUM mmol/L 3 5   < >  --    < > 6 3*   CHLORIDE mmol/L 103   < >  --   --  110*   CO2 mmol/L 23   < >  --   --  14*   CO2, I-STAT mmol/L  --   --  23   < >  --    BUN mg/dL 37*   < >  --   --  53*   CREATININE mg/dL 2 14*   < >  --   --  3 72*   CALCIUM mg/dL 9 3   < >  --   --  8 4 ALK PHOS U/L  --   --   --   --  54   ALT U/L  --   --   --   --  38   AST U/L  --   --   --   --  33   GLUCOSE, ISTAT mg/dl  --   --  125   < >  --     < > = values in this interval not displayed  Results from last 7 days   Lab Units 05/10/22  1346   INR  1 27*     Results from last 7 days   Lab Units 05/11/22  0226 05/10/22  2157 05/10/22  1627   POC GLUCOSE mg/dl 110 123 129         Results from last 7 days   Lab Units 05/12/22  0619 05/11/22  2029 05/11/22  1340 05/11/22  0218 05/10/22  0816 05/10/22  0518   LACTIC ACID mmol/L 0 8 3 5* 3 5* 3 1*   < > 3 9*   PROCALCITONIN ng/ml  --   --   --   --   --  2 30*    < > = values in this interval not displayed  Results from last 7 days   Lab Units 05/10/22  1353 05/10/22  1350 05/10/22  1307 05/10/22  0531 05/10/22  0523 05/10/22  0518   BLOOD CULTURE  No Growth After 4 Days  No Growth After 4 Days  --   --  No Growth After 5 Days   Escherichia coli*   URINE CULTURE   --   --  20,000-29,000 cfu/ml Escherichia coli* >100,000 cfu/ml Escherichia coli*  --   --          Last 24 Hours Medication List:   Current Facility-Administered Medications   Medication Dose Route Frequency Provider Last Rate    acetaminophen  650 mg Oral Q6H PRN Lita Cary PA-C      amLODIPine  10 mg Oral Daily Lita Cary PA-C      calcium carbonate  1,000 mg Oral BID PRN Zach Hussien PA-C      cefTRIAXone  2,000 mg Intravenous Q12H Arnol Bernal MD Stopped (05/15/22 1133)    [MAR Hold] chlorhexidine  15 mL Mouth/Throat Q12H [address] VERÓNICA Martin      [facility] Hold] glycerin-hypromellose-  1 drop Both Eyes Q3H PRN Gurpreet Thao PA-C      heparin (porcine)  7,500 Units Subcutaneous [facility] Lita Ragona, PA-C      labetalol  10 mg Intravenous Q4H PRN Arnol Bernal MD      ondansetron  4 mg Intravenous Q6H PRN Lita Cary PA-C      oxyCODONE  2 5 mg Oral Q6H PRN Lita Cary PA-C      senna-docusate sodium  2 tablet Oral BID Gurpreet Thao PA-C      tamsulosin  0 4 mg Oral Daily With [employer], PA-C                      ** Please Note: This note is constructed using a voice recognition dictation system  An occasional wrong word/phrase or sound-a-like substitution may have been picked up by dictation device due to the inherent limitations of voice recognition software  Read the chart carefully and recognize, using reasonable context, where substitutions may have occurred  **

## 2022-05-15 NOTE — PLAN OF CARE
Problem: MOBILITY - ADULT  Goal: Maintain or return to baseline ADL function  Description: INTERVENTIONS:  -  Assess patient's ability to carry out ADLs; assess patient's baseline for ADL function and identify physical deficits which impact ability to perform ADLs (bathing, care of mouth/teeth, toileting, grooming, dressing, etc )  - Assess/evaluate cause of self-care deficits   - Assess range of motion  - Assess patient's mobility; develop plan if impaired  - Assess patient's need for assistive devices and provide as appropriate  - Encourage maximum independence but intervene and supervise when necessary  - Involve family in performance of ADLs  - Assess for home care needs following discharge   - Consider OT consult to assist with ADL evaluation and planning for discharge  - Provide patient education as appropriate  Outcome: Progressing  Goal: Maintains/Returns to pre admission functional level  Description: INTERVENTIONS:  - Perform BMAT or MOVE assessment daily    - Set and communicate daily mobility goal to care team and patient/family/caregiver     - Collaborate with rehabilitation services on mobility goals if consulted  - Out of bed for toileting  - Record patient progress and toleration of activity level   Outcome: Progressing     Problem: Potential for Falls  Goal: Patient will remain free of falls  Description: INTERVENTIONS:  - Educate patient/family on patient safety including physical limitations  - Instruct patient to call for assistance with activity   - Consult OT/PT to assist with strengthening/mobility   - Keep Call bell within reach  - Keep bed low and locked with side rails adjusted as appropriate  - Keep care items and personal belongings within reach  - Initiate and maintain comfort rounds  - Make Fall Risk Sign visible to staff  - Apply yellow socks and bracelet for high fall risk patients  - Consider moving patient to room near nurses station  Outcome: Progressing     Problem: Prexisting or High Potential for Compromised Skin Integrity  Goal: Skin integrity is maintained or improved  Description: INTERVENTIONS:  - Identify patients at risk for skin breakdown  - Assess and monitor skin integrity  - Assess and monitor nutrition and hydration status  - Monitor labs   - Assess for incontinence   - Turn and reposition patient  - Assist with mobility/ambulation  - Relieve pressure over bony prominences  - Avoid friction and shearing  - Provide appropriate hygiene as needed including keeping skin clean and dry  - Evaluate need for skin moisturizer/barrier cream  - Collaborate with interdisciplinary team   - Patient/family teaching  - Consider wound care consult   Outcome: Progressing     Problem: Nutrition/Hydration-ADULT  Goal: Nutrient/Hydration intake appropriate for improving, restoring or maintaining nutritional needs  Description: Monitor and assess patient's nutrition/hydration status for malnutrition  Collaborate with interdisciplinary team and initiate plan and interventions as ordered  Monitor patient's weight and dietary intake as ordered or per policy  Utilize nutrition screening tool and intervene as necessary  Determine patient's food preferences and provide high-protein, high-caloric foods as appropriate       INTERVENTIONS:  - Monitor oral intake, urinary output, labs, and treatment plans  - Assess nutrition and hydration status and recommend course of action  - Evaluate amount of meals eaten  - Assist patient with eating if necessary   - Allow adequate time for meals  - Recommend/ encourage appropriate diets, oral nutritional supplements, and vitamin/mineral supplements  - Order, calculate, and assess calorie counts as needed  - Recommend, monitor, and adjust tube feedings and TPN/PPN based on assessed needs  - Assess need for intravenous fluids  - Provide specific nutrition/hydration education as appropriate  - Include patient/family/caregiver in decisions related to nutrition  Outcome: Progressing

## 2022-05-15 NOTE — ASSESSMENT & PLAN NOTE
Ongoing issue over the last several weeks now leading to difficulty bearing weight secondary to pain  Encourage weight loss in the setting of morbid obesity  XR of knee without acute osseous abnormality, however, did reveal tricompartmental osteoarthritis similar to imaging last year  Appreciate orthopedic surgery input -> unsuccessful attempts at fluid aspiration -> recommending optimization of pain control -> may consider future joint steroid injection once acute sepsis/infection resolves  PT/OT recommending skilled rehab placement and patient agreeable - will appreciate case management assistance with placement

## 2022-05-16 LAB
ANION GAP SERPL CALCULATED.3IONS-SCNC: 5 MMOL/L (ref 4–13)
BASOPHILS # BLD MANUAL: 0.21 THOUSAND/UL (ref 0–0.1)
BASOPHILS NFR MAR MANUAL: 3 % (ref 0–1)
BUN SERPL-MCNC: 35 MG/DL (ref 5–25)
CALCIUM SERPL-MCNC: 9.6 MG/DL (ref 8.3–10.1)
CHLORIDE SERPL-SCNC: 104 MMOL/L (ref 100–108)
CO2 SERPL-SCNC: 28 MMOL/L (ref 21–32)
CREAT SERPL-MCNC: 1.92 MG/DL (ref 0.6–1.3)
EOSINOPHIL # BLD MANUAL: 0.49 THOUSAND/UL (ref 0–0.4)
EOSINOPHIL NFR BLD MANUAL: 7 % (ref 0–6)
ERYTHROCYTE [DISTWIDTH] IN BLOOD BY AUTOMATED COUNT: 14 % (ref 11.6–15.1)
GFR SERPL CREATININE-BSD FRML MDRD: 38 ML/MIN/1.73SQ M
GLUCOSE SERPL-MCNC: 107 MG/DL (ref 65–140)
HCT VFR BLD AUTO: 35.1 % (ref 36.5–49.3)
HGB BLD-MCNC: 11.1 G/DL (ref 12–17)
LYMPHOCYTES # BLD AUTO: 1.4 THOUSAND/UL (ref 0.6–4.47)
LYMPHOCYTES # BLD AUTO: 20 % (ref 14–44)
MCH RBC QN AUTO: 29.7 PG (ref 26.8–34.3)
MCHC RBC AUTO-ENTMCNC: 31.6 G/DL (ref 31.4–37.4)
MCV RBC AUTO: 94 FL (ref 82–98)
MONOCYTES # BLD AUTO: 0.28 THOUSAND/UL (ref 0–1.22)
MONOCYTES NFR BLD: 4 % (ref 4–12)
NEUTROPHILS # BLD MANUAL: 4.63 THOUSAND/UL (ref 1.85–7.62)
NEUTS BAND NFR BLD MANUAL: 1 % (ref 0–8)
NEUTS SEG NFR BLD AUTO: 65 % (ref 43–75)
PLATELET # BLD AUTO: 150 THOUSANDS/UL (ref 149–390)
PLATELET BLD QL SMEAR: ABNORMAL
PMV BLD AUTO: 10.8 FL (ref 8.9–12.7)
POTASSIUM SERPL-SCNC: 3.8 MMOL/L (ref 3.5–5.3)
RBC # BLD AUTO: 3.74 MILLION/UL (ref 3.88–5.62)
SODIUM SERPL-SCNC: 137 MMOL/L (ref 136–145)
WBC # BLD AUTO: 7.01 THOUSAND/UL (ref 4.31–10.16)

## 2022-05-16 PROCEDURE — 80048 BASIC METABOLIC PNL TOTAL CA: CPT | Performed by: INTERNAL MEDICINE

## 2022-05-16 PROCEDURE — 99233 SBSQ HOSP IP/OBS HIGH 50: CPT | Performed by: INTERNAL MEDICINE

## 2022-05-16 PROCEDURE — 85027 COMPLETE CBC AUTOMATED: CPT | Performed by: INTERNAL MEDICINE

## 2022-05-16 PROCEDURE — 85007 BL SMEAR W/DIFF WBC COUNT: CPT | Performed by: INTERNAL MEDICINE

## 2022-05-16 PROCEDURE — 97167 OT EVAL HIGH COMPLEX 60 MIN: CPT

## 2022-05-16 RX ORDER — LIDOCAINE 50 MG/G
1 PATCH TOPICAL DAILY
Status: DISCONTINUED | OUTPATIENT
Start: 2022-05-16 | End: 2022-05-19 | Stop reason: HOSPADM

## 2022-05-16 RX ADMIN — CALCIUM CARBONATE (ANTACID) CHEW TAB 500 MG 1000 MG: 500 CHEW TAB at 21:29

## 2022-05-16 RX ADMIN — CEFTRIAXONE 2000 MG: 2 INJECTION, POWDER, FOR SOLUTION INTRAMUSCULAR; INTRAVENOUS at 21:23

## 2022-05-16 RX ADMIN — CALCIUM CARBONATE (ANTACID) CHEW TAB 500 MG 1000 MG: 500 CHEW TAB at 08:52

## 2022-05-16 RX ADMIN — HEPARIN SODIUM 7500 UNITS: 5000 INJECTION INTRAVENOUS; SUBCUTANEOUS at 05:37

## 2022-05-16 RX ADMIN — AMLODIPINE BESYLATE 10 MG: 10 TABLET ORAL at 08:52

## 2022-05-16 RX ADMIN — LIDOCAINE 5% 1 PATCH: 700 PATCH TOPICAL at 13:04

## 2022-05-16 RX ADMIN — ACETAMINOPHEN 650 MG: 325 TABLET, FILM COATED ORAL at 21:28

## 2022-05-16 RX ADMIN — CEFTRIAXONE 2000 MG: 2 INJECTION, POWDER, FOR SOLUTION INTRAMUSCULAR; INTRAVENOUS at 10:07

## 2022-05-16 RX ADMIN — ACETAMINOPHEN 650 MG: 325 TABLET, FILM COATED ORAL at 13:11

## 2022-05-16 RX ADMIN — HEPARIN SODIUM 7500 UNITS: 5000 INJECTION INTRAVENOUS; SUBCUTANEOUS at 21:23

## 2022-05-16 RX ADMIN — HEPARIN SODIUM 7500 UNITS: 5000 INJECTION INTRAVENOUS; SUBCUTANEOUS at 13:05

## 2022-05-16 RX ADMIN — TAMSULOSIN HYDROCHLORIDE 0.4 MG: 0.4 CAPSULE ORAL at 16:25

## 2022-05-16 NOTE — OCCUPATIONAL THERAPY NOTE
Occupational Therapy Evaluation     Patient Name: Marjan Dietrich  Today's Date: 5/16/2022  Problem List  Principal Problem:    Severe sepsis on admission  Active Problems:    Bacteremia due to Escherichia coli    Acute kidney injury superimposed on CKD stage 3    Essential hypertension    Morbid obesity    Recurrent left knee pain    Obstructive pyelonephritis    Anemia    Past Medical History  Past Medical History:   Diagnosis Date    Arthritis     Gout     Hypertension     Kidney stone      Past Surgical History  Past Surgical History:   Procedure Laterality Date    FL RETROGRADE PYELOGRAM  8/4/2019    FL RETROGRADE PYELOGRAM  9/26/2019    FL RETROGRADE PYELOGRAM  4/22/2022    IR NEPHROSTOMY TUBE PLACEMENT  5/10/2022    NO PAST SURGERIES  03/23/2015    Last assessed    NM CYSTO/URETERO W/LITHOTRIPSY &INDWELL STENT INSRT Right 9/26/2019    Procedure: CYSTOSCOPY URETEROSCOPY WITH RETROGRADE PYELOGRAM AND INSERTION STENT URETERAL;  Surgeon: Lionel Harvey MD;  [facility]: [facility];  Service: Urology    NM CYSTOURETHROSCOPY,URETER CATHETER Right 8/4/2019    Procedure: CYSTOSCOPY RETROGRADE PYELOGRAM WITH INSERTION STENT URETERAL;  Surgeon: Rigo Dorantes MD;  [facility];  Service: Urology    NM CYSTOURETHROSCOPY,URETER CATHETER Left 4/22/2022    Procedure: CYSTOSCOPY RETROGRADE PYELOGRAM WITH INSERTION STENT URETERAL;  Surgeon: Edenilson Birmingham MD;  [facility];  Service: Urology         05/16/22 1141   OT Last Visit   OT Visit Date 05/16/22   Note Type   Note type Evaluation   Restrictions/Precautions   Weight Bearing Precautions Per Order Yes   LLE Weight Bearing Per Order WBAT   Other Precautions Multiple lines; Fall Risk;Pain  (cook, nephrostomy drain)   Pain Assessment   Pain Assessment Tool 0-10   Pain Score 7   Pain [facility]/Orientation Orientation: Bilateral;[facility]: Leg   Hospital Pain Intervention(s) Repositioned; Ambulation/increased activity   Home Living   Type of Home House   Home Layout Two level;1/2 bath on main level  (3STE)   Bathroom Shower/Tub Tub/shower unit   Bathroom Toilet Standard   Prior Function   Level of [place] Independent with ADLs and functional mobility   Lives With Alone   Receives Help From Family   ADL Assistance Independent   IADLs Independent   Falls in the last 6 months 1 to 4  (1 fall per report)   Vocational Full time employment   Lifestyle   Autonomy PTA, pt reports being I with ADLs, IADLs, fnxl mobility, (+)    Reciprocal Relationships Pt reports local father but limited support available, unable to provide physical assistance   Service to Others FTE - works at a [employer]   [facility] ([facility]) WDL   ADL   Where Assessed Edge of bed   Eating Assistance 5  Supervision/Setup   Grooming Assistance 5  Supervision/Setup   UB [address] 3  Moderate Assistance   LB [address] 2  [address] [facility] 3  Moderate Assistance    [facility] 2  [address]  3  Moderate Assistance   Bed Mobility   Supine to Sit 4  Minimal assistance   Additional items Assist x 1;HOB elevated; Bedrails; Increased time required;LE management   Sit to Supine 4  Minimal assistance   Additional items Assist x 1;HOB elevated; Bedrails; Increased time required;LE management   Transfers   Sit to Stand 4  Minimal assistance   Additional items Assist x 1; Increased time required   Stand to Sit 4  Minimal assistance   Additional items Assist x 1; Increased time required   Additional Comments Transfers with RW   Functional Mobility   Functional Mobility 4  Minimal assistance   Additional Comments Ax1   Additional items Rolling walker   Balance   Static Sitting Fair +   Dynamic Sitting Fair   Static Standing Fair -   Dynamic Standing Poor +   Ambulatory Poor +   Activity Tolerance   Activity Tolerance Patient limited by fatigue;Patient limited by pain   Nurse Made Aware RN clearance for session   RUE Assessment   RUE Assessment   (~ 90* ROM, limited strength)   LUE Assessment   LUE Assessment WFL   Cognition   Overall Cognitive Status WFL   Arousal/Participation Alert; Cooperative;Responsive   Attention Within functional limits   Orientation Level Oriented X4   Memory Within functional limits   Following Commands Follows one step commands without difficulty   Comments Pt pleasant and cooperative t/o session   Assessment   Limitation Decreased UE ROM; Decreased ADL status; Decreased UE strength;Decreased endurance;Decreased self-care trans;Decreased high-level ADLs   Prognosis Good   Assessment Pt is a 55 y o  male admitted to [facility] on 5/10/2022 w/ Severe sepsis ([address] ), obstructive pyelonephritis, bacteremia  Pt s/p IR guided L PCN placement  has a past medical history of Arthritis, Gout, Hypertension, obesity, anemia, insomnia, hyperlipidemia, lactic acidosis, and Kidney stone  Pt with active OT orders  As per pt report, pta, resides alone in a 2STH, 3STE  Pt was I w/  ADLS and IADLS, (+) drove  Upon evaluation, pt currently requires MIN A with RW for transfers and mobility  Exhibits decreased strength to RUE's limiting performance in ADLs/transfers  Pt currently requires S eating, S grooming, MOD A UB ADLs, MAX A LB ADLs, and MOD A toileting  Pt is limited at this time 2*: pain, endurance, activity tolerance, functional mobility, balance, functional standing tolerance, unsupportive home environment, decreased I w/ ADLS/IADLS and strength  The following Occupational Performance Areas to address include: eating, grooming, bathing/shower, toilet hygiene, dressing, health maintenance, functional mobility, community mobility and clothing management  Pt to benefit from immediate acute skilled OT to address above deficits, improve overall functional independence, maximize fnxl mobility and reduce caregiver burden   From OT standpoint, recommendation at time of d/c would be STR  Pt was left after session with all current needs met  The patient's raw score on the AM-PAC Daily Activity inpatient short form is 14, standardized score is 33 39, less than 39 4  Patients at this level are likely to benefit from discharge to post-acute rehabilitation services  Please refer to the recommendation of the Occupational Therapist for safe discharge planning  Goals   Patient Goals to decrease his pain   LTG Time Frame 10-14   Plan   Treatment Interventions ADL retraining;Functional transfer training;UE strengthening/ROM; Endurance training;Patient/family training;Equipment evaluation/education; Compensatory technique education;Continued evaluation; Energy conservation; Activityengagement   Goal Expiration Date 05/30/22   OT Frequency 3-5x/wk   Recommendation   OT Discharge Recommendation Post acute rehabilitation services   OT - OK to Discharge Yes  (to rehab when medically stable)   AM-PAC Daily Activity Inpatient   Lower Body Dressing 2   Bathing 2   Toileting 2   Upper Body Dressing 2   Grooming 3   Eating 3   Daily Activity Raw Score 14   Daily Activity Standardized Score (Calc for Raw Score >=11) 33 39   AM-[facility] Applied Cognition Inpatient   Following a Speech/Presentation 4   Understanding Ordinary Conversation 4   Taking Medications 4   Remembering Where Things Are Placed or Put Away 4   Remembering List of 4-5 Errands 4   Taking Care of Complicated Tasks 3   Applied Cognition Raw Score 23   Applied Cognition Standardized Score 53 08     GOALS    - Pt will complete UB dressing/self care/bathing w/ mod I using adaptive device and DME as needed    - Pt will complete LB dressing/self care/bathing w/ mod I using adaptive device and DME as needed    - Pt will complete toileting w/ mod I w/ G hygiene/thoroughness using DME as needed    - Pt will improve functional transfers to Mod I on/off all surfaces using DME as needed w/ G balance/safety     - Pt will improve functional mobility during ADL/IADL/leisure tasks to Mod I using DME as needed w/ G balance/safety     - Pt will demonstrate G carryover of pt/caregiver education and training as appropriate      - Pt will demonstrate 100% carryover of energy conservation techniques t/o functional I/ADL/leisure tasks w/o cues s/p skilled education    - Pt will increase static and dynamic standing/sitting balance to F+  using AD/DME as needed to increase safety and I during fnxl transfers and ADLs    - Pt will maintain upright sitting position for at least 20 min during dynamic fnxl activity with F+  balance and endurance to improve ADL performance and independence, as well as reduce risk of falls     - Pt will participate in simulated IADL management task with DME as needed to increase independence to  w/ G safety and endurance      Derek Quesada MS, OTR/L

## 2022-05-16 NOTE — ASSESSMENT & PLAN NOTE
Positive growth of E coli in one blood culture bottle from 5/10 - likely associated w/ UTI/pyelonephritis  Continue IV Rocephin (bacteremic dose) -> should transition to oral regimen to complete a 14 day course at time of discharge

## 2022-05-16 NOTE — PLAN OF CARE
Problem: OCCUPATIONAL THERAPY ADULT  Goal: Performs self-care activities at highest level of function for planned discharge setting  See evaluation for individualized goals  Description: Treatment Interventions: ADL retraining, Functional transfer training, UE strengthening/ROM, Endurance training, Patient/family training, Equipment evaluation/education, Compensatory technique education, Continued evaluation, Energy conservation, Activityengagement          See flowsheet documentation for full assessment, interventions and recommendations  Note: Limitation: Decreased UE ROM, Decreased ADL status, Decreased UE strength, Decreased endurance, Decreased self-care trans, Decreased high-level ADLs  Prognosis: Good  Assessment: Pt is a 55 y o  male admitted to hospitals on 5/10/2022 w/ Severe sepsis ([address] ), obstructive pyelonephritis, bacteremia  Pt s/p IR guided L PCN placement  has a past medical history of Arthritis, Gout, Hypertension, obesity, anemia, insomnia, hyperlipidemia, lactic acidosis, and Kidney stone  Pt with active OT orders  As per pt report, pta, resides alone in a 2STH, 3STE  Pt was I w/  ADLS and IADLS, (+) drove  Upon evaluation, pt currently requires MIN A with RW for transfers and mobility  Exhibits decreased strength to RUE's limiting performance in ADLs/transfers  Pt currently requires S eating, S grooming, MOD A UB ADLs, MAX A LB ADLs, and MOD A toileting  Pt is limited at this time 2*: pain, endurance, activity tolerance, functional mobility, balance, functional standing tolerance, unsupportive home environment, decreased I w/ ADLS/IADLS and strength  The following Occupational Performance Areas to address include: eating, grooming, bathing/shower, toilet hygiene, dressing, health maintenance, functional mobility, community mobility and clothing management   Pt to benefit from immediate acute skilled OT to address above deficits, improve overall functional independence, maximize fnxl mobility and reduce caregiver burden  From OT standpoint, recommendation at time of d/c would be STR  Pt was left after session with all current needs met  The patient's raw score on the AM-PAC Daily Activity inpatient short form is 14, standardized score is 33 39, less than 39 4  Patients at this level are likely to benefit from discharge to post-acute rehabilitation services  Please refer to the recommendation of the Occupational Therapist for safe discharge planning       OT Discharge Recommendation: Post acute rehabilitation services  OT - OK to Discharge: Yes (to rehab when medically stable)

## 2022-05-16 NOTE — CASE MANAGEMENT
Case Management Discharge Planning Note    Patient name Roz Garcia  [facility] [address] [place] [address] 912/PPHP [id number] MRN [id number]  : 1965 Date 2022       Current Admission Date: 5/10/2022  Current Admission Diagnosis:Severe sepsis on admission   Patient Active Problem List    Diagnosis Date Noted    Anemia 2022    Obstructive pyelonephritis 2022    Acute unilateral obstructive uropathy 2022    Mixed hyperlipidemia 2021    Morbid obesity 2021    Recurrent left knee pain 2021    Insomnia 2019    Essential hypertension 2019    Severe sepsis on admission 2019    Chronic kidney disease, stage 3 ([facility] [address] ) 2019    Acute kidney injury superimposed on CKD stage 3 2019    Bacteremia due to Escherichia coli 2019    Lactic acidosis 2019    History of gout 07/15/2019    Vitamin D deficiency 06/10/2015    Morbid obesity ([facility] [address] ) 2015      LOS (days): 6  Geometric Mean LOS (GMLOS) (days): 3 50  Days to GMLOS:-2 5     OBJECTIVE:  Risk of Unplanned Readmission Score: 20 56         Current admission status: Inpatient   Preferred Pharmacy:   [facility]/pharmacy #[id number]- [place] [address]  Phone: [phone] Fax: [phone] [place], [place] - [address] [facility] [address]  Phone: [phone] Fax: [phone]    Primary Care Provider: RITA Cabral    Primary Insurance: BLUE CROSS  Secondary Insurance:     DISCHARGE DETAILS:    Discharge planning discussed with[de-identified] patient  Freedom of Choice: Yes  Comments - Freedom of Choice: referrals to [address] in [facility], and [facility] as per patient request  CM contacted family/caregiver?: No- see comments (patient AAO x4)                  [address]         Is the patient interested in [address] at discharge?: No         Other Referral/Resources/Interventions Provided:  Interventions: Short Term Rehab         Treatment Team Recommendation: Short Term Rehab  Discharge Destination Plan[de-identified] Short Term Rehab  Transport at Discharge : BLS Ambulance                  Additional Comments: Provided patient with STR list, patient selected facilities in his area, referrals placed to [facility], awaiting responses

## 2022-05-16 NOTE — ASSESSMENT & PLAN NOTE
Ongoing issue over the last several weeks now leading to difficulty bearing weight secondary to pain  Encourage weight loss in the setting of morbid obesity  XR of knee without acute osseous abnormality, however, did reveal tricompartmental osteoarthritis similar to imaging last year  Appreciate orthopedic surgery input -> unsuccessful attempts at fluid aspiration -> recommending optimization of pain control -> may consider future joint steroid injection once acute sepsis/infection resolves  PT/OT recommending skilled rehab placement and patient agreeable - awaiting placement

## 2022-05-16 NOTE — PROGRESS NOTES
[address] [facility]  Progress Note - Brian Rodriguez 1965, 55 y o  male MRN: [id number]  [facility]: [id number]  Primary Care Provider: RITA Chen   Date and time admitted to hospital: 5/10/2022 12:42 PM      * Severe sepsis on admission  Assessment & Plan  Presented w/ lactic acidosis coupled with fever/tachycardia/tachypnea/leukocytosis in the setting of E  coli pyelonephritis/bacteremia (see below)  Once hemodynamically stabilized, transferred out of the ICU  Monitor vitals and maintain hemodynamics    Obstructive pyelonephritis  Assessment & Plan  CT imaging on admission revealed "Mild left hydronephrosis status post ureteral stent placement, with 3 mm calculus in the mid ureter adjacent to the stent   Multiple bilateral renal calculi "  Recently underwent left-sided ureteral stenting on 4/21 -> found to have stent obstruction requiring revisit to hospital w/ subsequent IR guided left PCN placement on 5/10  PRN pain/emesis control  Appreciate urology input -> continue Flomax and outpatient follow-up for secondary stone treatment on 5/25  Antibiotic regimen as noted under plan for bacteremia below    Bacteremia due to Escherichia coli  Assessment & Plan  Positive growth of E coli in one blood culture bottle from 5/10 - likely associated w/ UTI/pyelonephritis  Continue IV Rocephin (bacteremic dose) -> should transition to oral regimen to complete a 14 day course at time of discharge    Acute kidney injury superimposed on CKD stage 3  Assessment & Plan  Baseline creatinine approximately 1 4-1 6 - remains elevated but improving @ 1 92 from a prior peak of 3 72  Likely secondary to severe sepsis and obstructive pyelonephritis (see above)  Monitor renal function and urine output - limit/avoid nephrotoxins if possible - avoid hypotension as possible    Recurrent left knee pain  Assessment & Plan  Ongoing issue over the last several weeks now leading to difficulty bearing weight secondary to pain  Encourage weight loss in the setting of morbid obesity  XR of knee without acute osseous abnormality, however, did reveal tricompartmental osteoarthritis similar to imaging last year  Appreciate orthopedic surgery input -> unsuccessful attempts at fluid aspiration -> recommending optimization of pain control -> may consider future joint steroid injection once acute sepsis/infection resolves  PT/OT recommending skilled rehab placement and patient agreeable - awaiting placement    Morbid obesity  Assessment & Plan  BMI of 55 30 currently  Lifestyle/diet modifications    Essential hypertension  Assessment & Plan  Low-sodium diet  Continue Norvasc - PRN IV Labetalol on board for BP spikes    Anemia  Assessment & Plan  Consider dilutional effect from aggressive IV fluid hydration  Iron panel indicative of chronic disease (elevated ferritin stores) - B12/folate sufficient  Monitor H/H      DVT Prophylaxis:  Heparin SC      Patient Centered Rounds:  I have performed bedside rounds and discussed plan of care with nursing today  Discussions with Specialists or Other Care Team Provider:  see above assessments if applicable    Education and Discussions with Family / Patient:  Patient at bedside, who will self-update family  Time Spent for Care:  32 minutes  More than 50% of total time spent on counseling and coordination of care as described above  Current Length of Stay: 6 day(s)    Current Patient Status: Inpatient   Certification Statement:  Patient will continue to require additional hospital stay due to assessments as noted above  Code Status: Level 1 - Full Code        Subjective:     Seen/examined earlier today  Remains weak/fatigued however, states that his left knee pain/discomfort has slightly improved today  Does acknowledge intermittent generalized joint aches with various positional movements          Objective:     Vitals:   Temp (24hrs), Av 5 °F (36 9 °C), Min:97 7 °F (36 5 °C), Max:99 2 °F (37 3 °C)    Temp:  [97 7 °F (36 5 °C)-99 2 °F (37 3 °C)] 98 7 °F (37 1 °C)  HR:  [101-106] 101  Resp:  [20-21] 21  BP: (122-127)/(74) 122/74  SpO2:  [92 %-96 %] 95 %  Body mass index is 55 3 kg/m²  Input and Output Summary (last 24 hours):        Intake/Output Summary (Last 24 hours) at 5/16/2022 1734  Last data filed at 5/16/2022 1625  Gross per 24 hour   Intake 20 ml   Output 2875 ml   Net -2855 ml       Physical Exam:     GENERAL:  Obese - weak/fatigued  HEAD:  Normocephalic - atraumatic  EYES: PERRL - EOMI   MOUTH:  Mucosa moist  NECK:  Supple - full range of motion  CARDIAC:  Occasionally tachycardic - S1/S2 positive  PULMONARY:  Mildly diminished secondary to body habitus - nonlabored respirations at rest  ABDOMEN:  Soft - nontender/nondistended - active bowel sounds   MUSCULOSKELETAL:  Motor strength/range of motion deconditioned of the LLE secondary to knee discomfort  NEUROLOGIC:  Alert/oriented at baseline  SKIN:  Chronic wrinkles/blemishes   PSYCHIATRIC:  Mood/affect stable      Additional Data:     Labs & Recent Cultures:    Results from last 7 days   Lab Units 05/16/22  0605 05/15/22  0551   WBC Thousand/uL 7 01 8 19   HEMOGLOBIN g/dL 11 1* 10 9*   HEMATOCRIT % 35 1* 34 8*   PLATELETS Thousands/uL 150 151   BANDS PCT % 1  --    NEUTROS PCT %  --  73   LYMPHS PCT %  --  12*   LYMPHO PCT % 20  --    MONOS PCT %  --  7   MONO PCT % 4  --    EOS PCT % 7* 5     Results from last 7 days   Lab Units 05/16/22  0605 05/10/22  2155 05/10/22  2019 05/10/22  1622 05/10/22  1346   POTASSIUM mmol/L 3 8   < >  --    < > 6 3*   CHLORIDE mmol/L 104   < >  --   --  110*   CO2 mmol/L 28   < >  --   --  14*   CO2, I-STAT mmol/L  --   --  23   < >  --    BUN mg/dL 35*   < >  --   --  53*   CREATININE mg/dL 1 92*   < >  --   --  3 72*   CALCIUM mg/dL 9 6   < >  --   --  8 4   ALK PHOS U/L  --   --   --   --  54   ALT U/L  --   --   --   --  38   AST U/L  --   --   --   --  33 GLUCOSE, ISTAT mg/dl  --   --  125   < >  --     < > = values in this interval not displayed  Results from last 7 days   Lab Units 05/10/22  1346   INR  1 27*     Results from last 7 days   Lab Units 05/11/22  0226 05/10/22  2157 05/10/22  1627   POC GLUCOSE mg/dl 110 123 129         Results from last 7 days   Lab Units 05/12/22  0619 05/11/22  2029 05/11/22  1340 05/11/22  0218 05/10/22  0816 05/10/22  0518   LACTIC ACID mmol/L 0 8 3 5* 3 5* 3 1*   < > 3 9*   PROCALCITONIN ng/ml  --   --   --   --   --  2 30*    < > = values in this interval not displayed  Results from last 7 days   Lab Units 05/10/22  1353 05/10/22  1350 05/10/22  1307 05/10/22  0531 05/10/22  0523 05/10/22  0518   BLOOD CULTURE  No Growth After 5 Days  No Growth After 5 Days  --   --  No Growth After 5 Days   Escherichia coli*   URINE CULTURE   --   --  20,000-29,000 cfu/ml Escherichia coli* >100,000 cfu/ml Escherichia coli*  --   --          Last 24 Hours Medication List:   Current Facility-Administered Medications   Medication Dose Route Frequency Provider Last Rate    acetaminophen  650 mg Oral Q6H PRN Lita Cary PA-C      amLODIPine  10 mg Oral Daily Lita Cary PA-C      calcium carbonate  1,000 mg Oral BID PRN Elizabeth Luis PA-C      cefTRIAXone  2,000 mg Intravenous Q12H James Burrell MD Stopped (05/16/22 1037)    [MAR Hold] chlorhexidine  15 mL Mouth/Throat Q12H [address] VERÓNICA Martin      [facility] Hold] glycerin-hypromellose-  1 drop Both Eyes Q3H PRN Gen Bledsoe PA-C      heparin (porcine)  7,500 Units Subcutaneous [facility] Lita Cary PA-C      labetalol  10 mg Intravenous Q4H PRN James Burrell MD      lidocaine  1 patch Topical Daily James Burrell MD      ondansetron  4 mg Intravenous Q6H PRN Lita Cary PA-C      oxyCODONE  2 5 mg Oral Q6H PRN Lita Cary PA-C      senna-docusate sodium  2 tablet Oral BID Lita Cary PA-C      tamsulosin  0 4 mg Oral Daily With [employer] VERÓNICA Cary                      ** Please Note: This note is constructed using a voice recognition dictation system  An occasional wrong word/phrase or sound-a-like substitution may have been picked up by dictation device due to the inherent limitations of voice recognition software  Read the chart carefully and recognize, using reasonable context, where substitutions may have occurred  **

## 2022-05-16 NOTE — ASSESSMENT & PLAN NOTE
Baseline creatinine approximately 1 4-1 6 - remains elevated but improving @ 1 92 from a prior peak of 3 72  Likely secondary to severe sepsis and obstructive pyelonephritis (see above)  Monitor renal function and urine output - limit/avoid nephrotoxins if possible - avoid hypotension as possible

## 2022-05-17 LAB
ANION GAP SERPL CALCULATED.3IONS-SCNC: 8 MMOL/L (ref 4–13)
BUN SERPL-MCNC: 33 MG/DL (ref 5–25)
CALCIUM SERPL-MCNC: 9.6 MG/DL (ref 8.3–10.1)
CHLORIDE SERPL-SCNC: 103 MMOL/L (ref 100–108)
CO2 SERPL-SCNC: 24 MMOL/L (ref 21–32)
CREAT SERPL-MCNC: 1.87 MG/DL (ref 0.6–1.3)
ERYTHROCYTE [DISTWIDTH] IN BLOOD BY AUTOMATED COUNT: 14.3 % (ref 11.6–15.1)
FLUAV RNA RESP QL NAA+PROBE: NEGATIVE
FLUBV RNA RESP QL NAA+PROBE: NEGATIVE
GFR SERPL CREATININE-BSD FRML MDRD: 39 ML/MIN/1.73SQ M
GLUCOSE SERPL-MCNC: 109 MG/DL (ref 65–140)
HCT VFR BLD AUTO: 35.5 % (ref 36.5–49.3)
HGB BLD-MCNC: 11.3 G/DL (ref 12–17)
MCH RBC QN AUTO: 29.7 PG (ref 26.8–34.3)
MCHC RBC AUTO-ENTMCNC: 31.8 G/DL (ref 31.4–37.4)
MCV RBC AUTO: 93 FL (ref 82–98)
NRBC BLD AUTO-RTO: 0 /100 WBCS
PLATELET # BLD AUTO: 178 THOUSANDS/UL (ref 149–390)
PMV BLD AUTO: 10.7 FL (ref 8.9–12.7)
POTASSIUM SERPL-SCNC: 3.5 MMOL/L (ref 3.5–5.3)
RBC # BLD AUTO: 3.8 MILLION/UL (ref 3.88–5.62)
RSV RNA RESP QL NAA+PROBE: NEGATIVE
SARS-COV-2 RNA RESP QL NAA+PROBE: NEGATIVE
SODIUM SERPL-SCNC: 135 MMOL/L (ref 136–145)
WBC # BLD AUTO: 6.78 THOUSAND/UL (ref 4.31–10.16)

## 2022-05-17 PROCEDURE — 97110 THERAPEUTIC EXERCISES: CPT

## 2022-05-17 PROCEDURE — 85027 COMPLETE CBC AUTOMATED: CPT | Performed by: INTERNAL MEDICINE

## 2022-05-17 PROCEDURE — 97530 THERAPEUTIC ACTIVITIES: CPT

## 2022-05-17 PROCEDURE — 0241U HB NFCT DS VIR RESP RNA 4 TRGT: CPT | Performed by: INTERNAL MEDICINE

## 2022-05-17 PROCEDURE — 97116 GAIT TRAINING THERAPY: CPT

## 2022-05-17 PROCEDURE — 80048 BASIC METABOLIC PNL TOTAL CA: CPT | Performed by: INTERNAL MEDICINE

## 2022-05-17 PROCEDURE — 99233 SBSQ HOSP IP/OBS HIGH 50: CPT | Performed by: INTERNAL MEDICINE

## 2022-05-17 RX ADMIN — CALCIUM CARBONATE (ANTACID) CHEW TAB 500 MG 1000 MG: 500 CHEW TAB at 05:20

## 2022-05-17 RX ADMIN — TAMSULOSIN HYDROCHLORIDE 0.4 MG: 0.4 CAPSULE ORAL at 17:59

## 2022-05-17 RX ADMIN — CEFTRIAXONE 2000 MG: 2 INJECTION, POWDER, FOR SOLUTION INTRAMUSCULAR; INTRAVENOUS at 08:55

## 2022-05-17 RX ADMIN — ACETAMINOPHEN 650 MG: 325 TABLET, FILM COATED ORAL at 23:46

## 2022-05-17 RX ADMIN — OXYCODONE HYDROCHLORIDE 2.5 MG: 5 TABLET ORAL at 15:41

## 2022-05-17 RX ADMIN — HEPARIN SODIUM 7500 UNITS: 5000 INJECTION INTRAVENOUS; SUBCUTANEOUS at 18:00

## 2022-05-17 RX ADMIN — HEPARIN SODIUM 7500 UNITS: 5000 INJECTION INTRAVENOUS; SUBCUTANEOUS at 05:20

## 2022-05-17 RX ADMIN — LIDOCAINE 5% 1 PATCH: 700 PATCH TOPICAL at 08:57

## 2022-05-17 RX ADMIN — ACETAMINOPHEN 650 MG: 325 TABLET, FILM COATED ORAL at 05:19

## 2022-05-17 RX ADMIN — AMLODIPINE BESYLATE 10 MG: 10 TABLET ORAL at 08:57

## 2022-05-17 RX ADMIN — HEPARIN SODIUM 7500 UNITS: 5000 INJECTION INTRAVENOUS; SUBCUTANEOUS at 21:15

## 2022-05-17 RX ADMIN — CEFTRIAXONE 2000 MG: 2 INJECTION, POWDER, FOR SOLUTION INTRAMUSCULAR; INTRAVENOUS at 20:15

## 2022-05-17 NOTE — PHYSICAL THERAPY NOTE
PHYSICAL THERAPY NOTE          Patient Name: Miriam Moreau  GWKCV'L Date: 5/17/2022 05/17/22 1206   PT Last Visit   PT Visit Date 05/17/22   Note Type   Note Type Treatment   Pain Assessment   Pain Assessment Tool 0-10   Pain Score 6   Pain [facility]/Orientation Orientation: Left; [facility]: Knee; [facility]: Leg;[facility]: Abdomen   Hospital Pain Intervention(s) Repositioned; Ambulation/increased activity; Emotional support   Restrictions/Precautions   Weight Bearing Precautions Per Order Yes   LLE Weight Bearing Per Order WBAT   Other Precautions Multiple lines; Fall Risk;Pain  (cook, nephrostomy drain)   General   Chart Reviewed Yes   Response to Previous Treatment Patient with no complaints from previous session  Family/Caregiver Present No   Cognition   Overall Cognitive Status WFL   Arousal/Participation Alert; Cooperative;Responsive   Attention Within functional limits   Orientation Level Oriented X4   Memory Within functional limits   Following Commands Follows one step commands without difficulty   Comments pt pleasant and cooperative to participate in therapy session   Bed Mobility   Supine to Sit 4  Minimal assistance   Additional items Assist x 1; Increased time required;Verbal cues   Sit to Supine 4  Minimal assistance   Additional items Assist x 1; Increased time required;Verbal cues;LE management   Additional Comments pt supine in bed upon arrival  Pt returned to supine in bed with all needs within reach   Transfers   Sit to Stand 4  Minimal assistance   Additional items Assist x 1; Increased time required;Verbal cues  (+ bed elevation)   Stand to Sit 4  Minimal assistance   Additional items Assist x 1; Increased time required;Verbal cues   Toilet transfer 4  Minimal assistance   Additional items Assist x 1; Increased time required;Verbal cues; Commode   Additional Comments transfers with RW; 3x STS performed throughout session Ambulation/Elevation   Gait pattern Excessively slow; Short stride; Foward flexed;Decreased foot clearance; Improper Weight shift; Wide TEO   Gait Assistance 4  Minimal assist   Additional items Assist x 1; Tactile cues; Verbal cues   Assistive Device Rolling walker   Distance 40ft (seated rest) 40ft   Balance   Static Sitting Fair +   Dynamic Sitting Fair   Static Standing Fair -   Dynamic Standing Poor +   Ambulatory Poor +   Endurance Deficit   Endurance Deficit Yes   Endurance Deficit Description deconditioning, pain, weakness   Activity Tolerance   Activity Tolerance Patient tolerated treatment well;Patient limited by fatigue;Patient limited by pain   Medical Staff Made Aware restorative aide for chair follow   Nurse Made Aware RN cleared pt to participate in therapy session   Exercises   Quad Sets Supine;Bilateral;10 reps;AROM   Heelslides Supine;Bilateral;10 reps;AROM   Glute Sets Sitting;Bilateral;10 reps;AROM   Ankle Pumps Supine;Bilateral;15 reps;AROM   Assessment   Prognosis Good   Problem List Decreased strength;Decreased range of motion;Decreased endurance; Impaired balance;Decreased mobility;Obesity;Pain;Orthopedic restrictions   Assessment Pt seen for PT treatment session this date  Therapy session focused on bed mobility, functional transfers, gait training, endurance training and therapeutic exercise in order to improve overall mobility and independence  Pt requires assist of 1 for all mobility performed this date  Pt completes all functional mobility with min A using RW; bed elevation required to ease transfer as well as decrease LLE pain  Pt ambulated increased distances this date, cues for upright posture required, chair follow for increased safety  Pt required seated rest breaks throughout session 2* fatigue and pain  Pt performed/ tolerated supine therex program to b/l LEs to strengthen musculature  Bariatric BSC brought to room, increased time required for hygiene tasks   Pt required min A with RW use for static standing while therapist completed hygiene tasks  Pt making progress toward goals  Pt was left supine in bed at the end of PT session with all needs in reach  Pt would benefit from continued PT services while in hospital to address remaining limitations  PT to continue to follow pt and recommends STR upon d/c  The patient's AM-PAC Basic Mobility Inpatient Short Form Raw Score is 16  A Raw score of less than or equal to 16 suggests the patient may benefit from discharge to post-acute rehabilitation services  Please also refer to the recommendation of the Physical Therapist for safe discharge planning  Barriers to Discharge Inaccessible home environment;Decreased caregiver support   Goals   Patient Goals to get OOB and walk   STG Expiration Date 05/24/22   PT Treatment Day 1   Plan   Treatment/Interventions ADL retraining;Functional transfer training;LE strengthening/ROM; Endurance training;Patient/family training;Equipment eval/education; Bed mobility;Gait training;Spoke to nursing;Spoke to case management;OT;Spoke to advanced practitioner   Progress Progressing toward goals   PT Frequency 3-5x/wk   Recommendation   PT Discharge Recommendation Post acute rehabilitation services   Equipment Recommended [facility] Recommended HD Bariatric wheeled walker   Additional Comments cleared for d/c to STR once medically stable   AM-PAC Basic Mobility Inpatient   Turning in Bed Without Bedrails 3   Lying on Back to Sitting on Edge of Flat Bed 3   Moving Bed to Chair 3   Standing Up From Chair 3   Walk in Room 3   Climb 3-5 Stairs 1   Basic Mobility Inpatient Raw Score 16   Basic Mobility Standardized Score 38 32   Highest Level Of Mobility   JH-HLM Goal 5: Stand one or more mins   JH-HLM Achieved 7: Walk 25 feet or more   Education   Education Provided Mobility training;Assistive device   Patient Demonstrates acceptance/verbal understanding   End of Consult   Patient Position at End of Consult Supine; All needs within reach       Memo Clayton, PT, DPT

## 2022-05-17 NOTE — CASE MANAGEMENT
Case Management Discharge Planning Note    Patient name Vidal Kennedy  [facility] [facility] 912/PPHP 423-95 MRN [id number]  : 1965 Date 2022       Current Admission Date: 5/10/2022  Current Admission Diagnosis:Severe sepsis on admission   Patient Active Problem List    Diagnosis Date Noted    Anemia 2022    Obstructive pyelonephritis 2022    Acute unilateral obstructive uropathy 2022    Mixed hyperlipidemia 2021    Morbid obesity 2021    Recurrent left knee pain 2021    Insomnia 2019    Essential hypertension 2019    Severe sepsis on admission 2019    Chronic kidney disease, stage 3 ([facility] [address] ) 2019    Acute kidney injury superimposed on CKD stage 3 2019    Bacteremia due to Escherichia coli 2019    Lactic acidosis 2019    History of gout 07/15/2019    Vitamin D deficiency 06/10/2015    Morbid obesity ([facility] [address] ) 2015      LOS (days): 7  Geometric Mean LOS (GMLOS) (days): 3 50  Days to GMLOS:-3 6     OBJECTIVE:  Risk of Unplanned Readmission Score: 20 85         Current admission status: Inpatient   Preferred Pharmacy:   [id number] [place] [address]  Phone: [phone] Fax: [phone] [address],[place] [address]  Phone: [phone] Fax: [phone]    Primary Care Provider: RITA Cabral    Primary Insurance: BLUE CROSS  Secondary Insurance:     DISCHARGE DETAILS:              Additional Comments: Discussed discharge with patient, he is agreeable to accepting bed at [facility], insurance auth started    Patient requested CM call In[facility] [phone], as he is getting messages that they need medical information to complete his FMLA paperwork; CM called and spoke with representative, they need medical form completed but agreed it is a document that patient's PCP can complete; CM directed to Isabella Graff in [place] [address] who is patient's current PCP  CM to follow and will arrange for transportation via BLS at discharge

## 2022-05-17 NOTE — PLAN OF CARE
Problem: PHYSICAL THERAPY ADULT  Goal: Performs mobility at highest level of function for planned discharge setting  See evaluation for individualized goals  Description: Treatment/Interventions: ADL retraining, Functional transfer training, LE strengthening/ROM, Endurance training, Patient/family training, Equipment eval/education, Bed mobility, Gait training, Spoke to nursing, Spoke to case management, OT, Spoke to advanced practitioner  Equipment Recommended: Cleavon Gone       See flowsheet documentation for full assessment, interventions and recommendations  Outcome: Progressing  Note: Prognosis: Good  Problem List: Decreased strength, Decreased range of motion, Decreased endurance, Impaired balance, Decreased mobility, Obesity, Pain, Orthopedic restrictions  Assessment: Pt seen for PT treatment session this date  Therapy session focused on bed mobility, functional transfers, gait training, endurance training and therapeutic exercise in order to improve overall mobility and independence  Pt requires assist of 1 for all mobility performed this date  Pt completes all functional mobility with min A using RW; bed elevation required to ease transfer as well as decrease LLE pain  Pt ambulated increased distances this date, cues for upright posture required, chair follow for increased safety  Pt required seated rest breaks throughout session 2* fatigue and pain  Pt performed/ tolerated supine therex program to b/l LEs to strengthen musculature  Bariatric BSC brought to room, increased time required for hygiene tasks  Pt required min A with RW use for static standing while therapist completed hygiene tasks  Pt making progress toward goals  Pt was left supine in bed at the end of PT session with all needs in reach  Pt would benefit from continued PT services while in hospital to address remaining limitations  PT to continue to follow pt and recommends STR upon d/c   The patient's AM-PAC Basic Mobility Inpatient Short Form Raw Score is 16  A Raw score of less than or equal to 16 suggests the patient may benefit from discharge to post-acute rehabilitation services  Please also refer to the recommendation of the Physical Therapist for safe discharge planning  Barriers to Discharge: Inaccessible home environment, Decreased caregiver support        PT Discharge Recommendation: Post acute rehabilitation services          See flowsheet documentation for full assessment

## 2022-05-17 NOTE — TELEPHONE ENCOUNTER
Good morning Dr Dinesh Holley, patient is currently admitted to [facility] and is sched for 5/25 w you for URS  Should we leave surgery as sched pending d/c please advise   Thank you

## 2022-05-18 PROCEDURE — 99233 SBSQ HOSP IP/OBS HIGH 50: CPT | Performed by: INTERNAL MEDICINE

## 2022-05-18 RX ORDER — AMOXICILLIN AND CLAVULANATE POTASSIUM 875; 125 MG/1; MG/1
1 TABLET, FILM COATED ORAL EVERY 12 HOURS SCHEDULED
Status: DISCONTINUED | OUTPATIENT
Start: 2022-05-18 | End: 2022-05-19 | Stop reason: HOSPADM

## 2022-05-18 RX ORDER — ACETAMINOPHEN 325 MG/1
650 TABLET ORAL EVERY 6 HOURS PRN
Qty: 30 TABLET | Refills: 0 | Status: SHIPPED | OUTPATIENT
Start: 2022-05-18

## 2022-05-18 RX ORDER — AMOXICILLIN AND CLAVULANATE POTASSIUM 875; 125 MG/1; MG/1
1 TABLET, FILM COATED ORAL EVERY 12 HOURS SCHEDULED
Status: DISCONTINUED | OUTPATIENT
Start: 2022-05-18 | End: 2022-05-18

## 2022-05-18 RX ORDER — TAMSULOSIN HYDROCHLORIDE 0.4 MG/1
0.4 CAPSULE ORAL
Qty: 30 CAPSULE | Refills: 0 | Status: SHIPPED | OUTPATIENT
Start: 2022-05-18

## 2022-05-18 RX ORDER — AMOXICILLIN 250 MG
2 CAPSULE ORAL 2 TIMES DAILY
Refills: 0
Start: 2022-05-18 | End: 2022-06-30

## 2022-05-18 RX ORDER — OXYCODONE HYDROCHLORIDE 5 MG/1
2.5 TABLET ORAL EVERY 6 HOURS PRN
Qty: 30 TABLET | Refills: 0 | Status: ON HOLD | OUTPATIENT
Start: 2022-05-18 | End: 2022-05-24 | Stop reason: ALTCHOICE

## 2022-05-18 RX ORDER — LIDOCAINE 50 MG/G
1 PATCH TOPICAL DAILY
Refills: 0
Start: 2022-05-19 | End: 2022-06-30

## 2022-05-18 RX ORDER — AMOXICILLIN AND CLAVULANATE POTASSIUM 875; 125 MG/1; MG/1
1 TABLET, FILM COATED ORAL EVERY 12 HOURS SCHEDULED
Qty: 14 TABLET | Refills: 0 | Status: SHIPPED | OUTPATIENT
Start: 2022-05-18 | End: 2022-05-25

## 2022-05-18 RX ORDER — CALCIUM CARBONATE 200(500)MG
1000 TABLET,CHEWABLE ORAL 2 TIMES DAILY PRN
Qty: 30 TABLET | Refills: 0 | Status: SHIPPED | OUTPATIENT
Start: 2022-05-18 | End: 2022-06-30

## 2022-05-18 RX ADMIN — OXYCODONE HYDROCHLORIDE 2.5 MG: 5 TABLET ORAL at 21:31

## 2022-05-18 RX ADMIN — LIDOCAINE 5% 1 PATCH: 700 PATCH TOPICAL at 07:40

## 2022-05-18 RX ADMIN — TAMSULOSIN HYDROCHLORIDE 0.4 MG: 0.4 CAPSULE ORAL at 16:49

## 2022-05-18 RX ADMIN — HEPARIN SODIUM 7500 UNITS: 5000 INJECTION INTRAVENOUS; SUBCUTANEOUS at 14:36

## 2022-05-18 RX ADMIN — ACETAMINOPHEN 650 MG: 325 TABLET, FILM COATED ORAL at 09:47

## 2022-05-18 RX ADMIN — HEPARIN SODIUM 7500 UNITS: 5000 INJECTION INTRAVENOUS; SUBCUTANEOUS at 21:31

## 2022-05-18 RX ADMIN — HEPARIN SODIUM 7500 UNITS: 5000 INJECTION INTRAVENOUS; SUBCUTANEOUS at 05:21

## 2022-05-18 RX ADMIN — AMOXICILLIN AND CLAVULANATE POTASSIUM 1 TABLET: 875; 125 TABLET, FILM COATED ORAL at 21:31

## 2022-05-18 RX ADMIN — CEFTRIAXONE 2000 MG: 2 INJECTION, POWDER, FOR SOLUTION INTRAMUSCULAR; INTRAVENOUS at 07:39

## 2022-05-18 RX ADMIN — AMLODIPINE BESYLATE 10 MG: 10 TABLET ORAL at 07:40

## 2022-05-18 NOTE — ARC ADMISSION
[facility] Liaison met with patient at bedside: introduced self, explained role, [facility] program/services, [facility] three locations, transfer process to rehab unit, anticipated transport time of 3:00 PM per CM, insurance benefits breakdown, average rehab length of stay, and rehab program booklet  [facility] Rehab folder left with patient, all questions answered

## 2022-05-18 NOTE — PROGRESS NOTES
PHYSICAL MEDICINE AND REHABILITATION   PREADMISSION ASSESSMENT     Projected [facility] Diagnoses:  Impairment of mobility, safety and Activities of Daily Living (ADLs) due to Debility:  16  Debility (Non-cardiac/Non-pulmonary)  Etiologic: Debility due to severe sepsis  Date of Onset: 5/10/22   Date of surgery: n/a    PATIENT INFORMATION  Name: Francisco J Whiting Phone #: [phone] (home)   Address: Shaista Bonilla [address] [place] [address]  YOB: 1965 Age: 55 y o  SS#   Marital Status: Single  Ethnicity: White  Employment Status: currently employed  Extended Emergency Contact Information  Primary Emergency Contact: Prudencio Cao  Address: [address] Phone: [phone]  Mobile Phone: [phone]  Relation: Father  Secondary Emergency Contact: Cl Driver [facility] Phone: [phone]  Relation: Mother  Advance Directive: Level 1 Full Code (no ACP docs)    INSURANCE/COVERAGE:     Primary Payor: BLUE CROSS / Plan: [employer] [address] / Product Type: Blue Fee for Service /   Secondary Payer:Private Pay   Payer Contact:  Payer Contact:   Contact Phone: Customer Service # [phone] Contact Phone:     Authorization #: [id number]  Coverage Dates: 5/18-5/24  LCD: Update due 5/24 Fax# [phone] Phone# [phone] [address] Record #: [id number]    REFERRAL SOURCE:   Referring provider: Aren Allison MD  Referring facility: [address] [place] [address]  Room: [place] [address] [address]  PCP: Antonio Akhtar[address] PCP phone number: [phone]    MEDICAL INFORMATION  HPI: Pt is a 55year old male who presented to [facility] ED with worsening left-sided flank pain and generalized malaise/fatigue which was not improved since discharge from recent hospital stay at [facility] for obstructive pyelonephritis secondary to 3 mm stone with ureteral stent placement    Patient also developed E coli bacteremia during hospital stay and was ultimately discharged on a course of amoxicillin until 5/4  Prior cultures were sensitive for Ancef and ceftriaxone  In the ED at [facility] patient was found to have lactic acidosis, leukocytosis, worsening ADRIANNE  CT demonstrated left ureteral stent in satisfactory position, however there is moderate perinephric stranding and mild hydronephrosis in the left kidney and multiple bilateral renal calculi  Patient was recommended for transfer to critical care bed at [facility] it to be seen by Urology at that time  Pt is s/p perc nephrostomy tube drain placement by IR on 5/10  Per Urology- continue Flomax and outpatient follow-up for secondary stone  Positive growth of E coli in one blood culture bottle from 5/10 - likely associated w/ UTI/pyelonephritis  Continue IV Rocephin (bacteremic dose) -> should transition to oral regimen to complete a 14 day course at time of discharge  Ortho was consulted for left knee pain  Clinically low suspicion for septic arthritis but given elevated CRP, knee aspiration was performed  Given scant amount of blood and no effusion aspirated, very low concern for infection in the joint at this time  Encourage weight loss in the setting of morbid obesity  XR of knee without acute osseous abnormality, however, did reveal tricompartmental osteoarthritis similar to imaging last year  Ortho recommending optimization of pain control -> may consider future joint steroid injection once acute sepsis/infection resolves  Pt had ADRIANNE- Baseline creatinine approximately 1 4-1 6 - remains elevated but improving currently at 1 87 from a prior peak of 3 72  Likely secondary to severe sepsis and obstructive pyelonephritis  PT and OT have been consulted and are recommending post-acute rehab services  Patient's case has been reviewed with [facility] medical director, patient meets medical criteria for acute rehab and has demonstrated the ability to tolerate three or more hours of therapy per day   Patient is medically stable and ready for discharge to [facility] today  Past Medical History:   Past Surgical History:    Allergies:     Past Medical History:   Diagnosis Date    Arthritis     Gout     Hypertension     Kidney stone     Past Surgical History:   Procedure Laterality Date    FL RETROGRADE PYELOGRAM  8/4/2019    FL RETROGRADE PYELOGRAM  9/26/2019    FL RETROGRADE PYELOGRAM  4/22/2022    IR NEPHROSTOMY TUBE PLACEMENT  5/10/2022    NO PAST SURGERIES  03/23/2015    Last assessed    MN CYSTO/URETERO W/LITHOTRIPSY &INDWELL STENT INSRT Right 9/26/2019    Procedure: CYSTOSCOPY URETEROSCOPY WITH RETROGRADE PYELOGRAM AND INSERTION STENT URETERAL;  Surgeon: Warern Ramirez MD;  [facility]: [facility];  Service: Urology    MN CYSTOURETHROSCOPY,URETER CATHETER Right 8/4/2019    Procedure: CYSTOSCOPY RETROGRADE PYELOGRAM WITH INSERTION STENT URETERAL;  Surgeon: Joaquina Baker MD;  [facility];  Service: Urology    MN CYSTOURETHROSCOPY,URETER CATHETER Left 4/22/2022    Procedure: CYSTOSCOPY RETROGRADE PYELOGRAM WITH INSERTION STENT URETERAL;  Surgeon: China Flor MD;  [facility];  Service: Urology     No Known Allergies      Medical/functional conditions requiring inpatient rehabilitation: Severe sepsis, Anemia, Obstructive pylonephritis, Left knee pain, Decreased mobility, Decreased, self care, Decreased endurance    Risk for medical/clinical complications: Risk of falls, Risk of skin breakdown secondary to decreased mobility, Risk for hypo/hypertensive episodes    Comorbidities: Severe sepsis, Anemia, Obstructive pylonephritis, Left knee pain, Morbid obesity, HTN, ADRIANNE, Bacteremia due to E Coli    Procedures/Surgeries in the last 100 days:   left-sided ureteral stenting on 4/21   s/p perc nephrostomy tube drain placement by IR on 5/10    CURRENT VITAL SIGNS:   Temp:  [98 1 °F (36 7 °C)-98 8 °F (37 1 °C)] 98 4 °F (36 9 °C)  HR:  [] 105  Resp:  [16] 16  BP: (112-118)/(72-73) 112/72   Intake/Output Summary (Last 24 hours) at 5/19/2022 1011  Last data filed at 5/19/2022 0612  Gross per 24 hour   Intake 130 ml   Output 2725 ml   Net -2595 ml        LABORATORY RESULTS:      Lab Results   Component Value Date    HGB 11 3 (L) 05/19/2022    HGB 14 3 04/08/2015    HCT 35 8 (L) 05/19/2022    HCT 43 4 04/08/2015    WBC 7 88 05/19/2022    WBC 12 98 (H) 04/08/2015     Lab Results   Component Value Date    BUN 31 (H) 05/19/2022    BUN 15 04/08/2015     (L) 04/08/2015    K 3 7 05/19/2022    K 3 6 04/08/2015     05/19/2022    CL 99 04/08/2015    GLUCOSE 125 05/10/2022    GLUCOSE 104 04/08/2015    CREATININE 1 87 (H) 05/19/2022    CREATININE 1 80 (H) 04/08/2015     Lab Results   Component Value Date    PROTIME 15 4 (H) 05/10/2022    INR 1 27 (H) 05/10/2022        DIAGNOSTIC STUDIES:  CT abdomen pelvis wo contrast    Result Date: 5/10/2022  Impression: Mild left hydronephrosis status post ureteral stent placement, with 3 mm calculus in the mid ureter adjacent to the stent  Multiple bilateral renal calculi  Workstation performed: [id number]     XR chest portable    Result Date: 5/11/2022  Impression: Left perihilar infiltrate/atelectasis  Workstation performed: [id number]     XR chest 1 view portable    Result Date: 5/10/2022  Impression: No focal airspace consolidation  Workstation performed: [id number]     XR knee 1 or 2 vw left    Result Date: 5/13/2022  Impression: No acute osseous abnormality  Tricompartmental osteoarthrosis is similar to June 2, 2021 Workstation performed: [id number]     XR ankle 3+ vw left    Result Date: 5/15/2022  Impression: No acute osseous abnormality  Workstation performed: [id number]     IR nephrostomy tube placement    Result Date: 5/11/2022  Impression: Technically challenging two stick fluoroscopic guided 8-Libyan nephrostomy tube placement into a nondilated renal collecting system in a patient with large body habitus  Hematuria at the end of the procedure  Recommend daily tube flushes  _______________________________________________________________ COMPARISON: Same day CT abdomen and pelvis PROCEDURE DETAILS: Operators: Dr Barbara Marcos Anesthesia: General and local anesthesia  Medications: 1% lidocaine, please refer to the anesthesia medical record Contrast: 18 mL of Omnipaque 300, 20 mL of Visipaque Fluoroscopy time: 20 2 minutes Images: 5 COMMENTS: A preprocedure timeout was performed per [facility] protocol  The left flank was prepped and draped in the usual sterile fashion  Multiple attempts to access a nondilated left renal lower pole using real-time ultrasound guidance was unsuccessful  Next, multiple fluoroscopic guided attempts to place an AccuStick needle within the expected location of the renal pelvis using the existing double-J stent as a guide was performed  No direct access was achieved; however, eventual filling of the collecting system was identified  Next under fluoroscopic guidance, a needle was advanced into a calyx  Using angled glide catheter and angled Glidewire, access was achieved into the collecting system  Over a guidewire, an 8-Hebrew pigtail drain was placed  Contrast confirmed placement  The tube was sutured skin with 2-0 Prolene and connected to bag drainage   Workstation performed: [id number]       PRECAUTIONS/SPECIAL NEEDS:  Anticoagulation:  heparin, Edema Management, Safety Concerns, Pain Management, Dietary Restrictions: Cardiac and Language Preference: English    MEDICATIONS:     Current Facility-Administered Medications:     acetaminophen (TYLENOL) tablet 650 mg, 650 mg, Oral, Q6H PRN, Lita Cary PA-C, 650 mg at 05/19/22 0259    amLODIPine (NORVASC) tablet 10 mg, 10 mg, Oral, Daily, Lita Cary PA-C, 10 mg at 05/19/22 0901    amoxicillin-clavulanate (AUGMENTIN) 875-125 mg per tablet 1 tablet, 1 tablet, Oral, Q12H [address], Jade Breaux MD, 1 tablet at 05/19/22 0901    calcium carbonate (TUMS) chewable tablet 1,000 mg, 1,000 mg, Oral, BID PRN, Leatha Suarez PA-C, 1,000 mg at 05/17/22 0520    heparin (porcine) subcutaneous injection 7,500 Units, 7,500 Units, Subcutaneous, Q8H [facility], Lita Cary PA-C, 7,500 Units at 05/19/22 0606    labetalol (NORMODYNE) injection 10 mg, 10 mg, Intravenous, Q4H PRN, Li Godinez MD    lidocaine (LIDODERM) 5 % patch 1 patch, 1 patch, Topical, Daily, Li Godinez MD, 1 patch at 05/19/22 0901    ondansetron (ZOFRAN) injection 4 mg, 4 mg, Intravenous, Q6H PRN, Lita Cary PA-C    oxyCODONE (ROXICODONE) IR tablet 2 5 mg, 2 5 mg, Oral, Q6H PRN, 2 5 mg at 05/18/22 2131 **OR** [DISCONTINUED] oxyCODONE (ROXICODONE) IR tablet 5 mg, 5 mg, Oral, Q6H PRN, Lottie Gracia PA-C    senna-docusate sodium (SENOKOT S) 8 6-50 mg per tablet 2 tablet, 2 tablet, Oral, BID, Lita Cary PA-C    tamsulosin (FLOMAX) capsule 0 4 mg, 0 4 mg, Oral, Daily With Dinner, Lita Cary PA-C, 0 4 mg at 05/18/22 1649    SKIN INTEGRITY:   no rashes, no erythema, no peripheral edema    PRIOR LEVEL OF FUNCTION:  [facility]  Vidal Kennedy is single and lives alone  Self Care: Independent, Indoor Mobility: Independent, Stairs (in/outdoor): Independent and Cognition: Independent    FALLS IN THE LAST 6 MONTHS: 1    HOME ENVIRONMENT:  The living area: Two level;Performs ADLs on one level; 3 Steps to enter with rails and full flight to 2nd floor  The patient will not have 24 hour supervision available upon discharge  PREVIOUS DME:  Equipment in home (previous DME): None    FUNCTIONAL STATUS:  Physical Therapy Occupational Therapy Speech Therapy    05/17/22 1206   PT Last Visit   PT Visit Date 05/17/22   Note Type   Note Type Treatment   Pain Assessment   Pain Assessment Tool 0-10   Pain Score 6   Pain [facility]/Orientation Orientation: Left; [facility]: Knee; [facility]: Leg;[facility]: Abdomen   Hospital Pain Intervention(s) Repositioned; Ambulation/increased activity; Emotional support Restrictions/Precautions   Weight Bearing Precautions Per Order Yes   LLE Weight Bearing Per Order WBAT   Other Precautions Multiple lines; Fall Risk;Pain  (cook, nephrostomy drain)   General   Chart Reviewed Yes   Response to Previous Treatment Patient with no complaints from previous session  Family/Caregiver Present No   Cognition   Overall Cognitive Status WFL   Arousal/Participation Alert; Cooperative;Responsive   Attention Within functional limits   Orientation Level Oriented X4   Memory Within functional limits   Following Commands Follows one step commands without difficulty   Comments pt pleasant and cooperative to participate in therapy session   Bed Mobility   Supine to Sit 4  Minimal assistance   Additional items Assist x 1; Increased time required;Verbal cues   Sit to Supine 4  Minimal assistance   Additional items Assist x 1; Increased time required;Verbal cues;LE management   Additional Comments pt supine in bed upon arrival  Pt returned to supine in bed with all needs within reach   Transfers   Sit to Stand 4  Minimal assistance   Additional items Assist x 1; Increased time required;Verbal cues  (+ bed elevation)   Stand to Sit 4  Minimal assistance   Additional items Assist x 1; Increased time required;Verbal cues   Toilet transfer 4  Minimal assistance   Additional items Assist x 1; Increased time required;Verbal cues; Commode   Additional Comments transfers with RW; 3x STS performed throughout session   Ambulation/Elevation   Gait pattern Excessively slow; Short stride; Foward flexed;Decreased foot clearance; Improper Weight shift; Wide TEO   Gait Assistance 4  Minimal assist   Additional items Assist x 1; Tactile cues; Verbal cues   Assistive Device Rolling walker   Distance 40ft (seated rest) 40ft   Balance   Static Sitting Fair +   Dynamic Sitting Fair   Static Standing Fair -   Dynamic Standing Poor +   Ambulatory Poor +   Endurance Deficit   Endurance Deficit Yes   Endurance Deficit Description deconditioning, pain, weakness   Activity Tolerance   Activity Tolerance Patient tolerated treatment well;Patient limited by fatigue;Patient limited by pain   Medical Staff Made Aware restorative aide for chair follow   Nurse Made Aware RN cleared pt to participate in therapy session   Exercises   Quad Sets Supine;Bilateral;10 reps;AROM   Heelslides Supine;Bilateral;10 reps;AROM   Glute Sets Sitting;Bilateral;10 reps;AROM   Ankle Pumps Supine;Bilateral;15 reps;AROM   Assessment   Prognosis Good   Problem List Decreased strength;Decreased range of motion;Decreased endurance; Impaired balance;Decreased mobility;Obesity;Pain;Orthopedic restrictions   Assessment Pt seen for PT treatment session this date  Therapy session focused on bed mobility, functional transfers, gait training, endurance training and therapeutic exercise in order to improve overall mobility and independence  Pt requires assist of 1 for all mobility performed this date  Pt completes all functional mobility with min A using RW; bed elevation required to ease transfer as well as decrease LLE pain  Pt ambulated increased distances this date, cues for upright posture required, chair follow for increased safety  Pt required seated rest breaks throughout session 2* fatigue and pain  Pt performed/ tolerated supine therex program to b/l LEs to strengthen musculature  Bariatric BSC brought to room, increased time required for hygiene tasks  Pt required min A with RW use for static standing while therapist completed hygiene tasks  Pt making progress toward goals  Pt was left supine in bed at the end of PT session with all needs in reach  Pt would benefit from continued PT services while in hospital to address remaining limitations  PT to continue to follow pt and recommends STR upon d/c  The patient's AM-PAC Basic Mobility Inpatient Short Form Raw Score is 16   A Raw score of less than or equal to 16 suggests the patient may benefit from discharge to post-acute rehabilitation services  Please also refer to the recommendation of the Physical Therapist for safe discharge planning  05/19/22 0944    OT Last Visit   OT Visit Date 05/19/22   Note Type   Note Type Treatment for insurance authorization   Restrictions/Precautions   LLE Weight Bearing Per Order WBAT   Other Precautions Fall Risk;Pain;Multiple lines   General   Response to Previous Treatment Patient with no complaints from previous session   Lifestyle   Autonomy PTA, pt reports being I with ADLs, IADLs, fnxl mobility, (+)    Reciprocal Relationships Pt reports local father but limited support available, unable to provide physical assistance   Service to Others FTE - works at [employer]   [address] 4-wheeling   Pain Assessment   Pain Assessment Tool 0-10   Pain Score 10 - Worst Possible Pain   Pain [facility]/Orientation Orientation: Left; [facility]: Leg;Orientation: Right;[facility]: Shoulder   ADL   Eating Assistance 7  Independent   Eating Deficit Setup   Grooming Assistance 5  Supervision/Setup   Grooming Deficit Setup; Increased time to complete;Wash/dry hands; Wash/dry face; Teeth care;Brushing hair   UB Bathing Assistance 3  Moderate Assistance   UB Bathing Deficit Increased time to complete;Left arm   LB Bathing Assistance 2  Maximal Assistance   LB Bathing Deficit Right upper leg;Left upper leg;Right lower leg including foot; Left lower leg including foot   UB Dressing Assistance 3  Moderate Assistance   UB Dressing Deficit Pull over head;Pull around back   LB Dressing Assistance 2  Maximal Assistance   LB Dressing Deficit Don/doff R sock; Don/doff L sock   Toileting Assistance  2  Maximal Assistance   Toileting Deficit Bedside commode; Increased time to complete   Functional Standing Tolerance   Time 2 min   Activity tolerated standing x 2 min for toileting hygiene   Comments holding on to bedframe for assist   Bed Mobility   Sit to Supine 4  Minimal assistance Additional items Assist x 1;LE management   Transfers   Sit to Stand 4  Minimal assistance   Additional items Assist x 1; Increased time required   Stand to Sit 4  Minimal assistance   Additional items Assist x 1; Increased time required   Stand pivot 4  Minimal assistance   Additional items Assist x 1; Increased time required   Toilet transfer 4  Minimal assistance   Additional items Assist x 1;Commode   Cognition   Overall Cognitive Status [facility]   Arousal/Participation Alert; Cooperative   Attention Within functional limits   Orientation Level Oriented X4   Memory Within functional limits   Following Commands Follows multistep commands without difficulty   Comments pt pleasant and cooperative   Activity Tolerance   Activity Tolerance Patient limited by pain; Patient tolerated treatment well;Patient limited by fatigue   Medical Staff Made Aware OK to see per RN   Assessment   Assessment Pt seem for skilled OT treatment session w focus on self care, toileting, functional mobility and ECT/self pacing skills  Pt requires max assist for toileting hygiene, cleaning after BM, max assist LB self care  He can transfer on/off bsc w min assist, SPT w min assist  Good safety, although c/o significant pain LLE  Once at EOB, pt demonstrates good balance  Able to complete grooming w S/increased time  He does need mod assist for UB dressing due to pain R shoulder and limited mobility R arm due to pain  Pt w SOB, educated on breathing techniques, ECT/self pacing skills  Pt very pleasant and cooperative, and eager for therapy  The patient's raw score on the AM-PAC Daily Activity inpatient short form is 15, standardized score is 34 69, less than 39 4  Patients at this level are likely to benefit from discharge to post-acute rehabilitation services  Please refer to the recommendation of the Occupational Therapist for safe discharge planning  From OT standpoint, pt will need inpatient rehab when medically cleared for DC   OT will continue to follow while in acute care  CARE SCORES:  Self Care:  Eatin: Independent  Oral hygiene: 04: Supervision or touching  assistance  Toilet hygiene: 03: Partial/moderate assistance  Shower/bathing self: 02: Substantial/maximal assistance  Upper body dressin: Partial/moderate assistance  Lower body dressin: Substantial/maximal assistance  Putting on/taking off footwear: 09: Not applicable  Transfers:  Roll left and right: 03: Partial/moderate assistance  Sit to lyin: Partial/moderate assistance  Lying to sitting on side of bed: 03: Partial/moderate assistance  Sit to stand: 03: Partial/moderate assistance  Chair/bed to chair transfer: 03: Partial/moderate assistance  Toilet transfer: 03: Partial/moderate assistance  Mobility:  Walk 10 ft: 03: Partial/moderate assistance  Walk 50 ft with two turns: 88: Not attempted due to medical conditions or safety concerns  Walk 150ft: 88: Not attempted due to medical conditions or safety concerns    CURRENT GAP IN FUNCTION  Prior to Admission: Functional Status: Patient was independent with mobility/ambulation, transfers, ADL's, IADL's  Estimated length of stay: 10 to 14 days    Anticipated Post-Discharge Disposition/Treatment  Disposition: Return to previous home/apartment  Outpatient Services: Physical Therapy (PT) and Occupational Therapy (OT)    BARRIERS TO DISCHARGE  Weakness, Pain, Balance Difficulty, Fatigue, Home Accessibility, Caregiver Accessibility, Equipment Needs and Lives Alone    INTERVENTIONS FOR DISCHARGE  ADL retraining; Functional transfer training; LE strengthening/ROM; Endurance training; Patient/family training; Equipment eval/education; Bed mobility; Gait training    REQUIRED THERAPY:  Patient will require PT and OT 90 minutes each per day, five days per week to achieve rehab goals       REQUIRED FUNCTIONAL AND MEDICAL MANAGEMENT FOR INPATIENT REHABILITATION:  Skin:  Monitor skin for breakdown secondary to decreased mobility, Pain Management: Overall pain is moderately controlled, Deep Vein Thrombosis (DVT) Prophylaxis:  SCD's while in bed, further internal medicine management of additional medical conditions while on ARC, PT/OT intervention, patient/family education and training, and any needed consults PRN  RECOMMENDED LEVEL OF CARE:    Pt is a 55year old male who presented to [facility] ED with worsening left-sided flank pain and generalized malaise/fatigue which was not improved since discharge from recent hospital stay at [facility] for obstructive pyelonephritis secondary to 3 mm stone with ureteral stent placement  Patient also developed E coli bacteremia during hospital stay and was ultimately discharged on a course of amoxicillin until 5/4  Prior cultures were sensitive for Ancef and ceftriaxone  In the ED at [facility] patient was found to have lactic acidosis, leukocytosis, worsening ADRIANNE  CT demonstrated left ureteral stent in satisfactory position, however there is moderate perinephric stranding and mild hydronephrosis in the left kidney and multiple bilateral renal calculi  Patient was recommended for transfer to critical care bed at [facility] it to be seen by Urology at that time  Pt is s/p perc nephrostomy tube drain placement by IR on 5/10  Per Urology- continue Flomax and outpatient follow-up for secondary stone  Positive growth of E coli in one blood culture bottle from 5/10 - likely associated w/ UTI/pyelonephritis  Continue IV Rocephin (bacteremic dose) -> should transition to oral regimen to complete a 14 day course at time of discharge  Ortho was consulted for left knee pain  Clinically low suspicion for septic arthritis but given elevated CRP, knee aspiration was performed  Given scant amount of blood and no effusion aspirated, very low concern for infection in the joint at this time  Encourage weight loss in the setting of morbid obesity   XR of knee without acute osseous abnormality, however, did reveal tricompartmental osteoarthritis similar to imaging last year  Ortho recommending optimization of pain control -> may consider future joint steroid injection once acute sepsis/infection resolves  Pt had ADRIANNE- Baseline creatinine approximately 1 4-1 6 - remains elevated but improving currently at 1 87 from a prior peak of 3 72  Likely secondary to severe sepsis and obstructive pyelonephritis  Patient was independent with mobility/ambulation, transfers, ADL's, IADL's  Pt lives alone in a 2 story home with 3 steps to enter  He works full time  Pt is now functioning below baseline needing Min A for transfers/amb with RW and up to Max A for ADLs  Pt would benefit from [facility] admission to have close medical management while participating in 3 hours of therapy per day that will include physical and occupational therapy  Physical and occupational therapists will address functional mobility deficits and assist patient in improving their strength, endurance, ROM, and self care  Pt will have 24/7 nursing care to monitor routine vitals, I/Os, skin integrity, and overall condition  The rehab nursing staff will follow therapy recommendations to have 24 hour follow through during non-therapy hours  PM&R to maximize function and provide medical oversight  The MD will monitor patient co-morbidities while on the unit as well as order any additional tests, labs, and consults needed  The [facility] specialized interdisciplinary team will meet weekly to discuss patient overall medical status and rehab goals in preparation for D/C home  Inpatient acute rehab is recommended for patient to maximize overall strength, endurance, self care, and mobility for a safe and timely transition back home

## 2022-05-18 NOTE — PROGRESS NOTES
[address] [facility]  Progress Note - Marjan Dietrich 1965, 55 y o  male MRN: [id number]  Unit/Bed#: [facility] 912-01 Encounter: [id number]  Primary Care Provider: RITA Eugene   Date and time admitted to hospital: 5/10/2022 12:42 PM    * Severe sepsis on admission  Assessment & Plan  Presented w/ lactic acidosis coupled with fever/tachycardia/tachypnea/leukocytosis in the setting of E  coli pyelonephritis/bacteremia (see below)  Once hemodynamically stabilized, transferred out of the ICU  Monitor vitals and maintain hemodynamics    Anemia  Assessment & Plan  Consider dilutional effect from aggressive IV fluid hydration  Iron panel indicative of chronic disease (elevated ferritin stores) - B12/folate sufficient  Monitor H/H    Obstructive pyelonephritis  Assessment & Plan  CT imaging on admission revealed "Mild left hydronephrosis status post ureteral stent placement, with 3 mm calculus in the mid ureter adjacent to the stent   Multiple bilateral renal calculi "  Recently underwent left-sided ureteral stenting on 4/21 -> found to have stent obstruction requiring revisit to hospital w/ subsequent IR guided left PCN placement on 5/10  PRN pain/emesis control  Appreciate urology input -> continue Flomax and outpatient follow-up for secondary stone treatment on 5/25  Antibiotic regimen as noted under plan for bacteremia below    Recurrent left knee pain  Assessment & Plan  Ongoing issue over the last several weeks now leading to difficulty bearing weight secondary to pain  Encourage weight loss in the setting of morbid obesity  XR of knee without acute osseous abnormality, however, did reveal tricompartmental osteoarthritis similar to imaging last year  Appreciate orthopedic surgery input -> unsuccessful attempts at fluid aspiration -> recommending optimization of pain control -> may consider future joint steroid injection once acute sepsis/infection resolves  PT/OT recommending skilled rehab placement and patient agreeable - awaiting placement    Morbid obesity  Assessment & Plan  BMI of 55 30 currently  Lifestyle/diet modifications    Essential hypertension  Assessment & Plan  Low-sodium diet  Continue Norvasc - PRN IV Labetalol on board for BP spikes    Acute kidney injury superimposed on CKD stage 3  Assessment & Plan  Baseline creatinine approximately 1 4-1 6 - remains elevated but improving @ 1 92 from a prior peak of 3 72  Likely secondary to severe sepsis and obstructive pyelonephritis (see above)  Monitor renal function and urine output - limit/avoid nephrotoxins if possible - avoid hypotension as possible    Bacteremia due to Escherichia coli  Assessment & Plan  Positive growth of E coli in one blood culture bottle from 5/10 - likely associated w/ UTI/pyelonephritis  Continue IV Rocephin (bacteremic dose) -> should transition to oral regimen to complete a 14 day course at time of discharge             VTE Pharmacologic Prophylaxis: VTE Score: 8 High Risk (Score >/= 5) - Pharmacological DVT Prophylaxis Ordered: heparin  Sequential Compression Devices Ordered  Patient Centered Rounds: I performed bedside rounds with nursing staff today  Discussions with Specialists or Other Care Team Provider:     Education and Discussions with Family / Patient: Patient declined call to   Time Spent for Care: 45 minutes  More than 50% of total time spent on counseling and coordination of care as described above  Current Length of Stay: 8 day(s)  Current Patient Status: Inpatient   Certification Statement: The patient will continue to require additional inpatient hospital stay due to transport tomorrow morning   Discharge Plan: Anticipate discharge tomorrow to rehab facility  Code Status: Level 1 - Full Code    Subjective:   Patient has questions about his xray, has osteoarthritis  No fractrues, he was concerned about treatment  He needs to move out of bed and ambulate  Needs PT/OT treatment at rehab  Objective:     Vitals:   Temp (24hrs), Av 7 °F (37 1 °C), Min:98 1 °F (36 7 °C), Max:99 1 °F (37 3 °C)    Temp:  [98 1 °F (36 7 °C)-99 1 °F (37 3 °C)] 98 1 °F (36 7 °C)  HR:  [] 99  Resp:  [18-19] 19  BP: (113-118)/(72-73) 118/73  SpO2:  [93 %-97 %] 97 %  Body mass index is 55 3 kg/m²  Input and Output Summary (last 24 hours): Intake/Output Summary (Last 24 hours) at 2022 1719  Last data filed at 2022 1601  Gross per 24 hour   Intake 250 ml   Output 3450 ml   Net -3200 ml       Physical Exam:   Physical Exam  Vitals and nursing note reviewed  Constitutional:       Appearance: He is well-developed  HENT:      Head: Normocephalic and atraumatic  Eyes:      Conjunctiva/sclera: Conjunctivae normal    Cardiovascular:      Rate and Rhythm: Normal rate and regular rhythm  Heart sounds: No murmur heard  Pulmonary:      Effort: Pulmonary effort is normal  No respiratory distress  Breath sounds: Normal breath sounds  Abdominal:      Palpations: Abdomen is soft  Tenderness: There is no abdominal tenderness  Musculoskeletal:      Cervical back: Neck supple  Skin:     General: Skin is warm and dry  Neurological:      Mental Status: He is alert and oriented to person, place, and time  Cranial Nerves: No cranial nerve deficit  Sensory: No sensory deficit  Motor: No weakness        Coordination: Coordination normal    Psychiatric:         Mood and Affect: Mood normal          Behavior: Behavior normal            Additional Data:     Labs:  Results from last 7 days   Lab Units 22  0617 22  0605 05/15/22  0551   WBC Thousand/uL 6 78 7 01 8 19   HEMOGLOBIN g/dL 11 3* 11 1* 10 9*   HEMATOCRIT % 35 5* 35 1* 34 8*   PLATELETS Thousands/uL 178 150 151   BANDS PCT %  --  1  --    NEUTROS PCT %  --   --  73   LYMPHS PCT %  --   --  12*   LYMPHO PCT %  --  20  --    MONOS PCT %  --   --  7   MONO PCT %  --  4  --    EOS PCT %  --  7* 5     Results from last 7 days   Lab Units 05/17/22  0617   SODIUM mmol/L 135*   POTASSIUM mmol/L 3 5   CHLORIDE mmol/L 103   CO2 mmol/L 24   BUN mg/dL 33*   CREATININE mg/dL 1 87*   ANION GAP mmol/L 8   CALCIUM mg/dL 9 6   GLUCOSE RANDOM mg/dL 109                 Results from last 7 days   Lab Units 05/12/22  0619 05/11/22  2029   LACTIC ACID mmol/L 0 8 3 5*       Lines/Drains:  Invasive Devices  Report    Peripheral Intravenous Line  Duration           Peripheral IV 05/17/22 Right Hand 1 day          Drain  Duration           Nephrostomy Left 8 Fr  7 days    Urethral Catheter 7 days              Urinary Catheter:  Goal for removal: Voiding trial when ambulation improves               Imaging: Reviewed radiology reports from this admission including: xray(s)    Recent Cultures (last 7 days):         Last 24 Hours Medication List:   Current Facility-Administered Medications   Medication Dose Route Frequency Provider Last Rate    acetaminophen  650 mg Oral Q6H PRN Lita Cary PA-C      amLODIPine  10 mg Oral Daily Lita Cary PA-C      amoxicillin-clavulanate  1 tablet Oral Q12H [address] Henry Christensen MD      calcium carbonate  1,000 mg Oral BID PRN Soledad Hurd PA-C      heparin (porcine)  7,500 Units Subcutaneous [facility] Lita Cary PA-C      labetalol  10 mg Intravenous Q4H PRN Annmarie Ramirez MD      lidocaine  1 patch Topical Daily Annmarie Ramirez MD      ondansetron  4 mg Intravenous Q6H PRN Lita Cary PA-C      oxyCODONE  2 5 mg Oral Q6H PRN Lita Cary PA-C      senna-docusate sodium  2 tablet Oral BID Lita Cary PA-C      tamsulosin  0 4 mg Oral Daily With Dinner Meaghan Myers PA-C          Today, Patient Was Seen By: Henry Christensen MD    **Please Note: This note may have been constructed using a voice recognition system  **

## 2022-05-18 NOTE — ASSESSMENT & PLAN NOTE
Presented w/ lactic acidosis coupled with fever/tachycardia/tachypnea/leukocytosis in the setting of E  coli pyelonephritis/bacteremia (see below)  Once hemodynamically stabilized, transferred out of the ICU  Monitor vitals and maintain hemodynamics denies pain/discomfort

## 2022-05-18 NOTE — UTILIZATION REVIEW
Continued Stay Review    Date: 5/18/2022                       Current Patient Class: inpatient  Current Level of Care: med/surg  HPI:56 y o  male initially admitted on 5/10    Assessment/Plan: Pt cleared for d/c to [facility] later today but was unable to severe transport  Will transport to [facility] tomorrow  Continue supportive care      Vital Signs:   Date/Time Temp Pulse Resp BP MAP (mmHg) SpO2 O2 Device   05/18/22 16:05:27 98 1 °F (36 7 °C) 99 -- 118/73 88 97 % --   05/18/22 07:34:41 99 1 °F (37 3 °C) 99 19 115/72 86 93 % --   05/17/22 22:27:28 98 9 °F (37 2 °C) 100 18 113/72 86 97 % --   05/17/22 14:34:28 98 7 °F (37 1 °C) 106 Abnormal  17 122/72 89 95 % --       Pertinent Labs/Diagnostic Results:   Results from last 7 days   Lab Units 05/17/22 2015   SARS-COV-2  Negative     Results from last 7 days   Lab Units 05/17/22 0617 05/16/22  0605 05/15/22  0551 05/14/22  0523 05/12/22  0507   WBC Thousand/uL 6 78 7 01 8 19 8 99 14 69*   HEMOGLOBIN g/dL 11 3* 11 1* 10 9* 11 4* 9 1*   HEMATOCRIT % 35 5* 35 1* 34 8* 37 3 30 1*   PLATELETS Thousands/uL 178 150 151 158 169   NEUTROS ABS Thousands/µL  --   --  5 96 6 84  --    BANDS PCT %  --  1  --   --   --      Results from last 7 days   Lab Units 05/14/22  0523   RETIC CT ABS  44,900   RETIC CT PCT % 1 15     Results from last 7 days   Lab Units 05/17/22  0617 05/16/22  0605 05/15/22  0551 05/14/22  0523 05/13/22  0533 05/12/22  0507   SODIUM mmol/L 135* 137 136 134* 137 142   POTASSIUM mmol/L 3 5 3 8 3 5 3 7 3 7 3 5   CHLORIDE mmol/L 103 104 103 100 103 112*   CO2 mmol/L 24 28 23 25 27 25   ANION GAP mmol/L 8 5 10 9 7 5   BUN mg/dL 33* 35* 37* 35* 35* 37*   CREATININE mg/dL 1 87* 1 92* 2 14* 2 44* 2 55* 2 42*   EGFR ml/min/1 73sq m 39 38 33 28 27 28   CALCIUM mg/dL 9 6 9 6 9 3 9 3 9 4 7 7*   MAGNESIUM mg/dL  --   --   --   --  2 3 2 1     Results from last 7 days   Lab Units 05/17/22  0617 05/16/22  0605 05/15/22  0551 05/14/22  0523 05/13/22  0533 05/12/22  0507   GLUCOSE RANDOM mg/dL 109 107 118 122 101 93     Results from last 7 days   Lab Units 05/14/22  0523   FERRITIN ng/mL 1,619*     Results from last 7 days   Lab Units 05/17/22 2015   INFLUENZA A PCR  Negative   INFLUENZA B PCR  Negative   RSV PCR  Negative     Medications:   Scheduled Medications:  amLODIPine, 10 mg, Oral, Daily  amoxicillin-clavulanate, 1 tablet, Oral, Q12H LATRICE  heparin (porcine), 7,500 Units, Subcutaneous, Q8H LATRICE  lidocaine, 1 patch, Topical, Daily  senna-docusate sodium, 2 tablet, Oral, BID  tamsulosin, 0 4 mg, Oral, Daily With Dinner    PRN Meds:  acetaminophen, 650 mg, Oral, Q6H PRN 5/17 x2, 5/18 x1  calcium carbonate, 1,000 mg, Oral, BID PRN 5/17 x1  labetalol, 10 mg, Intravenous, Q4H PRN  ondansetron, 4 mg, Intravenous, Q6H PRN  oxyCODONE, 2 5 mg, Oral, Q6H PRN 5/17 x1        Discharge Plan: D    Network Utilization Review Department  ATTENTION: Please call with any questions or concerns to [phone] and carefully listen to the prompts so that you are directed to the right person  All voicemails are confidential   Sheridan Tyson all requests for admission clinical reviews, approved or denied determinations and any other requests to dedicated fax number below belonging to the campus where the patient is receiving treatment   List of dedicated fax numbers for the Facilities:  [address] DENIALS (Administrative/Medical Necessity) [phone]   [address] (Maternity/NICU/Pediatrics) [phone]   [address] 40 125 [facility]  [address] Medical [place] [address] [address] [place] [address] [phone] [address] [address] [place] [address] [phone]

## 2022-05-18 NOTE — PROGRESS NOTES
[facility]  Progress Note - Shayy Leal 1965, 55 y o  male MRN: [id number]  Unit/Bed#: [facility] 912-01 Encounter: [id number]  Primary Care Provider: RITA Chandler   Date and time admitted to hospital: 5/10/2022 12:42 PM    * Severe sepsis on admission  Assessment & Plan  Presented w/ lactic acidosis coupled with fever/tachycardia/tachypnea/leukocytosis in the setting of E  coli pyelonephritis/bacteremia (see below)  Once hemodynamically stabilized, transferred out of the ICU  Monitor vitals and maintain hemodynamics    Anemia  Assessment & Plan  Consider dilutional effect from aggressive IV fluid hydration  Iron panel indicative of chronic disease (elevated ferritin stores) - B12/folate sufficient  Monitor H/H    Obstructive pyelonephritis  Assessment & Plan  CT imaging on admission revealed "Mild left hydronephrosis status post ureteral stent placement, with 3 mm calculus in the mid ureter adjacent to the stent   Multiple bilateral renal calculi "  Recently underwent left-sided ureteral stenting on 4/21 -> found to have stent obstruction requiring revisit to hospital w/ subsequent IR guided left PCN placement on 5/10  PRN pain/emesis control  Appreciate urology input -> continue Flomax and outpatient follow-up for secondary stone treatment on 5/25  Antibiotic regimen as noted under plan for bacteremia below    Recurrent left knee pain  Assessment & Plan  Ongoing issue over the last several weeks now leading to difficulty bearing weight secondary to pain  Encourage weight loss in the setting of morbid obesity  XR of knee without acute osseous abnormality, however, did reveal tricompartmental osteoarthritis similar to imaging last year  Appreciate orthopedic surgery input -> unsuccessful attempts at fluid aspiration -> recommending optimization of pain control -> may consider future joint steroid injection once acute sepsis/infection resolves  PT/OT recommending skilled rehab placement and patient agreeable - awaiting placement    Morbid obesity  Assessment & Plan  BMI of 55 30 currently  Lifestyle/diet modifications    Essential hypertension  Assessment & Plan  Low-sodium diet  Continue Norvasc - PRN IV Labetalol on board for BP spikes    Acute kidney injury superimposed on CKD stage 3  Assessment & Plan  Baseline creatinine approximately 1 4-1 6 - remains elevated but improving @ 1 92 from a prior peak of 3 72  Likely secondary to severe sepsis and obstructive pyelonephritis (see above)  Monitor renal function and urine output - limit/avoid nephrotoxins if possible - avoid hypotension as possible    Bacteremia due to Escherichia coli  Assessment & Plan  Positive growth of E coli in one blood culture bottle from 5/10 - likely associated w/ UTI/pyelonephritis  Continue IV Rocephin (bacteremic dose) -> should transition to oral regimen to complete a 14 day course at time of discharge             VTE Pharmacologic Prophylaxis: VTE Score: 8 High Risk (Score >/= 5) - Pharmacological DVT Prophylaxis Ordered: heparin  Sequential Compression Devices Ordered  Patient Centered Rounds: I performed bedside rounds with nursing staff today  Discussions with Specialists or Other Care Team Provider:     Education and Discussions with Family / Patient: Patient declined call to   Time Spent for Care: 45 minutes  More than 50% of total time spent on counseling and coordination of care as described above  Current Length of Stay: 7 day(s)  Current Patient Status: Inpatient   Certification Statement: The patient will continue to require additional inpatient hospital stay due to placement, he will be discharged tomorrow   Discharge Plan: Anticipate discharge in 24-48 hrs to rehab facility  Code Status: Level 1 - Full Code    Subjective:   Patient doing well  He is ready to go to rehab       Objective:     Vitals:   Temp (24hrs), Av 1 °F (36 7 °C), Min:97 6 °F (36 4 °C), Max:98 7 °F (37 1 °C)    Temp:  [97 6 °F (36 4 °C)-98 7 °F (37 1 °C)] 98 7 °F (37 1 °C)  HR:  [] 106  Resp:  [17-20] 17  BP: (122-125)/(72) 122/72  SpO2:  [94 %-97 %] 95 %  Body mass index is 55 3 kg/m²  Input and Output Summary (last 24 hours): Intake/Output Summary (Last 24 hours) at 5/17/2022 2006  Last data filed at 5/17/2022 1831  Gross per 24 hour   Intake 120 ml   Output 1650 ml   Net -1530 ml       Physical Exam:   Physical Exam  Vitals and nursing note reviewed  Constitutional:       Appearance: He is well-developed  HENT:      Head: Normocephalic and atraumatic  Mouth/Throat:      Mouth: Mucous membranes are moist    Eyes:      Conjunctiva/sclera: Conjunctivae normal    Cardiovascular:      Rate and Rhythm: Normal rate and regular rhythm  Heart sounds: No murmur heard  Pulmonary:      Effort: Pulmonary effort is normal  No respiratory distress  Breath sounds: Normal breath sounds  Abdominal:      Palpations: Abdomen is soft  Tenderness: There is no abdominal tenderness  Musculoskeletal:         General: No swelling or deformity  Cervical back: Neck supple  Skin:     General: Skin is warm and dry  Neurological:      General: No focal deficit present  Mental Status: He is alert  Cranial Nerves: No cranial nerve deficit  Sensory: No sensory deficit  Motor: No weakness        Coordination: Coordination normal    Psychiatric:         Mood and Affect: Mood normal          Behavior: Behavior normal            Additional Data:     Labs:  Results from last 7 days   Lab Units 05/17/22  0617 05/16/22  0605 05/15/22  0551   WBC Thousand/uL 6 78 7 01 8 19   HEMOGLOBIN g/dL 11 3* 11 1* 10 9*   HEMATOCRIT % 35 5* 35 1* 34 8*   PLATELETS Thousands/uL 178 150 151   BANDS PCT %  --  1  --    NEUTROS PCT %  --   --  73   LYMPHS PCT %  --   --  12*   LYMPHO PCT %  --  20  --    MONOS PCT %  --   --  7   MONO PCT %  --  4  --    EOS PCT %  --  7* 5 Results from last 7 days   Lab Units 05/17/22  0617   SODIUM mmol/L 135*   POTASSIUM mmol/L 3 5   CHLORIDE mmol/L 103   CO2 mmol/L 24   BUN mg/dL 33*   CREATININE mg/dL 1 87*   ANION GAP mmol/L 8   CALCIUM mg/dL 9 6   GLUCOSE RANDOM mg/dL 109         Results from last 7 days   Lab Units 05/11/22  0226 05/10/22  2157   POC GLUCOSE mg/dl 110 123         Results from last 7 days   Lab Units 05/12/22  0619 05/11/22  2029 05/11/22  1340 05/11/22  0218   LACTIC ACID mmol/L 0 8 3 5* 3 5* 3 1*       Lines/Drains:  Invasive Devices  Report    Peripheral Intravenous Line  Duration           Peripheral IV 05/17/22 Right Hand <1 day          Drain  Duration           Nephrostomy Left 8 Fr  6 days    Urethral Catheter 6 days              Urinary Catheter:  Goal for removal: No longer needed   Will place order to discontinue               Imaging: Reviewed radiology reports from this admission including: chest xray and Personally reviewed the following imaging: chest xray    Recent Cultures (last 7 days):         Last 24 Hours Medication List:   Current Facility-Administered Medications   Medication Dose Route Frequency Provider Last Rate    acetaminophen  650 mg Oral Q6H PRN Lita Cary PA-C      amLODIPine  10 mg Oral Daily Lita Cary PA-C      calcium carbonate  1,000 mg Oral BID PRN Tessa Melo PA-C      cefTRIAXone  2,000 mg Intravenous Q12H Carolin Fitch MD 2,000 mg (05/17/22 0855)    [MAR Hold] chlorhexidine  15 mL Mouth/Throat Q12H [address] VERÓNICA Martin      [facility] Hold] glycerin-hypromellose-  1 drop Both Eyes Q3H PRN Violeta Aldrich PA-C      heparin (porcine)  7,500 Units Subcutaneous [facility] Lita Cary PA-C      labetalol  10 mg Intravenous Q4H PRN Carolin Fitch MD      lidocaine  1 patch Topical Daily Carolin Fitch MD      ondansetron  4 mg Intravenous Q6H PRN Lita Cary PA-C      oxyCODONE  2 5 mg Oral Q6H PRN Lita Cary PA-C      senna-docusate sodium  2 tablet Oral BID Meaghan Myers PA-C      tamsulosin  0 4 mg Oral Daily With [address] Yael Chawla PA-C          Today, Patient Was Seen By: Henry Christensen MD    **Please Note: This note may have been constructed using a voice recognition system  **

## 2022-05-18 NOTE — CASE MANAGEMENT
Case Management Discharge Planning Note    Patient name Anastacio Watters  [facility] [address]PP[address] 313[address]94 MRN [id number]  : 1965 Date 2022       Current Admission Date: 5/10/2022  Current Admission Diagnosis:Severe sepsis on admission   Patient Active Problem List    Diagnosis Date Noted    Anemia 2022    Obstructive pyelonephritis 2022    Acute unilateral obstructive uropathy 2022    Mixed hyperlipidemia 2021    Morbid obesity 2021    Recurrent left knee pain 2021    Insomnia 2019    Essential hypertension 2019    Severe sepsis on admission 2019    Chronic kidney disease, stage 3 ([facility] [address] ) 2019    Acute kidney injury superimposed on CKD stage 3 2019    Bacteremia due to Escherichia coli 2019    Lactic acidosis 2019    History of gout 07/15/2019    Vitamin D deficiency 06/10/2015    Morbid obesity ([facility] [address] ) 2015      LOS (days): 8  Geometric Mean LOS (GMLOS) (days): 3 50  Days to GMLOS:-4 6     OBJECTIVE:  Risk of Unplanned Readmission Score: 20 73         Current admission status: Inpatient   Preferred Pharmacy:   [facility]/pharmacy #[id number]- [address] [address]  Phone: [phone] Fax: [phone] [place], [place] - [address] [facility] [address]  Phone: [phone] Fax: [phone]    Primary Care Provider: RITA Cabral    Primary Insurance: [address]  Secondary Insurance:     DISCHARGE DETAILS:              Transported by (Company and Unit #): [employer]  ETA of Transport (Date): 22  ETA of Transport (Time): 1100     Transfer Mode: Wheelchair                 Additional Comments: Patient cleared for discharge today to [address] [place] [address], auth received, however transport unable to be secured in time for trip to [place] today; transport secured via bariatric WC at 1100 AM tomorrow   Patient aware         Receiving Facility/Agency Phone Number: [facility], [phone]  Facility/Agency Fax Number: [phone]

## 2022-05-19 ENCOUNTER — HOSPITAL ENCOUNTER (INPATIENT)
Facility: HOSPITAL | Age: 57
LOS: 19 days | DRG: 949 | End: 2022-06-07
Attending: FAMILY MEDICINE | Admitting: FAMILY MEDICINE
Payer: COMMERCIAL

## 2022-05-19 VITALS
HEIGHT: 66 IN | WEIGHT: 309.75 LBS | SYSTOLIC BLOOD PRESSURE: 117 MMHG | TEMPERATURE: 98.6 F | DIASTOLIC BLOOD PRESSURE: 64 MMHG | RESPIRATION RATE: 16 BRPM | BODY MASS INDEX: 49.78 KG/M2 | OXYGEN SATURATION: 98 % | HEART RATE: 77 BPM

## 2022-05-19 DIAGNOSIS — R65.20 SEVERE SEPSIS (HCC): Primary | ICD-10-CM

## 2022-05-19 DIAGNOSIS — N11.1 OBSTRUCTIVE PYELONEPHRITIS: ICD-10-CM

## 2022-05-19 DIAGNOSIS — N18.30 CHRONIC KIDNEY DISEASE, STAGE 3 (HCC): ICD-10-CM

## 2022-05-19 DIAGNOSIS — A41.9 SEVERE SEPSIS (HCC): Primary | ICD-10-CM

## 2022-05-19 DIAGNOSIS — I10 PRIMARY HYPERTENSION: ICD-10-CM

## 2022-05-19 LAB
ANION GAP SERPL CALCULATED.3IONS-SCNC: 8 MMOL/L (ref 4–13)
BASOPHILS # BLD AUTO: 0.03 THOUSANDS/ΜL (ref 0–0.1)
BASOPHILS NFR BLD AUTO: 0 % (ref 0–1)
BUN SERPL-MCNC: 31 MG/DL (ref 5–25)
CALCIUM SERPL-MCNC: 9.6 MG/DL (ref 8.3–10.1)
CHLORIDE SERPL-SCNC: 102 MMOL/L (ref 100–108)
CO2 SERPL-SCNC: 24 MMOL/L (ref 21–32)
CREAT SERPL-MCNC: 1.87 MG/DL (ref 0.6–1.3)
EOSINOPHIL # BLD AUTO: 0.35 THOUSAND/ΜL (ref 0–0.61)
EOSINOPHIL NFR BLD AUTO: 4 % (ref 0–6)
ERYTHROCYTE [DISTWIDTH] IN BLOOD BY AUTOMATED COUNT: 14.1 % (ref 11.6–15.1)
GFR SERPL CREATININE-BSD FRML MDRD: 39 ML/MIN/1.73SQ M
GLUCOSE SERPL-MCNC: 108 MG/DL (ref 65–140)
HCT VFR BLD AUTO: 35.8 % (ref 36.5–49.3)
HGB BLD-MCNC: 11.3 G/DL (ref 12–17)
IMM GRANULOCYTES # BLD AUTO: 0.03 THOUSAND/UL (ref 0–0.2)
IMM GRANULOCYTES NFR BLD AUTO: 0 % (ref 0–2)
LYMPHOCYTES # BLD AUTO: 1.33 THOUSANDS/ΜL (ref 0.6–4.47)
LYMPHOCYTES NFR BLD AUTO: 17 % (ref 14–44)
MCH RBC QN AUTO: 30.1 PG (ref 26.8–34.3)
MCHC RBC AUTO-ENTMCNC: 31.6 G/DL (ref 31.4–37.4)
MCV RBC AUTO: 96 FL (ref 82–98)
MONOCYTES # BLD AUTO: 0.75 THOUSAND/ΜL (ref 0.17–1.22)
MONOCYTES NFR BLD AUTO: 10 % (ref 4–12)
NEUTROPHILS # BLD AUTO: 5.39 THOUSANDS/ΜL (ref 1.85–7.62)
NEUTS SEG NFR BLD AUTO: 69 % (ref 43–75)
NRBC BLD AUTO-RTO: 0 /100 WBCS
PLATELET # BLD AUTO: 249 THOUSANDS/UL (ref 149–390)
PMV BLD AUTO: 10.4 FL (ref 8.9–12.7)
POTASSIUM SERPL-SCNC: 3.7 MMOL/L (ref 3.5–5.3)
RBC # BLD AUTO: 3.75 MILLION/UL (ref 3.88–5.62)
SODIUM SERPL-SCNC: 134 MMOL/L (ref 136–145)
WBC # BLD AUTO: 7.88 THOUSAND/UL (ref 4.31–10.16)

## 2022-05-19 PROCEDURE — NC001 PR NO CHARGE

## 2022-05-19 PROCEDURE — 80048 BASIC METABOLIC PNL TOTAL CA: CPT | Performed by: INTERNAL MEDICINE

## 2022-05-19 PROCEDURE — 99239 HOSP IP/OBS DSCHRG MGMT >30: CPT | Performed by: INTERNAL MEDICINE

## 2022-05-19 PROCEDURE — 97535 SELF CARE MNGMENT TRAINING: CPT

## 2022-05-19 PROCEDURE — 97530 THERAPEUTIC ACTIVITIES: CPT

## 2022-05-19 PROCEDURE — 85025 COMPLETE CBC W/AUTO DIFF WBC: CPT | Performed by: INTERNAL MEDICINE

## 2022-05-19 PROCEDURE — 99222 1ST HOSP IP/OBS MODERATE 55: CPT | Performed by: PHYSICAL MEDICINE & REHABILITATION

## 2022-05-19 RX ORDER — LIDOCAINE 50 MG/G
1 PATCH TOPICAL DAILY
Status: DISCONTINUED | OUTPATIENT
Start: 2022-05-20 | End: 2022-06-07 | Stop reason: HOSPADM

## 2022-05-19 RX ORDER — ACETAMINOPHEN 325 MG/1
975 TABLET ORAL EVERY 6 HOURS PRN
Status: DISCONTINUED | OUTPATIENT
Start: 2022-05-19 | End: 2022-05-21

## 2022-05-19 RX ORDER — SENNOSIDES 8.6 MG
2 TABLET ORAL
Status: DISCONTINUED | OUTPATIENT
Start: 2022-05-19 | End: 2022-06-07 | Stop reason: HOSPADM

## 2022-05-19 RX ORDER — HEPARIN SODIUM 5000 [USP'U]/ML
7500 INJECTION, SOLUTION INTRAVENOUS; SUBCUTANEOUS EVERY 8 HOURS SCHEDULED
Status: DISCONTINUED | OUTPATIENT
Start: 2022-05-19 | End: 2022-06-07 | Stop reason: HOSPADM

## 2022-05-19 RX ORDER — CALCIUM CARBONATE 200(500)MG
1000 TABLET,CHEWABLE ORAL 2 TIMES DAILY PRN
Status: DISCONTINUED | OUTPATIENT
Start: 2022-05-19 | End: 2022-06-07 | Stop reason: HOSPADM

## 2022-05-19 RX ORDER — AMOXICILLIN AND CLAVULANATE POTASSIUM 875; 125 MG/1; MG/1
1 TABLET, FILM COATED ORAL EVERY 12 HOURS SCHEDULED
Status: COMPLETED | OUTPATIENT
Start: 2022-05-19 | End: 2022-05-24

## 2022-05-19 RX ORDER — OXYCODONE HYDROCHLORIDE 5 MG/1
2.5 TABLET ORAL EVERY 6 HOURS PRN
Status: DISCONTINUED | OUTPATIENT
Start: 2022-05-19 | End: 2022-05-21

## 2022-05-19 RX ORDER — ACETAMINOPHEN 325 MG/1
650 TABLET ORAL EVERY 6 HOURS PRN
Status: DISCONTINUED | OUTPATIENT
Start: 2022-05-19 | End: 2022-05-19

## 2022-05-19 RX ORDER — OXYCODONE HYDROCHLORIDE 5 MG/1
5 TABLET ORAL EVERY 6 HOURS PRN
Status: DISCONTINUED | OUTPATIENT
Start: 2022-05-19 | End: 2022-05-21

## 2022-05-19 RX ORDER — ONDANSETRON 4 MG/1
4 TABLET, ORALLY DISINTEGRATING ORAL EVERY 6 HOURS PRN
Status: DISCONTINUED | OUTPATIENT
Start: 2022-05-19 | End: 2022-06-07 | Stop reason: HOSPADM

## 2022-05-19 RX ORDER — TAMSULOSIN HYDROCHLORIDE 0.4 MG/1
0.4 CAPSULE ORAL
Status: DISCONTINUED | OUTPATIENT
Start: 2022-05-19 | End: 2022-06-07 | Stop reason: HOSPADM

## 2022-05-19 RX ORDER — AMLODIPINE BESYLATE 10 MG/1
10 TABLET ORAL DAILY
Status: DISCONTINUED | OUTPATIENT
Start: 2022-05-20 | End: 2022-06-07 | Stop reason: HOSPADM

## 2022-05-19 RX ADMIN — HEPARIN SODIUM 7500 UNITS: 5000 INJECTION INTRAVENOUS; SUBCUTANEOUS at 17:17

## 2022-05-19 RX ADMIN — AMLODIPINE BESYLATE 10 MG: 10 TABLET ORAL at 09:01

## 2022-05-19 RX ADMIN — DICLOFENAC SODIUM 2 G: 10 GEL TOPICAL at 17:17

## 2022-05-19 RX ADMIN — DICLOFENAC SODIUM 2 G: 10 GEL TOPICAL at 21:17

## 2022-05-19 RX ADMIN — AMOXICILLIN AND CLAVULANATE POTASSIUM 1 TABLET: 875; 125 TABLET, FILM COATED ORAL at 09:01

## 2022-05-19 RX ADMIN — ACETAMINOPHEN 650 MG: 325 TABLET, FILM COATED ORAL at 02:59

## 2022-05-19 RX ADMIN — HEPARIN SODIUM 7500 UNITS: 5000 INJECTION INTRAVENOUS; SUBCUTANEOUS at 06:06

## 2022-05-19 RX ADMIN — LIDOCAINE 5% 1 PATCH: 700 PATCH TOPICAL at 09:01

## 2022-05-19 RX ADMIN — OXYCODONE HYDROCHLORIDE 5 MG: 5 TABLET ORAL at 21:15

## 2022-05-19 RX ADMIN — AMOXICILLIN AND CLAVULANATE POTASSIUM 1 TABLET: 875; 125 TABLET, FILM COATED ORAL at 21:14

## 2022-05-19 RX ADMIN — TAMSULOSIN HYDROCHLORIDE 0.4 MG: 0.4 CAPSULE ORAL at 17:17

## 2022-05-19 NOTE — TELEPHONE ENCOUNTER
Patient is still currently admitted and per Dr Katia De Souza we are to still leave him on for surgery

## 2022-05-19 NOTE — ASSESSMENT & PLAN NOTE
· Presented to [address] on 5/10 with worsening left flank pain, tachypnea and tachycardia in setting of E  Coli pyelonephritis/bacteremia   · CT revealed hydronephrosis and obstructive pyelonephritis  · Patient transferred to [facility] for higher level of care  · He went to the OR on 5/10 with IR for a nephrostomy tube placement  · Patient triggered for sepsis last night with increase in white count from 13 52 to 17 45  Creatinine is increasing from 1 67 to 1 74  Patient with elevated temp greater than 100  Patient with elevated heart rate since admission however noted to be as high as 122  Hospitalist notified and initiated lab work and IV with sodium chloride infusion  Transfer should have been initiated at that time but was not  · Patient did trigger earlier in the day yesterday for sepsis with white count of 13 52 and tachycardia  However he was asymptomatic at that time, afebrile, did have tachycardia but has been experiencing this since admission  · Patient now with c/o weakness, fatigue and diaphoresis  · Initiating transfer to [facility] to be evaluated by Urology as Nephrostomy tube may be a potential source of sepsis

## 2022-05-19 NOTE — ASSESSMENT & PLAN NOTE
Lab Results   Component Value Date    EGFR 43 06/07/2022    EGFR 45 06/06/2022    EGFR 50 06/01/2022    CREATININE 1 74 (H) 06/07/2022    CREATININE 1 67 (H) 06/06/2022    CREATININE 1 53 (H) 06/01/2022   · Baseline creatinine approximately 1 4-1 6  · Monitor renal function and urine output  · Avoid nephrotoxins and hypotension

## 2022-05-19 NOTE — H&P
PHYSICAL MEDICINE AND REHABILITATION H&P/ADMISSION NOTE  Jordi Pink 55 y o  male MRN: [id number]  Unit/Bed#: -01 Encounter: [id number]     Rehab Diagnosis: Impairment of mobility, safety and Activities of Daily Living (ADLs) due to Debility:  16  Debility (Non-cardiac/Non-pulmonary)  Etiologic: Debility due to severe sepsis    History of Present Illness:   Jordi Pink is a 55 y o  male with a PMH significant for CKD, HTN, HLD, obstructive pyelonephritis with coli bacteremia, who presented to the [place] [address] ED on 05/10  He had a recent discharge on 4/30/22 s/p cystoscopy and stent placement on 4/22/22  After his discharge, patient experienced intermittent waves of left flank pain described as sharp, non-radiating, lightheadedness, nausea  He also admitted to worsening shortness of breath  He arrived to ED with tachypnea and tachycardia  CT scan obtained revealing hydronephrosis and obstructive pyelonephritis  Urology was consulted and recommended acute transfer to [facility]  Patient diagnosed with sepsis and initiated on IV antibiotics  IR was consulted for concerns with potential obstruction of stent placement and noted to be in ADRIANNE  Patient went to the OR with IR for obstructive pyelonephritis and is s/p IR Nephrostomy tube placement on 5/10  He was intubated for the procedure and extubated on 5/11  Ortho was consulted on 5/13 for left knee pain and a left knee aspiration was performed  Patient is to continue WBAT to LLE  The aspiration was unsuccessful with ortho recommending adequate pain control and potential steroid injection in the future  Sepsis resolved and patient was transferred from ICU to med surg  On 5/16 patient was transitioned from IV antibiotics to oral to complete a total of 14 days  Patient participated with PT/OT and deemed appropriate for post acute rehabilitation services   He was accepted to [facility] and once hemodynamically stable, arrived on 5/19/22  Plan:     Rehabilitation   Functional deficits: impaired mobility, self care, weakness to the LLE due to pain to left knee and left foot, Pain also to right shoulder    Begin PT/OT/SLP  Rehabilitation goals are to achieve a modified independent level with mobility and self care  Prognosis is good  ELOS is 10-14 days  Estimated discharge is home  DVT prophylaxis   Heparin 7500 units SQ Q8H [address]    Pain   Tylenol 975 mg Q6H PRN for mild pain    Oxycodone 2 5 mg Q6H PRN for moderate pain   Oxycodone 5 mg Q6H PRN severe pain    Bladder plan   Cook catheter in place- initiate voiding trial in AM; patient states cook has been in since surgery    Bowel plan   Continent    Code Status   Full code       Anemia  Assessment & Plan  · Iron panel obtained with elevated ferritin stores   · Monitor H/h      Obstructive pyelonephritis  Assessment & Plan  · CT on 5/10: "Mild left hydronephrosis status post ureteral stent placement, with 3 mm calculus in the mid ureter adjacent to the stent  Multiple bilateral renal calculi "  · Recent admission and underwent L-side ureteral stent on 4/21- this was found to be obstructive   S/p left PCN placement by IR on 5/10   · Pain control  · Continue Flomax, OP follow up with urology for stone treatment on 5/25  · Continue Augmentin Q12H [address] for total of 14 days (Complete on 5/25/22)      Recurrent left knee pain  Assessment & Plan  · Ongoing for several weeks causing difficulty with bearing weight due to pain  · XR knee without acute osseous abnormality, did reveal tricompartmental osteoarthritis similar to imaging done the year prior  · Ortho consulted and attempted an unsuccessful fluid aspiration   · Continue with adequate pain management with OP Ortho f/u for potential steroid injection once acute sepsis/infection resolves   · Ordered Diclofenac gel to the knee 4 times daily, discussed with PT about a knee brace during sessions, patient also with Left foot pain ordered an xray to the left foot for tomorrow   · PT/OT      Essential hypertension  Assessment & Plan  · Low-sodium diet  · Continue Norvasc  · Was on IV Labetalol in acute care for BP spikes      Chronic kidney disease, stage 3 [facility])  Assessment & Plan  Lab Results   Component Value Date    EGFR 39 05/19/2022    EGFR 39 05/17/2022    EGFR 38 05/16/2022    CREATININE 1 87 (H) 05/19/2022    CREATININE 1 87 (H) 05/17/2022    CREATININE 1 92 (H) 05/16/2022   · Baseline creatinine approximately 1 4-1 6  · Did peak to 1 92 in acute care thought to be due to severe sepsis with obstructive pyelonephritis   · Monitor renal function and urine output  · Avoid nephrotoxins and hypotension       Severe sepsis on admission  Assessment & Plan  · Presented to [address] on 5/10 with worsening left flank pain, tachypnea and tachycardia in setting of E  Coli pyelonephritis/bacteremia   · CT revealed hydronephrosis and obstructive pyelonephritis  · Patient transferred to [facility] for higher level of care  · He went to the OR on 5/10 with IR for a nephrostomy tube placement  · Resolved      Bacteremia due to Escherichia coli  Assessment & Plan  · Positive growth of E  Coli in one blood culture on 5/10 thought to be due to UTI/pyelonephritis   · Was on IV Rocephin; transitioned to PO Augmentin in acute care for a total of 14 days      Morbid obesity ([address] )  Assessment & Plan  · BMI of 49 99  · Lifestyle and diet modifications               Subjective: This is a 55year old male presenting s/p nephrostomy tube placement  Patient admits to significant pain to the left knee and right shoulder  The right shoulder pain he reported started after surgery  He currently has a lidocaine patch on without relief  The left knee pain has been on going  Ortho tried to aspirate the knee without success  Patient asked about a knee brace which I relayed to PT  Also asked if I could add diclofenac gel for which patient was agreeable   He admitted to left toe pain as well that is not tender to palpation  He admits to an x-ray of the left knee and shin region but not of the foot  He is agreeable to this  Patient reports he has had a cook catheter in since surgery  Advised patient we will attempt a voiding trial tomorrow morning  Patient is anxious about starting therapy due to fear of not being able to tolerate with his current pain  I encourage patient that therapy will work with him in regards to his pain  Patient also noted he has an upcoming zachary on 5/25 for potential removal of the nephrostomy tube  He states he really wants this to be removed due to fear of moving with the tube because of the stent becoming dislodged and causing his current hospital admission  Will relay to SW to prepare to set up for this zachary  Review of Systems   Constitutional: Negative  Negative for appetite change and fatigue  HENT: Negative  Negative for trouble swallowing  Eyes: Negative  Negative for visual disturbance  Respiratory:        Dyspnea with exertion   Cardiovascular: Negative  Negative for chest pain  Gastrointestinal: Negative for abdominal pain and constipation  Loose stools   Endocrine: Negative  Genitourinary: Positive for difficulty urinating  Cook currently in place   Musculoskeletal: Positive for arthralgias, joint swelling and myalgias  Right shoulder pain  Left knee pain  Left toe pain to first two toes   Skin: Negative  Allergic/Immunologic: Negative  Neurological: Positive for weakness  LLE weakness   Hematological: Negative  Psychiatric/Behavioral: The patient is nervous/anxious  Function:  PRIOR LEVEL OF FUNCTION:  He lives in a(n) single family home  Rolando Mcduffie is single and lives alone  Self Care: Independent, Indoor Mobility: Independent, Stairs (in/outdoor): Independent and Cognition: Independent     FALLS IN THE LAST 6 MONTHS: 1     HOME ENVIRONMENT:  The living area:  Two level;Performs ADLs on one level; 3 Steps to enter with rails and full flight to 2nd floor  The patient will not have 24 hour supervision available upon discharge  PREVIOUS DME:  Equipment in home (previous DME): None      Current level of function:  Physical Therapy: Bed mobility min assist, transfers min assist, ambulation min assist  Occupational Therapy: eating: independent, grooming supervision s/u, UB bathing mod assist, LB bathing max assist, UB dressing mod assist, LB Dressing max assist, toileting assistance max assist  Speech Therapy: No notes on file     Physical Exam:  There were no vitals taken for this visit  No intake or output data in the 24 hours ending 05/19/22 6462    There is no height or weight on file to calculate BMI  Physical Exam  Vitals reviewed  Constitutional:       General: He is not in acute distress  Appearance: Normal appearance  He is obese  He is not ill-appearing  HENT:      Head: Normocephalic and atraumatic  Right Ear: External ear normal       Left Ear: External ear normal       Nose: Nose normal       Mouth/Throat:      Mouth: Mucous membranes are moist       Pharynx: Oropharynx is clear  Eyes:      Pupils: Pupils are equal, round, and reactive to light  Cardiovascular:      Rate and Rhythm: Normal rate and regular rhythm  Pulses: Normal pulses  Heart sounds: Normal heart sounds  No murmur heard  Pulmonary:      Effort: Pulmonary effort is normal  No respiratory distress  Breath sounds: Normal breath sounds  No wheezing  Abdominal:      General: There is no distension  Palpations: Abdomen is soft  Tenderness: There is no abdominal tenderness  Comments: Hypoactive bowel sounds   Musculoskeletal:      Cervical back: Normal range of motion  Right lower leg: No edema  Left lower leg: Edema present        Comments: Difficulty with lifting LLE straight up, difficulty with bending left knee  Able to bend right leg without difficulty   Skin:     General: Skin is warm  Neurological:      General: No focal deficit present  Mental Status: He is alert and oriented to person, place, and time  Psychiatric:      Comments: Anxious            Labs, medications, and imaging personally reviewed      Laboratory:    Lab Results   Component Value Date    SODIUM 134 (L) 05/19/2022    K 3 7 05/19/2022     05/19/2022    CO2 24 05/19/2022    BUN 31 (H) 05/19/2022    CREATININE 1 87 (H) 05/19/2022    GLUC 108 05/19/2022    CALCIUM 9 6 05/19/2022     Lab Results   Component Value Date    WBC 7 88 05/19/2022    HGB 11 3 (L) 05/19/2022    HCT 35 8 (L) 05/19/2022    MCV 96 05/19/2022     05/19/2022     Lab Results   Component Value Date    INR 1 27 (H) 05/10/2022    INR 1 09 05/10/2022    INR 1 33 (H) 08/24/2019    PROTIME 15 4 (H) 05/10/2022    PROTIME 13 5 05/10/2022    PROTIME 16 6 (H) 08/24/2019         Current Facility-Administered Medications:     acetaminophen (TYLENOL) tablet 975 mg, 975 mg, Oral, Q6H PRN, Primo Zhou PA-C    [START ON 5/20/2022] amLODIPine (NORVASC) tablet 10 mg, 10 mg, Oral, Daily, Judi Michaels PA-C    amoxicillin-clavulanate (AUGMENTIN) 875-125 mg per tablet 1 tablet, 1 tablet, Oral, Q12H [facility], Primo Zhou PA-C    calcium carbonate (TUMS) chewable tablet 1,000 mg, 1,000 mg, Oral, BID PRN, Primo Zhou PA-C    Diclofenac Sodium (VOLTAREN) 1 % topical gel 2 g, 2 g, Topical, 4x Daily, Judi Michaels PA-C    heparin (porcine) subcutaneous injection 7,500 Units, 7,500 Units, Subcutaneous, Q8H [facility], Judi Michaels PA-C    [START ON 5/20/2022] lidocaine (LIDODERM) 5 % patch 1 patch, 1 patch, Topical, Daily, Primo Zhou PA-C    ondansetron (ZOFRAN-ODT) dispersible tablet 4 mg, 4 mg, Oral, Q6H PRN, Primo Zhou PA-C    oxyCODONE (ROXICODONE) IR tablet 2 5 mg, 2 5 mg, Oral, Q6H PRN, Primo Zhou PA-C    oxyCODONE (ROXICODONE) IR tablet 5 mg, 5 mg, Oral, Q6H PRN, Elsa Morocho PA-C    senna (SENOKOT) tablet 17 2 mg, 2 tablet, Oral, HS PRN, Elsa Morocho PA-C    tamsulosin (FLOMAX) capsule 0 4 mg, 0 4 mg, Oral, Daily With Katie VERÓNICA  No Known Allergies   Patient Active Problem List    Diagnosis Date Noted    Anemia 05/13/2022    Obstructive pyelonephritis 04/23/2022    Acute unilateral obstructive uropathy 04/21/2022    Mixed hyperlipidemia 06/02/2021    Morbid obesity 06/02/2021    Recurrent left knee pain 06/02/2021    Insomnia 09/16/2019    Essential hypertension 08/27/2019    Severe sepsis on admission 08/23/2019    Chronic kidney disease, stage 3 ([facility] [address] ) 08/23/2019    Acute kidney injury superimposed on CKD stage 3 08/06/2019    Bacteremia due to Escherichia coli 08/04/2019    Lactic acidosis 08/04/2019    History of gout 07/15/2019    Vitamin D deficiency 06/10/2015    Morbid obesity ([facility] [address] ) 03/23/2015     Past Medical History:   Diagnosis Date    Arthritis     Gout     Hypertension     Kidney stone      Past Surgical History:   Procedure Laterality Date    FL RETROGRADE PYELOGRAM  8/4/2019    FL RETROGRADE PYELOGRAM  9/26/2019    FL RETROGRADE PYELOGRAM  4/22/2022    IR NEPHROSTOMY TUBE PLACEMENT  5/10/2022    NO PAST SURGERIES  03/23/2015    Last assessed    ND CYSTO/URETERO W/LITHOTRIPSY &INDWELL STENT INSRT Right 9/26/2019    Procedure: CYSTOSCOPY URETEROSCOPY WITH RETROGRADE PYELOGRAM AND INSERTION STENT URETERAL;  Surgeon: Alex Hickey MD;  [facility]: [facility];  Service: Urology    ND CYSTOURETHROSCOPY,URETER CATHETER Right 8/4/2019    Procedure: CYSTOSCOPY RETROGRADE PYELOGRAM WITH INSERTION STENT URETERAL;  Surgeon: Antonietta Jaffe MD;  [facility]:  MAIN OR;  Service: Urology    ND CYSTOURETHROSCOPY,URETER CATHETER Left 4/22/2022    Procedure: CYSTOSCOPY RETROGRADE PYELOGRAM WITH INSERTION STENT URETERAL;  Surgeon: Mary Alice Marin MD;  [facility];  Service: Urology     Social History     Socioeconomic History    Marital status: Single     Spouse name: Not on file    Number of children: Not on file    Years of education: Not on file    Highest education level: Not on file   Occupational History    Not on file   Tobacco Use    Smoking status: Never Smoker    Smokeless tobacco: Never Used   Vaping Use    Vaping Use: Never used   Substance and Sexual Activity    Alcohol use: Not Currently     Alcohol/week: 0 0 standard drinks     Comment: rarely    Drug use: No    Sexual activity: Not on file   Other Topics Concern    Not on file   Social History Narrative    Caffeine use      Social Determinants of Health     Financial Resource Strain: Not on file   Food Insecurity: No Food Insecurity    Worried About Running Out of Food in the Last Year: Never true    Isis of Food in the Last Year: Never true   Transportation Needs: No Transportation Needs    Lack of Transportation (Medical): No    Lack of Transportation (Non-Medical): No   Physical Activity: Not on file   Stress: Not on file   Social Connections: Not on file   Intimate Partner Violence: Not on file   Housing Stability: Low Risk     Unable to Pay for Housing in the Last Year: No    Number of Places Lived in the Last Year: 1    Unstable Housing in the Last Year: No     Social History     Tobacco Use   Smoking Status Never Smoker   Smokeless Tobacco Never Used     Social History     Substance and Sexual Activity   Alcohol Use Not Currently    Alcohol/week: 0 0 standard drinks    Comment: rarely     Family History   Problem Relation Age of Onset    Cancer Mother          Medical Necessity Criteria for [facility] Admission: Chronic Kidney Disease, Anemia, with the following plan: monitor H/H, Hypertension, Bowel/Bladder Management, Incision/Wound care and Urinary retention   In addition, the preadmission screen, post-admission physical evaluation, overall plan of care and admissions order demonstrate a reasonable expectation that the following criteria were met at the time of admission to the [facility]  1  The patient requires active and ongoing therapeutic intervention of multiple therapy disciplines (physical therapy, occupational therapy, speech-language pathology, or prosthetics/orthotics), one of which is physical or occupational therapy  2  Patient requires an intensive rehabilitation therapy program, as defined in Chapter 1, section 110 2 2 of the CMS Medicare Policy Manual  This intensive rehabilitation therapy program will consist of at least 3 hours of therapy per day at least 5 days per week or at least 15 hours of intensive rehabilitation therapy within a 7 consecutive day period, beginning with the date of admission to the [facility]  3  The patient is reasonably expected to actively participate in, and benefit significantly from, the intensive rehabilitation therapy program as defined in Chapter 1, section 110 2 2 of the CMS Medicare Policy Manual at this time of admission to the [facility]  He can reasonably be expected to make measurable improvement (that will be of practical value to improve the patients functional capacity or adaptation to impairments) as a result of the rehabilitation treatment, as defined in section 110 3, and such improvement can be expected to be made within the prescribed period of time  As noted in the CMS Medicare Policy Manual, the patient need not be expected to achieve complete independence in the domain of self-care nor be expected to return to his or her prior level of functioning in order to meet this standard  4  The patient must require physician supervision by a rehabilitation physician   As such, a rehabilitation physician will conduct face-to-face visits with the patient at least 3 days per week throughout the patients stay in the [facility] to assess the patient both medically and functionally, as well as to modify the course of treatment as needed to maximize the patients capacity to benefit from the rehabilitation process  5  The patient requires an intensive and coordinated interdisciplinary approach to providing rehabilitation, as defined in Chapter 1, section 110 2 5 of the CMS Medicare Policy Manual  This will be achieved through periodic team conferences, conducted at least once in a 7-day period, and comprising of an interdisciplinary team of medical professionals consisting of: a rehabilitation physician, registered nurse,  and/or , and a licensed/certified therapist from each therapy discipline involved in treating the patient  Changes Since Pre-admission Assessment: None -This patient's participation in rehab continues to be reasonable, necessary and appropriate  CMS Required Post-Admission Physician Evaluation Elements  History and Physical, including medical history, functional history and active comorbidities as in above text  Post-Admission Physician Evaluation:  The patient has the potential to make improvement and is in need of physical, occupational, and/or therapy services  The patient may also need nutritional services  Given the patient's complex medical condition and risk of further medical complications, rehabilitative services cannot be safely provided at a lower level of care, such as a skilled nursing facility  I have reviewed the patient's functional and medical status at the time of the preadmission screening and they are the same as on the day of this admission  I acknowledge that I have personally performed a full physical examination on this patient within 24 hours of admission  The patient and/or family demonstrated understanding the rehabilitation program and the discharge process after we discussed them  Agree in entirety: yes  Minor adaptions: none    Major changes: none    Elsa Morocho PA-C    ** Please Note: Fluency Direct voice to text software may have been used in the creation of this document   **      Total time spent:  30 minutes with more than 50% spent counseling/coordinating care  Counseling includes extended discussion with patient (+/- family/relevant historian)  re: history, symptoms, medications, functional issues, mood, medical and rehabilitation management plan, and disposition  Additional time spent with thorough chart review in EMR, reviewing recent medications, labs, imaging, and management plan  This was followed by formulating a comprehensive inpatient medical/rehabilitation management plan, and coordinating with pertinent specialists as indicated

## 2022-05-19 NOTE — ASSESSMENT & PLAN NOTE
· CT on 5/10: "Mild left hydronephrosis status post ureteral stent placement, with 3 mm calculus in the mid ureter adjacent to the stent  Multiple bilateral renal calculi "  · Recent admission and underwent L-side ureteral stent on 4/21- this was found to be obstructive  S/p left PCN placement by IR on 5/10   · Pain control  · Continue Flomax, OP follow up with urology for stone treatment on 5/25  · Continue Augmentin Q12H [facility] for total of 14 days (Completed on 5/25/22)  · Planned procedure for OP 6/17; will need updated CT stone series prior to surgery  · Needs to be re-evaluated by Urology secondary to sepsis

## 2022-05-19 NOTE — OCCUPATIONAL THERAPY NOTE
Occupational Therapy Treatment Note      [facility]    5/19/2022    Principal Problem:    Severe sepsis on admission  Active Problems:    Bacteremia due to Escherichia coli    Acute kidney injury superimposed on CKD stage 3    Essential hypertension    Morbid obesity    Recurrent left knee pain    Obstructive pyelonephritis    Anemia      Past Medical History:   Diagnosis Date    Arthritis     Gout     Hypertension     Kidney stone        Past Surgical History:   Procedure Laterality Date    FL RETROGRADE PYELOGRAM  8/4/2019    FL RETROGRADE PYELOGRAM  9/26/2019    FL RETROGRADE PYELOGRAM  4/22/2022    IR NEPHROSTOMY TUBE PLACEMENT  5/10/2022    NO PAST SURGERIES  03/23/2015    Last assessed    OR CYSTO/URETERO W/LITHOTRIPSY &INDWELL STENT INSRT Right 9/26/2019    Procedure: CYSTOSCOPY URETEROSCOPY WITH RETROGRADE PYELOGRAM AND INSERTION STENT URETERAL;  Surgeon: Marly North MD;  [facility]: [facility];  Service: Urology    OR CYSTOURETHROSCOPY,URETER CATHETER Right 8/4/2019    Procedure: CYSTOSCOPY RETROGRADE PYELOGRAM WITH INSERTION STENT URETERAL;  Surgeon: Quin Ag MD;  [facility];  Service: Urology    OR CYSTOURETHROSCOPY,URETER CATHETER Left 4/22/2022    Procedure: CYSTOSCOPY RETROGRADE PYELOGRAM WITH INSERTION STENT URETERAL;  Surgeon: Eusebio Desouza MD;  [facility];  Service: Urology        05/19/22 0944   OT Last Visit   OT Visit Date 05/19/22   Note Type   Note Type Treatment for insurance authorization   Restrictions/Precautions   LLE Weight Bearing Per Order WBAT   Other Precautions Fall Risk;Pain;Multiple lines   General   Response to Previous Treatment Patient with no complaints from previous session   Lifestyle   Autonomy PTA, pt reports being I with ADLs, IADLs, fnxl mobility, (+)    Reciprocal Relationships Pt reports local father but limited support available, unable to provide physical assistance   Service to Others FTE - works at a distribution [employer]   [address] 4[address]   Pain Assessment   Pain Assessment Tool 0-10   Pain Score 10 - Worst Possible Pain   Pain [facility]/Orientation Orientation: Left; [facility]: Leg;Orientation: Right;[facility]: Shoulder   ADL   Eating Assistance 7  Independent   Eating Deficit Setup   Grooming Assistance 5  Supervision/Setup   Grooming Deficit Setup; Increased time to complete;Wash/dry hands; Wash/dry face; Teeth care;Brushing hair   UB Bathing Assistance 3  Moderate Assistance   UB Bathing Deficit Increased time to complete;Left arm   LB Bathing Assistance 2  Maximal Assistance   LB Bathing Deficit Right upper leg;Left upper leg;Right lower leg including foot; Left lower leg including foot   UB Dressing Assistance 3  Moderate Assistance   UB Dressing Deficit Pull over head;Pull around back   LB Dressing Assistance 2  Maximal Assistance   LB Dressing Deficit Don/doff R sock; Don/doff L sock   Toileting Assistance  2  Maximal Assistance   Toileting Deficit Bedside commode; Increased time to complete   Functional Standing Tolerance   Time 2 min   Activity tolerated standing x 2 min for toileting hygiene   Comments holding on to bedframe for assist   Bed Mobility   Sit to Supine 4  Minimal assistance   Additional items Assist x 1;LE management   Transfers   Sit to Stand 4  Minimal assistance   Additional items Assist x 1; Increased time required   Stand to Sit 4  Minimal assistance   Additional items Assist x 1; Increased time required   Stand pivot 4  Minimal assistance   Additional items Assist x 1; Increased time required   Toilet transfer 4  Minimal assistance   Additional items Assist x 1;Commode   Cognition   Overall Cognitive Status [facility]   Arousal/Participation Alert; Cooperative   Attention Within functional limits   Orientation Level Oriented X4   Memory Within functional limits   Following Commands Follows multistep commands without difficulty   Comments pt pleasant and cooperative   Activity Tolerance   Activity Tolerance Patient limited by pain; Patient tolerated treatment well;Patient limited by fatigue   Medical Staff Made Aware OK to see per RN   Assessment   Assessment Pt seem for skilled OT treatment session w focus on self care, toileting, functional mobility and ECT/self pacing skills  Pt requires max assist for toileting hygiene, cleaning after BM, max assist LB self care  He can transfer on/off bsc w min assist, SPT w min assist  Good safety, although c/o significant pain LLE  Once at EOB, pt demonstrates good balance  Able to complete grooming w S/increased time  He does need mod assist for UB dressing due to pain R shoulder and limited mobility R arm due to pain  Pt w SOB, educated on breathing techniques, ECT/self pacing skills  Pt very pleasant and cooperative, and eager for therapy  The patient's raw score on the AM-PAC Daily Activity inpatient short form is 15, standardized score is 34 69, less than 39 4  Patients at this level are likely to benefit from discharge to post-acute rehabilitation services  Please refer to the recommendation of the Occupational Therapist for safe discharge planning  From OT standpoint, pt will need inpatient rehab when medically cleared for DC  OT will continue to follow while in acute care  Plan   Treatment Interventions ADL retraining;Functional transfer training;UE strengthening/ROM; Endurance training;Patient/family training; Compensatory technique education; Energy conservation; Activityengagement   Goal Expiration Date 05/30/22   OT Treatment Day 1   OT Frequency 3-5x/wk   Recommendation   OT Discharge Recommendation Post acute rehabilitation services   OT - OK to Discharge Yes   AM-PAC Daily Activity Inpatient   Lower Body Dressing 2   Bathing 2   Toileting 2   Upper Body Dressing 2   Grooming 3   Eating 4   Daily Activity Raw Score 15   Daily Activity Standardized Score (Calc for Raw Score >=11) 34 69

## 2022-05-19 NOTE — PLAN OF CARE
Problem: OCCUPATIONAL THERAPY ADULT  Goal: Performs self-care activities at highest level of function for planned discharge setting  See evaluation for individualized goals  Description: Treatment Interventions: ADL retraining, Functional transfer training, UE strengthening/ROM, Endurance training, Patient/family training, Equipment evaluation/education, Compensatory technique education, Continued evaluation, Energy conservation, Activityengagement          See flowsheet documentation for full assessment, interventions and recommendations  Note: Limitation: Decreased UE ROM, Decreased ADL status, Decreased UE strength, Decreased endurance, Decreased self-care trans, Decreased high-level ADLs  Prognosis: Good  Assessment: Pt seem for skilled OT treatment session w focus on self care, toileting, functional mobility and ECT/self pacing skills  Pt requires max assist for toileting hygiene, cleaning after BM, max assist LB self care  He can transfer on/off bsc w min assist, SPT w min assist  Good safety, although c/o significant pain LLE  Once at EOB, pt demonstrates good balance  Able to complete grooming w S/increased time  He does need mod assist for UB dressing due to pain R shoulder and limited mobility R arm due to pain  Pt w SOB, educated on breathing techniques, ECT/self pacing skills  Pt very pleasant and cooperative, and eager for therapy  The patient's raw score on the AM-PAC Daily Activity inpatient short form is 15, standardized score is 34 69, less than 39 4  Patients at this level are likely to benefit from discharge to post-acute rehabilitation services  Please refer to the recommendation of the Occupational Therapist for safe discharge planning  From OT standpoint, pt will need inpatient rehab when medically cleared for DC  OT will continue to follow while in acute care  OT Discharge Recommendation: Post acute rehabilitation services  OT - OK to Discharge:  Yes

## 2022-05-19 NOTE — ASSESSMENT & PLAN NOTE
· Ongoing for several weeks causing difficulty with bearing weight due to pain  · XR knee without acute osseous abnormality, did reveal tricompartmental osteoarthritis similar to imaging done the year prior  · Ortho consulted and attempted an unsuccessful fluid aspiration   · Continue with adequate pain management with OP Ortho f/u for potential steroid injection once acute sepsis/infection resolves   · Diclofenac gel to the knee 4 times daily  · Left foot xray: There is no acute fracture or dislocation  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  Small plantar and large retrocalcaneal spurs  No lytic or blastic osseous lesion  Soft tissues are unremarkable

## 2022-05-19 NOTE — ASSESSMENT & PLAN NOTE
· Positive growth of E   Coli in one blood culture on 5/10 thought to be due to UTI/pyelonephritis   · antibiotics completed

## 2022-05-19 NOTE — CASE MANAGEMENT
Case Management Discharge Planning Note    Patient name Deborah Justice  [facility] [address] [place] [address] [facility]N [id number]  : 1965 Date 2022       Current Admission Date: 5/10/2022  Current Admission Diagnosis:Severe sepsis on admission   Patient Active Problem List    Diagnosis Date Noted    Anemia 2022    Obstructive pyelonephritis 2022    Acute unilateral obstructive uropathy 2022    Mixed hyperlipidemia 2021    Morbid obesity 2021    Recurrent left knee pain 2021    Insomnia 2019    Essential hypertension 2019    Severe sepsis on admission 2019    Chronic kidney disease, stage 3 ([facility] [address] ) 2019    Acute kidney injury superimposed on CKD stage 3 2019    Bacteremia due to Escherichia coli 2019    Lactic acidosis 2019    History of gout 07/15/2019    Vitamin D deficiency 06/10/2015    Morbid obesity ([facility] [address] ) 2015      LOS (days): 9  Geometric Mean LOS (GMLOS) (days): 3 50  Days to GMLOS:-5 4     OBJECTIVE:  Risk of Unplanned Readmission Score: 23 07         Current admission status: Inpatient   Preferred Pharmacy:   [facility]/pharmacy [facility][id number] [address]  Phone: [phone] Fax: [phone] [address],[place] [address]  Phone: [phone] Fax: [phone]    Primary Care Provider: RITA Beltran    Primary Insurance: BLUE CROSS  Secondary Insurance:     DISCHARGE DETAILS:                 Additional Comments: Patient cleared for discharge today to [address] [place] [address], via bariatric WC at 11 AM today  AVS completed  Patient aware of discharge & transport

## 2022-05-19 NOTE — UTILIZATION REVIEW
Continued Stay Review    Date: 5/18-5/19/2022                      Current Patient Class: inpatient  ]Current Level of Care: med/surg  HPI:56 y o  male initially admitted on 5/10 with sepsis, e-coli bacteremia, obstructive pyelonephritis  Assessment/Plan: Pt had questions re his L knee pain  X-ray L knee neg for fx, has osteoarthritis He was concerned re tx  Dx and tx explained to pt by attending phys  Encouraged to get OOB/ambulate  PT/OT with IP rehab recommendation  VSS, IV abx course completed with transition to po regimen on d/c to complete 14 day course per ID  Continue previous po meds, supportive care    Plan to d/c to Acute Rehab on [address], transport arranged by wheelchair Hernandez Fan 5/19 @ 11 AM      Vital Signs:   Date/Time Temp Pulse Resp BP MAP (mmHg) SpO2 O2 Device   05/19/22 07:42:50 98 4 °F (36 9 °C) 105 16 112/72 85 95 % --   05/19/22 0137 -- -- -- -- -- 93 % None (Room air)   05/18/22 22:21:55 98 8 °F (37 1 °C) 105 16 113/73 86 94 % --   05/18/22 16:05:27 98 1 °F (36 7 °C) 99 -- 118/73 88 97 % --   05/18/22 07:34:41 99 1 °F (37 3 °C) 99 19 115/72 86 93 % --     Date/Time Weight Weight Method   05/19/22 0531 140 kg (309 lb 11 9 oz) Abnormal  Bed scale   05/12/22 0500 155 kg (342 lb 9 5 oz) Abnormal  Bed scale       Pertinent Labs/Diagnostic Results:   Results from last 7 days   Lab Units 05/19/22  0530 05/17/22  0617 05/16/22  0605 05/15/22  0551 05/14/22  0523   WBC Thousand/uL 7 88 6 78 7 01 8 19 8 99   HEMOGLOBIN g/dL 11 3* 11 3* 11 1* 10 9* 11 4*   HEMATOCRIT % 35 8* 35 5* 35 1* 34 8* 37 3   PLATELETS Thousands/uL 249 178 150 151 158   NEUTROS ABS Thousands/µL 5 39  --   --  5 96 6 84   BANDS PCT %  --   --  1  --   --      Results from last 7 days   Lab Units 05/19/22  0530 05/17/22  0617 05/16/22  0605 05/15/22  0551 05/14/22  0523 05/13/22  0533   SODIUM mmol/L 134* 135* 137 136 134* 137   POTASSIUM mmol/L 3 7 3 5 3 8 3 5 3 7 3 7   CHLORIDE mmol/L 102 103 104 103 100 103   CO2 mmol/L 24 24 28 23 25 27   ANION GAP mmol/L 8 8 5 10 9 7   BUN mg/dL 31* 33* 35* 37* 35* 35*   CREATININE mg/dL 1 87* 1 87* 1 92* 2 14* 2 44* 2 55*   EGFR ml/min/1 73sq m 39 39 38 33 28 27   CALCIUM mg/dL 9 6 9 6 9 6 9 3 9 3 9 4   MAGNESIUM mg/dL  --   --   --   --   --  2 3     Results from last 7 days   Lab Units 05/19/22  0530 05/17/22  0617 05/16/22  0605 05/15/22  0551 05/14/22  0523 05/13/22  0533   GLUCOSE RANDOM mg/dL 108 109 107 118 122 101       Medications:   Scheduled Medications:  amLODIPine, 10 mg, Oral, Daily  amoxicillin-clavulanate, 1 tablet, Oral, Q12H LATRICE  heparin (porcine), 7,500 Units, Subcutaneous, Q8H LATRICE  lidocaine, 1 patch, Topical, Daily  senna-docusate sodium, 2 tablet, Oral, BID  tamsulosin, 0 4 mg, Oral, Daily With Dinner    PRN Meds:  acetaminophen, 650 mg, Oral, Q6H PRN 5/18 x1, 5/19 x1  calcium carbonate, 1,000 mg, Oral, BID PRN  labetalol, 10 mg, Intravenous, Q4H PRN  ondansetron, 4 mg, Intravenous, Q6H PRN  oxyCODONE, 2 5 mg, Oral, Q6H PRN 5/18 x1        Discharge Plan:D    Network Utilization Review Department  ATTENTION: Please call with any questions or concerns to [phone] and carefully listen to the prompts so that you are directed to the right person  All voicemails are confidential   Kayden Thompson all requests for admission clinical reviews, approved or denied determinations and any other requests to dedicated fax number below belonging to the campus where the patient is receiving treatment   List of dedicated fax numbers for the Facilities:  [address] DENIALS (Administrative/Medical Necessity) [phone]   [address] (Maternity/NICU/Pediatrics) 261 [facility] [address] [address] [place] [address] [address] [facility] [phone]

## 2022-05-20 ENCOUNTER — APPOINTMENT (INPATIENT)
Dept: RADIOLOGY | Facility: HOSPITAL | Age: 57
DRG: 949 | End: 2022-05-20
Payer: COMMERCIAL

## 2022-05-20 ENCOUNTER — TRANSITIONAL CARE MANAGEMENT (OUTPATIENT)
Dept: INTERNAL MEDICINE CLINIC | Facility: CLINIC | Age: 57
End: 2022-05-20

## 2022-05-20 LAB
ALBUMIN SERPL BCP-MCNC: 3.1 G/DL (ref 3.5–5)
ALP SERPL-CCNC: 61 U/L (ref 34–104)
ALT SERPL W P-5'-P-CCNC: 12 U/L (ref 7–52)
ANION GAP SERPL CALCULATED.3IONS-SCNC: 12 MMOL/L (ref 4–13)
AST SERPL W P-5'-P-CCNC: 9 U/L (ref 13–39)
BASOPHILS # BLD MANUAL: 0 THOUSAND/UL (ref 0–0.1)
BASOPHILS NFR MAR MANUAL: 0 % (ref 0–1)
BILIRUB SERPL-MCNC: 0.59 MG/DL (ref 0.2–1)
BUN SERPL-MCNC: 34 MG/DL (ref 5–25)
CALCIUM ALBUM COR SERPL-MCNC: 10.1 MG/DL (ref 8.3–10.1)
CALCIUM SERPL-MCNC: 9.4 MG/DL (ref 8.4–10.2)
CHLORIDE SERPL-SCNC: 99 MMOL/L (ref 96–108)
CO2 SERPL-SCNC: 22 MMOL/L (ref 21–32)
CREAT SERPL-MCNC: 1.98 MG/DL (ref 0.6–1.3)
EOSINOPHIL # BLD MANUAL: 0.28 THOUSAND/UL (ref 0–0.4)
EOSINOPHIL NFR BLD MANUAL: 4 % (ref 0–6)
ERYTHROCYTE [DISTWIDTH] IN BLOOD BY AUTOMATED COUNT: 14.1 % (ref 11.6–15.1)
GFR SERPL CREATININE-BSD FRML MDRD: 36 ML/MIN/1.73SQ M
GLUCOSE P FAST SERPL-MCNC: 107 MG/DL (ref 65–99)
GLUCOSE SERPL-MCNC: 107 MG/DL (ref 65–140)
HCT VFR BLD AUTO: 35.5 % (ref 36.5–49.3)
HGB BLD-MCNC: 10.8 G/DL (ref 12–17)
LYMPHOCYTES # BLD AUTO: 1.54 THOUSAND/UL (ref 0.6–4.47)
LYMPHOCYTES # BLD AUTO: 22 % (ref 14–44)
MCH RBC QN AUTO: 29.5 PG (ref 26.8–34.3)
MCHC RBC AUTO-ENTMCNC: 30.4 G/DL (ref 31.4–37.4)
MCV RBC AUTO: 97 FL (ref 82–98)
MONOCYTES # BLD AUTO: 0.56 THOUSAND/UL (ref 0–1.22)
MONOCYTES NFR BLD: 8 % (ref 4–12)
NEUTROPHILS # BLD MANUAL: 4.62 THOUSAND/UL (ref 1.85–7.62)
NEUTS SEG NFR BLD AUTO: 66 % (ref 43–75)
PLATELET # BLD AUTO: 282 THOUSANDS/UL (ref 149–390)
PLATELET BLD QL SMEAR: ADEQUATE
PMV BLD AUTO: 10.4 FL (ref 8.9–12.7)
POTASSIUM SERPL-SCNC: 4 MMOL/L (ref 3.5–5.3)
PROT SERPL-MCNC: 7.5 G/DL (ref 6.4–8.4)
RBC # BLD AUTO: 3.66 MILLION/UL (ref 3.88–5.62)
RBC MORPH BLD: NORMAL
SODIUM SERPL-SCNC: 133 MMOL/L (ref 135–147)
WBC # BLD AUTO: 7 THOUSAND/UL (ref 4.31–10.16)

## 2022-05-20 PROCEDURE — 97162 PT EVAL MOD COMPLEX 30 MIN: CPT | Performed by: PHYSICAL THERAPIST

## 2022-05-20 PROCEDURE — 97535 SELF CARE MNGMENT TRAINING: CPT

## 2022-05-20 PROCEDURE — 97167 OT EVAL HIGH COMPLEX 60 MIN: CPT

## 2022-05-20 PROCEDURE — 99232 SBSQ HOSP IP/OBS MODERATE 35: CPT | Performed by: FAMILY MEDICINE

## 2022-05-20 PROCEDURE — 97110 THERAPEUTIC EXERCISES: CPT

## 2022-05-20 PROCEDURE — 73630 X-RAY EXAM OF FOOT: CPT

## 2022-05-20 PROCEDURE — 97530 THERAPEUTIC ACTIVITIES: CPT | Performed by: PHYSICAL THERAPIST

## 2022-05-20 PROCEDURE — 80053 COMPREHEN METABOLIC PANEL: CPT | Performed by: PHYSICAL MEDICINE & REHABILITATION

## 2022-05-20 PROCEDURE — 97530 THERAPEUTIC ACTIVITIES: CPT

## 2022-05-20 PROCEDURE — 97116 GAIT TRAINING THERAPY: CPT | Performed by: PHYSICAL THERAPIST

## 2022-05-20 PROCEDURE — 85007 BL SMEAR W/DIFF WBC COUNT: CPT | Performed by: PHYSICAL MEDICINE & REHABILITATION

## 2022-05-20 PROCEDURE — 85027 COMPLETE CBC AUTOMATED: CPT | Performed by: PHYSICAL MEDICINE & REHABILITATION

## 2022-05-20 RX ORDER — METOPROLOL SUCCINATE 25 MG/1
25 TABLET, EXTENDED RELEASE ORAL DAILY
Status: DISCONTINUED | OUTPATIENT
Start: 2022-05-20 | End: 2022-06-07 | Stop reason: HOSPADM

## 2022-05-20 RX ADMIN — HEPARIN SODIUM 7500 UNITS: 5000 INJECTION INTRAVENOUS; SUBCUTANEOUS at 14:00

## 2022-05-20 RX ADMIN — AMOXICILLIN AND CLAVULANATE POTASSIUM 1 TABLET: 875; 125 TABLET, FILM COATED ORAL at 09:36

## 2022-05-20 RX ADMIN — AMOXICILLIN AND CLAVULANATE POTASSIUM 1 TABLET: 875; 125 TABLET, FILM COATED ORAL at 21:12

## 2022-05-20 RX ADMIN — ACETAMINOPHEN 975 MG: 325 TABLET ORAL at 17:35

## 2022-05-20 RX ADMIN — OXYCODONE HYDROCHLORIDE 5 MG: 5 TABLET ORAL at 03:37

## 2022-05-20 RX ADMIN — DICLOFENAC SODIUM 2 G: 10 GEL TOPICAL at 11:20

## 2022-05-20 RX ADMIN — TAMSULOSIN HYDROCHLORIDE 0.4 MG: 0.4 CAPSULE ORAL at 17:35

## 2022-05-20 RX ADMIN — HEPARIN SODIUM 7500 UNITS: 5000 INJECTION INTRAVENOUS; SUBCUTANEOUS at 06:16

## 2022-05-20 RX ADMIN — LIDOCAINE 1 PATCH: 50 PATCH TOPICAL at 09:37

## 2022-05-20 RX ADMIN — HEPARIN SODIUM 7500 UNITS: 5000 INJECTION INTRAVENOUS; SUBCUTANEOUS at 21:12

## 2022-05-20 RX ADMIN — DICLOFENAC SODIUM 2 G: 10 GEL TOPICAL at 21:15

## 2022-05-20 RX ADMIN — DICLOFENAC SODIUM 2 G: 10 GEL TOPICAL at 09:37

## 2022-05-20 RX ADMIN — METOPROLOL SUCCINATE 25 MG: 25 TABLET, FILM COATED, EXTENDED RELEASE ORAL at 10:34

## 2022-05-20 RX ADMIN — DICLOFENAC SODIUM 2 G: 10 GEL TOPICAL at 17:35

## 2022-05-20 NOTE — MALNUTRITION/BMI
This medical record reflects one or more clinical indicators suggestive of malnutrition and/or morbid obesity  BMI Findings:  Adult BMI Classifications: Morbid Obesity 50-59 9     Weight management information provided        Body mass index is 52 77 kg/m²  See Nutrition note dated 05/20/2022 for additional details  Completed nutrition assessment is viewable in the nutrition documentation

## 2022-05-20 NOTE — PROGRESS NOTES
Occupational Therapy Evaluation      Kimmie Vizcaino    5/20/2022    Principal Problem:    Obstructive pyelonephritis  Active Problems: Morbid obesity ([address] )    Bacteremia due to Escherichia coli    Severe sepsis on admission    Chronic kidney disease, stage 3 (HCC)    Essential hypertension    Recurrent left knee pain    Anemia      Past Medical History:   Diagnosis Date    Arthritis     Gout     Hypertension     Kidney stone        Past Surgical History:   Procedure Laterality Date    FL RETROGRADE PYELOGRAM  8/4/2019    FL RETROGRADE PYELOGRAM  9/26/2019    FL RETROGRADE PYELOGRAM  4/22/2022    IR NEPHROSTOMY TUBE PLACEMENT  5/10/2022    NO PAST SURGERIES  03/23/2015    Last assessed    GA CYSTO/URETERO W/LITHOTRIPSY &INDWELL STENT INSRT Right 9/26/2019    Procedure: CYSTOSCOPY URETEROSCOPY WITH RETROGRADE PYELOGRAM AND INSERTION STENT URETERAL;  Surgeon: Philippe Mcnally MD;  [facility]: [facility];  Service: Urology    GA CYSTOURETHROSCOPY,URETER CATHETER Right 8/4/2019    Procedure: CYSTOSCOPY RETROGRADE PYELOGRAM WITH INSERTION STENT URETERAL;  Surgeon: Kayden Harris MD;  [facility];  Service: Urology    GA CYSTOURETHROSCOPY,URETER CATHETER Left 4/22/2022    Procedure: CYSTOSCOPY RETROGRADE PYELOGRAM WITH INSERTION STENT URETERAL;  Surgeon: Carolin Cedillo MD;  [facility];  Service: Urology        05/20/22 0700   Patient Data   Rehab Impairment Impairment of mobility, safety and Activities of Daily Living (ADLs) due to Debility:  16  Debility (Non-cardiac/Non-pulmonary)   Etiologic Diagnosis Debility due to severe sepsis   Support System   Name Lives alone- father lives in [facility] and Mother lives in [place] and sister who he does not often see   [facility] to provide 24 hour supervision No   Able to provide physical help?  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[employer])   Transportation    Prior IADL Participation   Money Management Identify Money;Estimate Costs;Estimate Change;Combine Bills;Manage Checkbook   Meal Preparation Full Participation   Laundry Full Participation   Home Cleaning Full Participation   Prior Level of Function   Self-Care 3  Independent - Patient completed the activities by him/herself, with or without an assistive device, with no assistance from a helper  Prior Device Used Z  None of the above   Falls in the Last Year   Number of falls in the past 12 months 1   Type of Injury Associated with Fall No injury  (fall at work, had a sore knee following but did not seek medical attention and reports felt better after 2 days)   Patient Preference   Nickname (Patient Preference) Carlin Cortez   Patient Normally Wakes at 0800   Patient Normally Goes to Sleep at [address] [facility] [employer] Watch TV, youtube, 4 wheeling in his [address] [place] [address] (WDL) X   Patient Behaviors/Mood Anxious   Restrictions/Precautions   Precautions Bed/chair alarms; Fall Risk;Pain   Pain Assessment   Pain Score 10 - Worst Possible Pain   Pain [facility]/Orientation Orientation: Right;[facility]: Shoulder;Orientation: Left; [facility]: Knee   Oral Hygiene   Comment Reports will complete following breakfast   Grooming   Able To Wash/Dry Face   Findings seated, supervision   Tub/Shower Transfer   Reason Not Assessed Sponge Bath   Shower/Bathe Self   Type of Assistance Needed Physical assistance   Physical Assistance Level 51%-75%   Comment Overall mod- max A   Shower/Bathe Self CARE Score 2   Bathing   Assessed Bath Style Sponge Bath   Anticipated D/C Bath Style Shower   Able to Maxbass Jeovanny No   Able to [employer] Temperature No   Able to Wash/Rinse/Dry (body part) Right Arm;L Upper Leg;R Upper Leg;Chest;Abdomen   Limitations Noted in Balance; Coordination; Endurance;Problem Solving; Safety;Strength   Positioning Seated;Standing   Findings  Pt requires increased asssit to wash R arm due to LUE deficits, as well as to wash underarms due to same  Requires max A to wash LB for distal LB and perianal area in stance   Dressing/Undressing Clothing   Don UB Clothes Pullover Shirt   Type of Assistance Needed Physical assistance   Physical Assistance Level 25% or less   Comment Pull down in back   Upper Body Dressing CARE Score 3   Don LB Clothes Shorts;Socks; Shoes   Type of Assistance Needed Physical assistance   Physical Assistance Level 76% or more   Comment Max A to don shorts   Lower Body Dressing CARE Score 2   Limitations Noted In Balance; Coordination; Endurance;Problem Solving; Safety; Sequencing;Strength;ROM   Positioning Sit Edge Of Bed;Standing   Findings Max A   Putting On/Taking Off Footwear   Type of Assistance Needed Physical assistance   Physical Assistance Level 76% or more   Comment don socks and shoes Max A   Putting On/Taking Off Footwear CARE Score 2   Transfer Bed/Chair/Wheelchair   Limitations Noted In Balance;Confidence; Coordination; Endurance;Pain Management;Problem Solving;UE Strength;LE Strength   Adaptive Equipment Walker   Findings Min A   Type of Assistance Needed Physical assistance   Physical Assistance Level 26%-50%   Comment SPT from EOB to w c with RA and min A   Chair/Bed-to-Chair Transfer CARE Score 3   Lying to Sitting on Side of Bed   Type of Assistance Needed Physical assistance   Physical Assistance Level 26%-50%   Comment Min A for LLE managment   HOB elevated   Lying to Sitting on Side of Bed CARE Score 3   Sit to Stand   Type of Assistance Needed Physical assistance   Physical Assistance Level 25% or less Comment Min A   Sit to Stand CARE Score 3   Picking Up Object   Reason if not Attempted Safety concerns   Picking Up Object CARE Score 88   Comprehension   QI: Comprehension 4  Undestands: Clear comprehension without cues or repetitions   Comprehension (FIM) 6 - Understands complex/abstract but requires more time   Expression   QI: Expression 4  Express complex messages without difficulty and with speech that is clear and easy to [place]   Expression (FIM) 7 - Expresses complex/abstract ideas in a reasonable time w/o devices or helper  Social Interaction   Social Interaction (FIM) 7 - Interacts appropriately without assistive device, medication or helper   Problem Solving   Problem solving (FIM) 5 - Solves complex problems But requires cues from helper   Memory   Memory (FIM) 7 - Remembers daily routines   RUE Assessment   RUE Assessment X   AROM - RUE   R Shoulder Flexion shoulder AROM is approx 10% elbow wrist hand fingers AROM WFL   PROM - RUE   R Shoulder Flexion Shoulder PROM approx 50%   Strength - RUE   R Gross Arm Strength   (MMT not tested 2* pt reports of 10/10 pain however  strength was tested and noted to be less than L hand but WFL)   LUE Assessment   LUE Assessment WFL  (ROM and MMT tested while seated EOB)   Coordination   Movements are Fluid and Coordinated 0   Coordination and Movement Description RUE deficits 2* pain   Sensation   Light Touch No apparent deficits   Propioception No apparent deficits   Cognition   Overall Cognitive Status WFL   Arousal/Participation Alert; Cooperative   Attention Attends with cues to redirect   Orientation Level Oriented X4   Memory Within functional limits   Following Commands Follows one step commands without difficulty   Comments Overall pleasant and cooperative  Pt requires ocassional cues to redirect     Therapeutic Exercise   Therapeutic Exercise/Activity Pt participates in 32 minutes of concurrent treat time during ADL treatment portion focusing on self cares and theraputic activities which focus on similar goals as another  Discharge Information   Vocational Plan Return to work   Barriers to Return to [employer]; Endurance   Patient's Discharge Plan To return home   Patient's Rehab Expectations To be able to walk on the R leg and get back to normal   Barriers to Discharge Home Limited Family Support;Decreased Strength;Decreased Endurance; Safety Considerations;Pain   Impressions Pt is a 55 y o  male seen for OT evaluation s/p admit [facility] on 5/19/2022 w/ Obstructive pyelonephritis  Comorbidities affecting pt's functional performance at time of assessment include: Kidney sone, gout, arthritis, HTN  Personal factors affecting pt at time of OTIE include:steps to enter environment, limited home support, difficulty performing ADLS, difficulty performing IADLS , limited insight into deficits, decreased initiation and engagement  and health management   Prior to admission, pt was I with all ADLS and IADLs and working full time  Upon evaluation: Pt presents supine and agreeable to OTIE  Pt requires Min- Max A throughout 2* the following deficits impacting occupational performance: weakness, decreased ROM, decreased strength, decreased balance, decreased tolerance, impaired problem solving, decreased safety awareness and increased pain  Pt resting in w c  at end of session with all needs in reach, alarm on, all lines in place  Pt to benefit from continued skilled OT tx while in the ARU to address deficits as defined above and maximize level of functional independence w ADL's and functional mobility  Occupational Performance areas to address include: grooming, bathing/shower, toilet hygiene, dressing, health maintenance, functional mobility, clothing management, cleaning, meal prep and household maintenance   Will continue to follow with current POC   OT Therapy Minutes   OT Time In 0700   OT Time Out 0832   OT Total Time (minutes) 92   OT Mode of treatment - Individual (minutes) 60   OT Mode of treatment - Concurrent (minutes) 32

## 2022-05-20 NOTE — PROGRESS NOTES
05/20/22 0900   Patient Data   Rehab Impairment Impairment of mobility, gait dysfunction, limited endurance, pain, weakness, impaired balance, limited functional activity tolerance   Etiologic Diagnosis Per EMR:  This is a 45-year-old male with a history of obstructive pyelonephritis resulting in a coli bacteremia status post cystoscopy and stent placement on 4/22/22, chronic kidney disease, hypertension, hyperlipidemia who presents with left flank pain and shortness of breath  Since his discharge on 4/30/22, patient states that he has been experiencing intermittent waves of left flank pain described as sharp, non-radiating, associated with lightheadedness and nausea without vomiting  The pain is becoming more severe  He is taking his medications as prescribed  States that he ran out of his pain medication  Over the past few days, patient also admits to worsening shortness of breath  He does not wear oxygen at home  Shortness of breath is associated with a mild chest pressure  Patient denies any history of DVT/PE  However, he was recently admitted to the hospital  On arrival, the patient is tachypneic and tachycardic  He is saturating in the upper 90s on room air  He appears uncomfortable  CT imaging on admission revealed "Mild left hydronephrosis status post ureteral stent placement, with 3 mm calculus in the mid ureter adjacent to the stent  Multiple bilateral renal calculi "   Support System   Name Alone   Home Setup   Type of Home Multi Level   Method of Entry Stairs   Number of Stairs 6  (From where he schmid his truck (4+2) - rails on 4, pole/column on 2)   Number of Stairs in Home 13   In [address] Right   [facility] [address] Walker;Single [employer]   Prior Level of Function   Indoor-Mobility (Ambulation) 3  Independent - Patient completed the activities by him/herself, with or without an assistive device, with no assistance from a helper  Stairs 3   Independent - Patient completed the activities by him/herself, with or without an assistive device, with no assistance from a helper  Prior Device Used Z  None of the above   Falls in the Last Year   Number of falls in the past 12 months 1   Type of Injury Associated with Fall No injury   Patient Preference   Nickname (Patient Preference) Nahum Mom   Restrictions/Precautions   Precautions Bed/chair alarms; Fall Risk;Pain  (Left nephrostomy tube)   LLE Weight Bearing Per Order WBAT   Pain Assessment   Pain Assessment Tool 0-10   Pain Score 10 - Worst Possible Pain   Pain [facility]/Orientation Orientation: Right;[facility]: Shoulder;Orientation: Left; [facility]: Knee   Toileting Hygiene   Type of Assistance Needed Physical assistance   Physical Assistance Level Total assistance   Toileting Hygiene CARE Score 1   Toilet Transfer   Surface Assessed Raised Toilet   Transfer Technique Standard   Limitations Noted In Balance;Confidence; Endurance;ROM;LE Strength;UE Strength  (Pain)   Adaptive Equipment Grab Bar;Walker   Positioning Concerns Raised Seat;Grab Bars   Type of Assistance Needed Physical assistance   Physical Assistance Level 26%-50%   Toilet Transfer CARE Score 3   Toileting   Able to Pull Clothing down yes, up no  Limitations Noted In LE Strength;UE Strength; Safety;ROM;Balance; Coordination  (Pain)   Transfer Bed/Chair/Wheelchair   Positioning Concerns Skin Integrity  (Pain)   Limitations Noted In Coordination;Balance;Confidence; Endurance;Pain Management;LE Strength;UE Strength   Adaptive Equipment Roller Walker   Findings min A   Type of Assistance Needed Physical assistance   Physical Assistance Level 26%-50%   Comment w/c to EOB   Chair/Bed-to-Chair Transfer CARE Score 3   Sit to Lying   Type of Assistance Needed Physical assistance   Physical Assistance Level 26%-50%   Comment min-mod A   Sit to Lying CARE Score 3   Sit to Stand   Type of Assistance Needed Physical assistance   Physical Assistance Level 25% or less   Comment min A   Sit to Stand CARE Score 3   Picking Up Object   Reason if not Attempted Safety concerns   Picking Up Object CARE Score 88   Car Transfer   Reason if not Attempted Environmental limitations   Car Transfer CARE Score 10   Ambulation   Primary Mode of Locomotion Prior to Admission Walk   Distance Walked (feet) 20 ft   Assist Device Roller Walker   Gait Pattern Antalgic; Wide TEO   Limitations Noted In Balance; Coordination;Device Management; Endurance; Heel Strike;Posture; Safety;Strength;Speed   Provided Assistance with: Balance   Walk Assist Level Minimum Assist;Moderate Assist;Chair Follow   Walk 10 Feet   Type of Assistance Needed Physical assistance   Physical Assistance Level 25% or less   Comment RW, 12 feet in room   Walk 10 Feet CARE Score 3   Walk 50 Feet with Two Turns   Comment Max ambulation distance 20 feet x 1   Reason if not Attempted Safety concerns   Walk 50 Feet with Two Turns CARE Score 88   Walk 150 Feet   Reason if not Attempted Safety concerns   Walk 150 Feet CARE Score 88   Walking 10 Feet on Uneven Surfaces   Reason if not Attempted Safety concerns   Walking 10 Feet on Uneven Surfaces CARE Score 88   Wheelchair mobility   Findings Pain limited, unable to propel using right uE   Curb or Single Stair   Reason if not Attempted Safety concerns   1 Step (Curb) CARE Score 88   4 Steps   Reason if not Attempted Safety concerns   4 Steps CARE Score 88   RLE Assessment   RLE Assessment X   Strength RLE   R Hip Flexion 3+/5   R Hip Extension 3/5   R Hip ABduction 3/5   R Hip ADduction 3/5   R Knee Flexion 3+/5   R Knee Extension 4/5   R Ankle Dorsiflexion 4-/5   R Ankle Plantar Flexion 4-/5   LLE Assessment   LLE Assessment X   Strength LLE   L Hip Flexion 3-/5   L Hip Extension 3-/5   L Hip ABduction 3-/5   L Hip ADduction 3-/5   L Knee Flexion 3-/5   L Knee Extension 3/5   L Ankle Dorsiflexion 3/5   L Ankle Plantar Flexion 3/5   Coordination   Movements are Fluid and Coordinated 0   Coordination and Movement Description LE deficits bilateral feet, left knee   Sensation   Light Touch   (Parasthesias in bilateral feet, left knee-> foot)   Propioception No apparent deficits   Cognition   Orientation Level Oriented X4   Discharge Information   Vocational Plan Return to work   Barriers to Return to [place]; Endurance   Patient's Discharge Plan To return home to work and walk independently   Patient's Rehab Expectations To be able to walk independently and normally   Impressions Pt is 55year old female seen for PT evaluation on 5/20/2022 s/p admit to [facility] on 5/19/2022 w/ obstructive pyelonephritis  Orders placed at admission  Performed at least 2 patient identifiers during session: Name and wristband  Comorbidities affecting pt's physical performance at time of assessment include: sepsis, anemia, CT showing calculus, difficulty WBing 2/2 left knee pain, right GHJ weakness, h/o gout, HTN, CKD, h/o of fall  LOF prior to admission was independent and working as a [employer] ([employer])   Personal factors affecting pt at time of IE include: weakness, decreased endurance, limited balance, increased pain, gait deficits  Please find objective findings from PT assessment regarding body systems outlined above with impairments and limitations including weakness, impaired balance, decreased endurance, pain, decreased activity tolerance and fall risk, as well as mobility assessment  Pt's clinical presentation warrants participation in multidisciplinary acute rehab program at 3 or greater hours of therapy per day  Pt to benefit from continued PT tx to address deficits as defined above and maximize level of functional independent mobility and consistency  PT for improved safe return home with maximized functional mobility     PT Therapy Minutes   PT Time In 0900   PT Time Out 1015   PT Total Time (minutes) 75   PT Mode of treatment - Individual (minutes) 75   PT Mode of treatment - Concurrent (minutes) 0   PT Mode of treatment - Group (minutes) 0   PT Mode of treatment - Co-treat (minutes) 0   PT Mode of Treatment - Total time(minutes) 75 minutes   PT Cumulative Minutes 75   Cumulative Minutes   Cumulative therapy minutes 75     Ambulation 20 feet x 1, 12 feet x 1   RW, w/c follow  Patient returned to bed, all needs in reach, LEs elevated  Alarm in place and activated  Encouraged use of call bell, patient verbalizes understanding

## 2022-05-20 NOTE — PLAN OF CARE
Problem: PAIN - ADULT  Goal: Verbalizes/displays adequate comfort level or baseline comfort level  Description: Interventions:  - Encourage patient to monitor pain and request assistance  - Assess pain using appropriate pain scale  - Administer analgesics based on type and severity of pain and evaluate response  - Implement non-pharmacological measures as appropriate and evaluate response  - Consider cultural and social influences on pain and pain management  - Notify physician/advanced practitioner if interventions unsuccessful or patient reports new pain  Outcome: Progressing     Problem: SAFETY ADULT  Goal: Patient will remain free of falls  Description: INTERVENTIONS:  - Educate patient/family on patient safety including physical limitations  - Instruct patient to call for assistance with activity   - Consult OT/PT to assist with strengthening/mobility   - Keep Call bell within reach  - Keep bed low and locked with side rails adjusted as appropriate  - Keep care items and personal belongings within reach  - Initiate and maintain comfort rounds  - Make Fall Risk Sign visible to staff  - Offer Toileting every 2 Hours, in advance of need  - Initiate/Maintain bed/chair alarm  - Obtain necessary fall risk management equipment: bed/chair alarm  - Apply yellow socks and bracelet for high fall risk patients  - Consider moving patient to room near nurses station  Outcome: Progressing

## 2022-05-20 NOTE — PLAN OF CARE
Problem: SAFETY ADULT  Goal: Patient will remain free of falls  Description: INTERVENTIONS:  - Educate patient/family on patient safety including physical limitations  - Instruct patient to call for assistance with activity   - Consult OT/PT to assist with strengthening/mobility   - Keep Call bell within reach  - Keep bed low and locked with side rails adjusted as appropriate  - Keep care items and personal belongings within reach  - Initiate and maintain comfort rounds  - Make Fall Risk Sign visible to staff  - Offer Toileting every 2 Hours, in advance of need  - Initiate/Maintain bed/chair alarm  - Obtain necessary fall risk management equipment: yellow socks  - Apply yellow socks and bracelet for high fall risk patients  - Consider moving patient to room near nurses station  Outcome: Progressing     Problem: Prexisting or High Potential for Compromised Skin Integrity  Goal: Skin integrity is maintained or improved  Description: INTERVENTIONS:  - Identify patients at risk for skin breakdown  - Assess and monitor skin integrity  - Assess and monitor nutrition and hydration status  - Monitor labs   - Assess for incontinence   - Turn and reposition patient  - Assist with mobility/ambulation  - Relieve pressure over bony prominences  - Avoid friction and shearing  - Provide appropriate hygiene as needed including keeping skin clean and dry  - Evaluate need for skin moisturizer/barrier cream  - Collaborate with interdisciplinary team   - Patient/family teaching  - Consider wound care consult   Outcome: Progressing

## 2022-05-20 NOTE — CASE MANAGEMENT
Initial assessment & orientation to ARC with Pt, who expressed understanding & agreement  Pt declined for this worker to contact his parents, stating that he prefers to do so himself  Pt lives alone in a multi-story home, FF set up  There are 6 steps (4+2) to enter, HR's on the first four steps  He has a SPC & RW  He reported that he has been completely independent, driving & working FT  He expressed that he is hopeful to return to employment but is considering applying for SSDI  Discussed role of team members & reviewed [address] with Pt, who expressed understanding & agreement  SW will continue to monitor & assist as needed with [address]  Reserved litter with Oriana Hernandez with [facility] for 5/25 appt (9-12:30 is blocked for drop-off), but need more specific details to arrange transport times for depart & return  See UR section for ins info

## 2022-05-20 NOTE — PROGRESS NOTES
Physical Medicine and Rehabilitation Progress Note  Caridad Rowley 55 y o  male MRN: [id number]  Unit/Bed#: -01 Encounter: [id number]    HPI: Caridad Rowley is a 55 y o  male with a PMH significant for CKD, HTN, HLD, obstructive pyelonephritis with coli bacteremia, who presented to the [place] [address] ED on 05/10  He had a recent discharge on 4/30/22 s/p cystoscopy and stent placement on 4/22/22   After his discharge, patient experienced intermittent waves of left flank pain described as sharp, non-radiating, lightheadedness, nausea  He also admitted to worsening shortness of breath  He arrived to ED with tachypnea and tachycardia  CT scan obtained revealing hydronephrosis and obstructive pyelonephritis  Urology was consulted and recommended acute transfer to [facility]  Patient diagnosed with sepsis and initiated on IV antibiotics  IR was consulted for concerns with potential obstruction of stent placement and noted to be in ADRIANNE  Patient went to the OR with IR for obstructive pyelonephritis and is s/p IR Nephrostomy tube placement on 5/10  He was intubated for the procedure and extubated on 5/11  Ortho was consulted on 5/13 for left knee pain and a left knee aspiration was performed  Patient is to continue WBAT to LLE  The aspiration was unsuccessful with ortho recommending adequate pain control and potential steroid injection in the future  Sepsis resolved and patient was transferred from ICU to med surg  On 5/16 patient was transitioned from IV antibiotics to oral to complete a total of 14 days  Patient participated with PT/OT and deemed appropriate for post acute rehabilitation services      Subjective:  Complaining of some mild right shoulder pain and some left knee pain    ROS: A 10 point ROS was performed; negative except as noted above         Assessment/Plan:  Rehabilitation  · Functional deficits: impaired mobility, self care, weakness to the LLE due to pain to left knee and left foot, Pain also to right shoulder   · Begin PT/OT/SLP  Rehabilitation goals are to achieve a modified independent level with mobility and self care  Prognosis is good  ELOS is 10-14 days  Estimated discharge is home  DVT prophylaxis  · Heparin 7500 units SQ Q8H [facility]     Pain  · Tylenol 975 mg Q6H PRN for mild pain   · Oxycodone 2 5 mg Q6H PRN for moderate pain  · Oxycodone 5 mg Q6H PRN severe pain     Bladder plan  · Cook catheter in place- initiate voiding trial in AM; patient states cook has been in since surgery     Bowel plan  · Continent     Code Status  · Full code         Anemia  Assessment & Plan  · Iron panel obtained with elevated ferritin stores   · Monitor H/h        Obstructive pyelonephritis  Assessment & Plan  · CT on 5/10: "Mild left hydronephrosis status post ureteral stent placement, with 3 mm calculus in the mid ureter adjacent to the stent  Multiple bilateral renal calculi "  · Recent admission and underwent L-side ureteral stent on 4/21- this was found to be obstructive   S/p left PCN placement by IR on 5/10   · Pain control  · Continue Flomax, OP follow up with urology for stone treatment on 5/25  · Continue Augmentin Q12H [facility] for total of 14 days (Complete on 5/25/22)        Recurrent left knee pain  Assessment & Plan  · Ongoing for several weeks causing difficulty with bearing weight due to pain  · XR knee without acute osseous abnormality, did reveal tricompartmental osteoarthritis similar to imaging done the year prior  · Ortho consulted and attempted an unsuccessful fluid aspiration   · Continue with adequate pain management with OP Ortho f/u for potential steroid injection once acute sepsis/infection resolves   · Ordered Diclofenac gel to the knee 4 times daily, discussed with PT about a knee brace during sessions, patient also with Left foot pain ordered an xray to the left foot for tomorrow   · PT/OT        Essential hypertension  Assessment & Plan  · Low-sodium diet  · Continue Norvasc  · Was on IV Labetalol in acute care for BP spikes        Chronic kidney disease, stage 3 [facility])  Assessment & Plan        Lab Results   Component Value Date     EGFR 39 05/19/2022     EGFR 39 05/17/2022     EGFR 38 05/16/2022     CREATININE 1 87 (H) 05/19/2022     CREATININE 1 87 (H) 05/17/2022     CREATININE 1 92 (H) 05/16/2022   · Baseline creatinine approximately 1 4-1 6  · Did peak to 1 92 in acute care thought to be due to severe sepsis with obstructive pyelonephritis   · Monitor renal function and urine output  · Avoid nephrotoxins and hypotension         Severe sepsis on admission  Assessment & Plan  · Presented to [address] on 5/10 with worsening left flank pain, tachypnea and tachycardia in setting of E  Coli pyelonephritis/bacteremia   · CT revealed hydronephrosis and obstructive pyelonephritis  · Patient transferred to [facility] for higher level of care  · He went to the OR on 5/10 with IR for a nephrostomy tube placement  · Resolved        Bacteremia due to Escherichia coli  Assessment & Plan  · Positive growth of E   Coli in one blood culture on 5/10 thought to be due to UTI/pyelonephritis   · Was on IV Rocephin; transitioned to PO Augmentin in acute care for a total of 14 days        Morbid obesity ([address] )  Assessment & Plan  · BMI of 49 99  · Lifestyle and diet modifications               Scheduled Meds:  Current Facility-Administered Medications   Medication Dose Route Frequency Provider Last Rate    acetaminophen  975 mg Oral Q6H PRN Primo Zhou PA-C      amLODIPine  10 mg Oral Daily Primo Zhou PA-C      amoxicillin-clavulanate  1 tablet Oral Q12H [facility] Primo Zhuo PA-C      calcium carbonate  1,000 mg Oral BID PRN Primo Zhou PA-C      Diclofenac Sodium  2 g Topical 4x Daily Primo Zhou PA-C      heparin (porcine)  7,500 Units Subcutaneous [facility] Primo Zhou PA-C      lidocaine  1 patch Topical Daily Primo Zhou PA-C      metoprolol succinate  25 mg Oral Daily RITA Alonzo      ondansetron  4 mg Oral Q6H PRN Bertis Roller, VERÓNICA      oxyCODONE  2 5 mg Oral Q6H PRN Bertis Roller, PA-[place]      oxyCODONE  5 mg Oral Q6H PRN Bertis Roller, VERÓNICA      senna  2 tablet Oral HS PRN Bertis Roller, PA-[place]      tamsulosin  0 4 mg Oral Daily With [employer] VERÓNICA Michaels         Objective:    Functional Update:  Physical Therapy: Bed mobility min assist, transfers min assist, ambulation min assist  Occupational Therapy: eating: independent, grooming supervision s/u, UB bathing mod assist, LB bathing max assist, UB dressing mod assist, LB Dressing max assist, toileting assistance max assist          Physical Exam:  Temp:  [97 9 °F (36 6 °C)-99 5 °F (37 5 °C)] 97 9 °F (36 6 °C)  HR:  [] 110  Resp:  [16-18] 16  BP: (103-149)/(64-75) 103/64  SpO2:  [94 %-98 %] 96 %    General: alert, no apparent distress, cooperative, morbidly obese and comfortable  General: He is not in acute distress  Appearance: Normal appearance  He is obese  He is not ill-appearing  HENT:      Head: Normocephalic and atraumatic  Right Ear: External ear normal       Left Ear: External ear normal       Nose: Nose normal       Mouth/Throat:      Mouth: Mucous membranes are moist       Pharynx: Oropharynx is clear  Eyes:      Pupils: Pupils are equal, round, and reactive to light  Cardiovascular:      Rate and Rhythm: Normal rate and regular rhythm  Pulses: Normal pulses  Heart sounds: Normal heart sounds  No murmur heard  Pulmonary:      Effort: Pulmonary effort is normal  No respiratory distress  Breath sounds: Normal breath sounds  No wheezing  Abdominal:      General: There is no distension  Palpations: Abdomen is soft  Tenderness: There is no abdominal tenderness  Comments: Hypoactive bowel sounds   Musculoskeletal:      Cervical back: Normal range of motion  Right lower leg: No edema        Left lower leg: Edema present  Comments: Difficulty with lifting LLE straight up, difficulty with bending left knee  Able to bend right leg without difficulty   Skin:     General: Skin is warm  Neurological:      General: No focal deficit present  Mental Status: He is alert and oriented to person, place, and time  Psychiatric:      Comments: Anxious           Diagnostic Studies: Reviewed  XR foot 3+ vw left    (Results Pending)       Laboratory: Labs reviewed  Results from last 7 days   Lab Units 05/20/22  0546 05/19/22  0530 05/17/22  0617   HEMOGLOBIN g/dL 10 8* 11 3* 11 3*   HEMATOCRIT % 35 5* 35 8* 35 5*   WBC Thousand/uL 7 00 7 88 6 78     Results from last 7 days   Lab Units 05/20/22  0546 05/19/22  0530 05/17/22  0617   BUN mg/dL 34* 31* 33*   SODIUM mmol/L 133* 134* 135*   POTASSIUM mmol/L 4 0 3 7 3 5   CHLORIDE mmol/L 99 102 103   CREATININE mg/dL 1 98* 1 87* 1 87*   AST U/L 9*  --   --    ALT U/L 12  --   --             ** Please Note: Fluency Direct voice to text software may have been used in the creation of this document   **

## 2022-05-20 NOTE — PROGRESS NOTES
05/20/22 1230   Pain Assessment   Pain Assessment Tool 0-10   Pain Score 10 - Worst Possible Pain   Pain [facility]/Orientation Orientation: Right;[facility]: Shoulder   Restrictions/Precautions   Precautions Bed/chair alarms; Fall Risk;Pain  (Left nephrostomy tube)   LLE Weight Bearing Per Order WBAT   Putting On/Taking Off Footwear   Type of Assistance Needed Physical assistance   Physical Assistance Level 76% or more   Comment B shoes with use of shoe horn   Putting On/Taking Off Footwear CARE Score 2   Lying to Sitting on Side of Bed   Type of Assistance Needed Physical assistance   Physical Assistance Level 26%-50%   Comment Min A for LLE managment  HOB elevated   Lying to Sitting on Side of Bed CARE Score 3   Sit to Stand   Type of Assistance Needed Physical assistance   Physical Assistance Level 25% or less   Sit to Stand CARE Score 3   Bed-Chair Transfer   Type of Assistance Needed Physical assistance   Physical Assistance Level 26%-50%   Chair/Bed-to-Chair Transfer CARE Score 3   Transfer Bed/Chair/Wheelchair   Limitations Noted In Balance;Confidence; Coordination; Endurance;Pain Management;Problem Solving;UE Strength;LE Strength   Adaptive Equipment Walker   Therapeutic Exercise - ROM   UE-ROM Yes   ROM- Right Upper Extremities   R Shoulder PROM; Flexion; Extension  (pro/ret)   R Position Seated   R Weight/Reps/Sets no weight, 15 reps, 1 set   Therapeutic Excerise-Strength   UE Strength Yes   Left Upper Extremity-Strength   L Shoulder Flexion; Extension  (pro/ret)   L Position Seated   Equipment Dumbell  (1#)   L Weights/Reps/Sets 1# wt, 15 reps, 2 sets   Cognition   Overall Cognitive Status [facility]   Arousal/Participation Alert; Cooperative   Attention Attends with cues to redirect   Orientation Level Oriented X4   Memory Within functional limits   Following Commands Follows one step commands without difficulty   Activity Tolerance   Activity Tolerance Patient limited by fatigue;Patient limited by pain   Assessment Treatment Assessment Patient participated in Skilled OT session this date with interventions consisting of ADL re training with the use of correct body mechnaics, therapeutic exercise to: increase functional use of BUEs, increase BUE muscle strength  and  therapeutic activities to: increase activity tolerance   Patient agreeable to OT treatment session, upon arrival patient was found supine in bed  In comparison to previous session, patient with improvements in functional transfers  Patient requiring frequent rest periods  Patient continues to be functioning below baseline level, occupational performance remains limited secondary to factors listed above and increased risk for falls and injury  Patient to benefit from continued Occupational Therapy treatment to address deficits as defined above and maximize level of functional independence with ADLs and functional mobility  Prognosis Good   Problem List Decreased strength;Decreased range of motion;Decreased endurance; Impaired balance;Decreased mobility; Decreased safety awareness; Obesity;Pain   Barriers to Discharge Inaccessible home environment   Plan   Treatment/Interventions ADL retraining;Functional transfer training; Therapeutic exercise; Endurance training;Patient/family training;Equipment eval/education; Bed mobility;OT   Progress Progressing toward goals   OT Therapy Minutes   OT Time In 1230   OT Time Out 1312   OT Total Time (minutes) 42   OT Mode of treatment - Individual (minutes) 42   OT Mode of treatment - Concurrent (minutes) 0   OT Mode of treatment - Group (minutes) 0   OT Mode of treatment - Co-treat (minutes) 0   OT Mode of Treatment - Total time(minutes) 42 minutes   OT Cumulative Minutes 42   Therapy Time missed   Time missed?  Yes   Amount of time missed 18   Reason for time missed Medical procedure  (x-rays)     Landen Maldonado MS, OTR/L

## 2022-05-20 NOTE — UTILIZATION REVIEW
Notification of Discharge   This is a Notification of Discharge from our facility [address]  Please be advised that this patient has been discharge from our facility  Below you will find the admission and discharge date and time including the patients disposition  UTILIZATION REVIEW CONTACT:  Jennifer Vargas  Utilization   Network Utilization Review Department  Phone: [phone] x carefully listen to the prompts  All voicemails are confidential   Email: [email or web address]     PHYSICIAN ADVISORY SERVICES:  FOR [facility] REVIEW - MEDICAL NECESSITY DENIAL  Phone: [phone]  Fax: [phone]  Email: [email or web address]     PRESENTATION DATE: 5/10/2022 12:42 PM  OBERVATION ADMISSION DATE:   INPATIENT ADMISSION DATE: 5/10/22 12:42 PM   DISCHARGE DATE: 5/19/2022 12:55 PM  DISPOSITION: [facility]      IMPORTANT INFORMATION:  Send all requests for admission clinical reviews, approved or denied determinations and any other requests to dedicated fax number below belonging to the campus where the patient is receiving treatment   List of dedicated fax numbers:  [address] DENIALS (Administrative/Medical Necessity) [phone]   [address] (Maternity/NICU/Pediatrics) [phone]   [facility] [phone]   [address] [phone]   [address] [place] [address] [phone]   [address] [phone]   [facility]  [phone]   [address] [facility], [facility]  [address] [place] [address] [address] [facility] [phone]

## 2022-05-20 NOTE — CONSULTS
Consultation - Internal Medicine    Shayy Leal 55 y o  male MRN: [id number]  Unit/Bed#: [facility]-01 Encounter: [id number]      A/P:  1  Hyponatremia: continue to monitor with serial labs; no fluid restriction at this time   2  Hyperuricemia: uric acid level to be repeated tomorrow, last level 8 8; discussed beginning allopurinol   3  CKD complicated by ADRIANNE: continue to monitor with serial labs   4  Obstructive uropathy: with a PCN tube, Urology following   5  Hypertension: using Norvasc daily; continue to monitor   6  Tachycardia: added 25mg of Metoprolol; continue to monitor            Thank you for allowing us to participate in the care of your patient  Please feel free to contact us regarding the care of this patient, or any other questions/concerns that may be applicable  Patient Active Problem List   Diagnosis    Morbid obesity ([facility] [address] )    Vitamin D deficiency    History of gout    Bacteremia due to Escherichia coli    Lactic acidosis    Acute kidney injury superimposed on CKD stage 3    Severe sepsis on admission    Chronic kidney disease, stage 3 ([facility] [address] )    Essential hypertension    Insomnia    Mixed hyperlipidemia    Morbid obesity    Recurrent left knee pain    Acute unilateral obstructive uropathy    Obstructive pyelonephritis    Anemia       History of Present Illness   Physician Requesting Consult: Jarrod Hutchinson MD  Reason for Consult / Principal Problem: Medical Management   Hx and PE limited by:   HPI: Shayy Leal is a 55y o  year old male who presented to the [facility] ED with complaints of left flank pain and worsening shortness of breath  He arrived to ED with tachypnea and tachycardia  CT scan obtained revealing hydronephrosis and obstructive pyelonephritis  Urology was consulted and recommended acute transfer to [facility]  Patient diagnosed with sepsis and initiated on IV antibiotics  IR was consulted for concerns with potential obstruction of stent placement and noted to be in ADRIANNE  Patient went to the OR with IR for obstructive pyelonephritis and is s/p IR Nephrostomy tube placement on 5/10  He was intubated for the procedure and extubated on 5/11  Ortho was consulted on 5/13 for left knee pain and a left knee aspiration was performed  Patient is to continue WBAT to LLE  The aspiration was unsuccessful with ortho recommending adequate pain control and potential steroid injection in the future  Sepsis resolved and patient was transferred from ICU to med surg  On 5/16 patient was transitioned from IV antibiotics to oral to complete a total of 14 days  Patient participated with PT/OT and deemed appropriate for post acute rehabilitation services  He was accepted to [facility] and once hemodynamically stable, arrived on 5/19/22  History obtained from chart review and the patient    Constitutional ROS- Denies fatigue, fever, chills, night sweats, weight changes  HEENT ROS- Denies history of eye surgeries, glaucoma, headaches or history of trauma, blurred vision  Endocrine ROS- No history diabetes mellitus or thyroid disease  Cardiovascular ROS- Denies chest pain, palpitation, dyspnea exertion, orthopnea, claudication  Pulmonary ROS- Denies history of COPD, asthma  Denies cough, hemoptysis, shortness of breath  GI ROS- Denies abdominal pain, diarrhea, nausea, swallowing problems, vomiting, constipation, blood in stools, fecal incontinence  Hematological ROS- Denies history of easy bruising, blood clots, bleeding or blood transfusions  Genitourinary ROS- Positive left PCN tube; multiple renal stones   Lymphatic ROS- Denies lymphadenopathy  Musculoskeletal ROS- Denies history of gout, muscle weakness, Positive left foot and knee pain   Dermatological ROS- Denies rash, wounds, ulcers, itching, jaundice  Psychiatric ROS- Denies anxiety, depression, hallucinations, disorientation  Neurological ROS- No stroke or TIA symptoms   Denies dizziness, paresthesias, history of stroke, history of peripheral neuropathy      Historical Information   Past Medical History:   Diagnosis Date    Arthritis     Gout     Hypertension     Kidney stone      Past Surgical History:   Procedure Laterality Date    FL RETROGRADE PYELOGRAM  8/4/2019    FL RETROGRADE PYELOGRAM  9/26/2019    FL RETROGRADE PYELOGRAM  4/22/2022    IR NEPHROSTOMY TUBE PLACEMENT  5/10/2022    NO PAST SURGERIES  03/23/2015    Last assessed    AL CYSTO/URETERO W/LITHOTRIPSY &INDWELL STENT INSRT Right 9/26/2019    Procedure: CYSTOSCOPY URETEROSCOPY WITH RETROGRADE PYELOGRAM AND INSERTION STENT URETERAL;  Surgeon: Josefina Garcia MD;  [facility]: [facility];  Service: Urology    AL CYSTOURETHROSCOPY,URETER CATHETER Right 8/4/2019    Procedure: CYSTOSCOPY RETROGRADE PYELOGRAM WITH INSERTION STENT URETERAL;  Surgeon: Tess Jacobs MD;  [facility];  Service: Urology    AL CYSTOURETHROSCOPY,URETER CATHETER Left 4/22/2022    Procedure: CYSTOSCOPY RETROGRADE PYELOGRAM WITH INSERTION STENT URETERAL;  Surgeon: Abi Hathaway MD;  [facility];  Service: Urology     Social History   Social History     Substance and Sexual Activity   Alcohol Use Not Currently    Alcohol/week: 0 0 standard drinks    Comment: rarely     Social History     Substance and Sexual Activity   Drug Use No     Social History     Tobacco Use   Smoking Status Never Smoker   Smokeless Tobacco Never Used     Family History   Problem Relation Age of Onset    Cancer Mother        Meds/Allergies   all current active meds have been reviewed, current meds:   Current Facility-Administered Medications   Medication Dose Route Frequency    acetaminophen (TYLENOL) tablet 975 mg  975 mg Oral Q6H PRN    amLODIPine (NORVASC) tablet 10 mg  10 mg Oral Daily    amoxicillin-clavulanate (AUGMENTIN) 875-125 mg per tablet 1 tablet  1 tablet Oral Q12H [address]    calcium carbonate (TUMS) chewable tablet 1,000 mg  1,000 mg Oral BID PRN    Diclofenac Sodium (VOLTAREN) 1 % topical gel 2 g  2 g Topical 4x Daily    heparin (porcine) subcutaneous injection 7,500 Units  7,500 Units Subcutaneous Q8H [address]    lidocaine (LIDODERM) 5 % patch 1 patch  1 patch Topical Daily    metoprolol succinate (TOPROL-XL) 24 hr tablet 25 mg  25 mg Oral Daily    ondansetron (ZOFRAN-ODT) dispersible tablet 4 mg  4 mg Oral Q6H PRN    oxyCODONE (ROXICODONE) IR tablet 2 5 mg  2 5 mg Oral Q6H PRN    oxyCODONE (ROXICODONE) IR tablet 5 mg  5 mg Oral Q6H PRN    senna (SENOKOT) tablet 17 2 mg  2 tablet Oral HS PRN    tamsulosin (FLOMAX) capsule 0 4 mg  0 4 mg Oral Daily With Dinner    and PTA meds:    Medications Prior to Admission   Medication    acetaminophen (TYLENOL) 325 mg tablet    amLODIPine (NORVASC) 10 mg tablet    amoxicillin-clavulanate (AUGMENTIN) 875-125 mg per tablet    calcium carbonate (TUMS) 500 mg chewable tablet    docusate sodium (COLACE) 100 mg capsule    lidocaine (LIDODERM) 5 %    oxyCODONE (ROXICODONE) 5 immediate release tablet    senna-docusate sodium (SENOKOT S) 8 6-50 mg per tablet    sodium chloride, PF, 0 9 %    tamsulosin (FLOMAX) 0 4 mg    tamsulosin (FLOMAX) 0 4 mg         No Known Allergies    Objective     Intake/Output Summary (Last 24 hours) at 5/20/2022 1623  Last data filed at 5/20/2022 1501  Gross per 24 hour   Intake 250 ml   Output 2050 ml   Net -1800 ml       Invasive Devices:        Physical Exam  Vitals and nursing note reviewed  Constitutional:       General: He is not in acute distress  Appearance: He is obese  He is not ill-appearing  HENT:      Head: Normocephalic and atraumatic  Right Ear: External ear normal       Left Ear: External ear normal       Nose: Nose normal  No congestion or rhinorrhea  Mouth/Throat:      Mouth: Mucous membranes are moist       Pharynx: Oropharynx is clear  No oropharyngeal exudate or posterior oropharyngeal erythema  Eyes:      Extraocular Movements: Extraocular movements intact  Conjunctiva/sclera: Conjunctivae normal       Pupils: Pupils are equal, round, and reactive to light  Cardiovascular:      Rate and Rhythm: Regular rhythm  Tachycardia present  Pulses: Normal pulses  Pulmonary:      Effort: Pulmonary effort is normal       Breath sounds: Normal breath sounds  No wheezing or rhonchi  Abdominal:      General: Bowel sounds are normal  There is no distension  Palpations: Abdomen is soft  Musculoskeletal:         General: Normal range of motion  Cervical back: Normal range of motion and neck supple  No rigidity or tenderness  Right lower leg: Edema present  Left lower leg: Edema present  Skin:     General: Skin is warm and dry  Neurological:      General: No focal deficit present  Mental Status: He is alert and oriented to person, place, and time  Motor: Weakness present  Psychiatric:         Mood and Affect: Mood normal          Behavior: Behavior normal          Thought Content: Thought content normal          Judgment: Judgment normal            I/O last 3 completed shifts: In: 240 [P O :240]  Out: 1600 [Urine:1600]    Vitals:    05/20/22 0713   BP: 103/64   Pulse: (!) 110   Resp: 16   Temp: 97 9 °F (36 6 °C)   SpO2: 96%         Current Weight: Weight - Scale: (!) 148 kg (326 lb 15 1 oz)  First Weight: Weight - Scale: (!) 148 kg (326 lb 15 1 oz)    Lab Results:  I have personally reviewed pertinent labs      CBC:   Lab Results   Component Value Date    WBC 7 00 05/20/2022    HGB 10 8 (L) 05/20/2022    HCT 35 5 (L) 05/20/2022    MCV 97 05/20/2022     05/20/2022    MCH 29 5 05/20/2022    MCHC 30 4 (L) 05/20/2022    RDW 14 1 05/20/2022    MPV 10 4 05/20/2022     CMP:   Lab Results   Component Value Date    K 4 0 05/20/2022    CL 99 05/20/2022    CO2 22 05/20/2022    BUN 34 (H) 05/20/2022    CREATININE 1 98 (H) 05/20/2022    CALCIUM 9 4 05/20/2022    AST 9 (L) 05/20/2022    ALT 12 05/20/2022    ALKPHOS 61 05/20/2022    EGFR 36 05/20/2022     Phosphorus: No results found for: PHOS  Magnesium: No results found for: MG  Urinalysis: No results found for: COLORU, CLARITYU, SPECGRAV, PHUR, LEUKOCYTESUR, NITRITE, PROTEINUA, GLUCOSEU, KETONESU, BILIRUBINUR, BLOODU  Ionized Calcium: No results found for: CAION  Coagulation: No results found for: PT, INR, APTT  Troponin: No results found for: TROPONINI  ABG: No results found for: PHART, PAP9NNJ, PO2ART, RVN2RFS, Y1CTFCYG, BEART, SOURCE    Results from last 7 days   Lab Units 05/20/22  0546 05/19/22  0530 05/17/22  0617   POTASSIUM mmol/L 4 0 3 7 3 5   CHLORIDE mmol/L 99 102 103   CO2 mmol/L 22 24 24   BUN mg/dL 34* 31* 33*   CREATININE mg/dL 1 98* 1 87* 1 87*   CALCIUM mg/dL 9 4 9 6 9 6   ALK PHOS U/L 61  --   --    ALT U/L 12  --   --    AST U/L 9*  --   --        Radiology review:  Procedure: XR foot 3+ vw left    Result Date: 5/20/2022  Narrative: LEFT FOOT INDICATION: Left foot pain  COMPARISON:  3/24/2015 VIEWS:  XR FOOT 3+ VW LEFT FINDINGS: There is no acute fracture or dislocation  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  Small plantar and large retrocalcaneal spurs  No lytic or blastic osseous lesion  Soft tissues are unremarkable  Impression: No acute osseous abnormality  Degenerative changes as described  Workstation performed: [id number]         EKG, Pathology, and Other Studies: I have reviewed pertinent studies       Counseling / Coordination of Care  Total ADDITIONAL floor / unit time spent today 35 minutes  Greater than 50% of total time was spent with the patient and / or family counseling and / or coordination of care  Berenice Rodriguez      This consultation note was produced in part using a dictation device which may document imprecise wording from author's original intent

## 2022-05-20 NOTE — NUTRITION
22 1241   Biochemical Data,Medical Tests, and Procedures   Biochemical Data/Medical Tests/Procedures Meds reviewed;Lab values reviewed   Labs (Comment)  Na:133, BUN:34, creat:1 98, B, AST:9, alb:3 1, H&H:10  5   Meds (Comment) norvasc, augmentin, heparin, CaCO3, zofran, roxicodone, senna   Speech Therapy Recommendations (Comment) Patient reports no dysphagia   Nutrition-Focused Physical Exam   Nutrition-Focused Physical Exam Findings RN skin assessment reviewed   Nutrition-Focused Physical Exam Findings SOB with exertion; nonpitting BLE edema; nephrostomy tube present; cook catheter removed    Medical-Related Concerns gout, HTN, kidney stone   Adequacy of Intake   Nutrition Modality PO   Feeding Route   PO Independent   Current PO Intake   Current Diet Order Cardiac diet thin liquids   Current Meal Intake %   Estimated calorie intake compared to estimated need nutrient needs met  PES Statement   Problem Clinical   Weight (3) Overweight/obesity NC-3 3   Overweight/ obesity specific to Obese, Class III (5)   Related to Energy intake>energy output over time   As evidenced by: BMI   Recommendations/Interventions   Malnutrition/BMI Present Yes  (energy intake > energy output over time)   Adult BMI Classifications Morbid Obesity 50-59 9   Summary High BMI  Cardiac diet thin liquids  Meal intakes 100%  Patient states his appetite is "not as good" as it usually is for the past 2-3 weeks  He states, "I normally eat like a horse"  Patient states at home he is a "grazer" and eats throughout the day with no scheduled meal times  He admits to over eating at times  He states he cooks for himself  He states he does not follow a diet plan at home but wants to avoid ice tea and follow a low purine diet for his gout  #; #, 26#(7%) loss significant; 10/7/#, 44#(12%) loss significant  Patient has been eating less and following therapeutic diet while hospitalized   Edema also present  Skin integrity reviewed  Nonpitting BLE edema noted  RD blocked high purine foods on menu which include yo and tuna, also blocked iced tea per patient request    Interventions/Recommendations Continue current diet order   Intervention Comments RD blocked tuna, yo and iced tea in foodservice software   Education Assessment   Education Education initiated/ completed   Education Notes Provided education and handout for weight management center     Patient Nutrition Goals   Goal Avoid weight gain   Goal Status Initiated   Timeframe to complete goal by next f/u   Nutrition Complexity Risk   Nutrition complexity level Low risk   Follow up date 05/30/22

## 2022-05-20 NOTE — NURSING NOTE
Urinary retention protocol followed  Pt urinating w/ ease and PVRs WDL  Drain sponge changed, nephrostomy tube flushed w/ 10cc NSS  Pt tolerated well  Pt completed PT and OT, he is currently resting, no s/s of distress noted  Call bell in reach  Will continue to monitor and follow plan of care

## 2022-05-20 NOTE — TREATMENT PLAN
Individualized Plan of Jhon [age] y o  male MRN: [id number]  Unit/Bed#: -01 Encounter: [id number]     PATIENT INFORMATION  ADMISSION DATE: 5/19/2022  1:48 PM DANIEL CATEGORY:Debility:  16  Debility (Non-cardiac/Non-pulmonary)   ADMISSION DIAGNOSIS: Sepsis ([facility] [address] ) [A41 9]  EXPECTED LOS: 10-14 days     MEDICAL/FUNCTIONAL PROGNOSIS  Based on my assessment of the patient's medical conditions and current functional status, the prognosis for attaining medical and functional goals or the IRF stay is:  Good    Medical Goals: Patient will be medically stable for discharge to [facility] upon completion of rehab program    [address]: Home - with supervision      ANTICIPATED FOLLOW-UP SERVICE:     Home Health Services: PT and OT      DISCIPLINE SPECIFIC PLANS:  Required Disciplines & Services: Rehabillitation Nursing and Case Management    REQUIRED THERAPY:  Therapy Hours per Day Days per Week Total Days   Physical Therapy 1 5 5 5   Occupational Therapy 1 5 5 5   NOTE: Additional therapy time(s) may be added as appropriate to meet patient needs and to achieve functional goals          Patient will either participate in above therapy regimen or participate in 900 minutes of therapy within 7 day week consisting of PT and OT due to the following medical procedure/condition:Debility:  16  Debility (Non-cardiac/Non-pulmonary)    ANTICIPATED FUNCTIONAL OUTCOMES:  ADL:     Bladder/Bowel:     Transfers: Patient will be independent with transfers with least restrictive device upon completion of rehab program   Locomotion: Patient will be independent with locomotion with least restrictive device upon completion of rehab program   Cognitive:       DISCHARGE PLANNING NEEDS  Equipment needs: Discharge needs to be reviewed with team      REHAB ANTICIPATED PARTICIPATION RESTRICTIONS:  Amubulate Short Distances Only and Rquires Assist with ADLS

## 2022-05-20 NOTE — NURSING NOTE
Pt resting in bed at this time  Pt assist x2 SPT to [facility]  Pt cont of large BM  Pt joyce pulled this AM at 0630 per orders w/o difficulty  Pt had c/o of pain to R shoulder and L leg, medicated as ordered  Pt nephrostomy tube CDI  Items within reach will continue to monitor

## 2022-05-20 NOTE — PCC CARE MANAGEMENT
Initial assessment & orientation to ARC with Pt, who expressed understanding & agreement  Pt declined for this worker to contact his parents, stating that he prefers to do so himself  Pt lives alone in a multi-story home, FF set up  There are 6 steps (4+2) to enter, HR's on the first four steps  He has a SPC & RW  He reported that he has been completely independent, driving & working FT  He expressed that he is hopeful to return to employment but is considering applying for SSDI  Discussed role of team members & reviewed [address] with Pt, who expressed understanding & agreement  SW will continue to monitor & assist as needed with [address]     5/25/22 - Tx team recommendations reviewed with patient, who expressed understanding & agreement  Pt stated that he prefers to notify his father himself  Target DC date is 6/1 with [facility] (Nsg, PT, OT, HHA); a list of providers was provided to Pt & a referral will be made based on Pt preference  Attempting to arrange DC around surgical procedure, but per chart, this is not RS until 6/17; SW to explore if this can be moved up  SW will continue to monitor & assist as needed with Tx & DC planning  6/1/22 - Tx team recommendations reviewed with patient, who expressed understanding & agreement  Pt's DC plan is changed due to incidental positive Covid test  DC date will be immediately prior to surgery; currently it is scheduled for 6/17 & will explore if this can be moved sooner  SW will continue to monitor & assist as needed with Tx & DC planning

## 2022-05-21 LAB
ANION GAP SERPL CALCULATED.3IONS-SCNC: 12 MMOL/L (ref 4–13)
BUN SERPL-MCNC: 34 MG/DL (ref 5–25)
CALCIUM SERPL-MCNC: 8.8 MG/DL (ref 8.4–10.2)
CHLORIDE SERPL-SCNC: 99 MMOL/L (ref 96–108)
CO2 SERPL-SCNC: 21 MMOL/L (ref 21–32)
CREAT SERPL-MCNC: 1.84 MG/DL (ref 0.6–1.3)
GFR SERPL CREATININE-BSD FRML MDRD: 40 ML/MIN/1.73SQ M
GLUCOSE P FAST SERPL-MCNC: 113 MG/DL (ref 65–99)
GLUCOSE SERPL-MCNC: 113 MG/DL (ref 65–140)
POTASSIUM SERPL-SCNC: 3.7 MMOL/L (ref 3.5–5.3)
SODIUM SERPL-SCNC: 132 MMOL/L (ref 135–147)
URATE SERPL-MCNC: 9.1 MG/DL (ref 3.5–8.5)

## 2022-05-21 PROCEDURE — 97110 THERAPEUTIC EXERCISES: CPT

## 2022-05-21 PROCEDURE — 84550 ASSAY OF BLOOD/URIC ACID: CPT | Performed by: NURSE PRACTITIONER

## 2022-05-21 PROCEDURE — 97116 GAIT TRAINING THERAPY: CPT

## 2022-05-21 PROCEDURE — 97530 THERAPEUTIC ACTIVITIES: CPT

## 2022-05-21 PROCEDURE — 82306 VITAMIN D 25 HYDROXY: CPT | Performed by: PHYSICAL MEDICINE & REHABILITATION

## 2022-05-21 PROCEDURE — 80048 BASIC METABOLIC PNL TOTAL CA: CPT | Performed by: NURSE PRACTITIONER

## 2022-05-21 RX ORDER — TRAMADOL HYDROCHLORIDE 50 MG/1
50 TABLET ORAL EVERY 6 HOURS PRN
Status: DISCONTINUED | OUTPATIENT
Start: 2022-05-21 | End: 2022-05-21

## 2022-05-21 RX ORDER — TRAMADOL HYDROCHLORIDE 50 MG/1
50 TABLET ORAL EVERY 6 HOURS PRN
Status: DISCONTINUED | OUTPATIENT
Start: 2022-05-21 | End: 2022-05-25

## 2022-05-21 RX ORDER — TRAMADOL HYDROCHLORIDE 50 MG/1
TABLET ORAL
Status: COMPLETED
Start: 2022-05-21 | End: 2022-05-21

## 2022-05-21 RX ORDER — ACETAMINOPHEN 325 MG/1
975 TABLET ORAL EVERY 8 HOURS SCHEDULED
Status: DISCONTINUED | OUTPATIENT
Start: 2022-05-21 | End: 2022-06-07 | Stop reason: HOSPADM

## 2022-05-21 RX ORDER — COLCHICINE 0.6 MG/1
0.6 TABLET ORAL DAILY
Status: DISCONTINUED | OUTPATIENT
Start: 2022-05-21 | End: 2022-05-23

## 2022-05-21 RX ADMIN — LIDOCAINE 1 PATCH: 50 PATCH TOPICAL at 09:12

## 2022-05-21 RX ADMIN — AMOXICILLIN AND CLAVULANATE POTASSIUM 1 TABLET: 875; 125 TABLET, FILM COATED ORAL at 09:12

## 2022-05-21 RX ADMIN — DICLOFENAC SODIUM 2 G: 10 GEL TOPICAL at 17:18

## 2022-05-21 RX ADMIN — TRAMADOL HYDROCHLORIDE 50 MG: 50 TABLET, COATED ORAL at 11:18

## 2022-05-21 RX ADMIN — AMOXICILLIN AND CLAVULANATE POTASSIUM 1 TABLET: 875; 125 TABLET, FILM COATED ORAL at 21:15

## 2022-05-21 RX ADMIN — DICLOFENAC SODIUM 2 G: 10 GEL TOPICAL at 09:12

## 2022-05-21 RX ADMIN — ACETAMINOPHEN 975 MG: 325 TABLET ORAL at 14:08

## 2022-05-21 RX ADMIN — TAMSULOSIN HYDROCHLORIDE 0.4 MG: 0.4 CAPSULE ORAL at 17:17

## 2022-05-21 RX ADMIN — COLCHICINE 0.6 MG: 0.6 TABLET, FILM COATED ORAL at 11:17

## 2022-05-21 RX ADMIN — TRAMADOL HYDROCHLORIDE 50 MG: 50 TABLET, COATED ORAL at 17:18

## 2022-05-21 RX ADMIN — HEPARIN SODIUM 7500 UNITS: 5000 INJECTION INTRAVENOUS; SUBCUTANEOUS at 05:16

## 2022-05-21 RX ADMIN — ACETAMINOPHEN 975 MG: 325 TABLET ORAL at 03:01

## 2022-05-21 RX ADMIN — AMLODIPINE BESYLATE 10 MG: 10 TABLET ORAL at 09:12

## 2022-05-21 RX ADMIN — HEPARIN SODIUM 7500 UNITS: 5000 INJECTION INTRAVENOUS; SUBCUTANEOUS at 21:17

## 2022-05-21 RX ADMIN — ACETAMINOPHEN 975 MG: 325 TABLET ORAL at 21:15

## 2022-05-21 RX ADMIN — DICLOFENAC SODIUM 2 G: 10 GEL TOPICAL at 21:18

## 2022-05-21 RX ADMIN — METOPROLOL SUCCINATE 25 MG: 25 TABLET, FILM COATED, EXTENDED RELEASE ORAL at 09:12

## 2022-05-21 RX ADMIN — HEPARIN SODIUM 7500 UNITS: 5000 INJECTION INTRAVENOUS; SUBCUTANEOUS at 14:08

## 2022-05-21 RX ADMIN — DICLOFENAC SODIUM 2 G: 10 GEL TOPICAL at 12:08

## 2022-05-21 NOTE — NURSING NOTE
Pt resting in bed  Pt nephrostomy tube flushed with 10mL this morning  At this time tubing is not leaking  Pt cont of urine with urinal  Pt has had 3 consecutive bladder scan readings of less than 200 after voiding  Pt had c/o of pain to R shoulder and L leg, prn tylenol given with relief  Items within reach will continue to monitor  Labs drawn this AM  Will continue to monitor

## 2022-05-21 NOTE — PROGRESS NOTES
05/21/22 1230   Pain Assessment   Pain Assessment Tool 0-10   Pain Score 9   Restrictions/Precautions   Precautions Bed/chair alarms; Fall Risk;Pain;Multiple lines  (Nephrostomy bag)   LLE Weight Bearing Per Order WBAT   Cognition   Overall Cognitive Status WFL   Arousal/Participation Alert; Cooperative   Attention Attends with cues to redirect   Orientation Level Oriented X4   Memory Within functional limits   Following Commands Follows one step commands without difficulty   Roll Left and Right   Type of Assistance Needed Independent   Roll Left and Right CARE Score 6   Lying to Sitting on Side of Bed   Type of Assistance Needed Incidental touching   Comment CG   Lying to Sitting on Side of Bed CARE Score 4   Sit to Stand   Type of Assistance Needed Physical assistance   Physical Assistance Level 26%-50%   Comment min A   Sit to Stand CARE Score 3   Bed-Chair Transfer   Type of Assistance Needed Physical assistance   Physical Assistance Level 26%-50%   Comment min A   Chair/Bed-to-Chair Transfer CARE Score 3   Transfer Bed/Chair/Wheelchair   Limitations Noted In Balance; Endurance;UE Strength;LE Strength   Adaptive Equipment Roller Walker   Stand Pivot Minimal Assist   Sit to Stand Minimal Assist   Stand to Sit Minimal Assist   Supine to [employer] Transfer   Reason if not Attempted Environmental limitations   Car Transfer CARE Score 10   Walk 10 Feet   Type of Assistance Needed Physical assistance   Physical Assistance Level 26%-50%   Walk 10 Feet CARE Score 3   Walk 50 Feet with Two Turns   Type of Assistance Needed Physical assistance   Physical Assistance Level 26%-50%   Walk 50 Feet with Two Turns CARE Score 3   Ambulation   Does the patient walk? 2  Yes   Primary Mode of Locomotion Prior to Admission Walk   Distance Walked (feet) 52 ft   Assist Device Roller Walker   Gait Pattern Slow Julieta;Decreased foot clearance; Wide TEO;Step through; Improper weight shift   Limitations Noted In Balance;Device Management;Posture; Safety;Speed;Strength   Provided Assistance with: Balance;Direction   Walk Assist Level Minimum Assist   Therapeutic Interventions   Strengthening seated ther ex   Other gait, transfers   Assessment   Treatment Assessment Pt seated in bed to start session, agreeable and pleasant  Offers gernalized over all pain, 9/10  Pt able to complete supine to sit with CG  Pt transfered to [facility] with CG  Pt completed seated ther ex with extended time due to pain and fatigue  Pt able to amb 46' with very slow calin and maximum encuragement  Pt seated in bed with min A for BLE to end session with call bell in reach and all needs met  Pt will benefit from conitnued skilled PT to improve over all strength and endurence  PT Barriers   Physical Impairment Decreased strength;Decreased range of motion;Decreased endurance; Impaired balance;Decreased mobility; Decreased safety awareness;Pain;Obesity   Functional Limitation Walking;Transfers;Standing;Stair negotiation;Ramp negotiation   Plan   Treatment/Interventions Functional transfer training;LE strengthening/ROM; Elevations; Therapeutic exercise; Endurance training;Gait training;Bed mobility   Progress Slow progress, decreased activity tolerance   PT Therapy Minutes   PT Time In 1230   PT Time Out 1400   PT Total Time (minutes) 90   PT Mode of treatment - Individual (minutes) 90   PT Mode of treatment - Concurrent (minutes) 0   PT Mode of treatment - Group (minutes) 0   PT Mode of treatment - Co-treat (minutes) 0   PT Mode of Treatment - Total time(minutes) 90 minutes   PT Cumulative Minutes 165   Therapy Time missed   Time missed?  No

## 2022-05-21 NOTE — NURSING NOTE
When emptying nephrostomy drainage bag, noticed dressing was wet  when changing dressing, there were slow drops leaking from tubing near insertion site  No leaking noted from actual insertion site  New dressing applied,  Will continue to monitor

## 2022-05-21 NOTE — PLAN OF CARE
Problem: SAFETY ADULT  Goal: Patient will remain free of falls  Description: INTERVENTIONS:  - Educate patient/family on patient safety including physical limitations  - Instruct patient to call for assistance with activity   - Consult OT/PT to assist with strengthening/mobility   - Keep Call bell within reach  - Keep bed low and locked with side rails adjusted as appropriate  - Keep care items and personal belongings within reach  - Initiate and maintain comfort rounds  - Make Fall Risk Sign visible to staff  - Offer Toileting every 2 Hours, in advance of need  - Initiate/Maintain chair/bed alarm  - Obtain necessary fall risk management equipment: yellow  - Apply yellow socks and bracelet for high fall risk patients  - Consider moving patient to room near nurses station  Outcome: Progressing     Problem: Prexisting or High Potential for Compromised Skin Integrity  Goal: Skin integrity is maintained or improved  Description: INTERVENTIONS:  - Identify patients at risk for skin breakdown  - Assess and monitor skin integrity  - Assess and monitor nutrition and hydration status  - Monitor labs   - Assess for incontinence   - Turn and reposition patient  - Assist with mobility/ambulation  - Relieve pressure over bony prominences  - Avoid friction and shearing  - Provide appropriate hygiene as needed including keeping skin clean and dry  - Evaluate need for skin moisturizer/barrier cream  - Collaborate with interdisciplinary team   - Patient/family teaching  - Consider wound care consult   Outcome: Progressing

## 2022-05-21 NOTE — ASSESSMENT & PLAN NOTE
Lab Results   Component Value Date    EGFR 40 05/21/2022    EGFR 36 05/20/2022    EGFR 39 05/19/2022    CREATININE 1 84 (H) 05/21/2022    CREATININE 1 98 (H) 05/20/2022    CREATININE 1 87 (H) 05/19/2022

## 2022-05-21 NOTE — DISCHARGE SUMMARY
[address] [facility]  Discharge- Francisco J Whiting 1965, 55 y o  male MRN: [id number]  Unit/Bed#: [facility] 912-01 Encounter: [id number]  Primary Care Provider: RITA Babb   Date and time admitted to hospital: 5/10/2022 12:42 PM    * Severe sepsis on admission  Assessment & Plan  Presented w/ lactic acidosis coupled with fever/tachycardia/tachypnea/leukocytosis in the setting of E  coli pyelonephritis/bacteremia (see below)  Once hemodynamically stabilized, transferred out of the ICU  Monitor vitals and maintain hemodynamics    Anemia  Assessment & Plan  Consider dilutional effect from aggressive IV fluid hydration  Iron panel indicative of chronic disease (elevated ferritin stores) - B12/folate sufficient  Monitor H/H    Obstructive pyelonephritis  Assessment & Plan  CT imaging on admission revealed "Mild left hydronephrosis status post ureteral stent placement, with 3 mm calculus in the mid ureter adjacent to the stent   Multiple bilateral renal calculi "  Recently underwent left-sided ureteral stenting on 4/21 -> found to have stent obstruction requiring revisit to hospital w/ subsequent IR guided left PCN placement on 5/10  PRN pain/emesis control  Appreciate urology input -> continue Flomax and outpatient follow-up for secondary stone treatment on 5/25  Antibiotic regimen as noted under plan for bacteremia below    Recurrent left knee pain  Assessment & Plan  Ongoing issue over the last several weeks now leading to difficulty bearing weight secondary to pain  Encourage weight loss in the setting of morbid obesity  XR of knee without acute osseous abnormality, however, did reveal tricompartmental osteoarthritis similar to imaging last year  Appreciate orthopedic surgery input -> unsuccessful attempts at fluid aspiration -> recommending optimization of pain control -> may consider future joint steroid injection once acute sepsis/infection resolves  PT/OT recommending skilled rehab placement and patient agreeable - awaiting placement    Morbid obesity  Assessment & Plan  BMI of 55 30 currently  Lifestyle/diet modifications    Essential hypertension  Assessment & Plan  Low-sodium diet  Continue Norvasc - PRN IV Labetalol on board for BP spikes    Acute kidney injury superimposed on CKD stage 3  Assessment & Plan  Baseline creatinine approximately 1 4-1 6 - remains elevated but improving @ 1 92 from a prior peak of 3 72  Likely secondary to severe sepsis and obstructive pyelonephritis (see above)  Monitor renal function and urine output - limit/avoid nephrotoxins if possible - avoid hypotension as possible    Bacteremia due to Escherichia coli  Assessment & Plan  Positive growth of E coli in one blood culture bottle from 5/10 - likely associated w/ UTI/pyelonephritis  Continue IV Rocephin (bacteremic dose) -> should transition to oral regimen to complete a 14 day course at time of discharge           Medical Problems             Resolved Problems  Date Reviewed: 5/21/2022   None               Discharging Physician / Practitioner: Deb Washburn MD  PCP: Alysa [id number], [facility]  Admission Date:   Admission Orders (From admission, onward)     Ordered        05/10/22 1252  Inpatient Admission  Once            05/10/22 1246  Inpatient Admission  Once                      Discharge Date:05/19/2022  Consultations During Hospital Stay:  ·  orthopedics, urology, urology     Procedures Performed:   ·  CT imaging on admission revealed "Mild left hydronephrosis status post ureteral stent placement, with 3 mm calculus in the mid ureter adjacent to the stent   Multiple bilateral renal calculi "  · XR of knee without acute osseous abnormality, however, did reveal tricompartmental osteoarthritis similar to imaging last year  · Creatinine is 3 72 baseline 1 4-1 6    Significant Findings / Test Results:   · As above     Incidental Findings:   · None      Test Results Pending at Discharge (will require follow up): · None      Outpatient Tests Requested:  · BMP and CBC in 1 week    Complications:  None     Reason for Admission: left knee pain  Hospital Course:   Elise Leung is a 55 y o  male patient history of CKD stage 3, morbid obesity, vitamin D deficiency, gout, HL,  who originally presented to the hospital on 5/10/2022 due  Severe sepsis hemodynamically unstable sent to critical care  He has lactic acidosis coupled with fever/tachycardia/ tachypnea/leukocytosis in the setting of E  coli pyelonephritis/bacteremia   He was treated with rocephin ivss  Changed to Augmentin to complete 14 days  CT imaging on admission revealed "Mild left hydronephrosis status post ureteral stent placement, with 3 mm calculus in the mid ureter adjacent to the stent  Multiple bilateral renal calculi "    He underwent previous stent placement on 4/21, stent was obstructed and required treatment on 5/25  He will continue Flomax and outpatient follow-up for secondary stone treatment on 5/25  He noted knee pain after cystoscopy on 4/22  He has has xr of knee without acute osseous abnormality  Has tricompartmental osteoarthritis similar to imaging last year  Could not remove fluid, would need pain control and might consider steroids in the future  He will need follow up with orthopedics  He is also ADRIANNE on CKD  Improved, needs follow up  This is likely due to obstructive pyelonephritis  Please see above list of diagnoses and related plan for additional information  Condition at Discharge: stable    Discharge Day Visit / Exam:   * Please refer to separate progress note for these details *    Discussion with Family: Patient declined call to   Discharge instructions/Information to patient and family:   See after visit summary for information provided to patient and family        Provisions for Follow-Up Care:  See after visit summary for information related to follow-up care and any pertinent home health orders  Disposition:   [facility] at      Planned Readmission: no      Discharge Statement:  I spent 45 minutes discharging the patient  This time was spent on the day of discharge  I had direct contact with the patient on the day of discharge  Greater than 50% of the total time was spent examining patient, answering all patient questions, arranging and discussing plan of care with patient as well as directly providing post-discharge instructions  Additional time then spent on discharge activities  Discharge Medications:  See after visit summary for reconciled discharge medications provided to patient and/or family        **Please Note: This note may have been constructed using a voice recognition system**

## 2022-05-21 NOTE — NURSING NOTE
Pt AM labs resulted, see new medication orders  Pt c/o 12/10 R ankle pain, refused oxy, requested tylenol  Given w/ slight relief  Pain med change, see new orders  Pt cont B/B this shift  Neph tube draining, drain sponge CDI  Pt currently resting in bed, no s/s of distress, call bell in reach  Will continue to monitor and follow plan of care

## 2022-05-22 PROCEDURE — 97110 THERAPEUTIC EXERCISES: CPT

## 2022-05-22 PROCEDURE — 97116 GAIT TRAINING THERAPY: CPT

## 2022-05-22 PROCEDURE — 97530 THERAPEUTIC ACTIVITIES: CPT

## 2022-05-22 RX ADMIN — DICLOFENAC SODIUM 2 G: 10 GEL TOPICAL at 12:37

## 2022-05-22 RX ADMIN — HEPARIN SODIUM 7500 UNITS: 5000 INJECTION INTRAVENOUS; SUBCUTANEOUS at 21:09

## 2022-05-22 RX ADMIN — DICLOFENAC SODIUM 2 G: 10 GEL TOPICAL at 17:18

## 2022-05-22 RX ADMIN — AMOXICILLIN AND CLAVULANATE POTASSIUM 1 TABLET: 875; 125 TABLET, FILM COATED ORAL at 21:08

## 2022-05-22 RX ADMIN — TRAMADOL HYDROCHLORIDE 50 MG: 50 TABLET, COATED ORAL at 08:47

## 2022-05-22 RX ADMIN — AMLODIPINE BESYLATE 10 MG: 10 TABLET ORAL at 08:47

## 2022-05-22 RX ADMIN — HEPARIN SODIUM 7500 UNITS: 5000 INJECTION INTRAVENOUS; SUBCUTANEOUS at 13:33

## 2022-05-22 RX ADMIN — TRAMADOL HYDROCHLORIDE 50 MG: 50 TABLET, COATED ORAL at 17:17

## 2022-05-22 RX ADMIN — DICLOFENAC SODIUM 2 G: 10 GEL TOPICAL at 21:09

## 2022-05-22 RX ADMIN — LIDOCAINE 1 PATCH: 50 PATCH TOPICAL at 08:47

## 2022-05-22 RX ADMIN — ACETAMINOPHEN 975 MG: 325 TABLET ORAL at 21:08

## 2022-05-22 RX ADMIN — ACETAMINOPHEN 975 MG: 325 TABLET ORAL at 05:13

## 2022-05-22 RX ADMIN — HEPARIN SODIUM 7500 UNITS: 5000 INJECTION INTRAVENOUS; SUBCUTANEOUS at 05:14

## 2022-05-22 RX ADMIN — DICLOFENAC SODIUM 2 G: 10 GEL TOPICAL at 08:56

## 2022-05-22 RX ADMIN — METOPROLOL SUCCINATE 25 MG: 25 TABLET, FILM COATED, EXTENDED RELEASE ORAL at 08:47

## 2022-05-22 RX ADMIN — COLCHICINE 0.6 MG: 0.6 TABLET, FILM COATED ORAL at 08:47

## 2022-05-22 RX ADMIN — AMOXICILLIN AND CLAVULANATE POTASSIUM 1 TABLET: 875; 125 TABLET, FILM COATED ORAL at 08:47

## 2022-05-22 RX ADMIN — ACETAMINOPHEN 975 MG: 325 TABLET ORAL at 13:32

## 2022-05-22 RX ADMIN — TAMSULOSIN HYDROCHLORIDE 0.4 MG: 0.4 CAPSULE ORAL at 17:17

## 2022-05-22 NOTE — PROGRESS NOTES
05/22/22 1031   Pain Assessment   Pain Assessment Tool 0-10   Pain Score 10 - Worst Possible Pain   Pain [facility]/Orientation [facility]: Generalized   Restrictions/Precautions   Precautions Bed/chair alarms; Fall Risk;Pain   LLE Weight Bearing Per Order WBAT   Lying to Sitting on Side of Bed   Type of Assistance Needed Independent   Physical Assistance Level No physical assistance   Lying to Sitting on Side of Bed CARE Score 6   Sit to Stand   Type of Assistance Needed Incidental touching   Physical Assistance Level No physical assistance   Comment CG   Sit to Stand CARE Score 4   Bed-Chair Transfer   Type of Assistance Needed Incidental touching   Physical Assistance Level 25% or less   Comment CG   Chair/Bed-to-Chair Transfer CARE Score 3   Transfer Bed/Chair/Wheelchair   Limitations Noted In [address] [facility]   Type of Assistance Needed Set-up / clean-up   Physical Assistance Level No physical assistance   Comment used urinal in bed   [address] 83 Score 5   Toileting   Able to [address] down yes, up yes     Able to Manage Clothing Closures Yes   Manage Hygiene Bladder   Findings preformed in bed with urinal   Therapeutic Exercise - ROM   UE-ROM Yes   ROM- Right Upper Extremities   RUE ROM Comment Functional reach placing and retireving cards working on increasing ROM and core strength, therapist supporting arm Min A   ROM - Left Upper Extremities    LUE ROM Comment Functional reach placing and retireving cards working on increasing ROM and core strength   Exercise Tools   UE Ergometer 3 min forward and back min resistance   Theraputty pinky theraputty item retrival and replacement   Other Exercise Tool 1 2# dowel 2x10 bicep extension/flexion   Other Exercise Tool 2 2# 2x10  chest press LUE, 1# weight therapist support   Other Exercise Tool 3 2# 1x12 pronation/supination 1# 1x12 pronation/supination   Cognition   Overall Cognitive Status [facility] Orientation Level Oriented X4   Activity Tolerance   Activity Tolerance Patient limited by pain   Assessment   Treatment Assessment Pt presents in bed agreeable to therapy  Pt requires max encouragement to preform throughout session  Pain in RUE limited pt ability to preform throughout session  Pt able to preform movement with therapist support but noticiable discomfort was demonstrated during movement  Pt required frequent rest breaks throughout session to compensate for low endurance and pain  Noted that pt's hands were dirty and pt was surprised when told, still not clean post hand , therapist helped for throughness  It is recommended that pt continue skilled OT intervention to increase strength and endurance for safer functional transfers and ADLs  Prognosis Fair   Problem List Decreased strength;Decreased range of motion;Decreased endurance; Impaired balance;Decreased mobility;Pain;Obesity;Orthopedic restrictions   Plan   Treatment/Interventions Functional transfer training; Therapeutic exercise; Endurance training;OT;Compensatory technique education   Progress Slow progress, decreased activity tolerance   OT Therapy Minutes   OT Time In 1031   OT Time Out 1204   OT Total Time (minutes) 93   OT Mode of treatment - Individual (minutes) 93   OT Mode of treatment - Concurrent (minutes) 0   OT Mode of treatment - Group (minutes) 0   OT Mode of treatment - Co-treat (minutes) 0   OT Mode of Treatment - Total time(minutes) 93 minutes   OT Cumulative Minutes 135   Therapy Time missed   Time missed?  No     Addendum Pt seen from 1031 to 1204

## 2022-05-22 NOTE — NURSING NOTE
Care taken over at 2230  Pt sleeping in bed  Pt washed for therapy this AM  L nephrostomy tube sponge gauze changed  Site is CDI  Pt had no c/o of pain or discomfort  Pt repositioned over night  Will continue to monitor

## 2022-05-22 NOTE — PLAN OF CARE
Problem: PAIN - ADULT  Goal: Verbalizes/displays adequate comfort level or baseline comfort level  Description: Interventions:  - Encourage patient to monitor pain and request assistance  - Assess pain using appropriate pain scale  - Administer analgesics based on type and severity of pain and evaluate response  - Implement non-pharmacological measures as appropriate and evaluate response  - Consider cultural and social influences on pain and pain management  - Notify physician/advanced practitioner if interventions unsuccessful or patient reports new pain  Outcome: Not Progressing     Problem: MOBILITY - ADULT  Goal: Maintain or return to baseline ADL function  Description: INTERVENTIONS:  -  Assess patient's ability to carry out ADLs; assess patient's baseline for ADL function and identify physical deficits which impact ability to perform ADLs (bathing, care of mouth/teeth, toileting, grooming, dressing, etc )  - Assess/evaluate cause of self-care deficits   - Assess range of motion  - Assess patient's mobility; develop plan if impaired  - Assess patient's need for assistive devices and provide as appropriate  - Encourage maximum independence but intervene and supervise when necessary  - Involve family in performance of ADLs  - Assess for home care needs following discharge   - Consider OT consult to assist with ADL evaluation and planning for discharge  - Provide patient education as appropriate  Outcome: Progressing

## 2022-05-22 NOTE — PROGRESS NOTES
05/22/22 0700   Pain Assessment   Pain Assessment Tool 0-10   Pain Score 10 - Worst Possible Pain   Pain [facility]/Orientation [facility]: Shoulder;Orientation: Right   Patient's Stated Pain Goal No pain   Restrictions/Precautions   Precautions Bed/chair alarms   LLE Weight Bearing Per Order WBAT   Cognition   Overall Cognitive Status WFL   Arousal/Participation Alert; Cooperative   Attention Attends with cues to redirect   Orientation Level Oriented X4   Memory Within functional limits   Following Commands Follows one step commands without difficulty   Comments pt lying supine watching tv   Subjective   Subjective pt agreeable to PT   Roll Left and Right   Type of Assistance Needed Independent   Physical Assistance Level No physical assistance   Comment bed rails   Roll Left and Right CARE Score 6   Lying to Sitting on Side of Bed   Type of Assistance Needed Supervision   Physical Assistance Level No physical assistance   Comment bed rail ; HOB elevated   Lying to Sitting on Side of Bed CARE Score 4   Sit to Stand   Type of Assistance Needed Physical assistance   Physical Assistance Level 26%-50%   Comment min A   Sit to Stand CARE Score 3   Transfer Bed/Chair/Wheelchair   Limitations Noted In Balance; Endurance;UE Strength;LE Strength;Pain Management   Adaptive Equipment Roller Walker   Stand Pivot Minimal Assist   Sit to Stand Minimal Assist   Stand to Sit Minimal Assist   Supine to Sit Supervision   Car Transfer   Reason if not Attempted Environmental limitations   Car Transfer CARE Score 10   Walk 10 Feet   Type of Assistance Needed Incidental touching   Physical Assistance Level 25% or less   Comment CG; RW   Walk 10 Feet CARE Score 3   Walk 50 Feet with Two Turns   Reason if not Attempted Refused to perform   Walk 50 Feet with Two Turns CARE Score 7   Walk 150 Feet   Reason if not Attempted Safety concerns   Walk 150 Feet CARE Score 88   Walking 10 Feet on Uneven Surfaces   Reason if not Attempted Safety concerns   Walking 10 Feet on Uneven Surfaces CARE Score 88   Ambulation   Does the patient walk? 2  Yes   Primary Mode of Locomotion Prior to Admission Walk   Distance Walked (feet) 30 ft   Assist Device Roller Walker   Gait Pattern Slow Julieta;Decreased foot clearance; Wide TEO;Step through; Improper weight shift   Limitations Noted In Balance;Device Management;Posture; Safety;Speed;Strength   Provided Assistance with: Direction   Walk Assist Level Contact Guard   Wheelchair mobility   Findings pain limiting; R shoulder pain unable to propel   Curb or Single Stair   Reason if not Attempted Safety concerns   1 Step (Curb) CARE Score 88   4 Steps   Reason if not Attempted Safety concerns   4 Steps CARE Score 88   Picking Up Object   Reason if not Attempted Safety concerns   Picking Up Object CARE Score 88   Therapeutic Interventions   Strengthening supine/seated TE to b/l LE   Other gait/transfers/bed mobility   Assessment   Treatment Assessment Pt was lying supine in bed; agreeable to PT  Pt performed supine TE to b/l LE  He performed supine to sit with HOB elevated and bed rail with S  He was able to sit to stand with S with bed elevated  He ambulated 30 ft to w/c with RW and CGA  He refused further ambulation secondary to generalized pain  He was wheeled down to PT and performed seated TE to b/l LE  He did well with exercises  He was then transported back to room with his needs in reach  Problem List Decreased strength;Decreased range of motion;Decreased endurance; Impaired balance;Decreased mobility;Pain;Orthopedic restrictions; Obesity   PT Barriers   Physical Impairment Decreased strength;Decreased range of motion;Decreased endurance; Impaired balance;Decreased mobility;Obesity;Orthopedic restrictions;Pain   Functional Limitation Transfers; Walking;Standing   Plan   Treatment/Interventions Functional transfer training;LE strengthening/ROM; Elevations; Therapeutic exercise; Endurance training;Patient/family training;Bed mobility;Gait training   Progress Slow progress, decreased activity tolerance   PT Therapy Minutes   PT Time In 0700   PT Time Out 0800   PT Total Time (minutes) 60   PT Mode of treatment - Individual (minutes) 60

## 2022-05-22 NOTE — PROGRESS NOTES
05/22/22 1230   Pain Assessment   Pain Assessment Tool 0-10   Pain Score 10 - Worst Possible Pain   Pain [facility]/Orientation [facility]: Generalized  (knee; ankle;)   Patient's Stated Pain Goal No pain   Restrictions/Precautions   Precautions Bed/chair alarms; Fall Risk;Pain   LLE Weight Bearing Per Order WBAT   Cognition   Overall Cognitive Status [facility]   Arousal/Participation Alert; Responsive; Cooperative   Attention Attends with cues to redirect   Orientation Level Oriented X4   Memory Within functional limits   Following Commands Follows one step commands without difficulty   Comments pt sitting in OT room eating breakfast   Subjective   Subjective pt wants to propel himself to PT in w/c   Roll Left and Right   Type of Assistance Needed Verbal cues   Physical Assistance Level No physical assistance   Comment bed rails   Roll Left and Right CARE Score 4   Sit to Lying   Type of Assistance Needed Physical assistance   Physical Assistance Level 25% or less   Comment min A for legs   Sit to Lying CARE Score 3   Sit to Stand   Type of Assistance Needed Incidental touching   Physical Assistance Level 25% or less   Comment CGA   Sit to Stand CARE Score 3   Bed-Chair Transfer   Type of Assistance Needed Incidental touching   Physical Assistance Level 25% or less   Comment CGA   Chair/Bed-to-Chair Transfer CARE Score 3   Transfer Bed/Chair/Wheelchair   Limitations Noted In Balance; Endurance;UE Strength;LE Strength;Pain Management   Adaptive Equipment Roller Walker   Stand Pivot Contact Guard   Sit to [employer]   Stand to [facility]   Sit to Supine Minimal Assist   Car Transfer   Reason if not Attempted Environmental limitations   Car Transfer CARE Score 10   Walk 10 Feet   Type of Assistance Needed Incidental touching   Physical Assistance Level 25% or less   Comment CGA; RW   Walk 10 Feet CARE Score 3   Ambulation   Does the patient walk? 2   Yes   Primary Mode of Locomotion Prior to Admission Walk Distance Walked (feet) 20 ft  (10 x 2)   Assist Device Roller Walker   Gait Pattern Slow Julieta;Decreased foot clearance; Wide TEO;Step through; Improper weight shift   Limitations Noted In Balance;Device Management;Posture;Speed;Strength   Provided Assistance with: Direction   Walk Assist Level Contact Guard   Wheel 50 Feet with Two Turns   Type of Assistance Needed Supervision   Physical Assistance Level No physical assistance   Comment S   Wheel 50 Feet with Two Turns CARE Score 4   Wheelchair mobility   Does the patient use a wheelchair? 1  Yes   Type of Wheelchair Used 1  Manual   Method Right upper extremity; Left upper extremity;Right lower extremity; Left lower extremity   Assistance Provided For Locking Brakes;Replace Leg Rest;Remove Leg Rest   Distance Level Surface (feet) 135 ft   Findings S   Curb or Single Stair   Reason if not Attempted Safety concerns   1 Step (Curb) CARE Score 88   4 Steps   Reason if not Attempted Safety concerns   4 Steps CARE Score 88   12 Steps   Reason if not Attempted Safety concerns   12 Steps CARE Score 88   Picking Up Object   Reason if not Attempted Safety concerns   Picking Up Object CARE Score 88   Therapeutic Interventions   Strengthening seated TE to b/l LE   Assessment   Treatment Assessment Pt propelled himself in w/c 135 ft with S using his b/l ue and b/l le  He also peforemd seated TE to b/l LE in w/c; he c/o L knee locking with sit tot stand transfers  He sit to stand with CGA with RW and ambulated 20 ft; he refused to ambulate furtehr secondary to pain in L knee  He ambulated 10 ft x 2 with RW and CGA  H e transferred to bed sit to supine min A for B/l LE  He was able to reposition self in bed using bed to  assist  He is limited today with L lknee pain with transfers/gait  Problem List Decreased strength;Decreased range of motion;Decreased endurance; Impaired balance;Decreased mobility;Obesity;Pain;Orthopedic restrictions   PT Barriers   Physical Impairment Decreased strength;Decreased range of motion;Decreased endurance; Impaired balance;Decreased mobility;Obesity;Orthopedic restrictions;Pain   Functional Limitation Walking;Transfers;Standing   Plan   Treatment/Interventions Functional transfer training;LE strengthening/ROM; Elevations; Therapeutic exercise; Endurance training;Patient/family training;Bed mobility;Gait training   Progress Slow progress, decreased activity tolerance   PT Therapy Minutes   PT Time In 1230   PT Time Out 1330   PT Total Time (minutes) 60   PT Mode of treatment - Individual (minutes) 30   PT Mode of treatment - Concurrent (minutes) 30

## 2022-05-23 PROCEDURE — 97140 MANUAL THERAPY 1/> REGIONS: CPT

## 2022-05-23 PROCEDURE — 97110 THERAPEUTIC EXERCISES: CPT

## 2022-05-23 PROCEDURE — 97530 THERAPEUTIC ACTIVITIES: CPT

## 2022-05-23 PROCEDURE — 97542 WHEELCHAIR MNGMENT TRAINING: CPT

## 2022-05-23 PROCEDURE — 99232 SBSQ HOSP IP/OBS MODERATE 35: CPT | Performed by: PHYSICAL MEDICINE & REHABILITATION

## 2022-05-23 PROCEDURE — 97116 GAIT TRAINING THERAPY: CPT

## 2022-05-23 RX ORDER — OMEGA-3S/DHA/EPA/FISH OIL/D3 300MG-1000
400 CAPSULE ORAL DAILY
Status: DISCONTINUED | OUTPATIENT
Start: 2022-05-23 | End: 2022-06-07 | Stop reason: HOSPADM

## 2022-05-23 RX ORDER — ALLOPURINOL 100 MG/1
100 TABLET ORAL DAILY
Status: DISCONTINUED | OUTPATIENT
Start: 2022-05-23 | End: 2022-06-07 | Stop reason: HOSPADM

## 2022-05-23 RX ADMIN — HEPARIN SODIUM 7500 UNITS: 5000 INJECTION INTRAVENOUS; SUBCUTANEOUS at 14:11

## 2022-05-23 RX ADMIN — DICLOFENAC SODIUM 2 G: 10 GEL TOPICAL at 11:54

## 2022-05-23 RX ADMIN — DICLOFENAC SODIUM 2 G: 10 GEL TOPICAL at 09:34

## 2022-05-23 RX ADMIN — HEPARIN SODIUM 7500 UNITS: 5000 INJECTION INTRAVENOUS; SUBCUTANEOUS at 21:17

## 2022-05-23 RX ADMIN — ACETAMINOPHEN 975 MG: 325 TABLET ORAL at 05:37

## 2022-05-23 RX ADMIN — ACETAMINOPHEN 975 MG: 325 TABLET ORAL at 14:11

## 2022-05-23 RX ADMIN — AMLODIPINE BESYLATE 10 MG: 10 TABLET ORAL at 09:34

## 2022-05-23 RX ADMIN — METOPROLOL SUCCINATE 25 MG: 25 TABLET, FILM COATED, EXTENDED RELEASE ORAL at 09:34

## 2022-05-23 RX ADMIN — TAMSULOSIN HYDROCHLORIDE 0.4 MG: 0.4 CAPSULE ORAL at 17:16

## 2022-05-23 RX ADMIN — TRAMADOL HYDROCHLORIDE 50 MG: 50 TABLET, COATED ORAL at 17:18

## 2022-05-23 RX ADMIN — DICLOFENAC SODIUM 2 G: 10 GEL TOPICAL at 17:16

## 2022-05-23 RX ADMIN — LIDOCAINE 1 PATCH: 50 PATCH TOPICAL at 09:34

## 2022-05-23 RX ADMIN — TRAMADOL HYDROCHLORIDE 50 MG: 50 TABLET, COATED ORAL at 09:34

## 2022-05-23 RX ADMIN — CHOLECALCIFEROL TAB 10 MCG (400 UNIT) 400 UNITS: 10 TAB at 11:53

## 2022-05-23 RX ADMIN — HEPARIN SODIUM 7500 UNITS: 5000 INJECTION INTRAVENOUS; SUBCUTANEOUS at 05:37

## 2022-05-23 RX ADMIN — ALLOPURINOL 100 MG: 100 TABLET ORAL at 14:11

## 2022-05-23 RX ADMIN — DICLOFENAC SODIUM 2 G: 10 GEL TOPICAL at 21:17

## 2022-05-23 RX ADMIN — AMOXICILLIN AND CLAVULANATE POTASSIUM 1 TABLET: 875; 125 TABLET, FILM COATED ORAL at 21:17

## 2022-05-23 RX ADMIN — AMOXICILLIN AND CLAVULANATE POTASSIUM 1 TABLET: 875; 125 TABLET, FILM COATED ORAL at 09:34

## 2022-05-23 RX ADMIN — ACETAMINOPHEN 975 MG: 325 TABLET ORAL at 21:17

## 2022-05-23 RX ADMIN — COLCHICINE 0.6 MG: 0.6 TABLET, FILM COATED ORAL at 09:34

## 2022-05-23 NOTE — PROGRESS NOTES
05/23/22 0654   Pain Assessment   Pain Assessment Tool 0-10   Pain Score 10 - Worst Possible Pain   Pain [facility]/Orientation Orientation: Right;[facility]: Shoulder;Orientation: Left; [facility]: Knee   Pain Onset/Description   (gout)   Patient's Stated Pain Goal No pain   Hospital Pain Intervention(s) Repositioned; Ambulation/increased activity  (RN made aware)   Restrictions/Precautions   Precautions Bed/chair alarms;Pain; Fall Risk   LLE Weight Bearing Per Order WBAT   Cognition   Overall Cognitive Status [facility]   Arousal/Participation Alert; Responsive   Attention Attends with cues to redirect   Orientation Level Oriented X4   Memory Within functional limits   Following Commands Follows one step commands without difficulty   Subjective   Subjective "I went into surgery with no pain and came out with all of this going on "   Roll Left and Right   Type of Assistance Needed Independent   Physical Assistance Level No physical assistance   Comment BSR   Roll Left and Right CARE Score 6   Sit to Lying   Type of Assistance Needed Incidental touching   Physical Assistance Level 25% or less   Comment CGA for LE management   Sit to Lying CARE Score 3   Lying to Sitting on Side of Bed   Type of Assistance Needed Supervision   Physical Assistance Level No physical assistance   Comment HOB and BSR   Lying to Sitting on Side of Bed CARE Score 4   Sit to Stand   Type of Assistance Needed Incidental touching   Physical Assistance Level 25% or less   Comment CGA   Sit to Stand CARE Score 3   Bed-Chair Transfer   Type of Assistance Needed Incidental touching   Physical Assistance Level 25% or less   Comment CGA   Chair/Bed-to-Chair Transfer CARE Score 3   Transfer Bed/Chair/Wheelchair   Limitations Noted In Balance; Endurance;Confidence;Pain Management;UE Strength;LE Strength   Adaptive Equipment Roller Walker   Stand Pivot Contact Guard   Sit to [employer]   Stand to [facility]   Supine to Sit Supervision   Sit to Supine Contact Guard   Car Transfer   Reason if not Attempted Environmental limitations   Car Transfer CARE Score 10   Walk 10 Feet   Type of Assistance Needed Incidental touching   Physical Assistance Level 25% or less   Comment CGA   Walk 10 Feet CARE Score 3   Walk 50 Feet with Two Turns   Type of Assistance Needed Incidental touching   Physical Assistance Level 25% or less   Comment CGA   Walk 50 Feet with Two Turns CARE Score 3   Walk 150 Feet   Reason if not Attempted Safety concerns   Walk 150 Feet CARE Score 88   Walking 10 Feet on Uneven Surfaces   Reason if not Attempted Safety concerns   Walking 10 Feet on Uneven Surfaces CARE Score 88   Ambulation   Does the patient walk? 2  Yes   Primary Mode of Locomotion Prior to Admission Walk   Distance Walked (feet) 68 ft  (15ft)   Assist Device Roller Walker   Gait Pattern Decreased foot clearance; Improper weight shift;Decreased L stance; Antalgic; Slow Julieta; Wide TEO   Limitations Noted In Balance; Endurance; Heel Strike;Speed;Strength;Posture   Provided Assistance with: Direction   Walk Assist Level Contact Guard; Chair Follow   Wheel 50 Feet with Two Turns   Type of Assistance Needed Supervision   Physical Assistance Level No physical assistance   Wheel 50 Feet with Two Turns CARE Score 4   Wheel 150 Feet   Type of Assistance Needed Supervision   Physical Assistance Level No physical assistance   Wheel 150 Feet CARE Score 4   Wheelchair mobility   Does the patient use a wheelchair? 1  Yes   Type of Wheelchair Used 1  Manual   Method Right upper extremity; Left upper extremity   Assistance Provided For Remove Leg Rest;Replace Leg Rest;Locking Brakes   Distance Level Surface (feet) 145 ft   Curb or Single Stair   Reason if not Attempted Safety concerns   1 Step (Curb) CARE Score 88   4 Steps   Reason if not Attempted Safety concerns   4 Steps CARE Score 88   12 Steps   Reason if not Attempted Safety concerns   12 Steps CARE Score 88   Picking Up Object   Reason if not Attempted Safety concerns   Picking Up Object CARE Score 88   Therapeutic Interventions   Strengthening seated ther ex BLE   Flexibility BLE calf stretching and L quad stretch 3x held for 15 seconds each   Balance gait and transfer training   Other w/c mobility   Other Comments   Comments Increased time required for each task due to anticipated pain   Assessment   Treatment Assessment Pt  seen today for PT treatment including bed mobility tasks, transfer training, gait training, seated ther ex, manual therapy including stretching of B calf muscles and L quad muscles to improve joint flexibility, w/c mobility and education provided as needed for safety anddirection to improve functionalmobility and activity tolerance  Pt  have made improvements in ambulation distance and decreased amountof assistance required for sit to supine transfers  Continue current POC and progress as tolerated  Problem List Decreased strength;Decreased range of motion;Decreased endurance; Impaired balance;Decreased mobility; Decreased safety awareness;Pain;Obesity;Orthopedic restrictions   PT Barriers   Physical Impairment Decreased strength;Decreased range of motion;Decreased endurance; Impaired balance;Decreased mobility; Decreased safety awareness;Pain;Obesity;Orthopedic restrictions   Functional Limitation Walking;Transfers;Standing;Stair negotiation   Plan   Treatment/Interventions Functional transfer training;LE strengthening/ROM; Elevations; Therapeutic exercise; Endurance training;Patient/family training;Equipment eval/education; Bed mobility;Gait training;Spoke to nursing   Progress Slow progress, decreased activity tolerance   PT Therapy Minutes   PT Time In 0654   PT Time Out 0839   PT Total Time (minutes) 105   PT Mode of treatment - Individual (minutes) 105   PT Mode of treatment - Concurrent (minutes) 0   PT Mode of treatment - Group (minutes) 0   PT Mode of treatment - Co-treat (minutes) 0   PT Mode of Treatment - Total time(minutes) 105 minutes   PT Cumulative Minutes 270   Therapy Time missed   Time missed?  No

## 2022-05-23 NOTE — NURSING NOTE
Patient able to ambulate to bathroom with assist times one with walker  Needs encouragement and cueing to participate  Voices constant pain in right shoulder and left knee and medicated for same  Remains continant of bowel and bladder  Nephrostomy tube dressing changed and flushed as ordered  Patient to have surgical procedure Wed per pre admission screening who called and also per date set in chart  Neelima PEDRAZA to confirm if still needs to be completed while in rehab  Patient made aware of same  Will continue to monitor and follow plan of care

## 2022-05-23 NOTE — CASE MANAGEMENT
Transport has been reserved to drop Pt off for appt on 5/25 for 9-12:30, see 5/20 note  Per Nursing, pre-admissions called today & stated drop-off time is 9 AM  However, transport with [facility] contract is not available until 9 AM  SW called [phone] requesting they transport Pt sooner if possible; they are discussing with their supervisor & will let this worker know at a later time if they are able to have their staff start their shift early on 5/25  Unable to block times for the transport back to [facility] as of this time, as there is no information available regarding how long this procedure will take, recovery time, etc  This will be secured when more information is available; [facility] is aware the Pt will need same day transport back to [facility] on 5/25

## 2022-05-23 NOTE — PLAN OF CARE
Problem: PAIN - ADULT  Goal: Verbalizes/displays adequate comfort level or baseline comfort level  Description: Interventions:  - Encourage patient to monitor pain and request assistance  - Assess pain using appropriate pain scale  - Administer analgesics based on type and severity of pain and evaluate response  - Implement non-pharmacological measures as appropriate and evaluate response  - Consider cultural and social influences on pain and pain management  - Notify physician/advanced practitioner if interventions unsuccessful or patient reports new pain  Outcome: Progressing     Problem: INFECTION - ADULT  Goal: Absence or prevention of progression during hospitalization  Description: INTERVENTIONS:  - Assess and monitor for signs and symptoms of infection  - Monitor lab/diagnostic results  - Monitor all insertion sites, i e  indwelling lines, tubes, and drains  - Monitor endotracheal if appropriate and nasal secretions for changes in amount and color  - Sandy appropriate cooling/warming therapies per order  - Administer medications as ordered  - Instruct and encourage patient and family to use good hand hygiene technique  - Identify and instruct in appropriate isolation precautions for identified infection/condition  Outcome: Progressing  Goal: Absence of fever/infection during neutropenic period  Description: INTERVENTIONS:  - Monitor WBC    Outcome: Progressing     Problem: DISCHARGE PLANNING  Goal: Discharge to home or other facility with appropriate resources  Description: INTERVENTIONS:  - Identify barriers to discharge w/patient and caregiver  - Arrange for needed discharge resources and transportation as appropriate  - Identify discharge learning needs (meds, wound care, etc )  - Arrange for interpretive services to assist at discharge as needed  - Refer to Case Management Department for coordinating discharge planning if the patient needs post-hospital services based on physician/advanced practitioner order or complex needs related to functional status, cognitive ability, or social support system  Outcome: Progressing

## 2022-05-23 NOTE — PROGRESS NOTES
05/23/22 1230   Pain Assessment   Pain Assessment Tool 0-10   Pain Score 10 - Worst Possible Pain   Pain [facility]/Orientation Orientation: Left; [facility]: Shoulder   Pain Onset/Description Onset: Ongoing; Descriptor: Throbbing   Patient's Stated Pain Goal No pain   Restrictions/Precautions   Precautions Bed/chair alarms;Pain; Fall Risk   LLE Weight Bearing Per Order WBAT   Sit to Lying   Type of Assistance Needed Incidental touching   Sit to Lying CARE Score 4   Lying to Sitting on Side of Bed   Type of Assistance Needed Supervision   Lying to Sitting on Side of Bed CARE Score 4   Sit to Stand   Type of Assistance Needed Incidental touching   Sit to Stand CARE Score 4   Bed-Chair Transfer   Type of Assistance Needed Incidental touching   Chair/Bed-to-Chair Transfer CARE Score 4   ROM- Right Upper Extremities   R Shoulder AAROM; Flexion; Extension  (pro/ret)   R Position Seated   R Weight/Reps/Sets no weight, 10 reps, 1 set   Left Upper Extremity-Strength   L Shoulder Flexion; Extension  (pro/ret)   L Position Seated   Equipment Dumbell   L Weights/Reps/Sets 1# wt, 15 reps, 2 sets   Exercise Tools   Other Exercise Tool 1 alfred box with B UEs 10 reps x3 sets all directions without weight   Other Exercise Tool 2 2# dowel 10 reps x2 sets shoulder horizontal abd/add elbow flex/ext   Cognition   Overall Cognitive Status [facility]   Arousal/Participation Alert; Responsive   Attention Attends with cues to redirect   Orientation Level Oriented X4   Memory Within functional limits   Following Commands Follows one step commands without difficulty   Additional Activities   Additional Activities Comments Pt self-propelled w/c from room to therapy room with B UEs Mod I   Activity Tolerance   Activity Tolerance Patient limited by fatigue;Patient limited by pain  (Pt requires extended recent rest breaks due to pain and fatigue)   Assessment   Treatment Assessment Patient participated in Skilled OT session this date with interventions consisting of therapeutic exercise to: increase functional use of BUEs, increase BUE muscle strength  and  therapeutic activities to: increase activity tolerance   Patient agreeable to OT treatment session, upon arrival patient was found supine in bed  In comparison to previous session, patient with improvements in functional transfers  Patient requiring frequent rest periods  Patient continues to be functioning below baseline level, occupational performance remains limited secondary to factors listed above and increased risk for falls and injury  Patient to benefit from continued Occupational Therapy treatment to address deficits as defined above and maximize level of functional independence with ADLs and functional mobility  Prognosis Good   Problem List Decreased strength;Decreased range of motion;Decreased endurance; Impaired balance;Decreased mobility;Obesity;Orthopedic restrictions;Pain   Plan   Treatment/Interventions ADL retraining;Functional transfer training; Therapeutic exercise; Endurance training;Bed mobility;Gait training; Compensatory technique education;OT   Progress Progressing toward goals   OT Therapy Minutes   OT Time In 1230   OT Time Out 1400   OT Total Time (minutes) 90   OT Mode of treatment - Individual (minutes) 90   OT Mode of treatment - Concurrent (minutes) 0   OT Mode of treatment - Group (minutes) 0   OT Mode of treatment - Co-treat (minutes) 0   OT Mode of Treatment - Total time(minutes) 90 minutes   OT Cumulative Minutes 225   Therapy Time missed   Time missed?  No     Landen Gudino MS, OTR/L

## 2022-05-23 NOTE — NURSING NOTE
Patient resting comfortably in bed at this time  No signs of distress noted  Patient stood with two assist this morning for assist with bathing tasks  Patient was encouraged to reposition frequently overnight to promote skin integrity  Bed alarm in place to promote patient safety  Urostomy tube draining clear yellow urine  Call bell within reach  Will continue to monitor patient and follow plan of care

## 2022-05-23 NOTE — PROGRESS NOTES
PM&R PROGRESS NOTE:  Chelsea Eid 55 y o  male MRN: [id number]  Unit/Bed#: -01 Encounter: [id number]        Rehabilitation Diagnosis: Impairment of mobility, safety and Activities of Daily Living (ADLs) due to Debility:  16  Debility (Non-cardiac/Non-pulmonary)    HPI: Chelsea Eid is a 55 y o  male with a PMH significant for CKD, HTN, HLD, obstructive pyelonephritis with coli bacteremia, who presented to the [place] [address] ED on 05/10  He had a recent discharge on 4/30/22 s/p cystoscopy and stent placement on 4/22/22   After his discharge, patient experienced intermittent waves of left flank pain described as sharp, non-radiating, lightheadedness, nausea  He also admitted to worsening shortness of breath  He arrived to ED with tachypnea and tachycardia  CT scan obtained revealing hydronephrosis and obstructive pyelonephritis  Urology was consulted and recommended acute transfer to [facility]  Patient diagnosed with sepsis and initiated on IV antibiotics  IR was consulted for concerns with potential obstruction of stent placement and noted to be in [place]AN[place]  Patient went to the OR with IR for obstructive pyelonephritis and is s/p IR Nephrostomy tube placement on 5/10  He was intubated for the procedure and extubated on 5/11  Ortho was consulted on 5/13 for left knee pain and a left knee aspiration was performed  Patient is to continue WBAT to LLE  The aspiration was unsuccessful with ortho recommending adequate pain control and potential steroid injection in the future  Sepsis resolved and patient was transferred from ICU to med surg  On 5/16 patient was transitioned from IV antibiotics to oral to complete a total of 14 days  Patient participated with PT/OT and deemed appropriate for post acute rehabilitation services  He was accepted to [facility] and once hemodynamically stable, arrived on 5/19/22       SUBJECTIVE: Patient seen face to face  Patient complaining of myalgias  Right shoulder pain, left knee pain and left ankle pain  Informed patient of left ankle/foot x-ray which revealed osteoarthritis to 1st MTP  Vitamin D has yet to return but patient requesting to be placed on supplementation admitting in the past he had significant pain and difficulty with ambulating due to low vitamin D levels and he would like to try taking supplementation again  Patient c/o leaking out of urinal at night  Explained about adjusting his positioning overnight to see if this helps  ASSESSMENT: Stable, progressing      PLAN:    Rehabilitation  · Functional deficits:  impaired mobility, self care, weakness to the LLE due to pain to left knee and left foot, Pain also to right shoulder    Continue current rehabilitation plan of care to maximize function   Functional update:   o PT:  Roll L-R: independent   Sit-lying: CGA  Lying to sitting on side of bed: supervision  Sit-stand: CGA  Bed-chair transfer: CGA  Wheelchair mobility: supervision   o OT:  Toileting hygiene: s/u c/u     Estimated Discharge:  To be discussed this week      Pain   Tylenol 975 mg Q8H [address]   Tramadol 50 mg Q6H PRN for mod-severe pain    DVT prophylaxis   Heparin 7500 units Q8H [address]    Bladder plan   Continent    Bowel plan   Continent      Anemia  Assessment & Plan  · Iron panel obtained with elevated ferritin stores   · Monitor H/H      Recurrent left knee pain  Assessment & Plan  · Ongoing for several weeks causing difficulty with bearing weight due to pain  · XR knee without acute osseous abnormality, did reveal tricompartmental osteoarthritis similar to imaging done the year prior  · Ortho consulted and attempted an unsuccessful fluid aspiration   · Continue with adequate pain management with OP Ortho f/u for potential steroid injection once acute sepsis/infection resolves   · Diclofenac gel to the knee 4 times daily  · Vitamin D supplement ordered per patient request   · knee brace during therapy sessions   · Left foot xray: There is no acute fracture or dislocation  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  Small plantar and large retrocalcaneal spurs  No lytic or blastic osseous lesion  Soft tissues are unremarkable  Essential hypertension  Assessment & Plan  · Low-sodium diet  · Continue Norvasc 10 mg and Metoprolol 25 mg daily      Chronic kidney disease, stage 3 [facility])  Assessment & Plan  Lab Results   Component Value Date    EGFR 40 05/21/2022    EGFR 36 05/20/2022    EGFR 39 05/19/2022    CREATININE 1 84 (H) 05/21/2022    CREATININE 1 98 (H) 05/20/2022    CREATININE 1 87 (H) 05/19/2022   · Baseline creatinine approximately 1 4-1 6  · Did peak to 1 92 in acute care thought to be due to severe sepsis with obstructive pyelonephritis   · Monitor renal function and urine output  · Avoid nephrotoxins and hypotension       Severe sepsis on admission  Assessment & Plan  · Presented to [address] on 5/10 with worsening left flank pain, tachypnea and tachycardia in setting of E  Coli pyelonephritis/bacteremia   · CT revealed hydronephrosis and obstructive pyelonephritis  · Patient transferred to [facility] for higher level of care  · He went to the OR on 5/10 with IR for a nephrostomy tube placement  · Resolved      Bacteremia due to Escherichia coli  Assessment & Plan  · Positive growth of E  Coli in one blood culture on 5/10 thought to be due to UTI/pyelonephritis   · Was on IV Rocephin; transitioned to PO Augmentin in acute care for a total of 14 days      Morbid obesity ([address] )  Assessment & Plan  · BMI of 49 99  · Lifestyle and diet modifications       * Obstructive pyelonephritis  Assessment & Plan  · CT on 5/10: "Mild left hydronephrosis status post ureteral stent placement, with 3 mm calculus in the mid ureter adjacent to the stent  Multiple bilateral renal calculi "  · Recent admission and underwent L-side ureteral stent on 4/21- this was found to be obstructive   S/p left PCN placement by IR on 5/10   · Pain control  · Continue Flomax, OP follow up with urology for stone treatment on 5/25  · Continue Augmentin Q12H [address] for total of 14 days (Complete on 5/25/22)          Appreciate IM consultants medical co-management  Labs, medications, and imaging personally reviewed  ROS:  A ten point review of systems was completed and pertinent positives are listed in subjective section  All other systems reviewed were negative  Review of Systems   Constitutional: Negative  Negative for fatigue  HENT: Negative  Negative for trouble swallowing  Eyes: Negative  Negative for visual disturbance  Respiratory: Negative  Negative for shortness of breath  Cardiovascular: Negative  Negative for chest pain  Gastrointestinal: Negative  Negative for abdominal distention, constipation and diarrhea  Endocrine: Negative  Genitourinary: Negative  Negative for difficulty urinating  Musculoskeletal: Positive for myalgias  Skin: Negative  Allergic/Immunologic: Negative  Neurological: Negative  Hematological: Negative  Psychiatric/Behavioral: Negative  OBJECTIVE:   /88 (BP [facility]: Right arm)   Pulse (!) 107   Temp 99 °F (37 2 °C) (Tympanic)   Resp 18   Ht 5' 6" (1 676 m)   Wt (!) 148 kg (325 lb 9 9 oz)   SpO2 98%   BMI 52 56 kg/m²     Physical Exam  Vitals reviewed  Constitutional:       General: He is not in acute distress  Appearance: Normal appearance  He is obese  He is not ill-appearing  HENT:      Head: Normocephalic and atraumatic  Right Ear: External ear normal       Left Ear: External ear normal       Nose: Nose normal    Eyes:      Pupils: Pupils are equal, round, and reactive to light  Cardiovascular:      Rate and Rhythm: Normal rate and regular rhythm  Pulses: Normal pulses  Heart sounds: Normal heart sounds  No murmur heard  Pulmonary:      Effort: Pulmonary effort is normal  No respiratory distress  Breath sounds: Normal breath sounds  No wheezing     Abdominal: General: Bowel sounds are normal  There is no distension  Palpations: Abdomen is soft  Tenderness: There is no abdominal tenderness  Musculoskeletal:         General: Normal range of motion  Cervical back: Normal range of motion  Right lower leg: Edema present  Left lower leg: Edema present  Skin:     General: Skin is warm  Neurological:      General: No focal deficit present  Mental Status: He is alert and oriented to person, place, and time     Psychiatric:         Mood and Affect: Mood normal           Lab Results   Component Value Date    WBC 7 00 05/20/2022    HGB 10 8 (L) 05/20/2022    HCT 35 5 (L) 05/20/2022    MCV 97 05/20/2022     05/20/2022     Lab Results   Component Value Date    SODIUM 132 (L) 05/21/2022    K 3 7 05/21/2022    CL 99 05/21/2022    CO2 21 05/21/2022    BUN 34 (H) 05/21/2022    CREATININE 1 84 (H) 05/21/2022    GLUC 113 05/21/2022    CALCIUM 8 8 05/21/2022     Lab Results   Component Value Date    INR 1 27 (H) 05/10/2022    INR 1 09 05/10/2022    INR 1 33 (H) 08/24/2019    PROTIME 15 4 (H) 05/10/2022    PROTIME 13 5 05/10/2022    PROTIME 16 6 (H) 08/24/2019           Current Facility-Administered Medications:     acetaminophen (TYLENOL) tablet 975 mg, 975 mg, Oral, Q8H [address], MILO Brock-C, 975 mg at 05/23/22 0537    amLODIPine (NORVASC) tablet 10 mg, 10 mg, Oral, Daily, Cezar Pinzon PA-C, 10 mg at 05/23/22 0934    amoxicillin-clavulanate (AUGMENTIN) 875-125 mg per tablet 1 tablet, 1 tablet, Oral, Q12H [address], Cezar Pinzon PA-C, 1 tablet at 05/23/22 0934    calcium carbonate (TUMS) chewable tablet 1,000 mg, 1,000 mg, Oral, BID PRN, Cezar Pinzon PA-C    colchicine (COLCRYS) tablet 0 6 mg, 0 6 mg, Oral, Daily, Cezar Pinzon PA-C, 0 6 mg at 05/23/22 0934    Diclofenac Sodium (VOLTAREN) 1 % topical gel 2 g, 2 g, Topical, 4x Daily, Cezar Pinzon PA-C, 2 g at 05/23/22 0934    heparin (porcine) subcutaneous injection 7,500 Units, 7,500 Units, Subcutaneous, Q8H [address], Judi Michaels PA-C, 7,500 Units at 05/23/22 0537    lidocaine (LIDODERM) 5 % patch 1 patch, 1 patch, Topical, Daily, Cezar Pinzon PA-C, 1 patch at 05/23/22 0934    metoprolol succinate (TOPROL-XL) 24 hr tablet 25 mg, 25 mg, Oral, Daily, RITA Ott, 25 mg at 05/23/22 0909    ondansetron (ZOFRAN-ODT) dispersible tablet 4 mg, 4 mg, Oral, Q6H PRN, Cezar Pinzon PA-C    senna (SENOKOT) tablet 17 2 mg, 2 tablet, Oral, HS PRN, Cezar Pinzon PA-C    tamsulosin (FLOMAX) capsule 0 4 mg, 0 4 mg, Oral, Daily With Dinner, Judi Michaels PA-C, 0 4 mg at 05/22/22 1717    traMADol (ULTRAM) tablet 50 mg, 50 mg, Oral, Q6H PRN, Cezar Pinzon PA-C, 50 mg at 05/23/22 1847    Past Medical History:   Diagnosis Date    Arthritis     Gout     Hypertension     Kidney stone        Patient Active Problem List    Diagnosis Date Noted    Anemia 05/13/2022    Obstructive pyelonephritis 04/23/2022    Acute unilateral obstructive uropathy 04/21/2022    Mixed hyperlipidemia 06/02/2021    Morbid obesity 06/02/2021    Recurrent left knee pain 06/02/2021    Insomnia 09/16/2019    Essential hypertension 08/27/2019    Severe sepsis on admission 08/23/2019    Chronic kidney disease, stage 3 ([facility] ) 08/23/2019    Acute kidney injury superimposed on CKD stage 3 08/06/2019    Bacteremia due to Escherichia coli 08/04/2019    Lactic acidosis 08/04/2019    History of gout 07/15/2019    Vitamin D deficiency 06/10/2015    Morbid obesity ([facility] ) 03/23/2015          Cezar Pinzon PA-C    Total time spent:  30 minutes with more than 50% spent counseling/coordinating care  Counseling includes discussion with patient re: progress and discussion with patient of his/her current medical/functional state/information  Coordination of patient's care was performed in conjunction with consulting services   Time invested included review of patient's labs, vitals, and management of their comorbidities with continued monitoring  The care of the patient was extensively discussed and appropriate treatment plan was formulated unique for this patient  ** Please Note:  voice to text software may have been used in the creation of this document   Although proof errors in transcription or interpretation are a potential of such software**

## 2022-05-24 ENCOUNTER — TELEPHONE (OUTPATIENT)
Dept: OTHER | Facility: HOSPITAL | Age: 57
End: 2022-05-24

## 2022-05-24 PROCEDURE — 97110 THERAPEUTIC EXERCISES: CPT

## 2022-05-24 PROCEDURE — 97140 MANUAL THERAPY 1/> REGIONS: CPT

## 2022-05-24 PROCEDURE — 97530 THERAPEUTIC ACTIVITIES: CPT

## 2022-05-24 PROCEDURE — 97116 GAIT TRAINING THERAPY: CPT

## 2022-05-24 PROCEDURE — 99232 SBSQ HOSP IP/OBS MODERATE 35: CPT | Performed by: PHYSICAL MEDICINE & REHABILITATION

## 2022-05-24 RX ADMIN — TRAMADOL HYDROCHLORIDE 50 MG: 50 TABLET, COATED ORAL at 08:58

## 2022-05-24 RX ADMIN — DICLOFENAC SODIUM 2 G: 10 GEL TOPICAL at 12:43

## 2022-05-24 RX ADMIN — ACETAMINOPHEN 975 MG: 325 TABLET ORAL at 21:13

## 2022-05-24 RX ADMIN — DICLOFENAC SODIUM 2 G: 10 GEL TOPICAL at 09:02

## 2022-05-24 RX ADMIN — ACETAMINOPHEN 975 MG: 325 TABLET ORAL at 14:41

## 2022-05-24 RX ADMIN — ALLOPURINOL 100 MG: 100 TABLET ORAL at 08:59

## 2022-05-24 RX ADMIN — ACETAMINOPHEN 975 MG: 325 TABLET ORAL at 05:21

## 2022-05-24 RX ADMIN — HEPARIN SODIUM 7500 UNITS: 5000 INJECTION INTRAVENOUS; SUBCUTANEOUS at 21:13

## 2022-05-24 RX ADMIN — HEPARIN SODIUM 7500 UNITS: 5000 INJECTION INTRAVENOUS; SUBCUTANEOUS at 14:41

## 2022-05-24 RX ADMIN — AMOXICILLIN AND CLAVULANATE POTASSIUM 1 TABLET: 875; 125 TABLET, FILM COATED ORAL at 21:13

## 2022-05-24 RX ADMIN — LIDOCAINE 1 PATCH: 50 PATCH TOPICAL at 08:58

## 2022-05-24 RX ADMIN — AMLODIPINE BESYLATE 10 MG: 10 TABLET ORAL at 08:59

## 2022-05-24 RX ADMIN — DICLOFENAC SODIUM 2 G: 10 GEL TOPICAL at 21:13

## 2022-05-24 RX ADMIN — CHOLECALCIFEROL TAB 10 MCG (400 UNIT) 400 UNITS: 10 TAB at 08:59

## 2022-05-24 RX ADMIN — METOPROLOL SUCCINATE 25 MG: 25 TABLET, FILM COATED, EXTENDED RELEASE ORAL at 08:59

## 2022-05-24 RX ADMIN — DICLOFENAC SODIUM 2 G: 10 GEL TOPICAL at 18:03

## 2022-05-24 RX ADMIN — TAMSULOSIN HYDROCHLORIDE 0.4 MG: 0.4 CAPSULE ORAL at 17:16

## 2022-05-24 RX ADMIN — AMOXICILLIN AND CLAVULANATE POTASSIUM 1 TABLET: 875; 125 TABLET, FILM COATED ORAL at 08:59

## 2022-05-24 RX ADMIN — HEPARIN SODIUM 7500 UNITS: 5000 INJECTION INTRAVENOUS; SUBCUTANEOUS at 05:21

## 2022-05-24 NOTE — [facility] NURSING
5/23/22 Patient is a recent admission for [facility] following a hospitalization for obstructive pyelonephritis  Patient remains alert and oriented bed alarm in place to promote patient safety  Patient with nephrostomy tube to the left flank dressing changed daily and tube flushed with 10cc of NSS daily to maintain patency  Tube draining clear yellow urine without difficulty  Patient also voids small amounts in urinal   Scattered ecchymotic areas on the abdomen noted  Patient was started on allopurinol for a gout flare up  Pain managed with scheduled tylenol and as needed ultram       5/30/22 Patient remains alert and oriented  Lungs are clear but diminished at the bases on room air  Patient with left flank nephrostomy tube draining clear yellow urine staff performs emptying of the device and daily flushes of the device  Patient also voids small amounts in a urinal   Scattered ecchymotic areas on the abdomen noted  Patient now on scheduled tramadol and tylenol for pain management  Patient able to ambulate to the bathroom with one assist and a walker overnight       6-7-2022, A/o x 4  Heart reg  Lungs diminished  Abd soft  Cont  B & B  Pt still has left nephro tube which is flushed daily, currently is draining light yellow urine  Pt continues on scheduled ultram and tylenol for pain relief  Pt with 2-3 + edema lower ext  Pt with temp 6-6 evenings and sepsis alert initiated  Max temp 102 2, hr to  122, BP and O2 sats WNL  Labs sent IVF given as ordered, urine sent for culture  IV abx started  Con't to monitor  Tab alarm for safety

## 2022-05-24 NOTE — PROGRESS NOTES
PM&R PROGRESS NOTE:  Lanie Cardenas 55 y o  male MRN: [id number]  Unit/Bed#: -01 Encounter: [id number]        Rehabilitation Diagnosis: Impairment of mobility, safety and Activities of Daily Living (ADLs) due to Debility:  16  Debility (Non-cardiac/Non-pulmonary)    HPI: Lanie Cardenas is a 55 y o  male with a PMH significant for CKD, HTN, HLD, obstructive pyelonephritis with coli bacteremia, who presented to the [place] [address] ED on 05/10  He had a recent discharge on 4/30/22 s/p cystoscopy and stent placement on 4/22/22   After his discharge, patient experienced intermittent waves of left flank pain described as sharp, non-radiating, lightheadedness, nausea  He also admitted to worsening shortness of breath  He arrived to ED with tachypnea and tachycardia  CT scan obtained revealing hydronephrosis and obstructive pyelonephritis  Urology was consulted and recommended acute transfer to [facility]  Patient diagnosed with sepsis and initiated on IV antibiotics  IR was consulted for concerns with potential obstruction of stent placement and noted to be in ADRIANNE  Patient went to the OR with IR for obstructive pyelonephritis and is s/p IR Nephrostomy tube placement on 5/10  He was intubated for the procedure and extubated on 5/11  Ortho was consulted on 5/13 for left knee pain and a left knee aspiration was performed  Patient is to continue WBAT to LLE  The aspiration was unsuccessful with ortho recommending adequate pain control and potential steroid injection in the future  Sepsis resolved and patient was transferred from ICU to med surg  On 5/16 patient was transitioned from IV antibiotics to oral to complete a total of 14 days  Patient participated with PT/OT and deemed appropriate for post acute rehabilitation services  He was accepted to [facility] and once hemodynamically stable, arrived on 5/19/22       SUBJECTIVE: Patient seen face to face    Patient complaining of left arm discomfort  Area is slightly erythematous with potential lymph node enlargement  Area is warm compared to the right arm  Added warm compresses  Patient is currently on Augmentin, can consider Keflex  Possible cellulitis infection  Patient reported this started yesterday  Explained to patient that I reached out to the  who stated his Vitamin D panel will likely come back in 5-7 days since drawn  Discussed with Braulio Westbrook PA-C about procedure for tomorrow  Since patient is currently still inpatient urology was agreeable to hold off on the procedure for now  Will need to contact Urology tomorrow and give definite discharge date  Possibly discharge from [facility] to procedure when date is set  Explain this to the patient who is disappointed stating "I was hoping to come back to rehab after the procedure  I think I would do better with therapy " Patient does understands and is aware we will update him with the new date of the procedure  ASSESSMENT: Stable, progressing      PLAN:    Rehabilitation  · Functional deficits:  impaired mobility, self care, weakness to the LLE due to pain to left knee and left foot, Pain also to right shoulder    Continue current rehabilitation plan of care to maximize function   Functional update:   o PT:  Roll L-R: independent   Sit-lying: CGA  Lying to sitting on side of bed: supervision  Sit-stand: CGA  Bed-chair transfer: CGA  Wheelchair mobility: supervision   o OT:  Toileting hygiene: s/u c/u     Estimated Discharge:  To be discussed tomorrow       Pain   Tylenol 975 mg Q8H [facility]   Tramadol 50 mg Q6H PRN for mod-severe pain    DVT prophylaxis   Heparin 7500 units Q8H [facility]    Bladder plan   Continent    Bowel plan   Continent      Anemia  Assessment & Plan  · Iron panel obtained with elevated ferritin stores   · Monitor H/H- Repeat for tomorrow    Recurrent left knee pain  Assessment & Plan  · Ongoing for several weeks causing difficulty with bearing weight due to pain  · XR knee without acute osseous abnormality, did reveal tricompartmental osteoarthritis similar to imaging done the year prior  · Ortho consulted and attempted an unsuccessful fluid aspiration   · Continue with adequate pain management with OP Ortho f/u for potential steroid injection once acute sepsis/infection resolves   · Diclofenac gel to the knee 4 times daily  · Left foot xray: There is no acute fracture or dislocation  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  Small plantar and large retrocalcaneal spurs  No lytic or blastic osseous lesion  Soft tissues are unremarkable  Essential hypertension  Assessment & Plan  · Low-sodium diet  · Continue Norvasc 10 mg and Metoprolol 25 mg daily      Chronic kidney disease, stage 3 [facility])  Assessment & Plan  Lab Results   Component Value Date    EGFR 40 05/21/2022    EGFR 36 05/20/2022    EGFR 39 05/19/2022    CREATININE 1 84 (H) 05/21/2022    CREATININE 1 98 (H) 05/20/2022    CREATININE 1 87 (H) 05/19/2022   · Baseline creatinine approximately 1 4-1 6  · Did peak to 1 92 in acute care thought to be due to severe sepsis with obstructive pyelonephritis   · Monitor renal function and urine output  · Avoid nephrotoxins and hypotension       Severe sepsis on admission  Assessment & Plan  · Presented to [address] on 5/10 with worsening left flank pain, tachypnea and tachycardia in setting of E  Coli pyelonephritis/bacteremia   · CT revealed hydronephrosis and obstructive pyelonephritis  · Patient transferred to [facility] for higher level of care  · He went to the OR on 5/10 with IR for a nephrostomy tube placement  · Resolved      Bacteremia due to Escherichia coli  Assessment & Plan  · Positive growth of E   Coli in one blood culture on 5/10 thought to be due to UTI/pyelonephritis   · Was on IV Rocephin; transitioned to PO Augmentin in acute care for a total of 14 days      Morbid obesity ([address] )  Assessment & Plan  · BMI of 49 99  · Lifestyle and diet modifications       * Obstructive pyelonephritis  Assessment & Plan  · CT on 5/10: "Mild left hydronephrosis status post ureteral stent placement, with 3 mm calculus in the mid ureter adjacent to the stent  Multiple bilateral renal calculi "  · Recent admission and underwent L-side ureteral stent on 4/21- this was found to be obstructive  S/p left PCN placement by IR on 5/10   · Pain control  · Continue Flomax, OP follow up with urology for stone treatment on 5/25  · Continue Augmentin Q12H [facility] for total of 14 days (Complete on 5/25/22)  · Planned procedure for tomorrow 5/25 to be pushed until after discharge, discussed with Sarabjit Michelle PA-C of Urology   · Please update Artis Leonela tomorrow the discharge date after team to reschedule procedure          Appreciate IM consultants medical co-management  Labs, medications, and imaging personally reviewed  ROS:  A ten point review of systems was completed and pertinent positives are listed in subjective section  All other systems reviewed were negative  Review of Systems   Constitutional: Negative  Negative for fatigue  HENT: Negative  Negative for trouble swallowing  Eyes: Negative  Negative for visual disturbance  Respiratory: Negative  Negative for shortness of breath  Cardiovascular: Negative  Negative for chest pain  Gastrointestinal: Negative  Negative for abdominal distention, constipation and diarrhea  Endocrine: Negative  Genitourinary: Negative  Negative for difficulty urinating  Musculoskeletal: Positive for myalgias  Skin: Positive for color change  Redness to left forearm noted yesterday with "lumps"- discomfort with the same   Allergic/Immunologic: Negative  Neurological: Negative  Hematological: Negative  Psychiatric/Behavioral: Negative           OBJECTIVE:   /70 (BP [facility]: Right arm)   Pulse 97   Temp 98 7 °F (37 1 °C) (Tympanic)   Resp 16   Ht 5' 6" (1 676 m)   Wt (!) 148 kg (325 lb 9 9 oz)   SpO2 99%   BMI 52 56 kg/m² Physical Exam  Vitals reviewed  Constitutional:       General: He is not in acute distress  Appearance: Normal appearance  He is obese  He is not ill-appearing  HENT:      Head: Normocephalic and atraumatic  Right Ear: External ear normal       Left Ear: External ear normal       Nose: Nose normal       Mouth/Throat:      Mouth: Mucous membranes are moist       Pharynx: Oropharynx is clear  Eyes:      Pupils: Pupils are equal, round, and reactive to light  Cardiovascular:      Rate and Rhythm: Normal rate and regular rhythm  Pulses: Normal pulses  Heart sounds: Normal heart sounds  No murmur heard  Pulmonary:      Effort: Pulmonary effort is normal  No respiratory distress  Breath sounds: Normal breath sounds  No wheezing  Abdominal:      General: Bowel sounds are normal  There is no distension  Palpations: Abdomen is soft  Tenderness: There is no abdominal tenderness  Musculoskeletal:         General: Normal range of motion  Cervical back: Normal range of motion  Skin:     General: Skin is warm  Findings: Erythema present  Comments: Mild erythema to left forearm with enlarged lymph nodes, slightly warmer to touch compared to right forearm   Neurological:      General: No focal deficit present  Mental Status: He is alert and oriented to person, place, and time     Psychiatric:         Mood and Affect: Mood normal           Lab Results   Component Value Date    WBC 7 00 05/20/2022    HGB 10 8 (L) 05/20/2022    HCT 35 5 (L) 05/20/2022    MCV 97 05/20/2022     05/20/2022     Lab Results   Component Value Date    SODIUM 132 (L) 05/21/2022    K 3 7 05/21/2022    CL 99 05/21/2022    CO2 21 05/21/2022    BUN 34 (H) 05/21/2022    CREATININE 1 84 (H) 05/21/2022    GLUC 113 05/21/2022    CALCIUM 8 8 05/21/2022     Lab Results   Component Value Date    INR 1 27 (H) 05/10/2022    INR 1 09 05/10/2022    INR 1 33 (H) 08/24/2019    PROTIME 15 4 (H) 05/10/2022    PROTIME 13 5 05/10/2022    PROTIME 16 6 (H) 08/24/2019           Current Facility-Administered Medications:     acetaminophen (TYLENOL) tablet 975 mg, 975 mg, Oral, Q8H [facility], Judi VERÓNICA Michaels, 975 mg at 05/24/22 0521    allopurinol (ZYLOPRIM) tablet 100 mg, 100 mg, Oral, Daily, Liborio Townsend PA-C, 100 mg at 05/24/22 0859    amLODIPine (NORVASC) tablet 10 mg, 10 mg, Oral, Daily, [facility], 10 mg at 05/24/22 0859    amoxicillin-clavulanate (AUGMENTIN) 875-125 mg per tablet 1 tablet, 1 tablet, Oral, Q12H [facility] VERÓNICA Townsend, 1 tablet at 05/24/22 0859    calcium carbonate (TUMS) chewable tablet 1,000 mg, 1,000 mg, Oral, BID PRN, Liborio Townsend PA-C    cholecalciferol (VITAMIN D3) tablet 400 Units, 400 Units, Oral, Daily, Liborio Townsend PA-C, 400 Units at 05/24/22 0859    Diclofenac Sodium (VOLTAREN) 1 % topical gel 2 g, 2 g, Topical, 4x Daily, Liborio Townsend PA-C, 2 g at 05/24/22 0902    heparin (porcine) subcutaneous injection 7,500 Units, 7,500 Units, Subcutaneous, Q8H [facility], Judi Michaels PA-C, 7,500 Units at 05/24/22 0521    lidocaine (LIDODERM) 5 % patch 1 patch, 1 patch, Topical, Daily, Liborio Townsend PA-C, 1 patch at 05/24/22 0858    metoprolol succinate (TOPROL-XL) 24 hr tablet 25 mg, 25 mg, Oral, Daily, JOON AlonzoNP, 25 mg at 05/24/22 0859    ondansetron (ZOFRAN-ODT) dispersible tablet 4 mg, 4 mg, Oral, Q6H PRN, Liborio Townsend PA-C    senna (SENOKOT) tablet 17 2 mg, 2 tablet, Oral, HS PRN, Liborio Townsend PA-C    tamsulosin (FLOMAX) capsule 0 4 mg, 0 4 mg, Oral, Daily With Dinner, Judi Michaels PA-C, 0 4 mg at 05/23/22 1716    traMADol (ULTRAM) tablet 50 mg, 50 mg, Oral, Q6H PRN, Liborio Townsend PA-C, 50 mg at 05/24/22 6039    Past Medical History:   Diagnosis Date    Arthritis     Chronic kidney disease     Gout     Hyperlipidemia     Hypertension     Kidney stone     Obstructive pyelonephritis     Sepsis ([facility] [address] [address] ) Patient Active Problem List    Diagnosis Date Noted    Anemia 05/13/2022    Obstructive pyelonephritis 04/23/2022    Acute unilateral obstructive uropathy 04/21/2022    Mixed hyperlipidemia 06/02/2021    Morbid obesity 06/02/2021    Recurrent left knee pain 06/02/2021    Insomnia 09/16/2019    Essential hypertension 08/27/2019    Severe sepsis on admission 08/23/2019    Chronic kidney disease, stage 3 ([facility] [address] ) 08/23/2019    Acute kidney injury superimposed on CKD stage 3 08/06/2019    Bacteremia due to Escherichia coli 08/04/2019    Lactic acidosis 08/04/2019    History of gout 07/15/2019    Vitamin D deficiency 06/10/2015    [facility] 03/23/2015          Margarita Woods PA-C    Total time spent:  30 minutes with more than 50% spent counseling/coordinating care  Counseling includes discussion with patient re: progress and discussion with patient of his/her current medical/functional state/information  Coordination of patient's care was performed in conjunction with consulting services  Time invested included review of patient's labs, vitals, and management of their comorbidities with continued monitoring  The care of the patient was extensively discussed and appropriate treatment plan was formulated unique for this patient  ** Please Note:  voice to text software may have been used in the creation of this document   Although proof errors in transcription or interpretation are a potential of such software**

## 2022-05-24 NOTE — TELEPHONE ENCOUNTER
Patient RS to 6/17 w Dr Dianne Herzog  I will call patient within 24 hrs to inform him of date change since he is admitted

## 2022-05-24 NOTE — PLAN OF CARE
Problem: PAIN - ADULT  Goal: Verbalizes/displays adequate comfort level or baseline comfort level  Description: Interventions:  - Encourage patient to monitor pain and request assistance  - Assess pain using appropriate pain scale  - Administer analgesics based on type and severity of pain and evaluate response  - Implement non-pharmacological measures as appropriate and evaluate response  - Consider cultural and social influences on pain and pain management  - Notify physician/advanced practitioner if interventions unsuccessful or patient reports new pain  Outcome: Progressing     Problem: SAFETY ADULT  Goal: Patient will remain free of falls  Description: INTERVENTIONS:  - Educate patient/family on patient safety including physical limitations  - Instruct patient to call for assistance with activity   - Consult OT/PT to assist with strengthening/mobility   - Keep Call bell within reach  - Keep bed low and locked with side rails adjusted as appropriate  - Keep care items and personal belongings within reach  - Initiate and maintain comfort rounds  - Make Fall Risk Sign visible to staff  - Offer Toileting every 2 Hours, in advance of need  - Initiate/Maintain bed/chair alarm  - Obtain necessary fall risk management equipment: yellow socks  - Apply yellow socks and bracelet for high fall risk patients  - Consider moving patient to room near nurses station  Outcome: Progressing

## 2022-05-24 NOTE — NURSING NOTE
Neph flushed w/ 10cc NSS, drainage sponge changed  Pt tolerated well  P/C R shoulder, R ankle, L knee pain, pt medicated and repositioned w/ slight relief  C/O RFA Lumps  PA assessed labs ordered and will reassess tomorrow  Pt transferrs and ambulates w/ Ax1 RW  Pt cont this shift  Pt currently sitting at bedside eating dinner  Call bell in reach  Will continue to monitor and follow plan of care

## 2022-05-24 NOTE — PROGRESS NOTES
05/24/22 0659   Pain Assessment   Pain Assessment Tool 0-10   Pain Score 8   Pain [facility]/Orientation Orientation: Left; [facility]: Knee   Pain Onset/Description Onset: Ongoing   Patient's Stated Pain Goal No pain   Hospital Pain Intervention(s) Repositioned; Ambulation/increased activity   Restrictions/Precautions   Precautions Bed/chair alarms;Pain; Fall Risk  (gout)   Weight Bearing Restrictions Yes   LLE Weight Bearing Per Order WBAT   Cognition   Overall Cognitive Status WFL   Arousal/Participation Alert; Cooperative   Attention Within functional limits   Orientation Level Oriented X4   Memory Within functional limits   Following Commands Follows all commands and directions without difficulty   Comments Pt  much more apt to trying therapist suggestions this session  Seems as though pt  is more trusting of therapist    Subjective   Subjective "I think I may be able to tolerate sitting in the chair until our next session the way I'm positioned right now "   Roll Left and Right   Type of Assistance Needed Independent   Physical Assistance Level No physical assistance   Comment BSR   Roll Left and Right CARE Score 6   Sit to Lying   Reason if not Attempted Activity not applicable   Sit to Lying CARE Score 9   Lying to Sitting on Side of Bed   Type of Assistance Needed Supervision   Physical Assistance Level No physical assistance   Comment HOB and BSR   Lying to Sitting on Side of Bed CARE Score 4   Sit to Stand   Type of Assistance Needed Physical assistance   Physical Assistance Level 26%-50%   Comment min Ax1 with bed only elevated as high as w/c seat height   Sit to Stand CARE Score 3   Bed-Chair Transfer   Type of Assistance Needed Supervision   Physical Assistance Level No physical assistance   Comment RW   Chair/Bed-to-Chair Transfer CARE Score 4   Transfer Bed/Chair/Wheelchair   Limitations Noted In Pain Management; Endurance;UE Strength;LE Strength;Balance   Adaptive Equipment Roller Walker   Stand Pivot Supervision   Sit to Stand Minimal Assist   Stand to Sit Contact Guard;Assist x 1  (VC's to slide LLE forward prior to sitting and to reach back for arms of chair to slowly lower)   Supine to Sit Supervision   Car Transfer   Reason if not Attempted Environmental limitations   Car Transfer CARE Score 10   Walk 10 Feet   Type of Assistance Needed Supervision   Physical Assistance Level No physical assistance   Comment Close S   Walk 10 Feet CARE Score 4   Walk 50 Feet with Two Turns   Reason if not Attempted Activity not applicable   Walk 50 Feet with Two Turns CARE Score 9   Walk 150 Feet   Reason if not Attempted Activity not applicable   Walk 363 Feet CARE Score 9   Walking 10 Feet on Uneven Surfaces   Reason if not Attempted Activity not applicable   Walking 10 Feet on Uneven Surfaces CARE Score 9   Ambulation   Does the patient walk? 2  Yes   Primary Mode of Locomotion Prior to Admission Walk   Distance Walked (feet) 10 ft   Assist Device Roller Walker   Gait Pattern Slow Julieta; Antalgic;Decreased foot clearance; Wide TEO; Forward Flexion;Decreased L stance; Improper weight shift   Limitations Noted In Balance; Endurance; Safety;Speed;Strength;Posture   Provided Assistance with: Direction   Walk Assist Level Supervision   Wheel 50 Feet with Two Turns   Reason if not Attempted Activity not applicable   Wheel 50 Feet with Two Turns CARE Score 9   Wheel 150 Feet   Reason if not Attempted Activity not applicable   Wheel 462 Feet CARE Score 9   Curb or Single Stair   Reason if not Attempted Safety concerns   1 Step (Curb) CARE Score 88   4 Steps   Reason if not Attempted Safety concerns   4 Steps CARE Score 88   12 Steps   Reason if not Attempted Safety concerns   12 Steps CARE Score 88   Picking Up Object   Reason if not Attempted Safety concerns   Picking Up Object CARE Score 88   Toilet Transfer   Reason if not Attempted Activity not applicable   Toilet Transfer CARE Score 9   Therapeutic Interventions   Strengthening supine ther ex   Flexibility MT (stretching of L calf and hamstring muscles)   Balance gait and transfer training   Other Comments   Comments Increased time required due to rest periods needed due to pain and fatigue in L knee and R shoulder  Assessment   Treatment Assessment Pt  tolerated treatment better this session with reports of less pain and increased trust in therapist this session  PT treatment included supine MT of calf and hamstring muscles of LLE to decrease tightness and improve ROM, supine ther ex BLE x 30 reps all directions, bed mobility tasks,  transfer training, pre gait activities including weight shifting from side to side and gait training, and education was provided for safety, direction, and proper technique to help improve functional mobility and activity tolerance  Pt  have made improvements in quality of mobility tasks, decreased pain, and amount of assistance required during ambulation  Continue current POC and progress as tolerated  Problem List Decreased range of motion;Decreased strength;Decreased endurance; Impaired balance;Decreased mobility;Pain;Orthopedic restrictions;Decreased skin integrity   PT Barriers   Physical Impairment Decreased range of motion;Decreased strength;Decreased endurance; Impaired balance;Decreased mobility;Pain;Orthopedic restrictions;Decreased skin integrity   Functional Limitation Walking;Transfers;Standing;Stair negotiation   Plan   Treatment/Interventions Functional transfer training;LE strengthening/ROM; Elevations; Therapeutic exercise; Endurance training;Equipment eval/education;Patient/family training;Bed mobility;Gait training   Progress Progressing toward goals   PT Therapy Minutes   PT Time In 0659   PT Time Out 0802   PT Total Time (minutes) 63   PT Mode of treatment - Individual (minutes) 63   PT Mode of treatment - Concurrent (minutes) 0   PT Mode of treatment - Group (minutes) 0   PT Mode of treatment - Co-treat (minutes) 0   PT Mode of Treatment - Total time(minutes) 63 minutes   PT Cumulative Minutes 333   Therapy Time missed   Time missed?  No

## 2022-05-24 NOTE — PROGRESS NOTES
05/24/22 1030   Pain Assessment   Pain Assessment Tool 0-10   Pain Score 10 - Worst Possible Pain   Pain [facility]/Orientation Orientation: Right;[facility]: Shoulder   Pain Onset/Description Onset: Ongoing;Frequency: Constant/Continuous; Descriptor: Aching   Patient's Stated Pain Goal No pain   Hospital Pain Intervention(s) Repositioned;Medication (See MAR)   Restrictions/Precautions   Precautions Bed/chair alarms; Fall Risk;Pain   Weight Bearing Restrictions Yes   LLE Weight Bearing Per Order WBAT   Lying to Sitting on Side of Bed   Type of Assistance Needed Supervision   Physical Assistance Level No physical assistance   Lying to Sitting on Side of Bed CARE Score 4   ROM- Right Upper Extremities   R Shoulder AAROM; Flexion; Extension;Horizontal ABduction  (hoirzontal add, pro/ret)   R Elbow AROM;Elbow flexion;Elbow extension  (sup/pro with 1# dumbell)   R Hand   (digit flex/ext with Red (3#) Digi-flex)   R Position Supine   R Weight/Reps/Sets no weight, 15-20 reps, 1 set   Left Upper Extremity-Strength   L Shoulder Flexion; Extension;Horizontal ABduction  (horizontal add, pro/ret)   L Elbow Elbow flexion;Elbow extension  (sup/pro, 3# dumbell)   L Hand   (digit flex/ext with Red (3#) Digi-flex)   L Position Supine   Equipment Dumbell   L Weights/Reps/Sets 2# wt, 15-20 reps, 2 sets   Cognition   Overall Cognitive Status [facility]   Arousal/Participation Alert; Cooperative   Attention Within functional limits   Orientation Level Oriented X4   Memory Within functional limits   Following Commands Follows all commands and directions without difficulty   Activity Tolerance   Activity Tolerance Patient limited by fatigue;Patient limited by pain   Assessment   Treatment Assessment Patient participated in Skilled OT session this date with interventions consisting of therapeutic exercise to: increase functional use of BUEs, increase BUE muscle strength     Patient agreeable to OT treatment session, upon arrival patient was found supine in bed  In comparison to previous session, patient with improvements in B UE TE  Patient requiring frequent rest periods  Patient continues to be functioning below baseline level, occupational performance remains limited secondary to factors listed above and increased risk for falls and injury  Patient to benefit from continued Occupational Therapy treatment to address deficits as defined above and maximize level of functional independence with ADLs and functional mobility  Prognosis Good   Problem List Decreased strength;Decreased range of motion;Decreased endurance; Impaired balance;Decreased mobility;Orthopedic restrictions;Pain   Plan   Treatment/Interventions ADL retraining;Functional transfer training; Therapeutic exercise; Endurance training;Bed mobility;Gait training; Compensatory technique education;OT   Progress Progressing toward goals   OT Therapy Minutes   OT Time In 1030   OT Time Out 1200   OT Total Time (minutes) 90   OT Mode of treatment - Individual (minutes) 90   OT Mode of treatment - Concurrent (minutes) 0   OT Mode of treatment - Group (minutes) 0   OT Mode of treatment - Co-treat (minutes) 0   OT Mode of Treatment - Total time(minutes) 90 minutes   OT Cumulative Minutes 315   Therapy Time missed   Time missed?  No     Landen Ricardo MS, OTR/L

## 2022-05-24 NOTE — NURSING NOTE
Patient resting comfortably in bed at this time  No signs of distress noted  Patient stood with one assist this morning for assist with bathing tasks  Patient was encouraged to reposition frequently overnight to promote skin integrity  Bed alarm in place to promote patient safety  Urostomy tube draining clear yellow urine  Call bell within reach  Will continue to monitor patient and follow plan of care

## 2022-05-24 NOTE — PCC OCCUPATIONAL THERAPY
05/25/2022: Pt new admit to ARU s/p sepsis and nephrology tube placement  Current LOF listed above  Barriers to d/c include decreased strength, decreased balance, decreased endurance and activity tolerance, limited home support, increased pain in L knee and R shoulders all which affect independence and  functional performance  Pt will participate in ADL training, therapeutic exercises, therapeutic activities, functional mobility/transfers, compensatory techniques and activity tolerance in order to progress towards goals  Pt would benefit from continued skilled OT services in order to address listed barriers and prepare for safe d/c     6/1/22: Pt's current LOF listed above  Continues with barriers to d/c of decreased strength, decreased balance, decreased endurance and activity tolerance, limited home support, increased pain in L knee and R shoulders all which affect independence in self care and functional performance  Pt participating in ADL training, therapeutic exercises, therapeutic activities, functional mobility/transfers, compensatory techniques and activity tolerance in order to progress towards goals  Pt would benefit from continued skilled OT services in order to address listed barriers and prepare for safe d/c

## 2022-05-24 NOTE — CASE MANAGEMENT
[facility] WILL change their schedule to accomodate our needs & will pick Pt up tomorrow at 7:30AM to take him to [address] same day surgery  Pt needs to be there for surgery by 9 AM  He is the second scheduled procedure of the day  It is estimated that he will be out of surgery at 12:10PM if all goes well & according to schedule  Recovery times vary, and Pt will need to urinate prior to being released from [facility]"; the estimated return time frame is 1-5 PM, per clinical coordinator at the same day surgery center, who can be reached at [phone]  Provided nurses station numbers for updates & the [place] [facility] will contact [facility] at [phone] when Pt is ready to return to [facility]

## 2022-05-24 NOTE — PROGRESS NOTES
05/24/22 1300   Pain Assessment   Pain Assessment Tool 0-10   Pain Score 9   Pain [facility]/Orientation Orientation: Right;[facility]: Shoulder   Pain Onset/Description Onset: Ongoing;Frequency: Constant/Continuous; Descriptor: Aching   Patient's Stated Pain Goal No pain   Hospital Pain Intervention(s) Repositioned;Medication (See MAR)   Restrictions/Precautions   Precautions Bed/chair alarms; Fall Risk;Pain   Weight Bearing Restrictions Yes   LLE Weight Bearing Per Order WBAT   Sit to Lying   Type of Assistance Needed Supervision   Physical Assistance Level No physical assistance   Sit to Lying CARE Score 4   Lying to Sitting on Side of Bed   Type of Assistance Needed Supervision   Physical Assistance Level No physical assistance   Lying to Sitting on Side of Bed CARE Score 4   Exercise Tools   Theraputty Green theraputty item retrival   Hand Gripper placed pegs into board and removed with hand gripper x2 (once with each hand)   Cognition   Overall Cognitive Status [facility]   Arousal/Participation Alert; Cooperative   Attention Within functional limits   Orientation Level Oriented X4   Memory Within functional limits   Following Commands Follows all commands and directions without difficulty   Activity Tolerance   Activity Tolerance Patient limited by fatigue;Patient limited by pain   Assessment   Treatment Assessment Patient participated in Skilled OT session this date with interventions consisting of  therapeutic activities to: increase activity tolerance   Patient agreeable to OT treatment session, upon arrival patient was found supine in bed  In comparison to previous session, patient with improvements in FM coordination  Patient continues to be functioning below baseline level, occupational performance remains limited secondary to factors listed above and increased risk for falls and injury   Patient to benefit from continued Occupational Therapy treatment to address deficits as defined above and maximize level of functional independence with ADLs and functional mobility  Spoke with PAIGOR about R shoulder pain, no change in plan directed  Prognosis Good   Problem List Decreased strength;Decreased range of motion;Decreased endurance; Impaired balance;Decreased mobility;Orthopedic restrictions;Pain   Plan   Treatment/Interventions ADL retraining;Functional transfer training; Therapeutic exercise; Endurance training;Bed mobility;Gait training; Compensatory technique education;OT   Progress Progressing toward goals   OT Therapy Minutes   OT Time In 1300   OT Time Out 1342   OT Total Time (minutes) 42   OT Mode of treatment - Individual (minutes) 42   OT Mode of treatment - Concurrent (minutes) 0   OT Mode of treatment - Group (minutes) 0   OT Mode of treatment - Co-treat (minutes) 0   OT Mode of Treatment - Total time(minutes) 42 minutes   OT Cumulative Minutes 357   Therapy Time missed   Time missed?  No     Landen Keane MS, OTR/L

## 2022-05-24 NOTE — PROGRESS NOTES
05/24/22 0859   Pain Assessment   Pain Assessment Tool 0-10   Pain Score 7   Pain [facility]/Orientation Orientation: Left; [facility]: Knee   Pain Onset/Description Onset: Ongoing   Patient's Stated Pain Goal No pain   Hospital Pain Intervention(s) Repositioned; Ambulation/increased activity   Restrictions/Precautions   Precautions Bed/chair alarms; Fall Risk;Pain  (gout pain)   Weight Bearing Restrictions Yes   LLE Weight Bearing Per Order WBAT   Cognition   Overall Cognitive Status WFL   Arousal/Participation Alert; Cooperative   Attention Within functional limits   Orientation Level Oriented X4   Memory Within functional limits   Following Commands Follows all commands and directions without difficulty   Subjective   Subjective "My butt is starting to hurt, it's time to get out of the chair "   Roll Left and Right   Type of Assistance Needed Independent   Physical Assistance Level No physical assistance   Roll Left and Right CARE Score 6   Sit to Lying   Type of Assistance Needed Supervision   Physical Assistance Level No physical assistance   Sit to Lying CARE Score 4   Lying to Sitting on Side of Bed   Reason if not Attempted Activity not applicable   Lying to Sitting on Side of Bed CARE Score 9   Sit to Stand   Type of Assistance Needed Supervision   Physical Assistance Level No physical assistance   Comment transfer out of w/c   Sit to Stand CARE Score 4   Bed-Chair Transfer   Type of Assistance Needed Supervision   Physical Assistance Level No physical assistance   Comment RW   Chair/Bed-to-Chair Transfer CARE Score 4   Transfer Bed/Chair/Wheelchair   Limitations Noted In Balance; Endurance;Pain Management;UE Strength;LE Strength   Adaptive Equipment Roller Walker   Stand Pivot Supervision   Sit to Stand Supervision   Stand to Sit Supervision   Sit to Supine Supervision   Car Transfer   Reason if not Attempted Environmental limitations   Car Transfer CARE Score 10   Walk 10 Feet   Type of Assistance Needed Supervision   Physical Assistance Level No physical assistance   Walk 10 Feet CARE Score 4   Walk 50 Feet with Two Turns   Type of Assistance Needed Supervision   Physical Assistance Level No physical assistance   Walk 50 Feet with Two Turns CARE Score 4   Walk 150 Feet   Reason if not Attempted Safety concerns   Walk 150 Feet CARE Score 88   Walking 10 Feet on Uneven Surfaces   Reason if not Attempted Safety concerns   Walking 10 Feet on Uneven Surfaces CARE Score 88   Ambulation   Does the patient walk? 2  Yes   Primary Mode of Locomotion Prior to Admission Walk   Distance Walked (feet) 74 ft   Assist Device Roller Walker   Gait Pattern Antalgic;Decreased foot clearance; Slow Julieta; Wide TEO; Forward Flexion;Decreased L stance; Improper weight shift   Limitations Noted In Balance;Posture; Safety;Speed;Strength   Provided Assistance with: Direction   Walk Assist Level Supervision   Wheel 50 Feet with Two Turns   Reason if not Attempted Activity not applicable   Wheel 50 Feet with Two Turns CARE Score 9   Wheel 150 Feet   Reason if not Attempted Activity not applicable   Wheel 267 Feet CARE Score 9   Curb or Single Stair   Reason if not Attempted Safety concerns   1 Step (Curb) CARE Score 88   4 Steps   Reason if not Attempted Safety concerns   4 Steps CARE Score 88   12 Steps   Reason if not Attempted Safety concerns   12 Steps CARE Score 88   Picking Up Object   Reason if not Attempted Safety concerns   Picking Up Object CARE Score 88   Toilet Transfer   Reason if not Attempted Activity not applicable   Toilet Transfer CARE Score 9   Therapeutic Interventions   Balance gait and transfer training   Assessment   Treatment Assessment Pt  performed transfer training out of w/c and sit to supine, bed mobility tasks, gait training, and education provided as needed for safety and direction to improve functional mobility and activity tolerance    Pt  has progressed as evidenced by increased ambulation distance and decreased assistance required for mobility tasks  Continue current POC and progress as tolerated  Pt  also reporting pain in R shoulder as 9/10 on pain scale upon conclusion of session and L hand cramping due to holding onto the RW tightly  Pt  encouraged to loosen  and drink more water  PT Barriers   Physical Impairment Decreased strength;Decreased range of motion; Impaired balance;Decreased endurance;Decreased mobility;Orthopedic restrictions;Pain   Functional Limitation Walking;Transfers;Standing;Stair negotiation   Plan   Treatment/Interventions Functional transfer training;LE strengthening/ROM; Elevations; Therapeutic exercise; Endurance training;Patient/family training;Equipment eval/education; Bed mobility;Gait training   Progress Progressing toward goals   PT Therapy Minutes   PT Time In 0859   PT Time Out 0930   PT Total Time (minutes) 31   PT Mode of treatment - Individual (minutes) 31   PT Mode of treatment - Concurrent (minutes) 0   PT Mode of treatment - Group (minutes) 0   PT Mode of treatment - Co-treat (minutes) 0   PT Mode of Treatment - Total time(minutes) 31 minutes   PT Cumulative Minutes 364   Therapy Time missed   Time missed?  No

## 2022-05-24 NOTE — TELEPHONE ENCOUNTER
Patient 50-year-old male status post stent for septic stone on 04/22/2022  He then re-presented to the hospital in early May due to sepsis again  CT scan revealed persistent hydronephrosis despite ureteral stent placement now status post PCN placement on the left  He is currently in rehab facility  Will not be discharged for at least an additional week  Please assist with rescheduling patient's appointment  He is currently scheduled for definitive stone treatment on 05/25 with Dr Javier Dunaway

## 2022-05-24 NOTE — CASE MANAGEMENT
Per VERÓNICA, she spoke with urology & this procedure can be deferred until DC  LUDIVINA contacted [employer] to cancel the transport for tomorrow

## 2022-05-25 LAB
ANION GAP SERPL CALCULATED.3IONS-SCNC: 11 MMOL/L (ref 4–13)
BASOPHILS # BLD MANUAL: 0.06 THOUSAND/UL (ref 0–0.1)
BASOPHILS NFR MAR MANUAL: 2 % (ref 0–1)
BUN SERPL-MCNC: 32 MG/DL (ref 5–25)
CALCIUM SERPL-MCNC: 9.7 MG/DL (ref 8.4–10.2)
CHLORIDE SERPL-SCNC: 103 MMOL/L (ref 96–108)
CO2 SERPL-SCNC: 20 MMOL/L (ref 21–32)
CREAT SERPL-MCNC: 1.74 MG/DL (ref 0.6–1.3)
EOSINOPHIL # BLD MANUAL: 0.19 THOUSAND/UL (ref 0–0.4)
EOSINOPHIL NFR BLD MANUAL: 6 % (ref 0–6)
ERYTHROCYTE [DISTWIDTH] IN BLOOD BY AUTOMATED COUNT: 14 % (ref 11.6–15.1)
GFR SERPL CREATININE-BSD FRML MDRD: 43 ML/MIN/1.73SQ M
GLUCOSE SERPL-MCNC: 104 MG/DL (ref 65–140)
HCT VFR BLD AUTO: 32.5 % (ref 36.5–49.3)
HGB BLD-MCNC: 9.9 G/DL (ref 12–17)
LYMPHOCYTES # BLD AUTO: 1.77 THOUSAND/UL (ref 0.6–4.47)
LYMPHOCYTES # BLD AUTO: 55 % (ref 14–44)
MCH RBC QN AUTO: 29.6 PG (ref 26.8–34.3)
MCHC RBC AUTO-ENTMCNC: 30.5 G/DL (ref 31.4–37.4)
MCV RBC AUTO: 97 FL (ref 82–98)
MONOCYTES # BLD AUTO: 1.06 THOUSAND/UL (ref 0–1.22)
MONOCYTES NFR BLD: 33 % (ref 4–12)
NEUTROPHILS # BLD MANUAL: 0.13 THOUSAND/UL (ref 1.85–7.62)
NEUTS BAND NFR BLD MANUAL: 1 % (ref 0–8)
NEUTS SEG NFR BLD AUTO: 3 % (ref 43–75)
NRBC BLD AUTO-RTO: 1 /100 WBC (ref 0–2)
PLATELET # BLD AUTO: 588 THOUSANDS/UL (ref 149–390)
PLATELET BLD QL SMEAR: ABNORMAL
PMV BLD AUTO: 10.1 FL (ref 8.9–12.7)
POTASSIUM SERPL-SCNC: 3.9 MMOL/L (ref 3.5–5.3)
RBC # BLD AUTO: 3.35 MILLION/UL (ref 3.88–5.62)
RBC MORPH BLD: NORMAL
SODIUM SERPL-SCNC: 134 MMOL/L (ref 135–147)
WBC # BLD AUTO: 3.22 THOUSAND/UL (ref 4.31–10.16)

## 2022-05-25 PROCEDURE — 80048 BASIC METABOLIC PNL TOTAL CA: CPT | Performed by: PHYSICAL MEDICINE & REHABILITATION

## 2022-05-25 PROCEDURE — 97112 NEUROMUSCULAR REEDUCATION: CPT

## 2022-05-25 PROCEDURE — 97530 THERAPEUTIC ACTIVITIES: CPT

## 2022-05-25 PROCEDURE — 97110 THERAPEUTIC EXERCISES: CPT

## 2022-05-25 PROCEDURE — 97036 APP MDLTY 1+HUBBRD TNK EA 15: CPT

## 2022-05-25 PROCEDURE — 97140 MANUAL THERAPY 1/> REGIONS: CPT

## 2022-05-25 PROCEDURE — 85007 BL SMEAR W/DIFF WBC COUNT: CPT | Performed by: PHYSICAL MEDICINE & REHABILITATION

## 2022-05-25 PROCEDURE — 85027 COMPLETE CBC AUTOMATED: CPT | Performed by: PHYSICAL MEDICINE & REHABILITATION

## 2022-05-25 PROCEDURE — 97535 SELF CARE MNGMENT TRAINING: CPT

## 2022-05-25 PROCEDURE — 99233 SBSQ HOSP IP/OBS HIGH 50: CPT | Performed by: FAMILY MEDICINE

## 2022-05-25 RX ORDER — TRAMADOL HYDROCHLORIDE 50 MG/1
50 TABLET ORAL EVERY 6 HOURS SCHEDULED
Status: DISCONTINUED | OUTPATIENT
Start: 2022-05-25 | End: 2022-06-07 | Stop reason: HOSPADM

## 2022-05-25 RX ADMIN — DICLOFENAC SODIUM 2 G: 10 GEL TOPICAL at 21:19

## 2022-05-25 RX ADMIN — CHOLECALCIFEROL TAB 10 MCG (400 UNIT) 400 UNITS: 10 TAB at 09:05

## 2022-05-25 RX ADMIN — ACETAMINOPHEN 975 MG: 325 TABLET ORAL at 21:15

## 2022-05-25 RX ADMIN — ACETAMINOPHEN 975 MG: 325 TABLET ORAL at 05:56

## 2022-05-25 RX ADMIN — METOPROLOL SUCCINATE 25 MG: 25 TABLET, FILM COATED, EXTENDED RELEASE ORAL at 09:05

## 2022-05-25 RX ADMIN — TRAMADOL HYDROCHLORIDE 50 MG: 50 TABLET, COATED ORAL at 09:04

## 2022-05-25 RX ADMIN — AMLODIPINE BESYLATE 10 MG: 10 TABLET ORAL at 09:05

## 2022-05-25 RX ADMIN — TAMSULOSIN HYDROCHLORIDE 0.4 MG: 0.4 CAPSULE ORAL at 17:34

## 2022-05-25 RX ADMIN — ALLOPURINOL 100 MG: 100 TABLET ORAL at 09:04

## 2022-05-25 RX ADMIN — ACETAMINOPHEN 975 MG: 325 TABLET ORAL at 14:05

## 2022-05-25 RX ADMIN — DICLOFENAC SODIUM 2 G: 10 GEL TOPICAL at 17:34

## 2022-05-25 RX ADMIN — HEPARIN SODIUM 7500 UNITS: 5000 INJECTION INTRAVENOUS; SUBCUTANEOUS at 21:16

## 2022-05-25 RX ADMIN — LIDOCAINE 1 PATCH: 50 PATCH TOPICAL at 09:06

## 2022-05-25 RX ADMIN — DICLOFENAC SODIUM 2 G: 10 GEL TOPICAL at 09:04

## 2022-05-25 RX ADMIN — TRAMADOL HYDROCHLORIDE 50 MG: 50 TABLET, COATED ORAL at 17:34

## 2022-05-25 RX ADMIN — TRAMADOL HYDROCHLORIDE 50 MG: 50 TABLET, COATED ORAL at 23:00

## 2022-05-25 RX ADMIN — HEPARIN SODIUM 7500 UNITS: 5000 INJECTION INTRAVENOUS; SUBCUTANEOUS at 05:56

## 2022-05-25 RX ADMIN — HEPARIN SODIUM 7500 UNITS: 5000 INJECTION INTRAVENOUS; SUBCUTANEOUS at 14:05

## 2022-05-25 RX ADMIN — DICLOFENAC SODIUM 2 G: 10 GEL TOPICAL at 12:15

## 2022-05-25 NOTE — PROGRESS NOTES
05/25/22 0859   Pain Assessment   Pain Assessment Tool 0-10   Pain Score 10 - Worst Possible Pain   Pain [facility]/Orientation Orientation: Left; [facility]: Knee;Orientation: Right;[facility]: Shoulder   Pain Onset/Description Onset: Ongoing   Patient's Stated Pain Goal No pain   Hospital Pain Intervention(s) Repositioned; Ambulation/increased activity  (RN made aware of intense pain and meds provided)   Restrictions/Precautions   Precautions Bed/chair alarms; Fall Risk;Pain   Weight Bearing Restrictions Yes   LLE Weight Bearing Per Order WBAT   Cognition   Overall Cognitive Status WFL   Arousal/Participation Alert; Cooperative   Attention Within functional limits   Orientation Level Oriented X4   Memory Within functional limits   Following Commands Follows all commands and directions without difficulty   Subjective   Subjective "I just can't take sitting in this chair anymore, I don't know what to do, I can't find a way to let it relax "   Roll Left and Right   Type of Assistance Needed Independent   Physical Assistance Level No physical assistance   Comment mod I BSR's   Roll Left and Right CARE Score 6   Sit to Lying   Type of Assistance Needed Physical assistance   Physical Assistance Level 26%-50%   Comment min A for LE management due to pain   Sit to Lying CARE Score 3   Lying to Sitting on Side of Bed   Reason if not Attempted Activity not applicable   Lying to Sitting on Side of Bed CARE Score 9   Sit to Stand   Type of Assistance Needed Incidental touching   Physical Assistance Level 25% or less   Comment CG  (from w/c)   Sit to Stand CARE Score 3   Bed-Chair Transfer   Type of Assistance Needed Incidental touching   Physical Assistance Level 25% or less   Comment CGA with RW   Chair/Bed-to-Chair Transfer CARE Score 3   Transfer Bed/Chair/Wheelchair   Limitations Noted In Balance;Confidence;Pain Management;UE Strength;LE Strength; Endurance   Adaptive Equipment Roller Walker   Stand Pivot Contact Guard   Sit to Stand Contact Guard   Stand to [facility]   Sit to Supine Minimal Assist;Assist x 1  (LE management)   Findings increased pain requiring increased assistance   Car Transfer   Reason if not Attempted Environmental limitations   Car Transfer CARE Score 10   Walk 10 Feet   Reason if not Attempted Safety concerns   Walk 10 Feet CARE Score 88   Walk 50 Feet with Two Turns   Reason if not Attempted Safety concerns   Walk 50 Feet with Two Turns CARE Score 88   Walk 150 Feet   Reason if not Attempted Safety concerns   Walk 150 Feet CARE Score 88   Walking 10 Feet on Uneven Surfaces   Reason if not Attempted Safety concerns   Walking 10 Feet on Uneven Surfaces CARE Score 88   Ambulation   Findings unable to perform due to pain   Wheel 50 Feet with Two Turns   Reason if not Attempted Activity not applicable   Wheel 50 Feet with Two Turns CARE Score 9   Wheel 150 Feet   Reason if not Attempted Activity not applicable   Wheel 367 Feet CARE Score 9   Curb or Single Stair   Reason if not Attempted Safety concerns   1 Step (Curb) CARE Score 88   4 Steps   Reason if not Attempted Safety concerns   4 Steps CARE Score 88   12 Steps   Reason if not Attempted Safety concerns   12 Steps CARE Score 88   Picking Up Object   Reason if not Attempted Safety concerns   Picking Up Object CARE Score 88   Toilet Transfer   Reason if not Attempted Activity not applicable   Toilet Transfer CARE Score 9   Therapeutic Interventions   Balance transfer training   Other bed mobility tasks   Other Comments   Comments Pt  reporting increased pain during PT treatments today  Pt  unable to tolerated bending L knee  Pt s face turning red, holding breath, and yelling out, stating"I just can't find a way to let it relax "  Pt  tolerated 50 mins sitting OOB in w/c and then could not handle the pain any longer  RN made aware and pain and RN stated that pt  only received 1 pain med yesterday   Pt  questioned why that was and RN reported that pain med is ordered PRN  Pt  was unaware that he needed to ask for the pain med if needed  Pt  very upset  Assessment   Treatment Assessment PT treatment limited due to severity of pain this session  Treatment included transfer training,bed mobility tasks, and education as needed for safety and direction  Pt  was positioned for comfort to the best of ability and left with all needs in reach  Will attempt to ambulate later this afternoon once pain meds take affect if patient can tolerate  COntinue current POC and progress as able  Problem List Decreased strength;Decreased range of motion;Decreased endurance; Impaired balance;Decreased mobility;Orthopedic restrictions;Pain   Plan   Treatment/Interventions Functional transfer training;LE strengthening/ROM; Elevations; Therapeutic exercise; Endurance training;Patient/family training;Equipment eval/education; Bed mobility;Gait training;Spoke to nursing   PT Therapy Minutes   PT Time In 0859   PT Time Out 0930   PT Total Time (minutes) 31   PT Mode of treatment - Individual (minutes) 31   PT Mode of treatment - Concurrent (minutes) 0   PT Mode of treatment - Group (minutes) 0   PT Mode of treatment - Co-treat (minutes) 0   PT Mode of Treatment - Total time(minutes) 31 minutes   PT Cumulative Minutes 464   Therapy Time missed   Time missed?  No

## 2022-05-25 NOTE — PLAN OF CARE
Problem: SAFETY ADULT  Goal: Patient will remain free of falls  Description: INTERVENTIONS:  - Educate patient/family on patient safety including physical limitations  - Instruct patient to call for assistance with activity   - Consult OT/PT to assist with strengthening/mobility   - Keep Call bell within reach  - Keep bed low and locked with side rails adjusted as appropriate  - Keep care items and personal belongings within reach  - Initiate and maintain comfort rounds  - Make Fall Risk Sign visible to staff  - Offer Toileting every 2 Hours, in advance of need  - Initiate/Maintain bed/chair alarm  - Obtain necessary fall risk management equipment: bed/chair alarm  - Apply yellow socks and bracelet for high fall risk patients  - Consider moving patient to room near nurses station  Outcome: Progressing  Goal: Maintain or return to baseline ADL function  Description: INTERVENTIONS:  -  Assess patient's ability to carry out ADLs; assess patient's baseline for ADL function and identify physical deficits which impact ability to perform ADLs (bathing, care of mouth/teeth, toileting, grooming, dressing, etc )  - Assess/evaluate cause of self-care deficits   - Assess range of motion  - Assess patient's mobility; develop plan if impaired  - Assess patient's need for assistive devices and provide as appropriate  - Encourage maximum independence but intervene and supervise when necessary  - Involve family in performance of ADLs  - Assess for home care needs following discharge   - Consider OT consult to assist with ADL evaluation and planning for discharge  - Provide patient education as appropriate  Outcome: Progressing

## 2022-05-25 NOTE — PLAN OF CARE
Problem: PAIN - ADULT  Goal: Verbalizes/displays adequate comfort level or baseline comfort level  Description: Interventions:  - Encourage patient to monitor pain and request assistance  - Assess pain using appropriate pain scale  - Administer analgesics based on type and severity of pain and evaluate response  - Implement non-pharmacological measures as appropriate and evaluate response  - Consider cultural and social influences on pain and pain management  - Notify physician/advanced practitioner if interventions unsuccessful or patient reports new pain  Outcome: Progressing     Problem: INFECTION - ADULT  Goal: Absence or prevention of progression during hospitalization  Description: INTERVENTIONS:  - Assess and monitor for signs and symptoms of infection  - Monitor lab/diagnostic results  - Monitor all insertion sites, i e  indwelling lines, tubes, and drains  - Monitor endotracheal if appropriate and nasal secretions for changes in amount and color  - [place] appropriate cooling/warming therapies per order  - Administer medications as ordered  - Instruct and encourage patient and family to use good hand hygiene technique  - Identify and instruct in appropriate isolation precautions for identified infection/condition  Outcome: Progressing  Goal: Absence of fever/infection during neutropenic period  Description: INTERVENTIONS:  - Monitor WBC    Outcome: Progressing     Problem: DISCHARGE PLANNING  Goal: Discharge to home or other facility with appropriate resources  Description: INTERVENTIONS:  - Identify barriers to discharge w/patient and caregiver  - Arrange for needed discharge resources and transportation as appropriate  - Identify discharge learning needs (meds, wound care, etc )  - Arrange for interpretive services to assist at discharge as needed  - Refer to Case Management Department for coordinating discharge planning if the patient needs post-hospital services based on physician/advanced practitioner order or complex needs related to functional status, cognitive ability, or social support system  Outcome: Progressing

## 2022-05-25 NOTE — NURSING NOTE
Patient resting comfortably in bed at this time   No signs of distress noted   K-pad to the left forearm as needed overnight  Left forearm area remains reddened   Patient was encouraged to reposition frequently overnight to promote skin integrity   Bed alarm in place to promote patient safety   Nephrostomy tube draining clear yellow urine   Call bell within reach   Will continue to monitor patient and follow plan of care

## 2022-05-25 NOTE — TEAM CONFERENCE
Acute RehabilitationTeam Conference Note  Date: 5/25/2022   Time: 11:43 AM       Patient Name:  Lanie Cardenas       Medical Record Number: [id number]   YOB: 1965  Sex: Male          Room/Bed:  /[facility] 220-01  Payor Info:  Payor: Becky Campbell / Plan: Joshua Reyna / Product Type: Blue Fee for Service /      Admitting Diagnosis: Sepsis ([facility] ) [A41 9]   Admit Date/Time:  5/19/2022  1:48 PM  Admission Comments: No comment available     Primary Diagnosis:  Obstructive pyelonephritis  Principal Problem: Obstructive pyelonephritis    Patient Active Problem List    Diagnosis Date Noted    Anemia 05/13/2022    Obstructive pyelonephritis 04/23/2022    Acute unilateral obstructive uropathy 04/21/2022    Mixed hyperlipidemia 06/02/2021    Morbid obesity 06/02/2021    Recurrent left knee pain 06/02/2021    Insomnia 09/16/2019    Essential hypertension 08/27/2019    Severe sepsis on admission 08/23/2019    Chronic kidney disease, stage 3 ([facility] [address] ) 08/23/2019    Acute kidney injury superimposed on CKD stage 3 08/06/2019    Bacteremia due to Escherichia coli 08/04/2019    Lactic acidosis 08/04/2019    History of gout 07/15/2019    Vitamin D deficiency 06/10/2015    Morbid obesity ([facility] ) 03/23/2015       Physical Therapy:    Weight Bearing Status: Weight Bearing as Tolerated  Transfers: Supervision, Minimal Assistance  Bed Mobility: Supervision  Amulation Distance (ft): 74 feet  Ambulation: Supervision  Assistive Device for Ambulation: Roller Walker  Wheelchair Mobility Distance: 145 ft  Wheelchair Mobility: Supervision  Number of Stairs:  (not tested)  Discharge Recommendations: Home with:  [address] [facility] with[de-identified] Family Support, First Floor Setup, Home Physical Therapy, Outpatient Physical Therapy    05/25/2022:   Patient participating in therapy and making slow, yet positive gains      Patient S bed mobility, S/MIN A all transfers with walker, S wheelchair mobility up to 145' level surfaces, S ambulation up to 76' level  surfaces with walker  Steps not assessed yet  Patient remains limited by decreased ROM/strength, decreased balance and safety, decreased endurance, and pain  Patient would benefit from continued inpatient ARC PT to increase function, safety, and independence in prep for safe d/c to home  Occupational Therapy:  Eating: Independent  Grooming: Supervision  Bathing: Moderate Assistance, Maximum Assistance  Bathing: Moderate Assistance, Maximum Assistance  Upper Body Dressing: Minimal Assistance  Lower Body Dressing: Maximum Assistance  Toileting: Maximum Assistance (supervision for urinal use)  Tub/Shower Transfer:  (TBA)  Toilet Transfer: Supervision  Cognition: Within Defined Limits  Orientation: Person, Place, Time, Situation  Discharge Recommendations: Home with:  [address] with[de-identified] Home Occupational Therapy       05/25/2022: Pt new admit to ARU s/p sepsis and nephrology tube placement  Current LOF listed above  Barriers to d/c include decreased strength, decreased balance, decreased endurance and activity tolerance, limited home support, increased pain in L knee and R shoulders all which affect independence and  functional performance  Pt will participate in ADL training, therapeutic exercises, therapeutic activities, functional mobility/transfers, compensatory techniques and activity tolerance in order to progress towards goals  Pt would benefit from continued skilled OT services in order to address listed barriers and prepare for safe d/c         Speech Therapy:           No notes on file    Nursing Notes:  Appetite: Good  Diet Type: Regular/House                      Diet Patient/Family Education Complete:  Yes                         Type of Wound Patient/Family Education: No  Bladder: 1 - Total Assistance (emptying urinal for patient but total assist for emptying nephro tube)     Bladder Patient/Family Education: Yes  Bowel: 3 - Moderate Assistance (needs help wiping)     Bowel Patient/Family Education: Yes  Pain [facility]/Orientation: Orientation: Right, [facility]: Shoulder, Orientation: Left, [facility]: Knee  Pain Score: 10                       Hospital Pain Intervention(s): Medication (See MAR), Repositioned  Pain Patient/Family Education: Yes  Medication Management/Safety  Injectable: Lovenox  Safe Administration: Yes  Medication Patient/Family Education Complete: Yes    5/23/22 Patient is a recent admission for ARC following a hospitalization for obstructive pyelonephritis  Patient remains alert and oriented bed alarm in place to promote patient safety  Patient with nephrostomy tube to the left flank dressing changed daily and tube flushed with 10cc of NSS daily to maintain patency  Tube draining clear yellow urine without difficulty  Patient also voids small amounts in urinal   Scattered ecchymotic areas on the abdomen noted  Patient was started on allopurinol for a gout flare up  Pain managed with scheduled tylenol and as needed ultram         Case Management:     Discharge Planning  Living Arrangements: Lives Alone  Support Systems: Self  Assistance Needed: na  Type of Current Residence: Private residence  Current Home Care Services: No  Initial assessment & orientation to [facility] with Pt, who expressed understanding & agreement  Pt declined for this worker to contact his parents, stating that he prefers to do so himself  Pt lives alone in a multi-story home, FF set up  There are 6 steps (4+2) to enter, HR's on the first four steps  He has a SPC & RW  He reported that he has been completely independent, driving & working FT  He expressed that he is hopeful to return to employment but is considering applying for SSDI  Discussed role of team members & reviewed [address] with Pt, who expressed understanding & agreement  SW will continue to monitor & assist as needed with [address]  Is the patient actively participating in therapies?  yes  List any modifications to the treatment plan: na    Barriers Interventions   Chronic kidney disease, HTN  Medical management and oversight, nephrostomy tube   pain Medication management   Decreased ROM, decreased right UE strength Therapeutic exercise, therapeutic activity   Decreased balance ADL, transfer, gait training   Decreased safety Cues, ADL, transfer, gait training     Is the patient making expected progress toward goals?  yes  List any update or changes to goals: na    Medical Goals: Patient will be able to manage medical conditions and comorbid conditions with medications and follow up upon completion of rehab program    Weekly Team Goals:   Rehab Team Goals  ADL Team Goal: Patient will be independent with ADLs with least restrictive device upon completion of rehab program  Transfer Team Goal: Patient will be independent with transfers with least restrictive device upon completion of rehab program  Locomotion Team Goal: Patient will be independent with locomotion with least restrictive device upon completion of rehab program     Mod I bed mobility, transfers, mobility  Mod I self care    Health and wellness: to be able to return to work    Discussion: Plan for return home alone with [facility] for PT, OT, nursing, and aides    Anticipated Discharge Date:  June 1, 2022

## 2022-05-25 NOTE — NURSING NOTE
Dr John Raza made aware of patients WBC count down to 3 22  Patient usually with normal WBC  Patient with no complaints  Will monitor for any changes in condition

## 2022-05-25 NOTE — PCC PHYSICAL THERAPY
05/25/2022:   Patient participating in therapy and making slow, yet positive gains  Patient S bed mobility, S/MIN A all transfers with walker, S wheelchair mobility up to 145' level surfaces, S ambulation up to 74' level  surfaces with walker  Steps not assessed yet  Patient remains limited by decreased ROM/strength, decreased balance and safety, decreased endurance, and pain  Patient would benefit from continued inpatient ARC PT to increase function, safety, and independence in prep for safe d/c to home  05/31/2022:   Patient participating in therapy and making slow, yet positive gains  Patient independent bed mobility, supervision to CGA all transfers with walker, S wheelchair mobility up to 145' level surfaces max distance, S ambulation up to 121' level and unlevel surfaces with walker, CG negotiation of 3 steps with 2 handrails  Patient remains limited by decreased ROM/strength, decreased balance and safety, decreased endurance, and pain  Patient would benefit from continued inpatient ARC PT to increase function, safety, and independence in prep for safe d/c to home

## 2022-05-25 NOTE — PROGRESS NOTES
05/25/22 1225   Pain Assessment   Pain Assessment Tool 0-10   Pain Score 9   Pain [facility]/Orientation Orientation: Right;[facility]: Shoulder;Orientation: Left; [facility]: Leg   Pain Onset/Description Onset: Ongoing   Patient's Stated Pain Goal No pain   Restrictions/Precautions   Precautions Bed/chair alarms; Fall Risk;Pain   Weight Bearing Restrictions Yes   LLE Weight Bearing Per Order WBAT   Grooming   Able To Initiate Tasks;Comb/Brush Hair;Wash/Dry Face   Limitation Noted In Safety;Strength;Timeliness   Shower/Bathe Self   Type of Assistance Needed Physical assistance   Physical Assistance Level 51%-75%   Comment UB- SBA, LB- Max A   Shower/Bathe Self CARE Score 2   Bathing   Assessed Bath Style Shower   Anticipated D/C Bath Style Sponge Bath   Able to [employer] No   Able to [employer] Temperature Yes   Able to Wash/Rinse/Dry (body part) Left Arm;Right Arm;L Upper Leg;R Upper Leg;Chest;Abdomen   Limitations Noted in Balance; Coordination; Endurance;Problem Solving; Safety;Strength;Timeliness   Positioning Seated;Standing   Adaptive Equipment Tub Bench; Shower [employer]   Limitations Noted In Balance; Endurance;Problem Solving; Safety;UE Strength   Adaptive Equipment Grab Bars;Transfer Bench   Upper Body Dressing   Type of Assistance Needed Incidental touching   Upper Body Dressing CARE Score 4   Lower Body Dressing   Type of Assistance Needed Physical assistance   Physical Assistance Level 76% or more   Lower Body Dressing CARE Score 2   Putting On/Taking Off Footwear   Type of Assistance Needed Physical assistance   Physical Assistance Level Total assistance   Putting On/Taking Off Footwear CARE Score 1   Dressing/Undressing Clothing   Remove UB Clothes Pullover Shirt   Don UB Clothes Pullover Shirt   Remove LB Clothes Shorts;Socks   Don LB Clothes Shorts;Socks   Limitations Noted In Balance; Coordination; Endurance;Problem Solving; Safety; Sequencing;Strength;ROM   Positioning Supported Sit;Standing   Sit to Lying   Type of Assistance Needed Supervision   Physical Assistance Level No physical assistance   Sit to Lying CARE Score 4   Lying to Sitting on Side of Bed   Type of Assistance Needed Supervision   Physical Assistance Level No physical assistance   Lying to Sitting on Side of Bed CARE Score 4   Sit to Stand   Type of Assistance Needed Incidental touching   Sit to Stand CARE Score 4   Bed-Chair Transfer   Type of Assistance Needed Incidental touching   Chair/Bed-to-Chair Transfer CARE Score 4   Transfer Bed/Chair/Wheelchair   Limitations Noted In Balance; Coordination; Endurance;Pain Management; Sequencing;UE Strength   Adaptive Equipment Roller Walker   ROM- Right Upper Extremities   R Shoulder AAROM; Horizontal ABduction  (horizontal add)   Right Upper Extremity- Strength   R Elbow Elbow flexion;Elbow extension  (sup/pro)   R Position Supine   Equipment Dumbbell   R Weight/Reps/Sets 1# wt, 15 reps, 2 sets   Left Upper Extremity-Strength   L Shoulder Horizontal ABduction  (horizontal add)   L Elbow Elbow flexion;Elbow extension  (sup/pro)   L Position Supine   Equipment Dumbell   L Weights/Reps/Sets 3# wt, 15 reps, 2 sets   Cognition   Overall Cognitive Status [facility]   Arousal/Participation Alert; Cooperative   Attention Within functional limits   Orientation Level Oriented X4   Memory Within functional limits   Following Commands Follows all commands and directions without difficulty   Additional Activities   Additional Activities Comments Pt completed functional mobility from w/c tp bed ~20ft with RW and CGA   Activity Tolerance   Activity Tolerance Patient limited by fatigue;Patient limited by pain   Assessment   Treatment Assessment Patient participated in Skilled OT session this date with interventions consisting of ADL re training with the use of correct body mechnaics, therapeutic exercise to: increase functional use of BUEs, increase BUE muscle strength  and  therapeutic activities to: increase activity tolerance   Patient agreeable to OT treatment session, upon arrival patient was found supine in bed  In comparison to previous session, patient with improvements in ADLs  Patient requiring frequent rest periods  Patient continues to be functioning below baseline level, occupational performance remains limited secondary to factors listed above and increased risk for falls and injury  Patient to benefit from continued Occupational Therapy treatment to address deficits as defined above and maximize level of functional independence with ADLs and functional mobility  Prognosis Good   Problem List Decreased strength;Decreased range of motion;Decreased endurance; Impaired balance;Decreased mobility; Decreased coordination;Decreased safety awareness; Obesity;Pain   Plan   Treatment/Interventions ADL retraining;Functional transfer training; Therapeutic exercise; Endurance training;Bed mobility; Compensatory technique education;OT   Progress Progressing toward goals   OT Therapy Minutes   OT Time In 1225   OT Time Out 1423   OT Total Time (minutes) 118   OT Mode of treatment - Individual (minutes) 118   OT Mode of treatment - Concurrent (minutes) 0   OT Mode of treatment - Group (minutes) 0   OT Mode of treatment - Co-treat (minutes) 0   OT Mode of Treatment - Total time(minutes) 118 minutes   OT Cumulative Minutes 475   Therapy Time missed   Time missed?  No     Landen Oviedo MS, OTR/L

## 2022-05-25 NOTE — PROGRESS NOTES
PM&R PROGRESS NOTE:  Remington Estes 55 y o  male MRN: [id number]  Unit/Bed#: -01 Encounter: [id number]        Rehabilitation Diagnosis: Impairment of mobility, safety and Activities of Daily Living (ADLs) due to Debility:  16  Debility (Non-cardiac/Non-pulmonary)    HPI: Remington Estes is a 55 y o  male with a PMH significant for CKD, HTN, HLD, obstructive pyelonephritis with coli bacteremia, who presented to the [place] [address] ED on 05/10  He had a recent discharge on 4/30/22 s/p cystoscopy and stent placement on 4/22/22   After his discharge, patient experienced intermittent waves of left flank pain described as sharp, non-radiating, lightheadedness, nausea  He also admitted to worsening shortness of breath  He arrived to ED with tachypnea and tachycardia  CT scan obtained revealing hydronephrosis and obstructive pyelonephritis  Urology was consulted and recommended acute transfer [facility]  Patient diagnosed with sepsis and initiated on IV antibiotics  IR was consulted for concerns with potential obstruction of stent placement and noted to be in ADRIANNE  Patient went to the OR with IR for obstructive pyelonephritis and is s/p IR Nephrostomy tube placement on 5/10  He was intubated for the procedure and extubated on 5/11  Ortho was consulted on 5/13 for left knee pain and a left knee aspiration was performed  Patient is to continue WBAT to LLE  The aspiration was unsuccessful with ortho recommending adequate pain control and potential steroid injection in the future  Sepsis resolved and patient was transferred from ICU to med surg  On 5/16 patient was transitioned from IV antibiotics to oral to complete a total of 14 days  Patient participated with PT/OT and deemed appropriate for post acute rehabilitation services  He was accepted to [facility] and once hemodynamically stable, arrived on 5/19/22       SUBJECTIVE: Patient seen face to face    Complaining of leg pain and he would like his tramadol scheduled rather than p r n  ASSESSMENT: Stable, progressing  Planned discharge is for June 1st I sent a tiger text to Emeli PEDRAZA from the Urology Department to let her know about his discharge so they could schedule the outpatient lithotripsy and nephrostomy tube removal    PLAN:    Rehabilitation  · Functional deficits:  impaired mobility, self care, weakness to the LLE due to pain to left knee and left foot, Pain also to right shoulder    Continue current rehabilitation plan of care to maximize function   Functional update:   Weight Bearing Status: Weight Bearing as Tolerated  Transfers: Supervision, Minimal Assistance  Bed Mobility: Supervision  Amulation Distance (ft): 74 feet  Ambulation: Supervision  Assistive Device for Ambulation: Roller Walker  Wheelchair Mobility Distance: 145 ft  Wheelchair Mobility: Supervision  Number of Stairs:  (not tested)  Discharge Recommendations: Home with:  [address] with[de-identified] Family Support, First Floor Setup, Home Physical Therapy, Outpatient Physical Therapy  Occupational Therapy:  Eating: Independent  Grooming: Supervision  Bathing: Moderate Assistance, Maximum Assistance  Bathing:  Moderate Assistance, Maximum Assistance  Upper Body Dressing: Minimal Assistance  Lower Body Dressing: Maximum Assistance  Toileting: Maximum Assistance (supervision for urinal use)  Tub/Shower Transfer:  (TBA)  Toilet Transfer: Supervision  Cognition: Within Defined Limits  Orientation: Person, Place, Time, Situation  Discharge Recommendations: Home with:  [address] with[de-identified] Home Occupational Therapy       Estimated Discharge: 6/1/2022      Pain   Tylenol 975 mg Q8H [address]   Tramadol 50 mg Q6H PRN for mod-severe pain    DVT prophylaxis   Heparin 7500 units Q8H [address]    Bladder plan   Continent    Bowel plan   Continent      Anemia  Assessment & Plan  · Iron panel obtained with elevated ferritin stores   · Monitor H/H- Repeat for tomorrow    Recurrent left knee pain  Assessment & Plan  · Ongoing for several weeks causing difficulty with bearing weight due to pain  · XR knee without acute osseous abnormality, did reveal tricompartmental osteoarthritis similar to imaging done the year prior  · Ortho consulted and attempted an unsuccessful fluid aspiration   · Continue with adequate pain management with OP Ortho f/u for potential steroid injection once acute sepsis/infection resolves   · Diclofenac gel to the knee 4 times daily  · Left foot xray: There is no acute fracture or dislocation  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  Small plantar and large retrocalcaneal spurs  No lytic or blastic osseous lesion  Soft tissues are unremarkable  Essential hypertension  Assessment & Plan  · Low-sodium diet  · Continue Norvasc 10 mg and Metoprolol 25 mg daily      Chronic kidney disease, stage 3 [facility])  Assessment & Plan  Lab Results   Component Value Date    EGFR 40 05/21/2022    EGFR 36 05/20/2022    EGFR 39 05/19/2022    CREATININE 1 84 (H) 05/21/2022    CREATININE 1 98 (H) 05/20/2022    CREATININE 1 87 (H) 05/19/2022   · Baseline creatinine approximately 1 4-1 6  · Did peak to 1 92 in acute care thought to be due to severe sepsis with obstructive pyelonephritis   · Monitor renal function and urine output  · Avoid nephrotoxins and hypotension       Severe sepsis on admission  Assessment & Plan  · Presented to [address] on 5/10 with worsening left flank pain, tachypnea and tachycardia in setting of E  Coli pyelonephritis/bacteremia   · CT revealed hydronephrosis and obstructive pyelonephritis  · Patient transferred to [facility] for higher level of care  · He went to the OR on 5/10 with IR for a nephrostomy tube placement  · Resolved      Bacteremia due to Escherichia coli  Assessment & Plan  · Positive growth of E   Coli in one blood culture on 5/10 thought to be due to UTI/pyelonephritis   · Was on IV Rocephin; transitioned to PO Augmentin in acute care for a total of 14 days      Morbid obesity ([facility] )  Assessment & Plan  · BMI of 49 99  · Lifestyle and diet modifications       * Obstructive pyelonephritis  Assessment & Plan  · CT on 5/10: "Mild left hydronephrosis status post ureteral stent placement, with 3 mm calculus in the mid ureter adjacent to the stent  Multiple bilateral renal calculi "  · Recent admission and underwent L-side ureteral stent on 4/21- this was found to be obstructive  S/p left PCN placement by IR on 5/10   · Pain control  · Continue Flomax, OP follow up with urology for stone treatment on 5/25  · Continue Augmentin Q12H [address] for total of 14 days (Complete on 5/25/22)  · Planned procedure for tomorrow 5/25 to be pushed until after discharge, discussed with Juanito Buckley PA-C of Urology   · Please update Desire Bhandari tomorrow the discharge date after team to reschedule procedure          Appreciate IM consultants medical co-management  Labs, medications, and imaging personally reviewed  ROS:  A ten point review of systems was completed and pertinent positives are listed in subjective section  All other systems reviewed were negative  Review of Systems   Constitutional: Negative  Negative for fatigue  HENT: Negative  Negative for trouble swallowing  Eyes: Negative  Negative for visual disturbance  Respiratory: Negative  Negative for shortness of breath  Cardiovascular: Negative  Negative for chest pain  Gastrointestinal: Negative  Negative for abdominal distention, constipation and diarrhea  Endocrine: Negative  Genitourinary: Negative  Negative for difficulty urinating  Musculoskeletal: Positive for myalgias  Skin: Positive for color change  Redness to left forearm noted yesterday with "lumps"- discomfort with the same   Allergic/Immunologic: Negative  Neurological: Negative  Hematological: Negative  Psychiatric/Behavioral: Negative           OBJECTIVE:   /56 (BP [facility]: Right arm)   Pulse 96   Temp 98 5 °F (36 9 °C) (Tympanic) Resp 16   Ht 5' 6" (1 676 m)   Wt (!) 148 kg (325 lb 9 9 oz)   SpO2 96%   BMI 52 56 kg/m²     Physical Exam  Vitals reviewed  Constitutional:       General: He is not in acute distress  Appearance: Normal appearance  He is obese  He is not ill-appearing  HENT:      Head: Normocephalic and atraumatic  Right Ear: External ear normal       Left Ear: External ear normal       Nose: Nose normal       Mouth/Throat:      Mouth: Mucous membranes are moist       Pharynx: Oropharynx is clear  Eyes:      Pupils: Pupils are equal, round, and reactive to light  Cardiovascular:      Rate and Rhythm: Normal rate and regular rhythm  Pulses: Normal pulses  Heart sounds: Normal heart sounds  No murmur heard  Pulmonary:      Effort: Pulmonary effort is normal  No respiratory distress  Breath sounds: Normal breath sounds  No wheezing  Abdominal:      General: Bowel sounds are normal  There is no distension  Palpations: Abdomen is soft  Tenderness: There is no abdominal tenderness  Musculoskeletal:         General: Normal range of motion  Cervical back: Normal range of motion  Skin:     General: Skin is warm  Findings: Erythema present  Comments: Mild erythema to left forearm with enlarged lymph nodes, slightly warmer to touch compared to right forearm   Neurological:      General: No focal deficit present  Mental Status: He is alert and oriented to person, place, and time  Psychiatric:         Mood and Affect: Mood normal       This patient was discussed by the Interdisciplinary Team in weekly case conference today  The care of the patient was extensively discussed with all care providers and an appropriate rehabilitation plan was formulated unique for this patient  Barriers were identified preventing progression of therapy and appropriate interventions were discussed with each discipline   Please see the team note for input from all disciplines regarding barriers, intervention, and discharge planning      [ x ] Total time spent: 35 Mins, and greater than 50% of this time was spent counseling/coordinating care      Lab Results   Component Value Date    WBC 3 22 (L) 05/25/2022    HGB 9 9 (L) 05/25/2022    HCT 32 5 (L) 05/25/2022    MCV 97 05/25/2022     (H) 05/25/2022     Lab Results   Component Value Date    SODIUM 132 (L) 05/21/2022    K 3 7 05/21/2022    CL 99 05/21/2022    CO2 21 05/21/2022    BUN 34 (H) 05/21/2022    CREATININE 1 84 (H) 05/21/2022    GLUC 113 05/21/2022    CALCIUM 8 8 05/21/2022     Lab Results   Component Value Date    INR 1 27 (H) 05/10/2022    INR 1 09 05/10/2022    INR 1 33 (H) 08/24/2019    PROTIME 15 4 (H) 05/10/2022    PROTIME 13 5 05/10/2022    PROTIME 16 6 (H) 08/24/2019           Current Facility-Administered Medications:     acetaminophen (TYLENOL) tablet 975 mg, 975 mg, Oral, Q8H [facility], Judi Michaels PA-C, 975 mg at 05/25/22 0556    allopurinol (ZYLOPRIM) tablet 100 mg, 100 mg, Oral, Daily, Bennie Darby PA-C, 100 mg at 05/25/22 0904    amLODIPine (NORVASC) tablet 10 mg, 10 mg, Oral, Daily, Bennie Darby PA-C, 10 mg at 05/25/22 0905    calcium carbonate (TUMS) chewable tablet 1,000 mg, 1,000 mg, Oral, BID PRN, Bennie Darby PA-C    cholecalciferol (VITAMIN D3) tablet 400 Units, 400 Units, Oral, Daily, Bennie Darby PA-C, 400 Units at 05/25/22 0905    Diclofenac Sodium (VOLTAREN) 1 % topical gel 2 g, 2 g, Topical, 4x Daily, Bennie Darby PA-C, 2 g at 05/25/22 1215    heparin (porcine) subcutaneous injection 7,500 Units, 7,500 Units, Subcutaneous, Q8H [facility], Judi Michaels PA-C, 7,500 Units at 05/25/22 0556    lidocaine (LIDODERM) 5 % patch 1 patch, 1 patch, Topical, Daily, Bennie Darby PA-C, 1 patch at 05/25/22 0906    metoprolol succinate (TOPROL-XL) 24 hr tablet 25 mg, 25 mg, Oral, Daily, RITA Villasenor, 25 mg at 05/25/22 0905    ondansetron (ZOFRAN-ODT) dispersible tablet 4 mg, 4 mg, Oral, Q6H PRN, Kim Pandey PA-C    senna (SENOKOT) tablet 17 2 mg, 2 tablet, Oral, HS PRN, Kim Pandey PA-C    tamsulosin (FLOMAX) capsule 0 4 mg, 0 4 mg, Oral, Daily With Dinner, Moore Tanner VERÓNICA Michaels, 0 4 mg at 05/24/22 1716    traMADol (ULTRAM) tablet 50 mg, 50 mg, Oral, Q6H [facility], Farzana Gamez MD    Past Medical History:   Diagnosis Date    Arthritis     Chronic kidney disease     Gout     Hyperlipidemia     Hypertension     Kidney stone     Obstructive pyelonephritis     Sepsis [facility])        Patient Active Problem List    Diagnosis Date Noted    Anemia 05/13/2022    Obstructive pyelonephritis 04/23/2022    Acute unilateral obstructive uropathy 04/21/2022    Mixed hyperlipidemia 06/02/2021    Morbid obesity 06/02/2021    Recurrent left knee pain 06/02/2021    Insomnia 09/16/2019    Essential hypertension 08/27/2019    Severe sepsis on admission 08/23/2019    Chronic kidney disease, stage 3 ([facility] ) 08/23/2019    Acute kidney injury superimposed on CKD stage 3 08/06/2019    Bacteremia due to Escherichia coli 08/04/2019    Lactic acidosis 08/04/2019    History of gout 07/15/2019    Vitamin D deficiency 06/10/2015    Morbid obesity ([facility] ) 03/23/2015          Farzana Gamez MD    Total time spent:  30 minutes with more than 50% spent counseling/coordinating care  Counseling includes discussion with patient re: progress and discussion with patient of his/her current medical/functional state/information  Coordination of patient's care was performed in conjunction with consulting services  Time invested included review of patient's labs, vitals, and management of their comorbidities with continued monitoring  The care of the patient was extensively discussed and appropriate treatment plan was formulated unique for this patient  ** Please Note:  voice to text software may have been used in the creation of this document   Although proof errors in transcription or interpretation are a potential of such software**

## 2022-05-25 NOTE — CASE MANAGEMENT
Tx team recommendations reviewed with patient, who expressed understanding & agreement  Pt stated that he prefers to notify his father himself  Target DC date is 6/1 with C (Nsg, PT, OT, HHA); a list of providers was provided to Pt & a referral will be made based on Pt preference  Attempting to arrange DC around surgical procedure, but per chart, this is not RS until 6/17; SW to explore if this can be moved up  SW will continue to monitor & assist as needed with Tx & DC planning

## 2022-05-25 NOTE — NURSING NOTE
Patient able to transfer with assist times one with walker  Tramadol ordered as routine and patient rates pain #8-10 at all times in shoulder and knee  Dressing changed to nephrostomy tube  Area clean and intact  Urine draining yellow and clear  Patient noted with chills after taking shower and slight elevation in temp  Patient remains at 80  No further complaints of chills or discomfort  Will continue to monitor and follow plan of care

## 2022-05-25 NOTE — PROGRESS NOTES
05/25/22 0700   Pain Assessment   Pain Assessment Tool 0-10   Pain Score 10 - Worst Possible Pain   Pain [facility]/Orientation Orientation: Left; [facility]: Knee;Orientation: Right;[facility]: Shoulder   Pain Onset/Description Onset: Ongoing   Patient's Stated Pain Goal No pain   Hospital Pain Intervention(s) Repositioned; Ambulation/increased activity  (RN made aware)   Restrictions/Precautions   Precautions Bed/chair alarms; Fall Risk;Pain  (gout pain)   Weight Bearing Restrictions Yes   LLE Weight Bearing Per Order WBAT   Cognition   Overall Cognitive Status WFL   Arousal/Participation Alert; Cooperative   Attention Within functional limits   Orientation Level Oriented X4   Memory Within functional limits   Following Commands Follows all commands and directions without difficulty   Subjective   Subjective "It hurts so bad to bend my L knee "   Roll Left and Right   Type of Assistance Needed Independent   Physical Assistance Level No physical assistance   Comment Mod I BSR   Roll Left and Right CARE Score 6   Sit to Lying   Reason if not Attempted Activity not applicable   Sit to Lying CARE Score 9   Lying to Sitting on Side of Bed   Type of Assistance Needed Independent   Physical Assistance Level No physical assistance   Comment Mod I HOB and BSR   Lying to Sitting on Side of Bed CARE Score 6   Sit to Stand   Type of Assistance Needed Incidental touching   Physical Assistance Level 25% or less   Comment CGA with bed slightly elevated to W/C height  (assistance required due to increased pain L knee)   Sit to Stand CARE Score 3   Bed-Chair Transfer   Type of Assistance Needed Supervision   Physical Assistance Level No physical assistance   Comment RW   Chair/Bed-to-Chair Transfer CARE Score 4   Transfer Bed/Chair/Wheelchair   Limitations Noted In Balance;Confidence;Pain Management;UE Strength;LE Strength; Endurance   Adaptive Equipment Roller Walker   Stand Pivot Supervision   Sit to Jermain   Stand to [place] Supervision   Supine to Sit Modified Independent   Car Transfer   Reason if not Attempted Environmental limitations   Car Transfer CARE Score 10   Walk 10 Feet   Type of Assistance Needed Supervision   Physical Assistance Level No physical assistance   Comment close S   Walk 10 Feet CARE Score 4   Walk 50 Feet with Two Turns   Reason if not Attempted Activity not applicable   Walk 50 Feet with Two Turns CARE Score 9   Walk 150 Feet   Reason if not Attempted Safety concerns   Walk 150 Feet CARE Score 88   Walking 10 Feet on Uneven Surfaces   Reason if not Attempted Activity not applicable   Walking 10 Feet on Uneven Surfaces CARE Score 9   Ambulation   Does the patient walk? 2  Yes   Primary Mode of Locomotion Prior to Admission Walk   Distance Walked (feet) 10 ft  (bed to w/c)   Assist Device Roller Walker   Gait Pattern Antalgic;Decreased foot clearance; Slow Julieta; Forward Flexion; Wide TEO; Decreased L stance; Improper weight shift   Limitations Noted In Balance; Endurance;Strength; Safety;Posture;Speed   Provided Assistance with: Direction   Walk Assist Level Supervision   Wheel 50 Feet with Two Turns   Reason if not Attempted Activity not applicable   Wheel 50 Feet with Two Turns CARE Score 9   Wheel 150 Feet   Reason if not Attempted Activity not applicable   Wheel 855 Feet CARE Score 9   Curb or Single Stair   Reason if not Attempted Safety concerns   1 Step (Curb) CARE Score 88   4 Steps   Reason if not Attempted Safety concerns   4 Steps CARE Score 88   12 Steps   Reason if not Attempted Safety concerns   12 Steps CARE Score 88   Picking Up Object   Reason if not Attempted Safety concerns   Picking Up Object CARE Score 88   Toilet Transfer   Reason if not Attempted Activity not applicable   Toilet Transfer CARE Score 9   Therapeutic Interventions   Strengthening supine ther ex BLE   Flexibility MT, stretching of B gastroc and B hamstring 3x and held for 15 seconds each   Balance gait and transfer training Neuromuscular Re-Education dynamic sitting balance activities including multidirectional weight shifting, multi directional reaching, and scooting fwd/backward/lateral   Other bed mobility tasks   Assessment   Treatment Assessment Pt  presenting with increased pain in L knee with knee flexion this am   Pt  having trouble finding a position of relief  PT treatment included supine ther ex BLE x 30 reps all directions, MT to BLE gastroc and hamstring muscles to improve ROM, bed mobility tasks, transfer training, neuromuscular re-eduation of movement activities performed at EOB to improve dynamic sitting balance, gait training, and education provided as needed for safety and direction to improve functional mobility and activity tolerance  Slow progress being made and increased time required with mobility tasks due to severe pain in L knee and R shoulder as well as fatigue  Continue current POC and progress as tolerated  Problem List Decreased strength;Decreased range of motion;Decreased endurance; Impaired balance;Decreased mobility;Orthopedic restrictions;Pain  (gout pain)   PT Barriers   Functional Limitation Walking;Transfers;Standing;Stair negotiation   Plan   Treatment/Interventions Functional transfer training;LE strengthening/ROM; Elevations; Therapeutic exercise; Endurance training;Patient/family training;Equipment eval/education; Bed mobility;Gait training;Spoke to nursing   Progress Slow progress, decreased activity tolerance   PT Therapy Minutes   PT Time In 0700   PT Time Out 0809   PT Total Time (minutes) 69   PT Mode of treatment - Individual (minutes) 69   PT Mode of treatment - Concurrent (minutes) 0   PT Mode of treatment - Group (minutes) 0   PT Mode of treatment - Co-treat (minutes) 0   PT Mode of Treatment - Total time(minutes) 69 minutes   PT Cumulative Minutes 433   Therapy Time missed   Time missed?  No

## 2022-05-26 PROCEDURE — 97110 THERAPEUTIC EXERCISES: CPT

## 2022-05-26 PROCEDURE — 97116 GAIT TRAINING THERAPY: CPT

## 2022-05-26 PROCEDURE — 97140 MANUAL THERAPY 1/> REGIONS: CPT

## 2022-05-26 PROCEDURE — 97542 WHEELCHAIR MNGMENT TRAINING: CPT

## 2022-05-26 PROCEDURE — 97535 SELF CARE MNGMENT TRAINING: CPT

## 2022-05-26 PROCEDURE — 97530 THERAPEUTIC ACTIVITIES: CPT

## 2022-05-26 PROCEDURE — 99231 SBSQ HOSP IP/OBS SF/LOW 25: CPT | Performed by: FAMILY MEDICINE

## 2022-05-26 RX ADMIN — METOPROLOL SUCCINATE 25 MG: 25 TABLET, FILM COATED, EXTENDED RELEASE ORAL at 08:41

## 2022-05-26 RX ADMIN — TRAMADOL HYDROCHLORIDE 50 MG: 50 TABLET, COATED ORAL at 17:18

## 2022-05-26 RX ADMIN — HEPARIN SODIUM 7500 UNITS: 5000 INJECTION INTRAVENOUS; SUBCUTANEOUS at 13:24

## 2022-05-26 RX ADMIN — DICLOFENAC SODIUM 2 G: 10 GEL TOPICAL at 17:17

## 2022-05-26 RX ADMIN — DICLOFENAC SODIUM 2 G: 10 GEL TOPICAL at 11:20

## 2022-05-26 RX ADMIN — ACETAMINOPHEN 975 MG: 325 TABLET ORAL at 05:53

## 2022-05-26 RX ADMIN — ACETAMINOPHEN 975 MG: 325 TABLET ORAL at 21:07

## 2022-05-26 RX ADMIN — AMLODIPINE BESYLATE 10 MG: 10 TABLET ORAL at 08:41

## 2022-05-26 RX ADMIN — TRAMADOL HYDROCHLORIDE 50 MG: 50 TABLET, COATED ORAL at 23:16

## 2022-05-26 RX ADMIN — CHOLECALCIFEROL TAB 10 MCG (400 UNIT) 400 UNITS: 10 TAB at 08:41

## 2022-05-26 RX ADMIN — TAMSULOSIN HYDROCHLORIDE 0.4 MG: 0.4 CAPSULE ORAL at 17:18

## 2022-05-26 RX ADMIN — ALLOPURINOL 100 MG: 100 TABLET ORAL at 08:41

## 2022-05-26 RX ADMIN — ACETAMINOPHEN 975 MG: 325 TABLET ORAL at 13:24

## 2022-05-26 RX ADMIN — HEPARIN SODIUM 7500 UNITS: 5000 INJECTION INTRAVENOUS; SUBCUTANEOUS at 21:08

## 2022-05-26 RX ADMIN — DICLOFENAC SODIUM 2 G: 10 GEL TOPICAL at 21:11

## 2022-05-26 RX ADMIN — DICLOFENAC SODIUM 2 G: 10 GEL TOPICAL at 08:43

## 2022-05-26 RX ADMIN — TRAMADOL HYDROCHLORIDE 50 MG: 50 TABLET, COATED ORAL at 05:54

## 2022-05-26 RX ADMIN — HEPARIN SODIUM 7500 UNITS: 5000 INJECTION INTRAVENOUS; SUBCUTANEOUS at 05:54

## 2022-05-26 RX ADMIN — TRAMADOL HYDROCHLORIDE 50 MG: 50 TABLET, COATED ORAL at 11:21

## 2022-05-26 RX ADMIN — LIDOCAINE 1 PATCH: 50 PATCH TOPICAL at 08:41

## 2022-05-26 NOTE — PROGRESS NOTES
PM&R PROGRESS NOTE:  Shayy Leal 55 y o  male MRN: [id number]  Unit/Bed#: -01 Encounter: [id number]        Rehabilitation Diagnosis: Impairment of mobility, safety and Activities of Daily Living (ADLs) due to Debility:  16  Debility (Non-cardiac/Non-pulmonary)    HPI: Shayy Leal is a 55 y o  male with a PMH significant for CKD, HTN, HLD, obstructive pyelonephritis with coli bacteremia, who presented to the [place] [address] ED on 05/10  He had a recent discharge on 4/30/22 s/p cystoscopy and stent placement on 4/22/22   After his discharge, patient experienced intermittent waves of left flank pain described as sharp, non-radiating, lightheadedness, nausea  He also admitted to worsening shortness of breath  He arrived to ED with tachypnea and tachycardia  CT scan obtained revealing hydronephrosis and obstructive pyelonephritis  Urology was consulted and recommended acute transfer to [facility]  Patient diagnosed with sepsis and initiated on IV antibiotics  IR was consulted for concerns with potential obstruction of stent placement and noted to be in [place]AN[place]  Patient went to the OR with IR for obstructive pyelonephritis and is s/p IR Nephrostomy tube placement on 5/10  He was intubated for the procedure and extubated on 5/11  Ortho was consulted on 5/13 for left knee pain and a left knee aspiration was performed  Patient is to continue WBAT to LLE  The aspiration was unsuccessful with ortho recommending adequate pain control and potential steroid injection in the future  Sepsis resolved and patient was transferred from ICU to med surg  On 5/16 patient was transitioned from IV antibiotics to oral to complete a total of 14 days  Patient participated with PT/OT and deemed appropriate for post acute rehabilitation services  He was accepted to [facility] and once hemodynamically stable, arrived on 5/19/22       SUBJECTIVE: Patient seen face to face    Complaining of leg pain and he would like his tramadol scheduled rather than p r n  ASSESSMENT: Stable, progressing  Planned discharge is for June 1st I sent a tiger text to Marlen PEDRAZA from the Urology Department to let her know about his discharge so they could schedule the outpatient lithotripsy and nephrostomy tube removal    PLAN:    Rehabilitation  · Functional deficits:  impaired mobility, self care, weakness to the LLE due to pain to left knee and left foot, Pain also to right shoulder    Continue current rehabilitation plan of care to maximize function   Functional update:   Weight Bearing Status: Weight Bearing as Tolerated  Transfers: Supervision, Minimal Assistance  Bed Mobility: Supervision  Amulation Distance (ft): 74 feet  Ambulation: Supervision  Assistive Device for Ambulation: Roller Walker  Wheelchair Mobility Distance: 145 ft  Wheelchair Mobility: Supervision  Number of Stairs:  (not tested)  Discharge Recommendations: Home with:  [address] with[de-identified] Family Support, First Floor Setup, Home Physical Therapy, Outpatient Physical Therapy  Occupational Therapy:  Eating: Independent  Grooming: Supervision  Bathing: Moderate Assistance, Maximum Assistance  Bathing:  Moderate Assistance, Maximum Assistance  Upper Body Dressing: Minimal Assistance  Lower Body Dressing: Maximum Assistance  Toileting: Maximum Assistance (supervision for urinal use)  Tub/Shower Transfer:  (TBA)  Toilet Transfer: Supervision  Cognition: Within Defined Limits  Orientation: Person, Place, Time, Situation  Discharge Recommendations: Home with:  [address] with[de-identified] Home Occupational Therapy       Estimated Discharge: 6/1/2022      Pain   Tylenol 975 mg Q8H [address]  Tramadol 50 mg Q6H RTC   DVT prophylaxis   Heparin 7500 units Q8H [address]    Bladder plan   Continent    Bowel plan   Continent      Anemia  Assessment & Plan  · Iron panel obtained with elevated ferritin stores   · Monitor H/H- Repeat for tomorrow    Recurrent left knee pain  Assessment & Plan  · Ongoing for several weeks causing difficulty with bearing weight due to pain  · XR knee without acute osseous abnormality, did reveal tricompartmental osteoarthritis similar to imaging done the year prior  · Ortho consulted and attempted an unsuccessful fluid aspiration   · Continue with adequate pain management with OP Ortho f/u for potential steroid injection once acute sepsis/infection resolves   · Diclofenac gel to the knee 4 times daily  · Left foot xray: There is no acute fracture or dislocation  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  Small plantar and large retrocalcaneal spurs  No lytic or blastic osseous lesion  Soft tissues are unremarkable  Essential hypertension  Assessment & Plan  · Low-sodium diet  · Continue Norvasc 10 mg and Metoprolol 25 mg daily      Chronic kidney disease, stage 3 [facility])  Assessment & Plan  Lab Results   Component Value Date    EGFR 40 05/21/2022    EGFR 36 05/20/2022    EGFR 39 05/19/2022    CREATININE 1 84 (H) 05/21/2022    CREATININE 1 98 (H) 05/20/2022    CREATININE 1 87 (H) 05/19/2022   · Baseline creatinine approximately 1 4-1 6  · Did peak to 1 92 in acute care thought to be due to severe sepsis with obstructive pyelonephritis   · Monitor renal function and urine output  · Avoid nephrotoxins and hypotension       Severe sepsis on admission  Assessment & Plan  · Presented to [address] on 5/10 with worsening left flank pain, tachypnea and tachycardia in setting of E  Coli pyelonephritis/bacteremia   · CT revealed hydronephrosis and obstructive pyelonephritis  · Patient transferred to hospitals for higher level of care  · He went to the OR on 5/10 with IR for a nephrostomy tube placement  · Resolved      Bacteremia due to Escherichia coli  Assessment & Plan  · Positive growth of E   Coli in one blood culture on 5/10 thought to be due to UTI/pyelonephritis   · Was on IV Rocephin; transitioned to PO Augmentin in acute care for a total of 14 days      Morbid obesity ([address] )  Assessment & Plan  · BMI of 49 99  · Lifestyle and diet modifications       * Obstructive pyelonephritis  Assessment & Plan  · CT on 5/10: "Mild left hydronephrosis status post ureteral stent placement, with 3 mm calculus in the mid ureter adjacent to the stent  Multiple bilateral renal calculi "  · Recent admission and underwent L-side ureteral stent on 4/21- this was found to be obstructive  S/p left PCN placement by IR on 5/10   · Pain control  · Continue Flomax, OP follow up with urology for stone treatment on 5/25  · Continue Augmentin Q12H [facility] living for total of 14 days (Complete on 5/25/22)  · Planned procedure for tomorrow 5/25 to be pushed until after discharge, discussed with Jerome Clemens PA-C of Urology   · Please update Raghu Lown tomorrow the discharge date after team to reschedule procedure    [place] back from Nephrology yesterday and they said after discharge they will arrange for nephrostomy tube removal and lithotripsy    Pain under better control with tramadol on a regular scheduled basis      Appreciate IM consultants medical co-management  Labs, medications, and imaging personally reviewed  ROS:  A ten point review of systems was completed and pertinent positives are listed in subjective section  All other systems reviewed were negative  Review of Systems   Constitutional: Negative  Negative for fatigue  HENT: Negative  Negative for trouble swallowing  Eyes: Negative  Negative for visual disturbance  Respiratory: Negative  Negative for shortness of breath  Cardiovascular: Negative  Negative for chest pain  Gastrointestinal: Negative  Negative for abdominal distention, constipation and diarrhea  Endocrine: Negative  Genitourinary: Negative  Negative for difficulty urinating  Musculoskeletal: Positive for myalgias  Skin: Positive for color change  Redness to left forearm noted yesterday with "lumps"- discomfort with the same   Allergic/Immunologic: Negative  Neurological: Negative  Hematological: Negative  Psychiatric/Behavioral: Negative  OBJECTIVE:   /74 (BP [facility]: Right arm)   Pulse 103   Temp 98 6 °F (37 °C) (Tympanic)   Resp 20   Ht 5' 6" (1 676 m)   Wt (!) 148 kg (325 lb 9 9 oz)   SpO2 95%   BMI 52 56 kg/m²     Physical Exam  Vitals reviewed  Constitutional:       General: He is not in acute distress  Appearance: Normal appearance  He is obese  He is not ill-appearing  HENT:      Head: Normocephalic and atraumatic  Right Ear: External ear normal       Left Ear: External ear normal       Nose: Nose normal       Mouth/Throat:      Mouth: Mucous membranes are moist       Pharynx: Oropharynx is clear  Eyes:      Pupils: Pupils are equal, round, and reactive to light  Cardiovascular:      Rate and Rhythm: Normal rate and regular rhythm  Pulses: Normal pulses  Heart sounds: Normal heart sounds  No murmur heard  Pulmonary:      Effort: Pulmonary effort is normal  No respiratory distress  Breath sounds: Normal breath sounds  No wheezing  Abdominal:      General: Bowel sounds are normal  There is no distension  Palpations: Abdomen is soft  Tenderness: There is no abdominal tenderness  Musculoskeletal:         General: Normal range of motion  Cervical back: Normal range of motion  Skin:     General: Skin is warm  Findings: Erythema present  Comments: Mild erythema to left forearm with enlarged lymph nodes, slightly warmer to touch compared to right forearm   Neurological:      General: No focal deficit present  Mental Status: He is alert and oriented to person, place, and time     Psychiatric:         Mood and Affect: Mood normal             Lab Results   Component Value Date    WBC 3 22 (L) 05/25/2022    HGB 9 9 (L) 05/25/2022    HCT 32 5 (L) 05/25/2022    MCV 97 05/25/2022     (H) 05/25/2022     Lab Results   Component Value Date    SODIUM 134 (L) 05/25/2022    K 3 9 05/25/2022     05/25/2022    CO2 20 (L) 05/25/2022    BUN 32 (H) 05/25/2022    CREATININE 1 74 (H) 05/25/2022    GLUC 104 05/25/2022    CALCIUM 9 7 05/25/2022     Lab Results   Component Value Date    INR 1 27 (H) 05/10/2022    INR 1 09 05/10/2022    INR 1 33 (H) 08/24/2019    PROTIME 15 4 (H) 05/10/2022    PROTIME 13 5 05/10/2022    PROTIME 16 6 (H) 08/24/2019           Current Facility-Administered Medications:     acetaminophen (TYLENOL) tablet 975 mg, 975 mg, Oral, Q8H [facility], Judi Michaels PA-C, 975 mg at 05/26/22 0553    allopurinol (ZYLOPRIM) tablet 100 mg, 100 mg, Oral, Daily, Dain Thorpe PA-C, 100 mg at 05/26/22 0841    amLODIPine (NORVASC) tablet 10 mg, 10 mg, Oral, Daily, Dain Thorpe PA-C, 10 mg at 05/26/22 0841    calcium carbonate (TUMS) chewable tablet 1,000 mg, 1,000 mg, Oral, BID PRN, Dain Thorpe PA-C    cholecalciferol (VITAMIN D3) tablet 400 Units, 400 Units, Oral, Daily, Dain Thorpe PA-C, 400 Units at 05/26/22 0841    Diclofenac Sodium (VOLTAREN) 1 % topical gel 2 g, 2 g, Topical, 4x Daily, Dain Thorpe PA-C, 2 g at 05/26/22 0843    heparin (porcine) subcutaneous injection 7,500 Units, 7,500 Units, Subcutaneous, Q8H [facility], Judi Michaels PA-C, 7,500 Units at 05/26/22 0554    lidocaine (LIDODERM) 5 % patch 1 patch, 1 patch, Topical, Daily, Dain Thorpe PA-C, 1 patch at 05/26/22 0841    metoprolol succinate (TOPROL-XL) 24 hr tablet 25 mg, 25 mg, Oral, Daily, RITA Frederick, 25 mg at 05/26/22 0841    ondansetron (ZOFRAN-ODT) dispersible tablet 4 mg, 4 mg, Oral, Q6H PRN, Dain Thorpe PA-C    senna (SENOKOT) tablet 17 2 mg, 2 tablet, Oral, HS PRN, Dain Thorpe PA-C    tamsulosin (FLOMAX) capsule 0 4 mg, 0 4 mg, Oral, Daily With Josee Michaels PA-C, 0 4 mg at 05/25/22 6942    traMADol (ULTRAM) tablet 50 mg, 50 mg, Oral, Q6H [facility], Christian Bledsoe MD, 50 mg at 05/26/22 0593    Past Medical History:   Diagnosis Date    Arthritis     Chronic kidney disease     Gout     Hyperlipidemia     Hypertension     Kidney stone     Obstructive pyelonephritis     Sepsis [facility])        Patient Active Problem List    Diagnosis Date Noted    Anemia 05/13/2022    Obstructive pyelonephritis 04/23/2022    Acute unilateral obstructive uropathy 04/21/2022    Mixed hyperlipidemia 06/02/2021    Morbid obesity 06/02/2021    Recurrent left knee pain 06/02/2021    Insomnia 09/16/2019    Essential hypertension 08/27/2019    Severe sepsis on admission 08/23/2019    Chronic kidney disease, stage 3 ([facility] [address] ) 08/23/2019    Acute kidney injury superimposed on CKD stage 3 08/06/2019    Bacteremia due to Escherichia coli 08/04/2019    Lactic acidosis 08/04/2019    History of gout 07/15/2019    Vitamin D deficiency 06/10/2015    [facility] 03/23/2015          Doc MD Clive    Total time spent:  30 minutes with more than 50% spent counseling/coordinating care  Counseling includes discussion with patient re: progress and discussion with patient of his/her current medical/functional state/information  Coordination of patient's care was performed in conjunction with consulting services  Time invested included review of patient's labs, vitals, and management of their comorbidities with continued monitoring  The care of the patient was extensively discussed and appropriate treatment plan was formulated unique for this patient  ** Please Note:  voice to text software may have been used in the creation of this document   Although proof errors in transcription or interpretation are a potential of such software**

## 2022-05-26 NOTE — TELEPHONE ENCOUNTER
Patient still admitted, pls contact patient to inform him of new date of procedure w Dr Светлана Gudino   Thank you

## 2022-05-26 NOTE — NURSING NOTE
Pt resting in bed  Pt did not want to get washed for therapy this AM due to getting a shower yesterday  Pt assist x1 with RW to BR  Pt encouraged to reposition over night  L nephrostomy tube dressing CDI and draining clear yellow urine  Pt L forearm continues to be reddened  K pad used intermittently  Pt had pain to L leg and R shoulder, given scheduled pain med with relief  Will continue to monitor  additional...

## 2022-05-26 NOTE — PROGRESS NOTES
05/26/22 1103   Pain Assessment   Pain Assessment Tool 0-10   Pain Score 9   Pain [facility]/Orientation Orientation: Left; [facility]: Knee   Pain Onset/Description Onset: Ongoing   Patient's Stated Pain Goal No pain   Hospital Pain Intervention(s) Repositioned; Ambulation/increased activity   Restrictions/Precautions   Precautions Bed/chair alarms;Pain; Fall Risk   Weight Bearing Restrictions Yes   LLE Weight Bearing Per Order WBAT   Cognition   Overall Cognitive Status WFL   Arousal/Participation Alert; Cooperative   Attention Within functional limits   Orientation Level Oriented X4   Memory Within functional limits   Following Commands Follows all commands and directions without difficulty   Subjective   Subjective "I want to try to walk farther this session "   Roll Left and Right   Type of Assistance Needed Independent   Physical Assistance Level No physical assistance   Comment Mod I BSR's   Roll Left and Right CARE Score 6   Sit to Lying   Type of Assistance Needed Physical assistance   Physical Assistance Level 26%-50%   Comment min A for LE management   Sit to Lying CARE Score 3   Lying to Sitting on Side of Bed   Type of Assistance Needed Independent   Physical Assistance Level No physical assistance   Comment mod I HOB and BSR   Lying to Sitting on Side of Bed CARE Score 6   Sit to Stand   Type of Assistance Needed Physical assistance   Physical Assistance Level 26%-50%   Comment min A from EOB with bed elevated   Sit to Stand CARE Score 3   Bed-Chair Transfer   Type of Assistance Needed Supervision   Physical Assistance Level No physical assistance   Comment RW   Chair/Bed-to-Chair Transfer CARE Score 4   Transfer Bed/Chair/Wheelchair   Limitations Noted In Balance;Confidence; Endurance;Pain Management;UE Strength;LE Strength   Adaptive Equipment Roller Walker   Stand Pivot Supervision   Sit to Stand Minimal Assist   Stand to Sit Supervision   Supine to Sit Modified Independent   Sit to Supine Minimal Assist Findings Pt  requires more assistance to transfer from sit to stand from EOB than from w/c or recliner   Car Transfer   Reason if not Attempted Environmental limitations   Car Transfer CARE Score 10   Walk 10 Feet   Type of Assistance Needed Supervision   Physical Assistance Level No physical assistance   Walk 10 Feet CARE Score 4   Walk 50 Feet with Two Turns   Type of Assistance Needed Supervision   Physical Assistance Level No physical assistance   Walk 50 Feet with Two Turns CARE Score 4   Walk 150 Feet   Reason if not Attempted Safety concerns   Walk 150 Feet CARE Score 88   Walking 10 Feet on Uneven Surfaces   Reason if not Attempted Safety concerns   Walking 10 Feet on Uneven Surfaces CARE Score 88   Ambulation   Does the patient walk? 2  Yes   Primary Mode of Locomotion Prior to Admission Walk   Distance Walked (feet) 84 ft   Assist Device Roller Walker   Gait Pattern Antalgic;Decreased foot clearance; Forward Flexion; Slow Julieta; Wide TEO;Step to;Decreased L stance; Improper weight shift   Limitations Noted In Balance; Endurance; Safety;Strength;Speed   Provided Assistance with: Direction   Walk Assist Level Supervision   Wheel 50 Feet with Two Turns   Reason if not Attempted Activity not applicable   Wheel 50 Feet with Two Turns CARE Score 9   Wheel 150 Feet   Reason if not Attempted Activity not applicable   Wheel 452 Feet CARE Score 9   Curb or Single Stair   Reason if not Attempted Activity not applicable   1 Step (Curb) CARE Score 9   4 Steps   Reason if not Attempted Safety concerns   4 Steps CARE Score 88   12 Steps   Reason if not Attempted Safety concerns   12 Steps CARE Score 88   Picking Up Object   Reason if not Attempted Safety concerns   Picking Up Object CARE Score 88   Toilet Transfer   Reason if not Attempted Activity not applicable   Toilet Transfer CARE Score 9   Therapeutic Interventions   Strengthening supine ther ex   Flexibility MT LLE   Balance gait and transfer training   Other w/c mobility   Other Comments   Comments Pt  was out in recliner for approximately 90 mins  and then requested to get back into bed due to discomfort  Assessment   Treatment Assessment Pt  found back in bed for second session  Pt  was educated in the importance of trying to increase OOB time in upright position to allow gravity to assist with knee flexion  Pt  was shown how to opperate recliner to allow BLE to hang down to the floor as long as possible and then to elevate BLE using handle of recliner for relief  Pt  did report increased tolerance of the recliner verses w/c but still could not tolerate a great deal of time OOB  This session pt  performed bed mobility tasks, MT of LLE including stretching of gastroc, hamstrings, and quad muscles to help improve ROM, supine ther ex, transfer training, gait training, and continued education to ensure safety and direction to help improve functional mobility and activity tolerance  Pt  has made improvements in ambulation distance this session  COntinue current POC and progress as tolerated  Problem List Decreased range of motion;Decreased endurance;Decreased strength; Impaired balance;Decreased mobility;Orthopedic restrictions;Pain;Obesity   PT Barriers   Functional Limitation Walking;Transfers;Standing;Stair negotiation   Plan   Treatment/Interventions Functional transfer training;LE strengthening/ROM; Elevations; Therapeutic exercise; Endurance training;Patient/family training;Equipment eval/education;Gait training;Bed mobility   Progress Progressing toward goals   PT Therapy Minutes   PT Time In 1103   PT Time Out 1200   PT Total Time (minutes) 57   PT Mode of treatment - Individual (minutes) 57   PT Mode of treatment - Concurrent (minutes) 0   PT Mode of treatment - Group (minutes) 0   PT Mode of treatment - Co-treat (minutes) 0   PT Mode of Treatment - Total time(minutes) 57 minutes   PT Cumulative Minutes 605   Therapy Time missed   Time missed?  No

## 2022-05-26 NOTE — PROGRESS NOTES
05/26/22 0844   Pain Assessment   Pain Assessment Tool 0-10   Pain Score 9   Pain [facility]/Orientation [facility]: Shoulder;Orientation: Right  (ankle, left hip, knee pain 12/10)   Pain Onset/Description Onset: Ongoing;Frequency: Constant/Continuous   Patient's Stated Pain Goal No pain   Hospital Pain Intervention(s) Medication (See MAR)   Restrictions/Precautions   Precautions Bed/chair alarms; Fall Risk;Pain   Weight Bearing Restrictions Yes   LLE Weight Bearing Per Order WBAT   Sit to Lying   Type of Assistance Needed Incidental touching   Physical Assistance Level 25% or less   Comment Min A for LE management   Sit to Lying CARE Score 3   Sit to Stand   Type of Assistance Needed Incidental touching   Physical Assistance Level 25% or less   Comment   (CGA)   Sit to Stand CARE Score 3   Transfer Bed/Chair/Wheelchair   Positioning Concerns Skin Integrity   Limitations Noted In Balance; Coordination; Endurance;Pain Management; Sequencing;UE Strength   Adaptive Equipment Roller Walker   Findings Min A/CGA   Toileting Hygiene   Type of Assistance Needed Set-up / clean-up   Physical Assistance Level No physical assistance   Comment pt utilized urinal while supine in bed  Toileting Hygiene CARE Score 5   Toileting   Able to Manage Clothing Closures Yes   Manage Hygiene Bladder   Limitations Noted In LE Strength;UE Strength; Safety;ROM;Balance; Coordination   Therapeutic Excerise-Strength   UE Strength Yes   Right Upper Extremity- Strength   R Elbow Elbow flexion;Elbow extension   R Wrist   (wrist rotation)   R Weight/Reps/Sets 2# weight x 2 sets of 15 reps   Left Upper Extremity-Strength   L Elbow Elbow flexion;Elbow extension   L Wrist   (wrist rotation)   L Weights/Reps/Sets 3# weight x 2 sets of 15 reps   Exercise Tools   Hand Gripper Green digiflex x 2 sets of 15 reps   Other Exercise Tool 1 Red theraband x 15 reps elbow flex/ext   Cognition   Overall Cognitive Status [facility]   Arousal/Participation Alert; Cooperative;Responsive   Attention Within functional limits   Orientation Level Oriented X4   Memory Within functional limits   Following Commands Follows all commands and directions without difficulty   Comments Pt required additional time throughout session due to significant shivering and levels of fatigue  Activity Tolerance   Activity Tolerance Patient limited by fatigue;Patient limited by pain   Assessment   Prognosis Good   Problem List Decreased strength;Decreased range of motion;Decreased endurance; Impaired balance;Decreased mobility;Obesity;Orthopedic restrictions;Pain   Assessment Patient participated in Skilled OT session this date with interventions consisting of functional transfer training, ADL re training with the use of correct body mechnaics, Energy Conservation techniques and therapeutic exercise to: increase functional use of BUEs, increase BUE muscle strength    Patient agreeable to OT treatment session, upon arrival patient was found seated OOB to Recliner Patient shivering excessively and was provided with heating pad from nursing  Patient then tolerated UB TE using 2#, 3#, green digiflex, and red theraband as detailed in flow sheet  Patient required additional time/frequent rest periods throughout session due to shivering/fatigue/pain levels  Also provide assistance with LE repositioning in recliner during TE  Patient then transfers sit to stand with CGA and pivots chair to bed with CGA x 1  Patient then requires min A/CGA with LE management to enter bed  Patient then utilized urinal independently while supine in bed  Session ended with patient supine in bed, all needs met, and call bell within reach  In comparison to previous session, patient with improvements in UB strength, endurance, and functional transfers   Patient requiring verbal cues for correct technique, verbal cues for pacing thru activity steps, frequent rest periods and ocassional safety reminders   Patient performance demonstrated good carryover of learned techniques and strategies to facilitate safety during functional tasks  Patient continues to be functioning below baseline level, occupational performance remains limited secondary to factors listed above and increased risk for falls and injury  Patient to benefit from continued Occupational Therapy treatment while in the hospital to address deficits as defined above and maximize level of functional independence with ADLs and functional mobility in order to return to OF  Plan   Treatment/Interventions ADL retraining;Functional transfer training; Therapeutic exercise;OT   Progress Progressing toward goals   OT Therapy Minutes   OT Time In 0844   OT Time Out 1024   OT Total Time (minutes) 100   OT Mode of treatment - Individual (minutes) 100   Therapy Time missed   Time missed?  No   Jacquiline Means Dannie Cho

## 2022-05-27 LAB
25(OH)D2 SERPL-MCNC: 3.6 NG/ML
25(OH)D3 SERPL-MCNC: 34 NG/ML
25(OH)D3+25(OH)D2 SERPL-MCNC: 38 NG/ML

## 2022-05-27 PROCEDURE — 97535 SELF CARE MNGMENT TRAINING: CPT

## 2022-05-27 PROCEDURE — 97110 THERAPEUTIC EXERCISES: CPT

## 2022-05-27 PROCEDURE — 97530 THERAPEUTIC ACTIVITIES: CPT

## 2022-05-27 PROCEDURE — 97116 GAIT TRAINING THERAPY: CPT

## 2022-05-27 PROCEDURE — 99232 SBSQ HOSP IP/OBS MODERATE 35: CPT | Performed by: FAMILY MEDICINE

## 2022-05-27 RX ADMIN — HEPARIN SODIUM 7500 UNITS: 5000 INJECTION INTRAVENOUS; SUBCUTANEOUS at 21:23

## 2022-05-27 RX ADMIN — TRAMADOL HYDROCHLORIDE 50 MG: 50 TABLET, COATED ORAL at 05:47

## 2022-05-27 RX ADMIN — HEPARIN SODIUM 7500 UNITS: 5000 INJECTION INTRAVENOUS; SUBCUTANEOUS at 05:48

## 2022-05-27 RX ADMIN — DICLOFENAC SODIUM 2 G: 10 GEL TOPICAL at 08:58

## 2022-05-27 RX ADMIN — ACETAMINOPHEN 975 MG: 325 TABLET ORAL at 14:21

## 2022-05-27 RX ADMIN — AMLODIPINE BESYLATE 10 MG: 10 TABLET ORAL at 08:57

## 2022-05-27 RX ADMIN — HEPARIN SODIUM 7500 UNITS: 5000 INJECTION INTRAVENOUS; SUBCUTANEOUS at 14:22

## 2022-05-27 RX ADMIN — DICLOFENAC SODIUM 2 G: 10 GEL TOPICAL at 12:10

## 2022-05-27 RX ADMIN — TRAMADOL HYDROCHLORIDE 50 MG: 50 TABLET, COATED ORAL at 17:26

## 2022-05-27 RX ADMIN — LIDOCAINE 1 PATCH: 50 PATCH TOPICAL at 08:58

## 2022-05-27 RX ADMIN — ACETAMINOPHEN 975 MG: 325 TABLET ORAL at 05:46

## 2022-05-27 RX ADMIN — METOPROLOL SUCCINATE 25 MG: 25 TABLET, FILM COATED, EXTENDED RELEASE ORAL at 08:57

## 2022-05-27 RX ADMIN — ACETAMINOPHEN 975 MG: 325 TABLET ORAL at 21:23

## 2022-05-27 RX ADMIN — TRAMADOL HYDROCHLORIDE 50 MG: 50 TABLET, COATED ORAL at 12:09

## 2022-05-27 RX ADMIN — TAMSULOSIN HYDROCHLORIDE 0.4 MG: 0.4 CAPSULE ORAL at 17:27

## 2022-05-27 RX ADMIN — CHOLECALCIFEROL TAB 10 MCG (400 UNIT) 400 UNITS: 10 TAB at 08:57

## 2022-05-27 RX ADMIN — TRAMADOL HYDROCHLORIDE 50 MG: 50 TABLET, COATED ORAL at 23:07

## 2022-05-27 RX ADMIN — DICLOFENAC SODIUM 2 G: 10 GEL TOPICAL at 17:27

## 2022-05-27 RX ADMIN — DICLOFENAC SODIUM 2 G: 10 GEL TOPICAL at 21:23

## 2022-05-27 RX ADMIN — ALLOPURINOL 100 MG: 100 TABLET ORAL at 08:57

## 2022-05-27 NOTE — PROGRESS NOTES
05/27/22 0859   Pain Assessment   Pain Assessment Tool 0-10   Pain Score 10 - Worst Possible Pain   Pain [facility]/Orientation Orientation: Bilateral;[facility]: Leg;[facility]: Foot;Orientation: Right;[facility]: Shoulder   Pain Onset/Description Onset: Ongoing;Frequency: Constant/Continuous; Descriptor: Aching;Descriptor: Sore   Patient's Stated Pain Goal No pain   Hospital Pain Intervention(s) Medication (See MAR); Repositioned; Ambulation/increased activity   Restrictions/Precautions   Precautions Bed/chair alarms; Fall Risk;Pain   Weight Bearing Restrictions Yes   LLE Weight Bearing Per Order WBAT   Shower/Bathe Self   Type of Assistance Needed Physical assistance   Physical Assistance Level 51%-75%   Comment UB-Min -Mod A; LB - Max A   Shower/Bathe Self CARE Score 2   Bathing   Assessed Bath Style Sponge Bath   Anticipated D/C Bath Style Sponge Bath   Able to Gather/Transport No   Able to [employer] Temperature Yes   Able to Wash/Rinse/Dry (body part) L Upper Leg;R Upper Leg;Chest;Abdomen   Limitations Noted in Balance; Coordination; Endurance;ROM;Safety;Problem Solving;Strength;Timeliness   Positioning Seated;Standing   Tub/Shower Transfer   Reason Not Assessed Sponge Bath   Upper Body Dressing   Type of Assistance Needed Incidental touching   Physical Assistance Level 26%-50%   Comment pull down in back   Upper Body Dressing CARE Score 3   Lower Body Dressing   Type of Assistance Needed Physical assistance   Physical Assistance Level 76% or more   Comment Max A to don shorts/ D doff/don socks   Lower Body Dressing CARE Score 2   Putting On/Taking Off Footwear   Type of Assistance Needed Physical assistance   Physical Assistance Level Total assistance   Comment B socks   Putting On/Taking Off Footwear CARE Score 1   Picking Up Object   Reason if not Attempted Safety concerns   Picking Up Object CARE Score 88   Dressing/Undressing Clothing   Remove UB Clothes Pullover Shirt   Don UB Clothes Pullover Shirt   Remove LB Clothes Shorts;Socks   Don LB Clothes Shorts;Socks   Limitations Noted In Balance; Coordination; Endurance;Problem Solving; Safety; Sequencing;Strength;ROM   Positioning Supported Sit;Standing   Sit to Lying   Type of Assistance Needed Incidental touching   Physical Assistance Level 25% or less   Comment SBA with LE management   Sit to Lying CARE Score 3   Sit to Stand   Type of Assistance Needed Incidental touching   Physical Assistance Level 25% or less   Comment Supervision sit to stand form recliner   Sit to Stand CARE Score 3   Transfer Bed/Chair/Wheelchair   Positioning Concerns Skin Integrity   Limitations Noted In Balance; Coordination; Endurance;Pain Management; Sequencing;UE Strength   Adaptive Equipment Roller Walker   Findings CGA   Functional Standing Tolerance   Time 5 min 15 sec   Activity Tolerates static standing supported with RW x 5 :15 for pericare and   Comments Holding onto RW   Therapeutic Exercise - ROM   UE-ROM Yes   ROM- Right Upper Extremities   R Shoulder AAROM;Codman's;Extension;Horizontal ABduction  (horiz  add, scapular pro/ret)   R Weight/Reps/Sets 15 reps x 1 set AAROM   Therapeutic Excerise-Strength   UE Strength Yes   Right Upper Extremity- Strength   R Elbow Elbow flexion;Elbow extension   R Wrist   (wrist rotation)   Equipment Dumbbell   R Weight/Reps/Sets 2# weight x 2 sets of 15 reps   Left Upper Extremity-Strength   L Shoulder Flexion; Extension;Horizontal ABduction  (horiz  add, scapular pro/ret)   L Elbow Elbow flexion;Elbow extension   L Wrist   (wrist rotation)   Equipment Dumbell   L Weights/Reps/Sets 3# weight x 2 sets of 15 reps   Cognition   Overall Cognitive Status WFL   Arousal/Participation Alert; Cooperative   Attention Within functional limits   Orientation Level Oriented X4   Memory Within functional limits   Following Commands Follows all commands and directions without difficulty   Comments PT agreeable to OT treatment     Activity Tolerance   Activity Tolerance Patient limited by fatigue;Patient limited by pain   Assessment   Prognosis Good   Problem List Decreased range of motion;Decreased strength;Decreased endurance; Impaired balance;Decreased mobility;Orthopedic restrictions;Pain;Obesity   Assessment Patient participated in Skilled OT session this date with interventions consisting of ADL re training with the use of correct body mechnaics, Energy Conservation techniques, therapeutic exercise to: increase functional use of BUEs, increase BUE muscle strength , increase standing tolerance time with unilateral UE support to complete sink level ADLs and increase dynamic sit/ stand balance during functional activity    Patient agreeable to OT treatment session, upon arrival patient was found seated OOB to Recliner  Patient set up for  AM ADL seated supported in recliner  Requires Min-Mod A with UB self care and Max A with L self care (D to doff/don socks)  Patient transfers sit to stand from recliner with supervision x 1  Patient then stands with bilateral support of walker x 5 min 15 secs for pericare  Patient then sat for a brief rest break before transferring recliner to bed with CGA x 1  Patient requires SBA with LEs to transfer sit to supine in bed  While seated semi-reclined in bed, patient tolerated UB TE as detailed in flow sheet  Patient reports pain levels 8-10/10 in R shoulder and B/L LEs  Session ended with patient supine in bed, all needs met and call bell within reach  In comparison to previous session, patient with improvements in self care performance, UB strength, endurance, functional transfers, and increased functional standing tolerance  Patient requiring verbal cues for correct technique, verbal cues for pacing thru activity steps, frequent rest periods and ocassional safety reminders  Patient performance  demonstrated good carryover of learned techniques and strategies to facilitate safety during functional tasks   Patient continues to be functioning below baseline level, occupational performance remains limited secondary to factors listed above and increased risk for falls and injury  Patient to benefit from continued Occupational Therapy treatment while in the hospital to address deficits as defined above and maximize level of functional independence with ADLs and functional mobility in order to return to OF  Barriers to Discharge Inaccessible home environment   Plan   Treatment/Interventions ADL retraining;Functional transfer training; Therapeutic exercise; Bed mobility; Endurance training;OT   Progress Progressing toward goals   OT Therapy Minutes   OT Time In 0859   OT Time Out 1032   OT Total Time (minutes) 93   OT Mode of treatment - Individual (minutes) 93   Therapy Time missed   Time missed?  Essie Benitez

## 2022-05-27 NOTE — PLAN OF CARE
Problem: PAIN - ADULT  Goal: Verbalizes/displays adequate comfort level or baseline comfort level  Description: Interventions:  - Encourage patient to monitor pain and request assistance  - Assess pain using appropriate pain scale  - Administer analgesics based on type and severity of pain and evaluate response  - Implement non-pharmacological measures as appropriate and evaluate response  - Consider cultural and social influences on pain and pain management  - Notify physician/advanced practitioner if interventions unsuccessful or patient reports new pain  Outcome: Progressing     Problem: INFECTION - ADULT  Goal: Absence or prevention of progression during hospitalization  Description: INTERVENTIONS:  - Assess and monitor for signs and symptoms of infection  - Monitor lab/diagnostic results  - Monitor all insertion sites, i e  indwelling lines, tubes, and drains  - Monitor endotracheal if appropriate and nasal secretions for changes in amount and color  - Jackson appropriate cooling/warming therapies per order  - Administer medications as ordered  - Instruct and encourage patient and family to use good hand hygiene technique  - Identify and instruct in appropriate isolation precautions for identified infection/condition  Outcome: Progressing  Goal: Absence of fever/infection during neutropenic period  Description: INTERVENTIONS:  - Monitor WBC    Outcome: Progressing     Problem: DISCHARGE PLANNING  Goal: Discharge to home or other facility with appropriate resources  Description: INTERVENTIONS:  - Identify barriers to discharge w/patient and caregiver  - Arrange for needed discharge resources and transportation as appropriate  - Identify discharge learning needs (meds, wound care, etc )  - Arrange for interpretive services to assist at discharge as needed  - Refer to Case Management Department for coordinating discharge planning if the patient needs post-hospital services based on physician/advanced practitioner order or complex needs related to functional status, cognitive ability, or social support system  Outcome: Progressing     Problem: DISCHARGE PLANNING  Goal: Discharge to home or other facility with appropriate resources  Description: INTERVENTIONS:  - Identify barriers to discharge w/patient and caregiver  - Arrange for needed discharge resources and transportation as appropriate  - Identify discharge learning needs (meds, wound care, etc )  - Arrange for interpretive services to assist at discharge as needed  - Refer to Case Management Department for coordinating discharge planning if the patient needs post-hospital services based on physician/advanced practitioner order or complex needs related to functional status, cognitive ability, or social support system  Outcome: Progressing

## 2022-05-27 NOTE — CASE MANAGEMENT
Pt stated that he can not care for the drainage bags & does not have adequate support to do so in the home  He is agreeable to [facility] placement  Referrals sent to the [facility], [facility], [facility] [address]  Awaiting responses  Pt will need auth from [employer]

## 2022-05-27 NOTE — PROGRESS NOTES
PM&R PROGRESS NOTE:  Carmen Whitaker 55 y o  male MRN: [id number]  Unit/Bed#: -01 Encounter: [id number]        Rehabilitation Diagnosis: Impairment of mobility, safety and Activities of Daily Living (ADLs) due to Debility:  16  Debility (Non-cardiac/Non-pulmonary)    HPI: Carmen Whitaker is a 55 y o  male with a PMH significant for CKD, HTN, HLD, obstructive pyelonephritis with coli bacteremia, who presented to the [place] [address] ED on 05/10  He had a recent discharge on 4/30/22 s/p cystoscopy and stent placement on 4/22/22   After his discharge, patient experienced intermittent waves of left flank pain described as sharp, non-radiating, lightheadedness, nausea  He also admitted to worsening shortness of breath  He arrived to ED with tachypnea and tachycardia  CT scan obtained revealing hydronephrosis and obstructive pyelonephritis  Urology was consulted and recommended acute transfer to [facility]  Patient diagnosed with sepsis and initiated on IV antibiotics  IR was consulted for concerns with potential obstruction of stent placement and noted to be in ADRIANNE  Patient went to the OR with IR for obstructive pyelonephritis and is s/p IR Nephrostomy tube placement on 5/10  He was intubated for the procedure and extubated on 5/11  Ortho was consulted on 5/13 for left knee pain and a left knee aspiration was performed  Patient is to continue WBAT to LLE  The aspiration was unsuccessful with ortho recommending adequate pain control and potential steroid injection in the future  Sepsis resolved and patient was transferred from ICU to med surg  On 5/16 patient was transitioned from IV antibiotics to oral to complete a total of 14 days  Patient participated with PT/OT and deemed appropriate for post acute rehabilitation services  He was accepted to [facility] and once hemodynamically stable, arrived on 5/19/22       SUBJECTIVE: Patient seen face to face    Complaining of right foot swelling most likely secondary to dependent edema    ASSESSMENT: Stable, progressing  Planned discharge is for June 1st I sent a tiger text to Vipul PEDRAZA from the Urology Department to let her know about his discharge so they could schedule the outpatient lithotripsy and nephrostomy tube removal    PLAN:    Rehabilitation  · Functional deficits:  impaired mobility, self care, weakness to the LLE due to pain to left knee and left foot, Pain also to right shoulder    Continue current rehabilitation plan of care to maximize function   Functional update:   Weight Bearing Status: Weight Bearing as Tolerated  Transfers: Supervision, Minimal Assistance  Bed Mobility: Supervision  Amulation Distance (ft): 74 feet  Ambulation: Supervision  Assistive Device for Ambulation: Roller Walker  Wheelchair Mobility Distance: 145 ft  Wheelchair Mobility: Supervision  Number of Stairs:  (not tested)  Discharge Recommendations: Home with:  [address] with[de-identified] Family Support, First Floor Setup, Home Physical Therapy, Outpatient Physical Therapy  Occupational Therapy:  Eating: Independent  Grooming: Supervision  Bathing: Moderate Assistance, Maximum Assistance  Bathing:  Moderate Assistance, Maximum Assistance  Upper Body Dressing: Minimal Assistance  Lower Body Dressing: Maximum Assistance  Toileting: Maximum Assistance (supervision for urinal use)  Tub/Shower Transfer:  (TBA)  Toilet Transfer: Supervision  Cognition: Within Defined Limits  Orientation: Person, Place, Time, Situation  Discharge Recommendations: Home with:  [address] with[de-identified] Home Occupational Therapy       Estimated Discharge: 6/1/2022      Pain   Tylenol 975 mg Q8H [address]  Tramadol 50 mg Q6H RTC   DVT prophylaxis   Heparin 7500 units Q8H [address]    Bladder plan   Continent    Bowel plan   Continent      Anemia  Assessment & Plan  · Iron panel obtained with elevated ferritin stores   · Monitor H/H- Repeat for tomorrow    Recurrent left knee pain  Assessment & Plan  · Ongoing for several weeks causing difficulty with bearing weight due to pain  · XR knee without acute osseous abnormality, did reveal tricompartmental osteoarthritis similar to imaging done the year prior  · Ortho consulted and attempted an unsuccessful fluid aspiration   · Continue with adequate pain management with OP Ortho f/u for potential steroid injection once acute sepsis/infection resolves   · Diclofenac gel to the knee 4 times daily  · Left foot xray: There is no acute fracture or dislocation  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  Small plantar and large retrocalcaneal spurs  No lytic or blastic osseous lesion  Soft tissues are unremarkable  Essential hypertension  Assessment & Plan  · Low-sodium diet  · Continue Norvasc 10 mg and Metoprolol 25 mg daily      Chronic kidney disease, stage 3 [facility])  Assessment & Plan  Lab Results   Component Value Date    EGFR 40 05/21/2022    EGFR 36 05/20/2022    EGFR 39 05/19/2022    CREATININE 1 84 (H) 05/21/2022    CREATININE 1 98 (H) 05/20/2022    CREATININE 1 87 (H) 05/19/2022   · Baseline creatinine approximately 1 4-1 6  · Did peak to 1 92 in acute care thought to be due to severe sepsis with obstructive pyelonephritis   · Monitor renal function and urine output  · Avoid nephrotoxins and hypotension       Severe sepsis on admission  Assessment & Plan  · Presented to [address] on 5/10 with worsening left flank pain, tachypnea and tachycardia in setting of E  Coli pyelonephritis/bacteremia   · CT revealed hydronephrosis and obstructive pyelonephritis  · Patient transferred [facility] for higher level of care  · He went to the OR on 5/10 with IR for a nephrostomy tube placement  · Resolved      Bacteremia due to Escherichia coli  Assessment & Plan  · Positive growth of E   Coli in one blood culture on 5/10 thought to be due to UTI/pyelonephritis   · Was on IV Rocephin; transitioned to PO Augmentin in acute care for a total of 14 days      Morbid obesity ([address] )  Assessment & Plan  · BMI of 49 99  · Lifestyle and diet modifications       * Obstructive pyelonephritis  Assessment & Plan  · CT on 5/10: "Mild left hydronephrosis status post ureteral stent placement, with 3 mm calculus in the mid ureter adjacent to the stent  Multiple bilateral renal calculi "  · Recent admission and underwent L-side ureteral stent on 4/21- this was found to be obstructive  S/p left PCN placement by IR on 5/10   · Pain control  · Continue Flomax, OP follow up with urology for stone treatment on 5/25  · Continue Augmentin Q12H [address] for total of 14 days (Complete on 5/25/22)  · Planned procedure for tomorrow 5/25 to be pushed until after discharge, discussed with Juanito Buckley PA-C of Urology   · Please update Desire Bhandari tomorrow the discharge date after team to reschedule procedure    [place] back from Nephrology yesterday and they said after discharge they will arrange for nephrostomy tube removal and lithotripsy    Pain under better control with tramadol on a regular scheduled basis      Appreciate IM consultants medical co-management  Labs, medications, and imaging personally reviewed  ROS:  A ten point review of systems was completed and pertinent positives are listed in subjective section  All other systems reviewed were negative  Review of Systems   Constitutional: Negative  Negative for fatigue  HENT: Negative  Negative for trouble swallowing  Eyes: Negative  Negative for visual disturbance  Respiratory: Negative  Negative for shortness of breath  Cardiovascular: Negative  Negative for chest pain  Gastrointestinal: Negative  Negative for abdominal distention, constipation and diarrhea  Endocrine: Negative  Genitourinary: Negative  Negative for difficulty urinating  Musculoskeletal: Positive for myalgias  Skin: Positive for color change  Redness to left forearm noted yesterday with "lumps"- discomfort with the same   Allergic/Immunologic: Negative  Neurological: Negative  Hematological: Negative  Psychiatric/Behavioral: Negative  OBJECTIVE:   /66   Pulse 72   Temp 97 8 °F (36 6 °C) (Tympanic)   Resp 16   Ht 5' 6" (1 676 m)   Wt (!) 148 kg (325 lb 9 9 oz)   SpO2 98%   BMI 52 56 kg/m²     Physical Exam  Vitals reviewed  Constitutional:       General: He is not in acute distress  Appearance: Normal appearance  He is obese  He is not ill-appearing  HENT:      Head: Normocephalic and atraumatic  Right Ear: External ear normal       Left Ear: External ear normal       Nose: Nose normal       Mouth/Throat:      Mouth: Mucous membranes are moist       Pharynx: Oropharynx is clear  Eyes:      Pupils: Pupils are equal, round, and reactive to light  Cardiovascular:      Rate and Rhythm: Normal rate and regular rhythm  Pulses: Normal pulses  Heart sounds: Normal heart sounds  No murmur heard  Pulmonary:      Effort: Pulmonary effort is normal  No respiratory distress  Breath sounds: Normal breath sounds  No wheezing  Abdominal:      General: Bowel sounds are normal  There is no distension  Palpations: Abdomen is soft  Tenderness: There is no abdominal tenderness  Musculoskeletal:         General: Normal range of motion  Cervical back: Normal range of motion  Skin:     General: Skin is warm  Findings: Erythema present  Comments: Mild erythema to left forearm with enlarged lymph nodes, slightly warmer to touch compared to right forearm   Neurological:      General: No focal deficit present  Mental Status: He is alert and oriented to person, place, and time     Psychiatric:         Mood and Affect: Mood normal             Lab Results   Component Value Date    WBC 3 22 (L) 05/25/2022    HGB 9 9 (L) 05/25/2022    HCT 32 5 (L) 05/25/2022    MCV 97 05/25/2022     (H) 05/25/2022     Lab Results   Component Value Date    SODIUM 134 (L) 05/25/2022    K 3 9 05/25/2022     05/25/2022    CO2 20 (L) 05/25/2022    BUN 32 (H) 05/25/2022    CREATININE 1 74 (H) 05/25/2022    GLUC 104 05/25/2022    CALCIUM 9 7 05/25/2022     Lab Results   Component Value Date    INR 1 27 (H) 05/10/2022    INR 1 09 05/10/2022    INR 1 33 (H) 08/24/2019    PROTIME 15 4 (H) 05/10/2022    PROTIME 13 5 05/10/2022    PROTIME 16 6 (H) 08/24/2019           Current Facility-Administered Medications:     acetaminophen (TYLENOL) tablet 975 mg, 975 mg, Oral, Q8H [address], Judi Michaels PA-C, 975 mg at 05/27/22 0546    allopurinol (ZYLOPRIM) tablet 100 mg, 100 mg, Oral, Daily, Deshawn Okeefe PA-C, 100 mg at 05/27/22 0857    amLODIPine (NORVASC) tablet 10 mg, 10 mg, Oral, Daily, Deshawn Okeefe PA-C, 10 mg at 05/27/22 0857    calcium carbonate (TUMS) chewable tablet 1,000 mg, 1,000 mg, Oral, BID PRN, Deshawn Okeefe PA-C    cholecalciferol (VITAMIN D3) tablet 400 Units, 400 Units, Oral, Daily, Deshawn Okeefe PA-C, 400 Units at 05/27/22 0857    Diclofenac Sodium (VOLTAREN) 1 % topical gel 2 g, 2 g, Topical, 4x Daily, Deshawn Okeefe PA-C, 2 g at 05/27/22 0858    heparin (porcine) subcutaneous injection 7,500 Units, 7,500 Units, Subcutaneous, Q8H [address], Judi Michaels PA-C, 7,500 Units at 05/27/22 0548    lidocaine (LIDODERM) 5 % patch 1 patch, 1 patch, Topical, Daily, Deshawn Okeefe PA-C, 1 patch at 05/27/22 0858    metoprolol succinate (TOPROL-XL) 24 hr tablet 25 mg, 25 mg, Oral, Daily, RITA Vogel, 25 mg at 05/27/22 0857    ondansetron (ZOFRAN-ODT) dispersible tablet 4 mg, 4 mg, Oral, Q6H PRN, Deshawn Okeefe PA-C    senna (SENOKOT) tablet 17 2 mg, 2 tablet, Oral, HS PRN, Deshawn Okeefe PA-C    tamsulosin (FLOMAX) capsule 0 4 mg, 0 4 mg, Oral, Daily With Dinner, Judi Michaels PA-C, 0 4 mg at 05/26/22 1718    traMADol (ULTRAM) tablet 50 mg, 50 mg, Oral, Q6H [address], Richard Buck MD, 50 mg at 05/27/22 0547    Past Medical History:   Diagnosis Date    Arthritis     Chronic kidney disease     Gout     Hyperlipidemia     Hypertension     Kidney stone     Obstructive pyelonephritis     Sepsis [facility])        Patient Active Problem List    Diagnosis Date Noted    Anemia 05/13/2022    Obstructive pyelonephritis 04/23/2022    Acute unilateral obstructive uropathy 04/21/2022    Mixed hyperlipidemia 06/02/2021    Morbid obesity 06/02/2021    Recurrent left knee pain 06/02/2021    Insomnia 09/16/2019    Essential hypertension 08/27/2019    Severe sepsis on admission 08/23/2019    Chronic kidney disease, stage 3 [address] [address] ) 08/23/2019    Acute kidney injury superimposed on CKD stage 3 08/06/2019    Bacteremia due to Escherichia coli 08/04/2019    Lactic acidosis 08/04/2019    History of gout 07/15/2019    Vitamin D deficiency 06/10/2015    Morbid obesity ([address] ) 03/23/2015          Doc MD Clive    Total time spent:  30 minutes with more than 50% spent counseling/coordinating care  Counseling includes discussion with patient re: progress and discussion with patient of his/her current medical/functional state/information  Coordination of patient's care was performed in conjunction with consulting services  Time invested included review of patient's labs, vitals, and management of their comorbidities with continued monitoring  The care of the patient was extensively discussed and appropriate treatment plan was formulated unique for this patient  ** Please Note:  voice to text software may have been used in the creation of this document   Although proof errors in transcription or interpretation are a potential of such software**

## 2022-05-27 NOTE — PROGRESS NOTES
PT treatment note       05/27/22 0725   Restrictions/Precautions   LLE Weight Bearing Per Order WBAT   Pain Assessment   Pain Assessment Tool 0-10   Pain Score 9   Pain [facility]/Orientation Orientation: Bilateral;[facility]: Leg   Pain Onset/Description Descriptor: Kyler Flood; Descriptor: [address] Pain Intervention(s) Medication (See MAR)   Toileting Hygiene   Type of Assistance Needed Set-up / clean-up   Physical Assistance Level No physical assistance   Comment used urinal   Toileting Hygiene CARE Score 5   Lying to Sitting on Side of Bed   Type of Assistance Needed Set-up / clean-up   Physical Assistance Level No physical assistance   Comment HOB elevated, bedside rails   Lying to Sitting on Side of Bed CARE Score 5   Sit to Stand   Type of Assistance Needed Physical assistance   Physical Assistance Level 26%-50%   Comment Supervision when standing EOB with bed elevated; required Mod A to stand from [place]; very slow transfers requiering rest break post standing; cues to extend L knee prior to sitting   Sit to Stand CARE Score 3   Ambulation   Primary Mode of Locomotion Prior to Admission Walk   Distance Walked (feet) 63 ft  (and 60 ft)   Assist Device Roller Walker  (with w/c follow)   Gait Pattern Antalgic; Slow Julieta;Decreased foot clearance; Forward Flexion; Shuffle;Step to   Limitations Noted In Endurance;Speed;Strength;Balance   Provided Assistance with: Direction   Walk Assist Level Supervision   Walk 10 Feet   Type of Assistance Needed Supervision   Physical Assistance Level No physical assistance   Comment RW   Walk 10 Feet CARE Score 4   Walk 50 Feet with Two Turns   Type of Assistance Needed Supervision   Physical Assistance Level No physical assistance   Comment RW; very SOB post amb requiring long rest break   Walk 50 Feet with Two Turns CARE Score 4   Cognition   Overall Cognitive Status WFL   Arousal/Participation Alert   Attention Within functional limits   Orientation Level Oriented X4   Memory Within functional limits   Following Commands Follows all commands and directions without difficulty   Therapeutic Exercise   Therapeutic Exercise/Activity BLE stretches into dF, knee flexion and hip flexion with knee extension; BLE seated therex with green resistance band   PT Therapy Minutes   PT Time In 0725   PT Time Out 0855   PT Total Time (minutes) 90   PT Mode of treatment - Individual (minutes) 90   PT Mode of treatment - Concurrent (minutes) 0   PT Mode of treatment - Group (minutes) 0   PT Mode of treatment - Co-treat (minutes) 0   PT Mode of Treatment - Total time(minutes) 90 minutes   PT Cumulative Minutes 695   Cumulative Minutes   Cumulative therapy minutes 1170   Oneyda Carolina, PT

## 2022-05-27 NOTE — NURSING NOTE
Patient ambulates out of bed with walker with assist times one  Needs cueing and encouragement  Medicated for pain that patient states is always 8-10 pain  Noted with edema to bilateral lower extremities right more then left  Dressing changed to nephrostomy tube and flushed as ordered  Will continue to monitor and follow plan of care

## 2022-05-27 NOTE — NURSING NOTE
Pt sleeping in bed  Pt had c/o of pain to L knee, R ankle, and R shoulder  Medicated with scheduled pain meds  Pt Nephro tube dressing changed last night and flushed with 10mL as ordered  Site is clean and intact  draining clear yellow urine  Will continue to monitor

## 2022-05-28 PROCEDURE — 97110 THERAPEUTIC EXERCISES: CPT

## 2022-05-28 PROCEDURE — 97116 GAIT TRAINING THERAPY: CPT | Performed by: PHYSICAL THERAPIST

## 2022-05-28 PROCEDURE — 97542 WHEELCHAIR MNGMENT TRAINING: CPT | Performed by: PHYSICAL THERAPIST

## 2022-05-28 PROCEDURE — 97110 THERAPEUTIC EXERCISES: CPT | Performed by: PHYSICAL THERAPIST

## 2022-05-28 PROCEDURE — 97535 SELF CARE MNGMENT TRAINING: CPT

## 2022-05-28 RX ADMIN — HEPARIN SODIUM 7500 UNITS: 5000 INJECTION INTRAVENOUS; SUBCUTANEOUS at 14:19

## 2022-05-28 RX ADMIN — ACETAMINOPHEN 975 MG: 325 TABLET ORAL at 21:21

## 2022-05-28 RX ADMIN — HEPARIN SODIUM 7500 UNITS: 5000 INJECTION INTRAVENOUS; SUBCUTANEOUS at 05:41

## 2022-05-28 RX ADMIN — DICLOFENAC SODIUM 2 G: 10 GEL TOPICAL at 08:48

## 2022-05-28 RX ADMIN — TAMSULOSIN HYDROCHLORIDE 0.4 MG: 0.4 CAPSULE ORAL at 17:22

## 2022-05-28 RX ADMIN — TRAMADOL HYDROCHLORIDE 50 MG: 50 TABLET, COATED ORAL at 12:10

## 2022-05-28 RX ADMIN — ACETAMINOPHEN 975 MG: 325 TABLET ORAL at 14:17

## 2022-05-28 RX ADMIN — ALLOPURINOL 100 MG: 100 TABLET ORAL at 08:49

## 2022-05-28 RX ADMIN — DICLOFENAC SODIUM 2 G: 10 GEL TOPICAL at 12:18

## 2022-05-28 RX ADMIN — DICLOFENAC SODIUM 2 G: 10 GEL TOPICAL at 21:21

## 2022-05-28 RX ADMIN — TRAMADOL HYDROCHLORIDE 50 MG: 50 TABLET, COATED ORAL at 05:41

## 2022-05-28 RX ADMIN — TRAMADOL HYDROCHLORIDE 50 MG: 50 TABLET, COATED ORAL at 17:22

## 2022-05-28 RX ADMIN — DICLOFENAC SODIUM 2 G: 10 GEL TOPICAL at 17:24

## 2022-05-28 RX ADMIN — ACETAMINOPHEN 975 MG: 325 TABLET ORAL at 05:41

## 2022-05-28 RX ADMIN — CHOLECALCIFEROL TAB 10 MCG (400 UNIT) 400 UNITS: 10 TAB at 08:48

## 2022-05-28 RX ADMIN — LIDOCAINE 1 PATCH: 50 PATCH TOPICAL at 08:52

## 2022-05-28 RX ADMIN — HEPARIN SODIUM 7500 UNITS: 5000 INJECTION INTRAVENOUS; SUBCUTANEOUS at 21:21

## 2022-05-28 NOTE — PROGRESS NOTES
Progress Note - Internal Medicine   Roz Garcia 55 y o  male MRN: [id number]  Unit/Bed#: -01 Encounter: [id number]    A/P:  1  Hyponatremia: continue to monitor with serial labs; no fluid restriction at this time   2  Hyperuricemia: allopurinol to continue with monitoring of uric acid level    3  CKD complicated by ADRIANNE: continue to monitor with serial labs   4  Obstructive uropathy: with a PCN tube, Urology following   5  Hypertension: using Norvasc daily; continue to monitor   6  Tachycardia: appears improved with addition of metoprolol; continue to monitor       Follow up reason for today's visit: hyponatremia/hypertension/hyperuricemia     Obstructive pyelonephritis    Patient Active Problem List   Diagnosis    Morbid obesity ([facility] [address] )    Vitamin D deficiency    History of gout    Bacteremia due to Escherichia coli    Lactic acidosis    Acute kidney injury superimposed on CKD stage 3    Severe sepsis on admission    Chronic kidney disease, stage 3 ([facility] [address] )    Essential hypertension    Insomnia    Mixed hyperlipidemia    Morbid obesity    Recurrent left knee pain    Acute unilateral obstructive uropathy    Obstructive pyelonephritis    Anemia         Subjective:   Mr Peña Hui is evaluated today resting within his bed  He states he is feeling fatigued and with increased leg pain today  He is utilizing pain medication therapy  On ROS he denies any fever, chills, chest pain, sob, n/v/d or abdominal pain  He continues with therapy services  He will need to have follow up with Urology as an outpatient as he recent appointment has been modified  Objective:     Vitals: Blood pressure 107/65, pulse 60, temperature 97 5 °F (36 4 °C), temperature source Tympanic, resp  rate 15, height 5' 6" (1 676 m), weight (!) 148 kg (325 lb 9 9 oz), SpO2 94 %  ,Body mass index is 52 56 kg/m²      Weight (last 2 days)     None            Intake/Output Summary (Last 24 hours) at 5/28/2022 1451  Last data filed at 5/28/2022 1231  Gross per 24 hour   Intake 600 ml   Output 1750 ml   Net -1150 ml     I/O last 3 completed shifts: In: 500 [P O :480; Other:20]  Out: 65 [Urine:3050]         Physical Exam: /65 (BP [facility]: Right arm)   Pulse 60   Temp 97 5 °F (36 4 °C) (Tympanic)   Resp 15   Ht 5' 6" (1 676 m)   Wt (!) 148 kg (325 lb 9 9 oz)   SpO2 94%   BMI 52 56 kg/m²     General Appearance:    Alert, cooperative, no distress, appears stated age   Head:    Normocephalic, without obvious abnormality, atraumatic   Eyes:    Conjunctiva/corneas clear   Ears:    Normal external ears   Nose:   Nares normal, septum midline, mucosa normal, no drainage    or sinus tenderness   Throat:   Lips, mucosa, and tongue normal; teeth and gums normal   Neck:   Supple, symmetrical, trachea midline, no adenopathy;        thyroid:  No enlargement/tenderness/nodules; no carotid    bruit or JVD   Back:     Symmetric, no curvature, ROM normal, no CVA tenderness   Lungs:     Clear to auscultation bilaterally, respirations unlabored   Chest wall:    No tenderness or deformity   Heart:    Regular rate and rhythm, S1 and S2 normal, no murmur, rub   or gallop   Abdomen:     Soft, non-tender, bowel sounds active   Extremities:   Extremities normal, atraumatic, no cyanosis or edema   Skin:   Skin color, texture, turgor normal, no rashes or lesions   Lymph nodes:   Cervical normal   Neurologic:   CNII-XII intact           Lab, Imaging and other studies: I have personally reviewed pertinent labs  CBC: No results found for: WBC, HGB, HCT, MCV, PLT, ADJUSTEDWBC, MCH, MCHC, RDW, MPV, NRBC  CMP: No results found for: NA, K, CL, CO2, ANIONGAP, BUN, CREATININE, GLUCOSE, CALCIUM, AST, ALT, ALKPHOS, PROT, BILITOT, EGFR        Results from last 7 days   Lab Units 05/25/22  0628   POTASSIUM mmol/L 3 9   CHLORIDE mmol/L 103   CO2 mmol/L 20*   BUN mg/dL 32*   CREATININE mg/dL 1 74*   CALCIUM mg/dL 9 7         Phosphorus: No results found for: PHOS  Magnesium: No results found for: MG  Urinalysis: No results found for: Peder Fleeting, SPECGRAV, PHUR, LEUKOCYTESUR, NITRITE, PROTEINUA, GLUCOSEU, KETONESU, BILIRUBINUR, BLOODU  Ionized Calcium: No results found for: CAION  Coagulation: No results found for: PT, INR, APTT  Troponin: No results found for: TROPONINI  ABG: No results found for: PHART, HRC0CMN, PO2ART, GKI9OXB, V5TYTAGI, BEART, SOURCE  Radiology review:     IMAGING  No results found      Current Facility-Administered Medications   Medication Dose Route Frequency    acetaminophen (TYLENOL) tablet 975 mg  975 mg Oral Q8H [address]    allopurinol (ZYLOPRIM) tablet 100 mg  100 mg Oral Daily    amLODIPine (NORVASC) tablet 10 mg  10 mg Oral Daily    calcium carbonate (TUMS) chewable tablet 1,000 mg  1,000 mg Oral BID PRN    cholecalciferol (VITAMIN D3) tablet 400 Units  400 Units Oral Daily    Diclofenac Sodium (VOLTAREN) 1 % topical gel 2 g  2 g Topical 4x Daily    heparin (porcine) subcutaneous injection 7,500 Units  7,500 Units Subcutaneous Q8H [address]    lidocaine (LIDODERM) 5 % patch 1 patch  1 patch Topical Daily    metoprolol succinate (TOPROL-XL) 24 hr tablet 25 mg  25 mg Oral Daily    ondansetron (ZOFRAN-ODT) dispersible tablet 4 mg  4 mg Oral Q6H PRN    senna (SENOKOT) tablet 17 2 mg  2 tablet Oral HS PRN    tamsulosin (FLOMAX) capsule 0 4 mg  0 4 mg Oral Daily With Dinner    traMADol (ULTRAM) tablet 50 mg  50 mg Oral Q6H [address]     Medications Discontinued During This Encounter   Medication Reason    acetaminophen (TYLENOL) tablet 650 mg     acetaminophen (TYLENOL) tablet 975 mg     traMADol (ULTRAM) tablet 50 mg     oxyCODONE (ROXICODONE) IR tablet 2 5 mg     oxyCODONE (ROXICODONE) IR tablet 5 mg     traMADol (ULTRAM) tablet 50 mg     colchicine (COLCRYS) tablet 0 6 mg     traMADol (ULTRAM) tablet 50 mg        RITA Avila      This progress note was produced in part using a dictation device which may document imprecise wording from author's original intent

## 2022-05-28 NOTE — PROGRESS NOTES
05/28/22 0942   Pain Assessment   Pain Assessment Tool 0-10   Pain Score 9   Pain [facility]/Orientation [facility]: Generalized  (shoulder - right, ankle - left and right)   Restrictions/Precautions   Precautions Bed/chair alarms; Fall Risk;Pain   LLE Weight Bearing Per Order WBAT   Cognition   Orientation Level Oriented X4   Subjective   Subjective Patient reports his foot is still swollen, right > left  Roll Left and Right   Type of Assistance Needed Independent   Physical Assistance Level No physical assistance   Roll Left and Right CARE Score 6   Sit to Lying   Type of Assistance Needed Supervision   Physical Assistance Level No physical assistance   Comment Increased time to complete   Sit to Lying CARE Score 4   Lying to Sitting on Side of Bed   Type of Assistance Needed Independent   Physical Assistance Level No physical assistance   Comment HOB elevated minimally to 20-30 degrees   Lying to Sitting on Side of Bed CARE Score 6   Sit to Stand   Type of Assistance Needed Physical assistance   Physical Assistance Level 25% or less   Comment From bed in lowest position, max encouragement   Sit to Stand CARE Score 3   Bed-Chair Transfer   Type of Assistance Needed Physical assistance   Physical Assistance Level 25% or less   Comment RW, encouragement   Chair/Bed-to-Chair Transfer CARE Score 3   Car Transfer   Reason if not Attempted Environmental limitations   Car Transfer CARE Score 10   Walk 10 Feet   Type of Assistance Needed Incidental touching   Physical Assistance Level No physical assistance   Comment RW, CGA, 27 ft   Walk 10 Feet CARE Score 4   Walk 50 Feet with Two Turns   Comment c/o increased pain, near knee buckling   Reason if not Attempted Safety concerns   Walk 50 Feet with Two Turns CARE Score 88   Ambulation   Does the patient walk? 2   Yes   Distance Walked (feet) 26 ft   Assist Device [employer] 50 Feet with Two Turns   Type of Assistance Needed Independent   Physical Assistance Level No physical assistance   Wheel 50 Feet with Two Turns CARE Score 6   Wheel 150 Feet   Comment Limited by right shoulder pain this date   Wheelchair mobility   Does the patient use a wheelchair? 1  Yes   Type of Wheelchair Used 1  Manual   Method Right upper extremity; Left upper extremity   Distance Level Surface (feet) 74 ft  (two trials of 74 feet)   Curb or Single Stair   Comment Deferred 2/2 pain and knee buckling   4 Steps   Comment Deferred 2/2 pain and knee buckling   Therapeutic Interventions   Strengthening Supine and seated LE TE   Assessment   Treatment Assessment Patient presents semi-reclined in bed, agreeable to PT  Patient does request return to bed at end of session for LE elevation  Patient notes increased pain in knees and ankles this date  Remains motivated to improve  Patient continues to require increased time, however, full participation as able  In need of ongoing interventions, more pain limited this date  Cont per POC and progress as tolerated  Problem List Decreased range of motion;Decreased strength;Decreased endurance; Impaired balance;Decreased mobility;Orthopedic restrictions;Pain;Obesity   PT Barriers   Physical Impairment Decreased strength;Decreased range of motion; Impaired balance;Decreased endurance;Decreased mobility;Orthopedic restrictions;Pain   Functional Limitation Walking;Transfers;Standing;Stair negotiation   PT Therapy Minutes   PT Time In 0942   PT Time Out 1100   PT Total Time (minutes) 78   PT Mode of treatment - Individual (minutes) 44   PT Mode of treatment - Concurrent (minutes) 34   PT Mode of treatment - Group (minutes) 0   PT Mode of treatment - Co-treat (minutes) 0   PT Mode of Treatment - Total time(minutes) 78 minutes   PT Cumulative Minutes 773     Seated LE TE:  3x10, B/L LEs  LAQ - 2#  HR - green t-band  TR  March -2 #  Hip ABd - green - unilateral  Hip ADd - umang  HS curl - blue    Supine LE TE:  2x15, B/L LEs  QS  GS  APs  SLR - flexion - left only, painful right  Hip ADd - umang    Patient returned to bed, all needs in reach  Alarm in place and activated  Encouraged use of call bell, patient verbalizes understanding

## 2022-05-28 NOTE — NURSING NOTE
Pt resting comfortable in bed, reports feeling stiff and mild pain this AM routine medication given as ordered refer to [facility]  Ambulates to BR RWx1  Pt needs cueing and encouragement, reassurance  Nephrostomy tube on left side draining clear yellow urine and voiding in urinal as well  Pt states that tube was not draining as much and has been voided more  Tube intact with dressing CDI  Will continue to monitor and follow plan of care  All items within reach and bed alarm intact for safety

## 2022-05-28 NOTE — PROGRESS NOTES
05/28/22 0830   Pain Assessment   Pain Assessment Tool 0-10   Pain Score 9   Pain [facility]/Orientation Orientation: Right;[facility]: Shoulder   Pain Onset/Description Onset: Ongoing   Restrictions/Precautions   Precautions Bed/chair alarms; Fall Risk;Pain   Weight Bearing Restrictions Yes   LLE Weight Bearing Per Order WBAT   Oral Hygiene   Type of Assistance Needed Set-up / clean-up   Physical Assistance Level No physical assistance   Comment Mouthwash   Oral Hygiene CARE Score 5   Grooming   Able To Wash/Dry Face;Comb/Brush Hair   Limitation Noted In Strength   Shower/Bathe Self   Type of Assistance Needed Physical assistance   Physical Assistance Level 51%-75%   Comment Mod/Max A while seated on EOB   Shower/Bathe Self CARE Score 2   Bathing   Assessed Bath Style Sponge Bath   Anticipated D/C Bath Style Shower;Sponge Bath   Able to Jud Jeovanny No   Able to Wash/Rinse/Dry (body part) Left Arm;Right Arm;L Upper Leg;R Upper Leg;Chest;Abdomen  (L foerarm)   Limitations Noted in Balance; Coordination; Endurance;Problem Solving;ROM;Safety; Sequencing;Strength;Timeliness   Positioning Seated   Upper Body Dressing   Type of Assistance Needed Physical assistance   Physical Assistance Level 25% or less   Comment Adjust back/shoulder area   Upper Body Dressing CARE Score 3   Lower Body Dressing   Type of Assistance Needed Physical assistance   Physical Assistance Level 76% or more   Comment Max A to doff/don socks   Lower Body Dressing CARE Score 2   Dressing/Undressing Clothing   Remove UB Clothes Pullover Shirt   Don UB Clothes Pullover Shirt   Limitations Noted In Balance; Coordination; Endurance; Sequencing;Strength;ROM   Positioning Sit Edge Of Bed   Lying to Sitting on Side of Bed   Type of Assistance Needed Supervision   Physical Assistance Level No physical assistance   Comment Elevated HOB   Lying to Sitting on Side of Bed CARE Score 4   Exercise Tools   Other Exercise Tool 1 3# bar B/L chest press, 2x15; 3# DB R UE shoulder flexion/extension, 2x15   Other Exercise Tool 2 2-way postural rows Red Theraband, 2x10   Other Exercise Tool 3 PROM provided to R shoulder in flex/ext, abd/add, IR/ER and elbow pro/sup   Assessment   Treatment Assessment Pt recieved semi-supine in bed and agreeable to participate in OT tx program  Pt completed sponge bath whle seated EOB however up to Max A required to wash/dry distal LE and throughouly was/dry B/L arms due to limitations in R shoulder ROM and reported pain  Pt provided with long langled sponge and reacher and education provided for use of devices and compensatory stratieges to support safety and independence with self care tasks  Pt cooperative throughout tx session  Pt would benefit from continued skilled OT services to support progress towards increased UE strength and ROM, transfers skills, ADL performance and overall safety to help facilitate safe discharge to home  Problem List Decreased strength;Decreased range of motion;Decreased endurance; Impaired balance;Decreased mobility;Obesity;Pain;Orthopedic restrictions   OT Therapy Minutes   OT Time In 0830   OT Time Out 0930   OT Total Time (minutes) 60   OT Mode of treatment - Individual (minutes) 60

## 2022-05-29 LAB
ALBUMIN SERPL BCP-MCNC: 3 G/DL (ref 3.5–5)
ALP SERPL-CCNC: 78 U/L (ref 34–104)
ALT SERPL W P-5'-P-CCNC: 27 U/L (ref 7–52)
ANION GAP SERPL CALCULATED.3IONS-SCNC: 13 MMOL/L (ref 4–13)
AST SERPL W P-5'-P-CCNC: 25 U/L (ref 13–39)
BASOPHILS # BLD MANUAL: 0 THOUSAND/UL (ref 0–0.1)
BASOPHILS NFR MAR MANUAL: 0 % (ref 0–1)
BILIRUB SERPL-MCNC: 0.35 MG/DL (ref 0.2–1)
BUN SERPL-MCNC: 27 MG/DL (ref 5–25)
CALCIUM ALBUM COR SERPL-MCNC: 10.7 MG/DL (ref 8.3–10.1)
CALCIUM SERPL-MCNC: 9.9 MG/DL (ref 8.4–10.2)
CHLORIDE SERPL-SCNC: 99 MMOL/L (ref 96–108)
CO2 SERPL-SCNC: 21 MMOL/L (ref 21–32)
CREAT SERPL-MCNC: 1.73 MG/DL (ref 0.6–1.3)
EOSINOPHIL # BLD MANUAL: 0.25 THOUSAND/UL (ref 0–0.4)
EOSINOPHIL NFR BLD MANUAL: 3 % (ref 0–6)
ERYTHROCYTE [DISTWIDTH] IN BLOOD BY AUTOMATED COUNT: 14.6 % (ref 11.6–15.1)
GFR SERPL CREATININE-BSD FRML MDRD: 43 ML/MIN/1.73SQ M
GLUCOSE P FAST SERPL-MCNC: 95 MG/DL (ref 65–99)
GLUCOSE SERPL-MCNC: 95 MG/DL (ref 65–140)
HCT VFR BLD AUTO: 36.6 % (ref 36.5–49.3)
HGB BLD-MCNC: 10.7 G/DL (ref 12–17)
LYMPHOCYTES # BLD AUTO: 3.13 THOUSAND/UL (ref 0.6–4.47)
LYMPHOCYTES # BLD AUTO: 37 % (ref 14–44)
MCH RBC QN AUTO: 28.7 PG (ref 26.8–34.3)
MCHC RBC AUTO-ENTMCNC: 29.2 G/DL (ref 31.4–37.4)
MCV RBC AUTO: 98 FL (ref 82–98)
METAMYELOCYTES NFR BLD MANUAL: 4 % (ref 0–1)
MONOCYTES # BLD AUTO: 0.85 THOUSAND/UL (ref 0–1.22)
MONOCYTES NFR BLD: 10 % (ref 4–12)
MYELOCYTES NFR BLD MANUAL: 6 % (ref 0–1)
NEUTROPHILS # BLD MANUAL: 3.38 THOUSAND/UL (ref 1.85–7.62)
NEUTS BAND NFR BLD MANUAL: 6 % (ref 0–8)
NEUTS SEG NFR BLD AUTO: 34 % (ref 43–75)
PLATELET # BLD AUTO: 641 THOUSANDS/UL (ref 149–390)
PLATELET BLD QL SMEAR: ABNORMAL
PMV BLD AUTO: 9.8 FL (ref 8.9–12.7)
POTASSIUM SERPL-SCNC: 4.3 MMOL/L (ref 3.5–5.3)
PROT SERPL-MCNC: 8.2 G/DL (ref 6.4–8.4)
RBC # BLD AUTO: 3.73 MILLION/UL (ref 3.88–5.62)
RBC MORPH BLD: NORMAL
SODIUM SERPL-SCNC: 133 MMOL/L (ref 135–147)
WBC # BLD AUTO: 8.45 THOUSAND/UL (ref 4.31–10.16)

## 2022-05-29 PROCEDURE — 97116 GAIT TRAINING THERAPY: CPT | Performed by: PHYSICAL THERAPIST

## 2022-05-29 PROCEDURE — 97530 THERAPEUTIC ACTIVITIES: CPT | Performed by: PHYSICAL THERAPIST

## 2022-05-29 PROCEDURE — 80053 COMPREHEN METABOLIC PANEL: CPT | Performed by: NURSE PRACTITIONER

## 2022-05-29 PROCEDURE — 97110 THERAPEUTIC EXERCISES: CPT | Performed by: PHYSICAL THERAPIST

## 2022-05-29 PROCEDURE — 97535 SELF CARE MNGMENT TRAINING: CPT

## 2022-05-29 PROCEDURE — 97110 THERAPEUTIC EXERCISES: CPT

## 2022-05-29 PROCEDURE — 85027 COMPLETE CBC AUTOMATED: CPT | Performed by: NURSE PRACTITIONER

## 2022-05-29 PROCEDURE — 85007 BL SMEAR W/DIFF WBC COUNT: CPT | Performed by: NURSE PRACTITIONER

## 2022-05-29 RX ADMIN — DICLOFENAC SODIUM 2 G: 10 GEL TOPICAL at 08:27

## 2022-05-29 RX ADMIN — TRAMADOL HYDROCHLORIDE 50 MG: 50 TABLET, COATED ORAL at 13:16

## 2022-05-29 RX ADMIN — METOPROLOL SUCCINATE 25 MG: 25 TABLET, FILM COATED, EXTENDED RELEASE ORAL at 08:30

## 2022-05-29 RX ADMIN — HEPARIN SODIUM 7500 UNITS: 5000 INJECTION INTRAVENOUS; SUBCUTANEOUS at 05:16

## 2022-05-29 RX ADMIN — ALLOPURINOL 100 MG: 100 TABLET ORAL at 08:30

## 2022-05-29 RX ADMIN — TRAMADOL HYDROCHLORIDE 50 MG: 50 TABLET, COATED ORAL at 05:16

## 2022-05-29 RX ADMIN — HEPARIN SODIUM 7500 UNITS: 5000 INJECTION INTRAVENOUS; SUBCUTANEOUS at 14:00

## 2022-05-29 RX ADMIN — ACETAMINOPHEN 975 MG: 325 TABLET ORAL at 05:16

## 2022-05-29 RX ADMIN — CHOLECALCIFEROL TAB 10 MCG (400 UNIT) 400 UNITS: 10 TAB at 08:30

## 2022-05-29 RX ADMIN — DICLOFENAC SODIUM 2 G: 10 GEL TOPICAL at 21:10

## 2022-05-29 RX ADMIN — TAMSULOSIN HYDROCHLORIDE 0.4 MG: 0.4 CAPSULE ORAL at 17:06

## 2022-05-29 RX ADMIN — AMLODIPINE BESYLATE 10 MG: 10 TABLET ORAL at 08:30

## 2022-05-29 RX ADMIN — ACETAMINOPHEN 975 MG: 325 TABLET ORAL at 21:10

## 2022-05-29 RX ADMIN — LIDOCAINE 1 PATCH: 50 PATCH TOPICAL at 08:28

## 2022-05-29 RX ADMIN — DICLOFENAC SODIUM 2 G: 10 GEL TOPICAL at 17:08

## 2022-05-29 RX ADMIN — TRAMADOL HYDROCHLORIDE 50 MG: 50 TABLET, COATED ORAL at 17:05

## 2022-05-29 RX ADMIN — TRAMADOL HYDROCHLORIDE 50 MG: 50 TABLET, COATED ORAL at 23:30

## 2022-05-29 RX ADMIN — HEPARIN SODIUM 7500 UNITS: 5000 INJECTION INTRAVENOUS; SUBCUTANEOUS at 21:10

## 2022-05-29 RX ADMIN — ACETAMINOPHEN 975 MG: 325 TABLET ORAL at 13:16

## 2022-05-29 RX ADMIN — DICLOFENAC SODIUM 2 G: 10 GEL TOPICAL at 13:17

## 2022-05-29 RX ADMIN — TRAMADOL HYDROCHLORIDE 50 MG: 50 TABLET, COATED ORAL at 00:00

## 2022-05-29 NOTE — PROGRESS NOTES
05/29/22 0659   Pain Assessment   Pain Assessment Tool 0-10   Pain Score 6   Pain [facility]/Orientation Orientation: Lower; [facility]: Leg;[facility]: Knee;Orientation: Right;[facility]: Shoulder  (R shoulder, L Knee, R ankle)   Restrictions/Precautions   Precautions Fall Risk;Pain;Bed/chair alarms   LLE Weight Bearing Per Order WBAT   Eating   Type of Assistance Needed Set-up / clean-up   Eating CARE Score 5   Oral Hygiene   Type of Assistance Needed Set-up / clean-up   Physical Assistance Level No physical assistance   Comment preformed EOB, mouthwash only   Oral Hygiene CARE Score 5   Grooming   Able To Initiate Tasks;Comb/Brush Hair;Wash/Dry Face;Brush/Clean Teeth   Limitation Noted In Strength   Shower/Bathe Self   Type of Assistance Needed Physical assistance   Physical Assistance Level 26%-50%   Comment Min A for buttock hygiene   Shower/Bathe Self CARE Score 3   Bathing   Assessed Bath Style Sponge Bath   Anticipated D/C Bath Style Sponge Bath; Shower   Able to Wash/Rinse/Dry (body part) Left Arm;L Upper Leg;R Upper Leg;L Lower Leg/Foot;R Lower Leg/Foot;Chest;Abdomen;Perineal Area   Limitations Noted in ROM; Problem Solving;Strength; Coordination;Balance   Positioning Seated;Standing   Adaptive Equipment Longhand Sponge;Longhand Reacher   Tub/Shower Transfer   Reason Not Assessed Sponge Bath   Upper Body Dressing   Type of Assistance Needed Set-up / Lisabeth Batters; Verbal cues   Physical Assistance Level No physical assistance   Comment Pt ed to compensatory dressing strat   Upper Body Dressing CARE Score 4   Lower Body Dressing   Type of Assistance Needed Supervision;Set-up / clean-up   Physical Assistance Level No physical assistance   Comment supervision for standing to don, Pt ed to compensatory strategies   Lower Body Dressing CARE Score 4   Putting On/Taking Off Footwear   Type of Assistance Needed Adaptive equipment   Physical Assistance Level No physical assistance   Comment Pt ed to use of sock aide, demonstrates good understanding   Putting On/Taking Off Footwear CARE Score 6   Dressing/Undressing Clothing   Remove UB Clothes Pullover Shirt   Don UB Clothes Pullover Shirt   Remove LB Clothes Shorts;Socks   Don LB Clothes Undergarment;Socks   Limitations Noted In Balance; Endurance;Strength;ROM   Adaptive Equipment Reacher;Sock Aide   Positioning Sit Edge Of Bed;Standing   Sit to Lying   Type of Assistance Needed Supervision   Physical Assistance Level No physical assistance   Sit to Lying CARE Score 4   Lying to Sitting on Side of Bed   Type of Assistance Needed Supervision   Physical Assistance Level No physical assistance   Lying to Sitting on Side of Bed CARE Score 4   Sit to Stand   Type of Assistance Needed Supervision;Verbal cues   Physical Assistance Level No physical assistance   Sit to Stand CARE Score 4   Cognition   Overall Cognitive Status WFL   Orientation Level Oriented X4   Additional Activities   Additional Activities Comments Pt ed to energy conservation strategies, Pt demonstrates good understanding discussing how he will implement strategies at home   Activity Tolerance   Activity Tolerance Patient tolerated treatment well   Assessment   Treatment Assessment Pt presents in bed agreeable to therapy this date  Pt seen for ADL session  Pt ed to dressing strat to compensate for ROM limitations and energy conservation  Pt given sock aide this date to increase independence in dressing  Able to don socks independently with adapt equip  Pt ed to dressing stategies, Pt demonstrates good understanding able to recall strategies for UB dressing to compensate for ROM limitations  Pt needed short breaks through out session due to low endurance  Pt able to show a high level of independence with encouragement   Pt would benefit from continued skilled OT intervention to increase independence in ADL and functional transfers,   Prognosis Good   Problem List Decreased strength;Decreased range of motion;Decreased endurance; Impaired balance;Decreased safety awareness;Pain;Obesity; Decreased mobility   Plan   Treatment/Interventions Functional transfer training;ADL retraining; Therapeutic exercise; Endurance training; Compensatory technique education;OT   Progress Progressing toward goals   OT Therapy Minutes   OT Time In 0659   OT Time Out 0831   OT Total Time (minutes) 92   Therapy Time missed   Time missed?  No

## 2022-05-29 NOTE — PROGRESS NOTES
05/29/22 0830   Pain Assessment   Pain Assessment Tool 0-10   Pain Score 5   Pain [facility]/Orientation Orientation: Left; [facility]: Knee;Orientation: Right;[facility]: Foot; Location: Hip   Restrictions/Precautions   Precautions Bed/chair alarms; Fall Risk;Pain   LLE Weight Bearing Per Order WBAT   Cognition   Orientation Level Oriented X4   Subjective   Subjective Patient reports he is motivated, but just can't do it today  Roll Left and Right   Type of Assistance Needed Independent   Roll Left and Right CARE Score 6   Lying to Sitting on Side of Bed   Type of Assistance Needed Independent   Physical Assistance Level No physical assistance   Lying to Sitting on Side of Bed CARE Score 6   Sit to Stand   Type of Assistance Needed Supervision   Physical Assistance Level No physical assistance   Comment Bed in low position   Sit to Stand CARE Score 4   Bed-Chair Transfer   Type of Assistance Needed Incidental touching   Physical Assistance Level No physical assistance   Comment RW, encouragement   Chair/Bed-to-Chair Transfer CARE Score 4   Transfer Bed/Chair/Wheelchair   Findings Increased time for transfers   Car Transfer   Reason if not Attempted Environmental limitations   Car Transfer CARE Score 10   Walk 10 Feet   Type of Assistance Needed Incidental touching   Physical Assistance Level 25% or less   Comment RW, CGA, w/c follow   Walk 10 Feet CARE Score 3   Walk 50 Feet with Two Turns   Comment Declined 2/2 pain   Ambulation   Does the patient walk? 2  Yes   Primary Mode of Locomotion Prior to Admission Walk   Distance Walked (feet) 32 ft   Assist Device Roller Walker   Findings w/c follow, max encouragement   Curb or Single Stair   Comment Pt defers 2/2 pain   Therapeutic Interventions   Strengthening Supine and seated LE TE   Other Comments   Comments Pt encouraged upright/OOB with LEs positioned and elevated   Assessment   Treatment Assessment Patient agreeable to a m  session    He demonstrates ongoing pain limitations in left knee, right ankle, right hip, right shoulder  He remains limited by pain and will benefit from ongoing interventions to maximize return to PLOF  Problem List Decreased strength;Decreased range of motion;Decreased endurance; Impaired balance;Decreased safety awareness;Pain;Obesity; Decreased mobility   PT Barriers   Physical Impairment Decreased strength;Decreased range of motion; Impaired balance;Decreased endurance;Decreased mobility;Orthopedic restrictions;Pain   Functional Limitation Walking;Transfers;Standing;Stair negotiation   Plan   Treatment/Interventions Functional transfer training;LE strengthening/ROM; Elevations; Endurance training; Therapeutic exercise;Patient/family training;Equipment eval/education; Bed mobility;Gait training   Progress Progressing toward goals   PT Therapy Minutes   PT Time In 0830   PT Time Out 0930   PT Total Time (minutes) 60   PT Mode of treatment - Individual (minutes) 60   PT Mode of treatment - Concurrent (minutes) 0   PT Mode of treatment - Group (minutes) 0   PT Mode of treatment - Co-treat (minutes) 0   PT Mode of Treatment - Total time(minutes) 60 minutes   PT Cumulative Minutes 833     Supine LE TE:  2x15, B/L LEs  SAQ  QS  GS  APs  Heel slides  Hip ADd - umang  Hip ABd - Green T-band  HS Curls - seated  LAQ - 1#    Patient remains OOB in chair, all needs in reach  Alarm in place and activated  Encouraged use of call bell, patient verbalizes understanding

## 2022-05-29 NOTE — NURSING NOTE
Pt awake, ambulated to BR min assist with RW  Pt had large BM, encouraged pt to attempt to wipe bottom after BM pt refused  Pt stated that his right shoulder is too stiff and can't reach back their and that therapy is working with him to loosen it up  Pt also requires assistance to pull up/down  C/O generalized pain, medicated with scheduled medications for pain refer to [facility]  Pt had been doing therapy exercises in bed with his b/l le, states can move his feet more better than yesterday  Will continue to monitor and follow plan of care  Bed alarm intact for safety

## 2022-05-29 NOTE — PROGRESS NOTES
05/29/22 1249   Pain Assessment   Pain Assessment Tool 0-10   Pain Score 8   Pain Location/Orientation Location: Shoulder;Orientation: Right   Restrictions/Precautions   Precautions Fall Risk;Bed/chair alarms;Pain   Weight Bearing Restrictions Yes   LLE Weight Bearing Per Order WBAT   Sit to Lying   Type of Assistance Needed Verbal cues; Supervision   Physical Assistance Level No physical assistance   Comment Ed to technique for better bed positioning   Sit to Lying CARE Score 4   Lying to Sitting on Side of Bed   Type of Assistance Needed Supervision   Physical Assistance Level No physical assistance   Lying to Sitting on Side of Bed CARE Score 4   ROM- Right Upper Extremities   RUE ROM Comment Supported shoulder extension/flexion, chest press 1 x15   Therapeutic Excerise-Strength   UE Strength Yes   Exercise Tools   Other Exercise Tool 1 3# 2x15 weight bicep/L shoulder extension/flexion, LUE chest press, RUE 2# weight LUE 3# weight abduction/abbduction, pronation/supination   Cognition   Overall Cognitive Status WFL   Orientation Level Oriented X4   Activity Tolerance   Activity Tolerance Patient tolerated treatment well   Assessment   Treatment Assessment Pt presents in bed agreeable to therapy  PT participated in thera ex to increase strength and endurance for safer functional transfers and ADLs, see pertainent section of notes for details  Pt demonstrated good effort and form this session  Pain and ROM limitations in RUE required support of therapist to complete  Pt is progressing well toward goals  Pt required short breaks between sets  It is recommended that pt continue skilled OT intervention to increase safety and independence in ADLs and functional transfers  Prognosis Good   Problem List Decreased strength;Decreased range of motion;Decreased endurance; Impaired balance;Decreased safety awareness;Pain   Plan   Treatment/Interventions Therapeutic exercise;OT;Compensatory technique education; Endurance training;Functional transfer training;ADL retraining   Progress Progressing toward goals   OT Therapy Minutes   OT Time In 1249   OT Time Out 1329   OT Total Time (minutes) 40   OT Mode of treatment - Individual (minutes) 40   OT Mode of treatment - Concurrent (minutes) 0   OT Mode of treatment - Group (minutes) 0   OT Mode of treatment - Co-treat (minutes) 0   OT Mode of Treatment - Total time(minutes) 40 minutes   OT Cumulative Minutes 515   Therapy Time missed   Time missed?  No

## 2022-05-30 PROCEDURE — 97530 THERAPEUTIC ACTIVITIES: CPT

## 2022-05-30 PROCEDURE — 97116 GAIT TRAINING THERAPY: CPT

## 2022-05-30 PROCEDURE — 97110 THERAPEUTIC EXERCISES: CPT

## 2022-05-30 RX ADMIN — TRAMADOL HYDROCHLORIDE 50 MG: 50 TABLET, COATED ORAL at 05:56

## 2022-05-30 RX ADMIN — DICLOFENAC SODIUM 2 G: 10 GEL TOPICAL at 17:20

## 2022-05-30 RX ADMIN — HEPARIN SODIUM 7500 UNITS: 5000 INJECTION INTRAVENOUS; SUBCUTANEOUS at 21:08

## 2022-05-30 RX ADMIN — HEPARIN SODIUM 7500 UNITS: 5000 INJECTION INTRAVENOUS; SUBCUTANEOUS at 14:05

## 2022-05-30 RX ADMIN — DICLOFENAC SODIUM 2 G: 10 GEL TOPICAL at 09:13

## 2022-05-30 RX ADMIN — AMLODIPINE BESYLATE 10 MG: 10 TABLET ORAL at 09:14

## 2022-05-30 RX ADMIN — LIDOCAINE 1 PATCH: 50 PATCH TOPICAL at 09:14

## 2022-05-30 RX ADMIN — METOPROLOL SUCCINATE 25 MG: 25 TABLET, FILM COATED, EXTENDED RELEASE ORAL at 09:14

## 2022-05-30 RX ADMIN — TRAMADOL HYDROCHLORIDE 50 MG: 50 TABLET, COATED ORAL at 23:27

## 2022-05-30 RX ADMIN — DICLOFENAC SODIUM 2 G: 10 GEL TOPICAL at 21:08

## 2022-05-30 RX ADMIN — TRAMADOL HYDROCHLORIDE 50 MG: 50 TABLET, COATED ORAL at 12:09

## 2022-05-30 RX ADMIN — TRAMADOL HYDROCHLORIDE 50 MG: 50 TABLET, COATED ORAL at 17:20

## 2022-05-30 RX ADMIN — ACETAMINOPHEN 975 MG: 325 TABLET ORAL at 05:56

## 2022-05-30 RX ADMIN — DICLOFENAC SODIUM 2 G: 10 GEL TOPICAL at 12:09

## 2022-05-30 RX ADMIN — ALLOPURINOL 100 MG: 100 TABLET ORAL at 09:13

## 2022-05-30 RX ADMIN — ACETAMINOPHEN 975 MG: 325 TABLET ORAL at 14:05

## 2022-05-30 RX ADMIN — ACETAMINOPHEN 975 MG: 325 TABLET ORAL at 21:08

## 2022-05-30 RX ADMIN — TAMSULOSIN HYDROCHLORIDE 0.4 MG: 0.4 CAPSULE ORAL at 17:20

## 2022-05-30 RX ADMIN — CHOLECALCIFEROL TAB 10 MCG (400 UNIT) 400 UNITS: 10 TAB at 09:13

## 2022-05-30 RX ADMIN — HEPARIN SODIUM 7500 UNITS: 5000 INJECTION INTRAVENOUS; SUBCUTANEOUS at 05:56

## 2022-05-30 NOTE — NURSING NOTE
Pt awake resting in bed  Used urinal overnight setup staff emptied, left nephrostomy tube staff manages, dressing changed at site HS  Medicated for pain with scheduled meds refer to [facility]  Repos self in bed  Will continue to monitor and follow plan of care  All items within reach and bed alarm intact for safety

## 2022-05-30 NOTE — PLAN OF CARE
Problem: PAIN - ADULT  Goal: Verbalizes/displays adequate comfort level or baseline comfort level  Description: Interventions:  - Encourage patient to monitor pain and request assistance  - Assess pain using appropriate pain scale  - Administer analgesics based on type and severity of pain and evaluate response  - Implement non-pharmacological measures as appropriate and evaluate response  - Consider cultural and social influences on pain and pain management  - Notify physician/advanced practitioner if interventions unsuccessful or patient reports new pain  Outcome: Progressing     Problem: INFECTION - ADULT  Goal: Absence or prevention of progression during hospitalization  Description: INTERVENTIONS:  - Assess and monitor for signs and symptoms of infection  - Monitor lab/diagnostic results  - Monitor all insertion sites, i e  indwelling lines, tubes, and drains  - Monitor endotracheal if appropriate and nasal secretions for changes in amount and color  - [place] appropriate cooling/warming therapies per order  - Administer medications as ordered  - Instruct and encourage patient and family to use good hand hygiene technique  - Identify and instruct in appropriate isolation precautions for identified infection/condition  Outcome: Progressing  Goal: Absence of fever/infection during neutropenic period  Description: INTERVENTIONS:  - Monitor WBC    Outcome: Progressing     Problem: DISCHARGE PLANNING  Goal: Discharge to home or other facility with appropriate resources  Description: INTERVENTIONS:  - Identify barriers to discharge w/patient and caregiver  - Arrange for needed discharge resources and transportation as appropriate  - Identify discharge learning needs (meds, wound care, etc )  - Arrange for interpretive services to assist at discharge as needed  - Refer to Case Management Department for coordinating discharge planning if the patient needs post-hospital services based on physician/advanced practitioner order or complex needs related to functional status, cognitive ability, or social support system  Outcome: Progressing

## 2022-05-30 NOTE — NURSING NOTE
Patient out of bed with therapy only  Patient with little motivation to get out of bed for nursing  Remains in bed for meals  Educated on importance of repositioning side to side  Medicated for pain as ordered  Remains continant of bladder  Nephrostomy tube draining clear yellow urine and dressing changed around site  Will continue to monitor and follow plan of care

## 2022-05-30 NOTE — PROGRESS NOTES
05/30/22 0930   Pain Assessment   Pain Assessment Tool 0-10   Pain Score 9   Pain Location/Orientation Orientation: Bilateral;Orientation: Lower; Location: Leg;Location: Knee   Restrictions/Precautions   Precautions Fall Risk;Bed/chair alarms;Pain   Weight Bearing Restrictions Yes   LLE Weight Bearing Per Order WBAT   Cognition   Overall Cognitive Status WFL   Arousal/Participation Alert; Cooperative   Attention Within functional limits   Orientation Level Oriented X4   Memory Within functional limits   Following Commands Follows all commands and directions without difficulty   Roll Left and Right   Type of Assistance Needed Independent   Roll Left and Right CARE Score 6   Sit to Lying   Type of Assistance Needed Supervision   Sit to Lying CARE Score 4   Lying to Sitting on Side of Bed   Type of Assistance Needed Supervision   Lying to Sitting on Side of Bed CARE Score 4   Sit to Stand   Type of Assistance Needed Incidental touching   Comment CG   Sit to Stand CARE Score 4   Bed-Chair Transfer   Type of Assistance Needed Incidental touching   Comment CG   Chair/Bed-to-Chair Transfer CARE Score 4   Transfer Bed/Chair/Wheelchair   Limitations Noted In Balance; Endurance;UE Strength;LE Strength   Adaptive Equipment Roller Walker   Stand Pivot Contact Guard   Sit to [employer]   Stand to [facility]   Supine to Sit Modified Independent   Sit to Supine Supervision   Car Transfer   Reason if not Attempted Environmental limitations   Car Transfer CARE Score 10   Walk 10 Feet   Type of Assistance Needed Supervision   Walk 10 Feet CARE Score 4   Walk 50 Feet with Two Turns   Type of Assistance Needed Supervision   Walk 50 Feet with Two Turns CARE Score 4   Walk 150 Feet   Reason if not Attempted Safety concerns   Walk 150 Feet CARE Score 88   Walking 10 Feet on Uneven Surfaces   Reason if not Attempted Safety concerns   Walking 10 Feet on Uneven Surfaces CARE Score 88   Ambulation   Does the patient walk?  2  Yes   Primary Mode of Locomotion Prior to Admission Walk   Distance Walked (feet) 121 ft  (63)   Assist Device Roller Walker   Gait Pattern Antalgic; Slow Julieta;Decreased foot clearance; Forward Flexion; Wide TEO;Step through; Improper weight shift   Limitations Noted In Balance; Endurance;Posture; Safety;Speed;Strength   Provided Assistance with: Direction   Walk Assist Level Supervision   Therapeutic Interventions   Strengthening seated ther ex   Other gait, transfers,   Assessment   Treatment Assessment Pt seated in bed to start session, pleasant and agreeable  Pt stated that something feels different today and he is willing to get up go for a walk  Pt able to amb 121' with RW and CS  Pt completed seated ther ex with no complication  Pt able to amb 61' with RW and CS  Pt completed bed mobility with S  Seated up right in bed to end session with call  bell in reach and all needs met  Pt will benefit from continued skilled PT to improve over all strength and endurence  PT Barriers   Physical Impairment Decreased strength;Decreased range of motion; Impaired balance;Decreased endurance;Decreased mobility;Orthopedic restrictions;Pain   Functional Limitation Walking;Transfers;Standing;Stair negotiation   Plan   Treatment/Interventions Functional transfer training;LE strengthening/ROM; Elevations; Therapeutic exercise; Endurance training;Bed mobility;Gait training   Progress Progressing toward goals   PT Therapy Minutes   PT Time In 0930   PT Time Out 1030   PT Total Time (minutes) 60   PT Mode of treatment - Individual (minutes) 60   PT Mode of treatment - Concurrent (minutes) 0   PT Mode of treatment - Group (minutes) 0   PT Mode of treatment - Co-treat (minutes) 0   PT Mode of Treatment - Total time(minutes) 60 minutes   PT Cumulative Minutes 893   Therapy Time missed   Time missed?  No

## 2022-05-31 LAB
FLUAV RNA RESP QL NAA+PROBE: NEGATIVE
FLUBV RNA RESP QL NAA+PROBE: NEGATIVE
RSV RNA RESP QL NAA+PROBE: NEGATIVE
SARS-COV-2 RNA RESP QL NAA+PROBE: POSITIVE

## 2022-05-31 PROCEDURE — 97110 THERAPEUTIC EXERCISES: CPT

## 2022-05-31 PROCEDURE — 97537 COMMUNITY/WORK REINTEGRATION: CPT

## 2022-05-31 PROCEDURE — 97116 GAIT TRAINING THERAPY: CPT

## 2022-05-31 PROCEDURE — 0241U HB NFCT DS VIR RESP RNA 4 TRGT: CPT | Performed by: PHYSICAL MEDICINE & REHABILITATION

## 2022-05-31 PROCEDURE — 99232 SBSQ HOSP IP/OBS MODERATE 35: CPT | Performed by: PHYSICAL MEDICINE & REHABILITATION

## 2022-05-31 PROCEDURE — 97535 SELF CARE MNGMENT TRAINING: CPT

## 2022-05-31 PROCEDURE — 97530 THERAPEUTIC ACTIVITIES: CPT

## 2022-05-31 RX ORDER — CALCIUM CARBONATE 200(500)MG
1000 TABLET,CHEWABLE ORAL 2 TIMES DAILY PRN
Qty: 30 TABLET | Refills: 0
Start: 2022-05-31

## 2022-05-31 RX ORDER — ALLOPURINOL 100 MG/1
100 TABLET ORAL DAILY
Refills: 0
Start: 2022-05-31

## 2022-05-31 RX ORDER — TRAMADOL HYDROCHLORIDE 50 MG/1
50 TABLET ORAL EVERY 6 HOURS SCHEDULED
Qty: 40 TABLET | Refills: 0
Start: 2022-05-31 | End: 2022-06-10

## 2022-05-31 RX ORDER — ACETAMINOPHEN 325 MG/1
975 TABLET ORAL EVERY 8 HOURS SCHEDULED
Refills: 0
Start: 2022-05-31

## 2022-05-31 RX ORDER — AMLODIPINE BESYLATE 10 MG/1
10 TABLET ORAL DAILY
Qty: 60 TABLET | Refills: 0
Start: 2022-05-31

## 2022-05-31 RX ORDER — ONDANSETRON 4 MG/1
4 TABLET, ORALLY DISINTEGRATING ORAL EVERY 6 HOURS PRN
Qty: 20 TABLET | Refills: 0
Start: 2022-05-31

## 2022-05-31 RX ORDER — SENNOSIDES 8.6 MG
17.2 TABLET ORAL
Refills: 0
Start: 2022-05-31

## 2022-05-31 RX ORDER — TAMSULOSIN HYDROCHLORIDE 0.4 MG/1
0.4 CAPSULE ORAL
Refills: 0
Start: 2022-05-31

## 2022-05-31 RX ORDER — METOPROLOL SUCCINATE 25 MG/1
25 TABLET, EXTENDED RELEASE ORAL DAILY
Refills: 0
Start: 2022-06-01

## 2022-05-31 RX ORDER — OMEGA-3S/DHA/EPA/FISH OIL/D3 300MG-1000
400 CAPSULE ORAL DAILY
Refills: 0
Start: 2022-05-31

## 2022-05-31 RX ORDER — LIDOCAINE 50 MG/G
1 PATCH TOPICAL DAILY
Refills: 0
Start: 2022-06-01

## 2022-05-31 RX ADMIN — DICLOFENAC SODIUM 2 G: 10 GEL TOPICAL at 17:04

## 2022-05-31 RX ADMIN — TRAMADOL HYDROCHLORIDE 50 MG: 50 TABLET, COATED ORAL at 11:59

## 2022-05-31 RX ADMIN — CHOLECALCIFEROL TAB 10 MCG (400 UNIT) 400 UNITS: 10 TAB at 08:28

## 2022-05-31 RX ADMIN — DICLOFENAC SODIUM 2 G: 10 GEL TOPICAL at 11:59

## 2022-05-31 RX ADMIN — HEPARIN SODIUM 7500 UNITS: 5000 INJECTION INTRAVENOUS; SUBCUTANEOUS at 14:11

## 2022-05-31 RX ADMIN — HEPARIN SODIUM 7500 UNITS: 5000 INJECTION INTRAVENOUS; SUBCUTANEOUS at 05:38

## 2022-05-31 RX ADMIN — TAMSULOSIN HYDROCHLORIDE 0.4 MG: 0.4 CAPSULE ORAL at 16:46

## 2022-05-31 RX ADMIN — DICLOFENAC SODIUM 2 G: 10 GEL TOPICAL at 21:22

## 2022-05-31 RX ADMIN — AMLODIPINE BESYLATE 10 MG: 10 TABLET ORAL at 08:28

## 2022-05-31 RX ADMIN — TRAMADOL HYDROCHLORIDE 50 MG: 50 TABLET, COATED ORAL at 05:38

## 2022-05-31 RX ADMIN — METOPROLOL SUCCINATE 25 MG: 25 TABLET, FILM COATED, EXTENDED RELEASE ORAL at 08:30

## 2022-05-31 RX ADMIN — LIDOCAINE 1 PATCH: 50 PATCH TOPICAL at 08:28

## 2022-05-31 RX ADMIN — DICLOFENAC SODIUM 2 G: 10 GEL TOPICAL at 08:28

## 2022-05-31 RX ADMIN — ALLOPURINOL 100 MG: 100 TABLET ORAL at 08:28

## 2022-05-31 RX ADMIN — TRAMADOL HYDROCHLORIDE 50 MG: 50 TABLET, COATED ORAL at 23:10

## 2022-05-31 RX ADMIN — HEPARIN SODIUM 7500 UNITS: 5000 INJECTION INTRAVENOUS; SUBCUTANEOUS at 21:22

## 2022-05-31 RX ADMIN — ACETAMINOPHEN 975 MG: 325 TABLET ORAL at 21:22

## 2022-05-31 RX ADMIN — ACETAMINOPHEN 975 MG: 325 TABLET ORAL at 05:38

## 2022-05-31 RX ADMIN — TRAMADOL HYDROCHLORIDE 50 MG: 50 TABLET, COATED ORAL at 17:03

## 2022-05-31 RX ADMIN — ACETAMINOPHEN 975 MG: 325 TABLET ORAL at 14:11

## 2022-05-31 NOTE — PROGRESS NOTES
PM&R PROGRESS NOTE:  Marjan Dietrich 55 y o  male MRN: [id number]  Unit/Bed#: -01 Encounter: [id number]        Rehabilitation Diagnosis: Impairment of mobility, safety and Activities of Daily Living (ADLs) due to Debility:  16  Debility (Non-cardiac/Non-pulmonary)    HPI: Miguel Farrell a 55 y  o  male with a PMH significant for CKD, HTN, HLD, obstructive pyelonephritis with coli bacteremia, who presented to the [address] [place] [address] ED on 05/10  He had a recent discharge on 4/30/22 s/p cystoscopy and stent placement on 4/22/22   After his discharge, patient experienced intermittent waves of left flank pain described as sharp, non-radiating, lightheadedness, nausea  He also admitted to worsening shortness of breath  He arrived to ED with tachypnea and tachycardia  CT scan obtained revealing hydronephrosis and obstructive pyelonephritis  Urology was consulted and recommended acute transfer to [facility]  Patient diagnosed with sepsis and initiated on IV antibiotics  IR was consulted for concerns with potential obstruction of stent placement and noted to be in ADRIANNE  Patient went to the OR with IR for obstructive pyelonephritis and is s/p IR Nephrostomy tube placement on 5/10  He was intubated for the procedure and extubated on 5/11  Ortho was consulted on 5/13 for left knee pain and a left knee aspiration was performed  Patient is to continue WBAT to LLE  The aspiration was unsuccessful with ortho recommending adequate pain control and potential steroid injection in the future  Sepsis resolved and patient was transferred from ICU to med surg  On 5/16 patient was transitioned from IV antibiotics to oral to complete a total of 14 days  Patient participated with PT/OT and deemed appropriate for post acute rehabilitation services  He was accepted to [facility] and once hemodynamically stable, arrived on 5/19/22       SUBJECTIVE: Patient seen face to face    Patient is happy with his progress  He admits to myalgias to his right shoulder around 5-6/10 on the pain scale and also to the RLE after therapy today  Patient is agreeable to a SNF as he does not feel he can take care of himself at home for the time being  ASSESSMENT: Stable, progressing      PLAN:    Rehabilitation   Functional deficits:  Impaired mobility, self care, weakness to LLE due to pain in left knee and foot, pain to right shoulder, nephrostomy tube and inability to flush    Continue current rehabilitation plan of care to maximize function       Functional update:   o PT:  Lying to sitting on side of bed: supervision  Sit-stand: supervision  Bed-chair transfer: supervision   Ambulation: supervision  Stairs: CGA  o OT:  Oral Hygiene: s/u c/u  Eating: s/u c/u  Grooming: initiates tasks  Showering/bathing: min assist  UB dressing: s/u c/u  LB dressing: supervision; s/u c/u  Putting on/taking off footwear: with adaptive equipment, no physical assistance     Estimated Discharge: 6/1/22 tentatively; awaiting placement to SNF      Pain   Tylenol 975 mg Q8H [address]   Voltaren gel 4x daily   Lidocaine patch Q12H to right shoulder    Tramadol 60 mg Q6H [address]    DVT prophylaxis   Heparin 7500 units Q8H    Bladder plan   Continent    Bowel plan   Continent      Anemia  Assessment & Plan  · Iron panel obtained with elevated ferritin stores   · Monitor H/H- Repeat for tomorrow    Recurrent left knee pain  Assessment & Plan  · Ongoing for several weeks causing difficulty with bearing weight due to pain  · XR knee without acute osseous abnormality, did reveal tricompartmental osteoarthritis similar to imaging done the year prior  · Ortho consulted and attempted an unsuccessful fluid aspiration   · Continue with adequate pain management with OP Ortho f/u for potential steroid injection once acute sepsis/infection resolves   · Diclofenac gel to the knee 4 times daily  · Left foot xray: There is no acute fracture or dislocation  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  Small plantar and large retrocalcaneal spurs  No lytic or blastic osseous lesion  Soft tissues are unremarkable  Essential hypertension  Assessment & Plan  · Low-sodium diet  · Continue Norvasc 10 mg and Metoprolol 25 mg daily      Chronic kidney disease, stage 3 [facility])  Assessment & Plan  Lab Results   Component Value Date    EGFR 43 05/29/2022    EGFR 43 05/25/2022    EGFR 40 05/21/2022    CREATININE 1 73 (H) 05/29/2022    CREATININE 1 74 (H) 05/25/2022    CREATININE 1 84 (H) 05/21/2022   · Baseline creatinine approximately 1 4-1 6  · Monitor renal function and urine output  · Avoid nephrotoxins and hypotension       Severe sepsis on admission  Assessment & Plan  · Presented to [address] on 5/10 with worsening left flank pain, tachypnea and tachycardia in setting of E  Coli pyelonephritis/bacteremia   · CT revealed hydronephrosis and obstructive pyelonephritis  · Patient transferred to [facility] for higher level of care  · He went to the OR on 5/10 with IR for a nephrostomy tube placement  · Resolved      Bacteremia due to Escherichia coli  Assessment & Plan  · Positive growth of E  Coli in one blood culture on 5/10 thought to be due to UTI/pyelonephritis   · Was on IV Rocephin; transitioned to PO Augmentin in acute care for a total of 14 days; antibiotics completed      Morbid obesity ([address] )  Assessment & Plan  · BMI of 51 73  · Lifestyle and diet modifications       * Obstructive pyelonephritis  Assessment & Plan  · CT on 5/10: "Mild left hydronephrosis status post ureteral stent placement, with 3 mm calculus in the mid ureter adjacent to the stent  Multiple bilateral renal calculi "  · Recent admission and underwent L-side ureteral stent on 4/21- this was found to be obstructive   S/p left PCN placement by IR on 5/10   · Pain control  · Continue Flomax, OP follow up with urology for stone treatment on 5/25  · Continue Augmentin Q12H [address] for total of 14 days (Completed on 5/25/22)  · Planned procedure for as OP around 6/17        Appreciate IM consultants medical co-management  Labs, medications, and imaging personally reviewed  ROS:  A ten point review of systems was completed and pertinent positives are listed in subjective section  All other systems reviewed were negative  Review of Systems   Constitutional: Negative for appetite change and fatigue  HENT: Negative  Negative for trouble swallowing  Eyes: Negative  Negative for visual disturbance  Respiratory: Negative  Negative for shortness of breath  Cardiovascular: Negative  Negative for chest pain  Gastrointestinal: Negative  Negative for abdominal distention, constipation and diarrhea  Genitourinary: Negative  Negative for difficulty urinating  Musculoskeletal: Positive for myalgias  Skin: Negative  Neurological: Negative  Hematological: Negative  Psychiatric/Behavioral: Negative  OBJECTIVE:   /58 (BP Location: Right arm)   Pulse 73   Temp 97 7 °F (36 5 °C) (Tympanic)   Resp 18   Ht 5' 6" (1 676 m)   Wt (!) 145 kg (320 lb 8 oz)   SpO2 94%   BMI 51 73 kg/m²     Physical Exam  Vitals and nursing note reviewed  Constitutional:       General: He is not in acute distress  Appearance: Normal appearance  He is obese  He is not ill-appearing  HENT:      Head: Normocephalic and atraumatic  Right Ear: External ear normal       Left Ear: External ear normal       Nose: Nose normal    Eyes:      Pupils: Pupils are equal, round, and reactive to light  Cardiovascular:      Rate and Rhythm: Normal rate and regular rhythm  Pulses: Normal pulses  Heart sounds: Normal heart sounds  No murmur heard  Pulmonary:      Effort: Pulmonary effort is normal  No respiratory distress  Breath sounds: Normal breath sounds  No wheezing  Abdominal:      General: Bowel sounds are normal  There is no distension  Palpations: Abdomen is soft  Tenderness:  There is no abdominal tenderness  Musculoskeletal:         General: Normal range of motion  Cervical back: Normal range of motion  Right lower leg: Edema present  Left lower leg: Edema present  Skin:     General: Skin is warm  Neurological:      General: No focal deficit present  Mental Status: He is alert and oriented to person, place, and time     Psychiatric:         Mood and Affect: Mood normal           Lab Results   Component Value Date    WBC 8 45 05/29/2022    HGB 10 7 (L) 05/29/2022    HCT 36 6 05/29/2022    MCV 98 05/29/2022     (H) 05/29/2022     Lab Results   Component Value Date    SODIUM 133 (L) 05/29/2022    K 4 3 05/29/2022    CL 99 05/29/2022    CO2 21 05/29/2022    BUN 27 (H) 05/29/2022    CREATININE 1 73 (H) 05/29/2022    GLUC 95 05/29/2022    CALCIUM 9 9 05/29/2022     Lab Results   Component Value Date    INR 1 27 (H) 05/10/2022    INR 1 09 05/10/2022    INR 1 33 (H) 08/24/2019    PROTIME 15 4 (H) 05/10/2022    PROTIME 13 5 05/10/2022    PROTIME 16 6 (H) 08/24/2019           Current Facility-Administered Medications:     acetaminophen (TYLENOL) tablet 975 mg, 975 mg, Oral, Q8H [address], MILO Brock-C, 975 mg at 05/31/22 0538    allopurinol (ZYLOPRIM) tablet 100 mg, 100 mg, Oral, Daily, Ana Lazaro PA-C, 100 mg at 05/31/22 0828    amLODIPine (NORVASC) tablet 10 mg, 10 mg, Oral, Daily, Ana Lazaro PA-C, 10 mg at 05/31/22 7078    calcium carbonate (TUMS) chewable tablet 1,000 mg, 1,000 mg, Oral, BID PRN, Ana Lazaro PA-C    cholecalciferol (VITAMIN D3) tablet 400 Units, 400 Units, Oral, Daily, Ana Lazaro PA-C, 400 Units at 05/31/22 0672    Diclofenac Sodium (VOLTAREN) 1 % topical gel 2 g, 2 g, Topical, 4x Daily, Ana Lazaro PA-C, 2 g at 05/31/22 8245    heparin (porcine) subcutaneous injection 7,500 Units, 7,500 Units, Subcutaneous, Q8H [address], Judi Michaels PA-C, 7,500 Units at 05/31/22 0538    lidocaine (LIDODERM) 5 % patch 1 patch, 1 patch, Topical, Daily, Priscila Pal PA-C, 1 patch at 05/31/22 5862    metoprolol succinate (TOPROL-XL) 24 hr tablet 25 mg, 25 mg, Oral, Daily, RITA Obrien, 25 mg at 05/31/22 0830    ondansetron (ZOFRAN-ODT) dispersible tablet 4 mg, 4 mg, Oral, Q6H PRN, Priscila Pal PA-C    senna (SENOKOT) tablet 17 2 mg, 2 tablet, Oral, HS PRN, Priscila Pal PA-C    tamsulosin (FLOMAX) capsule 0 4 mg, 0 4 mg, Oral, Daily With Shoshana Michaels PA-C, 0 4 mg at 05/30/22 1720    traMADol (ULTRAM) tablet 50 mg, 50 mg, Oral, Q6H [address], Katy Shafer MD, 50 mg at 05/31/22 8697    Past Medical History:   Diagnosis Date    Arthritis     Chronic kidney disease     Gout     Hyperlipidemia     Hypertension     Kidney stone     Obstructive pyelonephritis     Sepsis [facility])        Patient Active Problem List    Diagnosis Date Noted    Anemia 05/13/2022    Obstructive pyelonephritis 04/23/2022    Acute unilateral obstructive uropathy 04/21/2022    Mixed hyperlipidemia 06/02/2021    Morbid obesity 06/02/2021    Recurrent left knee pain 06/02/2021    Insomnia 09/16/2019    Essential hypertension 08/27/2019    Severe sepsis on admission 08/23/2019    [facility] 08/23/2019    Acute kidney injury superimposed on CKD stage 3 08/06/2019    Bacteremia due to Escherichia coli 08/04/2019    Lactic acidosis 08/04/2019    History of gout 07/15/2019    Vitamin D deficiency 06/10/2015    Morbid obesity ([facility] ) 03/23/2015          Priscila Pal PA-C    Total time spent:  30 minutes with more than 50% spent counseling/coordinating care  Counseling includes discussion with patient re: progress and discussion with patient of his/her current medical/functional state/information  Coordination of patient's care was performed in conjunction with consulting services   Time invested included review of patient's labs, vitals, and management of their comorbidities with continued monitoring  The care of the patient was extensively discussed and appropriate treatment plan was formulated unique for this patient  ** Please Note:  voice to text software may have been used in the creation of this document   Although proof errors in transcription or interpretation are a potential of such software**

## 2022-05-31 NOTE — PROGRESS NOTES
05/31/22 0643   Pain Assessment   Pain Assessment Tool 0-10   Pain Score 8   Pain Location/Orientation Orientation: Left; Location: Knee;Orientation: Right  (ankle)   Restrictions/Precautions   Precautions Fall Risk;Pain   RLE Weight Bearing Per Order WBAT   LLE Weight Bearing Per Order FWB   Cognition   Overall Cognitive Status WFL   Orientation Level Oriented X4   Lying to Sitting on Side of Bed   Type of Assistance Needed Supervision   Comment hospital bed elevated to height of bed at home   Lying to Sitting on Side of Bed CARE Score 4   Sit to Stand   Type of Assistance Needed Supervision   Sit to Stand CARE Score 4   Bed-Chair Transfer   Type of Assistance Needed Supervision   Chair/Bed-to-Chair Transfer CARE Score 4   Walk 10 Feet   Type of Assistance Needed Supervision   Walk 10 Feet CARE Score 4   Walk 50 Feet with Two Turns   Type of Assistance Needed Supervision   Walk 50 Feet with Two Turns CARE Score 4   Walking 10 Feet on Uneven Surfaces   Type of Assistance Needed Supervision   Walking 10 Feet on Uneven Surfaces CARE Score 4   Ambulation   Does the patient walk? 2  Yes   Primary Mode of Locomotion Prior to Admission Walk   Distance Walked (feet) 81 ft  (81' 66')   Assist Device Roller Walker   Gait Pattern Antalgic; Inconsistant Julieta; Slow Julieta;Decreased foot clearance   Limitations Noted In Endurance;Balance; Safety;Strength   Walk Assist Level Supervision   Findings level and unlevel surfaces   Curb or Single Stair   Style negotiated Single stair   Type of Assistance Needed Incidental touching   Comment cg   1 Step (Curb) CARE Score 4   Stairs   Type Stairs   # of Steps 3   Weight Bearing Precautions WBAT   Assist Devices Bilateral Rail   Findings cg with cues   Therapeutic Interventions   Strengthening seated and standing ther ex   Balance gait and transfer training   Other stair training   Assessment   Treatment Assessment Patient agreeable to therapy session      Pain reported in R ankle and L knee, both at 8/10  Patient continues to make gains in therapy  Completed ther ex for general LE strengthening; gait and transfer training focusing on sequence and technique for improved balance and safety with functional mobility using walker  Patient able to negotiate up and down steps with cues for sequence  PT Barriers   Physical Impairment Decreased strength;Decreased range of motion;Decreased endurance; Impaired balance;Decreased mobility;Pain   Functional Limitation Walking;Transfers;Standing;Stair negotiation   Plan   Treatment/Interventions Functional transfer training;LE strengthening/ROM; Elevations; Therapeutic exercise; Bed mobility;Gait training   Progress Progressing toward goals   PT Therapy Minutes   PT Time In 0643   PT Time Out 0814   PT Total Time (minutes) 91   PT Mode of treatment - Individual (minutes) 91   PT Mode of treatment - Concurrent (minutes) 0   PT Mode of treatment - Group (minutes) 0   PT Mode of treatment - Co-treat (minutes) 0   PT Mode of Treatment - Total time(minutes) 91 minutes   PT Cumulative Minutes 984   Therapy Time missed   Time missed?  No

## 2022-05-31 NOTE — NURSING NOTE
05/31/22 Pt tolerating short periods of being out of bed for therapy  Back to bed in between for rest  Nephrostomy tube flushed as ordered  Pt refused to have dressing changed today  He stated he skin is getting irritated due to the tape and does not want it changed today  Complains of right ankle and bilateral knee pain  Medicated for same as ordered and effective  Will continue to monitor for safety and comfort   Bri Dasilva RN

## 2022-05-31 NOTE — PLAN OF CARE
Problem: PAIN - ADULT  Goal: Verbalizes/displays adequate comfort level or baseline comfort level  Description: Interventions:  - Encourage patient to monitor pain and request assistance  - Assess pain using appropriate pain scale  - Administer analgesics based on type and severity of pain and evaluate response  - Implement non-pharmacological measures as appropriate and evaluate response  - Consider cultural and social influences on pain and pain management  - Notify physician/advanced practitioner if interventions unsuccessful or patient reports new pain  Outcome: Progressing     Problem: INFECTION - ADULT  Goal: Absence or prevention of progression during hospitalization  Description: INTERVENTIONS:  - Assess and monitor for signs and symptoms of infection  - Monitor lab/diagnostic results  - Monitor all insertion sites, i e  indwelling lines, tubes, and drains  - Monitor endotracheal if appropriate and nasal secretions for changes in amount and color  - [place] appropriate cooling/warming therapies per order  - Administer medications as ordered  - Instruct and encourage patient and family to use good hand hygiene technique  - Identify and instruct in appropriate isolation precautions for identified infection/condition  Outcome: Progressing  Goal: Absence of fever/infection during neutropenic period  Description: INTERVENTIONS:  - Monitor WBC    Outcome: Progressing     Problem: DISCHARGE PLANNING  Goal: Discharge to home or other facility with appropriate resources  Description: INTERVENTIONS:  - Identify barriers to discharge w/patient and caregiver  - Arrange for needed discharge resources and transportation as appropriate  - Identify discharge learning needs (meds, wound care, etc )  - Arrange for interpretive services to assist at discharge as needed  - Refer to Case Management Department for coordinating discharge planning if the patient needs post-hospital services based on physician/advanced practitioner order or complex needs related to functional status, cognitive ability, or social support system  Outcome: Progressing     Problem: Prexisting or High Potential for Compromised Skin Integrity  Goal: Skin integrity is maintained or improved  Description: INTERVENTIONS:  - Identify patients at risk for skin breakdown  - Assess and monitor skin integrity  - Assess and monitor nutrition and hydration status  - Monitor labs   - Assess for incontinence   - Turn and reposition patient  - Assist with mobility/ambulation  - Relieve pressure over bony prominences  - Avoid friction and shearing  - Provide appropriate hygiene as needed including keeping skin clean and dry  - Evaluate need for skin moisturizer/barrier cream  - Collaborate with interdisciplinary team   - Patient/family teaching  - Consider wound care consult   Outcome: Progressing     Problem: Potential for Falls  Goal: Patient will remain free of falls  Description: INTERVENTIONS:  - Educate patient/family on patient safety including physical limitations  - Instruct patient to call for assistance with activity   - Consult OT/PT to assist with strengthening/mobility   - Keep Call bell within reach  - Keep bed low and locked with side rails adjusted as appropriate  - Keep care items and personal belongings within reach  - Initiate and maintain comfort rounds  - Make Fall Risk Sign visible to staff  - Apply yellow socks and bracelet for high fall risk patients  - Consider moving patient to room near nurses station  Outcome: Progressing     Problem: Nutrition/Hydration-ADULT  Goal: Nutrient/Hydration intake appropriate for improving, restoring or maintaining nutritional needs  Description: Monitor and assess patient's nutrition/hydration status for malnutrition  Collaborate with interdisciplinary team and initiate plan and interventions as ordered  Monitor patient's weight and dietary intake as ordered or per policy   Utilize nutrition screening tool and intervene as necessary  Determine patient's food preferences and provide high-protein, high-caloric foods as appropriate       INTERVENTIONS:  - Monitor oral intake, urinary output, labs, and treatment plans  - Assess nutrition and hydration status and recommend course of action  - Evaluate amount of meals eaten  - Assist patient with eating if necessary   - Allow adequate time for meals  - Recommend/ encourage appropriate diets, oral nutritional supplements, and vitamin/mineral supplements  - Order, calculate, and assess calorie counts as needed  - Recommend, monitor, and adjust tube feedings and TPN/PPN based on assessed needs  - Assess need for intravenous fluids  - Provide specific nutrition/hydration education as appropriate  - Include patient/family/caregiver in decisions related to nutrition  Outcome: Progressing

## 2022-05-31 NOTE — PROGRESS NOTES
05/31/22 1130   Pain Assessment   Pain Assessment Tool 0-10   Pain Score 8   Pain Location/Orientation Orientation: Left; Location: Knee  (R ankle)   Restrictions/Precautions   Precautions Fall Risk;Pain   Cognition   Overall Cognitive Status WFL   Orientation Level Oriented X4   Therapeutic Interventions   Strengthening supine ther ex; occasional AAROM LLE   Assessment   Treatment Assessment Patient agreeable to therapy session  Pain in R ankle and L knee remains  Completed ther ex for general LE strengthening  Nursing aware of pain and is getting pain pills at end of therapy session before lunch  PT Barriers   Physical Impairment Decreased strength;Decreased range of motion;Decreased endurance; Impaired balance;Decreased mobility; Decreased safety awareness;Pain   Functional Limitation Wheelchair management; [facility] negotiation   Plan   Treatment/Interventions Therapeutic exercise;LE strengthening/ROM   Progress Progressing toward goals   PT Therapy Minutes   PT Time In 1130   PT Time Out 1200   PT Total Time (minutes) 30   PT Mode of treatment - Individual (minutes) 30   PT Mode of treatment - Concurrent (minutes) 0   PT Mode of treatment - Group (minutes) 0   PT Mode of treatment - Co-treat (minutes) 0   PT Mode of Treatment - Total time(minutes) 30 minutes   PT Cumulative Minutes 1014   Therapy Time missed   Time missed?  No

## 2022-05-31 NOTE — DISCHARGE INSTRUCTIONS
Nephrostomy Tube Care   WHAT YOU NEED TO KNOW:   A nephrostomy tube is a catheter (thin plastic tube) that is inserted through your skin and into your kidney  The nephrostomy tube drains urine from your kidney into a collecting bag outside your body  You may need a nephrostomy tube when something is blocking the normal flow of urine  A nephrostomy tube may be used for a short or a long period of time  The nephrostomy tube comes out of your back, so you will need someone to help care for your nephrostomy tube  DISCHARGE INSTRUCTIONS:   Medicines:   Antibiotics  may be given to prevent or treat an infection caused by bacteria  You may need to take antibiotics for 5 to 10 days after the tube is placed  Take your medicine as directed  Contact your healthcare provider if you think your medicine is not helping or you have side effects  Tell him if you are allergic to any medicine  Keep a list of the medicines, vitamins, and herbs you take  Include the amounts, and when and why you take them  Bring the list or the pill bottles to follow-up visits  Carry your medicine list with you in case of an emergency  Follow up with your healthcare provider as directed: You may need a test called a nephrostogram to make sure your nephrostomy tube is in the correct place  Write down your questions so you remember to ask them during your visits  How to clean the skin around the nephrostomy tube and change the bandage:  Since the nephrostomy tube comes out of your back, you will not be able to care for it by yourself  Ask someone to follow the general directions below to check and care for your nephrostomy tube  Ask your healthcare provider how your skin should be cleaned and what skin barriers and attachment devices to use  Gather the items you will need        Disposable (single use) under-pad, and a clean washcloth    Plain soap, warm water, and new medical gloves    Sterile gauze bandages    Clear adhesive dressing or medical tape    Skin barrier    Tube attachment device     Protective skin film    Hydrogen peroxide and saline solution (if ordered by a healthcare provider)    Medicine for your skin (if ordered by a healthcare provider)     Trash bag    Remove the old bandage, and check the tube entry site  Have the patient lie on his side with the nephrostomy tube entry site facing up  Place the under-pad where it will catch drainage as you are working with the nephrostomy tube  Wash your hands with soap and water  Put on new medical gloves  Gently remove the old bandage and skin barrier  Do this by holding the skin beside the tube with one hand  With the other hand, gently remove sticky tape and the skin barrier by pulling in the same direction as hair growth  Do not touch the side of the bandage that is placed over or around the tube  Throw the bandage and skin barrier away in a trash bag  Look for signs of infection, such as skin redness and swelling  Report any skin changes to healthcare providers  There may be a black randy on the tube to randy the place where the tube enters the skin  Check to see that the black randy is next to the skin  If it is further down the tube, the tube has moved  A healthcare provider needs to put it back in  Clean the tube entry site  Hold the tube in place to keep it from being pulled out while you are cleaning around it  You will need to clean the area twice  For the first cleaning, wet a new gauze bandage with soap and water  You may be directed to use hydrogen peroxide instead  Begin at the entry site of the tube  Wipe the skin in circles, moving away from the entry site  Remove blood and any other material with the gauze  Do this as often as needed  Use a new gauze bandage each time you clean the area, moving away from the entry site  For the second cleaning, wet a new gauze bandage with water  You may be directed to use saline solution instead   Begin at the entry site of the tube  Wipe the skin in circles, moving away from the entry site  Use a new gauze bandage each time you clean the area, moving away from the entry site  Gently pat the skin with a clean washcloth to dry it  Put medicine on the skin if directed by a healthcare provider  Apply the skin barrier and bandages  Cut an opening in the center of the skin barrier large enough to fit around the tube  Cut a slit from the outside edge of the skin barrier to the center opening so that you can fit it around the nephrostomy tube  Place the skin barrier around the nephrostomy tube  Make sure the skin barrier is not touching stitches that may be holding the tube in place  Put a protective skin film over the skin barrier if directed by a healthcare provider  Roll up a bandage to make it thick, and wrap it around the place where the tube enters the skin  Place it to support the tube, and stop it from kinking or bending  Tape the bandage in place, and apply more bandages if directed by a healthcare provider  An attachment device may be placed over the bandages to help keep the nephrostomy tube in place  Bring the tubing forward to the front and tape it to the skin  Do not stretch the tube tight, because this may pull the nephrostomy tube out  How often to change the bandage, skin barrier, and tube attachment device:  Change the bandage around the tube, the bolsters, skin barriers, and tube attachment devices at least every 7 days  If your bandages, barriers, or devices get dirty or wet, change them right away, and as often as needed  If your nephrostomy tube is to be used for a long period of time, the tube needs to be changed every 2 to 3 months  Healthcare providers will tell you when you need to make an appointment to have your tube changed  How to care for the urine drainage bag: You may use a reusable or a single-use (disposable) urine bag   If you are using a disposable urine bag, use it only once, and then throw it away  If you have a reusable urine drainage bag, ask when and how to clean it  The following are general directions for cleaning a reusable urine drainage bag:  Ask if you need to measure and write down how much urine is in the bag before you empty it  Drain urine out of the drainage bag when it is ½ to ? full  Open the spout at the bottom of the bag to empty the urine into the toilet  You may need to detach the drainage bag from the nephrostomy tube to clean it  If so, attach a new drainage bag tightly to the nephrostomy tube  You may need to use a solution such as phosphoric acid to clean the bag  Ask what kind of cleaning solution to use  Use a plastic syringe (without a needle) to gently force the cleaning solution into the drainage bag as you clean it  Ask if you should leave the cleaning solution in the bag for a time before you drain it out  When it is time to drain the bag, drain the cleaning solution out through the spout at the bottom  Ask what to use to rinse the urine drainage bag  After you rinse the bag, empty it and hang it up to air dry before you use it again  Throw reusable bags away after you use them for 1 week  How to prevent problems with your nephrostomy tube:   Change bandages, skin barriers, and attachment devices as directed  This helps to prevent infection  Throw away or clean your drainage bag as directed by your healthcare provider  Wipe the connecting ends of the drainage bag with alcohol or iodine before you reconnect the bag to the tube  This helps prevent infection  Keep the tube taped to your skin and connected to a drainage bag placed below the level of your kidneys  This helps prevent urine from backing up into your kidneys  You may wear a small drainage bag strapped to your leg to let you move around more easily  Use a larger drainage bag at night and when you take naps during the day    This will help prevent urine from leaking out from the place where the tube enters your skin  Check the catheter to be sure it is in place after you change your clothes or do other activities  Do not wear tight clothing over the tube  Place the tubing over your thigh rather than under it when you are sitting down  Be sure that nothing is pulling on the nephrostomy tube when you move around  Change positions if you see little or no urine in your drainage bag  Check to see if the urine tube is twisted or bent  Be sure that you are not sitting or lying on the tube  If there are no kinks and there is little or no urine in the drainage bag, tell your healthcare provider  Flush out the tube as directed  Do this if you think the tube is blocked  Other things to know:   Drink 2 to 3 liters of liquid each day  unless you were told to limit liquids because of another condition  This amount is equal to about 8 to 12 (eight-ounce) cups of liquid  There should be 30 to 60 milliliters of urine draining into the bag each hour  A large amount of urine that drains over a shorter period of time should be reported  For example, 2,000 milliliters (2 liters) of urine draining out over 8 hours could be a sign of problems  Keep the site covered while you shower  Tape a piece of clear adhesive plastic over the dressing to keep it dry while you shower  Do not take tub baths  Contact your healthcare provider if:   The skin around the nephrostomy tube is red, swollen, itches, or has a rash  You have a fever  You have lower back or hip pain  There are changes in how your urine looks or smells  You have large amounts of urine draining into the drainage bag over a short period of time  You have little or no urine draining from the nephrostomy tube  You have nausea and are vomiting  The black randy on your tube has moved, or the tube is longer than when it was put in       You have questions or concerns about your condition or care     Seek care immediately or call 911 if: The nephrostomy tube comes out completely  There is blood, pus, or a bad smell coming from the place where the tube enters your skin  Urine is leaking around the tube 10 days after the tube was placed  © Copyright [employer] 2022 Information is for End User's use only and may not be sold, redistributed or otherwise used for commercial purposes  All illustrations and images included in CareNotes® are the copyrighted property of A D A M [employer] Inc  or Shanthi Sharma   The above information is an  only  It is not intended as medical advice for individual conditions or treatments  Talk to your doctor, nurse or pharmacist before following any medical regimen to see if it is safe and effective for you  Kidney Infection   WHAT YOU NEED TO KNOW:   A kidney infection, or pyelonephritis, is a bacterial infection  The infection usually starts in your bladder or urethra and moves into your kidney  One or both kidneys may be infected  DISCHARGE INSTRUCTIONS:   Return to the emergency department if:   You have a fever and chills  You cannot stop vomiting  You have severe pain in your abdomen, lower back, or sides  Contact your healthcare provider if:   You continue to have a fever after you take antibiotics for 3 days  You have pain when you urinate, even after treatment  Your signs and symptoms return  You have questions or concerns about your condition or care  Medicines: You may  need any of the following:  Antibiotics  treat your bacterial infection  Acetaminophen  decreases pain and fever  It is available without a doctor's order  Ask how much to take and how often to take it  Follow directions  Read the labels of all other medicines you are using to see if they also contain acetaminophen, or ask your doctor or pharmacist  Acetaminophen can cause liver damage if not taken correctly   Do not use more than 4 grams (4,000 milligrams) total of acetaminophen in one day  NSAIDs , such as ibuprofen, help decrease swelling, pain, and fever  This medicine is available with or without a doctor's order  NSAIDs can cause stomach bleeding or kidney problems in certain people  If you take blood thinner medicine, always ask if NSAIDs are safe for you  Always read the medicine label and follow directions  Do not give these medicines to children under 10months of age without direction from your child's healthcare provider  Prescription pain medicine  may be given  Ask how to take this medicine safely  Take your medicine as directed  Contact your healthcare provider if you think your medicine is not helping or if you have side effects  Tell him of her if you are allergic to any medicine  Keep a list of the medicines, vitamins, and herbs you take  Include the amounts, and when and why you take them  Bring the list or the pill bottles to follow-up visits  Carry your medicine list with you in case of an emergency  Drink liquids as directed: You may need to drink extra liquids to help flush your kidneys and urinary system  Water is the best liquid to drink  Ask your healthcare provider how much liquid to drink each day and which liquids are best for you  Urinate as soon as you feel the urge: This will help flush bacteria from your urinary system  Do not wait or hold your urine for too long  Clean your genital area every day with soap and water:  Wipe from front to back after you urinate or have a bowel movement  Wear cotton underwear  Fabrics such as nylon and polyester can stay damp  This can increase your risk for infection  Urinate within 15 minutes after you have sex  Follow up with your doctor as directed:  Write down your questions so you remember to ask them during your visits     © Copyright [employer] 2022 Information is for End User's use only and may not be sold, redistributed or otherwise used for commercial purposes  All illustrations and images included in CareNotes® are the copyrighted property of [employer] Inc  or Shanthi Ott  The above information is an  only  It is not intended as medical advice for individual conditions or treatments  Talk to your doctor, nurse or pharmacist before following any medical regimen to see if it is safe and effective for you

## 2022-05-31 NOTE — NURSING NOTE
Patient resting comfortably in bed at this time   No signs of distress noted   Patient was encouraged to reposition frequently overnight to promote skin integrity   Bed alarm in place to promote patient safety   Nephrostomy tube draining clear yellow urine   Patient was able to ambulate with one assist and a walker into the bathroom overnight  Patient required assist with wiping as patient states he is unable to due to his shoulder decreased range of motion    Call bell within reach   Will continue to monitor patient and follow plan of care

## 2022-05-31 NOTE — PROGRESS NOTES
OT Daily Progress       05/31/22 0900   Pain Assessment   Pain Assessment Tool 0-10   Pain Score 5   Pain Location/Orientation   (knee and R anterior shoulder)   Oral Hygiene   Type of Assistance Needed Set-up / clean-up   Physical Assistance Level No physical assistance   Oral Hygiene CARE Score 5   Grooming   Able To Wash/Dry Face;Wash/Dry Hands   Limitation Noted In Strength   Findings seated EOB   Shower/Bathe Self   Type of Assistance Needed Physical assistance   Physical Assistance Level 26%-50%   Comment mod A in stance for buttocks   Shower/Bathe Self CARE Score 3   Bathing   Assessed Bath Style Sponge Bath   Anticipated D/C Bath Style Sponge Bath; Shower   Able to [facility] EHR No   Able to [employer] Temperature Yes   Able to Wash/Rinse/Dry (body part) Left Arm;Right Arm;L Upper Leg;R Upper Leg;Chest;Abdomen;Perineal Area   Limitations Noted in Balance; Endurance;ROM;Strength   Positioning Standing;Seated   Findings  pt reports his socks/feet are clean and requested not to wash them   Upper Body Dressing   Type of Assistance Needed Set-up / clean-up   Physical Assistance Level No physical assistance   Comment cues to dress R UE first   Upper Body Dressing CARE Score 5   Lower Body Dressing   Type of Assistance Needed Physical assistance   Physical Assistance Level 25% or less   Comment Pt did not wish to change shorts, however, was able to reach to floor to pull up shorts from standing position from RW after bathing hamida area  Lower Body Dressing CARE Score 3   Picking Up Object   Type of Assistance Needed Independent   Physical Assistance Level No physical assistance   Comment reached to floor from standing to pull up shorts     Picking Up Object CARE Score 6   Lying to Sitting on Side of Bed   Type of Assistance Needed Supervision   Physical Assistance Level No physical assistance   Lying to Sitting on Side of Bed CARE Score 4   Sit to Stand   Type of Assistance Needed Supervision   Physical Assistance Level No physical assistance   Sit to Stand CARE Score 4   Therapeutic Exercise - ROM   UE-ROM Yes   Therapeutic Excerise-Strength   UE Strength Yes   Cognition   Overall Cognitive Status WFL   Arousal/Participation Alert; Cooperative   Attention Within functional limits   Orientation Level Oriented X4   Memory Within functional limits   Activity Tolerance   Activity Tolerance Patient tolerated treatment well;Patient limited by pain   OT Therapy Minutes   OT Time In 0900   OT Time Out 1031   OT Total Time (minutes) 91   OT Mode of treatment - Individual (minutes) 91       Pt seen for follow up visit this date consisting of ADL session followed by UE ROM/strengthening  Pt in bed, awake, alert  Agreeable to treatment  Complains of significant pain to bilat knees as well as R shoulder with motion  Bed mobility, transfers, self care tasks as noted above  Pt completed partial ADL task (did not wash feet or change socks, or change shorts  States these tasks were completed earlier this AM)  Required increased assist with bathing buttocks for thoroughness due to increased pain levels with UE and knees  UE therapeutic exercises completed remainder of session  AA/PROM R shoulder for flexion, abduction, horizontal abduction with gentle stretch at end range  3# free weight for L UE - shoulder flexion, chest press, bicep curls, wrist curls, pro/sup 20 R each with break between sets  3# FW R bicep curls, forearm pro/sup  5# gripper bilat hands 30 Reps each with 5 second hold for sustained grasp  Reviewed additional AROM shoulder exercises including shoulder shrugs and shoulder rolls  Demos understanding of same  Reviewed neck exercises - flex/ext, cervical rotation and lateral flexion with stretch  Left in bed at end of session, needs in reach  Tolerating fair   Currently limited by noted pain in R shoulder and knees which impacts maximal participation in therapy program  Will continue to benefit from OT to max I and safety with self care prior to discharge  Progress treatment as status allows

## 2022-05-31 NOTE — NURSING NOTE
05/31/22 COVID swab done for screening for SNF placement  Noted result came back positive  Patient denies any symptoms at this time  Millicent PEDRAZA made aware  Started on contact and airborne precautions  Explained procedure to pt  He verbalized understanding of same   Keyonna David RN

## 2022-06-01 PROBLEM — U07.1 COVID: Status: ACTIVE | Noted: 2022-06-01

## 2022-06-01 LAB
ANION GAP SERPL CALCULATED.3IONS-SCNC: 9 MMOL/L (ref 4–13)
BASOPHILS # BLD MANUAL: 0 THOUSAND/UL (ref 0–0.1)
BASOPHILS NFR MAR MANUAL: 0 % (ref 0–1)
BUN SERPL-MCNC: 28 MG/DL (ref 5–25)
CALCIUM SERPL-MCNC: 9.7 MG/DL (ref 8.4–10.2)
CHLORIDE SERPL-SCNC: 98 MMOL/L (ref 96–108)
CO2 SERPL-SCNC: 26 MMOL/L (ref 21–32)
CREAT SERPL-MCNC: 1.53 MG/DL (ref 0.6–1.3)
DIFFERENTIAL COMMENT: ABNORMAL
EOSINOPHIL # BLD MANUAL: 0.12 THOUSAND/UL (ref 0–0.4)
EOSINOPHIL NFR BLD MANUAL: 1 % (ref 0–6)
ERYTHROCYTE [DISTWIDTH] IN BLOOD BY AUTOMATED COUNT: 14.7 % (ref 11.6–15.1)
GFR SERPL CREATININE-BSD FRML MDRD: 50 ML/MIN/1.73SQ M
GLUCOSE P FAST SERPL-MCNC: 94 MG/DL (ref 65–99)
GLUCOSE SERPL-MCNC: 94 MG/DL (ref 65–140)
HCT VFR BLD AUTO: 31.9 % (ref 36.5–49.3)
HGB BLD-MCNC: 9.5 G/DL (ref 12–17)
LYMPHOCYTES # BLD AUTO: 1.8 THOUSAND/UL (ref 0.6–4.47)
LYMPHOCYTES # BLD AUTO: 15 % (ref 14–44)
MCH RBC QN AUTO: 28.8 PG (ref 26.8–34.3)
MCHC RBC AUTO-ENTMCNC: 29.8 G/DL (ref 31.4–37.4)
MCV RBC AUTO: 97 FL (ref 82–98)
METAMYELOCYTES NFR BLD MANUAL: 4 % (ref 0–1)
MONOCYTES # BLD AUTO: 0.36 THOUSAND/UL (ref 0–1.22)
MONOCYTES NFR BLD: 3 % (ref 4–12)
MYELOCYTES NFR BLD MANUAL: 3 % (ref 0–1)
NEUTROPHILS # BLD MANUAL: 8.89 THOUSAND/UL (ref 1.85–7.62)
NEUTS BAND NFR BLD MANUAL: 7 % (ref 0–8)
NEUTS SEG NFR BLD AUTO: 67 % (ref 43–75)
PLATELET # BLD AUTO: 442 THOUSANDS/UL (ref 149–390)
PLATELET BLD QL SMEAR: ABNORMAL
PMV BLD AUTO: 10.1 FL (ref 8.9–12.7)
POTASSIUM SERPL-SCNC: 3.8 MMOL/L (ref 3.5–5.3)
RBC # BLD AUTO: 3.3 MILLION/UL (ref 3.88–5.62)
RBC MORPH BLD: NORMAL
SODIUM SERPL-SCNC: 133 MMOL/L (ref 135–147)
WBC # BLD AUTO: 12.01 THOUSAND/UL (ref 4.31–10.16)

## 2022-06-01 PROCEDURE — 99233 SBSQ HOSP IP/OBS HIGH 50: CPT | Performed by: FAMILY MEDICINE

## 2022-06-01 PROCEDURE — 97537 COMMUNITY/WORK REINTEGRATION: CPT

## 2022-06-01 PROCEDURE — 97116 GAIT TRAINING THERAPY: CPT

## 2022-06-01 PROCEDURE — 80048 BASIC METABOLIC PNL TOTAL CA: CPT | Performed by: PHYSICAL MEDICINE & REHABILITATION

## 2022-06-01 PROCEDURE — 97110 THERAPEUTIC EXERCISES: CPT

## 2022-06-01 PROCEDURE — 85027 COMPLETE CBC AUTOMATED: CPT | Performed by: PHYSICAL MEDICINE & REHABILITATION

## 2022-06-01 PROCEDURE — 85007 BL SMEAR W/DIFF WBC COUNT: CPT | Performed by: PHYSICAL MEDICINE & REHABILITATION

## 2022-06-01 PROCEDURE — 97530 THERAPEUTIC ACTIVITIES: CPT

## 2022-06-01 RX ADMIN — HEPARIN SODIUM 7500 UNITS: 5000 INJECTION INTRAVENOUS; SUBCUTANEOUS at 05:17

## 2022-06-01 RX ADMIN — ACETAMINOPHEN 975 MG: 325 TABLET ORAL at 21:19

## 2022-06-01 RX ADMIN — TRAMADOL HYDROCHLORIDE 50 MG: 50 TABLET, COATED ORAL at 23:14

## 2022-06-01 RX ADMIN — TRAMADOL HYDROCHLORIDE 50 MG: 50 TABLET, COATED ORAL at 17:16

## 2022-06-01 RX ADMIN — DICLOFENAC SODIUM 2 G: 10 GEL TOPICAL at 21:20

## 2022-06-01 RX ADMIN — TAMSULOSIN HYDROCHLORIDE 0.4 MG: 0.4 CAPSULE ORAL at 17:17

## 2022-06-01 RX ADMIN — HEPARIN SODIUM 7500 UNITS: 5000 INJECTION INTRAVENOUS; SUBCUTANEOUS at 21:19

## 2022-06-01 RX ADMIN — DICLOFENAC SODIUM 2 G: 10 GEL TOPICAL at 17:17

## 2022-06-01 RX ADMIN — HEPARIN SODIUM 7500 UNITS: 5000 INJECTION INTRAVENOUS; SUBCUTANEOUS at 13:34

## 2022-06-01 RX ADMIN — TRAMADOL HYDROCHLORIDE 50 MG: 50 TABLET, COATED ORAL at 05:17

## 2022-06-01 RX ADMIN — DICLOFENAC SODIUM 2 G: 10 GEL TOPICAL at 09:02

## 2022-06-01 RX ADMIN — CHOLECALCIFEROL TAB 10 MCG (400 UNIT) 400 UNITS: 10 TAB at 09:02

## 2022-06-01 RX ADMIN — AMLODIPINE BESYLATE 10 MG: 10 TABLET ORAL at 09:02

## 2022-06-01 RX ADMIN — TRAMADOL HYDROCHLORIDE 50 MG: 50 TABLET, COATED ORAL at 13:34

## 2022-06-01 RX ADMIN — ACETAMINOPHEN 975 MG: 325 TABLET ORAL at 13:35

## 2022-06-01 RX ADMIN — METOPROLOL SUCCINATE 25 MG: 25 TABLET, FILM COATED, EXTENDED RELEASE ORAL at 09:02

## 2022-06-01 RX ADMIN — LIDOCAINE 1 PATCH: 50 PATCH TOPICAL at 09:02

## 2022-06-01 RX ADMIN — DICLOFENAC SODIUM 2 G: 10 GEL TOPICAL at 13:35

## 2022-06-01 RX ADMIN — ACETAMINOPHEN 975 MG: 325 TABLET ORAL at 05:16

## 2022-06-01 RX ADMIN — ALLOPURINOL 100 MG: 100 TABLET ORAL at 09:02

## 2022-06-01 NOTE — NURSING NOTE
Pt sleeping in bed this morning  Pt L nephro tube draining clear yellow urine  Dressing intact  Pt assist x1 with RW to BR  Pt refused to elevate R leg on pillow over night, R leg with +1 edema  covid precautions maintained  Will continue to monitor

## 2022-06-01 NOTE — PROGRESS NOTES
PM&R PROGRESS NOTE:  Marjan Dietrich 55 y o  male MRN: [id number]  Unit/Bed#: -01 Encounter: [id number]        Rehabilitation Diagnosis: Impairment of mobility, safety and Activities of Daily Living (ADLs) due to Debility:  16  Debility (Non-cardiac/Non-pulmonary)    HPI: Miguel Farrell a 55 y  o  male with a PMH significant for CKD, HTN, HLD, obstructive pyelonephritis with coli bacteremia, who presented to the [address] [place] [address] ED on 05/10  He had a recent discharge on 4/30/22 s/p cystoscopy and stent placement on 4/22/22   After his discharge, patient experienced intermittent waves of left flank pain described as sharp, non-radiating, lightheadedness, nausea  He also admitted to worsening shortness of breath  He arrived to ED with tachypnea and tachycardia  CT scan obtained revealing hydronephrosis and obstructive pyelonephritis  Urology was consulted and recommended acute transfer to [facility]  Patient diagnosed with sepsis and initiated on IV antibiotics  IR was consulted for concerns with potential obstruction of stent placement and noted to be in ADRIANNE  Patient went to the OR with IR for obstructive pyelonephritis and is s/p IR Nephrostomy tube placement on 5/10  He was intubated for the procedure and extubated on 5/11  Ortho was consulted on 5/13 for left knee pain and a left knee aspiration was performed  Patient is to continue WBAT to LLE  The aspiration was unsuccessful with ortho recommending adequate pain control and potential steroid injection in the future  Sepsis resolved and patient was transferred from ICU to med surg  On 5/16 patient was transitioned from IV antibiotics to oral to complete a total of 14 days  Patient participated with PT/OT and deemed appropriate for post acute rehabilitation services  He was accepted to [facility] and once hemodynamically stable, arrived on 5/19/22       SUBJECTIVE: Patient seen face to face    Patient states he feels his right shoulder pain is improving  Patient understands that 10 days of isolation is required  He states he feels asymptomatic besides feeling slightly more short of breath  Informed patient of blood-work  Patient would like if father can still bring laundry in and out but understands he cannot have visitors at this time  ASSESSMENT: Stable, progressing      PLAN:    Rehabilitation   Functional deficits:  Impaired mobility, self care, weakness to LLE due to pain in left knee and foot, pain to right shoulder, nephrostomy tube and inability to flush    Continue current rehabilitation plan of care to maximize function   Functional update:   o PT:  Updated below  o OT:  Updated below    Physical therapy Occupational therapy    Weight Bearing Status: Weight Bearing as Tolerated  Transfers: Supervision  Bed Mobility: Independent  Amulation Distance (ft): 121 feet  Ambulation: Supervision  Assistive Device for Ambulation: Roller Walker  Wheelchair Mobility Distance: 145 ft  Wheelchair Mobility: Supervision  Number of Stairs: 3  Assistive Device for Stairs: Bilateral Hand Rails  Stair Assistance: Incidental Touching  Discharge Recommendations: [address] with[de-identified] Family Support, Home Physical Therapy, First Floor Setup Eating: Independent  Grooming: Supervision  Bathing: Minimal Assistance  Bathing: Minimal Assistance  Upper Body Dressing: Supervision  Lower Body Dressing: Minimal Assistance  Toileting: Minimal Assistance, Moderate Assistance  Tub/Shower Transfer: Minimal Assistance  Toilet Transfer: Incidental Touching  Cognition: Within Defined Limits  Orientation: Person, Place, Time, Situation  Discharge Recommendations: Home with: (vs  SNF)  [address] with[de-identified] First Floor Setup, Home Occupational Therapy    This patient was discussed by the Interdisciplinary Team in weekly case conference today   The care of the patient was extensively discussed with all care providers and an appropriate rehabilitation plan was formulated unique for this patient  Barriers were identified preventing progression of therapy and appropriate interventions were discussed with each discipline  Please see the team note for input from all disciplines regarding barriers, intervention, and discharge planning  Total time spent: 35 Mins, and greater than 50% of this time was spent counseling/coordinating care     Estimated Discharge: 6/17      Pain   Tylenol 975 mg Q8H [address]   Voltaren gel 4x daily   Lidocaine patch Q12H to right shoulder    Tramadol 60 mg Q6H [address]    DVT prophylaxis   Heparin 7500 units Q8H    Bladder plan   Continent    Bowel plan   Continent      COVID  Assessment & Plan  · Incidentally found positive on 5/31 when screening for transfer to SNF  · Patient will remain here at [facility] while positive   · White count elevated at 12 01, remains asymptomatic, afebrile  Pt did report mildly worsening shortness of breath while working with therapy    Anemia  Assessment & Plan  · Iron panel obtained with elevated ferritin stores   · Monitor H/H- stable at 9 5    Recurrent left knee pain  Assessment & Plan  · Ongoing for several weeks causing difficulty with bearing weight due to pain  · XR knee without acute osseous abnormality, did reveal tricompartmental osteoarthritis similar to imaging done the year prior  · Ortho consulted and attempted an unsuccessful fluid aspiration   · Continue with adequate pain management with OP Ortho f/u for potential steroid injection once acute sepsis/infection resolves   · Diclofenac gel to the knee 4 times daily  · Left foot xray: There is no acute fracture or dislocation  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  Small plantar and large retrocalcaneal spurs  No lytic or blastic osseous lesion  Soft tissues are unremarkable      Essential hypertension  Assessment & Plan  · Low-sodium diet  · Continue Norvasc 10 mg and Metoprolol 25 mg daily      Chronic kidney disease, stage 3 (HCC)  Assessment & Plan  Lab Results   Component Value Date    EGFR 50 06/01/2022    EGFR 43 05/29/2022    EGFR 43 05/25/2022    CREATININE 1 53 (H) 06/01/2022    CREATININE 1 73 (H) 05/29/2022    CREATININE 1 74 (H) 05/25/2022   · Baseline creatinine approximately 1 4-1 6  · Monitor renal function and urine output  · Avoid nephrotoxins and hypotension       Severe sepsis on admission  Assessment & Plan  · Presented to [address] on 5/10 with worsening left flank pain, tachypnea and tachycardia in setting of E  Coli pyelonephritis/bacteremia   · CT revealed hydronephrosis and obstructive pyelonephritis  · Patient transferred to [facility] for higher level of care  · He went to the OR on 5/10 with IR for a nephrostomy tube placement  · Resolved      Bacteremia due to Escherichia coli  Assessment & Plan  · Positive growth of E  Coli in one blood culture on 5/10 thought to be due to UTI/pyelonephritis   · Was on IV Rocephin; transitioned to PO Augmentin in acute care for a total of 14 days; antibiotics completed      Morbid obesity ([facility] [address] )  Assessment & Plan  · BMI of 51 73  · Lifestyle and diet modifications       * Obstructive pyelonephritis  Assessment & Plan  · CT on 5/10: "Mild left hydronephrosis status post ureteral stent placement, with 3 mm calculus in the mid ureter adjacent to the stent  Multiple bilateral renal calculi "  · Recent admission and underwent L-side ureteral stent on 4/21- this was found to be obstructive  S/p left PCN placement by IR on 5/10   · Pain control  · Continue Flomax, OP follow up with urology for stone treatment on 5/25  · Continue Augmentin Q12H [facility] for total of 14 days (Completed on 5/25/22)  · Planned procedure for as OP around 6/17        Appreciate IM consultants medical co-management  Labs, medications, and imaging personally reviewed  ROS:  A ten point review of systems was completed and pertinent positives are listed in subjective section  All other systems reviewed were negative     Review of Systems   Constitutional: Negative for appetite change and fatigue  HENT: Negative  Negative for congestion, sore throat and trouble swallowing  Eyes: Negative  Negative for visual disturbance  Respiratory: Positive for shortness of breath  Negative for cough and wheezing  Dyspnea with exertion   Cardiovascular: Negative  Negative for chest pain  Gastrointestinal: Negative  Negative for abdominal distention, constipation and diarrhea  Genitourinary: Negative  Negative for difficulty urinating  Musculoskeletal: Positive for myalgias  Skin: Negative  Neurological: Negative  Hematological: Negative  Psychiatric/Behavioral: Negative  OBJECTIVE:   /77 (BP Location: Right arm)   Pulse 77   Temp (!) 97 2 °F (36 2 °C) (Tympanic)   Resp 18   Ht 5' 6" (1 676 m)   Wt (!) 145 kg (320 lb 8 oz)   SpO2 95%   BMI 51 73 kg/m²     Physical Exam  Vitals reviewed  Constitutional:       General: He is not in acute distress  Appearance: Normal appearance  He is obese  He is not ill-appearing  HENT:      Head: Normocephalic and atraumatic  Right Ear: External ear normal       Left Ear: External ear normal       Nose: Nose normal       Mouth/Throat:      Mouth: Mucous membranes are moist       Pharynx: Oropharynx is clear  Eyes:      Pupils: Pupils are equal, round, and reactive to light  Cardiovascular:      Rate and Rhythm: Normal rate and regular rhythm  Pulses: Normal pulses  Heart sounds: Normal heart sounds  No murmur heard  Pulmonary:      Effort: Pulmonary effort is normal  No respiratory distress  Breath sounds: Normal breath sounds  No wheezing  Abdominal:      General: Bowel sounds are normal  There is no distension  Palpations: Abdomen is soft  Tenderness: There is no abdominal tenderness  Musculoskeletal:         General: Normal range of motion  Cervical back: Normal range of motion        Right lower leg: Edema present  Left lower leg: Edema present  Comments: Edema improving   Skin:     General: Skin is warm  Neurological:      General: No focal deficit present  Mental Status: He is alert and oriented to person, place, and time     Psychiatric:         Mood and Affect: Mood normal           Lab Results   Component Value Date    WBC 12 01 (H) 06/01/2022    HGB 9 5 (L) 06/01/2022    HCT 31 9 (L) 06/01/2022    MCV 97 06/01/2022     (H) 06/01/2022     Lab Results   Component Value Date    SODIUM 133 (L) 06/01/2022    K 3 8 06/01/2022    CL 98 06/01/2022    CO2 26 06/01/2022    BUN 28 (H) 06/01/2022    CREATININE 1 53 (H) 06/01/2022    GLUC 94 06/01/2022    CALCIUM 9 7 06/01/2022     Lab Results   Component Value Date    INR 1 27 (H) 05/10/2022    INR 1 09 05/10/2022    INR 1 33 (H) 08/24/2019    PROTIME 15 4 (H) 05/10/2022    PROTIME 13 5 05/10/2022    PROTIME 16 6 (H) 08/24/2019           Current Facility-Administered Medications:     acetaminophen (TYLENOL) tablet 975 mg, 975 mg, Oral, Q8H [address], Judi Brando, PA-C, 975 mg at 06/01/22 1335    allopurinol (ZYLOPRIM) tablet 100 mg, 100 mg, Oral, Daily, Rafael Keene, PA-C, 100 mg at 06/01/22 0902    amLODIPine (NORVASC) tablet 10 mg, 10 mg, Oral, Daily, Rafael Keene, PA-C, 10 mg at 06/01/22 0902    calcium carbonate (TUMS) chewable tablet 1,000 mg, 1,000 mg, Oral, BID PRN, Rafael Keene PA-C    cholecalciferol (VITAMIN D3) tablet 400 Units, 400 Units, Oral, Daily, Rafaelstephanie Keene, PA-C, 400 Units at 06/01/22 0902    Diclofenac Sodium (VOLTAREN) 1 % topical gel 2 g, 2 g, Topical, 4x Daily, Rafael Keene PA-C, 2 g at 06/01/22 1335    heparin (porcine) subcutaneous injection 7,500 Units, 7,500 Units, Subcutaneous, Q8H [address], Judi Michaels PA-C, 7,500 Units at 06/01/22 1334    lidocaine (LIDODERM) 5 % patch 1 patch, 1 patch, Topical, Daily, Rafael Keene PA-C, 1 patch at 06/01/22 0902    metoprolol succinate (TOPROL-XL) 24 hr tablet 25 mg, 25 mg, Oral, Daily, RITA Schulte, 25 mg at 06/01/22 0902    ondansetron (ZOFRAN-ODT) dispersible tablet 4 mg, 4 mg, Oral, Q6H PRN, Cintia Stanley PA-C    senna (SENOKOT) tablet 17 2 mg, 2 tablet, Oral, HS PRN, Cintia Stanley PA-C    tamsulosin (FLOMAX) capsule 0 4 mg, 0 4 mg, Oral, Daily With Dinner, Morris Michaels PA-C, 0 4 mg at 05/31/22 1646    traMADol (ULTRAM) tablet 50 mg, 50 mg, Oral, Q6H [address], Sergio Torres MD, 50 mg at 06/01/22 1334    Past Medical History:   Diagnosis Date    Arthritis     Chronic kidney disease     Gout     Hyperlipidemia     Hypertension     Kidney stone     Obstructive pyelonephritis     Sepsis [facility])        Patient Active Problem List    Diagnosis Date Noted    COVID 06/01/2022    Anemia 05/13/2022    Obstructive pyelonephritis 04/23/2022    Acute unilateral obstructive uropathy 04/21/2022    Mixed hyperlipidemia 06/02/2021    Morbid obesity 06/02/2021    Recurrent left knee pain 06/02/2021    Insomnia 09/16/2019    Essential hypertension 08/27/2019    Severe sepsis on admission 08/23/2019    [facility], [facility] 08/23/2019    Acute kidney injury superimposed on CKD stage 3 08/06/2019    Bacteremia due to Escherichia coli 08/04/2019    Lactic acidosis 08/04/2019    History of gout 07/15/2019    Vitamin D deficiency 06/10/2015    Morbid obesity ([facility] [address] ) 03/23/2015          Cintia Stanley PA-C    Total time spent:  30 minutes with more than 50% spent counseling/coordinating care  Counseling includes discussion with patient re: progress and discussion with patient of his/her current medical/functional state/information  Coordination of patient's care was performed in conjunction with consulting services  Time invested included review of patient's labs, vitals, and management of their comorbidities with continued monitoring   The care of the patient was extensively discussed and appropriate treatment plan was formulated unique for this patient  ** Please Note:  voice to text software may have been used in the creation of this document   Although proof errors in transcription or interpretation are a potential of such software**

## 2022-06-01 NOTE — PROGRESS NOTES
06/01/22 0852   Pain Assessment   Pain Assessment Tool 0-10   Pain Score 6   Pain Location/Orientation Orientation: Left; Location: Knee  (R ankle)   Multiple Pain Sites Yes   Pain 2   Pain Score 2 7   Pain Location/Orientation 2 Orientation: Left; Location: Foot   Restrictions/Precautions   Precautions Fall Risk;Pain;Contact/isolation; Airborne/isolation;Droplet precautions   RLE Weight Bearing Per Order WBAT   LLE Weight Bearing Per Order WBAT   Cognition   Overall Cognitive Status WFL   Orientation Level Oriented X4   Sit to Lying   Type of Assistance Needed Supervision   Sit to Lying CARE Score 4   Lying to Sitting on Side of Bed   Type of Assistance Needed Supervision   Lying to Sitting on Side of Bed CARE Score 4   Sit to Stand   Type of Assistance Needed Supervision   Sit to Stand CARE Score 4   Bed-Chair Transfer   Type of Assistance Needed Supervision   Chair/Bed-to-Chair Transfer CARE Score 4   Walk 10 Feet   Type of Assistance Needed Supervision   Walk 10 Feet CARE Score 4   Walk 50 Feet with Two Turns   Type of Assistance Needed Supervision   Walk 50 Feet with Two Turns CARE Score 4   Walking 10 Feet on Uneven Surfaces   Type of Assistance Needed Supervision   Walking 10 Feet on Uneven Surfaces CARE Score 4   Ambulation   Does the patient walk? 2  Yes   Primary Mode of Locomotion Prior to Admission Walk   Distance Walked (feet)   (78' 86')   Assist Device Roller Walker   Gait Pattern Antalgic; Inconsistant Julieta; Slow Julieta;Decreased foot clearance; Step through;Narrow TEO   Limitations Noted In Balance; Endurance; Safety;Strength   Walk Assist Level Supervision   Findings level and unlevel surfaces   Curb or Single Stair   Style negotiated Single stair   Type of Assistance Needed Incidental touching   Comment cg   1 Step (Curb) CARE Score 4   Stairs   Type Stairs   # of Steps 3   Weight Bearing Precautions WBAT   Assist Devices Bilateral Rail   Findings cg   Therapeutic Interventions   Strengthening seated and supine ther ex; occasional AAROM LLE   Balance gait and transfer training   Other stair training   Assessment   Treatment Assessment Patient agreeable to therapy session  Pain reported at 6/10 R ankle and L knee and 7/10 in L foot  Continues to make positive gains in therapy  Completed ther ex for general LE strengthening; gait and transfer training focusing on sequence and technique for improved balance and safety with functional mobility using walker  Patient able negotiate up and down steps with 2 handrails and cues for sequence  PT Barriers   Physical Impairment Decreased strength;Decreased range of motion;Decreased endurance; Impaired balance;Decreased mobility; Decreased safety awareness;Pain   Functional Limitation Walking;Transfers;Standing;Stair negotiation; Wheelchair management   Plan   Treatment/Interventions Functional transfer training;LE strengthening/ROM; Elevations; Therapeutic exercise; Bed mobility;Gait training   Progress Progressing toward goals   PT Therapy Minutes   PT Time In 0852   PT Time Out 1022   PT Total Time (minutes) 90   PT Mode of treatment - Individual (minutes) 90   PT Mode of treatment - Concurrent (minutes) 0   PT Mode of treatment - Group (minutes) 0   PT Mode of treatment - Co-treat (minutes) 0   PT Mode of Treatment - Total time(minutes) 90 minutes   PT Cumulative Minutes 1104   Therapy Time missed   Time missed?  No

## 2022-06-01 NOTE — TEAM CONFERENCE
Acute RehabilitationTeam Conference Note  Date: 6/1/2022   Time: 10:59 AM       Patient Name:  Maryjo Thao       Medical Record Number: [id number]   YOB: 1965  Sex: Male          Room/Bed:  [facility] 220/[facility] 220-01  Payor Info:  Payor: Sonia Whiting / Plan: Sanjay Dutta / Product Type: Blue Fee for Service /      Admitting Diagnosis: Sepsis ([facility] ) [A41 9]   Admit Date/Time:  5/19/2022  1:48 PM  Admission Comments: No comment available     Primary Diagnosis:  Obstructive pyelonephritis  Principal Problem: Obstructive pyelonephritis    Patient Active Problem List    Diagnosis Date Noted    Anemia 05/13/2022    Obstructive pyelonephritis 04/23/2022    Acute unilateral obstructive uropathy 04/21/2022    Mixed hyperlipidemia 06/02/2021    Morbid obesity 06/02/2021    Recurrent left knee pain 06/02/2021    Insomnia 09/16/2019    Essential hypertension 08/27/2019    Severe sepsis on admission 08/23/2019    Chronic kidney disease, stage 3 ([facility] ) 08/23/2019    Acute kidney injury superimposed on CKD stage 3 08/06/2019    Bacteremia due to Escherichia coli 08/04/2019    Lactic acidosis 08/04/2019    History of gout 07/15/2019    Vitamin D deficiency 06/10/2015    Morbid obesity ([facility] ) 03/23/2015       Physical Therapy:    Weight Bearing Status: Weight Bearing as Tolerated  Transfers: Supervision  Bed Mobility: Independent  Amulation Distance (ft): 121 feet  Ambulation: Supervision  Assistive Device for Ambulation: Roller Walker  Wheelchair Mobility Distance: 145 ft  Wheelchair Mobility: Supervision  Number of Stairs: 3  Assistive Device for Stairs: Bilateral [employer]  Stair Assistance: Incidental Touching  Discharge Recommendations: [address] [place] [address] with[de-identified] Family Support, Home Physical Therapy, First Floor Setup    05/25/2022:   Patient participating in therapy and making slow, yet positive gains      Patient S bed mobility, S/MIN A all transfers with walker, S wheelchair mobility up to 145' level surfaces, S ambulation up to 74' level  surfaces with walker  Steps not assessed yet  Patient remains limited by decreased ROM/strength, decreased balance and safety, decreased endurance, and pain  Patient would benefit from continued inpatient ARC PT to increase function, safety, and independence in prep for safe d/c to home  05/31/2022:   Patient participating in therapy and making slow, yet positive gains  Patient independent bed mobility, supervision to CGA all transfers with walker, S wheelchair mobility up to 145' level surfaces max distance, S ambulation up to 121' level and unlevel surfaces with walker, CG negotiation of 3 steps with 2 handrails  Patient remains limited by decreased ROM/strength, decreased balance and safety, decreased endurance, and pain  Patient would benefit from continued inpatient ARC PT to increase function, safety, and independence in prep for safe d/c to home  Occupational Therapy:  Eating: Independent  Grooming: Supervision  Bathing: Minimal Assistance  Bathing: Minimal Assistance  Upper Body Dressing: Supervision  Lower Body Dressing: Minimal Assistance  Toileting: Minimal Assistance, Moderate Assistance  Tub/Shower Transfer: Minimal Assistance  Toilet Transfer: Incidental Touching  Cognition: Within Defined Limits  Orientation: Person, Place, Time, Situation  Discharge Recommendations: Home with: (vs  SNF)  DC Home with[de-identified] First Floor Setup, Home Occupational Therapy       05/25/2022: Pt new admit to ARU s/p sepsis and nephrology tube placement  Current LOF listed above  Barriers to d/c include decreased strength, decreased balance, decreased endurance and activity tolerance, limited home support, increased pain in L knee and R shoulders all which affect independence and  functional performance   Pt will participate in ADL training, therapeutic exercises, therapeutic activities, functional mobility/transfers, compensatory techniques and activity tolerance in order to progress towards goals  Pt would benefit from continued skilled OT services in order to address listed barriers and prepare for safe d/c     6/1/22: Pt's current LOF listed above  Continues with barriers to d/c of decreased strength, decreased balance, decreased endurance and activity tolerance, limited home support, increased pain in L knee and R shoulders all which affect independence in self care and functional performance  Pt participating in ADL training, therapeutic exercises, therapeutic activities, functional mobility/transfers, compensatory techniques and activity tolerance in order to progress towards goals  Pt would benefit from continued skilled OT services in order to address listed barriers and prepare for safe d/c       Speech Therapy:           No notes on file    Nursing Notes:  Appetite: Good  Diet Type: Regular/House                      Diet Patient/Family Education Complete: Yes                         Type of Wound Patient/Family Education: No  Bladder: 2 - Maximal Assistance     Bladder Patient/Family Education: Yes  Bowel: 3 - Moderate Assistance     Bowel Patient/Family Education: Yes  Pain Location/Orientation: Orientation: Left, Location: Knee (R ankle)  Pain Score: 0  Pain 2  Pain Score 2: 7  Pain Location/Orientation 2: Orientation: Left, Location: [facility] Pain Intervention(s): Medication (See MAR)  Pain Patient/Family Education: Yes  Medication Management/Safety  Injectable: Lovenox  Safe Administration: Yes  Medication Patient/Family Education Complete: Yes    5/23/22 Patient is a recent admission for ARC following a hospitalization for obstructive pyelonephritis  Patient remains alert and oriented bed alarm in place to promote patient safety  Patient with nephrostomy tube to the left flank dressing changed daily and tube flushed with 10cc of NSS daily to maintain patency  Tube draining clear yellow urine without difficulty    Patient also voids small amounts in urinal   Scattered ecchymotic areas on the abdomen noted  Patient was started on allopurinol for a gout flare up  Pain managed with scheduled tylenol and as needed ultram       5/30/22 Patient remains alert and oriented  Lungs are clear but diminished at the bases on room air  Patient with left flank nephrostomy tube draining clear yellow urine staff performs emptying of the device and daily flushes of the device  Patient also voids small amounts in a urinal   Scattered ecchymotic areas on the abdomen noted  Patient now on scheduled tramadol and tylenol for pain management  Patient able to ambulate to the bathroom with one assist and a walker overnight  Case Management:     Discharge Planning  Living Arrangements: Lives Alone  Support Systems: Self  Assistance Needed: na  Type of Current Residence: Private residence  Current Home Care Services: No  Initial assessment & orientation to [place] with Pt, who expressed understanding & agreement  Pt declined for this worker to contact his parents, stating that he prefers to do so himself  Pt lives alone in a multi-story home,  set up  There are 6 steps (4+2) to enter, HR's on the first four steps  He has a SPC & RW  He reported that he has been completely independent, driving & working FT  He expressed that he is hopeful to return to employment but is considering applying for SSDI  Discussed role of team members & reviewed [address] with Pt, who expressed understanding & agreement  SW will continue to monitor & assist as needed with [address]     5/25/22 - Tx team recommendations reviewed with patient, who expressed understanding & agreement  Pt stated that he prefers to notify his father himself  Target DC date is 6/1 with [facility] (Nsg, PT, OT, HHA); a list of providers was provided to Pt & a referral will be made based on Pt preference   Attempting to arrange DC around surgical procedure, but per chart, this is not RS until 6/17; LUDIVINA to explore if this can be moved up  SW will continue to monitor & assist as needed with Tx & DC planning  Is the patient actively participating in therapies? yes  List any modifications to the treatment plan: na    Barriers Interventions   COVID +, HTN, anemia Medical management and oversight   pain Medication management   Nephrostomy tube Local care   Decreased ROM, strength, and end Therapeutic exercise, therapeutic activity   Decreased balance and safety ADL, transfer, gait training     Is the patient making expected progress toward goals?  yes  List any update or changes to goals: na    Medical Goals: Patient will be able to manage medical conditions and comorbid conditions with medications and follow up upon completion of rehab program    Weekly Team Goals:   Rehab Team Goals  ADL Team Goal: Patient will be independent with ADLs with least restrictive device upon completion of rehab program  Transfer Team Goal: Patient will be independent with transfers with least restrictive device upon completion of rehab program  Locomotion Team Goal: Patient will be independent with locomotion with least restrictive device upon completion of rehab program     Mod I bed mobility, transfers, and mobility  Min assist bathing, LB dressing, and toileting    Health and wellness: to be able to return home and complete homemaking    Discussion: Plan for return home alone with Ayanna Harden for PT, OT, and nursing     Anticipated Discharge Date:  June 17, 2022

## 2022-06-01 NOTE — ASSESSMENT & PLAN NOTE
· Incidentally found positive on 5/31  · out of isolation on 6/9  · Sepsis criteria met as mentioned below

## 2022-06-01 NOTE — CASE MANAGEMENT
Tx team recommendations reviewed with patient, who expressed understanding & agreement  Pt's DC plan is changed due to incidental positive Covid test  DC date will be immediately prior to surgery; currently it is scheduled for 6/17 & will explore if this can be moved sooner  SW will continue to monitor & assist as needed with Tx & DC planning

## 2022-06-01 NOTE — PLAN OF CARE
Problem: PAIN - ADULT  Goal: Verbalizes/displays adequate comfort level or baseline comfort level  Description: Interventions:  - Encourage patient to monitor pain and request assistance  - Assess pain using appropriate pain scale  - Administer analgesics based on type and severity of pain and evaluate response  - Implement non-pharmacological measures as appropriate and evaluate response  - Consider cultural and social influences on pain and pain management  - Notify physician/advanced practitioner if interventions unsuccessful or patient reports new pain  Outcome: Progressing     Problem: SAFETY ADULT  Goal: Patient will remain free of falls  Description: INTERVENTIONS:  - Educate patient/family on patient safety including physical limitations  - Instruct patient to call for assistance with activity   - Consult OT/PT to assist with strengthening/mobility   - Keep Call bell within reach  - Keep bed low and locked with side rails adjusted as appropriate  - Keep care items and personal belongings within reach  - Initiate and maintain comfort rounds  - Make Fall Risk Sign visible to staff  - Offer Toileting every 2 Hours, in advance of need  - Initiate/Maintain bed/chair alarm  - Obtain necessary fall risk management equipment: bed/chair alarm  - Apply yellow socks and bracelet for high fall risk patients  - Consider moving patient to room near nurses station  Outcome: Progressing  Goal: Maintain or return to baseline ADL function  Description: INTERVENTIONS:  -  Assess patient's ability to carry out ADLs; assess patient's baseline for ADL function and identify physical deficits which impact ability to perform ADLs (bathing, care of mouth/teeth, toileting, grooming, dressing, etc )  - Assess/evaluate cause of self-care deficits   - Assess range of motion  - Assess patient's mobility; develop plan if impaired  - Assess patient's need for assistive devices and provide as appropriate  - Encourage maximum independence but intervene and supervise when necessary  - Involve family in performance of ADLs  - Assess for home care needs following discharge   - Consider OT consult to assist with ADL evaluation and planning for discharge  - Provide patient education as appropriate  Outcome: Progressing

## 2022-06-01 NOTE — PROGRESS NOTES
06/01/22 1230   Pain Assessment   Pain Assessment Tool 0-10   Pain Score 7   Pain Location/Orientation Orientation: Left; Location: Knee   Restrictions/Precautions   Precautions Fall Risk; Airborne/isolation;Contact/isolation;Droplet precautions;Pain  (covid +)   ROM- Right Upper Extremities   R Shoulder PROM; Prolonged stretch; Flexion;ABduction; Extension;Horizontal ABduction; External rotation; Internal rotation;AAROM   R Elbow AROM;Elbow flexion;Elbow extension   R Position Supine   RUE ROM Comment In supine with HOB elevate to approx 45* P/AAROM shld flex/ext with prolonged stretch and place and hold completed encouraging control of pt in extension, forward punch with AAROM for guidance due to weight of UE, horiz abd/add modified position all x15 reps followed by isometric with shld ext into bed x 10 reps with 3 sec hold; elbow flex ext with 3# x 30 reps and then sup/pro x 30 reps   Left Upper Extremity-Strength   LUE Strength Comment 3# dumbell 2x15 reps in shld flex/ext, punches, horiz abd/add, elbow flex/ext and sup/pro   Exercise Tools   Hand Gripper 5# digi x 30 B hands   Other Exercise Tool 1 red t band Bilateral stretches x 10 reps, L UE 2x15 reps, forward punches with b UEs 2x15 reps   Cognition   Overall Cognitive Status WFL   Arousal/Participation Alert; Cooperative   Attention Within functional limits   Orientation Level Oriented X4   Memory Within functional limits   Following Commands Follows all commands and directions without difficulty   Activity Tolerance   Activity Tolerance Patient tolerated treatment well   Assessment   Treatment Assessment Upon entry to room pt in bed with HOB elevated and reporting L LE feels tight and requested to remain in bed for TE  Educ in techniques for bed mobility to encourage use of R UE for functional activites  Pt making improvements in AROM and strength with r UE as evident by ability to complete TE with less A from OT   Refer to respectvie sections of note for details of session  Plan is to conitnue with skilled OT in prep for d/c to home  Prognosis Good   Problem List Decreased strength;Decreased range of motion;Decreased endurance   Plan   Treatment/Interventions Therapeutic exercise; Endurance training;OT;Bed mobility   Progress Improving as expected   OT Therapy Minutes   OT Time In 1230   OT Time Out 1408   OT Total Time (minutes) 98   OT Mode of treatment - Individual (minutes) 98   OT Mode of treatment - Concurrent (minutes) 0   OT Mode of treatment - Group (minutes) 0   OT Mode of treatment - Co-treat (minutes) 0   OT Mode of Treatment - Total time(minutes) 98 minutes   OT Cumulative Minutes 613   Therapy Time missed   Time missed?  No

## 2022-06-02 PROCEDURE — 97116 GAIT TRAINING THERAPY: CPT

## 2022-06-02 PROCEDURE — 97110 THERAPEUTIC EXERCISES: CPT

## 2022-06-02 PROCEDURE — 97535 SELF CARE MNGMENT TRAINING: CPT

## 2022-06-02 PROCEDURE — 97542 WHEELCHAIR MNGMENT TRAINING: CPT

## 2022-06-02 PROCEDURE — 97530 THERAPEUTIC ACTIVITIES: CPT

## 2022-06-02 RX ADMIN — TRAMADOL HYDROCHLORIDE 50 MG: 50 TABLET, COATED ORAL at 05:16

## 2022-06-02 RX ADMIN — HEPARIN SODIUM 7500 UNITS: 5000 INJECTION INTRAVENOUS; SUBCUTANEOUS at 13:27

## 2022-06-02 RX ADMIN — ALLOPURINOL 100 MG: 100 TABLET ORAL at 08:05

## 2022-06-02 RX ADMIN — ACETAMINOPHEN 975 MG: 325 TABLET ORAL at 13:26

## 2022-06-02 RX ADMIN — ACETAMINOPHEN 975 MG: 325 TABLET ORAL at 05:15

## 2022-06-02 RX ADMIN — DICLOFENAC SODIUM 2 G: 10 GEL TOPICAL at 08:05

## 2022-06-02 RX ADMIN — HEPARIN SODIUM 7500 UNITS: 5000 INJECTION INTRAVENOUS; SUBCUTANEOUS at 21:35

## 2022-06-02 RX ADMIN — TAMSULOSIN HYDROCHLORIDE 0.4 MG: 0.4 CAPSULE ORAL at 17:17

## 2022-06-02 RX ADMIN — TRAMADOL HYDROCHLORIDE 50 MG: 50 TABLET, COATED ORAL at 23:11

## 2022-06-02 RX ADMIN — DICLOFENAC SODIUM 2 G: 10 GEL TOPICAL at 12:13

## 2022-06-02 RX ADMIN — ACETAMINOPHEN 975 MG: 325 TABLET ORAL at 21:35

## 2022-06-02 RX ADMIN — DICLOFENAC SODIUM 2 G: 10 GEL TOPICAL at 21:36

## 2022-06-02 RX ADMIN — AMLODIPINE BESYLATE 10 MG: 10 TABLET ORAL at 08:05

## 2022-06-02 RX ADMIN — TRAMADOL HYDROCHLORIDE 50 MG: 50 TABLET, COATED ORAL at 12:15

## 2022-06-02 RX ADMIN — METOPROLOL SUCCINATE 25 MG: 25 TABLET, FILM COATED, EXTENDED RELEASE ORAL at 08:05

## 2022-06-02 RX ADMIN — DICLOFENAC SODIUM 2 G: 10 GEL TOPICAL at 17:17

## 2022-06-02 RX ADMIN — TRAMADOL HYDROCHLORIDE 50 MG: 50 TABLET, COATED ORAL at 17:17

## 2022-06-02 RX ADMIN — CHOLECALCIFEROL TAB 10 MCG (400 UNIT) 400 UNITS: 10 TAB at 08:05

## 2022-06-02 RX ADMIN — LIDOCAINE 1 PATCH: 50 PATCH TOPICAL at 08:05

## 2022-06-02 RX ADMIN — HEPARIN SODIUM 7500 UNITS: 5000 INJECTION INTRAVENOUS; SUBCUTANEOUS at 05:16

## 2022-06-02 NOTE — PROGRESS NOTES
06/02/22 0906   Pain Assessment   Pain Assessment Tool 0-10   Pain Score 7   Pain Location/Orientation Location: Generalized   Restrictions/Precautions   Precautions Fall Risk;Contact/isolation; Airborne/isolation;Pain   Weight Bearing Restrictions No   ROM Restrictions No   Oral Hygiene   Type of Assistance Needed Set-up / clean-up   Physical Assistance Level No physical assistance   Oral Hygiene CARE Score 5   Grooming   Able To Initiate Tasks;Comb/Brush Hair;Wash/Dry Face;Brush/Clean Teeth   Limitation Noted In Safety;Strength  (decreased ROM R shoulder affects combing hair, used LUE)   Shower/Bathe Self   Type of Assistance Needed Physical assistance; Adaptive equipment   Physical Assistance Level 25% or less   Comment assist for buttocks thoroughness   Shower/Bathe Self CARE Score 3   Bathing   Assessed Bath Style Sponge Bath   Anticipated D/C Bath Style Shower;Sponge Bath   Able to [place] No   Able to Wash/Rinse/Dry (body part) Left Arm;Right Arm;L Upper Leg;R Upper Leg;L Lower Leg/Foot;R Lower Leg/Foot;Chest;Abdomen;Perineal Area   Limitations Noted in Balance; Endurance;ROM;Safety;Strength   Positioning Seated;Standing   Adaptive Equipment Longhand Sponge   Upper Body Dressing   Type of Assistance Needed Set-up / clean-up   Physical Assistance Level No physical assistance   Upper Body Dressing CARE Score 5   Lower Body Dressing   Comment pt declined changing shorts, lowered to feet to wash hamida and buttocks and pulled back up when done   Putting On/Taking Off Footwear   Type of Assistance Needed Set-up / clean-up; Adaptive equipment   Physical Assistance Level No physical assistance   Putting On/Taking Off Footwear CARE Score 5   Dressing/Undressing Clothing   Remove UB Clothes Pullover Shirt   Don UB Clothes Pullover Shirt   Don LB Clothes Socks   Limitations Noted In Balance; Endurance; Safety;Strength;ROM   Adaptive Equipment Reacher;Sock Aide   Positioning Sit Edge Of Bed   Sit to Lying   Type of Assistance Needed Supervision   Physical Assistance Level No physical assistance   Sit to Lying CARE Score 4   Lying to Sitting on Side of Bed   Type of Assistance Needed Supervision   Physical Assistance Level No physical assistance   Lying to Sitting on Side of Bed CARE Score 4   Sit to Stand   Type of Assistance Needed Supervision   Physical Assistance Level No physical assistance   Sit to Stand CARE Score 4   Functional Standing Tolerance   Time approx 2min during ADL   ROM- Right Upper Extremities   RUE ROM Comment x10 prolonged stretch to R shoulder flexion/extension and horizontal ABD/ADD with pt completing motion as much as able, pt requested to complete stretches while supine in bed, pt reported overall good stretch feelings however did report soreness by end of horizontal ABD stretching   Therapeutic Excerise-Strength   UE Strength Yes   Right Upper Extremity- Strength   RUE Strength Comment x30 digit flexion/extension using 5# digi flex; 10 using red theraband: internal/external rotation, horizontal ABD/ADD; x30 using 3# dubmbell: elbow flexion/extension, pronation/supination, internal/external rotation   Left Upper Extremity-Strength   LUE Strength Comment x30 digit flexion/extension using 5# digi flex; x30 using 3# dumbbell: elbow flexion/extension, protraction/retraction, internal/external rotation, pronation/supination, shoulder flexion/extension, shoulder press   Cognition   Overall Cognitive Status WFL   Arousal/Participation Alert; Responsive; Cooperative   Attention Within functional limits   Orientation Level Oriented X4   Memory Within functional limits   Following Commands Follows all commands and directions without difficulty   Assessment   Treatment Assessment Pt agreeable to OT session this AM  Received sidelying in bed  ADL session completed; current LOF and details listed in respective sections   Pt overall Mervin bathing, set up UB dressing and grooming; pt used AE for footwear dressing and LB bathing  Pt declined changing shorts, reporting his dad is bringing a new pair later this week  After ADL, pt returned to supine and completed UE strength and RUE stretching exercises; details in respective sections  Pt reported generalized pain especially in LLE, RUE, and R ankle; pt did report feeling good stretches during RUE stretching but did report increased pain near end of exercises  Pt remains eager to return to independence and is an active participant in therapy  Pt would benefit from continued skilled OT services in order to maximize independence in self care, functional mobility/transfers, ROM/strength, and activity tolerance  Prognosis Good   Problem List Decreased strength;Decreased range of motion;Decreased endurance; Impaired balance;Decreased safety awareness;Pain   Plan   Treatment/Interventions ADL retraining;Functional transfer training;LE strengthening/ROM; Therapeutic exercise; Endurance training;Patient/family training;Equipment eval/education; Compensatory technique education;OT   Progress Progressing toward goals   OT Therapy Minutes   OT Time In 0906   OT Time Out 1036   OT Total Time (minutes) 90   OT Mode of treatment - Individual (minutes) 89   OT Mode of treatment - Concurrent (minutes) 1

## 2022-06-02 NOTE — NURSING NOTE
Patient remains on contact and airborne precautions  Patient with no symptoms of covid and states he feels good accept for being a little tired  No cough noted or SOB  Patient medicated as ordered for continued pain in knees and shoulder  Remains continant of bowel and bladder  Nephrostomy tube flushed as ordered and new dressing applied  Educated on importance of repositioning self in bed  Transfers and ambulates with assist times one with walker  Will continue to monitor and follow plan of care

## 2022-06-02 NOTE — NURSING NOTE
Pt sleeping in bed  Pt assist x1 with RW to BR  Cont of large BM last night  nephro tube draining clear yellow urine  Dressing CDI around insertion site  Pt with +2 edema to RLE, elevated on pillow over night  Pt had c/o of R ankle pain, medicated as ordered  Pt repositioned self in bed  Will continue to monitor

## 2022-06-02 NOTE — PLAN OF CARE
Problem: PAIN - ADULT  Goal: Verbalizes/displays adequate comfort level or baseline comfort level  Description: Interventions:  - Encourage patient to monitor pain and request assistance  - Assess pain using appropriate pain scale  - Administer analgesics based on type and severity of pain and evaluate response  - Implement non-pharmacological measures as appropriate and evaluate response  - Consider cultural and social influences on pain and pain management  - Notify physician/advanced practitioner if interventions unsuccessful or patient reports new pain  Outcome: Progressing     Problem: INFECTION - ADULT  Goal: Absence or prevention of progression during hospitalization  Description: INTERVENTIONS:  - Assess and monitor for signs and symptoms of infection  - Monitor lab/diagnostic results  - Monitor all insertion sites, i e  indwelling lines, tubes, and drains  - Monitor endotracheal if appropriate and nasal secretions for changes in amount and color  - [place] appropriate cooling/warming therapies per order  - Administer medications as ordered  - Instruct and encourage patient and family to use good hand hygiene technique  - Identify and instruct in appropriate isolation precautions for identified infection/condition  Outcome: Progressing  Goal: Absence of fever/infection during neutropenic period  Description: INTERVENTIONS:  - Monitor WBC    Outcome: Progressing     Problem: DISCHARGE PLANNING  Goal: Discharge to home or other facility with appropriate resources  Description: INTERVENTIONS:  - Identify barriers to discharge w/patient and caregiver  - Arrange for needed discharge resources and transportation as appropriate  - Identify discharge learning needs (meds, wound care, etc )  - Arrange for interpretive services to assist at discharge as needed  - Refer to Case Management Department for coordinating discharge planning if the patient needs post-hospital services based on physician/advanced practitioner order or complex needs related to functional status, cognitive ability, or social support system  Outcome: Progressing

## 2022-06-02 NOTE — PLAN OF CARE
Problem: PAIN - ADULT  Goal: Verbalizes/displays adequate comfort level or baseline comfort level  Description: Interventions:  - Encourage patient to monitor pain and request assistance  - Assess pain using appropriate pain scale  - Administer analgesics based on type and severity of pain and evaluate response  - Implement non-pharmacological measures as appropriate and evaluate response  - Consider cultural and social influences on pain and pain management  - Notify physician/advanced practitioner if interventions unsuccessful or patient reports new pain  6/2/2022 1949 by Yamilet Murdock RN  Outcome: Progressing    Problem: SAFETY ADULT  Goal: Patient will remain free of falls  Description: INTERVENTIONS:  - Educate patient/family on patient safety including physical limitations  - Instruct patient to call for assistance with activity   - Consult OT/PT to assist with strengthening/mobility   - Keep Call bell within reach  - Keep bed low and locked with side rails adjusted as appropriate  - Keep care items and personal belongings within reach  - Initiate and maintain comfort rounds  - Make Fall Risk Sign visible to staff  - Offer Toileting every 2 Hours, in advance of need  - Initiate/Maintain bed/chair alarm  - Obtain necessary fall risk management equipment: bed/chair alarm  - Apply yellow socks and bracelet for high fall risk patients  - Consider moving patient to room near nurses station  6/2/2022 1949 by Yamilet Murdock RN  Outcome: Progressing       Problem: MOBILITY - ADULT  Goal: Maintain or return to baseline ADL function  Description: INTERVENTIONS:  -  Assess patient's ability to carry out ADLs; assess patient's baseline for ADL function and identify physical deficits which impact ability to perform ADLs (bathing, care of mouth/teeth, toileting, grooming, dressing, etc )  - Assess/evaluate cause of self-care deficits   - Assess range of motion  - Assess patient's mobility; develop plan if impaired  - Assess patient's need for assistive devices and provide as appropriate  - Encourage maximum independence but intervene and supervise when necessary  - Involve family in performance of ADLs  - Assess for home care needs following discharge   - Consider OT consult to assist with ADL evaluation and planning for discharge  - Provide patient education as appropriate  Outcome: Progressing

## 2022-06-02 NOTE — PROGRESS NOTES
06/02/22 1300   Pain Assessment   Pain Assessment Tool 0-10   Pain Score 8   Pain Location/Orientation Orientation: Bilateral  (ankles)   Pain 2   Pain Score 2 7   Pain Location/Orientation 2 Orientation: Right;Location: Knee   Restrictions/Precautions   Precautions Fall Risk;Pain;Contact/isolation; Airborne/isolation;Droplet precautions  (covid +)   Cognition   Overall Cognitive Status WFL   Orientation Level Oriented X4   Sit to Lying   Type of Assistance Needed Independent   Sit to Lying CARE Score 6   Lying to Sitting on Side of Bed   Type of Assistance Needed Independent   Lying to Sitting on Side of Bed CARE Score 6   Sit to Stand   Type of Assistance Needed Supervision   Sit to Stand CARE Score 4   Bed-Chair Transfer   Type of Assistance Needed Supervision   Chair/Bed-to-Chair Transfer CARE Score 4   Walk 10 Feet   Type of Assistance Needed Supervision   Walk 10 Feet CARE Score 4   Walk 50 Feet with Two Turns   Type of Assistance Needed Supervision   Walk 50 Feet with Two Turns CARE Score 4   Ambulation   Does the patient walk? 2  Yes   Primary Mode of Locomotion Prior to Admission Walk   Distance Walked (feet) 60 ft  (60' 21')   Assist Device Roller Walker   Gait Pattern Antalgic; Inconsistant Julieta; Slow Julieta;Decreased foot clearance   Limitations Noted In Balance; Endurance; Safety;Strength   Provided Assistance with: Balance   Walk Assist Level Supervision   Findings level surfaces   Wheel 50 Feet with Two Turns   Type of Assistance Needed Supervision   Wheel 50 Feet with Two Turns CARE Score 4   Wheel 150 Feet   Type of Assistance Needed Supervision   Wheel 150 Feet CARE Score 4   Wheelchair mobility   Does the patient use a wheelchair? 1  Yes   Type of Wheelchair Used 1  Manual   Method Right upper extremity; Left upper extremity;Right lower extremity; Left lower extremity   Assistance Provided For Locking Brakes;Obstacles;Remove Leg Rest;Replace Leg Rest   Distance Level Surface (feet) 168 ft Distance Wheeled 3% Grade 0 ft   Findings level surfaces   Therapeutic Interventions   Strengthening seated and supine ther ex   Balance gait and transfer training   Other w/c mobility   Assessment   Treatment Assessment Patient tolerated therapy session well  Pain in bilateral feet and L knee remains  Completed ther ex for general LE strengthening; gait and transfer training focusing on sequence and technique for improved balance and safety with functional mobility using walker  Patient able to propel w/c with supervision over level surfaces  Declined stair training today due to pain  PT Barriers   Physical Impairment Decreased strength;Decreased range of motion;Decreased endurance; Impaired balance;Decreased mobility; Decreased safety awareness;Pain;Obesity   Functional Limitation Wheelchair management; [facility] negotiation   Plan   Treatment/Interventions Functional transfer training;LE strengthening/ROM; Therapeutic exercise; Bed mobility;Gait training  (w/c mobility)   Progress Slow progress, decreased activity tolerance   PT Therapy Minutes   PT Time In 1300   PT Time Out 1433   PT Total Time (minutes) 93   PT Mode of treatment - Individual (minutes) 93   PT Mode of treatment - Concurrent (minutes) 0   PT Mode of treatment - Group (minutes) 0   PT Mode of treatment - Co-treat (minutes) 0   PT Mode of Treatment - Total time(minutes) 93 minutes   PT Cumulative Minutes 1197   Therapy Time missed   Time missed?  No

## 2022-06-03 PROCEDURE — 99232 SBSQ HOSP IP/OBS MODERATE 35: CPT | Performed by: FAMILY MEDICINE

## 2022-06-03 PROCEDURE — 97530 THERAPEUTIC ACTIVITIES: CPT

## 2022-06-03 PROCEDURE — 97110 THERAPEUTIC EXERCISES: CPT

## 2022-06-03 PROCEDURE — 97116 GAIT TRAINING THERAPY: CPT

## 2022-06-03 PROCEDURE — 97537 COMMUNITY/WORK REINTEGRATION: CPT

## 2022-06-03 PROCEDURE — 97535 SELF CARE MNGMENT TRAINING: CPT

## 2022-06-03 RX ADMIN — TRAMADOL HYDROCHLORIDE 50 MG: 50 TABLET, COATED ORAL at 11:52

## 2022-06-03 RX ADMIN — ACETAMINOPHEN 975 MG: 325 TABLET ORAL at 05:14

## 2022-06-03 RX ADMIN — METOPROLOL SUCCINATE 25 MG: 25 TABLET, FILM COATED, EXTENDED RELEASE ORAL at 08:25

## 2022-06-03 RX ADMIN — CHOLECALCIFEROL TAB 10 MCG (400 UNIT) 400 UNITS: 10 TAB at 08:25

## 2022-06-03 RX ADMIN — DICLOFENAC SODIUM 2 G: 10 GEL TOPICAL at 17:00

## 2022-06-03 RX ADMIN — TRAMADOL HYDROCHLORIDE 50 MG: 50 TABLET, COATED ORAL at 05:14

## 2022-06-03 RX ADMIN — ALLOPURINOL 100 MG: 100 TABLET ORAL at 08:25

## 2022-06-03 RX ADMIN — ACETAMINOPHEN 975 MG: 325 TABLET ORAL at 21:32

## 2022-06-03 RX ADMIN — HEPARIN SODIUM 7500 UNITS: 5000 INJECTION INTRAVENOUS; SUBCUTANEOUS at 14:27

## 2022-06-03 RX ADMIN — TAMSULOSIN HYDROCHLORIDE 0.4 MG: 0.4 CAPSULE ORAL at 16:48

## 2022-06-03 RX ADMIN — HEPARIN SODIUM 7500 UNITS: 5000 INJECTION INTRAVENOUS; SUBCUTANEOUS at 05:15

## 2022-06-03 RX ADMIN — DICLOFENAC SODIUM 2 G: 10 GEL TOPICAL at 11:52

## 2022-06-03 RX ADMIN — DICLOFENAC SODIUM 2 G: 10 GEL TOPICAL at 08:25

## 2022-06-03 RX ADMIN — DICLOFENAC SODIUM 2 G: 10 GEL TOPICAL at 21:32

## 2022-06-03 RX ADMIN — LIDOCAINE 1 PATCH: 50 PATCH TOPICAL at 08:25

## 2022-06-03 RX ADMIN — ACETAMINOPHEN 975 MG: 325 TABLET ORAL at 14:26

## 2022-06-03 RX ADMIN — HEPARIN SODIUM 7500 UNITS: 5000 INJECTION INTRAVENOUS; SUBCUTANEOUS at 21:32

## 2022-06-03 RX ADMIN — TRAMADOL HYDROCHLORIDE 50 MG: 50 TABLET, COATED ORAL at 17:01

## 2022-06-03 RX ADMIN — AMLODIPINE BESYLATE 10 MG: 10 TABLET ORAL at 08:25

## 2022-06-03 NOTE — PROGRESS NOTES
06/03/22 0644   Pain Assessment   Pain Assessment Tool 0-10   Pain Score 7   Pain Location/Orientation Orientation: Left; Location: Knee;Orientation: Bilateral;Location: Foot   Restrictions/Precautions   Precautions Fall Risk;Contact/isolation; Airborne/isolation;Droplet precautions;Pain   Cognition   Overall Cognitive Status WFL   Orientation Level Oriented X4   Sit to Lying   Type of Assistance Needed Independent   Sit to Lying CARE Score 6   Lying to Sitting on Side of Bed   Type of Assistance Needed Independent   Lying to Sitting on Side of Bed CARE Score 6   Sit to Stand   Type of Assistance Needed Independent   Sit to Stand CARE Score 6   Bed-Chair Transfer   Type of Assistance Needed Supervision   Chair/Bed-to-Chair Transfer CARE Score 4   Walk 10 Feet   Type of Assistance Needed Supervision   Walk 10 Feet CARE Score 4   Walk 50 Feet with Two Turns   Type of Assistance Needed Supervision   Walk 50 Feet with Two Turns CARE Score 4   Walking 10 Feet on Uneven Surfaces   Type of Assistance Needed Supervision   Walking 10 Feet on Uneven Surfaces CARE Score 4   Ambulation   Does the patient walk? 2  Yes   Primary Mode of Locomotion Prior to Admission Walk   Distance Walked (feet) 79 ft  (79' 70')   Assist Device Roller Walker   Gait Pattern Antalgic; Inconsistant Julieta; Slow Julieta;Decreased foot clearance   Limitations Noted In Balance; Endurance; Safety;Strength   Walk Assist Level Supervision   Findings level and unlevel surfaces   Curb or Single Stair   Style negotiated Single stair   Type of Assistance Needed Incidental touching   Comment cg   1 Step (Curb) CARE Score 4   4 Steps   Type of Assistance Needed Incidental touching   Comment cg   4 Steps CARE Score 4   12 Steps   Type of Assistance Needed Incidental touching   Comment cg   12 Steps CARE Score 4   Stairs   Type Stairs   # of Steps 17   Weight Bearing Precautions WBAT   Assist Devices Bilateral Rail;Single Rail   Findings CG; single vs bilateral rail; attempted coming down steps backwards with positive results  Therapeutic Interventions   Strengthening seated and supine ther ex   Balance gait and transfer training   Other stair training   Assessment   Treatment Assessment Patient agreeable to therapy session  Pain reported in L knee and bilateral ankles  Continues to make positive gains  Trialed going down steps backwards and with single rail with positive results  Completed ther ex for general LE strengthening; gait and transfer training focusing on sequence and technique for improved balance and safety with functional mobility using walker  Patient able to negotiate up and down 17 steps with single and bilateral handrails and cues for sequence  PT Barriers   Physical Impairment Decreased strength;Decreased range of motion;Decreased endurance; Impaired balance;Decreased mobility; Decreased safety awareness;Pain   Functional Limitation Walking;Transfers;Standing;Stair negotiation; Wheelchair management   Plan   Treatment/Interventions Functional transfer training;LE strengthening/ROM; Therapeutic exercise;Elevations; Bed mobility;Gait training   Progress Slow progress, decreased activity tolerance   PT Therapy Minutes   PT Time In 0644   PT Time Out 0814   PT Total Time (minutes) 90   PT Mode of treatment - Individual (minutes) 90   PT Mode of treatment - Concurrent (minutes) 0   PT Mode of treatment - Group (minutes) 0   PT Mode of treatment - Co-treat (minutes) 0   PT Mode of Treatment - Total time(minutes) 90 minutes   PT Cumulative Minutes 1287   Therapy Time missed   Time missed?  No

## 2022-06-03 NOTE — NURSING NOTE
06/03/22 Pt continues to deny and S/S of COVID  Participating in therapy  Continues to report knee and should pain  Medicated as ordered  Nephrostomy tube patent  Flushed with 10ml per order  Dressing changed  Pt reported discomfort at tube insertion site after flush and dressing change  Repositioning effective  Noted redness and slight bleeding where tape was removed  Minimal tape reapplied with dressing to limit skin breakdown  Will continue to monitor for relief or further injury   Rayo Michel RN

## 2022-06-03 NOTE — NURSING NOTE
Pt sleeping in bed at this time  Pt assist x1 with RW to BR  Pt L nephro tube draining clear yellow urine  Dressing CDI around insertion site  covid precautions maintained  Pt asymptomatic  +2 edema to RLE, elevated on pillow in bed  Items within reach will continue to monitor

## 2022-06-03 NOTE — PROGRESS NOTES
06/03/22 1030   Pain Assessment   Pain Assessment Tool 0-10   Pain Score 7   Pain Location/Orientation Orientation: Left; Location: Knee   Pain 2   Pain Score 2 7   Pain Location/Orientation 2 Orientation: Right;Location: Shoulder   Restrictions/Precautions   Precautions Fall Risk;Contact/isolation; Airborne/isolation;Droplet precautions;Pain   RLE Weight Bearing Per Order WBAT   LLE Weight Bearing Per Order WBAT   Oral Hygiene   Type of Assistance Needed Set-up / clean-up   Oral Hygiene CARE Score 5   Grooming   Able To Wash/Dry Hands;Brush/Clean Teeth;Wash/Dry Face;Comb/Brush Hair   Findings seated EOB   Lower Body Dressing   Type of Assistance Needed Incidental touching;Verbal cues   Comment in BR on toilet   Lower Body Dressing CARE Score 4   Picking Up Object   Type of Assistance Needed Incidental touching;Verbal cues   Picking Up Object CARE Score 4   Dressing/Undressing Clothing   Remove LB Clothes Shorts   Don LB Clothes Shorts   Limitations Noted In ROM; Strength   Sit to Lying   Type of Assistance Needed Supervision;Verbal cues   Sit to Lying CARE Score 4   Lying to Sitting on Side of Bed   Type of Assistance Needed Supervision;Verbal cues   Lying to Sitting on Side of Bed CARE Score 4   Sit to Stand   Type of Assistance Needed Supervision   Sit to Stand CARE Score 4   Toileting Hygiene   Type of Assistance Needed Physical assistance;Verbal cues   Physical Assistance Level 25% or less   [facility] Vei [facility] Score 3   Toileting   Able to Pull Clothing down yes, up yes  Manage Hygiene Bladder   Findings A with buttock hygiene   Toilet Transfer   Type of Assistance Needed Supervision;Verbal cues   Toilet Transfer CARE Score 4   Toilet Transfer   Surface Assessed Raised Toilet   Transfer Technique Standard   Limitations Noted In Balance; Safety;ROM;LE Strength   Adaptive Equipment Walker   ROM- Right Upper Extremities   R Shoulder PROM;AAROM;AROM;Prolonged stretch; Flexion;ABduction;Horizontal ABduction; Extension; External rotation; Internal rotation   R Elbow AROM;Elbow flexion;Elbow extension   R Position Seated;Supine   RUE ROM Comment seated EOB with 3# weight elbow flex/ext and sup/pro, then in supine AAROM shld flex/ext with prolonged stretch completed x 12 reps, then hriz abd/add with stretch in add x 10 reps then abd/add x 12 reps with pt completing AROM x 6 reps in this plane, followed by forward punch x 10 reps actively  Left Upper Extremity-Strength   L Shoulder Flexion;ABduction; Extension;Horizontal ABduction; External rotation; Internal rotation   L Elbow Elbow flexion;Elbow extension   LUE Strength Comment seated initially for elbow flex/ext and sup/pro and forward punch 2x15 reps then in supine with shld flex/ext, horiz abd/add, forward rowing   Exercise Tools   Hand Gripper 5# digi, pt completed for 2 minutes with each hand as per his request   Cognition   Overall Cognitive Status WFL   Orientation Level Oriented X4   Additional Activities   Additional Activities Comments fxnl mobility S to and from BR with RW   Activity Tolerance   Activity Tolerance Patient tolerated treatment well   Assessment   Treatment Assessment Pt continues to make gains toward OT goals  This date pt instructed in portions of ADL via sponge bathe with educ in safe techniques and compensatory strategies as noted in respectvie sections of note  OT assisted with emptying drain and reported to nurse  Safety with all aspects of toileting also addressed  Pt then seated EOB and then transitioned to supine for TE with B UEs as noted in respectvie sections of note  Pts R UE is improving actively as evident by his ability to comb hair using R hand  Plan is to continue with skilled OT in prep for d/c to home  Prognosis Good   Problem List Decreased strength;Decreased range of motion;Decreased endurance; Impaired balance;Decreased mobility; Decreased safety awareness; Obesity;Pain   Plan   Treatment/Interventions ADL retraining;Functional transfer training; Therapeutic exercise; Endurance training;Patient/family training;Equipment eval/education; Bed mobility;Gait training; Compensatory technique education;OT;Spoke to nursing   Progress Progressing toward goals   OT Therapy Minutes   OT Time In 1030   OT Time Out 1203   OT Total Time (minutes) 93   OT Mode of treatment - Individual (minutes) 93   OT Mode of treatment - Concurrent (minutes) 0   OT Mode of treatment - Group (minutes) 0   OT Mode of treatment - Co-treat (minutes) 0   OT Mode of Treatment - Total time(minutes) 93 minutes   OT Cumulative Minutes 706   Therapy Time missed   Time missed?  No

## 2022-06-04 PROCEDURE — 97535 SELF CARE MNGMENT TRAINING: CPT

## 2022-06-04 PROCEDURE — 97530 THERAPEUTIC ACTIVITIES: CPT

## 2022-06-04 PROCEDURE — 97110 THERAPEUTIC EXERCISES: CPT

## 2022-06-04 RX ADMIN — LIDOCAINE 1 PATCH: 50 PATCH TOPICAL at 08:05

## 2022-06-04 RX ADMIN — DICLOFENAC SODIUM 2 G: 10 GEL TOPICAL at 17:11

## 2022-06-04 RX ADMIN — TRAMADOL HYDROCHLORIDE 50 MG: 50 TABLET, COATED ORAL at 00:17

## 2022-06-04 RX ADMIN — HEPARIN SODIUM 7500 UNITS: 5000 INJECTION INTRAVENOUS; SUBCUTANEOUS at 15:01

## 2022-06-04 RX ADMIN — ACETAMINOPHEN 975 MG: 325 TABLET ORAL at 21:39

## 2022-06-04 RX ADMIN — DICLOFENAC SODIUM 2 G: 10 GEL TOPICAL at 12:45

## 2022-06-04 RX ADMIN — ALLOPURINOL 100 MG: 100 TABLET ORAL at 08:04

## 2022-06-04 RX ADMIN — ACETAMINOPHEN 975 MG: 325 TABLET ORAL at 15:01

## 2022-06-04 RX ADMIN — HEPARIN SODIUM 7500 UNITS: 5000 INJECTION INTRAVENOUS; SUBCUTANEOUS at 21:39

## 2022-06-04 RX ADMIN — AMLODIPINE BESYLATE 10 MG: 10 TABLET ORAL at 08:04

## 2022-06-04 RX ADMIN — HEPARIN SODIUM 7500 UNITS: 5000 INJECTION INTRAVENOUS; SUBCUTANEOUS at 05:21

## 2022-06-04 RX ADMIN — DICLOFENAC SODIUM 2 G: 10 GEL TOPICAL at 08:05

## 2022-06-04 RX ADMIN — TRAMADOL HYDROCHLORIDE 50 MG: 50 TABLET, COATED ORAL at 12:22

## 2022-06-04 RX ADMIN — TRAMADOL HYDROCHLORIDE 50 MG: 50 TABLET, COATED ORAL at 17:10

## 2022-06-04 RX ADMIN — TRAMADOL HYDROCHLORIDE 50 MG: 50 TABLET, COATED ORAL at 05:21

## 2022-06-04 RX ADMIN — CHOLECALCIFEROL TAB 10 MCG (400 UNIT) 400 UNITS: 10 TAB at 08:04

## 2022-06-04 RX ADMIN — TAMSULOSIN HYDROCHLORIDE 0.4 MG: 0.4 CAPSULE ORAL at 17:10

## 2022-06-04 RX ADMIN — METOPROLOL SUCCINATE 25 MG: 25 TABLET, FILM COATED, EXTENDED RELEASE ORAL at 08:04

## 2022-06-04 RX ADMIN — DICLOFENAC SODIUM 2 G: 10 GEL TOPICAL at 21:39

## 2022-06-04 RX ADMIN — ACETAMINOPHEN 975 MG: 325 TABLET ORAL at 05:21

## 2022-06-04 NOTE — PLAN OF CARE
Problem: PAIN - ADULT  Goal: Verbalizes/displays adequate comfort level or baseline comfort level  Description: Interventions:  - Encourage patient to monitor pain and request assistance  - Assess pain using appropriate pain scale  - Administer analgesics based on type and severity of pain and evaluate response  - Implement non-pharmacological measures as appropriate and evaluate response  - Consider cultural and social influences on pain and pain management  - Notify physician/advanced practitioner if interventions unsuccessful or patient reports new pain  Outcome: Progressing     Problem: INFECTION - ADULT  Goal: Absence or prevention of progression during hospitalization  Description: INTERVENTIONS:  - Assess and monitor for signs and symptoms of infection  - Monitor lab/diagnostic results  - Monitor all insertion sites, i e  indwelling lines, tubes, and drains  - Monitor endotracheal if appropriate and nasal secretions for changes in amount and color  - [place] appropriate cooling/warming therapies per order  - Administer medications as ordered  - Instruct and encourage patient and family to use good hand hygiene technique  - Identify and instruct in appropriate isolation precautions for identified infection/condition  Outcome: Progressing  Goal: Absence of fever/infection during neutropenic period  Description: INTERVENTIONS:  - Monitor WBC    Outcome: Progressing     Problem: DISCHARGE PLANNING  Goal: Discharge to home or other facility with appropriate resources  Description: INTERVENTIONS:  - Identify barriers to discharge w/patient and caregiver  - Arrange for needed discharge resources and transportation as appropriate  - Identify discharge learning needs (meds, wound care, etc )  - Arrange for interpretive services to assist at discharge as needed  - Refer to Case Management Department for coordinating discharge planning if the patient needs post-hospital services based on physician/advanced practitioner order or complex needs related to functional status, cognitive ability, or social support system  Outcome: Progressing     Problem: Prexisting or High Potential for Compromised Skin Integrity  Goal: Skin integrity is maintained or improved  Description: INTERVENTIONS:  - Identify patients at risk for skin breakdown  - Assess and monitor skin integrity  - Assess and monitor nutrition and hydration status  - Monitor labs   - Assess for incontinence   - Turn and reposition patient  - Assist with mobility/ambulation  - Relieve pressure over bony prominences  - Avoid friction and shearing  - Provide appropriate hygiene as needed including keeping skin clean and dry  - Evaluate need for skin moisturizer/barrier cream  - Collaborate with interdisciplinary team   - Patient/family teaching  - Consider wound care consult   Outcome: Progressing     Problem: Nutrition/Hydration-ADULT  Goal: Nutrient/Hydration intake appropriate for improving, restoring or maintaining nutritional needs  Description: Monitor and assess patient's nutrition/hydration status for malnutrition  Collaborate with interdisciplinary team and initiate plan and interventions as ordered  Monitor patient's weight and dietary intake as ordered or per policy  Utilize nutrition screening tool and intervene as necessary  Determine patient's food preferences and provide high-protein, high-caloric foods as appropriate       INTERVENTIONS:  - Monitor oral intake, urinary output, labs, and treatment plans  - Assess nutrition and hydration status and recommend course of action  - Evaluate amount of meals eaten  - Assist patient with eating if necessary   - Allow adequate time for meals  - Recommend/ encourage appropriate diets, oral nutritional supplements, and vitamin/mineral supplements  - Order, calculate, and assess calorie counts as needed  - Recommend, monitor, and adjust tube feedings and TPN/PPN based on assessed needs  - Assess need for intravenous fluids  - Provide specific nutrition/hydration education as appropriate  - Include patient/family/caregiver in decisions related to nutrition  Outcome: Progressing

## 2022-06-04 NOTE — PROGRESS NOTES
06/04/22 0830   Pain Assessment   Pain Assessment Tool 0-10   Pain Score 7   Pain Location/Orientation Orientation: Left; Location: Leg;Orientation: Right;Location: Shoulder   Restrictions/Precautions   Precautions Fall Risk;Contact/isolation; Airborne/isolation;Droplet precautions;Pain   Weight Bearing Restrictions No   ROM Restrictions No   Oral Hygiene   Type of Assistance Needed Set-up / clean-up   Physical Assistance Level No physical assistance   Comment standing at sink   Oral Hygiene CARE Score 5   Grooming   Able To Initiate Tasks;Brush/Clean Teeth   Limitation Noted In Safety;Strength   Bathing   Findings  cleaned perineal and buttocks with modA for buttocks thoroughness   Upper Body Dressing   Type of Assistance Needed Set-up / clean-up   Physical Assistance Level No physical assistance   Upper Body Dressing CARE Score 5   Sit to Lying   Type of Assistance Needed Supervision   Physical Assistance Level No physical assistance   Sit to Lying CARE Score 4   Lying to Sitting on Side of Bed   Type of Assistance Needed Supervision   Physical Assistance Level No physical assistance   Lying to Sitting on Side of Bed CARE Score 4   Sit to Stand   Type of Assistance Needed Supervision   Physical Assistance Level No physical assistance   Sit to Stand CARE Score 4   Toileting Hygiene   Type of Assistance Needed Physical assistance   Physical Assistance Level 26%-50%   Comment assist for buttocks hygiene   Toileting Hygiene CARE Score 3   Toileting   Able to [address] down yes, up yes  Manage Hygiene Bladder; Bowel   Limitations Noted In Balance;ROM;Safety;UE Strength;LE Strength   Adaptive Equipment Grab Bar   Toilet Transfer   Type of Assistance Needed Supervision   Physical Assistance Level No physical assistance   Toilet Transfer CARE Score 4   Toilet Transfer   Surface Assessed Standard Toilet   Transfer Technique Standard   Limitations Noted In Balance; Endurance; Safety;LE Strength   Adaptive Equipment Grab Bar;Walker   ROM- Right Upper Extremities   RUE ROM Comment x10 prolonged stretch R shoulder in flexion/extension, horizontal ABD/ADD, ABD/ADD while supine, pt reports "good stretch" feeling in all planes with reported improvements in pain management and ROM   Therapeutic Excerise-Strength   UE Strength Yes   Right Upper Extremity- Strength   RUE Strength Comment x30 digit flexion/extension using 3# digi flex; x30 using 3# dummbell: elbow flexion/extension, pronation/supination; x20 unweighted: internal/external rotation, protraction/retraction   Left Upper Extremity-Strength   LUE Strength Comment x30 digit flexion/extension using 3# digi flex; x30 using 3# dumbbell: elbow flexion/extension, protraction/retraction, internal/extenral rotation, pronation/supination, shoulder flexion/extension   Cognition   Overall Cognitive Status WFL   Arousal/Participation Alert; Responsive; Cooperative   Attention Within functional limits   Orientation Level Oriented X4   Memory Within functional limits   Following Commands Follows all commands and directions without difficulty   Assessment   Treatment Assessment Pt agreeable to OT session this AM  Received lying supine in bed  Pt declined full ADL, instead opting for toileting, oral care, and UB dressing; details in respective sections  Pt requires assist for buttocks thoroughness during both hamida care and toileting; OT asked pt to attempt to clean himself, however increased pain at nephro tube site when pt attempted this, and pt asked for assist from OT as a result  Functional mobility to and from bathroom with RW was completed with supervision and slightly increased time  After ADL tasks, pt completed UE strength/ROM exercises with good tolerance and rest breaks taken as needed  Pt reported less pain in R shoulder and also more AROM as compared to previous sessions  Pt did return to supine for RUE stretches 2* pain in LLE when seated EOB   Pt continues with deficits of decreased balance, decreased generalized strength, pain in R shoulder and LLE and nephro tube site, and decreased activity tolerance, however is progressing nicely towards goals  Pt would benefit from continued skilled OT services in order to maximize independence in self care, functional mobility/transfers, ROM/strength, and activity tolerance  Prognosis Good   Problem List Decreased strength;Decreased range of motion;Decreased endurance; Impaired balance;Decreased safety awareness;Pain   Plan   Treatment/Interventions ADL retraining;Functional transfer training;LE strengthening/ROM; Therapeutic exercise; Endurance training;Patient/family training;Equipment eval/education; Compensatory technique education;OT   Progress Progressing toward goals   OT Therapy Minutes   OT Time In 0830   OT Time Out 0932   OT Total Time (minutes) 62   OT Mode of treatment - Individual (minutes) 62

## 2022-06-04 NOTE — NURSING NOTE
Pt awake resting in room  +COVID precautions maintained during shift  Voices no complaints this morning other than generalized pain medicated with scheduled tylenol and tramadol  Left sided nephrostomy draining clear yellow urine, dressing CDI at insertion site  Pt also uses urinal nursing sets up/empties  Pt able to shift weight during shift  Assessment otherwise unchanged  Will continue to monitor and follow plan of care  All items within reach and bed alarm intact for safety

## 2022-06-04 NOTE — NURSING NOTE
06/04/22 Pt denies discomfort at nephrostomy site today  Tube flushes as ordered  Pt refused dressing change due to the skin being irritated around site  Pt reported one episode of feeling bloated but able to belch and had soft stool and felt better  COVID precautions maintained  Pain controlled  Ambulated to bathroom CG with RW  Will maintain safety and comfort   Bon Mcgregor RN

## 2022-06-05 PROCEDURE — 97110 THERAPEUTIC EXERCISES: CPT

## 2022-06-05 PROCEDURE — 97530 THERAPEUTIC ACTIVITIES: CPT

## 2022-06-05 RX ADMIN — ACETAMINOPHEN 975 MG: 325 TABLET ORAL at 05:16

## 2022-06-05 RX ADMIN — TRAMADOL HYDROCHLORIDE 50 MG: 50 TABLET, COATED ORAL at 05:16

## 2022-06-05 RX ADMIN — TRAMADOL HYDROCHLORIDE 50 MG: 50 TABLET, COATED ORAL at 12:01

## 2022-06-05 RX ADMIN — TAMSULOSIN HYDROCHLORIDE 0.4 MG: 0.4 CAPSULE ORAL at 17:21

## 2022-06-05 RX ADMIN — HEPARIN SODIUM 7500 UNITS: 5000 INJECTION INTRAVENOUS; SUBCUTANEOUS at 05:16

## 2022-06-05 RX ADMIN — METOPROLOL SUCCINATE 25 MG: 25 TABLET, FILM COATED, EXTENDED RELEASE ORAL at 08:12

## 2022-06-05 RX ADMIN — DICLOFENAC SODIUM 2 G: 10 GEL TOPICAL at 08:12

## 2022-06-05 RX ADMIN — ALLOPURINOL 100 MG: 100 TABLET ORAL at 08:11

## 2022-06-05 RX ADMIN — DICLOFENAC SODIUM 2 G: 10 GEL TOPICAL at 17:27

## 2022-06-05 RX ADMIN — CHOLECALCIFEROL TAB 10 MCG (400 UNIT) 400 UNITS: 10 TAB at 08:11

## 2022-06-05 RX ADMIN — DICLOFENAC SODIUM 2 G: 10 GEL TOPICAL at 12:01

## 2022-06-05 RX ADMIN — TRAMADOL HYDROCHLORIDE 50 MG: 50 TABLET, COATED ORAL at 17:21

## 2022-06-05 RX ADMIN — DICLOFENAC SODIUM 2 G: 10 GEL TOPICAL at 21:54

## 2022-06-05 RX ADMIN — AMLODIPINE BESYLATE 10 MG: 10 TABLET ORAL at 08:11

## 2022-06-05 RX ADMIN — TRAMADOL HYDROCHLORIDE 50 MG: 50 TABLET, COATED ORAL at 23:23

## 2022-06-05 RX ADMIN — LIDOCAINE 1 PATCH: 50 PATCH TOPICAL at 08:12

## 2022-06-05 RX ADMIN — ACETAMINOPHEN 975 MG: 325 TABLET ORAL at 13:47

## 2022-06-05 RX ADMIN — ACETAMINOPHEN 975 MG: 325 TABLET ORAL at 21:51

## 2022-06-05 RX ADMIN — TRAMADOL HYDROCHLORIDE 50 MG: 50 TABLET, COATED ORAL at 00:12

## 2022-06-05 RX ADMIN — HEPARIN SODIUM 7500 UNITS: 5000 INJECTION INTRAVENOUS; SUBCUTANEOUS at 13:47

## 2022-06-05 RX ADMIN — HEPARIN SODIUM 7500 UNITS: 5000 INJECTION INTRAVENOUS; SUBCUTANEOUS at 21:51

## 2022-06-05 NOTE — NURSING NOTE
Pt awake resting in bed, pain medication admin as ordered  Urinal set up at bedside staff empties  Nephrostomy tube draining clear yellow urine nursing emptying and managing device, dressing CDI at insertion site  Turns and repos self  Assessment unchanged  Continuing to monitor and follow plan of care bed alarm intact for safety

## 2022-06-05 NOTE — PLAN OF CARE
Problem: SAFETY ADULT  Goal: Patient will remain free of falls  Description: INTERVENTIONS:  - Educate patient/family on patient safety including physical limitations  - Instruct patient to call for assistance with activity   - Consult OT/PT to assist with strengthening/mobility   - Keep Call bell within reach  - Keep bed low and locked with side rails adjusted as appropriate  - Keep care items and personal belongings within reach  - Initiate and maintain comfort rounds  - Make Fall Risk Sign visible to staff  - Offer Toileting every 2 Hours, in advance of need  - Initiate/Maintain bed/chair alarm  - Obtain necessary fall risk management equipment: bed/chair alarm  - Apply yellow socks and bracelet for high fall risk patients  - Consider moving patient to room near nurses station  Outcome: Progressing     Problem: MOBILITY - ADULT  Goal: Maintain or return to baseline ADL function  Description: INTERVENTIONS:  -  Assess patient's ability to carry out ADLs; assess patient's baseline for ADL function and identify physical deficits which impact ability to perform ADLs (bathing, care of mouth/teeth, toileting, grooming, dressing, etc )  - Assess/evaluate cause of self-care deficits   - Assess range of motion  - Assess patient's mobility; develop plan if impaired  - Assess patient's need for assistive devices and provide as appropriate  - Encourage maximum independence but intervene and supervise when necessary  - Involve family in performance of ADLs  - Assess for home care needs following discharge   - Consider OT consult to assist with ADL evaluation and planning for discharge  - Provide patient education as appropriate  Outcome: Progressing

## 2022-06-05 NOTE — PLAN OF CARE
Problem: PAIN - ADULT  Goal: Verbalizes/displays adequate comfort level or baseline comfort level  Description: Interventions:  - Encourage patient to monitor pain and request assistance  - Assess pain using appropriate pain scale  - Administer analgesics based on type and severity of pain and evaluate response  - Implement non-pharmacological measures as appropriate and evaluate response  - Consider cultural and social influences on pain and pain management  - Notify physician/advanced practitioner if interventions unsuccessful or patient reports new pain  Outcome: Progressing     Problem: INFECTION - ADULT  Goal: Absence or prevention of progression during hospitalization  Description: INTERVENTIONS:  - Assess and monitor for signs and symptoms of infection  - Monitor lab/diagnostic results  - Monitor all insertion sites, i e  indwelling lines, tubes, and drains  - Monitor endotracheal if appropriate and nasal secretions for changes in amount and color  - [place] appropriate cooling/warming therapies per order  - Administer medications as ordered  - Instruct and encourage patient and family to use good hand hygiene technique  - Identify and instruct in appropriate isolation precautions for identified infection/condition  Outcome: Progressing  Goal: Absence of fever/infection during neutropenic period  Description: INTERVENTIONS:  - Monitor WBC    Outcome: Progressing     Problem: DISCHARGE PLANNING  Goal: Discharge to home or other facility with appropriate resources  Description: INTERVENTIONS:  - Identify barriers to discharge w/patient and caregiver  - Arrange for needed discharge resources and transportation as appropriate  - Identify discharge learning needs (meds, wound care, etc )  - Arrange for interpretive services to assist at discharge as needed  - Refer to Case Management Department for coordinating discharge planning if the patient needs post-hospital services based on physician/advanced practitioner order or complex needs related to functional status, cognitive ability, or social support system  Outcome: Progressing

## 2022-06-05 NOTE — PROGRESS NOTES
06/05/22 1233   Pain Assessment   Pain Assessment Tool 0-10   Pain Score 7   Pain Location/Orientation Orientation: Right;Location: Shoulder;Orientation: Left; Location: Leg   Restrictions/Precautions   Precautions Fall Risk;Pain   Weight Bearing Restrictions No   ROM Restrictions No   Oral Hygiene   Type of Assistance Needed Supervision   Physical Assistance Level No physical assistance   Comment standing at sink   Oral Hygiene CARE Score 4   Grooming   Able To Initiate Tasks;Brush/Clean Teeth   Limitation Noted In Safety;Strength   Sit to Lying   Type of Assistance Needed Independent   Physical Assistance Level No physical assistance   Sit to Lying CARE Score 6   Lying to Sitting on Side of Bed   Type of Assistance Needed Independent   Physical Assistance Level No physical assistance   Lying to Sitting on Side of Bed CARE Score 6   Sit to Stand   Type of Assistance Needed Supervision   Physical Assistance Level No physical assistance   Sit to Stand CARE Score 4   Light Housekeeping   Light Housekeeping Level Walker   Light Housekeeping Level of Assistance Minimum assistance   Light Housekeeping pt completed reorganizing closet and bags with Mervin from OT to hand objects to pt   Functional Standing Tolerance   Time approx 2min at sink for oral care   Therapeutic Exercise - ROM   UE-ROM Yes   ROM- Right Upper Extremities   RUE ROM Comment x10 prolonged stretch shoulder flexion/extension and horizontal ABD/ADD while supine in bed   Cognition   Overall Cognitive Status [facility]   Arousal/Participation Alert; Responsive; Cooperative   Attention Within functional limits   Orientation Level Oriented X4   Memory Within functional limits   Following Commands Follows all commands and directions without difficulty   Assessment   Treatment Assessment Pt agreeable to OT session this PM  Received lying supine in bed  Pt requested to reorganize closet, completed with Mervin from OT   Pt completed functional mobility to either side of bed and then to bathroom after closet reorganizing, CG/supervision with RW and increased time  Pt stood approx 2min at sink for oral care with good balance overall  Pt returned to supine for R shoulder stretching; pt reported same amount of pain as previous sessions but more AROM in shoulder  Pt would benefit from continued skilled OT services in order to maximize independence in self care, functional mobility/transfers, ROM/strength, and activity tolerance  Prognosis Good   Problem List Decreased strength;Decreased range of motion;Decreased endurance; Impaired balance;Decreased safety awareness;Pain   Plan   Treatment/Interventions ADL retraining;Functional transfer training;LE strengthening/ROM; Therapeutic exercise; Endurance training;Patient/family training;Equipment eval/education; Compensatory technique education;OT   Progress Progressing toward goals   OT Therapy Minutes   OT Time In 1233   OT Time Out 1306   OT Total Time (minutes) 33   OT Mode of treatment - Individual (minutes) 33

## 2022-06-05 NOTE — NURSING NOTE
Patient resting in bed all of shift  Ambulates to bathroom assist times one with walker  Educated on importance of repositioning in bed  Medicated for pain as ordered  Dressing changed to nephrostomy tube and flushed as ordered  Patient tolerated well  Will continue to monitor and follow plan of care

## 2022-06-06 LAB
ANION GAP SERPL CALCULATED.3IONS-SCNC: 8 MMOL/L (ref 4–13)
BASOPHILS # BLD AUTO: 0.08 THOUSANDS/ΜL (ref 0–0.1)
BASOPHILS NFR BLD AUTO: 1 % (ref 0–1)
BUN SERPL-MCNC: 26 MG/DL (ref 5–25)
CALCIUM SERPL-MCNC: 9.9 MG/DL (ref 8.4–10.2)
CHLORIDE SERPL-SCNC: 101 MMOL/L (ref 96–108)
CO2 SERPL-SCNC: 26 MMOL/L (ref 21–32)
CREAT SERPL-MCNC: 1.67 MG/DL (ref 0.6–1.3)
EOSINOPHIL # BLD AUTO: 0.43 THOUSAND/ΜL (ref 0–0.61)
EOSINOPHIL NFR BLD AUTO: 3 % (ref 0–6)
ERYTHROCYTE [DISTWIDTH] IN BLOOD BY AUTOMATED COUNT: 15.1 % (ref 11.6–15.1)
GFR SERPL CREATININE-BSD FRML MDRD: 45 ML/MIN/1.73SQ M
GLUCOSE P FAST SERPL-MCNC: 99 MG/DL (ref 65–99)
GLUCOSE SERPL-MCNC: 99 MG/DL (ref 65–140)
HCT VFR BLD AUTO: 32.5 % (ref 36.5–49.3)
HGB BLD-MCNC: 9.7 G/DL (ref 12–17)
IMM GRANULOCYTES # BLD AUTO: 0.19 THOUSAND/UL (ref 0–0.2)
IMM GRANULOCYTES NFR BLD AUTO: 1 % (ref 0–2)
LACTATE SERPL-SCNC: 1.1 MMOL/L (ref 0.5–2)
LYMPHOCYTES # BLD AUTO: 1.73 THOUSANDS/ΜL (ref 0.6–4.47)
LYMPHOCYTES NFR BLD AUTO: 13 % (ref 14–44)
MCH RBC QN AUTO: 29 PG (ref 26.8–34.3)
MCHC RBC AUTO-ENTMCNC: 29.8 G/DL (ref 31.4–37.4)
MCV RBC AUTO: 97 FL (ref 82–98)
MONOCYTES # BLD AUTO: 0.73 THOUSAND/ΜL (ref 0.17–1.22)
MONOCYTES NFR BLD AUTO: 5 % (ref 4–12)
NEUTROPHILS # BLD AUTO: 10.36 THOUSANDS/ΜL (ref 1.85–7.62)
NEUTS SEG NFR BLD AUTO: 77 % (ref 43–75)
NRBC BLD AUTO-RTO: 0 /100 WBCS
PLATELET # BLD AUTO: 364 THOUSANDS/UL (ref 149–390)
PMV BLD AUTO: 9.9 FL (ref 8.9–12.7)
POTASSIUM SERPL-SCNC: 4 MMOL/L (ref 3.5–5.3)
PROCALCITONIN SERPL-MCNC: 0.3 NG/ML
RBC # BLD AUTO: 3.35 MILLION/UL (ref 3.88–5.62)
SODIUM SERPL-SCNC: 135 MMOL/L (ref 135–147)
WBC # BLD AUTO: 13.52 THOUSAND/UL (ref 4.31–10.16)

## 2022-06-06 PROCEDURE — 87186 SC STD MICRODIL/AGAR DIL: CPT | Performed by: PHYSICIAN ASSISTANT

## 2022-06-06 PROCEDURE — 99232 SBSQ HOSP IP/OBS MODERATE 35: CPT | Performed by: FAMILY MEDICINE

## 2022-06-06 PROCEDURE — 97116 GAIT TRAINING THERAPY: CPT

## 2022-06-06 PROCEDURE — 97110 THERAPEUTIC EXERCISES: CPT

## 2022-06-06 PROCEDURE — 97530 THERAPEUTIC ACTIVITIES: CPT

## 2022-06-06 PROCEDURE — 84145 PROCALCITONIN (PCT): CPT | Performed by: PHYSICIAN ASSISTANT

## 2022-06-06 PROCEDURE — 85025 COMPLETE CBC W/AUTO DIFF WBC: CPT | Performed by: PHYSICAL MEDICINE & REHABILITATION

## 2022-06-06 PROCEDURE — 97537 COMMUNITY/WORK REINTEGRATION: CPT

## 2022-06-06 PROCEDURE — 80048 BASIC METABOLIC PNL TOTAL CA: CPT | Performed by: PHYSICAL MEDICINE & REHABILITATION

## 2022-06-06 PROCEDURE — 97535 SELF CARE MNGMENT TRAINING: CPT

## 2022-06-06 PROCEDURE — 83605 ASSAY OF LACTIC ACID: CPT | Performed by: PHYSICIAN ASSISTANT

## 2022-06-06 PROCEDURE — 87086 URINE CULTURE/COLONY COUNT: CPT | Performed by: PHYSICIAN ASSISTANT

## 2022-06-06 PROCEDURE — 87040 BLOOD CULTURE FOR BACTERIA: CPT | Performed by: PHYSICIAN ASSISTANT

## 2022-06-06 PROCEDURE — 87077 CULTURE AEROBIC IDENTIFY: CPT | Performed by: PHYSICIAN ASSISTANT

## 2022-06-06 RX ORDER — SODIUM CHLORIDE 9 MG/ML
125 INJECTION, SOLUTION INTRAVENOUS CONTINUOUS
Status: DISCONTINUED | OUTPATIENT
Start: 2022-06-07 | End: 2022-06-07 | Stop reason: HOSPADM

## 2022-06-06 RX ORDER — CEFEPIME HYDROCHLORIDE 2 G/50ML
2000 INJECTION, SOLUTION INTRAVENOUS EVERY 12 HOURS
Status: DISCONTINUED | OUTPATIENT
Start: 2022-06-06 | End: 2022-06-07 | Stop reason: HOSPADM

## 2022-06-06 RX ORDER — CEFEPIME HYDROCHLORIDE 2 G/1
2000 INJECTION, POWDER, FOR SOLUTION INTRAVENOUS EVERY 12 HOURS
Status: DISCONTINUED | OUTPATIENT
Start: 2022-06-06 | End: 2022-06-06

## 2022-06-06 RX ORDER — SODIUM CHLORIDE, SODIUM GLUCONATE, SODIUM ACETATE, POTASSIUM CHLORIDE, MAGNESIUM CHLORIDE, SODIUM PHOSPHATE, DIBASIC, AND POTASSIUM PHOSPHATE .53; .5; .37; .037; .03; .012; .00082 G/100ML; G/100ML; G/100ML; G/100ML; G/100ML; G/100ML; G/100ML
1000 INJECTION, SOLUTION INTRAVENOUS EVERY 4 HOURS
Status: COMPLETED | OUTPATIENT
Start: 2022-06-06 | End: 2022-06-07

## 2022-06-06 RX ADMIN — TRAMADOL HYDROCHLORIDE 50 MG: 50 TABLET, COATED ORAL at 23:27

## 2022-06-06 RX ADMIN — AMLODIPINE BESYLATE 10 MG: 10 TABLET ORAL at 09:03

## 2022-06-06 RX ADMIN — ACETAMINOPHEN 975 MG: 325 TABLET ORAL at 21:36

## 2022-06-06 RX ADMIN — ALLOPURINOL 100 MG: 100 TABLET ORAL at 09:03

## 2022-06-06 RX ADMIN — TRAMADOL HYDROCHLORIDE 50 MG: 50 TABLET, COATED ORAL at 11:47

## 2022-06-06 RX ADMIN — CHOLECALCIFEROL TAB 10 MCG (400 UNIT) 400 UNITS: 10 TAB at 09:03

## 2022-06-06 RX ADMIN — ACETAMINOPHEN 975 MG: 325 TABLET ORAL at 14:02

## 2022-06-06 RX ADMIN — DICLOFENAC SODIUM 2 G: 10 GEL TOPICAL at 17:50

## 2022-06-06 RX ADMIN — TRAMADOL HYDROCHLORIDE 50 MG: 50 TABLET, COATED ORAL at 05:24

## 2022-06-06 RX ADMIN — ACETAMINOPHEN 975 MG: 325 TABLET ORAL at 05:23

## 2022-06-06 RX ADMIN — DICLOFENAC SODIUM 2 G: 10 GEL TOPICAL at 09:04

## 2022-06-06 RX ADMIN — HEPARIN SODIUM 7500 UNITS: 5000 INJECTION INTRAVENOUS; SUBCUTANEOUS at 21:36

## 2022-06-06 RX ADMIN — LIDOCAINE 1 PATCH: 50 PATCH TOPICAL at 09:03

## 2022-06-06 RX ADMIN — TRAMADOL HYDROCHLORIDE 50 MG: 50 TABLET, COATED ORAL at 17:14

## 2022-06-06 RX ADMIN — TAMSULOSIN HYDROCHLORIDE 0.4 MG: 0.4 CAPSULE ORAL at 17:14

## 2022-06-06 RX ADMIN — METOPROLOL SUCCINATE 25 MG: 25 TABLET, FILM COATED, EXTENDED RELEASE ORAL at 09:03

## 2022-06-06 RX ADMIN — HEPARIN SODIUM 7500 UNITS: 5000 INJECTION INTRAVENOUS; SUBCUTANEOUS at 13:55

## 2022-06-06 RX ADMIN — HEPARIN SODIUM 7500 UNITS: 5000 INJECTION INTRAVENOUS; SUBCUTANEOUS at 05:25

## 2022-06-06 RX ADMIN — CEFEPIME HYDROCHLORIDE 2000 MG: 2 INJECTION, SOLUTION INTRAVENOUS at 22:07

## 2022-06-06 RX ADMIN — DICLOFENAC SODIUM 2 G: 10 GEL TOPICAL at 11:47

## 2022-06-06 RX ADMIN — SODIUM CHLORIDE, SODIUM GLUCONATE, SODIUM ACETATE, POTASSIUM CHLORIDE, MAGNESIUM CHLORIDE, SODIUM PHOSPHATE, DIBASIC, AND POTASSIUM PHOSPHATE 1000 ML: .53; .5; .37; .037; .03; .012; .00082 INJECTION, SOLUTION INTRAVENOUS at 22:22

## 2022-06-06 NOTE — NURSING NOTE
06/06/22 Dressing changed to RLE x 1 today for scant amt of drainage  Stump with increased edema, warmth and redness  Started on keflex and x-ray pending  Pain controlled at this time  Offers no complaints  High risk for falls  Pt observed standing unsupervised in bathroom and not consistently putting WC brakes on  Will continue to monitor healing of RLE   Dharmesh Savage RN

## 2022-06-06 NOTE — PROGRESS NOTES
06/06/22 1030   Pain Assessment   Pain Assessment Tool 0-10   Pain Score 7   Pain Location/Orientation Orientation: Left; Location: Knee   Pain 2   Pain Score 2 8   Pain Location/Orientation 2 Orientation: Bilateral;Location: Foot   Restrictions/Precautions   Precautions Fall Risk;Pain;Contact/isolation; Airborne/isolation;Droplet precautions   Cognition   Overall Cognitive Status WFL   Orientation Level Oriented X4   Roll Left and Right   Type of Assistance Needed Independent   Roll Left and Right CARE Score 6   Sit to Lying   Type of Assistance Needed Independent   Sit to Lying CARE Score 6   Lying to Sitting on Side of Bed   Type of Assistance Needed Independent   Lying to Sitting on Side of Bed CARE Score 6   Sit to Stand   Type of Assistance Needed Independent   Sit to Stand CARE Score 6   Bed-Chair Transfer   Type of Assistance Needed Independent   Chair/Bed-to-Chair Transfer CARE Score 6   Walk 10 Feet   Type of Assistance Needed Supervision   Walk 10 Feet CARE Score 4   Walk 50 Feet with Two Turns   Type of Assistance Needed Supervision   Walk 50 Feet with Two Turns CARE Score 4   Walking 10 Feet on Uneven Surfaces   Type of Assistance Needed Supervision   Walking 10 Feet on Uneven Surfaces CARE Score 4   Ambulation   Does the patient walk? 2  Yes   Primary Mode of Locomotion Prior to Admission Walk   Distance Walked (feet) 86 ft  (86' 79')   Assist Device Roller Walker   Gait Pattern Antalgic; Inconsistant Julieta; Slow Julieta;Decreased foot clearance   Limitations Noted In Endurance;Strength; Safety   Walk Assist Level Supervision   Findings level and unlevel surfaces   Curb or Single Stair   Style negotiated Single stair   Type of Assistance Needed Incidental touching   Comment cg   1 Step (Curb) CARE Score 4   4 Steps   Type of Assistance Needed Incidental touching   Comment cg   4 Steps CARE Score 4   12 Steps   Type of Assistance Needed Incidental touching   Comment cg   12 Steps CARE Score 4   Stairs Type Stairs   # of Steps 12   Weight Bearing Precautions WBAT   Assist Devices Single Rail;Bilateral Rail   Findings cg going up forwards and down backwards; used both single and bilateral rails  Therapeutic Interventions   Strengthening seated and supine ther ex   Balance gait and transfer training   Other stair training   Assessment   Treatment Assessment Patient agreeable to therapy session  Pain in L knee and bilateral feet/ankles remains, however better than last week  Patient used compression sleeve on LLE with positive resuls  Completed ther ex for general LE strengthening; gait and transfer training focusing on sequence and technique for improved balance and safety with functional mobility using walker  Patient able to negotiate up and down steps using both single and bilateral rails with cues for sequence  Patient went up steps forward but down backwards  PT Barriers   Physical Impairment Decreased strength;Decreased range of motion;Decreased endurance; Impaired balance;Decreased mobility; Decreased safety awareness;Pain   Functional Limitation Walking;Transfers;Standing; Wheelchair management;Stair negotiation   Plan   Treatment/Interventions Functional transfer training;LE strengthening/ROM; Elevations; Therapeutic exercise; Bed mobility;Gait training   Progress Progressing toward goals   PT Therapy Minutes   PT Time In 1030   PT Time Out 1200   PT Total Time (minutes) 90   PT Mode of treatment - Individual (minutes) 90   PT Mode of treatment - Concurrent (minutes) 0   PT Mode of treatment - Group (minutes) 0   PT Mode of treatment - Co-treat (minutes) 0   PT Mode of Treatment - Total time(minutes) 90 minutes   PT Cumulative Minutes 1377   Therapy Time missed   Time missed?  No

## 2022-06-06 NOTE — PROGRESS NOTES
06/06/22 1236   Pain Assessment   Pain Assessment Tool Jasso-Baker FACES   Jasso-Baker FACES Pain Rating 4   Pain Location/Orientation Orientation: Right;Location: Shoulder   Restrictions/Precautions   Precautions Fall Risk;Bed/chair alarms; Airborne/isolation;Contact/isolation;Limb alert;Pain  (decreased AROM R shoulder)   Weight Bearing Restrictions No   ROM Restrictions No   Toileting Hygiene   Type of Assistance Needed Set-up / clean-up   Comment urinal use   Toileting Hygiene CARE Score 5   Toileting   Able to [address] down yes, up yes  Manage Hygiene Bladder   Limitations Noted In Balance;Problem Solving;ROM;Safety;LE Strength   Findings OT emptied 50 and 25 cc following urination while supine   Exercise Tools   Hand Gripper 1 set 30 B hands with 5# digiflex   Other Exercise Tool 1 AAROM R shoulder flexion, add/ abd, chest press 2 sets 15 with minimal assist from OT while completing therex against gravity   Other Exercise Tool 2 3# wt exercises 2 sets 15 including shoulder chest press, abd/ add, flexion/ ext LUE and BUE elbow flexion/ extension, supination/ pronation and wrist flexion extension   Other Comments   Assessment Urology scheduling called near end of session regarding upcoming surgery with RN and PAC notified   Assessment   Treatment Assessment Pt presents supine agreeable to OT sesison including BUE therex and toileting  Pt tolerates sesison without complaints reporting AAROM with strecth to RUE shoulder "feels good"  Pt is making gains towards goals with pain and strength/ ROM RUE improving  Pt will benefit from continued skilled OT services to increase independence with daily tasks  Problem List Decreased strength;Decreased range of motion;Decreased endurance; Impaired balance;Decreased safety awareness;Pain   Plan   Treatment/Interventions ADL retraining;Functional transfer training; Therapeutic exercise; Endurance training;Patient/family training;Equipment eval/education; Compensatory technique education   Progress Progressing toward goals   OT Therapy Minutes   OT Time In 1236   OT Time Out 1330   OT Total Time (minutes) 54   OT Mode of treatment - Individual (minutes) 54   Therapy Time missed   Time missed?  No

## 2022-06-06 NOTE — NURSING NOTE
Pt sleeping in bed  No c/o of pain or discomfort  L nephrostomy tube draining clear yellow urine  Pt used urinal over night  Attempted to draw labs this morning, unsuccessful x 2 attempts  Pt encouraged to reposition self in bed  Contact and airborne precautions maintained  Will continue to monitor

## 2022-06-06 NOTE — PROGRESS NOTES
PM&R PROGRESS NOTE:  Evens Saenz 55 y o  male MRN: [id number]  Unit/Bed#: -01 Encounter: [id number]        Rehabilitation Diagnosis: Impairment of mobility, safety and Activities of Daily Living (ADLs) due to Debility:  16  Debility (Non-cardiac/Non-pulmonary)    HPI: Jony Luke a 55 y  o  male with a PMH significant for CKD, HTN, HLD, obstructive pyelonephritis with coli bacteremia, who presented to the [address] [place] [address] ED on 05/10  He had a recent discharge on 4/30/22 s/p cystoscopy and stent placement on 4/22/22   After his discharge, patient experienced intermittent waves of left flank pain described as sharp, non-radiating, lightheadedness, nausea  He also admitted to worsening shortness of breath  He arrived to ED with tachypnea and tachycardia  CT scan obtained revealing hydronephrosis and obstructive pyelonephritis  Urology was consulted and recommended acute transfer to [facility]  Patient diagnosed with sepsis and initiated on IV antibiotics  IR was consulted for concerns with potential obstruction of stent placement and noted to be in DARIANNE  Patient went to the OR with IR for obstructive pyelonephritis and is s/p IR Nephrostomy tube placement on 5/10  He was intubated for the procedure and extubated on 5/11  Ortho was consulted on 5/13 for left knee pain and a left knee aspiration was performed  Patient is to continue WBAT to LLE  The aspiration was unsuccessful with ortho recommending adequate pain control and potential steroid injection in the future  Sepsis resolved and patient was transferred from ICU to med surg  On 5/16 patient was transitioned from IV antibiotics to oral to complete a total of 14 days  Patient participated with PT/OT and deemed appropriate for post acute rehabilitation services  He was accepted to [facility] and once hemodynamically stable, arrived on 5/19/22       SUBJECTIVE: Patient seen face to face   Patient received a call about his surgery being moved to 7/29 due to Arian  Patient is extremely frustrated and wants the Nephrostomy tube removed  I will be reaching out to Urology in regards to the same  Patient cannot take care of the dressing changes or tube flushes on his own and does not have assistance at home  Patient would need further inpatient stay  He was approved by insurance until 6/27 however, he will not meet acute rehab criteria with continual improvement in his levels with therapy  Will attempt to get surgery rescheduled back on 6/17  ASSESSMENT: Stable, progressing      PLAN:    Rehabilitation   Functional deficits:  Impaired mobility, self care, weakness to LLE due to pain in left knee and foot, pain to right shoulder, nephrostomy tube and inability to flush    Continue current rehabilitation plan of care to maximize function       Functional update:   o PT:  Sit-lying: independent  Lying-sitting on side of bed: independent  Bed-chair transfer:  supervision  Ambulation:supervision   Stairs: CGA  o OT:  Grooming: s/u  Showering/bathing: CGA  Tub/shower transfer: s/u c/u  UB dressing: s/u c/u  LB dressing: supervision  Toileting hygiene: min assist  Toilet transfer: supervision       Estimated Discharge: 6/17      Pain   Tylenol 975 mg Q8H [facility]   Voltaren gel 4x daily   Lidocaine patch Q12H to right shoulder    Tramadol 60 mg Q6H [facility]    DVT prophylaxis   Heparin 7500 units Q8H    Bladder plan   Continent    Bowel plan   Continent      COVID  Assessment & Plan  · Incidentally found positive on 5/31  · out of isolation on 6/9    Anemia  Assessment & Plan  · Iron panel obtained with elevated ferritin stores   · Monitor H/H- Stable    Recurrent left knee pain  Assessment & Plan  · Ongoing for several weeks causing difficulty with bearing weight due to pain  · XR knee without acute osseous abnormality, did reveal tricompartmental osteoarthritis similar to imaging done the year prior  · Ortho consulted and attempted an unsuccessful fluid aspiration   · Continue with adequate pain management with OP Ortho f/u for potential steroid injection once acute sepsis/infection resolves   · Diclofenac gel to the knee 4 times daily  · Left foot xray: There is no acute fracture or dislocation  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  Small plantar and large retrocalcaneal spurs  No lytic or blastic osseous lesion  Soft tissues are unremarkable  Essential hypertension  Assessment & Plan  · Low-sodium diet  · Continue Norvasc 10 mg and Metoprolol 25 mg daily      Chronic kidney disease, stage 3 [facility])  Assessment & Plan  Lab Results   Component Value Date    EGFR 50 06/01/2022    EGFR 43 05/29/2022    EGFR 43 05/25/2022    CREATININE 1 53 (H) 06/01/2022    CREATININE 1 73 (H) 05/29/2022    CREATININE 1 74 (H) 05/25/2022   · Baseline creatinine approximately 1 4-1 6  · Monitor renal function and urine output  · Avoid nephrotoxins and hypotension       Severe sepsis on admission  Assessment & Plan  · Presented to [address] on 5/10 with worsening left flank pain, tachypnea and tachycardia in setting of E  Coli pyelonephritis/bacteremia   · CT revealed hydronephrosis and obstructive pyelonephritis  · Patient transferred to \[facility]\"" for higher level of care  · He went to the OR on 5/10 with IR for a nephrostomy tube placement  · White count 13 52  Remains afebrile, vitals WNL, continue to monitor       Bacteremia due to Escherichia coli  Assessment & Plan  · Positive growth of E  Coli in one blood culture on 5/10 thought to be due to UTI/pyelonephritis   · antibiotics completed      Morbid obesity (HCC)  Assessment & Plan  · BMI of 52 06  · Lifestyle and diet modifications       * Obstructive pyelonephritis  Assessment & Plan  · CT on 5/10: "Mild left hydronephrosis status post ureteral stent placement, with 3 mm calculus in the mid ureter adjacent to the stent   Multiple bilateral renal calculi "  · Recent admission and underwent L-side ureteral stent on 4/21- this was found to be obstructive  S/p left PCN placement by IR on 5/10   · Pain control  · Continue Flomax, OP follow up with urology for stone treatment on 5/25  · Continue Augmentin Q12H [facility] for total of 14 days (Completed on 5/25/22)  · Planned procedure for OP 6/17; will need updated CT stone series prior to surgery        Appreciate IM consultants medical co-management  Labs, medications, and imaging personally reviewed  ROS:  A ten point review of systems was completed and pertinent positives are listed in subjective section  All other systems reviewed were negative  Review of Systems   Constitutional: Negative for appetite change and fatigue  HENT: Negative  Negative for congestion, sore throat and trouble swallowing  Eyes: Negative  Negative for visual disturbance  Respiratory: Negative  Negative for cough, shortness of breath and wheezing  Cardiovascular: Negative  Negative for chest pain  Gastrointestinal: Negative  Negative for abdominal distention, constipation and diarrhea  Genitourinary: Negative  Negative for difficulty urinating  Musculoskeletal: Positive for myalgias  Skin: Negative  Neurological: Negative  Hematological: Negative  Psychiatric/Behavioral: Negative  OBJECTIVE:   /66 (BP Location: Right arm)   Pulse 92   Temp 98 °F (36 7 °C) (Tympanic)   Resp 18   Ht 5' 6" (1 676 m)   Wt (!) 146 kg (322 lb 8 5 oz)   SpO2 94%   BMI 52 06 kg/m²     Physical Exam  Vitals reviewed  Constitutional:       General: He is not in acute distress  Appearance: Normal appearance  He is obese  He is not ill-appearing  HENT:      Head: Normocephalic and atraumatic  Right Ear: External ear normal       Left Ear: External ear normal       Nose: Nose normal    Eyes:      Pupils: Pupils are equal, round, and reactive to light  Cardiovascular:      Rate and Rhythm: Normal rate and regular rhythm  Pulses: Normal pulses        Heart sounds: Normal heart sounds  No murmur heard  Pulmonary:      Effort: Pulmonary effort is normal  No respiratory distress  Breath sounds: No wheezing or rhonchi  Abdominal:      General: Bowel sounds are normal  There is no distension  Palpations: Abdomen is soft  Tenderness: There is no abdominal tenderness  Musculoskeletal:         General: Normal range of motion  Cervical back: Normal range of motion  Right lower leg: Edema present  Left lower leg: Edema present  Comments: improving   Skin:     General: Skin is warm  Neurological:      General: No focal deficit present  Mental Status: He is alert and oriented to person, place, and time     Psychiatric:         Mood and Affect: Mood normal           Lab Results   Component Value Date    WBC 13 52 (H) 06/06/2022    HGB 9 7 (L) 06/06/2022    HCT 32 5 (L) 06/06/2022    MCV 97 06/06/2022     06/06/2022     Lab Results   Component Value Date    SODIUM 133 (L) 06/01/2022    K 3 8 06/01/2022    CL 98 06/01/2022    CO2 26 06/01/2022    BUN 28 (H) 06/01/2022    CREATININE 1 53 (H) 06/01/2022    GLUC 94 06/01/2022    CALCIUM 9 7 06/01/2022     Lab Results   Component Value Date    INR 1 27 (H) 05/10/2022    INR 1 09 05/10/2022    INR 1 33 (H) 08/24/2019    PROTIME 15 4 (H) 05/10/2022    PROTIME 13 5 05/10/2022    PROTIME 16 6 (H) 08/24/2019           Current Facility-Administered Medications:     acetaminophen (TYLENOL) tablet 975 mg, 975 mg, Oral, Q8H [address], Judi Brando, PA-C, 975 mg at 06/06/22 0523    allopurinol (ZYLOPRIM) tablet 100 mg, 100 mg, Oral, Daily, Kallie Ricks PA-C, 100 mg at 06/06/22 0903    amLODIPine (NORVASC) tablet 10 mg, 10 mg, Oral, Daily, Kallie Ricks PA-C, 10 mg at 06/06/22 0155    calcium carbonate (TUMS) chewable tablet 1,000 mg, 1,000 mg, Oral, BID PRN, Kallie Ricks PA-C    cholecalciferol (VITAMIN D3) tablet 400 Units, 400 Units, Oral, Daily, Judi Michaels PA-C, 400 Units at 06/06/22 0903    Diclofenac Sodium (VOLTAREN) 1 % topical gel 2 g, 2 g, Topical, 4x Daily, Dea Fillers, PA-C, 2 g at 06/06/22 0904    heparin (porcine) subcutaneous injection 7,500 Units, 7,500 Units, Subcutaneous, Q8H [address], MILO Brock-C, 7,500 Units at 06/06/22 0525    lidocaine (LIDODERM) 5 % patch 1 patch, 1 patch, Topical, Daily, Dea Fillers, PA-C, 1 patch at 06/06/22 4799    metoprolol succinate (TOPROL-XL) 24 hr tablet 25 mg, 25 mg, Oral, Daily, RITA Wahl, 25 mg at 06/06/22 0523    ondansetron (ZOFRAN-ODT) dispersible tablet 4 mg, 4 mg, Oral, Q6H PRN, Dea Fillers, PA-C    senna (SENOKOT) tablet 17 2 mg, 2 tablet, Oral, HS PRN, Dea Fillers, PA-C    tamsulosin (FLOMAX) capsule 0 4 mg, 0 4 mg, Oral, Daily With Dinner, Judi Michaels PA-C, 0 4 mg at 06/05/22 1721    traMADol (ULTRAM) tablet 50 mg, 50 mg, Oral, Q6H [address], Abdiel Duffy MD, 50 mg at 06/06/22 0524    Past Medical History:   Diagnosis Date    Arthritis     Chronic kidney disease     Gout     Hyperlipidemia     Hypertension     Kidney stone     Obstructive pyelonephritis     Sepsis [facility])        Patient Active Problem List    Diagnosis Date Noted    COVID 06/01/2022    Anemia 05/13/2022    Obstructive pyelonephritis 04/23/2022    Acute unilateral obstructive uropathy 04/21/2022    Mixed hyperlipidemia 06/02/2021    Morbid obesity 06/02/2021    Recurrent left knee pain 06/02/2021    Insomnia 09/16/2019    Essential hypertension 08/27/2019    Severe sepsis on admission 08/23/2019    Chronic kidney disease, stage 3 ([facility] [address] ) 08/23/2019    Acute kidney injury superimposed on CKD stage 3 08/06/2019    Bacteremia due to Escherichia coli 08/04/2019    Lactic acidosis 08/04/2019    History of gout 07/15/2019    Vitamin D deficiency 06/10/2015    Morbid obesity ([facility] [address] ) 03/23/2015          Dea Rodriguez PA-C    Total time spent:  30 minutes with more than 50% spent counseling/coordinating care  Counseling includes discussion with patient re: progress and discussion with patient of his/her current medical/functional state/information  Coordination of patient's care was performed in conjunction with consulting services  Time invested included review of patient's labs, vitals, and management of their comorbidities with continued monitoring  The care of the patient was extensively discussed and appropriate treatment plan was formulated unique for this patient  ** Please Note:  voice to text software may have been used in the creation of this document   Although proof errors in transcription or interpretation are a potential of such software**

## 2022-06-06 NOTE — TELEPHONE ENCOUNTER
Pt has concerns about his stent and PCN due to need to postpone surgery due to covid and hospitalizationn for severe sepsis  He is currently scheduled for 7/29/22 for cysto/URS/stent with Dr Javier Dunaway  Patient 67-year-old male status post stent for septic stone on 04/22/2022  He then re-presented to the hospital in early May due to sepsis again  CT scan revealed persistent hydronephrosis despite ureteral stent placement now status post PCN placement on the left

## 2022-06-06 NOTE — PROGRESS NOTES
06/06/22 0800   Pain Assessment   Pain Assessment Tool Jasso-Baker FACES   Jasso-Baker FACES Pain Rating 4   Pain Location/Orientation Orientation: Right;Location: Shoulder;Orientation: Left; Location: Leg   Restrictions/Precautions   Precautions Fall Risk;Contact/isolation; Airborne/isolation;Limb alert  (improving AROM R shoulder)   Weight Bearing Restrictions No   ROM Restrictions No   Eating   Type of Assistance Needed Independent   Comment meal completed prior to OT arrival supine   Eating CARE Score 6   Oral Hygiene   Comment pt declined due to upset stomach   Grooming   Able To Initiate Tasks; Wash/Dry Face;Comb/Brush Hair;Wash/Dry Hands   Limitation Noted In Strength; Safety   Findings setup   Shower/Bathe Self   Type of Assistance Needed Physical assistance   Physical Assistance Level 25% or less   Shower/Bathe Self CARE Score 3   Bathing   Assessed Bath Style Sponge Bath   Anticipated D/C Bath Style Sponge Bath; Shower   Able to [place] No   Able to [employer] Temperature No   Able to Wash/Rinse/Dry (body part) Right Arm;L Upper Leg;R Upper Leg;R Lower Leg/Foot;Chest;Perineal Area;L Lower Leg/Foot; Abdomen   Limitations Noted in Balance; Endurance;ROM;Safety;Strength   Positioning Seated;Standing   Findings  pt requires assist with buttock due to decreased trunk ROM with tube in place and assist with LUE due to decreased RUE sholder ROM/ strength   Tub/Shower Transfer   Reason Not Assessed Sponge Bath;Medical   Upper Body Dressing   Type of Assistance Needed Set-up / clean-up   Upper Body Dressing CARE Score 5   Lower Body Dressing   Type of Assistance Needed Supervision   Lower Body Dressing CARE Score 4   Putting On/Taking Off Footwear   Comment declined changing socks   Dressing/Undressing Clothing   Remove UB Clothes Pullover Shirt   Don UB Clothes Pullover Shirt   Remove LB Clothes Shorts   Don LB Clothes Shorts   Limitations Noted In Balance; Endurance;Problem Solving; Safety;Strength;ROM Positioning Sit Edge Of Bed;Standing   Findings assist to pin and unpin nephrostomy tube/bag during ADL tasks   Sit to Lying   Type of Assistance Needed Independent   Sit to Lying CARE Score 6   Lying to Sitting on Side of Bed   Type of Assistance Needed Independent   Lying to Sitting on Side of Bed CARE Score 6   Sit to Stand   Type of Assistance Needed Supervision   Sit to Stand CARE Score 4   Bed-Chair Transfer   Type of Assistance Needed Supervision   Chair/Bed-to-Chair Transfer CARE Score 4   Toileting Hygiene   Type of Assistance Needed Physical assistance   Physical Assistance Level 26%-50%   Toileting Hygiene CARE Score 3   Toileting   Able to Pull Clothing down yes, up yes  Manage Hygiene Bladder   Limitations Noted In Balance;Problem Solving;ROM;Safety;LE Strength   Adaptive Equipment Grab Bar   Findings assist for hygiene following loose BM on toilet   Toilet Transfer   Type of Assistance Needed Supervision   Toilet Transfer CARE Score 4   Toilet Transfer   Surface Assessed Raised Toilet   Transfer Technique Standard   Limitations Noted In Balance; Endurance;ROM;Safety;LE Strength   Adaptive Equipment Grab Bar;Walker   Additional Activities   Additional Activities Other (Comment)   Additional Activities Comments fxl mobility to and from the BR with RW and supervision, pt reports increased "stiffness" since first time OOB for the day   Other Comments   Assessment Pt participates in 39 minutes of concurrent treatment time compleating self care and therapeutic activities focusing similar goals as another   Assessment   Treatment Assessment Pt presents supine having recently finished with breakfast agreeable to OTs ession including toileting, ADLs and related transfers/ mobility  Pt requires assist and supervision due to decreased balance, safety, endurance and generalized srength/ROM enzo of RUE at shoulder and of trunk due to nephrostomy tube in place  Pt is making gains towards goals with min A overall  Pt will benefit from continued skilled OT services to increase independence with daily tasks  Problem List Decreased strength;Decreased range of motion;Decreased endurance; Impaired balance;Decreased safety awareness;Pain;Decreased skin integrity   Plan   Treatment/Interventions ADL retraining;Functional transfer training; Therapeutic exercise; Endurance training;Equipment eval/education; Compensatory technique education;Patient/family training   Progress Progressing toward goals   OT Therapy Minutes   OT Time In 0800   OT Time Out 0845   OT Total Time (minutes) 45   OT Mode of treatment - Individual (minutes) 6   OT Mode of treatment - Concurrent (minutes) 39   Therapy Time missed   Time missed?  No

## 2022-06-06 NOTE — TELEPHONE ENCOUNTER
Patient's rehab provider reached out to inpatient urology in regards to change in date  I discussed with these providers that we may not be able to change the date of the procedure due to hospital & anesthesia policies regarding recent COVID-19 infection and elective surgical procedures  I discussed that I would reach out to see if we can make this patient a priority, if there is a cancellation, can be rescheduled for an earlier date within the guidelines  Patient has a nephrostomy tube and has difficulty with taking care of this  Discussed with the rehab facility reaching out to see if patient can get VNA services  They are considering sending him to nursing facility which patient does not require  Which is why patient is extremely frustrated  Not sure if we can assist in any other way  Also discussed that it is okay to maintain ureteral stent in PCN  PCN placed in early May   can remain in place for 3 months and ureteral stent placed in late April which can remain in place for 3-6 months

## 2022-06-06 NOTE — PLAN OF CARE
Problem: PAIN - ADULT  Goal: Verbalizes/displays adequate comfort level or baseline comfort level  Description: Interventions:  - Encourage patient to monitor pain and request assistance  - Assess pain using appropriate pain scale  - Administer analgesics based on type and severity of pain and evaluate response  - Implement non-pharmacological measures as appropriate and evaluate response  - Consider cultural and social influences on pain and pain management  - Notify physician/advanced practitioner if interventions unsuccessful or patient reports new pain  Outcome: Progressing     Problem: INFECTION - ADULT  Goal: Absence or prevention of progression during hospitalization  Description: INTERVENTIONS:  - Assess and monitor for signs and symptoms of infection  - Monitor lab/diagnostic results  - Monitor all insertion sites, i e  indwelling lines, tubes, and drains  - Monitor endotracheal if appropriate and nasal secretions for changes in amount and color  - [place] appropriate cooling/warming therapies per order  - Administer medications as ordered  - Instruct and encourage patient and family to use good hand hygiene technique  - Identify and instruct in appropriate isolation precautions for identified infection/condition  Outcome: Progressing  Goal: Absence of fever/infection during neutropenic period  Description: INTERVENTIONS:  - Monitor WBC    Outcome: Progressing     Problem: SAFETY ADULT  Goal: Patient will remain free of falls  Description: INTERVENTIONS:  - Educate patient/family on patient safety including physical limitations  - Instruct patient to call for assistance with activity   - Consult OT/PT to assist with strengthening/mobility   - Keep Call bell within reach  - Keep bed low and locked with side rails adjusted as appropriate  - Keep care items and personal belongings within reach  - Initiate and maintain comfort rounds  - Make Fall Risk Sign visible to staff  - Apply yellow socks and bracelet for high fall risk patients  - Consider moving patient to room near nurses station  Outcome: Progressing  Goal: Maintain or return to baseline ADL function  Description: INTERVENTIONS:  -  Assess patient's ability to carry out ADLs; assess patient's baseline for ADL function and identify physical deficits which impact ability to perform ADLs (bathing, care of mouth/teeth, toileting, grooming, dressing, etc )  - Assess/evaluate cause of self-care deficits   - Assess range of motion  - Assess patient's mobility; develop plan if impaired  - Assess patient's need for assistive devices and provide as appropriate  - Encourage maximum independence but intervene and supervise when necessary  - Involve family in performance of ADLs  - Assess for home care needs following discharge   - Consider OT consult to assist with ADL evaluation and planning for discharge  - Provide patient education as appropriate  Outcome: Progressing  Goal: Maintains/Returns to pre admission functional level  Description: INTERVENTIONS:  - Perform BMAT or MOVE assessment daily    - Set and communicate daily mobility goal to care team and patient/family/caregiver     - Out of bed for toileting  - Record patient progress and toleration of activity level   Outcome: Progressing     Problem: DISCHARGE PLANNING  Goal: Discharge to home or other facility with appropriate resources  Description: INTERVENTIONS:  - Identify barriers to discharge w/patient and caregiver  - Arrange for needed discharge resources and transportation as appropriate  - Identify discharge learning needs (meds, wound care, etc )  - Arrange for interpretive services to assist at discharge as needed  - Refer to Case Management Department for coordinating discharge planning if the patient needs post-hospital services based on physician/advanced practitioner order or complex needs related to functional status, cognitive ability, or social support system  Outcome: Progressing     Problem: MOBILITY - ADULT  Goal: Maintain or return to baseline ADL function  Description: INTERVENTIONS:  -  Assess patient's ability to carry out ADLs; assess patient's baseline for ADL function and identify physical deficits which impact ability to perform ADLs (bathing, care of mouth/teeth, toileting, grooming, dressing, etc )  - Assess/evaluate cause of self-care deficits   - Assess range of motion  - Assess patient's mobility; develop plan if impaired  - Assess patient's need for assistive devices and provide as appropriate  - Encourage maximum independence but intervene and supervise when necessary  - Involve family in performance of ADLs  - Assess for home care needs following discharge   - Consider OT consult to assist with ADL evaluation and planning for discharge  - Provide patient education as appropriate  Outcome: Progressing  Goal: Maintains/Returns to pre admission functional level  Description: INTERVENTIONS:  - Perform BMAT or MOVE assessment daily    - Set and communicate daily mobility goal to care team and patient/family/caregiver     - Collaborate with rehabilitation services on mobility goals if consulted  - Out of bed for toileting  - Record patient progress and toleration of activity level   Outcome: Progressing     Problem: Prexisting or High Potential for Compromised Skin Integrity  Goal: Skin integrity is maintained or improved  Description: INTERVENTIONS:  - Identify patients at risk for skin breakdown  - Assess and monitor skin integrity  - Assess and monitor nutrition and hydration status  - Monitor labs   - Assess for incontinence   - Turn and reposition patient  - Assist with mobility/ambulation  - Relieve pressure over bony prominences  - Avoid friction and shearing  - Provide appropriate hygiene as needed including keeping skin clean and dry  - Evaluate need for skin moisturizer/barrier cream  - Collaborate with interdisciplinary team   - Patient/family teaching  - Consider wound care consult Outcome: Progressing     Problem: Nutrition/Hydration-ADULT  Goal: Nutrient/Hydration intake appropriate for improving, restoring or maintaining nutritional needs  Description: Monitor and assess patient's nutrition/hydration status for malnutrition  Collaborate with interdisciplinary team and initiate plan and interventions as ordered  Monitor patient's weight and dietary intake as ordered or per policy  Utilize nutrition screening tool and intervene as necessary  Determine patient's food preferences and provide high-protein, high-caloric foods as appropriate       INTERVENTIONS:  - Monitor oral intake, urinary output, labs, and treatment plans  - Assess nutrition and hydration status and recommend course of action  - Evaluate amount of meals eaten  - Assist patient with eating if necessary   - Allow adequate time for meals  - Recommend/ encourage appropriate diets, oral nutritional supplements, and vitamin/mineral supplements  - Order, calculate, and assess calorie counts as needed  - Recommend, monitor, and adjust tube feedings and TPN/PPN based on assessed needs  - Assess need for intravenous fluids  - Provide specific nutrition/hydration education as appropriate  - Include patient/family/caregiver in decisions related to nutrition  Outcome: Progressing

## 2022-06-07 ENCOUNTER — HOSPITAL ENCOUNTER (INPATIENT)
Facility: HOSPITAL | Age: 57
LOS: 10 days | DRG: 854 | End: 2022-06-17
Attending: INTERNAL MEDICINE | Admitting: INTERNAL MEDICINE
Payer: COMMERCIAL

## 2022-06-07 ENCOUNTER — APPOINTMENT (INPATIENT)
Dept: RADIOLOGY | Facility: HOSPITAL | Age: 57
DRG: 854 | End: 2022-06-07
Payer: COMMERCIAL

## 2022-06-07 VITALS
TEMPERATURE: 98.8 F | SYSTOLIC BLOOD PRESSURE: 144 MMHG | HEIGHT: 66 IN | DIASTOLIC BLOOD PRESSURE: 64 MMHG | WEIGHT: 315 LBS | RESPIRATION RATE: 20 BRPM | OXYGEN SATURATION: 94 % | HEART RATE: 110 BPM | BODY MASS INDEX: 50.62 KG/M2

## 2022-06-07 DIAGNOSIS — N20.1 URETERAL CALCULUS: ICD-10-CM

## 2022-06-07 DIAGNOSIS — Z16.24 MULTIPLE DRUG RESISTANT ORGANISM (MDRO) CULTURE POSITIVE: ICD-10-CM

## 2022-06-07 DIAGNOSIS — N11.1 OBSTRUCTIVE PYELONEPHRITIS: ICD-10-CM

## 2022-06-07 DIAGNOSIS — N20.0 RENAL CALCULUS: ICD-10-CM

## 2022-06-07 DIAGNOSIS — R65.20 SEVERE SEPSIS (HCC): Primary | ICD-10-CM

## 2022-06-07 DIAGNOSIS — A41.9 SEVERE SEPSIS (HCC): Primary | ICD-10-CM

## 2022-06-07 DIAGNOSIS — N12: ICD-10-CM

## 2022-06-07 LAB
ALBUMIN SERPL BCP-MCNC: 2.8 G/DL (ref 3.5–5)
ALP SERPL-CCNC: 52 U/L (ref 34–104)
ALT SERPL W P-5'-P-CCNC: 11 U/L (ref 7–52)
ANION GAP SERPL CALCULATED.3IONS-SCNC: 14 MMOL/L (ref 4–13)
AST SERPL W P-5'-P-CCNC: 14 U/L (ref 13–39)
BILIRUB SERPL-MCNC: 0.47 MG/DL (ref 0.2–1)
BUN SERPL-MCNC: 24 MG/DL (ref 5–25)
CALCIUM ALBUM COR SERPL-MCNC: 10.6 MG/DL (ref 8.3–10.1)
CALCIUM SERPL-MCNC: 9.6 MG/DL (ref 8.4–10.2)
CHLORIDE SERPL-SCNC: 99 MMOL/L (ref 96–108)
CO2 SERPL-SCNC: 21 MMOL/L (ref 21–32)
CREAT SERPL-MCNC: 1.74 MG/DL (ref 0.6–1.3)
ERYTHROCYTE [DISTWIDTH] IN BLOOD BY AUTOMATED COUNT: 15.4 % (ref 11.6–15.1)
FLUAV RNA RESP QL NAA+PROBE: NEGATIVE
FLUBV RNA RESP QL NAA+PROBE: NEGATIVE
GFR SERPL CREATININE-BSD FRML MDRD: 43 ML/MIN/1.73SQ M
GLUCOSE P FAST SERPL-MCNC: 123 MG/DL (ref 65–99)
GLUCOSE SERPL-MCNC: 123 MG/DL (ref 65–140)
HCT VFR BLD AUTO: 33.4 % (ref 36.5–49.3)
HGB BLD-MCNC: 9.9 G/DL (ref 12–17)
MCH RBC QN AUTO: 28.7 PG (ref 26.8–34.3)
MCHC RBC AUTO-ENTMCNC: 29.6 G/DL (ref 31.4–37.4)
MCV RBC AUTO: 97 FL (ref 82–98)
PLATELET # BLD AUTO: 356 THOUSANDS/UL (ref 149–390)
PMV BLD AUTO: 9.7 FL (ref 8.9–12.7)
POTASSIUM SERPL-SCNC: 3.9 MMOL/L (ref 3.5–5.3)
PROT SERPL-MCNC: 8.3 G/DL (ref 6.4–8.4)
RBC # BLD AUTO: 3.45 MILLION/UL (ref 3.88–5.62)
RSV RNA RESP QL NAA+PROBE: NEGATIVE
SARS-COV-2 RNA RESP QL NAA+PROBE: NEGATIVE
SODIUM SERPL-SCNC: 134 MMOL/L (ref 135–147)
WBC # BLD AUTO: 17.56 THOUSAND/UL (ref 4.31–10.16)

## 2022-06-07 PROCEDURE — 0241U HB NFCT DS VIR RESP RNA 4 TRGT: CPT | Performed by: INTERNAL MEDICINE

## 2022-06-07 PROCEDURE — NC001 PR NO CHARGE: Performed by: PHYSICAL MEDICINE & REHABILITATION

## 2022-06-07 PROCEDURE — 71045 X-RAY EXAM CHEST 1 VIEW: CPT

## 2022-06-07 PROCEDURE — 85027 COMPLETE CBC AUTOMATED: CPT | Performed by: PHYSICIAN ASSISTANT

## 2022-06-07 PROCEDURE — 80053 COMPREHEN METABOLIC PANEL: CPT | Performed by: PHYSICIAN ASSISTANT

## 2022-06-07 PROCEDURE — 99223 1ST HOSP IP/OBS HIGH 75: CPT | Performed by: INTERNAL MEDICINE

## 2022-06-07 PROCEDURE — 74176 CT ABD & PELVIS W/O CONTRAST: CPT

## 2022-06-07 PROCEDURE — 87040 BLOOD CULTURE FOR BACTERIA: CPT | Performed by: PHYSICIAN ASSISTANT

## 2022-06-07 PROCEDURE — 99232 SBSQ HOSP IP/OBS MODERATE 35: CPT | Performed by: PHYSICAL MEDICINE & REHABILITATION

## 2022-06-07 PROCEDURE — G1004 CDSM NDSC: HCPCS

## 2022-06-07 PROCEDURE — 97530 THERAPEUTIC ACTIVITIES: CPT

## 2022-06-07 RX ORDER — ALLOPURINOL 100 MG/1
100 TABLET ORAL DAILY
Status: DISCONTINUED | OUTPATIENT
Start: 2022-06-08 | End: 2022-06-17 | Stop reason: HOSPADM

## 2022-06-07 RX ORDER — METOPROLOL SUCCINATE 25 MG/1
25 TABLET, EXTENDED RELEASE ORAL DAILY
Status: DISCONTINUED | OUTPATIENT
Start: 2022-06-08 | End: 2022-06-17 | Stop reason: HOSPADM

## 2022-06-07 RX ORDER — AMLODIPINE BESYLATE 10 MG/1
10 TABLET ORAL DAILY
Status: DISCONTINUED | OUTPATIENT
Start: 2022-06-08 | End: 2022-06-17 | Stop reason: HOSPADM

## 2022-06-07 RX ORDER — TAMSULOSIN HYDROCHLORIDE 0.4 MG/1
0.4 CAPSULE ORAL
Status: DISCONTINUED | OUTPATIENT
Start: 2022-06-07 | End: 2022-06-17 | Stop reason: HOSPADM

## 2022-06-07 RX ORDER — OMEGA-3S/DHA/EPA/FISH OIL/D3 300MG-1000
400 CAPSULE ORAL DAILY
Status: DISCONTINUED | OUTPATIENT
Start: 2022-06-08 | End: 2022-06-17 | Stop reason: HOSPADM

## 2022-06-07 RX ORDER — SODIUM CHLORIDE 9 MG/ML
10 INJECTION INTRAVENOUS DAILY
Status: DISCONTINUED | OUTPATIENT
Start: 2022-06-07 | End: 2022-06-15

## 2022-06-07 RX ORDER — AMOXICILLIN 250 MG
2 CAPSULE ORAL 2 TIMES DAILY
Status: DISCONTINUED | OUTPATIENT
Start: 2022-06-07 | End: 2022-06-07

## 2022-06-07 RX ORDER — AMOXICILLIN 250 MG
2 CAPSULE ORAL 2 TIMES DAILY PRN
Status: DISCONTINUED | OUTPATIENT
Start: 2022-06-07 | End: 2022-06-17 | Stop reason: HOSPADM

## 2022-06-07 RX ORDER — SODIUM CHLORIDE, SODIUM GLUCONATE, SODIUM ACETATE, POTASSIUM CHLORIDE, MAGNESIUM CHLORIDE, SODIUM PHOSPHATE, DIBASIC, AND POTASSIUM PHOSPHATE .53; .5; .37; .037; .03; .012; .00082 G/100ML; G/100ML; G/100ML; G/100ML; G/100ML; G/100ML; G/100ML
1000 INJECTION, SOLUTION INTRAVENOUS EVERY 4 HOURS
Status: COMPLETED | OUTPATIENT
Start: 2022-06-07 | End: 2022-06-07

## 2022-06-07 RX ORDER — HEPARIN SODIUM 5000 [USP'U]/ML
5000 INJECTION, SOLUTION INTRAVENOUS; SUBCUTANEOUS EVERY 8 HOURS SCHEDULED
Status: DISCONTINUED | OUTPATIENT
Start: 2022-06-07 | End: 2022-06-07

## 2022-06-07 RX ORDER — ONDANSETRON 4 MG/1
4 TABLET, ORALLY DISINTEGRATING ORAL EVERY 6 HOURS PRN
Status: DISCONTINUED | OUTPATIENT
Start: 2022-06-07 | End: 2022-06-17 | Stop reason: HOSPADM

## 2022-06-07 RX ORDER — HEPARIN SODIUM 5000 [USP'U]/ML
7500 INJECTION, SOLUTION INTRAVENOUS; SUBCUTANEOUS EVERY 8 HOURS SCHEDULED
Status: DISCONTINUED | OUTPATIENT
Start: 2022-06-07 | End: 2022-06-17 | Stop reason: HOSPADM

## 2022-06-07 RX ORDER — CALCIUM CARBONATE 200(500)MG
1000 TABLET,CHEWABLE ORAL 2 TIMES DAILY PRN
Status: DISCONTINUED | OUTPATIENT
Start: 2022-06-07 | End: 2022-06-17 | Stop reason: HOSPADM

## 2022-06-07 RX ORDER — TRAMADOL HYDROCHLORIDE 50 MG/1
50 TABLET ORAL EVERY 6 HOURS PRN
Status: DISCONTINUED | OUTPATIENT
Start: 2022-06-07 | End: 2022-06-17 | Stop reason: HOSPADM

## 2022-06-07 RX ORDER — ACETAMINOPHEN 325 MG/1
650 TABLET ORAL EVERY 6 HOURS PRN
Status: DISCONTINUED | OUTPATIENT
Start: 2022-06-07 | End: 2022-06-17 | Stop reason: HOSPADM

## 2022-06-07 RX ADMIN — SODIUM CHLORIDE 125 ML/HR: 9 INJECTION, SOLUTION INTRAVENOUS at 10:42

## 2022-06-07 RX ADMIN — CEFTRIAXONE 1000 MG: 1 INJECTION, POWDER, FOR SOLUTION INTRAMUSCULAR; INTRAVENOUS at 17:49

## 2022-06-07 RX ADMIN — METOPROLOL SUCCINATE 25 MG: 25 TABLET, FILM COATED, EXTENDED RELEASE ORAL at 09:20

## 2022-06-07 RX ADMIN — DICLOFENAC SODIUM 2 G: 10 GEL TOPICAL at 09:21

## 2022-06-07 RX ADMIN — SODIUM CHLORIDE, SODIUM GLUCONATE, SODIUM ACETATE, POTASSIUM CHLORIDE, MAGNESIUM CHLORIDE, SODIUM PHOSPHATE, DIBASIC, AND POTASSIUM PHOSPHATE 1000 ML: .53; .5; .37; .037; .03; .012; .00082 INJECTION, SOLUTION INTRAVENOUS at 01:55

## 2022-06-07 RX ADMIN — TAMSULOSIN HYDROCHLORIDE 0.4 MG: 0.4 CAPSULE ORAL at 17:50

## 2022-06-07 RX ADMIN — AMLODIPINE BESYLATE 10 MG: 10 TABLET ORAL at 09:20

## 2022-06-07 RX ADMIN — TRAMADOL HYDROCHLORIDE 50 MG: 50 TABLET, COATED ORAL at 05:10

## 2022-06-07 RX ADMIN — HEPARIN SODIUM 7500 UNITS: 5000 INJECTION INTRAVENOUS; SUBCUTANEOUS at 14:21

## 2022-06-07 RX ADMIN — CHOLECALCIFEROL TAB 10 MCG (400 UNIT) 400 UNITS: 10 TAB at 09:20

## 2022-06-07 RX ADMIN — SODIUM CHLORIDE, SODIUM GLUCONATE, SODIUM ACETATE, POTASSIUM CHLORIDE, MAGNESIUM CHLORIDE, SODIUM PHOSPHATE, DIBASIC, AND POTASSIUM PHOSPHATE 1000 ML: .53; .5; .37; .037; .03; .012; .00082 INJECTION, SOLUTION INTRAVENOUS at 01:56

## 2022-06-07 RX ADMIN — ALLOPURINOL 100 MG: 100 TABLET ORAL at 09:20

## 2022-06-07 RX ADMIN — CEFEPIME HYDROCHLORIDE 2000 MG: 2 INJECTION, SOLUTION INTRAVENOUS at 09:21

## 2022-06-07 RX ADMIN — SODIUM CHLORIDE 10 ML: 9 INJECTION INTRAMUSCULAR; INTRAVENOUS; SUBCUTANEOUS at 16:00

## 2022-06-07 RX ADMIN — ACETAMINOPHEN 975 MG: 325 TABLET ORAL at 14:21

## 2022-06-07 RX ADMIN — HEPARIN SODIUM 7500 UNITS: 5000 INJECTION INTRAVENOUS; SUBCUTANEOUS at 21:23

## 2022-06-07 RX ADMIN — ACETAMINOPHEN 975 MG: 325 TABLET ORAL at 05:11

## 2022-06-07 RX ADMIN — TRAMADOL HYDROCHLORIDE 50 MG: 50 TABLET, COATED ORAL at 21:23

## 2022-06-07 RX ADMIN — SODIUM CHLORIDE 125 ML/HR: 9 INJECTION, SOLUTION INTRAVENOUS at 05:15

## 2022-06-07 RX ADMIN — LIDOCAINE 1 PATCH: 50 PATCH TOPICAL at 09:21

## 2022-06-07 RX ADMIN — TRAMADOL HYDROCHLORIDE 50 MG: 50 TABLET, COATED ORAL at 12:11

## 2022-06-07 RX ADMIN — HEPARIN SODIUM 7500 UNITS: 5000 INJECTION INTRAVENOUS; SUBCUTANEOUS at 05:10

## 2022-06-07 RX ADMIN — SODIUM CHLORIDE, SODIUM GLUCONATE, SODIUM ACETATE, POTASSIUM CHLORIDE, MAGNESIUM CHLORIDE, SODIUM PHOSPHATE, DIBASIC, AND POTASSIUM PHOSPHATE 1000 ML: .53; .5; .37; .037; .03; .012; .00082 INJECTION, SOLUTION INTRAVENOUS at 06:15

## 2022-06-07 NOTE — ASSESSMENT & PLAN NOTE
Lab Results   Component Value Date    EGFR 43 06/07/2022    EGFR 45 06/06/2022    EGFR 50 06/01/2022    CREATININE 1 74 (H) 06/07/2022    CREATININE 1 67 (H) 06/06/2022    CREATININE 1 53 (H) 06/01/2022     Baseline creatinine seems between 1 4 -1 7  Currently close to baseline  Trend BMP

## 2022-06-07 NOTE — NURSING NOTE
06/07/22 Transferred via EMS and stretcher [facility]  Report called to [facility]  All questions answered  Belongings sent with pt  Called patient's Father, Kalpesh Suárez to update   Akilah Antony RN

## 2022-06-07 NOTE — SEPSIS NOTE
Sepsis Note   Abel Karimi 55 y o  male MRN: [id number]  Unit/Bed#: -01 Encounter: [id number]       qSOFA     [address] Name 06/06/22 2327 06/06/22 2136 06/06/22 2000 06/06/22 1900 06/05/22 1954    Altered mental status GCS < 15 -- -- 0 -- --    Respiratory Rate > / =22 0 0 -- 0 0    Systolic BP < / =605 0 0 -- 0 0    Q Sofa Score 0 0 0 0 0    Row Name 06/05/22 1939 06/05/22 0753             Altered mental status GCS < 15 0 --       Respiratory Rate > / =22 -- 0       Systolic BP < / =180 -- 0       Q Sofa Score -- 0                  Initial Sepsis Screening     Row Name 06/06/22 2340                Is the patient's history suggestive of a new or worsening infection? Yes (Proceed)  -AI        Suspected source of infection urinary tract infection  -AI        Are two or more of the following signs & symptoms of infection both present and new to the patient? Yes (Proceed)  -AI        Indicate SIRS criteria Hyperthemia > 38 3C (100 9F); Leukocytosis (WBC > 55757 IJL); Tachycardia > 90 bpm  -AI        If the answer is yes to both questions, suspicion of sepsis is present --        If severe sepsis is present AND tissue hypoperfusion perists in the hour after fluid resuscitation or lactate > 4, the patient meets criteria for SEPTIC SHOCK --        Are any of the following organ dysfunction criteria present within 6 hours of suspected infection and SIRS criteria that are NOT considered to be chronic conditions?  Yes  -AI        Organ dysfunction --        Date of presentation of severe sepsis --        Time of presentation of severe sepsis --        Tissue hypoperfusion persists in the hour after crystalloid fluid administration, evidenced, by either: --        Was hypotension present within one hour of the conclusion of crystalloid fluid administration? --        Date of presentation of septic shock --        Time of presentation of septic shock --              User Key  (r) = Recorded By, (t) = Taken By, (c) = Cosigned By [id number] Name Provider Type    AI Darius Phipps PA-C Physician Assistant

## 2022-06-07 NOTE — CASE MANAGEMENT
Pt being transferred to [facility] due to current medical status  See Sepsis note  SW faxing information to [employer]

## 2022-06-07 NOTE — PLAN OF CARE
Problem: PAIN - ADULT  Goal: Verbalizes/displays adequate comfort level or baseline comfort level  Description: Interventions:  - Encourage patient to monitor pain and request assistance  - Assess pain using appropriate pain scale  - Administer analgesics based on type and severity of pain and evaluate response  - Implement non-pharmacological measures as appropriate and evaluate response  - Consider cultural and social influences on pain and pain management  - Notify physician/advanced practitioner if interventions unsuccessful or patient reports new pain  Outcome: Progressing     Problem: INFECTION - ADULT  Goal: Absence or prevention of progression during hospitalization  Description: INTERVENTIONS:  - Assess and monitor for signs and symptoms of infection  - Monitor lab/diagnostic results  - Monitor all insertion sites, i e  indwelling lines, tubes, and drains  - Monitor endotracheal if appropriate and nasal secretions for changes in amount and color  - [place] appropriate cooling/warming therapies per order  - Administer medications as ordered  - Instruct and encourage patient and family to use good hand hygiene technique  - Identify and instruct in appropriate isolation precautions for identified infection/condition  Outcome: Progressing  Goal: Absence of fever/infection during neutropenic period  Description: INTERVENTIONS:  - Monitor WBC    Outcome: Progressing

## 2022-06-07 NOTE — PROGRESS NOTES
06/07/22 0715   Pain Assessment   Pain Assessment Tool Jasso-Baker FACES   Jasso-Baker FACES Pain Rating 4   Pain Location/Orientation Orientation: Left; Location: Foot  (with WB laterally)   Restrictions/Precautions   Precautions Bed/chair alarms; Fall Risk;Contact/isolation; Airborne/isolation;Limb alert;Pain  (IV LUE and decreased AROM RUE shoulder)   Weight Bearing Restrictions No   ROM Restrictions No   Eating   Type of Assistance Needed Supervision   Eating CARE Score 4   Oral Hygiene   Comment declines as pt is afraid of an upset stomach with mouthwash   Sit to Lying   Type of Assistance Needed Independent   Sit to Lying CARE Score 6   Lying to Sitting on Side of Bed   Type of Assistance Needed Independent   Lying to Sitting on Side of Bed CARE Score 6   Sit to Stand   Type of Assistance Needed Supervision   Sit to Stand CARE Score 4   Bed-Chair Transfer   Type of Assistance Needed Supervision   Chair/Bed-to-Chair Transfer CARE Score 4   Coordination   Fine Motor yellow putty manipulation including pulling, pinching, rolling, falttening and placing to make a flower using BUE   Additional Activities   Additional Activities Other (Comment)   Additional Activities Comments pt able to sit EOB for 30 minutes Mod I for FM tasks, vitals and limited intake of breakfast   Other Comments   Assessment Pt participates in 60 minutes concurrent treatment focusing on theract with similar goals as another patient to increase activity tolerance and overall strength   Assessment   Treatment Assessment Pt presents supine with RN rpeorting increased temp, altered lab work and IV started overnight  Pt agreeable to simple session including sitting EOB, transfers/ standing and FM tasks before limited intake of breakfast and return to supine  Sheets and pillowcase changed due to saturation while OOB and drainage bag emptied with RN notified  IV in place and unable to complete UB ADLs with LB completed earlier when in BR for toileting  Pt requires assist and supervision due to decreased balance, safety, endurance, IV LUE, nephrostomy bag Left side with no attempt to empty, left lateral foot pain and decreased endurance/ generalized strength/ ROM  Pt will benefit from conitnued skilled OT services to increase independence with daily tasks as able depending on medical status  Problem List Decreased strength;Decreased range of motion;Decreased endurance; Impaired balance;Decreased safety awareness;Pain   Plan   Treatment/Interventions ADL retraining;Functional transfer training; Therapeutic exercise; Endurance training;Patient/family training;Equipment eval/education; Compensatory technique education   Progress Progressing toward goals   OT Therapy Minutes   OT Time In 0715   OT Time Out 0815   OT Total Time (minutes) 60   OT Mode of treatment - Concurrent (minutes) 60   Therapy Time missed   Time missed?  No

## 2022-06-07 NOTE — NURSING NOTE
Pt  With fever of 101 , hr 118, pt only reports being warm  At 8pm  Am lab work showed elevated WBC resulting in sepsis screen which was +  Hospitalist JENN Sauer notified via tiger text and orders received to initiate sepsis alert  Supervisor IVÁN Lozano notified and sent out sepsis alert  Orders received  Labs needed to be coordinated with a  pickup which the supervisor took care of  We got the okay to draw blood at 2135  IV started and fluid started, labs obtained by PCA Lili as well as urine  Only able to obtain 1 set of blood cultures  This was relayed to Hospitalists  IV ABX started after labs and cultures  Temp check at 2140 102 2, pt  Giving his routine tylenol of 975mg  See Vital signs flow sheet for trends

## 2022-06-07 NOTE — H&P
[facility]  H&P- [place] 1965, 55 y o  male MRN: [id number]  [facility] Encounter: [id number]  Primary Care Provider: Marcelina Phelps   Date and time admitted to hospital: 6/7/2022  3:45 PM    * Sepsis [facility])  Assessment & Plan  · Present at outside campus   · Suspect source of UTI  Noted left nephrostomy tube and left ureteral stent  · Noted multiple E coli UTI and also previous E coli bacteremia  · Also noted covid positive status as of 5/31>> no supplemental oxygen requirement  · Leukocytosis noted  · Received IV fluids and cefepime at outside [facility] and transferred for Urology evaluation  PLAN  · Follow urine and blood cultures  · Based on previous cultures, will give ceftriaxone monotherapy for now  · Check CT abdomen pelvis without contrast to evaluate for worsening hydronephrosis  · Urology consult  · Check chest x-ray  · Monitor on mild COVID pathway  · Trend CBC with differential     Obstructive uropathy  Assessment & Plan  Known history of left obstructive uropathy, status post ureteral stent and nephrostomy tube given  mild hydronephrosis  Urology consult  CT abdomen pelvis  Rest as above    COVID  Assessment & Plan  · Not requiring supplemental oxygen  · Noted mild shortness of breath after fever last night  · Tested positive on 05/31, last day of isolation 6/9  · Monitor on mild COVID pathway    · Incentive spirometry and respiratory protocol  · Encourage ambulation        Morbid obesity  Assessment & Plan  Lifestyle modification    Chronic kidney disease, stage 3 [facility])  Assessment & Plan  Lab Results   Component Value Date    EGFR 43 06/07/2022    EGFR 45 06/06/2022    EGFR 50 06/01/2022    CREATININE 1 74 (H) 06/07/2022    CREATININE 1 67 (H) 06/06/2022    CREATININE 1 53 (H) 06/01/2022     Baseline creatinine seems between 1 4 -1 7  Currently close to baseline  Trend BMP        VTE Prophylaxis: Heparin  / sequential compression device   Code Status: full      Anticipated Length of Stay:  Patient will be admitted on an Inpatient basis with an anticipated length of stay of  More than 2 midnights  Justification for Hospital Stay: ongoing eval    Total Time for Visit, including Counseling / Coordination of Care: 60 minutes  Greater than 50% of this total time spent on direct patient counseling and coordination of care  Chief Complaint:   Sepsis    History of Present Illness:    Minta Lennox is a 55 y o  male who presents with sepsis at [address] [place] [address] rehab center     Patient has ongoing urological issue with left-sided nephrolithiasis and hydronephrosis since April this year  He had stent placement in April but had to come to hospital again with worsening symptoms and was noted to have hydronephrosis with sepsis  Underwent left nephrostomy tube placement and received antibiotic course based on cultures  He was discharged to rehab  At rehab, he had fever yesterday along with leukocytosis and diaphoresis  He continues to have intermittent left flank discomfort  He was also noted to have COVID infection as an incidental finding on 05/31 during screening test   Patient denies any chest pain  Had mild shortness of breath yesterday with fever  Had an episode of diarrhea this evening  No abdominal pain but reports bloating  Urology was contacted from rehab center and patient is transferred here for urology evaluation given possible urinary tract infection related sepsis  Patient received IV fluids and cefepime and blood cultures and urine cultures were obtained at rehab center  At [facility], patient denies any acute worsening symptoms  He had 1 episode of diarrhea and reports abdominal bloating  No more fever now      Review of Systems:    Review of Systems    Past Medical and Surgical History:     Past Medical History:   Diagnosis Date    Arthritis     Chronic kidney disease     Gout     Hyperlipidemia     Hypertension     Kidney stone     Obstructive pyelonephritis     Sepsis [facility])        Past Surgical History:   Procedure Laterality Date    FL RETROGRADE PYELOGRAM  8/4/2019    FL RETROGRADE PYELOGRAM  9/26/2019    FL RETROGRADE PYELOGRAM  4/22/2022    IR NEPHROSTOMY TUBE PLACEMENT  5/10/2022    NO PAST SURGERIES  03/23/2015    Last assessed    WV CYSTO/URETERO W/LITHOTRIPSY &INDWELL STENT INSRT Right 9/26/2019    Procedure: CYSTOSCOPY URETEROSCOPY WITH RETROGRADE PYELOGRAM AND INSERTION STENT URETERAL;  Surgeon: Anthony Ball MD;  Location: [facility];  Service: Urology    WV CYSTOURETHROSCOPY,URETER CATHETER Right 8/4/2019    Procedure: CYSTOSCOPY RETROGRADE PYELOGRAM WITH INSERTION STENT URETERAL;  Surgeon: Stuart Claudio MD;  [facility];  Service: Urology    WV CYSTOURETHROSCOPY,URETER CATHETER Left 4/22/2022    Procedure: CYSTOSCOPY RETROGRADE PYELOGRAM WITH INSERTION STENT URETERAL;  Surgeon: Mona Lane MD;  [facility];  Service: Urology       Meds/Allergies:    Prior to Admission medications    Medication Sig Start Date End Date Taking? Authorizing Provider   acetaminophen (TYLENOL) 325 mg tablet Take 2 tablets (650 mg total) by mouth every 6 (six) hours as needed for mild pain, headaches or fever 5/18/22   Chad Childs MD   allopurinol (ZYLOPRIM) 100 mg tablet Take 100 mg by mouth in the morning  Historical Provider, MD   amLODIPine (NORVASC) 10 mg tablet Take 1 tablet (10 mg total) by mouth daily 4/30/22   Braden Aguilera DO   calcium carbonate (TUMS) 500 mg chewable tablet Chew 2 tablets (1,000 mg total)  as needed in the morning and 2 tablets (1,000 mg total) as needed in the evening for indigestion or heartburn  5/18/22   Chad Childs MD   cholecalciferol (VITAMIN D3) 400 units tablet Take 400 Units by mouth in the morning      Historical Provider, MD   Diclofenac Sodium (VOLTAREN) 1 % Apply 2 g topically in the morning and 2 g at noon and 2 g in the evening and 2 g before bedtime  Historical Provider, MD   HEPARIN, PORCINE, IN NACL IJ Inject 7,500 Units under the skin 3 (three) times a day    Historical Provider, MD   lidocaine (LIDODERM) 5 % Apply 1 patch topically in the morning  Remove & Discard patch within 12 hours or as directed by MD  5/19/22   Henry Christensen MD   metoprolol succinate (TOPROL-XL) 25 mg 24 hr tablet Take 25 mg by mouth in the morning  Historical Provider, MD   ondansetron (ZOFRAN-ODT) 4 mg disintegrating tablet Take 4 mg by mouth every 6 (six) hours as needed for nausea or vomiting    Historical Provider, MD   senna-docusate sodium (SENOKOT S) 8 6-50 mg per tablet Take 2 tablets by mouth in the morning and 2 tablets in the evening  5/18/22   Henry Christensen MD   sodium chloride, PF, 0 9 % 10 mL by Intracatheter route in the morning  Intracatheter flushing daily  May substitute prefilled syringe with normal saline 10 mL vials, 10 mL syringes, and 18 g blunt needles   5/11/22 6/10/22  RITA Hernandes   tamsulosin (FLOMAX) 0 4 mg Take 1 capsule (0 4 mg total) by mouth daily with dinner 5/18/22   Henry Christensen MD   traMADol (ULTRAM) 50 mg tablet Take 50 mg by mouth every 6 (six) hours as needed for moderate pain    Historical Provider, MD         Allergies: No Known Allergies    Social History:     Marital Status: Single     Substance Use History:   Social History     Substance and Sexual Activity   Alcohol Use Not Currently    Alcohol/week: 0 0 standard drinks    Comment: rarely     Social History     Tobacco Use   Smoking Status Never Smoker   Smokeless Tobacco Never Used     Social History     Substance and Sexual Activity   Drug Use No       Family History:    non-contributory    Physical Exam:     Vitals:   Blood Pressure: 132/81 (06/07/22 1621)  Pulse: 98 (06/07/22 1621)  Temperature: 98 4 °F (36 9 °C) (06/07/22 1621)  Respirations: 18 (06/07/22 1621)  SpO2: 92 % (06/07/22 1621)    Physical Exam  Constitutional:       General: He is not in acute distress  Cardiovascular:      Rate and Rhythm: Normal rate and regular rhythm  Heart sounds: Normal heart sounds  No murmur heard  Pulmonary:      Effort: No respiratory distress  Breath sounds: No wheezing or rales  Abdominal:      General: Bowel sounds are normal  There is no distension  Palpations: Abdomen is soft  Tenderness: There is no abdominal tenderness  Musculoskeletal:         General: No swelling  Skin:     General: Skin is warm  Neurological:      Mental Status: He is alert  Comments: Awake alert and communicative             Additional Data:     Lab Results: I have personally reviewed pertinent reports  Results from last 7 days   Lab Units 06/07/22  0610 06/06/22  0738 06/01/22  0541   WBC Thousand/uL 17 56* 13 52* 12 01*   HEMOGLOBIN g/dL 9 9* 9 7* 9 5*   HEMATOCRIT % 33 4* 32 5* 31 9*   PLATELETS Thousands/uL 356 364 442*   BANDS PCT %  --   --  7   NEUTROS PCT %  --  77*  --    LYMPHS PCT %  --  13*  --    LYMPHO PCT %  --   --  15   MONOS PCT %  --  5  --    MONO PCT %  --   --  3*   EOS PCT %  --  3 1     Results from last 7 days   Lab Units 06/07/22  0512   SODIUM mmol/L 134*   POTASSIUM mmol/L 3 9   CHLORIDE mmol/L 99   CO2 mmol/L 21   BUN mg/dL 24   CREATININE mg/dL 1 74*   ANION GAP mmol/L 14*   CALCIUM mg/dL 9 6   ALBUMIN g/dL 2 8*   TOTAL BILIRUBIN mg/dL 0 47   ALK PHOS U/L 52   ALT U/L 11   AST U/L 14   GLUCOSE RANDOM mg/dL 123                 Results from last 7 days   Lab Units 06/06/22  2140   LACTIC ACID mmol/L 1 1   PROCALCITONIN ng/ml 0 30*       Imaging: I have personally reviewed pertinent reports  CT abdomen pelvis wo contrast    (Results Pending)   XR chest portable    (Results Pending)         [employer] Reviewed: Yes     ** Please Note: This note has been constructed using a voice recognition system   **

## 2022-06-07 NOTE — NURSING NOTE
Pt  With shivering earlier, blankets provided, temp now up to 101 but no further shivering  No other c/o from pt

## 2022-06-07 NOTE — NURSING NOTE
06/07/22 Pt denies chills or discomfort at this time  Only reports feeling tired and wanting to "take it easy" on his stomach  No further loose stools  Flushed and emptied nephrostomy tube  Pt is voiding small amounts in urinal also  No further chills or fever noted  IV fluids infusing  Pt refused Voltaren gel to knees and also refused to have nephrostomy dressing change this shift due to tape causing skin irritation  Awaiting transport time and info  Will continue to monitor   Akilah Antony RN

## 2022-06-07 NOTE — ASSESSMENT & PLAN NOTE
Known history of left obstructive uropathy, status post ureteral stent and nephrostomy tube given  mild hydronephrosis  Urology consult  CT abdomen pelvis  Rest as above

## 2022-06-07 NOTE — PHYSICAL THERAPY NOTE
PT DISCHARGE SUMMARY:      Patient with change in medical status resulting in d/c and transfer back to acute [facility] 06/07/2022  At time of D/C, patient MOD I bed mobility, MOD I all transfers with walker, S ambulation up to 86' level and unlevel surfaces, CG negotiation of 14 steps 1 vs 2 handrails (going up forward and down backward)  Patient remains limited by decreased ROM/strength, decreased balance and safety, decreased endurance, and pain  Patient d/c to [facility] hospital via ambulance

## 2022-06-07 NOTE — PROGRESS NOTES
PM&R PROGRESS NOTE:  Maria Victoria Balderas 55 y o  male MRN: [id number]  Unit/Bed#: -01 Encounter: [id number]        Rehabilitation Diagnosis: Impairment of mobility, safety and Activities of Daily Living (ADLs) due to Debility:  16  Debility (Non-cardiac/Non-pulmonary)    HPI: Thanh geiger 55 y  o  male with a PMH significant for CKD, HTN, HLD, obstructive pyelonephritis with coli bacteremia, who presented to the [address] [place] [address] ED on 05/10  He had a recent discharge on 4/30/22 s/p cystoscopy and stent placement on 4/22/22   After his discharge, patient experienced intermittent waves of left flank pain described as sharp, non-radiating, lightheadedness, nausea  He also admitted to worsening shortness of breath  He arrived to ED with tachypnea and tachycardia  CT scan obtained revealing hydronephrosis and obstructive pyelonephritis  Urology was consulted and recommended acute transfer to [facility] acute hospital  Patient diagnosed with sepsis and initiated on IV antibiotics  IR was consulted for concerns with potential obstruction of stent placement and noted to be in ADRIANNE  Patient went to the OR with IR for obstructive pyelonephritis and is s/p IR Nephrostomy tube placement on 5/10  He was intubated for the procedure and extubated on 5/11  Ortho was consulted on 5/13 for left knee pain and a left knee aspiration was performed  Patient is to continue WBAT to LLE  The aspiration was unsuccessful with ortho recommending adequate pain control and potential steroid injection in the future  Sepsis resolved and patient was transferred from ICU to med surg  On 5/16 patient was transitioned from IV antibiotics to oral to complete a total of 14 days  Patient participated with PT/OT and deemed appropriate for post acute rehabilitation services  He was accepted to [facility] and once hemodynamically stable, arrived on 5/19/22       SUBJECTIVE: Patient seen face to face   Patient developed sepsis overnight as evident by tachycardia, elevated temp of 102 2 and increase in white count from 13 52 to 17 56  Hospitalist notified and initiated labs, IV placement with Sodium chloride  Hospitalist did not initiate transfer which should have been done  Will be transferring patient out today to [address] [place] [address] were he can be evaluated by urology as this may be a potential source of the Sepsis  Patient did screen for sepsis yesterday with an elevated heart rate and white count of 13 52 however at that time patient was not symptomatic and has had elevated heart rate readings since admission  Patient now admits to fatigue, weakness and diaphoresis  Explained to patient about transferring out and having urology see him while at [facility]  Patient understands and is in agreement  ASSESSMENT: Septic; fatigue, generalized weakness      PLAN:    Rehabilitation   Functional deficits:  Impaired mobility, self care, generalized weakness, pain to right shoulder, nephrostomy tube, New onset of sepsis    Continue current rehabilitation plan of care to maximize function       Functional update:   o PT:  Sit-lying: independent  Lying-sitting on side of bed: independent  Bed-chair transfer:  supervision  Ambulation:supervision   Stairs: CGA  o OT:  Grooming: s/u  Showering/bathing: CGA  Tub/shower transfer: s/u c/u  UB dressing: s/u c/u  LB dressing: supervision  Toileting hygiene: min assist  Toilet transfer: supervision       Estimated Discharge: Plan for transfer to [facility] today for higher level of care secondary to meeting sepsis criteria       Pain   Tylenol 975 mg Q8H [facility]   Voltaren gel 4x daily   Lidocaine patch Q12H to right shoulder    Tramadol 60 mg Q6H [facility]    DVT prophylaxis   Heparin 7500 units Q8H    Bladder plan   Continent    Bowel plan   Continent      COVID  Assessment & Plan  · Incidentally found positive on 5/31  · out of isolation on 6/9  · Sepsis criteria met as mentioned below    Anemia  Assessment & Plan  · Iron panel obtained with elevated ferritin stores   · Monitor H/H- Stable    Recurrent left knee pain  Assessment & Plan  · Ongoing for several weeks causing difficulty with bearing weight due to pain  · XR knee without acute osseous abnormality, did reveal tricompartmental osteoarthritis similar to imaging done the year prior  · Ortho consulted and attempted an unsuccessful fluid aspiration   · Continue with adequate pain management with OP Ortho f/u for potential steroid injection once acute sepsis/infection resolves   · Diclofenac gel to the knee 4 times daily  · Left foot xray: There is no acute fracture or dislocation  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  Small plantar and large retrocalcaneal spurs  No lytic or blastic osseous lesion  Soft tissues are unremarkable  Essential hypertension  Assessment & Plan  · Low-sodium diet  · Continue Norvasc 10 mg and Metoprolol 25 mg daily      Chronic kidney disease, stage 3 [facility])  Assessment & Plan  Lab Results   Component Value Date    EGFR 43 06/07/2022    EGFR 45 06/06/2022    EGFR 50 06/01/2022    CREATININE 1 74 (H) 06/07/2022    CREATININE 1 67 (H) 06/06/2022    CREATININE 1 53 (H) 06/01/2022   · Baseline creatinine approximately 1 4-1 6  · Monitor renal function and urine output  · Avoid nephrotoxins and hypotension       Severe sepsis on admission  Assessment & Plan  · Presented to [address] on 5/10 with worsening left flank pain, tachypnea and tachycardia in setting of E  Coli pyelonephritis/bacteremia   · CT revealed hydronephrosis and obstructive pyelonephritis  · Patient transferred to [facility] for higher level of care  · He went to the OR on 5/10 with IR for a nephrostomy tube placement  · Patient triggered for sepsis last night with increase in white count from 13 52 to 17 45  Creatinine is increasing from 1 67 to 1 74  Patient with elevated temp greater than 100  Patient with elevated heart rate since admission however noted to be as high as 122   Hospitalist notified and initiated lab work and IV with sodium chloride infusion  Transfer should have been initiated at that time but was not  · Patient did trigger earlier in the day yesterday for sepsis with white count of 13 52 and tachycardia  However he was asymptomatic at that time, afebrile, did have tachycardia but has been experiencing this since admission  · Patient now with c/o weakness, fatigue and diaphoresis  · Initiating transfer to [facility] to be evaluated by Urology as Nephrostomy tube may be a potential source of sepsis  Bacteremia due to Escherichia coli  Assessment & Plan  · Positive growth of E  Coli in one blood culture on 5/10 thought to be due to UTI/pyelonephritis   · antibiotics completed      Morbid obesity (HCC)  Assessment & Plan  · BMI of 51 45  · Lifestyle and diet modifications       * Obstructive pyelonephritis  Assessment & Plan  · CT on 5/10: "Mild left hydronephrosis status post ureteral stent placement, with 3 mm calculus in the mid ureter adjacent to the stent  Multiple bilateral renal calculi "  · Recent admission and underwent L-side ureteral stent on 4/21- this was found to be obstructive  S/p left PCN placement by IR on 5/10   · Pain control  · Continue Flomax, OP follow up with urology for stone treatment on 5/25  · Continue Augmentin Q12H [facility] & half-way for total of 14 days (Completed on 5/25/22)  · Planned procedure for OP 6/17; will need updated CT stone series prior to surgery  · Needs to be re-evaluated by Urology secondary to sepsis  Appreciate IM consultants medical co-management  Labs, medications, and imaging personally reviewed  ROS:  A ten point review of systems was completed and pertinent positives are listed in subjective section  All other systems reviewed were negative  Review of Systems   Constitutional: Positive for appetite change, fatigue and fever          No appetite to eat this AM  Fatigued with last nights events  Fever subsiding however did experience diaphoresis earlier this AM after Tylenol was given   HENT: Negative  Negative for congestion, sore throat and trouble swallowing  Eyes: Negative  Negative for visual disturbance  Respiratory: Negative  Negative for cough, shortness of breath and wheezing  Cardiovascular: Negative  Negative for chest pain  Gastrointestinal: Negative  Negative for abdominal distention, constipation and diarrhea  Genitourinary: Positive for difficulty urinating  No longer urinating; was urinating prior to last night's events    Musculoskeletal: Positive for myalgias  Skin: Negative  Neurological: Positive for weakness  Hematological: Negative  Psychiatric/Behavioral: Negative  OBJECTIVE:   /72 (BP Location: Right arm)   Pulse (!) 106   Temp (!) 97 3 °F (36 3 °C) (Oral)   Resp 18   Ht 5' 6" (1 676 m)   Wt (!) 145 kg (318 lb 12 6 oz)   SpO2 100%   BMI 51 45 kg/m²     Physical Exam  Vitals and nursing note reviewed  Constitutional:       General: He is not in acute distress  Appearance: He is obese  He is ill-appearing and diaphoretic  HENT:      Head: Normocephalic and atraumatic  Right Ear: External ear normal       Left Ear: External ear normal       Nose: Nose normal       Mouth/Throat:      Mouth: Mucous membranes are moist       Pharynx: Oropharynx is clear  Eyes:      Extraocular Movements: Extraocular movements intact  Pupils: Pupils are equal, round, and reactive to light  Cardiovascular:      Rate and Rhythm: Regular rhythm  Tachycardia present  Pulses: Normal pulses  Heart sounds: Normal heart sounds  No murmur heard  Pulmonary:      Effort: Pulmonary effort is normal  No respiratory distress  Breath sounds: Rhonchi present  No wheezing  Comments: Throughout lungs   Abdominal:      General: Bowel sounds are normal  There is no distension  Palpations: Abdomen is soft  Tenderness: There is no abdominal tenderness     Musculoskeletal: General: Normal range of motion  Cervical back: Normal range of motion  Right lower leg: Edema present  Left lower leg: Edema present  Skin:     General: Skin is warm  Neurological:      General: No focal deficit present  Mental Status: He is alert and oriented to person, place, and time     Psychiatric:         Mood and Affect: Mood normal           Lab Results   Component Value Date    WBC 17 56 (H) 06/07/2022    HGB 9 9 (L) 06/07/2022    HCT 33 4 (L) 06/07/2022    MCV 97 06/07/2022     06/07/2022     Lab Results   Component Value Date    SODIUM 134 (L) 06/07/2022    K 3 9 06/07/2022    CL 99 06/07/2022    CO2 21 06/07/2022    BUN 24 06/07/2022    CREATININE 1 74 (H) 06/07/2022    GLUC 123 06/07/2022    CALCIUM 9 6 06/07/2022     Lab Results   Component Value Date    INR 1 27 (H) 05/10/2022    INR 1 09 05/10/2022    INR 1 33 (H) 08/24/2019    PROTIME 15 4 (H) 05/10/2022    PROTIME 13 5 05/10/2022    PROTIME 16 6 (H) 08/24/2019           Current Facility-Administered Medications:     acetaminophen (TYLENOL) tablet 975 mg, 975 mg, Oral, Q8H [address], Judi Michaels, PA-C, 975 mg at 06/07/22 0511    allopurinol (ZYLOPRIM) tablet 100 mg, 100 mg, Oral, Daily, MILO Griffin-C, 100 mg at 06/07/22 0920    amLODIPine (NORVASC) tablet 10 mg, 10 mg, Oral, Daily, MILO Griffin-C, 10 mg at 06/07/22 0920    calcium carbonate (TUMS) chewable tablet 1,000 mg, 1,000 mg, Oral, BID PRN, Rafael Keene PA-C    cefepime (MAXIPIME) IVPB (premix in dextrose) 2,000 mg 50 mL, 2,000 mg, Intravenous, Q12H, Karl Vázquez PA-C, Last Rate: 100 mL/hr at 06/07/22 0921, 2,000 mg at 06/07/22 0921    cholecalciferol (VITAMIN D3) tablet 400 Units, 400 Units, Oral, Daily, Rafael Keene PA-C, 400 Units at 06/07/22 0920    Diclofenac Sodium (VOLTAREN) 1 % topical gel 2 g, 2 g, Topical, 4x Daily, Rafael Keene PA-C, 2 g at 06/07/22 0921    heparin (porcine) subcutaneous injection 7,500 Units, 7,500 Units, Subcutaneous, Q8H [facility], Judi Michaels PA-C, 7,500 Units at 06/07/22 0510    lidocaine (LIDODERM) 5 % patch 1 patch, 1 patch, Topical, Daily, Siri Stone PA-C, 1 patch at 06/07/22 0921    metoprolol succinate (TOPROL-XL) 24 hr tablet 25 mg, 25 mg, Oral, Daily, RITA Trujillo, 25 mg at 06/07/22 0920    multi-electrolyte (ISOLYTE-S PH 7 4) bolus 1,000 mL, 1,000 mL, Intravenous, Q4H, Geno Sauer PA-C, Last Rate: 250 mL/hr at 06/07/22 0615, 1,000 mL at 06/07/22 0615    ondansetron (ZOFRAN-ODT) dispersible tablet 4 mg, 4 mg, Oral, Q6H PRN, Siri Stone PA-C    senna (SENOKOT) tablet 17 2 mg, 2 tablet, Oral, HS PRN, Siri Stone PA-C    sodium chloride 0 9 % infusion, 125 mL/hr, Intravenous, Continuous, [facility], Stopped at 06/07/22 7673    tamsulosin (FLOMAX) capsule 0 4 mg, 0 4 mg, Oral, Daily With Nancy Michaels PA-C, 0 4 mg at 06/06/22 1714    traMADol (ULTRAM) tablet 50 mg, 50 mg, Oral, Q6H [facility], Jarrod Hutchinson MD, 50 mg at 06/07/22 0510    Past Medical History:   Diagnosis Date    Arthritis     Chronic kidney disease     Gout     Hyperlipidemia     Hypertension     Kidney stone     Obstructive pyelonephritis     Sepsis [facility])        Patient Active Problem List    Diagnosis Date Noted    COVID 06/01/2022    Anemia 05/13/2022    Obstructive pyelonephritis 04/23/2022    Acute unilateral obstructive uropathy 04/21/2022    Mixed hyperlipidemia 06/02/2021    Morbid obesity 06/02/2021    Recurrent left knee pain 06/02/2021    Insomnia 09/16/2019    Essential hypertension 08/27/2019    Severe sepsis on admission 08/23/2019    Chronic kidney disease, stage 3 ([address] ) 08/23/2019    Acute kidney injury superimposed on CKD stage 3 08/06/2019    Bacteremia due to Escherichia coli 08/04/2019    Lactic acidosis 08/04/2019    History of gout 07/15/2019    Vitamin D deficiency 06/10/2015    Morbid obesity ([facility] [address] ) 03/23/2015          Kennedi Washburn PA-C    Total time spent:  30 minutes with more than 50% spent counseling/coordinating care  Counseling includes discussion with patient re: progress and discussion with patient of his/her current medical/functional state/information  Coordination of patient's care was performed in conjunction with consulting services  Time invested included review of patient's labs, vitals, and management of their comorbidities with continued monitoring  The care of the patient was extensively discussed and appropriate treatment plan was formulated unique for this patient  ** Please Note:  voice to text software may have been used in the creation of this document   Although proof errors in transcription or interpretation are a potential of such software**

## 2022-06-07 NOTE — OCCUPATIONAL THERAPY NOTE
Pt participates in ADLs, transfers/ mobility and BUE therex  Pt is currently S for transfers/ mobility, setup UB dressing, min A bathing, supervision LB dressing, and mod A toileting  Pt requires assist due to decreased balance, safety, endurance, ROM/ strength enzo of RUE and pain Left foot and at drain site Left back  Pt has made gains towards goals although due to change in medical status is being transferred to acute care later this day  Pt may benefit from continued rehab services as recommended following stabilization of medical status

## 2022-06-07 NOTE — DISCHARGE SUMMARY
Discharge Summary - PMR   Sera Clark 55 y o  male MRN: [id number]  Unit/Bed#: -01 Encounter: [id number]    Admission Date: 5/19/2022     Discharge Date: 6/7/22    Etiologic/Rehabilitation Diagnosis: Impairment of mobility, safety and Activities of Daily Living (ADLs) due to Debility:  16  Debility (Non-cardiac/Non-pulmonary)    HPI: Christine Hernandez a 55 y  o  male with a PMH significant for CKD, HTN, HLD, obstructive pyelonephritis with coli bacteremia, who presented to the [address] [place] [address] ED on 05/10  He had a recent discharge on 4/30/22 s/p cystoscopy and stent placement on 4/22/22   After his discharge, patient experienced intermittent waves of left flank pain described as sharp, non-radiating, lightheadedness, nausea  He also admitted to worsening shortness of breath  He arrived to ED with tachypnea and tachycardia  CT scan obtained revealing hydronephrosis and obstructive pyelonephritis  Urology was consulted and recommended acute transfer to [facility]  Patient diagnosed with sepsis and initiated on IV antibiotics  IR was consulted for concerns with potential obstruction of stent placement and noted to be in ADRIANNE  Patient went to the OR with IR for obstructive pyelonephritis and is s/p IR Nephrostomy tube placement on 5/10  He was intubated for the procedure and extubated on 5/11  Ortho was consulted on 5/13 for left knee pain and a left knee aspiration was performed  Patient is to continue WBAT to LLE  The aspiration was unsuccessful with ortho recommending adequate pain control and potential steroid injection in the future  Sepsis resolved and patient was transferred from ICU to med surg  On 5/16 patient was transitioned from IV antibiotics to oral to complete a total of 14 days  Patient participated with PT/OT and deemed appropriate for post acute rehabilitation services   He was accepted to [facility] and once hemodynamically stable, arrived on 5/19/22       Procedures Performed During ARC Admission: none    Acute Rehabilitation Center Course: Patient required transfer to acute hospital      Wendy Calzada 22  · Incidentally found positive on 5/31  · out of isolation on 6/9  · Sepsis criteria met as mentioned below    Anemia  Assessment & Plan  · Iron panel obtained with elevated ferritin stores   · Monitor H/H- Stable    Recurrent left knee pain  Assessment & Plan  · Ongoing for several weeks causing difficulty with bearing weight due to pain  · XR knee without acute osseous abnormality, did reveal tricompartmental osteoarthritis similar to imaging done the year prior  · Ortho consulted and attempted an unsuccessful fluid aspiration   · Continue with adequate pain management with OP Ortho f/u for potential steroid injection once acute sepsis/infection resolves   · Diclofenac gel to the knee 4 times daily  · Left foot xray: There is no acute fracture or dislocation  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  Small plantar and large retrocalcaneal spurs  No lytic or blastic osseous lesion  Soft tissues are unremarkable  Essential hypertension  Assessment & Plan  · Low-sodium diet  · Continue Norvasc 10 mg and Metoprolol 25 mg daily      Chronic kidney disease, stage 3 [facility])  Assessment & Plan  Lab Results   Component Value Date    EGFR 43 06/07/2022    EGFR 45 06/06/2022    EGFR 50 06/01/2022    CREATININE 1 74 (H) 06/07/2022    CREATININE 1 67 (H) 06/06/2022    CREATININE 1 53 (H) 06/01/2022   · Baseline creatinine approximately 1 4-1 6  · Monitor renal function and urine output  · Avoid nephrotoxins and hypotension       Severe sepsis on admission  Assessment & Plan  · Presented to [address] on 5/10 with worsening left flank pain, tachypnea and tachycardia in setting of E   Coli pyelonephritis/bacteremia   · CT revealed hydronephrosis and obstructive pyelonephritis  · Patient transferred to B for higher level of care  · He went to the OR on 5/10 with IR for a nephrostomy tube placement  · Patient triggered for sepsis last night with increase in white count from 13 52 to 17 45  Creatinine is increasing from 1 67 to 1 74  Patient with elevated temp greater than 100  Patient with elevated heart rate since admission however noted to be as high as 122  Hospitalist notified and initiated lab work and IV with sodium chloride infusion  Transfer should have been initiated at that time but was not  · Patient did trigger earlier in the day yesterday for sepsis with white count of 13 52 and tachycardia  However he was asymptomatic at that time, afebrile, did have tachycardia but has been experiencing this since admission  · Patient now with c/o weakness, fatigue and diaphoresis  · Initiating transfer to [facility] to be evaluated by Urology as Nephrostomy tube may be a potential source of sepsis  Bacteremia due to Escherichia coli  Assessment & Plan  · Positive growth of E  Coli in one blood culture on 5/10 thought to be due to UTI/pyelonephritis   · antibiotics completed      Morbid obesity (HCC)  Assessment & Plan  · BMI of 51 45  · Lifestyle and diet modifications       * Obstructive pyelonephritis  Assessment & Plan  · CT on 5/10: "Mild left hydronephrosis status post ureteral stent placement, with 3 mm calculus in the mid ureter adjacent to the stent  Multiple bilateral renal calculi "  · Recent admission and underwent L-side ureteral stent on 4/21- this was found to be obstructive  S/p left PCN placement by IR on 5/10   · Pain control  · Continue Flomax, OP follow up with urology for stone treatment on 5/25  · Continue Augmentin Q12H [address] for total of 14 days (Completed on 5/25/22)  · Planned procedure for OP 6/17; will need updated CT stone series prior to surgery  · Needs to be re-evaluated by Urology secondary to sepsis  Discharge Physical Examination:  Vitals and nursing note reviewed  Constitutional:       General: He is not in acute distress       Appearance: He is obese  He is ill-appearing and diaphoretic  HENT:      Head: Normocephalic and atraumatic  Right Ear: External ear normal       Left Ear: External ear normal       Nose: Nose normal       Mouth/Throat:      Mouth: Mucous membranes are moist       Pharynx: Oropharynx is clear  Eyes:      Extraocular Movements: Extraocular movements intact  Pupils: Pupils are equal, round, and reactive to light  Cardiovascular:      Rate and Rhythm: Regular rhythm  Tachycardia present  Pulses: Normal pulses  Heart sounds: Normal heart sounds  No murmur heard  Pulmonary:      Effort: Pulmonary effort is normal  No respiratory distress  Breath sounds: Rhonchi present  No wheezing  Comments: Throughout lungs   Abdominal:      General: Bowel sounds are normal  There is no distension  Palpations: Abdomen is soft  Tenderness: There is no abdominal tenderness  Musculoskeletal:         General: Normal range of motion  Cervical back: Normal range of motion  Right lower leg: Edema present  Left lower leg: Edema present  Skin:     General: Skin is warm  Neurological:      General: No focal deficit present  Mental Status: He is alert and oriented to person, place, and time  Psychiatric:         Mood and Affect: Mood normal      Significant Findings, Care, Treatment and Services Provided: Patient met sepsis criteria with Tachycardia, increased white count from 13 52-17 56, increase in creatinine, fever of >100 F  Patient participated with PT/OT for 3hrs per day with a minimum of 5x per week  He was followed by [facility] and IM providers       Complications: sepsis    Functional Status Upon Admission to ARC:  Physical Therapy: Bed mobility min assist, transfers min assist, ambulation min assist  Occupational Therapy: eating: independent, grooming supervision s/u, UB bathing mod assist, LB bathing max assist, UB dressing mod assist, LB Dressing max assist, toileting assistance max assist  Speech Therapy: No notes on file     Functional Status Upon Discharge from ARC:   ? PT:  Sit-lying: independent  Lying-sitting on side of bed: independent  Bed-chair transfer:  supervision  Ambulation:supervision   Stairs: CGA  ? OT:  Grooming: s/u  Showering/bathing: CGA  Tub/shower transfer: s/u c/u  UB dressing: s/u c/u  LB dressing: supervision  Toileting hygiene: min assist  Toilet transfer: supervision    Discharge Diagnosis: Impairment of mobility, safety and Activities of Daily Living (ADLs) due to Debility:  16  Debility (Non-cardiac/Non-pulmonary)    Discharge Medications:   See after visit summary for reconciled discharge medications provided to patient and family  Condition at Discharge: fair     Discharge instructions/Information to patient and family:   See after visit summary for information provided to patient and family  Provisions for Follow-Up Care:  See after visit summary for information related to follow-up care and any pertinent home health orders  Future Appointments   Date Time Provider Myriam Bess   7/15/2022 11:30 AM VERÓNICA Gregory AL  [facility]       Disposition: See After Visit Summary for discharge disposition information  Planned Readmission or Procedure within next 30 days               Planned Readmission: No    Discharge Statement   I spent 30 minutes or less discharging the patient  This time was spent on the day of discharge  I had direct contact with the patient on the day of discharge  Greater than 50% of the total time was spent examining patient, answering all patient questions, arranging and discussing plan of care with patient as well as directly providing post-discharge instructions  Additional time then spent on discharge activities  Discharge Medications:  See after visit summary for reconciled discharge medications provided to patient and family

## 2022-06-07 NOTE — ASSESSMENT & PLAN NOTE
· Not requiring supplemental oxygen  · Noted mild shortness of breath after fever last night  · Tested positive on 05/31, last day of isolation 6/9  · Monitor on mild COVID pathway    · Incentive spirometry and respiratory protocol  · Encourage ambulation

## 2022-06-07 NOTE — NURSING NOTE
06/07/22 Transferred via EMS and stretcher to [facility]  Report called to [facility]  All questions answered  Belongings sent with pt  Called Father Johan Cotto to update   Jackelin Rivera RN

## 2022-06-07 NOTE — ASSESSMENT & PLAN NOTE
· Present at outside campus   · Suspect source of UTI  Noted left nephrostomy tube and left ureteral stent  · Noted multiple E coli UTI and also previous E coli bacteremia  · Also noted covid positive status as of 5/31>> no supplemental oxygen requirement  · Leukocytosis noted  · Received IV fluids and cefepime at outside [facility] and transferred for Urology evaluation  PLAN  · Follow urine and blood cultures  · Based on previous cultures, will give ceftriaxone monotherapy for now  · Check CT abdomen pelvis without contrast to evaluate for worsening hydronephrosis  · Urology consult  · Check chest x-ray  · Monitor on mild COVID pathway    · Trend CBC with differential

## 2022-06-07 NOTE — PLAN OF CARE
Problem: PAIN - ADULT  Goal: Verbalizes/displays adequate comfort level or baseline comfort level  Description: Interventions:  - Encourage patient to monitor pain and request assistance  - Assess pain using appropriate pain scale  - Administer analgesics based on type and severity of pain and evaluate response  - Implement non-pharmacological measures as appropriate and evaluate response  - Consider cultural and social influences on pain and pain management  - Notify physician/advanced practitioner if interventions unsuccessful or patient reports new pain  Outcome: Progressing     Problem: INFECTION - ADULT  Goal: Absence or prevention of progression during hospitalization  Description: INTERVENTIONS:  - Assess and monitor for signs and symptoms of infection  - Monitor lab/diagnostic results  - Monitor all insertion sites, i e  indwelling lines, tubes, and drains  - Monitor endotracheal if appropriate and nasal secretions for changes in amount and color  - [place] appropriate cooling/warming therapies per order  - Administer medications as ordered  - Instruct and encourage patient and family to use good hand hygiene technique  - Identify and instruct in appropriate isolation precautions for identified infection/condition  Outcome: Progressing  Goal: Absence of fever/infection during neutropenic period  Description: INTERVENTIONS:  - Monitor WBC    Outcome: Progressing     Problem: SAFETY ADULT  Goal: Patient will remain free of falls  Description: INTERVENTIONS:  - Educate patient/family on patient safety including physical limitations  - Instruct patient to call for assistance with activity   - Consult OT/PT to assist with strengthening/mobility   - Keep Call bell within reach  - Keep bed low and locked with side rails adjusted as appropriate  - Keep care items and personal belongings within reach  - Initiate and maintain comfort rounds  - Make Fall Risk Sign visible to staff  - Apply yellow socks and bracelet for high fall risk patients  - Consider moving patient to room near nurses station  Outcome: Progressing  Goal: Maintain or return to baseline ADL function  Description: INTERVENTIONS:  -  Assess patient's ability to carry out ADLs; assess patient's baseline for ADL function and identify physical deficits which impact ability to perform ADLs (bathing, care of mouth/teeth, toileting, grooming, dressing, etc )  - Assess/evaluate cause of self-care deficits   - Assess range of motion  - Assess patient's mobility; develop plan if impaired  - Assess patient's need for assistive devices and provide as appropriate  - Encourage maximum independence but intervene and supervise when necessary  - Involve family in performance of ADLs  - Assess for home care needs following discharge   - Consider OT consult to assist with ADL evaluation and planning for discharge  - Provide patient education as appropriate  Outcome: Progressing  Goal: Maintains/Returns to pre admission functional level  Description: INTERVENTIONS:  - Perform BMAT or MOVE assessment daily    - Set and communicate daily mobility goal to care team and patient/family/caregiver     - Collaborate with rehabilitation services on mobility goals if consulted    Problem: DISCHARGE PLANNING  Goal: Discharge to home or other facility with appropriate resources  Description: INTERVENTIONS:  - Identify barriers to discharge w/patient and caregiver  - Arrange for needed discharge resources and transportation as appropriate  - Identify discharge learning needs (meds, wound care, etc )  - Arrange for interpretive services to assist at discharge as needed  - Refer to Case Management Department for coordinating discharge planning if the patient needs post-hospital services based on physician/advanced practitioner order or complex needs related to functional status, cognitive ability, or social support system  Outcome: Progressing     Problem: Prexisting or High Potential for Compromised Skin Integrity  Goal: Skin integrity is maintained or improved  Description: INTERVENTIONS:  - Identify patients at risk for skin breakdown  - Assess and monitor skin integrity  - Assess and monitor nutrition and hydration status  - Monitor labs   - Assess for incontinence   - Turn and reposition patient  - Assist with mobility/ambulation  - Relieve pressure over bony prominences  - Avoid friction and shearing  - Provide appropriate hygiene as needed including keeping skin clean and dry  - Evaluate need for skin moisturizer/barrier cream  - Collaborate with interdisciplinary team   - Patient/family teaching  - Consider wound care consult   Outcome: Progressing     Problem: Nutrition/Hydration-ADULT  Goal: Nutrient/Hydration intake appropriate for improving, restoring or maintaining nutritional needs  Description: Monitor and assess patient's nutrition/hydration status for malnutrition  Collaborate with interdisciplinary team and initiate plan and interventions as ordered  Monitor patient's weight and dietary intake as ordered or per policy  Utilize nutrition screening tool and intervene as necessary  Determine patient's food preferences and provide high-protein, high-caloric foods as appropriate       INTERVENTIONS:  - Monitor oral intake, urinary output, labs, and treatment plans  - Assess nutrition and hydration status and recommend course of action  - Evaluate amount of meals eaten  - Assist patient with eating if necessary   - Allow adequate time for meals  - Recommend/ encourage appropriate diets, oral nutritional supplements, and vitamin/mineral supplements  - Order, calculate, and assess calorie counts as needed  - Recommend, monitor, and adjust tube feedings and TPN/PPN based on assessed needs  - Assess need for intravenous fluids  - Provide specific nutrition/hydration education as appropriate  - Include patient/family/caregiver in decisions related to nutrition  Outcome: Progressing   - Record patient progress and toleration of activity level   Outcome: Progressing

## 2022-06-08 LAB
ANION GAP SERPL CALCULATED.3IONS-SCNC: 10 MMOL/L (ref 4–13)
BASOPHILS # BLD AUTO: 0.06 THOUSANDS/ΜL (ref 0–0.1)
BASOPHILS NFR BLD AUTO: 1 % (ref 0–1)
BUN SERPL-MCNC: 22 MG/DL (ref 5–25)
CALCIUM SERPL-MCNC: 9.6 MG/DL (ref 8.3–10.1)
CHLORIDE SERPL-SCNC: 104 MMOL/L (ref 100–108)
CO2 SERPL-SCNC: 24 MMOL/L (ref 21–32)
CREAT SERPL-MCNC: 1.86 MG/DL (ref 0.6–1.3)
CRP SERPL QL: 161 MG/L
EOSINOPHIL # BLD AUTO: 0.66 THOUSAND/ΜL (ref 0–0.61)
EOSINOPHIL NFR BLD AUTO: 7 % (ref 0–6)
ERYTHROCYTE [DISTWIDTH] IN BLOOD BY AUTOMATED COUNT: 15.6 % (ref 11.6–15.1)
GFR SERPL CREATININE-BSD FRML MDRD: 39 ML/MIN/1.73SQ M
GLUCOSE SERPL-MCNC: 112 MG/DL (ref 65–140)
HCT VFR BLD AUTO: 29.1 % (ref 36.5–49.3)
HGB BLD-MCNC: 8.9 G/DL (ref 12–17)
IMM GRANULOCYTES # BLD AUTO: 0.1 THOUSAND/UL (ref 0–0.2)
IMM GRANULOCYTES NFR BLD AUTO: 1 % (ref 0–2)
LYMPHOCYTES # BLD AUTO: 0.83 THOUSANDS/ΜL (ref 0.6–4.47)
LYMPHOCYTES NFR BLD AUTO: 8 % (ref 14–44)
MCH RBC QN AUTO: 28.4 PG (ref 26.8–34.3)
MCHC RBC AUTO-ENTMCNC: 30.6 G/DL (ref 31.4–37.4)
MCV RBC AUTO: 93 FL (ref 82–98)
MONOCYTES # BLD AUTO: 0.44 THOUSAND/ΜL (ref 0.17–1.22)
MONOCYTES NFR BLD AUTO: 4 % (ref 4–12)
NEUTROPHILS # BLD AUTO: 8.11 THOUSANDS/ΜL (ref 1.85–7.62)
NEUTS SEG NFR BLD AUTO: 79 % (ref 43–75)
NRBC BLD AUTO-RTO: 0 /100 WBCS
PLATELET # BLD AUTO: 284 THOUSANDS/UL (ref 149–390)
PMV BLD AUTO: 9.7 FL (ref 8.9–12.7)
POTASSIUM SERPL-SCNC: 3.4 MMOL/L (ref 3.5–5.3)
PROCALCITONIN SERPL-MCNC: 0.66 NG/ML
RBC # BLD AUTO: 3.13 MILLION/UL (ref 3.88–5.62)
SODIUM SERPL-SCNC: 138 MMOL/L (ref 136–145)
WBC # BLD AUTO: 10.2 THOUSAND/UL (ref 4.31–10.16)

## 2022-06-08 PROCEDURE — 86140 C-REACTIVE PROTEIN: CPT | Performed by: INTERNAL MEDICINE

## 2022-06-08 PROCEDURE — 99254 IP/OBS CNSLTJ NEW/EST MOD 60: CPT | Performed by: UROLOGY

## 2022-06-08 PROCEDURE — 80048 BASIC METABOLIC PNL TOTAL CA: CPT | Performed by: INTERNAL MEDICINE

## 2022-06-08 PROCEDURE — 85025 COMPLETE CBC W/AUTO DIFF WBC: CPT | Performed by: INTERNAL MEDICINE

## 2022-06-08 PROCEDURE — 97166 OT EVAL MOD COMPLEX 45 MIN: CPT

## 2022-06-08 PROCEDURE — 99232 SBSQ HOSP IP/OBS MODERATE 35: CPT | Performed by: INTERNAL MEDICINE

## 2022-06-08 PROCEDURE — 97163 PT EVAL HIGH COMPLEX 45 MIN: CPT

## 2022-06-08 PROCEDURE — 84145 PROCALCITONIN (PCT): CPT | Performed by: INTERNAL MEDICINE

## 2022-06-08 RX ADMIN — AMLODIPINE BESYLATE 10 MG: 10 TABLET ORAL at 08:56

## 2022-06-08 RX ADMIN — SODIUM CHLORIDE 10 ML: 9 INJECTION INTRAMUSCULAR; INTRAVENOUS; SUBCUTANEOUS at 08:55

## 2022-06-08 RX ADMIN — HEPARIN SODIUM 7500 UNITS: 5000 INJECTION INTRAVENOUS; SUBCUTANEOUS at 23:57

## 2022-06-08 RX ADMIN — HEPARIN SODIUM 7500 UNITS: 5000 INJECTION INTRAVENOUS; SUBCUTANEOUS at 06:37

## 2022-06-08 RX ADMIN — CHOLECALCIFEROL TAB 10 MCG (400 UNIT) 400 UNITS: 10 TAB at 08:56

## 2022-06-08 RX ADMIN — TAMSULOSIN HYDROCHLORIDE 0.4 MG: 0.4 CAPSULE ORAL at 17:43

## 2022-06-08 RX ADMIN — CEFTRIAXONE 1000 MG: 1 INJECTION, POWDER, FOR SOLUTION INTRAMUSCULAR; INTRAVENOUS at 16:24

## 2022-06-08 RX ADMIN — ACETAMINOPHEN 650 MG: 325 TABLET, FILM COATED ORAL at 02:25

## 2022-06-08 RX ADMIN — HEPARIN SODIUM 7500 UNITS: 5000 INJECTION INTRAVENOUS; SUBCUTANEOUS at 14:17

## 2022-06-08 RX ADMIN — METOPROLOL SUCCINATE 25 MG: 25 TABLET, FILM COATED, EXTENDED RELEASE ORAL at 08:56

## 2022-06-08 RX ADMIN — ACETAMINOPHEN 650 MG: 325 TABLET, FILM COATED ORAL at 16:35

## 2022-06-08 RX ADMIN — ALLOPURINOL 100 MG: 100 TABLET ORAL at 08:56

## 2022-06-08 NOTE — ASSESSMENT & PLAN NOTE
Lab Results   Component Value Date    EGFR 39 06/08/2022    EGFR 43 06/07/2022    EGFR 45 06/06/2022    CREATININE 1 86 (H) 06/08/2022    CREATININE 1 74 (H) 06/07/2022    CREATININE 1 67 (H) 06/06/2022     Baseline creatinine seems between 1 4 -1 7  Currently close to baseline  Trend BMP  Slightly above now   Will continue to monitor

## 2022-06-08 NOTE — PROGRESS NOTES
[address] [facility]  Progress Note - Kimmie Vizcaino 1965, 55 y o  male MRN: [id number]  [facility] En[facility]: [id number]  Primary Care Provider: Marcelina Yousif   Date and time admitted to hospital: 6/7/2022  3:45 PM    [address]  Not requiring supplemental oxygen  Noted mild shortness of breath after fever last night  Tested positive on 05/31, last day of isolation 6/9 - as  perprior documentation  Unclear what the plan was from admission however patient is not on any isolation now  This may be because he is vaccinated and does not need to be isolated  Will not start any isolation now    Obstructive uropathy  Assessment & Plan  Urology not planning any intervention - will  Reassess after receiving antibiotics for 2-3 days   will order diet   thankfully  No further fevers since last night, still tachycardic so will give more IV fluids, white cell count trended down from 17-10    Morbid obesity  Assessment & Plan  Lifestyle modification    Essential hypertension  Assessment & Plan  BP is 121/71  Will continue current meds       Chronic kidney disease, stage 3 [facility])  Assessment & Plan  Lab Results   Component Value Date    EGFR 39 06/08/2022    EGFR 43 06/07/2022    EGFR 45 06/06/2022    CREATININE 1 86 (H) 06/08/2022    CREATININE 1 74 (H) 06/07/2022    CREATININE 1 67 (H) 06/06/2022     Baseline creatinine seems between 1 4 -1 7  Currently close to baseline  Trend BMP  Slightly above now   Will continue to monitor     * Sepsis [facility])  Assessment & Plan  Present at outside campus   Suspect source of UTI  Noted left nephrostomy tube and left ureteral stent  Noted multiple E coli UTI and also previous E coli bacteremia  Also noted covid positive status as of 5/31>> no supplemental oxygen requirement  Leukocytosis noted  Received IV fluids and cefepime at outside [facility] and transferred for Urology evaluation      PLAN  · Continue current abx · Await urology recs over next 24-48 hours   · If decline will get ID            VTE Pharmacologic Prophylaxis:   Moderate Risk (Score 3-4) - Pharmacological DVT Prophylaxis Ordered: heparin  Patient Centered Rounds: I performed bedside rounds with nursing staff today  Discussions with Specialists or Other Care Team Provider: Discussed with  Nursing red urology note  Education and Discussions with Family / Patient: Updated  (father) via phone  Time Spent for Care: 30 minutes  More than 50% of total time spent on counseling and coordination of care as described above  Current Length of Stay: 1 day(s)  Current Patient Status: Inpatient   Certification Statement: The patient will continue to require additional inpatient hospital stay due to IV antibiotics  Discharge Plan: Anticipate discharge in 48-72 hrs to home  Code Status: Level 1 - Full Code    Subjective:    patient seen and examined   no new symptoms   Feeling "  Quite good"    Objective:     Vitals:   Temp (24hrs), Av °F (37 2 °C), Min:98 2 °F (36 8 °C), Max:100 9 °F (38 3 °C)    Temp:  [98 2 °F (36 8 °C)-100 9 °F (38 3 °C)] 98 2 °F (36 8 °C)  HR:  [108-122] 114  Resp:  [22] 22  BP: (121-145)/(67-77) 125/67  SpO2:  [94 %-99 %] 94 %  Body mass index is 51 6 kg/m²  Input and Output Summary (last 24 hours): Intake/Output Summary (Last 24 hours) at 2022 1639  Last data filed at 2022 1300  Gross per 24 hour   Intake 200 ml   Output 1400 ml   Net -1200 ml       Physical Exam:   Physical Exam  Constitutional:       General: He is not in acute distress  Appearance: He is not ill-appearing, toxic-appearing or diaphoretic  HENT:      Head: Normocephalic  Nose: Nose normal       Mouth/Throat:      Mouth: Mucous membranes are moist    Eyes:      General: No scleral icterus  Right eye: No discharge  Left eye: No discharge  Pupils: Pupils are equal, round, and reactive to light  Cardiovascular:      Rate and Rhythm: Normal rate  Pulmonary:      Effort: Pulmonary effort is normal  No respiratory distress  Breath sounds: No stridor  No wheezing, rhonchi or rales  Chest:      Chest wall: No tenderness  Abdominal:      General: Abdomen is flat  There is no distension  Palpations: There is no mass  Tenderness: There is no abdominal tenderness  There is no right CVA tenderness, left CVA tenderness, guarding or rebound  Hernia: No hernia is present  Musculoskeletal:         General: No swelling, tenderness, deformity or signs of injury  Right lower leg: No edema  Left lower leg: No edema  Skin:     General: Skin is warm  Capillary Refill: Capillary refill takes less than 2 seconds  Coloration: Skin is pale  Skin is not jaundiced  Findings: No bruising, erythema, lesion or rash  Neurological:      General: No focal deficit present  Mental Status: He is alert  Cranial Nerves: No cranial nerve deficit  Sensory: No sensory deficit  Motor: No weakness        Coordination: Coordination normal       Gait: Gait normal       Deep Tendon Reflexes: Reflexes normal    Psychiatric:         Mood and Affect: Mood normal           Additional Data:     Labs:  Results from last 7 days   Lab Units 06/08/22  0635   WBC Thousand/uL 10 20*   HEMOGLOBIN g/dL 8 9*   HEMATOCRIT % 29 1*   PLATELETS Thousands/uL 284   NEUTROS PCT % 79*   LYMPHS PCT % 8*   MONOS PCT % 4   EOS PCT % 7*     Results from last 7 days   Lab Units 06/08/22  0635 06/07/22  0512   SODIUM mmol/L 138 134*   POTASSIUM mmol/L 3 4* 3 9   CHLORIDE mmol/L 104 99   CO2 mmol/L 24 21   BUN mg/dL 22 24   CREATININE mg/dL 1 86* 1 74*   ANION GAP mmol/L 10 14*   CALCIUM mg/dL 9 6 9 6   ALBUMIN g/dL  --  2 8*   TOTAL BILIRUBIN mg/dL  --  0 47   ALK PHOS U/L  --  52   ALT U/L  --  11   AST U/L  --  14   GLUCOSE RANDOM mg/dL 112 123                 Results from last 7 days   Lab Units 06/08/22  0635 06/06/22  2140   LACTIC ACID mmol/L  --  1 1   PROCALCITONIN ng/ml 0 66* 0 30*       Lines/Drains:  Invasive Devices  Report    Peripheral Intravenous Line  Duration           Peripheral IV 06/06/22 Left Forearm 1 day          Drain  Duration           Nephrostomy Left 8 Fr  28 days                      Imaging: No pertinent imaging reviewed  Recent Cultures (last 7 days):   Results from last 7 days   Lab Units 06/07/22  0514 06/06/22  2110 06/06/22  0000   BLOOD CULTURE  No Growth at 24 hrs   --  No Growth at 24 hrs  URINE CULTURE   --  80,000-89,000 cfu/ml Gram Negative Anselmo*  --        Last 24 Hours Medication List:   Current Facility-Administered Medications   Medication Dose Route Frequency Provider Last Rate    acetaminophen  650 mg Oral Q6H PRN Wang Driver MD      allopurinol  100 mg Oral Daily Wang Driver MD      amLODIPine  10 mg Oral Daily Wang Driver MD      calcium carbonate  1,000 mg Oral BID PRN Wang Driver MD      cefTRIAXone  1,000 mg Intravenous Q24H Wang Driver MD 1,000 mg (06/08/22 1624)    cholecalciferol  400 Units Oral Daily Wang Driver MD      heparin (porcine)  7,500 Units Subcutaneous Q8H [address] Wang Driver MD      metoprolol succinate  25 mg Oral Daily Wang Driver MD      ondansetron  4 mg Oral Q6H PRN Wang rDiver MD      senna-docusate sodium  2 tablet Oral BID PRN Wang Driver MD      sodium chloride (PF)  10 mL Intracatheter Daily Wang Driver MD      tamsulosin  0 4 mg Oral After Kate Ortiz MD      traMADol  50 mg Oral Q6H PRN Wang Driver MD          Today, Patient Was Seen By: Beatrice Astorga MD    **Please Note: This note may have been constructed using a voice recognition system  **

## 2022-06-08 NOTE — UTILIZATION REVIEW
Initial Clinical Review    Admission: Date/Time/Statement:   Admission Orders (From admission, onward)     Ordered        06/07/22 1547  Inpatient Admission  Once                      Orders Placed This Encounter   Procedures    Inpatient Admission     Standing Status:   Standing     Number of Occurrences:   1     Order Specific Question:   Level of Care     Answer:   Med Surg [16]     Order Specific Question:   Estimated length of stay     Answer:   More than 2 Midnights     Order Specific Question:   Certification     Answer:   I certify that inpatient services are medically necessary for this patient for a duration of greater than two midnights  See H&P and MD Progress Notes for additional information about the patient's course of treatment  Initial Presentation: 55 y o  male who presented to [facility] acute rehab w/ sepsis, sent to [facility] for evaluation  Patient has ongoing urological issue with left-sided nephrolithiasis and hydronephrosis since April 2022 with stent placement but had to come to hospital again with worsening symptoms and was noted to have hydronephrosis with sepsis  Underwent left nephrostomy tube placement and received antibiotic course based on cultures  He was discharged to rehab  At rehab, he had fever yesterday along with leukocytosis and diaphoresis  Continues to have intermittent left flank discomfort  Had an episode of diarrhea this evening  Admit Inpatient med surg d/t sepsis, obstructive uropathy:  Urology consult, f/u on urine and blood cxs, start IV ABX, check CT AP and CXR, trend cbc and bmp  Pt dx'd with [id number] on 5/31/22, monitor symptoms, encourage inc spir and ambulation, maintain isolation until 6/9  Date: 6/8   Day 2:   No new complaints today, afebrile    Continue IV ABX, urology following, urology not planning any intervention - will  Reassess after receiving antibiotics for 2-3 days, order diet, continue home meds, trend bmp and monitor kidney function  6/8 Urology Consult: Well decompressed with left ureteral stent and nephrostomy tube, no residual hydronephrosis  No acute adjustments needed  Continue IV ABX, urine cx GNR, sensitives pending  BC x2 no growth @ 24 hrs  Discussion on timing for clearance ureteroscopy in light of recurrent sepsis and recent asymptomatic positive covid swab, and additional anesthesia and systemic risk associated with this  He has had recurring admissions x 3 now, will discuss with team to attempt ureteroscopic procedure this admission after a period of 48-72h culture directed IV antibiotics to prep for surgery and monitor postoperatively       Triage Vitals   Temperature Pulse Respirations Blood Pressure SpO2   06/07/22 1621 06/07/22 1621 06/07/22 1621 06/07/22 1621 06/07/22 1621   98 4 °F (36 9 °C) 98 18 132/81 92 %      Temp src Heart Rate Source Patient Position - Orthostatic VS BP Location FiO2 (%)   -- -- -- -- --             Pain Score       06/07/22 1758       No Pain          Wt Readings from Last 1 Encounters:   06/07/22 (!) 145 kg (319 lb 10 7 oz)     Additional Vital Signs:   Date/Time Temp Pulse Resp BP MAP (mmHg) SpO2 O2 Device   06/09/22 07:24:01 98 °F (36 7 °C) 107 Abnormal  15 132/83 99 89 % Abnormal  --   06/08/22 22:13:03 97 9 °F (36 6 °C) 102 16 122/66 85 96 % --   06/08/22 1630 -- -- -- -- -- -- None (Room air)   06/08/22 14:59:47 98 2 °F (36 8 °C) 114 Abnormal  22 125/67 86 94 % --   06/08/22 0900 -- -- -- -- -- -- None (Room air)   06/08/22 07:24:43 98 3 °F (36 8 °C) 108 Abnormal  22 121/71 88 97 % --   06/07/22 22:19:02 98 5 °F (36 9 °C) 116 Abnormal  -- -- -- 98 % --   06/07/22 22:14:56 100 9 °F (38 3 °C) Abnormal  122 Abnormal  -- 145/77 100 99 % --   06/07/22 1800 -- -- -- -- -- -- None (Room air)   06/07/22 16:21:12 98 4 °F (36 9 °C) 98 18 132/81 98 92 % --     Pertinent Labs/Diagnostic Test Results:   XR chest portable   Final Result by Maulik Stevenson MD (06/07 3165)      No acute cardiopulmonary disease  CT abdomen pelvis wo contrast   Final Result by Yvonne Valerio MD (06/07 1742)      Subcentimeter right mid pole and right renal lower pole nonobstructing calculi  Atrophic 6 cm right kidney  Small 11 cm left kidney  Left nephroureteral stent in addition to left posterior approach percutaneous nephrostomy tube  No left hydronephrosis  Few scattered subcentimeter left renal ankle nonobstructing calculi  2 mm calculus along the right dependent portion of the bladder  This could represent previously identified left mid ureteral calculus      Wall thickening in the bilateral pelvis laterally could relate to cellulitis in the appropriate clinical setting, correlate clinically       Results from last 7 days   Lab Units 06/07/22  1611   SARS-COV-2  Negative     Results from last 7 days   Lab Units 06/09/22  0441 06/08/22  0635 06/07/22  0610 06/06/22  0738   WBC Thousand/uL 8 34 10 20* 17 56* 13 52*   HEMOGLOBIN g/dL 8 9* 8 9* 9 9* 9 7*   HEMATOCRIT % 30 5* 29 1* 33 4* 32 5*   PLATELETS Thousands/uL 311 284 356 364   NEUTROS ABS Thousands/µL 5 39 8 11*  --  10 36*     Results from last 7 days   Lab Units 06/09/22 0441 06/08/22  0635 06/07/22  0512 06/06/22  0738   SODIUM mmol/L 141 138 134* 135   POTASSIUM mmol/L 3 4* 3 4* 3 9 4 0   CHLORIDE mmol/L 107 104 99 101   CO2 mmol/L 22 24 21 26   ANION GAP mmol/L 12 10 14* 8   BUN mg/dL 25 22 24 26*   CREATININE mg/dL 1 72* 1 86* 1 74* 1 67*   EGFR ml/min/1 73sq m 43 39 43 45   CALCIUM mg/dL 9 6 9 6 9 6 9 9     Results from last 7 days   Lab Units 06/09/22  0441 06/07/22  0512   AST U/L 19 14   ALT U/L 19 11   ALK PHOS U/L 50 52   TOTAL PROTEIN g/dL 7 7 8 3   ALBUMIN g/dL 2 0* 2 8*   TOTAL BILIRUBIN mg/dL 0 25 0 47     Results from last 7 days   Lab Units 06/09/22  0441 06/08/22  0635 06/07/22  0512 06/06/22  0738   GLUCOSE RANDOM mg/dL 108 112 123 99     Results from last 7 days   Lab Units 06/08/22  0635 06/06/22  2140   PROCALCITONIN ng/ml 0 66* 0 30*     Results from last 7 days   Lab Units 06/06/22  2140   LACTIC ACID mmol/L 1 1     Results from last 7 days   Lab Units 06/08/22  0635   CRP mg/L 161 0*     Results from last 7 days   Lab Units 06/07/22  1611   INFLUENZA A PCR  Negative   INFLUENZA B PCR  Negative   RSV PCR  Negative     Results from last 7 days   Lab Units 06/07/22  0514 06/06/22  2110 06/06/22  0000   BLOOD CULTURE  No Growth at 48 hrs  --  No Growth at 48 hrs     URINE CULTURE   --  80,000-89,000 cfu/ml Gram Negative Anselmo*  --        Past Medical History:   Diagnosis Date    Arthritis     Chronic kidney disease     Gout     Hyperlipidemia     Hypertension     Kidney stone     Obstructive pyelonephritis     Sepsis ([facility] [address] )      Present on Admission:   Chronic kidney disease, stage 3 (HCC)   Essential hypertension   COVID      Admitting Diagnosis: Pyelonephritis [N12]  Age/Sex: 55 y o  male  Admission Orders:  Scheduled Medications:  [MAR Hold] allopurinol, 100 mg, Oral, Daily  [MAR Hold] amLODIPine, 10 mg, Oral, Daily  [MAR Hold] cefTRIAXone, 1,000 mg, Intravenous, Q24H  [MAR Hold] cholecalciferol, 400 Units, Oral, Daily  [MAR Hold] heparin (porcine), 7,500 Units, Subcutaneous, Q8H [address]  [MAR Hold] metoprolol succinate, 25 mg, Oral, Daily  [MAR Hold] sodium chloride (PF), 10 mL, Intracatheter, Daily  [MAR Hold] tamsulosin, 0 4 mg, Oral, After Dinner      Continuous IV Infusions:     sodium chloride 0 9 % infusion  Rate: 125 mL/hr Dose: 125 mL/hr  Freq: Continuous Route: IV  Last Dose: 125 mL/hr (06/07/22 1042)  Start: 06/07/22 0500 End: 06/07/22 1545  PRN Meds:  acetaminophen, 650 mg, Oral, Q6H PRN x1 thus far  calcium carbonate, 1,000 mg, Oral, BID PRN  ondansetron, 4 mg, Oral, Q6H PRN  senna-docusate sodium, 2 tablet, Oral, BID PRN  traMADol, 50 mg, Oral, Q6H PRN x1 thus far    NPO  Pt/ot eval  scd  IO     IP CONSULT TO UROLOGY    Network Utilization Review Department  ATTENTION: Please call with any questions or concerns to [phone] and carefully listen to the prompts so that you are directed to the right person  All voicemails are confidential   Suzanne Aguilar all requests for admission clinical reviews, approved or denied determinations and any other requests to dedicated fax number below belonging to the campus where the patient is receiving treatment   List of dedicated fax numbers for the Facilities:  [address] DENIALS (Administrative/Medical Necessity) [phone]   [address] (Maternity/NICU/Pediatrics) [phone]   [address]  [address] Medical [place] [id number] [address] [place] [address] [place] [address] [phone]

## 2022-06-08 NOTE — ASSESSMENT & PLAN NOTE
Urology not planning any intervention - will  Reassess after receiving antibiotics for 2-3 days   will order diet   thankfully  No further fevers since last night, still tachycardic so will give more IV fluids, white cell count trended down from 17-10

## 2022-06-08 NOTE — ASSESSMENT & PLAN NOTE
Not requiring supplemental oxygen  Noted mild shortness of breath after fever last night  Tested positive on 05/31, last day of isolation 6/9 - as  perprior documentation  Unclear what the plan was from admission however patient is not on any isolation now  This may be because he is vaccinated and does not need to be isolated    Will not start any isolation now

## 2022-06-08 NOTE — CONSULTS
Consult - Urology   Kallie Shore 1965, 55 y o  male MRN: [id number]    Unit/Bed#: [facility] 804-01 Encounter: [id number]        Assessment  1  Sepsis  2  Gram negative selina urinary tract infection (culture 6/6, sensitivities pending)  3  Left ureteral calculus s/p left ureteral stent (4/22/22) and nephrostomy tube (5/10/22), awaiting clearance ureteroscopy/stone extraction    Plan  Well decompressed with left ureteral stent and nephrostomy tube, no residual hydronephrosis  No acute adjustments needed  Antibiotic treatment- ceftriaxone, urine culture GNR, sensitivities pending  BCx2 no growth @ 24 hr   Sepsis endpoints are resolved today    Discussion on timing for clearance ureteroscopy in light of recurrent sepsis recently confounded by asymptomatic positive covid swab, and additional anesthesia and systemic risk associated with this  He has had recurring admissions x 3 now, will discuss with team to attempt ureteroscopic procedure this admission after a period of 48-72h culture directed IV antibiotics to prep for surgery and monitor postoperatively  Tmax 100 9 last night, today afebrile 98 3  Mild tachycardia hr-low 100s and tachypnea rr-22  Good BP 130s/70s, and saturating 97% on room air  WBC 17, today 10  Procalcitonin 0 6  Renal function is stable to what it has been the past month or so, creatinine 1 7 (baseline appears 1 4-1 6 months prior  Feels good today  Subjective: Feels good today  Wants stone/stent out  Never had covid symptoms was screening swab for moving rooms at rehab, repeat test is negative  Urologic history  Underwent cystoscopy and left ureteral stent placement on 04/22/2022 on initial admission for 3mm left mid ureteral obstructing stone and pyelonephritis  He was readmitted few weeks later for sepsis again, was found to have persistent hydronephrosis despite stent, he underwent nephrostomy tube placement by IR 5/10/22   Admissions have been complicated by sepsis and prolonged intubations  He was discharged to rehab about three weeks ago  Yesterday developed signs and symptoms of sepsis again with some fever and tachycardia was apparently transferred from the rehab center directly to this campus  CT scan arrival shows a decompressed left collecting system with ureteral stent and nephrostomy tube in correct position  No contralateral obstruction of his chronically atrophic right kidney  He received IV cefepime, urine blood cultures are obtained and pending  He was switched to ceftriaxone today  Previous cultures have been E coli  He had some diarrhea after abx yesterday/today  Behind the scenes he was scheduled for definitive ureteroscopy on June 17th, but this was postponed due to his recent COVID infection and anesthesia recommendations for delay of seven weeks, new surgery date is July 29th  He states he did not have covid  Review of Systems   Constitutional: Negative for activity change, appetite change, chills, fever and unexpected weight change  HENT: Negative  Respiratory: Negative  Negative for shortness of breath  Cardiovascular: Negative  Negative for chest pain  Gastrointestinal: Negative for abdominal pain, diarrhea, nausea and vomiting  Endocrine: Negative  Genitourinary: Negative for decreased urine volume, difficulty urinating, dysuria, flank pain, frequency, hematuria, scrotal swelling, testicular pain and urgency  Musculoskeletal: Negative for back pain and gait problem  Skin: Negative  Allergic/Immunologic: Negative  Neurological: Negative  Hematological: Negative for adenopathy  Does not bruise/bleed easily  Objective:  Vitals: Blood pressure 121/71, pulse (!) 108, temperature 98 3 °F (36 8 °C), resp  rate 22, height 5' 6" (1 676 m), weight (!) 145 kg (319 lb 10 7 oz), SpO2 97 %  ,Body mass index is 51 6 kg/m²  Physical Exam  Vitals and nursing note reviewed  Constitutional:       Appearance: He is well-developed  Comments: Obese white male seated upright in chair pleasant conversive   HENT:      Head: Normocephalic and atraumatic  Cardiovascular:      Rate and Rhythm: Normal rate and regular rhythm  Heart sounds: Normal heart sounds  No murmur heard  Pulmonary:      Effort: Pulmonary effort is normal       Breath sounds: Normal breath sounds  No wheezing or rales  Abdominal:      General: Bowel sounds are normal       Palpations: Abdomen is soft  Tenderness: There is no abdominal tenderness  There is no right CVA tenderness or left CVA tenderness  Musculoskeletal:         General: Normal range of motion  Right lower leg: No edema  Left lower leg: No edema  Skin:     General: Skin is warm and dry  Capillary Refill: Capillary refill takes less than 2 seconds  Coloration: Skin is not pale  Neurological:      Mental Status: He is alert and oriented to person, place, and time  Gait: Gait normal    Psychiatric:         Mood and Affect: Mood normal          Imaging:  CT scans thoroughly reviewed: 4/21, 4/27, 5/10, 6/7  Small bladder calc seen on original ct April, not seen in may, but seen again today  Left mid ureteral stone seen 4/21 and 5/10, not seen on 4/27 or 6/7 scan  But bladder calc same as April or represent the 5/10 stone passed alongside stent? Still think too high risk for recurrent obstruction or infection without performing clearance urs to confirm no residual ureteral frag  CT abdomen pelvis wo contrast [[id number]] Collected: 06/07/22 1735   Order Status: Completed Updated: 06/07/22 1743   Narrative:     CT ABDOMEN AND PELVIS WITHOUT IV CONTRAST     INDICATION:   Sepsis   sepsis, known left PCn and ureteral stent  COMPARISON:  5/10/2022     TECHNIQUE:  CT examination of the abdomen and pelvis was performed without intravenous contrast  This examination was performed without intravenous contrast in the context of the critical nationwide Omnipaque shortage  Axial, sagittal, and coronal 2D   reformatted images were created from the source data and submitted for interpretation  Radiation dose length product (DLP) for this visit:  1320 21 mGy-cm    This examination, like all CT scans performed in the [facility], was performed utilizing techniques to minimize radiation dose exposure, including the use of   iterative reconstruction and automated exposure control  Enteric contrast was administered  FINDINGS:     ABDOMEN     LOWER CHEST:  Small hiatal hernia noted   No other clinically significant abnormality identified in the visualized lower chest      LIVER/BILIARY TREE:  Unremarkable  GALLBLADDER:  No calcified gallstones  No pericholecystic inflammatory change  SPLEEN:  Unremarkable  PANCREAS:  Unremarkable  ADRENAL GLANDS:  Unremarkable  KIDNEYS/URETERS:  Subcentimeter right mid pole and right renal lower pole nonobstructing calculi  Atrophic 6 cm right kidney  Small 11 cm left kidney  Left nephroureteral stent in addition to left posterior approach percutaneous nephrostomy tube  No left hydronephrosis  Few scattered subcentimeter left renal ankle nonobstructing calculi  STOMACH AND BOWEL:  Unremarkable  APPENDIX:  No findings to suggest appendicitis  ABDOMINOPELVIC CAVITY:  No ascites   No pneumoperitoneum   No lymphadenopathy  VESSELS:  Unremarkable for patient's age  PELVIS     REPRODUCTIVE ORGANS:  Unremarkable for patient's age  URINARY BLADDER:  2 mm calculus along the right dependent portion of the bladder  ABDOMINAL WALL/INGUINAL REGIONS:  Scattered wall thickening bilaterally in the pelvis with subcutaneous stranding could relate to cellulitis  OSSEOUS STRUCTURES:  No acute fracture or destructive osseous lesion  Impression:       Subcentimeter right mid pole and right renal lower pole nonobstructing calculi  Atrophic 6 cm right kidney  Small 11 cm left kidney   Left nephroureteral stent in addition to left posterior approach percutaneous nephrostomy tube  No left hydronephrosis  Few scattered subcentimeter left renal ankle nonobstructing calculi  2 mm calculus along the right dependent portion of the bladder  This could represent previously identified left mid ureteral calculus     Wall thickening in the bilateral pelvis laterally could relate to cellulitis in the appropriate clinical setting, correlate clinically  Imaging reviewed - both report and images personally reviewed  Labs:  Recent Labs     06/06/22  0738 06/07/22  0610 06/08/22  0635   WBC 13 52* 17 56* 10 20*     Recent Labs     06/06/22  0738 06/07/22  0610 06/08/22  0635   HGB 9 7* 9 9* 8 9*       Recent Labs     06/06/22  0738 06/07/22  0512 06/08/22  0635   CREATININE 1 67* 1 74* 1 86*       Microbiology:  UCX 6/6: 80-89k gram negative rods    History:  Social History     Socioeconomic History    Marital status: Single     Spouse name: None    Number of children: None    Years of education: None    Highest education level: None   Occupational History    None   Tobacco Use    Smoking status: Never Smoker    Smokeless tobacco: Never Used   Vaping Use    Vaping Use: Never used   Substance and Sexual Activity    Alcohol use: Not Currently     Alcohol/week: 0 0 standard drinks     Comment: rarely    Drug use: No    Sexual activity: None   Other Topics Concern    None   Social History Narrative    Caffeine use      Social Determinants of Health     Financial Resource Strain: Not on file   Food Insecurity: No Food Insecurity    Worried About Running Out of Food in the Last Year: Never true    Isis of Food in the Last Year: Never true   Transportation Needs: No Transportation Needs    Lack of Transportation (Medical): No    Lack of Transportation (Non-Medical):  No   Physical Activity: Not on file   Stress: Not on file   Social Connections: Not on file   Intimate Partner Violence: Not on file   Housing Stability: Low Risk     Unable to Pay for Housing in the Last Year: No    Number of Places Lived in the Last Year: 1    Unstable Housing in the Last Year: No     Financial Resource Strain: Not on file   Food Insecurity: No Food Insecurity    Worried About [facility] in the Last Year: Never true    [address] in the Last Year: Never true   Transportation Needs: No Transportation Needs    Lack of Transportation (Medical): No    Lack of Transportation (Non-Medical):  No   Physical Activity: Not on file   Stress: Not on file   Social Connections: Not on file   Intimate Partner Violence: Not on file   Housing Stability: Low Risk     Unable to Pay for Housing in the Last Year: No    Number of Places Lived in the Last Year: 1    Unstable Housing in the Last Year: No      Diagnosis Date    Arthritis     Chronic kidney disease     Gout     Hyperlipidemia     Hypertension     Kidney stone     Obstructive pyelonephritis     Sepsis ([address] )      Past Surgical History:   Procedure Laterality Date    FL RETROGRADE PYELOGRAM  8/4/2019    FL RETROGRADE PYELOGRAM  9/26/2019    FL RETROGRADE PYELOGRAM  4/22/2022    IR NEPHROSTOMY TUBE PLACEMENT  5/10/2022    NO PAST SURGERIES  03/23/2015    Last assessed    ME CYSTO/URETERO W/LITHOTRIPSY &INDWELL STENT INSRT Right 9/26/2019    Procedure: CYSTOSCOPY URETEROSCOPY WITH RETROGRADE PYELOGRAM AND INSERTION STENT URETERAL;  Surgeon: Kathya Valdivia MD;  Location: [facility];  Service: Urology    ME CYSTOURETHROSCOPY,URETER CATHETER Right 8/4/2019    Procedure: CYSTOSCOPY RETROGRADE PYELOGRAM WITH INSERTION STENT URETERAL;  Surgeon: Abram Hernandez MD;  Location:  MAIN OR;  Service: Urology    ME CYSTOURETHROSCOPY,URETER CATHETER Left 4/22/2022    Procedure: CYSTOSCOPY RETROGRADE PYELOGRAM WITH INSERTION STENT URETERAL;  Surgeon: Dennise Marlow MD;  [facility];  Service: Urology     Family History   Problem Relation Age of Onset    Cancer Mother        Viviana Barron, [place]  Date: 6/8/2022 Time: 8:54 AM

## 2022-06-08 NOTE — PLAN OF CARE
Problem: OCCUPATIONAL THERAPY ADULT  Goal: Performs self-care activities at highest level of function for planned discharge setting  See evaluation for individualized goals  Description: Treatment Interventions: ADL retraining, Functional transfer training, UE strengthening/ROM, Endurance training, Cognitive reorientation, Patient/family training, Equipment evaluation/education, Energy conservation, Activityengagement          See flowsheet documentation for full assessment, interventions and recommendations  Note: Limitation: Decreased ADL status, Decreased UE ROM, Decreased UE strength, Decreased Safe judgement during ADL, Decreased endurance, Decreased self-care trans, Decreased high-level ADLs  Prognosis: Fair  Assessment: Pt is a 56 yo Male who presented to [facility] on 6/8/2022 with sepsis  Pt admitted from [facility] rehab center  Pt  has a past medical history of Arthritis, Chronic kidney disease, Gout, Hyperlipidemia, Hypertension, Kidney stone, Obstructive pyelonephritis, and Sepsis [address] [address] )  Pt greeted bedside for OT evaluation on 6/8/2022  Pt lives alone in [place] with 3 JENNY  PTA, Pt I with ADLs/IADLs/no AD/ +/works FT  Pt with local suportive father  Pt admitted from rehab where he was working on participating in ADLs  Pt states he has been spongebathing with assist and able to dress with use of LHAE  Pt states he is working on increasing ROM with RUE shoulder in order to maximize participating in toileting hygiene  Pt demonstrating the following occupational deficits: S with UB ADLs, mod A with LB ADLs, S with bed mobility, S with functional transfers, and S with functional mobility with RW  Limitations that impact functional performance include decreased ADL status, decreased UE ROM, decreased UE strength, decreased safe judgement during ADLs, decreased endurance, decreased self care transfers, decreased high level ADLs and pain   Occupational performance areas to address ADL retraining, functional transfer training, UE strengthening/ROM, endurance training, cognitive reorientation, Pt/caregiver education, equipment evaluation/education, compensatory technique education, energy conservation and activity engagement   Pt would benefit from continued skilled OT services while in hospital to maximize independence with ADLs  Will continue to follow Pt's progress  Pt would benefit from post acute rehabilitation services upon DC to maximize safety and independence with ADLs and functional tasks of choice  OT Discharge Recommendation: Post acute rehabilitation services  OT - OK to Discharge:  Yes

## 2022-06-08 NOTE — OCCUPATIONAL THERAPY NOTE
Occupational Therapy Evaluation     Patient Name: Rolando Mcduffie  Today's Date: 6/8/2022  Problem List  Principal Problem:    Sepsis ([facility] [address] )  Active Problems:    Chronic kidney disease, stage 3 ([facility] [address] )    Essential hypertension    Morbid obesity    Obstructive uropathy    COVID    Past Medical History  Past Medical History:   Diagnosis Date    Arthritis     Chronic kidney disease     Gout     Hyperlipidemia     Hypertension     Kidney stone     Obstructive pyelonephritis     Sepsis [facility])      Past Surgical History  Past Surgical History:   Procedure Laterality Date    FL RETROGRADE PYELOGRAM  8/4/2019    FL RETROGRADE PYELOGRAM  9/26/2019    FL RETROGRADE PYELOGRAM  4/22/2022    IR NEPHROSTOMY TUBE PLACEMENT  5/10/2022    NO PAST SURGERIES  03/23/2015    Last assessed    GA CYSTO/URETERO W/LITHOTRIPSY &INDWELL STENT INSRT Right 9/26/2019    Procedure: CYSTOSCOPY URETEROSCOPY WITH RETROGRADE PYELOGRAM AND INSERTION STENT URETERAL;  Surgeon: Karo Pack MD;  Location: [facility];  Service: Urology    GA CYSTOURETHROSCOPY,URETER CATHETER Right 8/4/2019    Procedure: CYSTOSCOPY RETROGRADE PYELOGRAM WITH INSERTION STENT URETERAL;  Surgeon: Laura Jewell MD;  [facility];  Service: Urology    GA CYSTOURETHROSCOPY,URETER CATHETER Left 4/22/2022    Procedure: CYSTOSCOPY RETROGRADE PYELOGRAM WITH INSERTION STENT URETERAL;  Surgeon: Collin Anaya MD;  [facility];  Service: Urology             06/08/22 0839   OT Last Visit   OT Visit Date 06/08/22   Note Type   Note type Evaluation   Restrictions/Precautions   Weight Bearing Precautions Per Order No   Other Precautions Pain; Fall Risk;Multiple lines  (nephrostomy tube)   Pain Assessment   Pain Assessment Tool 0-10   Pain Score 4   Pain Location/Orientation Orientation: Left; Other (Comment)  (L ankle and R shoulder)   Hospital Pain Intervention(s) Repositioned; Ambulation/increased activity;Cold applied   Home Living   Type of Home House   Home Layout Two level;1/2 bath on main level;Bed/bath upstairs  (able to have FFSU with recliner)   Bathroom Shower/Tub Tub/shower unit   Bathroom Toilet Standard   Bathroom Equipment   (denies)   Home Equipment Walker   Additional Comments Pt lives alone in [address] with 3 JENNY  Prior Function   Level of [place] Independent with ADLs and functional mobility   Lives With [facility] [employer] Help From Family   ADL Assistance Independent   IADLs Independent   Falls in the last 6 months 1 to 4  (x1)   Vocational Full time employment  ([employer])   Comments PTA, Pt I with ADLs/IADLs/no AD/ +/works FT  Pt with local suportive father  Pt admitted from rehab where he was working on participating in ADLs  Pt states he has been spongebathing with assist and able to dress with use of LHAE  Pt states he is working on increasing ROM with RUE shoulder in order to maximize participating in toileting hygiene  Lifestyle   Autonomy PTA, pt reports being I with ADLs, IADLs, fnxl mobility, (+)    Reciprocal Relationships Pt reports local father but limited support available, unable to provide physical assistance   Service to Others FTE - works at a [employer]   [address] [facility] ([facility]) [facility]   Subjective   Subjective "I feel good today "   ADL   Where Assessed Edge of bed   Eating Assistance 5  Supervision/Setup   Grooming Assistance 5  Supervision/Setup   Grooming Deficit Brushing hair   UB Bathing Assistance 5  Supervision/Setup   LB Bathing Assistance 3  Moderate Assistance   [address] 5  Supervision/Setup   LB Dressing Assistance 3  Moderate Assistance   LB Dressing Deficit   (Pt states he requires a sock jose to apply socks   Pt S with R foot, however, requires assist this AM from nursing staff with L foot )   Toileting Assistance  3  Moderate Assistance   Functional Assistance 5  Supervision/Setup   Functional Deficit Increased time to complete;Verbal cueing   Bed Mobility   Supine to Sit 5  Supervision   Additional items Bedrails;HOB elevated   Sit to Supine Unable to assess   Additional Comments Pt greeted supine in bed and left OOB in recliner chair at end of session  All needs met and call bell nearby  Transfers   Sit to Stand 5  Supervision   Additional items Verbal cues; Increased time required   Stand to Sit 5  Supervision   Additional items Increased time required;Verbal cues   Functional Mobility   Functional Mobility 5  Supervision   Additional Comments S with RW- Household distances  Additional items Rolling walker   Balance   Static Sitting Fair +   Dynamic Sitting Fair   Static Standing Fair   Dynamic Standing Fair -   Ambulatory Fair -   Activity Tolerance   Activity Tolerance Patient limited by fatigue   Medical Staff Made Aware Co-eval with REBECCA Snyder 2* to Pt's medical complexity and decreased endurance  Nurse Made Aware RN cleared/updated  RUE Assessment   RUE Assessment WFL   RUE Overall AROM   R Shoulder Flexion 100*  (limited RUE shoulder ROM)   LUE Assessment   LUE Assessment WFL   Hand Function   Gross Motor Coordination Functional   Fine Motor Coordination Functional   Sensation   Light Touch No apparent deficits   Cognition   Overall Cognitive Status WFL   Arousal/Participation Alert; Responsive; Cooperative   Attention Within functional limits   Orientation Level Oriented X4   Memory Within functional limits   Following Commands Follows all commands and directions without difficulty   Comments Pt very pleasant and cooperative during OT session  Pt tangential at times and requires redirection to task  Assessment   Limitation Decreased ADL status; Decreased UE ROM; Decreased UE strength;Decreased Safe judgement during ADL;Decreased endurance;Decreased self-care trans;Decreased high-level ADLs   Prognosis Fair   Assessment Pt is a 56 yo Male who presented to [facility] on 6/8/2022 with sepsis   Pt admitted from [facility] rehab center  Pt  has a past medical history of Arthritis, Chronic kidney disease, Gout, Hyperlipidemia, Hypertension, Kidney stone, Obstructive pyelonephritis, and Sepsis ([facility] [address] )  Pt greeted bedside for OT evaluation on 6/8/2022  Pt lives alone in [address] with 3 JENNY  PTA, Pt I with ADLs/IADLs/no AD/ +/works FT  Pt with local suportive father  Pt admitted from rehab where he was working on participating in ADLs  Pt states he has been spongebathing with assist and able to dress with use of LHAE  Pt states he is working on increasing ROM with RUE shoulder in order to maximize participating in toileting hygiene  Pt demonstrating the following occupational deficits: S with UB ADLs, mod A with LB ADLs, S with bed mobility, S with functional transfers, and S with functional mobility with RW  Limitations that impact functional performance include decreased ADL status, decreased UE ROM, decreased UE strength, decreased safe judgement during ADLs, decreased endurance, decreased self care transfers, decreased high level ADLs and pain  Occupational performance areas to address ADL retraining, functional transfer training, UE strengthening/ROM, endurance training, cognitive reorientation, Pt/caregiver education, equipment evaluation/education, compensatory technique education, energy conservation and activity engagement   Pt would benefit from continued skilled OT services while in hospital to maximize independence with ADLs  Will continue to follow Pt's progress  Pt would benefit from post acute rehabilitation services upon DC to maximize safety and independence with ADLs and functional tasks of choice  Goals   Patient Goals To walk today  LTG Time Frame 10-14   Long Term Goal #1 See goals listed below  Plan   Treatment Interventions ADL retraining;Functional transfer training;UE strengthening/ROM; Endurance training;Cognitive reorientation;Patient/family training;Equipment evaluation/education; Energy conservation; Activityengagement   Goal Expiration Date 6/22/22   OT Frequency 3-5x/wk   Recommendation   OT Discharge Recommendation Post acute rehabilitation services   OT - OK to Discharge Yes   Additional Comments  The patient's raw score on the -PAC Daily Activity inpatient short form is 16, standardized score is 35 96, less than 39 4  Patients at this level are likely to benefit from discharge to post-acute rehabilitation services  Please refer to the recommendation of the Occupational Therapist for safe discharge planning  AM-PAC Daily Activity Inpatient   Lower Body Dressing 2   Bathing 2   Toileting 2   Upper Body Dressing 3   Grooming 3   Eating 4   Daily Activity Raw Score 16   Daily Activity Standardized Score (Calc for Raw Score >=11) 35 96   AM-[facility] Applied Cognition Inpatient   Following a Speech/Presentation 4   Understanding Ordinary Conversation 4   Taking Medications 4   Remembering Where Things Are Placed or Put Away 4   Remembering List of 4-5 Errands 4   Taking Care of Complicated Tasks 3   Applied Cognition Raw Score 23   Applied Cognition Standardized Score 53 08       Goals:  1  Pt will complete UB ADLs with I in order to maximize participation with ADLs  2  Pt will complete LB ADLs with I in order to maximize safety with ADLs  3  Pt will complete toileting routine (transfer, hygiene, and clothing management) with I in order to return to prior level of function  4  Pt will complete bed mobility with I in order to maximize participation with ADLs  5  Pt will complete functional transfers at I level in order to increase participation with ADLs  6  Pt will increase dynamic standing balance to F+ in order to increase safety with ADLs  7  Pt will increase standing tolerance x5 min in order to increase participation with ADLs  8  Pt will complete functional mobility with AD PRN for item retrieval task at Carla level in order to increase participation with ADLs    9  Pt will complete IADL tasks/simulation of IADLs tasks with I in order to return to PLOF  10  Pt will demonstrate G energy conservation techniques with ADLs/IADLs in order to reduce the risk of falls  11  Pt will be attentive 100% of the time for ongoing functional/formal cognitive assessment to assist with safe dc planning nitesh Butler MS, OTR/L

## 2022-06-08 NOTE — PLAN OF CARE
Problem: PHYSICAL THERAPY ADULT  Goal: Performs mobility at highest level of function for planned discharge setting  See evaluation for individualized goals  Description: Treatment/Interventions: Functional transfer training, LE strengthening/ROM, Elevations, Therapeutic exercise, Endurance training, Patient/family training, Equipment eval/education, Bed mobility, Gait training          See flowsheet documentation for full assessment, interventions and recommendations  Note: Prognosis: Fair  Problem List: Decreased endurance, Impaired balance, Decreased mobility, Decreased safety awareness  Assessment: Pt is a 55 y o  male seen for PT evaluation s/p admit to [facility] on 6/7/2022  Pt was admitted with a primary dx of: Sepsis  PT now consulted for assessment of mobility and d/c needs  Pts current comorbidities effecting treatment include: CKD, Morbid Obesity, COVID, Obstructive uropathy s/p stent and nephrostomy  Pts current clinical presentation is Unstable/ Unpredictable (high complexity) due to Ongoing medical management for primary dx, Increased reliance on more restrictive AD compared to baseline, Decreased activity tolerance compared to baseline, Fall risk, Trending lab values  Prior to admission, pt was independent, recent use of RW since at rehab  Upon evaluation, pt currently is requiring supervision for bed mobility; supervision for transfers and Mervin for ambulation 80 ft w/ RW  Pt presents at PT eval functioning below baseline and currently w/ overall mobility deficits 2* to: BLE weakness, impaired balance, decreased endurance, gait deviations, pain, decreased activity tolerance compared to baseline, decreased functional mobility tolerance compared to baseline, decreased safety awareness, fall risk  Pt currently at a fall risk 2* to impairments listed above    Pt will continue to benefit from skilled acute PT interventions to address stated impairments; to maximize functional mobility; for ongoing pt/ family training; and DME needs  At conclusion of PT session pt returned back in chair with phone and call bell within reach  Pt denies any further questions at this time  Recommend IP rehab upon hospital D/C  PT Discharge Recommendation: Post acute rehabilitation services          See flowsheet documentation for full assessment

## 2022-06-08 NOTE — RESTORATIVE TECHNICIAN NOTE
Restorative Technician Note      Patient Name: Evens Saenz     Restorative Tech Visit Date: 6/8/2022  Note Type: Mobility  Patient Position Upon Consult: Bedside chair  Activity Performed: Ambulated  Assistive Device: Standard walker  Patient Position at End of Consult: Supine;  All needs within reach    Chanell Jackson  DPT, Restorative Technician

## 2022-06-08 NOTE — PLAN OF CARE
Problem: Potential for Falls  Goal: Patient will remain free of falls  Description: INTERVENTIONS:  - Educate patient/family on patient safety including physical limitations  - Instruct patient to call for assistance with activity   - Consult OT/PT to assist with strengthening/mobility   - Keep Call bell within reach  - Keep bed low and locked with side rails adjusted as appropriate  - Keep care items and personal belongings within reach  - Initiate and maintain comfort rounds  - Make Fall Risk Sign visible to staff  - Offer Toileting every two Hours, in advance of need  - Initiate/Maintain bedalarm  - Apply yellow socks and bracelet for high fall risk patients  - Consider moving patient to room near nurses station  Outcome: Progressing     Problem: MOBILITY - ADULT  Goal: Maintain or return to baseline ADL function  Description: INTERVENTIONS:  -  Assess patient's ability to carry out ADLs; assess patient's baseline for ADL function and identify physical deficits which impact ability to perform ADLs (bathing, care of mouth/teeth, toileting, grooming, dressing, etc )  - Assess/evaluate cause of self-care deficits   - Assess range of motion  - Assess patient's mobility; develop plan if impaired  - Assess patient's need for assistive devices and provide as appropriate  - Encourage maximum independence but intervene and supervise when necessary  - Involve family in performance of ADLs  - Assess for home care needs following discharge   - Consider OT consult to assist with ADL evaluation and planning for discharge  - Provide patient education as appropriate  Outcome: Progressing  Goal: Maintains/Returns to pre admission functional level  Description: INTERVENTIONS:  - Perform BMAT or MOVE assessment daily    - Set and communicate daily mobility goal to care team and patient/family/caregiver  - Collaborate with rehabilitation services on mobility goals if consulted  - Perform Range of Motion three times a day    - Reposition patient every two hours    - Dangle patient three times a day  - Stand patient three times a day  - Ambulate patient three times a day  - Out of bed to chair three times a day   - Out of bed for meals three times a day  - Out of bed for toileting  - Record patient progress and toleration of activity level   Outcome: Progressing

## 2022-06-08 NOTE — H&P (VIEW-ONLY)
Consult - Urology   Remington Phlegm 1965, 55 y o  male MRN: [id number]    Unit/Bed#: [facility] 804-01 Encounter: [id number]        Assessment  1  Sepsis  2  Gram negative selina urinary tract infection (culture 6/6, sensitivities pending)  3  Left ureteral calculus s/p left ureteral stent (4/22/22) and nephrostomy tube (5/10/22), awaiting clearance ureteroscopy/stone extraction    Plan  Well decompressed with left ureteral stent and nephrostomy tube, no residual hydronephrosis  No acute adjustments needed  Antibiotic treatment- ceftriaxone, urine culture GNR, sensitivities pending  BCx2 no growth @ 24 hr   Sepsis endpoints are resolved today    Discussion on timing for clearance ureteroscopy in light of recurrent sepsis recently confounded by asymptomatic positive covid swab, and additional anesthesia and systemic risk associated with this  He has had recurring admissions x 3 now, will discuss with team to attempt ureteroscopic procedure this admission after a period of 48-72h culture directed IV antibiotics to prep for surgery and monitor postoperatively  Tmax 100 9 last night, today afebrile 98 3  Mild tachycardia hr-low 100s and tachypnea rr-22  Good BP 130s/70s, and saturating 97% on room air  WBC 17, today 10  Procalcitonin 0 6  Renal function is stable to what it has been the past month or so, creatinine 1 7 (baseline appears 1 4-1 6 months prior  Feels good today  Subjective: Feels good today  Wants stone/stent out  Never had covid symptoms was screening swab for moving rooms at rehab, repeat test is negative  Urologic history  Underwent cystoscopy and left ureteral stent placement on 04/22/2022 on initial admission for 3mm left mid ureteral obstructing stone and pyelonephritis  He was readmitted few weeks later for sepsis again, was found to have persistent hydronephrosis despite stent, he underwent nephrostomy tube placement by IR 5/10/22   Admissions have been complicated by sepsis and prolonged intubations  He was discharged to rehab about three weeks ago  Yesterday developed signs and symptoms of sepsis again with some fever and tachycardia was apparently transferred from the rehab center directly to this campus  CT scan arrival shows a decompressed left collecting system with ureteral stent and nephrostomy tube in correct position  No contralateral obstruction of his chronically atrophic right kidney  He received IV cefepime, urine blood cultures are obtained and pending  He was switched to ceftriaxone today  Previous cultures have been E coli  He had some diarrhea after abx yesterday/today  Behind the scenes he was scheduled for definitive ureteroscopy on June 17th, but this was postponed due to his recent COVID infection and anesthesia recommendations for delay of seven weeks, new surgery date is July 29th  He states he did not have covid  Review of Systems   Constitutional: Negative for activity change, appetite change, chills, fever and unexpected weight change  HENT: Negative  Respiratory: Negative  Negative for shortness of breath  Cardiovascular: Negative  Negative for chest pain  Gastrointestinal: Negative for abdominal pain, diarrhea, nausea and vomiting  Endocrine: Negative  Genitourinary: Negative for decreased urine volume, difficulty urinating, dysuria, flank pain, frequency, hematuria, scrotal swelling, testicular pain and urgency  Musculoskeletal: Negative for back pain and gait problem  Skin: Negative  Allergic/Immunologic: Negative  Neurological: Negative  Hematological: Negative for adenopathy  Does not bruise/bleed easily  Objective:  Vitals: Blood pressure 121/71, pulse (!) 108, temperature 98 3 °F (36 8 °C), resp  rate 22, height 5' 6" (1 676 m), weight (!) 145 kg (319 lb 10 7 oz), SpO2 97 %  ,Body mass index is 51 6 kg/m²  Physical Exam  Vitals and nursing note reviewed  Constitutional:       Appearance: He is well-developed  Comments: Obese white male seated upright in chair pleasant conversive   HENT:      Head: Normocephalic and atraumatic  Cardiovascular:      Rate and Rhythm: Normal rate and regular rhythm  Heart sounds: Normal heart sounds  No murmur heard  Pulmonary:      Effort: Pulmonary effort is normal       Breath sounds: Normal breath sounds  No wheezing or rales  Abdominal:      General: Bowel sounds are normal       Palpations: Abdomen is soft  Tenderness: There is no abdominal tenderness  There is no right CVA tenderness or left CVA tenderness  Musculoskeletal:         General: Normal range of motion  Right lower leg: No edema  Left lower leg: No edema  Skin:     General: Skin is warm and dry  Capillary Refill: Capillary refill takes less than 2 seconds  Coloration: Skin is not pale  Neurological:      Mental Status: He is alert and oriented to person, place, and time  Gait: Gait normal    Psychiatric:         Mood and Affect: Mood normal          Imaging:  CT scans thoroughly reviewed: 4/21, 4/27, 5/10, 6/7  Small bladder calc seen on original ct April, not seen in may, but seen again today  Left mid ureteral stone seen 4/21 and 5/10, not seen on 4/27 or 6/7 scan  But bladder calc same as April or represent the 5/10 stone passed alongside stent? Still think too high risk for recurrent obstruction or infection without performing clearance urs to confirm no residual ureteral frag  CT abdomen pelvis wo contrast [[id number]] Collected: 06/07/22 1735   Order Status: Completed Updated: 06/07/22 1743   Narrative:     CT ABDOMEN AND PELVIS WITHOUT IV CONTRAST     INDICATION:   Sepsis   sepsis, known left PCn and ureteral stent  COMPARISON:  5/10/2022     TECHNIQUE:  CT examination of the abdomen and pelvis was performed without intravenous contrast  This examination was performed without intravenous contrast in the context of the critical nationwide Omnipaque shortage  Axial, sagittal, and coronal 2D   reformatted images were created from the source data and submitted for interpretation  Radiation dose length product (DLP) for this visit:  1320 21 mGy-cm    This examination, like all CT scans performed in the [facility], was performed utilizing techniques to minimize radiation dose exposure, including the use of   iterative reconstruction and automated exposure control  Enteric contrast was administered  FINDINGS:     ABDOMEN     LOWER CHEST:  Small hiatal hernia noted   No other clinically significant abnormality identified in the visualized lower chest      LIVER/BILIARY TREE:  Unremarkable  GALLBLADDER:  No calcified gallstones  No pericholecystic inflammatory change  SPLEEN:  Unremarkable  PANCREAS:  Unremarkable  ADRENAL GLANDS:  Unremarkable  KIDNEYS/URETERS:  Subcentimeter right mid pole and right renal lower pole nonobstructing calculi  Atrophic 6 cm right kidney  Small 11 cm left kidney  Left nephroureteral stent in addition to left posterior approach percutaneous nephrostomy tube  No left hydronephrosis  Few scattered subcentimeter left renal ankle nonobstructing calculi  STOMACH AND BOWEL:  Unremarkable  APPENDIX:  No findings to suggest appendicitis  ABDOMINOPELVIC CAVITY:  No ascites   No pneumoperitoneum   No lymphadenopathy  VESSELS:  Unremarkable for patient's age  PELVIS     REPRODUCTIVE ORGANS:  Unremarkable for patient's age  URINARY BLADDER:  2 mm calculus along the right dependent portion of the bladder  ABDOMINAL WALL/INGUINAL REGIONS:  Scattered wall thickening bilaterally in the pelvis with subcutaneous stranding could relate to cellulitis  OSSEOUS STRUCTURES:  No acute fracture or destructive osseous lesion  Impression:       Subcentimeter right mid pole and right renal lower pole nonobstructing calculi  Atrophic 6 cm right kidney  Small 11 cm left kidney   Left nephroureteral stent in addition to left posterior approach percutaneous nephrostomy tube  No left hydronephrosis  Few scattered subcentimeter left renal ankle nonobstructing calculi  2 mm calculus along the right dependent portion of the bladder  This could represent previously identified left mid ureteral calculus     Wall thickening in the bilateral pelvis laterally could relate to cellulitis in the appropriate clinical setting, correlate clinically  Imaging reviewed - both report and images personally reviewed  Labs:  Recent Labs     06/06/22  0738 06/07/22  0610 06/08/22  0635   WBC 13 52* 17 56* 10 20*     Recent Labs     06/06/22  0738 06/07/22  0610 06/08/22  0635   HGB 9 7* 9 9* 8 9*       Recent Labs     06/06/22  0738 06/07/22  0512 06/08/22  0635   CREATININE 1 67* 1 74* 1 86*       Microbiology:  UCX 6/6: 80-89k gram negative rods    History:  Social History     Socioeconomic History    Marital status: Single     Spouse name: None    Number of children: None    Years of education: None    Highest education level: None   Occupational History    None   Tobacco Use    Smoking status: Never Smoker    Smokeless tobacco: Never Used   Vaping Use    Vaping Use: Never used   Substance and Sexual Activity    Alcohol use: Not Currently     Alcohol/week: 0 0 standard drinks     Comment: rarely    Drug use: No    Sexual activity: None   Other Topics Concern    None   Social History Narrative    Caffeine use      Social Determinants of Health     Financial Resource Strain: Not on file   Food Insecurity: No Food Insecurity    Worried About Running Out of Food in the Last Year: Never true    Isis of Food in the Last Year: Never true   Transportation Needs: No Transportation Needs    Lack of Transportation (Medical): No    Lack of Transportation (Non-Medical):  No   Physical Activity: Not on file   Stress: Not on file   Social Connections: Not on file   Intimate Partner Violence: Not on file   Housing Stability: Low Risk     Unable to Pay for Housing in the Last Year: No    Number of Places Lived in the Last Year: 1    Unstable Housing in the Last Year: No     Financial Resource Strain: Not on file   Food Insecurity: No Food Insecurity    Worried About [facility] in the Last Year: Never true    [address] in the Last Year: Never true   Transportation Needs: No Transportation Needs    Lack of Transportation (Medical): No    Lack of Transportation (Non-Medical):  No   Physical Activity: Not on file   Stress: Not on file   Social Connections: Not on file   Intimate Partner Violence: Not on file   Housing Stability: Low Risk     Unable to Pay for Housing in the Last Year: No    Number of Places Lived in the Last Year: 1    Unstable Housing in the Last Year: No      Diagnosis Date    Arthritis     Chronic kidney disease     Gout     Hyperlipidemia     Hypertension     Kidney stone     Obstructive pyelonephritis     Sepsis ([facility] [address] )      Past Surgical History:   Procedure Laterality Date    FL RETROGRADE PYELOGRAM  8/4/2019    FL RETROGRADE PYELOGRAM  9/26/2019    FL RETROGRADE PYELOGRAM  4/22/2022    IR NEPHROSTOMY TUBE PLACEMENT  5/10/2022    NO PAST SURGERIES  03/23/2015    Last assessed    LA CYSTO/URETERO W/LITHOTRIPSY &INDWELL STENT INSRT Right 9/26/2019    Procedure: CYSTOSCOPY URETEROSCOPY WITH RETROGRADE PYELOGRAM AND INSERTION STENT URETERAL;  Surgeon: Robert Noonan MD;  Location: [facility];  Service: Urology    LA CYSTOURETHROSCOPY,URETER CATHETER Right 8/4/2019    Procedure: CYSTOSCOPY RETROGRADE PYELOGRAM WITH INSERTION STENT URETERAL;  Surgeon: Tone Robles MD;  Location:  MAIN OR;  Service: Urology    LA CYSTOURETHROSCOPY,URETER CATHETER Left 4/22/2022    Procedure: CYSTOSCOPY RETROGRADE PYELOGRAM WITH INSERTION STENT URETERAL;  Surgeon: Lis Crum MD;  [facility];  Service: Urology     Family History   Problem Relation Age of Onset    Cancer Mother        Lillian Avendaño, [place]  Date: 6/8/2022 Time: 8:54 AM

## 2022-06-08 NOTE — PHYSICAL THERAPY NOTE
Physical Therapy Evaluation    Patient's Name: Sera Clark    Admitting Diagnosis  Pyelonephritis [N12]    Problem List  Patient Active Problem List   Diagnosis    Morbid obesity ([facility] [address] )    Vitamin D deficiency    History of gout    Bacteremia due to Escherichia coli    Lactic acidosis    Acute kidney injury superimposed on CKD stage 3    Severe sepsis on admission    Chronic kidney disease, stage 3 ([facility] [address] )    Essential hypertension    Insomnia    Mixed hyperlipidemia    Morbid obesity    Recurrent left knee pain    Obstructive uropathy    Obstructive pyelonephritis    Anemia    COVID    Sepsis [facility])       Past Medical History  Past Medical History:   Diagnosis Date    Arthritis     Chronic kidney disease     Gout     Hyperlipidemia     Hypertension     Kidney stone     Obstructive pyelonephritis     Sepsis [facility])        Past Surgical History  Past Surgical History:   Procedure Laterality Date    FL RETROGRADE PYELOGRAM  8/4/2019    FL RETROGRADE PYELOGRAM  9/26/2019    FL RETROGRADE PYELOGRAM  4/22/2022    IR NEPHROSTOMY TUBE PLACEMENT  5/10/2022    NO PAST SURGERIES  03/23/2015    Last assessed    MS CYSTO/URETERO W/LITHOTRIPSY &INDWELL STENT INSRT Right 9/26/2019    Procedure: CYSTOSCOPY URETEROSCOPY WITH RETROGRADE PYELOGRAM AND INSERTION STENT URETERAL;  Surgeon: Maddi Tran MD;  Location: [facility];  Service: Urology    MS CYSTOURETHROSCOPY,URETER CATHETER Right 8/4/2019    Procedure: CYSTOSCOPY RETROGRADE PYELOGRAM WITH INSERTION STENT URETERAL;  Surgeon: Bernadette Varghese MD;  [facility];  Service: Urology    MS CYSTOURETHROSCOPY,URETER CATHETER Left 4/22/2022    Procedure: CYSTOSCOPY RETROGRADE PYELOGRAM WITH INSERTION STENT URETERAL;  Surgeon: Cinthia Lopez MD;  [facility];  Service: Urology        06/08/22 0840   PT Last Visit   PT Visit Date 06/08/22   Note Type   Note type Evaluation   Pain Assessment   Pain Assessment Tool 0-10   Pain Score 4   Pain Location/Orientation Orientation: Left; Location: Foot   Hospital Pain Intervention(s) Repositioned; Ambulation/increased activity   Pain 2   Pain Score 2 4   Pain Location/Orientation 2 Orientation: Right;Location: Shoulder   Hospital Pain Intervention(s) 2 Repositioned; Ambulation/increased activity;Cold applied   Restrictions/Precautions   Weight Bearing Precautions Per Order No   Other Precautions Fall Risk;Pain   Home Living   Type of [address] Two level;1/2 bath on main level;Stairs to enter with rails  ([address] with recliner chair, 3 [address])   Home Equipment Walker   Prior Function   Level of [place] Independent with ADLs and functional mobility   Lives With (S)  Alone   Falls in the last 6 months 1 to 4   Vocational Full time employment   Comments Pt reports prior to initial admission, he was independent with all mobility, no AD  Was recently discharged to [facility] for [facility], was ambulating around 150 ft with RW, limited by shoulder pain and decreased endurance  General   Family/Caregiver Present No   Cognition   Overall Cognitive Status WFL   Orientation Level Oriented X4   Following Commands Follows all commands and directions without difficulty   Comments Pt very pleasant and cooperative, motivated to participate and improve, requires occasional cues to attend to task   RLE Assessment   RLE Assessment WFL   LLE Assessment   LLE Assessment WFL   Light Touch   RLE Light Touch Grossly intact   LLE Light Touch Grossly intact   Bed Mobility   Supine to Sit 5  Supervision   Additional items HOB elevated   Transfers   Sit to Stand 5  Supervision   Stand to Sit 5  Supervision   Additional Comments with RW   Ambulation/Elevation   Gait pattern Improper Weight shift; Forward Flexion; Wide TEO; Decreased foot clearance   Gait Assistance 4  Minimal assist   Additional items Assist x 1   Assistive Device Rolling walker   Distance 80 ft x2 trials, standing rest break between trials     Balance   Static Sitting Fair + Dynamic Sitting Fair   Static Standing Fair -   Dynamic Standing Fair -   Ambulatory Poor +   Activity Tolerance   Activity Tolerance Patient limited by fatigue   Medical Staff Made Aware Co-evaluation with OT given limited activity tolerance and medical complexity   Nurse Made Aware RN updated   Assessment   Prognosis Fair   Problem List Decreased endurance; Impaired balance;Decreased mobility; Decreased safety awareness   Assessment Pt is a 55 y o  male seen for PT evaluation s/p admit to [facility] on 6/7/2022  Pt was admitted with a primary dx of: Sepsis  PT now consulted for assessment of mobility and d/c needs  Pts current comorbidities effecting treatment include: CKD, Morbid Obesity, COVID, Obstructive uropathy s/p stent and nephrostomy  Pts current clinical presentation is Unstable/ Unpredictable (high complexity) due to Ongoing medical management for primary dx, Increased reliance on more restrictive AD compared to baseline, Decreased activity tolerance compared to baseline, Fall risk, Trending lab values  Prior to admission, pt was independent, recent use of RW since at rehab  Upon evaluation, pt currently is requiring supervision for bed mobility; supervision for transfers and Mervin for ambulation 80 ft w/ RW  Pt presents at PT eval functioning below baseline and currently w/ overall mobility deficits 2* to: BLE weakness, impaired balance, decreased endurance, gait deviations, pain, decreased activity tolerance compared to baseline, decreased functional mobility tolerance compared to baseline, decreased safety awareness, fall risk  Pt currently at a fall risk 2* to impairments listed above  Pt will continue to benefit from skilled acute PT interventions to address stated impairments; to maximize functional mobility; for ongoing pt/ family training; and DME needs  At conclusion of PT session pt returned back in chair with phone and call bell within reach   Pt denies any further questions at this time  Recommend IP rehab upon hospital D/C  Goals   Patient Goals to walk   STG Expiration Date 06/22/22   Short Term Goal #1 In 14 days pt will be able to: 1  Demonstrate ability to perform all aspects of bed mobility independently to increase functional independence  2  Perform functional transfers with LRAD independently to facilitate safe return to previous living environment  3   Ambulate 150 ft with LRAD independently with stable vitals to improve safety with household distances and reduce fall risk  4  Improve LE strength grades by 1 to increase ease of functional mobility with transfers and gait  5  Pt will demonstrate improved balance by one grade in order to decrease risk of falls  6  Climb 3 steps with supervision and 1 HR to simulate entrance to home  PT Treatment Day 0   Plan   Treatment/Interventions Functional transfer training;LE strengthening/ROM; Elevations; Therapeutic exercise; Endurance training;Patient/family training;Equipment eval/education; Bed mobility;Gait training   PT Frequency 3-5x/wk   Recommendation   PT Discharge Recommendation Post acute rehabilitation services   AM-PAC Basic Mobility Inpatient   Turning in Bed Without Bedrails 4   Lying on Back to Sitting on Edge of Flat Bed 3   Moving Bed to Chair 3   Standing Up From Chair 3   Walk in Room 3   Climb 3-5 Stairs 2   Basic Mobility Inpatient Raw Score 18   Basic Mobility Standardized Score 41 05   Highest Level Of Mobility   JH-HLM Goal 6: Walk 10 steps or more   JH-HLM Achieved 7: Walk 25 feet or more       Robert Elizabeth, PT, DPT, GCS

## 2022-06-08 NOTE — ASSESSMENT & PLAN NOTE
Present at outside campus   Suspect source of UTI  Noted left nephrostomy tube and left ureteral stent  Noted multiple E coli UTI and also previous E coli bacteremia  Also noted covid positive status as of 5/31>> no supplemental oxygen requirement  Leukocytosis noted  Received IV fluids and cefepime at outside [facility] and transferred for Urology evaluation      PLAN  · Continue current abx   · Await urology recs over next 24-48 hours   · If decline will get ID

## 2022-06-08 NOTE — CASE MANAGEMENT
Case Management Assessment & Discharge Planning Note    Patient name Remington Phlegm  Location [address] 804/PPHP 972-90 MRN [id number]  : 1965 Date 2022       Current Admission Date: 2022  Current Admission Diagnosis:Sepsis [facility])   Patient Active Problem List    Diagnosis Date Noted    [facility]is ([facility] ) 2022    COVID 2022    Anemia 2022    Obstructive pyelonephritis 2022    Obstructive uropathy 2022    Mixed hyperlipidemia 2021    Morbid obesity 2021    Recurrent left knee pain 2021    Insomnia 2019    Essential hypertension 2019    Severe sepsis on admission 2019    Chronic kidney disease, stage 3 ([facility] ) 2019    Acute kidney injury superimposed on CKD stage 3 2019    Bacteremia due to Escherichia coli 2019    Lactic acidosis 2019    History of gout 07/15/2019    Vitamin D deficiency 06/10/2015    Morbid obesity ([email or web address] ) 2015      LOS (days): 1  Geometric Mean LOS (GMLOS) (days):   Days to GMLOS:     OBJECTIVE:  PATIENT READMITTED TO HOSPITAL  Risk of Unplanned Readmission Score: 24 16         Current admission status: Inpatient       Preferred Pharmacy:   [facility]/pharmacy #[id number]- [address] [address]  Phone: [phone] Fax: [phone] Shawn Guerrero, [address]   [address] [place] [address]  Phone: [phone] Fax: [phone]    Primary Care Provider: RITA Flower    Primary Insurance: BLUE CROSS  Secondary Insurance:     ASSESSMENT:  Doug 26 Proxies    There are no active Health Care Proxies on file                   Readmission Root Cause  30 Day Readmission: No  Who directed you to return to the hospital?: Other (comment) ([facility])  During previous admission, was a post-acute recommendation made?: No    Patient Information  Admitted from[de-identified] Facility ([facility])  Mental Status: Alert  During Assessment patient was accompanied by: Not accompanied during assessment  Assessment information provided by[de-identified] Patient  Primary Caregiver: Self  Support Systems: Parent, [address] [facility] of Residence: [address] do you live in?: Yolanda Patel 1729 entry access options   Select all that apply : Stairs  Number of steps to enter home : 3  Upon entering residence, is there a bedroom on the main floor (no further steps)?: No  A bedroom is located on the following floor levels of residence (select all that apply):: 2nd Floor  Upon entering residence, is there a bathroom on the main floor (no further steps)?: Yes  Number of steps to 2nd floor from main floor: One Flight  In the last 12 months, was there a time when you were not able to pay the mortgage or rent on time?: No  In the last 12 months, how many places have you lived?: 1  In the last 12 months, was there a time when you did not have a steady place to sleep or slept in a shelter (including now)?: No  Living Arrangements: Lives Alone    Activities of Daily Living Prior to Admission  Functional Status: Independent  Completes ADLs independently?: Yes  Ambulates independently?: Yes  Does patient use assisted devices?: No  Does patient currently own DME?: Yes  What DME does the patient currently own?: Starlett Kind  Does patient have a history of Outpatient Therapy (PT/OT)?: No  Does the patient have a history of Short-Term Rehab?: Yes  Does patient have a history of HHC?: No  Does patient currently have [facility]?: No         Patient Information Continued  Income Source: Employed  Does patient have prescription coverage?: Yes  Within the past 12 months, you worried that your food would run out before you got the money to buy more : Never true  Within the past 12 months, the food you bought just didn't last and you didn't have money to get more : Never true  Food insecurity resource given?: N/A  Does patient receive dialysis treatments?: No  Does patient have a history of substance abuse?: No  Does patient have a history of Mental Health Diagnosis?: No         Means of Transportation  In the past 12 months, has lack of transportation kept you from medical appointments or from getting medications?: No  In the past 12 months, has lack of transportation kept you from meetings, work, or from getting things needed for daily living?: No  Was application for public transport provided?: N/A        DISCHARGE DETAILS:    Discharge planning discussed with[de-identified] Pt  Freedom of Choice: Yes  Comments - Freedom of Choice: Pt strongly prefers to return to [facility]  Agreed to post acute backup referrals       Were Treatment Team discharge recommendations reviewed with patient/caregiver?: Yes     Were patient/caregiver advised of the risks associated with not following Treatment Team discharge recommendations?: Yes    Contacts  Patient Contacts: Dewayne Loza  Relationship to Patient[de-identified] Family  Contact Method: Phone  Phone Number: [phone]  Reason/Outcome: Emergency [address]         Is the patient interested in Ayanna Harden at discharge?: No    DME Referral Provided  Referral made for DME?: No    Other Referral/Resources/Interventions Provided:  Referral Comments: Referral made to [facility]    Would you like to participate in our [address] service program?  : No - Declined    Treatment Team Recommendation: Short Term Rehab  Discharge Destination Plan[de-identified] Short Term Rehab

## 2022-06-09 ENCOUNTER — ANESTHESIA EVENT (INPATIENT)
Dept: PERIOP | Facility: HOSPITAL | Age: 57
DRG: 854 | End: 2022-06-09
Payer: COMMERCIAL

## 2022-06-09 ENCOUNTER — APPOINTMENT (INPATIENT)
Dept: RADIOLOGY | Facility: HOSPITAL | Age: 57
DRG: 854 | End: 2022-06-09
Payer: COMMERCIAL

## 2022-06-09 ENCOUNTER — TELEPHONE (OUTPATIENT)
Dept: UROLOGY | Facility: MEDICAL CENTER | Age: 57
End: 2022-06-09

## 2022-06-09 ENCOUNTER — ANESTHESIA (INPATIENT)
Dept: PERIOP | Facility: HOSPITAL | Age: 57
DRG: 854 | End: 2022-06-09
Payer: COMMERCIAL

## 2022-06-09 LAB
ALBUMIN SERPL BCP-MCNC: 2 G/DL (ref 3.5–5)
ALP SERPL-CCNC: 50 U/L (ref 46–116)
ALT SERPL W P-5'-P-CCNC: 19 U/L (ref 12–78)
ANION GAP SERPL CALCULATED.3IONS-SCNC: 12 MMOL/L (ref 4–13)
AST SERPL W P-5'-P-CCNC: 19 U/L (ref 5–45)
BACTERIA UR CULT: ABNORMAL
BACTERIA UR CULT: ABNORMAL
BASOPHILS # BLD AUTO: 0.05 THOUSANDS/ΜL (ref 0–0.1)
BASOPHILS NFR BLD AUTO: 1 % (ref 0–1)
BILIRUB SERPL-MCNC: 0.25 MG/DL (ref 0.2–1)
BUN SERPL-MCNC: 25 MG/DL (ref 5–25)
CALCIUM ALBUM COR SERPL-MCNC: 11.2 MG/DL (ref 8.3–10.1)
CALCIUM SERPL-MCNC: 9.6 MG/DL (ref 8.3–10.1)
CHLORIDE SERPL-SCNC: 107 MMOL/L (ref 100–108)
CO2 SERPL-SCNC: 22 MMOL/L (ref 21–32)
CREAT SERPL-MCNC: 1.72 MG/DL (ref 0.6–1.3)
EOSINOPHIL # BLD AUTO: 0.89 THOUSAND/ΜL (ref 0–0.61)
EOSINOPHIL NFR BLD AUTO: 11 % (ref 0–6)
ERYTHROCYTE [DISTWIDTH] IN BLOOD BY AUTOMATED COUNT: 15.6 % (ref 11.6–15.1)
GFR SERPL CREATININE-BSD FRML MDRD: 43 ML/MIN/1.73SQ M
GLUCOSE SERPL-MCNC: 108 MG/DL (ref 65–140)
HCT VFR BLD AUTO: 30.5 % (ref 36.5–49.3)
HGB BLD-MCNC: 8.9 G/DL (ref 12–17)
IMM GRANULOCYTES # BLD AUTO: 0.09 THOUSAND/UL (ref 0–0.2)
IMM GRANULOCYTES NFR BLD AUTO: 1 % (ref 0–2)
LYMPHOCYTES # BLD AUTO: 1.33 THOUSANDS/ΜL (ref 0.6–4.47)
LYMPHOCYTES NFR BLD AUTO: 16 % (ref 14–44)
MCH RBC QN AUTO: 28.4 PG (ref 26.8–34.3)
MCHC RBC AUTO-ENTMCNC: 29.2 G/DL (ref 31.4–37.4)
MCV RBC AUTO: 97 FL (ref 82–98)
MONOCYTES # BLD AUTO: 0.59 THOUSAND/ΜL (ref 0.17–1.22)
MONOCYTES NFR BLD AUTO: 7 % (ref 4–12)
NEUTROPHILS # BLD AUTO: 5.39 THOUSANDS/ΜL (ref 1.85–7.62)
NEUTS SEG NFR BLD AUTO: 64 % (ref 43–75)
NRBC BLD AUTO-RTO: 0 /100 WBCS
PLATELET # BLD AUTO: 311 THOUSANDS/UL (ref 149–390)
PMV BLD AUTO: 9.9 FL (ref 8.9–12.7)
POTASSIUM SERPL-SCNC: 3.4 MMOL/L (ref 3.5–5.3)
PROT SERPL-MCNC: 7.7 G/DL (ref 6.4–8.2)
RBC # BLD AUTO: 3.13 MILLION/UL (ref 3.88–5.62)
SODIUM SERPL-SCNC: 141 MMOL/L (ref 136–145)
WBC # BLD AUTO: 8.34 THOUSAND/UL (ref 4.31–10.16)

## 2022-06-09 PROCEDURE — 0T778DZ DILATION OF LEFT URETER WITH INTRALUMINAL DEVICE, VIA NATURAL OR ARTIFICIAL OPENING ENDOSCOPIC: ICD-10-PCS | Performed by: UROLOGY

## 2022-06-09 PROCEDURE — C2617 STENT, NON-COR, TEM W/O DEL: HCPCS | Performed by: UROLOGY

## 2022-06-09 PROCEDURE — 74018 RADEX ABDOMEN 1 VIEW: CPT

## 2022-06-09 PROCEDURE — 99232 SBSQ HOSP IP/OBS MODERATE 35: CPT | Performed by: PHYSICIAN ASSISTANT

## 2022-06-09 PROCEDURE — C1769 GUIDE WIRE: HCPCS | Performed by: UROLOGY

## 2022-06-09 PROCEDURE — 80053 COMPREHEN METABOLIC PANEL: CPT | Performed by: INTERNAL MEDICINE

## 2022-06-09 PROCEDURE — 52356 CYSTO/URETERO W/LITHOTRIPSY: CPT | Performed by: UROLOGY

## 2022-06-09 PROCEDURE — 85025 COMPLETE CBC W/AUTO DIFF WBC: CPT | Performed by: INTERNAL MEDICINE

## 2022-06-09 PROCEDURE — 0TP5X0Z REMOVAL OF DRAINAGE DEVICE FROM KIDNEY, EXTERNAL APPROACH: ICD-10-PCS | Performed by: UROLOGY

## 2022-06-09 PROCEDURE — 0TF78ZZ FRAGMENTATION IN LEFT URETER, VIA NATURAL OR ARTIFICIAL OPENING ENDOSCOPIC: ICD-10-PCS | Performed by: UROLOGY

## 2022-06-09 PROCEDURE — 99232 SBSQ HOSP IP/OBS MODERATE 35: CPT | Performed by: INTERNAL MEDICINE

## 2022-06-09 DEVICE — STENT URETERAL 6 FR 26CM INLAY OPTIMA: Type: IMPLANTABLE DEVICE | Status: FUNCTIONAL

## 2022-06-09 RX ORDER — MAGNESIUM HYDROXIDE 1200 MG/15ML
LIQUID ORAL AS NEEDED
Status: DISCONTINUED | OUTPATIENT
Start: 2022-06-09 | End: 2022-06-09 | Stop reason: HOSPADM

## 2022-06-09 RX ORDER — PHENAZOPYRIDINE HYDROCHLORIDE 200 MG/1
200 TABLET, FILM COATED ORAL 3 TIMES DAILY PRN
Qty: 30 TABLET | Refills: 0 | Status: SHIPPED | OUTPATIENT
Start: 2022-06-09 | End: 2022-06-30

## 2022-06-09 RX ORDER — FENTANYL CITRATE 50 UG/ML
INJECTION, SOLUTION INTRAMUSCULAR; INTRAVENOUS AS NEEDED
Status: DISCONTINUED | OUTPATIENT
Start: 2022-06-09 | End: 2022-06-09

## 2022-06-09 RX ORDER — CEFAZOLIN SODIUM 1 G/3ML
INJECTION, POWDER, FOR SOLUTION INTRAMUSCULAR; INTRAVENOUS AS NEEDED
Status: DISCONTINUED | OUTPATIENT
Start: 2022-06-09 | End: 2022-06-09

## 2022-06-09 RX ORDER — PHENAZOPYRIDINE HYDROCHLORIDE 100 MG/1
100 TABLET, FILM COATED ORAL 3 TIMES DAILY PRN
Status: DISCONTINUED | OUTPATIENT
Start: 2022-06-09 | End: 2022-06-17 | Stop reason: HOSPADM

## 2022-06-09 RX ORDER — SODIUM CHLORIDE, SODIUM LACTATE, POTASSIUM CHLORIDE, CALCIUM CHLORIDE 600; 310; 30; 20 MG/100ML; MG/100ML; MG/100ML; MG/100ML
INJECTION, SOLUTION INTRAVENOUS CONTINUOUS PRN
Status: DISCONTINUED | OUTPATIENT
Start: 2022-06-09 | End: 2022-06-09

## 2022-06-09 RX ORDER — ONDANSETRON 2 MG/ML
INJECTION INTRAMUSCULAR; INTRAVENOUS AS NEEDED
Status: DISCONTINUED | OUTPATIENT
Start: 2022-06-09 | End: 2022-06-09

## 2022-06-09 RX ORDER — ONDANSETRON 2 MG/ML
4 INJECTION INTRAMUSCULAR; INTRAVENOUS ONCE AS NEEDED
Status: DISCONTINUED | OUTPATIENT
Start: 2022-06-09 | End: 2022-06-09 | Stop reason: HOSPADM

## 2022-06-09 RX ORDER — FENTANYL CITRATE/PF 50 MCG/ML
25 SYRINGE (ML) INJECTION
Status: DISCONTINUED | OUTPATIENT
Start: 2022-06-09 | End: 2022-06-09 | Stop reason: HOSPADM

## 2022-06-09 RX ORDER — MORPHINE SULFATE 4 MG/ML
4 INJECTION, SOLUTION INTRAMUSCULAR; INTRAVENOUS
Status: DISCONTINUED | OUTPATIENT
Start: 2022-06-09 | End: 2022-06-13

## 2022-06-09 RX ORDER — DEXAMETHASONE SODIUM PHOSPHATE 10 MG/ML
INJECTION, SOLUTION INTRAMUSCULAR; INTRAVENOUS AS NEEDED
Status: DISCONTINUED | OUTPATIENT
Start: 2022-06-09 | End: 2022-06-09

## 2022-06-09 RX ORDER — HYDROCODONE BITARTRATE AND ACETAMINOPHEN 5; 325 MG/1; MG/1
1 TABLET ORAL EVERY 6 HOURS PRN
Qty: 5 TABLET | Refills: 0 | Status: SHIPPED | OUTPATIENT
Start: 2022-06-09 | End: 2022-06-30

## 2022-06-09 RX ORDER — PROPOFOL 10 MG/ML
INJECTION, EMULSION INTRAVENOUS AS NEEDED
Status: DISCONTINUED | OUTPATIENT
Start: 2022-06-09 | End: 2022-06-09

## 2022-06-09 RX ORDER — SUCCINYLCHOLINE/SOD CL,ISO/PF 100 MG/5ML
SYRINGE (ML) INTRAVENOUS AS NEEDED
Status: DISCONTINUED | OUTPATIENT
Start: 2022-06-09 | End: 2022-06-09

## 2022-06-09 RX ADMIN — FENTANYL CITRATE 50 MCG: 50 INJECTION INTRAMUSCULAR; INTRAVENOUS at 12:48

## 2022-06-09 RX ADMIN — ONDANSETRON 4 MG: 2 INJECTION INTRAMUSCULAR; INTRAVENOUS at 12:54

## 2022-06-09 RX ADMIN — METOPROLOL SUCCINATE 25 MG: 25 TABLET, FILM COATED, EXTENDED RELEASE ORAL at 08:10

## 2022-06-09 RX ADMIN — ALLOPURINOL 100 MG: 100 TABLET ORAL at 08:10

## 2022-06-09 RX ADMIN — TAMSULOSIN HYDROCHLORIDE 0.4 MG: 0.4 CAPSULE ORAL at 18:06

## 2022-06-09 RX ADMIN — SODIUM CHLORIDE 10 ML: 9 INJECTION INTRAMUSCULAR; INTRAVENOUS; SUBCUTANEOUS at 08:10

## 2022-06-09 RX ADMIN — HEPARIN SODIUM 7500 UNITS: 5000 INJECTION INTRAVENOUS; SUBCUTANEOUS at 14:43

## 2022-06-09 RX ADMIN — CEFAZOLIN 3000 MG: 1 INJECTION, POWDER, FOR SOLUTION INTRAMUSCULAR; INTRAVENOUS at 12:51

## 2022-06-09 RX ADMIN — PROPOFOL 200 MG: 10 INJECTION, EMULSION INTRAVENOUS at 12:48

## 2022-06-09 RX ADMIN — Medication 200 MG: at 12:48

## 2022-06-09 RX ADMIN — SODIUM CHLORIDE, SODIUM LACTATE, POTASSIUM CHLORIDE, AND CALCIUM CHLORIDE: .6; .31; .03; .02 INJECTION, SOLUTION INTRAVENOUS at 12:35

## 2022-06-09 RX ADMIN — FENTANYL CITRATE 50 MCG: 50 INJECTION INTRAMUSCULAR; INTRAVENOUS at 12:57

## 2022-06-09 RX ADMIN — HEPARIN SODIUM 7500 UNITS: 5000 INJECTION INTRAVENOUS; SUBCUTANEOUS at 21:53

## 2022-06-09 RX ADMIN — HEPARIN SODIUM 7500 UNITS: 5000 INJECTION INTRAVENOUS; SUBCUTANEOUS at 05:53

## 2022-06-09 RX ADMIN — DEXAMETHASONE SODIUM PHOSPHATE 10 MG: 10 INJECTION, SOLUTION INTRAMUSCULAR; INTRAVENOUS at 12:54

## 2022-06-09 RX ADMIN — AMLODIPINE BESYLATE 10 MG: 10 TABLET ORAL at 08:10

## 2022-06-09 RX ADMIN — CEFTRIAXONE 1000 MG: 1 INJECTION, POWDER, FOR SOLUTION INTRAMUSCULAR; INTRAVENOUS at 16:32

## 2022-06-09 NOTE — QUICK NOTE
Nephrostomy tube removed, stones broken up with laser, new stent placed with string taped to the lower abdomen  Office has been contacted to set him up for stent removal in the office by pulling on the string in 1 week, then see 1 of us in 6 months with a renal ultrasound  I have sent prescriptions for Norco and Pyridium to his pharmacy  Discharge instructions on chart  Okay for discharge from my standpoint when you think is stable

## 2022-06-09 NOTE — ANESTHESIA PREPROCEDURE EVALUATION
Procedure:  CYSTOSCOPY LEFT URETEROSCOPY WITH BASKET STONE EXTRACTION/LITHOTRIPSY HOLMIUM LASER, RETROGRADE PYELOGRAM AND EXCHANGE URETERAL STENT URETERAL, REMOVAL NEPHROSTOMY TUBE (Left Bladder)    Relevant Problems   CARDIO   (+) Essential hypertension   (+) Mixed hyperlipidemia      /RENAL   (+) Acute kidney injury superimposed on CKD stage 3   (+) Chronic kidney disease, stage 3 (HCC)   (+) Obstructive pyelonephritis      HEMATOLOGY   (+) Anemia      NEURO/PSYCH   (+) History of gout      Genitourinary   (+) Obstructive uropathy      Other   (+) Morbid obesity (BMI 52)   (+) Sepsis ([address] )      H/o nephrolithiasis s/p ureteral stent 4/22/22, represented with urosepsis s/p left PCN 5/10/22  Physical Exam    Airway    Mallampati score: II  TM Distance: >3 FB  Neck ROM: full     Dental       Cardiovascular      Pulmonary      Other Findings        Anesthesia Plan  ASA Score- 3     Anesthesia Type- general with ASA Monitors  Additional Monitors:   Airway Plan: ETT  Plan Factors-Exercise tolerance (METS): <4 METS  Chart reviewed  Existing labs reviewed  Patient summary reviewed  Induction- intravenous  Postoperative Plan-     Informed Consent- Anesthetic plan and risks discussed with patient  I personally reviewed this patient with the CRNA  Discussed and agreed on the Anesthesia Plan with the CRNA  Vinay Lowery

## 2022-06-09 NOTE — OP NOTE
OPERATIVE REPORT  PATIENT NAME: Misbah Beckham    :  1965  MRN: [id number]  Pt Location: BE CYSTO ROOM 01    SURGERY DATE: 2022    Surgeon(s) and Role:     * Ede Orellana MD - Primary    Preop Diagnosis:  Ureteral calculus [N20 1]  Recurrent obstructive pyelonephritis [N12]    Post-Op Diagnosis Codes:    Left renal calculi     * Recurrent obstructive pyelonephritis [N12]    Procedure(s) (LRB):  CYSTOSCOPY LEFT URETEROSCOPY/LITHOTRIPSY HOLMIUM LASER,  AND EXCHANGE URETERAL STENT URETERAL, REMOVAL NEPHROSTOMY TUBE (Left)    Specimen(s):  * No specimens in log *    Estimated Blood Loss:   Minimal    Drains:  Nephrostomy Left 8 Fr  (Active)   Site Assessment Clean; Intact 22 1615   Dressing Status Clean; Intact;Dry 22 1615   Dressing Type Split gauze 22 1615   Dressing Change Due 22 161   Collection Container Leg bag 22 1615   Securement Method Securing device (Describe) 22 2109   Intake (mL) 10 mL 22 0855   Output (mL) 200 mL 22 0724   Number of days: 30       Anesthesia Type:   General    Operative Indications:  Ureteral calculus [N20 1]  Recurrent obstructive pyelonephritis [N12]      Operative Findings:  Two left renal calculi, 1 about 8 mm the other 1 6 mm  Both broken up into tiny passable fragments with the laser  Nephrostomy tube removed intact  Stent exchanged for a 6 Telugu by 26 cm stent with a string taped to the lower abdomen  Complications:   None    Procedure and Technique:  52-year-old man who was hospitalized initially with sepsis with left ureteral calculi, and a stent was placed  This was 7 weeks ago  In the meantime he presented with sepsis again with hydronephrosis on that side and a nephrostomy tube had to be placed  He was set up for cystoscopy ureteroscopy laser lithotripsy in the near future but he came in again this week with sepsis    He has since been on antibiotics and has recovered so we have decided to do this while he is hospitalized on antibiotics to finally get it done  The risks bleeding infection damage urinary tract and possible need for additional procedures were explained he gives informed consent the procedure will be cystoscopy left ureteroscopy laser lithotripsy, left ureteral stent exchange or removal of the nephrostomy tube  The patient was brought to the operating room identified properly  LMA was induced patient was prepped and draped in dorsal lithotomy position usual fashion  A time-out was performed  Cystoscopy was carried out with a 22 Telugu cystoscopy sheath 30 degree lens  Urethra was normal without stricture  The prostate did show some occlusion with a moderate median lobe component  The bladder itself was 1 to 2+ trabeculated no stones tumors or lesions  The orifices were orthotopic intact  The left ureteral stent was visible this was grasped with a grasping forceps and brought to the urethral meatus  A 0 035 in guidewire was placed up the stent under fluoroscopic guidance up into the kidney and the old stent was removed discarded  I then established 2 wire access with the dual-lumen catheter and then removed that and then placed the 10/1235 cm ureteral access sheath over the working wire and the other wire was clamped as a safety wire  The flexible ureteral scope was advanced up the ureter up into the kidney where I found 2 stones, 1 8 mm 1 6 mm and using the 272 holmium laser fiber I broke them up into tiny passable fragments  I then withdrew my equipment and checked at the entire ureter and there were no stones in the ureter  With the safety wire placed  I removed the nephrostomy tube by cutting the tube externally and then pulling it out intact  I then placed 6 [place] by 26 cm stent over the wire and coils were established in renal pelvis and bladder and there was a full string was externalized and taped to his lower abdomen  The bladder was then drained with cystoscopy sheath     I was present for the entire procedure and A qualified resident physician was not available    Patient Disposition:  PACU  and extubated and stable      SIGNATURE: Beny Bethea MD  DATE: June 9, 2022  TIME: 1:23 PM

## 2022-06-09 NOTE — ASSESSMENT & PLAN NOTE
Urology not planning any intervention - will  Reassess after receiving antibiotics for 2-3 days   will order diet   thankfully  No further fevers since last night, still tachycardic so will give more IV fluids, white cell count trended down to normal

## 2022-06-09 NOTE — PLAN OF CARE
Problem: Potential for Falls  Goal: Patient will remain free of falls  Description: INTERVENTIONS:  - Educate patient/family on patient safety including physical limitations  - Instruct patient to call for assistance with activity   - Consult OT/PT to assist with strengthening/mobility   - Keep Call bell within reach  - Keep bed low and locked with side rails adjusted as appropriate  - Keep care items and personal belongings within reach  - Initiate and maintain comfort rounds  - Make Fall Risk Sign visible to staff  - Offer Toileting every 2 Hours, in advance of need  - Initiate/Maintain alarm  - Obtain necessary fall risk management equipment  - Apply yellow socks and bracelet for high fall risk patients  - Consider moving patient to room near nurses station  Outcome: Progressing     Problem: MOBILITY - ADULT  Goal: Maintain or return to baseline ADL function  Description: INTERVENTIONS:  -  Assess patient's ability to carry out ADLs; assess patient's baseline for ADL function and identify physical deficits which impact ability to perform ADLs (bathing, care of mouth/teeth, toileting, grooming, dressing, etc )  - Assess/evaluate cause of self-care deficits   - Assess range of motion  - Assess patient's mobility; develop plan if impaired  - Assess patient's need for assistive devices and provide as appropriate  - Encourage maximum independence but intervene and supervise when necessary  - Involve family in performance of ADLs  - Assess for home care needs following discharge   - Consider OT consult to assist with ADL evaluation and planning for discharge  - Provide patient education as appropriate  Outcome: Progressing  Goal: Maintains/Returns to pre admission functional level  Description: INTERVENTIONS:  - Perform BMAT or MOVE assessment daily    - Set and communicate daily mobility goal to care team and patient/family/caregiver     - Collaborate with rehabilitation services on mobility goals if consulted  - Reposition patient every 2 hours  - Stand patient 3 times a day  - Ambulate patient 3 times a day  - Out of bed to chair 3 times a day   - Out of bed for meals 3 times a day  - Out of bed for toileting  - Record patient progress and toleration of activity level   Outcome: Progressing     Problem: Nutrition/Hydration-ADULT  Goal: Nutrient/Hydration intake appropriate for improving, restoring or maintaining nutritional needs  Description: Monitor and assess patient's nutrition/hydration status for malnutrition  Collaborate with interdisciplinary team and initiate plan and interventions as ordered  Monitor patient's weight and dietary intake as ordered or per policy  Utilize nutrition screening tool and intervene as necessary  Determine patient's food preferences and provide high-protein, high-caloric foods as appropriate       INTERVENTIONS:  - Monitor oral intake, urinary output, labs, and treatment plans  - Assess nutrition and hydration status and recommend course of action  - Evaluate amount of meals eaten  - Assist patient with eating if necessary   - Allow adequate time for meals  - Recommend/ encourage appropriate diets, oral nutritional supplements, and vitamin/mineral supplements  - Order, calculate, and assess calorie counts as needed  - Recommend, monitor, and adjust tube feedings and TPN/PPN based on assessed needs  - Assess need for intravenous fluids  - Provide specific nutrition/hydration education as appropriate  - Include patient/family/caregiver in decisions related to nutrition  Outcome: Progressing

## 2022-06-09 NOTE — DISCHARGE INSTRUCTIONS
Expect to see blood in the urine, and to experience urgency/frequency/burning with urination and dribbling  This is normal after urological procedures  Is also normal to experience some nausea after these procedures  Go back your regular diet carefully  It is normal to feel pain in the kidney when urinating and when the bladder is filling due to urine refluxing up to the kidney because of the open stent  The stent is necessary to keep the ureter open to allow clots and swelling to resolve and to allow the kidney to drain properly after instrumentation  Some stones may pass around the stent, but most stones pass after the stent is removed  The presence of the stent makes the ureter wider while it is in and after has been removed, allowing passage of larger fragments  Keep a dressing over the nephrostomy tube site until it stops leaking  After that simply apply Neosporin every now and then to the wound  That will close on its own  Call for fever greater that 101 5, inability to urinate, prolonged nausea and vomiting, or severe pain not relieved by pain medications  Larri Nohemy Keep stent string taped- if it becomes dislodged or gets pulled out early, that is OK- simply remove it completely by pulling on string until it is completely out  No driving/operating machinery for 24 hours, and while taking narcotics  Take over the counter remedy of choice to avoid constipation  Drink plenty of fluids

## 2022-06-09 NOTE — OR NURSING
As per patient's request, preop nurse called mother to provide surgical update   Preop nurse explained that patient went back to the operating room later than anticipated and provide reassurance

## 2022-06-09 NOTE — ASSESSMENT & PLAN NOTE
Present at outside campus   Suspect source of UTI  Noted left nephrostomy tube and left ureteral stent  Noted multiple E coli UTI and also previous E coli bacteremia  Also noted covid positive status as of 5/31>> no supplemental oxygen requirement  Leukocytosis noted  Received IV fluids and cefepime at outside [facility] and transferred for Urology evaluation  PLAN  · Continue current abx   · Await urology recs over next 24-48 hours   ·  clinically improved however concern for MDRO  Unclear if this is a actual pathogen since the patient has improved much    Will consult ID for clarification for now continue current antibiotics if patient has a fever decompensates becomes hypotensive or tachycardic or more are well than will need toescalate to ertapenem and vancomycin

## 2022-06-09 NOTE — RESTORATIVE TECHNICIAN NOTE
Restorative Technician Note      Patient Name: Chelsea Eid     Restorative Tech Visit Date: 6/9/2022  Note Type: Mobility  Patient Position Upon Consult: Other (Comment) (supine in transport stretcher in hallway)  Activity Performed: Ambulated  Assistive Device: Standard walker (Ax1)  Patient Position at End of Consult: Supine;  All needs within [facility]

## 2022-06-09 NOTE — TELEPHONE ENCOUNTER
----- Message from John Delgado MD sent at 6/9/2022  1:29 PM EDT -----  Please cancel upcoming surgical cases with Dr Mina Spaulding underwent cystoscopy left ureteroscopy laser lithotripsy by me and his nephrostomy tube was removed as well  He has a left ureteral stent that needs to be removed by pulling on the string next week  Please call to arrange  Follow-up after that 6 months with a renal ultrasound  Order has been placed

## 2022-06-09 NOTE — ANESTHESIA POSTPROCEDURE EVALUATION
Post-Op Assessment Note    CV Status:  Stable  Pain Score: 0    Pain management: adequate     Mental Status:  Alert and awake   Hydration Status:  Euvolemic   PONV Controlled:  Controlled   Airway Patency:  Patent      Post Op Vitals Reviewed: Yes      Staff: Anesthesiologist, CRNA         No complications documented      BP   114/57   Temp   98 7   Pulse 88   Resp   18   SpO2  99

## 2022-06-09 NOTE — PROGRESS NOTES
Progress Note - urology  Remingtno Phlegm 55 y o  male MRN: [id number]  Unit/Bed#: [facility] 804-01 Encounter: [id number]    Assessment& Plan:    Sepsis most likely from  source:  -patient with a history of nephrolithiasis status post ureteral stent placement on 04/22/2022 and re-presented due to additional episode of sepsis now status post left PCN placement on 05/10/2022   -patient transferred from rehab facility due to fever of 100 1 to [address] for further evaluation  -CT scan on admission revealed subcentimeter right medulla and pole and right renal lower pole nonobstructing calculi  Atrophic 6 cm right kidney  Small 11 cm left kidney  Left nephroureteral stent in addition to left posterior approach percutaneous nephrostomy tube  No left-sided hydronephrosis  Few scattered subcentimeter left renal angle nonobstructing calculi  2 mm calculus along the right dependent portion of the bladder  This could represent previously identified left mid ureteral calculus  -patient has received ceftriaxone, urine culture revealing Gram-negative rods, sensitivities pending  Blood cultures negative after 48 hours  -plan for cystoscopy ureteroscopy holmium laser lithotripsy basket extraction retrograde pyelogram ureteral stent insertion on the left with removal of left nephrostomy tube today   -consent signed at bedside, placed in patient's chart, discussed procedure in depth discussed risks and benefits of procedure including bleeding infection damage to nearby structures such kidney ureter bladder need for additional stone procedures  -patient agreeable to moving forward at this time    NPO, proceed to OR    Subjective/Objective    Chief Complaint:  None    Subjective:   Patient sitting comfortably in bed in no acute distress denying any nausea, vomiting, fevers, chills, flank pain or abdominal pain  Denies any discomfort with nephrostomy tube reports nephrostomy tube with good urinary output    He denies any additional complaints  He is ready to have his nephrostomy tube and ureteral stent removed today  Objective:     Blood pressure 132/83, pulse (!) 107, temperature 98 °F (36 7 °C), resp  rate 15, height 5' 6" (1 676 m), weight (!) 145 kg (319 lb 10 7 oz), SpO2 (!) 89 %  ,Body mass index is 51 6 kg/m²  Intake/Output Summary (Last 24 hours) at 6/9/2022 1005  Last data filed at 6/9/2022 2496  Gross per 24 hour   Intake 180 ml   Output 1200 ml   Net -1020 ml       Invasive Devices  Report    Peripheral Intravenous Line  Duration           Peripheral IV 06/06/22 Left Forearm 2 days          Drain  Duration           Nephrostomy Left 8 Fr  29 days            Physical Exam  Constitutional:       General: He is not in acute distress  Appearance: He is obese  He is not ill-appearing, toxic-appearing or diaphoretic  HENT:      Head: Normocephalic and atraumatic  Right Ear: External ear normal       Nose: Nose normal       Mouth/Throat:      Pharynx: Oropharynx is clear  Eyes:      General: No scleral icterus  Conjunctiva/sclera: Conjunctivae normal    Cardiovascular:      Rate and Rhythm: Normal rate and regular rhythm  Heart sounds: No murmur heard  No friction rub  No gallop  Pulmonary:      Effort: Pulmonary effort is normal  No respiratory distress  Breath sounds: No wheezing, rhonchi or rales  Abdominal:      General: Bowel sounds are normal  There is no distension  Tenderness: There is no abdominal tenderness  Comments: Nephrostomy tube in place draining clear yellow urine   Musculoskeletal:         General: Normal range of motion  Cervical back: Normal range of motion  Skin:     General: Skin is warm and dry  Neurological:      General: No focal deficit present  Mental Status: He is alert  Psychiatric:         Mood and Affect: Mood normal          Behavior: Behavior normal          Thought Content:  Thought content normal          Judgment: Judgment normal            Lab, Imaging and other studies:I have personally reviewed pertinent lab results      Lab Results   Component Value Date    WBC 8 34 06/09/2022    HGB 8 9 (L) 06/09/2022    HCT 30 5 (L) 06/09/2022    MCV 97 06/09/2022     06/09/2022     Lab Results   Component Value Date    SODIUM 141 06/09/2022    K 3 4 (L) 06/09/2022     06/09/2022    CO2 22 06/09/2022    BUN 25 06/09/2022    CREATININE 1 72 (H) 06/09/2022    GLUC 108 06/09/2022    CALCIUM 9 6 06/09/2022       VTE Pharmacologic Prophylaxis: Heparin  VTE Mechanical Prophylaxis: sequential compression device      Osman Cook PA-C

## 2022-06-09 NOTE — ASSESSMENT & PLAN NOTE
Chief complaint:   Chief Complaint   Patient presents with   • Office Visit     f/u after ER visit        Vitals:  Visit Vitals  /72   Pulse 91   Temp 98.6 °F (37 °C) (Temporal)   Resp 20   Ht 5' 4\" (1.626 m)   Wt 56.2 kg (123 lb 14.4 oz)   SpO2 97%   BMI 21.27 kg/m²       HISTORY OF PRESENT ILLNESS     Nevaeh is an 85 year old female seen today for evaluation/follow up after having a globus sensation in her throat after eating some raw onions. She was able to swallow fluids without difficultly and breath without difficulty. She was evaluated in the ER and given Carafate which she states she never took because the bottle was child proof and she could not open it. She went to bed that evening with the persistent sensation that the onions were still lodged in her throat but woke up the next morning asymptomatic. She has had no concerns since the incident. She is eating and drinking anything she wants without difficulty.       Other significant problems:  Patient Active Problem List    Diagnosis Date Noted   • Macular hole left eye 11/11/2013     Priority: Medium   • Chronic insomnia 09/04/2015     Priority: Low   • Neck pain 09/04/2015     Priority: Low   • Cataract 01/22/2015     Priority: Low   • Generalized anxiety disorder 05/06/2012     Priority: Low       PAST MEDICAL, FAMILY AND SOCIAL HISTORY     Medications:  Current Outpatient Medications   Medication   • Zirgan 0.15 % ophthalmic gel   • timolol PF (TIMOPTIC OCUDOSE) 0.5 % ophthalmic solution   • latanoprost (XALATAN) 0.005 % ophthalmic solution   • latanoprost (XALATAN) 0.005 % ophthalmic solution   • dorzolamide (TRUSOPT) 2 % ophthalmic solution   • Flax Oil-Fish Oil-Borage Oil (FISH OIL-FLAX OIL-BORAGE OIL) Cap   • Multiple Vitamins-Minerals (OCUVITE PRESERVISION PO)   • TURMERIC PO   • DISPENSE   • Acetaminophen (TYLENOL PO)   • lactobacillus acidophilus (BACID)   • Ascorbic Acid (VITAMIN C PO)   • CALCIUM PO   • CRANBERRY PO   • Cholecalciferol  Lifestyle modification (VITAMIN D-3 PO)   • COENZYME Q-10 PO   • Multiple Vitamin (MULTIVITAMINS PO)   • GLUCOSAMINE-CHONDROITIN PO   • melatonin 3 MG TABS   • aspirin 81 MG tablet   • ECHINACEA PO     No current facility-administered medications for this visit.       Allergies:  ALLERGIES:   Allergen Reactions   • Ciprofloxacin Other (See Comments)     Severe leg pain   • Alphagan P VISUAL DISTURBANCE   • Buspirone ANXIETY     Worsening anxiety   • Celebrex [Celecoxib] SWELLING     Swelling of face, thick tongue, tingling  Throat swelling    • Cephalexin SWELLING     Swelling of face, thick tongue   • Clindamycin Other (See Comments)     Caused C-dif   • Erythromycin NAUSEA     Stomach upset    • Methazolamide Other (See Comments)     Head felt hollow   • Penicillins RASH   • Sertraline Hcl DIARRHEA       Past Medical  History/Surgeries:  Past Medical History:   Diagnosis Date   • Abnormal CT scan, pancreas or bile duct 10/7/2014   • Anxiety     with some insomnia   • Bipolar I disorder, most recent episode (or current) unspecified 3/21/2014   • Chest pain 5/6/2012   • COVID-19 virus infection 4/13/2020   • COVID-19 virus infection 4/13/2020   • Essential (primary) hypertension     with an element of white coat hypertension, has been intolerant of  atenolol and lisinipril   • Family history of colon cancer 10/7/2014    Father and sister had colon cancer.   • Glaucoma    • Manic behavior (CMS/HCC) 3/20/2014   • Menopausal state 7/2/2013   • Osteoarthritis    • Paranoia (CMS/HCC) 3/20/2014   • Pneumonia    • Urinary incontinence        Past Surgical History:   Procedure Laterality Date   • Appendectomy      Pt reports incidental with one of her surgeries   • Cholecystectomy  8/1/1986   • Colonoscopy  4/30/15   • Colonoscopy diagnostic  12/01/2009    No abnormal findings.  Due to family hx recommend repeat 2014   • Cystocele repair     • Endoscopic retro cholangiopancreatography, w/rem foreign  01/13/15    Stent removal. Dr. Pardo.     • Ercp,insert stent,biliary/panc  11/11/14    rpt 1/2015,CBD stent placement. Brushing>negative for malignant cells,Dr. Pardo.    • Eye surgery     • Hysterectomy  1981    Without BSO.  For fibroids   • Laser surgery of eye      Due to glaucoma   • Removal gallbladder     • Repair of rectocele     • Service to gastroenterology     • Xray mammogram screening bilat  06/25/2012       Family History:  Family History   Problem Relation Age of Onset   • Cancer Father         prostate and possible colon   • Diabetes Mother    • Hypertension Mother    • Cancer Sister         colon / ovarian   • Cancer Brother    • Scoliosis Daughter        Social History:  Social History     Tobacco Use   • Smoking status: Never Smoker   • Smokeless tobacco: Never Used   Substance Use Topics   • Alcohol use: Never     Alcohol/week: 1.0 standard drink     Types: 1 Standard drinks or equivalent per week       REVIEW OF SYSTEMS     Review of Systems   HENT: Negative for sore throat, trouble swallowing and voice change.    Respiratory: Negative for cough, choking and shortness of breath.    See HPI    PHYSICAL EXAM     Physical Exam  Vitals reviewed.   Constitutional:       General: She is not in acute distress.     Appearance: Normal appearance.   HENT:      Mouth/Throat:      Mouth: Mucous membranes are moist.      Pharynx: Oropharynx is clear. No oropharyngeal exudate or posterior oropharyngeal erythema.   Musculoskeletal:      Cervical back: Neck supple. No tenderness.   Lymphadenopathy:      Cervical: No cervical adenopathy.   Neurological:      Mental Status: She is alert.         ASSESSMENT/PLAN     1. Globus sensation      ER visit note reviewed. Problem resolved. Encourage Nevaeh to eat slowly, smaller bites, chew thoroughly, and drink sips of liquid between every few bites of food. Monitor. Follow up prn.

## 2022-06-09 NOTE — PHYSICAL THERAPY NOTE
Physical Therapy Cancellation Note    The pt  Is off of the floor for a procedure  Will continue to follow      Krystal Matute PTA

## 2022-06-09 NOTE — PROGRESS NOTES
[facility]  Progress Note - Deborah Justice 1965, 55 y o  male MRN: [id number]  Unit/Bed#: [facility] 804-01 Encounter: [id number]  Primary Care Provider: Marcelina Beltran   Date and time admitted to hospital: 6/7/2022  3:45 PM    [address]  Not requiring supplemental oxygen  Noted mild shortness of breath after fever last night  Tested positive on 05/31, last day of isolation 6/9 - as  perprior documentation  Unclear what the plan was from admission however patient is not on any isolation now  This may be because he is vaccinated and does not need to be isolated  Will not start any isolation now    Obstructive uropathy  Assessment & Plan  Urology not planning any intervention - will  Reassess after receiving antibiotics for 2-3 days   will order diet   thankfully  No further fevers since last night, still tachycardic so will give more IV fluids, white cell count trended down to normal       Morbid obesity  Assessment & Plan  Lifestyle modification    Chronic kidney disease, stage 3 [facility])  Assessment & Plan  Lab Results   Component Value Date    EGFR 43 06/09/2022    EGFR 39 06/08/2022    EGFR 43 06/07/2022    CREATININE 1 72 (H) 06/09/2022    CREATININE 1 86 (H) 06/08/2022    CREATININE 1 74 (H) 06/07/2022     Baseline creatinine seems between 1 4 -1 7  Currently close to baseline  Trend BMP  Slightly above now   Will continue to monitor     * Sepsis [facility])  Assessment & Plan  Present at outside campus   Suspect source of UTI  Noted left nephrostomy tube and left ureteral stent  Noted multiple E coli UTI and also previous E coli bacteremia  Also noted covid positive status as of 5/31>> no supplemental oxygen requirement  Leukocytosis noted  Received IV fluids and cefepime at outside [facility] and transferred for Urology evaluation      PLAN  · Continue current abx   · Await urology recs over next 24-48 hours   ·  clinically improved however concern for MDRO   Unclear if this is a actual pathogen since the patient has improved much  Will consult ID for clarification for now continue current antibiotics if patient has a fever decompensates becomes hypotensive or tachycardic or more are well than will need toescalate to ertapenem and vancomycin            VTE Pharmacologic Prophylaxis:   Moderate Risk (Score 3-4) - Pharmacological DVT Prophylaxis Ordered: heparin  Patient Centered Rounds: I performed bedside rounds with nursing staff today  Discussions with Specialists or Other Care Team Provider: Discussed with nursing     Education and Discussions with Family / Patient: will udpate family   Time Spent for Care: 30 minutes  More than 50% of total time spent on counseling and coordination of care as described above  Current Length of Stay: 2 day(s)  Current Patient Status: Inpatient   Certification Statement: The patient will continue to require additional inpatient hospital stay due to sepsis  IVABX  Discharge Plan: Anticipate discharge in 48-72 hrs to home  Code Status: Level 1 - Full Code    Subjective:   Patient seen and examined   Feeling "good"    Objective:     Vitals:   Temp (24hrs), Av 1 °F (36 7 °C), Min:97 2 °F (36 2 °C), Max:98 7 °F (37 1 °C)    Temp:  [97 2 °F (36 2 °C)-98 7 °F (37 1 °C)] 97 8 °F (36 6 °C)  HR:  [] 93  Resp:  [15-28] 23  BP: (114-133)/(57-83) 116/70  SpO2:  [89 %-100 %] 92 %  Body mass index is 51 6 kg/m²  Input and Output Summary (last 24 hours): Intake/Output Summary (Last 24 hours) at 2022 1724  Last data filed at 2022 1332  Gross per 24 hour   Intake 1000 ml   Output 950 ml   Net 50 ml       Physical Exam:   Physical Exam  Constitutional:       General: He is not in acute distress  Appearance: He is not ill-appearing, toxic-appearing or diaphoretic  HENT:      Head: Normocephalic  Eyes:      Pupils: Pupils are equal, round, and reactive to light     Pulmonary:      Effort: No respiratory distress  Breath sounds: No stridor  No rhonchi or rales  Chest:      Chest wall: No tenderness  Abdominal:      General: There is no distension  Palpations: There is no mass  Tenderness: There is no abdominal tenderness  There is no left CVA tenderness or guarding  Musculoskeletal:         General: No swelling, tenderness, deformity or signs of injury  Right lower leg: No edema  Skin:     Coloration: Skin is pale  Skin is not jaundiced  Findings: No erythema, lesion or rash  Neurological:      Mental Status: He is alert  Cranial Nerves: No cranial nerve deficit  Sensory: No sensory deficit  Motor: No weakness  Coordination: Coordination normal       Deep Tendon Reflexes: Reflexes normal    Psychiatric:         Mood and Affect: Mood normal           Additional Data:     Labs:  Results from last 7 days   Lab Units 06/09/22  0441   WBC Thousand/uL 8 34   HEMOGLOBIN g/dL 8 9*   HEMATOCRIT % 30 5*   PLATELETS Thousands/uL 311   NEUTROS PCT % 64   LYMPHS PCT % 16   MONOS PCT % 7   EOS PCT % 11*     Results from last 7 days   Lab Units 06/09/22  0441   SODIUM mmol/L 141   POTASSIUM mmol/L 3 4*   CHLORIDE mmol/L 107   CO2 mmol/L 22   BUN mg/dL 25   CREATININE mg/dL 1 72*   ANION GAP mmol/L 12   CALCIUM mg/dL 9 6   ALBUMIN g/dL 2 0*   TOTAL BILIRUBIN mg/dL 0 25   ALK PHOS U/L 50   ALT U/L 19   AST U/L 19   GLUCOSE RANDOM mg/dL 108                 Results from last 7 days   Lab Units 06/08/22  0635 06/06/22  2140   LACTIC ACID mmol/L  --  1 1   PROCALCITONIN ng/ml 0 66* 0 30*       Lines/Drains:  Invasive Devices  Report    Peripheral Intravenous Line  Duration           Peripheral IV 06/09/22 Right Forearm <1 day                      Imaging: No pertinent imaging reviewed  Recent Cultures (last 7 days):   Results from last 7 days   Lab Units 06/07/22  0514 06/06/22  2110 06/06/22  0000   BLOOD CULTURE  No Growth at 48 hrs  --  No Growth at 48 hrs  URINE CULTURE   --  80,000-89,000 cfu/ml Citrobacter amalonaticus complex*  80,000-89,000 cfu/ml Staphylococcus coagulase negative*  --        Last 24 Hours Medication List:   Current Facility-Administered Medications   Medication Dose Route Frequency Provider Last Rate    acetaminophen  650 mg Oral Q6H PRN Richar Jerez MD      allopurinol  100 mg Oral Daily Richar Jerez MD      amLODIPine  10 mg Oral Daily Richar Jerez MD      calcium carbonate  1,000 mg Oral BID PRN Richar Jerez MD      cefTRIAXone  1,000 mg Intravenous Q24H Richar Jerez MD 1,000 mg (06/09/22 1632)    cholecalciferol  400 Units Oral Daily Richar Jerez MD      heparin (porcine)  7,500 Units Subcutaneous [facility] Richar Jerez MD      metoprolol succinate  25 mg Oral Daily Richar Jerez MD      morphine injection  4 mg Intravenous Q3H PRN Richar Jerez MD      ondansetron  4 mg Oral Q6H PRN Richar Jerez MD      phenazopyridine  100 mg Oral TID PRN Richar Jerez MD      senna-docusate sodium  2 tablet Oral BID PRN Richar Jerez MD      sodium chloride (PF)  10 mL Intracatheter Daily Richar Jerez MD      tamsulosin  0 4 mg Oral After Yohannes Omalley MD      traMADol  50 mg Oral Q6H PRN Richar Jerez MD          Today, Patient Was Seen By: Gardenia Sever, MD    **Please Note: This note may have been constructed using a voice recognition system  **

## 2022-06-09 NOTE — INTERVAL H&P NOTE
H&P reviewed  After examining the patient I find no changes in the patients condition since the H&P had been written      Vitals:    06/09/22 0724   BP: 132/83   Pulse: (!) 107   Resp: 15   Temp: 98 °F (36 7 °C)   SpO2: (!) 89%

## 2022-06-09 NOTE — ARC ADMISSION
[facility] - will continue to follow at this time, monitoring for medical stability and functional progress  Will update as able

## 2022-06-10 LAB
ALBUMIN SERPL BCP-MCNC: 2.3 G/DL (ref 3.5–5)
ALP SERPL-CCNC: 51 U/L (ref 46–116)
ALT SERPL W P-5'-P-CCNC: 15 U/L (ref 12–78)
ANION GAP SERPL CALCULATED.3IONS-SCNC: 6 MMOL/L (ref 4–13)
AST SERPL W P-5'-P-CCNC: 14 U/L (ref 5–45)
BASOPHILS # BLD AUTO: 0.02 THOUSANDS/ΜL (ref 0–0.1)
BASOPHILS NFR BLD AUTO: 0 % (ref 0–1)
BILIRUB SERPL-MCNC: 0.17 MG/DL (ref 0.2–1)
BUN SERPL-MCNC: 30 MG/DL (ref 5–25)
CALCIUM ALBUM COR SERPL-MCNC: 11.1 MG/DL (ref 8.3–10.1)
CALCIUM SERPL-MCNC: 9.7 MG/DL (ref 8.3–10.1)
CHLORIDE SERPL-SCNC: 109 MMOL/L (ref 100–108)
CO2 SERPL-SCNC: 25 MMOL/L (ref 21–32)
CREAT SERPL-MCNC: 2.12 MG/DL (ref 0.6–1.3)
EOSINOPHIL # BLD AUTO: 0.03 THOUSAND/ΜL (ref 0–0.61)
EOSINOPHIL NFR BLD AUTO: 0 % (ref 0–6)
ERYTHROCYTE [DISTWIDTH] IN BLOOD BY AUTOMATED COUNT: 15 % (ref 11.6–15.1)
GFR SERPL CREATININE-BSD FRML MDRD: 34 ML/MIN/1.73SQ M
GLUCOSE SERPL-MCNC: 118 MG/DL (ref 65–140)
HCT VFR BLD AUTO: 30.4 % (ref 36.5–49.3)
HGB BLD-MCNC: 9.4 G/DL (ref 12–17)
IMM GRANULOCYTES # BLD AUTO: 0.12 THOUSAND/UL (ref 0–0.2)
IMM GRANULOCYTES NFR BLD AUTO: 1 % (ref 0–2)
LYMPHOCYTES # BLD AUTO: 1.88 THOUSANDS/ΜL (ref 0.6–4.47)
LYMPHOCYTES NFR BLD AUTO: 16 % (ref 14–44)
MCH RBC QN AUTO: 28.8 PG (ref 26.8–34.3)
MCHC RBC AUTO-ENTMCNC: 30.9 G/DL (ref 31.4–37.4)
MCV RBC AUTO: 93 FL (ref 82–98)
MONOCYTES # BLD AUTO: 0.64 THOUSAND/ΜL (ref 0.17–1.22)
MONOCYTES NFR BLD AUTO: 5 % (ref 4–12)
NEUTROPHILS # BLD AUTO: 9.37 THOUSANDS/ΜL (ref 1.85–7.62)
NEUTS SEG NFR BLD AUTO: 78 % (ref 43–75)
NRBC BLD AUTO-RTO: 0 /100 WBCS
PLATELET # BLD AUTO: 351 THOUSANDS/UL (ref 149–390)
PMV BLD AUTO: 9.3 FL (ref 8.9–12.7)
POTASSIUM SERPL-SCNC: 3.6 MMOL/L (ref 3.5–5.3)
PROT SERPL-MCNC: 8.2 G/DL (ref 6.4–8.2)
RBC # BLD AUTO: 3.26 MILLION/UL (ref 3.88–5.62)
SODIUM SERPL-SCNC: 140 MMOL/L (ref 136–145)
WBC # BLD AUTO: 12.06 THOUSAND/UL (ref 4.31–10.16)

## 2022-06-10 PROCEDURE — 97530 THERAPEUTIC ACTIVITIES: CPT

## 2022-06-10 PROCEDURE — 85025 COMPLETE CBC W/AUTO DIFF WBC: CPT | Performed by: INTERNAL MEDICINE

## 2022-06-10 PROCEDURE — 99255 IP/OBS CONSLTJ NEW/EST HI 80: CPT | Performed by: INTERNAL MEDICINE

## 2022-06-10 PROCEDURE — 99232 SBSQ HOSP IP/OBS MODERATE 35: CPT | Performed by: INTERNAL MEDICINE

## 2022-06-10 PROCEDURE — 97110 THERAPEUTIC EXERCISES: CPT

## 2022-06-10 PROCEDURE — 80053 COMPREHEN METABOLIC PANEL: CPT | Performed by: INTERNAL MEDICINE

## 2022-06-10 PROCEDURE — 99232 SBSQ HOSP IP/OBS MODERATE 35: CPT | Performed by: PHYSICIAN ASSISTANT

## 2022-06-10 PROCEDURE — 97535 SELF CARE MNGMENT TRAINING: CPT

## 2022-06-10 PROCEDURE — 97116 GAIT TRAINING THERAPY: CPT

## 2022-06-10 RX ORDER — CIPROFLOXACIN 500 MG/1
500 TABLET, FILM COATED ORAL EVERY 12 HOURS SCHEDULED
Status: COMPLETED | OUTPATIENT
Start: 2022-06-10 | End: 2022-06-16

## 2022-06-10 RX ADMIN — AMLODIPINE BESYLATE 10 MG: 10 TABLET ORAL at 09:37

## 2022-06-10 RX ADMIN — CIPROFLOXACIN HYDROCHLORIDE 500 MG: 500 TABLET, FILM COATED ORAL at 13:56

## 2022-06-10 RX ADMIN — ACETAMINOPHEN 650 MG: 325 TABLET, FILM COATED ORAL at 13:57

## 2022-06-10 RX ADMIN — HEPARIN SODIUM 7500 UNITS: 5000 INJECTION INTRAVENOUS; SUBCUTANEOUS at 05:46

## 2022-06-10 RX ADMIN — HEPARIN SODIUM 7500 UNITS: 5000 INJECTION INTRAVENOUS; SUBCUTANEOUS at 21:49

## 2022-06-10 RX ADMIN — CHOLECALCIFEROL TAB 10 MCG (400 UNIT) 400 UNITS: 10 TAB at 09:37

## 2022-06-10 RX ADMIN — METOPROLOL SUCCINATE 25 MG: 25 TABLET, FILM COATED, EXTENDED RELEASE ORAL at 09:37

## 2022-06-10 RX ADMIN — TAMSULOSIN HYDROCHLORIDE 0.4 MG: 0.4 CAPSULE ORAL at 17:28

## 2022-06-10 RX ADMIN — HEPARIN SODIUM 7500 UNITS: 5000 INJECTION INTRAVENOUS; SUBCUTANEOUS at 13:56

## 2022-06-10 RX ADMIN — ALLOPURINOL 100 MG: 100 TABLET ORAL at 09:37

## 2022-06-10 RX ADMIN — CIPROFLOXACIN HYDROCHLORIDE 500 MG: 500 TABLET, FILM COATED ORAL at 21:51

## 2022-06-10 NOTE — ASSESSMENT & PLAN NOTE
Present at outside campus   Suspect source of UTI  Noted left nephrostomy tube and left ureteral stent  Noted multiple E coli UTI and also previous E coli bacteremia  Also noted covid positive status as of 5/31>> no supplemental oxygen requirement  Leukocytosis noted  Received IV fluids and cefepime at outside [facility] and transferred for Urology evaluation  S/p OR with Urology yesterday for cystoscopy ureteroscopy holmium laser lithotripsy basket extraction and ureteral stent insertion with a string removal of left-sided nephrostomy tube

## 2022-06-10 NOTE — TELEPHONE ENCOUNTER
Pt remains inpatient at this time  Will continue to monitor for discharge to arrange for follow up appointment

## 2022-06-10 NOTE — OCCUPATIONAL THERAPY NOTE
Occupational Therapy Progress Note     Patient Name: Karyn Harvey  Today's Date: 6/10/2022  Problem List  Principal Problem:    Sepsis [facility])  Active Problems:    Chronic kidney disease, stage 3 ([address] )    Essential hypertension    Morbid obesity    Obstructive uropathy    COVID              06/10/22 0931   OT Last Visit   OT Visit Date 06/10/22   Note Type   Note Type Treatment   Restrictions/Precautions   Weight Bearing Precautions Per Order No   Other Precautions Pain; Fall Risk   Lifestyle   Autonomy PTA, pt reports being I with ADLs, IADLs, fnxl mobility, (+)    Reciprocal Relationships Pt reports local father but limited support available, unable to provide physical assistance   Service to Others FTE - works at a [employer] moving [employer]   [address] 4-wheeling   Pain Assessment   Pain Assessment Tool 0-10   Pain Score No Pain   ADL   Where Assessed Edge of bed   Equipment Provided Reacher;Sock aid;Long-handled shoe horn   Eating Assistance 7  Independent   Grooming Assistance 5  Supervision/Setup   [address]  Minimal Assistance   UB Bathing Deficit Setup;Supervision/safety; Increased time to complete;Right arm   UB Bathing Comments Pt requires assistance with L armpit/R armpit and back due to decreased RUE ROM  LB Bathing Assistance 3  Moderate Assistance   LB Bathing Deficit Setup; Increased time to complete;Supervision/safety; Buttocks; Left lower leg including foot;Right lower leg including foot   UB Dressing Assistance 5  Supervision/Setup   UB Dressing Deficit Setup; Fasteners   LB Dressing Assistance 3  Moderate Assistance   LB Dressing Deficit Use of adaptive equipment; Don/doff R sock; Don/doff L sock   LB Dressing Comments Pt attempted donning of B/L shoes, however, R foot too swollen at this time  Pt completes LB donning of socks at min A level with use of sock jose/reacher   Pt with limited L knee flexion decreasing independence and anticipated mod A with donning of underwear/shorts  Toileting Assistance  4  Minimal Assistance   Toileting Deficit Setup; Increased time to complete;Supervison/safety;Use of bedpan/urinal setup  (Use of urinal seated )   Functional Standing Tolerance   Time x2 min   Activity LB bathing routine  Pt provided with frequent rest breaks (seated/standing) due to SOB, O2 sats stable on RA  Pt with decreased endurance  Comments Unilateral support of RW  Bed Mobility   Sit to Supine 5  Supervision   Additional items Bedrails; Increased time required   Additional Comments Pt greeted performing functional mobility with restorative staff in hallway and left supine in bed at end of session  All needs met and call bell nearby  Transfers   Sit to Stand 5  Supervision   Additional items Increased time required;Verbal cues   Stand to Sit 5  Supervision   Additional items Increased time required;Verbal cues   Additional Comments with RW  VCs for safety  Functional Mobility   Functional Mobility 5  Supervision   Additional Comments S with RW- Household distances  Additional items Rolling walker   Cognition   Overall Cognitive Status WFL   Arousal/Participation Alert; Responsive; Cooperative   Attention Attends with cues to redirect   Orientation Level Oriented X4   Memory Within functional limits   Following Commands Follows all commands and directions without difficulty   Comments Pt very pleasant and cooperative during OT session  Pt requires cues to redirect  Activity Tolerance   Activity Tolerance Patient limited by fatigue   Medical Staff Made Aware RN cleared/updated  Assessment   Assessment Pt greeted in hallway performing functional mobility with restorative staff for OT treatment on 6/10/2022 focusing on maximizing independence with ADLs with use of LHAE  Pt s/p "CYSTOSCOPY LEFT URETEROSCOPY/LITHOTRIPSY HOLMIUM LASER, AND EXCHANGE URETERAL STENT URETERAL, REMOVAL NEPHROSTOMY TUBE (Left)" on 6/9/2022   Pt S with bed mobility, S with functional transfers, and S with functional mobility with RW  Pt engages in ADL routine seated on EOB and standing with RW: S with grooming, min A with UB bathing, mod A with LB bathing, S with UB dressing, mod A with LB dressing (use of LHAE), and min A with toileting  Limitations that impact functional performance include decreased ADL status, decreased UE ROM, decreased UE strength, decreased safe judgement during ADLs, decreased endurance, decreased self care transfers and decreased high level ADLs  Occupational performance areas to address ADL retraining, functional transfer training, UE strengthening/ROM, endurance training, cognitive reorientation, Pt/caregiver education, equipment evaluation/education, compensatory technique education, energy conservation and activity engagement   Pt would benefit from continued skilled OT services while in hospital to maximize independence with ADLs  Will continue to follow Pt's goals and progress  Pt would benefit from post acute rehabilitation services upon DC to maximize safety and independence with ADLs and functional tasks of choice  Plan   Treatment Interventions ADL retraining;Functional transfer training;UE strengthening/ROM; Endurance training;Cognitive reorientation;Patient/family training;Equipment evaluation/education; Compensatory technique education; Energy conservation; Activityengagement   Goal Expiration Date 6/22/22   OT Treatment Day 1   OT Frequency 3-5x/wk   Recommendation   OT Discharge Recommendation Post acute rehabilitation services   Equipment Recommended Bedside commode; Hip Kit ($);Shower/Tub chair with back ($);Sock aid ($);Reacher ($)   Commode Type Bariatric (300 lb+)   OT - OK to Discharge Yes   Additional Comments  The patient's raw score on the AM-PAC Daily Activity inpatient short form is 16, standardized score is 35 96, less than 39 4  Patients at this level are likely to benefit from discharge to post-acute rehabilitation services   Please refer to the recommendation of the Occupational Therapist for safe discharge planning     AM-PAC Daily Activity Inpatient   Lower Body Dressing 2   Bathing 2   Toileting 3   Upper Body Dressing 3   Grooming 3   Eating 4   Daily Activity Raw Score 17   Daily Activity Standardized Score (Calc for Raw Score >=11) 37 26   AM-PAC Applied Cognition Inpatient   Following a Speech/Presentation 4   Understanding Ordinary Conversation 4   Taking Medications 4   Remembering Where Things Are Placed or Put Away 4   Remembering List of 4-5 Errands 4   Taking Care of Complicated Tasks 3   Applied Cognition Raw Score 23   Applied Cognition Standardized Score 53 08         Eliza Cash MS, OTR/L

## 2022-06-10 NOTE — PROGRESS NOTES
[address] [facility]  Progress Note - Ellie Wilson 1965, 55 y o  male MRN: [id number]  Unit/Bed#: [facility] 804-01 Encounter: [id number]  Primary Care Provider: Marcelina Iverson   Date and time admitted to hospital: 6/7/2022  3:45 PM    [address]  Not requiring supplemental oxygen  Noted mild shortness of breath after fever last night  Tested positive on 05/31, last day of isolation 6/9 - as  perprior documentation  Unclear what the plan was from admission however patient is not on any isolation now  This may be because he is vaccinated and does not need to be isolated  Will not start any isolation now    Obstructive uropathy  Assessment & Plan  Urology not planning any intervention - will  Reassess after receiving antibiotics for 2-3 days   will order diet   thankfully  No further fevers since last night, still tachycardic so will give more IV fluids, white cell count trended down to normal       Morbid obesity  Assessment & Plan  Lifestyle modification    Essential hypertension  Assessment & Plan  BP is 142/82  Will continue current meds       Chronic kidney disease, stage 3 [facility])  Assessment & Plan  Lab Results   Component Value Date    EGFR 34 06/10/2022    EGFR 43 06/09/2022    EGFR 39 06/08/2022    CREATININE 2 12 (H) 06/10/2022    CREATININE 1 72 (H) 06/09/2022    CREATININE 1 86 (H) 06/08/2022     Baseline creatinine seems between 1 4 -1 7  Creatinine trending up  Will discuss with Urology  If continued trend upward will need renal consult         * Sepsis [facility])  Assessment & Plan  Present at outside campus   Suspect source of UTI  Noted left nephrostomy tube and left ureteral stent  Noted multiple E coli UTI and also previous E coli bacteremia  Also noted covid positive status as of 5/31>> no supplemental oxygen requirement  Leukocytosis noted  Received IV fluids and cefepime at outside [facility] and transferred for Urology evaluation      S/p OR with Urology yesterday for cystoscopy ureteroscopy holmium laser lithotripsy basket extraction and ureteral stent insertion with a string removal of left-sided nephrostomy tube  VTE Pharmacologic Prophylaxis:   Moderate Risk (Score 3-4) - Pharmacological DVT Prophylaxis Ordered: heparin  Patient Centered Rounds: I performed bedside rounds with nursing staff today  Discussions with Specialists or Other Care Team Provider: Discussed with Urology - no need for concern wrt creatinine but if continued trending up ? Repeat imaging  Urology will let us know  Education and Discussions with Family / Patient: called father no answer        Time Spent for Care: 30 minutes  More than 50% of total time spent on counseling and coordination of care as described above  Current Length of Stay: 3 day(s)  Current Patient Status: Inpatient   Certification Statement: The patient will continue to require additional inpatient hospital stay due to monitor creatinine  Discharge Plan: Anticipate discharge in 24-48 hrs to home  Code Status: Level 1 - Full Code    Subjective:   Patient seen and examined  Feeling "good"    Objective:     Vitals:   Temp (24hrs), Av 9 °F (36 6 °C), Min:97 3 °F (36 3 °C), Max:98 4 °F (36 9 °C)    Temp:  [97 3 °F (36 3 °C)-98 4 °F (36 9 °C)] 97 3 °F (36 3 °C)  HR:  [] 103  Resp:  [15-20] 20  BP: (101-143)/(57-82) 142/82  SpO2:  [94 %-98 %] 98 %  Body mass index is 51 6 kg/m²  Input and Output Summary (last 24 hours): Intake/Output Summary (Last 24 hours) at 6/10/2022 1802  Last data filed at 6/10/2022 1728  Gross per 24 hour   Intake 170 ml   Output 1940 ml   Net -1770 ml       Physical Exam:   Physical Exam  Constitutional:       General: He is not in acute distress  Appearance: He is not ill-appearing, toxic-appearing or diaphoretic  HENT:      Head: Normocephalic  Eyes:      Pupils: Pupils are equal, round, and reactive to light     Cardiovascular:      Rate and Rhythm: Normal rate  Heart sounds: No murmur heard  No friction rub  No gallop  Pulmonary:      Effort: No respiratory distress  Breath sounds: No stridor  No wheezing, rhonchi or rales  Chest:      Chest wall: No tenderness  Abdominal:      General: Abdomen is flat  There is no distension  Palpations: There is no mass  Tenderness: There is no abdominal tenderness  There is no right CVA tenderness, left CVA tenderness, guarding or rebound  Hernia: No hernia is present  Musculoskeletal:         General: No swelling, tenderness, deformity or signs of injury  Right lower leg: No edema  Left lower leg: No edema  Skin:     Coloration: Skin is not jaundiced or pale  Findings: No bruising, erythema, lesion or rash  Neurological:      General: No focal deficit present  Mental Status: He is alert  Cranial Nerves: No cranial nerve deficit  Sensory: No sensory deficit  Motor: No weakness        Gait: Gait normal    Psychiatric:         Mood and Affect: Mood normal           Additional Data:     Labs:  Results from last 7 days   Lab Units 06/10/22  1100   WBC Thousand/uL 12 06*   HEMOGLOBIN g/dL 9 4*   HEMATOCRIT % 30 4*   PLATELETS Thousands/uL 351   NEUTROS PCT % 78*   LYMPHS PCT % 16   MONOS PCT % 5   EOS PCT % 0     Results from last 7 days   Lab Units 06/10/22  1100   SODIUM mmol/L 140   POTASSIUM mmol/L 3 6   CHLORIDE mmol/L 109*   CO2 mmol/L 25   BUN mg/dL 30*   CREATININE mg/dL 2 12*   ANION GAP mmol/L 6   CALCIUM mg/dL 9 7   ALBUMIN g/dL 2 3*   TOTAL BILIRUBIN mg/dL 0 17*   ALK PHOS U/L 51   ALT U/L 15   AST U/L 14   GLUCOSE RANDOM mg/dL 118                 Results from last 7 days   Lab Units 06/08/22  0635 06/06/22  2140   LACTIC ACID mmol/L  --  1 1   PROCALCITONIN ng/ml 0 66* 0 30*       Lines/Drains:  Invasive Devices  Report    Peripheral Intravenous Line  Duration           Peripheral IV 06/09/22 Right Forearm 1 day Imaging: No pertinent imaging reviewed  Recent Cultures (last 7 days):   Results from last 7 days   Lab Units 06/07/22  0514 06/06/22  2110 06/06/22  0000   BLOOD CULTURE  No Growth at 72 hrs   --  No Growth at 72 hrs  URINE CULTURE   --  80,000-89,000 cfu/ml Citrobacter amalonaticus complex*  80,000-89,000 cfu/ml Staphylococcus coagulase negative*  --        Last 24 Hours Medication List:   Current Facility-Administered Medications   Medication Dose Route Frequency Provider Last Rate    acetaminophen  650 mg Oral Q6H PRN Dianne Orellana MD      allopurinol  100 mg Oral Daily Dianne Orellana MD      amLODIPine  10 mg Oral Daily Dianne Orellana MD      calcium carbonate  1,000 mg Oral BID PRN Dianne Orellana MD      cholecalciferol  400 Units Oral Daily Dianne Orellana MD      ciprofloxacin  500 mg Oral Q12H Nam Jolley MD      heparin (porcine)  7,500 Units Subcutaneous [facility] Dianne Orellana MD      metoprolol succinate  25 mg Oral Daily Dianne Orellana MD      morphine injection  4 mg Intravenous Q3H PRN Dianne Orellana MD      ondansetron  4 mg Oral Q6H PRN Dianne Orellana MD      phenazopyridine  100 mg Oral TID PRN Dianne Orellana MD      senna-docusate sodium  2 tablet Oral BID PRN Dianne Orellana MD      sodium chloride (PF)  10 mL Intracatheter Daily Dianne Orellana MD      tamsulosin  0 4 mg Oral After Jose Raul Mclaughlin MD      traMADol  50 mg Oral Q6H PRN Dianne Orellana MD          Today, Patient Was Seen By: Wally Puente MD    **Please Note: This note may have been constructed using a voice recognition system  **

## 2022-06-10 NOTE — UTILIZATION REVIEW
Continued Stay Review    Date: 6/9/22                          Current Patient Class: inpatient  Current Level of Care: med surg    HPI:56 y o  male initially admitted on 6/7/22  Sepsis, Obstructive Uropathy    Assessment/Plan:  Pt feeling better  No fevers since 6/8, still tachycardic, give more IVF, WBC trended down to normal  Continue IV ABX, await urology recommendations over next 24-48 hrs  Consult ID dt continuation of current abx regimen  Continue to trend bmp and labs, monitor CKD  Continued stay needed for IV ABX      Vital Signs:   Date/Time Temp Pulse Resp BP MAP (mmHg) SpO2 O2 Device   06/10/22 10:55:41 98 °F (36 7 °C) 103 -- -- -- 98 % --   06/10/22 07:19:55 97 7 °F (36 5 °C) 99 17 143/80 101 96 % --   06/09/22 22:26:04 98 1 °F (36 7 °C) 98 15 114/68 83 94 % --   06/09/22 18:06:49 98 4 °F (36 9 °C) 110 Abnormal  20 101/57 72 94 % --   06/09/22 16:44:38 97 8 °F (36 6 °C) 93 23 Abnormal  116/70 85 92 % --   06/09/22 1600 -- -- -- -- -- -- None (Room air)   06/09/22 15:42:57 98 4 °F (36 9 °C) 95 16 132/81 98 98 % --   06/09/22 14:41:36 97 2 °F (36 2 °C) Abnormal  -- 16 133/79 97 -- --   06/09/22 1400 98 7 °F (37 1 °C) 82 28 Abnormal  127/73 97 100 % None (Room air)   06/09/22 1345 -- 86 19 132/72 86 100 % None (Room air)   06/09/22 1332 98 7 °F (37 1 °C) 96 15 114/57 76 100 % None (Room air)   06/09/22 07:24:01 98 °F (36 7 °C) 107 Abnormal  15 132/83 99 89 % Abnormal  --   06/09/22 0500 -- -- -- -- -- -- None (Room air)   06/08/22 22:13:03 97 9 °F (36 6 °C) 102 16 122/66 85 96 % --   06/08/22 1630 -- -- -- -- -- -- None (Room air)   06/08/22 14:59:47 98 2 °F (36 8 °C) 114 Abnormal  22 125/67 86 94 % --   06/08/22 0900 -- -- -- -- -- -- None (Room air)   06/08/22 07:24:43 98 3 °F (36 8 °C) 108 Abnormal  22 121/71 88 97 % --     Pertinent Labs/Diagnostic Results:   Results from last 7 days   Lab Units 06/07/22  1611   SARS-COV-2  Negative     Results from last 7 days   Lab Units 06/10/22  1100 06/09/22  0441 06/08/22  0635 06/07/22  0610 06/06/22  0738   WBC Thousand/uL 12 06* 8 34 10 20* 17 56* 13 52*   HEMOGLOBIN g/dL 9 4* 8 9* 8 9* 9 9* 9 7*   HEMATOCRIT % 30 4* 30 5* 29 1* 33 4* 32 5*   PLATELETS Thousands/uL 351 311 284 356 364   NEUTROS ABS Thousands/µL 9 37* 5 39 8 11*  --  10 36*     Results from last 7 days   Lab Units 06/10/22  1100 06/09/22  0441 06/08/22  0635 06/07/22  0512 06/06/22  0738   SODIUM mmol/L 140 141 138 134* 135   POTASSIUM mmol/L 3 6 3 4* 3 4* 3 9 4 0   CHLORIDE mmol/L 109* 107 104 99 101   CO2 mmol/L 25 22 24 21 26   ANION GAP mmol/L 6 12 10 14* 8   BUN mg/dL 30* 25 22 24 26*   CREATININE mg/dL 2 12* 1 72* 1 86* 1 74* 1 67*   EGFR ml/min/1 73sq m 34 43 39 43 45   CALCIUM mg/dL 9 7 9 6 9 6 9 6 9 9     Results from last 7 days   Lab Units 06/10/22  1100 06/09/22  0441 06/07/22  0512   AST U/L 14 19 14   ALT U/L 15 19 11   ALK PHOS U/L 51 50 52   TOTAL PROTEIN g/dL 8 2 7 7 8 3   ALBUMIN g/dL 2 3* 2 0* 2 8*   TOTAL BILIRUBIN mg/dL 0 17* 0 25 0 47     Results from last 7 days   Lab Units 06/10/22  1100 06/09/22  0441 06/08/22  0635 06/07/22  0512 06/06/22  0738   GLUCOSE RANDOM mg/dL 118 108 112 123 99     Results from last 7 days   Lab Units 06/08/22  0635 06/06/22  2140   PROCALCITONIN ng/ml 0 66* 0 30*     Results from last 7 days   Lab Units 06/06/22  2140   LACTIC ACID mmol/L 1 1     Results from last 7 days   Lab Units 06/08/22  0635   CRP mg/L 161 0*     Results from last 7 days   Lab Units 06/07/22  1611   INFLUENZA A PCR  Negative   INFLUENZA B PCR  Negative   RSV PCR  Negative     Results from last 7 days   Lab Units 06/07/22  0514 06/06/22  2110 06/06/22  0000   BLOOD CULTURE  No Growth at 72 hrs   --  No Growth at 72 hrs     URINE CULTURE   --  80,000-89,000 cfu/ml Citrobacter amalonaticus complex*  80,000-89,000 cfu/ml Staphylococcus coagulase negative*  --      Medications:   Scheduled Medications:  allopurinol, 100 mg, Oral, Daily  amLODIPine, 10 mg, Oral, Daily  cefepime, 1,000 mg, Intravenous, Q8H  cholecalciferol, 400 Units, Oral, Daily  heparin (porcine), 7,500 Units, Subcutaneous, Q8H LATRICE  metoprolol succinate, 25 mg, Oral, Daily  sodium chloride (PF), 10 mL, Intracatheter, Daily  tamsulosin, 0 4 mg, Oral, After Dinner      Continuous IV Infusions: none     PRN Meds:  acetaminophen, 650 mg, Oral, Q6H PRN  calcium carbonate, 1,000 mg, Oral, BID PRN  morphine injection, 4 mg, Intravenous, Q3H PRN  ondansetron, 4 mg, Oral, Q6H PRN  phenazopyridine, 100 mg, Oral, TID PRN  senna-docusate sodium, 2 tablet, Oral, BID PRN  traMADol, 50 mg, Oral, Q6H PRN        Discharge Plan: [facility]    Network Utilization Review Department  ATTENTION: Please call with any questions or concerns to [phone] and carefully listen to the prompts so that you are directed to the right person  All voicemails are confidential   Davey Bal all requests for admission clinical reviews, approved or denied determinations and any other requests to dedicated fax number below belonging to the campus where the patient is receiving treatment   List of dedicated fax numbers for the Facilities:  [address] DENIALS (Administrative/Medical Necessity) [phone]   [address] (Maternity/NICU/Pediatrics) [phone]   [address] [place] [address]  [address] [facility] [place] [address] [address] [place] [address] [address]ooja Linares [address] [facility] [place] [address] [phone]

## 2022-06-10 NOTE — ARC ADMISSION
Reviewed updates with [facility] physician - will continue to follow patient's case at this time, monitoring for medical stability and functional progress  Will update as able

## 2022-06-10 NOTE — PLAN OF CARE
Problem: Potential for Falls  Goal: Patient will remain free of falls  Description: INTERVENTIONS:  - Educate patient/family on patient safety including physical limitations  - Instruct patient to call for assistance with activity   - Consult OT/PT to assist with strengthening/mobility   - Keep Call bell within reach  - Keep bed low and locked with side rails adjusted as appropriate  - Keep care items and personal belongings within reach  - Initiate and maintain comfort rounds  - Make Fall Risk Sign visible to staff  - Apply yellow socks and bracelet for high fall risk patients  - Consider moving patient to room near nurses station  Outcome: Progressing       Problem: MOBILITY - ADULT  Goal: Maintain or return to baseline ADL function  Description: INTERVENTIONS:  -  Assess patient's ability to carry out ADLs; assess patient's baseline for ADL function and identify physical deficits which impact ability to perform ADLs (bathing, care of mouth/teeth, toileting, grooming, dressing, etc )  - Assess/evaluate cause of self-care deficits   - Assess range of motion  - Assess patient's mobility; develop plan if impaired  - Assess patient's need for assistive devices and provide as appropriate  - Encourage maximum independence but intervene and supervise when necessary  - Involve family in performance of ADLs  - Assess for home care needs following discharge   - Consider OT consult to assist with ADL evaluation and planning for discharge  - Provide patient education as appropriate  Outcome: Progressing

## 2022-06-10 NOTE — CONSULTS
Consultation - Infectious Disease   Slaav English 55 y o  male MRN: [id number]  Unit/Bed#: [facility] 804-01 Encounter: [id number]      IMPRESSION & RECOMMENDATIONS:   Impression/Recommendations:  1  Sepsis, POA  Due to #2  No other appreciable source  ROS, exam otherwise negative  Blood cultures negative  Clinically stable and non-toxic  Rec:  · Continue antibiotics as below  · Follow temperatures closely  · Recheck CBC in a m  · Supportive care as per the primary service    2  MDR-Citrobacter UTI  In setting of #3  Suspect true infection given sepsis as above  Rec:  · Change antibiotics to cipro to continue through 6/16 (7 days total)  · Discussed risk of tendinopathy with patient; no apparent drug interactions and baseline QTC normal    3  Obstructive nephrolithiasis  Status post cystoscopy, retrograde pyelogram, and left ureteral stent placement 4/22/22  Developed mmild hydronephrosis associated with recurrent sepsis, bactermia  Status post left PCN 5/10/22  Now status post cystoscopy, lithotripsy, PCN removal, stent exchange 6/9/22  Rec:  · Flomax, oxybutinin per Urology  · Stent removal in 1 week    4  ADRIANNE on CKD  Baseline Cr 1 4-1 7  Likely multifactorial   Rec:  · Follow creatinine closely and dose-adjust antibiotics as indicated  · Recheck BMP in AM  · Consider Renal U/S to verify appropriate stent position if worsens    5  Recent COVID  Asymptomatic  PCR positive 5/31  Off isolation  The above impression and plan was discussed in detail with the patient and Dr Susana Hassan  Antibiotics:  Ceftriaxone    Thank you for this consultation  We will follow along with you  HISTORY OF PRESENT ILLNESS:  Reason for Consult: UTI    HPI: Slava Enlgish is a 55 y o  male well known to me from a previous hospitalization in April of this year when he presented with sepsis and E coli bacteremia due to obstructive pyelonephritis    He was found to have an obstructing renal stone on the left and underwent cystoscopy with left ureteral stent placement  He was noted to have passed a stone in house  He was treated with IV and ultimately oral antibiotics to complete a 14 day total course of treatment  Was readmitted in early April for recurrent severe sepsis due to E coli bacteremia and pyelonephritis  CT showed mild hydronephrosis and he underwent left PCN placement  He was treated with 14 days total of antibiotics once again  He was discharged to arc  Returned to the inpatient setting on 06/07/2022 with recurrent sepsis  His blood cultures were negative but his urine has grown a MDR-Citrobacter  He was seen by Urology and taken to the OR yesterday for cystoscopy, lithotripsy, PCN removal, and stent exchange  We are asked to comment on further evaluation and management  Of note, he recently tested positive for COVID 5/31 while at [facility] but has remains asymptomatic  In performing this consult, I have reviewed prior admission and outpatient visit records in detail  REVIEW OF SYSTEMS:  Feels fatigued although did not sleep well overnight  Denies fevers, chills, sweats, nausea, vomiting, or diarrhea  A complete system-based review of systems is otherwise negative      PAST MEDICAL HISTORY:  Past Medical History:   Diagnosis Date    Arthritis     Chronic kidney disease     Gout     Hyperlipidemia     Hypertension     Kidney stone     Obstructive pyelonephritis     Sepsis ([address] )      Past Surgical History:   Procedure Laterality Date    FL RETROGRADE PYELOGRAM  8/4/2019    FL RETROGRADE PYELOGRAM  9/26/2019    FL RETROGRADE PYELOGRAM  4/22/2022    IR NEPHROSTOMY TUBE PLACEMENT  5/10/2022    NO PAST SURGERIES  03/23/2015    Last assessed    WI CYSTO/URETERO W/LITHOTRIPSY &INDWELL STENT INSRT Right 9/26/2019    Procedure: CYSTOSCOPY URETEROSCOPY WITH RETROGRADE PYELOGRAM AND INSERTION STENT URETERAL;  Surgeon: Chelsey Bingham MD;  Location: [facility];  Service: Urology    WI CYSTO/URETERO W/LITHOTRIPSY &INDWELL STENT INSRT Left 2022    Procedure: CYSTOSCOPY LEFT URETEROSCOPY/LITHOTRIPSY HOLMIUM LASER,  AND EXCHANGE URETERAL STENT URETERAL, REMOVAL NEPHROSTOMY TUBE;  Surgeon: Wesley Braxton MD;  [facility];  Service: Urology    KS CYSTOURETHROSCOPY,URETER CATHETER Right 2019    Procedure: CYSTOSCOPY RETROGRADE PYELOGRAM WITH INSERTION STENT URETERAL;  Surgeon: Tone Robles MD;  [facility];  Service: Urology    KS CYSTOURETHROSCOPY,URETER CATHETER Left 2022    Procedure: CYSTOSCOPY RETROGRADE PYELOGRAM WITH INSERTION STENT URETERAL;  Surgeon: Lis Crum MD;  [facility];  Service: Urology       FAMILY HISTORY:  Non-contributory    SOCIAL HISTORY:  Social History     Substance and Sexual Activity   Alcohol Use Not Currently    Alcohol/week: 0 0 standard drinks    Comment: rarely     Social History     Substance and Sexual Activity   Drug Use No     Social History     Tobacco Use   Smoking Status Never Smoker   Smokeless Tobacco Never Used       ALLERGIES:  No Known Allergies    MEDICATIONS:  All current active medications have been reviewed      PHYSICAL EXAM:  Vitals:  Temp:  [97 2 °F (36 2 °C)-98 7 °F (37 1 °C)] 98 °F (36 7 °C)  HR:  [] 103  Resp:  [15-28] 17  BP: (101-143)/(57-81) 143/80  SpO2:  [92 %-100 %] 98 %  Temp (24hrs), Av 1 °F (36 7 °C), Min:97 2 °F (36 2 °C), Max:98 7 °F (37 1 °C)  Current: Temperature: 98 °F (36 7 °C)     Physical Exam:  General:  Well-nourished, well-developed, in no acute distress  Eyes:  Conjunctive clear with no hemorrhages or effusions  Oropharynx:  No ulcers, no lesions  Neck:  Supple, no lymphadenopathy  Lungs:  Normal respiratory excursion, no accessory muscle use  Cardiac:  Regular rate and rhythm, extremities well perfused  Abdomen:  Soft, non-tender, non-distended  Extremities:  No peripheral cyanosis, clubbing, or edema  Skin:  No rashes, no ulcers  Neurological:  Moves all four extremities spontaneously, sensation grossly intact    LABS, IMAGING, & OTHER STUDIES:  Lab Results:  I have personally reviewed pertinent labs  Results from last 7 days   Lab Units 06/10/22  1100 06/09/22  0441 06/08/22  0635 06/07/22  0512   POTASSIUM mmol/L 3 6 3 4* 3 4* 3 9   CHLORIDE mmol/L 109* 107 104 99   CO2 mmol/L 25 22 24 21   BUN mg/dL 30* 25 22 24   CREATININE mg/dL 2 12* 1 72* 1 86* 1 74*   EGFR ml/min/1 73sq m 34 43 39 43   CALCIUM mg/dL 9 7 9 6 9 6 9 6   AST U/L 14 19  --  14   ALT U/L 15 19  --  11   ALK PHOS U/L 51 50  --  52     Results from last 7 days   Lab Units 06/10/22  1100 06/09/22  0441 06/08/22  0635   WBC Thousand/uL 12 06* 8 34 10 20*   HEMOGLOBIN g/dL 9 4* 8 9* 8 9*   PLATELETS Thousands/uL 351 311 284     Results from last 7 days   Lab Units 06/07/22  0514 06/06/22  2110 06/06/22  0000   BLOOD CULTURE  No Growth at 72 hrs   --  No Growth at 72 hrs  URINE CULTURE   --  80,000-89,000 cfu/ml Citrobacter amalonaticus complex*  80,000-89,000 cfu/ml Staphylococcus coagulase negative*  --        Imaging Studies:   I have personally reviewed pertinent imaging study reports and images in PACS  CT A/P reviewed personally left ureteral stent and PCN with stones; no hydronephrosis    EKG, Pathology, and Other Studies:   I have personally reviewed pertinent reports    QTc 5/2022 433

## 2022-06-10 NOTE — PHYSICAL THERAPY NOTE
PHYSICAL THERAPY TREATMENT  NAME:  Maryjo Thao  DATE: 06/10/22    AGE:   55 y o  Mrn:   [id number]  ADMIT DX:  Pyelonephritis [N12]    Past Medical History:   Diagnosis Date    Arthritis     Chronic kidney disease     Gout     Hyperlipidemia     Hypertension     Kidney stone     Obstructive pyelonephritis     Sepsis [facility])      Past Surgical History:   Procedure Laterality Date    FL RETROGRADE PYELOGRAM  8/4/2019    FL RETROGRADE PYELOGRAM  9/26/2019    FL RETROGRADE PYELOGRAM  4/22/2022    IR NEPHROSTOMY TUBE PLACEMENT  5/10/2022    NO PAST SURGERIES  03/23/2015    Last assessed    DC CYSTO/URETERO W/LITHOTRIPSY &INDWELL STENT INSRT Right 9/26/2019    Procedure: CYSTOSCOPY URETEROSCOPY WITH RETROGRADE PYELOGRAM AND INSERTION STENT URETERAL;  Surgeon: Carina Gil MD;  Location: [facility];  Service: Urology    DC CYSTO/URETERO W/LITHOTRIPSY &INDWELL STENT INSRT Left 6/9/2022    Procedure: CYSTOSCOPY LEFT URETEROSCOPY/LITHOTRIPSY HOLMIUM LASER,  AND EXCHANGE URETERAL STENT URETERAL, REMOVAL NEPHROSTOMY TUBE;  Surgeon: Faiza Kim MD;  [facility];  Service: Urology    DC CYSTOURETHROSCOPY,URETER CATHETER Right 8/4/2019    Procedure: CYSTOSCOPY RETROGRADE PYELOGRAM WITH INSERTION STENT URETERAL;  Surgeon: Elisa Schaffer MD;  [facility];  Service: Urology    DC CYSTOURETHROSCOPY,URETER CATHETER Left 4/22/2022    Procedure: CYSTOSCOPY RETROGRADE PYELOGRAM WITH INSERTION STENT URETERAL;  Surgeon: Nasreen Scott MD;  [facility];  Service: Urology       Length Of Stay: 3     06/10/22 1543   PT Last Visit   PT Visit Date 06/10/22   Note Type   Note Type Treatment   End of Consult   Patient Position at End of Consult Supine; All needs within reach   Pain Assessment   Pain Assessment Tool 0-10   Pain Score No Pain   Restrictions/Precautions   Weight Bearing Precautions Per Order No   Braces or Orthoses   (none)   Other Precautions Fall Risk;Multiple lines  (limited endurance w/ need for frequent rest breaks throughout)   General   Chart Reviewed Yes   Response to Previous Treatment Patient with no complaints from previous session  Family/Caregiver Present No   Cognition   Overall Cognitive Status WFL   Arousal/Participation Alert   Attention Within functional limits   Orientation Level Oriented X4   Memory Within functional limits   Following Commands Follows all commands and directions without difficulty   Comments pleasant; cooperative- makes needs known- motivated to walk and exercise   Subjective   Subjective "great- I was hoping to see someone to work with me again this afternoon "   Bed Mobility   Supine to Sit 5  Supervision   Additional items Increased time required   Sit to Supine 5  Supervision   Additional items Increased time required   Additional Comments sits EOB and dons gym shorts w/ S (and noted difficulty and inc time required) > 7 mins w/ pt attempting varied positions to complete- required rest post - prior to walking   Transfers   Sit to Stand 5  Supervision   Additional items Increased time required   Stand to Sit 5  Supervision   Additional items Increased time required   Additional Comments partial standing for urinal use w/ CGA for balance- inc time for completion ; Ambulation/Elevation   Gait pattern Improper Weight shift; Wide TEO; Excessively slow   Gait Assistance 5  Supervision   Additional items Verbal cues   Assistive Device Rolling walker   Distance total of 280' w/ 6 standing rest breaks throughout for fatigue- longest distance walked (first trial prior to 1st rest was 90'x1) pt on RA- Spo2 > 90 throughout- pt limited by fatigue and R knee discomfort; SOB     Number of Stairs   (pt declines attempt 2* inc walking distances)   Balance   Static Sitting Good   Dynamic Sitting Good   Static Standing Fair +   Dynamic Standing Fair   Ambulatory Fair  (RW)   Endurance Deficit   Endurance Deficit Yes   Endurance Deficit Description ongoing deconditioning; obesity and R knee pain/ dec ROM limiting   Activity Tolerance   Medical Staff Made Aware care coordination w/ RN CM   Nurse Made Aware cleared for session   Exercises   Quad Sets Supine;20 reps;Right;Left   Heelslides Supine;Right;15 reps   Knee AROM Long Arc Quad Sitting;15 reps;Right;Left   Ankle Pumps Supine;25 reps   Balance training  STS from bed surface x5 w/ S standing marching x 10 reps aon each attempt w/ RW support   Assessment   Prognosis Good   Problem List Decreased endurance;Decreased range of motion; Impaired balance;Decreased mobility;Obesity;Pain;Decreased safety awareness   Assessment Pt seen for extended tx session consisting of gait and therex/ standing tolerance/ therapeutic activity/ transfer training  Pt motivated to walk w/ RW- remains reluctant to trial gait w/o RW and progress to [facility] 2* ongoing reports of R knee pain and limited ROM  pt was able to ambualte a total of  280' w/ multiple standing rest breaks throughout for fatigue  Spo2 on RA throughout was stable  pt w/ slow calin and wide TEO  no dizziness or LOB w/ turns or obstacle negotiation  performed multiple sit<>stands from bed surface to conclude session  pt reports continuing to complete exercises throughout then day  Conversation w/ OT did reveal that pt continues to require considerable A w/ toileting hygiene post BM and LB ADLs' 2* obesity and pain from stent  Pt remains functioning below his baseline and further skilled PT in inpt and on d/c is recommended to address ongoing mobility deficits; limited balance and activity tolerance; SOB fatigue; limited AROM ; knee pain/ edema/ ROM limitations  Pt lives alone w/ reports of limited support systems for d/c pt supine in bed w/ HOB elevated post tx session  will continue to follow and progress     Barriers to Discharge Inaccessible home environment;Decreased caregiver support   Barriers to Discharge Comments lives alone - limited support   Goals   Patient Goals walk   STG Expiration Date 06/22/22   PT Treatment Day 0   Plan   Treatment/Interventions Functional transfer training;LE strengthening/ROM; Elevations; Therapeutic exercise; Endurance training;Patient/family training;Equipment eval/education; Bed mobility;Gait training; Compensatory technique education;Spoke to nursing;Spoke to case management;OT;ADL retraining   PT Frequency 3-5x/wk   Recommendation   PT Discharge Recommendation Post acute rehabilitation services   Equipment Recommended [facility] Recommended HD Bariatric wheeled walker   [address]   Turning in Bed Without Bedrails 4   Lying on Back to Sitting on Edge of Flat Bed 4   Moving Bed to Chair 3   Standing Up From Chair 3   Walk in Room 3   Climb 3-5 Stairs 2   Basic Mobility Inpatient Raw Score 19   Basic Mobility Standardized Score 42 48   Highest Level Of Mobility   JH-HLM Goal 6: Walk 10 steps or more   JH-HLM Achieved 8: Walk 250 feet ot more   Education   Education Provided Mobility training;Home exercise program;Other   Patient Demonstrates verbal understanding;Demonstrates acceptance/verbal understanding     The patient's AM-PAC Basic Mobility Inpatient Short Form Raw Score is 19  A Raw score of greater than 16 suggests the patient may benefit from discharge to home  Please also refer to the recommendation of the Physical Therapist for safe discharge planning              Carolina Lester, PT

## 2022-06-10 NOTE — PLAN OF CARE
Problem: OCCUPATIONAL THERAPY ADULT  Goal: Performs self-care activities at highest level of function for planned discharge setting  See evaluation for individualized goals  Description: Treatment Interventions: ADL retraining, Functional transfer training, UE strengthening/ROM, Endurance training, Cognitive reorientation, Patient/family training, Equipment evaluation/education, Energy conservation, Activityengagement          See flowsheet documentation for full assessment, interventions and recommendations  Outcome: Progressing  Note: Limitation: Decreased ADL status, Decreased UE ROM, Decreased UE strength, Decreased Safe judgement during ADL, Decreased endurance, Decreased self-care trans, Decreased high-level ADLs  Prognosis: Fair  Assessment: Pt greeted in hallway performing functional mobility with restorative staff for OT treatment on 6/10/2022 focusing on maximizing independence with ADLs with use of LHAE  Pt S with bed mobility, S with functional transfers, and S with functional mobility with RW  Pt engages in ADL routine seated on EOB and standing with RW: S with grooming, min A with UB bathing, mod A with LB bathing, S with UB dressing, mod A with LB dressing (use of LHAE), and min A with toileting  Limitations that impact functional performance include decreased ADL status, decreased UE ROM, decreased UE strength, decreased safe judgement during ADLs, decreased endurance, decreased self care transfers and decreased high level ADLs  Occupational performance areas to address ADL retraining, functional transfer training, UE strengthening/ROM, endurance training, cognitive reorientation, Pt/caregiver education, equipment evaluation/education, compensatory technique education, energy conservation and activity engagement   Pt would benefit from continued skilled OT services while in hospital to maximize independence with ADLs  Will continue to follow Pt's goals and progress   Pt would benefit from post acute rehabilitation services upon DC to maximize safety and independence with ADLs and functional tasks of choice  OT Discharge Recommendation: Post acute rehabilitation services  OT - OK to Discharge:  Yes

## 2022-06-10 NOTE — ASSESSMENT & PLAN NOTE
Lab Results   Component Value Date    EGFR 34 06/10/2022    EGFR 43 06/09/2022    EGFR 39 06/08/2022    CREATININE 2 12 (H) 06/10/2022    CREATININE 1 72 (H) 06/09/2022    CREATININE 1 86 (H) 06/08/2022     Baseline creatinine seems between 1 4 -1 7  Creatinine trending up     Will discuss with Urology  If continued trend upward will need renal consult

## 2022-06-11 LAB
ALBUMIN SERPL BCP-MCNC: 2.2 G/DL (ref 3.5–5)
ALP SERPL-CCNC: 46 U/L (ref 46–116)
ALT SERPL W P-5'-P-CCNC: 14 U/L (ref 12–78)
ANION GAP SERPL CALCULATED.3IONS-SCNC: 10 MMOL/L (ref 4–13)
AST SERPL W P-5'-P-CCNC: 11 U/L (ref 5–45)
BASOPHILS # BLD AUTO: 0.07 THOUSANDS/ΜL (ref 0–0.1)
BASOPHILS NFR BLD AUTO: 1 % (ref 0–1)
BILIRUB SERPL-MCNC: 0.23 MG/DL (ref 0.2–1)
BUN SERPL-MCNC: 32 MG/DL (ref 5–25)
CALCIUM ALBUM COR SERPL-MCNC: 10.6 MG/DL (ref 8.3–10.1)
CALCIUM SERPL-MCNC: 9.2 MG/DL (ref 8.3–10.1)
CHLORIDE SERPL-SCNC: 106 MMOL/L (ref 100–108)
CO2 SERPL-SCNC: 23 MMOL/L (ref 21–32)
CREAT SERPL-MCNC: 2 MG/DL (ref 0.6–1.3)
EOSINOPHIL # BLD AUTO: 0.46 THOUSAND/ΜL (ref 0–0.61)
EOSINOPHIL NFR BLD AUTO: 4 % (ref 0–6)
ERYTHROCYTE [DISTWIDTH] IN BLOOD BY AUTOMATED COUNT: 15.5 % (ref 11.6–15.1)
GFR SERPL CREATININE-BSD FRML MDRD: 36 ML/MIN/1.73SQ M
GLUCOSE SERPL-MCNC: 105 MG/DL (ref 65–140)
HCT VFR BLD AUTO: 29.8 % (ref 36.5–49.3)
HGB BLD-MCNC: 9 G/DL (ref 12–17)
IMM GRANULOCYTES # BLD AUTO: 0.14 THOUSAND/UL (ref 0–0.2)
IMM GRANULOCYTES NFR BLD AUTO: 1 % (ref 0–2)
LYMPHOCYTES # BLD AUTO: 2.13 THOUSANDS/ΜL (ref 0.6–4.47)
LYMPHOCYTES NFR BLD AUTO: 19 % (ref 14–44)
MCH RBC QN AUTO: 28.9 PG (ref 26.8–34.3)
MCHC RBC AUTO-ENTMCNC: 30.2 G/DL (ref 31.4–37.4)
MCV RBC AUTO: 96 FL (ref 82–98)
MONOCYTES # BLD AUTO: 0.69 THOUSAND/ΜL (ref 0.17–1.22)
MONOCYTES NFR BLD AUTO: 6 % (ref 4–12)
NEUTROPHILS # BLD AUTO: 7.53 THOUSANDS/ΜL (ref 1.85–7.62)
NEUTS SEG NFR BLD AUTO: 69 % (ref 43–75)
NRBC BLD AUTO-RTO: 0 /100 WBCS
PLATELET # BLD AUTO: 328 THOUSANDS/UL (ref 149–390)
PMV BLD AUTO: 9.5 FL (ref 8.9–12.7)
POTASSIUM SERPL-SCNC: 3.2 MMOL/L (ref 3.5–5.3)
PROT SERPL-MCNC: 7.5 G/DL (ref 6.4–8.2)
RBC # BLD AUTO: 3.11 MILLION/UL (ref 3.88–5.62)
SODIUM SERPL-SCNC: 139 MMOL/L (ref 136–145)
WBC # BLD AUTO: 11.02 THOUSAND/UL (ref 4.31–10.16)

## 2022-06-11 PROCEDURE — 99232 SBSQ HOSP IP/OBS MODERATE 35: CPT | Performed by: HOSPITALIST

## 2022-06-11 PROCEDURE — 80053 COMPREHEN METABOLIC PANEL: CPT | Performed by: INTERNAL MEDICINE

## 2022-06-11 PROCEDURE — 85025 COMPLETE CBC W/AUTO DIFF WBC: CPT | Performed by: INTERNAL MEDICINE

## 2022-06-11 RX ADMIN — CHOLECALCIFEROL TAB 10 MCG (400 UNIT) 400 UNITS: 10 TAB at 08:56

## 2022-06-11 RX ADMIN — HEPARIN SODIUM 7500 UNITS: 5000 INJECTION INTRAVENOUS; SUBCUTANEOUS at 21:09

## 2022-06-11 RX ADMIN — CIPROFLOXACIN HYDROCHLORIDE 500 MG: 500 TABLET, FILM COATED ORAL at 21:09

## 2022-06-11 RX ADMIN — CIPROFLOXACIN HYDROCHLORIDE 500 MG: 500 TABLET, FILM COATED ORAL at 08:56

## 2022-06-11 RX ADMIN — HEPARIN SODIUM 7500 UNITS: 5000 INJECTION INTRAVENOUS; SUBCUTANEOUS at 14:48

## 2022-06-11 RX ADMIN — TAMSULOSIN HYDROCHLORIDE 0.4 MG: 0.4 CAPSULE ORAL at 17:18

## 2022-06-11 RX ADMIN — AMLODIPINE BESYLATE 10 MG: 10 TABLET ORAL at 08:56

## 2022-06-11 RX ADMIN — HEPARIN SODIUM 7500 UNITS: 5000 INJECTION INTRAVENOUS; SUBCUTANEOUS at 06:06

## 2022-06-11 RX ADMIN — ALLOPURINOL 100 MG: 100 TABLET ORAL at 08:56

## 2022-06-11 RX ADMIN — METOPROLOL SUCCINATE 25 MG: 25 TABLET, FILM COATED, EXTENDED RELEASE ORAL at 08:56

## 2022-06-11 NOTE — ASSESSMENT & PLAN NOTE
Present at outside campus   Suspect source of UTI  Noted left nephrostomy tube and left ureteral stent  Noted multiple E coli UTI and also previous E coli bacteremia  Also noted covid positive status as of 5/31>> no supplemental oxygen requirement  Leukocytosis noted  Received IV fluids and cefepime at outside [facility] and transferred for Urology evaluation      S/p OR with Urology yesterday for cystoscopy ureteroscopy holmium laser lithotripsy basket extraction and ureteral stent insertion with a string removal of left-sided nephrostomy tube  Urine cultures growing MRDO citrobacter, ID consulted -> placed on PO Cipro & continue thru 6/16 for 7 days

## 2022-06-11 NOTE — ASSESSMENT & PLAN NOTE
S/p CYSTOSCOPY LEFT URETEROSCOPY/LITHOTRIPSY HOLMIUM LASER,  AND EXCHANGE URETERAL STENT URETERAL, REMOVAL NEPHROSTOMY TUBE (Left) on 6/9/22  OP f/u with urology

## 2022-06-11 NOTE — ASSESSMENT & PLAN NOTE
Not requiring supplemental oxygen  Noted mild shortness of breath after fever last night  Tested positive on 05/31, last day of isolation 6/9 - as  perprior documentation

## 2022-06-11 NOTE — CASE MANAGEMENT
Case Management Discharge Planning Note    Patient name Abel Kairmi  Location [address] [place] [address] 804/PPHP [id number] MRN [id number]  : 1965 Date 2022       Current Admission Date: 2022  Current Admission Diagnosis:Sepsis [facility])   Patient Active Problem List    Diagnosis Date Noted    Sepsis ([facility] [address] ) 2022    COVID 2022    Anemia 2022    Obstructive pyelonephritis 2022    Obstructive uropathy 2022    Mixed hyperlipidemia 2021    Morbid obesity 2021    Recurrent left knee pain 2021    Insomnia 2019    Essential hypertension 2019    Severe sepsis on admission 2019    Chronic kidney disease, stage 3 ([facility] [address] ) 2019    Acute kidney injury superimposed on CKD stage 3 2019    Bacteremia due to Escherichia coli 2019    Lactic acidosis 2019    History of gout 07/15/2019    Vitamin D deficiency 06/10/2015    Morbid obesity ([facility] [address] ) 2015      LOS (days): 4  Geometric Mean LOS (GMLOS) (days): 9 60  Days to GMLOS:5 8     OBJECTIVE:  Risk of Unplanned Readmission Score: 28 63         Current admission status: Inpatient   Preferred Pharmacy:   [facility]/pharmacy #[id number]- [place] [address]  Phone: [phone] Fax: [phone] Adriana Hopkins [facility] [address] [place] [address]  Phone: [phone] Fax: [phone]    Primary Care Provider: RITA Mcbride    Primary Insurance: BLUE CROSS  Secondary Insurance:     DISCHARGE DETAILS:       Additional Comments: CM was notified that pt is cleared for d/c today  CM reached out to [facility] who stated that they would be able to accept but not until Monday  CM notified pt's provider

## 2022-06-11 NOTE — PROGRESS NOTES
[facility]  Progress Note - Marjan Dietrich 1965, 55 y o  male MRN: [id number]  Unit/Bed#: PPHP 804-01 Encounter: [id number]  Primary Care Provider: Helene Eugene   Date and time admitted to hospital: 6/7/2022  3:45 PM    * Sepsis [facility])  Assessment & Plan  Present at outside campus   Suspect source of UTI  Noted left nephrostomy tube and left ureteral stent  Noted multiple E coli UTI and also previous E coli bacteremia  Also noted covid positive status as of 5/31>> no supplemental oxygen requirement  Leukocytosis noted  Received IV fluids and cefepime at outside [facility] and transferred for Urology evaluation  S/p OR with Urology yesterday for cystoscopy ureteroscopy holmium laser lithotripsy basket extraction and ureteral stent insertion with a string removal of left-sided nephrostomy tube  Urine cultures growing MRDO citrobacter, ID consulted -> placed on PO Cipro & continue thru 6/16 for 7 days      COVID  Assessment & Plan  Not requiring supplemental oxygen  Noted mild shortness of breath after fever last night  Tested positive on 05/31, last day of isolation 6/9 - as  perprior documentation         Obstructive uropathy  Assessment & Plan  S/p CYSTOSCOPY LEFT URETEROSCOPY/LITHOTRIPSY HOLMIUM LASER,  AND EXCHANGE URETERAL STENT URETERAL, REMOVAL NEPHROSTOMY TUBE (Left) on 6/9/22  OP f/u with urology      Morbid obesity  Assessment & Plan  Lifestyle modification    Essential hypertension  Assessment & Plan  Will continue current meds       Chronic kidney disease, stage 3 [facility])  Assessment & Plan  Lab Results   Component Value Date    EGFR 36 06/11/2022    EGFR 34 06/10/2022    EGFR 43 06/09/2022    CREATININE 2 00 (H) 06/11/2022    CREATININE 2 12 (H) 06/10/2022    CREATININE 1 72 (H) 06/09/2022     Baseline creatinine seems between 1 4 -1 7  Creatinine trended up to 2 1  Now down to 2 0              VTE Pharmacologic Prophylaxis:   Moderate Risk (Score 3-4) - Pharmacological DVT Prophylaxis Ordered: heparin  Patient Centered Rounds: I performed bedside rounds with nursing staff today  Discussions with Specialists or Other Care Team Provider:     Education and Discussions with Family / Patient: patient  Time Spent for Care: 30 minutes  More than 50% of total time spent on counseling and coordination of care as described above  Current Length of Stay: 4 day(s)  Current Patient Status: Inpatient   Certification Statement: The patient will continue to require additional inpatient hospital stay due to work on [address], pt wants to go back to [address] [facility] rehab  Discharge Plan: stable for DC    Code Status: Level 1 - Full Code    Subjective:   Feels well, urinary frequency  No pain or fever, appetite fine    Objective:     Vitals:   Temp (24hrs), Av 8 °F (36 6 °C), Min:97 3 °F (36 3 °C), Max:98 °F (36 7 °C)    Temp:  [97 3 °F (36 3 °C)-98 °F (36 7 °C)] 98 °F (36 7 °C)  HR:  [104] 104  Resp:  [16-20] 16  BP: (132-142)/(78-82) 132/78  SpO2:  [93 %] 93 %  Body mass index is 51 6 kg/m²  Input and Output Summary (last 24 hours): Intake/Output Summary (Last 24 hours) at 2022 1439  Last data filed at 2022 1321  Gross per 24 hour   Intake 540 ml   Output 1900 ml   Net -1360 ml       Physical Exam:   Physical Exam  Vitals reviewed  HENT:      Head: Normocephalic and atraumatic  Cardiovascular:      Rate and Rhythm: Normal rate and regular rhythm  Heart sounds: Normal heart sounds  Pulmonary:      Effort: Pulmonary effort is normal  No respiratory distress  Breath sounds: Normal breath sounds  No wheezing  Abdominal:      General: There is no distension  Palpations: Abdomen is soft  Tenderness: There is no abdominal tenderness  Musculoskeletal:      Right lower leg: No edema  Left lower leg: No edema  Neurological:      Mental Status: He is alert and oriented to person, place, and time            Additional Data: Labs:  Results from last 7 days   Lab Units 06/11/22  0437   WBC Thousand/uL 11 02*   HEMOGLOBIN g/dL 9 0*   HEMATOCRIT % 29 8*   PLATELETS Thousands/uL 328   NEUTROS PCT % 69   LYMPHS PCT % 19   MONOS PCT % 6   EOS PCT % 4     Results from last 7 days   Lab Units 06/11/22  0437   SODIUM mmol/L 139   POTASSIUM mmol/L 3 2*   CHLORIDE mmol/L 106   CO2 mmol/L 23   BUN mg/dL 32*   CREATININE mg/dL 2 00*   ANION GAP mmol/L 10   CALCIUM mg/dL 9 2   ALBUMIN g/dL 2 2*   TOTAL BILIRUBIN mg/dL 0 23   ALK PHOS U/L 46   ALT U/L 14   AST U/L 11   GLUCOSE RANDOM mg/dL 105                 Results from last 7 days   Lab Units 06/08/22  0635 06/06/22  2140   LACTIC ACID mmol/L  --  1 1   PROCALCITONIN ng/ml 0 66* 0 30*       Lines/Drains:  Invasive Devices  Report    Peripheral Intravenous Line  Duration           Peripheral IV 06/09/22 Right Forearm 2 days                      Imaging: No pertinent imaging reviewed  Recent Cultures (last 7 days):   Results from last 7 days   Lab Units 06/07/22  0514 06/06/22  2110 06/06/22  0000   BLOOD CULTURE  No Growth After 4 Days  --  No Growth After 4 Days     URINE CULTURE   --  80,000-89,000 cfu/ml Citrobacter amalonaticus complex*  80,000-89,000 cfu/ml Staphylococcus coagulase negative*  --        Last 24 Hours Medication List:   Current Facility-Administered Medications   Medication Dose Route Frequency Provider Last Rate    acetaminophen  650 mg Oral Q6H PRN Erik Almonte MD      allopurinol  100 mg Oral Daily Erik Almonte MD      amLODIPine  10 mg Oral Daily Erik Almonte MD      calcium carbonate  1,000 mg Oral BID PRN Erik Almonte MD      cholecalciferol  400 Units Oral Daily Erik Almonte MD      ciprofloxacin  500 mg Oral Q12H Salbador George MD      heparin (porcine)  7,500 Units Subcutaneous [facility] Erik Almonte MD      metoprolol succinate  25 mg Oral Daily Erik Almonte MD      morphine injection  4 mg Intravenous Q3H PRN Erik Almonte MD      ondansetron  4 mg Oral Q6H PRN Wesley Braxton MD      phenazopyridine  100 mg Oral TID PRN Wesley Braxton MD      senna-docusate sodium  2 tablet Oral BID PRN Wesley Braxton MD      sodium chloride (PF)  10 mL Intracatheter Daily Wesley Braxton MD      tamsulosin  0 4 mg Oral After Maciel Mcnamara MD      traMADol  50 mg Oral Q6H PRN Wesley Braxton MD          Today, Patient Was Seen By: Mercedez Dewey MD    **Please Note: This note may have been constructed using a voice recognition system  **

## 2022-06-11 NOTE — ASSESSMENT & PLAN NOTE
Lab Results   Component Value Date    EGFR 36 06/11/2022    EGFR 34 06/10/2022    EGFR 43 06/09/2022    CREATININE 2 00 (H) 06/11/2022    CREATININE 2 12 (H) 06/10/2022    CREATININE 1 72 (H) 06/09/2022     Baseline creatinine seems between 1 4 -1 7  Creatinine trended up to 2 1  Now down to 2 0

## 2022-06-11 NOTE — PLAN OF CARE
Problem: Potential for Falls  Goal: Patient will remain free of falls  Description: INTERVENTIONS:  - Educate patient/family on patient safety including physical limitations  - Instruct patient to call for assistance with activity   - Consult OT/PT to assist with strengthening/mobility   - Keep Call bell within reach  - Keep bed low and locked with side rails adjusted as appropriate  - Keep care items and personal belongings within reach  - Initiate and maintain comfort rounds  - Make Fall Risk Sign visible to staff  - Offer Toileting every 3 Hours, in advance of need  - Initiate/Maintain alarm  - Obtain necessary fall risk management equipment:  - Apply yellow socks and bracelet for high fall risk patients  - Consider moving patient to room near nurses station  Outcome: Progressing    Problem: MOBILITY - ADULT  Goal: Maintain or return to baseline ADL function  Description: INTERVENTIONS:  -  Assess patient's ability to carry out ADLs; assess patient's baseline for ADL function and identify physical deficits which impact ability to perform ADLs (bathing, care of mouth/teeth, toileting, grooming, dressing, etc )  - Assess/evaluate cause of self-care deficits   - Assess range of motion  - Assess patient's mobility; develop plan if impaired  - Assess patient's need for assistive devices and provide as appropriate  - Encourage maximum independence but intervene and supervise when necessary  - Involve family in performance of ADLs  - Assess for home care needs following discharge   - Consider OT consult to assist with ADL evaluation and planning for discharge  - Provide patient education as appropriate  Outcome: Progressing

## 2022-06-12 ENCOUNTER — APPOINTMENT (INPATIENT)
Dept: RADIOLOGY | Facility: HOSPITAL | Age: 57
DRG: 854 | End: 2022-06-12
Payer: COMMERCIAL

## 2022-06-12 LAB
ANION GAP SERPL CALCULATED.3IONS-SCNC: 4 MMOL/L (ref 4–13)
ANISOCYTOSIS BLD QL SMEAR: PRESENT
BACTERIA BLD CULT: NORMAL
BACTERIA BLD CULT: NORMAL
BASOPHILS # BLD MANUAL: 0 THOUSAND/UL (ref 0–0.1)
BASOPHILS NFR MAR MANUAL: 0 % (ref 0–1)
BUN SERPL-MCNC: 27 MG/DL (ref 5–25)
CALCIUM SERPL-MCNC: 9.6 MG/DL (ref 8.3–10.1)
CHLORIDE SERPL-SCNC: 106 MMOL/L (ref 100–108)
CO2 SERPL-SCNC: 27 MMOL/L (ref 21–32)
CREAT SERPL-MCNC: 2.14 MG/DL (ref 0.6–1.3)
EOSINOPHIL # BLD MANUAL: 0.35 THOUSAND/UL (ref 0–0.4)
EOSINOPHIL NFR BLD MANUAL: 3 % (ref 0–6)
ERYTHROCYTE [DISTWIDTH] IN BLOOD BY AUTOMATED COUNT: 15.8 % (ref 11.6–15.1)
GFR SERPL CREATININE-BSD FRML MDRD: 33 ML/MIN/1.73SQ M
GLUCOSE SERPL-MCNC: 111 MG/DL (ref 65–140)
HCT VFR BLD AUTO: 30.1 % (ref 36.5–49.3)
HGB BLD-MCNC: 9.2 G/DL (ref 12–17)
HYPERCHROMIA BLD QL SMEAR: PRESENT
LYMPHOCYTES # BLD AUTO: 1.77 THOUSAND/UL (ref 0.6–4.47)
LYMPHOCYTES # BLD AUTO: 15 % (ref 14–44)
MCH RBC QN AUTO: 28.6 PG (ref 26.8–34.3)
MCHC RBC AUTO-ENTMCNC: 30.6 G/DL (ref 31.4–37.4)
MCV RBC AUTO: 94 FL (ref 82–98)
MONOCYTES # BLD AUTO: 0.71 THOUSAND/UL (ref 0–1.22)
MONOCYTES NFR BLD: 6 % (ref 4–12)
NEUTROPHILS # BLD MANUAL: 8.98 THOUSAND/UL (ref 1.85–7.62)
NEUTS BAND NFR BLD MANUAL: 2 % (ref 0–8)
NEUTS SEG NFR BLD AUTO: 74 % (ref 43–75)
PLATELET # BLD AUTO: 343 THOUSANDS/UL (ref 149–390)
PLATELET BLD QL SMEAR: ADEQUATE
PMV BLD AUTO: 9.7 FL (ref 8.9–12.7)
POTASSIUM SERPL-SCNC: 3.9 MMOL/L (ref 3.5–5.3)
RBC # BLD AUTO: 3.22 MILLION/UL (ref 3.88–5.62)
RBC MORPH BLD: PRESENT
SODIUM SERPL-SCNC: 137 MMOL/L (ref 136–145)
WBC # BLD AUTO: 11.81 THOUSAND/UL (ref 4.31–10.16)

## 2022-06-12 PROCEDURE — 85007 BL SMEAR W/DIFF WBC COUNT: CPT | Performed by: HOSPITALIST

## 2022-06-12 PROCEDURE — 76770 US EXAM ABDO BACK WALL COMP: CPT

## 2022-06-12 PROCEDURE — 80048 BASIC METABOLIC PNL TOTAL CA: CPT | Performed by: HOSPITALIST

## 2022-06-12 PROCEDURE — 85027 COMPLETE CBC AUTOMATED: CPT | Performed by: HOSPITALIST

## 2022-06-12 PROCEDURE — 99232 SBSQ HOSP IP/OBS MODERATE 35: CPT | Performed by: HOSPITALIST

## 2022-06-12 RX ORDER — SODIUM CHLORIDE, SODIUM GLUCONATE, SODIUM ACETATE, POTASSIUM CHLORIDE, MAGNESIUM CHLORIDE, SODIUM PHOSPHATE, DIBASIC, AND POTASSIUM PHOSPHATE .53; .5; .37; .037; .03; .012; .00082 G/100ML; G/100ML; G/100ML; G/100ML; G/100ML; G/100ML; G/100ML
50 INJECTION, SOLUTION INTRAVENOUS CONTINUOUS
Status: DISPENSED | OUTPATIENT
Start: 2022-06-12 | End: 2022-06-12

## 2022-06-12 RX ADMIN — TAMSULOSIN HYDROCHLORIDE 0.4 MG: 0.4 CAPSULE ORAL at 16:57

## 2022-06-12 RX ADMIN — CIPROFLOXACIN HYDROCHLORIDE 500 MG: 500 TABLET, FILM COATED ORAL at 09:49

## 2022-06-12 RX ADMIN — METOPROLOL SUCCINATE 25 MG: 25 TABLET, FILM COATED, EXTENDED RELEASE ORAL at 09:50

## 2022-06-12 RX ADMIN — ALLOPURINOL 100 MG: 100 TABLET ORAL at 09:50

## 2022-06-12 RX ADMIN — HEPARIN SODIUM 7500 UNITS: 5000 INJECTION INTRAVENOUS; SUBCUTANEOUS at 13:51

## 2022-06-12 RX ADMIN — CHOLECALCIFEROL TAB 10 MCG (400 UNIT) 400 UNITS: 10 TAB at 09:50

## 2022-06-12 RX ADMIN — HEPARIN SODIUM 7500 UNITS: 5000 INJECTION INTRAVENOUS; SUBCUTANEOUS at 06:30

## 2022-06-12 RX ADMIN — SODIUM CHLORIDE, SODIUM GLUCONATE, SODIUM ACETATE, POTASSIUM CHLORIDE, MAGNESIUM CHLORIDE, SODIUM PHOSPHATE, DIBASIC, AND POTASSIUM PHOSPHATE 50 ML/HR: .53; .5; .37; .037; .03; .012; .00082 INJECTION, SOLUTION INTRAVENOUS at 09:50

## 2022-06-12 RX ADMIN — HEPARIN SODIUM 7500 UNITS: 5000 INJECTION INTRAVENOUS; SUBCUTANEOUS at 21:07

## 2022-06-12 RX ADMIN — ACETAMINOPHEN 650 MG: 325 TABLET, FILM COATED ORAL at 21:10

## 2022-06-12 RX ADMIN — AMLODIPINE BESYLATE 10 MG: 10 TABLET ORAL at 09:50

## 2022-06-12 RX ADMIN — CIPROFLOXACIN HYDROCHLORIDE 500 MG: 500 TABLET, FILM COATED ORAL at 21:07

## 2022-06-12 RX ADMIN — ACETAMINOPHEN 650 MG: 325 TABLET, FILM COATED ORAL at 13:51

## 2022-06-12 NOTE — ASSESSMENT & PLAN NOTE
Lab Results   Component Value Date    EGFR 33 06/12/2022    EGFR 36 06/11/2022    EGFR 34 06/10/2022    CREATININE 2 14 (H) 06/12/2022    CREATININE 2 00 (H) 06/11/2022    CREATININE 2 12 (H) 06/10/2022     Baseline creatinine seems between 1 4 -1 7  Creatinine trended up to 2 1, staying near 2 0-2 1  Trial small IVF, check renal US for stent location  Check PVR

## 2022-06-12 NOTE — PROGRESS NOTES
[address] [facility]  Progress Note - Joaquina Lauren 1965, 55 y o  male MRN: [id number]  [facility] Encounter: [id number]  Primary Care Provider: Marcelina Munguia   Date and time admitted to hospital: 6/7/2022  3:45 PM    * Sepsis [facility])  Assessment & Plan  Present at outside campus   Suspect source of UTI  Noted left nephrostomy tube and left ureteral stent  Noted multiple E coli UTI and also previous E coli bacteremia  Also noted covid positive status as of 5/31>> no supplemental oxygen requirement  Leukocytosis noted  Received IV fluids and cefepime at outside [facility] and transferred for Urology evaluation  S/p OR with Urology yesterday for cystoscopy ureteroscopy holmium laser lithotripsy basket extraction and ureteral stent insertion with a string removal of left-sided nephrostomy tube  Urine cultures growing MRDO citrobacter, ID consulted -> placed on PO Cipro & continue thru 6/16 for 7 days  6/12: low grade temp, WBC 11k  Monitor for now with on PO abx, check renal [address] [facility]  Not requiring supplemental oxygen  Noted mild shortness of breath after fever last night  Tested positive on 05/31, last day of isolation 6/9 - as  perprior documentation         Obstructive uropathy  Assessment & Plan  S/p CYSTOSCOPY LEFT URETEROSCOPY/LITHOTRIPSY HOLMIUM LASER,  AND EXCHANGE URETERAL STENT URETERAL, REMOVAL NEPHROSTOMY TUBE (Left) on 6/9/22  OP f/u with urology  Stent removal after a week      Morbid obesity  Assessment & Plan  Lifestyle modification    Essential hypertension  Assessment & Plan  Will continue current meds       Chronic kidney disease, stage 3 [facility])  Assessment & Plan  Lab Results   Component Value Date    EGFR 33 06/12/2022    EGFR 36 06/11/2022    EGFR 34 06/10/2022    CREATININE 2 14 (H) 06/12/2022    CREATININE 2 00 (H) 06/11/2022    CREATININE 2 12 (H) 06/10/2022     Baseline creatinine seems between 1 4 -1 7  Creatinine trended up to 2 1, staying near 2 0-2 1  Trial small IVF, check renal US for stent location  Check PVR              VTE Pharmacologic Prophylaxis:   Moderate Risk (Score 3-4) - Pharmacological DVT Prophylaxis Ordered: heparin  Patient Centered Rounds: I performed bedside rounds with nursing staff today  Discussions with Specialists or Other Care Team Provider:     Education and Discussions with Family / Patient: patient  Time Spent for Care: 30 minutes  More than 50% of total time spent on counseling and coordination of care as described above  Current Length of Stay: 5 day(s)  Current Patient Status: Inpatient   Certification Statement: The patient will continue to require additional inpatient hospital stay due to monitor Temp, WBC, Cr  Discharge Plan: Anticipate discharge in 24-48 hrs to rehab facility  [facility]    Code Status: Level 1 - Full Code    Subjective:   Feels ok, hot & cold overnight, has to urinate every half hour but small amounts    Objective:     Vitals:   Temp (24hrs), Av 3 °F (37 9 °C), Min:100 3 °F (37 9 °C), Max:100 4 °F (38 °C)    Temp:  [100 3 °F (37 9 °C)-100 4 °F (38 °C)] 100 4 °F (38 °C)  HR:  [107-112] 107  Resp:  [16] 16  BP: (121-147)/(71-86) 121/71  SpO2:  [89 %-94 %] 94 %  Body mass index is 51 6 kg/m²  Input and Output Summary (last 24 hours): Intake/Output Summary (Last 24 hours) at 2022 1618  Last data filed at 2022 1355  Gross per 24 hour   Intake 600 ml   Output 2539 ml   Net -1939 ml       Physical Exam:   Physical Exam  Vitals reviewed  HENT:      Head: Normocephalic and atraumatic  Mouth/Throat:      Mouth: Mucous membranes are moist    Cardiovascular:      Rate and Rhythm: Normal rate and regular rhythm  Heart sounds: Normal heart sounds  Pulmonary:      Effort: Pulmonary effort is normal  No respiratory distress  Breath sounds: Normal breath sounds  Abdominal:      General: There is no distension        Palpations: Abdomen is soft  Tenderness: There is no abdominal tenderness  Musculoskeletal:      Right lower leg: No edema  Left lower leg: No edema  Neurological:      Mental Status: He is alert and oriented to person, place, and time  Additional Data:     Labs:  Results from last 7 days   Lab Units 06/12/22  1006 06/11/22  0437   WBC Thousand/uL 11 81* 11 02*   HEMOGLOBIN g/dL 9 2* 9 0*   HEMATOCRIT % 30 1* 29 8*   PLATELETS Thousands/uL 343 328   BANDS PCT % 2  --    NEUTROS PCT %  --  69   LYMPHS PCT %  --  19   LYMPHO PCT % 15  --    MONOS PCT %  --  6   MONO PCT % 6  --    EOS PCT % 3 4     Results from last 7 days   Lab Units 06/12/22  0429 06/11/22  0437   SODIUM mmol/L 137 139   POTASSIUM mmol/L 3 9 3 2*   CHLORIDE mmol/L 106 106   CO2 mmol/L 27 23   BUN mg/dL 27* 32*   CREATININE mg/dL 2 14* 2 00*   ANION GAP mmol/L 4 10   CALCIUM mg/dL 9 6 9 2   ALBUMIN g/dL  --  2 2*   TOTAL BILIRUBIN mg/dL  --  0 23   ALK PHOS U/L  --  46   ALT U/L  --  14   AST U/L  --  11   GLUCOSE RANDOM mg/dL 111 105                 Results from last 7 days   Lab Units 06/08/22  0635 06/06/22  2140   LACTIC ACID mmol/L  --  1 1   PROCALCITONIN ng/ml 0 66* 0 30*       Lines/Drains:  Invasive Devices  Report    Peripheral Intravenous Line  Duration           Peripheral IV Right Hand -- days                      Imaging: No pertinent imaging reviewed  Recent Cultures (last 7 days):   Results from last 7 days   Lab Units 06/07/22  0514 06/06/22  2110 06/06/22  0000   BLOOD CULTURE  No Growth After 5 Days  --  No Growth After 5 Days     URINE CULTURE   --  80,000-89,000 cfu/ml Citrobacter amalonaticus complex*  80,000-89,000 cfu/ml Staphylococcus coagulase negative*  --        Last 24 Hours Medication List:   Current Facility-Administered Medications   Medication Dose Route Frequency Provider Last Rate    acetaminophen  650 mg Oral Q6H PRN Kat Parada MD      allopurinol  100 mg Oral Daily MD Tomasa Abarca amLODIPine  10 mg Oral Daily Carlos Sandoval MD      calcium carbonate  1,000 mg Oral BID PRN Carlos Sandoval MD      cholecalciferol  400 Units Oral Daily Carlos Sandoval MD      ciprofloxacin  500 mg Oral Q12H Jacquelyn Olmstead MD      heparin (porcine)  7,500 Units Subcutaneous Count includes the [facility] Carlos Sandoval MD      metoprolol succinate  25 mg Oral Daily Carlos Sandoval MD      morphine injection  4 mg Intravenous Q3H PRN Carlos Sandoval MD      multi-electrolyte  50 mL/hr Intravenous Continuous Neo Turner MD 50 mL/hr (06/12/22 9555)    ondansetron  4 mg Oral Q6H PRN Carlos Sandoval MD      phenazopyridine  100 mg Oral TID PRN Carlos Sandoval MD      senna-docusate sodium  2 tablet Oral BID PRN Carlos Sandoval MD      sodium chloride (PF)  10 mL Intracatheter Daily Carlos Sandoval MD      tamsulosin  0 4 mg Oral After Frances Rogers MD      traMADol  50 mg Oral Q6H PRN Carlos Sandoval MD          Today, Patient Was Seen By: Neo Turner MD    **Please Note: This note may have been constructed using a voice recognition system  **

## 2022-06-12 NOTE — ASSESSMENT & PLAN NOTE
Present at outside campus   Suspect source of UTI  Noted left nephrostomy tube and left ureteral stent  Noted multiple E coli UTI and also previous E coli bacteremia  Also noted covid positive status as of 5/31>> no supplemental oxygen requirement  Leukocytosis noted  Received IV fluids and cefepime at outside [facility] and transferred for Urology evaluation  S/p OR with Urology yesterday for cystoscopy ureteroscopy holmium laser lithotripsy basket extraction and ureteral stent insertion with a string removal of left-sided nephrostomy tube  Urine cultures growing MRDO citrobacter, ID consulted -> placed on PO Cipro & continue thru 6/16 for 7 days  6/12: low grade temp, WBC 11k   Monitor for now with on PO abx, check renal US

## 2022-06-12 NOTE — ASSESSMENT & PLAN NOTE
S/p CYSTOSCOPY LEFT URETEROSCOPY/LITHOTRIPSY HOLMIUM LASER,  AND EXCHANGE URETERAL STENT URETERAL, REMOVAL NEPHROSTOMY TUBE (Left) on 6/9/22  OP f/u with urology  Stent removal after a week

## 2022-06-13 PROBLEM — R19.7 DIARRHEA: Status: ACTIVE | Noted: 2022-06-13

## 2022-06-13 LAB
ANION GAP SERPL CALCULATED.3IONS-SCNC: 8 MMOL/L (ref 4–13)
BUN SERPL-MCNC: 23 MG/DL (ref 5–25)
CALCIUM SERPL-MCNC: 9.3 MG/DL (ref 8.3–10.1)
CHLORIDE SERPL-SCNC: 106 MMOL/L (ref 100–108)
CO2 SERPL-SCNC: 22 MMOL/L (ref 21–32)
CREAT SERPL-MCNC: 2.06 MG/DL (ref 0.6–1.3)
ERYTHROCYTE [DISTWIDTH] IN BLOOD BY AUTOMATED COUNT: 15.9 % (ref 11.6–15.1)
GFR SERPL CREATININE-BSD FRML MDRD: 35 ML/MIN/1.73SQ M
GLUCOSE SERPL-MCNC: 122 MG/DL (ref 65–140)
HCT VFR BLD AUTO: 31.2 % (ref 36.5–49.3)
HGB BLD-MCNC: 9.8 G/DL (ref 12–17)
MCH RBC QN AUTO: 28.8 PG (ref 26.8–34.3)
MCHC RBC AUTO-ENTMCNC: 31.4 G/DL (ref 31.4–37.4)
MCV RBC AUTO: 92 FL (ref 82–98)
NRBC BLD AUTO-RTO: 0 /100 WBCS
PLATELET # BLD AUTO: 367 THOUSANDS/UL (ref 149–390)
PMV BLD AUTO: 9.7 FL (ref 8.9–12.7)
POTASSIUM SERPL-SCNC: 3.7 MMOL/L (ref 3.5–5.3)
PROCALCITONIN SERPL-MCNC: 0.28 NG/ML
RBC # BLD AUTO: 3.4 MILLION/UL (ref 3.88–5.62)
SODIUM SERPL-SCNC: 136 MMOL/L (ref 136–145)
WBC # BLD AUTO: 11.46 THOUSAND/UL (ref 4.31–10.16)

## 2022-06-13 PROCEDURE — 80048 BASIC METABOLIC PNL TOTAL CA: CPT | Performed by: HOSPITALIST

## 2022-06-13 PROCEDURE — 85027 COMPLETE CBC AUTOMATED: CPT | Performed by: HOSPITALIST

## 2022-06-13 PROCEDURE — 99232 SBSQ HOSP IP/OBS MODERATE 35: CPT | Performed by: INTERNAL MEDICINE

## 2022-06-13 PROCEDURE — 84145 PROCALCITONIN (PCT): CPT | Performed by: HOSPITALIST

## 2022-06-13 PROCEDURE — 99232 SBSQ HOSP IP/OBS MODERATE 35: CPT | Performed by: HOSPITALIST

## 2022-06-13 RX ADMIN — AMLODIPINE BESYLATE 10 MG: 10 TABLET ORAL at 08:04

## 2022-06-13 RX ADMIN — HEPARIN SODIUM 7500 UNITS: 5000 INJECTION INTRAVENOUS; SUBCUTANEOUS at 13:54

## 2022-06-13 RX ADMIN — CIPROFLOXACIN HYDROCHLORIDE 500 MG: 500 TABLET, FILM COATED ORAL at 21:10

## 2022-06-13 RX ADMIN — ACETAMINOPHEN 650 MG: 325 TABLET, FILM COATED ORAL at 16:12

## 2022-06-13 RX ADMIN — CHOLECALCIFEROL TAB 10 MCG (400 UNIT) 400 UNITS: 10 TAB at 08:04

## 2022-06-13 RX ADMIN — ACETAMINOPHEN 650 MG: 325 TABLET, FILM COATED ORAL at 22:41

## 2022-06-13 RX ADMIN — HEPARIN SODIUM 7500 UNITS: 5000 INJECTION INTRAVENOUS; SUBCUTANEOUS at 21:10

## 2022-06-13 RX ADMIN — CIPROFLOXACIN HYDROCHLORIDE 500 MG: 500 TABLET, FILM COATED ORAL at 08:04

## 2022-06-13 RX ADMIN — METOPROLOL SUCCINATE 25 MG: 25 TABLET, FILM COATED, EXTENDED RELEASE ORAL at 08:04

## 2022-06-13 RX ADMIN — HEPARIN SODIUM 7500 UNITS: 5000 INJECTION INTRAVENOUS; SUBCUTANEOUS at 05:11

## 2022-06-13 RX ADMIN — TAMSULOSIN HYDROCHLORIDE 0.4 MG: 0.4 CAPSULE ORAL at 16:12

## 2022-06-13 RX ADMIN — ALLOPURINOL 100 MG: 100 TABLET ORAL at 08:04

## 2022-06-13 NOTE — ASSESSMENT & PLAN NOTE
Present at outside campus   Suspect source of UTI  Noted left nephrostomy tube and left ureteral stent  Noted multiple E coli UTI and also previous E coli bacteremia  Also noted covid positive status as of 5/31>> no supplemental oxygen requirement  Leukocytosis noted  Received IV fluids and cefepime at outside [facility] and transferred for Urology evaluation  S/p OR with Urology yesterday for cystoscopy ureteroscopy holmium laser lithotripsy basket extraction and ureteral stent insertion with a string removal of left-sided nephrostomy tube  Urine cultures growing MRDO citrobacter, ID consulted -> placed on PO Cipro & continue thru 6/16 for 7 days  6/12: low grade temp, WBC 11k   Monitor for now with on PO abx, check renal US  Now better

## 2022-06-13 NOTE — [facility] ADMISSION
[facility], patient has been approved pending insurance authorization approval, medical clearance and bed availability  Patient with low grade temps yesterday evening  [facility] Admissions will continue to follow patient for medical stability  Will need updated therapy notes prior to admission  Provided update with same to CM through [employer]

## 2022-06-13 NOTE — PROGRESS NOTES
[address] [facility]  Progress Note - Madeline Prime 1965, 55 y o  male MRN: [id number]  Unit/Bed#: [facility] 804-01 Encounter: [id number]  Primary Care Provider: Marcelina Conway   Date and time admitted to hospital: 6/7/2022  3:45 PM    * Sepsis [facility])  Assessment & Plan  Present at outside campus   Suspect source of UTI  Noted left nephrostomy tube and left ureteral stent  Noted multiple E coli UTI and also previous E coli bacteremia  Also noted covid positive status as of 5/31>> no supplemental oxygen requirement  Leukocytosis noted  Received IV fluids and cefepime at outside [facility] and transferred for Urology evaluation  S/p OR with Urology yesterday for cystoscopy ureteroscopy holmium laser lithotripsy basket extraction and ureteral stent insertion with a string removal of left-sided nephrostomy tube  Urine cultures growing MRDO citrobacter, ID consulted -> placed on PO Cipro & continue thru 6/16 for 7 days  6/12: low grade temp, WBC 11k  Monitor for now with on PO abx, check renal US  Now better      Diarrhea  Assessment & Plan  · Started today - 4 times, small , loose, a/w gurgling  · Not on stool softners  · Monitor today - ? From Abx  · If ongoing or worse check [address]  Not requiring supplemental oxygen  Noted mild shortness of breath after fever last night  Tested positive on 05/31, last day of isolation 6/9 - as  perprior documentation         Obstructive uropathy  Assessment & Plan  S/p CYSTOSCOPY LEFT URETEROSCOPY/LITHOTRIPSY HOLMIUM LASER,  AND EXCHANGE URETERAL STENT URETERAL, REMOVAL NEPHROSTOMY TUBE (Left) on 6/9/22  OP f/u with urology  Stent removal after a week  Pt c/o ongoing irritation from it & wants it removed, d/w urology ok for nurse to pull out the stent      Morbid obesity  Assessment & Plan  Lifestyle modification    Essential hypertension  Assessment & Plan  Will continue current meds       Chronic kidney disease, stage 3 [facility])  Assessment & Plan  Lab Results   Component Value Date    EGFR 35 2022    EGFR 33 2022    EGFR 36 2022    CREATININE 2 06 (H) 2022    CREATININE 2 14 (H) 2022    CREATININE 2 00 (H) 2022     Baseline creatinine seems between 1 4 -1 7  Creatinine trended up to 2 1, staying near 2 0-2 1  Trial small IVF, check renal US for stent location on  - stable  Check PVR - low              VTE Pharmacologic Prophylaxis:   Moderate Risk (Score 3-4) - Pharmacological DVT Prophylaxis Ordered: heparin  Patient Centered Rounds: I performed bedside rounds with nursing staff today  Discussions with Specialists or Other Care Team Provider:     Education and Discussions with Family / Patient: patient  Time Spent for Care: 30 minutes  More than 50% of total time spent on counseling and coordination of care as described above  Current Length of Stay: 6 day(s)  Current Patient Status: Inpatient   Certification Statement: The patient will continue to require additional inpatient hospital stay due to Monitor diarrhea, work on placement  Discharge Plan: Anticipate discharge tomorrow to rehab facility  he patient was to go to [place] acute rehab    Code Status: Level 1 - Full Code    Subjective:   Concerned about his abdominal pain and small loose BMs today  Also concerned about ongoing irritation from the stent causing urinary frequency and wants it out    Objective:     Vitals:   Temp (24hrs), Av 1 °F (37 3 °C), Min:98 °F (36 7 °C), Max:100 1 °F (37 8 °C)    Temp:  [98 °F (36 7 °C)-100 1 °F (37 8 °C)] 99 8 °F (37 7 °C)  HR:  [] 98  Resp:  [14-28] 18  BP: (112-139)/(82-91) 123/87  SpO2:  [96 %-98 %] 98 %  Body mass index is 51 6 kg/m²  Input and Output Summary (last 24 hours):      Intake/Output Summary (Last 24 hours) at 2022 1648  Last data filed at 2022 1228  Gross per 24 hour   Intake 1686 67 ml   Output 1150 ml   Net 536 67 ml       Physical Exam: Physical Exam  Vitals reviewed  HENT:      Head: Normocephalic and atraumatic  Mouth/Throat:      Mouth: Mucous membranes are moist    Cardiovascular:      Rate and Rhythm: Normal rate and regular rhythm  Heart sounds: Normal heart sounds  Pulmonary:      Effort: Pulmonary effort is normal  No respiratory distress  Breath sounds: Normal breath sounds  Abdominal:      General: There is no distension  Palpations: Abdomen is soft  Tenderness: There is no abdominal tenderness  Musculoskeletal:      Right lower leg: No edema  Left lower leg: No edema  Neurological:      Mental Status: He is alert and oriented to person, place, and time  Additional Data:     Labs:  Results from last 7 days   Lab Units 06/13/22  0430 06/12/22  1006 06/11/22  0437   WBC Thousand/uL 11 46* 11 81* 11 02*   HEMOGLOBIN g/dL 9 8* 9 2* 9 0*   HEMATOCRIT % 31 2* 30 1* 29 8*   PLATELETS Thousands/uL 367 343 328   BANDS PCT %  --  2  --    NEUTROS PCT %  --   --  69   LYMPHS PCT %  --   --  19   LYMPHO PCT %  --  15  --    MONOS PCT %  --   --  6   MONO PCT %  --  6  --    EOS PCT %  --  3 4     Results from last 7 days   Lab Units 06/13/22  0430 06/12/22  0429 06/11/22  0437   SODIUM mmol/L 136   < > 139   POTASSIUM mmol/L 3 7   < > 3 2*   CHLORIDE mmol/L 106   < > 106   CO2 mmol/L 22   < > 23   BUN mg/dL 23   < > 32*   CREATININE mg/dL 2 06*   < > 2 00*   ANION GAP mmol/L 8   < > 10   CALCIUM mg/dL 9 3   < > 9 2   ALBUMIN g/dL  --   --  2 2*   TOTAL BILIRUBIN mg/dL  --   --  0 23   ALK PHOS U/L  --   --  46   ALT U/L  --   --  14   AST U/L  --   --  11   GLUCOSE RANDOM mg/dL 122   < > 105    < > = values in this interval not displayed                   Results from last 7 days   Lab Units 06/13/22  0430 06/08/22  0635 06/06/22  2140   LACTIC ACID mmol/L  --   --  1 1   PROCALCITONIN ng/ml 0 28* 0 66* 0 30*       Lines/Drains:  Invasive Devices  Report    Peripheral Intravenous Line  Duration Peripheral IV Right Hand -- days                      Imaging: No pertinent imaging reviewed  Recent Cultures (last 7 days):   Results from last 7 days   Lab Units 06/07/22  0514 06/06/22  2110   BLOOD CULTURE  No Growth After 5 Days  --    URINE CULTURE   --  80,000-89,000 cfu/ml Citrobacter amalonaticus complex*  80,000-89,000 cfu/ml Staphylococcus coagulase negative*       Last 24 Hours Medication List:   Current Facility-Administered Medications   Medication Dose Route Frequency Provider Last Rate    acetaminophen  650 mg Oral Q6H PRN Baldev Landis MD      allopurinol  100 mg Oral Daily Baldev Landis MD      amLODIPine  10 mg Oral Daily Baldev Landis MD      calcium carbonate  1,000 mg Oral BID PRN Baldev Landis MD      cholecalciferol  400 Units Oral Daily Baldev Landis MD      ciprofloxacin  500 mg Oral Q12H Parker Antoine MD      heparin (porcine)  7,500 Units Subcutaneous [facility] Baldev Landis MD      metoprolol succinate  25 mg Oral Daily Baldev Landis MD      ondansetron  4 mg Oral Q6H PRN Baldev Landis MD      phenazopyridine  100 mg Oral TID PRN Baldev Landis MD      senna-docusate sodium  2 tablet Oral BID PRN Baldev Landis MD      sodium chloride (PF)  10 mL Intracatheter Daily Baldev Landis MD      tamsulosin  0 4 mg Oral After Norma Luevano MD      traMADol  50 mg Oral Q6H PRN Baldev Landis MD          Today, Patient Was Seen By: Eliud Box MD    **Please Note: This note may have been constructed using a voice recognition system  **

## 2022-06-13 NOTE — PROGRESS NOTES
Progress Note - Infectious Disease   Deborah Justice 55 y o  male MRN: [id number]  Unit/Bed#: [facility] 804-01 Encounter: [id number]      Impression/Recommendations:  1  Sepsis, POA  Due to #2  No other appreciable source  ROS, exam otherwise negative  Blood cultures negative  Clinically stable and non-toxic  Improved  Low-grade temps over weekend may be due to atelectasis versus drug fever versus evolving C  Diff as below  Renal U/S negative for hydronephrosis  Improved  Rec:  · Continue antibiotics as below  · Follow temperatures closely  · Recheck CBC in a m   · OOB as tolerated  · Follow stool output closely as below  · Supportive care as per the primary service     2  MDR-Citrobacter UTI  In setting of #3  Suspect true infection given sepsis as above  Rec:  · Continue ciprothrough 6/16 (7 days total)  · Discussed risk of tendinopathy with patient; no apparent drug interactions and baseline QTC normal     3  Obstructive nephrolithiasis  Status post cystoscopy, retrograde pyelogram, and left ureteral stent placement 4/22/22  Developed mmild hydronephrosis associated with recurrent sepsis, bactermia  Status post left PCN 5/10/22  Now status post cystoscopy, lithotripsy, PCN removal, stent exchange 6/9/22  Renal U/S 6/12 negative for hydronephrosis  Rec:  · Flomax, stent removal per Urology     4  ADRIANNE on CKD  Baseline Cr 1 4-1 7  Likely multifactorial   Remains elevated but stable  Rec:  · Follow creatinine closely and dose-adjust antibiotics as indicated     5  Recent COVID  Asymptomatic  PCR positive 5/31  Off isolation      6   Diarrhea  New onset today  Consider antibiotic-associated  Consider C  Diff  No recent laxatives  Rec:  · Monitor stool output closely  · If diarrhea continues check C  diff    The above impression and plan was discussed in detail with the patient and Dr Noreen Neumann      Antibiotics:  Cipro #4    Subjective:  Patient seen on AM rounds    New onset loose stools this AM   Stent string bothers him due to feeling like he has to urinate constantly  24 Hour Events:  No documented fevers, chills, sweats, nausea, vomiting  Low-grade temps overnight  Objective:  Vitals:  Temp:  [98 °F (36 7 °C)-100 1 °F (37 8 °C)] 98 9 °F (37 2 °C)  HR:  [100-119] 107  Resp:  [14-28] 20  BP: (112-139)/(82-91) 121/85  SpO2:  [96 %-97 %] 96 %  Temp (24hrs), Av °F (37 2 °C), Min:98 °F (36 7 °C), Max:100 1 °F (37 8 °C)  Current: Temperature: 98 9 °F (37 2 °C)    Physical Exam:   General:  No acute distress  Psychiatric:  Awake and alert  Pulmonary:  Normal respiratory excursion without accessory muscle use  Abdomen:  Soft, obese  Extremities:  No edema  Skin:  No rashes    Lab Results:  I have personally reviewed pertinent labs  Results from last 7 days   Lab Units 22  0430 22  0429 22  0437 06/10/22  1100 22  0441   POTASSIUM mmol/L 3 7 3 9 3 2* 3 6 3 4*   CHLORIDE mmol/L 106 106 106 109* 107   CO2 mmol/L 22 27 23 25 22   BUN mg/dL 23 27* 32* 30* 25   CREATININE mg/dL 2 06* 2 14* 2 00* 2 12* 1 72*   EGFR ml/min/1 73sq m 35 33 36 34 43   CALCIUM mg/dL 9 3 9 6 9 2 9 7 9 6   AST U/L  --   --  11 14 19   ALT U/L  --   --  14 15 19   ALK PHOS U/L  --   --  46 51 50     Results from last 7 days   Lab Units 22  0430 22  1006 22  0437   WBC Thousand/uL 11 46* 11 81* 11 02*   HEMOGLOBIN g/dL 9 8* 9 2* 9 0*   PLATELETS Thousands/uL 367 343 328     Results from last 7 days   Lab Units 22  0514 22  2110   BLOOD CULTURE  No Growth After 5 Days  --    URINE CULTURE   --  80,000-89,000 cfu/ml Citrobacter amalonaticus complex*  80,000-89,000 cfu/ml Staphylococcus coagulase negative*       Imaging Studies:   I have personally reviewed pertinent imaging study reports and images in PACS  EKG, Pathology, and Other Studies:   I have personally reviewed pertinent reports

## 2022-06-13 NOTE — ASSESSMENT & PLAN NOTE
S/p CYSTOSCOPY LEFT URETEROSCOPY/LITHOTRIPSY HOLMIUM LASER,  AND EXCHANGE URETERAL STENT URETERAL, REMOVAL NEPHROSTOMY TUBE (Left) on 6/9/22  OP f/u with urology  Stent removal after a week  Pt c/o ongoing irritation from it & wants it removed, d/w urology ok for nurse to pull out the stent

## 2022-06-13 NOTE — ASSESSMENT & PLAN NOTE
Lab Results   Component Value Date    EGFR 35 06/13/2022    EGFR 33 06/12/2022    EGFR 36 06/11/2022    CREATININE 2 06 (H) 06/13/2022    CREATININE 2 14 (H) 06/12/2022    CREATININE 2 00 (H) 06/11/2022     Baseline creatinine seems between 1 4 -1 7  Creatinine trended up to 2 1, staying near 2 0-2 1  Trial small IVF, check renal US for stent location on 6/12 - stable  Check PVR - low

## 2022-06-13 NOTE — ASSESSMENT & PLAN NOTE
· Started today - 4 times, small , loose, a/w gurgling  · Not on stool softners  · Monitor today - ?  From Abx  · If ongoing or worse check Cdiff

## 2022-06-14 LAB
ANION GAP SERPL CALCULATED.3IONS-SCNC: 4 MMOL/L (ref 4–13)
BUN SERPL-MCNC: 23 MG/DL (ref 5–25)
CALCIUM SERPL-MCNC: 9 MG/DL (ref 8.3–10.1)
CHLORIDE SERPL-SCNC: 108 MMOL/L (ref 100–108)
CO2 SERPL-SCNC: 26 MMOL/L (ref 21–32)
CREAT SERPL-MCNC: 2.04 MG/DL (ref 0.6–1.3)
ERYTHROCYTE [DISTWIDTH] IN BLOOD BY AUTOMATED COUNT: 15.7 % (ref 11.6–15.1)
GFR SERPL CREATININE-BSD FRML MDRD: 35 ML/MIN/1.73SQ M
GLUCOSE SERPL-MCNC: 112 MG/DL (ref 65–140)
HCT VFR BLD AUTO: 31 % (ref 36.5–49.3)
HGB BLD-MCNC: 9.4 G/DL (ref 12–17)
MCH RBC QN AUTO: 28.7 PG (ref 26.8–34.3)
MCHC RBC AUTO-ENTMCNC: 30.3 G/DL (ref 31.4–37.4)
MCV RBC AUTO: 95 FL (ref 82–98)
PLATELET # BLD AUTO: 339 THOUSANDS/UL (ref 149–390)
PMV BLD AUTO: 9.5 FL (ref 8.9–12.7)
POTASSIUM SERPL-SCNC: 3.2 MMOL/L (ref 3.5–5.3)
RBC # BLD AUTO: 3.27 MILLION/UL (ref 3.88–5.62)
SODIUM SERPL-SCNC: 138 MMOL/L (ref 136–145)
WBC # BLD AUTO: 8.22 THOUSAND/UL (ref 4.31–10.16)

## 2022-06-14 PROCEDURE — 97116 GAIT TRAINING THERAPY: CPT

## 2022-06-14 PROCEDURE — 80048 BASIC METABOLIC PNL TOTAL CA: CPT | Performed by: HOSPITALIST

## 2022-06-14 PROCEDURE — 97535 SELF CARE MNGMENT TRAINING: CPT

## 2022-06-14 PROCEDURE — 99232 SBSQ HOSP IP/OBS MODERATE 35: CPT | Performed by: INTERNAL MEDICINE

## 2022-06-14 PROCEDURE — 85027 COMPLETE CBC AUTOMATED: CPT | Performed by: HOSPITALIST

## 2022-06-14 PROCEDURE — 99232 SBSQ HOSP IP/OBS MODERATE 35: CPT | Performed by: GENERAL PRACTICE

## 2022-06-14 RX ORDER — POTASSIUM CHLORIDE 750 MG/1
10 TABLET, EXTENDED RELEASE ORAL DAILY
Status: DISCONTINUED | OUTPATIENT
Start: 2022-06-14 | End: 2022-06-17 | Stop reason: HOSPADM

## 2022-06-14 RX ADMIN — HEPARIN SODIUM 7500 UNITS: 5000 INJECTION INTRAVENOUS; SUBCUTANEOUS at 13:35

## 2022-06-14 RX ADMIN — TRAMADOL HYDROCHLORIDE 50 MG: 50 TABLET, COATED ORAL at 15:28

## 2022-06-14 RX ADMIN — CHOLECALCIFEROL TAB 10 MCG (400 UNIT) 400 UNITS: 10 TAB at 09:20

## 2022-06-14 RX ADMIN — TAMSULOSIN HYDROCHLORIDE 0.4 MG: 0.4 CAPSULE ORAL at 17:06

## 2022-06-14 RX ADMIN — HEPARIN SODIUM 7500 UNITS: 5000 INJECTION INTRAVENOUS; SUBCUTANEOUS at 05:22

## 2022-06-14 RX ADMIN — HEPARIN SODIUM 7500 UNITS: 5000 INJECTION INTRAVENOUS; SUBCUTANEOUS at 21:29

## 2022-06-14 RX ADMIN — METOPROLOL SUCCINATE 25 MG: 25 TABLET, FILM COATED, EXTENDED RELEASE ORAL at 09:20

## 2022-06-14 RX ADMIN — ALLOPURINOL 100 MG: 100 TABLET ORAL at 09:20

## 2022-06-14 RX ADMIN — CIPROFLOXACIN HYDROCHLORIDE 500 MG: 500 TABLET, FILM COATED ORAL at 21:29

## 2022-06-14 RX ADMIN — TRAMADOL HYDROCHLORIDE 50 MG: 50 TABLET, COATED ORAL at 09:20

## 2022-06-14 RX ADMIN — AMLODIPINE BESYLATE 10 MG: 10 TABLET ORAL at 09:20

## 2022-06-14 RX ADMIN — POTASSIUM CHLORIDE 10 MEQ: 750 TABLET, EXTENDED RELEASE ORAL at 09:54

## 2022-06-14 RX ADMIN — CIPROFLOXACIN HYDROCHLORIDE 500 MG: 500 TABLET, FILM COATED ORAL at 09:20

## 2022-06-14 NOTE — PLAN OF CARE
Problem: PHYSICAL THERAPY ADULT  Goal: Performs mobility at highest level of function for planned discharge setting  See evaluation for individualized goals  Description: Treatment/Interventions: Functional transfer training, LE strengthening/ROM, Elevations, Therapeutic exercise, Endurance training, Patient/family training, Equipment eval/education, Bed mobility, Gait training          See flowsheet documentation for full assessment, interventions and recommendations  Outcome: Progressing  Note: Prognosis: Good  Problem List: Decreased strength, Decreased endurance, Impaired balance, Decreased mobility, Decreased safety awareness, Pain  Assessment: Pt finishing OT session upon PT arrival, reports fatigue, but willing to participate in PT session  Pt remains limited by fatigue, moderate RAMÍREZ with ambulation, tachycardic  Pt attempted ambulation short distance without AD, working toward baseline, however poor balance and lateral lean due to LE weakness/L knee pain  Pt very motivated to participate and improve, will need to progress to stair climbing to simulate home enviornment  Pt will benefit from continued skilled PT intervention during course of hospital stay to address the above mentioned impairments  Recommend IP rehab upon hospital D/C  Barriers to Discharge: Inaccessible home environment, Decreased caregiver support  Barriers to Discharge Comments: lives alone - limited support     PT Discharge Recommendation: Post acute rehabilitation services          See flowsheet documentation for full assessment

## 2022-06-14 NOTE — PHYSICAL THERAPY NOTE
Physical Therapy Treatment Note    Patient's Name: Cheryl Oliveira  : 22 1052   PT Last Visit   PT Visit Date 22   Note Type   Note Type Treatment for insurance authorization   Pain Assessment   Pain Assessment Tool 0-10   Pain Score 6   Pain Location/Orientation Orientation: Left; Location: Knee   Hospital Pain Intervention(s) Repositioned; Ambulation/increased activity   Restrictions/Precautions   Other Precautions Fall Risk;Pain   General   Chart Reviewed Yes   Family/Caregiver Present No   Cognition   Overall Cognitive Status WFL   Orientation Level Oriented X4   Following Commands Follows all commands and directions without difficulty   Comments Pt very pleasant and cooperative throughout session, motivated to discharge to rehab   Bed Mobility   Sit to Supine 5  Supervision   Additional items HOB elevated; Increased time required   Transfers   Sit to Stand 5  Supervision   Stand to Sit 5  Supervision   Additional Comments with and without AD, increased difficulty from lower surface, extra time for set-up   Ambulation/Elevation   Gait pattern Improper Weight shift;Decreased foot clearance;Decreased L stance  (increased lateral sway, reaching for furniture)   Gait Assistance 4  Minimal assist  (CGA)   Assistive Device None   Distance 15 ft, no AD, poor balance and increased lateral sway  Additional ambulation 61' with RW, VC's for pacing, proximity to RW, forward eye gaze  Pt requires seated rest break due to RAMÍREZ and fatigue, HR max 129 bpm, O2 sats stable on RA   Balance   Static Sitting Good   Dynamic Sitting Fair   Static Standing Fair -   Dynamic Standing Poor +   Ambulatory Fair -   Activity Tolerance   Activity Tolerance Patient limited by fatigue   Nurse Made Aware RN updated   Assessment   Prognosis Good   Problem List Decreased strength;Decreased endurance; Impaired balance;Decreased mobility; Decreased safety awareness;Pain Assessment Pt finishing OT session upon PT arrival, reports fatigue, but willing to participate in PT session  Pt remains limited by fatigue, moderate RAMÍREZ with ambulation, tachycardic  Pt attempted ambulation short distance without AD, working toward baseline, however poor balance and lateral lean due to LE weakness/L knee pain  Pt very motivated to participate and improve, will need to progress to stair climbing to simulate home enviornment  Pt will benefit from continued skilled PT intervention during course of hospital stay to address the above mentioned impairments  Recommend IP rehab upon hospital D/C  Barriers to Discharge Inaccessible home environment;Decreased caregiver support   Goals   Patient Goals to go to rehab   STG Expiration Date 06/22/22   Plan   Treatment/Interventions Functional transfer training;LE strengthening/ROM; Elevations; Therapeutic exercise; Endurance training;Patient/family training;Equipment eval/education; Bed mobility;Gait training   Progress Progressing toward goals   PT Frequency 3-5x/wk   Recommendation   PT Discharge Recommendation Post acute rehabilitation services   AM-PAC Basic Mobility Inpatient   Turning in Bed Without Bedrails 4   Lying on Back to Sitting on Edge of Flat Bed 3   Moving Bed to Chair 3   Standing Up From Chair 3   Walk in Room 3   Climb 3-5 Stairs 2   Basic Mobility Inpatient Raw Score 18   Basic Mobility Standardized Score 41 05   Highest Level Of Mobility   JH-HLM Goal 6: Walk 10 steps or more   JH-HLM Achieved 7: Walk 25 feet or more       Royer Sanchez, PT, DPT, GCS

## 2022-06-14 NOTE — UTILIZATION REVIEW
Continued Stay Review    Date: 6/14/22                          Current Patient Class: inpatient  Current Level of Care: med surg    HPI:56 y o  male initially admitted on 6/7/22 Sepsis    Assessment/Plan: Pain controlled w/tramadol  Stent removed on 6/13  Pt continues to wait for placement at [facility]      Vital Signs:   Date/Time Temp Pulse Resp BP MAP (mmHg) SpO2 O2 Device   06/14/22 14:48:41 98 9 °F (37 2 °C) 99 24 Abnormal  110/67 81 97 % --   06/14/22 07:52:04 98 3 °F (36 8 °C) 101 24 Abnormal  131/81 98 94 % --   06/14/22 0520 -- -- -- -- -- -- None (Room air)   06/13/22 22:51:22 97 5 °F (36 4 °C) 103 22 126/82 97 100 % --   06/13/22 15:04:12 99 8 °F (37 7 °C) 98 18 123/87 99 98 % --   06/13/22 07:18:36 98 9 °F (37 2 °C) 107 Abnormal  20 121/85 97 96 % --   06/13/22 0445 -- -- -- -- -- -- None (Room air)   06/12/22 21:27:22 98 9 °F (37 2 °C) 119 Abnormal  28 Abnormal  112/91 98 96 % --   06/12/22 2110 98 °F (36 7 °C) -- -- -- -- -- --   06/12/22 17:21:19 100 1 °F (37 8 °C) 100 14 139/82 101 97 % --   06/12/22 09:50:03 100 4 °F (38 °C) 107 Abnormal  -- 121/71 88 94 % --   06/12/22 06:26:58 100 3 °F (37 9 °C) 111 Abnormal  -- 140/82 101 89 % Abnormal  --     Pertinent Labs/Diagnostic Results:   Results from last 7 days   Lab Units 06/07/22  1611   SARS-COV-2  Negative     Results from last 7 days   Lab Units 06/14/22  0633 06/13/22  0430 06/12/22  1006 06/11/22  0437 06/10/22  1100 06/09/22  0441   WBC Thousand/uL 8 22 11 46* 11 81* 11 02* 12 06* 8 34   HEMOGLOBIN g/dL 9 4* 9 8* 9 2* 9 0* 9 4* 8 9*   HEMATOCRIT % 31 0* 31 2* 30 1* 29 8* 30 4* 30 5*   PLATELETS Thousands/uL 339 367 343 328 351 311   NEUTROS ABS Thousands/µL  --   --   --  7 53 9 37* 5 39   BANDS PCT %  --   --  2  --   --   --      Results from last 7 days   Lab Units 06/14/22  0633 06/13/22  0430 06/12/22  0429 06/11/22  0437 06/10/22  1100   SODIUM mmol/L 138 136 137 139 140   POTASSIUM mmol/L 3 2* 3 7 3 9 3 2* 3 6   CHLORIDE mmol/L 108 106 106 106 109*   CO2 mmol/L 26 22 27 23 25   ANION GAP mmol/L 4 8 4 10 6   BUN mg/dL 23 23 27* 32* 30*   CREATININE mg/dL 2 04* 2 06* 2 14* 2 00* 2 12*   EGFR ml/min/1 73sq m 35 35 33 36 34   CALCIUM mg/dL 9 0 9 3 9 6 9 2 9 7     Results from last 7 days   Lab Units 06/11/22  0437 06/10/22  1100 06/09/22  0441   AST U/L 11 14 19   ALT U/L 14 15 19   ALK PHOS U/L 46 51 50   TOTAL PROTEIN g/dL 7 5 8 2 7 7   ALBUMIN g/dL 2 2* 2 3* 2 0*   TOTAL BILIRUBIN mg/dL 0 23 0 17* 0 25     Results from last 7 days   Lab Units 06/14/22  0633 06/13/22  0430 06/12/22  0429 06/11/22  0437 06/10/22  1100 06/09/22  0441 06/08/22  0635   GLUCOSE RANDOM mg/dL 112 122 111 105 118 108 112     Results from last 7 days   Lab Units 06/13/22  0430 06/08/22  0635   PROCALCITONIN ng/ml 0 28* 0 66*     Results from last 7 days   Lab Units 06/08/22  0635   CRP mg/L 161 0*     Results from last 7 days   Lab Units 06/07/22  1611   INFLUENZA A PCR  Negative   INFLUENZA B PCR  Negative   RSV PCR  Negative     Medications:   Scheduled Medications:  allopurinol, 100 mg, Oral, Daily  amLODIPine, 10 mg, Oral, Daily  cholecalciferol, 400 Units, Oral, Daily  ciprofloxacin, 500 mg, Oral, Q12H LATRICE  heparin (porcine), 7,500 Units, Subcutaneous, Q8H LATRICE  metoprolol succinate, 25 mg, Oral, Daily  potassium chloride, 10 mEq, Oral, Daily  sodium chloride (PF), 10 mL, Intracatheter, Daily  tamsulosin, 0 4 mg, Oral, After Dinner      Continuous IV Infusions: none     PRN Meds:  acetaminophen, 650 mg, Oral, Q6H PRN  calcium carbonate, 1,000 mg, Oral, BID PRN  ondansetron, 4 mg, Oral, Q6H PRN  phenazopyridine, 100 mg, Oral, TID PRN  senna-docusate sodium, 2 tablet, Oral, BID PRN  traMADol, 50 mg, Oral, Q6H PRN        Discharge Plan: tbd     Network Utilization Review Department  ATTENTION: Please call with any questions or concerns to [phone] and carefully listen to the prompts so that you are directed to the right person   All voicemails are confidential   Harry Moore all requests for admission clinical reviews, approved or denied determinations and any other requests to dedicated fax number below belonging to the campus where the patient is receiving treatment   List of dedicated fax numbers for the Facilities:  [address] [place] [address] DENIALS (Administrative/Medical Necessity) [phone]   1000  [facility] (Maternity/NICU/Pediatrics) [phone] [address]  [address] [place] [address] [address] [address] [place] [address] [facility] [place] [address] [phone]

## 2022-06-14 NOTE — ASSESSMENT & PLAN NOTE
S/p CYSTOSCOPY LEFT URETEROSCOPY/LITHOTRIPSY HOLMIUM LASER,  AND EXCHANGE URETERAL STENT URETERAL, REMOVAL NEPHROSTOMY TUBE (Left) on 6/9/22  OP f/u with urology  Stent removed 6/13

## 2022-06-14 NOTE — ASSESSMENT & PLAN NOTE
Present at outside campus   Suspect source of UTI  Noted left nephrostomy tube and left ureteral stent  Noted multiple E coli UTI and also previous E coli bacteremia  Also noted covid positive status as of 5/31>> no supplemental oxygen requirement  Leukocytosis noted  Received IV fluids and cefepime at outside [facility] and transferred for Urology evaluation  S/p OR with Urology yesterday for cystoscopy ureteroscopy holmium laser lithotripsy basket extraction and ureteral stent insertion with a string removal of left-sided nephrostomy tube  Urine cultures growing MRDO citrobacter, ID consulted -> placed on PO Cipro & continue thru 6/16 for 7 days  6/12: low grade temp, WBC 11k   Monitor for now with on PO abx, renal US unremarkable  Now better

## 2022-06-14 NOTE — PROGRESS NOTES
[facility]  Progress Note - Jordi Pink 1965, 55 y o  male MRN: [id number]  [facility] Encounter: [id number]  Primary Care Provider: Marcelina Zapien   Date and time admitted to hospital: 6/7/2022  3:45 PM    * Sepsis [facility])  Assessment & Plan  Present at outside campus   Suspect source of UTI  Noted left nephrostomy tube and left ureteral stent  Noted multiple E coli UTI and also previous E coli bacteremia  Also noted covid positive status as of 5/31>> no supplemental oxygen requirement  Leukocytosis noted  Received IV fluids and cefepime at outside [facility] and transferred for Urology evaluation  S/p OR with Urology yesterday for cystoscopy ureteroscopy holmium laser lithotripsy basket extraction and ureteral stent insertion with a string removal of left-sided nephrostomy tube  Urine cultures growing MRDO citrobacter, ID consulted -> placed on PO Cipro & continue thru 6/16 for 7 days  6/12: low grade temp, WBC 11k  Monitor for now with on PO abx, renal US unremarkable  Now better      Obstructive uropathy  Assessment & Plan  S/p CYSTOSCOPY LEFT URETEROSCOPY/LITHOTRIPSY HOLMIUM LASER,  AND EXCHANGE URETERAL STENT URETERAL, REMOVAL NEPHROSTOMY TUBE (Left) on 6/9/22  OP f/u with urology  Stent removed 6/13      Diarrhea  Assessment & Plan  · Noted 6/13 - now resolved  · Not on stool softners    COVID  Assessment & Plan  Not requiring supplemental oxygen  Noted mild shortness of breath after fever last night  Tested positive on 05/31, last day of isolation 6/9 - as  Per prior documentation         Morbid obesity  Assessment & Plan  Lifestyle modification    Essential hypertension  Assessment & Plan  Will continue current meds       Chronic kidney disease, stage 3 [facility])  Assessment & Plan  Lab Results   Component Value Date    EGFR 35 06/14/2022    EGFR 35 06/13/2022    EGFR 33 06/12/2022    CREATININE 2 04 (H) 06/14/2022    CREATININE 2 06 (H) 2022    CREATININE 2 14 (H) 2022   Estimated Creatinine Clearance: 55 7 mL/min (A) (by C-G formula based on SCr of 2 04 mg/dL (H))  Baseline creatinine seems between 1 4 -1 7  Creatinine staying near 2 0-2 1  Trial small IVF, check renal US for stent location on  - stable  Check PVR - low            VTE Pharmacologic Prophylaxis: VTE Score: 5 High Risk (Score >/= 5) - Pharmacological DVT Prophylaxis Ordered: heparin  Sequential Compression Devices Ordered  Patient Centered Rounds: I performed bedside rounds with nursing staff today  Discussions with Specialists or Other Care Team Provider: no    Education and Discussions with Family / Patient: Patient declined call to   Time Spent for Care: 30 minutes  More than 50% of total time spent on counseling and coordination of care as described above  Current Length of Stay: 7 day(s)  Current Patient Status: Inpatient   Certification Statement: The patient will continue to require additional inpatient hospital stay due to need for ARC  Discharge Plan: Anticipate discharge tomorrow to rehab facility  Code Status: Level 1 - Full Code    Subjective:   Pain controlled w/ Tramadol    Objective:     Vitals:   Temp (24hrs), Av 2 °F (36 8 °C), Min:97 5 °F (36 4 °C), Max:98 9 °F (37 2 °C)    Temp:  [97 5 °F (36 4 °C)-98 9 °F (37 2 °C)] 98 9 °F (37 2 °C)  HR:  [] 99  Resp:  [22-24] 24  BP: (110-131)/(67-82) 110/67  SpO2:  [94 %-100 %] 97 %  Body mass index is 51 6 kg/m²  Input and Output Summary (last 24 hours): Intake/Output Summary (Last 24 hours) at 2022 1541  Last data filed at 2022 1246  Gross per 24 hour   Intake 1240 ml   Output 525 ml   Net 715 ml       Physical Exam:   Physical Exam  HENT:      Head: Normocephalic and atraumatic  Nose: Nose normal       Mouth/Throat:      Mouth: Mucous membranes are moist    Eyes:      Extraocular Movements: Extraocular movements intact        Conjunctiva/sclera: Conjunctivae normal    Cardiovascular:      Rate and Rhythm: Normal rate and regular rhythm  Pulmonary:      Effort: Pulmonary effort is normal       Breath sounds: Normal breath sounds  No wheezing or rales  Abdominal:      General: Bowel sounds are normal  There is no distension  Palpations: Abdomen is soft  Tenderness: There is no abdominal tenderness  Musculoskeletal:         General: Normal range of motion  Cervical back: Normal range of motion and neck supple  Right lower leg: No edema  Left lower leg: No edema  Skin:     General: Skin is warm and dry  Neurological:      Mental Status: He is alert and oriented to person, place, and time  Additional Data:     Labs:  Results from last 7 days   Lab Units 06/14/22  0633 06/13/22  0430 06/12/22  1006 06/11/22  0437   WBC Thousand/uL 8 22   < > 11 81* 11 02*   HEMOGLOBIN g/dL 9 4*   < > 9 2* 9 0*   HEMATOCRIT % 31 0*   < > 30 1* 29 8*   PLATELETS Thousands/uL 339   < > 343 328   BANDS PCT %  --   --  2  --    NEUTROS PCT %  --   --   --  69   LYMPHS PCT %  --   --   --  19   LYMPHO PCT %  --   --  15  --    MONOS PCT %  --   --   --  6   MONO PCT %  --   --  6  --    EOS PCT %  --   --  3 4    < > = values in this interval not displayed  Results from last 7 days   Lab Units 06/14/22  0633 06/12/22  0429 06/11/22  0437   SODIUM mmol/L 138   < > 139   POTASSIUM mmol/L 3 2*   < > 3 2*   CHLORIDE mmol/L 108   < > 106   CO2 mmol/L 26   < > 23   BUN mg/dL 23   < > 32*   CREATININE mg/dL 2 04*   < > 2 00*   ANION GAP mmol/L 4   < > 10   CALCIUM mg/dL 9 0   < > 9 2   ALBUMIN g/dL  --   --  2 2*   TOTAL BILIRUBIN mg/dL  --   --  0 23   ALK PHOS U/L  --   --  46   ALT U/L  --   --  14   AST U/L  --   --  11   GLUCOSE RANDOM mg/dL 112   < > 105    < > = values in this interval not displayed                   Results from last 7 days   Lab Units 06/13/22  0430 06/08/22  0635   PROCALCITONIN ng/ml 0 28* 0 66* Lines/Drains:  Invasive Devices  Report    Peripheral Intravenous Line  Duration           Peripheral IV Right Hand -- days                      Imaging: No pertinent imaging reviewed  Recent Cultures (last 7 days):         Last 24 Hours Medication List:   Current Facility-Administered Medications   Medication Dose Route Frequency Provider Last Rate    acetaminophen  650 mg Oral Q6H PRN Zac Burger MD      allopurinol  100 mg Oral Daily Rise MD Tao      amLODIPine  10 mg Oral Daily Rise MD Tao      calcium carbonate  1,000 mg Oral BID PRN Rise MD Tao      cholecalciferol  400 Units Oral Daily Rise MD Tao      ciprofloxacin  500 mg Oral Q12H Nam Jolley MD      heparin (porcine)  7,500 Units Subcutaneous [facility] Zac Burger MD      metoprolol succinate  25 mg Oral Daily Rise MD Tao      ondansetron  4 mg Oral Q6H PRN Rise MD Tao      phenazopyridine  100 mg Oral TID PRN Rise MD Tao      potassium chloride  10 mEq Oral Daily Tania Lopez DO      senna-docusate sodium  2 tablet Oral BID PRN Rise MD Tao      sodium chloride (PF)  10 mL Intracatheter Daily Rise MD Tao      tamsulosin  0 4 mg Oral After Howie Barrow MD      traMADol  50 mg Oral Q6H PRN Rise MD Tao          Today, Patient Was Seen By: Tania Lopez DO    **Please Note: This note may have been constructed using a voice recognition system  **

## 2022-06-14 NOTE — ASSESSMENT & PLAN NOTE
Not requiring supplemental oxygen  Noted mild shortness of breath after fever last night  Tested positive on 05/31, last day of isolation 6/9 - as  Per prior documentation

## 2022-06-14 NOTE — UTILIZATION REVIEW
Continued Stay Review    Date: 6/13/22                          Current Patient Class: inpatient  Current Level of Care: med surg    HPI:56 y o  male initially admitted on 6/7/22  Sepsis     Assessment/Plan:  OR with Urology yesterday for cystoscopy ureteroscopy holmium laser lithotripsy basket extraction and ureteral stent insertion with a string removal of left-sided nephrostomy tube  Outpt f/u w/urology for stent removal after 1 wk  Urine cultures growing MRDO citrobacter, ID consulted and pt placed on PO Cipro & continue thru 6/16 for 7 days  6/12: low grade temp, WBC 11k  Monitor for now with on PO abx, check renal US  Monitor diarrhea, possibly from abx        Vital Signs:   Date/Time Temp Pulse Resp BP MAP (mmHg) SpO2 O2 Device   06/14/22 07:52:04 98 3 °F (36 8 °C) 101 24 Abnormal  131/81 98 94 % --   06/14/22 0520 -- -- -- -- -- -- None (Room air)   06/13/22 22:51:22 97 5 °F (36 4 °C) 103 22 126/82 97 100 % --   06/13/22 15:04:12 99 8 °F (37 7 °C) 98 18 123/87 99 98 % --   06/13/22 07:18:36 98 9 °F (37 2 °C) 107 Abnormal  20 121/85 97 96 % --   06/13/22 0445 -- -- -- -- -- -- None (Room air)   06/12/22 21:27:22 98 9 °F (37 2 °C) 119 Abnormal  28 Abnormal  112/91 98 96 % --   06/12/22 2110 98 °F (36 7 °C) -- -- -- -- -- --   06/12/22 17:21:19 100 1 °F (37 8 °C) 100 14 139/82 101 97 % --   06/12/22 09:50:03 100 4 °F (38 °C) 107 Abnormal  -- 121/71 88 94 % --   06/12/22 06:26:58 100 3 °F (37 9 °C) 111 Abnormal  -- 140/82 101 89 % Abnormal  --     Pertinent Labs/Diagnostic Results:   Results from last 7 days   Lab Units 06/07/22  1611   SARS-COV-2  Negative     Results from last 7 days   Lab Units 06/14/22  0633 06/13/22  0430 06/12/22  1006 06/11/22  0437 06/10/22  1100 06/09/22  0441   WBC Thousand/uL 8 22 11 46* 11 81* 11 02* 12 06* 8 34   HEMOGLOBIN g/dL 9 4* 9 8* 9 2* 9 0* 9 4* 8 9*   HEMATOCRIT % 31 0* 31 2* 30 1* 29 8* 30 4* 30 5*   PLATELETS Thousands/uL 339 367 343 328 351 311   NEUTROS ABS Thousands/µL  --   --   --  7 53 9 37* 5 39   BANDS PCT %  --   --  2  --   --   --          Results from last 7 days   Lab Units 06/14/22  0633 06/13/22 0430 06/12/22 0429 06/11/22 0437 06/10/22  1100   SODIUM mmol/L 138 136 137 139 140   POTASSIUM mmol/L 3 2* 3 7 3 9 3 2* 3 6   CHLORIDE mmol/L 108 106 106 106 109*   CO2 mmol/L 26 22 27 23 25   ANION GAP mmol/L 4 8 4 10 6   BUN mg/dL 23 23 27* 32* 30*   CREATININE mg/dL 2 04* 2 06* 2 14* 2 00* 2 12*   EGFR ml/min/1 73sq m 35 35 33 36 34   CALCIUM mg/dL 9 0 9 3 9 6 9 2 9 7     Results from last 7 days   Lab Units 06/11/22  0437 06/10/22  1100 06/09/22  0441   AST U/L 11 14 19   ALT U/L 14 15 19   ALK PHOS U/L 46 51 50   TOTAL PROTEIN g/dL 7 5 8 2 7 7   ALBUMIN g/dL 2 2* 2 3* 2 0*   TOTAL BILIRUBIN mg/dL 0 23 0 17* 0 25         Results from last 7 days   Lab Units 06/14/22  0633 06/13/22 0430 06/12/22 0429 06/11/22 0437 06/10/22  1100 06/09/22  0441 06/08/22  0635   GLUCOSE RANDOM mg/dL 112 122 111 105 118 108 112     Results from last 7 days   Lab Units 06/13/22  0430 06/08/22  0635   PROCALCITONIN ng/ml 0 28* 0 66*     Results from last 7 days   Lab Units 06/08/22  0635   CRP mg/L 161 0*     Results from last 7 days   Lab Units 06/07/22  1611   INFLUENZA A PCR  Negative   INFLUENZA B PCR  Negative   RSV PCR  Negative     Medications:   Scheduled Medications:  allopurinol, 100 mg, Oral, Daily  amLODIPine, 10 mg, Oral, Daily  cholecalciferol, 400 Units, Oral, Daily  ciprofloxacin, 500 mg, Oral, Q12H LATRICE  heparin (porcine), 7,500 Units, Subcutaneous, Q8H LATRICE  metoprolol succinate, 25 mg, Oral, Daily  potassium chloride, 10 mEq, Oral, Daily  sodium chloride (PF), 10 mL, Intracatheter, Daily  tamsulosin, 0 4 mg, Oral, After Dinner      Continuous IV Infusions: none     PRN Meds:  acetaminophen, 650 mg, Oral, Q6H PRN  calcium carbonate, 1,000 mg, Oral, BID PRN  ondansetron, 4 mg, Oral, Q6H PRN  phenazopyridine, 100 mg, Oral, TID PRN  senna-docusate sodium, 2 tablet, Oral, BID PRN  traMADol, 50 mg, Oral, Q6H PRN        Discharge Plan: tbd, awaiting insurance auth for [facility] Utilization Review Department  ATTENTION: Please call with any questions or concerns to [phone] and carefully listen to the prompts so that you are directed to the right person  All voicemails are confidential   Mat Bicker all requests for admission clinical reviews, approved or denied determinations and any other requests to dedicated fax number below belonging to the campus where the patient is receiving treatment   List of dedicated fax numbers for the Facilities:  [address] DENIALS (Administrative/Medical Necessity) [phone]   [address] (Maternity/NICU/Pediatrics) [phone]   [address]  [address] [place] [address] [place] [address] [address] [facility] [place] [address] [phone]

## 2022-06-14 NOTE — PLAN OF CARE
Problem: OCCUPATIONAL THERAPY ADULT  Goal: Performs self-care activities at highest level of function for planned discharge setting  See evaluation for individualized goals  Description: Treatment Interventions: ADL retraining, Functional transfer training, UE strengthening/ROM, Endurance training, Cognitive reorientation, Patient/family training, Equipment evaluation/education, Energy conservation, Activityengagement          See flowsheet documentation for full assessment, interventions and recommendations  Outcome: Progressing  Note: Limitation: Decreased ADL status, Decreased UE ROM, Decreased UE strength, Decreased Safe judgement during ADL, Decreased endurance, Decreased self-care trans, Decreased high-level ADLs  Prognosis: Fair  Assessment: Pt participated in occupational therapy session with focus on activity tolerance, bed mob, functional transfers/mob, toilet transfers/toileting, and UB LB self-care tasks  Pt cleared by DARRELL/Sanjay for pt participation in OT session  Pt presented supine/HOB raised and pt agreeable to participate in therapy following pt identifiers confirmed  Pt reported his therapy goal to return to [place] rehab  Pt reported deficits with toilet hygiene 2* pt decreased coordination and balance  Pt easily short of breath with activity and does required seated rest breaks  Pt will benefit from post acute rehab services to continue to address pt noted deficits with decreased activity tolerance which currently impacts pt ADL and functional mob  OT Discharge Recommendation: Post acute rehabilitation services  OT - OK to Discharge:  Yes

## 2022-06-14 NOTE — ARC ADMISSION
Reviewed updates with [facility] physician - will continue to follow patient's case at this time, monitoring for medical stability and functional progress  Will update as able

## 2022-06-14 NOTE — ASSESSMENT & PLAN NOTE
Lab Results   Component Value Date    EGFR 35 06/14/2022    EGFR 35 06/13/2022    EGFR 33 06/12/2022    CREATININE 2 04 (H) 06/14/2022    CREATININE 2 06 (H) 06/13/2022    CREATININE 2 14 (H) 06/12/2022   Estimated Creatinine Clearance: 55 7 mL/min (A) (by C-G formula based on SCr of 2 04 mg/dL (H))    Baseline creatinine seems between 1 4 -1 7  Creatinine staying near 2 0-2 1  Trial small IVF, check renal US for stent location on 6/12 - stable  Check PVR - low

## 2022-06-14 NOTE — PROGRESS NOTES
Progress Note - Infectious Disease   Shayy Leal 55 y o  male MRN: [id number]  Unit/Bed#: [facility] 804-01 Encounter: [id number]      Impression/Recommendations:  1   Sepsis, POA   Due to #2   No other appreciable source   ROS, exam otherwise negative   Blood cultures negative   Clinically stable and non-toxic  Improved  Low-grade temps over weekend may be due to atelectasis versus drug fever versus evolving C  Diff as below  Renal U/S negative for hydronephrosis  Improved  Rec:  · Continue antibiotics as below  · Follow temperatures and WBC closely  · OOB as tolerated  · Supportive care as per the primary service     2   MDR-Citrobacter UTI   In setting of #3   Suspect true infection given sepsis as above   Discussed risk of tendinopathy with FQ with patient; no apparent drug interactions and baseline QTC normal  Rec:  · Continue ciprothrough 6/16 (7 days total)     3   Obstructive nephrolithiasis   Status post cystoscopy, retrograde pyelogram, and left ureteral stent placement 4/22/22   Developed mmild hydronephrosis associated with recurrent sepsis, bactermia   Status post left PCN 5/10/22   Now status post cystoscopy, lithotripsy, PCN removal, stent exchange 6/9/22  Renal U/S 6/12 negative for hydronephrosis  Stent (on a string) now removed      4   ADRIANNE on CKD   Baseline Cr 1 4-1 7   Likely multifactorial   Slowly improving      5   Recent COVID   Asymptomatic   PCR positive 5/31   Off isolation      The above impression and plan was discussed in detail with the patient  The patient is stable from an ID standpoint for D/C to [facility]      Antibiotics:  Cipro #5    Subjective:  Patient seen on AM rounds  Feels well  Had a busy morning and is now fatigued  Reports stent was removed  Urinating without difficulty  Denies fevers, chills, sweats, nausea, vomiting, or diarrhea  24 Hour Events:  No documented fevers, chills, sweats, nausea, vomiting, or diarrhea        Objective:  Vitals:  Temp:  [97 5 °F (36 4 °C)-99 8 °F (37 7 °C)] 98 3 °F (36 8 °C)  HR:  [] 101  Resp:  [18-24] 24  BP: (123-131)/(81-87) 131/81  SpO2:  [94 %-100 %] 94 %  Temp (24hrs), Av 5 °F (36 9 °C), Min:97 5 °F (36 4 °C), Max:99 8 °F (37 7 °C)  Current: Temperature: 98 3 °F (36 8 °C)    Physical Exam:   General:  No acute distress  Psychiatric:  Awake and alert  Pulmonary:  Normal respiratory excursion without accessory muscle use  Abdomen:  Soft, nontender  Extremities:  No edema  Skin:  No rashes    Lab Results:  I have personally reviewed pertinent labs  Results from last 7 days   Lab Units 22  0633 22  0430 22  0429 22  0437 06/10/22  1100 22  0441   POTASSIUM mmol/L 3 2* 3 7 3 9 3 2* 3 6 3 4*   CHLORIDE mmol/L 108 106 106 106 109* 107   CO2 mmol/L 26 22 27 23 25 22   BUN mg/dL 23 23 27* 32* 30* 25   CREATININE mg/dL 2 04* 2 06* 2 14* 2 00* 2 12* 1 72*   EGFR ml/min/1 73sq m 35 35 33 36 34 43   CALCIUM mg/dL 9 0 9 3 9 6 9 2 9 7 9 6   AST U/L  --   --   --  11 14 19   ALT U/L  --   --   --  14 15 19   ALK PHOS U/L  --   --   --  46 51 50     Results from last 7 days   Lab Units 22  0633 22  0430 22  1006   WBC Thousand/uL 8 22 11 46* 11 81*   HEMOGLOBIN g/dL 9 4* 9 8* 9 2*   PLATELETS Thousands/uL 339 367 343           Imaging Studies:   I have personally reviewed pertinent imaging study reports and images in PACS  EKG, Pathology, and Other Studies:   I have personally reviewed pertinent reports

## 2022-06-14 NOTE — OCCUPATIONAL THERAPY NOTE
Occupational Therapy Treatment Note     06/14/22 1034   OT Last Visit   OT Visit Date 06/14/22   Note Type   Note Type Treatment   Restrictions/Precautions   Weight Bearing Precautions Per Order No   RLE Weight Bearing Per Order WBAT   LLE Weight Bearing Per Order WBAT   Braces or Orthoses   (none)   Other Precautions Fall Risk   General   Response to Previous Treatment Patient with no complaints from previous session   Lifestyle   Autonomy PTA, pt reports being I with ADLs, IADLs, fnxl mobility, (+)    Reciprocal Relationships Pt reports local father but limited support available, unable to provide physical assistance   Service to Others FTE - works at [employer]   [facility]   Pain Assessment   Pain Assessment Tool 0-10   Pain Score No Pain   ADL   Where Assessed Edge of bed   Grooming Assistance 5  Supervision/Setup   Grooming Deficit Setup; Increased time to complete   Grooming Comments in stance at bathroom sink   UB Bathing Assistance 5  Supervision/Setup   UB Bathing Deficit Setup; Increased time to complete   LB Bathing Assistance 3  Moderate Assistance   LB Bathing Deficit Buttocks; Left lower leg including foot   UB Dressing Assistance 5  Supervision/Setup   UB Dressing Deficit Setup   LB Dressing Assistance 4  Minimal Assistance   LB Dressing Deficit Don/doff L sock   Toileting Assistance  4  Minimal Assistance   Toileting Deficit Perineal hygiene   Light Housekeeping   Light Housekeeping Level Walker   Light Housekeeping Level of Assistance Minimum assistance   Bed Mobility   Supine to Sit 5  Supervision   Additional items Increased time required   Transfers   Sit to Stand 5  Supervision   Additional items Increased time required   Stand to Sit 5  Supervision   Additional items Increased time required   Cognition   Overall Cognitive Status [facility]   Arousal/Participation Alert   Attention Within functional limits   Orientation Level Oriented X4   Memory Within functional limits   Following Commands Follows all commands and directions without difficulty   Additional Activities   Additional Activities Other (Comment)   Activity Tolerance   Activity Tolerance Patient limited by fatigue   Assessment   Assessment Pt participated in occupational therapy session with focus on activity tolerance, bed mob, functional transfers/mob, toilet transfers/toileting, and UB LB self-care tasks  Pt cleared by DARRELL/Sanjay for pt participation in OT session  Pt presented supine/HOB raised and pt agreeable to participate in therapy following pt identifiers confirmed  [facility]  Pt reported deficits with toilet hygiene 2* pt decreased coordination and balance  Pt easily short of breath with activity and does required seated rest breaks  Pt will benefit from post acute rehab services to continue to address pt noted deficits with decreased activity tolerance which currently impacts pt ADL and functional mob     Plan   Treatment Interventions ADL retraining;Functional transfer training   Goal Expiration Date 06/22/22   OT Treatment Day 2   OT Frequency 3-5x/wk   Recommendation   OT Discharge Recommendation Post acute rehabilitation services   AM-PAC Daily Activity Inpatient   Lower Body Dressing 2   Bathing 2   Toileting 3   Upper Body Dressing 3   Grooming 3   Eating 4   Daily Activity Raw Score 17   Daily Activity Standardized Score (Calc for Raw Score >=11) 37 26   AM-PAC Applied Cognition Inpatient   Following a Speech/Presentation 4   Understanding Ordinary Conversation 4   Taking Medications 4   Remembering Where Things Are Placed or Put Away 4   Remembering List of 4-5 Errands 4   Taking Care of Complicated Tasks 3   Applied Cognition Raw Score 23   Applied Cognition Standardized Score 53 08   Barthel Index   Feeding 10   Bathing 0   Grooming Score 5   Dressing Score 10   Bladder Score 10   Bowels Score 10   Toilet Use Score 10 Transfers (Bed/Chair) Score 15   Mobility (Level Surface) Score 10   Stairs Score 0   Barthel Index Score 80       Christine Washington  REINA/L

## 2022-06-15 ENCOUNTER — TELEPHONE (OUTPATIENT)
Dept: INTERNAL MEDICINE CLINIC | Facility: CLINIC | Age: 57
End: 2022-06-15

## 2022-06-15 PROCEDURE — 99232 SBSQ HOSP IP/OBS MODERATE 35: CPT | Performed by: GENERAL PRACTICE

## 2022-06-15 PROCEDURE — 99232 SBSQ HOSP IP/OBS MODERATE 35: CPT | Performed by: INTERNAL MEDICINE

## 2022-06-15 RX ADMIN — CIPROFLOXACIN HYDROCHLORIDE 500 MG: 500 TABLET, FILM COATED ORAL at 08:39

## 2022-06-15 RX ADMIN — METOPROLOL SUCCINATE 25 MG: 25 TABLET, FILM COATED, EXTENDED RELEASE ORAL at 08:40

## 2022-06-15 RX ADMIN — TRAMADOL HYDROCHLORIDE 50 MG: 50 TABLET, COATED ORAL at 16:20

## 2022-06-15 RX ADMIN — ACETAMINOPHEN 650 MG: 325 TABLET, FILM COATED ORAL at 18:02

## 2022-06-15 RX ADMIN — CHOLECALCIFEROL TAB 10 MCG (400 UNIT) 400 UNITS: 10 TAB at 08:39

## 2022-06-15 RX ADMIN — TAMSULOSIN HYDROCHLORIDE 0.4 MG: 0.4 CAPSULE ORAL at 18:00

## 2022-06-15 RX ADMIN — HEPARIN SODIUM 7500 UNITS: 5000 INJECTION INTRAVENOUS; SUBCUTANEOUS at 13:28

## 2022-06-15 RX ADMIN — AMLODIPINE BESYLATE 10 MG: 10 TABLET ORAL at 08:40

## 2022-06-15 RX ADMIN — HEPARIN SODIUM 7500 UNITS: 5000 INJECTION INTRAVENOUS; SUBCUTANEOUS at 21:29

## 2022-06-15 RX ADMIN — POTASSIUM CHLORIDE 10 MEQ: 750 TABLET, EXTENDED RELEASE ORAL at 08:40

## 2022-06-15 RX ADMIN — ACETAMINOPHEN 650 MG: 325 TABLET, FILM COATED ORAL at 23:26

## 2022-06-15 RX ADMIN — HEPARIN SODIUM 7500 UNITS: 5000 INJECTION INTRAVENOUS; SUBCUTANEOUS at 05:34

## 2022-06-15 RX ADMIN — ALLOPURINOL 100 MG: 100 TABLET ORAL at 08:39

## 2022-06-15 RX ADMIN — CIPROFLOXACIN HYDROCHLORIDE 500 MG: 500 TABLET, FILM COATED ORAL at 20:53

## 2022-06-15 NOTE — [facility] ADMISSION
Reviewed updates with [facility] physician - patient is approved for [facility] pending insurance authorization  Auth process has been initiated and we await their determination at this time  CM has been updated  Will continue to follow and update as able

## 2022-06-15 NOTE — PROGRESS NOTES
[address] [facility]  Progress Note - Shade Alvarez 1965, 55 y o  male MRN: [id number]  [facility] Encounter: [id number]  Primary Care Provider: Marcelina Alvarez   Date and time admitted to hospital: 6/7/2022  3:45 PM    * Sepsis [facility])  Assessment & Plan  Present at outside campus   Suspect source of UTI  Noted left nephrostomy tube and left ureteral stent  Noted multiple E coli UTI and also previous E coli bacteremia  Also noted covid positive status as of 5/31>> no supplemental oxygen requirement  Leukocytosis noted  Received IV fluids and cefepime at outside [facility] and transferred for Urology evaluation  S/p OR with Urology yesterday for cystoscopy ureteroscopy holmium laser lithotripsy basket extraction and ureteral stent insertion with a string removal of left-sided nephrostomy tube  Urine cultures growing MRDO citrobacter, ID consulted -> placed on PO Cipro & continue thru 6/16 for 7 days  6/12: low grade temp, WBC 11k  Monitor for now with on PO abx, renal US unremarkable  Now better      Obstructive uropathy  Assessment & Plan  S/p CYSTOSCOPY LEFT URETEROSCOPY/LITHOTRIPSY HOLMIUM LASER,  AND EXCHANGE URETERAL STENT URETERAL, REMOVAL NEPHROSTOMY TUBE (Left) on 6/9/22  OP f/u with urology  Stent removed 6/13      Diarrhea  Assessment & Plan  · Noted 6/13 - now resolved  · Not on stool softners    COVID  Assessment & Plan  Not requiring supplemental oxygen  Noted mild shortness of breath after fever last night  Tested positive on 05/31, last day of isolation 6/9 - as  Per prior documentation         Morbid obesity  Assessment & Plan  Lifestyle modification    Essential hypertension  Assessment & Plan  Will continue current meds       Chronic kidney disease, stage 3 [facility])  Assessment & Plan  Lab Results   Component Value Date    EGFR 35 06/14/2022    EGFR 35 06/13/2022    EGFR 33 06/12/2022    CREATININE 2 04 (H) 06/14/2022    CREATININE 2 06 (H) 2022    CREATININE 2 14 (H) 2022   Estimated Creatinine Clearance: 55 7 mL/min (A) (by C-G formula based on SCr of 2 04 mg/dL (H))  Baseline creatinine seems between 1 4 -1 7  Creatinine staying near 2 0-2 1  Trial small IVF, check renal US for stent location on  - stable  Check PVR - low            VTE Pharmacologic Prophylaxis: VTE Score: 5 High Risk (Score >/= 5) - Pharmacological DVT Prophylaxis Ordered: heparin  Sequential Compression Devices Ordered  Patient Centered Rounds: I performed bedside rounds with nursing staff today  Discussions with Specialists or Other Care Team Provider: no    Education and Discussions with Family / Patient: Patient declined call to   Time Spent for Care: 30 minutes  More than 50% of total time spent on counseling and coordination of care as described above  Current Length of Stay: 8 day(s)  Current Patient Status: Inpatient   Certification Statement: The patient will continue to require additional inpatient hospital stay due to need for rehab  Discharge Plan: Anticipate discharge tomorrow to rehab facility  Code Status: Level 1 - Full Code    Subjective:   No acute complaints    Objective:     Vitals:   Temp (24hrs), Av 9 °F (36 6 °C), Min:97 6 °F (36 4 °C), Max:98 1 °F (36 7 °C)    Temp:  [97 6 °F (36 4 °C)-98 1 °F (36 7 °C)] 98 1 °F (36 7 °C)  HR:  [75-93] 75  Resp:  [20] 20  BP: (114-119)/(69) 119/69  SpO2:  [96 %-98 %] 96 %  Body mass index is 51 6 kg/m²  Input and Output Summary (last 24 hours): Intake/Output Summary (Last 24 hours) at 6/15/2022 1505  Last data filed at 6/15/2022 1227  Gross per 24 hour   Intake 838 ml   Output 325 ml   Net 513 ml       Physical Exam:   Physical Exam  HENT:      Head: Normocephalic and atraumatic  Nose: Nose normal       Mouth/Throat:      Mouth: Mucous membranes are moist    Eyes:      Extraocular Movements: Extraocular movements intact        Conjunctiva/sclera: Conjunctivae normal    Cardiovascular:      Rate and Rhythm: Normal rate and regular rhythm  Pulmonary:      Effort: Pulmonary effort is normal  No respiratory distress  Breath sounds: Normal breath sounds  No wheezing or rales  Abdominal:      General: Bowel sounds are normal  There is no distension  Palpations: Abdomen is soft  Tenderness: There is no abdominal tenderness  Musculoskeletal:         General: Normal range of motion  Cervical back: Normal range of motion and neck supple  Right lower leg: No edema  Left lower leg: No edema  Skin:     General: Skin is warm and dry  Neurological:      Mental Status: He is alert and oriented to person, place, and time  Additional Data:     Labs:  Results from last 7 days   Lab Units 06/14/22  0633 06/13/22  0430 06/12/22  1006 06/11/22  0437   WBC Thousand/uL 8 22   < > 11 81* 11 02*   HEMOGLOBIN g/dL 9 4*   < > 9 2* 9 0*   HEMATOCRIT % 31 0*   < > 30 1* 29 8*   PLATELETS Thousands/uL 339   < > 343 328   BANDS PCT %  --   --  2  --    NEUTROS PCT %  --   --   --  69   LYMPHS PCT %  --   --   --  19   LYMPHO PCT %  --   --  15  --    MONOS PCT %  --   --   --  6   MONO PCT %  --   --  6  --    EOS PCT %  --   --  3 4    < > = values in this interval not displayed  Results from last 7 days   Lab Units 06/14/22 0633 06/12/22  0429 06/11/22 0437   SODIUM mmol/L 138   < > 139   POTASSIUM mmol/L 3 2*   < > 3 2*   CHLORIDE mmol/L 108   < > 106   CO2 mmol/L 26   < > 23   BUN mg/dL 23   < > 32*   CREATININE mg/dL 2 04*   < > 2 00*   ANION GAP mmol/L 4   < > 10   CALCIUM mg/dL 9 0   < > 9 2   ALBUMIN g/dL  --   --  2 2*   TOTAL BILIRUBIN mg/dL  --   --  0 23   ALK PHOS U/L  --   --  46   ALT U/L  --   --  14   AST U/L  --   --  11   GLUCOSE RANDOM mg/dL 112   < > 105    < > = values in this interval not displayed                   Results from last 7 days   Lab Units 06/13/22  0430   PROCALCITONIN ng/ml 0 28*       Lines/Drains:  Invasive Devices  Report    Peripheral Intravenous Line  Duration           Peripheral IV Right Hand -- days                      Imaging: No pertinent imaging reviewed  Recent Cultures (last 7 days):         Last 24 Hours Medication List:   Current Facility-Administered Medications   Medication Dose Route Frequency Provider Last Rate    acetaminophen  650 mg Oral Q6H PRN Hanh Valladares MD      allopurinol  100 mg Oral Daily Hanh Valladares MD      amLODIPine  10 mg Oral Daily Hanh Valladares MD      calcium carbonate  1,000 mg Oral BID PRN Hanh Valladares MD      cholecalciferol  400 Units Oral Daily Hanh Valladares MD      ciprofloxacin  500 mg Oral Q12H Ti Epperson MD      heparin (porcine)  7,500 Units Subcutaneous [facility] Hanh Valladares MD      metoprolol succinate  25 mg Oral Daily Hanh Valladares MD      ondansetron  4 mg Oral Q6H PRN Hanh Valladares MD      phenazopyridine  100 mg Oral TID PRN Hanh Valladares MD      potassium chloride  10 mEq Oral Daily Lia Alex DO      senna-docusate sodium  2 tablet Oral BID PRN Hanh Valladares MD      tamsulosin  0 4 mg Oral After Catherine Burnett MD      traMADol  50 mg Oral Q6H PRN Hanh Valladares MD          Today, Patient Was Seen By: Lia Alex DO    **Please Note: This note may have been constructed using a voice recognition system  **

## 2022-06-15 NOTE — UTILIZATION REVIEW
Continued Stay Review    Date: 6/15/22                          Current Patient Class: inpatient  Current Level of Care: med surg    HPI:56 y o  male initially admitted on 6/7/22 Sepsis, Obstructive uropathy     Assessment/Plan: No new complaints  Pt remains stable and awaiting placement at [facility]  Per CM note dated 6/15:  [facility] still reviewing  Pt medically stable for DC since 6/11/22  CM to continue to follow      Vital Signs:   Date/Time Temp Pulse Resp BP MAP (mmHg) SpO2 O2 Device   06/15/22 07:45:04 98 1 °F (36 7 °C) 75 20 119/69 86 96 % --   06/14/22 22:53:37 97 6 °F (36 4 °C) 93 -- 114/69 84 98 % --   06/14/22 14:48:41 98 9 °F (37 2 °C) 99 24 Abnormal  110/67 81 97 % --   06/14/22 07:52:04 98 3 °F (36 8 °C) 101 24 Abnormal  131/81 98 94 % --   06/14/22 0520 -- -- -- -- -- -- None (Room air)   06/13/22 22:51:22 97 5 °F (36 4 °C) 103 22 126/82 97 100 % --   06/13/22 15:04:12 99 8 °F (37 7 °C) 98 18 123/87 99 98 % --   06/13/22 07:18:36 98 9 °F (37 2 °C) 107 Abnormal  20 121/85 97 96 % --   06/13/22 0445 -- -- -- -- -- -- None (Room air)     Pertinent Labs/Diagnostic Results:       Results from last 7 days   Lab Units 06/14/22  0633 06/13/22  0430 06/12/22  1006 06/11/22  0437 06/10/22  1100 06/09/22  0441   WBC Thousand/uL 8 22 11 46* 11 81* 11 02* 12 06* 8 34   HEMOGLOBIN g/dL 9 4* 9 8* 9 2* 9 0* 9 4* 8 9*   HEMATOCRIT % 31 0* 31 2* 30 1* 29 8* 30 4* 30 5*   PLATELETS Thousands/uL 339 367 343 328 351 311   NEUTROS ABS Thousands/µL  --   --   --  7 53 9 37* 5 39   BANDS PCT %  --   --  2  --   --   --      Results from last 7 days   Lab Units 06/14/22  0633 06/13/22  0430 06/12/22  0429 06/11/22  0437 06/10/22  1100   SODIUM mmol/L 138 136 137 139 140   POTASSIUM mmol/L 3 2* 3 7 3 9 3 2* 3 6   CHLORIDE mmol/L 108 106 106 106 109*   CO2 mmol/L 26 22 27 23 25   ANION GAP mmol/L 4 8 4 10 6   BUN mg/dL 23 23 27* 32* 30*   CREATININE mg/dL 2 04* 2 06* 2 14* 2 00* 2 12*   EGFR ml/min/1 73sq m 35 35 33 36 34   CALCIUM mg/dL 9 0 9 3 9 6 9 2 9 7     Results from last 7 days   Lab Units 06/11/22  0437 06/10/22  1100 06/09/22  0441   AST U/L 11 14 19   ALT U/L 14 15 19   ALK PHOS U/L 46 51 50   TOTAL PROTEIN g/dL 7 5 8 2 7 7   ALBUMIN g/dL 2 2* 2 3* 2 0*   TOTAL BILIRUBIN mg/dL 0 23 0 17* 0 25     Results from last 7 days   Lab Units 06/14/22  0633 06/13/22  0430 06/12/22  0429 06/11/22  0437 06/10/22  1100 06/09/22  0441   GLUCOSE RANDOM mg/dL 112 122 111 105 118 108     Results from last 7 days   Lab Units 06/13/22  0430   PROCALCITONIN ng/ml 0 28*     Medications:   Scheduled Medications:  allopurinol, 100 mg, Oral, Daily  amLODIPine, 10 mg, Oral, Daily  cholecalciferol, 400 Units, Oral, Daily  ciprofloxacin, 500 mg, Oral, Q12H LATRICE  heparin (porcine), 7,500 Units, Subcutaneous, Q8H LATRICE  metoprolol succinate, 25 mg, Oral, Daily  potassium chloride, 10 mEq, Oral, Daily  tamsulosin, 0 4 mg, Oral, After Dinner      Continuous IV Infusions:     PRN Meds:  acetaminophen, 650 mg, Oral, Q6H PRN  calcium carbonate, 1,000 mg, Oral, BID PRN  ondansetron, 4 mg, Oral, Q6H PRN  phenazopyridine, 100 mg, Oral, TID PRN  senna-docusate sodium, 2 tablet, Oral, BID PRN  traMADol, 50 mg, Oral, Q6H PRN        Discharge Plan: [facility]    Network Utilization Review Department  ATTENTION: Please call with any questions or concerns to [phone] and carefully listen to the prompts so that you are directed to the right person  All voicemails are confidential   Audelia Chnug all requests for admission clinical reviews, approved or denied determinations and any other requests to dedicated fax number below belonging to the campus where the patient is receiving treatment   List of dedicated fax numbers for the Facilities:  [address] DENIALS (Administrative/Medical Necessity) [phone]   [address] [place] [address] (Maternity/NICU/Pediatrics) [phone]   [address] [facility] En[facility] [phone]   [facility] [facility] [phone]   [address] [facility] [address] [facility] [address] [place] [address] [address] [phone]   [address] [phone]

## 2022-06-15 NOTE — TELEPHONE ENCOUNTER
Amisha Dowell called and left a voicemail that he is looking to have his Long Term disability forms and FMLA forms filled out  He is currently still in the hospital but wondering if there is an update  I informed Amisha Dowell that we can't fill paperwork out for him while he is still in the hospital  We also need to see him in office since he has not been seen in over a year  Amishaamish Dowell stated that he understood but he needed his paperwork filled out so that he could start receiving his long term disability  I informed him per Marlee Bean to speak with the doctors that he is currently seeing while he is in the hospital  He stated that he understood and would talk to his doctors today

## 2022-06-15 NOTE — ASSESSMENT & PLAN NOTE
Present at outside campus   Suspect source of UTI  Noted left nephrostomy tube and left ureteral stent  Noted multiple E coli UTI and also previous E coli bacteremia  Also noted covid positive status as of 5/31>> no supplemental oxygen requirement  Leukocytosis noted  Received IV fluids and cefepime at outside [facility] and transferred for Urology evaluation  S/p OR with Urology yesterday for cystoscopy ureteroscopy holmium laser lithotripsy basket extraction and ureteral stent insertion with a string removal of left-sided nephrostomy tube  Urine cultures growing MRDO citrobacter, ID consulted -> placed on PO Cipro & continue thru 6/16 for 7 days  6/12: low grade temp, WBC 11k   Monitor for now with on PO abx, renal US unremarkable  Now better

## 2022-06-15 NOTE — PLAN OF CARE
Problem: Potential for Falls  Goal: Patient will remain free of falls  Description: INTERVENTIONS:  - Educate patient/family on patient safety including physical limitations  - Instruct patient to call for assistance with activity   - Consult OT/PT to assist with strengthening/mobility   - Keep Call bell within reach  - Keep bed low and locked with side rails adjusted as appropriate  - Keep care items and personal belongings within reach  - Initiate and maintain comfort rounds  - Make Fall Risk Sign visible to staff  - Consider moving patient to room near nurses station  Outcome: Progressing     Problem: MOBILITY - ADULT  Goal: Maintain or return to baseline ADL function  Description: INTERVENTIONS:  -  Assess patient's ability to carry out ADLs; assess patient's baseline for ADL function and identify physical deficits which impact ability to perform ADLs (bathing, care of mouth/teeth, toileting, grooming, dressing, etc )  - Assess/evaluate cause of self-care deficits   - Assess range of motion  - Assess patient's mobility; develop plan if impaired  - Assess patient's need for assistive devices and provide as appropriate  - Encourage maximum independence but intervene and supervise when necessary  - Involve family in performance of ADLs  - Assess for home care needs following discharge   - Consider OT consult to assist with ADL evaluation and planning for discharge  - Provide patient education as appropriate  Outcome: Progressing  Goal: Maintains/Returns to pre admission functional level  Description: INTERVENTIONS:  - Perform BMAT or MOVE assessment daily    - Set and communicate daily mobility goal to care team and patient/family/caregiver     - Collaborate with rehabilitation services on mobility goals if consulted  - Out of bed for toileting  - Record patient progress and toleration of activity level   Outcome: Progressing     Problem: Nutrition/Hydration-ADULT  Goal: Nutrient/Hydration intake appropriate for improving, restoring or maintaining nutritional needs  Description: Monitor and assess patient's nutrition/hydration status for malnutrition  Collaborate with interdisciplinary team and initiate plan and interventions as ordered  Monitor patient's weight and dietary intake as ordered or per policy  Utilize nutrition screening tool and intervene as necessary  Determine patient's food preferences and provide high-protein, high-caloric foods as appropriate       INTERVENTIONS:  - Monitor oral intake, urinary output, labs, and treatment plans  - Assess nutrition and hydration status and recommend course of action  - Evaluate amount of meals eaten  - Assist patient with eating if necessary   - Allow adequate time for meals  - Recommend/ encourage appropriate diets, oral nutritional supplements, and vitamin/mineral supplements  - Order, calculate, and assess calorie counts as needed  - Recommend, monitor, and adjust tube feedings and TPN/PPN based on assessed needs  - Assess need for intravenous fluids  - Provide specific nutrition/hydration education as appropriate  - Include patient/family/caregiver in decisions related to nutrition  Outcome: Progressing

## 2022-06-15 NOTE — PROGRESS NOTES
Progress Note - Infectious Disease   Miriam Moreau 55 y o  male MRN: [id number]  [facility] Encounter: [id number]      Impression/Recommendations:  1   Sepsis, POA   Due to #2   No other appreciable source   ROS, exam otherwise negative   Blood cultures negative   Clinically stable and non-toxic   Improved   Low-grade temps over weekend may be due to atelectasis versus drug fever versus evolving C  Diff as below   Renal U/S negative for hydronephrosis   Improved  Rec:  · Continue antibiotics as below  · Follow temperatures and WBC closely  · OOB as tolerated  · Supportive care as per the primary service     2   MDR-Citrobacter UTI   In setting of #3   Suspect true infection given sepsis as above   Discussed risk of tendinopathy with FQ with patient; no apparent drug interactions and baseline QTC normal  Rec:  · Continue ciprothrough 6/16 (7 days total)     3   Obstructive nephrolithiasis   Status post cystoscopy, retrograde pyelogram, and left ureteral stent placement 4/22/22   Developed mmild hydronephrosis associated with recurrent sepsis, bactermia   Status post left PCN 5/10/22   Now status post cystoscopy, lithotripsy, PCN removal, stent exchange 6/9/22   Renal U/S 6/12 negative for hydronephrosis  Stent (on a string) now removed      4   ADRIANNE on CKD   Baseline Cr 1 4-1 7   Likely multifactorial   Slowly improving      5   Recent COVID   Asymptomatic   PCR positive 5/31   Off isolation      The above impression and plan was discussed in detail with the patient  The patient is stable from an ID standpoint for D/C to [facility]      Antibiotics:  Cipro #6    Subjective:  Patient seen on AM rounds  Doing well  Urinating frequently but improving  Denies fevers, chills, sweats, nausea, vomiting, or diarrhea  24 Hour Events:  No documented fevers, chills, sweats, nausea, vomiting, or diarrhea      Objective:  Vitals:  Temp:  [97 6 °F (36 4 °C)-98 9 °F (37 2 °C)] 98 1 °F (36 7 °C)  HR:  [75-99] 75  Resp: [20-24] 20  BP: (110-119)/(67-69) 119/69  SpO2:  [96 %-98 %] 96 %  Temp (24hrs), Av 2 °F (36 8 °C), Min:97 6 °F (36 4 °C), Max:98 9 °F (37 2 °C)  Current: Temperature: 98 1 °F (36 7 °C)    Physical Exam:   General:  No acute distress  Psychiatric:  Awake and alert  Pulmonary:  Normal respiratory excursion without accessory muscle use  Abdomen:  Soft, nontender  Extremities:  No edema  Skin:  No rashes    Lab Results:  I have personally reviewed pertinent labs  Results from last 7 days   Lab Units 22  0633 22  0430 22  0429 22  0437 06/10/22  1100 22  0441   POTASSIUM mmol/L 3 2* 3 7 3 9 3 2* 3 6 3 4*   CHLORIDE mmol/L 108 106 106 106 109* 107   CO2 mmol/L 26 22 27 23 25 22   BUN mg/dL 23 23 27* 32* 30* 25   CREATININE mg/dL 2 04* 2 06* 2 14* 2 00* 2 12* 1 72*   EGFR ml/min/1 73sq m 35 35 33 36 34 43   CALCIUM mg/dL 9 0 9 3 9 6 9 2 9 7 9 6   AST U/L  --   --   --  11 14 19   ALT U/L  --   --   --  14 15 19   ALK PHOS U/L  --   --   --  46 51 50     Results from last 7 days   Lab Units 22  0633 22  0430 22  1006   WBC Thousand/uL 8 22 11 46* 11 81*   HEMOGLOBIN g/dL 9 4* 9 8* 9 2*   PLATELETS Thousands/uL 339 367 343           Imaging Studies:   I have personally reviewed pertinent imaging study reports and images in PACS  EKG, Pathology, and Other Studies:   I have personally reviewed pertinent reports

## 2022-06-15 NOTE — CASE MANAGEMENT
Case Management Discharge Planning Note    Patient name Elise Leung  Location [address]/[facility] 721-60 MRN [id number]  : 1965 Date 6/15/2022       Current Admission Date: 2022  Current Admission Diagnosis:Sepsis [facility])   Patient Active Problem List    Diagnosis Date Noted    Diarrhea 2022    Sepsis ([facility] [address] [facility] ) 2022    COVID 2022    Anemia 2022    Obstructive pyelonephritis 2022    Obstructive uropathy 2022    Mixed hyperlipidemia 2021    Morbid obesity 2021    Recurrent left knee pain 2021    Insomnia 2019    Essential hypertension 2019    Severe sepsis on admission 2019    Chronic kidney disease, stage 3 ([facility] [facility] ) 2019    Acute kidney injury superimposed on CKD stage 3 2019    Bacteremia due to Escherichia coli 2019    Lactic acidosis 2019    History of gout 07/15/2019    Vitamin D deficiency 06/10/2015    Morbid obesity ([facility] [address] ) 2015      LOS (days): 8  Geometric Mean LOS (GMLOS) (days): 9 60  Days to GMLOS:1 9     OBJECTIVE:  Risk of Unplanned Readmission Score: 27 16         Current admission status: Inpatient   Preferred Pharmacy:   [facility]/pharmacy #[id number]- [place] [address]  Phone: [phone] Fax: [phone] Bharati Angel, [address] [facility] [address]  Phone: [phone] Fax: [phone]    Primary Care Provider: RITA Cabral    Primary Insurance: BLUE CROSS  Secondary Insurance:     DISCHARGE DETAILS:         Other Referral/Resources/Interventions Provided:  Referral Comments: Uriah Haas still reviewing  Pt medically stable for DC since 22  CM to continue to follow

## 2022-06-16 PROCEDURE — 99232 SBSQ HOSP IP/OBS MODERATE 35: CPT | Performed by: INTERNAL MEDICINE

## 2022-06-16 PROCEDURE — 99232 SBSQ HOSP IP/OBS MODERATE 35: CPT | Performed by: GENERAL PRACTICE

## 2022-06-16 RX ADMIN — HEPARIN SODIUM 7500 UNITS: 5000 INJECTION INTRAVENOUS; SUBCUTANEOUS at 05:23

## 2022-06-16 RX ADMIN — ACETAMINOPHEN 650 MG: 325 TABLET, FILM COATED ORAL at 17:50

## 2022-06-16 RX ADMIN — CHOLECALCIFEROL TAB 10 MCG (400 UNIT) 400 UNITS: 10 TAB at 08:45

## 2022-06-16 RX ADMIN — CIPROFLOXACIN HYDROCHLORIDE 500 MG: 500 TABLET, FILM COATED ORAL at 08:45

## 2022-06-16 RX ADMIN — AMLODIPINE BESYLATE 10 MG: 10 TABLET ORAL at 08:45

## 2022-06-16 RX ADMIN — HEPARIN SODIUM 7500 UNITS: 5000 INJECTION INTRAVENOUS; SUBCUTANEOUS at 21:27

## 2022-06-16 RX ADMIN — ALLOPURINOL 100 MG: 100 TABLET ORAL at 08:45

## 2022-06-16 RX ADMIN — TAMSULOSIN HYDROCHLORIDE 0.4 MG: 0.4 CAPSULE ORAL at 17:48

## 2022-06-16 RX ADMIN — METOPROLOL SUCCINATE 25 MG: 25 TABLET, FILM COATED, EXTENDED RELEASE ORAL at 08:45

## 2022-06-16 RX ADMIN — HEPARIN SODIUM 7500 UNITS: 5000 INJECTION INTRAVENOUS; SUBCUTANEOUS at 13:55

## 2022-06-16 RX ADMIN — CIPROFLOXACIN HYDROCHLORIDE 500 MG: 500 TABLET, FILM COATED ORAL at 21:27

## 2022-06-16 RX ADMIN — POTASSIUM CHLORIDE 10 MEQ: 750 TABLET, EXTENDED RELEASE ORAL at 08:45

## 2022-06-16 NOTE — ASSESSMENT & PLAN NOTE
Present at outside campus   Suspect source of UTI  Noted left nephrostomy tube and left ureteral stent  Noted multiple E coli UTI and also previous E coli bacteremia  Also noted covid positive status as of 5/31>> no supplemental oxygen requirement  Leukocytosis noted  Received IV fluids and cefepime at outside [facility] and transferred for Urology evaluation  S/p OR with Urology yesterday for cystoscopy ureteroscopy holmium laser lithotripsy basket extraction and ureteral stent insertion with a string removal of left-sided nephrostomy tube  Urine cultures growing MRDO citrobacter, ID consulted -> placed on PO Cipro & continue thru 6/16 for 7 days  6/12: low grade temp, WBC 11k   Monitor for now with on PO abx, renal US unremarkable  Now better

## 2022-06-16 NOTE — [facility] ADMISSION
Spoke with Winston Morrow, nurse reviewer at [facility], regarding status of patient's case for inpatient acute rehab  Clinical information has been received, however they are requesting for preadmission screen to be completed for further review  Will fax as able  Preadmission screen completed and faxed to insurance per their request  Continue to await their determination at this time  Will update as able

## 2022-06-16 NOTE — RESTORATIVE TECHNICIAN NOTE
Restorative Technician Note      Patient Name: Brian Rodriguez     Restorative Tech Visit Date: 6/16/2022  Note Type: Mobility  Patient Position Upon Consult: Supine  Activity Performed: Ambulated  Assistive Device: Standard walker  Patient Position at End of Consult: Seated edge of bed;  All needs within reach    [facility]  DPT, Restorative Technician

## 2022-06-16 NOTE — PROGRESS NOTES
[facility]  Progress Note - Noble Sachi 1965, 55 y o  male MRN: [id number]  [facility] Encounter: [id number]  Primary Care Provider: Marcelina Mcbride   Date and time admitted to hospital: 6/7/2022  3:45 PM    * Sepsis [facility])  Assessment & Plan  Present at outside campus   Suspect source of UTI  Noted left nephrostomy tube and left ureteral stent  Noted multiple E coli UTI and also previous E coli bacteremia  Also noted covid positive status as of 5/31>> no supplemental oxygen requirement  Leukocytosis noted  Received IV fluids and cefepime at outside [facility] and transferred for Urology evaluation  S/p OR with Urology yesterday for cystoscopy ureteroscopy holmium laser lithotripsy basket extraction and ureteral stent insertion with a string removal of left-sided nephrostomy tube  Urine cultures growing MRDO citrobacter, ID consulted -> placed on PO Cipro & continue thru 6/16 for 7 days  6/12: low grade temp, WBC 11k  Monitor for now with on PO abx, renal US unremarkable  Now better      Obstructive uropathy  Assessment & Plan  S/p CYSTOSCOPY LEFT URETEROSCOPY/LITHOTRIPSY HOLMIUM LASER,  AND EXCHANGE URETERAL STENT URETERAL, REMOVAL NEPHROSTOMY TUBE (Left) on 6/9/22  OP f/u with urology  Stent removed 6/13      Diarrhea  Assessment & Plan  · Noted 6/13 - now resolved  · Not on stool softners    COVID  Assessment & Plan  Not requiring supplemental oxygen  Noted mild shortness of breath after fever last night  Tested positive on 05/31, last day of isolation 6/9 - as  Per prior documentation         Morbid obesity  Assessment & Plan  Lifestyle modification    Essential hypertension  Assessment & Plan  Will continue current meds       Chronic kidney disease, stage 3 [facility])  Assessment & Plan  Lab Results   Component Value Date    EGFR 35 06/14/2022    EGFR 35 06/13/2022    EGFR 33 06/12/2022    CREATININE 2 04 (H) 06/14/2022    CREATININE 2 06 (H) 2022    CREATININE 2 14 (H) 2022   Estimated Creatinine Clearance: 55 7 mL/min (A) (by C-G formula based on SCr of 2 04 mg/dL (H))  Baseline creatinine seems between 1 4 -1 7  Creatinine staying near 2 0-2 1  Trial small IVF, check renal US for stent location on  - stable  Check PVR - low              VTE Pharmacologic Prophylaxis: VTE Score: 5 High Risk (Score >/= 5) - Pharmacological DVT Prophylaxis Ordered: heparin  Sequential Compression Devices Ordered  Patient Centered Rounds: I performed bedside rounds with nursing staff today  Discussions with Specialists or Other Care Team Provider: no    Education and Discussions with Family / Patient: Patient declined call to   Time Spent for Care: 30 minutes  More than 50% of total time spent on counseling and coordination of care as described above  Current Length of Stay: 9 day(s)  Current Patient Status: Inpatient   Certification Statement: The patient will continue to require additional inpatient hospital stay due to need for insurance auth for [facility]  Discharge Plan: Anticipate discharge tomorrow to rehab facility  Code Status: Level 1 - Full Code    Subjective:   No acute complaints    Objective:     Vitals:   Temp (24hrs), Av 4 °F (36 9 °C), Min:98 3 °F (36 8 °C), Max:98 5 °F (36 9 °C)    Temp:  [98 3 °F (36 8 °C)-98 5 °F (36 9 °C)] 98 3 °F (36 8 °C)  HR:  [94] 94  Resp:  [16] 16  BP: (117)/(69) 117/69  SpO2:  [97 %-100 %] 97 %  Body mass index is 51 6 kg/m²  Input and Output Summary (last 24 hours): Intake/Output Summary (Last 24 hours) at 2022 1508  Last data filed at 2022 1154  Gross per 24 hour   Intake 1440 ml   Output 300 ml   Net 1140 ml       Physical Exam:   Physical Exam  HENT:      Head: Normocephalic and atraumatic  Nose: Nose normal       Mouth/Throat:      Mouth: Mucous membranes are moist    Eyes:      Extraocular Movements: Extraocular movements intact        Conjunctiva/sclera: Conjunctivae normal    Cardiovascular:      Rate and Rhythm: Normal rate and regular rhythm  Pulmonary:      Effort: Pulmonary effort is normal       Breath sounds: Normal breath sounds  No wheezing or rales  Abdominal:      General: Bowel sounds are normal  There is no distension  Palpations: Abdomen is soft  Tenderness: There is no abdominal tenderness  Musculoskeletal:         General: Normal range of motion  Cervical back: Normal range of motion and neck supple  Right lower leg: No edema  Left lower leg: No edema  Skin:     General: Skin is warm and dry  Neurological:      Mental Status: He is alert and oriented to person, place, and time  Additional Data:     Labs:  Results from last 7 days   Lab Units 06/14/22  0633 06/13/22  0430 06/12/22  1006 06/11/22  0437   WBC Thousand/uL 8 22   < > 11 81* 11 02*   HEMOGLOBIN g/dL 9 4*   < > 9 2* 9 0*   HEMATOCRIT % 31 0*   < > 30 1* 29 8*   PLATELETS Thousands/uL 339   < > 343 328   BANDS PCT %  --   --  2  --    NEUTROS PCT %  --   --   --  69   LYMPHS PCT %  --   --   --  19   LYMPHO PCT %  --   --  15  --    MONOS PCT %  --   --   --  6   MONO PCT %  --   --  6  --    EOS PCT %  --   --  3 4    < > = values in this interval not displayed  Results from last 7 days   Lab Units 06/14/22 0633 06/12/22  0429 06/11/22  0437   SODIUM mmol/L 138   < > 139   POTASSIUM mmol/L 3 2*   < > 3 2*   CHLORIDE mmol/L 108   < > 106   CO2 mmol/L 26   < > 23   BUN mg/dL 23   < > 32*   CREATININE mg/dL 2 04*   < > 2 00*   ANION GAP mmol/L 4   < > 10   CALCIUM mg/dL 9 0   < > 9 2   ALBUMIN g/dL  --   --  2 2*   TOTAL BILIRUBIN mg/dL  --   --  0 23   ALK PHOS U/L  --   --  46   ALT U/L  --   --  14   AST U/L  --   --  11   GLUCOSE RANDOM mg/dL 112   < > 105    < > = values in this interval not displayed                   Results from last 7 days   Lab Units 06/13/22  0430   PROCALCITONIN ng/ml 0 28*       Lines/Drains:  Invasive Devices  Report Peripheral Intravenous Line  Duration           Peripheral IV Right Hand -- days                      Imaging: No pertinent imaging reviewed  Recent Cultures (last 7 days):         Last 24 Hours Medication List:   Current Facility-Administered Medications   Medication Dose Route Frequency Provider Last Rate    acetaminophen  650 mg Oral Q6H PRN Lyly Wood MD      allopurinol  100 mg Oral Daily Lyly Wood MD      amLODIPine  10 mg Oral Daily Lyly Wood MD      calcium carbonate  1,000 mg Oral BID PRN Lyly Wood MD      cholecalciferol  400 Units Oral Daily Lyly Wood MD      ciprofloxacin  500 mg Oral Q12H Rosario Nguyễn MD      heparin (porcine)  7,500 Units Subcutaneous [facility] Lyly Wood MD      metoprolol succinate  25 mg Oral Daily Lyly Wood MD      ondansetron  4 mg Oral Q6H PRN Lyly Wood MD      phenazopyridine  100 mg Oral TID PRN Lyly Wood MD      potassium chloride  10 mEq Oral Daily Remi Yi DO      senna-docusate sodium  2 tablet Oral BID PRN Lyly Wood MD      tamsulosin  0 4 mg Oral After Adrian Mccloud MD      traMADol  50 mg Oral Q6H PRN Lyly Wood MD          Today, Patient Was Seen By: Remi Yi DO    **Please Note: This note may have been constructed using a voice recognition system  **

## 2022-06-16 NOTE — PROGRESS NOTES
PHYSICAL MEDICINE AND REHABILITATION   PREADMISSION ASSESSMENT     Projected [facility] Diagnoses:  Impairment of mobility, safety and Activities of Daily Living (ADLs) due to Debility:  16  Debility (Non-cardiac/Non-pulmonary)  Etiologic Dx: E coli Bacteremia  Date of Onset: 5/10/2022   Date of surgery: 5/10/2022 IR Percutaneous Nephrostomy Tube Placement; 6/9/2022 CYSTOSCOPY LEFT URETEROSCOPY/LITHOTRIPSY HOLMIUM LASER,  AND EXCHANGE URETERAL STENT URETERAL, REMOVAL NEPHROSTOMY TUBE (Left)    PATIENT INFORMATION  Name: Adam Chris Phone #: [phone] (home)   Address: [address] [address] [address]  YOB: 1965 Age: 55 y o  SS#   Marital Status: Single  Ethnicity:   Employment Status: currently employed  Extended Emergency Contact Information  Primary Emergency Contact: Prudencio Cao  Address: [address] Phone: [phone]  Mobile Phone: [phone]  Relation: Father  Secondary Emergency Contact: [address] [facility]  Mobile Phone: [phone]  Relation: Mother  Advance Directive: Level 1 Full Code - unknown advanced directive    INSURANCE/COVERAGE:     Primary Payor: BLUE CROSS / Plan: Ami Proffer / Product Type: Blue Fee for Service /  [employer] [place] [place] Secondary Payer: Private Pay   Payer Contact: [facility] Payer Contact:   Contact Phone: [phone] ext [phone]  Contact Fax: [phone] Contact Phone:     Authorization #: [id number]  Coverage Dates: 6/17 - 6/23 (7 days)  LCD: 6/23 with review  Medical Record #: [id number]     **Confirmed patient's benefits for inpatient acute rehab via [employer] as follows: $850 deductible (met), $6600 out of pocket (met), 20% coinsurance      REFERRAL SOURCE:   Referring provider: Matthew Oakes DO  Referring facility: [address] [place] [address]  Room: [place] [address]/[place] [address]  PCP: [address] [place] [address] PCP phone number: [phone]    MEDICAL INFORMATION  HPI: Patient is a 55year old male who originally presented to [address] [place], [place] [address] ED with worsening left-sided flank pain and generalized malaise/fatigue which was not improved since discharge from recent hospital stay at [facility] for obstructive pyelonephritis secondary to 3 mm stone with ureteral stent placement  Patient also developed E coli bacteremia during hospital stay and was ultimately discharged on a course of amoxicillin until 5/4  Prior cultures were sensitive for Ancef and ceftriaxone  In the ED at [facility] patient was found to have lactic acidosis, leukocytosis, worsening ADRIANNE  CT demonstrated left ureteral stent in satisfactory position, however there is moderate perinephric stranding and mild hydronephrosis in the left kidney and multiple bilateral renal calculi  Patient was recommended for transfer to critical care bed at [facility] it to be seen by Urology at that time  Pt is s/p perc nephrostomy tube drain placement by IR on 5/10  Per Urology - continue Flomax and outpatient follow-up for secondary stone  Positive growth of E coli in one blood culture bottle from 5/10 - likely associated with UTI/pyelonephritis  Continue IV Rocephin (bacteremic dose), should transition to oral regimen to complete a 14 day course at time of discharge  Ortho was consulted for left knee pain  Clinically low suspicion for septic arthritis but given elevated CRP, knee aspiration was performed  Given scant amount of blood and no effusion aspirated, very low concern for infection in the joint at this time  Encourage weight loss in the setting of morbid obesity  XR of knee without acute osseous abnormality, however, did reveal tricompartmental osteoarthritis similar to imaging last year  Ortho recommending optimization of pain control, may consider future joint steroid injection once acute sepsis/infection resolves   Patient had ADRIANNE - Baseline creatinine approximately 1 4-1 6 - remains elevated but improving currently at 1 87 from a prior peak of 3 72  Likely secondary to severe sepsis and obstructive pyelonephritis  Patient was recommended for ARC by therapy services, and admitted to [facility] on 5/19/2022  Patient was progressive well with therapy, however, on 6/6, patient met sepsis criteria with elevated WBCs and creat level, elevated temp and tachycardia  Patient also had complaints of weakness, fatigue and diaphoresis  He was transferred to [place] [address] on 6/7 for Urology evaluation  Urine and blood cultures were obtained, and patient was initiated on IV antibiotics  Urology was consulted, with urine culture growing gram negative rods (E coli/MDRO citrobacter)  Patient was recommended for inpatient clearance ureteroscopy  ID was consulted, and patient was placed on Cipro PO for 7 days through 6/16  Renal US was unremarkable  On 6/9, patient went to the OR with Urology for CYSTOSCOPY LEFT URETEROSCOPY/LITHOTRIPSY HOLMIUM LASER,  AND EXCHANGE URETERAL STENT URETERAL, REMOVAL NEPHROSTOMY TUBE (Left); EBL minimal  Stent was since discontinued on 6/13, and patient is to follow-up with Urology as an outpatient in 6 months with renal US  Patient is overall hemodynamically stable and medically cleared for discharge to [facility]  PT/OT therapies were consulted, as well as patient's case reviewed with [facility] physician, and they are recommending patient for inpatient Acute Rehab  He has demonstrated that he can tolerate and participate in 3 hours of therapy per day  Received J&J vaccine; COVID positive on 5/31/2022 and COVID negative on 6/7/2022  Past Medical History:   Past Surgical History:    Allergies:     Past Medical History:   Diagnosis Date    Arthritis     Chronic kidney disease     Gout     Hyperlipidemia     Hypertension     Kidney stone     Obstructive pyelonephritis     Sepsis [facility])     Past Surgical History:   Procedure Laterality Date    FL RETROGRADE PYELOGRAM  8/4/2019    FL RETROGRADE PYELOGRAM  9/26/2019  FL RETROGRADE PYELOGRAM  4/22/2022    IR NEPHROSTOMY TUBE PLACEMENT  5/10/2022    NO PAST SURGERIES  03/23/2015    Last assessed    TN CYSTO/URETERO W/LITHOTRIPSY &INDWELL STENT INSRT Right 9/26/2019    Procedure: CYSTOSCOPY URETEROSCOPY WITH RETROGRADE PYELOGRAM AND INSERTION STENT URETERAL;  Surgeon: Jess Willingham MD;  Location: [facility];  Service: Urology    TN CYSTO/URETERO W/LITHOTRIPSY &INDWELL STENT INSRT Left 6/9/2022    Procedure: CYSTOSCOPY LEFT URETEROSCOPY/LITHOTRIPSY HOLMIUM LASER,  AND EXCHANGE URETERAL STENT URETERAL, REMOVAL NEPHROSTOMY TUBE;  Surgeon: Marybel Arias MD;  [facility];  Service: Urology    TN CYSTOURETHROSCOPY,URETER CATHETER Right 8/4/2019    Procedure: CYSTOSCOPY RETROGRADE PYELOGRAM WITH INSERTION STENT URETERAL;  Surgeon: Aj Rodriguez MD;  [facility];  Service: Urology    TN CYSTOURETHROSCOPY,URETER CATHETER Left 4/22/2022    Procedure: CYSTOSCOPY RETROGRADE PYELOGRAM WITH INSERTION STENT URETERAL;  Surgeon: Yomi Santiago MD;  [facility];  Service: Urology     No Known Allergies      Medical/functional conditions requiring inpatient rehabilitation: Severe sepsis, Anemia, Obstructive pylonephritis, Left knee pain, tachycardia, ADRIANNE, E coli/MDRO bacteremia, UTI, Decreased mobility, Decreased, self care, Decreased endurance    Risk for medical/clinical complications: Risk for falls, Risk for skin breakdown secondary to decreased mobility, Risk for hypo/hypertensive episodes, risk for DVT/PE    Comorbidities: arthritis, gout, HTN, kidney stones, CKD, HLD, COVID + on 5/31/2022    Procedures/Surgeries in the last 100 days:   left-sided ureteral stenting on 4/21  s/p perc nephrostomy tube drain placement by IR on 5/10    CURRENT VITAL SIGNS:   Temp:  [97 8 °F (36 6 °C)-99 1 °F (37 3 °C)] 97 8 °F (36 6 °C)  HR:  [] 100  Resp:  [18-20] 20  BP: (121-126)/(69-73) 126/73   Intake/Output Summary (Last 24 hours) at 6/17/2022 [address] filed at 6/17/2022 1007  Gross per 24 hour   Intake 480 ml   Output 945 ml   Net -465 ml        LABORATORY RESULTS:      Lab Results   Component Value Date    HGB 9 4 (L) 06/14/2022    HGB 14 3 04/08/2015    HCT 31 0 (L) 06/14/2022    HCT 43 4 04/08/2015    WBC 8 22 06/14/2022    WBC 12 98 (H) 04/08/2015     Lab Results   Component Value Date    BUN 23 06/14/2022    BUN 15 04/08/2015     (L) 04/08/2015    K 3 2 (L) 06/14/2022    K 3 6 04/08/2015     06/14/2022    CL 99 04/08/2015    GLUCOSE 125 05/10/2022    GLUCOSE 104 04/08/2015    CREATININE 2 04 (H) 06/14/2022    CREATININE 1 80 (H) 04/08/2015     Lab Results   Component Value Date    PROTIME 15 4 (H) 05/10/2022    INR 1 27 (H) 05/10/2022        DIAGNOSTIC STUDIES:  CT abdomen pelvis wo contrast    Result Date: 5/10/2022  Impression: Mild left hydronephrosis status post ureteral stent placement, with 3 mm calculus in the mid ureter adjacent to the stent  Multiple bilateral renal calculi  Workstation performed: [id number]     XR chest portable    Result Date: 5/11/2022  Impression: Left perihilar infiltrate/atelectasis  Workstation performed: [id number]     XR chest 1 view portable    Result Date: 5/10/2022  Impression: No focal airspace consolidation  Workstation performed: [id number]     XR knee 1 or 2 vw left    Result Date: 5/13/2022  Impression: No acute osseous abnormality  Tricompartmental osteoarthrosis is similar to June 2, 2021 Workstation performed: [id number]     XR ankle 3+ vw left    Result Date: 5/15/2022  Impression: No acute osseous abnormality  Workstation performed: [id number]     IR nephrostomy tube placement    Result Date: 5/11/2022  Impression: Technically challenging two stick fluoroscopic guided 8-Thai nephrostomy tube placement into a nondilated renal collecting system in a patient with large body habitus  Hematuria at the end of the procedure  Recommend daily tube flushes  _______________________________________________________________ COMPARISON: Same day CT abdomen and pelvis PROCEDURE DETAILS: Operators: Dr Darryle Mcmurray Anesthesia: General and local anesthesia  Medications: 1% lidocaine, please refer to the anesthesia medical record Contrast: 18 mL of Omnipaque 300, 20 mL of Visipaque Fluoroscopy time: 20 2 minutes Images: 5 COMMENTS: A preprocedure timeout was performed per [facility] protocol  The left flank was prepped and draped in the usual sterile fashion  Multiple attempts to access a nondilated left renal lower pole using real-time ultrasound guidance was unsuccessful  Next, multiple fluoroscopic guided attempts to place an AccuStick needle within the expected location of the renal pelvis using the existing double-J stent as a guide was performed  No direct access was achieved; however, eventual filling of the collecting system was identified  Next under fluoroscopic guidance, a needle was advanced into a calyx  Using angled glide catheter and angled Glidewire, access was achieved into the collecting system  Over a guidewire, an 8-Mongolian pigtail drain was placed  Contrast confirmed placement  The tube was sutured skin with 2-0 Prolene and connected to bag drainage  Workstation performed: [id number]       PRECAUTIONS/SPECIAL NEEDS:  Tobacco:   Social History     Tobacco Use   Smoking Status Never Smoker   Smokeless Tobacco Never Used   , Alcohol:    Social History     Substance and Sexual Activity   Alcohol Use Not Currently    Alcohol/week: 0 0 standard drinks    Comment: rarely   , Isolation Precautions:  Contact, Anticoagulation:  heparin, Edema Management, Safety Concerns, Pain Management, Language Preference: English and Fall Precautions      MEDICATIONS:     Current Facility-Administered Medications:     acetaminophen (TYLENOL) tablet 650 mg, 650 mg, Oral, Q6H PRN, Tara Slaughter MD, 650 mg at 06/17/22 1007    allopurinol (ZYLOPRIM) tablet 100 mg, 100 mg, Oral, Daily, Carlos Sandoval MD, 100 mg at 06/17/22 0826    amLODIPine (NORVASC) tablet 10 mg, 10 mg, Oral, Daily, Carlos Sandoval MD, 10 mg at 06/17/22 9658    calcium carbonate (TUMS) chewable tablet 1,000 mg, 1,000 mg, Oral, BID PRN, Carlos Sandoval MD    cholecalciferol (VITAMIN D3) tablet 400 Units, 400 Units, Oral, Daily, Carlos Sandoval MD, 400 Units at 06/17/22 0826    heparin (porcine) subcutaneous injection 7,500 Units, 7,500 Units, Subcutaneous, Q8H [facility], Carlos Sandoval MD, 7,500 Units at 06/17/22 0515    metoprolol succinate (TOPROL-XL) 24 hr tablet 25 mg, 25 mg, Oral, Daily, Carlos Sandoval MD, 25 mg at 06/17/22 0826    ondansetron (ZOFRAN-ODT) dispersible tablet 4 mg, 4 mg, Oral, Q6H PRN, Carlos Sandoval MD    phenazopyridine (PYRIDIUM) tablet 100 mg, 100 mg, Oral, TID PRN, Carlos Sandoval MD    potassium chloride (K-DUR,KLOR-CON) CR tablet 10 mEq, 10 mEq, Oral, Daily, Cathi Styles DO, 10 mEq at 06/17/22 2624    senna-docusate sodium (SENOKOT S) 8 6-50 mg per tablet 2 tablet, 2 tablet, Oral, BID PRN, Carlos Sandoval MD    tamsulosin [facility]) capsule 0 4 mg, 0 4 mg, Oral, After Mars Black MD, 0 4 mg at 06/16/22 1748    traMADol (ULTRAM) tablet 50 mg, 50 mg, Oral, Q6H PRN, Carlos Sandoval MD, 50 mg at 06/15/22 1620    SKIN INTEGRITY:   ecchymoses - arm(s) bilateral, abdomen, Incision: healing well, no significant drainage, no dehiscence, no significant erythema, left back incision with DSD    PRIOR LEVEL OF FUNCTION:  He lives in [facility] single family home  Jordi Pink is single and lives alone  Self Care: Independent, Indoor Mobility: Independent, Stairs (in/outdoor): Independent and Cognition: Independent  Prior to patient's admission, patient was fully Independent with ADLs and IADLs, including working full-time and driving       FALLS IN THE LAST 6 MONTHS: one    HOME ENVIRONMENT:  The living area: Two level; Performs ADLs on one level; 3 Steps to enter with rails and full flight to 2nd floor, 1/2 bath on main level   The patient will not have 24 hour supervision available upon discharge  Patient's father lives locally and is supportive, however is unable to provide physical assistance  PREVIOUS DME:  Equipment in home (previous DME): Rolling Walker and Single [employer]    FUNCTIONAL STATUS:  Physical Therapy Occupational Therapy Speech Therapy   6/17/2022, per PTA    Subjective   Subjective The pt  is eager to get moving, but he notes continued knee and ankle pain  He reports considerable difficulty with the stairs  Transfers   Sit to Stand 5  Supervision   Stand to Sit 5  Supervision   Ambulation/Elevation   Gait pattern Excessively slow; Short stride   Gait Assistance 5  Supervision   Additional items Verbal cues   Assistive Device Bariatric Rolling walker   Distance 100 feet, 130 feet  Stair Management Assistance 4  Minimal assist   Additional items Assist x 1;Verbal cues; Increased time required   Stair Management Technique Two rails; Sideways;Nonreciprocal   Number of Stairs 1   Balance   Static Sitting Good   Dynamic Sitting Fair +   Static Standing Fair   Dynamic Standing [address] [place] [address] 6896 -   Activity Tolerance   Activity Tolerance Patient tolerated treatment well;Patient limited by pain   Nurse [address] [place] [address], RN  Exercises   TKR Supine;Bilateral;AROM;10 reps   Assessment   Prognosis Good   Problem List Decreased strength;Decreased mobility;Pain; Impaired balance   Assessment The pt  continues to remain limited due to pain as well as deficits in strength and activity tolerance  He as notable difficulty with the stairs with increased time in order to complete only one step  He has difficulty pulling himself up with just his RUE, and needed considerable effort to pull himself up via the sideways approach  The patient is ambulating beyond household distances, but he requires occasional standing rests   He had no loss of balance with the walker, and recommend continued use of the walker to maximize his balance and activity tolerance  He does remain limited from his baseline, and continued therapy is indicated to maximize his functional mobility prior to returning home  6/17/2022, per OT    ADL   Where Assessed Edge of bed   Eating Assistance [address] 5  Supervision/Setup   Grooming Deficit Setup;Supervision/safety; Wash/dry face   UB Bathing Assistance 4  Minimal Assistance   UB Bathing Deficit Setup;Supervision/safety; Increased time to complete; Other (Comment)  (B/L armpits)   LB Bathing Assistance 3  Moderate Assistance   LB Bathing Deficit Setup; Buttocks; Increased time to complete;Supervision/safety;Right upper leg;Left upper leg   UB Dressing Assistance 5  Supervision/Setup   UB Dressing Deficit Setup;Verbal cueing  (VCs for reminder of compensatory technique)   LB Dressing Assistance 4  [address]  4  Minimal Assistance   Toileting Deficit Setup; Increased time to complete;Supervison/safety;Grab bar use  (STD toilet)   Bed Mobility   Supine to Sit 5  Supervision   Additional items Bedrails; Increased time required   Sit to Supine 5  Supervision   Additional items Bedrails; Increased time required;Verbal cues   Additional Comments Pt greeted supine in bed and left supine in bed at end of session  ALl needs met, and call bell nearby  Transfers   Sit to Stand 5  Supervision   Additional items Increased time required   Stand to Sit 5  Supervision   Additional items Increased time required   Toilet transfer 4  Minimal assistance   Additional items Assist x 1; Increased time required;Standard toilet; Other  (GB)   Additional Comments with and without RW   Functional Mobility   Functional Mobility 5  Supervision   Additional Comments S within room distances with RW- Pt able to complete few steps of functional mobility with no AD and CGA in bathroom     Additional items Rolling walker   Therapeutic Exercise - ROM   UE-ROM Yes   ROM- Right Upper Extremities   R Shoulder AAROM; Horizontal ABduction; Flexion   R Position Seated   R Weight/Reps/Sets 2x5   RUE ROM Comment Pt completes HEP seated on EOB for increasing AROM of RUE in order to maximize independence with UB dressing  ROM - Left Upper Extremities    L Shoulder AROM; Flexion   L Position Seated   L Weight/Reps/Sets 1x5   Cognition   Overall Cognitive Status WFL   Arousal/Participation Alert   Attention Within functional limits   Orientation Level Oriented X4   Memory Within functional limits   Following Commands Follows all commands and directions without difficulty   Comments Pt very pleasant and cooperative during OT session  Pt with decreased safety awareness at times requiring VCs to correct  Activity Tolerance   Activity Tolerance Patient limited by fatigue   Medical Staff Made Aware RN cleared/updated  Assessment   Assessment Pt greeted bedside for OT treatment on 6/17/2022 focusing on maximizing independence with ADLs  Pt completes toileting in bathroom with min A and seated/standing ADLs within room  Pt S with grooming, min A with UB bathing/dressing and mod A with LB bathing  Pt with decreased endurance requiring seated rest breaks throughout session  Pt completed HEP seated on EOB in order to increase independence with UB ADLs  Limitations that impact functional performance include decreased ADL status, decreased UE ROM, decreased UE strength, decreased safe judgement during ADLs, decreased cognition, decreased endurance, decreased self care transfers, decreased high level ADLs and pain  Occupational performance areas to address ADL retraining, functional transfer training, UE strengthening/ROM, endurance training, cognitive reorientation, Pt/caregiver education, equipment evaluation/education, compensatory technique education, energy conservation and activity engagement   Pt would benefit from continued skilled OT services while in hospital to maximize independence with ADLs   Will continue to follow Pt's goals and progress  Pt would benefit from post acute rehabilitation services upon DC to maximize safety and independence with ADLs and functional tasks of choice  N/A     CARE SCORES:  Self Care:  Eatin: Independent  Oral hygiene: 04: Supervision or touching  assistance  Toilet hygiene: 04: Supervision or touching  assistance  Shower/bathing self: 03: Partial/moderate assistance  Upper body dressin: Supervision or touching  assistance  Lower body dressin: Supervision or touching  assistance  Putting on/taking off footwear: 04: Supervision or touching  assistance  Transfers:  Roll left and right: 09: Not applicable  Sit to lyin: Not applicable  Lying to sitting on side of bed: 09: Not applicable  Sit to stand: 04: Supervision or touching  assistance  Chair/bed to chair transfer: 04: Supervision or touching  assistance  Toilet transfer: 09: Not applicable  Mobility:  Walk 10 ft: 04: Supervision or touching  assistance  Walk 50 ft with two turns: 10: Not attempted due to environmental limitations  Walk 150ft: 88: Not attempted due to medical conditions or safety concerns    CURRENT GAP IN FUNCTION  Prior to Admission: Functional Status: Patient was independent with mobility/ambulation, transfers, ADL's, IADL's  Estimated length of stay: 7 to 10 days    Anticipated Post-Discharge Disposition/Treatment  Disposition: Return to previous home/apartment  Outpatient Services: Physical Therapy (PT) and Occupational Therapy (OT)    BARRIERS TO DISCHARGE  Weakness, Pain, Balance Difficulty, Fatigue, Home Accessibility, Caregiver Accessibility, Financial Resources, Equipment Needs, Resource Availability and Lives Alone    INTERVENTIONS FOR DISCHARGE  ADL retraining; Functional transfer training; LE strengthening/ROM; Endurance training; Patient/family training; Equipment eval/education;  Bed mobility; Gait training    REQUIRED THERAPY:  Patient will require PT and OT 90 minutes each per day, five days per week to achieve rehab goals  REQUIRED FUNCTIONAL AND MEDICAL MANAGEMENT FOR INPATIENT REHABILITATION:  Skin:  left back incision with DSD, ecchymosis abdomen/BL arms, Pain Management: Overall pain is well controlled, Deep Vein Thrombosis (DVT) Prophylaxis:  heparin and SCD's while in bed, further internal medicine management of additional medical conditions while on ARC, PT/OT intervention, patient/family education and training, and any needed consults PRN, nursing medication review and management of bowel/bladder function  RECOMMENDED LEVEL OF CARE:   Patient originally presented to [address] [place] [address] ED with worsening left-sided flank pain and generalized malaise/fatigue which was not improved since discharge from [facility] hospital [facility] for obstructive pyelonephritis secondary to 3 mm stone with ureteral stent placement  Patient also developed E coli bacteremia during hospital stay and was ultimately discharged on a course of amoxicillin until 5/4  Prior cultures were sensitive for Ancef and ceftriaxone  In the ED at [facility] patient was found to have lactic acidosis, leukocytosis, worsening ADRIANNE  CT demonstrated left ureteral stent in satisfactory position, however there is moderate perinephric stranding and mild hydronephrosis in the left kidney and multiple bilateral renal calculi  Patient was recommended for transfer to critical care bed at [facility] it to be seen by Urology at that time  Pt is s/p perc nephrostomy tube drain placement by IR on 5/10  Per Urology - continue Flomax and outpatient follow-up for secondary stone  Positive growth of E coli in one blood culture bottle from 5/10 - likely associated with UTI/pyelonephritis  Continue IV Rocephin (bacteremic dose), should transition to oral regimen to complete a 14 day course at time of discharge  Ortho was consulted for left knee pain   Clinically low suspicion for septic arthritis but given elevated CRP, knee aspiration was performed  Given scant amount of blood and no effusion aspirated, very low concern for infection in the joint at this time  Encourage weight loss in the setting of morbid obesity  XR of knee without acute osseous abnormality, however, did reveal tricompartmental osteoarthritis similar to imaging last year  Ortho recommending optimization of pain control, may consider future joint steroid injection once acute sepsis/infection resolves  Patient had ADRIANNE - Baseline creatinine approximately 1 4-1 6 - remains elevated but improving currently at 1 87 from a prior peak of 3 72  Likely secondary to severe sepsis and obstructive pyelonephritis  Patient was recommended for ARC by therapy services, and admitted to [facility] on 5/19/2022  Patient was progressive well with therapy, however, on 6/6, patient met sepsis criteria with elevated WBCs and creat level, elevated temp and tachycardia  Patient also had complaints of weakness, fatigue and diaphoresis  He was transferred to [place] [address] on 6/7 for Urology evaluation  Urine and blood cultures were obtained, and patient was initiated on IV antibiotics  Urology was consulted, with urine culture growing gram negative rods (E coli/MDRO citrobacter)  Patient was recommended for inpatient clearance ureteroscopy  ID was consulted, and patient was placed on Cipro PO for 7 days through 6/16  Renal US was unremarkable  On 6/9, patient went to the OR with Urology for CYSTOSCOPY LEFT URETEROSCOPY/LITHOTRIPSY HOLMIUM LASER,  AND EXCHANGE URETERAL STENT URETERAL, REMOVAL NEPHROSTOMY TUBE (Left); EBL minimal  Stent was since discontinued on 6/13, and patient is to follow-up with Urology as an outpatient in 6 months with renal US  Prior to patient's admission, patient was fully Independent with ADLs and IADLs, including working full-time and driving   Currently, patient is Supervision with use of Bariatric RW for gait and transfers, Min assist for stair management, and Supervision for UB ADLs, Min/Mod assist for LB ADLs  Close medical management and PM&R management is recommended at this time while patient is on the [facility]  Inpatient acute rehab is recommended for patient to maximize overall strength and mobility upon discharge to home with support of family

## 2022-06-16 NOTE — PROGRESS NOTES
Progress Note - Infectious Disease   Bulgarian Phlegm 55 y o  male MRN: [id number]  Unit/Bed#: [facility] 804-01 Encounter: [id number]      Impression/Recommendations:  1   Sepsis, POA   Due to #2   No other appreciable source   ROS, exam otherwise negative   Blood cultures negative   Clinically stable and non-toxic   Improved   Low-grade temps over weekend may be due to atelectasis versus drug fever versus evolving C  Diff as below   Renal U/S negative for hydronephrosis   Improved  Rec:  · Discontinue antibiotics as below to complete treatment course  · Follow temperatures closely  · Supportive care as per the primary service     2   MDR-Citrobacter UTI   In setting of #3   Suspect true infection given sepsis as above   Improved status post 7 days IV/IV equivalent antibiotics  Rec:  · Discontinue cipro today to complete treatment course     3   Obstructive nephrolithiasis   Status post cystoscopy, retrograde pyelogram, and left ureteral stent placement 4/22/22   Developed mmild hydronephrosis associated with recurrent sepsis, bactermia   Status post left PCN 5/10/22   Now status post cystoscopy, lithotripsy, PCN removal, stent exchange 6/9/22   Renal U/S 6/12 negative for hydronephrosis   Stent (on a string) now removed      4   ADRIANNE on CKD   Baseline Cr 1 4-1 7   Likely multifactorial   Slowly improving      5   Recent COVID   Asymptomatic   PCR positive 5/31   Off isolation      The above impression and plan was discussed in detail with the patient  The patient is stable from an ID standpoint  We will sign off for now  Please call with new questions      Antibiotics:  Cipro #7    Subjective:  Patient seen on AM rounds  Denies fevers, chills, sweats, nausea, vomiting, or diarrhea  Still with urinary frequency  24 Hour Events:  No documented fevers, chills, sweats, nausea, vomiting, or diarrhea      Objective:  Vitals:  Temp:  [98 3 °F (36 8 °C)-98 5 °F (36 9 °C)] 98 3 °F (36 8 °C)  HR:  [94] 94  Resp:  [16] 16  BP: (117)/(69) 117/69  SpO2:  [97 %-100 %] 97 %  Temp (24hrs), Av 4 °F (36 9 °C), Min:98 3 °F (36 8 °C), Max:98 5 °F (36 9 °C)  Current: Temperature: 98 3 °F (36 8 °C)    Physical Exam:   General:  No acute distress  Psychiatric:  Awake and alert  Pulmonary:  Normal respiratory excursion without accessory muscle use  Abdomen:  Soft, nontender  Extremities:  No edema  Skin:  No rashes    Lab Results:  I have personally reviewed pertinent labs  Results from last 7 days   Lab Units 22  0622  0430 22  0429 22  0437 06/10/22  1100   POTASSIUM mmol/L 3 2* 3 7 3 9 3 2* 3 6   CHLORIDE mmol/L 108 106 106 106 109*   CO2 mmol/L 26 22 27 23 25   BUN mg/dL 23 23 27* 32* 30*   CREATININE mg/dL 2 04* 2 06* 2 14* 2 00* 2 12*   EGFR ml/min/1 73sq m 35 35 33 36 34   CALCIUM mg/dL 9 0 9 3 9 6 9 2 9 7   AST U/L  --   --   --  11 14   ALT U/L  --   --   --  14 15   ALK PHOS U/L  --   --   --  46 51     Results from last 7 days   Lab Units 22  0430 22  1006   WBC Thousand/uL 8 22 11 46* 11 81*   HEMOGLOBIN g/dL 9 4* 9 8* 9 2*   PLATELETS Thousands/uL 339 367 343           Imaging Studies:   I have personally reviewed pertinent imaging study reports and images in PACS  EKG, Pathology, and Other Studies:   I have personally reviewed pertinent reports

## 2022-06-17 ENCOUNTER — HOSPITAL ENCOUNTER (INPATIENT)
Facility: HOSPITAL | Age: 57
LOS: 13 days | Discharge: HOME HEALTH | DRG: 949 | End: 2022-06-30
Attending: FAMILY MEDICINE | Admitting: FAMILY MEDICINE
Payer: COMMERCIAL

## 2022-06-17 VITALS
SYSTOLIC BLOOD PRESSURE: 126 MMHG | TEMPERATURE: 97.8 F | WEIGHT: 315 LBS | HEART RATE: 100 BPM | HEIGHT: 66 IN | RESPIRATION RATE: 20 BRPM | DIASTOLIC BLOOD PRESSURE: 73 MMHG | OXYGEN SATURATION: 97 % | BODY MASS INDEX: 50.62 KG/M2

## 2022-06-17 DIAGNOSIS — M25.562 CHRONIC PAIN OF LEFT KNEE: ICD-10-CM

## 2022-06-17 DIAGNOSIS — N18.30 STAGE 3 CHRONIC KIDNEY DISEASE, UNSPECIFIED WHETHER STAGE 3A OR 3B CKD (HCC): ICD-10-CM

## 2022-06-17 DIAGNOSIS — M25.522 LEFT ELBOW PAIN: ICD-10-CM

## 2022-06-17 DIAGNOSIS — N11.1 OBSTRUCTIVE PYELONEPHRITIS: Primary | ICD-10-CM

## 2022-06-17 DIAGNOSIS — Z87.39 HISTORY OF GOUT: ICD-10-CM

## 2022-06-17 DIAGNOSIS — N18.32 STAGE 3B CHRONIC KIDNEY DISEASE (HCC): ICD-10-CM

## 2022-06-17 DIAGNOSIS — G89.29 CHRONIC PAIN OF LEFT KNEE: ICD-10-CM

## 2022-06-17 DIAGNOSIS — I10 ESSENTIAL HYPERTENSION: ICD-10-CM

## 2022-06-17 PROBLEM — A41.9 SEPSIS (HCC): Status: RESOLVED | Noted: 2022-06-07 | Resolved: 2022-06-17

## 2022-06-17 PROBLEM — U07.1 COVID: Status: RESOLVED | Noted: 2022-06-01 | Resolved: 2022-06-17

## 2022-06-17 PROCEDURE — 97535 SELF CARE MNGMENT TRAINING: CPT

## 2022-06-17 PROCEDURE — NC001 PR NO CHARGE

## 2022-06-17 PROCEDURE — 99222 1ST HOSP IP/OBS MODERATE 55: CPT | Performed by: PHYSICAL MEDICINE & REHABILITATION

## 2022-06-17 PROCEDURE — 97116 GAIT TRAINING THERAPY: CPT

## 2022-06-17 PROCEDURE — 97110 THERAPEUTIC EXERCISES: CPT

## 2022-06-17 PROCEDURE — 97530 THERAPEUTIC ACTIVITIES: CPT

## 2022-06-17 PROCEDURE — 99239 HOSP IP/OBS DSCHRG MGMT >30: CPT | Performed by: GENERAL PRACTICE

## 2022-06-17 RX ORDER — HEPARIN SODIUM 5000 [USP'U]/ML
7500 INJECTION, SOLUTION INTRAVENOUS; SUBCUTANEOUS EVERY 8 HOURS SCHEDULED
Status: CANCELLED | OUTPATIENT
Start: 2022-06-17

## 2022-06-17 RX ORDER — POTASSIUM CHLORIDE 750 MG/1
10 TABLET, EXTENDED RELEASE ORAL DAILY
Status: CANCELLED | OUTPATIENT
Start: 2022-06-18

## 2022-06-17 RX ORDER — TRAMADOL HYDROCHLORIDE 50 MG/1
50 TABLET ORAL EVERY 6 HOURS PRN
Status: DISCONTINUED | OUTPATIENT
Start: 2022-06-17 | End: 2022-06-30 | Stop reason: HOSPADM

## 2022-06-17 RX ORDER — OMEGA-3S/DHA/EPA/FISH OIL/D3 300MG-1000
400 CAPSULE ORAL DAILY
Status: DISCONTINUED | OUTPATIENT
Start: 2022-06-18 | End: 2022-06-30 | Stop reason: HOSPADM

## 2022-06-17 RX ORDER — TRAMADOL HYDROCHLORIDE 50 MG/1
50 TABLET ORAL EVERY 6 HOURS PRN
Status: CANCELLED | OUTPATIENT
Start: 2022-06-17

## 2022-06-17 RX ORDER — AMLODIPINE BESYLATE 10 MG/1
10 TABLET ORAL DAILY
Status: CANCELLED | OUTPATIENT
Start: 2022-06-18

## 2022-06-17 RX ORDER — METOPROLOL SUCCINATE 25 MG/1
25 TABLET, EXTENDED RELEASE ORAL DAILY
Status: CANCELLED | OUTPATIENT
Start: 2022-06-18

## 2022-06-17 RX ORDER — ALLOPURINOL 100 MG/1
100 TABLET ORAL DAILY
Status: DISCONTINUED | OUTPATIENT
Start: 2022-06-18 | End: 2022-06-30 | Stop reason: HOSPADM

## 2022-06-17 RX ORDER — ACETAMINOPHEN 325 MG/1
650 TABLET ORAL EVERY 6 HOURS PRN
Status: CANCELLED | OUTPATIENT
Start: 2022-06-17

## 2022-06-17 RX ORDER — ACETAMINOPHEN 325 MG/1
650 TABLET ORAL EVERY 6 HOURS PRN
Status: DISCONTINUED | OUTPATIENT
Start: 2022-06-17 | End: 2022-06-30 | Stop reason: HOSPADM

## 2022-06-17 RX ORDER — AMLODIPINE BESYLATE 10 MG/1
10 TABLET ORAL DAILY
Status: DISCONTINUED | OUTPATIENT
Start: 2022-06-18 | End: 2022-06-30 | Stop reason: HOSPADM

## 2022-06-17 RX ORDER — POTASSIUM CHLORIDE 750 MG/1
10 TABLET, EXTENDED RELEASE ORAL DAILY
Status: DISCONTINUED | OUTPATIENT
Start: 2022-06-18 | End: 2022-06-30 | Stop reason: HOSPADM

## 2022-06-17 RX ORDER — TAMSULOSIN HYDROCHLORIDE 0.4 MG/1
0.4 CAPSULE ORAL
Status: DISCONTINUED | OUTPATIENT
Start: 2022-06-17 | End: 2022-06-19

## 2022-06-17 RX ORDER — SENNOSIDES 8.6 MG
2 TABLET ORAL DAILY
Status: DISCONTINUED | OUTPATIENT
Start: 2022-06-17 | End: 2022-06-18

## 2022-06-17 RX ORDER — METOPROLOL SUCCINATE 25 MG/1
25 TABLET, EXTENDED RELEASE ORAL DAILY
Status: DISCONTINUED | OUTPATIENT
Start: 2022-06-18 | End: 2022-06-19

## 2022-06-17 RX ORDER — ALLOPURINOL 100 MG/1
100 TABLET ORAL DAILY
Status: CANCELLED | OUTPATIENT
Start: 2022-06-18

## 2022-06-17 RX ORDER — OMEGA-3S/DHA/EPA/FISH OIL/D3 300MG-1000
400 CAPSULE ORAL DAILY
Status: CANCELLED | OUTPATIENT
Start: 2022-06-18

## 2022-06-17 RX ORDER — ONDANSETRON 4 MG/1
4 TABLET, ORALLY DISINTEGRATING ORAL EVERY 6 HOURS PRN
Status: DISCONTINUED | OUTPATIENT
Start: 2022-06-17 | End: 2022-06-30 | Stop reason: HOSPADM

## 2022-06-17 RX ORDER — TAMSULOSIN HYDROCHLORIDE 0.4 MG/1
0.4 CAPSULE ORAL
Status: CANCELLED | OUTPATIENT
Start: 2022-06-17

## 2022-06-17 RX ADMIN — POTASSIUM CHLORIDE 10 MEQ: 750 TABLET, EXTENDED RELEASE ORAL at 08:26

## 2022-06-17 RX ADMIN — HEPARIN SODIUM 7500 UNITS: 5000 INJECTION INTRAVENOUS; SUBCUTANEOUS at 05:15

## 2022-06-17 RX ADMIN — AMLODIPINE BESYLATE 10 MG: 10 TABLET ORAL at 08:26

## 2022-06-17 RX ADMIN — ACETAMINOPHEN 650 MG: 325 TABLET, FILM COATED ORAL at 10:07

## 2022-06-17 RX ADMIN — ACETAMINOPHEN 650 MG: 325 TABLET ORAL at 17:32

## 2022-06-17 RX ADMIN — TAMSULOSIN HYDROCHLORIDE 0.4 MG: 0.4 CAPSULE ORAL at 17:30

## 2022-06-17 RX ADMIN — TRAMADOL HYDROCHLORIDE 50 MG: 50 TABLET, COATED ORAL at 23:16

## 2022-06-17 RX ADMIN — CHOLECALCIFEROL TAB 10 MCG (400 UNIT) 400 UNITS: 10 TAB at 08:26

## 2022-06-17 RX ADMIN — ALLOPURINOL 100 MG: 100 TABLET ORAL at 08:26

## 2022-06-17 RX ADMIN — METOPROLOL SUCCINATE 25 MG: 25 TABLET, FILM COATED, EXTENDED RELEASE ORAL at 08:26

## 2022-06-17 NOTE — ASSESSMENT & PLAN NOTE
Lab Results   Component Value Date    EGFR 27 06/30/2022    EGFR 27 06/27/2022    EGFR 31 06/22/2022    CREATININE 2 52 (H) 06/30/2022    CREATININE 2 49 (H) 06/27/2022    CREATININE 2 24 (H) 06/22/2022   · Baseline creatinine 1 4-1 7  · Nephrology following  · Will need OP Nephrology follow up; continue nephrology recommendations  · Ambulatory referral to Nephrology sent at discharge  · Avoid Nephrotoxins

## 2022-06-17 NOTE — TELEPHONE ENCOUNTER
Pt remains inpatient at this time  Will postpone pending discharge to arrange for follow up appointment that is requested

## 2022-06-17 NOTE — ASSESSMENT & PLAN NOTE
Present at outside campus   Suspect source of UTI  Noted left nephrostomy tube and left ureteral stent  Noted multiple E coli UTI and also previous E coli bacteremia  Also noted covid positive status as of 5/31>> no supplemental oxygen requirement  Leukocytosis noted  Received IV fluids and cefepime at outside [facility] and transferred for Urology evaluation      S/p OR with Urology for cystoscopy ureteroscopy holmium laser lithotripsy basket extraction and ureteral stent insertion with a string removal of left-sided nephrostomy tube  Urine cultures growing MRDO citrobacter, ID consulted -> placed on PO Cipro & continued thru 6/16 for 7 days  6/12: renal US unremarkable  Now better

## 2022-06-17 NOTE — PLAN OF CARE
Problem: OCCUPATIONAL THERAPY ADULT  Goal: Performs self-care activities at highest level of function for planned discharge setting  See evaluation for individualized goals  Description: Treatment Interventions: ADL retraining, Functional transfer training, UE strengthening/ROM, Endurance training, Cognitive reorientation, Patient/family training, Equipment evaluation/education, Energy conservation, Activityengagement          See flowsheet documentation for full assessment, interventions and recommendations  Note: Limitation: Decreased ADL status, Decreased UE ROM, Decreased UE strength, Decreased Safe judgement during ADL, Decreased endurance, Decreased self-care trans, Decreased high-level ADLs  Prognosis: Fair  Assessment: Pt greeted bedside for OT treatment on 6/17/2022 focusing on maximizing independence with ADLs  Pt completes toileting in bathroom with min A and seated/standing ADLs within room  Pt S with grooming, min A with UB bathing/dressing and mod A with LB bathing  Pt with decreased endurance requiring seated rest breaks throughout session  Pt completed HEP seated on EOB in order to increase independence with UB ADLs  Limitations that impact functional performance include decreased ADL status, decreased UE ROM, decreased UE strength, decreased safe judgement during ADLs, decreased cognition, decreased endurance, decreased self care transfers, decreased high level ADLs and pain  Occupational performance areas to address ADL retraining, functional transfer training, UE strengthening/ROM, endurance training, cognitive reorientation, Pt/caregiver education, equipment evaluation/education, compensatory technique education, energy conservation and activity engagement   Pt would benefit from continued skilled OT services while in hospital to maximize independence with ADLs  Will continue to follow Pt's goals and progress   Pt would benefit from post acute rehabilitation services upon DC to maximize safety and independence with ADLs and functional tasks of choice  OT Discharge Recommendation: Post acute rehabilitation services  OT - OK to Discharge:  Yes

## 2022-06-17 NOTE — ARC ADMISSION
Received phone call from Cherelle at [facility] - patient is approved for ARC  He will admit to [facility] today pending transport time, to be arranged by CM, and report can be called to [phone]  CM has been updated

## 2022-06-17 NOTE — PHYSICAL THERAPY NOTE
Physical Therapy Progress Note     06/17/22 0935   PT Last Visit   PT Visit Date 06/17/22   Note Type   Note Type Treatment for insurance authorization   Pain Assessment   Pain Assessment Tool 0-10   Pain Score 6   Pain Location/Orientation Orientation: Bilateral;Location: Knee; Other (Comment)  (and ankles )   Hospital Pain Intervention(s) Repositioned; Ambulation/increased activity; Emotional support   Restrictions/Precautions   Other Precautions Pain   Subjective   Subjective The pt  is eager to get moving, but he notes continued knee and ankle pain  He reports considerable difficulty with the stairs  Transfers   Sit to Stand 5  Supervision   Stand to Sit 5  Supervision   Ambulation/Elevation   Gait pattern Excessively slow; Short stride   Gait Assistance 5  Supervision   Additional items Verbal cues   Assistive Device Bariatric Rolling walker   Distance 100 feet, 130 feet  Stair Management Assistance 4  Minimal assist   Additional items Assist x 1;Verbal cues; Increased time required   Stair Management Technique Two rails; Sideways;Nonreciprocal   Number of Stairs 1   Balance   Static Sitting Good   Dynamic Sitting Fair +   Static Standing Fair   Dynamic Standing Yung [address]896 -   Activity Tolerance   Activity Tolerance Patient tolerated treatment well;Patient limited by pain   Nurse [address] [place] [address], RN  Exercises   TKR Supine;Bilateral;AROM;10 reps   Assessment   Prognosis Good   Problem List Decreased strength;Decreased mobility;Pain; Impaired balance   Assessment The pt  continues to remain limited due to pain as well as deficits in strength and activity tolerance  He as notable difficulty with the stairs with increased time in order to complete only one step  He has difficulty pulling himself up with just his RUE, and needed considerable effort to pull himself up via the sideways approach  The patient is ambulating beyond household distances, but he requires occasional standing rests   He had no loss of balance with the walker, and recommend continued use of the walker to maximize his balance and activity tolerance  He does remain limited from his baseline, and continued therapy is indicated to maximize his functional mobility prior to returning home  Barriers to Discharge Inaccessible home environment;Decreased caregiver support   Goals   Patient Goals To get to rehab in order to regain his independence  STG Expiration Date 06/22/22   PT Treatment Day 1   Plan   Treatment/Interventions Functional transfer training;LE strengthening/ROM; Elevations; Therapeutic exercise; Endurance training;Patient/family training;Bed mobility;Gait training   Progress Progressing toward goals   PT Frequency 3-5x/wk   Recommendation   PT Discharge Recommendation Post acute rehabilitation services   Equipment Recommended [facility] Recommended HD Bariatric wheeled walker   [address]   Turning in Bed Without Bedrails 4   Lying on Back to Sitting on Edge of Flat Bed 3   Moving Bed to Chair 3   Standing Up From Chair 3   Walk in Room 3   Climb 3-5 Stairs 3   Basic Mobility Inpatient Raw Score 19   Basic Mobility Standardized Score 42 48   Highest Level Of Mobility   JH-HLM Goal 6: Walk 10 steps or more   JH-HLM Achieved 7: Walk 25 feet or more       An AM-PAC Basic Mobility standardized score less than 40 78 suggests the patient may benefit from discharge to post-acute rehab services      Jean John, PTA

## 2022-06-17 NOTE — PLAN OF CARE
Problem: PHYSICAL THERAPY ADULT  Goal: Performs mobility at highest level of function for planned discharge setting  See evaluation for individualized goals  Description: Treatment/Interventions: Functional transfer training, LE strengthening/ROM, Elevations, Therapeutic exercise, Endurance training, Patient/family training, Equipment eval/education, Bed mobility, Gait training          See flowsheet documentation for full assessment, interventions and recommendations  Outcome: Progressing  Note: Prognosis: Good  Problem List: Decreased strength, Decreased mobility, Pain, Impaired balance  Assessment: The pt  continues to remain limited due to pain as well as deficits in strength and activity tolerance  He as notable difficulty with the stairs with increased time in order to complete only one step  He has difficulty pulling himself up with just his RUE, and needed considerable effort to pull himself up via the sideways approach  The patient is ambulating beyond household distances, but he requires occasional standing rests  He had no loss of balance with the walker, and recommend continued use of the walker to maximize his balance and activity tolerance  He does remain limited from his baseline, and continued therapy is indicated to maximize his functional mobility prior to returning home  Barriers to Discharge: Inaccessible home environment, Decreased caregiver support  Barriers to Discharge Comments: lives alone - limited support     PT Discharge Recommendation: Post acute rehabilitation services          See flowsheet documentation for full assessment

## 2022-06-17 NOTE — CASE MANAGEMENT
Case Management Discharge Planning Note    Patient name Joaquina Lauren  Location [address] [place] [address] [facility]N [id number]  : 1965 Date 2022       Current Admission Date: 2022  Current Admission Diagnosis:Sepsis [facility])   Patient Active Problem List    Diagnosis Date Noted    Diarrhea 2022    Sepsis ([facility] [address] ) 2022    COVID 2022    Anemia 2022    Obstructive pyelonephritis 2022    Obstructive uropathy 2022    Mixed hyperlipidemia 2021    Morbid obesity 2021    Recurrent left knee pain 2021    Insomnia 2019    Essential hypertension 2019    Severe sepsis on admission 2019    Chronic kidney disease, stage 3 ([facility] ) 2019    Acute kidney injury superimposed on CKD stage 3 2019    Bacteremia due to Escherichia coli 2019    Lactic acidosis 2019    History of gout 07/15/2019    Vitamin D deficiency 06/10/2015    [facility] 2015      LOS (days): 10  Geometric Mean LOS (GMLOS) (days): 9 60  Days to GMLOS:-0 2     OBJECTIVE:  Risk of Unplanned Readmission Score: 23 81         Current admission status: Inpatient   Preferred Pharmacy:   [facility]/pharmacy #[id number]- [address] [address]  Phone: [phone] Fax: [phone] , [address]   [address] [address]  Phone: [phone] Fax: [phone]    Primary Care Provider: RITA Cabral    Primary Insurance: BLUE CROSS  Secondary Insurance:     DISCHARGE DETAILS:                                          Other Referral/Resources/Interventions Provided:  Referral Comments: Notified by [facility] rehab admissions that pt has auth approval & can go to them today  Transport needs to be by 3pm  Bariatric wcv requested  No covid testing needed per admissions  Updated SLIM  Pt cleared for dc today               1155 update: Cristian wcv set up for [phone] [id number]  Updated KP, pt, rn & Jenny at [facility] acute rehab  Pt agreeable to wcv cost  Report number provided

## 2022-06-17 NOTE — H&P
PHYSICAL MEDICINE AND REHABILITATION H&P/ADMISSION NOTE  Sherita Smith 55 y o  male MRN: [id number]  Unit/Bed#: -01 Encounter: [id number]     Rehab Diagnosis: Impairment of mobility, safety and Activities of Daily Living (ADLs) due to Debility:  16  Debility (Non-cardiac/Non-pulmonary)  Etiologic Dx: E coli Bacteremia    History of Present Illness:   Sherita Smith is a 55 y o  male with a PMH significant for CKD, HLD, HTN and arthritis who presented to the [place] [address] ED initially with worsening left-sided flank pain with generalized malaise/fatigue  He had a recent admission at [facility] for obstructive pyelonephritis secondary to a 3 mm stone with ureteral stent placement  Pt developed E  Coli bacteremia during hospitalization and discharged with amoxicillian until 5/4  In the ED patient was found to have lactic acidosis, leukocytosis and worsening ADRIANNE  CT revealed moderate perinephric stranding and mild hydronephrosis in the left kidney with multiple bilateral renal calculi  Patient transferred to [facility] to be evaluated by Urology  S/p perc nephrostomy tube drain replacement by IR on 5/10  Positive E  coli in one blood culture from 5/10 thought to be associated with UTI/Pyelonephritis  IV antibiotics initiated and then ultimately transitioned to PO antibiotics  During this stay, Ortho was consulted for ongoing left knee pain  His left knee was aspirated  Blood was noted but no effusion aspirated  Xray of the knee without acute osseous abnormality but did reveal tricompartmental osteoarthritis similar to a prior imaging the year prior  Ortho considering future joint steroid injection once acute medical comorbidities resolve  Patient arrived to [facility] on 5/19 after PT/OT recommendations  He progressed well however on 6/6 he developed sepsis meeting the criteria with elevation in WBCS, creatinine level, elevated temp and tachycardia  Patient also with c/o weakness, fatigue and diaphoresis   Pt was transferred to [facility] on 6/7 for urology evaluation  Urine and blood cultures obtained and IV antibiotics were initiated  Urine culture with gram negative rods of E  Coli/MDRO Citrobacter  Pt was recommended for inpatient clearance ureteroscopy  ID consulted and placed patient on Cipro PO x7 days (6/16)  Renal US unremarkable  6/9 patient went to OR with Urology for cystoscopy and left ureteroscopy/lithotripsy with ureteral stent exchange and removal of nephrostomy tube  Stent was then discontinued on 6/13  Pt is to follow up with urology as OP in 6 months with a renal US  Patient participated with PT/OT and was deemed appropriate to return to acute rehabilitation  He arrived to [facility] 6/17  Plan:     Rehabilitation   Functional deficits: impaired mobility, self care, generalized pain   Begin PT/OT/SLP  Rehabilitation goals are to achieve a modified independent level with mobility and self care  Prognosis is good  ELOS is 10-14 days  Estimated discharge is home       DVT prophylaxis   SCDs    Pain   Tylenol 650 mg Q6H PRN for mild pain   Tramadol 50 mg Q6H PRN for moderate pain    Bladder plan   Continent    Bowel plan   Continent    Code Status   Full code- confirmed      Obstructive pyelonephritis  Assessment & Plan  · Leading to Sepsis suspected to be caused by UTI with left nephrostomy tube and left ureteral stent  · Received IVF and Cefepime   · S/p OR with Urology for cystoscopy ureteroscopy and ureteral stent insertion with string removal of left sided nephrostomy tube   · Urine cultures with MDRO Citrobacter, ID consulted and placed on PO cipro until 6/16        Obstructive uropathy  Assessment & Plan  · s/p cystoscopy left ureteroscopy/lithotripsy and exchange ureteral stent, removal nephrostomy tube left on 6/9  · stent removal on 6/13  · OP follow up with Urology in 6 weeks with renal US       Morbid obesity  Assessment & Plan  · Lifestyle modifications       Essential hypertension  Assessment & Plan  · Monitor vitals  · IM to follow  · Continue Metoprolol 25 mg daily and Norvasc 10 mg daily        Chronic kidney disease, stage 3 [facility])  Assessment & Plan  Lab Results   Component Value Date    EGFR 35 06/14/2022    EGFR 35 06/13/2022    EGFR 33 06/12/2022    CREATININE 2 04 (H) 06/14/2022    CREATININE 2 06 (H) 06/13/2022    CREATININE 2 14 (H) 06/12/2022   · Baseline creatinine 1 4-1 7  · IVF given in acute care   · Monitor             Subjective: Happy to be back in [facility]  No questions at this time  Complaining of abdominal pain with Heparin shots  Will D/C at this time  Review of Systems   Constitutional: Negative  Negative for appetite change and fatigue  HENT: Negative  Negative for trouble swallowing  Eyes: Negative  Negative for visual disturbance  Respiratory: Positive for shortness of breath  Dyspnea with exertion   Cardiovascular: Negative  Negative for chest pain  Gastrointestinal: Positive for abdominal pain  Negative for constipation, diarrhea and nausea  From heparin shots   Endocrine: Negative  Genitourinary: Negative  Negative for difficulty urinating  Musculoskeletal: Positive for arthralgias and myalgias  Skin: Negative  Allergic/Immunologic: Negative  Neurological: Negative  Hematological: Negative  Psychiatric/Behavioral: Negative  Function:  PRIOR LEVEL OF FUNCTION:  He lives in a(n) single family home  Elías Agosto is single and lives alone  Self Care: Independent, Indoor Mobility: Independent, Stairs (in/outdoor): Independent and Cognition: Independent  Prior to patient's admission, patient was fully Independent with ADLs and IADLs, including working full-time and driving  FALLS IN THE LAST 6 MONTHS: one     HOME ENVIRONMENT:  The living area: Two level; Performs ADLs on one level; 3 Steps to enter with rails and full flight to 2nd floor, 1/2 bath on main level    The patient will not have 24 hour supervision available upon discharge  Patient's father lives locally and is supportive, however is unable to provide physical assistance  PREVIOUS DME:  Equipment in home (previous DME): [employer] and [employer]    Current level of function:  Physical Therapy: ambulation supervision, transfers supervision  Occupational Therapy: eating independent, grooming s/u supervision, UB bathing min assist, LB bathing mod assist, UB dressing supervision s/u, LB bathing min assist, toileting assistance min assist, bed mobility supervision, functional mobility supervision  Speech Therapy: n/a    Physical Exam:  There were no vitals taken for this visit  No intake or output data in the 24 hours ending 06/17/22 1532    There is no height or weight on file to calculate BMI  Physical Exam  Vitals reviewed  Constitutional:       General: He is not in acute distress  Appearance: Normal appearance  He is obese  He is not ill-appearing  HENT:      Head: Normocephalic and atraumatic  Right Ear: External ear normal       Nose: Nose normal       Mouth/Throat:      Mouth: Mucous membranes are moist       Pharynx: Oropharynx is clear  Eyes:      Extraocular Movements: Extraocular movements intact  Pupils: Pupils are equal, round, and reactive to light  Cardiovascular:      Rate and Rhythm: Normal rate and regular rhythm  Pulses: Normal pulses  Heart sounds: Normal heart sounds  No murmur heard  Pulmonary:      Effort: Pulmonary effort is normal  No respiratory distress  Breath sounds: Normal breath sounds  No wheezing  Abdominal:      General: Bowel sounds are normal  There is no distension  Palpations: Abdomen is soft  Tenderness: There is no abdominal tenderness  Musculoskeletal:         General: Normal range of motion  Cervical back: Normal range of motion  Right lower leg: Edema present  Left lower leg: Edema present  Comments: +!  To bilateral feet, non-piting    Skin:     General: Skin is warm  Neurological:      General: No focal deficit present  Mental Status: He is alert and oriented to person, place, and time  Psychiatric:         Mood and Affect: Mood normal             Labs, medications, and imaging personally reviewed      Laboratory:    Lab Results   Component Value Date    SODIUM 138 06/14/2022    K 3 2 (L) 06/14/2022     06/14/2022    CO2 26 06/14/2022    BUN 23 06/14/2022    CREATININE 2 04 (H) 06/14/2022    GLUC 112 06/14/2022    CALCIUM 9 0 06/14/2022     Lab Results   Component Value Date    WBC 8 22 06/14/2022    HGB 9 4 (L) 06/14/2022    HCT 31 0 (L) 06/14/2022    MCV 95 06/14/2022     06/14/2022     Lab Results   Component Value Date    INR 1 27 (H) 05/10/2022    INR 1 09 05/10/2022    INR 1 33 (H) 08/24/2019    PROTIME 15 4 (H) 05/10/2022    PROTIME 13 5 05/10/2022    PROTIME 16 6 (H) 08/24/2019         Current Facility-Administered Medications:     acetaminophen (TYLENOL) tablet 650 mg, 650 mg, Oral, Q6H PRN, Cosmo Signs, PA-C    [START ON 6/18/2022] allopurinol (ZYLOPRIM) tablet 100 mg, 100 mg, Oral, Daily, Cosmo Signs, PA-C    [START ON 6/18/2022] amLODIPine (NORVASC) tablet 10 mg, 10 mg, Oral, Daily, Cosmo Signs, PA-C    [START ON 6/18/2022] cholecalciferol (VITAMIN D3) tablet 400 Units, 400 Units, Oral, Daily, Cosmo Signs, PA-C    [START ON 6/18/2022] metoprolol succinate (TOPROL-XL) 24 hr tablet 25 mg, 25 mg, Oral, Daily, Judi Michaels PA-C    ondansetron (ZOFRAN-ODT) dispersible tablet 4 mg, 4 mg, Oral, Q6H PRN, Cosmo Signs, PA-C    [START ON 6/18/2022] potassium chloride (K-DUR,KLOR-CON) CR tablet 10 mEq, 10 mEq, Oral, Daily, Judi Michaels PA-C    senna (SENOKOT) tablet 17 2 mg, 2 tablet, Oral, Daily, Cosmo Albert PA-C    tamsulosin (FLOMAX) capsule 0 4 mg, 0 4 mg, Oral, After Dinner, Cosmo Albert PA-C    traMADol (ULTRAM) tablet 50 mg, 50 mg, Oral, Q6H PRN, Joyce Bean PA-C  No Known Allergies   Patient Active Problem List    Diagnosis Date Noted    Diarrhea 06/13/2022    Anemia 05/13/2022    Obstructive pyelonephritis 04/23/2022    Obstructive uropathy 04/21/2022    Mixed hyperlipidemia 06/02/2021    Morbid obesity 06/02/2021    Recurrent left knee pain 06/02/2021    Insomnia 09/16/2019    Essential hypertension 08/27/2019    Severe sepsis on admission 08/23/2019    Chronic kidney disease, stage 3 ([facility] [address] ) 08/23/2019    Acute kidney injury superimposed on CKD stage 3 08/06/2019    Bacteremia due to Escherichia coli 08/04/2019    Lactic acidosis 08/04/2019    History of gout 07/15/2019    Vitamin D deficiency 06/10/2015    Morbid obesity ([facility] [address] ) 03/23/2015     Past Medical History:   Diagnosis Date    Arthritis     Chronic kidney disease     Gout     Hyperlipidemia     Hypertension     Kidney stone     Obstructive pyelonephritis     Sepsis ([facility] [address] )      Past Surgical History:   Procedure Laterality Date    FL RETROGRADE PYELOGRAM  8/4/2019    FL RETROGRADE PYELOGRAM  9/26/2019    FL RETROGRADE PYELOGRAM  4/22/2022    IR NEPHROSTOMY TUBE PLACEMENT  5/10/2022    NO PAST SURGERIES  03/23/2015    Last assessed    AL CYSTO/URETERO W/LITHOTRIPSY &INDWELL STENT INSRT Right 9/26/2019    Procedure: CYSTOSCOPY URETEROSCOPY WITH RETROGRADE PYELOGRAM AND INSERTION STENT URETERAL;  Surgeon: Yon Almanzar MD;  Location: [facility];  Service: Urology    AL CYSTO/URETERO W/LITHOTRIPSY &INDWELL STENT INSRT Left 6/9/2022    Procedure: CYSTOSCOPY LEFT URETEROSCOPY/LITHOTRIPSY HOLMIUM LASER,  AND EXCHANGE URETERAL STENT URETERAL, REMOVAL NEPHROSTOMY TUBE;  Surgeon: Astrid Shaw MD;  Location:  MAIN OR;  Service: Urology    AL CYSTOURETHROSCOPY,URETER CATHETER Right 8/4/2019    Procedure: CYSTOSCOPY RETROGRADE PYELOGRAM WITH INSERTION STENT URETERAL;  Surgeon: Omkar Varela MD;  [facility];  Service: Urology    AL CYSTOURETHROSCOPY,URETER CATHETER Left 4/22/2022    Procedure: CYSTOSCOPY RETROGRADE PYELOGRAM WITH INSERTION STENT URETERAL;  Surgeon: Mary Clayton MD;  [facility];  Service: Urology     Social History     Socioeconomic History    Marital status: Single     Spouse name: Not on file    Number of children: Not on file    Years of education: Not on file    Highest education level: Not on file   Occupational History    Not on file   Tobacco Use    Smoking status: Never Smoker    Smokeless tobacco: Never Used   Vaping Use    Vaping Use: Never used   Substance and Sexual Activity    Alcohol use: Not Currently     Alcohol/week: 0 0 standard drinks     Comment: rarely    Drug use: No    Sexual activity: Not on file   Other Topics Concern    Not on file   Social History Narrative    Caffeine use      Social Determinants of Health     Financial Resource Strain: Not on file   Food Insecurity: No Food Insecurity    Worried About Running Out of Food in the Last Year: Never true    Isis of Food in the Last Year: Never true   Transportation Needs: No Transportation Needs    Lack of Transportation (Medical): No    Lack of Transportation (Non-Medical): No   Physical Activity: Not on file   Stress: Not on file   Social Connections: Not on file   Intimate Partner Violence: Not on file   Housing Stability: Low Risk     Unable to Pay for Housing in the Last Year: No    Number of Places Lived in the Last Year: 1    Unstable Housing in the Last Year: No     Social History     Tobacco Use   Smoking Status Never Smoker   Smokeless Tobacco Never Used     Social History     Substance and Sexual Activity   Alcohol Use Not Currently    Alcohol/week: 0 0 standard drinks    Comment: rarely     Family History   Problem Relation Age of Onset    Cancer Mother          Medical Necessity Criteria for [facility] Admission: Chronic Kidney Disease, Hypertension and Bowel/Bladder Management   In addition, the preadmission screen, post-admission physical evaluation, overall plan of care and admissions order demonstrate a reasonable expectation that the following criteria were met at the time of admission to the [facility]  1  The patient requires active and ongoing therapeutic intervention of multiple therapy disciplines (physical therapy, occupational therapy, speech-language pathology, or prosthetics/orthotics), one of which is physical or occupational therapy  2  Patient requires an intensive rehabilitation therapy program, as defined in Chapter 1, section 110 2 2 of the CMS Medicare Policy Manual  This intensive rehabilitation therapy program will consist of at least 3 hours of therapy per day at least 5 days per week or at least 15 hours of intensive rehabilitation therapy within a 7 consecutive day period, beginning with the date of admission to the [facility]  3  The patient is reasonably expected to actively participate in, and benefit significantly from, the intensive rehabilitation therapy program as defined in Chapter 1, section 110 2 2 of the CMS Medicare Policy Manual at this time of admission to the [facility]  He can reasonably be expected to make measurable improvement (that will be of practical value to improve the patients functional capacity or adaptation to impairments) as a result of the rehabilitation treatment, as defined in section 110 3, and such improvement can be expected to be made within the prescribed period of time  As noted in the CMS Medicare Policy Manual, the patient need not be expected to achieve complete independence in the domain of self-care nor be expected to return to his or her prior level of functioning in order to meet this standard  4  The patient must require physician supervision by a rehabilitation physician   As such, a rehabilitation physician will conduct face-to-face visits with the patient at least 3 days per week throughout the patients stay in the [facility] to assess the patient both medically and functionally, as well as to modify the course of treatment as needed to maximize the patients capacity to benefit from the rehabilitation process  5  The patient requires an intensive and coordinated interdisciplinary approach to providing rehabilitation, as defined in Chapter 1, section 110 2 5 of the CMS Medicare Policy Manual  This will be achieved through periodic team conferences, conducted at least once in a 7-day period, and comprising of an interdisciplinary team of medical professionals consisting of: a rehabilitation physician, registered nurse,  and/or , and a licensed/certified therapist from each therapy discipline involved in treating the patient  Changes Since Pre-admission Assessment: None -This patient's participation in rehab continues to be reasonable, necessary and appropriate  CMS Required Post-Admission Physician Evaluation Elements  History and Physical, including medical history, functional history and active comorbidities as in above text  Post-Admission Physician Evaluation:  The patient has the potential to make improvement and is in need of physical, occupational, and/or therapy services  The patient may also need nutritional services  Given the patient's complex medical condition and risk of further medical complications, rehabilitative services cannot be safely provided at a lower level of care, such as a skilled nursing facility  I have reviewed the patient's functional and medical status at the time of the preadmission screening and they are the same as on the day of this admission  I acknowledge that I have personally performed a full physical examination on this patient within 24 hours of admission  The patient and/or family demonstrated understanding the rehabilitation program and the discharge process after we discussed them       Agree in entirety: yes  Minor adaptions: none    Major changes: none    Ruthie Mcintosh PA-C    ** Please Note: Fluency Direct voice to text software may have been used in the creation of this document  **      Total time spent:  30 minutes with more than 50% spent counseling/coordinating care  Counseling includes extended discussion with patient (+/- family/relevant historian)  re: history, symptoms, medications, functional issues, mood, medical and rehabilitation management plan, and disposition  Additional time spent with thorough chart review in EMR, reviewing recent medications, labs, imaging, and management plan  This was followed by formulating a comprehensive inpatient medical/rehabilitation management plan, and coordinating with pertinent specialists as indicated

## 2022-06-17 NOTE — ASSESSMENT & PLAN NOTE
· Leading to Sepsis suspected to be caused by UTI with left nephrostomy tube and left ureteral stent  · Received IVF and Cefepime   · S/p OR with Urology for cystoscopy ureteroscopy and ureteral stent insertion with string removal of left sided nephrostomy tube   · Urine cultures with MDRO Citrobacter, ID consulted  · PO cipro completed 6/16

## 2022-06-17 NOTE — ASSESSMENT & PLAN NOTE
· s/p cystoscopy left ureteroscopy/lithotripsy and exchange ureteral stent, removal nephrostomy tube left on 6/9  · stent removal on 6/13  · OP follow up with Urology in 6 weeks with renal US  · Flomax 0 4 mg daily

## 2022-06-17 NOTE — PLAN OF CARE
Problem: PAIN - ADULT  Goal: Verbalizes/displays adequate comfort level or baseline comfort level  Description: Interventions:  - Encourage patient to monitor pain and request assistance  - Assess pain using appropriate pain scale  - Administer analgesics based on type and severity of pain and evaluate response  - Implement non-pharmacological measures as appropriate and evaluate response  - Consider cultural and social influences on pain and pain management  - Notify physician/advanced practitioner if interventions unsuccessful or patient reports new pain  Outcome: Progressing     Problem: INFECTION - ADULT  Goal: Absence or prevention of progression during hospitalization  Description: INTERVENTIONS:  - Assess and monitor for signs and symptoms of infection  - Monitor lab/diagnostic results  - Monitor all insertion sites, i e  indwelling lines, tubes, and drains  - Monitor endotracheal if appropriate and nasal secretions for changes in amount and color  - [place] appropriate cooling/warming therapies per order  - Administer medications as ordered  - Instruct and encourage patient and family to use good hand hygiene technique  - Identify and instruct in appropriate isolation precautions for identified infection/condition  Outcome: Progressing     Problem: Knowledge Deficit  Goal: Patient/family/caregiver demonstrates understanding of disease process, treatment plan, medications, and discharge instructions  Description: Complete learning assessment and assess knowledge base    Interventions:  - Provide teaching at level of understanding  - Provide teaching via preferred learning methods  Outcome: Progressing

## 2022-06-17 NOTE — OCCUPATIONAL THERAPY NOTE
Occupational Therapy Progress Note     Patient Name: Remington Estes  Today's Date: 6/17/2022  Problem List  Principal Problem:    Sepsis [facility])  Active Problems:    Chronic kidney disease, stage 3 ([address] )    Essential hypertension    Morbid obesity    Obstructive uropathy    COVID    Diarrhea            06/17/22 0856   OT Last Visit   OT Visit Date 06/17/22   Note Type   Note Type Treatment   Restrictions/Precautions   Weight Bearing Precautions Per Order No   Other Precautions Pain; Fall Risk   Lifestyle   Autonomy PTA, pt reports being I with ADLs, IADLs, fnxl mobility, (+)    Reciprocal Relationships Pt reports local father but limited support available, unable to provide physical assistance   Service to Others FTE - works at a [employer] center moving [employer]   [address] 4-wheeling   Pain Assessment   Pain Assessment Tool 0-10   Pain Score 5   Pain Location/Orientation Orientation: Right;Location: Shoulder   ADL   Where Assessed Edge of bed   Eating Assistance 7  Independent   Grooming Assistance 5  Supervision/Setup   Grooming Deficit Setup;Supervision/safety; Wash/dry face   UB Bathing Assistance 4  Minimal Assistance   UB Bathing Deficit Setup;Supervision/safety; Increased time to complete; Other (Comment)  (B/L armpits)   LB Bathing Assistance 3  Moderate Assistance   LB Bathing Deficit Setup; Buttocks; Increased time to complete;Supervision/safety;Right upper leg;Left upper leg   UB Dressing Assistance 5  Supervision/Setup   UB Dressing Deficit Setup;Verbal cueing  (VCs for reminder of compensatory technique)   LB Dressing Assistance 4  [address]  4  Minimal Assistance   Toileting Deficit Setup; Increased time to complete;Supervison/safety;Grab bar use  (STD toilet)   Bed Mobility   Supine to Sit 5  Supervision   Additional items Bedrails; Increased time required   Sit to Supine 5  Supervision   Additional items Bedrails; Increased time required;Verbal cues Additional Comments Pt greeted supine in bed and left supine in bed at end of session  ALl needs met, and call bell nearby  Transfers   Sit to Stand 5  Supervision   Additional items Increased time required   Stand to Sit 5  Supervision   Additional items Increased time required   Toilet transfer 4  Minimal assistance   Additional items Assist x 1; Increased time required;Standard toilet; Other  (GB)   Additional Comments with and without RW   Functional Mobility   Functional Mobility 5  Supervision   Additional Comments S within room distances with RW- Pt able to complete few steps of functional mobility with no AD and CGA in bathroom  Additional items Rolling walker   Therapeutic Exercise - ROM   UE-ROM Yes   ROM- Right Upper Extremities   R Shoulder AAROM; Horizontal ABduction; Flexion   R Position Seated   R Weight/Reps/Sets 2x5   RUE ROM Comment Pt completes HEP seated on EOB for increasing AROM of RUE in order to maximize independence with UB dressing  ROM - Left Upper Extremities    L Shoulder AROM; Flexion   L Position Seated   L Weight/Reps/Sets 1x5   Cognition   Overall Cognitive Status WFL   Arousal/Participation Alert   Attention Within functional limits   Orientation Level Oriented X4   Memory Within functional limits   Following Commands Follows all commands and directions without difficulty   Comments Pt very pleasant and cooperative during OT session  Pt with decreased safety awareness at times requiring VCs to correct  Activity Tolerance   Activity Tolerance Patient limited by fatigue   Medical Staff Made Aware RN cleared/updated  Assessment   Assessment Pt greeted bedside for OT treatment on 6/17/2022 focusing on maximizing independence with ADLs  Pt completes toileting in bathroom with min A and seated/standing ADLs within room  Pt S with grooming, min A with UB bathing/dressing and mod A with LB bathing  Pt with decreased endurance requiring seated rest breaks throughout session   Pt completed HEP seated on EOB in order to increase independence with UB ADLs  Limitations that impact functional performance include decreased ADL status, decreased UE ROM, decreased UE strength, decreased safe judgement during ADLs, decreased cognition, decreased endurance, decreased self care transfers, decreased high level ADLs and pain  Occupational performance areas to address ADL retraining, functional transfer training, UE strengthening/ROM, endurance training, cognitive reorientation, Pt/caregiver education, equipment evaluation/education, compensatory technique education, energy conservation and activity engagement   Pt would benefit from continued skilled OT services while in hospital to maximize independence with ADLs  Will continue to follow Pt's goals and progress  Pt would benefit from post acute rehabilitation services upon DC to maximize safety and independence with ADLs and functional tasks of choice  Plan   Treatment Interventions ADL retraining;Functional transfer training;UE strengthening/ROM; Endurance training;Cognitive reorientation;Patient/family training;Equipment evaluation/education; Compensatory technique education; Energy conservation; Activityengagement   Goal Expiration Date 06/22/22   OT Treatment Day 2   OT Frequency 3-5x/wk   Recommendation   OT Discharge Recommendation Post acute rehabilitation services   OT - OK to Discharge Yes   Additional Comments  The patient's raw score on the AM-PAC Daily Activity inpatient short form is 17, standardized score is 37 26, less than 39 4  Patients at this level are likely to benefit from discharge to post-acute rehabilitation services  Please refer to the recommendation of the Occupational Therapist for safe discharge planning     AM-PAC Daily Activity Inpatient   Lower Body Dressing 2   Bathing 2   Toileting 3   Upper Body Dressing 3   Grooming 3   Eating 4   Daily Activity Raw Score 17   Daily Activity Standardized Score (Calc for Raw Score >=11) 37 26   AM-PAC Applied Cognition Inpatient   Following a Speech/Presentation 4   Understanding Ordinary Conversation 4   Taking Medications 4   Remembering Where Things Are Placed or Put Away 4   Remembering List of 4-5 Errands 4   Taking Care of Complicated Tasks 3   Applied Cognition Raw Score 23   Applied Cognition Standardized Score 53 08       Eliza Cash MS, OTR/L

## 2022-06-17 NOTE — ASSESSMENT & PLAN NOTE
Lab Results   Component Value Date    EGFR 35 06/14/2022    EGFR 35 06/13/2022    EGFR 33 06/12/2022    CREATININE 2 04 (H) 06/14/2022    CREATININE 2 06 (H) 06/13/2022    CREATININE 2 14 (H) 06/12/2022   Estimated Creatinine Clearance: 55 7 mL/min (A) (by C-G formula based on SCr of 2 04 mg/dL (H))    Baseline creatinine seems between 1 4 -1 7  Creatinine staying near 2 0-2 1  renal US for stent location on 6/12 - stable  Check PVR - low

## 2022-06-17 NOTE — NURSING NOTE
Admitted from [facility] w dx sepsis  AAO x 4  Contact precautions for MDRO  Hx recent nephrostomy tube, left nephrolethiasis,stent placement in kidneys then removal, cystostomy,  States of urinary frequency, occ difficulty starting the flow of urine  Right arm weakness with limited mobility and left leg weakness with painful left knee  Tylenol given per request for above,  See pain reassessment  Oriented to room and surroundings and upcoming therapy  Transferred supervision/RW chair to bed and ambulated RW/CGA to supervision to bathroom  Voided qs  Needed help with wiping after BM  Callbell within reach  Safety maintained

## 2022-06-17 NOTE — DISCHARGE SUMMARY
[address] [facility]  Discharge- Margene Root 1965, 55 y o  male MRN: [id number]  [facility] Encounter: [id number]  Primary Care Provider: Marcelina Iverson   Date and time admitted to hospital: 6/7/2022  3:45 PM    * Sepsis (HCC)-resolved as of 6/17/2022  Assessment & Plan  Present at outside campus   Suspect source of UTI  Noted left nephrostomy tube and left ureteral stent  Noted multiple E coli UTI and also previous E coli bacteremia  Also noted covid positive status as of 5/31>> no supplemental oxygen requirement  Leukocytosis noted  Received IV fluids and cefepime at outside [facility] and transferred for Urology evaluation  S/p OR with Urology for cystoscopy ureteroscopy holmium laser lithotripsy basket extraction and ureteral stent insertion with a string removal of left-sided nephrostomy tube  Urine cultures growing MRDO citrobacter, ID consulted -> placed on PO Cipro & continued thru 6/16 for 7 days  6/12: renal US unremarkable  Now better      Obstructive uropathy  Assessment & Plan  S/p CYSTOSCOPY LEFT URETEROSCOPY/LITHOTRIPSY HOLMIUM LASER,  AND EXCHANGE URETERAL STENT URETERAL, REMOVAL NEPHROSTOMY TUBE (Left) on 6/9/22  OP f/u with urology  Stent removed 6/13      Diarrhea  Assessment & Plan  · Noted 6/13 - now resolved  · Not on stool softners    Morbid obesity  Assessment & Plan  Lifestyle modification    Essential hypertension  Assessment & Plan  Will continue current meds       Chronic kidney disease, stage 3 [facility])  Assessment & Plan  Lab Results   Component Value Date    EGFR 35 06/14/2022    EGFR 35 06/13/2022    EGFR 33 06/12/2022    CREATININE 2 04 (H) 06/14/2022    CREATININE 2 06 (H) 06/13/2022    CREATININE 2 14 (H) 06/12/2022   Estimated Creatinine Clearance: 55 7 mL/min (A) (by C-G formula based on SCr of 2 04 mg/dL (H))    Baseline creatinine seems between 1 4 -1 7  Creatinine staying near 2 0-2 1  renal US for stent location on 6/12 - stable  Check PVR - low        COVID-resolved as of 6/17/2022  Assessment & Plan  Not requiring supplemental oxygen  Noted mild shortness of breath after fever last night  Tested positive on 05/31, last day of isolation 6/9 - as  Per prior documentation  Medical Problems             Resolved Problems  Date Reviewed: 6/17/2022          Resolved    COVID 6/17/2022     Resolved by  Meenakshi Murphy DO    * (Principal) Sepsis ([address] ) 6/17/2022     Resolved by  Meenakshi Murphy DO              Discharging Physician / Practitioner: Meenakshi Murphy DO  PCP: [facility] [address], [address]  Admission Date:   Admission Orders (From admission, onward)     Ordered        06/07/22 1547  Inpatient Admission  Once                      Discharge Date: 06/17/22    Consultations During Hospital Stay:  · Urology  · ID    Procedures Performed:   S/p CYSTOSCOPY LEFT URETEROSCOPY/LITHOTRIPSY HOLMIUM LASER,  AND EXCHANGE URETERAL STENT URETERAL, REMOVAL NEPHROSTOMY TUBE (Left) on 6/9/22    Significant Findings / Test Results:   · See above    Incidental Findings:   · none     Test Results Pending at Discharge (will require follow up):   · none     Outpatient Tests Requested:  · Per [facility]    Complications:  none    Reason for Admission: sepsis    Hospital Course:   Roz Garcia is a 55 y o  male patient who originally presented to the hospital on 6/7/2022 due to sepsis 2/2 pyelo  Txfr from [facility]  Nephrostomy removed and stent placed  Stent removed 6/13  Tx w/ 1 week abx post cysto  D/c tbakc to [address] [place] [address]  Please see above list of diagnoses and related plan for additional information       Condition at Discharge: stable    Discharge Day Visit / Exam:   Subjective:  No acute complaints  Vitals: Blood Pressure: 126/73 (06/17/22 0740)  Pulse: 100 (06/17/22 0740)  Temperature: 97 8 °F (36 6 °C) (06/17/22 0740)  Temp Source: Oral (06/12/22 2110)  Respirations: 20 (06/17/22 0740)  Height: 5' 6" (167 6 cm) (06/07/22 1621)  Weight - Scale: (!) 145 kg (319 lb 10 7 oz) (06/07/22 1621)  SpO2: 97 % (06/17/22 0740)  Exam:   Physical Exam  HENT:      Head: Normocephalic and atraumatic  Nose: Nose normal       Mouth/Throat:      Mouth: Mucous membranes are moist    Eyes:      Extraocular Movements: Extraocular movements intact  Conjunctiva/sclera: Conjunctivae normal    Cardiovascular:      Rate and Rhythm: Normal rate and regular rhythm  Pulmonary:      Effort: Pulmonary effort is normal       Breath sounds: Normal breath sounds  No wheezing or rales  Abdominal:      General: Bowel sounds are normal  There is no distension  Palpations: Abdomen is soft  Tenderness: There is no abdominal tenderness  Musculoskeletal:         General: Normal range of motion  Cervical back: Normal range of motion and neck supple  Right lower leg: No edema  Left lower leg: No edema  Skin:     General: Skin is warm and dry  Neurological:      Mental Status: He is alert and oriented to person, place, and time  Discussion with Family: Patient declined call to   Discharge instructions/Information to patient and family:   See after visit summary for information provided to patient and family  Provisions for Follow-Up Care:  See after visit summary for information related to follow-up care and any pertinent home health orders  Disposition:   Acute Rehab at [facility]    Planned Readmission: no     Discharge Statement:  I spent 33 minutes discharging the patient  This time was spent on the day of discharge  I had direct contact with the patient on the day of discharge  Greater than 50% of the total time was spent examining patient, answering all patient questions, arranging and discussing plan of care with patient as well as directly providing post-discharge instructions  Additional time then spent on discharge activities      Discharge Medications:  See after visit summary for reconciled discharge medications provided to patient and/or family        **Please Note: This note may have been constructed using a voice recognition system**

## 2022-06-18 LAB
ALBUMIN SERPL BCP-MCNC: 2.9 G/DL (ref 3.5–5)
ALP SERPL-CCNC: 34 U/L (ref 34–104)
ALT SERPL W P-5'-P-CCNC: 5 U/L (ref 7–52)
ANION GAP SERPL CALCULATED.3IONS-SCNC: 10 MMOL/L (ref 4–13)
AST SERPL W P-5'-P-CCNC: 9 U/L (ref 13–39)
BASOPHILS # BLD AUTO: 0.04 THOUSANDS/ΜL (ref 0–0.1)
BASOPHILS NFR BLD AUTO: 0 % (ref 0–1)
BILIRUB SERPL-MCNC: 0.37 MG/DL (ref 0.2–1)
BUN SERPL-MCNC: 20 MG/DL (ref 5–25)
CALCIUM ALBUM COR SERPL-MCNC: 9.7 MG/DL (ref 8.3–10.1)
CALCIUM SERPL-MCNC: 8.8 MG/DL (ref 8.4–10.2)
CHLORIDE SERPL-SCNC: 104 MMOL/L (ref 96–108)
CO2 SERPL-SCNC: 21 MMOL/L (ref 21–32)
CREAT SERPL-MCNC: 2.5 MG/DL (ref 0.6–1.3)
EOSINOPHIL # BLD AUTO: 0.71 THOUSAND/ΜL (ref 0–0.61)
EOSINOPHIL NFR BLD AUTO: 8 % (ref 0–6)
ERYTHROCYTE [DISTWIDTH] IN BLOOD BY AUTOMATED COUNT: 16 % (ref 11.6–15.1)
GFR SERPL CREATININE-BSD FRML MDRD: 27 ML/MIN/1.73SQ M
GLUCOSE SERPL-MCNC: 96 MG/DL (ref 65–140)
HCT VFR BLD AUTO: 28 % (ref 36.5–49.3)
HGB BLD-MCNC: 8.3 G/DL (ref 12–17)
IMM GRANULOCYTES # BLD AUTO: 0.16 THOUSAND/UL (ref 0–0.2)
IMM GRANULOCYTES NFR BLD AUTO: 2 % (ref 0–2)
LYMPHOCYTES # BLD AUTO: 2.55 THOUSANDS/ΜL (ref 0.6–4.47)
LYMPHOCYTES NFR BLD AUTO: 27 % (ref 14–44)
MCH RBC QN AUTO: 28.6 PG (ref 26.8–34.3)
MCHC RBC AUTO-ENTMCNC: 29.6 G/DL (ref 31.4–37.4)
MCV RBC AUTO: 97 FL (ref 82–98)
MONOCYTES # BLD AUTO: 0.73 THOUSAND/ΜL (ref 0.17–1.22)
MONOCYTES NFR BLD AUTO: 8 % (ref 4–12)
NEUTROPHILS # BLD AUTO: 5.29 THOUSANDS/ΜL (ref 1.85–7.62)
NEUTS SEG NFR BLD AUTO: 55 % (ref 43–75)
NRBC BLD AUTO-RTO: 0 /100 WBCS
PLATELET # BLD AUTO: 360 THOUSANDS/UL (ref 149–390)
PMV BLD AUTO: 9.6 FL (ref 8.9–12.7)
POTASSIUM SERPL-SCNC: 3.3 MMOL/L (ref 3.5–5.3)
PROT SERPL-MCNC: 7.3 G/DL (ref 6.4–8.4)
RBC # BLD AUTO: 2.9 MILLION/UL (ref 3.88–5.62)
SODIUM SERPL-SCNC: 135 MMOL/L (ref 135–147)
WBC # BLD AUTO: 9.48 THOUSAND/UL (ref 4.31–10.16)

## 2022-06-18 PROCEDURE — 80053 COMPREHEN METABOLIC PANEL: CPT | Performed by: PHYSICAL MEDICINE & REHABILITATION

## 2022-06-18 PROCEDURE — 97535 SELF CARE MNGMENT TRAINING: CPT

## 2022-06-18 PROCEDURE — 97166 OT EVAL MOD COMPLEX 45 MIN: CPT

## 2022-06-18 PROCEDURE — 97530 THERAPEUTIC ACTIVITIES: CPT

## 2022-06-18 PROCEDURE — 85025 COMPLETE CBC W/AUTO DIFF WBC: CPT | Performed by: PHYSICAL MEDICINE & REHABILITATION

## 2022-06-18 RX ORDER — SENNOSIDES 8.6 MG
2 TABLET ORAL
Status: DISCONTINUED | OUTPATIENT
Start: 2022-06-18 | End: 2022-06-30 | Stop reason: HOSPADM

## 2022-06-18 RX ADMIN — ALLOPURINOL 100 MG: 100 TABLET ORAL at 08:38

## 2022-06-18 RX ADMIN — ACETAMINOPHEN 650 MG: 325 TABLET ORAL at 10:55

## 2022-06-18 RX ADMIN — CHOLECALCIFEROL TAB 10 MCG (400 UNIT) 400 UNITS: 10 TAB at 08:38

## 2022-06-18 RX ADMIN — POTASSIUM CHLORIDE 10 MEQ: 750 TABLET, EXTENDED RELEASE ORAL at 08:39

## 2022-06-18 RX ADMIN — AMLODIPINE BESYLATE 10 MG: 10 TABLET ORAL at 08:38

## 2022-06-18 RX ADMIN — TRAMADOL HYDROCHLORIDE 50 MG: 50 TABLET, COATED ORAL at 20:57

## 2022-06-18 RX ADMIN — METOPROLOL SUCCINATE 25 MG: 25 TABLET, FILM COATED, EXTENDED RELEASE ORAL at 08:38

## 2022-06-18 RX ADMIN — TRAMADOL HYDROCHLORIDE 50 MG: 50 TABLET, COATED ORAL at 08:39

## 2022-06-18 RX ADMIN — TAMSULOSIN HYDROCHLORIDE 0.4 MG: 0.4 CAPSULE ORAL at 17:43

## 2022-06-18 RX ADMIN — ACETAMINOPHEN 650 MG: 325 TABLET ORAL at 17:43

## 2022-06-18 NOTE — CONSULTS
[address] 1965, 55 y o  male MRN: [id number]  Unit/Bed#: [facility] 216-01 Encounter: [id number]  Primary Care Provider: RITA Severino   Date and time admitted to hospital: 6/17/2022  3:25 PM    Inpatient consult to Internal Medicine  Consult performed by: Cynthia Moreno PA-C  Consult ordered by: Loulou Hall PA-C        * Obstructive uropathy  Assessment & Plan  · Recently discharged from [address] [place] [address]   · Status post cystoscopy left ureteroscopy/lithotripsy and ureteral stent exchange, with subsequent removal of left nephrostomy tube on 06/09/2022  · Status post stent removal on 06/13  · Outpatient Urology follow-up   · Medically stable for participation in inpatient rehab    Morbid obesity  Assessment & Plan  · Encouraged aggressive lifestyle modification with diet and exercise     Essential hypertension  Assessment & Plan  · Blood pressure reviewed  · Continue current regimen of metoprolol succinate 25 mg daily, amlodipine 10 mg daily  · Due to tachycardia, toprol increased to 50 mg daily   · Monitor blood pressure     Stage 3b chronic kidney disease [facility])  Assessment & Plan  Lab Results   Component Value Date    EGFR 27 06/18/2022    EGFR 35 06/14/2022    EGFR 35 06/13/2022    CREATININE 2 50 (H) 06/18/2022    CREATININE 2 04 (H) 06/14/2022    CREATININE 2 06 (H) 06/13/2022     · Patient with recent ADRIANNE secondary to obstructive uropathy/UTI  · Baseline creatinine between 1 4-1 7 mg/dL   · Creatinine had previously been stable around 2 0-2 1 mg/dL   · Creatinine appears to be worsening to 2 5 mg/dL   · Renal ultrasound on 06/12- stable  · Check PVR, placed on urinary retention protocol  · Nephrology consulted, pending formal evaluation  · Avoid hypotension and nephrotoxins  · Repeat BMP in the a m  VTE Prophylaxis:   Heparin    Collaboration of Care:  Were Recommendations Directly Discussed with Primary Treatment Team? Yes    History of Present Illness:  Justa Montano is a 55 y o  male with a past medical history significant for morbid obesity, hypertension, CKD, recent COVID-19 infection r and resolved as of 06/17/2022, and recent cystoscopy with left ureteroscopy/lithotripsy who is originally admitted to the acute rehab service for inpatient physical therapy  We are consulted for medical clearance  Patient was recently discharged from [facility] for sepsis secondary to pyelonephritis  Patient underwent cystoscopy with left ureteroscopy/lithotripsy, and ureteral stent exchange, with removal of left nephrostomy tube, and subsequent removal of stent prior to discharge  Patient suffered an ADRIANNE during admission, and creatinine had initially improved and was stable around 2 0-2 1 mg/dL  However, on a m  Labs at acute rehab, creatinine noted to be increasing  Nephrology has been consulted and ultrasound kidney and bladder has been ordered  Review of Systems:  Review of Systems   Constitutional: Negative for chills, fatigue and fever  HENT: Negative for congestion, ear pain, postnasal drip and sore throat  Eyes: Negative for discharge, itching and visual disturbance  Respiratory: Negative for cough, chest tightness and shortness of breath  Cardiovascular: Negative for chest pain, palpitations and leg swelling  Gastrointestinal: Negative for abdominal pain, nausea and vomiting  Endocrine: Negative for cold intolerance and heat intolerance  Genitourinary: Negative for difficulty urinating, dysuria and hematuria  Musculoskeletal: Negative for back pain, gait problem and neck pain  Skin: Negative for rash and wound  Neurological: Negative for dizziness, speech difficulty, weakness, light-headedness and headaches  Psychiatric/Behavioral: Negative for confusion, decreased concentration and dysphoric mood         Past Medical and Surgical History:   Past Medical History:   Diagnosis Date    Arthritis     Chronic kidney disease     Gout     Hyperlipidemia     Hypertension     Kidney stone     Obstructive pyelonephritis     Sepsis [facility])        Past Surgical History:   Procedure Laterality Date    FL RETROGRADE PYELOGRAM  8/4/2019    FL RETROGRADE PYELOGRAM  9/26/2019    FL RETROGRADE PYELOGRAM  4/22/2022    IR NEPHROSTOMY TUBE PLACEMENT  5/10/2022    NO PAST SURGERIES  03/23/2015    Last assessed    NM CYSTO/URETERO W/LITHOTRIPSY &INDWELL STENT INSRT Right 9/26/2019    Procedure: CYSTOSCOPY URETEROSCOPY WITH RETROGRADE PYELOGRAM AND INSERTION STENT URETERAL;  Surgeon: Juancarlos Menchaca MD;  Location: [facility];  Service: Urology    NM CYSTO/URETERO W/LITHOTRIPSY &INDWELL STENT INSRT Left 6/9/2022    Procedure: CYSTOSCOPY LEFT URETEROSCOPY/LITHOTRIPSY HOLMIUM LASER,  AND EXCHANGE URETERAL STENT URETERAL, REMOVAL NEPHROSTOMY TUBE;  Surgeon: Cory Goetz MD;  [facility];  Service: Urology    NM CYSTOURETHROSCOPY,URETER CATHETER Right 8/4/2019    Procedure: CYSTOSCOPY RETROGRADE PYELOGRAM WITH INSERTION STENT URETERAL;  Surgeon: Damion Loja MD;  [facility];  Service: Urology    NM CYSTOURETHROSCOPY,URETER CATHETER Left 4/22/2022    Procedure: CYSTOSCOPY RETROGRADE PYELOGRAM WITH INSERTION STENT URETERAL;  Surgeon: Lissa Vidal MD;  [facility];  Service: Urology       Meds/Allergies:  all medications and allergies reviewed    Allergies: No Known Allergies    Social History:  Marital Status: Single  Substance Use History:   Social History     Substance and Sexual Activity   Alcohol Use Not Currently    Alcohol/week: 0 0 standard drinks    Comment: rarely     Social History     Tobacco Use   Smoking Status Never Smoker   Smokeless Tobacco Never Used     Social History     Substance and Sexual Activity   Drug Use No       Family History:  Family History   Problem Relation Age of Onset    Cancer Mother     Heart disease Father     No Known Problems Sister        Physical Exam:   Vitals:   Blood Pressure: 145/79 (06/19/22 0753)  Pulse: (!) 128 (06/19/22 0753)  Temperature: 99 3 °F (37 4 °C) (06/19/22 0753)  Temp Source: Temporal (06/19/22 0753)  Respirations: 18 (06/19/22 0753)  Height: 5' 6" (167 6 cm) (06/17/22 1533)  Weight - Scale: (!) 146 kg (321 lb 10 4 oz) (06/17/22 1533)  SpO2: 98 % (06/19/22 0753)    Physical Exam  Vitals and nursing note reviewed  Constitutional:       General: He is not in acute distress  Appearance: Normal appearance  He is obese  HENT:      Head: Normocephalic and atraumatic  Right Ear: External ear normal       Left Ear: External ear normal       Nose: Nose normal       Mouth/Throat:      Mouth: Mucous membranes are moist       Pharynx: Oropharynx is clear  Eyes:      General:         Right eye: No discharge  Left eye: No discharge  Extraocular Movements: Extraocular movements intact  Pupils: Pupils are equal, round, and reactive to light  Cardiovascular:      Rate and Rhythm: Regular rhythm  Tachycardia present  Pulses: Normal pulses  Heart sounds: Normal heart sounds  No murmur heard  Pulmonary:      Effort: Pulmonary effort is normal  No respiratory distress  Breath sounds: Normal breath sounds  No wheezing or rales  Abdominal:      General: Bowel sounds are normal  There is no distension  Palpations: Abdomen is soft  There is no mass  Tenderness: There is no abdominal tenderness  Musculoskeletal:         General: No swelling, tenderness or deformity  Normal range of motion  Cervical back: Normal range of motion and neck supple  No rigidity  Skin:     General: Skin is warm and dry  Capillary Refill: Capillary refill takes less than 2 seconds  Coloration: Skin is not pale  Findings: No erythema  Neurological:      General: No focal deficit present  Mental Status: He is alert and oriented to person, place, and time   Mental status is at baseline  Psychiatric:         Mood and Affect: Mood normal          Behavior: Behavior normal          Thought Content: Thought content normal          Judgment: Judgment normal          Additional Data:   Lab Results:    Results from last 7 days   Lab Units 06/18/22  0538 06/13/22  0430 06/12/22  1006   WBC Thousand/uL 9 48   < > 11 81*   HEMOGLOBIN g/dL 8 3*   < > 9 2*   HEMATOCRIT % 28 0*   < > 30 1*   PLATELETS Thousands/uL 360   < > 343   BANDS PCT %  --   --  2   NEUTROS PCT % 55  --   --    LYMPHS PCT % 27  --   --    LYMPHO PCT %  --   --  15   MONOS PCT % 8  --   --    MONO PCT %  --   --  6   EOS PCT % 8*  --  3    < > = values in this interval not displayed  Results from last 7 days   Lab Units 06/18/22  0538   SODIUM mmol/L 135   POTASSIUM mmol/L 3 3*   CHLORIDE mmol/L 104   CO2 mmol/L 21   BUN mg/dL 20   CREATININE mg/dL 2 50*   ANION GAP mmol/L 10   CALCIUM mg/dL 8 8   ALBUMIN g/dL 2 9*   TOTAL BILIRUBIN mg/dL 0 37   ALK PHOS U/L 34   ALT U/L 5*   AST U/L 9*   GLUCOSE RANDOM mg/dL 96             Lab Results   Component Value Date/Time    HGBA1C 5 4 03/24/2015 09:53 AM         Results from last 7 days   Lab Units 06/13/22  0430   PROCALCITONIN ng/ml 0 28*       Imaging: Reviewed radiology reports from this admission including: CT abdomen/pelvis, chest x-ray, abdominal x-ray, ultrasound kidney and bladder  No orders to display       ** Please Note: This note may have been constructed using a voice recognition system   **

## 2022-06-18 NOTE — ASSESSMENT & PLAN NOTE
· Blood pressure reviewed  · Continue current regimen of metoprolol succinate 25 mg daily, amlodipine 10 mg daily  · Due to tachycardia, toprol increased to 50 mg daily   · Monitor blood pressure

## 2022-06-18 NOTE — ASSESSMENT & PLAN NOTE
· Recently discharged from [address] [place] [address]   · Status post cystoscopy left ureteroscopy/lithotripsy and ureteral stent exchange, with subsequent removal of left nephrostomy tube on 06/09/2022  · Status post stent removal on 06/13  · Outpatient Urology follow-up   · Medically stable for participation in inpatient rehab

## 2022-06-18 NOTE — NURSING NOTE
Pt medicated multiple times throughout shift for variety of pain locations w relief  See charting  Pt cont B&B this shifts  Transfers w/ supervision RW to BR  Pt needs assistance wiping after BM  Pt currently resting in bed watching TV  No s/s of distress

## 2022-06-18 NOTE — ASSESSMENT & PLAN NOTE
Lab Results   Component Value Date    EGFR 27 06/18/2022    EGFR 35 06/14/2022    EGFR 35 06/13/2022    CREATININE 2 50 (H) 06/18/2022    CREATININE 2 04 (H) 06/14/2022    CREATININE 2 06 (H) 06/13/2022     · Patient with recent ADRIANNE secondary to obstructive uropathy/UTI  · Baseline creatinine between 1 4-1 7 mg/dL   · Creatinine had previously been stable around 2 0-2 1 mg/dL   · Creatinine appears to be worsening to 2 5 mg/dL   · Renal ultrasound on 06/12- stable  · Check PVR, placed on urinary retention protocol  · Nephrology consulted, pending formal evaluation  · Avoid hypotension and nephrotoxins  · Repeat BMP in the a m

## 2022-06-18 NOTE — PROGRESS NOTES
06/18/22 0830   Patient Data   Rehab Impairment Imparired mobility, Limited ADL performance   Etiologic Diagnosis E coli Bacteremia; Severe sepsis   Support System   Name Pt lives alone; Father in [facility]   Relationship Father   Home Setup   Type of Home Multi Level   Method of Entry Stairs   Number of Stairs 2  (B/L handrail)   Number of Stairs in Home 13   In Home Hand Rail Right   First Floor Bathroom Half   Second Floor Bathroom Tub; Shower;Combo   Prior IADL Participation   Money Management Identify Money;Manage Checkbook; [employer] Preparation Full Participation   Laundry Full Participation   Home Cleaning Full Participation   Prior Level of Function   Self-Care 3  Independent - Patient completed the activities by him/herself, with or without an assistive device, with no assistance from a helper  (Prior to hospitailization)   Indoor-Mobility (Ambulation) 3  Independent - Patient completed the activities by him/herself, with or without an assistive device, with no assistance from a helper  Stairs 3  Independent - Patient completed the activities by him/herself, with or without an assistive device, with no assistance from a helper  Functional Cognition 3  Independent - Patient completed the activities by him/herself, with or without an assistive device, with no assistance from a helper  Prior Device Used Z  None of the above   Falls in the Last Year   Number of falls in the past 12 months 1   Type of Injury Associated with Fall No injury   Psychosocial   Psychosocial (WDL) WDL   Patient Behaviors/Mood Calm; Cooperative   Restrictions/Precautions   Precautions Fall Risk;Pain   RLE Weight Bearing Per Order WBAT   LLE Weight Bearing Per Order WBAT   Pain Assessment   Pain Assessment Tool 0-10   Pain Score 8   Pain Location/Orientation Orientation: Left; Location: Leg  (Ankle)   Oral Hygiene   Type of Assistance Needed Set-up / Deloris Wilkes; Verbal cues; Supervision   Physical Assistance Level No physical assistance   Comment Brush teeth in supported standing at sink   Oral Hygiene CARE Score 4   Grooming   Able To Comb/Brush Hair;Wash/Dry Face;Brush/Clean Teeth;Wash/Dry Hands   Limitation Noted In Safety;Strength;Timeliness   Identified Safety Needs   (Supported standing at sink)   Findings   (Supervision)   Shower/Bathe Self   Type of Assistance Needed Physical assistance   Physical Assistance Level 26%-50%   Comment Min/Mod A   Shower/Bathe Self CARE Score 3   Bathing   Assessed Bath Style Sponge Bath   Anticipated D/C Bath Style Shower;Sponge Bath   Able to [place] No   Able to Wash/Rinse/Dry (body part) Left Arm;Right Arm;L Upper Leg;R Upper Leg;L Lower Leg/Foot;R Lower Leg/Foot  (Long handles sponge)   Limitations Noted in Coordination;Balance; Endurance;Problem Solving; Safety;ROM; Sequencing;Strength;Timeliness   Positioning Seated   Adaptive Equipment Longhand Sponge   Dressing/Undressing Clothing   Remove UB Clothes Pullover Shirt   Don UB Clothes Pullover Shirt   Type of Assistance Needed Physical assistance   Physical Assistance Level 25% or less   Comment   (Increased time and verbal cues)   Upper Body Dressing CARE Score 3   Remove LB Clothes Socks   Don LB Clothes Socks   Type of Assistance Needed Physical assistance   Physical Assistance Level 25% or less   Comment Verbal cues and increased time required   Lower Body Dressing CARE Score 3   Limitations Noted In Balance; Coordination; Endurance; Safety; Sequencing;Strength;ROM   Positioning Sit Edge Of Bed   Toileting Hygiene   Type of Assistance Needed Supervision   Physical Assistance Level 25% or less   Comment   (Supervision for baldder; Pt reported need for increased assistance at time with bowel hygiene due to limitation in R UE ROM/strength)   Toileting Hygiene CARE Score 3   Toilet Transfer   Surface Assessed Raised Toilet   Transfer Technique Standard   Limitations Noted In Balance; Endurance;Problem Solving;ROM;Safety;UE Strength;LE Strength  (Pain)   Adaptive Equipment Grab Bar;Walker   Positioning Concerns Raised Seat;Grab Bars   Type of Assistance Needed Verbal cues; Incidental touching  (CGA)   Toilet Transfer CARE Score 4   Toileting   Able to Pull Clothing down yes, up yes  Manage Hygiene Bladder   Limitations Noted In Balance; Coordination;ROM;Safety  (Pain)   Adaptive Equipment Grab Bar   Roll Left and Right   Type of Assistance Needed Supervision;Verbal cues   Comment Bed rails   Roll Left and Right CARE Score 4   Sit to Lying   Type of Assistance Needed Supervision;Verbal cues   Comment   (Bed rails; Increased time and effort)   Sit to Lying CARE Score 4   Lying to Sitting on Side of Bed   Type of Assistance Needed Verbal cues; Supervision   Comment   (Bed rails; Increased time required)   Lying to Sitting on Side of Bed CARE Score 4   Sit to Stand   Type of Assistance Needed Supervision;Verbal cues   Sit to Stand CARE Score 4   RUE Assessment   RUE Assessment X  (R shoulder AROM flexion 90 degrees, Abduction 100 degress; Elbow, wrist digits WFL; Near full ROM with PROM applied to R shoulder)   LUE Assessment   LUE Assessment WFL   Cognition   Orientation Level Oriented X4   Discharge Information   Vocational Plan   (Return to work as able)   Barriers to Return to [employer]; Endurance   Barriers to Discharge Home Limited Family Support;Decreased Strength;Decreased Endurance;Pain; Safety Considerations   Impressions Pt is a 55year old male with a PMH significant for CKD, HLD, HTN and arthritis and initally presented to the [place] [address] ED with worsening left-sided flank pain with generalized malaise/fatigue  Pt with good participation and progress at ARU; Until on 6/6/2022 Pt developed sepsis and was transferd to [facility] for urology evaluation  Pt returned to ARU on 6/17  Comorbidities affecting Pts functional performance at this time can include: Kidney sone, gout, arthritis, HTN   Prior to hospialization, Pt was Independent with all ADLs and IADLs including driving and working  Pt with limitations in ADL performance, transfers, functioanl mobilty and overall safety  Pt required up to Mod A to complete sponge bath; Supervision with verbal cues for grooming; Min A to complete UE/LE dressing with increased time required; CGA for sit<>stand/toilet transfer  Pt with limitations in overall strength, endurance, balance, coordination and safety awareness  Pt also with defcits in R UE shoulder ROM and strength which limit particiaption in ADL routines  Pt would benefit from skilled OT services at [facility] to address indicated deficites and promote maximum functional independence with ADL performance, transfers and functional mobiltiy  Continue to follow with current POC     OT Therapy Minutes   OT Time In 0830   OT Time Out 0930   OT Total Time (minutes) 60   OT Mode of treatment - Individual (minutes) 60

## 2022-06-18 NOTE — PLAN OF CARE
Problem: INFECTION - ADULT  Goal: Absence or prevention of progression during hospitalization  Description: INTERVENTIONS:  - Assess and monitor for signs and symptoms of infection  - Monitor lab/diagnostic results  - Monitor all insertion sites, i e  indwelling lines, tubes, and drains  - Monitor endotracheal if appropriate and nasal secretions for changes in amount and color  - Wells appropriate cooling/warming therapies per order  - Administer medications as ordered  - Instruct and encourage patient and family to use good hand hygiene technique  - Identify and instruct in appropriate isolation precautions for identified infection/condition  Outcome: Progressing     Problem: SAFETY ADULT  Goal: Patient will remain free of falls  Description: INTERVENTIONS:  - Educate patient/family on patient safety including physical limitations  - Instruct patient to call for assistance with activity   - Consult OT/PT to assist with strengthening/mobility   - Keep Call bell within reach  - Keep bed low and locked with side rails adjusted as appropriate  - Keep care items and personal belongings within reach  - Initiate and maintain comfort rounds  - Make Fall Risk Sign visible to staff  - Offer Toileting every 2 Hours, in advance of need  - Initiate/Maintain bed/chair alarm  - Obtain necessary fall risk management equipment: yellow socks  - Apply yellow socks and bracelet for high fall risk patients  - Consider moving patient to room near nurses station  Outcome: Progressing

## 2022-06-19 PROBLEM — N18.32 STAGE 3B CHRONIC KIDNEY DISEASE (HCC): Status: ACTIVE | Noted: 2019-08-23

## 2022-06-19 PROCEDURE — 99252 IP/OBS CONSLTJ NEW/EST SF 35: CPT | Performed by: NURSE PRACTITIONER

## 2022-06-19 PROCEDURE — 82570 ASSAY OF URINE CREATININE: CPT | Performed by: INTERNAL MEDICINE

## 2022-06-19 PROCEDURE — 84300 ASSAY OF URINE SODIUM: CPT | Performed by: INTERNAL MEDICINE

## 2022-06-19 PROCEDURE — 84540 ASSAY OF URINE/UREA-N: CPT | Performed by: INTERNAL MEDICINE

## 2022-06-19 PROCEDURE — 97162 PT EVAL MOD COMPLEX 30 MIN: CPT

## 2022-06-19 PROCEDURE — 97116 GAIT TRAINING THERAPY: CPT

## 2022-06-19 PROCEDURE — 97530 THERAPEUTIC ACTIVITIES: CPT

## 2022-06-19 PROCEDURE — 84156 ASSAY OF PROTEIN URINE: CPT | Performed by: INTERNAL MEDICINE

## 2022-06-19 PROCEDURE — 97535 SELF CARE MNGMENT TRAINING: CPT

## 2022-06-19 PROCEDURE — 97110 THERAPEUTIC EXERCISES: CPT

## 2022-06-19 PROCEDURE — 82043 UR ALBUMIN QUANTITATIVE: CPT | Performed by: INTERNAL MEDICINE

## 2022-06-19 RX ORDER — TAMSULOSIN HYDROCHLORIDE 0.4 MG/1
0.8 CAPSULE ORAL
Status: DISCONTINUED | OUTPATIENT
Start: 2022-06-19 | End: 2022-06-20

## 2022-06-19 RX ORDER — METOPROLOL SUCCINATE 50 MG/1
50 TABLET, EXTENDED RELEASE ORAL DAILY
Status: DISCONTINUED | OUTPATIENT
Start: 2022-06-19 | End: 2022-06-20

## 2022-06-19 RX ADMIN — METOPROLOL SUCCINATE 50 MG: 50 TABLET, EXTENDED RELEASE ORAL at 09:21

## 2022-06-19 RX ADMIN — TRAMADOL HYDROCHLORIDE 50 MG: 50 TABLET, COATED ORAL at 16:51

## 2022-06-19 RX ADMIN — TRAMADOL HYDROCHLORIDE 50 MG: 50 TABLET, COATED ORAL at 03:12

## 2022-06-19 RX ADMIN — AMLODIPINE BESYLATE 10 MG: 10 TABLET ORAL at 09:21

## 2022-06-19 RX ADMIN — CHOLECALCIFEROL TAB 10 MCG (400 UNIT) 400 UNITS: 10 TAB at 09:21

## 2022-06-19 RX ADMIN — ACETAMINOPHEN 650 MG: 325 TABLET ORAL at 01:04

## 2022-06-19 RX ADMIN — ALLOPURINOL 100 MG: 100 TABLET ORAL at 09:21

## 2022-06-19 RX ADMIN — TAMSULOSIN HYDROCHLORIDE 0.8 MG: 0.4 CAPSULE ORAL at 17:01

## 2022-06-19 RX ADMIN — TRAMADOL HYDROCHLORIDE 50 MG: 50 TABLET, COATED ORAL at 09:23

## 2022-06-19 RX ADMIN — ACETAMINOPHEN 650 MG: 325 TABLET ORAL at 13:49

## 2022-06-19 RX ADMIN — TRAMADOL HYDROCHLORIDE 50 MG: 50 TABLET, COATED ORAL at 22:47

## 2022-06-19 RX ADMIN — POTASSIUM CHLORIDE 10 MEQ: 750 TABLET, EXTENDED RELEASE ORAL at 09:21

## 2022-06-19 NOTE — PROGRESS NOTES
OT Daily Progress       06/19/22 0930   Pain Assessment   Pain Assessment Tool 0-10   Pain Score 8   Pain Location/Orientation   (L knee, R ankle)   Sit to Stand   Type of Assistance Needed Supervision   Physical Assistance Level No physical assistance   Sit to Stand CARE Score 4   Bed-Chair Transfer   Type of Assistance Needed Supervision   Physical Assistance Level No physical assistance   Chair/Bed-to-Chair Transfer CARE Score 4   Toileting Hygiene   Type of Assistance Needed Supervision   Physical Assistance Level No physical assistance   Toileting Hygiene CARE Score 4   Toileting   Able to [address] down yes, up yes  Able to [facility] Bladder   Adaptive Equipment Grab Bar   Toilet Transfer   Type of Assistance Needed Supervision   Physical Assistance Level No physical assistance   Toilet Transfer CARE Score 4   Toilet Transfer   Surface Assessed Raised Toilet   Transfer Technique Standard   Limitations Noted In Endurance   Adaptive Equipment Grab Bar;Walker   Positioning Concerns Raised Seat   Therapeutic Exercise - ROM   UE-ROM Yes   Therapeutic Excerise-Strength   UE Strength Yes   Cognition   Overall Cognitive Status WFL   Orientation Level Oriented X4   Activity Tolerance   Activity Tolerance Patient tolerated treatment well;Patient limited by fatigue;Patient limited by pain   OT Therapy Minutes   OT Time In 0930   OT Time Out 1030   OT Total Time (minutes) 60   OT Mode of treatment - Individual (minutes) 60     Pt seen for follow up visit  Pt rec'd in PT room, awake, alert  Functional mobility from PT room to patient room to urinate  Stands at toilet to urinate into clean urinal  Completed at supervision level  Funct mob with RW from pt room to OT gym with supervision  UE therapeutic exercises to address endurance/strength/ROM of bilat Ues relative to ADL and transfer status  A/AROM to R shoulder - flexion/abduction/IR/ER with fair tolerance to same   Pinpoint discomfort noted R shoulder which patient states "its always in the same place"  5# digiflex 30R x 2 bilat hands  2# free weight - R UE bicep curls, forearm pro/sup x 30 reps  L UE bicep curls, forearm pro/sup, chest press, shoulder press x 30 reps with rest break between sets  Completes session with arm bike - 2 minutes forward and 2 minutes back for general UE endurance  Returns to room in wheelchair due to fatigue  Left seated on toilet at end of session, needs in reach  Instructed to use call ball when completed  Verbalizes understanding  Tolerating treatment well  Fatigued, however, had completed 3 hours of therapy consecutively this AM  Motivated to regain independence  Ankle and knee pain limiting at times  Will continue to address OT plan of care to max I and safety with all ADL/IADL tasks prior to discharge  Progress treatment as status allows

## 2022-06-19 NOTE — NURSING NOTE
Pt voiding frequently, no c/o pain or burning with urination  Pt requires no assistance with RW with transfers and ambulation  Pt given PRN tramadol for pain to B/L ankles  All needs are currently met  Will continue to monitor and follow plan of care

## 2022-06-19 NOTE — PROGRESS NOTES
As per Jessica Renteria , [facility] H&P note:  Yanique Ladd is a 55 y o  male with a PMH significant for CKD, HLD, HTN and arthritis who presented to the [place] [address] ED initially with worsening left-sided flank pain with generalized malaise/fatigue  He had a recent admission at [facility] for obstructive pyelonephritis secondary to a 3 mm stone with ureteral stent placement  Pt developed E  Coli bacteremia during hospitalization and discharged with amoxicillian until 5/4  In the ED patient was found to have lactic acidosis, leukocytosis and worsening ADRIANNE  CT revealed moderate perinephric stranding and mild hydronephrosis in the left kidney with multiple bilateral renal calculi  Patient transferred to [facility] to be evaluated by Urology  S/p perc nephrostomy tube drain replacement by IR on 5/10  Positive E  coli in one blood culture from 5/10 thought to be associated with UTI/Pyelonephritis  IV antibiotics initiated and then ultimately transitioned to PO antibiotics  During this stay, Ortho was consulted for ongoing left knee pain  His left knee was aspirated  Blood was noted but no effusion aspirated  Xray of the knee without acute osseous abnormality but did reveal tricompartmental osteoarthritis similar to a prior imaging the year prior  Ortho considering future joint steroid injection once acute medical comorbidities resolve  Patient arrived to [facility] on 5/19 after PT/OT recommendations  He progressed well however on 6/6 he developed sepsis meeting the criteria with elevation in WBCS, creatinine level, elevated temp and tachycardia  Patient also with c/o weakness, fatigue and diaphoresis  Pt was transferred to [facility] on 6/7 for urology evaluation  Urine and blood cultures obtained and IV antibiotics were initiated  Urine culture with gram negative rods of E  Coli/MDRO Citrobacter  Pt was recommended for inpatient clearance ureteroscopy  ID consulted and placed patient on Cipro PO x7 days (6/16)  Renal US unremarkable  6/9 patient went to OR with Urology for cystoscopy and left ureteroscopy/lithotripsy with ureteral stent exchange and removal of nephrostomy tube  Stent was then discontinued on 6/13  Pt is to follow up with urology as OP in 6 months with a renal US  Patient participated with PT/OT and was deemed appropriate to return to acute rehabilitation  He arrived to [facility] 6/17 06/19/22 0830   Patient Data   Rehab Impairment impairment of mobility; transfers/gait /debiltiy secondary to sepsis   Etiologic Diagnosis E coli/Bacteremia/sepsis   Support System   Name pt lives alone; father lives in [address] [place] [address]   Relationship father   Able to provide 24 hour supervision No   Able to provide physical help? No   Home Setup   Type of Home Multi Level   Method of Entry Stairs;Hand Rail Bilateral   Number of Stairs 6   Number of Stairs in Home 13   In Home Hand Rail Right   First Floor Setup Available Yes   Available Equipment Roller Walker;Single [email or web address]   Prior Level of Function   Self-Care 3  Independent - Patient completed the activities by him/herself, with or without an assistive device, with no assistance from a helper  Indoor-Mobility (Ambulation) 3  Independent - Patient completed the activities by him/herself, with or without an assistive device, with no assistance from a helper  Stairs 3  Independent - Patient completed the activities by him/herself, with or without an assistive device, with no assistance from a helper  Functional Cognition 3  Independent - Patient completed the activities by him/herself, with or without an assistive device, with no assistance from a helper  Prior Device Used Z  None of the above   Falls in the Last Year   Number of falls in the past 12 months 1   Type of Injury Associated with Fall No injury   Patient Preference   Nicknammaynor (Patient Preference) Maxi (WDL) WDL   Patient Behaviors/Mood Calm; Cooperative   Ability to Express Feelings Able to express   Ability to Express Needs Able to express   Ability to Express Thoughts Able to express   Ability to Understand Others Understands   Restrictions/Precautions   Precautions Fall Risk   Weight Bearing Restrictions No   RLE Weight Bearing Per Order WBAT   LLE Weight Bearing Per Order WBAT   ROM Restrictions No   Pain Assessment   Pain Assessment Tool 0-10   Pain Score 8   Pain Location/Orientation Orientation: Left; Location: Foot  (L knee)   Pain Onset/Description Onset: Ongoing   Effect of Pain on Daily Activities pain increases iwht wieght bearing   Patient's Stated Pain Goal No pain   Toileting Hygiene   Type of Assistance Needed Supervision   Physical Assistance Level No physical assistance   Comment distant S; uses grab bar and RW   Toileting Hygiene CARE Score 4   Toilet Transfer   Surface Assessed Raised Toilet   Transfer Technique Standard   Limitations Noted In Endurance   Adaptive Equipment Grab Bar;Walker   Positioning Concerns Raised Seat;Grab Bars   Type of Assistance Needed Supervision   Physical Assistance Level No physical assistance   Toilet Transfer CARE Score 4   Toileting   Able to [address] down yes, up yes  Able to Manage Clothing Closures Yes   Manage Hygiene Bladder   Adaptive Equipment Grab Bar   Transfer Bed/Chair/Wheelchair   Positioning Concerns Skin Integrity   Limitations Noted In Endurance; Other  (pain)   Adaptive Equipment Roller Walker   Findings S   Type of Assistance Needed Supervision   Physical Assistance Level No physical assistance   Comment S   Chair/Bed-to-Chair Transfer CARE Score 4   Roll Left and Right   Type of Assistance Needed Independent   Physical Assistance Level No physical assistance   Comment bed rails   Roll Left and Right CARE Score 6   Sit to Lying   Type of Assistance Needed Independent   Physical Assistance Level No physical assistance   Comment bed rails   Sit to Lying CARE Score 6   Lying to Sitting on Side of Bed   Type of Assistance Needed Independent   Physical Assistance Level No physical assistance   Comment bed rails   Lying to Sitting on Side of Bed CARE Score 6   Sit to Stand   Type of Assistance Needed Supervision   Physical Assistance Level No physical assistance   Sit to Stand CARE Score 4   Picking Up Object   Comment ankle/knee pain   Reason if not Attempted Safety concerns   Picking Up Object CARE Score 88   Car Transfer   Reason if not Attempted Environmental limitations   Car Transfer CARE Score 10   Ambulation   Primary Mode of Locomotion Prior to Admission Walk   Distance Walked (feet) 180 ft   Assist Device Roller Walker   Gait Pattern Antalgic; Wide TEO   Limitations Noted In Endurance; Other  (pain)   Provided Assistance with: Direction   Walk Assist Level Supervision   Findings level and unlevel   Walk 10 Feet   Type of Assistance Needed Supervision   Physical Assistance Level No physical assistance   Comment RW   Walk 10 Feet CARE Score 4   Walk 50 Feet with Two Turns   Type of Assistance Needed Supervision   Physical Assistance Level No physical assistance   Comment S   Walk 50 Feet with Two Turns CARE Score 4   Walk 150 Feet   Type of Assistance Needed Supervision   Physical Assistance Level No physical assistance   Comment S   Walk 150 Feet CARE Score 4   Walking 10 Feet on Uneven Surfaces   Type of Assistance Needed Supervision   Physical Assistance Level No physical assistance   Comment   (S)   Walking 10 Feet on Uneven Surfaces CARE Score 4   Wheelchair mobility   Findings n/a   Wheel 50 Feet with Two Turns   Reason if not Attempted Activity not applicable   Wheel 50 Feet with Two Turns CARE Score 9   Wheel 150 Feet   Reason if not Attempted Activity not applicable   Wheel 765 Feet CARE Score 9   Curb or Single Stair   Style negotiated Single stair   Type of Assistance Needed Supervision   Physical Assistance Level No physical assistance   Comment b/l HR   1 Step (Curb) CARE Score 4   4 Steps   Type of Assistance Needed Supervision   Physical Assistance Level No physical assistance   Comment b/l HR   4 Steps CARE Score 4   12 Steps   Reason if not Attempted Safety concerns  (pain in ankle/knee)   12 Steps CARE Score 88   Stairs   Type Stairs   # of Steps 7   Assist Devices Bilateral Rail   Findings S   Comprehension   QI: Comprehension 4  Undestands: Clear comprehension without cues or repetitions   Comprehension (FIM) 6 - Understands complex/abstract but requires more time   Expression   QI: Expression 4  Express complex messages without difficulty and with speech that is clear and easy to [place]   Expression (FIM) 7 - Expresses complex/abstract ideas in a reasonable time w/o devices or helper  Social Interaction   Social Interaction (FIM) 7 - Interacts appropriately without assistive device, medication or helper   Problem Solving   Problem solving (FIM) 6 - Solves complex problems BUT requires extra time   Memory   Memory (FIM) 7 - Remembers daily routines   RLE Assessment   RLE Assessment WFL   Strength RLE   R Hip Flexion 4/5   R Hip Extension 4-/5   R Hip ABduction 3+/5   R Hip ADduction 3+/5   R Knee Flexion 4/5   R Knee Extension 4-/5   R Ankle Dorsiflexion 4/5   R Ankle Plantar Flexion 4/5   Strength LLE   L Hip Flexion 4-/5   L Hip Extension 3-/5   L Hip ABduction 2+/5   L Hip ADduction 3+/5   L Knee Flexion 4/5   L Knee Extension 4/5   L Ankle Dorsiflexion 4/5   L Ankle Plantar Flexion 4/5   Coordination   Movements are Fluid and Coordinated 0   Sensation   Light Touch No apparent deficits   Cognition   Overall Cognitive Status WFL   Arousal/Participation Alert; Responsive; Cooperative   Attention Within functional limits   Orientation Level Oriented X4   Memory Within functional limits   Following Commands Follows all commands and directions without difficulty   Therapeutic Exercise   Therapeutic Exercise/Activity b/l LE stretching; TE   Discharge Information   Barriers to Return to [facility] Endurance   Patient's Discharge Plan to go home   Patient's Rehab Expectations walk w/out pain   Barriers to Discharge [address] Pt is a  54 yo   M  who was admitted to hospital on 6/17/22 for dx of sepsis  Pt was a prior patient at [facility] however he was transferred to [facility] for infection  Patient was Independent prior level of function  Patient now is S assist for transfers; I iwht bed mobility using bed rails; S assist with gait using RW x 180'  He is able to do 7  stairs using b/l hand rails and S assist   He presents with  pain and slightly decreased strength in b/l LE; however demonstrates good balance  and safety awareness using RW  with functional activities/transfers/gait  Pt does c/o knee and ankle pain iwht walking  Patient will benefit from skilled PT services in order to  return to premorbid functional level     PT Therapy Minutes   PT Time In 0830   PT Time Out 0930   PT Total Time (minutes) 60   PT Mode of treatment - Individual (minutes) 60

## 2022-06-19 NOTE — QUICK NOTE
Due to frequent voiding small amount throughout the day  Will trial tamsulosin 0 8 mg daily instead of 0 4 mg  May require urology evaluation if PVR consistently greater than 300 ml

## 2022-06-19 NOTE — UTILIZATION REVIEW
Notification of Discharge   This is a Notification of Discharge from our facility [address]  Please be advised that this patient has been discharge from our facility  Below you will find the admission and discharge date and time including the patients disposition  UTILIZATION REVIEW CONTACT:  Alena Hines  Utilization   Network Utilization Review Department  Phone: [phone] x carefully listen to the prompts  All voicemails are confidential   Email: [email or web address]     PHYSICIAN ADVISORY SERVICES:  FOR KXLJ-TO-CTHD REVIEW - MEDICAL NECESSITY DENIAL  Phone: [phone]  Fax: [phone]  Email: [email or web address]     PRESENTATION DATE: 6/7/2022  3:45 PM  OBERVATION ADMISSION DATE:   INPATIENT ADMISSION DATE: 6/7/22  3:45 PM   DISCHARGE DATE: 6/17/2022  2:09 PM  [facility]      IMPORTANT INFORMATION:  Send all requests for admission clinical reviews, approved or denied determinations and any other requests to dedicated fax number below belonging to the campus where the patient is receiving treatment   List of dedicated fax numbers:  [address] DENIALS (Administrative/Medical Necessity) [phone]   1000  [facility] (Maternity/NICU/Pediatrics) [phone]   Aundra Small [phone]   [address] [facility] [address] [phone]   [address] [place] [address] [phone]   2000 [facility] [address] [place] [address],[address] [phone]   [facility]  [phone]   [address] [facility] [address] [facility]  [address]01 [facility] [address] [facility] [phone]

## 2022-06-19 NOTE — PROGRESS NOTES
OT Daily Progress/ADL       06/19/22 0700   Pain Assessment   Pain Assessment Tool 0-10   Jasso-Baker FACES Pain Rating 4   Pain Location/Orientation   (L ankle)   Restrictions/Precautions   Precautions Fall Risk   Eating   Type of Assistance Needed Independent   Physical Assistance Level No physical assistance   Eating CARE Score 6   Grooming   Able To Comb/Brush Hair;Wash/Dry Face;Brush/Clean Teeth   Limitation Noted In Strength   Shower/Bathe Self   Type of Assistance Needed Set-up / clean-up   Physical Assistance Level No physical assistance   Comment shower  Passively bathes feet via hand held shower   Shower/Bathe Self CARE Score 5   Bathing   Assessed Bath Style Shower   Able to Gather/Transport No   Able to [employer] Temperature Yes   Able to Wash/Rinse/Dry (body part) Left Arm;Right Arm;L Upper Leg;R Upper Leg;L Lower Leg/Foot;R Lower Leg/Foot;Chest;Abdomen;Perineal Area; Buttocks   Limitations Noted in Balance; Endurance;Strength   Positioning Seated;Standing   Adaptive Equipment Tub Bench; Shower [employer]; Shower Seat;[employer]  passively bathes feet  Completes remainder of shower in seated/standing position   Tub/Shower Transfer   Limitations Noted In Balance; Endurance;LE Strength   Adaptive Equipment Grab Bars;Seat with Back   Assessed Shower  (walk in shower with step over)   Upper Body Dressing   Type of Assistance Needed Set-up / clean-up   Physical Assistance Level No physical assistance   Comment t-shirt   Upper Body Dressing CARE Score 5   Lower Body Dressing   Type of Assistance Needed Set-up / clean-up   Physical Assistance Level No physical assistance   Comment for shorts   Lower Body Dressing CARE Score 5   Putting On/Taking Off Footwear   Type of Assistance Needed Physical assistance   Physical Assistance Level 26%-50%   Comment mod A for slipper sock management   Putting On/Taking Off Footwear CARE Score 3   Picking Up Object   Type of Assistance Needed Independent   Physical Assistance Level No physical assistance   Comment reached to floor from standing to  mesh shorts  Picking Up Object CARE Score 6   Dressing/Undressing Clothing   Remove UB Clothes Pullover Shirt   Don UB Clothes Pullover Shirt   Remove LB Clothes Shorts;Socks   Don LB Clothes Shorts;Socks   Limitations Noted In Balance; Endurance;Strength   Positioning Sit Edge Of Bed;Standing   Lying to Sitting on Side of Bed   Type of Assistance Needed Supervision   Physical Assistance Level No physical assistance   Lying to Sitting on Side of Bed CARE Score 4   Sit to Stand   Type of Assistance Needed Supervision   Physical Assistance Level No physical assistance   Comment CG A   Sit to Stand CARE Score 4   Cognition   Overall Cognitive Status [facility]   Arousal/Participation Alert; Responsive; Cooperative   Attention Within functional limits   Orientation Level Oriented X4   Memory Within functional limits   Following Commands Follows all commands and directions without difficulty   Activity Tolerance   Activity Tolerance Patient tolerated treatment well;Patient limited by fatigue   OT Therapy Minutes   OT Time In 0700   OT Time Out 0800   OT Total Time (minutes) 60   OT Mode of treatment - Individual (minutes) 60       Pt seen for follow up visit  Pt awake, alert  Agreeable to treatment and shower  Transfers, self care tasks as noted above  Mobilizes to shower room from pt room with RW and supervision  Tolerated well - reports fatigue following shower  Did require some assist for sock management (due to reported ankle pain) and min A to dry back  Left seated at EOB for breakfast at end of session, needs in reach  Tolerating well, limited overall by decreased act mily  Will continue to benefit from OT services to max I and safety with self care prior to discharge  Continue to progress treatment as status allows

## 2022-06-19 NOTE — PLAN OF CARE
Problem: SAFETY ADULT  Goal: Patient will remain free of falls  Description: INTERVENTIONS:  - Educate patient/family on patient safety including physical limitations  - Instruct patient to call for assistance with activity   - Consult OT/PT to assist with strengthening/mobility   - Keep Call bell within reach  - Keep bed low and locked with side rails adjusted as appropriate  - Keep care items and personal belongings within reach  - Initiate and maintain comfort rounds  - Make Fall Risk Sign visible to staff  - Offer Toileting every 2 Hours, in advance of need  - Initiate/Maintain chair/bed alarm  - Obtain necessary fall risk management equipment: yellow socks  - Apply yellow socks and bracelet for high fall risk patients  - Consider moving patient to room near nurses station  Outcome: Progressing     Problem: Knowledge Deficit  Goal: Patient/family/caregiver demonstrates understanding of disease process, treatment plan, medications, and discharge instructions  Description: Complete learning assessment and assess knowledge base    Interventions:  - Provide teaching at level of understanding  - Provide teaching via preferred learning methods  Outcome: Progressing

## 2022-06-20 LAB
ANION GAP SERPL CALCULATED.3IONS-SCNC: 12 MMOL/L (ref 4–13)
BACTERIA UR QL AUTO: ABNORMAL /HPF
BASOPHILS # BLD AUTO: 0.04 THOUSANDS/ΜL (ref 0–0.1)
BASOPHILS NFR BLD AUTO: 0 % (ref 0–1)
BILIRUB UR QL STRIP: NEGATIVE
BUN SERPL-MCNC: 22 MG/DL (ref 5–25)
CALCIUM SERPL-MCNC: 9.1 MG/DL (ref 8.4–10.2)
CHLORIDE SERPL-SCNC: 104 MMOL/L (ref 96–108)
CLARITY UR: CLEAR
CO2 SERPL-SCNC: 20 MMOL/L (ref 21–32)
COLOR UR: YELLOW
CREAT SERPL-MCNC: 2.23 MG/DL (ref 0.6–1.3)
CREAT UR-MCNC: 95.4 MG/DL
EOSINOPHIL # BLD AUTO: 0.75 THOUSAND/ΜL (ref 0–0.61)
EOSINOPHIL NFR BLD AUTO: 7 % (ref 0–6)
ERYTHROCYTE [DISTWIDTH] IN BLOOD BY AUTOMATED COUNT: 15.9 % (ref 11.6–15.1)
GFR SERPL CREATININE-BSD FRML MDRD: 31 ML/MIN/1.73SQ M
GLUCOSE P FAST SERPL-MCNC: 93 MG/DL (ref 65–99)
GLUCOSE SERPL-MCNC: 93 MG/DL (ref 65–140)
GLUCOSE UR STRIP-MCNC: NEGATIVE MG/DL
HCT VFR BLD AUTO: 28.1 % (ref 36.5–49.3)
HGB BLD-MCNC: 8.3 G/DL (ref 12–17)
HGB UR QL STRIP.AUTO: NEGATIVE
IMM GRANULOCYTES # BLD AUTO: 0.12 THOUSAND/UL (ref 0–0.2)
IMM GRANULOCYTES NFR BLD AUTO: 1 % (ref 0–2)
KETONES UR STRIP-MCNC: NEGATIVE MG/DL
LEUKOCYTE ESTERASE UR QL STRIP: ABNORMAL
LYMPHOCYTES # BLD AUTO: 2.03 THOUSANDS/ΜL (ref 0.6–4.47)
LYMPHOCYTES NFR BLD AUTO: 20 % (ref 14–44)
MCH RBC QN AUTO: 28.5 PG (ref 26.8–34.3)
MCHC RBC AUTO-ENTMCNC: 29.5 G/DL (ref 31.4–37.4)
MCV RBC AUTO: 97 FL (ref 82–98)
MICROALBUMIN UR-MCNC: 12.5 MG/L (ref 0–20)
MICROALBUMIN/CREAT 24H UR: 13 MG/G CREATININE (ref 0–30)
MONOCYTES # BLD AUTO: 0.79 THOUSAND/ΜL (ref 0.17–1.22)
MONOCYTES NFR BLD AUTO: 8 % (ref 4–12)
NEUTROPHILS # BLD AUTO: 6.63 THOUSANDS/ΜL (ref 1.85–7.62)
NEUTS SEG NFR BLD AUTO: 64 % (ref 43–75)
NITRITE UR QL STRIP: NEGATIVE
NON-SQ EPI CELLS URNS QL MICRO: ABNORMAL /HPF
NRBC BLD AUTO-RTO: 0 /100 WBCS
OTHER STN SPEC: ABNORMAL
PH UR STRIP.AUTO: 7 [PH]
PLATELET # BLD AUTO: 378 THOUSANDS/UL (ref 149–390)
PMV BLD AUTO: 9.9 FL (ref 8.9–12.7)
POTASSIUM SERPL-SCNC: 3.6 MMOL/L (ref 3.5–5.3)
PROT UR STRIP-MCNC: NEGATIVE MG/DL
PROT UR-MCNC: 48 MG/DL
PROT/CREAT UR: 0.5 MG/G{CREAT} (ref 0–0.1)
RBC # BLD AUTO: 2.91 MILLION/UL (ref 3.88–5.62)
RBC #/AREA URNS AUTO: ABNORMAL /HPF
SODIUM 24H UR-SCNC: 38 MOL/L
SODIUM SERPL-SCNC: 136 MMOL/L (ref 135–147)
SP GR UR STRIP.AUTO: 1.01 (ref 1–1.03)
UROBILINOGEN UR QL STRIP.AUTO: 0.2 E.U./DL
UUN 24H UR-MCNC: 349 MG/DL
WBC # BLD AUTO: 10.36 THOUSAND/UL (ref 4.31–10.16)
WBC #/AREA URNS AUTO: ABNORMAL /HPF

## 2022-06-20 PROCEDURE — 97110 THERAPEUTIC EXERCISES: CPT

## 2022-06-20 PROCEDURE — 97530 THERAPEUTIC ACTIVITIES: CPT

## 2022-06-20 PROCEDURE — 81001 URINALYSIS AUTO W/SCOPE: CPT | Performed by: PHYSICIAN ASSISTANT

## 2022-06-20 PROCEDURE — 97116 GAIT TRAINING THERAPY: CPT

## 2022-06-20 PROCEDURE — 97112 NEUROMUSCULAR REEDUCATION: CPT

## 2022-06-20 PROCEDURE — 97535 SELF CARE MNGMENT TRAINING: CPT

## 2022-06-20 PROCEDURE — 85025 COMPLETE CBC W/AUTO DIFF WBC: CPT | Performed by: PHYSICAL MEDICINE & REHABILITATION

## 2022-06-20 PROCEDURE — 99232 SBSQ HOSP IP/OBS MODERATE 35: CPT | Performed by: PHYSICAL MEDICINE & REHABILITATION

## 2022-06-20 PROCEDURE — 99254 IP/OBS CNSLTJ NEW/EST MOD 60: CPT | Performed by: PHYSICIAN ASSISTANT

## 2022-06-20 PROCEDURE — 80048 BASIC METABOLIC PNL TOTAL CA: CPT | Performed by: NURSE PRACTITIONER

## 2022-06-20 RX ORDER — METOPROLOL SUCCINATE 50 MG/1
100 TABLET, EXTENDED RELEASE ORAL DAILY
Status: DISCONTINUED | OUTPATIENT
Start: 2022-06-21 | End: 2022-06-30 | Stop reason: HOSPADM

## 2022-06-20 RX ORDER — TAMSULOSIN HYDROCHLORIDE 0.4 MG/1
0.4 CAPSULE ORAL 2 TIMES DAILY
Status: DISCONTINUED | OUTPATIENT
Start: 2022-06-20 | End: 2022-06-22

## 2022-06-20 RX ADMIN — AMLODIPINE BESYLATE 10 MG: 10 TABLET ORAL at 09:04

## 2022-06-20 RX ADMIN — CHOLECALCIFEROL TAB 10 MCG (400 UNIT) 400 UNITS: 10 TAB at 09:04

## 2022-06-20 RX ADMIN — ACETAMINOPHEN 650 MG: 325 TABLET ORAL at 19:17

## 2022-06-20 RX ADMIN — ALLOPURINOL 100 MG: 100 TABLET ORAL at 09:04

## 2022-06-20 RX ADMIN — TRAMADOL HYDROCHLORIDE 50 MG: 50 TABLET, COATED ORAL at 17:14

## 2022-06-20 RX ADMIN — TRAMADOL HYDROCHLORIDE 50 MG: 50 TABLET, COATED ORAL at 23:20

## 2022-06-20 RX ADMIN — METOPROLOL SUCCINATE 50 MG: 50 TABLET, EXTENDED RELEASE ORAL at 09:04

## 2022-06-20 RX ADMIN — TAMSULOSIN HYDROCHLORIDE 0.4 MG: 0.4 CAPSULE ORAL at 20:56

## 2022-06-20 RX ADMIN — POTASSIUM CHLORIDE 10 MEQ: 750 TABLET, EXTENDED RELEASE ORAL at 09:04

## 2022-06-20 RX ADMIN — TRAMADOL HYDROCHLORIDE 50 MG: 50 TABLET, COATED ORAL at 09:08

## 2022-06-20 NOTE — PLAN OF CARE
Problem: PAIN - ADULT  Goal: Verbalizes/displays adequate comfort level or baseline comfort level  Description: Interventions:  - Encourage patient to monitor pain and request assistance  - Assess pain using appropriate pain scale  - Administer analgesics based on type and severity of pain and evaluate response  - Implement non-pharmacological measures as appropriate and evaluate response  - Consider cultural and social influences on pain and pain management  - Notify physician/advanced practitioner if interventions unsuccessful or patient reports new pain  Outcome: Progressing     Problem: INFECTION - ADULT  Goal: Absence or prevention of progression during hospitalization  Description: INTERVENTIONS:  - Assess and monitor for signs and symptoms of infection  - Monitor lab/diagnostic results  - Monitor all insertion sites, i e  indwelling lines, tubes, and drains  - Monitor endotracheal if appropriate and nasal secretions for changes in amount and color  - [place] appropriate cooling/warming therapies per order  - Administer medications as ordered  - Instruct and encourage patient and family to use good hand hygiene technique  - Identify and instruct in appropriate isolation precautions for identified infection/condition  Outcome: Progressing     Problem: SAFETY ADULT  Goal: Patient will remain free of falls  Description: INTERVENTIONS:  - Educate patient/family on patient safety including physical limitations  - Instruct patient to call for assistance with activity   - Consult OT/PT to assist with strengthening/mobility   - Keep Call bell within reach  - Keep bed low and locked with side rails adjusted as appropriate  - Keep care items and personal belongings within reach  - Initiate and maintain comfort rounds  - Offer Toileting every 2 Hours, in advance of need  - Initiate/Maintain bed/chair alarm  - Apply yellow socks and bracelet for high fall risk patients  - Consider moving patient to room near nurses station  Outcome: Progressing     Problem: Potential for Falls  Goal: Patient will remain free of falls  Description: INTERVENTIONS:  - Educate patient/family on patient safety including physical limitations  - Instruct patient to call for assistance with activity   - Consult OT/PT to assist with strengthening/mobility   - Keep Call bell within reach  - Keep bed low and locked with side rails adjusted as appropriate  - Keep care items and personal belongings within reach  - Initiate and maintain comfort rounds  - Offer Toileting every 2 Hours, in advance of need  - Initiate/Maintain bed/chair alarm  - Apply yellow socks and bracelet for high fall risk patients  - Consider moving patient to room near nurses station  Outcome: Progressing     Problem: Prexisting or High Potential for Compromised Skin Integrity  Goal: Skin integrity is maintained or improved  Description: INTERVENTIONS:  - Identify patients at risk for skin breakdown  - Assess and monitor skin integrity  - Assess and monitor nutrition and hydration status  - Monitor labs   - Assess for incontinence   - Turn and reposition patient  - Assist with mobility/ambulation  - Relieve pressure over bony prominences  - Avoid friction and shearing  - Provide appropriate hygiene as needed including keeping skin clean and dry  - Evaluate need for skin moisturizer/barrier cream  - Collaborate with interdisciplinary team   - Patient/family teaching  - Consider wound care consult   Outcome: Progressing

## 2022-06-20 NOTE — TREATMENT PLAN
Individualized Plan of Jhon [age] y o  male MRN: [id number]  Unit/Bed#: -01 Encounter: [id number]     PATIENT INFORMATION  ADMISSION DATE: 6/17/2022  3:25 PM DANIEL CATEGORY:Debility:  12  Debility (Non-cardiac/Non-pulmonary)   ADMISSION DIAGNOSIS: Sepsis, unspecified organism [A41 9]  EXPECTED LOS: 10-14 days     MEDICAL/FUNCTIONAL PROGNOSIS  Based on my assessment of the patient's medical conditions and current functional status, the prognosis for attaining medical and functional goals or the IRF stay is:  Good      Cardiopulmonary function: Ensure cardiopulmonary stability and optimize cardiopulmonary function not only at rest but with activity as patient's activity level significantly increases in acute rehab compared with prior to transfer in preparation for safe discharge from [facility]  Must closely and frequently monitor blood pressure and HR to ensure adequate cardiac output during ADLs and ambulation as patient is at increased risk for orthostatic hypotension/syncope and potential injury if not monitored for and managed adequately  Blood pressure management:    Frequent monitoring of blood pressure with appropriate adjustments in blood pressure medication management to optimize blood pressure control and prevent/limit renal complications  Monitoring impact of blood pressure and side-effects of blood pressure medications at rest and with activity  Pain management:  Pain will improve with frequent evaluation of pain, careful adjustments in medications, frequent re-evaluation of patient's pain and medical/neurologic status to ensure optimal pain control, avoidance of potential serious and even life-threatening side-effects and drug interactions, as well as weaning pain medications as soon as possible to decrease risk of short and long-term use  Inpatient rehabilitation education/teaching:   To be provided to patient and typically family/caregiver (if able to be identified) by all skilled therapists, rehab nursing, case management, and rehab specialized physician to ensure optimal recovery and decrease risks of complications in both acute rehabilitation setting as well as after discharge  Chronic kidney failure management and blood pressure management: Frequent measurements and evaluation of urinary intake, urinary output, and labs which include BUN/Cr and electrolytes with appropriate adjustments in medication and when necessary order additional testing  Frequent monitoring of blood pressure with appropriate adjustments in blood pressure medication management to optimize blood pressure control and prevent/limit renal complications  Obesity:  Close monitoring of nutrition status, nutrition specialist with adjustments in diet   on appropriate short and long term nutrition and activity  Obesity increases complexity of patient's overall condition and causes unique challenges during this part of patient's recovery process  Supervise and if necessary make adjustments in rehab nursing care and skilled therapy care to ensure appropriate toileting, bed mobility, other ADLs, and ambulation to decrease risk of falls/injuries, VTE, skin breakdown/ulceration and optimize functional recovery  Skin wounds: Appropriate skin checks for wound/skin evaluation including evaluation of healing, worsening of wounds, or signs of infection  Wound care management from rehab nursing, wound care nursing, physicians  Ensure frequent appropriate turning, positioning in bed, in chair, when mobilizing, and when appropriate with use of appropriate devices to optimize healing and decrease risk of worsening or new skin breakdown        Medical Goals: Patient will be medically stable for discharge to [facility] [place] upon completion of rehab program and Patient will be able to manage medical conditions and comorbid conditions with medications and follow up upon completion of rehab program    ANTICIPATED DISCHARGE DISPOSITION AND SERVICES  COMMUNITY SETTING: Home - Assistance    ANTICIPATED FOLLOW-UP SERVICE:   Outpatient Therapy Services: PT and OT      Home Health Services: PT and OT    DISCIPLINE SPECIFIC PLANS:  Required Disciplines & Services: Rehabillitation Nursing, Case Management and Dietay/Nutrition    REQUIRED THERAPY:  Therapy Hours per Day Days per Week Total Days   Physical Therapy 1 5 5 5   Occupational Therapy 1 5 5 5   NOTE: Additional therapy time(s) may be added as appropriate to meet patient needs and to achieve functional goals  Patient will either participate in above therapy regimen or participate in 900 minutes of therapy within 7 day week consisting of PT and OT due to the following medical procedure/condition:Debility:  17  Debility (Non-cardiac/Non-pulmonary)    ANTICIPATED FUNCTIONAL OUTCOMES:  ADL: Patient will require assist with ADLs with least restrictive device upon completion of rehab program   Bladder/Bowel: Patient will be independent with bladder/bowel management with least restrictive device upon completion of rehab program   Transfers: Patient will be independent with transfers with least restrictive device upon completion of rehab program   Locomotion: Patient will be independent with locomotion with least restrictive device upon completion of rehab program   Cognitive:   Patient will be independent for basic and complex tasks upon completion of rehab program      DISCHARGE PLANNING NEEDS  Equipment needs: Discharge needs to be reviewed with team      REHAB ANTICIPATED PARTICIPATION RESTRICTIONS:  Assist with Bathing Shower, Assist with Mobility, Assist with Tub Shower Transfer, Decreased Insight to Deficits, Decreaed Safety Awareness, Inability to Drive, Requires Assit with Homemaking, Requires Assit with Steps and Weight Bearing as tolerated

## 2022-06-20 NOTE — CONSULTS
Consultation - Nephrology   Franco Franco 55 y o  male MRN: [id number]  Unit/Bed#: -01 Encounter: [id number]      Assessment and Plan    1  Acute kidney injury, present admission, secondary to obstructive uropathy, superimposed on chronic kidney disease  Will obtain urine studies  Trend renal function  Continue to provide supportive care  Optimize hemodynamics  Maintain mean arterial pressure greater than 65 mmHg  Renally dose medications  Avoid nephrotoxins  Please discuss with renal any diuretics or possible nephrotoxic agents, such as NSAIDs or IV contrast  Recommend avoidance of PPIs in favor of H2 blocker, if appropriate for clinical scenario  Monitor for urinary retention- strict I&Os, record bladder scan PVRs and follow urinary retention protocol  2  Chronic kidney disease, stage IIIA with baseline creatinine 1 4-1 5 mg/dL  3  Obstructive uropathy status post left ureteroscopy/lithotripsy and ureteral stent exchange with subsequent removal of left nephrostomy tube on 06/09/2022 and stent removal on 06/13/2022  · Will need Urology follow-up  4  Recurrent nephrolithiasis  · Discussed with patient the importance of stone risk profile to determine etiology and prevent further hospitalizations  5  Hypertension  · Pressure is borderline elevated  Will follow with amlodipine 10 mg daily and metoprolol succinate 100 mg daily  Consider additional antihypertensive, if continued elevation  6  Morbid obesity with BMI 50  · BMI greater than 40 is associated with greater risk of progression of renal disease secondary to glomerular hyperfiltration  Recommend weight loss  Thank you for allowing us to participate in the care of your patient  Please feel free to contact us regarding the care of this patient, or any other questions/concerns that may be applicable      Patient Active Problem List   Diagnosis    Morbid obesity ([facility] [address] )    Vitamin D deficiency    History of gout    Bacteremia due to Escherichia coli    Lactic acidosis    Acute kidney injury superimposed on CKD stage 3    Severe sepsis on admission    Stage 3b chronic kidney disease ([address] )    Essential hypertension    Insomnia    Mixed hyperlipidemia    Morbid obesity    Recurrent left knee pain    Obstructive uropathy    Obstructive pyelonephritis    Anemia    Diarrhea       History of Present Illness     Physician Requesting Consult: Wilver Roca MD    Reason for Consult / Principal Problem:  Acute kidney injury    Hx and PE limited by:  None    HPI: Toño Hinkle is a 55y o  year old male who presents to [id number] [place] [address] for inpatient physical therapy  Patient was recently discharged from [address] [place] [address] for sepsis secondary to pyelonephritis in the setting of urinary obstruction  Patient underwent cystoscopy with left ureteroscopy and lithotripsy and ureteral stent exchange with removal of left nephrostomy tube and stent removal   Nephrology is consulted for acute kidney injury superimposed on stage 3 chronic kidney disease with prior baseline creatinine around 1 5 mg/dL  Patient has been seen for similar acute kidney injury with obstructive uropathy in April 2022  Patient did not follow-up for stone risk profile  Upon speaking with the patient, he denies acute complaints  History obtained from chart review and the patient  Review of Systems    Denies physical complaints  A complete 10 point review of systems was performed and is otherwise negative         Historical Information   Past Medical History:   Diagnosis Date    Arthritis     Chronic kidney disease     Gout     Hyperlipidemia     Hypertension     Kidney stone     Obstructive pyelonephritis     Sepsis [facility])      Past Surgical History:   Procedure Laterality Date    FL RETROGRADE PYELOGRAM  8/4/2019    FL RETROGRADE PYELOGRAM  9/26/2019    FL RETROGRADE PYELOGRAM  4/22/2022    IR NEPHROSTOMY TUBE PLACEMENT 5/10/2022    NO PAST SURGERIES  03/23/2015    Last assessed    [place] CYSTO/URETERO W/LITHOTRIPSY &INDWELL STENT INSRT Right 9/26/2019    Procedure: CYSTOSCOPY URETEROSCOPY WITH RETROGRADE PYELOGRAM AND INSERTION STENT URETERAL;  Surgeon: Sabrina Pelayo MD;  Location: [facility];  Service: Urology    KY CYSTO/URETERO W/LITHOTRIPSY &INDWELL STENT INSRT Left 6/9/2022    Procedure: CYSTOSCOPY LEFT URETEROSCOPY/LITHOTRIPSY HOLMIUM LASER,  AND EXCHANGE URETERAL STENT URETERAL, REMOVAL NEPHROSTOMY TUBE;  Surgeon: Akosua Figueroa MD;  [facility];  Service: Urology    KY CYSTOURETHROSCOPY,URETER CATHETER Right 8/4/2019    Procedure: CYSTOSCOPY RETROGRADE PYELOGRAM WITH INSERTION STENT URETERAL;  Surgeon: Juan Carlos Serna MD;  [facility];  Service: Urology    KY CYSTOURETHROSCOPY,URETER CATHETER Left 4/22/2022    Procedure: CYSTOSCOPY RETROGRADE PYELOGRAM WITH INSERTION STENT URETERAL;  Surgeon: Brittney Alvarez MD;  [facility];  Service: Urology     Social History   Social History     Substance and Sexual Activity   Alcohol Use Not Currently    Alcohol/week: 0 0 standard drinks    Comment: rarely     Social History     Substance and Sexual Activity   Drug Use No     Social History     Tobacco Use   Smoking Status Never Smoker   Smokeless Tobacco Never Used     Family History   Problem Relation Age of Onset    Cancer Mother     Heart disease Father     No Known Problems Sister        Meds/Allergies   all current active meds have been reviewed, current meds:   Current Facility-Administered Medications   Medication Dose Route Frequency    acetaminophen (TYLENOL) tablet 650 mg  650 mg Oral Q6H PRN    allopurinol (ZYLOPRIM) tablet 100 mg  100 mg Oral Daily    amLODIPine (NORVASC) tablet 10 mg  10 mg Oral Daily    cholecalciferol (VITAMIN D3) tablet 400 Units  400 Units Oral Daily    Diclofenac Sodium (VOLTAREN) 1 % topical gel 2 g  2 g Topical 4x Daily    [START ON 6/21/2022] metoprolol succinate (TOPROL-XL) 24 hr tablet 100 mg  100 mg Oral Daily    ondansetron (ZOFRAN-ODT) dispersible tablet 4 mg  4 mg Oral Q6H PRN    potassium chloride (K-DUR,KLOR-CON) CR tablet 10 mEq  10 mEq Oral Daily    senna (SENOKOT) tablet 17 2 mg  2 tablet Oral HS PRN    tamsulosin (FLOMAX) capsule 0 4 mg  0 4 mg Oral BID    traMADol (ULTRAM) tablet 50 mg  50 mg Oral Q6H PRN    and PTA meds:    Medications Prior to Admission   Medication    acetaminophen (TYLENOL) 325 mg tablet    allopurinol (ZYLOPRIM) 100 mg tablet    amLODIPine (NORVASC) 10 mg tablet    calcium carbonate (TUMS) 500 mg chewable tablet    cholecalciferol (VITAMIN D3) 400 units tablet    Diclofenac Sodium (VOLTAREN) 1 %    HEPARIN, PORCINE, IN NACL IJ    HYDROcodone-acetaminophen (NORCO) 5-325 mg per tablet    lidocaine (LIDODERM) 5 %    metoprolol succinate (TOPROL-XL) 25 mg 24 hr tablet    ondansetron (ZOFRAN-ODT) 4 mg disintegrating tablet    phenazopyridine (PYRIDIUM) 200 mg tablet    senna-docusate sodium (SENOKOT S) 8 6-50 mg per tablet    sodium chloride, PF, 0 9 %    tamsulosin (FLOMAX) 0 4 mg    traMADol (ULTRAM) 50 mg tablet         No Known Allergies    Objective     Intake/Output Summary (Last 24 hours) at 6/20/2022 1226  Last data filed at 6/19/2022 1700  Gross per 24 hour   Intake 590 ml   Output --   Net 590 ml       Invasive Devices:        Physical Exam  Vitals and nursing note reviewed  Constitutional:       General: He is awake  He is not in acute distress  Appearance: Normal appearance  He is well-developed  He is morbidly obese  He is ill-appearing  He is not toxic-appearing or diaphoretic  HENT:      Head: Normocephalic and atraumatic  Jaw: There is normal jaw occlusion  Nose: Nose normal       Mouth/Throat:      Mouth: Mucous membranes are moist       Pharynx: Oropharynx is clear  No oropharyngeal exudate or posterior oropharyngeal erythema     Eyes:      General: Lids are normal  Vision grossly intact  Gaze aligned appropriately  No scleral icterus  Right eye: No discharge  Left eye: No discharge  Extraocular Movements: Extraocular movements intact  Conjunctiva/sclera: Conjunctivae normal       Pupils: Pupils are equal, round, and reactive to light  Neck:      Thyroid: No thyroid mass or thyromegaly  Trachea: Trachea and phonation normal    Cardiovascular:      Rate and Rhythm: Normal rate and regular rhythm  Heart sounds: Normal heart sounds, S1 normal and S2 normal  No murmur heard  No friction rub  No gallop  Pulmonary:      Effort: Pulmonary effort is normal  No respiratory distress  Breath sounds: Normal breath sounds  No stridor  No wheezing, rhonchi or rales  Abdominal:      General: Abdomen is flat  Bowel sounds are normal  There is no distension  Palpations: Abdomen is soft  There is no mass  Tenderness: There is no abdominal tenderness  There is no guarding  Hernia: No hernia is present  Musculoskeletal:         General: Normal range of motion  Cervical back: Normal range of motion and neck supple  No rigidity or tenderness  Right lower leg: No edema  Left lower leg: No edema  Lymphadenopathy:      Cervical: No cervical adenopathy  Skin:     General: Skin is warm and dry  Coloration: Skin is not jaundiced  Findings: No bruising  Nails: There is no clubbing  Neurological:      General: No focal deficit present  Mental Status: He is alert and oriented to person, place, and time  Mental status is at baseline  Psychiatric:         Attention and Perception: Attention and perception normal          Mood and Affect: Mood and affect normal          Speech: Speech normal          Behavior: Behavior normal  Behavior is cooperative  Thought Content: Thought content normal          Judgment: Judgment normal            I/O last 3 completed shifts:   In: 1 [P O :708]  Out: - Vitals:    06/20/22 0716   BP: 168/83   Pulse: (!) 107   Resp: 18   Temp: 98 4 °F (36 9 °C)   SpO2: 100%         Current Weight: Weight - Scale: (!) 143 kg (314 lb 9 5 oz)  First Weight: Weight - Scale: (!) 146 kg (321 lb 10 4 oz)    Lab Results:  I have personally reviewed pertinent labs  CBC:   Lab Results   Component Value Date    WBC 10 36 (H) 06/20/2022    HGB 8 3 (L) 06/20/2022    HCT 28 1 (L) 06/20/2022    MCV 97 06/20/2022     06/20/2022    MCH 28 5 06/20/2022    MCHC 29 5 (L) 06/20/2022    RDW 15 9 (H) 06/20/2022    MPV 9 9 06/20/2022    NRBC 0 06/20/2022     CMP:   Lab Results   Component Value Date    K 3 6 06/20/2022     06/20/2022    CO2 20 (L) 06/20/2022    BUN 22 06/20/2022    CREATININE 2 23 (H) 06/20/2022    CALCIUM 9 1 06/20/2022    EGFR 31 06/20/2022     Phosphorus: No results found for: PHOS  Magnesium: No results found for: MG  Urinalysis:   Lab Results   Component Value Date    COLORU Yellow 06/20/2022    CLARITYU Clear 06/20/2022    SPECGRAV 1 010 06/20/2022    PHUR 7 0 06/20/2022    LEUKOCYTESUR Trace (A) 06/20/2022    NITRITE Negative 06/20/2022    GLUCOSEU Negative 06/20/2022    KETONESU Negative 06/20/2022    BILIRUBINUR Negative 06/20/2022    BLOODU Negative 06/20/2022     Ionized Calcium: No results found for: CAION  Coagulation: No results found for: PT, INR, APTT  Troponin: No results found for: TROPONINI  ABG: No results found for: PHART, TTS7IQD, PO2ART, FHJ9LJW, I7DGKGQT, BEART, SOURCE    Results from last 7 days   Lab Units 06/20/22  0459 06/18/22  0538 06/14/22  0633   POTASSIUM mmol/L 3 6 3 3* 3 2*   CHLORIDE mmol/L 104 104 108   CO2 mmol/L 20* 21 26   BUN mg/dL 22 20 23   CREATININE mg/dL 2 23* 2 50* 2 04*   CALCIUM mg/dL 9 1 8 8 9 0   ALK PHOS U/L  --  34  --    ALT U/L  --  5*  --    AST U/L  --  9*  --        Radiology review:  No results found        Mello Hernandez PA-C      Portions of the record may have been created with voice recognition software  Occasional wrong word or "sound a like" substitutions may have occurred due to the inherent limitations of voice recognition software  Read the chart carefully and recognize, using context, where substitutions have occurred

## 2022-06-20 NOTE — PCC OCCUPATIONAL THERAPY
6/20/2022: Pt participates in ADLs, transfers/ mobility, toileting and BUE therex  Pt requires assist and increase time due to decreased balance, safety, endurance and generalized strength/ ROM with increased pain RUE and LLE  Pt is making gains towards goals with current LOF as listed above  Pt will benefit from continued skilled OT services to increase independence with daily tasks  6/27/2022: Pt participates in ADLs, transfers/ mobility, toileting, light homemaking and BUE therex  Pt requires assist and increase time due to decreased balance, safety, endurance and generalized strength/ ROM with increased generalized pain LEs more than UEs  Pt is making gains towards goals with current LOF as listed above  Pt will benefit from continued skilled OT services to increase independence with daily tasks

## 2022-06-20 NOTE — PROGRESS NOTES
06/20/22 0659   Pain Assessment   Pain Assessment Tool 0-10   Pain Score 7   Pain Location/Orientation Orientation: Left; Location: Knee  (ankle)   Pain Onset/Description Frequency: Constant/Continuous; Descriptor: Aching   Patient's Stated Pain Goal No pain   Hospital Pain Intervention(s) Repositioned; Ambulation/increased activity   Restrictions/Precautions   Precautions Fall Risk   Weight Bearing Restrictions No   RLE Weight Bearing Per Order WBAT   LLE Weight Bearing Per Order WBAT   ROM Restrictions No   Cognition   Overall Cognitive Status WFL   Arousal/Participation Alert; Cooperative   Attention Within functional limits   Orientation Level Oriented X4   Memory Within functional limits   Following Commands Follows all commands and directions without difficulty   Subjective   Subjective "My L knee has been bothering me since the ride from [place] to here "   Roll Left and Right   Type of Assistance Needed Independent   Physical Assistance Level No physical assistance   Comment BSR, mod I   Roll Left and Right CARE Score 6   Sit to Lying   Type of Assistance Needed Independent   Physical Assistance Level No physical assistance   Comment HOB and BSR's, MOD I   Sit to Lying CARE Score 6   Lying to Sitting on Side of Bed   Type of Assistance Needed Independent   Physical Assistance Level No physical assistance   Comment HOB and BSR, MOD I   Lying to Sitting on Side of Bed CARE Score 6   Sit to Stand   Type of Assistance Needed Supervision   Physical Assistance Level No physical assistance   Sit to Stand CARE Score 4   Bed-Chair Transfer   Type of Assistance Needed Supervision   Physical Assistance Level No physical assistance   Comment RW   Chair/Bed-to-Chair Transfer CARE Score 4   Transfer Bed/Chair/Wheelchair   Limitations Noted In Balance; Endurance;UE Strength;LE Strength   Adaptive Equipment Roller Walker   Stand Pivot Supervision   Sit to Stand Supervision   Stand to Sit Supervision   Supine to Sit Modified Independent   Sit to Supine Modified Independent   Findings increased time required due to LLE pain in knee and ankle   Car Transfer   Reason if not Attempted Environmental limitations   Car Transfer CARE Score 10   Walk 10 Feet   Type of Assistance Needed Supervision   Physical Assistance Level No physical assistance   Comment RW   Walk 10 Feet CARE Score 4   Walk 50 Feet with Two Turns   Type of Assistance Needed Supervision   Physical Assistance Level No physical assistance   Comment RW   Walk 50 Feet with Two Turns CARE Score 4   Walk 150 Feet   Type of Assistance Needed Supervision   Physical Assistance Level No physical assistance   Comment RW   Walk 150 Feet CARE Score 4   Walking 10 Feet on Uneven Surfaces   Type of Assistance Needed Incidental touching   Physical Assistance Level 25% or less   Comment 3% ramp with RW   Walking 10 Feet on Uneven Surfaces CARE Score 3   Ambulation   Does the patient walk? 2  Yes   Primary Mode of Locomotion Prior to Admission Walk   Distance Walked (feet) 218 ft  (10ftx6)   Assist Device Roller Walker   Gait Pattern Decreased foot clearance; Slow Julieta; Wide TEO;Step through; Decreased L stance; Improper weight shift; Antalgic   Limitations Noted In Endurance;Strength;Speed;Balance   Provided Assistance with: Direction   Walk Assist Level Supervision   Findings level and unlevel surfaces   Curb or Single Stair   Style negotiated Single stair   Type of Assistance Needed Supervision   Physical Assistance Level No physical assistance   Comment B HR   1 Step (Curb) CARE Score 4   4 Steps   Type of Assistance Needed Supervision   Physical Assistance Level No physical assistance   Comment B HR   4 Steps CARE Score 4   12 Steps   Type of Assistance Needed Supervision   Physical Assistance Level No physical assistance   Comment B HR   12 Steps CARE Score 4   Stairs   Type Stairs   # of Steps 14  (both directions performed)   Weight Bearing Precautions WBAT   Assist Devices Bilateral Rail   Findings S   Picking Up Object   Type of Assistance Needed Independent   Physical Assistance Level No physical assistance   Comment MOD I supported by Carol Antonio on RW   Picking Up Object CARE Score 6   Toilet Transfer   Type of Assistance Needed Supervision   Physical Assistance Level No physical assistance   Comment RW, no assist required for clothing management or hygiene needs  Toileting performed x3 due to [employer] Score 4   Toilet Transfer   Surface Assessed Standard Toilet   Limitations Noted In Balance;UE Strength;LE Strength; Endurance   Adaptive Equipment Grab Bar  (RW)   Positioning Concerns   (raised seat)   Therapeutic Interventions   Balance gait and transfer training   Neuromuscular Re-Education dynamic standing balance activities performed including anterior weighjt shift, forward reach, and picking objects up from floor x3   Other toilet transfers x3, stair training, and ramp training   Other Comments   Comments Pt  attempted treatment with B boots and reports the need to be able to climb up and down 17 3/4 inch height step to return to work  Assessment   Treatment Assessment Pt  participated in PT treatment including bed mobility tasks, transfer traininf, toilet transfers, gait training, stair training, ramp training, and was provided with education as needed for safty and direction to improve functional mobility and activity tolerance  Pt  tolerating treatment well wearing shoes,  with insight of his own to help make gains  Pt  has made improvement with ambulation distance this session and was left in his bed positioned for comfort, set up with breakfast tray, with bed alarm activated, and all needs in reach  Continue PT treatment and progress as tolerated  Problem List Decreased strength;Decreased endurance;Decreased mobility; Impaired balance;Pain   PT Barriers   Physical Impairment Decreased strength;Decreased endurance;Decreased mobility; Impaired balance;Pain Functional Limitation Walking;Transfers;Standing;Stair negotiation;Ramp negotiation   Plan   Treatment/Interventions Functional transfer training;LE strengthening/ROM; Elevations; Therapeutic exercise; Endurance training;Patient/family training;Equipment eval/education; Bed mobility;Gait training   Progress Progressing toward goals   PT Therapy Minutes   PT Time In 0659   PT Time Out 0832   PT Total Time (minutes) 93   PT Mode of treatment - Individual (minutes) 93   PT Mode of treatment - Concurrent (minutes) 0   PT Mode of treatment - Group (minutes) 0   PT Mode of treatment - Co-treat (minutes) 0   PT Mode of Treatment - Total time(minutes) 93 minutes   PT Cumulative Minutes 93   Therapy Time missed   Time missed?  No

## 2022-06-20 NOTE — NUTRITION
06/20/22 1423   Biochemical Data,Medical Tests, and Procedures   Biochemical Data/Medical Tests/Procedures Lab values reviewed; Meds reviewed   Labs (Comment) 6/20 creat:2 23, WBC:10 36, H&H:8 3/28  1  6/18 alb:2 9   Meds (Comment) zyloprim, norvasc, Vit D, metoprolol, zofran, KCl   Nutrition-Focused Physical Exam   Nutrition-Focused Physical Exam Findings RN skin assessment reviewed  (wound left back clean/dry/intact per documentation)   Nutrition-Focused Physical Exam Findings +2 BLE edema; left nephrostomy tube removed 6/13; LBM 6/20   Medical-Related Concerns CKD, HLD, HTN, obstructive pyelonephritis, sepsis   Adequacy of Intake   Nutrition Modality PO   Feeding Route   PO Independent   Current PO Intake   Current Diet Order Regular diet thin liquids  Current Meal Intake %   Estimated calorie intake compared to estimated need Nutrient needs met  PES Statement   Problem Clinical   Biochemical (2) Altered nutrition-related laboratory values (specify) NC-2 2  (creat)   Related to Other (Comment)  (altered renal function)   As evidenced by: Abnormal lab (Specify)  (creat:2 23)   Recommendations/Interventions   Malnutrition/BMI Present Yes   Adult BMI Classifications Morbid Obesity 50-59 9   360 Statement Morbid obesity noted by physician  Summary High BMI  Regular diet thin liquids  Meal completions 100%  Patient known to this writer from previous admission  He states his appetite is okay  He reports he has been avoiding foods that cause his gout to flare up as well as iced tea  He states he has been trying to select healthier food choices and avoid salty foods/added salt  He reports at home he is a "grazer" and over eats at times  He states he was cooking for himself  6/20/#; 5/19/#, 12#(4%) loss in 1 month  4/22/#, 38#(11%) weight loss in 2 months  Patient states he has been trying to lose weight and is happy about his weight loss  +2 BLE edema  Wound left back, clean/dry/intact  Diet education previously provided  Encouraged patient to avoid high sodium foods and use of added salt  Interventions/Recommendations Continue current diet order   Education Assessment   Education Education not indicated at this time   Patient Nutrition Goals   Goal Other (Comment); Adequate hydration; Adequate intake  (improved creat)   Goal Status Initiated   Timeframe to complete goal by next f/u   Nutrition Complexity Risk   Nutrition complexity level Low risk   Follow up date 06/30/22

## 2022-06-20 NOTE — PCC CARE MANAGEMENT
Pt is known to [facility] staff from recent admission & DC; see chart notes  Pt declined for this worker to contact his parents, stating that he prefers to do so himself  Pt lives alone in a multi-story home, FF set up  There are 6 steps (4+2) to enter, HR's on the first four steps  He has a SPC & RW  He reported that he has been completely independent, driving & working FT  He expressed that he is hopeful to return to employment but is considering applying for SSDI  Reviewed role of team members & reviewed [address] with Pt, who expressed understanding & agreement  SW will continue to monitor & assist as needed with [address]  See UR section for insurance details  6/21/22 - Tx team recommendations reviewed with patient, who expressed understanding & agreement  Target DC date is 6/30/22  Team will continue to assess to determine if [facility] or [facility] will be most appropriate for Pt at DC  SW will continue to monitor & assist as needed with Tx & DC planning     6/28/22 - Tx team recommendations reviewed with patient & Pt's father, who expressed understanding & agreement  Target DC date is 6/30 between 1 and 2 PM, with [facility] (Nsg, PT, OT); a list of providers was provided to Pt & a referral being made to [facility] based on Pt preference  SW will continue to monitor & assist as needed with Tx & DC planning

## 2022-06-20 NOTE — ASSESSMENT & PLAN NOTE
· Pain management   · Xray of knee on 5/13 with moderate osteoarthritis   · Repeat x-ray on 6/21 with moderate osteoarthritis   · Continue conservative management  · US therapy to Left knee; heat and Voltaren gel   · SW performed Reiki for pain   · Orthopedic referral made at discharge

## 2022-06-20 NOTE — CASE MANAGEMENT
Initial re-assessment & re-orientation to [facility] with Pt initiated on 6/17, but completed in entirety on 6/20 per Pt preference  Pt is known to [facility] staff from recent admission & DC; see chart notes  Pt declined for this worker to contact his parents, stating that he prefers to do so himself  Pt lives alone in a multi-story home, FF set up  There are 6 steps (4+2) to enter, HR's on the first four steps  He has a SPC & RW  He reported that he has been completely independent, driving & working FT  He expressed that he is hopeful to return to employment but is considering applying for SSDI  Reviewed role of team members & reviewed [address] with Pt, who expressed understanding & agreement  SW will continue to monitor & assist as needed with [address]  See UR section for insurance details

## 2022-06-20 NOTE — PROGRESS NOTES
PM&R PROGRESS NOTE:  Cecelia Zuniga 55 y o  male MRN: [id number]  Unit/Bed#: -01 Encounter: [id number]        Rehabilitation Diagnosis: Impairment of mobility, safety and Activities of Daily Living (ADLs) due to Debility:  16  Debility (Non-cardiac/Non-pulmonary)    HPI: Cecelia Zuniga is a 55 y o  male with a PMH significant for CKD, HLD, HTN and arthritis who presented to the [place] [address] ED initially with worsening left-sided flank pain with generalized malaise/fatigue  He had a recent admission at [facility] for obstructive pyelonephritis secondary to a 3 mm stone with ureteral stent placement  Pt developed E  Coli bacteremia during hospitalization and discharged with amoxicillian until 5/4  In the ED patient was found to have lactic acidosis, leukocytosis and worsening ADRIANNE  CT revealed moderate perinephric stranding and mild hydronephrosis in the left kidney with multiple bilateral renal calculi  Patient transferred to hospitals to be evaluated by Urology  S/p perc nephrostomy tube drain replacement by IR on 5/10  Positive E  coli in one blood culture from 5/10 thought to be associated with UTI/Pyelonephritis  IV antibiotics initiated and then ultimately transitioned to PO antibiotics  During this stay, Ortho was consulted for ongoing left knee pain  His left knee was aspirated  Blood was noted but no effusion aspirated  Xray of the knee without acute osseous abnormality but did reveal tricompartmental osteoarthritis similar to a prior imaging the year prior  Ortho considering future joint steroid injection once acute medical comorbidities resolve  Patient [facility] on 5/19 after PT/OT recommendations  He progressed well however on 6/6 he developed sepsis meeting the criteria with elevation in WBCS, creatinine level, elevated temp and tachycardia  Patient also with c/o weakness, fatigue and diaphoresis  Pt was transferred to [facility] on 6/7 for urology evaluation   Urine and blood cultures obtained and IV antibiotics were initiated  Urine culture with gram negative rods of E  Coli/MDRO Citrobacter  Pt was recommended for inpatient clearance ureteroscopy  ID consulted and placed patient on Cipro PO x7 days (6/16)  Renal US unremarkable  6/9 patient went to OR with Urology for cystoscopy and left ureteroscopy/lithotripsy with ureteral stent exchange and removal of nephrostomy tube  Stent was then discontinued on 6/13  Pt is to follow up with urology as OP in 6 months with a renal US  Patient participated with PT/OT and was deemed appropriate to return to acute rehabilitation  He arrived to [facility] 6/17  SUBJECTIVE: Patient seen face to face  Patient complaining that he is not allowed to be independent at this time  Explained to patient that he needs more therapy in order to be upgraded to independent  Patient is eager for independence however, does not feel his pain to his knees and left shoulder is under well control stating "this needs to be fixed before I leave " Explained to patient Ortho would like a f/u as OP for possible steroid injection  Pt stated "I do not want a knee injection at this time " Explained to patient that Ortho does not come to [facility] anymore and he will have to follow up as an OP and pain management is the most we can provide at this time  Patient will eventually need a knee replacement due to osteoarthritis  Lifestyle modifications will need to be made with Obesity as well  Pain is multifactorial and will not be resolved with just medication management  ASSESSMENT: Stable, progressing      PLAN:    Rehabilitation  · Functional deficits: impaired mobility, self care, generalized pain     Continue current rehabilitation plan of care to maximize function       Functional update:   o PT:  Roll L-R: independent   Sit-lying: independent   Lying to sitting on side of bed: independent   Sit-stand: supervision  Bed-chair transfer: supervision  Ambulation: supervision/ CGA with uneven surfaces  Stairs: supervision  Picking up object: independent   o OT:  Toileting hygiene: supervision  Toilet transfer: supervision  Shower/bathing: s/u c/u  UB dressing: s/u c/u  LB dressing: s/u c/u  Putting on/taking off footwear: mod assist     Estimated Discharge: To be discussed this week      Pain   Tylenol 650 mg Q6H PRN for mild pain   Voltaren gel 4x daily    DVT prophylaxis   Refusing Heparin injections and SCDs    Bladder plan   Continent    Bowel plan   Continent      Obstructive pyelonephritis  Assessment & Plan  · Leading to Sepsis suspected to be caused by UTI with left nephrostomy tube and left ureteral stent  · Received IVF and Cefepime   · S/p OR with Urology for cystoscopy ureteroscopy and ureteral stent insertion with string removal of left sided nephrostomy tube   · Urine cultures with MDRO Citrobacter, ID consulted and placed on PO cipro until 6/16        Recurrent left knee pain  Assessment & Plan  · Pain management   · Xray of knee on 5/13 with moderate osteoarthritis   · Patient refusing knee injection at this time although it was explained that Ortho performs these and cannot do this until seen as an OP   · Patient wants this issue resolved prior to leaving   Explained to patient that when Ortho aspirated patient they wanted to f/u as OP for possible steroid injection to the knee due to Osteoarthritis  · Pain is likely multifactorial in setting of osteoarthritis, obesity and lack of mobility with ongoing hospitalization stay     Morbid obesity  Assessment & Plan  · Lifestyle modifications       Essential hypertension  Assessment & Plan  · Monitor vitals  · IM to follow  · Continue Metoprolol 25 mg daily and Norvasc 10 mg daily        Stage 3b chronic kidney disease [facility])  Assessment & Plan  Lab Results   Component Value Date    EGFR 31 06/20/2022    EGFR 27 06/18/2022    EGFR 35 06/14/2022    CREATININE 2 23 (H) 06/20/2022 CREATININE 2 50 (H) 06/18/2022    CREATININE 2 04 (H) 06/14/2022   · Baseline creatinine 1 4-1 7  · Creatinine up to 2 23 on 6/20, consulted Nephrology    * Obstructive uropathy  Assessment & Plan  · s/p cystoscopy left ureteroscopy/lithotripsy and exchange ureteral stent, removal nephrostomy tube left on 6/9  · stent removal on 6/13  · OP follow up with Urology in 6 weeks with renal US           Appreciate IM consultants medical co-management  Labs, medications, and imaging personally reviewed  ROS:  A ten point review of systems was completed and pertinent positives are listed in subjective section  All other systems reviewed were negative  Review of Systems   Constitutional: Negative  Negative for appetite change, fatigue and fever  HENT: Negative  Negative for trouble swallowing  Eyes: Negative  Negative for visual disturbance  Respiratory: Negative  Negative for shortness of breath  Cardiovascular: Negative  Negative for chest pain  Gastrointestinal: Negative  Negative for abdominal pain and constipation  Genitourinary: Negative  Negative for difficulty urinating  Musculoskeletal: Positive for arthralgias and myalgias  Skin: Negative  Neurological: Negative  Psychiatric/Behavioral: Negative  OBJECTIVE:   /83 (BP Location: Left arm)   Pulse (!) 107   Temp 98 4 °F (36 9 °C) (Temporal)   Resp 18   Ht 5' 6" (1 676 m)   Wt (!) 143 kg (314 lb 9 5 oz)   SpO2 100%   BMI 50 78 kg/m²     Physical Exam  Vitals reviewed  Constitutional:       General: He is not in acute distress  Appearance: Normal appearance  He is obese  He is not ill-appearing  HENT:      Head: Normocephalic and atraumatic  Right Ear: External ear normal       Left Ear: External ear normal       Nose: Nose normal    Eyes:      Pupils: Pupils are equal, round, and reactive to light  Cardiovascular:      Rate and Rhythm: Normal rate and regular rhythm  Pulses: Normal pulses  Heart sounds: Normal heart sounds  No murmur heard  Pulmonary:      Effort: Pulmonary effort is normal  No respiratory distress  Breath sounds: Normal breath sounds  No wheezing  Abdominal:      General: Bowel sounds are normal  There is no distension  Palpations: Abdomen is soft  Tenderness: There is no abdominal tenderness  Musculoskeletal:         General: Normal range of motion  Cervical back: Normal range of motion  Right lower leg: Edema present  Left lower leg: Edema present  Skin:     General: Skin is warm  Neurological:      General: No focal deficit present  Mental Status: He is alert and oriented to person, place, and time     Psychiatric:         Mood and Affect: Mood normal           Lab Results   Component Value Date    WBC 10 36 (H) 06/20/2022    HGB 8 3 (L) 06/20/2022    HCT 28 1 (L) 06/20/2022    MCV 97 06/20/2022     06/20/2022     Lab Results   Component Value Date    SODIUM 136 06/20/2022    K 3 6 06/20/2022     06/20/2022    CO2 20 (L) 06/20/2022    BUN 22 06/20/2022    CREATININE 2 23 (H) 06/20/2022    GLUC 93 06/20/2022    CALCIUM 9 1 06/20/2022     Lab Results   Component Value Date    INR 1 27 (H) 05/10/2022    INR 1 09 05/10/2022    INR 1 33 (H) 08/24/2019    PROTIME 15 4 (H) 05/10/2022    PROTIME 13 5 05/10/2022    PROTIME 16 6 (H) 08/24/2019           Current Facility-Administered Medications:     acetaminophen (TYLENOL) tablet 650 mg, 650 mg, Oral, Q6H PRN, Loulou Hall PA-C, 650 mg at 06/19/22 1349    allopurinol (ZYLOPRIM) tablet 100 mg, 100 mg, Oral, Daily, Loulou Hall PA-C, 100 mg at 06/20/22 0904    amLODIPine (NORVASC) tablet 10 mg, 10 mg, Oral, Daily, Loulou Hall PA-C, 10 mg at 06/20/22 8500    cholecalciferol (VITAMIN D3) tablet 400 Units, 400 Units, Oral, Daily, Loulou Hall PA-C, 400 Units at 06/20/22 3008    Diclofenac Sodium (VOLTAREN) 1 % topical gel 2 g, 2 g, Topical, 4x Daily, Diego Oyster VERÓNICA Michaels    [START ON 6/21/2022] metoprolol succinate (TOPROL-XL) 24 hr tablet 100 mg, 100 mg, Oral, Daily, Pravin Metz PA-C    ondansetron (ZOFRAN-ODT) dispersible tablet 4 mg, 4 mg, Oral, Q6H PRN, Pravin Metz PA-C    potassium chloride (K-DUR,KLOR-CON) CR tablet 10 mEq, 10 mEq, Oral, Daily, Pravin Metz PA-C, 10 mEq at 06/20/22 3057    senna (SENOKOT) tablet 17 2 mg, 2 tablet, Oral, HS PRN, Pravin Metz PA-C    tamsulosin (FLOMAX) capsule 0 4 mg, 0 4 mg, Oral, BID, Pravin Metz PA-C    traMADol (ULTRAM) tablet 50 mg, 50 mg, Oral, Q6H PRN, Pravin Metz PA-C, 50 mg at 06/20/22 7273    Past Medical History:   Diagnosis Date    Arthritis     Chronic kidney disease     Gout     Hyperlipidemia     Hypertension     Kidney stone     Obstructive pyelonephritis     Sepsis [facility])        Patient Active Problem List    Diagnosis Date Noted    Diarrhea 06/13/2022    Anemia 05/13/2022    Obstructive pyelonephritis 04/23/2022    Obstructive uropathy 04/21/2022    Mixed hyperlipidemia 06/02/2021    Morbid obesity 06/02/2021    Recurrent left knee pain 06/02/2021    Insomnia 09/16/2019    Essential hypertension 08/27/2019    Severe sepsis on admission 08/23/2019    Stage 3b chronic kidney disease ([facility] [address] ) 08/23/2019    Acute kidney injury superimposed on CKD stage 3 08/06/2019    Bacteremia due to Escherichia coli 08/04/2019    Lactic acidosis 08/04/2019    History of gout 07/15/2019    Vitamin D deficiency 06/10/2015    Morbid obesity ([facility] [address] ) 03/23/2015          Pravin Metz PA-C    Total time spent:  30 minutes with more than 50% spent counseling/coordinating care  Counseling includes discussion with patient re: progress and discussion with patient of his/her current medical/functional state/information  Coordination of patient's care was performed in conjunction with consulting services   Time invested included review of patient's labs, vitals, and management of their comorbidities with continued monitoring  The care of the patient was extensively discussed and appropriate treatment plan was formulated unique for this patient  ** Please Note:  voice to text software may have been used in the creation of this document   Although proof errors in transcription or interpretation are a potential of such software**

## 2022-06-20 NOTE — NURSING NOTE
Pt up to BR multiple times overnight  C/o increased flomax dose causing increased urinary frequency  Pt ambulates with walker and supervision  Requested prn tramadol x 1 for relief of L knee pain

## 2022-06-20 NOTE — PCC PHYSICAL THERAPY
6/21/2022:  Patient seen for IE with [facility] PT and has been progressing steadily  Presents following obstructive uropathy resulting in nephrostomy removal, prolonged immobility with decreased ROM/strength, decreased balance and safety, decreased endurance, and pain  Patient mod I bed mobility, supervision transfers with RW, supervision  ambulation up to 218 feet on level and unlevel surfaces with RW, supervision with bilateral HRs for negotiation of 14 steps with bilateral handrails  Patient would benefit from continued inpatient [facility] PT to increase function, safety, and increased independence in prep for safe d/c to home  6/28/2022:  Patient has been progressing slowly, but steadily  Self limiting at times  Presents following obstructive uropathy resulting in nephrostomy removal, prolonged immobility with decreased ROM/strength, decreased balance and safety, decreased endurance, and pain  Patient mod I bed mobility, mod I with transfers with RW, supervision  ambulation up to  feet on level and unlevel surfaces with RW, supervision with bilateral HRs for negotiation of 14 steps with bilateral handrails  Patient would benefit from continued inpatient [facility] PT to increase function, safety, and increased independence in prep for safe d/c to home

## 2022-06-20 NOTE — PROGRESS NOTES
06/20/22 1231   Pain Assessment   Pain Assessment Tool 0-10   Pain Score 3   Pain Location/Orientation Orientation: Right;Orientation: Upper   Pain 2   Pain Score 2 7   Pain Location/Orientation 2 Orientation: Left; Location: Leg;Location: Knee   Restrictions/Precautions   Precautions Fall Risk;Contact/isolation  (MDRO urine, I & O)   RLE Weight Bearing Per Order WBAT   LLE Weight Bearing Per Order WBAT   Grooming   Able To Initiate Tasks; Wash/Dry Hands   Findings supervision standing at the sink   Lying to Sitting on Side of Bed   Type of Assistance Needed Independent   Lying to Sitting on Side of Bed CARE Score 6   Sit to Stand   Type of Assistance Needed Supervision   Sit to Stand CARE Score 4   Bed-Chair Transfer   Type of Assistance Needed Supervision   Chair/Bed-to-Chair Transfer CARE Score 4   Toileting Hygiene   Type of Assistance Needed Physical assistance   Physical Assistance Level 25% or less   Comment min A thoroughness following med BM with patient attempting hygiene with difficulty noted due to decreased generalized ROM/ strength, 150 cc urine output measured   [address] Score 3   Toileting   Able to [address] down yes, up yes  Manage Hygiene Bladder; Bowel   Limitations Noted In Balance;ROM;Problem Solving; Safety;LE Strength   Adaptive Equipment Grab Bar   Toilet Transfer   Type of Assistance Needed Supervision   Toilet Transfer CARE Score 4   Toilet Transfer   Surface Assessed Raised Toilet   Transfer Technique Standard   Limitations Noted In Balance; Endurance;ROM;Problem Solving; Safety;LE Strength   Adaptive Equipment Grab Bar;Walker   Exercise Tools   Theraputty card matc reaching BUE while seated focusing on BUE strength and ROM BUE   Hand Gripper gripper iwth pegs BUE following retrieval from the floor using the reacher in the LUE   Other Exercise Tool 1 Sanderbox 2 sets 15 all direcrions with BUE and 2# wt   Other Exercise Tool 2 AAROM B shoulder including shoulder flexion/ extensiona nd R shoulde ronyl for abd/ add and chest press allowing for increased ROM with decreased pain during ROM   Other Exercise Tool 3 yellow flex therabar up and down 2 sets 15 BUE   Additional Activities   Additional Activities Other (Comment)   Additional Activities Comments fxl mobility to and frmo BR with RW and Supervision with pt educated to ring for assist and supervision allowing for TAB alarm to be placed in off position; Mod I in hallway w/c mobility to and from OT gym   Assessment   Treatment Assessment Pt presents supine agreeable to OT session including AAROM BUE mostly RUE while supine before transitioning to BR for toileting and then w/c for BUE therex while seated in OT gym  Pt tolerates session without increased c/o pain and is making gains towards goals  Pt requires assist due to decreased balance, safety, endurance and generalized strength/ ROM  Pt will benefit from continued skilled OT services to increase independence with daily tasks  Problem List Decreased strength;Decreased range of motion;Decreased endurance; Impaired balance;Decreased safety awareness;Pain   Plan   Treatment/Interventions ADL retraining;Functional transfer training; Therapeutic exercise; Endurance training;Patient/family training;Equipment eval/education; Compensatory technique education   Progress Progressing toward goals   OT Therapy Minutes   OT Time In 1231   OT Time Out 1407   OT Total Time (minutes) 96   OT Mode of treatment - Individual (minutes) 96   Therapy Time missed   Time missed?  No

## 2022-06-20 NOTE — PROGRESS NOTES
Progress Note - Internal Medicine   Jenean Osler 55 y o  male MRN: [id number]  Unit/Bed#: -01 Encounter: [id number]    A/P:  1  Hypertension: continue to monitor and adjust medication as needed   2  Gout: continue allopurinol and monitor for acute flare   3  Urinary retention: currently utilizing flomax 0 4mg bid   4  Obesity: encourage healthy diet and lifestyle along with increased activity   5  Sepsis r/t pylenephritis: continue to monitor for s/s of recurrent infection; has recently completed oral abx therapy; currently needs pt/ot as per Physiatry   Follow up reason for today's visit: hypertension/gout/urinary retention     Obstructive uropathy    Patient Active Problem List   Diagnosis    Morbid obesity ([facility] )    Vitamin D deficiency    History of gout    Bacteremia due to Escherichia coli    Lactic acidosis    Acute kidney injury superimposed on CKD stage 3    Severe sepsis on admission    [facility]    Essential hypertension    Insomnia    Mixed hyperlipidemia    Morbid obesity    Recurrent left knee pain    Obstructive uropathy    Obstructive pyelonephritis    Anemia    Diarrhea         Subjective:   Mr Romero Perkins is evaluated today resting comfortably in his bed  He is in no acute distress and is cooperative with the exam  He is participating in therapy services and complains of diffuse joint discomfort  He denies any fever, chills, chest pain, sob, n/v/d or abdominal pain  He feels his appetite and moods are good  Objective:     Vitals: Blood pressure 168/83, pulse (!) 107, temperature 98 4 °F (36 9 °C), temperature source Temporal, resp  rate 18, height 5' 6" (1 676 m), weight (!) 143 kg (314 lb 9 5 oz), SpO2 100 %  ,Body mass index is 50 78 kg/m²      Weight (last 2 days)     Date/Time Weight    06/20/22 0500 143 (314 6)            Intake/Output Summary (Last 24 hours) at 6/20/2022 1407  Last data filed at 6/20/2022 1300  Gross per 24 hour   Intake 840 ml Output 150 ml   Net 690 ml     I/O last 3 completed shifts: In: 1 [P O :708]  Out: -          Physical Exam: /83 (BP Location: Left arm)   Pulse (!) 107   Temp 98 4 °F (36 9 °C) (Temporal)   Resp 18   Ht 5' 6" (1 676 m)   Wt (!) 143 kg (314 lb 9 5 oz)   SpO2 100%   BMI 50 78 kg/m²     General Appearance:    Alert, cooperative, no distress, appears stated age   Head:    Normocephalic, without obvious abnormality, atraumatic   Eyes:    Conjunctiva/corneas clear   Ears:    Normal external ears   Nose:   Nares normal, septum midline, mucosa normal, no drainage    or sinus tenderness   Throat:   Lips, mucosa, and tongue normal; teeth and gums normal   Neck:   Supple, symmetrical, trachea midline, no adenopathy;        thyroid:  No enlargement/tenderness/nodules; no carotid    bruit or JVD   Back:     Symmetric, no curvature, ROM normal, no CVA tenderness   Lungs:     Clear to auscultation bilaterally, respirations unlabored   Chest wall:    No tenderness or deformity   Heart:    Regular rate and rhythm, S1 and S2 normal, no murmur, rub   or gallop   Abdomen:     Soft, non-tender, bowel sounds active   Extremities:   Extremities normal, atraumatic, no cyanosis or edema   Skin:   Skin color, texture, turgor normal, no rashes or lesions   Lymph nodes:   Cervical normal   Neurologic:   CNII-XII intact           Lab, Imaging and other studies: I have personally reviewed pertinent labs  CBC:   Lab Results   Component Value Date    WBC 10 36 (H) 06/20/2022    HGB 8 3 (L) 06/20/2022    HCT 28 1 (L) 06/20/2022    MCV 97 06/20/2022     06/20/2022    MCH 28 5 06/20/2022    MCHC 29 5 (L) 06/20/2022    RDW 15 9 (H) 06/20/2022    MPV 9 9 06/20/2022    NRBC 0 06/20/2022     CMP:   Lab Results   Component Value Date    K 3 6 06/20/2022     06/20/2022    CO2 20 (L) 06/20/2022    BUN 22 06/20/2022    CREATININE 2 23 (H) 06/20/2022    CALCIUM 9 1 06/20/2022    EGFR 31 06/20/2022           Results from last 7 days   Lab Units 06/20/22  0459 06/18/22  0538 06/14/22  0633   POTASSIUM mmol/L 3 6 3 3* 3 2*   CHLORIDE mmol/L 104 104 108   CO2 mmol/L 20* 21 26   BUN mg/dL 22 20 23   CREATININE mg/dL 2 23* 2 50* 2 04*   CALCIUM mg/dL 9 1 8 8 9 0   ALK PHOS U/L  --  34  --    ALT U/L  --  5*  --    AST U/L  --  9*  --          Phosphorus: No results found for: PHOS  Magnesium: No results found for: MG  Urinalysis:   Lab Results   Component Value Date    COLORU Yellow 06/20/2022    CLARITYU Clear 06/20/2022    SPECGRAV 1 010 06/20/2022    PHUR 7 0 06/20/2022    LEUKOCYTESUR Trace (A) 06/20/2022    NITRITE Negative 06/20/2022    GLUCOSEU Negative 06/20/2022    KETONESU Negative 06/20/2022    BILIRUBINUR Negative 06/20/2022    BLOODU Negative 06/20/2022     Ionized Calcium: No results found for: CAION  Coagulation: No results found for: PT, INR, APTT  Troponin: No results found for: TROPONINI  ABG: No results found for: PHART, MXR7LZW, PO2ART, FKR2TNT, S2SPPRLO, BEART, SOURCE  Radiology review:     IMAGING  No results found      Current Facility-Administered Medications   Medication Dose Route Frequency    acetaminophen (TYLENOL) tablet 650 mg  650 mg Oral Q6H PRN    allopurinol (ZYLOPRIM) tablet 100 mg  100 mg Oral Daily    amLODIPine (NORVASC) tablet 10 mg  10 mg Oral Daily    cholecalciferol (VITAMIN D3) tablet 400 Units  400 Units Oral Daily    Diclofenac Sodium (VOLTAREN) 1 % topical gel 2 g  2 g Topical 4x Daily PRN    [START ON 6/21/2022] metoprolol succinate (TOPROL-XL) 24 hr tablet 100 mg  100 mg Oral Daily    ondansetron (ZOFRAN-ODT) dispersible tablet 4 mg  4 mg Oral Q6H PRN    potassium chloride (K-DUR,KLOR-CON) CR tablet 10 mEq  10 mEq Oral Daily    senna (SENOKOT) tablet 17 2 mg  2 tablet Oral HS PRN    tamsulosin (FLOMAX) capsule 0 4 mg  0 4 mg Oral BID    traMADol (ULTRAM) tablet 50 mg  50 mg Oral Q6H PRN     Medications Discontinued During This Encounter   Medication Reason    senna (SENOKOT) tablet 17 2 mg     metoprolol succinate (TOPROL-XL) 24 hr tablet 25 mg     tamsulosin (FLOMAX) capsule 0 4 mg     tamsulosin (FLOMAX) capsule 0 8 mg     metoprolol succinate (TOPROL-XL) 24 hr tablet 50 mg     Diclofenac Sodium (VOLTAREN) 1 % topical gel 2 g        RITA Padilla      This progress note was produced in part using a dictation device which may document imprecise wording from author's original intent

## 2022-06-21 ENCOUNTER — APPOINTMENT (INPATIENT)
Dept: RADIOLOGY | Facility: HOSPITAL | Age: 57
DRG: 949 | End: 2022-06-21
Payer: COMMERCIAL

## 2022-06-21 LAB
ALBUMIN SERPL BCP-MCNC: 3 G/DL (ref 3.5–5)
ALP SERPL-CCNC: 39 U/L (ref 34–104)
ALT SERPL W P-5'-P-CCNC: 8 U/L (ref 7–52)
ANION GAP SERPL CALCULATED.3IONS-SCNC: 12 MMOL/L (ref 4–13)
AST SERPL W P-5'-P-CCNC: 11 U/L (ref 13–39)
ATRIAL RATE: 105 BPM
BASOPHILS # BLD AUTO: 0.03 THOUSANDS/ΜL (ref 0–0.1)
BASOPHILS NFR BLD AUTO: 0 % (ref 0–1)
BILIRUB SERPL-MCNC: 0.57 MG/DL (ref 0.2–1)
BUN SERPL-MCNC: 22 MG/DL (ref 5–25)
CALCIUM ALBUM COR SERPL-MCNC: 10.2 MG/DL (ref 8.3–10.1)
CALCIUM SERPL-MCNC: 9.4 MG/DL (ref 8.4–10.2)
CHLORIDE SERPL-SCNC: 102 MMOL/L (ref 96–108)
CO2 SERPL-SCNC: 20 MMOL/L (ref 21–32)
CREAT SERPL-MCNC: 2.23 MG/DL (ref 0.6–1.3)
EOSINOPHIL # BLD AUTO: 0.56 THOUSAND/ΜL (ref 0–0.61)
EOSINOPHIL NFR BLD AUTO: 6 % (ref 0–6)
ERYTHROCYTE [DISTWIDTH] IN BLOOD BY AUTOMATED COUNT: 15.9 % (ref 11.6–15.1)
GFR SERPL CREATININE-BSD FRML MDRD: 31 ML/MIN/1.73SQ M
GLUCOSE SERPL-MCNC: 127 MG/DL (ref 65–140)
HCT VFR BLD AUTO: 27.6 % (ref 36.5–49.3)
HGB BLD-MCNC: 8.3 G/DL (ref 12–17)
IMM GRANULOCYTES # BLD AUTO: 0.07 THOUSAND/UL (ref 0–0.2)
IMM GRANULOCYTES NFR BLD AUTO: 1 % (ref 0–2)
LYMPHOCYTES # BLD AUTO: 1.57 THOUSANDS/ΜL (ref 0.6–4.47)
LYMPHOCYTES NFR BLD AUTO: 15 % (ref 14–44)
MAGNESIUM SERPL-MCNC: 1.8 MG/DL (ref 1.9–2.7)
MCH RBC QN AUTO: 28.7 PG (ref 26.8–34.3)
MCHC RBC AUTO-ENTMCNC: 30.1 G/DL (ref 31.4–37.4)
MCV RBC AUTO: 96 FL (ref 82–98)
MONOCYTES # BLD AUTO: 0.54 THOUSAND/ΜL (ref 0.17–1.22)
MONOCYTES NFR BLD AUTO: 5 % (ref 4–12)
NEUTROPHILS # BLD AUTO: 7.4 THOUSANDS/ΜL (ref 1.85–7.62)
NEUTS SEG NFR BLD AUTO: 73 % (ref 43–75)
NRBC BLD AUTO-RTO: 0 /100 WBCS
P AXIS: 58 DEGREES
PHOSPHATE SERPL-MCNC: 3.7 MG/DL (ref 2.7–4.5)
PLATELET # BLD AUTO: 374 THOUSANDS/UL (ref 149–390)
PMV BLD AUTO: 10.1 FL (ref 8.9–12.7)
POTASSIUM SERPL-SCNC: 3.8 MMOL/L (ref 3.5–5.3)
PR INTERVAL: 146 MS
PROT SERPL-MCNC: 7.8 G/DL (ref 6.4–8.4)
QRS AXIS: -13 DEGREES
QRSD INTERVAL: 78 MS
QT INTERVAL: 320 MS
QTC INTERVAL: 422 MS
RBC # BLD AUTO: 2.89 MILLION/UL (ref 3.88–5.62)
SODIUM SERPL-SCNC: 134 MMOL/L (ref 135–147)
T WAVE AXIS: 15 DEGREES
VENTRICULAR RATE: 105 BPM
WBC # BLD AUTO: 10.17 THOUSAND/UL (ref 4.31–10.16)

## 2022-06-21 PROCEDURE — 93005 ELECTROCARDIOGRAM TRACING: CPT

## 2022-06-21 PROCEDURE — 84100 ASSAY OF PHOSPHORUS: CPT | Performed by: PHYSICIAN ASSISTANT

## 2022-06-21 PROCEDURE — 80053 COMPREHEN METABOLIC PANEL: CPT | Performed by: PHYSICIAN ASSISTANT

## 2022-06-21 PROCEDURE — 97140 MANUAL THERAPY 1/> REGIONS: CPT

## 2022-06-21 PROCEDURE — 99233 SBSQ HOSP IP/OBS HIGH 50: CPT | Performed by: PHYSICAL MEDICINE & REHABILITATION

## 2022-06-21 PROCEDURE — 97535 SELF CARE MNGMENT TRAINING: CPT

## 2022-06-21 PROCEDURE — 83735 ASSAY OF MAGNESIUM: CPT | Performed by: PHYSICIAN ASSISTANT

## 2022-06-21 PROCEDURE — 93010 ELECTROCARDIOGRAM REPORT: CPT | Performed by: INTERNAL MEDICINE

## 2022-06-21 PROCEDURE — 97530 THERAPEUTIC ACTIVITIES: CPT

## 2022-06-21 PROCEDURE — 85025 COMPLETE CBC W/AUTO DIFF WBC: CPT | Performed by: PHYSICIAN ASSISTANT

## 2022-06-21 PROCEDURE — 97110 THERAPEUTIC EXERCISES: CPT

## 2022-06-21 PROCEDURE — 99232 SBSQ HOSP IP/OBS MODERATE 35: CPT | Performed by: PHYSICIAN ASSISTANT

## 2022-06-21 PROCEDURE — 73564 X-RAY EXAM KNEE 4 OR MORE: CPT

## 2022-06-21 RX ADMIN — TRAMADOL HYDROCHLORIDE 50 MG: 50 TABLET, COATED ORAL at 09:05

## 2022-06-21 RX ADMIN — ACETAMINOPHEN 650 MG: 325 TABLET ORAL at 13:48

## 2022-06-21 RX ADMIN — TRAMADOL HYDROCHLORIDE 50 MG: 50 TABLET, COATED ORAL at 17:37

## 2022-06-21 RX ADMIN — METOPROLOL SUCCINATE 100 MG: 50 TABLET, EXTENDED RELEASE ORAL at 09:04

## 2022-06-21 RX ADMIN — CHOLECALCIFEROL TAB 10 MCG (400 UNIT) 400 UNITS: 10 TAB at 09:04

## 2022-06-21 RX ADMIN — ALLOPURINOL 100 MG: 100 TABLET ORAL at 09:04

## 2022-06-21 RX ADMIN — DICLOFENAC SODIUM 2 G: 10 GEL TOPICAL at 13:52

## 2022-06-21 RX ADMIN — TRAMADOL HYDROCHLORIDE 50 MG: 50 TABLET, COATED ORAL at 23:31

## 2022-06-21 RX ADMIN — DICLOFENAC SODIUM 2 G: 10 GEL TOPICAL at 10:06

## 2022-06-21 RX ADMIN — AMLODIPINE BESYLATE 10 MG: 10 TABLET ORAL at 09:04

## 2022-06-21 RX ADMIN — ACETAMINOPHEN 650 MG: 325 TABLET ORAL at 20:24

## 2022-06-21 RX ADMIN — POTASSIUM CHLORIDE 10 MEQ: 750 TABLET, EXTENDED RELEASE ORAL at 09:04

## 2022-06-21 RX ADMIN — TAMSULOSIN HYDROCHLORIDE 0.4 MG: 0.4 CAPSULE ORAL at 09:04

## 2022-06-21 NOTE — NURSING NOTE
Tylenol po given earlier for c/o left knee pain, voltaren gel to same per request   See pain reassessment  Continue same plan of care  No change from previous assessment  Liliam PEDRAZA aware of labs done today

## 2022-06-21 NOTE — CASE MANAGEMENT
Tx team recommendations reviewed with patient, who expressed understanding & agreement  Target DC date is 6/30/22  Team will continue to assess to determine if [place] [id number] or outpatient will be most appropriate for Pt at DC  SW will continue to monitor & assist as needed with Tx & DC planning

## 2022-06-21 NOTE — NURSING NOTE
Pt up to urinate every hour or less throughout the night  Incont of small amount of urine with each trip  Pt states when he sits up he "dribbles a little"  Pt states this is new as he hasn't had incont issues in the past   Pt ambulates to BR supervision with RW but does require minimal assistance with lower body dressing  Will continue to monitor and follow plan of care

## 2022-06-21 NOTE — PCC NURSING
6/21/22 Pt re admitted after Nephrostomy and stents removed  Pt was sent out for sepsis alert  Pt is A+Ox4 on contact for MDRO  Lungs are clear on RA and HRR, runs tachy at times  B/L LE edema+1  Pt is cont B+B, frequent urination, being followed by nephrology  Pt c/o B/L ankle and knee pain for which he receives PRN Tramadol and Tylenol for  Pt is independent with all bed mobility and supervision with RW for ambulation and transfers  No falls  6/27/22 Pt re admitted after Nephrostomy and stents removed  Pt was sent out for sepsis alert  Pt is A+Ox4 on contact for MDRO  Lungs are clear on RA and HRR, runs tachy at times  B/L LE edema+1  Pt is cont B+B, frequent urination, being followed by nephrology  Pt c/o B/L ankle and knee pain for which he receives PRN Tramadol and Tylenol for  Pt is independent with all bed mobility and supervision with RW for ambulation and transfers  No falls

## 2022-06-21 NOTE — PROGRESS NOTES
PM&R PROGRESS NOTE:  Sherita Smith 55 y o  male MRN: [id number]  Unit/Bed#: -01 Encounter: [id number]        Rehabilitation Diagnosis: Impairment of mobility, safety and Activities of Daily Living (ADLs) due to Debility:  16  Debility (Non-cardiac/Non-pulmonary)    HPI: Sherita Smith is a 55 y o  male with a PMH significant for CKD, HLD, HTN and arthritis who presented to the [place] [address] ED initially with worsening left-sided flank pain with generalized malaise/fatigue  He had a recent admission at [facility] for obstructive pyelonephritis secondary to a 3 mm stone with ureteral stent placement  Pt developed E  Coli bacteremia during hospitalization and discharged with amoxicillian until 5/4  In the ED patient was found to have lactic acidosis, leukocytosis and worsening ADRIANNE  CT revealed moderate perinephric stranding and mild hydronephrosis in the left kidney with multiple bilateral renal calculi  Patient transferred to [facility] to be evaluated by Urology  S/p perc nephrostomy tube drain replacement by IR on 5/10  Positive E  coli in one blood culture from 5/10 thought to be associated with UTI/Pyelonephritis  IV antibiotics initiated and then ultimately transitioned to PO antibiotics  During this stay, Ortho was consulted for ongoing left knee pain  His left knee was aspirated  Blood was noted but no effusion aspirated  Xray of the knee without acute osseous abnormality but did reveal tricompartmental osteoarthritis similar to a prior imaging the year prior  Ortho considering future joint steroid injection once acute medical comorbidities resolve  Patient arrived to [facility] on 5/19 after PT/OT recommendations  He progressed well however on 6/6 he developed sepsis meeting the criteria with elevation in WBCS, creatinine level, elevated temp and tachycardia  Patient also with c/o weakness, fatigue and diaphoresis  Pt was transferred to [facility] on 6/7 for urology evaluation   Urine and blood cultures obtained and IV antibiotics were initiated  Urine culture with gram negative rods of E  Coli/MDRO Citrobacter  Pt was recommended for inpatient clearance ureteroscopy  ID consulted and placed patient on Cipro PO x7 days (6/16)  Renal US unremarkable  6/9 patient went to OR with Urology for cystoscopy and left ureteroscopy/lithotripsy with ureteral stent exchange and removal of nephrostomy tube  Stent was then discontinued on 6/13  Pt is to follow up with urology as OP in 6 months with a renal US  Patient participated with PT/OT and was deemed appropriate to return to acute rehabilitation  He arrived to [facility] 6/17  SUBJECTIVE: Patient seen face to face  Pt c/o worsening left knee pain  X-ray obtained with moderate osteoarthritis  No signs of gout noted and patient is taking Allopurinol  White count noted to be improving  He did have an episode of chills earlier today  He remains afebrile  Tachycardic, ekg obtained with sinus tachycardia  Encouraged patient to use Voltaren gel  He is agreeable to try US therapy and ACE wraps  ASSESSMENT: Stable, progressing      PLAN:    Rehabilitation  · Functional deficits: impaired mobility, self care, generalized pain     Continue current rehabilitation plan of care to maximize function       Functional update:   o PT:  Updated below  o OT:  Updated below    Physical therapy Occupational therapy    Weight Bearing Status: Weight Bearing as Tolerated  Transfers: Supervision  Bed Mobility: Independent (mod I with side rail)  Amulation Distance (ft): 218 feet  Ambulation: Supervision  Assistive Device for Ambulation: Roller Walker  Number of Stairs: 14  Assistive Device for Stairs: Bilateral [employer]  Stair Assistance: Supervision  Ramp: Incidental Touching (CGA)  Assistive Device for Ramp: Roller Walker  Discharge Recommendations: Home with:  [address] with[de-identified] Family Support, Home Physical Therapy Eating: Independent  Grooming: Supervision  Bathing: Supervision, Minimal Assistance  Bathing: Supervision, Minimal Assistance  Upper Body Dressing: Supervision  Lower Body Dressing: Supervision, Minimal Assistance  Toileting: Supervision, Minimal Assistance (supervision urination, min A hygiene after BM)  Tub/Shower Transfer: Supervision, Incidental Touching  Toilet Transfer: Supervision  Cognition: Within Defined Limits  Orientation: Person, Place, Time, Situation  Discharge Recommendations: Home with:  [address] with[de-identified] Home Occupational Therapy, First Floor Setup, Outpatient Occupational Therapy (home health versus outpatient)      This patient was discussed by the Interdisciplinary Team in weekly case conference today  The care of the patient was extensively discussed with all care providers and an appropriate rehabilitation plan was formulated unique for this patient  Barriers were identified preventing progression of therapy and appropriate interventions were discussed with each discipline  Please see the team note for input from all disciplines regarding barriers, intervention, and discharge planning       Total time spent: 35 Mins, and greater than 50% of this time was spent counseling/coordinating care     Estimated Discharge: 6/30/22 with [facility] PT/OT/N vs OP depending on progression       Pain   Tylenol 650 mg Q6H PRN for mild pain   Voltaren gel 4x daily    DVT prophylaxis   Refusing Heparin injections and SCDs    Bladder plan   Continent    Bowel plan   Continent      Obstructive pyelonephritis  Assessment & Plan  · Leading to Sepsis suspected to be caused by UTI with left nephrostomy tube and left ureteral stent  · Received IVF and Cefepime   · S/p OR with Urology for cystoscopy ureteroscopy and ureteral stent insertion with string removal of left sided nephrostomy tube   · Urine cultures with MDRO Citrobacter, ID consulted and placed on PO cipro until 6/16        Recurrent left knee pain  Assessment & Plan  · Pain management   · Xray of knee on 5/13 with moderate osteoarthritis   · Repeat x-ray on 6/21 with moderate osteoarthritis   · Continue conservative management  · May trial US therapy to Left knee      Morbid obesity  Assessment & Plan  · Lifestyle modifications       Essential hypertension  Assessment & Plan  · Monitor vitals  · IM to follow  · Continue Metoprolol 25 mg daily and Norvasc 10 mg daily        Stage 3b chronic kidney disease [facility])  Assessment & Plan  Lab Results   Component Value Date    EGFR 31 06/21/2022    EGFR 31 06/20/2022    EGFR 27 06/18/2022    CREATININE 2 23 (H) 06/21/2022    CREATININE 2 23 (H) 06/20/2022    CREATININE 2 50 (H) 06/18/2022   · Baseline creatinine 1 4-1 7  · Nephrology following for increase in creatinine     * Obstructive uropathy  Assessment & Plan  · s/p cystoscopy left ureteroscopy/lithotripsy and exchange ureteral stent, removal nephrostomy tube left on 6/9  · stent removal on 6/13  · OP follow up with Urology in 6 weeks with renal US           Appreciate IM consultants medical co-management  Labs, medications, and imaging personally reviewed  ROS:  A ten point review of systems was completed and pertinent positives are listed in subjective section  All other systems reviewed were negative  Review of Systems   Constitutional: Negative  Negative for appetite change, fatigue and fever  HENT: Negative  Negative for trouble swallowing  Eyes: Negative  Negative for visual disturbance  Respiratory: Negative  Negative for shortness of breath  Cardiovascular: Negative  Negative for chest pain  Gastrointestinal: Negative  Negative for abdominal pain and constipation  Genitourinary: Negative  Negative for difficulty urinating  Musculoskeletal: Positive for arthralgias and myalgias  Worsening left knee pain   Skin: Negative  Neurological: Positive for weakness  Psychiatric/Behavioral: Negative           OBJECTIVE:   /66 (BP Location: Left arm)   Pulse (!) 108   Temp 98 8 °F (37 1 °C) (Temporal)   Resp 16   Ht 5' 6" (1 676 m)   Wt (!) 142 kg (313 lb 0 9 oz)   SpO2 100%   BMI 50 53 kg/m²     Physical Exam  Vitals and nursing note reviewed  Constitutional:       General: He is not in acute distress  Appearance: Normal appearance  He is obese  He is not ill-appearing  HENT:      Head: Normocephalic and atraumatic  Right Ear: External ear normal       Left Ear: External ear normal       Nose: Nose normal       Mouth/Throat:      Mouth: Mucous membranes are moist       Pharynx: Oropharynx is clear  Eyes:      Pupils: Pupils are equal, round, and reactive to light  Cardiovascular:      Rate and Rhythm: Normal rate and regular rhythm  Pulses: Normal pulses  Heart sounds: Normal heart sounds  No murmur heard  Pulmonary:      Effort: Pulmonary effort is normal  No respiratory distress  Breath sounds: Normal breath sounds  No wheezing  Abdominal:      General: Bowel sounds are normal  There is no distension  Palpations: Abdomen is soft  Tenderness: There is no abdominal tenderness  Musculoskeletal:         General: Normal range of motion  Cervical back: Normal range of motion  Right lower leg: Edema present  Left lower leg: Edema present  Skin:     General: Skin is warm  Neurological:      General: No focal deficit present  Mental Status: He is alert and oriented to person, place, and time     Psychiatric:         Mood and Affect: Mood normal           Lab Results   Component Value Date    WBC 10 17 (H) 06/21/2022    HGB 8 3 (L) 06/21/2022    HCT 27 6 (L) 06/21/2022    MCV 96 06/21/2022     06/21/2022     Lab Results   Component Value Date    SODIUM 134 (L) 06/21/2022    K 3 8 06/21/2022     06/21/2022    CO2 20 (L) 06/21/2022    BUN 22 06/21/2022    CREATININE 2 23 (H) 06/21/2022    GLUC 127 06/21/2022    CALCIUM 9 4 06/21/2022     Lab Results   Component Value Date    INR 1 27 (H) 05/10/2022    INR 1 09 05/10/2022    INR 1 33 (H) 08/24/2019    PROTIME 15 4 (H) 05/10/2022    PROTIME 13 5 05/10/2022    PROTIME 16 6 (H) 08/24/2019           Current Facility-Administered Medications:     acetaminophen (TYLENOL) tablet 650 mg, 650 mg, Oral, Q6H PRN, Lovenia Sine, PA-C, 650 mg at 06/21/22 1348    allopurinol (ZYLOPRIM) tablet 100 mg, 100 mg, Oral, Daily, Lovenia Sine, PA-C, 100 mg at 06/21/22 0904    amLODIPine (NORVASC) tablet 10 mg, 10 mg, Oral, Daily, Lovenia Sine, PA-C, 10 mg at 06/21/22 0266    cholecalciferol (VITAMIN D3) tablet 400 Units, 400 Units, Oral, Daily, Lovenia Sine, PA-C, 400 Units at 06/21/22 9847    Diclofenac Sodium (VOLTAREN) 1 % topical gel 2 g, 2 g, Topical, 4x Daily PRN, Lovenia Sine, PA-C, 2 g at 06/21/22 1352    metoprolol succinate (TOPROL-XL) 24 hr tablet 100 mg, 100 mg, Oral, Daily, Lovenia Sine, PA-C, 100 mg at 06/21/22 1115    ondansetron (ZOFRAN-ODT) dispersible tablet 4 mg, 4 mg, Oral, Q6H PRN, Lovenia Sine, PA-C    potassium chloride (K-DUR,KLOR-CON) CR tablet 10 mEq, 10 mEq, Oral, Daily, Lovenia Sine, PA-C, 10 mEq at 06/21/22 6683    senna (SENOKOT) tablet 17 2 mg, 2 tablet, Oral, HS PRN, Lovenia Sine, PA-C    tamsulosin (FLOMAX) capsule 0 4 mg, 0 4 mg, Oral, BID, Judi Michaels PA-C, 0 4 mg at 06/21/22 0413    traMADol (ULTRAM) tablet 50 mg, 50 mg, Oral, Q6H PRN, Lovenia Sine, PA-C, 50 mg at 06/21/22 3898    Past Medical History:   Diagnosis Date    Arthritis     Chronic kidney disease     Gout     Hyperlipidemia     Hypertension     Kidney stone     Obstructive pyelonephritis     Sepsis [facility])        Patient Active Problem List    Diagnosis Date Noted    Diarrhea 06/13/2022    Anemia 05/13/2022    Obstructive pyelonephritis 04/23/2022    Obstructive uropathy 04/21/2022    Mixed hyperlipidemia 06/02/2021    Morbid obesity 06/02/2021    Recurrent left knee pain 06/02/2021    Insomnia 09/16/2019    Essential hypertension 08/27/2019    Severe sepsis on admission 08/23/2019    [facility] ) 08/23/2019    Acute kidney injury superimposed on CKD stage 3 08/06/2019    Bacteremia due to Escherichia coli 08/04/2019    Lactic acidosis 08/04/2019    History of gout 07/15/2019    Vitamin D deficiency 06/10/2015    Morbid obesity ([facility] ) 03/23/2015          Lydia Ngo PA-C    Total time spent:  30 minutes with more than 50% spent counseling/coordinating care  Counseling includes discussion with patient re: progress and discussion with patient of his/her current medical/functional state/information  Coordination of patient's care was performed in conjunction with consulting services  Time invested included review of patient's labs, vitals, and management of their comorbidities with continued monitoring  The care of the patient was extensively discussed and appropriate treatment plan was formulated unique for this patient  ** Please Note:  voice to text software may have been used in the creation of this document   Although proof errors in transcription or interpretation are a potential of such software**

## 2022-06-21 NOTE — PLAN OF CARE
Problem: PAIN - ADULT  Goal: Verbalizes/displays adequate comfort level or baseline comfort level  Description: Interventions:  - Encourage patient to monitor pain and request assistance  - Assess pain using appropriate pain scale  - Administer analgesics based on type and severity of pain and evaluate response  - Implement non-pharmacological measures as appropriate and evaluate response  - Consider cultural and social influences on pain and pain management  - Notify physician/advanced practitioner if interventions unsuccessful or patient reports new pain  Outcome: Progressing     Problem: INFECTION - ADULT  Goal: Absence or prevention of progression during hospitalization  Description: INTERVENTIONS:  - Assess and monitor for signs and symptoms of infection  - Monitor lab/diagnostic results  - Monitor all insertion sites, i e  indwelling lines, tubes, and drains  - Monitor endotracheal if appropriate and nasal secretions for changes in amount and color  - [place] appropriate cooling/warming therapies per order  - Administer medications as ordered  - Instruct and encourage patient and family to use good hand hygiene technique  - Identify and instruct in appropriate isolation precautions for identified infection/condition  Outcome: Progressing     Problem: SAFETY ADULT  Goal: Patient will remain free of falls  Description: INTERVENTIONS:  - Educate patient/family on patient safety including physical limitations  - Instruct patient to call for assistance with activity   - Consult OT/PT to assist with strengthening/mobility   - Keep Call bell within reach  - Keep bed low and locked with side rails adjusted as appropriate  - Keep care items and personal belongings within reach  - Initiate and maintain comfort rounds  - Offer Toileting every 2 Hours, in advance of need  - Initiate/Maintain bed/chair alarm  - Apply yellow socks and bracelet for high fall risk patients  - Consider moving patient to room near nurses station  Outcome: Progressing     Problem: Potential for Falls  Goal: Patient will remain free of falls  Description: INTERVENTIONS:  - Educate patient/family on patient safety including physical limitations  - Instruct patient to call for assistance with activity   - Consult OT/PT to assist with strengthening/mobility   - Keep Call bell within reach  - Keep bed low and locked with side rails adjusted as appropriate  - Keep care items and personal belongings within reach  - Initiate and maintain comfort rounds  - Offer Toileting every 2 Hours, in advance of need  - Initiate/Maintain bed/chair alarm  - Apply yellow socks and bracelet for high fall risk patients  - Consider moving patient to room near nurses station  Outcome: Progressing     Problem: Nutrition/Hydration-ADULT  Goal: Nutrient/Hydration intake appropriate for improving, restoring or maintaining nutritional needs  Description: Monitor and assess patient's nutrition/hydration status for malnutrition  Collaborate with interdisciplinary team and initiate plan and interventions as ordered  Monitor patient's weight and dietary intake as ordered or per policy  Utilize nutrition screening tool and intervene as necessary  Determine patient's food preferences and provide high-protein, high-caloric foods as appropriate       INTERVENTIONS:  - Monitor oral intake, urinary output, labs, and treatment plans  - Assess nutrition and hydration status and recommend course of action  - Evaluate amount of meals eaten  - Assist patient with eating if necessary   - Allow adequate time for meals  - Recommend/ encourage appropriate diets, oral nutritional supplements, and vitamin/mineral supplements  - Order, calculate, and assess calorie counts as needed  - Recommend, monitor, and adjust tube feedings and TPN/PPN based on assessed needs  - Assess need for intravenous fluids  - Provide specific nutrition/hydration education as appropriate  - Include patient/family/caregiver in decisions related to nutrition  Outcome: Progressing

## 2022-06-21 NOTE — PROGRESS NOTES
Progress Note - Nephrology   Saroj Rob 55 y o  male MRN: [id number]  Unit/Bed#: -01 Encounter: [id number]    Assessment and Plan    1  Acute kidney injury, present admission, secondary to obstructive uropathy, superimposed on chronic kidney disease  Improved  Expect continued improvement following resolution of obstructive uropathy  Trend renal function  Continue to provide supportive care  Optimize hemodynamics  Maintain mean arterial pressure greater than 65 mmHg  Renally dose medications  Avoid nephrotoxins  Please discuss with renal any diuretics or possible nephrotoxic agents, such as NSAIDs or IV contrast  Recommend avoidance of PPIs in favor of H2 blocker, if appropriate for clinical scenario  Monitor for urinary retention- strict I&Os, record bladder scan PVRs and follow urinary retention protocol  2  Chronic kidney disease, stage IIIA with baseline creatinine 1 4-1 5 mg/dL  3  Obstructive uropathy status post left ureteroscopy/lithotripsy and ureteral stent exchange with subsequent removal of left nephrostomy tube on 06/09/2022 and stent removal on 06/13/2022  ? Will need Urology follow-up  4  Recurrent nephrolithiasis  ? Again reminded patient to follow-up for stone risk profile to prevent further nephrolithiasis  5  Hypertension  ? Blood pressure is improved  Continue current antihypertensive regimen    6  Morbid obesity with BMI 50    Follow up reason for today's visit:     Obstructive uropathy    Patient Active Problem List   Diagnosis    Morbid obesity ([address] )    Vitamin D deficiency    History of gout    Bacteremia due to Escherichia coli    Lactic acidosis    Acute kidney injury superimposed on CKD stage 3    Severe sepsis on admission    Stage 3b chronic kidney disease ([address] )    Essential hypertension    Insomnia    Mixed hyperlipidemia    Morbid obesity    Recurrent left knee pain    Obstructive uropathy    Obstructive pyelonephritis    Anemia    Diarrhea Subjective:   Complains of knee pain and nocturia after receiving Flomax  Is returning from x-ray  A complete 10 point review of systems was performed and is otherwise negative  Objective:     Vitals: Blood pressure 127/66, pulse (!) 108, temperature 98 8 °F (37 1 °C), temperature source Temporal, resp  rate 16, height 5' 6" (1 676 m), weight (!) 142 kg (313 lb 0 9 oz), SpO2 100 %  ,Body mass index is 50 53 kg/m²  Weight (last 2 days)     Date/Time Weight    06/21/22 0600 142 (313 05)    06/20/22 0500 143 (314 6)            Intake/Output Summary (Last 24 hours) at 6/21/2022 1402  Last data filed at 6/21/2022 1322  Gross per 24 hour   Intake 1130 ml   Output 2775 ml   Net -1645 ml     I/O last 3 completed shifts: In: 1050 [P O :1050]  Out: 1925 [Urine:1925]         Physical Exam: /66 (BP Location: Left arm)   Pulse (!) 108   Temp 98 8 °F (37 1 °C) (Temporal)   Resp 16   Ht 5' 6" (1 676 m)   Wt (!) 142 kg (313 lb 0 9 oz)   SpO2 100%   BMI 50 53 kg/m²     General Appearance:    No acute distress  Cooperative  Appears stated age  Obese  Head:    Normocephalic  Atraumatic  Normal jaw occlusion  Eyes:    Lids, conjunctiva normal  No scleral icterus  Ears:    Normal external ears  Nose:   Nares normal  No drainage  Mouth:   Lips, tongue normal  Mucosa normal  Phonation normal    Neck:   Supple  Symmetrical    Back:     Symmetric  No CVA tenderness  Lungs:     Normal respiratory effort  Clear to auscultation bilaterally  Chest wall:    No tenderness or deformity  Heart:    Regular rate and rhythm  Normal S1 and S2  No murmur  No JVD  No edema  Abdomen:     Soft  Non-tender  Bowel sounds active  Genitourinary:   No [place] catheter present  Extremities:   Extremities normal  Atraumatic  No cyanosis  Skin:   Warm and dry  No pallor, jaundice, rash, ecchymoses  Neurologic:   Alert and oriented to person, place, time  No focal deficit              Lab, Imaging and other studies: I have personally reviewed pertinent labs  CBC:   Lab Results   Component Value Date    WBC 10 17 (H) 06/21/2022    HGB 8 3 (L) 06/21/2022    HCT 27 6 (L) 06/21/2022    MCV 96 06/21/2022     06/21/2022    MCH 28 7 06/21/2022    MCHC 30 1 (L) 06/21/2022    RDW 15 9 (H) 06/21/2022    MPV 10 1 06/21/2022    NRBC 0 06/21/2022     CMP:   Lab Results   Component Value Date    K 3 8 06/21/2022     06/21/2022    CO2 20 (L) 06/21/2022    BUN 22 06/21/2022    CREATININE 2 23 (H) 06/21/2022    CALCIUM 9 4 06/21/2022    AST 11 (L) 06/21/2022    ALT 8 06/21/2022    ALKPHOS 39 06/21/2022    EGFR 31 06/21/2022         Results from last 7 days   Lab Units 06/21/22  0921 06/20/22  0459 06/18/22  0538   POTASSIUM mmol/L 3 8 3 6 3 3*   CHLORIDE mmol/L 102 104 104   CO2 mmol/L 20* 20* 21   BUN mg/dL 22 22 20   CREATININE mg/dL 2 23* 2 23* 2 50*   CALCIUM mg/dL 9 4 9 1 8 8   ALK PHOS U/L 39  --  34   ALT U/L 8  --  5*   AST U/L 11*  --  9*         Phosphorus:   Lab Results   Component Value Date    PHOS 3 7 06/21/2022     Magnesium:   Lab Results   Component Value Date    MG 1 8 (L) 06/21/2022     Urinalysis: No results found for: COLORU, CLARITYU, SPECGRAV, PHUR, LEUKOCYTESUR, NITRITE, PROTEINUA, GLUCOSEU, KETONESU, BILIRUBINUR, BLOODU  Ionized Calcium: No results found for: CAION  Coagulation: No results found for: PT, INR, APTT  Troponin: No results found for: TROPONINI  ABG: No results found for: PHART, XYX5IOK, PO2ART, VVD8GXE, Q3MSCLPL, BEART, SOURCE  Radiology review:     IMAGING  Procedure: XR knee 4+ vw left injury    Result Date: 6/21/2022  Narrative: LEFT KNEE INDICATION:   left knee pain  COMPARISON:  5/13/2022 VIEWS:  XR KNEE 4+ VW LEFT INJURY Images: 4 FINDINGS: There is no acute fracture or dislocation  There is no joint effusion  Moderate to severe degenerative changes with near complete joint space loss at the medial compartment  Tricompartmental osteophyte formation   No lytic or blastic osseous lesion  Soft tissues are unremarkable  Impression: No acute osseous abnormality  Degenerative changes as described  Workstation performed: [id number]       Current Facility-Administered Medications   Medication Dose Route Frequency    acetaminophen (TYLENOL) tablet 650 mg  650 mg Oral Q6H PRN    allopurinol (ZYLOPRIM) tablet 100 mg  100 mg Oral Daily    amLODIPine (NORVASC) tablet 10 mg  10 mg Oral Daily    cholecalciferol (VITAMIN D3) tablet 400 Units  400 Units Oral Daily    Diclofenac Sodium (VOLTAREN) 1 % topical gel 2 g  2 g Topical 4x Daily PRN    metoprolol succinate (TOPROL-XL) 24 hr tablet 100 mg  100 mg Oral Daily    ondansetron (ZOFRAN-ODT) dispersible tablet 4 mg  4 mg Oral Q6H PRN    potassium chloride (K-DUR,KLOR-CON) CR tablet 10 mEq  10 mEq Oral Daily    senna (SENOKOT) tablet 17 2 mg  2 tablet Oral HS PRN    tamsulosin (FLOMAX) capsule 0 4 mg  0 4 mg Oral BID    traMADol (ULTRAM) tablet 50 mg  50 mg Oral Q6H PRN     Medications Discontinued During This Encounter   Medication Reason    senna (SENOKOT) tablet 17 2 mg     metoprolol succinate (TOPROL-XL) 24 hr tablet 25 mg     tamsulosin (FLOMAX) capsule 0 4 mg     tamsulosin (FLOMAX) capsule 0 8 mg     metoprolol succinate (TOPROL-XL) 24 hr tablet 50 mg     Diclofenac Sodium (VOLTAREN) 1 % topical gel 2 g        Jeremiah Akers PA-C    Portions of the record may have been created with voice recognition software  Occasional wrong word or "sound a like" substitutions may have occurred due to the inherent limitations of voice recognition software  Read the chart carefully and recognize, using context, where substitutions have occurred

## 2022-06-21 NOTE — PROGRESS NOTES
06/21/22 0700   Pain Assessment   Pain Assessment Tool 0-10   Pain Score 10 - Worst Possible Pain   Pain Location/Orientation Orientation: Left; Location: Leg   Restrictions/Precautions   Precautions Fall Risk;Limb alert;Pain;Contact/isolation  (MDRO urine, I&O)   RLE Weight Bearing Per Order WBAT   LLE Weight Bearing Per Order WBAT   Eating   Type of Assistance Needed Independent   Eating CARE Score 6   Eating Assessment   Food To Mouth Yes   Able To Cut Yes   Positioning Upright;Out of Bed   Meal Assessed Breakfast   Opens Packages Yes   Oral Hygiene   Comment prefers to complete after breakfast   Grooming   Able To Initiate Tasks;Comb/Brush Hair;Wash/Dry Face;Wash/Dry Hands   Limitation Noted In Strength; Safety   Findings setup w/c level at the sink   Shower/Bathe Self   Type of Assistance Needed Physical assistance   Physical Assistance Level 25% or less   Comment thoroughness buttocks   Shower/Bathe Self CARE Score 3   Bathing   Assessed Bath Style Shower   Anticipated D/C Bath Style Shower;Sponge Bath   Able to [place] No   Able to [employer] Temperature No   Able to Wash/Rinse/Dry (body part) Left Arm;Right Arm;L Upper Leg;R Upper Leg;L Lower Leg/Foot;R Lower Leg/Foot;Chest;Abdomen;Perineal Area   Limitations Noted in Balance; Endurance;Problem Solving;ROM;Safety;Strength   Positioning Seated;Standing   Adaptive Equipment Longhand Sponge;Longhand Reacher; Shower Wheelright; Shower Seat;Hand [employer]   Limitations Noted In Balance; Endurance;Problem Solving;ROM;Safety;LE Strength   Adaptive Equipment Grab Bars;Seat with Back   Assessed Shower   Findings CGA with increased pain while WB LLE   Upper Body Dressing   Type of Assistance Needed Set-up / clean-up   Upper Body Dressing CARE Score 5   Lower Body Dressing   Type of Assistance Needed Incidental touching   Lower Body Dressing CARE Score 4   Putting On/Taking Off Footwear   Type of Assistance Needed Physical assistance Physical Assistance Level 26%-50%   Comment able to remove socks with reacher and mod A for donning due to tacky skin following shower with sock aide   Putting On/Taking Off Footwear CARE Score 3   Picking Up Object   Type of Assistance Needed Incidental touching   Picking Up Object CARE Score 4   Dressing/Undressing Clothing   Remove UB Clothes Pullover Shirt   Don UB Clothes Pullover Shirt   Remove LB Clothes Shorts;Socks   Don LB Clothes Shorts;Socks   Limitations Noted In Balance; Endurance;Problem Solving; Safety;Strength;ROM   Adaptive Equipment Reacher;Sock Aide   Positioning Supported Sit;Standing   Lying to Sitting on Side of Bed   Type of Assistance Needed Supervision   Lying to Sitting on Side of Bed CARE Score 4   Sit to Stand   Type of Assistance Needed Supervision   Sit to Stand CARE Score 4   Bed-Chair Transfer   Type of Assistance Needed Supervision   Chair/Bed-to-Chair Transfer CARE Score 4   Toileting Hygiene   Type of Assistance Needed Physical assistance   Physical Assistance Level 25% or less   Comment thoroughness following attempt for a BM   [address] Score 3   Toileting   Able to [address] down yes, up yes  Manage Hygiene Bladder   Limitations Noted In Balance;Problem Solving;ROM;Safety;LE Strength   Adaptive Equipment Grab Bar   Toilet Transfer   Type of Assistance Needed Supervision   Comment increased time due to pain LLE   Toilet Transfer CARE Score 4   Toilet Transfer   Surface Assessed Raised Toilet   Transfer Technique Standard   Limitations Noted In Balance; Endurance;Problem Solving;ROM;Safety;LE Strength   Adaptive Equipment Grab Bar;Walker   Additional Activities   Additional Activities Other (Comment)   Additional Activities Comments fxl mobility to and from the BR with RW, increased time and S/ CGA due to pain and stiffness mostly LLE; w/c use Mod I longer distances in hallway using BUE to propel   Assessment   Treatment Assessment Pt presents supine agreeable to OT session including toileting, transfers/ mobility, ADLS and grooming before return to bed to elevate BLE while eating breakfast  Pt requires assist and supervision due to decreased balance, safety, endurance and generalized strength/ ROM enzo of BLE  Pt requires more time and encouragement this day due to significant pain LLE and reports increased urgency/ frequency with urination  Pt will benefit from continued skilled OT services to increase independence with daily tasks  Problem List Decreased strength;Decreased range of motion;Decreased endurance; Impaired balance;Decreased safety awareness;Pain   Plan   Treatment/Interventions ADL retraining;Functional transfer training; Therapeutic exercise; Endurance training;Patient/family training; Compensatory technique education   Progress Progressing toward goals   OT Therapy Minutes   OT Time In 0700   OT Time Out 0832   OT Total Time (minutes) 92   OT Mode of treatment - Individual (minutes) 92   Therapy Time missed   Time missed?  No

## 2022-06-21 NOTE — PROGRESS NOTES
06/21/22 1402   Pain Assessment   Pain Assessment Tool 0-10   Pain Score 10 - Worst Possible Pain   Pain Location/Orientation Orientation: Left; Location: Leg;Location: Knee   Restrictions/Precautions   Precautions Fall Risk;Limb alert;Pain;Contact/isolation  (MDRO urine, I&O)   LLE Weight Bearing Per Order WBAT   Picking Up Object   Type of Assistance Needed Supervision   Comment supprting with one arm and reaching with the other   Picking Up Object CARE Score 4   Sit to Lying   Type of Assistance Needed Supervision   Sit to Lying CARE Score 4   Lying to Sitting on Side of Bed   Type of Assistance Needed Independent   Lying to Sitting on Side of Bed CARE Score 6   Sit to Stand   Type of Assistance Needed Supervision   Comment increased time   Sit to Stand CARE Score 4   Bed-Chair Transfer   Type of Assistance Needed Supervision   Comment increased time   Chair/Bed-to-Chair Transfer CARE Score 4   Toileting Hygiene   Type of Assistance Needed Physical assistance   Physical Assistance Level 26%-50%   Toileting Hygiene CARE Score 3   Toileting   Able to [address] down yes, up yes  Manage Hygiene Bladder  (assist with hygiene following BM)   Limitations Noted In Balance;Problem Solving;ROM;Safety;LE Strength  (LLE pain)   Adaptive Equipment Grab Bar   Toilet Transfer   Type of Assistance Needed Supervision   Toilet Transfer CARE Score 4   Toilet Transfer   Surface Assessed Raised Toilet   Transfer Technique Standard   Limitations Noted In Balance; Endurance;Problem Solving;ROM;Safety;LE Strength   Adaptive Equipment Grab Bar;Walker   Additional Activities   Additional Activities Other (Comment)   Additional Activities Comments fxl mobility to and from the BR S with RW   Assessment   Treatment Assessment Pt presents sitting OEB agreeable to OT session including toileting, transfers and mobility  Pt reports feeling better this afternoon with slightly decerased pain LE   Pt requries increased time and assist/ supervision due to decreased balance, safety, endurance and generalized strength/ ROM enzo of LLE with increased pain  Pt will benefit from continued skilled OT services to increase indepednence with daily tasks  Problem List Decreased strength;Decreased range of motion;Decreased endurance; Impaired balance;Decreased safety awareness;Pain   Plan   Treatment/Interventions ADL retraining;Functional transfer training; Therapeutic exercise; Endurance training;Patient/family training;Equipment eval/education; Compensatory technique education   Progress Progressing toward goals   OT Therapy Minutes   OT Time In 1402   OT Time Out 1427   OT Total Time (minutes) 25   OT Mode of treatment - Individual (minutes) 25   Therapy Time missed   Time missed?  No

## 2022-06-21 NOTE — PHYSICAL THERAPY NOTE
06/21/22 0859   Pain Assessment   Pain Assessment Tool 0-10   Pain Score 10 - Worst Possible Pain   Pain Location/Orientation Orientation: Left; Location: Knee   Pain Onset/Description Frequency: Constant/Continuous   Patient's Stated Pain Goal No pain   Hospital Pain Intervention(s) Repositioned; Emotional support; Ambulation/increased activity; Elevated;Relaxation technique  (RN aware)   Restrictions/Precautions   Precautions Fall Risk;Limb alert;Contact/isolation;Pain   Weight Bearing Restrictions No   RLE Weight Bearing Per Order WBAT   LLE Weight Bearing Per Order WBAT   ROM Restrictions No   General   Change In Medical/Functional Status Pt  presents with increased pain and swelling of the L knee  Upon entering room pt  shaking perfusely  Pt  reporting symptoms increased throughout the night  Pt  had increased difficulty getting OOB to BR with nursing assistance to the point where he was unable and was actually incontinent of urine 3 times  Pt  denied injury and anyone harming him  Pt  groaning out loud and wincing in pain with every movement and touch  RN aware and already made physician aware who already ordered testing  Temp taken which was 99 2 *F  L Knee warmer to touch than R knee,  Nail beds cyonotic, increased HR  PTA assisted RN and patient with positioning during testing for best test result and least pain inducing to pt     Cognition   Overall Cognitive Status WFL   Arousal/Participation Responsive   Attention Within functional limits   Orientation Level Oriented X4   Memory Within functional limits   Following Commands Follows all commands and directions without difficulty   Subjective   Subjective "I can't even bend my knee again because it hurt so bad "   Roll Left and Right   Type of Assistance Needed Physical assistance   Physical Assistance Level 51%-75%   Comment mod Ax1  (HOB BSR)   Roll Left and Right CARE Score 2   Sit to Lying   Type of Assistance Needed Physical assistance Physical Assistance Level 51%-75%   Comment mod Ax1 with HOB and BSR  (LLE management)   Sit to Lying CARE Score 2   Lying to Sitting on Side of Bed   Type of Assistance Needed Physical assistance   Physical Assistance Level 51%-75%   Comment mod Ax1 with HOB and BSR for LLE management   Lying to Sitting on Side of Bed CARE Score 2   Sit to Stand   Reason if not Attempted Medical concerns   Sit to Stand CARE Score 88   Bed-Chair Transfer   Reason if not Attempted Medical concerns   Chair/Bed-to-Chair Transfer CARE Score 88   Transfer Bed/Chair/Wheelchair   Limitations Noted In Confidence;Pain Management; Endurance;Sensation;UE Strength;LE Strength; Other  (increased pain and swelling in LLE)   Supine to Sit Moderate Assist;Assist x 1   Sit to Supine Moderate Assist;Assist x 1   Findings Pt  could not tolerate sitting at EOB due to increased pain and pressure   Car Transfer   Reason if not Attempted Medical concerns   Car Transfer CARE Score 88   Walk 10 Feet   Reason if not Attempted Medical concerns   Walk 10 Feet CARE Score 88   Walk 50 Feet with Two Turns   Reason if not Attempted Medical concerns   Walk 50 Feet with Two Turns CARE Score 88   Walk 150 Feet   Reason if not Attempted Medical concerns   Walk 150 Feet CARE Score 88   Walking 10 Feet on Uneven Surfaces   Reason if not Attempted Medical concerns   Walking 10 Feet on Uneven Surfaces CARE Score 88   Ambulation   Does the patient walk? 2  Yes   Findings unable to assess today do to change in medical status   Wheel 50 Feet with Two Turns   Reason if not Attempted Medical concerns   Wheel 50 Feet with Two Turns CARE Score 88   Wheel 150 Feet   Reason if not Attempted Medical concerns   Wheel 150 Feet CARE Score 88   Wheelchair mobility   Does the patient use a wheelchair? 1   Yes   Findings unable to assess today   Curb or Single Stair   Reason if not Attempted Medical concerns   1 Step (Curb) CARE Score 88   4 Steps   Reason if not Attempted Medical concerns   4 Steps CARE Score 88   12 Steps   Reason if not Attempted Medical concerns   12 Steps CARE Score 88   Picking Up Object   Reason if not Attempted Medical concerns   Picking Up Object CARE Score 88   Toilet Transfer   Reason if not Attempted Medical concerns   Toilet Transfer CARE Score 88   Toilet Transfer   Findings Pt  required min Ax1 to assist with positioning in bed to allow for pt  to use urinal due to severe pain  Therapeutic Interventions   Strengthening supine ther ex (AAROM/PROM LLE and AROM RLE)   Flexibility MT LLE   Other bed mobility tasks and positioning to assist NSG to perform testing and cause pt  least amount of pain as possible   Assessment   Treatment Assessment Pt  presents with increased pain and swelling of the L knee  Upon entering room pt  shaking perfusely  Pt  reporting symptoms increased throughout the night  Pt  had increased difficulty getting OOB to BR with nursing assistance to the point where he was unable and was actually incontinent of urine 3 times  Pt  denied injury and anyone harming him  Pt  groaning out loud and wincing in pain with every movement and touch  RN aware and already made physician aware who already ordered testing  Temp taken which was 99 2 *F  L Knee warmer to touch than R knee,  Nail beds cyonotic, increased HR  PTA assisted RN and patient with positioning during testing for best test result and least pain inducing to pt  Pt  performed bed mobility tasks with increased amount of assistance required, transfer training with increased assistance required, supine ther ex BLE with assistance required with LLE, manual therapy of LLE including gentle calf and hamstring stretching, relaxation techniques to try to calm pt  and ease tensing of muscles, and instruction in  proper breathing technique to prevent hyperventalation  Pt  has declined in all tasks performed since yesterday  Will continue current POC as long as pt   is medically stable and able to tolerate treatment, as well as progress as able  Problem List Decreased strength;Decreased range of motion;Decreased endurance; Impaired balance;Decreased mobility; Decreased safety awareness;Pain   PT Barriers   Physical Impairment Decreased strength;Decreased range of motion;Decreased endurance; Impaired balance;Decreased mobility; Decreased safety awareness;Pain   Functional Limitation Walking;Transfers;Standing;Stair negotiation   Plan   Treatment/Interventions Functional transfer training;LE strengthening/ROM; Elevations; Therapeutic exercise; Endurance training;Patient/family training;Equipment eval/education; Bed mobility;Gait training;Spoke to nursing;OT   Progress No functional improvements  (due to change in medical status)   PT Therapy Minutes   PT Time In 0859   PT Time Out 1030   PT Total Time (minutes) 91   PT Mode of treatment - Individual (minutes) 91   PT Mode of treatment - Concurrent (minutes) 0   PT Mode of treatment - Group (minutes) 0   PT Mode of treatment - Co-treat (minutes) 0   PT Mode of Treatment - Total time(minutes) 91 minutes   PT Cumulative Minutes 184   Therapy Time missed   Time missed?  No

## 2022-06-21 NOTE — TEAM CONFERENCE
Acute RehabilitationTeam Conference Note  Date: 6/21/2022   Time: 10:42 AM       Patient Name:  Jose Juan Scanlon       Medical Record Number: [id number]   YOB: 1965  Sex: Male          Room/Bed:  [facility] 216/[facility] 216-01  Payor Info:  Payor: Roxana Ochoa / Plan: Vinh Prabhakar / Product Type: Blue Fee for Service /      Admitting Diagnosis: Sepsis, unspecified organism [A41 9]   Admit Date/Time:  6/17/2022  3:25 PM  Admission Comments: No comment available     Primary Diagnosis:  Obstructive uropathy  Principal Problem: Obstructive uropathy    Patient Active Problem List    Diagnosis Date Noted    Diarrhea 06/13/2022    Anemia 05/13/2022    Obstructive pyelonephritis 04/23/2022    Obstructive uropathy 04/21/2022    Mixed hyperlipidemia 06/02/2021    Morbid obesity 06/02/2021    Recurrent left knee pain 06/02/2021    Insomnia 09/16/2019    Essential hypertension 08/27/2019    Severe sepsis on admission 08/23/2019    Stage 3b chronic kidney disease ([facility] [address] ) 08/23/2019    Acute kidney injury superimposed on CKD stage 3 08/06/2019    Bacteremia due to Escherichia coli 08/04/2019    Lactic acidosis 08/04/2019    History of gout 07/15/2019    Vitamin D deficiency 06/10/2015    Morbid obesity ([facility] [address] ) 03/23/2015       Physical Therapy:    Weight Bearing Status: Weight Bearing as Tolerated  Transfers: Supervision  Bed Mobility: Independent (mod I with side rail)  Amulation Distance (ft): 218 feet  Ambulation: Supervision  Assistive Device for Ambulation: Roller Walker  Number of Stairs: 14  Assistive Device for Stairs: Bilateral [employer]  Stair Assistance: Supervision  Ramp: Incidental Touching (CGA)  Assistive Device for Ramp: Roller Walker  Discharge Recommendations: Home with:  [address] with[de-identified] Family Support, Home Physical Therapy    6/21/2022:  Patient seen for IE with ARC PT and has been progressing steadily    Presents following obstructive uropathy resulting in nephrostomy removal, prolonged immobility with decreased ROM/strength, decreased balance and safety, decreased endurance, and pain  Patient mod I bed mobility, supervision transfers with RW, supervision  ambulation up to 218 feet on level and unlevel surfaces with RW, supervision with bilateral HRs for negotiation of 14 steps with bilateral handrails  Patient would benefit from continued inpatient [facility]C PT to increase function, safety, and increased independence in prep for safe d/c to home  Occupational Therapy:  Eating: Independent  Grooming: Supervision  Bathing: Supervision, Minimal Assistance  Bathing: Supervision, Minimal Assistance  Upper Body Dressing: Supervision  Lower Body Dressing: Supervision, Minimal Assistance  Toileting: Supervision, Minimal Assistance (supervision urination, min A hygiene after BM)  Tub/Shower Transfer: Supervision, Incidental Touching  Toilet Transfer: Supervision  Cognition: Within Defined Limits  Orientation: Person, Place, Time, Situation  Discharge Recommendations: Home with:  [address] with[de-identified] Home Occupational Therapy, First Floor Setup, Outpatient Occupational Therapy (home health versus outpatient)       6/20/2022: Pt participates in ADLs, transfers/ mobility, toileting and BUE therex  Pt requires assist and increase time due to decreased balance, safety, endurance and generalized strength/ ROM with increased pain RUE and LLE  Pt is making gains towards goals with current LOF as listed above  Pt will benefit from continued skilled OT services to increase independence with daily tasks  Speech Therapy:           No notes on file    Nursing Notes:  Appetite: Good  Diet Type: Regular/House                      Diet Patient/Family Education Complete: Yes    Type of Wound (LDA):  Wound (old puncture site left lower back)                    Type of Wound Patient/Family Education: Yes  Bladder: Continent     Bladder Patient/Family Education: Yes  Bowel: Continent     Bowel Patient/Family Education: Yes  Pain Location/Orientation: Orientation: Left, Location: Knee  Pain Score: 7  Pain 2  Pain Score 2: 7  Pain Location/Orientation 2: Orientation: Left, Location: Leg, Location: Knee                    Hospital Pain Intervention(s): Medication (See MAR)  Pain Patient/Family Education: Yes  Medication Management/Safety  Medication Patient/Family Education Complete: Yes    6/21/22 Pt re admitted after Nephrostomy and stents removed  Pt was sent out for sepsis alert  Pt is A+Ox4 on contact for MDRO  Lungs are clear on RA and HRR, runs tachy at times  B/L LE edema+1  Pt is cont B+B, frequent urination, being followed by nephrology  Pt c/o B/L ankle and knee pain for which he receives PRN Tramadol and Tylenol for  Pt is independent with all bed mobility and supervision with RW for ambulation and transfers  No falls      Case Management:     Discharge Planning  Living Arrangements: Lives Alone  Support Systems: Self  Assistance Needed: may need home care on discharge  Type of Current Residence: Private residence  Current Home Care Services: No  Pt is known to [facility] staff from recent admission & DC; see chart notes  Pt declined for this worker to contact his parents, stating that he prefers to do so himself  Pt lives alone in a multi-story home, FF set up  There are 6 steps (4+2) to enter, HR's on the first four steps  He has a SPC & RW  He reported that he has been completely independent, driving & working FT  He expressed that he is hopeful to return to employment but is considering applying for SSDI  Reviewed role of team members & reviewed [address] with Pt, who expressed understanding & agreement  SW will continue to monitor & assist as needed with [address]  See UR section for insurance details         Is the patient actively participating in therapies? no  List any modifications to the treatment plan: na    Barriers Interventions   HTN, chronic kidney disease Medical management and oversight, nephrology following   pain Medication management   Decreased end Therapeutic exercise, therapeutic activity   Decreased balance ADL, transfer, gait training   Decreased strength Therapeutic exercise, therapeutic activity     Is the patient making expected progress toward goals?  yes  List any update or changes to goals: na    Medical Goals: Patient will be able to manage medical conditions and comorbid conditions with medications and follow up upon completion of rehab program    Weekly Team Goals:   Rehab Team Goals  ADL Team Goal: Patient will require assist with ADLs with least restrictive device upon completion of rehab program  Bowel/Bladder Team Goal: Patient will be independent with bladder/bowel management with least restrictive device upon completion of rehab program  Transfer Team Goal: Patient will be independent with transfers with least restrictive device upon completion of rehab program  Locomotion Team Goal: Patient will be independent with locomotion with least restrictive device upon completion of rehab program     Mod I bed mobility, transfers, and mobility  Mod I self care    Health and wellness: to be able to return home and complete homemaking tasks, return to work and driving     Discussion: Plan for return home alone with Ayanna Harden PT, OT, and nursing verses outpatient dependent on progression     Anticipated Discharge Date:  June 30, 2022

## 2022-06-22 LAB
ANION GAP SERPL CALCULATED.3IONS-SCNC: 10 MMOL/L (ref 4–13)
BASOPHILS # BLD AUTO: 0.05 THOUSANDS/ΜL (ref 0–0.1)
BASOPHILS NFR BLD AUTO: 1 % (ref 0–1)
BUN SERPL-MCNC: 21 MG/DL (ref 5–25)
CALCIUM SERPL-MCNC: 9.3 MG/DL (ref 8.4–10.2)
CHLORIDE SERPL-SCNC: 103 MMOL/L (ref 96–108)
CO2 SERPL-SCNC: 22 MMOL/L (ref 21–32)
CREAT SERPL-MCNC: 2.24 MG/DL (ref 0.6–1.3)
EOSINOPHIL # BLD AUTO: 0.48 THOUSAND/ΜL (ref 0–0.61)
EOSINOPHIL NFR BLD AUTO: 5 % (ref 0–6)
ERYTHROCYTE [DISTWIDTH] IN BLOOD BY AUTOMATED COUNT: 15.6 % (ref 11.6–15.1)
GFR SERPL CREATININE-BSD FRML MDRD: 31 ML/MIN/1.73SQ M
GLUCOSE SERPL-MCNC: 99 MG/DL (ref 65–140)
HCT VFR BLD AUTO: 26.9 % (ref 36.5–49.3)
HGB BLD-MCNC: 8.1 G/DL (ref 12–17)
IMM GRANULOCYTES # BLD AUTO: 0.07 THOUSAND/UL (ref 0–0.2)
IMM GRANULOCYTES NFR BLD AUTO: 1 % (ref 0–2)
LYMPHOCYTES # BLD AUTO: 1.33 THOUSANDS/ΜL (ref 0.6–4.47)
LYMPHOCYTES NFR BLD AUTO: 13 % (ref 14–44)
MCH RBC QN AUTO: 28.7 PG (ref 26.8–34.3)
MCHC RBC AUTO-ENTMCNC: 30.1 G/DL (ref 31.4–37.4)
MCV RBC AUTO: 95 FL (ref 82–98)
MONOCYTES # BLD AUTO: 0.64 THOUSAND/ΜL (ref 0.17–1.22)
MONOCYTES NFR BLD AUTO: 6 % (ref 4–12)
NEUTROPHILS # BLD AUTO: 7.94 THOUSANDS/ΜL (ref 1.85–7.62)
NEUTS SEG NFR BLD AUTO: 74 % (ref 43–75)
NRBC BLD AUTO-RTO: 0 /100 WBCS
PLATELET # BLD AUTO: 356 THOUSANDS/UL (ref 149–390)
PMV BLD AUTO: 9.9 FL (ref 8.9–12.7)
POTASSIUM SERPL-SCNC: 3.8 MMOL/L (ref 3.5–5.3)
RBC # BLD AUTO: 2.82 MILLION/UL (ref 3.88–5.62)
SODIUM SERPL-SCNC: 135 MMOL/L (ref 135–147)
WBC # BLD AUTO: 10.51 THOUSAND/UL (ref 4.31–10.16)

## 2022-06-22 PROCEDURE — 97140 MANUAL THERAPY 1/> REGIONS: CPT

## 2022-06-22 PROCEDURE — 85025 COMPLETE CBC W/AUTO DIFF WBC: CPT | Performed by: PHYSICIAN ASSISTANT

## 2022-06-22 PROCEDURE — 99232 SBSQ HOSP IP/OBS MODERATE 35: CPT | Performed by: PHYSICIAN ASSISTANT

## 2022-06-22 PROCEDURE — 97110 THERAPEUTIC EXERCISES: CPT

## 2022-06-22 PROCEDURE — 97535 SELF CARE MNGMENT TRAINING: CPT

## 2022-06-22 PROCEDURE — 97116 GAIT TRAINING THERAPY: CPT

## 2022-06-22 PROCEDURE — 97112 NEUROMUSCULAR REEDUCATION: CPT

## 2022-06-22 PROCEDURE — 97530 THERAPEUTIC ACTIVITIES: CPT

## 2022-06-22 PROCEDURE — 97035 APP MDLTY 1+ULTRASOUND EA 15: CPT

## 2022-06-22 PROCEDURE — 80048 BASIC METABOLIC PNL TOTAL CA: CPT | Performed by: PHYSICIAN ASSISTANT

## 2022-06-22 RX ORDER — TAMSULOSIN HYDROCHLORIDE 0.4 MG/1
0.8 CAPSULE ORAL
Status: DISCONTINUED | OUTPATIENT
Start: 2022-06-23 | End: 2022-06-23

## 2022-06-22 RX ADMIN — CHOLECALCIFEROL TAB 10 MCG (400 UNIT) 400 UNITS: 10 TAB at 08:19

## 2022-06-22 RX ADMIN — AMLODIPINE BESYLATE 10 MG: 10 TABLET ORAL at 08:18

## 2022-06-22 RX ADMIN — ACETAMINOPHEN 650 MG: 325 TABLET ORAL at 19:57

## 2022-06-22 RX ADMIN — METOPROLOL SUCCINATE 100 MG: 50 TABLET, EXTENDED RELEASE ORAL at 08:18

## 2022-06-22 RX ADMIN — POTASSIUM CHLORIDE 10 MEQ: 750 TABLET, EXTENDED RELEASE ORAL at 08:18

## 2022-06-22 RX ADMIN — TRAMADOL HYDROCHLORIDE 50 MG: 50 TABLET, COATED ORAL at 21:31

## 2022-06-22 RX ADMIN — ALLOPURINOL 100 MG: 100 TABLET ORAL at 08:18

## 2022-06-22 RX ADMIN — TAMSULOSIN HYDROCHLORIDE 0.4 MG: 0.4 CAPSULE ORAL at 08:18

## 2022-06-22 RX ADMIN — TRAMADOL HYDROCHLORIDE 50 MG: 50 TABLET, COATED ORAL at 15:31

## 2022-06-22 RX ADMIN — TRAMADOL HYDROCHLORIDE 50 MG: 50 TABLET, COATED ORAL at 08:18

## 2022-06-22 NOTE — PROGRESS NOTES
06/22/22 0702   Pain Assessment   Pain Assessment Tool 0-10   Pain Score 10 - Worst Possible Pain   Pain Location/Orientation Orientation: Left; Location: Knee  (and L ankle, pain increases with mobility)   Patient's Stated Pain Goal No pain   Hospital Pain Intervention(s) Repositioned; Ambulation/increased activity  (US and RN aware)   Restrictions/Precautions   Precautions Fall Risk;Contact/isolation;Pain;Limb alert   Weight Bearing Restrictions No   RLE Weight Bearing Per Order WBAT   LLE Weight Bearing Per Order WBAT   ROM Restrictions No   General   Change In Medical/Functional Status Improvement in overall staus today as compared to yesterday   Cognition   Overall Cognitive Status [facility]   Arousal/Participation Alert; Cooperative   Attention Within functional limits   Orientation Level Oriented X4   Memory Within functional limits   Following Commands Follows all commands and directions without difficulty   Subjective   Subjective "I do feel better this morning, not back to normal, but definately better "   Roll Left and Right   Type of Assistance Needed Independent   Physical Assistance Level No physical assistance   Comment HOB and BSR, mod I   Roll Left and Right CARE Score 6   Sit to Lying   Type of Assistance Needed Supervision   Physical Assistance Level No physical assistance   Comment HOB BSR and increased time required   Sit to Lying CARE Score 4   Lying to Sitting on Side of Bed   Type of Assistance Needed Supervision   Physical Assistance Level No physical assistance   Comment HOB BSR and increased time required   Lying to Sitting on Side of Bed CARE Score 4   Sit to Stand   Type of Assistance Needed Supervision   Physical Assistance Level No physical assistance   Comment BSR and increased time required   Sit to Stand CARE Score 4   Bed-Chair Transfer   Type of Assistance Needed Supervision   Physical Assistance Level No physical assistance   Comment RW and increased time   Chair/Bed-to-Chair Transfer CARE Score 4   Transfer Bed/Chair/Wheelchair   Limitations Noted In Confidence; Endurance;Pain Management;UE Strength;LE Strength   Adaptive Equipment Roller Walker   Stand Pivot Supervision   Sit to Stand Supervision   Stand to Sit Supervision   Supine to Sit Supervision   Sit to Supine Supervision   Findings Increased time required for sll mobility tasks due to pain and stiffness in L knee and L ankle, teri able to tolerate and perform without physical assistance  Car Transfer   Reason if not Attempted Environmental limitations   Car Transfer CARE Score 10   Walk 10 Feet   Type of Assistance Needed Supervision   Physical Assistance Level No physical assistance   Comment RW   Walk 10 Feet CARE Score 4   Walk 50 Feet with Two Turns   Type of Assistance Needed Supervision   Physical Assistance Level No physical assistance   Comment RW   Walk 50 Feet with Two Turns CARE Score 4   Walk 150 Feet   Reason if not Attempted Safety concerns   Walk 150 Feet CARE Score 88   Walking 10 Feet on Uneven Surfaces   Reason if not Attempted Activity not applicable   Walking 10 Feet on Uneven Surfaces CARE Score 9   Ambulation   Does the patient walk? 2  Yes   Primary Mode of Locomotion Prior to Admission Walk   Distance Walked (feet) 61 ft  (10ft)   Assist Device Roller Walker   Gait Pattern Slow Julieta; Antalgic;Decreased foot clearance;Decreased L stance; Wide TEO; Improper weight shift; Step through   Limitations Noted In Endurance;Speed;Strength;Balance   Provided Assistance with: Direction   Walk Assist Level Supervision   Findings Pt  unable to obtain L TKE and is walking with bent knee causing use of accessory muscles and faster fatigue and L quad tremor   Wheel 50 Feet with Two Turns   Reason if not Attempted Activity not applicable   Wheel 50 Feet with Two Turns CARE Score 9   Wheel 150 Feet   Reason if not Attempted Activity not applicable   Wheel 463 Feet CARE Score 9   Wheelchair mobility   Does the patient use a wheelchair? 1  Yes   Type of Wheelchair Used 1  Manual   Curb or Single Stair   Reason if not Attempted Safety concerns   1 Step (Curb) CARE Score 88   4 Steps   Reason if not Attempted Safety concerns   4 Steps CARE Score 88   12 Steps   Reason if not Attempted Safety concerns   12 Steps CARE Score 88   Stairs   Findings Due to increased pain and decreased mobility stairs training held this session  Picking Up Object   Type of Assistance Needed Supervision   Physical Assistance Level No physical assistance   Comment supported with 1 hand on RW   Picking Up Object CARE Score 4   Toilet Transfer   Type of Assistance Needed Supervision   Physical Assistance Level No physical assistance   Comment Able to manage clothing without assistance but was D with posterior hygiene needs  Did not have BM but felt dirty from last time  Toilet Transfer CARE Score 4   Toilet Transfer   Surface Assessed Standard Toilet   Limitations Noted In Balance;UE Strength;LE Strength; Endurance   Adaptive Equipment Grab Bar  (RW)   Positioning Concerns Raised Seat   Findings S   Therapeutic Interventions   Flexibility MT LLE   Balance gait and transfer training on level surfaces   Neuromuscular Re-Education dynamic standing balance activities   Modalities US to L knee at 1 2 W/cm2 x 10 mins   Other toilet transfers   Assessment   Treatment Assessment Pt  with improvement in medical condition today  Continues to report 10/10 pain in L knee and ankle but significant improvements made in mobility compared to last session    Pt  performed bed mobility tasks, transfer training, manual therapy including stretching of L calf and hamstring muscles 3x for 20 seconds each, neuromuscular re-education of movement performed to improve dynamic standing balance including multidirectional weight shifting, reaching, and bending down to the grond to  object off floor with support of RW and close S, US to L knee at 1 2w/cm2 x 10 mins for pain relief , and education provided as needed for safety and direction to improve functional mobility and activity tolerance  COntinue current POC and progress as tolerated  Problem List Decreased strength;Decreased range of motion;Decreased endurance; Impaired balance;Decreased mobility; Decreased safety awareness;Pain   PT Barriers   Physical Impairment Decreased strength;Decreased range of motion;Decreased endurance; Impaired balance;Decreased mobility; Decreased safety awareness;Pain   Functional Limitation Walking;Transfers;Standing;Stair negotiation   Plan   Treatment/Interventions Functional transfer training;LE strengthening/ROM; Elevations; Therapeutic exercise; Endurance training;Patient/family training;Equipment eval/education; Bed mobility;Gait training;Spoke to nursing   Progress Progressing toward goals   PT Therapy Minutes   PT Time In 0702   PT Time Out 0824   PT Total Time (minutes) 82   PT Mode of treatment - Individual (minutes) 82   PT Mode of treatment - Concurrent (minutes) 0   PT Mode of treatment - Group (minutes) 0   PT Mode of treatment - Co-treat (minutes) 0   PT Mode of Treatment - Total time(minutes) 82 minutes   PT Cumulative Minutes 266   Therapy Time missed   Time missed?  No

## 2022-06-22 NOTE — PROGRESS NOTES
06/22/22 1303   Pain Assessment   Pain Assessment Tool 0-10   Pain Score 7   Pain Location/Orientation Orientation: Left; Location: Arm   Restrictions/Precautions   Precautions Fall Risk;Contact/isolation;Limb alert;Pain   Weight Bearing Restrictions No   RLE Weight Bearing Per Order WBAT   LLE Weight Bearing Per Order WBAT   ROM Restrictions No   Exercise Tools   Other Exercise Tool 1 2# weight 2x15 elbow adduction/abduction, L arm no weight, chest press, L arm no weight, bicep extension/flexion, shoulder extension/flexion, L arm no weight, pronation/supination   Cognition   Overall Cognitive Status WFL   Orientation Level Oriented X4   Activity Tolerance   Activity Tolerance Patient limited by pain   Assessment   Treatment Assessment Pt presented having jsut been to the bathroom and returning to bed with supervsion of CNA  Pt returned to bed at supervision level  Pt performed thera ex thsi session to increase strength for safer transfers and ADLs, see pertient section of note  Pt complained of pain in shoulder and elbow but was able to perform exercises for requested reps with minimal adjustment to exercise or correction of form  Pt was limited by pain and self limiting behaviors this session  Pt would benefit from continued skilled OT intervention to increases strength for safer and more independent functional transfers and ADLs  Problem List Decreased strength;Decreased range of motion;Decreased endurance; Impaired balance;Decreased mobility; Decreased safety awareness;Pain   Plan   Treatment/Interventions ADL retraining;Functional transfer training; Therapeutic exercise; Endurance training; Compensatory technique education;OT   Progress Progressing toward goals   OT Therapy Minutes   OT Time In 1303   OT Time Out 1333   OT Total Time (minutes) 30   OT Mode of treatment - Individual (minutes) 30   OT Mode of treatment - Concurrent (minutes) 0   OT Mode of treatment - Group (minutes) 0   OT Mode of treatment - Co-treat (minutes) 0   OT Mode of Treatment - Total time(minutes) 30 minutes   OT Cumulative Minutes 126   Therapy Time missed   Time missed?  No

## 2022-06-22 NOTE — PROGRESS NOTES
06/22/22 0854   Pain Assessment   Pain Assessment Tool 0-10   Pain Score 7   Pain Location/Orientation Orientation: Left; Location: Arm  (elbow)   Restrictions/Precautions   Precautions Fall Risk;Contact/isolation;Limb alert;Pain   Weight Bearing Restrictions No   RLE Weight Bearing Per Order WBAT   LLE Weight Bearing Per Order WBAT   ROM Restrictions No   Picking Up Object   Type of Assistance Needed Supervision   Physical Assistance Level No physical assistance   Comment Reacher to  pegs with RW   Picking Up Object CARE Score 4   Roll Left and Right   Type of Assistance Needed Set-up / clean-up   Physical Assistance Level No physical assistance   Comment removed pillow leg was resting on   Roll Left and Right CARE Score 5   Sit to Lying   Type of Assistance Needed Supervision   Sit to Lying CARE Score 4   Lying to Sitting on Side of Bed   Type of Assistance Needed Supervision   Physical Assistance Level No physical assistance   Lying to Sitting on Side of Bed CARE Score 4   Sit to Stand   Type of Assistance Needed Supervision   Physical Assistance Level No physical assistance   Sit to Stand CARE Score 4   Bed-Chair Transfer   Type of Assistance Needed Supervision   Physical Assistance Level No physical assistance   Comment RW increased time   Chair/Bed-to-Chair Transfer CARE Score 4   Transfer Bed/Chair/Wheelchair   Limitations Noted In Balance;Confidence; Endurance;Pain Management;Problem Solving;UE Strength;LE Strength   Adaptive Equipment [address] Hygiene   Type of Assistance Needed Set-up / clean-up   Physical Assistance Level No physical assistance   Comment empty urinal   Toileting Hygiene CARE Score 5   Toileting   Able to [address] down yes, up yes  Able to Manage Clothing Closures Yes   Manage Hygiene Bladder   Limitations Noted In Balance; Coordination; Safety   Adaptive Equipment Grab Bar   Toilet Transfer   Type of Assistance Needed Supervision;Verbal cues   Physical Assistance Level No physical assistance   Comment Able to manage pulling up and down pants on own, VC to safety after pt verbalized a plan to walk into the bathroom without RW given that there were hand holds   Toilet Transfer CARE Score 4   Toilet Transfer   Surface Assessed Raised Toilet   Transfer Technique Stand Pivot   Limitations Noted In Balance; Endurance; Safety;Problem Solving;UE Strength;LE Strength   Adaptive Equipment Grab Bar;Walker   Positioning Concerns Raised Seat;Grab Bars   Findings safety concerns after poor pt problem solving   Exercise Tools   Theraputty yello putty item retetrival   Hand Gripper rubberband resistance removing pegs with bilateral hands   Other Exercise Tool 1 push box 1# weight 2x15 multiple directions leaning foward at the waist for  extension   Cognition   Overall Cognitive Status WFL   Orientation Level Oriented X4   Activity Tolerance   Activity Tolerance Patient limited by pain   Assessment   Treatment Assessment Pt presents in bed agreeable to therapy  Pt complains of pain in left elbow limiting ROM  Stretched arm throughout session, pt still able to actively use arm to assist in thera activities  Pt moved slowly through exercises and activities due to pain and decreased endurance  Pt used bathroom during session and voiced a plan to walk into the bathoom educated to safer transfer techniques and given VC to preform at the highest level of independence  Pt tolerated the session fair  It is recommended that pt continue skilled OT intervention to increase independence and strength for safer functional transfers and ADLs  Problem List Decreased strength;Decreased range of motion;Decreased endurance; Impaired balance;Decreased mobility; Decreased safety awareness;Pain   Plan   Treatment/Interventions ADL retraining;Functional transfer training; Therapeutic exercise; Endurance training;Equipment eval/education; Compensatory technique education;OT   Progress Progressing toward goals   OT Therapy Minutes   OT Time In 4628   OT Time Out 1030   OT Total Time (minutes) 96   OT Mode of treatment - Individual (minutes) 96   OT Mode of treatment - Concurrent (minutes) 0   OT Mode of treatment - Group (minutes) 0   OT Mode of treatment - Co-treat (minutes) 0   OT Mode of Treatment - Total time(minutes) 96 minutes   OT Cumulative Minutes 96   Therapy Time missed   Time missed? No   Addendum: Pt preformed functional standing activities for 2 minutes 18 seconds and 2 minutes 40 seconds placing and removing pegs

## 2022-06-22 NOTE — PROGRESS NOTES
06/22/22 1028   Pain Assessment   Pain Assessment Tool 0-10   Pain Score 7   Pain Location/Orientation Orientation: Left; Location: Knee  (L ankle)   Pain Onset/Description Frequency: Constant/Continuous; Descriptor: Aching   Patient's Stated Pain Goal No pain   Hospital Pain Intervention(s) Repositioned; Ambulation/increased activity   Therapeutic Interventions   Strengthening supine ther ex BLE   Assessment   Treatment Assessment Pt  performed supine ther ex to BLE x 30 reps all directions with increased time required due to pain and to allow time for pt  to process what has to be done for each task which helps him to eliminate anticipation of pain and tensing  Pt  tolerated treatment well and reported feeling better after treatment  Pt  improving with activity tolerance and technique  Continue current POC and progress as able  PT Therapy Minutes   PT Time In 1028   PT Time Out 1102   PT Total Time (minutes) 34   PT Mode of treatment - Individual (minutes) 34   PT Mode of treatment - Concurrent (minutes) 0   PT Mode of treatment - Group (minutes) 0   PT Mode of treatment - Co-treat (minutes) 0   PT Mode of Treatment - Total time(minutes) 34 minutes   PT Cumulative Minutes 300   Therapy Time missed   Time missed?  No

## 2022-06-22 NOTE — NURSING NOTE
Patient resting comfortably in bed at this time  No signs of distress noted  Patient was able to ambulate to the bathroom with one assist and a walker to the bathroom overnight  PVR of 28 recorded during this shift  Patient was continent of bowel and bladder overnight  Patient refused HS flomax requesting it be retimed for earlier in the day  Call bell within reach  Will continue to monitor patient and follow plan of care   Unable to obtain AM bloodwork as ordered will inform oncoming RN of need to obtain sample

## 2022-06-22 NOTE — PROGRESS NOTES
Progress Note - Nephrology   Arthjoce Hinkle 55 y o  male MRN: [id number]  Unit/Bed#: [facility] 216-01 Encounter: [id number]    Assessment and Plan    1  Acute kidney injury, present admission, secondary to obstructive uropathy, superimposed on chronic kidney disease  · Awaiting updated labs today  Nurse was unable to obtain labs  Renal function yesterday was stable around creatinine 2 2 mg/dL, overall improved from peak 2 5 mg/dL on 06/18/2022  Patient is nonoliguric with 2 2 L urine output  Continue to monitor renal function, renally dose medications, avoid nephrotoxins, monitor for urinary retention  2  Chronic kidney disease, stage IIIA with baseline creatinine 1 4-1 5 mg/dL  3  Obstructive uropathy status post left ureteroscopy/lithotripsy and ureteral stent exchange with subsequent removal of left nephrostomy tube on 06/09/2022 and stent removal on 06/13/2022  ? Will need outpatient Urology follow-up  4  Recurrent nephrolithiasis  ? Will need outpatient stone risk profile to prevent recurrence  5  Hypertension  ? At goal   Continue antihypertensives  6  Morbid obesity with BMI 50  · Whole foods portion controlled diet recommended  Follow up reason for today's visit:  Acute kidney injury    Obstructive uropathy    Patient Active Problem List   Diagnosis    Morbid obesity ([address] )    Vitamin D deficiency    History of gout    Bacteremia due to Escherichia coli    Lactic acidosis    Acute kidney injury superimposed on CKD stage 3    Severe sepsis on admission    Stage 3b chronic kidney disease ([address] )    Essential hypertension    Insomnia    Mixed hyperlipidemia    Morbid obesity    Recurrent left knee pain    Obstructive uropathy    Obstructive pyelonephritis    Anemia    Diarrhea         Subjective:   Reports knee pain is better than yesterday  A complete 10 point review of systems was performed and is otherwise negative      Objective:     Vitals: Blood pressure 121/65, pulse (!) 113, temperature 98 9 °F (37 2 °C), temperature source Temporal, resp  rate 18, height 5' 6" (1 676 m), weight (!) 142 kg (313 lb 15 oz), SpO2 99 %  ,Body mass index is 50 67 kg/m²  Weight (last 2 days)     Date/Time Weight    06/22/22 0600 142 (313 94)    06/21/22 0600 142 (313 05)    06/20/22 0500 143 (314 6)            Intake/Output Summary (Last 24 hours) at 6/22/2022 1254  Last data filed at 6/22/2022 0902  Gross per 24 hour   Intake 300 ml   Output 2025 ml   Net -1725 ml     I/O last 3 completed shifts: In: 930 [P O :930]  Out: 3550 [Urine:3550]         Physical Exam: /65 (BP Location: Right arm)   Pulse (!) 113   Temp 98 9 °F (37 2 °C) (Temporal)   Resp 18   Ht 5' 6" (1 676 m)   Wt (!) 142 kg (313 lb 15 oz)   SpO2 99%   BMI 50 67 kg/m²     General Appearance:    No acute distress  Cooperative  Appears stated age  Obese  Head:    Normocephalic  Atraumatic  Normal jaw occlusion  Eyes:    Lids, conjunctiva normal  No scleral icterus  Ears:    Normal external ears  Nose:   Nares normal  No drainage  Mouth:   Lips, tongue normal  Mucosa normal  Phonation normal    Neck:   Supple  Symmetrical    Back:     Symmetric  No CVA tenderness  Lungs:     Normal respiratory effort  Clear to auscultation bilaterally  Chest wall:    No tenderness or deformity  Heart:    Regular rate and rhythm  Normal S1 and S2  No murmur  No JVD  No edema  Abdomen:     Soft  Non-tender  Bowel sounds active  Genitourinary:   No Berumen catheter present  Extremities:   Extremities normal  Atraumatic  No cyanosis  Skin:   Warm and dry  No pallor, jaundice, rash, ecchymoses  Neurologic:   Alert and oriented to person, place, time  No focal deficit  Lab, Imaging and other studies: I have personally reviewed pertinent labs    CBC: No results found for: WBC, HGB, HCT, MCV, PLT, ADJUSTEDWBC, MCH, MCHC, RDW, MPV, NRBC  CMP: No results found for: NA, K, CL, CO2, ANIONGAP, BUN, CREATININE, GLUCOSE, CALCIUM, AST, ALT, ALKPHOS, PROT, BILITOT, EGFR      Results from last 7 days   Lab Units 06/21/22  0921 06/20/22  0459 06/18/22  0538   POTASSIUM mmol/L 3 8 3 6 3 3*   CHLORIDE mmol/L 102 104 104   CO2 mmol/L 20* 20* 21   BUN mg/dL 22 22 20   CREATININE mg/dL 2 23* 2 23* 2 50*   CALCIUM mg/dL 9 4 9 1 8 8   ALK PHOS U/L 39  --  34   ALT U/L 8  --  5*   AST U/L 11*  --  9*         Phosphorus: No results found for: PHOS  Magnesium: No results found for: MG  Urinalysis: No results found for: COLORU, CLARITYU, SPECGRAV, PHUR, LEUKOCYTESUR, NITRITE, PROTEINUA, GLUCOSEU, KETONESU, BILIRUBINUR, BLOODU  Ionized Calcium: No results found for: CAION  Coagulation: No results found for: PT, INR, APTT  Troponin: No results found for: TROPONINI  ABG: No results found for: PHART, KKC3SZT, PO2ART, BDC4DDU, J5ZPQOTC, BEART, SOURCE  Radiology review:     IMAGING  Procedure: XR knee 4+ vw left injury    Result Date: 6/21/2022  Narrative: LEFT KNEE INDICATION:   left knee pain  COMPARISON:  5/13/2022 VIEWS:  XR KNEE 4+ VW LEFT INJURY Images: 4 FINDINGS: There is no acute fracture or dislocation  There is no joint effusion  Moderate to severe degenerative changes with near complete joint space loss at the medial compartment  Tricompartmental osteophyte formation  No lytic or blastic osseous lesion  Soft tissues are unremarkable  Impression: No acute osseous abnormality  Degenerative changes as described   Workstation performed: [id number]       Current Facility-Administered Medications   Medication Dose Route Frequency    acetaminophen (TYLENOL) tablet 650 mg  650 mg Oral Q6H PRN    allopurinol (ZYLOPRIM) tablet 100 mg  100 mg Oral Daily    amLODIPine (NORVASC) tablet 10 mg  10 mg Oral Daily    cholecalciferol (VITAMIN D3) tablet 400 Units  400 Units Oral Daily    Diclofenac Sodium (VOLTAREN) 1 % topical gel 2 g  2 g Topical 4x Daily PRN    metoprolol succinate (TOPROL-XL) 24 hr tablet 100 mg  100 mg Oral Daily    ondansetron (ZOFRAN-ODT) dispersible tablet 4 mg  4 mg Oral Q6H PRN    potassium chloride (K-DUR,KLOR-CON) CR tablet 10 mEq  10 mEq Oral Daily    senna (SENOKOT) tablet 17 2 mg  2 tablet Oral HS PRN    [START ON 6/23/2022] tamsulosin (FLOMAX) capsule 0 8 mg  0 8 mg Oral Early Morning    traMADol (ULTRAM) tablet 50 mg  50 mg Oral Q6H PRN     Medications Discontinued During This Encounter   Medication Reason    senna (SENOKOT) tablet 17 2 mg     metoprolol succinate (TOPROL-XL) 24 hr tablet 25 mg     tamsulosin (FLOMAX) capsule 0 4 mg     tamsulosin (FLOMAX) capsule 0 8 mg     metoprolol succinate (TOPROL-XL) 24 hr tablet 50 mg     Diclofenac Sodium (VOLTAREN) 1 % topical gel 2 g     tamsulosin (FLOMAX) capsule 0 4 mg        Noreen Tavares PA-C    Portions of the record may have been created with voice recognition software  Occasional wrong word or "sound a like" substitutions may have occurred due to the inherent limitations of voice recognition software  Read the chart carefully and recognize, using context, where substitutions have occurred

## 2022-06-22 NOTE — PLAN OF CARE
Problem: PAIN - ADULT  Goal: Verbalizes/displays adequate comfort level or baseline comfort level  Description: Interventions:  - Encourage patient to monitor pain and request assistance  - Assess pain using appropriate pain scale  - Administer analgesics based on type and severity of pain and evaluate response  - Implement non-pharmacological measures as appropriate and evaluate response  - Consider cultural and social influences on pain and pain management  - Notify physician/advanced practitioner if interventions unsuccessful or patient reports new pain  Outcome: Progressing     Problem: INFECTION - ADULT  Goal: Absence or prevention of progression during hospitalization  Description: INTERVENTIONS:  - Assess and monitor for signs and symptoms of infection  - Monitor lab/diagnostic results  - Monitor all insertion sites, i e  indwelling lines, tubes, and drains  - Monitor endotracheal if appropriate and nasal secretions for changes in amount and color  - [place] appropriate cooling/warming therapies per order  - Administer medications as ordered  - Instruct and encourage patient and family to use good hand hygiene technique  - Identify and instruct in appropriate isolation precautions for identified infection/condition  Outcome: Progressing     Problem: DISCHARGE PLANNING  Goal: Discharge to home or other facility with appropriate resources  Description: INTERVENTIONS:  - Identify barriers to discharge w/patient and caregiver  - Arrange for needed discharge resources and transportation as appropriate  - Identify discharge learning needs (meds, wound care, etc )  - Arrange for interpretive services to assist at discharge as needed  - Refer to Case Management Department for coordinating discharge planning if the patient needs post-hospital services based on physician/advanced practitioner order or complex needs related to functional status, cognitive ability, or social support system  Outcome: Progressing Problem: Prexisting or High Potential for Compromised Skin Integrity  Goal: Skin integrity is maintained or improved  Description: INTERVENTIONS:  - Identify patients at risk for skin breakdown  - Assess and monitor skin integrity  - Assess and monitor nutrition and hydration status  - Monitor labs   - Assess for incontinence   - Turn and reposition patient  - Assist with mobility/ambulation  - Relieve pressure over bony prominences  - Avoid friction and shearing  - Provide appropriate hygiene as needed including keeping skin clean and dry  - Evaluate need for skin moisturizer/barrier cream  - Collaborate with interdisciplinary team   - Patient/family teaching  - Consider wound care consult   Outcome: Progressing     Problem: Nutrition/Hydration-ADULT  Goal: Nutrient/Hydration intake appropriate for improving, restoring or maintaining nutritional needs  Description: Monitor and assess patient's nutrition/hydration status for malnutrition  Collaborate with interdisciplinary team and initiate plan and interventions as ordered  Monitor patient's weight and dietary intake as ordered or per policy  Utilize nutrition screening tool and intervene as necessary  Determine patient's food preferences and provide high-protein, high-caloric foods as appropriate       INTERVENTIONS:  - Monitor oral intake, urinary output, labs, and treatment plans  - Assess nutrition and hydration status and recommend course of action  - Evaluate amount of meals eaten  - Assist patient with eating if necessary   - Allow adequate time for meals  - Recommend/ encourage appropriate diets, oral nutritional supplements, and vitamin/mineral supplements  - Order, calculate, and assess calorie counts as needed  - Recommend, monitor, and adjust tube feedings and TPN/PPN based on assessed needs  - Assess need for intravenous fluids  - Provide specific nutrition/hydration education as appropriate  - Include patient/family/caregiver in decisions related to nutrition  Outcome: Progressing

## 2022-06-23 PROBLEM — M25.522 LEFT ELBOW PAIN: Status: ACTIVE | Noted: 2022-06-23

## 2022-06-23 PROCEDURE — 97140 MANUAL THERAPY 1/> REGIONS: CPT

## 2022-06-23 PROCEDURE — 97530 THERAPEUTIC ACTIVITIES: CPT

## 2022-06-23 PROCEDURE — 97535 SELF CARE MNGMENT TRAINING: CPT

## 2022-06-23 PROCEDURE — 97035 APP MDLTY 1+ULTRASOUND EA 15: CPT

## 2022-06-23 PROCEDURE — 99232 SBSQ HOSP IP/OBS MODERATE 35: CPT | Performed by: PHYSICAL MEDICINE & REHABILITATION

## 2022-06-23 PROCEDURE — 97110 THERAPEUTIC EXERCISES: CPT

## 2022-06-23 PROCEDURE — 97116 GAIT TRAINING THERAPY: CPT

## 2022-06-23 RX ORDER — TAMSULOSIN HYDROCHLORIDE 0.4 MG/1
0.4 CAPSULE ORAL DAILY
Status: DISCONTINUED | OUTPATIENT
Start: 2022-06-23 | End: 2022-06-30 | Stop reason: HOSPADM

## 2022-06-23 RX ORDER — TAMSULOSIN HYDROCHLORIDE 0.4 MG/1
0.8 CAPSULE ORAL
Status: DISCONTINUED | OUTPATIENT
Start: 2022-06-24 | End: 2022-06-23

## 2022-06-23 RX ADMIN — TAMSULOSIN HYDROCHLORIDE 0.4 MG: 0.4 CAPSULE ORAL at 11:50

## 2022-06-23 RX ADMIN — ACETAMINOPHEN 650 MG: 325 TABLET ORAL at 02:24

## 2022-06-23 RX ADMIN — ACETAMINOPHEN 650 MG: 325 TABLET ORAL at 10:18

## 2022-06-23 RX ADMIN — POTASSIUM CHLORIDE 10 MEQ: 750 TABLET, EXTENDED RELEASE ORAL at 08:10

## 2022-06-23 RX ADMIN — CHOLECALCIFEROL TAB 10 MCG (400 UNIT) 400 UNITS: 10 TAB at 08:10

## 2022-06-23 RX ADMIN — ACETAMINOPHEN 650 MG: 325 TABLET ORAL at 19:50

## 2022-06-23 RX ADMIN — METOPROLOL SUCCINATE 100 MG: 50 TABLET, EXTENDED RELEASE ORAL at 08:10

## 2022-06-23 RX ADMIN — TRAMADOL HYDROCHLORIDE 50 MG: 50 TABLET, COATED ORAL at 08:11

## 2022-06-23 RX ADMIN — AMLODIPINE BESYLATE 10 MG: 10 TABLET ORAL at 08:10

## 2022-06-23 RX ADMIN — TRAMADOL HYDROCHLORIDE 50 MG: 50 TABLET, COATED ORAL at 16:54

## 2022-06-23 RX ADMIN — ALLOPURINOL 100 MG: 100 TABLET ORAL at 08:10

## 2022-06-23 NOTE — PROGRESS NOTES
06/23/22 1301   Pain Assessment   Pain Assessment Tool 0-10   Pain Score 7   Pain Location/Orientation Orientation: Left; Location: Knee  (L ankle)   Patient's Stated Pain Goal No pain   Hospital Pain Intervention(s) Repositioned; Ambulation/increased activity; Heat applied   Restrictions/Precautions   Precautions Fall Risk;Contact/isolation;Pain;Limb alert   Weight Bearing Restrictions No   RLE Weight Bearing Per Order WBAT   LLE Weight Bearing Per Order WBAT   ROM Restrictions No   Cognition   Overall Cognitive Status WFL   Arousal/Participation Alert; Cooperative   Attention Within functional limits   Orientation Level Oriented X4   Memory Within functional limits   Following Commands Follows all commands and directions without difficulty   Subjective   Subjective "I will try the Nustep if you think it will help "   Sit to Stand   Type of Assistance Needed Supervision   Physical Assistance Level No physical assistance   Comment RW   Sit to Stand CARE Score 4   Bed-Chair Transfer   Type of Assistance Needed Supervision   Physical Assistance Level No physical assistance   Comment RW   Chair/Bed-to-Chair Transfer CARE Score 4   Transfer Bed/Chair/Wheelchair   Limitations Noted In Confidence; Endurance;Pain Management;UE Strength;LE Strength   Adaptive Equipment Roller Walker   Stand Pivot Supervision   Sit to Stand Supervision   Stand to Sit Supervision   Findings Increased time required due to pain and for processing instructions for tasks    Visual demonstration very beneficial    Car Transfer   Reason if not Attempted Environmental limitations   Car Transfer CARE Score 10   Walk 10 Feet   Type of Assistance Needed Supervision   Physical Assistance Level No physical assistance   Comment RW   Walk 10 Feet CARE Score 4   Walk 50 Feet with Two Turns   Type of Assistance Needed Supervision   Physical Assistance Level No physical assistance   Comment RW   Walk 50 Feet with Two Turns CARE Score 4   Walk 150 Feet   Reason if not Attempted Activity not applicable   Walk 053 Feet CARE Score 9   Walking 10 Feet on Uneven Surfaces   Reason if not Attempted Activity not applicable   Walking 10 Feet on Uneven Surfaces CARE Score 9   Ambulation   Does the patient walk? 2  Yes   Primary Mode of Locomotion Prior to Admission Walk   Distance Walked (feet) 66 ft  (12ft)   Assist Device Roller Walker   Gait Pattern Antalgic;Decreased foot clearance; Wide TEO; Decreased L stance; Improper weight shift; Step through   Limitations Noted In Endurance;Speed;Strength;Balance   Provided Assistance with: Direction   Walk Assist Level Supervision   Wheel 50 Feet with Two Turns   Reason if not Attempted Activity not applicable   Wheel 50 Feet with Two Turns CARE Score 9   Wheel 150 Feet   Reason if not Attempted Activity not applicable   Wheel 365 Feet CARE Score 9   Curb or Single Stair   Reason if not Attempted Safety concerns   1 Step (Curb) CARE Score 88   4 Steps   Reason if not Attempted Safety concerns   4 Steps CARE Score 88   12 Steps   Reason if not Attempted Safety concerns   12 Steps CARE Score 88   Picking Up Object   Reason if not Attempted Activity not applicable   Picking Up Object CARE Score 9   Toilet Transfer   Type of Assistance Needed Supervision   Physical Assistance Level No physical assistance   Comment able to manage clothing without assistance   Toilet Transfer CARE Score 4   Toilet Transfer   Surface Assessed Standard Toilet   Limitations Noted In Balance;ROM;UE Strength;LE Strength   Adaptive Equipment Grab Bar  (RW)   Positioning Concerns Raised Seat   Findings S   Therapeutic Interventions   Strengthening nustep x 16mins on lv 0   Balance gait and transfer training on level surfaces   Modalities HP to L knee and ankle   Other toilet transfers   Equipment Use   NuStep Lv 0 for 16 mins   Assessment   Treatment Assessment Pt  tolerated treatment this afternoon with reports of decreased pain level reported compared to  morning session  Pt  performed transfer training, gait training, ther ex including Nustep on lv 1 x 16 mins to help improve ROM in L knee and improve functional activity tolerance fb hp to L knee and ankle to decrease pain and spasms as well as promote relaxation of tense muscles x 10 mins, and education provided as needed for safety and direction  continue current POC and progress as able  Plan   Progress Progressing toward goals   PT Therapy Minutes   PT Time In 1301   PT Time Out 1339   PT Total Time (minutes) 38   PT Mode of treatment - Individual (minutes) 38   PT Mode of treatment - Concurrent (minutes) 0   PT Mode of treatment - Group (minutes) 0   PT Mode of treatment - Co-treat (minutes) 0   PT Mode of Treatment - Total time(minutes) 38 minutes   PT Cumulative Minutes 402   Therapy Time missed   Time missed?  No

## 2022-06-23 NOTE — PROGRESS NOTES
06/23/22 0900   Pain Assessment   Pain Assessment Tool 0-10   Pain Score 8   Pain Location/Orientation Orientation: Left; Location: Shoulder; Location: Arm   Restrictions/Precautions   Precautions Fall Risk;Contact/isolation;Pain;Limb alert  (MDRO urine, I&O)   RLE Weight Bearing Per Order WBAT   LLE Weight Bearing Per Order WBAT   Oral Hygiene   Comment prefers to complete later   Grooming   Able To Initiate Tasks; Wash/Dry Face;Wash/Dry Hands   Findings setup seated EOB   Shower/Bathe Self   Type of Assistance Needed Physical assistance   Physical Assistance Level 25% or less   Comment min A thoroughness periarea and buttock   Shower/Bathe Self CARE Score 3   Bathing   Assessed Bath Style Sponge Bath   Anticipated D/C Bath Style Shower;Sponge Bath   Able to [place] No   Able to [employer] Temperature No   Able to Wash/Rinse/Dry (body part) Left Arm;Right Arm;L Upper Leg;R Upper Leg;L Lower Leg/Foot;R Lower Leg/Foot;Chest;Abdomen   Limitations Noted in Endurance;Balance;Problem Solving; Safety;ROM   Positioning Seated;Standing   Adaptive Equipment Longhand Sponge;Longhand Reacher   Findings  pt requested assist without attempting hgiene although requires assist for thoroughness after attempt with limited success due to pain limiting ROM   Upper Body Dressing   Type of Assistance Needed Set-up / clean-up   Upper Body Dressing CARE Score 5   Lower Body Dressing   Type of Assistance Needed Supervision   Comment with reacher EOB   Lower Body Dressing CARE Score 4   Putting On/Taking Off Footwear   Type of Assistance Needed Physical assistance   Physical Assistance Level 25% or less   Comment using reacher and sock aide   Putting On/Taking Off Footwear CARE Score 3   Picking Up Object   Type of Assistance Needed Supervision   Comment with reacher   Picking Up Object CARE Score 4   Dressing/Undressing Clothing   Remove UB Clothes Pullover Shirt   Don UB Clothes Pullover Shirt   Remove LB Clothes Shorts;Socks Don LB Clothes Shorts;Socks   Limitations Noted In Balance; Endurance;Problem Solving; Safety;Strength;ROM   Adaptive Equipment Reacher;Sock Aide   Positioning Sit Edge Of Bed;Standing   Sit to Lying   Type of Assistance Needed Independent   Sit to Lying CARE Score 6   Lying to Sitting on Side of Bed   Type of Assistance Needed Independent   Lying to Sitting on Side of Bed CARE Score 6   Sit to Stand   Type of Assistance Needed Supervision   Sit to Stand CARE Score 4   Bed-Chair Transfer   Type of Assistance Needed Supervision   Chair/Bed-to-Chair Transfer CARE Score 4   Toileting Hygiene   Type of Assistance Needed Supervision   Comment supervision for hygiene periarea standing, no BM this session   Toileting Hygiene CARE Score 4   Toileting   Able to [address] down yes, up yes  Manage Hygiene Bladder   Limitations Noted In Balance;ROM;Safety;LE Strength   Adaptive Equipment Grab Bar   Findings discussed with pt and staff having pt call for toileting in BR while he is initiating movement and provide ,ore distant supervision allowiong for mobility to and from the BR rather than urinal use   Toilet Transfer   Type of Assistance Needed Supervision   Toilet Transfer CARE Score 4   Toilet Transfer   Surface Assessed Raised Toilet   Transfer Technique Standard   Limitations Noted In Balance; Endurance;Problem Solving;ROM;Safety;LE Strength   Adaptive Equipment Grab Bar;Walker   Additional Activities   Additional Activities Other (Comment)   Additional Activities Comments fxl mobility with RW to and from the [address]   Other Comments   Assessment Pt participated in 60 minutes of concurrent tx time completing therapeutic activities and self care which focus on similar goals as another  Assessment   Treatment Assessment Pt presents supine agreeable to OT session including toileting, ADLs and related transfers/ mobility  Pt reports increased pain BEU shoulder exp of LUE and Left knee pain resolving   Pt requires supervision and assist due to decreased balance, safety, endurance and generalized strength/ ROM with pain LUE/LLE  Pt is making gains with encouragement required and will benefit fromc ontinued skilled OT servcies to increase independence with daily tasks  Problem List Decreased strength;Decreased range of motion;Decreased endurance; Impaired balance;Decreased safety awareness;Pain   Plan   Treatment/Interventions ADL retraining;Functional transfer training; Therapeutic exercise; Endurance training;Patient/family training;Equipment eval/education; Compensatory technique education   Progress Progressing toward goals   OT Therapy Minutes   OT Time In 0900   OT Time Out 1000   OT Total Time (minutes) 60   OT Mode of treatment - Concurrent (minutes) 60   Therapy Time missed   Time missed?  No

## 2022-06-23 NOTE — NURSING NOTE
Patient able to ambulate supervision to bathroom with walker  Alarms removed per therapy for patient to have distant supervision  Patient bladder scanned for two PVRs this shift and no significant amounts present  Patient using urinal to void  Medicated twice for pain in shoulders and left elbow  PT applied heat pack after there session today  Ace wrap not applied at this time  Patient educated on importance of repositioning self in bed  Will continue to monitor and follow plan of care

## 2022-06-23 NOTE — PROGRESS NOTES
06/23/22 1106   Pain Assessment   Pain Assessment Tool 0-10   Pain Score 8   Pain Location/Orientation Orientation: Left; Location: Knee  (L ankle)   Patient's Stated Pain Goal No pain   Hospital Pain Intervention(s) Repositioned; Ambulation/increased activity; Heat applied  (order for moist heat to L knee and ankle received from physicain for use to decrease pain)   Restrictions/Precautions   Precautions Fall Risk;Contact/isolation;Limb alert;Pain   Weight Bearing Restrictions No   RLE Weight Bearing Per Order WBAT   LLE Weight Bearing Per Order WBAT   ROM Restrictions No   Cognition   Overall Cognitive Status WFL   Arousal/Participation Alert; Cooperative   Attention Within functional limits   Orientation Level Oriented X4   Memory Within functional limits   Following Commands Follows all commands and directions without difficulty   Subjective   Subjective "I am getting there a little bit each day "   Roll Left and Right   Type of Assistance Needed Independent   Physical Assistance Level No physical assistance   Roll Left and Right CARE Score 6   Sit to Lying   Type of Assistance Needed Independent   Physical Assistance Level No physical assistance   Comment HOB BSR   Sit to Lying CARE Score 6   Lying to Sitting on Side of Bed   Type of Assistance Needed Independent   Physical Assistance Level No physical assistance   Comment HOB BSR   Lying to Sitting on Side of Bed CARE Score 6   Sit to Stand   Type of Assistance Needed Supervision   Physical Assistance Level No physical assistance   Sit to Stand CARE Score 4   Bed-Chair Transfer   Type of Assistance Needed Supervision   Physical Assistance Level No physical assistance   Comment RW   Chair/Bed-to-Chair Transfer CARE Score 4   Transfer Bed/Chair/Wheelchair   Limitations Noted In Confidence; Endurance;Pain Management;UE Strength;LE Strength   Adaptive Equipment Roller Walker   Stand Pivot Supervision   Sit to Stand Supervision   Stand to Sit Supervision   Supine to Sit Modified Independent   Sit to Supine Modified Independent   Findings Increased time required due to pain and stifness in LLE   Car Transfer   Reason if not Attempted Environmental limitations   Car Transfer CARE Score 10   Walk 10 Feet   Reason if not Attempted Activity not applicable   Walk 10 Feet CARE Score 9   Walk 50 Feet with Two Turns   Reason if not Attempted Activity not applicable   Walk 50 Feet with Two Turns CARE Score 9   Walk 150 Feet   Reason if not Attempted Activity not applicable   Walk 164 Feet CARE Score 9   Ambulation   Does the patient walk? 2  Yes   Therapeutic Interventions   Strengthening supine ther ex BLE   Flexibility MT LLE   Balance transfer training   Modalities US   Other bed mobility tasks   Assessment   Treatment Assessment Pt  performed bed mobility tasks, transfer training, manual therapy performed including stretching of L calf and hamstring muscles, supine ther ex all directions BLE, US to L knee at 1 2 w/cm 2 x 10 mins, and education provided as needed for safety and direction to improve functional mobility and activity tolerance  Pt  is regaining mobility in LLE and tolerating increased activity  Continue current POC and progress as tolerated  HP provided to L knee and ankle while performing ther ex on RLE due to receiving order from physicain to use for pain relief  PT Barriers   Physical Impairment Decreased strength;Decreased endurance;Decreased range of motion; Impaired balance;Decreased mobility; Decreased safety awareness;Pain   Functional Limitation Walking;Transfers;Standing;Stair negotiation   Plan   Treatment/Interventions Functional transfer training;LE strengthening/ROM; Elevations; Therapeutic exercise; Endurance training;Patient/family training;Equipment eval/education; Bed mobility;Gait training   Progress Progressing toward goals   PT Therapy Minutes   PT Time In 1002   PT Time Out 1106   PT Total Time (minutes) 64   PT Mode of treatment - Individual (minutes) 64   PT Mode of treatment - Concurrent (minutes) 0   PT Mode of treatment - Group (minutes) 0   PT Mode of treatment - Co-treat (minutes) 0   PT Mode of Treatment - Total time(minutes) 64 minutes   PT Cumulative Minutes 364   Therapy Time missed   Time missed?  No

## 2022-06-23 NOTE — NURSING NOTE
Patient resting comfortably in bed at this time  No signs of distress noted  Patient was able to ambulate to the bathroom with one assist and a walker overnight  PVR bladder scan of 31mL recorded during this shift  Patient was incontinent bladder at times overnight  Call bell within reach  Will continue to monitor patient and follow plan of care

## 2022-06-23 NOTE — PLAN OF CARE
Problem: PAIN - ADULT  Goal: Verbalizes/displays adequate comfort level or baseline comfort level  Description: Interventions:  - Encourage patient to monitor pain and request assistance  - Assess pain using appropriate pain scale  - Administer analgesics based on type and severity of pain and evaluate response  - Implement non-pharmacological measures as appropriate and evaluate response  - Consider cultural and social influences on pain and pain management  - Notify physician/advanced practitioner if interventions unsuccessful or patient reports new pain  Outcome: Progressing     Problem: INFECTION - ADULT  Goal: Absence or prevention of progression during hospitalization  Description: INTERVENTIONS:  - Assess and monitor for signs and symptoms of infection  - Monitor lab/diagnostic results  - Monitor all insertion sites, i e  indwelling lines, tubes, and drains  - Monitor endotracheal if appropriate and nasal secretions for changes in amount and color  - [place] appropriate cooling/warming therapies per order  - Administer medications as ordered  - Instruct and encourage patient and family to use good hand hygiene technique  - Identify and instruct in appropriate isolation precautions for identified infection/condition  Outcome: Progressing     Problem: DISCHARGE PLANNING  Goal: Discharge to home or other facility with appropriate resources  Description: INTERVENTIONS:  - Identify barriers to discharge w/patient and caregiver  - Arrange for needed discharge resources and transportation as appropriate  - Identify discharge learning needs (meds, wound care, etc )  - Arrange for interpretive services to assist at discharge as needed  - Refer to Case Management Department for coordinating discharge planning if the patient needs post-hospital services based on physician/advanced practitioner order or complex needs related to functional status, cognitive ability, or social support system  Outcome: Progressing

## 2022-06-23 NOTE — PROGRESS NOTES
06/23/22 1229   Pain Assessment   Pain Assessment Tool Jasso-Baker FACES   Jasso-Baker FACES Pain Rating 4   Pain Location/Orientation Orientation: Left; Location: Arm;Location: Leg   Restrictions/Precautions   Precautions Fall Risk;Contact/isolation;Limb alert;Pain  (MDRO urine and I&O)   RLE Weight Bearing Per Order WBAT   LLE Weight Bearing Per Order WBAT   Lying to Sitting on Side of Bed   Type of Assistance Needed Independent   Lying to Sitting on Side of Bed CARE Score 6   Sit to Stand   Type of Assistance Needed Supervision   Sit to Stand CARE Score 4   Bed-Chair Transfer   Type of Assistance Needed Supervision   Chair/Bed-to-Chair Transfer CARE Score 4   Toileting Hygiene   Type of Assistance Needed Physical assistance   Physical Assistance Level 25% or less   Comment min A thoroughness following BM with encouragement to attempt before directly asking for assist   [place] 83 Score 3   Toileting   Able to [address] down yes, up yes  Manage Hygiene Bladder; Bowel   Limitations Noted In Balance;Problem Solving;ROM;Safety;LE Strength;UE Strength   Adaptive Equipment Grab Bar   Toilet Transfer   Type of Assistance Needed Supervision   Toilet Transfer CARE Score 4   Toilet Transfer   Surface Assessed Raised Toilet   Transfer Technique Standard   Limitations Noted In Balance; Endurance;ROM;Safety;Problem Solving;LE Strength   Adaptive Equipment Grab Bar;Walker   Exercise Tools   UE Ergometer 5 minutes forward and backwards BUE min resistance   Additional Activities   Additional Activities Other (Comment)   Additional Activities Comments fxl mobility to and from the BR with RW and Sueprvision; Mod I w/c mobility longer distances in hallway   Assessment   Treatment Assessment Pt rpesents supine following lunch agreeable to OT session including BUE therex and toeilting with related transfers/ mobility  Pt requires assist due to decreased balance, safety, endurance and generalized strength?  ROM with pain shoulder and leg left side  Pt requires encouragement to attempt tasks before asking for assist  Pt will benefit from continued skilled OT services to increase independence with daily tasks  Problem List Decreased strength;Decreased range of motion;Decreased endurance; Impaired balance;Decreased safety awareness;Pain   Plan   Treatment/Interventions ADL retraining;Functional transfer training; Therapeutic exercise; Endurance training;Patient/family training;Equipment eval/education; Compensatory technique education   Progress Progressing toward goals   OT Therapy Minutes   OT Time In 1229   OT Time Out 1300   OT Total Time (minutes) 31   OT Mode of treatment - Individual (minutes) 31   Therapy Time missed   Time missed?  No

## 2022-06-23 NOTE — PROGRESS NOTES
PM&R PROGRESS NOTE:  Toya Schwartz 55 y o  male MRN: [id number]  Unit/Bed#: -01 Encounter: [id number]        Rehabilitation Diagnosis: Impairment of mobility, safety and Activities of Daily Living (ADLs) due to Debility:  16  Debility (Non-cardiac/Non-pulmonary)    HPI: Toya Schwartz is a 55 y o  male with a PMH significant for CKD, HLD, HTN and arthritis who presented to the [place] [address] ED initially with worsening left-sided flank pain with generalized malaise/fatigue  He had a recent admission at [facility] for obstructive pyelonephritis secondary to a 3 mm stone with ureteral stent placement  Pt developed E  Coli bacteremia during hospitalization and discharged with amoxicillian until 5/4  In the ED patient was found to have lactic acidosis, leukocytosis and worsening ADRIANNE  CT revealed moderate perinephric stranding and mild hydronephrosis in the left kidney with multiple bilateral renal calculi  Patient transferred to [facility] to be evaluated by Urology  S/p perc nephrostomy tube drain replacement by IR on 5/10  Positive E  coli in one blood culture from 5/10 thought to be associated with UTI/Pyelonephritis  IV antibiotics initiated and then ultimately transitioned to PO antibiotics  During this stay, Ortho was consulted for ongoing left knee pain  His left knee was aspirated  Blood was noted but no effusion aspirated  Xray of the knee without acute osseous abnormality but did reveal tricompartmental osteoarthritis similar to a prior imaging the year prior  Ortho considering future joint steroid injection once acute medical comorbidities resolve  Patient arrived to [facility] on 5/19 after PT/OT recommendations  He progressed well however on 6/6 he developed sepsis meeting the criteria with elevation in WBCS, creatinine level, elevated temp and tachycardia  Patient also with c/o weakness, fatigue and diaphoresis  Pt was transferred to [facility] on 6/7 for urology evaluation   Urine and blood cultures obtained and IV antibiotics were initiated  Urine culture with gram negative rods of E  Coli/MDRO Citrobacter  Pt was recommended for inpatient clearance ureteroscopy  ID consulted and placed patient on Cipro PO x7 days (6/16)  Renal US unremarkable  6/9 patient went to OR with Urology for cystoscopy and left ureteroscopy/lithotripsy with ureteral stent exchange and removal of nephrostomy tube  Stent was then discontinued on 6/13  Pt is to follow up with urology as OP in 6 months with a renal US  Patient participated with PT/OT and was deemed appropriate to return to acute rehabilitation  He arrived to [facility] 6/17  SUBJECTIVE: Patient seen face to face  Pt c/o left elbow pain  Ok to try heating pads twice a day x15 minutes  Utilize Voltaren gel to left elbow  Left knee pain is improving with heat and US therapy  Patient c/o receiving too much flomax and refusing to take it early in the morning  Decreased back to 0 4 mg  To be taken daily with medications in the morning  Patient understands and is in agreement  ASSESSMENT: Stable, progressing      PLAN:    Rehabilitation  · Functional deficits: impaired mobility, self care, generalized pain     Continue current rehabilitation plan of care to maximize function       Functional update:   o PT:  Roll L-R: independent  Sit-lying: supervision   Lying to sitting on side of bed: supervision  Sit-stand: supervision  Bed-chair transfer: supervision  Ambulation: supervision  o OT:   Picking up object: supervision   Toilet transfer: supervision   toileting hygiene: s/u c/u       Estimated Discharge: 6/30/22 with [facility] PT/OT/N vs OP depending on progression       Pain   Tylenol 650 mg Q6H PRN for mild pain   Voltaren gel 4x daily    DVT prophylaxis   Refusing Heparin injections and SCDs    Bladder plan   Continent    Bowel plan   Continent      Left elbow pain  Assessment & Plan  · New onset while working with therapy   · May trial heat twice a day for 15 minutes   · May apply Voltaren gel to left elbow    Obstructive pyelonephritis  Assessment & Plan  · Leading to Sepsis suspected to be caused by UTI with left nephrostomy tube and left ureteral stent  · Received IVF and Cefepime   · S/p OR with Urology for cystoscopy ureteroscopy and ureteral stent insertion with string removal of left sided nephrostomy tube   · Urine cultures with MDRO Citrobacter, ID consulted and placed on PO cipro until 6/16        Recurrent left knee pain  Assessment & Plan  · Pain management   · Xray of knee on 5/13 with moderate osteoarthritis   · Repeat x-ray on 6/21 with moderate osteoarthritis   · Continue conservative management  · May trial US therapy to Left knee; heat and Voltaren gel     Morbid obesity  Assessment & Plan  · Lifestyle modifications       Essential hypertension  Assessment & Plan  · Monitor vitals  · IM to follow  · Continue Metoprolol 25 mg daily and Norvasc 10 mg daily        Stage 3b chronic kidney disease [facility])  Assessment & Plan  Lab Results   Component Value Date    EGFR 31 06/22/2022    EGFR 31 06/21/2022    EGFR 31 06/20/2022    CREATININE 2 24 (H) 06/22/2022    CREATININE 2 23 (H) 06/21/2022    CREATININE 2 23 (H) 06/20/2022   · Baseline creatinine 1 4-1 7  · Nephrology following    * Obstructive uropathy  Assessment & Plan  · s/p cystoscopy left ureteroscopy/lithotripsy and exchange ureteral stent, removal nephrostomy tube left on 6/9  · stent removal on 6/13  · OP follow up with Urology in 6 weeks with renal US           Appreciate IM consultants medical co-management  Labs, medications, and imaging personally reviewed  ROS:  A ten point review of systems was completed and pertinent positives are listed in subjective section  All other systems reviewed were negative  Review of Systems   Constitutional: Negative  Negative for appetite change, fatigue and fever  HENT: Negative  Negative for trouble swallowing  Eyes: Negative    Negative for visual disturbance  Respiratory: Negative  Negative for shortness of breath  Cardiovascular: Negative  Negative for chest pain  Gastrointestinal: Negative  Negative for abdominal pain and constipation  Genitourinary: Negative  Negative for difficulty urinating  Musculoskeletal: Positive for arthralgias and myalgias  Left knee pain improving  New onset left elbow pain with decreased ROM   Skin: Negative  Neurological: Positive for weakness  Psychiatric/Behavioral: Negative  OBJECTIVE:   /67 (BP Location: Right arm)   Pulse 98   Temp 98 6 °F (37 °C) (Temporal)   Resp 16   Ht 5' 6" (1 676 m)   Wt (!) 144 kg (317 lb 0 3 oz)   SpO2 97%   BMI 51 17 kg/m²     Physical Exam  Vitals reviewed  Constitutional:       General: He is not in acute distress  Appearance: Normal appearance  He is obese  He is not ill-appearing  HENT:      Head: Normocephalic and atraumatic  Right Ear: External ear normal       Left Ear: External ear normal       Nose: Nose normal    Eyes:      Pupils: Pupils are equal, round, and reactive to light  Cardiovascular:      Rate and Rhythm: Normal rate and regular rhythm  Pulses: Normal pulses  Heart sounds: Normal heart sounds  No murmur heard  Pulmonary:      Effort: Pulmonary effort is normal  No respiratory distress  Breath sounds: Normal breath sounds  No wheezing  Abdominal:      General: Bowel sounds are normal  There is no distension  Palpations: Abdomen is soft  Tenderness: There is no abdominal tenderness  Musculoskeletal:         General: No tenderness  Cervical back: Normal range of motion  Comments: Decreased ROM to LUE, decreased ability to lift left arm and bend left elbow    Skin:     General: Skin is warm  Neurological:      General: No focal deficit present  Mental Status: He is alert and oriented to person, place, and time     Psychiatric:         Mood and Affect: Mood normal           Lab Results   Component Value Date    WBC 10 51 (H) 06/22/2022    HGB 8 1 (L) 06/22/2022    HCT 26 9 (L) 06/22/2022    MCV 95 06/22/2022     06/22/2022     Lab Results   Component Value Date    SODIUM 135 06/22/2022    K 3 8 06/22/2022     06/22/2022    CO2 22 06/22/2022    BUN 21 06/22/2022    CREATININE 2 24 (H) 06/22/2022    GLUC 99 06/22/2022    CALCIUM 9 3 06/22/2022     Lab Results   Component Value Date    INR 1 27 (H) 05/10/2022    INR 1 09 05/10/2022    INR 1 33 (H) 08/24/2019    PROTIME 15 4 (H) 05/10/2022    PROTIME 13 5 05/10/2022    PROTIME 16 6 (H) 08/24/2019           Current Facility-Administered Medications:     acetaminophen (TYLENOL) tablet 650 mg, 650 mg, Oral, Q6H PRN, Corin Betts PA-C, 650 mg at 06/23/22 1018    allopurinol (ZYLOPRIM) tablet 100 mg, 100 mg, Oral, Daily, Corin Betts PA-C, 100 mg at 06/23/22 0810    amLODIPine (NORVASC) tablet 10 mg, 10 mg, Oral, Daily, Corin Betts PA-C, 10 mg at 06/23/22 0810    cholecalciferol (VITAMIN D3) tablet 400 Units, 400 Units, Oral, Daily, Corin Betts PA-C, 400 Units at 06/23/22 0810    Diclofenac Sodium (VOLTAREN) 1 % topical gel 2 g, 2 g, Topical, 4x Daily PRN, Corin Betts PA-C, 2 g at 06/21/22 1352    metoprolol succinate (TOPROL-XL) 24 hr tablet 100 mg, 100 mg, Oral, Daily, Corin Betts PA-C, 100 mg at 06/23/22 0810    ondansetron (ZOFRAN-ODT) dispersible tablet 4 mg, 4 mg, Oral, Q6H PRN, Corin Betts PA-C    potassium chloride (K-DUR,KLOR-CON) CR tablet 10 mEq, 10 mEq, Oral, Daily, Corin Betts PA-C, 10 mEq at 06/23/22 0810    senna (SENOKOT) tablet 17 2 mg, 2 tablet, Oral, HS PRN, Corin Betts PA-C    tamsulosin (FLOMAX) capsule 0 4 mg, 0 4 mg, Oral, Daily, Corin Betts PA-C    traMADol (ULTRAM) tablet 50 mg, 50 mg, Oral, Q6H PRN, Corin Betts PA-C, 50 mg at 06/23/22 3842    Past Medical History:   Diagnosis Date    Arthritis     Chronic kidney disease     Gout     Hyperlipidemia     Hypertension     Kidney stone     Obstructive pyelonephritis     Sepsis [facility])        Patient Active Problem List    Diagnosis Date Noted    Left elbow pain 06/23/2022    Diarrhea 06/13/2022    Anemia 05/13/2022    Obstructive pyelonephritis 04/23/2022    Obstructive uropathy 04/21/2022    Mixed hyperlipidemia 06/02/2021    Morbid obesity 06/02/2021    Recurrent left knee pain 06/02/2021    Insomnia 09/16/2019    Essential hypertension 08/27/2019    Severe sepsis on admission 08/23/2019    Stage 3b chronic kidney disease ([facility] [address] ) 08/23/2019    Acute kidney injury superimposed on CKD stage 3 08/06/2019    Bacteremia due to Escherichia coli 08/04/2019    Lactic acidosis 08/04/2019    History of gout 07/15/2019    Vitamin D deficiency 06/10/2015    Morbid obesity ([facility] [address] ) 03/23/2015          Josie Gomes PA-C    Total time spent:  30 minutes with more than 50% spent counseling/coordinating care  Counseling includes discussion with patient re: progress and discussion with patient of his/her current medical/functional state/information  Coordination of patient's care was performed in conjunction with consulting services  Time invested included review of patient's labs, vitals, and management of their comorbidities with continued monitoring  The care of the patient was extensively discussed and appropriate treatment plan was formulated unique for this patient  ** Please Note:  voice to text software may have been used in the creation of this document   Although proof errors in transcription or interpretation are a potential of such software**

## 2022-06-24 PROCEDURE — 97110 THERAPEUTIC EXERCISES: CPT

## 2022-06-24 PROCEDURE — 97140 MANUAL THERAPY 1/> REGIONS: CPT

## 2022-06-24 PROCEDURE — 97116 GAIT TRAINING THERAPY: CPT

## 2022-06-24 PROCEDURE — 97035 APP MDLTY 1+ULTRASOUND EA 15: CPT

## 2022-06-24 PROCEDURE — 97530 THERAPEUTIC ACTIVITIES: CPT

## 2022-06-24 PROCEDURE — 97535 SELF CARE MNGMENT TRAINING: CPT

## 2022-06-24 PROCEDURE — 99232 SBSQ HOSP IP/OBS MODERATE 35: CPT | Performed by: FAMILY MEDICINE

## 2022-06-24 PROCEDURE — 99232 SBSQ HOSP IP/OBS MODERATE 35: CPT | Performed by: NURSE PRACTITIONER

## 2022-06-24 RX ADMIN — ACETAMINOPHEN 650 MG: 325 TABLET ORAL at 02:45

## 2022-06-24 RX ADMIN — TRAMADOL HYDROCHLORIDE 50 MG: 50 TABLET, COATED ORAL at 08:22

## 2022-06-24 RX ADMIN — TRAMADOL HYDROCHLORIDE 50 MG: 50 TABLET, COATED ORAL at 00:21

## 2022-06-24 RX ADMIN — ACETAMINOPHEN 650 MG: 325 TABLET ORAL at 10:29

## 2022-06-24 RX ADMIN — ALLOPURINOL 100 MG: 100 TABLET ORAL at 08:21

## 2022-06-24 RX ADMIN — POTASSIUM CHLORIDE 10 MEQ: 750 TABLET, EXTENDED RELEASE ORAL at 08:21

## 2022-06-24 RX ADMIN — AMLODIPINE BESYLATE 10 MG: 10 TABLET ORAL at 08:21

## 2022-06-24 RX ADMIN — TRAMADOL HYDROCHLORIDE 50 MG: 50 TABLET, COATED ORAL at 23:10

## 2022-06-24 RX ADMIN — METOPROLOL SUCCINATE 100 MG: 50 TABLET, EXTENDED RELEASE ORAL at 08:21

## 2022-06-24 RX ADMIN — CHOLECALCIFEROL TAB 10 MCG (400 UNIT) 400 UNITS: 10 TAB at 08:21

## 2022-06-24 RX ADMIN — Medication 2 G: at 00:21

## 2022-06-24 RX ADMIN — ACETAMINOPHEN 650 MG: 325 TABLET ORAL at 19:17

## 2022-06-24 RX ADMIN — TRAMADOL HYDROCHLORIDE 50 MG: 50 TABLET, COATED ORAL at 16:28

## 2022-06-24 RX ADMIN — TAMSULOSIN HYDROCHLORIDE 0.4 MG: 0.4 CAPSULE ORAL at 08:21

## 2022-06-24 NOTE — PROGRESS NOTES
PM&R PROGRESS NOTE:  Cassandra Mendoza 55 y o  male MRN: [id number]  Unit/Bed#: -01 Encounter: [id number]        Rehabilitation Diagnosis: Impairment of mobility, safety and Activities of Daily Living (ADLs) due to Debility:  16  Debility (Non-cardiac/Non-pulmonary)    HPI: Cassandra Mendoza is a 55 y o  male with a PMH significant for CKD, HLD, HTN and arthritis who presented to the [place] [address] ED initially with worsening left-sided flank pain with generalized malaise/fatigue  He had a recent admission at [facility] for obstructive pyelonephritis secondary to a 3 mm stone with ureteral stent placement  Pt developed E  Coli bacteremia during hospitalization and discharged with amoxicillian until 5/4  In the ED patient was found to have lactic acidosis, leukocytosis and worsening ADRIANNE  CT revealed moderate perinephric stranding and mild hydronephrosis in the left kidney with multiple bilateral renal calculi  Patient transferred to [facility] to be evaluated by Urology  S/p perc nephrostomy tube drain replacement by IR on 5/10  Positive E  coli in one blood culture from 5/10 thought to be associated with UTI/Pyelonephritis  IV antibiotics initiated and then ultimately transitioned to PO antibiotics  During this stay, Ortho was consulted for ongoing left knee pain  His left knee was aspirated  Blood was noted but no effusion aspirated  Xray of the knee without acute osseous abnormality but did reveal tricompartmental osteoarthritis similar to a prior imaging the year prior  Ortho considering future joint steroid injection once acute medical comorbidities resolve  Patient arrived to [facility] on 5/19 after PT/OT recommendations  He progressed well however on 6/6 he developed sepsis meeting the criteria with elevation in WBCS, creatinine level, elevated temp and tachycardia  Patient also with c/o weakness, fatigue and diaphoresis  Pt was transferred to [facility] on 6/7 for urology evaluation   Urine and blood cultures obtained and IV antibiotics were initiated  Urine culture with gram negative rods of E  Coli/MDRO Citrobacter  Pt was recommended for inpatient clearance ureteroscopy  ID consulted and placed patient on Cipro PO x7 days (6/16)  Renal US unremarkable  6/9 patient went to OR with Urology for cystoscopy and left ureteroscopy/lithotripsy with ureteral stent exchange and removal of nephrostomy tube  Stent was then discontinued on 6/13  Pt is to follow up with urology as OP in 6 months with a renal US  Patient participated with PT/OT and was deemed appropriate to return to acute rehabilitation  He arrived to [facility] 6/17  SUBJECTIVE: Patient seen face to face  Feels that the ultrasound and heat has helped him with his multiple aches and pains I informed them will need to follow-up with this family doc when he is discharged take care a lot of these issues    ASSESSMENT: Stable, progressing      PLAN:    Rehabilitation  · Functional deficits: impaired mobility, self care, generalized pain     Continue current rehabilitation plan of care to maximize function       Functional update:   o PT:  Roll L-R: independent  Sit-lying: supervision   Lying to sitting on side of bed: supervision  Sit-stand: supervision  Bed-chair transfer: supervision  Ambulation: supervision  o OT:   Picking up object: supervision   Toilet transfer: supervision   toileting hygiene: s/u c/u       Estimated Discharge: 6/30/22 with [facility]/OT/N vs OP depending on progression       Pain   Tylenol 650 mg Q6H PRN for mild pain   Voltaren gel 4x daily    DVT prophylaxis   Refusing Heparin injections and SCDs    Bladder plan   Continent    Bowel plan   Continent      Left elbow pain  Assessment & Plan  · New onset while working with therapy   · May trial heat twice a day for 15 minutes   · May apply Voltaren gel to left elbow    Obstructive pyelonephritis  Assessment & Plan  · Leading to Sepsis suspected to be caused by UTI with left nephrostomy tube and left ureteral stent  · Received IVF and Cefepime   · S/p OR with Urology for cystoscopy ureteroscopy and ureteral stent insertion with string removal of left sided nephrostomy tube   · Urine cultures with MDRO Citrobacter, ID consulted and placed on PO cipro until 6/16        Recurrent left knee pain  Assessment & Plan  · Pain management   · Xray of knee on 5/13 with moderate osteoarthritis   · Repeat x-ray on 6/21 with moderate osteoarthritis   · Continue conservative management  · May trial US therapy to Left knee; heat and Voltaren gel     Morbid obesity  Assessment & Plan  · Lifestyle modifications       Essential hypertension  Assessment & Plan  · Monitor vitals  · IM to follow  · Continue Metoprolol 25 mg daily and Norvasc 10 mg daily        Stage 3b chronic kidney disease [facility])  Assessment & Plan  Lab Results   Component Value Date    EGFR 31 06/22/2022    EGFR 31 06/21/2022    EGFR 31 06/20/2022    CREATININE 2 24 (H) 06/22/2022    CREATININE 2 23 (H) 06/21/2022    CREATININE 2 23 (H) 06/20/2022   · Baseline creatinine 1 4-1 7  · Nephrology following    * Obstructive uropathy  Assessment & Plan  · s/p cystoscopy left ureteroscopy/lithotripsy and exchange ureteral stent, removal nephrostomy tube left on 6/9  · stent removal on 6/13  · OP follow up with Urology in 6 weeks with renal US           Appreciate IM consultants medical co-management  Labs, medications, and imaging personally reviewed  ROS:  A ten point review of systems was completed and pertinent positives are listed in subjective section  All other systems reviewed were negative  Review of Systems   Constitutional: Negative  Negative for appetite change, fatigue and fever  HENT: Negative  Negative for trouble swallowing  Eyes: Negative  Negative for visual disturbance  Respiratory: Negative  Negative for shortness of breath  Cardiovascular: Negative  Negative for chest pain  Gastrointestinal: Negative  Negative for abdominal pain and constipation  Genitourinary: Negative  Negative for difficulty urinating  Musculoskeletal: Positive for arthralgias and myalgias  Left knee pain improving  New onset left elbow pain with decreased ROM   Skin: Negative  Neurological: Positive for weakness  Psychiatric/Behavioral: Negative  OBJECTIVE:   /60 (BP Location: Right arm)   Pulse 73   Temp (!) 96 7 °F (35 9 °C) (Temporal)   Resp 20   Ht 5' 6" (1 676 m)   Wt (!) 144 kg (317 lb 10 9 oz)   SpO2 90%   BMI 51 28 kg/m²     Physical Exam  Vitals reviewed  Constitutional:       General: He is not in acute distress  Appearance: Normal appearance  He is obese  He is not ill-appearing  HENT:      Head: Normocephalic and atraumatic  Right Ear: External ear normal       Left Ear: External ear normal       Nose: Nose normal    Eyes:      Pupils: Pupils are equal, round, and reactive to light  Cardiovascular:      Rate and Rhythm: Normal rate and regular rhythm  Pulses: Normal pulses  Heart sounds: Normal heart sounds  No murmur heard  Pulmonary:      Effort: Pulmonary effort is normal  No respiratory distress  Breath sounds: Normal breath sounds  No wheezing  Abdominal:      General: Bowel sounds are normal  There is no distension  Palpations: Abdomen is soft  Tenderness: There is no abdominal tenderness  Musculoskeletal:         General: No tenderness  Cervical back: Normal range of motion  Comments: Decreased ROM to LUE, decreased ability to lift left arm and bend left elbow    Skin:     General: Skin is warm  Neurological:      General: No focal deficit present  Mental Status: He is alert and oriented to person, place, and time     Psychiatric:         Mood and Affect: Mood normal           Lab Results   Component Value Date    WBC 10 51 (H) 06/22/2022    HGB 8 1 (L) 06/22/2022    HCT 26 9 (L) 06/22/2022    MCV 95 06/22/2022     06/22/2022     Lab Results   Component Value Date    SODIUM 135 06/22/2022    K 3 8 06/22/2022     06/22/2022    CO2 22 06/22/2022    BUN 21 06/22/2022    CREATININE 2 24 (H) 06/22/2022    GLUC 99 06/22/2022    CALCIUM 9 3 06/22/2022     Lab Results   Component Value Date    INR 1 27 (H) 05/10/2022    INR 1 09 05/10/2022    INR 1 33 (H) 08/24/2019    PROTIME 15 4 (H) 05/10/2022    PROTIME 13 5 05/10/2022    PROTIME 16 6 (H) 08/24/2019           Current Facility-Administered Medications:     acetaminophen (TYLENOL) tablet 650 mg, 650 mg, Oral, Q6H PRN, Kathye Hearing, PA-C, 650 mg at 06/24/22 0245    allopurinol (ZYLOPRIM) tablet 100 mg, 100 mg, Oral, Daily, Kathye Hearing, PA-C, 100 mg at 06/24/22 7051    amLODIPine (NORVASC) tablet 10 mg, 10 mg, Oral, Daily, Kathye Hearing, PA-C, 10 mg at 06/24/22 7838    cholecalciferol (VITAMIN D3) tablet 400 Units, 400 Units, Oral, Daily, Kathye Hearing, PA-C, 400 Units at 06/24/22 9616    Diclofenac Sodium (VOLTAREN) 1 % topical gel 2 g, 2 g, Topical, 4x Daily PRN, Kathye Hearing, PA-C, 2 g at 06/24/22 0021    metoprolol succinate (TOPROL-XL) 24 hr tablet 100 mg, 100 mg, Oral, Daily, Kathye Hearing, PA-C, 100 mg at 06/24/22 0598    ondansetron (ZOFRAN-ODT) dispersible tablet 4 mg, 4 mg, Oral, Q6H PRN, Kathye Hearing, PA-C    potassium chloride (K-DUR,KLOR-CON) CR tablet 10 mEq, 10 mEq, Oral, Daily, Kathye Hearing, PA-C, 10 mEq at 06/24/22 9055    senna (SENOKOT) tablet 17 2 mg, 2 tablet, Oral, HS PRN, Kathye Hearing, PA-C    tamsulosin [facility]) capsule 0 4 mg, 0 4 mg, Oral, Daily, Charmaine Mckinney PA-C, 0 4 mg at 06/24/22 0280    traMADol (ULTRAM) tablet 50 mg, 50 mg, Oral, Q6H PRN, Charmaine Mckinney PA-C, 50 mg at 06/24/22 2636    Past Medical History:   Diagnosis Date    Arthritis     Chronic kidney disease     Gout     Hyperlipidemia     Hypertension     Kidney stone     Obstructive pyelonephritis     Sepsis [facility])        Patient Active Problem List    Diagnosis Date Noted    Left elbow pain 06/23/2022    Diarrhea 06/13/2022    Anemia 05/13/2022    Obstructive pyelonephritis 04/23/2022    Obstructive uropathy 04/21/2022    Mixed hyperlipidemia 06/02/2021    Morbid obesity 06/02/2021    Recurrent left knee pain 06/02/2021    Insomnia 09/16/2019    Essential hypertension 08/27/2019    Severe sepsis on admission 08/23/2019    Stage 3b chronic kidney disease ([facility] [address] ) 08/23/2019    Acute kidney injury superimposed on CKD stage 3 08/06/2019    Bacteremia due to Escherichia coli 08/04/2019    Lactic acidosis 08/04/2019    History of gout 07/15/2019    Vitamin D deficiency 06/10/2015    [facility] 03/23/2015          Shaheed John MD    Total time spent:  30 minutes with more than 50% spent counseling/coordinating care  Counseling includes discussion with patient re: progress and discussion with patient of his/her current medical/functional state/information  Coordination of patient's care was performed in conjunction with consulting services  Time invested included review of patient's labs, vitals, and management of their comorbidities with continued monitoring  The care of the patient was extensively discussed and appropriate treatment plan was formulated unique for this patient  ** Please Note:  voice to text software may have been used in the creation of this document   Although proof errors in transcription or interpretation are a potential of such software**

## 2022-06-24 NOTE — NURSING NOTE
Patient distant supervision for transfers  Alarms removed by OT  Patient able to ambulate supervision with walker to bathroom  Needs assistance with wiping but encouraged to do as much as possible as he lives alone  Patient voiding throught shift in urinal and PVRs obtained with low volumes  Medicated for pain in left elbow and left knee  Heat pack applied by therapy after session  No ace wraps applied due to no swelling and will restrict movement  Will continue to monitor and follow plan of care

## 2022-06-24 NOTE — PROGRESS NOTES
06/24/22 1033   Pain Assessment   Pain Assessment Tool 0-10   Pain Score 6   Pain Location/Orientation Orientation: Left; Location: Knee   Pain Onset/Description Frequency: Constant/Continuous; Descriptor: Aching   Patient's Stated Pain Goal No pain   Hospital Pain Intervention(s) Repositioned; Ambulation/increased activity   Restrictions/Precautions   Precautions Fall Risk;Contact/isolation;Pain;Limb alert   Weight Bearing Restrictions No   RLE Weight Bearing Per Order WBAT   LLE Weight Bearing Per Order WBAT   ROM Restrictions No   Subjective   Subjective "My L knee is sore after riding the bike "   Roll Left and Right   Type of Assistance Needed Independent   Physical Assistance Level No physical assistance   Roll Left and Right CARE Score 6   Sit to Lying   Type of Assistance Needed Independent   Physical Assistance Level No physical assistance   Sit to Lying CARE Score 6   Sit to Stand   Type of Assistance Needed Independent   Physical Assistance Level No physical assistance   Sit to Stand CARE Score 6   Bed-Chair Transfer   Type of Assistance Needed Supervision   Physical Assistance Level No physical assistance   Chair/Bed-to-Chair Transfer CARE Score 4   Transfer Bed/Chair/Wheelchair   Limitations Noted In Balance;Confidence; Endurance;Pain Management;UE Strength;LE Strength   Adaptive Equipment Roller Walker   Stand Pivot Supervision   Sit to Stand Modified Independent   Stand to Sit Modified Independent   Sit to Supine Modified Independent   Walk 10 Feet   Type of Assistance Needed Supervision   Physical Assistance Level No physical assistance   Comment RW   Walk 10 Feet CARE Score 4   Walk 50 Feet with Two Turns   Reason if not Attempted Refused to perform   Walk 50 Feet with Two Turns CARE Score 7   Walk 150 Feet   Reason if not Attempted Refused to perform   Walk 150 Feet CARE Score 7   Ambulation   Does the patient walk? 2   Yes   Primary Mode of Locomotion Prior to Admission Walk   Distance Walked (feet) 17 ft  (12ft and 10ft)   Assist Device Roller Walker   Gait Pattern Antalgic;Decreased foot clearance; Wide TEO;Step through; Decreased L stance; Improper weight shift   Limitations Noted In Balance; Endurance;Speed;Strength   Provided Assistance with: Direction   Walk Assist Level Distant Supervision   Findings Pt  decined further distance for fear of increasing pain   Therapeutic Interventions   Balance gait and transfer training on level surfaces   Other bed mobility tasks   Equipment Use   NuStep lv 1 for 11 mins with distance of  24 miles  (This is the same diatance pt  perfomed in yesterdays treatment but in less time today )   Assessment   Treatment Assessment Pt  performed transfer training, gait training, bed mobility tasks, NuStep at lv 1 x11 mins , and was provided education as needed for safety and direction  Pt  reported increased pain following treatment with the start of shivering due to feeling cold  Nail beds cyonotic but O2 level 100% on room air  Once back in bed and positioned for comfort pt  stated that he was warming back up shaking subsided  Continue current POC and progress as tolerated  PT Barriers   Physical Impairment Decreased strength;Decreased endurance;Decreased range of motion; Impaired balance;Decreased mobility; Decreased safety awareness;Pain   Functional Limitation Walking;Transfers;Standing;Stair negotiation   Plan   Treatment/Interventions Functional transfer training;LE strengthening/ROM; Therapeutic exercise;Elevations; Endurance training;Patient/family training;Equipment eval/education; Bed mobility;Gait training   Progress Progressing toward goals   PT Therapy Minutes   PT Time In 1033   PT Time Out 1115   PT Total Time (minutes) 42   PT Mode of treatment - Individual (minutes) 42   PT Mode of treatment - Concurrent (minutes) 0   PT Mode of treatment - Group (minutes) 0   PT Mode of treatment - Co-treat (minutes) 0   PT Mode of Treatment - Total time(minutes) 42 minutes   PT Cumulative Minutes 403   Therapy Time missed   Time missed?  No

## 2022-06-24 NOTE — PLAN OF CARE
Problem: PAIN - ADULT  Goal: Verbalizes/displays adequate comfort level or baseline comfort level  Description: Interventions:  - Encourage patient to monitor pain and request assistance  - Assess pain using appropriate pain scale  - Administer analgesics based on type and severity of pain and evaluate response  - Implement non-pharmacological measures as appropriate and evaluate response  - Consider cultural and social influences on pain and pain management  - Notify physician/advanced practitioner if interventions unsuccessful or patient reports new pain  Outcome: Progressing     Problem: INFECTION - ADULT  Goal: Absence or prevention of progression during hospitalization  Description: INTERVENTIONS:  - Assess and monitor for signs and symptoms of infection  - Monitor lab/diagnostic results  - Monitor all insertion sites, i e  indwelling lines, tubes, and drains  - Monitor endotracheal if appropriate and nasal secretions for changes in amount and color  - Filion appropriate cooling/warming therapies per order  - Administer medications as ordered  - Instruct and encourage patient and family to use good hand hygiene technique  - Identify and instruct in appropriate isolation precautions for identified infection/condition  Outcome: Progressing     Problem: DISCHARGE PLANNING  Goal: Discharge to home or other facility with appropriate resources  Description: INTERVENTIONS:  - Identify barriers to discharge w/patient and caregiver  - Arrange for needed discharge resources and transportation as appropriate  - Identify discharge learning needs (meds, wound care, etc )  - Arrange for interpretive services to assist at discharge as needed  - Refer to Case Management Department for coordinating discharge planning if the patient needs post-hospital services based on physician/advanced practitioner order or complex needs related to functional status, cognitive ability, or social support system  Outcome: Progressing

## 2022-06-24 NOTE — PROGRESS NOTES
06/24/22 0658   Pain Assessment   Pain Assessment Tool 0-10   Pain Score 2   Pain Location/Orientation Orientation: Left; Location: Knee  (L ankle)   Pain Onset/Description Frequency: Constant/Continuous; Descriptor: Aching   Patient's Stated Pain Goal No pain   Hospital Pain Intervention(s) Heat applied; Ambulation/increased activity;Repositioned   Restrictions/Precautions   Precautions Fall Risk;Contact/isolation;Pain;Limb alert   Weight Bearing Restrictions No   RLE Weight Bearing Per Order WBAT   LLE Weight Bearing Per Order WBAT   ROM Restrictions No   Cognition   Overall Cognitive Status WFL   Arousal/Participation Alert; Cooperative   Attention Within functional limits   Orientation Level Oriented X4   Memory Within functional limits   Following Commands Follows all commands and directions without difficulty   Subjective   Subjective "I had a great night last night, I really think the heat and that bike really helped me "   Roll Left and Right   Type of Assistance Needed Independent   Physical Assistance Level No physical assistance   Comment HOB BSR   Roll Left and Right CARE Score 6   Sit to Lying   Type of Assistance Needed Independent   Physical Assistance Level No physical assistance   Comment HOB BSR   Sit to Lying CARE Score 6   Lying to Sitting on Side of Bed   Type of Assistance Needed Independent   Physical Assistance Level No physical assistance   Comment HOB BSR   Lying to Sitting on Side of Bed CARE Score 6   Therapeutic Interventions   Strengthening supine ther ex BLE   Flexibility MT LLE   Balance transfer training   Modalities US to L knee at 1 2w/cm2 x 10 mins and HP to L knee and L ankle while performing ther ex on RLE   Other bed mobility tasks   Assessment   Treatment Assessment Pt  seen this morning for PT treatment  Upone entering room pt  was smiling and reported having an excellent night and decreased pain and swelling    He reported that moving to and from the bathroom last evening was much easier  Pt  was informed that he no longer has to ring to use the bathroom he has progressed to only requiring distant supervision with mobility  Pt  was instructed that if symptoms begin to arise or if feeling unsteady, he should use call bell and get assistance to prevent any type of accident  Pt  still required increased time with mobility tasks due to anticipation of pain but quality of mobility continues to improve, pain continues to decrease, edema continues to lessen, and ROM continues to improve  Pt  performed supine ther ex, bed mobility tasks, transfer training, manual therapy on LLE provided including calf and hamstring stretching for 3 reps and held 20 seconds each, HP applied to L knee and ankle prior to stretching, while performing RLE therex, seems to be most beneficial due to calming/relaxing effect allows for increased ROM, f/b US to L knee at 1 2 w/cm 2 for 10 mins to help decrease pain  Pt  was provided eduaction as needed for safety and direction to improve functional mobility and activity tolerance  Continue current POC and progress as able  Pt  left in bed positioned for comfort with all needs in reach and set up with breakfast tray  Pillow props placed under BUE due to reports of shoulder and elbow discomfort when just "hanging"  Problem List Decreased strength;Decreased range of motion;Decreased endurance; Impaired balance;Decreased mobility; Decreased safety awareness;Pain   PT Barriers   Functional Limitation Walking;Transfers;Standing;Stair negotiation   Plan   Treatment/Interventions Functional transfer training;LE strengthening/ROM; Elevations; Therapeutic exercise; Endurance training;Patient/family training;Equipment eval/education; Bed mobility;Gait training;Spoke to nursing   Progress Progressing toward goals   PT Therapy Minutes   PT Time In 0658   PT Time Out 0807   PT Total Time (minutes) 69   PT Mode of treatment - Individual (minutes) 69   PT Mode of treatment - Concurrent (minutes) 0   PT Mode of treatment - Group (minutes) 0   PT Mode of treatment - Co-treat (minutes) 0   PT Mode of Treatment - Total time(minutes) 69 minutes   PT Cumulative Minutes 471   Therapy Time missed   Time missed?  No

## 2022-06-24 NOTE — PROGRESS NOTES
06/24/22 0859   Pain Assessment   Pain Assessment Tool 0-10   Pain Score 7   Pain Location/Orientation Orientation: Bilateral;Location: Arm;Location: Leg  (shoulders/ ankles)   Restrictions/Precautions   Precautions Fall Risk;Contact/isolation;Limb alert;Pain  (MDRO urine, I&O)   Oral Hygiene   Type of Assistance Needed Set-up / clean-up   Oral Hygiene CARE Score 5   Grooming   Able To Initiate Tasks;Comb/Brush Hair;Wash/Dry Face;Brush/Clean Teeth;Wash/Dry Hands   Findings w/c lvel at the sink   Shower/Bathe Self   Type of Assistance Needed Incidental touching   Comment CGA with standing for reaching and limited assist thoroughness only buttocks   Shower/Bathe Self CARE Score 4   Bathing   Assessed Bath Style Shower   Anticipated D/C Bath Style Shower;Sponge Bath   Able to [place] No   Able to [employer] Temperature No   Able to Wash/Rinse/Dry (body part) Left Arm;Right Arm;L Upper Leg;R Upper Leg;L Lower Leg/Foot;R Lower Leg/Foot;Chest;Abdomen;Perineal Area; Buttocks   Limitations Noted in Balance;Problem Solving; Endurance; Safety;ROM;Strength   Positioning Seated;Standing   Adaptive Equipment Longhand Sponge;Longhand Reacher; Shower [employer]; Shower Seat;Hand [facility]   Limitations Noted In Balance; Endurance;ROM;Safety;LE Strength;Problem Solving   Adaptive Equipment Grab Bars;Seat with Back   Assessed Shower   Findings S/ CGA stepping into shower   Upper Body Dressing   Type of Assistance Needed Set-up / clean-up   Upper Body Dressing CARE Score 5   Lower Body Dressing   Type of Assistance Needed Supervision   Lower Body Dressing CARE Score 4   Putting On/Taking Off Footwear   Type of Assistance Needed Physical assistance   Physical Assistance Level 25% or less   Putting On/Taking Off Footwear CARE Score 3   Picking Up Object   Type of Assistance Needed Incidental touching   Comment with reacher   Picking Up Object CARE Score 4   Dressing/Undressing Clothing   Remove UB Clothes Pullover Shirt   Don UB Clothes Pullover Shirt   Remove LB Clothes Shorts;Socks   Don LB Clothes Shorts;Socks   Limitations Noted In Balance; Endurance;Problem Solving; Safety;Strength;ROM   Adaptive Equipment Reacher;Sock Aide   Positioning Standing;Supported Sit   Lying to Sitting on Side of Bed   Type of Assistance Needed Independent   Lying to Sitting on Side of Bed CARE Score 6   Sit to Stand   Type of Assistance Needed Supervision   Sit to Stand CARE Score 4   Bed-Chair Transfer   Type of Assistance Needed Supervision   Chair/Bed-to-Chair Transfer CARE Score 4   Toileting Hygiene   Type of Assistance Needed Supervision   Toileting Hygiene CARE Score 4   Toileting   Able to [address] down yes, up yes  Manage Hygiene Bladder   Limitations Noted In Balance; Safety;Problem Solving;LE Strength   Findings urinal use at EOB   Additional Activities   Additional Activities Other (Comment)   Additional Activities Comments fxl mobility using RW in room short distances S and longer distances in hallway using w/c Mod I   Assessment   Treatment Assessment Pt presents supine agreeable to OT session including ADLs, toileting and related transfers/ mobility  Pt tolerates session without increasec omplaints and is making gains towards goals with pain BUE and BLE normalizing and strength/ endurance improving  Pt requries assist and supervision due to decerased balance, safety, endurance and generalized strength/ ROM and pain  Pt will benefit from continued skilled OT services to increase independence with daily tasks  Problem List Decreased strength;Decreased range of motion;Decreased endurance; Impaired balance;Decreased safety awareness;Pain   Plan   Treatment/Interventions ADL retraining;Functional transfer training; Therapeutic exercise; Endurance training;Patient/family training;Equipment eval/education; Compensatory technique education   Progress Progressing toward goals   OT Therapy Minutes   OT Time In 0035   OT Time Out 1030   OT Total Time (minutes) 91   OT Mode of treatment - Individual (minutes) 91   Therapy Time missed   Time missed?  No

## 2022-06-24 NOTE — NURSING NOTE
Patient resting comfortably in bed at this time   No signs of distress noted   Patient was able to ambulate to the bathroom with one assist and a walker overnight   PVR bladder scan of 18mL recorded during this shift   Patient was continent of bladder overnight with urinal usage   Call bell within reach   Will continue to monitor patient and follow plan of care

## 2022-06-24 NOTE — PROGRESS NOTES
NEPHROLOGY PROGRESS NOTE   Cecelia Zuniga 55 y o  male MRN: [id number]  Unit/Bed#: [facility] 216-01 Encounter: [id number]    Assessment/Plan:    Cecelia Zuniga is a 55 y o  male with CKD 3B recently hospitalized for sepsis secondary to strep and E coli bacteremia due to complicated UTI, recent left-sided hydro ureteral nephrosis secondary to obstructive calculus status post stent placement and eventual percutaneous nephrostomy tube  Now undergoing inpatient rehabilitation  1  Acute kidney injury (POA) atop chronic kidney disease  · My hope is that creatinine can return to previous baseline which seems to be around 1 7-1 9 mg/dL however he may have progressed  Continue to avoid potential nephrotoxins, maximize hemodynamics  Will need outpatient nephrology follow-up  2  Stage IIIB chronic kidney disease with previous baseline creatinine around 1 7-1 9 mg/dL  3  Left knee pain  · X-ray was negative for acute issue but significant form moderate to severe osteoarthritis  He may acutely have gout  Previous uric acid levels were greater than 9 mg/dL  Will leave to primary team to treat  Check uric acid with next labs  4  Obstructive ureterolithiasis  · Status post intervention per Urology with ultimately percutaneous nephrostomy tube placement and removal and stent placement and removal   5  Sepsis secondary to complicated UTI  · Undergoing inpatient rehabilitation due to debility  6  Recurring kidney stone  · Will need to follow a low-sodium diet with appropriate hydration  Recommend 24 hour stone study as outpatient  Recently stone was sent for analysis in was 90% uric acid and 10% calcium oxalate    ROS  Seen and examined lying in bed  Complains of left knee pain  A complete 10 point review of systems have been performed and are otherwise negative         Historical Information   Past Medical History:   Diagnosis Date    Arthritis     Chronic kidney disease     Gout     Hyperlipidemia     Hypertension     Kidney stone     Obstructive pyelonephritis     Sepsis [facility])      Past Surgical History:   Procedure Laterality Date    FL RETROGRADE PYELOGRAM  8/4/2019    FL RETROGRADE PYELOGRAM  9/26/2019    FL RETROGRADE PYELOGRAM  4/22/2022    IR NEPHROSTOMY TUBE PLACEMENT  5/10/2022    NO PAST SURGERIES  03/23/2015    Last assessed    IN CYSTO/URETERO W/LITHOTRIPSY &INDWELL STENT INSRT Right 9/26/2019    Procedure: CYSTOSCOPY URETEROSCOPY WITH RETROGRADE PYELOGRAM AND INSERTION STENT URETERAL;  Surgeon: Quinn Carrero MD;  Location: [facility];  Service: Urology    IN CYSTO/URETERO W/LITHOTRIPSY &INDWELL STENT INSRT Left 6/9/2022    Procedure: CYSTOSCOPY LEFT URETEROSCOPY/LITHOTRIPSY HOLMIUM LASER,  AND EXCHANGE URETERAL STENT URETERAL, REMOVAL NEPHROSTOMY TUBE;  Surgeon: Sarah Boucher MD;  [facility];  Service: Urology    IN CYSTOURETHROSCOPY,URETER CATHETER Right 8/4/2019    Procedure: CYSTOSCOPY RETROGRADE PYELOGRAM WITH INSERTION STENT URETERAL;  Surgeon: Malu Montoya MD;  [facility];  Service: Urology    IN CYSTOURETHROSCOPY,URETER CATHETER Left 4/22/2022    Procedure: CYSTOSCOPY RETROGRADE PYELOGRAM WITH INSERTION STENT URETERAL;  Surgeon: Zainab Lyons MD;  [facility];  Service: Urology     Social History   Social History     Substance and Sexual Activity   Alcohol Use Not Currently    Alcohol/week: 0 0 standard drinks    Comment: rarely     Social History     Substance and Sexual Activity   Drug Use No     Social History     Tobacco Use   Smoking Status Never Smoker   Smokeless Tobacco Never Used       Family History:   Family History   Problem Relation Age of Onset    Cancer Mother     Heart disease Father     No Known Problems Sister        Medications:  Pertinent medications were reviewed  Current Facility-Administered Medications   Medication Dose Route Frequency Provider Last Rate    acetaminophen  650 mg Oral Q6H PRN Adriana Hernandez PA-C      allopurinol  100 mg Oral Daily Ruthie Mcintosh, PA-C      amLODIPine  10 mg Oral Daily Ruthie Mcintosh, PA-C      cholecalciferol  400 Units Oral Daily Ruthie Mcintosh, PA-C      Diclofenac Sodium  2 g Topical 4x Daily PRN Ruthie Mcintosh, PA-C      metoprolol succinate  100 mg Oral Daily Ruthie Mcintosh, PA-C      ondansetron  4 mg Oral Q6H PRN Ruthie Mcintosh, PA-C      potassium chloride  10 mEq Oral Daily Ruthie Mcintosh, PA-C      senna  2 tablet Oral HS PRN Ruthie Mcintosh, PA-C      tamsulosin  0 4 mg Oral Daily Ruthie Mcintosh, PA-C      traMADol  50 mg Oral Q6H PRN Ruthie Mcintosh, PA-C           No Known Allergies      Vitals:   /60 (BP Location: Right arm)   Pulse 73   Temp (!) 96 7 °F (35 9 °C) (Temporal)   Resp 20   Ht 5' 6" (1 676 m)   Wt (!) 144 kg (317 lb 10 9 oz)   SpO2 90%   BMI 51 28 kg/m²   Body mass index is 51 28 kg/m²  SpO2: 90 %,   SpO2 Activity: At Rest,   O2 Device: None (Room air)      Intake/Output Summary (Last 24 hours) at 6/24/2022 1253  Last data filed at 6/24/2022 1105  Gross per 24 hour   Intake 480 ml   Output 2450 ml   Net -1970 ml     Invasive Devices  Report    None                 Physical Exam  General: conscious, cooperative, in no acute distress  Eyes: conjunctivae pink, anicteric sclerae  ENT: lips and mucous membranes moist  Neck: supple, no JVD, no masses  Chest: clear breath sounds bilaterally, no crackles, ronchus or wheezings  CVS: distinct S1 & S2, normal rate, regular rhythm  Abdomen: soft, non-tender, non-distended, normoactive bowel sounds obese  Extremities: no edema of both legs  Skin: no rash  Neuro: awake, alert, oriented      Diagnostic Data:  Lab: I have personally reviewed pertinent lab results  ,   CBC:  Results from last 7 days   Lab Units 06/22/22  1210   WBC Thousand/uL 10 51*   HEMOGLOBIN g/dL 8 1*   HEMATOCRIT % 26 9*   PLATELETS Thousands/uL 356      CMP: No results found for: SODIUM, K, CL, CO2, ANIONGAP, BUN, CREATININE, GLUCOSE, CALCIUM, AST, ALT, ALKPHOS, PROT, BILITOT, EGFR,   PT/INR: No results found for: PT, INR,   Magnesium: No components found for: MAG,  Phosphorous: No results found for: PHOS    Microbiology:  @LABRCNTIP,(urinecx:7)@        RITA Granado    Portions of the record may have been created with voice recognition software  Occasional wrong word or "sound a like" substitutions may have occurred due to the inherent limitations of voice recognition software  Read the chart carefully and recognize, using context, where substitutions have occurred

## 2022-06-25 LAB
ATRIAL RATE: 100 BPM
QRS AXIS: -15 DEGREES
QRSD INTERVAL: 82 MS
QT INTERVAL: 332 MS
QTC INTERVAL: 428 MS
T WAVE AXIS: 39 DEGREES
VENTRICULAR RATE: 100 BPM

## 2022-06-25 PROCEDURE — 93010 ELECTROCARDIOGRAM REPORT: CPT | Performed by: INTERNAL MEDICINE

## 2022-06-25 PROCEDURE — 97110 THERAPEUTIC EXERCISES: CPT

## 2022-06-25 PROCEDURE — 97535 SELF CARE MNGMENT TRAINING: CPT

## 2022-06-25 PROCEDURE — 97530 THERAPEUTIC ACTIVITIES: CPT

## 2022-06-25 RX ADMIN — CHOLECALCIFEROL TAB 10 MCG (400 UNIT) 400 UNITS: 10 TAB at 08:47

## 2022-06-25 RX ADMIN — ALLOPURINOL 100 MG: 100 TABLET ORAL at 08:48

## 2022-06-25 RX ADMIN — ACETAMINOPHEN 650 MG: 325 TABLET ORAL at 18:02

## 2022-06-25 RX ADMIN — TRAMADOL HYDROCHLORIDE 50 MG: 50 TABLET, COATED ORAL at 15:18

## 2022-06-25 RX ADMIN — TRAMADOL HYDROCHLORIDE 50 MG: 50 TABLET, COATED ORAL at 22:03

## 2022-06-25 RX ADMIN — METOPROLOL SUCCINATE 100 MG: 50 TABLET, EXTENDED RELEASE ORAL at 08:47

## 2022-06-25 RX ADMIN — TRAMADOL HYDROCHLORIDE 50 MG: 50 TABLET, COATED ORAL at 06:32

## 2022-06-25 RX ADMIN — AMLODIPINE BESYLATE 10 MG: 10 TABLET ORAL at 08:48

## 2022-06-25 RX ADMIN — POTASSIUM CHLORIDE 10 MEQ: 750 TABLET, EXTENDED RELEASE ORAL at 08:48

## 2022-06-25 RX ADMIN — TAMSULOSIN HYDROCHLORIDE 0.4 MG: 0.4 CAPSULE ORAL at 08:48

## 2022-06-25 NOTE — PROGRESS NOTES
06/25/22 6895   Pain Assessment   Pain Assessment Tool 0-10   Pain Score 8   Pain Location/Orientation Orientation: Left; Location: Leg  (ankle)   Restrictions/Precautions   Precautions Fall Risk;Limb alert;Pain;Contact/isolation  (MDRO urine, I&O)   RLE Weight Bearing Per Order WBAT   LLE Weight Bearing Per Order WBAT   Oral Hygiene   Comment declines stating, "That will upset my stomach "   Grooming   Able To Initiate Tasks;Comb/Brush Hair;Wash/Dry Face;Wash/Dry Hands   Findings setup   Shower/Bathe Self   Type of Assistance Needed Physical assistance   Physical Assistance Level 25% or less   Shower/Bathe Self CARE Score 3   Bathing   Assessed Bath Style Sponge Bath   Anticipated D/C Bath Style Sponge Bath; Shower   Able to [place] Jeovanny No   Able to [employer] Temperature No   Able to Wash/Rinse/Dry (body part) Right Arm;Left Arm;L Upper Leg;R Upper Leg;L Lower Leg/Foot;R Lower Leg/Foot;Chest;Abdomen;Perineal Area   Limitations Noted in Endurance;Problem Solving;Balance; Safety;ROM;Strength   Positioning Standing;Seated   Findings  baby powder placed due to excoriation under stomach on right side per RN recommendation   Upper Body Dressing   Type of Assistance Needed Set-up / clean-up   Upper Body Dressing CARE Score 5   Lower Body Dressing   Type of Assistance Needed Supervision   Lower Body Dressing CARE Score 4   Picking Up Object   Type of Assistance Needed Supervision   Comment with reacher   Picking Up Object CARE Score 4   Dressing/Undressing Clothing   Remove UB Clothes Pullover [place] LB Clothes Shorts   Limitations Noted In Balance; Endurance;Problem Solving; Safety;Strength;ROM   Adaptive Equipment Reacher   Positioning Sit Edge Of Bed;Standing   Sit to Lying   Type of Assistance Needed Independent   Sit to Lying CARE Score 6   Lying to Sitting on Side of Bed   Type of Assistance Needed Independent   Lying to Sitting on Side of Bed CARE Score 6   Sit to Stand   Type of Assistance Needed Supervision   Sit to Stand CARE Score 4   Bed-Chair Transfer   Type of Assistance Needed Supervision   Chair/Bed-to-Chair Transfer CARE Score 4   Toileting Hygiene   Type of Assistance Needed Supervision   Toileting Hygiene CARE Score 4   Toileting   Able to [address] down yes, up yes  Manage Hygiene Bladder   Limitations Noted In Balance;Problem Solving;ROM;Safety;LE Strength   Adaptive Equipment Grab Bar   Toilet Transfer   Type of Assistance Needed Supervision   Toilet Transfer CARE Score 4   Toilet Transfer   Surface Assessed Raised Toilet   Transfer Technique Standard   Limitations Noted In Balance; Endurance;Problem Solving;ROM;Safety;LE Strength   Adaptive Equipment Walker;Grab Bar   Light Housekeeping   Light Housekeeping Level Walker   Light Housekeeping Level of Assistance Distant supervision   Light Housekeeping transport and collection of clothing for ADL with RW mobility   Exercise Tools   UE Ergometer 5 minutes forward and backwards BUE min reistsance   Cognition   Orientation Level Oriented X4   Additional Activities   Additional Activities Other (Comment)   Additional Activities Comments fxl mobility to and from the BR with RW S in room and Mod I w/c mobility longer distances in hallway   Assessment   Treatment Assessment Pt presents EOB requesting toileting and agreeable to ADL, transfers/ mobility and BUE therex  Pt toelrates session with LLE ankle pain noted however strength and ROM improving enzo of BUE  Pt requires assist and supervision due to decreased balance, safety, endurance, and generalized strength/ ROM with pain enzo of LLE  Pt will benefit from conitnued skilled OT services to increase independence with daily tasks  Problem List Decreased strength;Decreased endurance;Decreased range of motion; Impaired balance;Decreased safety awareness;Pain   Plan   Treatment/Interventions ADL retraining;Functional transfer training; Therapeutic exercise; Endurance training;Patient/family training; Compensatory technique education;Equipment eval/education   Progress Improving as expected   OT Therapy Minutes   OT Time In 0930   OT Time Out 1030   OT Total Time (minutes) 60   OT Mode of treatment - Individual (minutes) 60   Therapy Time missed   Time missed?  No

## 2022-06-26 PROCEDURE — 97530 THERAPEUTIC ACTIVITIES: CPT

## 2022-06-26 PROCEDURE — 97116 GAIT TRAINING THERAPY: CPT

## 2022-06-26 PROCEDURE — 97110 THERAPEUTIC EXERCISES: CPT

## 2022-06-26 PROCEDURE — 97535 SELF CARE MNGMENT TRAINING: CPT

## 2022-06-26 RX ADMIN — TRAMADOL HYDROCHLORIDE 50 MG: 50 TABLET, COATED ORAL at 19:35

## 2022-06-26 RX ADMIN — TRAMADOL HYDROCHLORIDE 50 MG: 50 TABLET, COATED ORAL at 11:56

## 2022-06-26 RX ADMIN — METOPROLOL SUCCINATE 100 MG: 50 TABLET, EXTENDED RELEASE ORAL at 08:04

## 2022-06-26 RX ADMIN — TAMSULOSIN HYDROCHLORIDE 0.4 MG: 0.4 CAPSULE ORAL at 08:04

## 2022-06-26 RX ADMIN — TRAMADOL HYDROCHLORIDE 50 MG: 50 TABLET, COATED ORAL at 04:50

## 2022-06-26 RX ADMIN — POTASSIUM CHLORIDE 10 MEQ: 750 TABLET, EXTENDED RELEASE ORAL at 08:05

## 2022-06-26 RX ADMIN — ALLOPURINOL 100 MG: 100 TABLET ORAL at 08:05

## 2022-06-26 RX ADMIN — AMLODIPINE BESYLATE 10 MG: 10 TABLET ORAL at 08:05

## 2022-06-26 RX ADMIN — CHOLECALCIFEROL TAB 10 MCG (400 UNIT) 400 UNITS: 10 TAB at 08:05

## 2022-06-26 NOTE — PROGRESS NOTES
06/26/22 0930   Pain Assessment   Pain Assessment Tool 0-10   Pain Score 5   Pain Location/Orientation Orientation: Left; Location: Knee; Location: Foot   Restrictions/Precautions   Precautions Fall Risk;Contact/isolation;Limb alert;Pain   Weight Bearing Restrictions No   RLE Weight Bearing Per Order WBAT   LLE Weight Bearing Per Order WBAT   Cognition   Overall Cognitive Status [facility]   Arousal/Participation Alert; Cooperative   Attention Within functional limits   Orientation Level Oriented X4   Memory Within functional limits   Following Commands Follows all commands and directions without difficulty   Lying to Sitting on Side of Bed   Type of Assistance Needed Independent   Lying to Sitting on Side of Bed CARE Score 6   Sit to Stand   Type of Assistance Needed Supervision   Sit to Stand CARE Score 4   Bed-Chair Transfer   Type of Assistance Needed Supervision   Chair/Bed-to-Chair Transfer CARE Score 4   Transfer Bed/Chair/Wheelchair   Limitations Noted In Balance;Confidence; Endurance;UE Strength;LE Strength   Adaptive Equipment Roller Walker   Stand Pivot Supervision   Sit to Stand Supervision   Stand to Sit Supervision   Supine to Sit Modified Independent   Findings increased time required   Car Transfer   Reason if not Attempted Environmental limitations   Car Transfer CARE Score 10   Walk 10 Feet   Type of Assistance Needed Supervision   Walk 10 Feet CARE Score 4   Ambulation   Does the patient walk? 2  Yes   Primary Mode of Locomotion Prior to Admission Walk   Distance Walked (feet) 40 ft   Assist Device Roller Walker   Gait Pattern Antalgic; Slow Julieta;Decreased foot clearance; Forward Flexion; Wide TEO;Step through; Improper weight shift   Limitations Noted In Balance; Endurance;Posture; Safety;Speed;Strength   Provided Assistance with: Direction   Walk Assist Level Supervision   Wheel 50 Feet with Two Turns   Type of Assistance Needed Supervision   Wheel 50 Feet with Two Turns CARE Score 4   Wheelchair mobility Does the patient use a wheelchair? 1  Yes   Type of Wheelchair Used 1  Manual   Method Right upper extremity; Left upper extremity   Assistance Provided For Locking Brakes   Findings attempted BLE, however pt did not like and used BUE  Curb or Single Stair   Reason if not Attempted Refused to perform   1 Step (Curb) CARE Score 7   4 Steps   Reason if not Attempted Refused to perform   4 Steps CARE Score 7   12 Steps   Reason if not Attempted Refused to perform   12 Steps CARE Score 7   Stairs   Findings declined due to activity making his L knee hurt  Therapeutic Interventions   Strengthening seated ther ex   Modalities MH applied to L knee x15 min   Other gait, transfers   Assessment   Treatment Assessment Pt seated in bed to start session pleasant and agreeable, offers complaints of L knee pain and B ankle pain  Pt able to complete bed mobilty with S  Pt dependent to don socks and shoes  Amb 36' with RW and S, limited distance due to pt preference  Stated walking further distances is what makes his knees and ankles hurt  Pt able to complete seated ther ex with no complication  MH applied to L knee for comfort and pain x15 min, skin intergrity intact before and after session  Pt amb 40' again with RW and S, declined any further distance due to not wanting to increased ankle pain  Pt saeted in [place] [address] to end session with call bell in reach and all needs met  Pt will benefit from continued skilled PT to improve over all stregnth and endurence  PT Barriers   Physical Impairment Decreased strength;Decreased endurance;Decreased range of motion; Impaired balance;Decreased mobility; Decreased safety awareness;Pain   Functional Limitation Walking;Transfers;Standing;Stair negotiation   Plan   Treatment/Interventions Functional transfer training;LE strengthening/ROM; Elevations; Therapeutic exercise; Endurance training;Bed mobility;Gait training   Progress Slow progress, decreased activity tolerance   PT Therapy Minutes   PT Time In 0930   PT Time Out 1030   PT Total Time (minutes) 60   PT Mode of treatment - Individual (minutes) 60   PT Mode of treatment - Concurrent (minutes) 0   PT Mode of treatment - Group (minutes) 0   PT Mode of treatment - Co-treat (minutes) 0   PT Mode of Treatment - Total time(minutes) 60 minutes   PT Cumulative Minutes 573   Therapy Time missed   Time missed?  No

## 2022-06-27 LAB
ANION GAP SERPL CALCULATED.3IONS-SCNC: 11 MMOL/L (ref 4–13)
BUN SERPL-MCNC: 24 MG/DL (ref 5–25)
CALCIUM SERPL-MCNC: 9.9 MG/DL (ref 8.4–10.2)
CHLORIDE SERPL-SCNC: 102 MMOL/L (ref 96–108)
CO2 SERPL-SCNC: 22 MMOL/L (ref 21–32)
CREAT SERPL-MCNC: 2.49 MG/DL (ref 0.6–1.3)
GFR SERPL CREATININE-BSD FRML MDRD: 27 ML/MIN/1.73SQ M
GLUCOSE P FAST SERPL-MCNC: 96 MG/DL (ref 65–99)
GLUCOSE SERPL-MCNC: 96 MG/DL (ref 65–140)
POTASSIUM SERPL-SCNC: 3.8 MMOL/L (ref 3.5–5.3)
SODIUM SERPL-SCNC: 135 MMOL/L (ref 135–147)
URATE SERPL-MCNC: 8.4 MG/DL (ref 3.5–8.5)

## 2022-06-27 PROCEDURE — 97535 SELF CARE MNGMENT TRAINING: CPT

## 2022-06-27 PROCEDURE — 84550 ASSAY OF BLOOD/URIC ACID: CPT | Performed by: NURSE PRACTITIONER

## 2022-06-27 PROCEDURE — 80048 BASIC METABOLIC PNL TOTAL CA: CPT | Performed by: NURSE PRACTITIONER

## 2022-06-27 PROCEDURE — 97542 WHEELCHAIR MNGMENT TRAINING: CPT | Performed by: PHYSICAL THERAPIST

## 2022-06-27 PROCEDURE — 97110 THERAPEUTIC EXERCISES: CPT | Performed by: PHYSICAL THERAPIST

## 2022-06-27 PROCEDURE — 97530 THERAPEUTIC ACTIVITIES: CPT

## 2022-06-27 PROCEDURE — 97110 THERAPEUTIC EXERCISES: CPT

## 2022-06-27 PROCEDURE — 99232 SBSQ HOSP IP/OBS MODERATE 35: CPT | Performed by: PHYSICAL MEDICINE & REHABILITATION

## 2022-06-27 PROCEDURE — 97530 THERAPEUTIC ACTIVITIES: CPT | Performed by: PHYSICAL THERAPIST

## 2022-06-27 PROCEDURE — 97116 GAIT TRAINING THERAPY: CPT | Performed by: PHYSICAL THERAPIST

## 2022-06-27 RX ADMIN — AMLODIPINE BESYLATE 10 MG: 10 TABLET ORAL at 08:03

## 2022-06-27 RX ADMIN — TAMSULOSIN HYDROCHLORIDE 0.4 MG: 0.4 CAPSULE ORAL at 08:03

## 2022-06-27 RX ADMIN — TRAMADOL HYDROCHLORIDE 50 MG: 50 TABLET, COATED ORAL at 01:31

## 2022-06-27 RX ADMIN — METOPROLOL SUCCINATE 100 MG: 50 TABLET, EXTENDED RELEASE ORAL at 08:03

## 2022-06-27 RX ADMIN — CHOLECALCIFEROL TAB 10 MCG (400 UNIT) 400 UNITS: 10 TAB at 08:03

## 2022-06-27 RX ADMIN — POTASSIUM CHLORIDE 10 MEQ: 750 TABLET, EXTENDED RELEASE ORAL at 08:03

## 2022-06-27 RX ADMIN — TRAMADOL HYDROCHLORIDE 50 MG: 50 TABLET, COATED ORAL at 20:08

## 2022-06-27 RX ADMIN — TRAMADOL HYDROCHLORIDE 50 MG: 50 TABLET, COATED ORAL at 13:56

## 2022-06-27 RX ADMIN — ALLOPURINOL 100 MG: 100 TABLET ORAL at 08:03

## 2022-06-27 RX ADMIN — TRAMADOL HYDROCHLORIDE 50 MG: 50 TABLET, COATED ORAL at 08:03

## 2022-06-27 NOTE — PROGRESS NOTES
06/27/22 1030   Pain Assessment   Pain Assessment Tool 0-10   Pain Score 8   Pain Location/Orientation Orientation: Bilateral;Location: Leg  (ankles mostly)   Restrictions/Precautions   Precautions Fall Risk;Limb alert;Pain   RLE Weight Bearing Per Order WBAT   LLE Weight Bearing Per Order WBAT   Grooming   Able To Initiate Tasks;Comb/Brush Hair;Wash/Dry Face;Wash/Dry Hands   Findings Mod I w/c leave retrieval   Shower/Bathe Self   Type of Assistance Needed Supervision; Incidental touching   Comment ensouragement to do as much as able for self with assist thoroughness buttocka before rinsing   Shower/Bathe Self CARE Score 4   Bathing   Assessed Bath Style Shower   Anticipated D/C Bath Style Shower;Sponge Bath   Able to [place] Jeovanny No   Able to [employer] Temperature No   Able to Wash/Rinse/Dry (body part) Left Arm;Right Arm;L Upper Leg;Chest;R Lower Leg/Foot;L Lower Leg/Foot;R Upper Leg;Perineal Area; Abdomen;Buttocks   Limitations Noted in Balance; Endurance;Problem Solving;ROM;Safety;Strength  (pain)   Positioning Seated;Standing   Adaptive Equipment Longhand Sponge;Longhand Reacher; Shower Seat;Hand Puckett's; Shower Bars   Tub/Shower Transfer   Limitations Noted In Balance; Endurance;Problem Solving;ROM;Safety;LE Strength  (pain)   Adaptive Equipment Grab Bars;Seat with Back   Assessed Shower   Findings Supervision   Upper Body Dressing   Type of Assistance Needed Set-up / clean-up   Upper Body Dressing CARE Score 5   Lower Body Dressing   Type of Assistance Needed Set-up / clean-up   Lower Body Dressing CARE Score 5   Putting On/Taking Off Footwear   Type of Assistance Needed Set-up / clean-up   Comment using reacher and sock aide   Putting On/Taking Off Footwear CARE Score 5   Picking Up Object   Type of Assistance Needed Supervision   Comment standing with support of 1 UE and reaching with the other   Picking Up Object CARE Score 4   Dressing/Undressing Clothing   Remove UB Clothes [place] UB Clothes Pullover Shirt   Remove LB Clothes Shorts;Socks   Don LB Clothes Shorts;Socks   Limitations Noted In Balance; Endurance;Problem Solving; Safety;Strength;ROM  (pain)   Adaptive Equipment Reacher;Sock Aide   Positioning Supported Sit;Standing   Sit to Lying   Type of Assistance Needed Independent   Sit to Lying CARE Score 6   Lying to Sitting on Side of Bed   Type of Assistance Needed Independent   Lying to Sitting on Side of Bed CARE Score 6   Sit to Stand   Type of Assistance Needed Supervision   Sit to Stand CARE Score 4   Bed-Chair Transfer   Type of Assistance Needed Supervision   Chair/Bed-to-Chair Transfer CARE Score 4   [address] [facility] [address] Score 4   Toileting   Able to [address] down yes, up yes  Manage Hygiene Bladder; Bowel   Limitations Noted In Balance;Problem Solving;ROM;Safety;LE Strength  (B ankle pain)   Adaptive Equipment Grab Bar   Findings pt encouraged to attempt hygiene following BM before assist issued with pt preferring to remove clothing for optimal ROM and is able to begin hygiene and then assist for thoroughness issued   Toilet Transfer   Type of Assistance Needed Supervision   Toilet Transfer CARE Score 4   Toilet Transfer   Surface Assessed Raised Toilet   Transfer Technique Standard   Limitations Noted In Balance; Endurance;Problem Solving;ROM;Safety;LE Strength   Adaptive Equipment Walker;Grab Bar   Cognition   Overall Cognitive Status WFL   Arousal/Participation Alert; Responsive; Cooperative   Attention Within functional limits   Orientation Level Oriented X4   Memory Within functional limits   Following Commands Follows all commands and directions without difficulty   Additional Activities   Additional Activities Other (Comment)   Additional Activities Comments fxl mobility to and from  with RW distant Supervision   Assessment   Treatment Assessment Pt presenst supine agreeable to OT session including toileting, ADLs, transfers and mobility  Pt requires encouragement to try as much for self as possible before assist issued and is progressing nicely towards goals  Pt requires continued assist and supervision due to decreased balance, safety, endurance and generalized strength/ ROM with increased pain enzo of ankles  Pt will benefit from continued skilled OT services to increase independence with daily tasks  Problem List Decreased strength;Decreased range of motion;Decreased endurance; Impaired balance;Decreased safety awareness;Pain   Plan   Treatment/Interventions ADL retraining;Functional transfer training; Therapeutic exercise; Endurance training;Patient/family training;Equipment eval/education; Compensatory technique education   Progress Progressing toward goals   OT Therapy Minutes   OT Time In 1030   OT Time Out 1154   OT Total Time (minutes) 84   OT Mode of treatment - Individual (minutes) 84   Therapy Time missed   Time missed?  No

## 2022-06-27 NOTE — PROGRESS NOTES
PM&R PROGRESS NOTE:  Katerine Baez 55 y o  male MRN: [id number]  Unit/Bed#: -01 Encounter: [id number]        Rehabilitation Diagnosis: Impairment of mobility, safety and Activities of Daily Living (ADLs) due to Debility:  16  Debility (Non-cardiac/Non-pulmonary)    HPI: Katerine Baez is a 55 y o  male with a PMH significant for CKD, HLD, HTN and arthritis who presented to the [place] [address] ED initially with worsening left-sided flank pain with generalized malaise/fatigue  He had a recent admission at [facility] for obstructive pyelonephritis secondary to a 3 mm stone with ureteral stent placement  Pt developed E  Coli bacteremia during hospitalization and discharged with amoxicillian until 5/4  In the ED patient was found to have lactic acidosis, leukocytosis and worsening ADRIANNE  CT revealed moderate perinephric stranding and mild hydronephrosis in the left kidney with multiple bilateral renal calculi  Patient transferred to [facility] to be evaluated by Urology  S/p perc nephrostomy tube drain replacement by IR on 5/10  Positive E  coli in one blood culture from 5/10 thought to be associated with UTI/Pyelonephritis  IV antibiotics initiated and then ultimately transitioned to PO antibiotics  During this stay, Ortho was consulted for ongoing left knee pain  His left knee was aspirated  Blood was noted but no effusion aspirated  Xray of the knee without acute osseous abnormality but did reveal tricompartmental osteoarthritis similar to a prior imaging the year prior  Ortho considering future joint steroid injection once acute medical comorbidities resolve  Patient arrived to [facility] on 5/19 after PT/OT recommendations  He progressed well however on 6/6 he developed sepsis meeting the criteria with elevation in WBCS, creatinine level, elevated temp and tachycardia  Patient also with c/o weakness, fatigue and diaphoresis  Pt was transferred to [facility] on 6/7 for urology evaluation   Urine and blood cultures obtained and IV antibiotics were initiated  Urine culture with gram negative rods of E  Coli/MDRO Citrobacter  Pt was recommended for inpatient clearance ureteroscopy  ID consulted and placed patient on Cipro PO x7 days (6/16)  Renal US unremarkable  6/9 patient went to OR with Urology for cystoscopy and left ureteroscopy/lithotripsy with ureteral stent exchange and removal of nephrostomy tube  Stent was then discontinued on 6/13  Pt is to follow up with urology as OP in 6 months with a renal US  Patient participated with PT/OT and was deemed appropriate to return to acute rehabilitation  He arrived to [facility] 6/17  SUBJECTIVE: Patient seen face to face  C/o new on-set of right ankle pain  Continue to do conservative treatments with applying heat/ice alternatively and voltaren gel as needed  Patient will need to follow up with Orthopedics/Rheumatology as OP  ASSESSMENT: Stable, progressing      PLAN:    Rehabilitation  · Functional deficits: impaired mobility, self care, generalized pain     Continue current rehabilitation plan of care to maximize function       Functional update:   o PT:  Lying to sitting on side of bed: independent  Sit-stand: supervision  Bed-chair transfer: supervision  Ambulation: supervision  Wheelchair mobility: supervision   o OT:   Toileting hygiene: s/u c/u         Estimated Discharge: 6/30/22 with [facility]/N vs OP depending on progression       Pain   Tylenol 650 mg Q6H PRN for mild pain   Voltaren gel 4x daily    DVT prophylaxis   Refusing Heparin injections and SCDs    Bladder plan   Continent    Bowel plan   Continent      Left elbow pain  Assessment & Plan  · heat twice a day for 15 minutes   · May apply Voltaren gel to left elbow    Obstructive pyelonephritis  Assessment & Plan  · Leading to Sepsis suspected to be caused by UTI with left nephrostomy tube and left ureteral stent  · Received IVF and Cefepime   · S/p OR with Urology for cystoscopy ureteroscopy and ureteral stent insertion with string removal of left sided nephrostomy tube   · Urine cultures with MDRO Citrobacter, ID consulted  · PO cipro completed 6/16        Recurrent left knee pain  Assessment & Plan  · Pain management   · Xray of knee on 5/13 with moderate osteoarthritis   · Repeat x-ray on 6/21 with moderate osteoarthritis   · Continue conservative management  · US therapy to Left knee; heat and Voltaren gel     Morbid obesity  Assessment & Plan  · Lifestyle modifications       Essential hypertension  Assessment & Plan  · Monitor vitals  · IM to follow  · Continue Metoprolol 25 mg daily and Norvasc 10 mg daily        Stage 3b chronic kidney disease [facility])  Assessment & Plan  Lab Results   Component Value Date    EGFR 27 06/27/2022    EGFR 31 06/22/2022    EGFR 31 06/21/2022    CREATININE 2 49 (H) 06/27/2022    CREATININE 2 24 (H) 06/22/2022    CREATININE 2 23 (H) 06/21/2022   · Baseline creatinine 1 4-1 7  · Nephrology following  · Will need OP Nephrology follow up; continue nephrology recommendations  · Avoid Nephrotoxins     * Obstructive uropathy  Assessment & Plan  · s/p cystoscopy left ureteroscopy/lithotripsy and exchange ureteral stent, removal nephrostomy tube left on 6/9  · stent removal on 6/13  · OP follow up with Urology in 6 weeks with renal US  · Was on Flomax 0 8 mg, c/o increased frequency of urination, decreased back to initial dose of 0 4 mg daily           Appreciate IM consultants medical co-management  Labs, medications, and imaging personally reviewed  ROS:  A ten point review of systems was completed and pertinent positives are listed in subjective section  All other systems reviewed were negative  Review of Systems   Constitutional: Negative  Negative for appetite change, fatigue and fever  HENT: Negative  Negative for trouble swallowing  Eyes: Negative  Negative for visual disturbance  Respiratory: Negative  Negative for shortness of breath  Cardiovascular: Negative  Negative for chest pain  Gastrointestinal: Negative  Negative for abdominal pain and constipation  Genitourinary: Negative  Negative for difficulty urinating  Musculoskeletal: Positive for arthralgias and myalgias  Left knee, left ankle and left elbow pain has improved  Now with c/o bilateral shoulder pain and new onset right ankle pain   Skin: Negative  Neurological: Positive for weakness  Psychiatric/Behavioral: Negative  OBJECTIVE:   BP (!) 179/85 (BP Location: Right arm)   Pulse 97   Temp 98 9 °F (37 2 °C) (Temporal)   Resp 19   Ht 5' 6" (1 676 m)   Wt (!) 142 kg (313 lb 4 4 oz)   SpO2 98%   BMI 50 56 kg/m²     Physical Exam  Vitals and nursing note reviewed  Constitutional:       General: He is not in acute distress  Appearance: Normal appearance  He is obese  He is not ill-appearing  HENT:      Head: Normocephalic and atraumatic  Right Ear: External ear normal       Left Ear: External ear normal       Nose: Nose normal       Mouth/Throat:      Mouth: Mucous membranes are moist       Pharynx: Oropharynx is clear  Eyes:      Pupils: Pupils are equal, round, and reactive to light  Cardiovascular:      Rate and Rhythm: Normal rate and regular rhythm  Pulses: Normal pulses  Heart sounds: Normal heart sounds  No murmur heard  Pulmonary:      Effort: Pulmonary effort is normal  No respiratory distress  Breath sounds: Normal breath sounds  No wheezing  Abdominal:      General: Bowel sounds are normal  There is no distension  Palpations: Abdomen is soft  Tenderness: There is no abdominal tenderness  Musculoskeletal:         General: No tenderness  Normal range of motion  Cervical back: Normal range of motion  Right lower leg: Edema present  Left lower leg: Edema present  Comments: +1 edema bilateral feet   Skin:     General: Skin is warm     Neurological:      General: No focal deficit present  Mental Status: He is alert and oriented to person, place, and time     Psychiatric:         Mood and Affect: Mood normal           Lab Results   Component Value Date    WBC 10 51 (H) 06/22/2022    HGB 8 1 (L) 06/22/2022    HCT 26 9 (L) 06/22/2022    MCV 95 06/22/2022     06/22/2022     Lab Results   Component Value Date    SODIUM 135 06/27/2022    K 3 8 06/27/2022     06/27/2022    CO2 22 06/27/2022    BUN 24 06/27/2022    CREATININE 2 49 (H) 06/27/2022    GLUC 96 06/27/2022    CALCIUM 9 9 06/27/2022     Lab Results   Component Value Date    INR 1 27 (H) 05/10/2022    INR 1 09 05/10/2022    INR 1 33 (H) 08/24/2019    PROTIME 15 4 (H) 05/10/2022    PROTIME 13 5 05/10/2022    PROTIME 16 6 (H) 08/24/2019           Current Facility-Administered Medications:     acetaminophen (TYLENOL) tablet 650 mg, 650 mg, Oral, Q6H PRN, Lovenia Sine, PA-C, 650 mg at 06/25/22 1802    allopurinol (ZYLOPRIM) tablet 100 mg, 100 mg, Oral, Daily, Lovenia Sine, PA-C, 100 mg at 06/27/22 0803    amLODIPine (NORVASC) tablet 10 mg, 10 mg, Oral, Daily, Lovenia Sine, PA-C, 10 mg at 06/27/22 0803    cholecalciferol (VITAMIN D3) tablet 400 Units, 400 Units, Oral, Daily, Lovenia Sine, PA-C, 400 Units at 06/27/22 0803    Diclofenac Sodium (VOLTAREN) 1 % topical gel 2 g, 2 g, Topical, 4x Daily PRN, Lovenia Sine, PA-C, 2 g at 06/24/22 0021    metoprolol succinate (TOPROL-XL) 24 hr tablet 100 mg, 100 mg, Oral, Daily, Lovenia Sine, PA-C, 100 mg at 06/27/22 0803    ondansetron (ZOFRAN-ODT) dispersible tablet 4 mg, 4 mg, Oral, Q6H PRN, Jony Patel PA-C    potassium chloride (K-DUR,KLOR-CON) CR tablet 10 mEq, 10 mEq, Oral, Daily, Jony Patel PA-C, 10 mEq at 06/27/22 0803    senna (SENOKOT) tablet 17 2 mg, 2 tablet, Oral, HS PRN, MILO Cam[place]    tamsulosin [facility]) capsule 0 4 mg, 0 4 mg, Oral, Daily, Jony Patel PA-C, 0 4 mg at 06/27/22 0803    traMADol (ULTRAM) tablet 50 mg, 50 mg, Oral, Q6H PRN, Raine De Souza PA-C, 50 mg at 06/27/22 4919    Past Medical History:   Diagnosis Date    Arthritis     Chronic kidney disease     Gout     Hyperlipidemia     Hypertension     Kidney stone     Obstructive pyelonephritis     Sepsis [facility])        Patient Active Problem List    Diagnosis Date Noted    Left elbow pain 06/23/2022    Diarrhea 06/13/2022    Anemia 05/13/2022    Obstructive pyelonephritis 04/23/2022    Obstructive uropathy 04/21/2022    Mixed hyperlipidemia 06/02/2021    Morbid obesity 06/02/2021    Recurrent left knee pain 06/02/2021    Insomnia 09/16/2019    Essential hypertension 08/27/2019    Severe sepsis on admission 08/23/2019    Stage 3b chronic kidney disease ([facility] [address] ) 08/23/2019    Acute kidney injury superimposed on CKD stage 3 08/06/2019    Bacteremia due to Escherichia coli 08/04/2019    Lactic acidosis 08/04/2019    History of gout 07/15/2019    Vitamin D deficiency 06/10/2015    Morbid obesity ([facility] ) 03/23/2015          Raine De Souza PA-C    Total time spent:  30 minutes with more than 50% spent counseling/coordinating care  Counseling includes discussion with patient re: progress and discussion with patient of his/her current medical/functional state/information  Coordination of patient's care was performed in conjunction with consulting services  Time invested included review of patient's labs, vitals, and management of their comorbidities with continued monitoring  The care of the patient was extensively discussed and appropriate treatment plan was formulated unique for this patient  ** Please Note:  voice to text software may have been used in the creation of this document   Although proof errors in transcription or interpretation are a potential of such software**

## 2022-06-27 NOTE — PROGRESS NOTES
06/27/22 0855   Pain Assessment   Pain Assessment Tool 0-10   Pain Score 8   Pain Location/Orientation Orientation: Right  (Ankle/foot)   Restrictions/Precautions   Precautions Fall Risk;Contact/isolation;Pain   RLE Weight Bearing Per Order WBAT   LLE Weight Bearing Per Order WBAT   Cognition   Orientation Level Oriented X4   Subjective   Subjective My right ankle is sore, left one is a little better today  Right one is sore on the outside of the right ankle  Roll Left and Right   Type of Assistance Needed Set-up / clean-up   Physical Assistance Level No physical assistance   Comment HOB lowered, side rails down   Roll Left and Right CARE Score 5   Lying to Sitting on Side of Bed   Type of Assistance Needed Independent   Physical Assistance Level No physical assistance   Comment HOB lowered, side rails down   Lying to Sitting on Side of Bed CARE Score 6   Sit to Stand   Type of Assistance Needed Supervision   Physical Assistance Level No physical assistance   Comment RW   Sit to Stand CARE Score 4   Bed-Chair Transfer   Type of Assistance Needed Supervision   Physical Assistance Level No physical assistance   Comment RW, good hand placement   Chair/Bed-to-Chair Transfer CARE Score 4   Car Transfer   Reason if not Attempted Environmental limitations   Car Transfer CARE Score 10   Walk 10 Feet   Type of Assistance Needed Supervision   Physical Assistance Level No physical assistance   Comment RW in room   Walk 10 Feet CARE Score 4   Ambulation   Does the patient walk? 2  Yes   Primary Mode of Locomotion Prior to Admission Walk   Distance Walked (feet) 44 ft   Assist Device Roller Walker   Gait Pattern Antalgic; Slow Julieta; Inconsistant Julieta   Wheel 50 Feet with Two Turns   Type of Assistance Needed Supervision   Physical Assistance Level No physical assistance   Wheel 50 Feet with Two Turns CARE Score 4   Wheel 150 Feet   Type of Assistance Needed Supervision   Physical Assistance Level No physical assistance Wheel 150 Feet CARE Score 4   Wheelchair mobility   Does the patient use a wheelchair? 1  Yes   Type of Wheelchair Used 1  Manual   Method Right upper extremity; Left upper extremity   Stairs   Type Ramp   Weight Bearing Precautions WBAT   Assist Devices Roller Walker   Therapeutic Interventions   Modalities MH to left knee and right ankle x 10 mins during seated rest   Assessment   Treatment Assessment Pt presents supine in bed, agreeable to PT  Patient requests urinal, able to use seated at EOB  Patient performed short distance ambulation and negotiation of ramp  Pain limited this date in ankle and knee  Requires frequent and prolonged seated rest breaks  Will continue to progress as tolerated  Discussed upcoming discharge concerns to include: in/out of vehicle, up/down stairs  Ability to stay on first floor with partial bath if needed  Patient declines negotiation of stairs this a m due to pain  Cont per POC and progress as patient is able  Problem List Decreased strength;Decreased range of motion;Decreased endurance; Impaired balance;Decreased safety awareness;Pain   PT Barriers   Physical Impairment Decreased strength;Decreased endurance;Decreased range of motion; Impaired balance;Decreased mobility; Decreased safety awareness;Pain   Functional Limitation Walking;Transfers;Standing;Stair negotiation   Plan   Treatment/Interventions Functional transfer training;LE strengthening/ROM; Elevations; Therapeutic exercise; Endurance training;Patient/family training;Equipment eval/education; Bed mobility;Gait training   Progress Progressing toward goals   PT Therapy Minutes   PT Time In 0855   PT Time Out 1000   PT Total Time (minutes) 65   PT Mode of treatment - Individual (minutes) 65   PT Mode of treatment - Concurrent (minutes) 0   PT Mode of treatment - Group (minutes) 0   PT Mode of treatment - Co-treat (minutes) 0   PT Mode of Treatment - Total time(minutes) 65 minutes   PT Cumulative Minutes 638     Patient remains OOB in chair, all needs in reach  Alarm in place and activated  Encouraged use of call bell, patient verbalizes understanding

## 2022-06-27 NOTE — PROGRESS NOTES
06/27/22 1330   Pain Assessment   Pain Assessment Tool 0-10   Pain Score 8   Pain Location/Orientation Orientation: Right;Location: Shoulder; Location: Leg  (ankle)   Restrictions/Precautions   Precautions Limb alert;Pain;Contact/isolation  (MDRO urine)   RLE Weight Bearing Per Order WBAT   LLE Weight Bearing Per Order WBAT   Picking Up Object   Type of Assistance Needed Independent   Comment standing to retrieve panst from the floor using RUE   Picking Up Object CARE Score 6   Sit to Lying   Type of Assistance Needed Independent   Sit to Lying CARE Score 6   Lying to Sitting on Side of Bed   Type of Assistance Needed Independent   Lying to Sitting on Side of Bed CARE Score 6   Sit to Stand   Type of Assistance Needed Independent   Sit to Stand CARE Score 6   Bed-Chair Transfer   Type of Assistance Needed Independent   Chair/Bed-to-Chair Transfer CARE Score 6   [facility] Score 6   Toileting   Able to [address] down yes, up yes  Manage Hygiene Bladder   Adaptive Equipment Grab Bar   Toilet Transfer   Type of Assistance Needed Independent   Toilet Transfer CARE Score 6   Toilet Transfer   Surface Assessed Raised Toilet   Transfer Technique Standard   Adaptive Equipment Grab Bar;Walker   Exercise Tools   UE Ergometer UBE BUE 5 minutes forward and backwards min resistance   Cognition   Overall Cognitive Status WFL   Orientation Level Oriented X4   Additional Activities   Additional Activities Other (Comment)   Additional Activities Comments fxl mobility to and from BR with RW and Mod I   Assessment   Treatment Assessment Pt presents supine agreeable to OTs ession including toileting with related transfers/ mobility to progress level to Mod I with use of urinal standing at bedisde or mobility to BR with RW followed by BUE therex  Pt tolerates session with RN informed of pt's progression   Pt will benefit from continued skilled OT services to increase independence with daily tasks  Problem List Decreased strength;Decreased range of motion;Decreased endurance;Decreased safety awareness;Pain   Plan   Treatment/Interventions ADL retraining;Functional transfer training; Therapeutic exercise; Endurance training;Patient/family training;Equipment eval/education; Compensatory technique education   Progress Progressing toward goals   OT Therapy Minutes   OT Time In 1330   OT Time Out 1355   OT Total Time (minutes) 25   OT Mode of treatment - Individual (minutes) 25   Therapy Time missed   Time missed?  No

## 2022-06-27 NOTE — PROGRESS NOTES
06/27/22 1400   Pain Assessment   Pain Assessment Tool 0-10   Pain Score 8   Pain Location/Orientation Orientation: Right  (Lateral ankle)   Restrictions/Precautions   Precautions Contact/isolation;Limb alert;Pain  (MDRO in urine)   RLE Weight Bearing Per Order WBAT   LLE Weight Bearing Per Order WBAT   Cognition   Orientation Level Oriented X4   Subjective   Subjective Right ankle still sore on the outside, feet swollen  Roll Left and Right   Type of Assistance Needed Supervision   Physical Assistance Level No physical assistance   Comment Side rails   Roll Left and Right CARE Score 4   Sit to Lying   Type of Assistance Needed Independent   Physical Assistance Level No physical assistance   Comment HOB flat, no side rail on right   Sit to Lying CARE Score 6   Lying to Sitting on Side of Bed   Type of Assistance Needed Independent   Physical Assistance Level No physical assistance   Comment   (HOB lowered, no side rails)   Lying to Sitting on Side of Bed CARE Score 6   Sit to Stand   Type of Assistance Needed Independent   Physical Assistance Level No physical assistance   Comment RW   Sit to Stand CARE Score 6   Bed-Chair Transfer   Type of Assistance Needed Independent   Physical Assistance Level No physical assistance   Comment RW   Chair/Bed-to-Chair Transfer CARE Score 6   Walk 10 Feet   Reason if not Attempted Refused to perform   Walk 10 Feet CARE Score 7   Wheel 150 Feet   Type of Assistance Needed Independent   Physical Assistance Level No physical assistance   Wheel 150 Feet CARE Score 6   Wheelchair mobility   Does the patient use a wheelchair? 1  Yes   Type of Wheelchair Used 1  Manual   Method Right upper extremity; Left upper extremity   Curb or Single Stair   Reason if not Attempted Refused to perform   1 Step (Curb) CARE Score 7   Picking Up Object   Comment Activity completed seated in w/c  Good awareness of balance in w/c     Therapeutic Interventions   Strengthening Seated LE TE   Equipment Use   NuStep Deferred 2/2 pain   Assessment   Treatment Assessment Pt presents to PT after OT session, w/c level  Agreeable to PT  Patient demonstrates ongoing deficits with pain limiting mobility this date  Difficulty with transfer initially, however, with increased time able to complete  Patient will benfit from ongoing interventions and encouragement  Cont per POC  Problem List Decreased strength;Decreased range of motion;Decreased endurance;Decreased safety awareness;Pain   PT Barriers   Physical Impairment Decreased strength;Decreased endurance;Decreased range of motion; Impaired balance;Decreased mobility; Decreased safety awareness;Pain   Functional Limitation Walking;Transfers;Standing;Stair negotiation   Plan   Treatment/Interventions Functional transfer training;LE strengthening/ROM; Elevations; Therapeutic exercise; Endurance training;Patient/family training;Equipment eval/education; Bed mobility;Gait training   Progress Progressing toward goals   PT Therapy Minutes   PT Time In 1400   PT Time Out 1430   PT Total Time (minutes) 30   PT Mode of treatment - Individual (minutes) 30   PT Mode of treatment - Concurrent (minutes) 0   PT Mode of treatment - Group (minutes) 0   PT Mode of treatment - Co-treat (minutes) 0   PT Mode of Treatment - Total time(minutes) 30 minutes   PT Cumulative Minutes 668     Patient returned to bed, all needs in reach  Alarm in place and activated  Encouraged use of call bell, patient verbalizes understanding

## 2022-06-27 NOTE — PLAN OF CARE
Problem: SAFETY ADULT  Goal: Maintain or return to baseline ADL function  Description: INTERVENTIONS:  -  Assess patient's ability to carry out ADLs; assess patient's baseline for ADL function and identify physical deficits which impact ability to perform ADLs (bathing, care of mouth/teeth, toileting, grooming, dressing, etc )  - Assess/evaluate cause of self-care deficits   - Assess range of motion  - Assess patient's mobility; develop plan if impaired  - Assess patient's need for assistive devices and provide as appropriate  - Encourage maximum independence but intervene and supervise when necessary  - Involve family in performance of ADLs  - Assess for home care needs following discharge   - Consider OT consult to assist with ADL evaluation and planning for discharge  - Provide patient education as appropriate  Outcome: Progressing     Problem: DISCHARGE PLANNING  Goal: Discharge to home or other facility with appropriate resources  Description: INTERVENTIONS:  - Identify barriers to discharge w/patient and caregiver  - Arrange for needed discharge resources and transportation as appropriate  - Identify discharge learning needs (meds, wound care, etc )  - Arrange for interpretive services to assist at discharge as needed  - Refer to Case Management Department for coordinating discharge planning if the patient needs post-hospital services based on physician/advanced practitioner order or complex needs related to functional status, cognitive ability, or social support system  Outcome: Progressing     Problem: PAIN - ADULT  Goal: Verbalizes/displays adequate comfort level or baseline comfort level  Description: Interventions:  - Encourage patient to monitor pain and request assistance  - Assess pain using appropriate pain scale  - Administer analgesics based on type and severity of pain and evaluate response  - Implement non-pharmacological measures as appropriate and evaluate response  - Consider cultural and social influences on pain and pain management  - Notify physician/advanced practitioner if interventions unsuccessful or patient reports new pain  Outcome: Not Progressing

## 2022-06-28 PROCEDURE — 97530 THERAPEUTIC ACTIVITIES: CPT

## 2022-06-28 PROCEDURE — 99233 SBSQ HOSP IP/OBS HIGH 50: CPT | Performed by: PHYSICAL MEDICINE & REHABILITATION

## 2022-06-28 PROCEDURE — 97110 THERAPEUTIC EXERCISES: CPT

## 2022-06-28 PROCEDURE — 97116 GAIT TRAINING THERAPY: CPT

## 2022-06-28 PROCEDURE — 97535 SELF CARE MNGMENT TRAINING: CPT

## 2022-06-28 RX ADMIN — AMLODIPINE BESYLATE 10 MG: 10 TABLET ORAL at 08:03

## 2022-06-28 RX ADMIN — CHOLECALCIFEROL TAB 10 MCG (400 UNIT) 400 UNITS: 10 TAB at 08:03

## 2022-06-28 RX ADMIN — METOPROLOL SUCCINATE 100 MG: 50 TABLET, EXTENDED RELEASE ORAL at 08:03

## 2022-06-28 RX ADMIN — TRAMADOL HYDROCHLORIDE 50 MG: 50 TABLET, COATED ORAL at 20:19

## 2022-06-28 RX ADMIN — ALLOPURINOL 100 MG: 100 TABLET ORAL at 08:04

## 2022-06-28 RX ADMIN — POTASSIUM CHLORIDE 10 MEQ: 750 TABLET, EXTENDED RELEASE ORAL at 08:04

## 2022-06-28 RX ADMIN — TRAMADOL HYDROCHLORIDE 50 MG: 50 TABLET, COATED ORAL at 08:06

## 2022-06-28 RX ADMIN — TRAMADOL HYDROCHLORIDE 50 MG: 50 TABLET, COATED ORAL at 02:05

## 2022-06-28 RX ADMIN — TAMSULOSIN HYDROCHLORIDE 0.4 MG: 0.4 CAPSULE ORAL at 08:04

## 2022-06-28 RX ADMIN — TRAMADOL HYDROCHLORIDE 50 MG: 50 TABLET, COATED ORAL at 14:12

## 2022-06-28 RX ADMIN — ACETAMINOPHEN 650 MG: 325 TABLET ORAL at 23:57

## 2022-06-28 NOTE — TEAM CONFERENCE
Acute RehabilitationTeam Conference Note  Date: 6/28/2022   Time: 10:41 AM       Patient Name:  Katerine Baez       Medical Record Number: [id number]   YOB: 1965  Sex: Male          Room/Bed:  [facility] 216/[facility] 216-01  Payor Info:  Payor: Juan Carlos Pinzon / Plan: Gautam Ace / Product Type: Blue Fee for Service /      Admitting Diagnosis: Sepsis, unspecified organism [A41 [id number]]   Admit Date/Time:  6/17/2022  3:25 PM  Admission Comments: No comment available     Primary Diagnosis:  Obstructive uropathy  Principal Problem: Obstructive uropathy    Patient Active Problem List    Diagnosis Date Noted    Left elbow pain 06/23/2022    Diarrhea 06/13/2022    Anemia 05/13/2022    Obstructive pyelonephritis 04/23/2022    Obstructive uropathy 04/21/2022    Mixed hyperlipidemia 06/02/2021    Morbid obesity 06/02/2021    Recurrent left knee pain 06/02/2021    Insomnia 09/16/2019    Essential hypertension 08/27/2019    Severe sepsis on admission 08/23/2019    Stage 3b chronic kidney disease ([facility] [address] ) 08/23/2019    Acute kidney injury superimposed on CKD stage 3 08/06/2019    Bacteremia due to Escherichia coli 08/04/2019    Lactic acidosis 08/04/2019    History of gout 07/15/2019    Vitamin D deficiency 06/10/2015    Morbid obesity ([facility] [address] ) 03/23/2015       Physical Therapy:    Weight Bearing Status: Weight Bearing as Tolerated  Transfers: Independent  Bed Mobility: Independent (No side rail)  Amulation Distance (ft): 218 feet ( ft)  Ambulation: Supervision  Assistive Device for Ambulation: Roller Walker  Wheelchair Mobility Distance: 150 ft  Wheelchair Mobility: Independent  Number of Stairs: 14  Assistive Device for Stairs: Bilateral [employer]  Stair Assistance: Supervision  Ramp: Supervision  Assistive Device for Ramp: Roller Walker  Discharge Recommendations: Home with:  [address] with[de-identified] Home Physical Therapy, Family Support    6/21/2022:  Patient seen for IE with [facility] PT and has been progressing steadily  Presents following obstructive uropathy resulting in nephrostomy removal, prolonged immobility with decreased ROM/strength, decreased balance and safety, decreased endurance, and pain  Patient mod I bed mobility, supervision transfers with RW, supervision  ambulation up to 218 feet on level and unlevel surfaces with RW, supervision with bilateral HRs for negotiation of 14 steps with bilateral handrails  Patient would benefit from continued inpatient [facility] PT to increase function, safety, and increased independence in prep for safe d/c to home  6/28/2022:  Patient has been progressing slowly, but steadily  Self limiting at times  Presents following obstructive uropathy resulting in nephrostomy removal, prolonged immobility with decreased ROM/strength, decreased balance and safety, decreased endurance, and pain  Patient mod I bed mobility, mod I with transfers with RW, supervision  ambulation up to  feet on level and unlevel surfaces with RW, supervision with bilateral HRs for negotiation of 14 steps with bilateral handrails  Patient would benefit from continued inpatient [facility] PT to increase function, safety, and increased independence in prep for safe d/c to home  Occupational Therapy:  Eating: Independent  Grooming: Supervision  Bathing: Supervision  Bathing: Supervision  Upper Body Dressing: Supervision  Lower Body Dressing: Supervision  Toileting: Supervision  Tub/Shower Transfer: Supervision  Toilet Transfer: Supervision  Cognition: Within Defined Limits  Orientation: Person, Place, Time, Situation  Discharge Recommendations: Home with:  [address] with[de-identified] Family Support, Home Occupational Therapy       6/20/2022: Pt participates in ADLs, transfers/ mobility, toileting and BUE therex  Pt requires assist and increase time due to decreased balance, safety, endurance and generalized strength/ ROM with increased pain RUE and LLE  Pt is making gains towards goals with current LOF as listed above   Pt will benefit from continued skilled OT services to increase independence with daily tasks  6/27/2022: Pt participates in ADLs, transfers/ mobility, toileting, light homemaking and BUE therex  Pt requires assist and increase time due to decreased balance, safety, endurance and generalized strength/ ROM with increased generalized pain LEs more than UEs  Pt is making gains towards goals with current LOF as listed above  Pt will benefit from continued skilled OT services to increase independence with daily tasks  Speech Therapy:           No notes on file    Nursing Notes:  Appetite: Good  Diet Type: Regular/House, 2 gram sodium diet                      Diet Patient/Family Education Complete: Yes    Type of Wound (LDA): Wound (old puncture site left lower back)                    Type of Wound Patient/Family Education: No  Bladder: Continent     Bladder Patient/Family Education: Yes  Bowel: Medication     Bowel Patient/Family Education: Yes  Pain Location/Orientation: Orientation: Right, Other (Comment) (ankle)  Pain Score: 6  Pain 2  Pain Score 2: 7  Pain Location/Orientation 2: Orientation: Left, Location: Leg, Location: Knee                    Hospital Pain Intervention(s): Medication (See MAR), Repositioned, Rest  Pain Patient/Family Education: Yes  Medication Management/Safety  Medication Patient/Family Education Complete: Yes    6/21/22 Pt re admitted after Nephrostomy and stents removed  Pt was sent out for sepsis alert  Pt is A+Ox4 on contact for MDRO  Lungs are clear on RA and HRR, runs tachy at times  B/L LE edema+1  Pt is cont B+B, frequent urination, being followed by nephrology  Pt c/o B/L ankle and knee pain for which he receives PRN Tramadol and Tylenol for  Pt is independent with all bed mobility and supervision with RW for ambulation and transfers  No falls  6/27/22 Pt re admitted after Nephrostomy and stents removed  Pt was sent out for sepsis alert    Pt is A+Ox4 on contact for MDRO   Lungs are clear on RA and HRR, runs tachy at times  B/L LE edema+1  Pt is cont B+B, frequent urination, being followed by nephrology  Pt c/o B/L ankle and knee pain for which he receives PRN Tramadol and Tylenol for  Pt is independent with all bed mobility and supervision with RW for ambulation and transfers  No falls  Case Management:     Discharge Planning  Living Arrangements: Lives Alone  Support Systems: Self  Assistance Needed: may need home care on discharge  Type of Current Residence: Private residence  Current Home Care Services: No  Pt is known to [facility] staff from recent admission & DC; see chart notes  Pt declined for this worker to contact his parents, stating that he prefers to do so himself  Pt lives alone in a multi-story home, FF set up  There are 6 steps (4+2) to enter, HR's on the first four steps  He has a SPC & RW  He reported that he has been completely independent, driving & working FT  He expressed that he is hopeful to return to employment but is considering applying for SSDI  Reviewed role of team members & reviewed [address] with Pt, who expressed understanding & agreement  SW will continue to monitor & assist as needed with [address]  See UR section for insurance details  6/21/22 - Tx team recommendations reviewed with patient, who expressed understanding & agreement  Target DC date is 6/30/22  Team will continue to assess to determine if [facility] or outpatient will be most appropriate for Pt at DC  SW will continue to monitor & assist as needed with Tx & DC planning  Is the patient actively participating in therapies?  yes  List any modifications to the treatment plan: na    Barriers Interventions   HTN, chronic kidney disease Medical management and oversight, nephrology following    Decreased end ADL, transfer, gait training   Decreased strength Therapeutic exercise, therapeutic activity   pain Medication management         Is the patient making expected progress toward goals? yes  List any update or changes to goals: na    Medical Goals: Patient will be able to manage medical conditions and comorbid conditions with medications and follow up upon completion of rehab program    Weekly Team Goals:   Rehab Team Goals  ADL Team Goal: Patient will require assist with ADLs with least restrictive device upon completion of rehab program  Bowel/Bladder Team Goal: Patient will be independent with bladder/bowel management with least restrictive device upon completion of rehab program  Transfer Team Goal: Patient will be independent with transfers with least restrictive device upon completion of rehab program  Locomotion Team Goal: Patient will be independent with locomotion with least restrictive device upon completion of rehab program     Mod I bed mobility, transfers, and mobility  Mod I self care    Health and wellness: to be able to return home and complete homemaking tasks, return to work    Discussion: Plan for return home alone [place] [facility] for PT, OT, and nursing       Anticipated Discharge Date:  June 30, 2022

## 2022-06-28 NOTE — NURSING NOTE
Tramadol po given per request this a m  for c/o right ankle pain  See pain reassessment   Pt informed he is independent to bathroom but requests that urinal be kept at bedside due to urinary frequency and urgency  Tolerating therapy  Continue same plan of care  Discharge home for Friday 6/30/22

## 2022-06-28 NOTE — CASE MANAGEMENT
Tx team recommendations reviewed with patient & Pt's father, who expressed understanding & agreement  Target DC date is 6/30 between 1 and 2 PM, with HHC (Nsg, PT, OT); a list of providers was provided to Pt & a referral being made to [facility] based on Pt preference  SW will continue to monitor & assist as needed with Tx & DC planning

## 2022-06-28 NOTE — PROGRESS NOTES
06/28/22 1230   Pain Assessment   Pain Assessment Tool 0-10   Pain Score 7   Pain Location/Orientation Orientation: Bilateral;Location: Foot   Restrictions/Precautions   Precautions Fall Risk;Contact/isolation;Pain   RLE Weight Bearing Per Order WBAT   LLE Weight Bearing Per Order WBAT   Cognition   Overall Cognitive Status WFL   Arousal/Participation Alert; Responsive; Cooperative   Attention Within functional limits   Orientation Level Oriented X4   Memory Within functional limits   Following Commands Follows all commands and directions without difficulty   Roll Left and Right   Type of Assistance Needed Independent   Roll Left and Right CARE Score 6   Sit to Lying   Type of Assistance Needed Independent   Sit to Lying CARE Score 6   Lying to Sitting on Side of Bed   Type of Assistance Needed Independent   Lying to Sitting on Side of Bed CARE Score 6   Sit to Stand   Type of Assistance Needed Independent   Sit to Stand CARE Score 6   Bed-Chair Transfer   Type of Assistance Needed Independent   Chair/Bed-to-Chair Transfer CARE Score 6   Transfer Bed/Chair/Wheelchair   Limitations Noted In Balance; Endurance;UE Strength;LE Strength   Adaptive Equipment Roller Walker   Stand Pivot Modified Independent   Sit to Stand Modified Independent   Stand to Sit Modified Independent   Supine to Sit Modified Independent   Sit to Supine Modified Independent   Car Transfer   Reason if not Attempted Environmental limitations   Car Transfer CARE Score 10   Walk 10 Feet   Type of Assistance Needed Independent   Walk 10 Feet CARE Score 6   Walk 50 Feet with Two Turns   Type of Assistance Needed Supervision   Walk 50 Feet with Two Turns CARE Score 4   Ambulation   Does the patient walk? 2  Yes   Primary Mode of Locomotion Prior to Admission Walk   Distance Walked (feet) 100 ft   Assist Device Roller Walker   Gait Pattern Antalgic; Slow Julieta;Decreased foot clearance; Forward Flexion; Wide TEO;Step through   Limitations Noted In Balance; Endurance;Posture; Safety;Speed;Strength   Provided Assistance with: Direction   Walk Assist Level Supervision;Modified Independent   Findings S for longer distance, MI for short   Therapeutic Interventions   Strengthening seated and standing ther ex   Modalities MH to JEANNINE gerardo   Other gait, transfers,   Assessment   Treatment Assessment Pt seated in bed to start session, pleasant and agreeable, offers complaints of 7/10 pain in R ankle  Pt completed bed mobility and transfers with MI  Pt had MH applied to B ankle x20 min, skin interity intact with no problems  Pt able to complete seated and standing ther ex with  no complication  Pt completed toilet transfer with MI  Pt able to amb 100' with RW and S, excesively slow gait speed  Pt seated in bed to end session with call bell in reach and all needs met  Pt will benefit from continued skilled PT to improve over all strength and endurence  PT Barriers   Physical Impairment Decreased strength;Decreased endurance;Decreased range of motion; Impaired balance;Decreased mobility; Decreased safety awareness;Pain   Functional Limitation Walking;Transfers;Standing;Stair negotiation   Plan   Treatment/Interventions Functional transfer training;LE strengthening/ROM; Elevations; Therapeutic exercise; Endurance training;Bed mobility;Gait training   Progress Progressing toward goals   PT Therapy Minutes   PT Time In 1230   PT Time Out 1400   PT Total Time (minutes) 90   PT Mode of treatment - Individual (minutes) 30   PT Mode of treatment - Concurrent (minutes) 60   PT Mode of treatment - Group (minutes) 0   PT Mode of treatment - Co-treat (minutes) 0   PT Mode of Treatment - Total time(minutes) 90 minutes   PT Cumulative Minutes 758   Therapy Time missed   Time missed?  No

## 2022-06-28 NOTE — PROGRESS NOTES
06/28/22 0905   Pain Assessment   Pain Assessment Tool 0-10   Pain Score 7   Pain Location/Orientation Orientation: Right;Location: Shoulder; Location: Leg  (ankle)   Restrictions/Precautions   Precautions Contact/isolation;Limb alert;Pain  (MDRO urine and I&O)   RLE Weight Bearing Per Order WBAT   LLE Weight Bearing Per Order WBAT   Grooming   Able To Initiate Tasks; Wash/Dry Hands   Findings independent using    Picking Up Object   Type of Assistance Needed Independent; Adaptive equipment   Comment using reacher in L hand to retrieval clothing from the floor while standing to simulate retrieval from the dryer   Picking Up Object CARE Score 6   Lying to Sitting on Side of Bed   Type of Assistance Needed Independent   Lying to Sitting on Side of Bed CARE Score 6   Sit to Stand   Type of Assistance Needed Independent   Sit to Stand CARE Score 6   Bed-Chair Transfer   Type of Assistance Needed Independent   Chair/Bed-to-Chair Transfer CARE Score 6   [facility] Score 6   Toileting   Able to [address] down yes, up yes     Manage Hygiene Bladder   Findings using the urinal supine   Kitchen Mobility   Kitchen-Mobility Level Walker   Kitchen Activity Retrieve items;Transport items   Kitchen Mobility Comments Mod I retrieval and transport to gather ice and soda for a drink   Light Housekeeping   Light Housekeeping Level Walker   Light Housekeeping Level of Assistance Modified independent   Light Housekeeping Mod I retrieval and transport clothing from the floor to simulate getting clothing from the dryer   Exercise Tools   UE Ergometer 5 minutes forward and backwards BUE min resistance   Theraputty green putty for object retrieval and manipulation to make into a flower   Hand Gripper AAROM with stretch R shoulder 30 times   Other Exercise Tool 1 gripper with pegs B hands following retrieval from the floor using the reacher in LUE   Cognition Overall Cognitive Status [facility]   Arousal/Participation Alert; Responsive; Cooperative   Attention Within functional limits   Orientation Level Oriented X4   Memory Within functional limits   Following Commands Follows all commands and directions without difficulty   Additional Activities   Additional Activities Other (Comment)   Additional Activities Comments fxl mobility with RW short distances Mod I   Other Comments   Assessment Pt participates in 70 minutes concurrent treatment focusing on Theract and therex to increase strength/ ROM with similar goals as another patient   Assessment   Treatment Assessment Pt presents supine agreeable to OT session including toielting, transfers/ mobility, light homemaking/kitchen mobility and BUE therex  Pt tolerates session without complaints and is making gains towards goals although does requrie encouragement to get OOB and do for self as much as possible dispite pain and decreased strength/ endurance  Pt will benefit frmo continued skilled OT services to increase independence prior to discharge planned Thursday  Problem List Decreased strength;Decreased range of motion;Decreased endurance;Pain   Plan   Treatment/Interventions ADL retraining;Functional transfer training; Therapeutic exercise; Endurance training;Patient/family training;Equipment eval/education; Compensatory technique education   Progress Progressing toward goals   OT Therapy Minutes   OT Time In 0905   OT Time Out 1015   OT Total Time (minutes) 70   OT Mode of treatment - Concurrent (minutes) 70   Therapy Time missed   Time missed?  No

## 2022-06-28 NOTE — PROGRESS NOTES
06/28/22 1104   Pain Assessment   Pain Assessment Tool 0-10   Pain Score 7   Pain Location/Orientation Orientation: Right;Location: Leg;Location: Shoulder  (ankle)   Restrictions/Precautions   Precautions Contact/isolation;Pain;Limb alert  (MDRO urine and I&O)   RLE Weight Bearing Per Order WBAT   LLE Weight Bearing Per Order WBAT   Grooming   Able To Initiate Tasks; Wash/Dry Hands   Findings independent using    Sit to Lying   Type of Assistance Needed Independent   Sit to Lying CARE Score 6   Lying to Sitting on Side of Bed   Type of Assistance Needed Independent   Lying to Sitting on Side of Bed CARE Score 6   Sit to Stand   Type of Assistance Needed Independent   Sit to Stand CARE Score 6   Bed-Chair Transfer   Type of Assistance Needed Independent   Chair/Bed-to-Chair Transfer CARE Score 6   Toileting Hygiene   Type of Assistance Needed Independent   Toileting Hygiene CARE Score 6   Toileting   Findings using the urinal supine   Exercise Tools   Other Exercise Tool 1 yellow flex therabar up and down BUE   Other Exercise Tool 2 card match reaching with BUE while standign 4 minutes Mod I   Other Exercise Tool 3 Sanderbox 2 sets 15 all directions with 2# wt and BUE   Additional Activities   Additional Activities Other (Comment)   Additional Activities Comments fxl mobility with RW short distances Mod I   Assessment   Treatment Assessment Pt presents supine again agreeable to OT session this time including toileting, transfers/ mobility, standing and BUE therex  Pt is progressing nicely with no other complaints besides increased pain enzo of R ankle  Pt will benefit from continued skilled oT to increase independence prior to discharge to home  Problem List Decreased strength;Decreased range of motion;Decreased endurance;Pain   Plan   Treatment/Interventions ADL retraining;Functional transfer training; Endurance training; Therapeutic exercise;Patient/family training;Equipment eval/education; Compensatory technique education   Progress Progressing toward goals   OT Therapy Minutes   OT Time In 1104   OT Time Out 1159   OT Total Time (minutes) 55   OT Mode of treatment - Individual (minutes) 55   Therapy Time missed   Time missed?  No

## 2022-06-28 NOTE — CASE MANAGEMENT
Reiki session #1 offered to this Pt  He expressed his needs primarily related to his R ankle pain  Pt was laying semi-upright in the bed for this 30-35 minute session  Spot session focused on right ankle for much of the session, but a full body treatment was also briefly completed  Pt expressed that he felt "relaxed", and "warm & comfortable", and inquired if there was a heating pad on his ankle (there was not)  He cried at the start, middle & end of the session, expressing that his mother has been very spiritual & he finds spirituality to be comforting, and beneficial to both his emotional & physical health & well-being  He reflected on his extended hospitalization, noting that one of the most important & positive experiences thus far has been meeting with a preacher at a prior hospital  He expressed that he enjoyed the Reiki session today & would like this offered again prior to his DC if possible  He expressed that he felt the treatment was comforting & beneficial  SW will continue to assist as needed

## 2022-06-28 NOTE — PLAN OF CARE
Problem: INFECTION - ADULT  Goal: Absence or prevention of progression during hospitalization  Description: INTERVENTIONS:  - Assess and monitor for signs and symptoms of infection  - Monitor lab/diagnostic results  - Monitor all insertion sites, i e  indwelling lines, tubes, and drains  - Monitor endotracheal if appropriate and nasal secretions for changes in amount and color  - [place] appropriate cooling/warming therapies per order  - Administer medications as ordered  - Instruct and encourage patient and family to use good hand hygiene technique  - Identify and instruct in appropriate isolation precautions for identified infection/condition  Outcome: Progressing     Problem: DISCHARGE PLANNING  Goal: Discharge to home or other facility with appropriate resources  Description: INTERVENTIONS:  - Identify barriers to discharge w/patient and caregiver  - Arrange for needed discharge resources and transportation as appropriate  - Identify discharge learning needs (meds, wound care, etc )  - Arrange for interpretive services to assist at discharge as needed  - Refer to Case Management Department for coordinating discharge planning if the patient needs post-hospital services based on physician/advanced practitioner order or complex needs related to functional status, cognitive ability, or social support system  Outcome: Progressing     Problem: Knowledge Deficit  Goal: Patient/family/caregiver demonstrates understanding of disease process, treatment plan, medications, and discharge instructions  Description: Complete learning assessment and assess knowledge base    Interventions:  - Provide teaching at level of understanding  - Provide teaching via preferred learning methods  Outcome: Progressing

## 2022-06-28 NOTE — PROGRESS NOTES
PM&R PROGRESS NOTE:  Thu Schmitz 55 y o  male MRN: [id number]  Unit/Bed#: -01 Encounter: [id number]        Rehabilitation Diagnosis: Impairment of mobility, safety and Activities of Daily Living (ADLs) due to Debility:  16  Debility (Non-cardiac/Non-pulmonary)    HPI: Thu Schmitz is a 55 y o  male with a PMH significant for CKD, HLD, HTN and arthritis who presented to the [place] [address] ED initially with worsening left-sided flank pain with generalized malaise/fatigue  He had a recent admission at [facility] for obstructive pyelonephritis secondary to a 3 mm stone with ureteral stent placement  Pt developed E  Coli bacteremia during hospitalization and discharged with amoxicillian until 5/4  In the ED patient was found to have lactic acidosis, leukocytosis and worsening ADRIANNE  CT revealed moderate perinephric stranding and mild hydronephrosis in the left kidney with multiple bilateral renal calculi  Patient transferred to [facility] to be evaluated by Urology  S/p perc nephrostomy tube drain replacement by IR on 5/10  Positive E  coli in one blood culture from 5/10 thought to be associated with UTI/Pyelonephritis  IV antibiotics initiated and then ultimately transitioned to PO antibiotics  During this stay, Ortho was consulted for ongoing left knee pain  His left knee was aspirated  Blood was noted but no effusion aspirated  Xray of the knee without acute osseous abnormality but did reveal tricompartmental osteoarthritis similar to a prior imaging the year prior  Ortho considering future joint steroid injection once acute medical comorbidities resolve  Patient arrived to [facility] on 5/19 after PT/OT recommendations  He progressed well however on 6/6 he developed sepsis meeting the criteria with elevation in WBCS, creatinine level, elevated temp and tachycardia  Patient also with c/o weakness, fatigue and diaphoresis  Pt was transferred to [facility] on 6/7 for urology evaluation   Urine and blood cultures obtained and IV antibiotics were initiated  Urine culture with gram negative rods of E  Coli/MDRO Citrobacter  Pt was recommended for inpatient clearance ureteroscopy  ID consulted and placed patient on Cipro PO x7 days (6/16)  Renal US unremarkable  6/9 patient went to OR with Urology for cystoscopy and left ureteroscopy/lithotripsy with ureteral stent exchange and removal of nephrostomy tube  Stent was then discontinued on 6/13  Pt is to follow up with urology as OP in 6 months with a renal US  Patient participated with PT/OT and was deemed appropriate to return to acute rehabilitation  He arrived to [facility] 6/17  SUBJECTIVE: C/o /10 right ankle pain  We will continue with conservative management and patient will need to f/u with Rheumatology and/or Orthopedics  This has already been discussed with the patient  ASSESSMENT: Stable, progressing      PLAN:    Rehabilitation  · Functional deficits: impaired mobility, self care, generalized pain     Continue current rehabilitation plan of care to maximize function       Functional update:   o PT:  Updated below  o OT:   Updated below    Physical therapy Occupational therapy    Weight Bearing Status: Weight Bearing as Tolerated  Transfers: Independent  Bed Mobility: Independent (No side rail)  Amulation Distance (ft): 218 feet ( ft)  Ambulation: Supervision  Assistive Device for Ambulation: Roller Walker  Wheelchair Mobility Distance: 150 ft  Wheelchair Mobility: Independent  Number of Stairs: 14  Assistive Device for Stairs: Bilateral Hand Rails  Stair Assistance: Supervision  Ramp: Supervision  Assistive Device for Ramp: Roller Walker  Discharge Recommendations: Home with:  [address] with[de-identified] Home Physical Therapy, Family Support Eating: Independent  Grooming: Supervision  Bathing: Supervision  Bathing: Supervision  Upper Body Dressing: Supervision  Lower Body Dressing: Supervision  Toileting: Supervision  Tub/Shower Transfer: Supervision  Toilet Transfer: Supervision  Cognition: Within Defined Limits  Orientation: Person, Place, Time, Situation  Discharge Recommendations: Home with:  [address] with[de-identified] Family Support, Home Occupational Therapy        This patient was discussed by the Interdisciplinary Team in weekly case conference today  The care of the patient was extensively discussed with all care providers and an appropriate rehabilitation plan was formulated unique for this patient  Barriers were identified preventing progression of therapy and appropriate interventions were discussed with each discipline  Please see the team note for input from all disciplines regarding barriers, intervention, and discharge planning      Total time spent: 35 Mins, and greater than 50% of this time was spent counseling/coordinating care       Estimated Discharge: 6/30/22 with [facility] PT/OT/N       Pain   Tylenol 650 mg Q6H PRN for mild pain   Voltaren gel 4x daily    DVT prophylaxis   Refusing Heparin injections and SCDs    Bladder plan   Continent    Bowel plan   Continent      Left elbow pain  Assessment & Plan  · heat twice a day for 15 minutes   · May apply Voltaren gel to left elbow    Obstructive pyelonephritis  Assessment & Plan  · Leading to Sepsis suspected to be caused by UTI with left nephrostomy tube and left ureteral stent  · Received IVF and Cefepime   · S/p OR with Urology for cystoscopy ureteroscopy and ureteral stent insertion with string removal of left sided nephrostomy tube   · Urine cultures with MDRO Citrobacter, ID consulted  · PO cipro completed 6/16        Recurrent left knee pain  Assessment & Plan  · Pain management   · Xray of knee on 5/13 with moderate osteoarthritis   · Repeat x-ray on 6/21 with moderate osteoarthritis   · Continue conservative management  · US therapy to Left knee; heat and Voltaren gel     Morbid obesity  Assessment & Plan  · Lifestyle modifications       Essential hypertension  Assessment & Plan  · Monitor vitals  · IM to follow  · Continue Metoprolol 25 mg daily and Norvasc 10 mg daily        Stage 3b chronic kidney disease [facility])  Assessment & Plan  Lab Results   Component Value Date    EGFR 27 06/27/2022    EGFR 31 06/22/2022    EGFR 31 06/21/2022    CREATININE 2 49 (H) 06/27/2022    CREATININE 2 24 (H) 06/22/2022    CREATININE 2 23 (H) 06/21/2022   · Baseline creatinine 1 4-1 7  · Nephrology following  · Will need OP Nephrology follow up; continue nephrology recommendations  · Avoid Nephrotoxins     * Obstructive uropathy  Assessment & Plan  · s/p cystoscopy left ureteroscopy/lithotripsy and exchange ureteral stent, removal nephrostomy tube left on 6/9  · stent removal on 6/13  · OP follow up with Urology in 6 weeks with renal US  · Flomax 0 4 mg daily           Appreciate IM consultants medical co-management  Labs, medications, and imaging personally reviewed  ROS:  A ten point review of systems was completed and pertinent positives are listed in subjective section  All other systems reviewed were negative  Review of Systems   Constitutional: Negative  Negative for appetite change, fatigue and fever  HENT: Negative  Negative for trouble swallowing  Eyes: Negative  Negative for visual disturbance  Respiratory: Negative  Negative for shortness of breath  Cardiovascular: Negative  Negative for chest pain  Gastrointestinal: Negative  Negative for abdominal pain and constipation  Genitourinary: Negative  Negative for difficulty urinating  Musculoskeletal: Positive for arthralgias and myalgias  Skin: Negative  Neurological: Negative  Psychiatric/Behavioral: Negative  OBJECTIVE:   /84 (BP Location: Left arm)   Pulse 96   Temp (!) 97 1 °F (36 2 °C) (Temporal)   Resp 17   Ht 5' 6" (1 676 m)   Wt (!) 142 kg (313 lb 4 4 oz)   SpO2 97%   BMI 50 56 kg/m²     Physical Exam  Vitals reviewed  Constitutional:       General: He is not in acute distress  Appearance: Normal appearance  He is obese  He is not ill-appearing  HENT:      Head: Normocephalic and atraumatic  Right Ear: External ear normal       Left Ear: External ear normal       Nose: Nose normal    Eyes:      Pupils: Pupils are equal, round, and reactive to light  Cardiovascular:      Rate and Rhythm: Normal rate and regular rhythm  Pulses: Normal pulses  Heart sounds: Normal heart sounds  No murmur heard  Pulmonary:      Effort: Pulmonary effort is normal  No respiratory distress  Breath sounds: Normal breath sounds  No wheezing  Abdominal:      General: Bowel sounds are normal  There is no distension  Palpations: Abdomen is soft  Tenderness: There is no abdominal tenderness  Musculoskeletal:         General: No tenderness  Normal range of motion  Cervical back: Normal range of motion  Right lower leg: Edema present  Left lower leg: Edema present  Skin:     General: Skin is warm  Neurological:      General: No focal deficit present  Mental Status: He is alert and oriented to person, place, and time     Psychiatric:         Mood and Affect: Mood normal           Lab Results   Component Value Date    WBC 10 51 (H) 06/22/2022    HGB 8 1 (L) 06/22/2022    HCT 26 9 (L) 06/22/2022    MCV 95 06/22/2022     06/22/2022     Lab Results   Component Value Date    SODIUM 135 06/27/2022    K 3 8 06/27/2022     06/27/2022    CO2 22 06/27/2022    BUN 24 06/27/2022    CREATININE 2 49 (H) 06/27/2022    GLUC 96 06/27/2022    CALCIUM 9 9 06/27/2022     Lab Results   Component Value Date    INR 1 27 (H) 05/10/2022    INR 1 09 05/10/2022    INR 1 33 (H) 08/24/2019    PROTIME 15 4 (H) 05/10/2022    PROTIME 13 5 05/10/2022    PROTIME 16 6 (H) 08/24/2019           Current Facility-Administered Medications:     acetaminophen (TYLENOL) tablet 650 mg, 650 mg, Oral, Q6H PRN, Madalyn Craig PA-C, 650 mg at 06/25/22 1802    allopurinol (ZYLOPRIM) tablet 100 mg, 100 mg, Oral, Daily, Raine Ap, PA-C, 100 mg at 06/28/22 0804    amLODIPine (NORVASC) tablet 10 mg, 10 mg, Oral, Daily, Raine Ap, PA-C, 10 mg at 06/28/22 0803    cholecalciferol (VITAMIN D3) tablet 400 Units, 400 Units, Oral, Daily, Raine De Souza, PA-C, 400 Units at 06/28/22 0803    Diclofenac Sodium (VOLTAREN) 1 % topical gel 2 g, 2 g, Topical, 4x Daily PRN, Raine Delgadoic, PA-C, 2 g at 06/24/22 0021    metoprolol succinate (TOPROL-XL) 24 hr tablet 100 mg, 100 mg, Oral, Daily, Raine Ap, PA-C, 100 mg at 06/28/22 0803    ondansetron (ZOFRAN-ODT) dispersible tablet 4 mg, 4 mg, Oral, Q6H PRN, Raine De Souza PA-C    potassium chloride (K-DUR,KLOR-CON) CR tablet 10 mEq, 10 mEq, Oral, Daily, Rainezenaida BeAp, PA-C, 10 mEq at 06/28/22 0804    senna (SENOKOT) tablet 17 2 mg, 2 tablet, Oral, HS PRN, Raine Delgadoic, PA-C    tamsulosin (FLOMAX) capsule 0 4 mg, 0 4 mg, Oral, Daily, Raine De Souza, PA-C, 0 4 mg at 06/28/22 0804    traMADol (ULTRAM) tablet 50 mg, 50 mg, Oral, Q6H PRN, Raine Delgadoic, PA-C, 50 mg at 06/28/22 5103    Past Medical History:   Diagnosis Date    Arthritis     Chronic kidney disease     Gout     Hyperlipidemia     Hypertension     Kidney stone     Obstructive pyelonephritis     Sepsis [facility])        Patient Active Problem List    Diagnosis Date Noted    Left elbow pain 06/23/2022    Diarrhea 06/13/2022    Anemia 05/13/2022    Obstructive pyelonephritis 04/23/2022    Obstructive uropathy 04/21/2022    Mixed hyperlipidemia 06/02/2021    Morbid obesity 06/02/2021    Recurrent left knee pain 06/02/2021    Insomnia 09/16/2019    Essential hypertension 08/27/2019    Severe sepsis on admission 08/23/2019    Stage 3b chronic kidney disease ([facility] [address] ) 08/23/2019    Acute kidney injury superimposed on CKD stage 3 08/06/2019    Bacteremia due to Escherichia coli 08/04/2019    Lactic acidosis 08/04/2019    History of gout 07/15/2019    Vitamin D deficiency 06/10/2015    Morbid obesity ([facility] [address] ) 03/23/2015          Ahsan Polo PA-C    Total time spent:  30 minutes with more than 50% spent counseling/coordinating care  Counseling includes discussion with patient re: progress and discussion with patient of his/her current medical/functional state/information  Coordination of patient's care was performed in conjunction with consulting services  Time invested included review of patient's labs, vitals, and management of their comorbidities with continued monitoring  The care of the patient was extensively discussed and appropriate treatment plan was formulated unique for this patient  ** Please Note:  voice to text software may have been used in the creation of this document   Although proof errors in transcription or interpretation are a potential of such software**

## 2022-06-29 PROCEDURE — 97110 THERAPEUTIC EXERCISES: CPT

## 2022-06-29 PROCEDURE — 97530 THERAPEUTIC ACTIVITIES: CPT

## 2022-06-29 PROCEDURE — 97112 NEUROMUSCULAR REEDUCATION: CPT

## 2022-06-29 PROCEDURE — 97537 COMMUNITY/WORK REINTEGRATION: CPT

## 2022-06-29 PROCEDURE — 97535 SELF CARE MNGMENT TRAINING: CPT

## 2022-06-29 PROCEDURE — 97116 GAIT TRAINING THERAPY: CPT

## 2022-06-29 PROCEDURE — 99232 SBSQ HOSP IP/OBS MODERATE 35: CPT | Performed by: FAMILY MEDICINE

## 2022-06-29 RX ADMIN — ACETAMINOPHEN 650 MG: 325 TABLET ORAL at 19:41

## 2022-06-29 RX ADMIN — AMLODIPINE BESYLATE 10 MG: 10 TABLET ORAL at 08:51

## 2022-06-29 RX ADMIN — METOPROLOL SUCCINATE 100 MG: 50 TABLET, EXTENDED RELEASE ORAL at 08:51

## 2022-06-29 RX ADMIN — POTASSIUM CHLORIDE 10 MEQ: 750 TABLET, EXTENDED RELEASE ORAL at 09:34

## 2022-06-29 RX ADMIN — TRAMADOL HYDROCHLORIDE 50 MG: 50 TABLET, COATED ORAL at 21:50

## 2022-06-29 RX ADMIN — TRAMADOL HYDROCHLORIDE 50 MG: 50 TABLET, COATED ORAL at 08:51

## 2022-06-29 RX ADMIN — CHOLECALCIFEROL TAB 10 MCG (400 UNIT) 400 UNITS: 10 TAB at 08:51

## 2022-06-29 RX ADMIN — TRAMADOL HYDROCHLORIDE 50 MG: 50 TABLET, COATED ORAL at 02:45

## 2022-06-29 RX ADMIN — TAMSULOSIN HYDROCHLORIDE 0.4 MG: 0.4 CAPSULE ORAL at 08:51

## 2022-06-29 RX ADMIN — ALLOPURINOL 100 MG: 100 TABLET ORAL at 08:51

## 2022-06-29 RX ADMIN — TRAMADOL HYDROCHLORIDE 50 MG: 50 TABLET, COATED ORAL at 15:37

## 2022-06-29 NOTE — DISCHARGE INSTRUCTIONS
Osteoarthritis   WHAT YOU NEED TO KNOW:   Osteoarthritis (OA) occurs when cartilage (tissue that cushions a joint) wears away slowly and causes the bones to rub together  OA is a long-term condition that often affects the hands, neck, lower back, knees, and hips  OA is also called arthrosis or degenerative joint disease  DISCHARGE INSTRUCTIONS:   Call your doctor or specialist if:   You have severe pain  You cannot move your joint  You have a fever  Your joint is red and tender  You have questions or concerns about your condition or care  Medicines: You may need any of the following:  Acetaminophen  decreases pain and fever  It is available without a doctor's order  Ask how much to take and how often to take it  Follow directions  Read the labels of all other medicines you are using to see if they also contain acetaminophen, or ask your doctor or pharmacist  Acetaminophen can cause liver damage if not taken correctly  Do not use more than 4 grams (4,000 milligrams) total of acetaminophen in one day  NSAIDs , such as ibuprofen, help decrease swelling, pain, and fever  This medicine is available with or without a doctor's order  NSAIDs can cause stomach bleeding or kidney problems in certain people  If you take blood thinner medicine, always ask your healthcare provider if NSAIDs are safe for you  Always read the medicine label and follow directions  Capsaicin cream  may help decrease pain in your joint  Prescription pain medicine  may be given  Ask your healthcare provider how to take this medicine safely  Some prescription pain medicines contain acetaminophen  Do not take other medicines that contain acetaminophen without talking to your healthcare provider  Too much acetaminophen may cause liver damage  Prescription pain medicine may cause constipation  Ask your healthcare provider how to prevent or treat constipation  Take your medicine as directed    Contact your healthcare provider if you think your medicine is not helping or if you have side effects  Tell him or her if you are allergic to any medicine  Keep a list of the medicines, vitamins, and herbs you take  Include the amounts, and when and why you take them  Bring the list or the pill bottles to follow-up visits  Carry your medicine list with you in case of an emergency  Follow up with your healthcare provider as directed:  Write down your questions so you remember to ask them during your visits  Go to physical therapy as directed:  A physical therapist teaches you exercises to help improve movement and strength, and to decrease pain in your joints  The exercises also help lower your risk for loss of function  A physical therapist may move an area with his or her hands  For example, he or she may move your leg in certain ways to treat osteoarthritis in your hip  Manage your symptoms:   Stay active  Physical activity may reduce your pain and improve your ability to do daily activities  Avoid activities that cause pain  Ask your healthcare provider what type of exercise would be best for you  Maintain a healthy weight  This helps decrease the strain on the joints in your back, hips, knees, ankles, and feet  Ask your healthcare provider what a healthy weight is for you  He or she can help you create a weight loss plan if you are overweight  Use heat or ice on your joints as directed  Heat and ice help decrease pain, swelling, and muscle spasms  For heat, use a heating pad on a low setting for 20 minutes, or take a warm bath  For ice, use an ice pack, or put crushed ice in a plastic bag  Cover it with a towel before you place it on your joint  Use ice for 15 minutes every hour  Massage the muscles around the joint  Massage helps relieve pain and stiffness  Your healthcare provider or a physical therapist can show you how to do this  If you have hip OA, another person may need to help you massage the area      Use a cane, crutches, or a walker if directed  These help protect and relieve pressure on your ankle, knee, and hip joints  You may also be prescribed shoe inserts to decrease pressure in your joints  Wear flat or low-heeled shoes  This will help decrease pain and reduce pressure on your ankle, knee, and hip joints  © Copyright [employer] 2022 Information is for End User's use only and may not be sold, redistributed or otherwise used for commercial purposes  All illustrations and images included in CareNotes® are the copyrighted property of [employer]  or [facility] Saman Sharma   The above information is an  only  It is not intended as medical advice for individual conditions or treatments  Talk to your doctor, nurse or pharmacist before following any medical regimen to see if it is safe and effective for you  Obesity   WHAT YOU NEED TO KNOW:   What is obesity? Obesity means your body mass index (BMI) is greater than 30  Your healthcare provider will use your height and weight to measure your BMI  What are the risks of obesity? Obesity can cause many health problems, including injuries or physical disability  Diabetes (high blood sugar level)    High blood pressure or high cholesterol    Heart disease    Stroke    Gallbladder or liver disease    Cancer of the colon, breast, prostate, liver, or kidney    Sleep apnea    Arthritis or gout    What do I need to know about screening? Screening is done to check for health conditions before you have signs or symptoms  If you are 28to 79years old, your blood sugar level may be checked every 3 years for signs of prediabetes or diabetes  Your healthcare provider will check your blood pressure at each visit  High blood pressure can lead to a stroke or other problems  Your provider may check for signs of heart disease, cancer, or other health problems  How is obesity treated? The goal of treatment is to help you lose weight so your health will improve  Even a small decrease in BMI can reduce the risk for many health problems  Your healthcare provider will help you set a weight-loss goal   Lifestyle changes  are the first step in treating obesity  These include making healthy food choices and getting regular physical activity  Your healthcare provider may suggest a weight-loss program that involves coaching, education, and therapy  Medicine  may help you lose weight when it is used with healthy foods and physical activity  Surgery  can help you lose weight if you are very obese and have other health problems  There are several types of weight-loss surgery  Ask your healthcare provider for more information  How can I be successful at losing weight? Set small, realistic goals  An example of a small goal is to walk for 20 minutes 5 days a week  Another goal is to lose 5% of your body weight  Tell friends, family members, and coworkers about your goals  and ask for their support  Ask a friend to lose weight with you, or join a weight-loss support group  Identify foods or triggers that may cause you to overeat , and find ways to avoid them  Remove tempting high-calorie foods from your home and workplace  Place a bowl of fresh fruit on your kitchen counter  If stress causes you to eat, then find other ways to cope with stress  A counselor or therapist may be able to help you  Keep a diary to track what you eat and drink  Also write down how many minutes of physical activity you do each day  Weigh yourself once a week and record it in your diary  What eating changes should I make? You will need to eat 500 to 1,000 fewer calories each day than you currently eat to lose 1 to 2 pounds a week  The following changes will help you cut calories:  Eat smaller portions  Use small plates, no larger than 9 inches in diameter  Fill your plate half full of fruits and vegetables   Measure your food using measuring cups until you know what a serving size looks like          Eat 3 meals and 1 or 2 snacks each day  Plan your meals in advance  [employer]anny [employer] and eat at home most of the time  Eat slowly  Do not skip meals  Skipping meals can lead to overeating later in the day  This can make it harder for you to lose weight  Talk with a dietitian to help you make a meal plan and schedule that is right for you  Eat fruits and vegetables at every meal   They are low in calories and high in fiber, which makes you feel full  Do not add butter, margarine, or cream sauce to vegetables  Use herbs to season steamed vegetables  Eat less fat and fewer fried foods  Eat more baked or grilled chicken and fish  These protein sources are lower in calories and fat than red meat  Limit fast food  Dress your salads with olive oil and vinegar instead of bottled dressing  Limit the amount of sugar you eat  Do not drink sugary beverages  Limit alcohol  What activity changes should I make? Physical activity is good for your body in many ways  It helps you burn calories and build strong muscles  It decreases stress and depression, and improves your mood  It can also help you sleep better  Talk to your healthcare provider before you begin an exercise program   Exercise for at least 30 minutes 5 days a week  Start slowly  Set aside time each day for physical activity that you enjoy and that is convenient for you  It is best to do both weight training and an activity that increases your heart rate, such as walking, bicycling, or swimming  Find ways to be more active  Do yard work and housecleaning  Walk up the stairs instead of using elevators  Spend your leisure time going to events that require walking, such as outdoor festivals or fairs  This extra physical activity can help you lose weight and keep it off  When should I seek immediate care? You have a severe headache, confusion, or difficulty speaking  You have weakness on one side of your body      You have chest pain, sweating, or shortness of breath at rest     When should I call my doctor? You have symptoms of gallbladder or liver disease, such as pain in your upper abdomen  You have knee or hip pain and discomfort while walking  You have symptoms of diabetes, such as intense hunger and thirst, and frequent urination  You have symptoms of sleep apnea, such as snoring or daytime sleepiness  You have questions or concerns about your condition or care  CARE AGREEMENT:   You have the right to help plan your care  Learn about your health condition and how it may be treated  Discuss treatment options with your healthcare providers to decide what care you want to receive  You always have the right to refuse treatment  The above information is an  only  It is not intended as medical advice for individual conditions or treatments  Talk to your doctor, nurse or pharmacist before following any medical regimen to see if it is safe and effective for you  © Copyright [employer] 2022 Information is for End User's use only and may not be sold, redistributed or otherwise used for commercial purposes  All illustrations and images included in CareNotes® are the copyrighted property of [employer]  or [facility] Saman Sharma   Kidney Stones   WHAT YOU NEED TO KNOW:   Kidney stones form in the urinary system when the water and waste in your urine are out of balance  When this happens, certain types of waste crystals separate from the urine  The crystals build up and form kidney stones  You may have more than one kidney stone  DISCHARGE INSTRUCTIONS:   Return to the emergency department if:   You are vomiting and it is not relieved with medicine  Call your doctor or kidney specialist if:   You have a fever  You have trouble urinating  You see blood in your urine  You have severe pain  You have any questions or concerns about your condition or care  Medicines:   You may need any of the following:  NSAIDs , such as ibuprofen, help decrease swelling, pain, and fever  This medicine is available with or without a doctor's order  NSAIDs can cause stomach bleeding or kidney problems in certain people  If you take blood thinner medicine, always ask your healthcare provider if NSAIDs are safe for you  Always read the medicine label and follow directions  Acetaminophen  decreases pain and fever  It is available without a doctor's order  Ask how much to take and how often to take it  Follow directions  Read the labels of all other medicines you are using to see if they also contain acetaminophen, or ask your doctor or pharmacist  Acetaminophen can cause liver damage if not taken correctly  Do not use more than 4 grams (4,000 milligrams) total of acetaminophen in one day  Prescription pain medicine  may be given  Ask your healthcare provider how to take this medicine safely  Some prescription pain medicines contain acetaminophen  Do not take other medicines that contain acetaminophen without talking to your healthcare provider  Too much acetaminophen may cause liver damage  Prescription pain medicine may cause constipation  Ask your healthcare provider how to prevent or treat constipation  Medicines  to balance your electrolytes may be needed  Take your medicine as directed  Contact your healthcare provider if you think your medicine is not helping or if you have side effects  Tell him or her if you are allergic to any medicine  Keep a list of the medicines, vitamins, and herbs you take  Include the amounts, and when and why you take them  Bring the list or the pill bottles to follow-up visits  Carry your medicine list with you in case of an emergency  What you can do to manage kidney stones:   Drink more liquids  Your healthcare provider may tell you to drink at least 8 to 12 (eight-ounce) cups of liquids each day  This helps flush out the kidney stones when you urinate   Water is the best liquid to drink  Strain your urine every time you go to the bathroom  Urinate through a strainer or a piece of thin cloth to catch the stones  Take the stones to your healthcare provider so they can be sent to the lab for tests  This will help your healthcare providers plan the best treatment for you  Ask if you should avoid any foods  You may need to limit oxalate  Oxalate is a chemical found in some plant foods  The most common type of kidney stone is made up of crystals that contain calcium and oxalate  Your healthcare provider or dietitian may recommend that you limit oxalate if you get this type of kidney stone often  You may need to limit how much sodium (salt) or protein you eat  Ask for information about the best foods for you  Be physically active as directed  Your stones may pass more easily if you stay active  Physical activity can also help you manage your weight  Ask about the best activities for you  After you pass the kidney stones: Your healthcare provider may  order a 24-hour urine test  Results from a 24-hour urine test will help your healthcare provider plan ways to prevent more stones from forming  Your healthcare provider will give you more instructions  Follow up with your doctor or kidney specialist as directed:  Write down your questions so you remember to ask them during your visits  © Copyright [employer] 2022 Information is for End User's use only and may not be sold, redistributed or otherwise used for commercial purposes  All illustrations and images included in CareNotes® are the copyrighted property of [employer]  or Shanthi Ott  The above information is an  only  It is not intended as medical advice for individual conditions or treatments  Talk to your doctor, nurse or pharmacist before following any medical regimen to see if it is safe and effective for you      Tramadol (By mouth)   Tramadol (TRAM-a-dol)  Treats moderate to severe pain  This medicine is a narcotic pain reliever  Brand Name(s): FusePaq Synapryn, Qdolo, Ultram, Ultram ER   There may be other brand names for this medicine  When This Medicine Should Not Be Used: This medicine is not right for everyone  Do not use it if you had an allergic reaction to tramadol or other narcotic medicine, or if you have stomach or bowel blockage (including paralytic ileus) or serious lung or breathing problems (including asthma, respiratory depression)  How to Use This Medicine:   Long Acting Capsule, Liquid, Tablet, Dissolving Tablet, Long Acting Tablet  Take your medicine as directed  Your dose may need to be changed several times to find what works best for you  Make sure your hands are dry before you handle the disintegrating tablet  Peel back the foil from the blister pack, then remove the tablet  Do not push the tablet through the foil  Place the tablet in your mouth  After it has melted, swallow or take a drink of water  Swallow the extended-release tablet whole  Do not crush, break, or chew it  Drink plenty of liquids to help avoid constipation  This medicine should come with a Medication Guide  Ask your pharmacist for a copy if you do not have one  Missed dose: Take a dose as soon as you remember  If it is almost time for your next dose, wait until then and take a regular dose  Do not take extra medicine to make up for a missed dose  Store the medicine in a closed container at room temperature, away from heat, moisture, and direct light  Drop off any unused narcotic medicine at a drug take-back location right away  If you do not have a drug take-back location near you, flush any unused narcotic medicine down the toilet  Check your local drug store and clinics for take-back locations  You can also check the [employer] web site for locations   Here is the link to the FDA safe disposal of medicines website: www fda gov/drugs/resourcesforyou/consumers/buyingusingmedicinesafely/ensuringsafeuseofmedicine/safedisposalofmedicines/ylm382476 htm  Drugs and Foods to Avoid:   Ask your doctor or pharmacist before using any other medicine, including over-the-counter medicines, vitamins, and herbal products  Do not use this medicine if you are using or have used an MAO inhibitor within the past 14 days  Some medicines can affect how tramadol works  Tell your doctor if you are using any of the following:  Amiodarone, carbamazepine, cyclobenzaprine, digoxin, erythromycin, ketoconazole, lithium, metaxalone, mirtazapine, phenytoin, promethazine, rifampin, ritonavir, quinidine, trazodone  Blood thinner (including warfarin)  Diuretic (water pill)  Medicine to treat depression (including bupropion, fluoxetine, paroxetine, quinidine, SSRIs, TCAs)  Phenothiazine medicine  Triptan medicine for migraine headaches  Tell your doctor if you use anything else that makes you sleepy  Some examples are allergy medicine, narcotic pain medicine, and alcohol  Tell your doctor if you are using buprenorphine, butorphanol, nalbuphine, pentazocine, or a muscle relaxer (including cyclobenzaprine, metaxalone)  Do not drink alcohol while you are using this medicine  Warnings While Using This Medicine:   Tell your doctor if you are pregnant or breastfeeding, or if you have kidney disease, liver disease (including cirrhosis), adrenal problems, gallstones, lung or breathing problems (including sleep apnea), diabetes, pancreas problems, or a history of head injury, seizures, drug addiction, or depression or similar emotional problems  Tell your doctor if you have phenylketonuria    This medicine may cause the following problems:  High risk of overdose, which can lead to death  Respiratory depression (serious breathing problem that can be life-threatening)  Sleep-related breathing problems (including sleep apnea, sleep-related hypoxemia)  Serotonin syndrome (when used with certain medicines)  Increased risk of seizures  Adrenal gland problem  Low blood pressure  Unusual change in mood or behavior  Hypoglycemia (low blood sugar level)  This medicine may make you dizzy, drowsy, or lightheaded  Do not drive or do anything else that could be dangerous until you know how this medicine affects you  Sit or lie down if you feel dizzy  Stand up carefully  This medicine can be habit-forming  Do not use more than your prescribed dose  Call your doctor if you think your medicine is not working  Do not stop using this medicine suddenly  Your doctor will need to slowly decrease your dose before you stop it completely  Tell any doctor or dentist who treats you that you are using this medicine  This medicine may cause constipation, especially with long-term use  Ask your doctor if you should use a laxative to prevent and treat constipation  This medicine could cause infertility  Talk with your doctor before using this medicine if you plan to have children  Your doctor will do lab tests at regular visits to check on the effects of this medicine  Keep all appointments  Keep all medicine out of the reach of children  Never share your medicine with anyone  Possible Side Effects While Using This Medicine:   Call your doctor right away if you notice any of these side effects:   Allergic reaction: Itching or hives, swelling in your face or hands, swelling or tingling in your mouth or throat, chest tightness, trouble breathing  Anxiety, restlessness, fast heartbeat, fever, sweating, muscle spasms, nausea, vomiting, diarrhea, seeing or hearing things that are not there  Blistering, peeling, red skin rash  Blue lips, fingernails, or skin  Changes in skin color, dark freckles, cold feeling, tiredness, weight loss  Extreme dizziness, drowsiness, or weakness, shallow breathing, slow heartbeat, seizures, and cold, clammy skin  Lightheadedness, dizziness, fainting  Seizures  Shaking, trembling, sweating, hunger, confusion  Unusual mood or behavior, thoughts of killing yourself or others  Trouble breathing  Weakness, muscle twitching  If you notice these less serious side effects, talk with your doctor:   Constipation, loss of appetite, stomach upset  Dry mouth  Headache  If you notice other side effects that you think are caused by this medicine, tell your doctor  Call your doctor for medical advice about side effects  You may report side effects to FDA at 5-004-FDA-8591  You will receive a limited supply of this medication  Follow up with Orthopedics/Rheumatology for further pain management  © Copyright [employer] 2022 Information is for End User's use only and may not be sold, redistributed or otherwise used for commercial purposes  The above information is an  only  It is not intended as medical advice for individual conditions or treatments  Talk to your doctor, nurse or pharmacist before following any medical regimen to see if it is safe and effective for you  Diclofenac (On the skin)   Diclofenac (dye-KLOE-fen-ak)  Treats actinic keratoses  Also treats pain and swelling caused by arthritis, including osteoarthritis of the knee  This is an NSAID  Brand Name(s): Aspercreme Arthritis Pain Reliever, Diclo Gel, Diclofono, Diclozor, Klofensaid II, Leader Arthritis Pain Reliever, Lexixryl, Pennsaid, Solaravix, Solaraze, Voltaren Gel, Xrylix   There may be other brand names for this medicine  When This Medicine Should Not Be Used: This medicine is not right for everyone  Do not use it if you had an allergic reaction to diclofenac, aspirin or another NSAID medication  How to Use This Medicine:   Gel/Jelly, Liquid  Use your medicine as directed  There are several brands of this medicine  Make sure you understand how to use the brand you have been prescribed  Ask your doctor if you have any questions    The diclofenac 1% gel comes with a dosing card to measure the correct dose  If you do not receive or misplace your dosing card, call your pharmacist to ask for a new one  Voltaren® gel: Follow the instructions on the medicine label if you are using this medicine without a prescription  Use this medicine only on your skin  Rinse it off right away if it gets on a cut or scrape  Do not get the medicine in your eyes, nose, or mouth  Wash your hands with soap and water before and after you use this medicine  Apply a thin layer of the medicine to the affected area  Rub it in gently  Do not shower, bathe, or wash the affected area for at least 30 minutes after you apply Pennsaid® or Solaraze® or 1 hour after you apply Voltaren®  Wait until the medicine dries before you cover the treated skin with gloves or clothing  Do not let the treated skin touch any other person's skin until the medicine is completely dry  Do not use external heat or bandages on the treated skin or joint  This medicine should come with a Medication Guide  Ask your pharmacist for a copy if you do not have one  Missed dose: Apply a dose as soon as you can  If it is almost time for your next dose, wait until then and apply a regular dose  Do not apply extra medicine to make up for a missed dose  Store the medicine in a closed container at room temperature, away from heat, moisture, and direct light  Drugs and Foods to Avoid:   Ask your doctor or pharmacist before using any other medicine, including over-the-counter medicines, vitamins, and herbal products  Do not use any other NSAID unless your doctor says it is okay  Some other NSAIDs are aspirin, diflunisal, ibuprofen, naproxen, or salsalate  Some medicines can affect how diclofenac works   Tell your doctor if you are using any of the following:  Acetaminophen, cyclosporine, digoxin, lithium, methotrexate, pemetrexed  Blood pressure medicine (including ACE inhibitors, ARBs, beta blockers)  Blood thinner (including warfarin)  Diuretic (water pill)  Medicine to treat depression (including SNRIs, SSRIs)  Steroids (including dexamethasone, hydrocortisone, methylprednisolone, prednisolone, prednisone)  Do not put cosmetics or skin care products on the treated skin  You may use sunscreen, insect repellant, lotion, or other topical medicines after using Pennsaid®  However, wait until the medicine is completely dry before you apply anything else  Warnings While Using This Medicine:   Tell your doctor if you are pregnant  Do not use this medicine during the later part of a pregnancy, unless your doctor tells you to  Tell your doctor if you are breastfeeding, or if you have kidney disease, liver disease, asthma, bleeding problems, heart failure, high blood pressure, other heart or blood vessel problems, a recent heart attack, or a history of stomach ulcers or bleeding  Tell your doctor if you drink alcohol  This medicine may cause the following problems:  Higher risk of blood clot, heart attack, heart failure, or stroke  Stomach or bowel problems (including bleeding, ulcers, or perforation)  Liver problems  High blood pressure  Kidney problems  Serious skin reactions, including Caldera-Jose syndrome, exfoliative dermatitis, toxic epidermal necrolysis, and drug reaction with eosinophilia and systemic symptoms (DRESS)  This medicine may cause a delay in ovulation for women and may affect their ability to have children  If you plan to have children, talk with your doctor before using this medicine  This medicine may make your skin more sensitive to sunlight  Wear sunscreen  Do not use sunlamps or tanning beds  Your doctor will do lab tests at regular visits to check on the effects of this medicine  Keep all appointments  Keep all medicine out of the reach of children  Never share your medicine with anyone  Possible Side Effects While Using This Medicine:   Call your doctor right away if you notice any of these side effects:   Allergic reaction: Itching or hives, swelling in your face or hands, swelling or tingling in your mouth or throat, chest tightness, trouble breathing  Blistering, peeling, or red skin rash  Bloody or black, tarry stools, severe stomach pain, vomiting blood or material that looks like coffee grounds  Change in how much or how often you urinate  Chest pain that may spread to your arms, jaw, back, or neck, trouble breathing, unusual sweating, faintness  Dark urine or pale stools, nausea, vomiting, loss of appetite, stomach pain, yellow skin or eyes  Numbness or weakness in your arm or leg, or on one side of your body, pain in your lower leg, sudden or severe headache, or problems with vision, speech, or walking  Rapid weight gain, swelling in your hands, ankles, or feet  Unusual bleeding, bruising, or weakness  If you notice these less serious side effects, talk with your doctor:   Dry, flaky, or scaly skin  Mild headache  Mild skin rash, itching, or redness  If you notice other side effects that you think are caused by this medicine, tell your doctor  Call your doctor for medical advice about side effects  You may report side effects to FDA at 8-962-FDA-4865    © Copyright [employer] 2022 Information is for End User's use only and may not be sold, redistributed or otherwise used for commercial purposes  The above information is an  only  It is not intended as medical advice for individual conditions or treatments  Talk to your doctor, nurse or pharmacist before following any medical regimen to see if it is safe and effective for you

## 2022-06-29 NOTE — PROGRESS NOTES
06/29/22 1000   Pain Assessment   Pain Assessment Tool 0-10   Pain Score 6   Pain Location/Orientation Orientation: Right;Location: Shoulder; Location: Leg   Restrictions/Precautions   Precautions Contact/isolation;Pain  (MDRO urine and I&O)   Weight Bearing Restrictions No   RLE Weight Bearing Per Order WBAT   LLE Weight Bearing Per Order WBAT   Eating   Type of Assistance Needed Independent   Eating CARE Score 6   Oral Hygiene   Type of Assistance Needed Independent   Comment when agreeable due to mouthwash upsetting stomach standing at the sink   Oral Hygiene CARE Score 6   Grooming   Able To Initiate Tasks; Acquire Items;Comb/Brush Hair;Wash/Dry Face;Brush/Clean Teeth;Wash/Dry Hands   Findings independent   Shower/Bathe Self   Type of Assistance Needed Independent; Adaptive equipment   Shower/Bathe Self CARE Score 6   Bathing   Assessed Bath Style Shower   Anticipated D/C Bath Style Shower;Sponge Bath   Able to [place] Yes   Able to [employer] Temperature Yes   Able to Wash/Rinse/Dry (body part) Left Arm;Right Arm;L Upper Leg;R Upper Leg;L Lower Leg/Foot;R Lower Leg/Foot;Chest;Abdomen;Perineal Area; Buttocks   Positioning Seated;Standing   Adaptive Equipment Longhand Sponge;Longhand Reacher; Shower Inverted Edge; Shower Seat;[employer]  pt does request assist for thoroughness after attempts although able to complete   Tub/Shower Transfer   Limitations Noted In [employer] with Back   Assessed Shower   Findings supervision   Upper Body Dressing   Type of Assistance Needed Independent   Upper Body Dressing CARE Score 6   Lower Body Dressing   Type of Assistance Needed Independent; Adaptive equipment   Lower Body Dressing CARE Score 6   Putting On/Taking Off Footwear   Type of Assistance Needed Independent; Adaptive equipment   Comment able to stand with back supported on foot of bed to bring foot onto lower chair to rachel and untye shoe laces similar to routine at home   Putting On/Taking Off Footwear CARE Score 6   Picking Up Object   Type of Assistance Needed Independent; Adaptive equipment   Comment reacher   Picking Up Object CARE Score 6   Dressing/Undressing Clothing   Remove UB Clothes Pullover Shirt   Don UB Clothes Pullover Shirt   Remove LB Clothes Pants; Shorts;Socks; Shoes   Don LB [employer]; Shoes   Adaptive Equipment Reacher;Sock Aide   Positioning Standing;Supported Sit   Sit to Lying   Type of Assistance Needed Independent   Sit to Lying CARE Score 6   Sit to Stand   Type of Assistance Needed Independent   Sit to Stand CARE Score 6   Bed-Chair Transfer   Type of Assistance Needed Independent   Chair/Bed-to-Chair Transfer CARE Score 6   [address]   Type of Assistance Needed Independent   Comment using urinal   [address] Score 6   Toileting   Able to [address] down yes, up yes     Manage Hygiene Bladder   Findings pt encouraged to attemtp hygiene following BM to gain ROM/ strength although staff notified to provide assist thoroughness to avoid skin breakdown   Toilet Transfer   Type of Assistance Needed Independent   Toilet Transfer CARE Score 6   Light Housekeeping   Light Housekeeping Level Walker   Light Housekeeping Level of Assistance Modified independent   Light Housekeeping collection and packing of clothing in room   Health Management   Health Management Level of Assistance Modified independent   Health Management explaining and discussing medicaiton with plan to continue current system at home with [facility] monitoring management   Exercise Tools   Other Exercise Tool 1 HEP reviewed and established with no wetight and ORM only B shoulders with increased difficulty R including chest press, flexion/ exteniona nd abd/ add and therex with 2# wt elbow flexion/ extension andd supination/ pronation   Cognition   Overall Cognitive Status WFL   Orientation Level Oriented X4   Additional Activities   Additional Activities Other (Comment)   Additional Activities Comments fxl mobility short distances in room Mod I with RW   Other Comments   Assessment Pt participates in 67 minutes of concurrent tx time completing therapeutic activities and ecercises which focus on similar goals as another   Assessment   Treatment Assessment Pt presents directly from PT agreeable to OT session including ADLs, light homemaking, transfers/ mobility, medication management and BUE therex with HEP established and reviewed  Pt will benefit from last session tomorrow before discahrge to home with family support, DME in [facility] and [address] [place] [address]'[facility] [place] recommended  Problem List Decreased strength;Decreased range of motion;Decreased endurance;Pain   Plan   Treatment/Interventions ADL retraining;Functional transfer training; Therapeutic exercise; Endurance training;Patient/family training;Equipment eval/education; Compensatory technique education   Progress Progressing toward goals   OT Therapy Minutes   OT Time In 1000   OT Time Out 1200   OT Total Time (minutes) 120   OT Mode of treatment - Individual (minutes) 53   OT Mode of treatment - Concurrent (minutes) 67   Therapy Time missed   Time missed?  No

## 2022-06-29 NOTE — PLAN OF CARE
Problem: PAIN - ADULT  Goal: Verbalizes/displays adequate comfort level or baseline comfort level  Description: Interventions:  - Encourage patient to monitor pain and request assistance  - Assess pain using appropriate pain scale  - Administer analgesics based on type and severity of pain and evaluate response  - Implement non-pharmacological measures as appropriate and evaluate response  - Consider cultural and social influences on pain and pain management  - Notify physician/advanced practitioner if interventions unsuccessful or patient reports new pain  Outcome: Progressing     Problem: INFECTION - ADULT  Goal: Absence or prevention of progression during hospitalization  Description: INTERVENTIONS:  - Assess and monitor for signs and symptoms of infection  - Monitor lab/diagnostic results  - Monitor all insertion sites, i e  indwelling lines, tubes, and drains  - Monitor endotracheal if appropriate and nasal secretions for changes in amount and color  - [place] appropriate cooling/warming therapies per order  - Administer medications as ordered  - Instruct and encourage patient and family to use good hand hygiene technique  - Identify and instruct in appropriate isolation precautions for identified infection/condition  Outcome: Progressing     Problem: SAFETY ADULT  Goal: Patient will remain free of falls  Description: INTERVENTIONS:  - Educate patient/family on patient safety including physical limitations  - Instruct patient to call for assistance with activity   - Consult OT/PT to assist with strengthening/mobility   - Keep Call bell within reach  - Keep bed low and locked with side rails adjusted as appropriate  - Keep care items and personal belongings within reach  - Initiate and maintain comfort rounds  - Make Fall Risk Sign visible to staff    Problem: DISCHARGE PLANNING  Goal: Discharge to home or other facility with appropriate resources  Description: INTERVENTIONS:  - Identify barriers to discharge w/patient and caregiver  - Arrange for needed discharge resources and transportation as appropriate  - Identify discharge learning needs (meds, wound care, etc )  - Arrange for interpretive services to assist at discharge as needed  - Refer to Case Management Department for coordinating discharge planning if the patient needs post-hospital services based on physician/advanced practitioner order or complex needs related to functional status, cognitive ability, or social support system  Outcome: Progressing     Problem: Knowledge Deficit  Goal: Patient/family/caregiver demonstrates understanding of disease process, treatment plan, medications, and discharge instructions  Description: Complete learning assessment and assess knowledge base  Interventions:  - Provide teaching at level of understanding  - Provide teaching via preferred learning methods  Outcome: Progressing     Problem: Prexisting or High Potential for Compromised Skin Integrity  Goal: Skin integrity is maintained or improved  Description: INTERVENTIONS:  - Identify patients at risk for skin breakdown  - Assess and monitor skin integrity  - Assess and monitor nutrition and hydration status  - Monitor labs   - Assess for incontinence   - Turn and reposition patient  - Assist with mobility/ambulation  - Relieve pressure over bony prominences  - Avoid friction and shearing  - Provide appropriate hygiene as needed including keeping skin clean and dry  - Evaluate need for skin moisturizer/barrier cream  - Collaborate with interdisciplinary team   - Patient/family teaching  - Consider wound care consult   Outcome: Progressing     Problem: Nutrition/Hydration-ADULT  Goal: Nutrient/Hydration intake appropriate for improving, restoring or maintaining nutritional needs  Description: Monitor and assess patient's nutrition/hydration status for malnutrition  Collaborate with interdisciplinary team and initiate plan and interventions as ordered    Monitor patient's weight and dietary intake as ordered or per policy  Utilize nutrition screening tool and intervene as necessary  Determine patient's food preferences and provide high-protein, high-caloric foods as appropriate       INTERVENTIONS:  - Monitor oral intake, urinary output, labs, and treatment plans  - Assess nutrition and hydration status and recommend course of action  - Evaluate amount of meals eaten  - Assist patient with eating if necessary   - Allow adequate time for meals  - Recommend/ encourage appropriate diets, oral nutritional supplements, and vitamin/mineral supplements  - Order, calculate, and assess calorie counts as needed  - Recommend, monitor, and adjust tube feedings and TPN/PPN based on assessed needs  - Assess need for intravenous fluids  - Provide specific nutrition/hydration education as appropriate  - Include patient/family/caregiver in decisions related to nutrition  Outcome: Progressing   - Apply yellow socks and bracelet for high fall risk patients  - Consider moving patient to room near nurses station  Outcome: Progressing  Goal: Maintain or return to baseline ADL function  Description: INTERVENTIONS:  -  Assess patient's ability to carry out ADLs; assess patient's baseline for ADL function and identify physical deficits which impact ability to perform ADLs (bathing, care of mouth/teeth, toileting, grooming, dressing, etc )  - Assess/evaluate cause of self-care deficits   - Assess range of motion  - Assess patient's mobility; develop plan if impaired  - Assess patient's need for assistive devices and provide as appropriate  - Encourage maximum independence but intervene and supervise when necessary  - Involve family in performance of ADLs  - Assess for home care needs following discharge   - Consider OT consult to assist with ADL evaluation and planning for discharge  - Provide patient education as appropriate  Outcome: Progressing  Goal: Maintains/Returns to pre admission functional level  Description: INTERVENTIONS:  - Perform BMAT or MOVE assessment daily    - Set and communicate daily mobility goal to care team and patient/family/caregiver     - Collaborate with rehabilitation services on mobility goals if consulted  - Out of bed for toileting  - Record patient progress and toleration of activity level   Outcome: Progressing

## 2022-06-29 NOTE — PROGRESS NOTES
06/29/22 0900   Pain Assessment   Pain Assessment Tool 0-10   Pain Score 6   Pain Location/Orientation Orientation: Right;Location: Foot  (ankle)   Restrictions/Precautions   Precautions Fall Risk;Contact/isolation   Weight Bearing Restrictions No   RLE Weight Bearing Per Order WBAT   LLE Weight Bearing Per Order WBAT   Cognition   Overall Cognitive Status WFL   Arousal/Participation Alert; Responsive; Cooperative   Attention Within functional limits   Orientation Level Oriented X4   Memory Within functional limits   Following Commands Follows all commands and directions without difficulty   Roll Left and Right   Type of Assistance Needed Independent   Roll Left and Right CARE Score 6   Sit to Lying   Type of Assistance Needed Independent   Sit to Lying CARE Score 6   Lying to Sitting on Side of Bed   Type of Assistance Needed Independent   Lying to Sitting on Side of Bed CARE Score 6   Sit to Stand   Type of Assistance Needed Independent   Sit to Stand CARE Score 6   Bed-Chair Transfer   Type of Assistance Needed Independent   Chair/Bed-to-Chair Transfer CARE Score 6   Transfer Bed/Chair/Wheelchair   Limitations Noted In Balance; Endurance;UE Strength;LE Strength   Adaptive Equipment Roller Walker   Stand Pivot Modified Independent   Sit to Stand Modified Independent   Stand to Sit Modified Independent   Supine to Sit Modified Independent   Sit to Supine Modified Independent   Car Transfer   Reason if not Attempted Environmental limitations   Car Transfer CARE Score 10   Walk 10 Feet   Type of Assistance Needed Independent   Walk 10 Feet CARE Score 6   Walk 50 Feet with Two Turns   Type of Assistance Needed Supervision   Walk 50 Feet with Two Turns CARE Score 4   Ambulation   Does the patient walk? 2  Yes   Primary Mode of Locomotion Prior to Admission Walk   Distance Walked (feet) 110 ft  (x2)   Assist Device Roller Walker   Gait Pattern Slow Julieta;Decreased foot clearance; Wide TEO;Step through; Improper weight shift   Limitations Noted In Balance; Endurance;Posture; Safety;Speed;Strength   Provided Assistance with: Direction   Walk Assist Level Supervision;Modified Independent   Curb or Single Stair   Style negotiated Single stair   Type of Assistance Needed Supervision   1 Step (Curb) CARE Score 4   4 Steps   Type of Assistance Needed Supervision   4 Steps CARE Score 4   Stairs   Type Ramp   # of Steps 4   Weight Bearing Precautions WBAT   Assist Devices Bilateral Rail   Therapeutic Interventions   Strengthening seated and standing ther ex   Other gait, transfers, stairs   Equipment Use   NuStep L1, sate 11, arms 8x10 min   Assessment   Treatment Assessment Pt seated in bed to start session, pleasant and agreeable  Completed bed mobility with MI, able to don shoes with use of reach and MI, needing assistance with tieing shoes  Pt able to amb 110' with RW and S  Pt completed seated and standing ther ex with no complication, extended time due to fatigue  Pt able to complete 10 min on nustep with no problems  Pt navigated 4 stairs wiht S and BHR  Pt seated in WC with OT to end session  Pt will benefit from continued skilled PT to improve over all strength and endurence  PT Barriers   Physical Impairment Decreased strength;Decreased endurance;Decreased range of motion; Impaired balance;Decreased mobility; Decreased safety awareness;Pain   Functional Limitation Walking;Transfers;Standing;Stair negotiation   Plan   Treatment/Interventions Functional transfer training;LE strengthening/ROM; Elevations; Therapeutic exercise; Endurance training;Gait training;Bed mobility   Progress Progressing toward goals   PT Therapy Minutes   PT Time In 0900   PT Time Out 1000   PT Total Time (minutes) 60   PT Mode of treatment - Individual (minutes) 60   PT Mode of treatment - Concurrent (minutes) 0   PT Mode of treatment - Group (minutes) 0   PT Mode of treatment - Co-treat (minutes) 0   PT Mode of Treatment - Total time(minutes) 60 minutes   PT Cumulative Minutes 818   Therapy Time missed   Time missed?  No

## 2022-06-29 NOTE — PROGRESS NOTES
PM&R PROGRESS NOTE:  Sugar Butts 55 y o  male MRN: [id number]  Unit/Bed#: -01 Encounter: [id number]        Rehabilitation Diagnosis: Impairment of mobility, safety and Activities of Daily Living (ADLs) due to Debility:  16  Debility (Non-cardiac/Non-pulmonary)    HPI: Sugar Butts is a 55 y o  male with a PMH significant for CKD, HLD, HTN and arthritis who presented to the [place] [address] ED initially with worsening left-sided flank pain with generalized malaise/fatigue  He had a recent admission at [facility] for obstructive pyelonephritis secondary to a 3 mm stone with ureteral stent placement  Pt developed E  Coli bacteremia during hospitalization and discharged with amoxicillian until 5/4  In the ED patient was found to have lactic acidosis, leukocytosis and worsening ADRIANNE  CT revealed moderate perinephric stranding and mild hydronephrosis in the left kidney with multiple bilateral renal calculi  Patient transferred to [facility] to be evaluated by Urology  S/p perc nephrostomy tube drain replacement by IR on 5/10  Positive E  coli in one blood culture from 5/10 thought to be associated with UTI/Pyelonephritis  IV antibiotics initiated and then ultimately transitioned to PO antibiotics  During this stay, Ortho was consulted for ongoing left knee pain  His left knee was aspirated  Blood was noted but no effusion aspirated  Xray of the knee without acute osseous abnormality but did reveal tricompartmental osteoarthritis similar to a prior imaging the year prior  Ortho considering future joint steroid injection once acute medical comorbidities resolve  Patient arrived to [facility] on 5/19 after PT/OT recommendations  He progressed well however on 6/6 he developed sepsis meeting the criteria with elevation in WBCS, creatinine level, elevated temp and tachycardia  Patient also with c/o weakness, fatigue and diaphoresis  Pt was transferred to [facility] on 6/7 for urology evaluation   Urine and blood cultures obtained and IV antibiotics were initiated  Urine culture with gram negative rods of E  Coli/MDRO Citrobacter  Pt was recommended for inpatient clearance ureteroscopy  ID consulted and placed patient on Cipro PO x7 days (6/16)  Renal US unremarkable  6/9 patient went to OR with Urology for cystoscopy and left ureteroscopy/lithotripsy with ureteral stent exchange and removal of nephrostomy tube  Stent was then discontinued on 6/13  Pt is to follow up with urology as OP in 6 months with a renal US  Patient participated with PT/OT and was deemed appropriate to return to acute rehabilitation  He arrived to [facility] 6/17  SUBJECTIVE: Patient seen face to face  Reviewed medications and confirmed pharmacy  Patient with 4/10 pain  Now with complaints of right pinky toe pain  Discussed with patient about following with Rheumatology And/or Orthopedics as an OP  ASSESSMENT: Stable, progressing      PLAN:    Rehabilitation  · Functional deficits: impaired mobility, self care, generalized pain     Continue current rehabilitation plan of care to maximize function       Functional update:   o PT:  Updated below  o OT:   Updated below    Physical therapy Occupational therapy    Weight Bearing Status: Weight Bearing as Tolerated  Transfers: Independent  Bed Mobility: Independent (No side rail)  Amulation Distance (ft): 218 feet ( ft)  Ambulation: Supervision  Assistive Device for Ambulation: Roller Walker  Wheelchair Mobility Distance: 150 ft  Wheelchair Mobility: Independent  Number of Stairs: 14  Assistive Device for Stairs: Bilateral [employer]  Stair Assistance: Supervision  Ramp: Supervision  Assistive Device for Ramp: Roller Walker  Discharge Recommendations: Home with:  DC Home with[de-identified] Home Physical Therapy, Family Support Eating: Independent  Grooming: Supervision  Bathing: Supervision  Bathing: Supervision  Upper Body Dressing: Supervision  Lower Body Dressing: Supervision  Toileting: Supervision  Tub/Shower Transfer: Supervision  Toilet Transfer: Supervision  Cognition: Within Defined Limits  Orientation: Person, Place, Time, Situation  Discharge Recommendations: Home with:  [address] with[de-identified] Family Support, Home Occupational Therapy       Estimated Discharge: 6/30/22 with [facility] PT/OT/N       Pain   Tylenol 650 mg Q6H PRN for mild pain   Voltaren gel 4x daily   Tramadol     DVT prophylaxis   Refusing Heparin injections and SCDs    Bladder plan   Continent    Bowel plan   Continent      Left elbow pain  Assessment & Plan  · heat twice a day for 15 minutes   · May apply Voltaren gel to left elbow    Obstructive pyelonephritis  Assessment & Plan  · Leading to Sepsis suspected to be caused by UTI with left nephrostomy tube and left ureteral stent  · Received IVF and Cefepime   · S/p OR with Urology for cystoscopy ureteroscopy and ureteral stent insertion with string removal of left sided nephrostomy tube   · Urine cultures with MDRO Citrobacter, ID consulted  · PO cipro completed 6/16        Recurrent left knee pain  Assessment & Plan  · Pain management   · Xray of knee on 5/13 with moderate osteoarthritis   · Repeat x-ray on 6/21 with moderate osteoarthritis   · Continue conservative management  · US therapy to Left knee; heat and Voltaren gel   · SW to trial Reiki for pain     Morbid obesity  Assessment & Plan  · Lifestyle modifications       Essential hypertension  Assessment & Plan  · Monitor vitals  · IM to follow  · Continue Metoprolol 25 mg daily and Norvasc 10 mg daily        Stage 3b chronic kidney disease [facility])  Assessment & Plan  Lab Results   Component Value Date    EGFR 27 06/27/2022    EGFR 31 06/22/2022    EGFR 31 06/21/2022    CREATININE 2 49 (H) 06/27/2022    CREATININE 2 24 (H) 06/22/2022    CREATININE 2 23 (H) 06/21/2022   · Baseline creatinine 1 4-1 7  · Nephrology following  · Will need OP Nephrology follow up; continue nephrology recommendations  · Will make ambulatory referral to Nephrology at time of discharge  · Avoid Nephrotoxins     * Obstructive uropathy  Assessment & Plan  · s/p cystoscopy left ureteroscopy/lithotripsy and exchange ureteral stent, removal nephrostomy tube left on 6/9  · stent removal on 6/13  · OP follow up with Urology in 6 weeks with renal US  · Flomax 0 4 mg daily           Appreciate IM consultants medical co-management  Labs, medications, and imaging personally reviewed  ROS:  A ten point review of systems was completed and pertinent positives are listed in subjective section  All other systems reviewed were negative  Review of Systems   Constitutional: Negative  Negative for appetite change, fatigue and fever  HENT: Negative  Negative for trouble swallowing  Eyes: Negative  Negative for visual disturbance  Respiratory: Negative  Negative for shortness of breath  Cardiovascular: Negative  Negative for chest pain  Gastrointestinal: Negative  Negative for abdominal pain and constipation  Genitourinary: Negative  Negative for difficulty urinating  Musculoskeletal: Positive for arthralgias and myalgias  Right pinky toe discomfort today   Skin: Negative  Neurological: Negative  Psychiatric/Behavioral: Negative  OBJECTIVE:   /87 (BP Location: Left arm)   Pulse 95   Temp 98 9 °F (37 2 °C) (Temporal)   Resp 15   Ht 5' 6" (1 676 m)   Wt (!) 141 kg (311 lb 8 2 oz)   SpO2 94%   BMI 50 28 kg/m²     Physical Exam  Vitals reviewed  Constitutional:       General: He is not in acute distress  Appearance: Normal appearance  He is obese  He is not ill-appearing  HENT:      Head: Normocephalic and atraumatic  Right Ear: External ear normal       Left Ear: External ear normal       Nose: Nose normal    Eyes:      Pupils: Pupils are equal, round, and reactive to light  Cardiovascular:      Rate and Rhythm: Normal rate and regular rhythm  Pulses: Normal pulses        Heart sounds: Normal heart sounds  No murmur heard  Pulmonary:      Effort: Pulmonary effort is normal  No respiratory distress  Breath sounds: Normal breath sounds  No wheezing  Abdominal:      General: Bowel sounds are normal  There is no distension  Palpations: Abdomen is soft  Tenderness: There is no abdominal tenderness  Musculoskeletal:         General: No tenderness  Normal range of motion  Cervical back: Normal range of motion  Right lower leg: Edema present  Left lower leg: Edema present  Skin:     General: Skin is warm  Neurological:      General: No focal deficit present  Mental Status: He is alert and oriented to person, place, and time     Psychiatric:         Mood and Affect: Mood normal           Lab Results   Component Value Date    WBC 10 51 (H) 06/22/2022    HGB 8 1 (L) 06/22/2022    HCT 26 9 (L) 06/22/2022    MCV 95 06/22/2022     06/22/2022     Lab Results   Component Value Date    SODIUM 135 06/27/2022    K 3 8 06/27/2022     06/27/2022    CO2 22 06/27/2022    BUN 24 06/27/2022    CREATININE 2 49 (H) 06/27/2022    GLUC 96 06/27/2022    CALCIUM 9 9 06/27/2022     Lab Results   Component Value Date    INR 1 27 (H) 05/10/2022    INR 1 09 05/10/2022    INR 1 33 (H) 08/24/2019    PROTIME 15 4 (H) 05/10/2022    PROTIME 13 5 05/10/2022    PROTIME 16 6 (H) 08/24/2019           Current Facility-Administered Medications:     acetaminophen (TYLENOL) tablet 650 mg, 650 mg, Oral, Q6H PRN, Courtney Park PA-C, 650 mg at 06/28/22 2357    allopurinol (ZYLOPRIM) tablet 100 mg, 100 mg, Oral, Daily, Judi Michaels PA-C, 100 mg at 06/29/22 0851    amLODIPine (NORVASC) tablet 10 mg, 10 mg, Oral, Daily, Courtney Park PA-C, 10 mg at 06/29/22 0851    cholecalciferol (VITAMIN D3) tablet 400 Units, 400 Units, Oral, Daily, Courtney Park PA-C, 400 Units at 06/29/22 0851    Diclofenac Sodium (VOLTAREN) 1 % topical gel 2 g, 2 g, Topical, 4x Daily PRN, Courtney Park, PA-C, 2 g at 06/24/22 0021    metoprolol succinate (TOPROL-XL) 24 hr tablet 100 mg, 100 mg, Oral, Daily, Madalyn Craig, PA-C, 100 mg at 06/29/22 0851    ondansetron (ZOFRAN-ODT) dispersible tablet 4 mg, 4 mg, Oral, Q6H PRN, Madalyn Craig, PA-C    potassium chloride (K-DUR,KLOR-CON) CR tablet 10 mEq, 10 mEq, Oral, Daily, Viola Chellynte, PA-C, 10 mEq at 06/29/22 0934    senna (SENOKOT) tablet 17 2 mg, 2 tablet, Oral, HS PRN, Madalyn Craig PA-C    tamsulosin (FLOMAX) capsule 0 4 mg, 0 4 mg, Oral, Daily, Madalyn Lirianoe, PA-C, 0 4 mg at 06/29/22 0851    traMADol (ULTRAM) tablet 50 mg, 50 mg, Oral, Q6H PRN, Madalyn Craig, PA-C, 50 mg at 06/29/22 4612    Past Medical History:   Diagnosis Date    Arthritis     Chronic kidney disease     Gout     Hyperlipidemia     Hypertension     Kidney stone     Obstructive pyelonephritis     Sepsis [facility])        Patient Active Problem List    Diagnosis Date Noted    Left elbow pain 06/23/2022    Diarrhea 06/13/2022    Anemia 05/13/2022    Obstructive pyelonephritis 04/23/2022    Obstructive uropathy 04/21/2022    Mixed hyperlipidemia 06/02/2021    Morbid obesity 06/02/2021    Recurrent left knee pain 06/02/2021    Insomnia 09/16/2019    Essential hypertension 08/27/2019    Severe sepsis on admission 08/23/2019    Stage 3b chronic kidney disease ([facility] [address] ) 08/23/2019    Acute kidney injury superimposed on CKD stage 3 08/06/2019    Bacteremia due to Escherichia coli 08/04/2019    Lactic acidosis 08/04/2019    History of gout 07/15/2019    Vitamin D deficiency 06/10/2015    Morbid obesity ([facility] [address] ) 03/23/2015          Madalyn Craig PA-C    Total time spent:  30 minutes with more than 50% spent counseling/coordinating care  Counseling includes discussion with patient re: progress and discussion with patient of his/her current medical/functional state/information  Coordination of patient's care was performed in conjunction with consulting services  Time invested included review of patient's labs, vitals, and management of their comorbidities with continued monitoring  The care of the patient was extensively discussed and appropriate treatment plan was formulated unique for this patient  ** Please Note:  voice to text software may have been used in the creation of this document   Although proof errors in transcription or interpretation are a potential of such software**

## 2022-06-29 NOTE — PROGRESS NOTES
06/29/22 1330   Pain Assessment   Pain Assessment Tool 0-10   Pain Score 4   Pain Location/Orientation Orientation: Right;Location: Foot  (ankle)   Restrictions/Precautions   Precautions Contact/isolation; Fall Risk   Weight Bearing Restrictions No   RLE Weight Bearing Per Order WBAT   LLE Weight Bearing Per Order WBAT   ROM Restrictions No   Cognition   Overall Cognitive Status WFL   Arousal/Participation Alert; Responsive; Cooperative   Attention Within functional limits   Orientation Level Oriented X4   Memory Within functional limits   Following Commands Follows all commands and directions without difficulty   Therapeutic Interventions   Strengthening supine ther ex   Modalities MH to B [id number] min   Assessment   Treatment Assessment Pt seated in bed to start session, pleasant and agreeable  Pt offers complaints of 4/10 pain to R ankle  MH applied to B ankles x20 min, skin interity intact before and after session  Pt able to complete supine ther ex with no complication  Pt seated in bed to end session with call bell in reach and all needs met  Pt will benefit from continued skilled PT to improve over all strength and endurence  PT Barriers   Physical Impairment Decreased strength;Decreased endurance;Decreased range of motion; Impaired balance;Decreased mobility; Decreased safety awareness;Pain   Functional Limitation Walking;Transfers;Standing;Stair negotiation   Plan   Treatment/Interventions Functional transfer training;LE strengthening/ROM; Elevations; Therapeutic exercise; Endurance training;Bed mobility;Gait training   Progress Progressing toward goals   PT Therapy Minutes   PT Time In 1330   PT Time Out 1400   PT Total Time (minutes) 30   PT Mode of treatment - Individual (minutes) 30   PT Mode of treatment - Concurrent (minutes) 0   PT Mode of treatment - Group (minutes) 0   PT Mode of treatment - Co-treat (minutes) 0   PT Mode of Treatment - Total time(minutes) 30 minutes   PT Cumulative Minutes 848 Therapy Time missed   Time missed?  No

## 2022-06-29 NOTE — CASE MANAGEMENT
Second Reiki session with this Pt for approx 30 minutes, per his request  This session included a spot treatment on his R shoulder & R foot  Notable movement in both areas, but particularly the R foot  His foot twitched briefly involuntarily several times  He expressed that he was not in any discomfort  He described feeling "relaxed" & "comfortable"  He described his experience with Reiki as "more relaxed than I ever have been in my life"  He expressed that he feels Reiki has been very beneficial physically, emotionally & spiritually  SW will continue to assist as needed

## 2022-06-29 NOTE — NURSING NOTE
Patient resting comfortably in bed at this time  No signs of distress noted  Patient was modified independent to the bathroom overnight  However patient rang call bell for nurses assist for wiping activities after suspected bowel activity  Nurse encouraged patient to participate in his own self care but patient declined to try to wipe himself  Assist provided by nurse however no bowel movement present  Pain controlled with as needed tramadol and tylenol overnight  Patient encouraged to reposition frequently to promote skin integrity  Call bell within reach  Will continue to monitor patient and follow plan of care

## 2022-06-30 VITALS
OXYGEN SATURATION: 96 % | RESPIRATION RATE: 16 BRPM | WEIGHT: 309.53 LBS | TEMPERATURE: 98.4 F | HEART RATE: 76 BPM | SYSTOLIC BLOOD PRESSURE: 144 MMHG | HEIGHT: 66 IN | BODY MASS INDEX: 49.74 KG/M2 | DIASTOLIC BLOOD PRESSURE: 64 MMHG

## 2022-06-30 LAB
ALBUMIN SERPL BCP-MCNC: 2.9 G/DL (ref 3.5–5)
ALP SERPL-CCNC: 68 U/L (ref 34–104)
ALT SERPL W P-5'-P-CCNC: 21 U/L (ref 7–52)
ANION GAP SERPL CALCULATED.3IONS-SCNC: 11 MMOL/L (ref 4–13)
AST SERPL W P-5'-P-CCNC: 22 U/L (ref 13–39)
BILIRUB SERPL-MCNC: 0.31 MG/DL (ref 0.2–1)
BUN SERPL-MCNC: 32 MG/DL (ref 5–25)
CALCIUM ALBUM COR SERPL-MCNC: 10.7 MG/DL (ref 8.3–10.1)
CALCIUM SERPL-MCNC: 9.8 MG/DL (ref 8.4–10.2)
CHLORIDE SERPL-SCNC: 100 MMOL/L (ref 96–108)
CHLORIDE UR-SCNC: 62 MMOL/L
CO2 SERPL-SCNC: 24 MMOL/L (ref 21–32)
CREAT SERPL-MCNC: 2.52 MG/DL (ref 0.6–1.3)
CREAT UR-MCNC: 62.9 MG/DL
ERYTHROCYTE [DISTWIDTH] IN BLOOD BY AUTOMATED COUNT: 15.9 % (ref 11.6–15.1)
GFR SERPL CREATININE-BSD FRML MDRD: 27 ML/MIN/1.73SQ M
GLUCOSE P FAST SERPL-MCNC: 84 MG/DL (ref 65–99)
GLUCOSE SERPL-MCNC: 84 MG/DL (ref 65–140)
HCT VFR BLD AUTO: 25.3 % (ref 36.5–49.3)
HGB BLD-MCNC: 7.4 G/DL (ref 12–17)
MCH RBC QN AUTO: 27.6 PG (ref 26.8–34.3)
MCHC RBC AUTO-ENTMCNC: 29.2 G/DL (ref 31.4–37.4)
MCV RBC AUTO: 94 FL (ref 82–98)
PLATELET # BLD AUTO: 336 THOUSANDS/UL (ref 149–390)
PMV BLD AUTO: 9.7 FL (ref 8.9–12.7)
POTASSIUM SERPL-SCNC: 3.7 MMOL/L (ref 3.5–5.3)
PROT SERPL-MCNC: 7.9 G/DL (ref 6.4–8.4)
RBC # BLD AUTO: 2.68 MILLION/UL (ref 3.88–5.62)
SODIUM 24H UR-SCNC: 68 MOL/L
SODIUM SERPL-SCNC: 135 MMOL/L (ref 135–147)
WBC # BLD AUTO: 8.43 THOUSAND/UL (ref 4.31–10.16)

## 2022-06-30 PROCEDURE — 97535 SELF CARE MNGMENT TRAINING: CPT

## 2022-06-30 PROCEDURE — 99232 SBSQ HOSP IP/OBS MODERATE 35: CPT | Performed by: NURSE PRACTITIONER

## 2022-06-30 PROCEDURE — 84300 ASSAY OF URINE SODIUM: CPT | Performed by: NURSE PRACTITIONER

## 2022-06-30 PROCEDURE — 99238 HOSP IP/OBS DSCHRG MGMT 30/<: CPT | Performed by: FAMILY MEDICINE

## 2022-06-30 PROCEDURE — 80053 COMPREHEN METABOLIC PANEL: CPT | Performed by: NURSE PRACTITIONER

## 2022-06-30 PROCEDURE — 85027 COMPLETE CBC AUTOMATED: CPT | Performed by: NURSE PRACTITIONER

## 2022-06-30 PROCEDURE — 97537 COMMUNITY/WORK REINTEGRATION: CPT

## 2022-06-30 PROCEDURE — 82570 ASSAY OF URINE CREATININE: CPT | Performed by: NURSE PRACTITIONER

## 2022-06-30 PROCEDURE — 82436 ASSAY OF URINE CHLORIDE: CPT | Performed by: NURSE PRACTITIONER

## 2022-06-30 PROCEDURE — 97116 GAIT TRAINING THERAPY: CPT

## 2022-06-30 RX ORDER — METOPROLOL SUCCINATE 100 MG/1
100 TABLET, EXTENDED RELEASE ORAL DAILY
Qty: 30 TABLET | Refills: 0 | Status: SHIPPED | OUTPATIENT
Start: 2022-06-30 | End: 2022-08-08 | Stop reason: SDUPTHER

## 2022-06-30 RX ORDER — TRAMADOL HYDROCHLORIDE 50 MG/1
50 TABLET ORAL EVERY 6 HOURS PRN
Qty: 20 TABLET | Refills: 0 | Status: SHIPPED | OUTPATIENT
Start: 2022-06-30 | End: 2022-07-10

## 2022-06-30 RX ORDER — POTASSIUM CHLORIDE 750 MG/1
10 TABLET, EXTENDED RELEASE ORAL DAILY
Qty: 30 TABLET | Refills: 0 | Status: SHIPPED | OUTPATIENT
Start: 2022-06-30

## 2022-06-30 RX ADMIN — TAMSULOSIN HYDROCHLORIDE 0.4 MG: 0.4 CAPSULE ORAL at 09:29

## 2022-06-30 RX ADMIN — AMLODIPINE BESYLATE 10 MG: 10 TABLET ORAL at 09:29

## 2022-06-30 RX ADMIN — CHOLECALCIFEROL TAB 10 MCG (400 UNIT) 400 UNITS: 10 TAB at 09:29

## 2022-06-30 RX ADMIN — TRAMADOL HYDROCHLORIDE 50 MG: 50 TABLET, COATED ORAL at 11:07

## 2022-06-30 RX ADMIN — METOPROLOL SUCCINATE 100 MG: 50 TABLET, EXTENDED RELEASE ORAL at 09:28

## 2022-06-30 RX ADMIN — POTASSIUM CHLORIDE 10 MEQ: 750 TABLET, EXTENDED RELEASE ORAL at 09:29

## 2022-06-30 RX ADMIN — ALLOPURINOL 100 MG: 100 TABLET ORAL at 09:29

## 2022-06-30 RX ADMIN — TRAMADOL HYDROCHLORIDE 50 MG: 50 TABLET, COATED ORAL at 04:04

## 2022-06-30 NOTE — NURSING NOTE
Instructions for DC discussed with patient and family  All posed questions answered to patients / family satisfaction  Interdisciplinary care team determined safest means of transportation on DC to be private vehicle with father  Patient DCd from the unit

## 2022-06-30 NOTE — OCCUPATIONAL THERAPY NOTE
OT DISCHARGE SUMMARY    Pt cleared for d/c from ARU to home with family assist and Ayanna Harden  Pt met all ADL goals of mod I as well as mod I for functional transfers, using AE as needed  Pt participated in ADL training, therapeutic exercises and activities, functional mobility/transfers, and activity tolerance in order to progress towards goals  DME has been obtained for home use      Carmen De Paz MS, OTR/L

## 2022-06-30 NOTE — PROGRESS NOTES
06/30/22 1130   Pain Assessment   Pain Assessment Tool 0-10   Pain Score 6   Pain Location/Orientation Orientation: Right;Location: Foot   Restrictions/Precautions   Precautions Contact/isolation   Weight Bearing Restrictions No   Cognition   Overall Cognitive Status WFL   Arousal/Participation Alert; Cooperative   Attention Within functional limits   Orientation Level Oriented X4   Memory Within functional limits   Following Commands Follows all commands and directions without difficulty   Roll Left and Right   Type of Assistance Needed Independent   Roll Left and Right CARE Score 6   Sit to Lying   Type of Assistance Needed Independent   Sit to Lying CARE Score 6   Lying to Sitting on Side of Bed   Type of Assistance Needed Independent   Lying to Sitting on Side of Bed CARE Score 6   Sit to Stand   Type of Assistance Needed Independent   Sit to Stand CARE Score 6   Bed-Chair Transfer   Type of Assistance Needed Independent   Chair/Bed-to-Chair Transfer CARE Score 6   Transfer Bed/Chair/Wheelchair   Limitations Noted In Balance; Endurance;UE Strength;LE Strength;Problem Solving   Adaptive Equipment Roller Walker   Stand Pivot Modified Independent   Sit to Stand Modified Independent   Stand to Sit Modified Independent   Supine to Sit Modified Independent   Sit to Supine Modified Independent   Car Transfer   Type of Assistance Needed Supervision;Verbal cues   Comment mock trial with ramp, curb step and chairs   Car Transfer CARE Score 4   Walk 10 Feet   Type of Assistance Needed Independent   Walk 10 Feet CARE Score 6   Walk 50 Feet with Two Turns   Type of Assistance Needed Independent   Walk 50 Feet with Two Turns CARE Score 6   Ambulation   Does the patient walk? 2  Yes   Primary Mode of Locomotion Prior to Admission Walk   Distance Walked (feet) 100 ft   Assist Device Roller Walker   Gait Pattern Slow Julieta;Decreased foot clearance; Forward Flexion; Wide TEO;Step through; Improper weight shift   Limitations Noted In Balance; Endurance;Posture; Safety;Speed;Strength   Provided Assistance with: Direction   Walk Assist Level Modified Independent   Curb or Single Stair   Style negotiated Single stair   Type of Assistance Needed Supervision   1 Step (Curb) CARE Score 4   Stairs   Type Curb   # of Steps 1  (x4)   Findings mock trial of car transfer with curb step   Therapeutic Interventions   Strengthening step ups at curb   Balance standing balance while tying shoes   Other gait   Assessment   Treatment Assessment Pt seated in bed to start sessoin, pleasant and agreeable  Pt able to don shoes with MI  Tolerated standing with leg supported on chair to tie own shoes with no LOB and awareness of safety  Pt able to amb 100' with RW and MI  Pt completed mock trial of car transfer with S and VC for different approches  Pt amb 100' with RW and MI back to room  Seated in bed to end session with call bell in reach and all needs met  Pt will benefit from continued skilled PT to improve over all strength and endurence  PT Barriers   Physical Impairment Decreased strength;Decreased endurance;Decreased range of motion; Impaired balance;Decreased mobility; Decreased safety awareness;Pain   Functional Limitation Walking;Transfers;Standing;Stair negotiation   Plan   Treatment/Interventions Functional transfer training;LE strengthening/ROM; Elevations; Therapeutic exercise; Endurance training;Bed mobility;Gait training   Progress Progressing toward goals   PT Therapy Minutes   PT Time In 1130   PT Time Out 1200   PT Total Time (minutes) 30   PT Mode of treatment - Individual (minutes) 30   PT Mode of treatment - Concurrent (minutes) 0   PT Mode of treatment - Group (minutes) 0   PT Mode of treatment - Co-treat (minutes) 0   PT Mode of Treatment - Total time(minutes) 30 minutes   PT Cumulative Minutes 878   Therapy Time missed   Time missed?  No

## 2022-06-30 NOTE — CASE MANAGEMENT
DC instructions reviewed with Pt & Pt's father, who expressed an understanding & agreement  Pt being [address] home today at 1 PM, being transported by family via car, which tx team has determined to be a safe mode of transport  FU appts indicated on DC instructions, to be scheduled by Pt per scheduling preferences  HHC arranged with Jany Porter (Nsg, PT, OT), per Pt preferences from choice list provided  The Pt's current course of Tx & post DC goals of care have been shared with Post-acute care service providers  Ins co aware of DC plan; all days covered  Additional clinicals submitted to Pt's employer's benefit center per their request, and per Pt's request, for Pt's LDT disability application  A note was attached by this worker stating that if additional information is needed, they will need to contact release of information

## 2022-06-30 NOTE — DISCHARGE SUMMARY
Discharge Summary - PMR   Valeria Arora 55 y o  male MRN: [id number]  Unit/Bed#: -01 Encounter: [id number]    Admission Date: 6/17/2022     Discharge Date: 6/30/22    Etiologic/Rehabilitation Diagnosis: Impairment of mobility, safety and Activities of Daily Living (ADLs) due to Debility:  16  Debility (Non-cardiac/Non-pulmonary)    HPI: Prosper Taylor a 55 y  o  male with a PMH significant for CKD, HLD, HTN and arthritis who presented to the [facility] ED initially with worsening left-sided flank pain with generalized malaise/fatigue  He had a recent admission at [facility] for obstructive pyelonephritis secondary to a 3 mm stone with ureteral stent placement  Pt developed E  Coli bacteremia during hospitalization and discharged with amoxicillian until 5/4  In the ED patient was found to have lactic acidosis, leukocytosis and worsening ADRIANNE  CT revealed moderate perinephric stranding and mild hydronephrosis in the left kidney with multiple bilateral renal calculi  Patient transferred to [facility] to be evaluated by Urology  S/p perc nephrostomy tube drain replacement by IR on 5/10  Positive E  coli in one blood culture from 5/10 thought to be associated with UTI/Pyelonephritis  IV antibiotics initiated and then ultimately transitioned to PO antibiotics  During this stay, Ortho was consulted for ongoing left knee pain  His left knee was aspirated  Blood was noted but no effusion aspirated  Xray of the knee without acute osseous abnormality but did reveal tricompartmental osteoarthritis similar to a prior imaging the year prior  Ortho considering future joint steroid injection once acute medical comorbidities resolve  Patient [facility] to [facility] on 5/19 after PT/OT recommendations  He progressed well however on 6/6 he developed sepsis meeting the criteria with elevation in WBCS, creatinine level, elevated temp and tachycardia  Patient also with c/o weakness, fatigue and diaphoresis   Pt was transferred to [facility] on 6/7 for urology evaluation  Urine and blood cultures obtained and IV antibiotics were initiated  Urine culture with gram negative rods of E  Coli/MDRO Citrobacter  Pt was recommended for inpatient clearance ureteroscopy  ID consulted and placed patient on Cipro PO x7 days (6/16)  Renal US unremarkable  6/9 patient went to OR with Urology for cystoscopy and left ureteroscopy/lithotripsy with ureteral stent exchange and removal of nephrostomy tube  Stent was then discontinued on 6/13  Pt is to follow up with urology as OP in 6 months with a renal US  Patient participated with PT/OT and was deemed appropriate to return to acute rehabilitation  He arrived to [facility] 6/17  Procedures Performed During ARC Admission: none    Acute Rehabilitation Center Course: Patient participated in a comprehensive interdisciplinary inpatient rehabilitation program which included involvment of MD, therapies (PT, OT, and/or SLP), RN, CM, SW, dietary, and psychology services  He was able to be advanced to a modified independent level of assist and considered safe for discharge home  Please see below for patient's day to day management of medical needs        Left elbow pain  Assessment & Plan  · heat twice a day for 15 minutes   · May apply Voltaren gel to left elbow    Obstructive pyelonephritis  Assessment & Plan  · Leading to Sepsis suspected to be caused by UTI with left nephrostomy tube and left ureteral stent  · Received IVF and Cefepime   · S/p OR with Urology for cystoscopy ureteroscopy and ureteral stent insertion with string removal of left sided nephrostomy tube   · Urine cultures with MDRO Citrobacter, ID consulted  · PO cipro completed 6/16        Recurrent left knee pain  Assessment & Plan  · Pain management   · Xray of knee on 5/13 with moderate osteoarthritis   · Repeat x-ray on 6/21 with moderate osteoarthritis   · Continue conservative management  · US therapy to Left knee; heat and Voltaren gel   · SW performed Reiki for pain   · Orthopedic referral made at discharge    Morbid obesity  Assessment & Plan  · Lifestyle modifications       Essential hypertension  Assessment & Plan  · Monitor vitals  · IM to follow  · Continue Metoprolol 100 mg daily and Norvasc 10 mg daily        Stage 3b chronic kidney disease [facility])  Assessment & Plan  Lab Results   Component Value Date    EGFR 27 06/30/2022    EGFR 27 06/27/2022    EGFR 31 06/22/2022    CREATININE 2 52 (H) 06/30/2022    CREATININE 2 49 (H) 06/27/2022    CREATININE 2 24 (H) 06/22/2022   · Baseline creatinine 1 4-1 7  · Nephrology following  · Will need OP Nephrology follow up; continue nephrology recommendations  · Ambulatory referral to Nephrology sent at discharge  · Avoid Nephrotoxins     * Obstructive uropathy  Assessment & Plan  · s/p cystoscopy left ureteroscopy/lithotripsy and exchange ureteral stent, removal nephrostomy tube left on 6/9  · stent removal on 6/13  · OP follow up with Urology in 6 weeks with renal US  · Flomax 0 4 mg daily         Discharge Physical Examination:  Vitals reviewed  Constitutional:       General: He is not in acute distress  Appearance: Normal appearance  He is obese  He is not ill-appearing  HENT:      Head: Normocephalic and atraumatic  Right Ear: External ear normal       Left Ear: External ear normal       Nose: Nose normal    Eyes:      Pupils: Pupils are equal, round, and reactive to light  Cardiovascular:      Rate and Rhythm: Normal rate and regular rhythm  Pulses: Normal pulses  Heart sounds: Normal heart sounds  No murmur heard  Pulmonary:      Effort: Pulmonary effort is normal  No respiratory distress  Breath sounds: Normal breath sounds  No wheezing  Abdominal:      General: Bowel sounds are normal  There is no distension  Palpations: Abdomen is soft  Tenderness: There is no abdominal tenderness  Musculoskeletal:         General: No tenderness   Normal range of motion  Cervical back: Normal range of motion  Right lower leg: Edema present  Left lower leg: Edema present  Skin:     General: Skin is warm  Neurological:      General: No focal deficit present  Mental Status: He is alert and oriented to person, place, and time  Psychiatric:         Mood and Affect: Mood normal      Significant Findings, Care, Treatment and Services Provided: No significant findings  Patient participated with PT/OT for 3hrs per day with a minimum of 5x per week  Followed by IM, ARC and Nephrology providers  Complications: none    Functional Status Upon Admission to [facility]:  Physical Therapy: ambulation supervision, transfers supervision  Occupational Therapy: eating independent, grooming s/u supervision, UB bathing min assist, LB bathing mod assist, UB dressing supervision s/u, LB bathing min assist, toileting assistance min assist, bed mobility supervision, functional mobility supervision  Speech Therapy: n/a    Functional Status Upon Discharge from [facility]:   Physical therapy: Roll L-R independent, sit-lying independent, lying-sitting on side of bed independent, sit-stand independent, transfers independent, ambulation independent-supervision , stairs supervision  Occupational therapy: Eating independent, oral hygiene independent, showering independent, UB dressing independent, LB dressing independent, Putting on/taking off footwear independent, picking up object independent, toileting hygiene independent, toilet transfer independent, light housekeeping mod I    Discharge Diagnosis: Impairment of mobility, safety and Activities of Daily Living (ADLs) due to Debility:  16  Debility (Non-cardiac/Non-pulmonary)    Discharge Medications:   See after visit summary for reconciled discharge medications provided to patient and family        Condition at Discharge: good     Discharge instructions/Information to patient and family:   See after visit summary for information provided to patient and family  Provisions for Follow-Up Care:  See after visit summary for information related to follow-up care and any pertinent home health orders  Future Appointments   Date Time Provider Myriam Bess   7/15/2022 11:30 AM VERÓNICA Jenkins [place]  [facility]   8/11/2022 10:00 AM CA IR 1 CA IR [facility]       Disposition: See After Visit Summary for discharge disposition information  Planned Readmission: No    Discharge Statement   I spent 30 minutes or less discharging the patient  This time was spent on the day of discharge  I had direct contact with the patient on the day of discharge  Greater than 50% of the total time was spent examining patient, answering all patient questions, arranging and discussing plan of care with patient as well as directly providing post-discharge instructions  Additional time then spent on discharge activities  Discharge Medications:  See after visit summary for reconciled discharge medications provided to patient and family

## 2022-06-30 NOTE — TELEPHONE ENCOUNTER
Spoke to pt s/p Cysto/URS/lithotripsy/exchange of left stent/removal of left nephrostomy tube performed on 6/9/22 with Dr Jocelyne Aguilar  Pt stated his stent was removed in the hospital   Per hospital note by Matthew Oakes DO dated 6/16/22, pt's stent was removed on 6/13/22  Pt advised to fu in 6 months with either Dr Jocelyne Aguilar or AP with renal US prior

## 2022-06-30 NOTE — NURSING NOTE
Patient resting comfortably in bed at this time  No signs of distress noted  Patient was modified independent with a walker to the bathroom overnight  Pain controlled with as needed tramadol and tylenol overnight  Patient encouraged to reposition frequently to promote skin integrity  Call bell within reach  Will continue to monitor patient and follow plan of care

## 2022-06-30 NOTE — PROGRESS NOTES
06/30/22 0702   Pain Assessment   Pain Assessment Tool 0-10   Pain Score 7   Pain Location/Orientation Orientation: Right;Location: Foot   Pain 2   Pain Score 2 7   Pain Location/Orientation 2 Orientation: Right;Location: Shoulder   Restrictions/Precautions   Precautions Contact/isolation; Fall Risk;Pain   Eating   Type of Assistance Needed Independent   Physical Assistance Level No physical assistance   Eating CARE Score 6   Oral Hygiene   Type of Assistance Needed Independent   Physical Assistance Level No physical assistance   Oral Hygiene CARE Score 6   Grooming   Able To Comb/Brush Hair;Wash/Dry Face;Brush/Clean Teeth;Wash/Dry Hands; Initiate Tasks   Shower/Bathe Self   Type of Assistance Needed Independent   Physical Assistance Level No physical assistance   Comment has AE didnt need it this date   Shower/Bathe Self CARE Score 6   Bathing   Assessed Bath Style Sponge Bath   Anticipated D/C Bath Style Shower;Sponge Bath   Able to Gather/Transport Yes   Able to Adjust Water Temperature Yes   Able to Wash/Rinse/Dry (body part) Perineal Area; Buttocks;L Upper Leg;R Upper Leg;Chest;Abdomen   Limitations Noted in ROM; Strength   Upper Body Dressing   Type of Assistance Needed Independent   Physical Assistance Level No physical assistance   Upper Body Dressing CARE Score 6   Lower Body Dressing   Type of Assistance Needed Independent; Adaptive equipment   Physical Assistance Level No physical assistance   Lower Body Dressing CARE Score 6   Putting On/Taking Off Footwear   Type of Assistance Needed Independent; Adaptive equipment   Comment in stance for shoes simul s/u for home   Putting On/Taking Off Footwear CARE Score 6   Picking Up Object   Type of Assistance Needed Independent; Adaptive equipment   Picking Up Object CARE Score 6   Sit to Lying   Type of Assistance Needed Independent   Physical Assistance Level No physical assistance   Sit to Lying CARE Score 6   Lying to Sitting on Side of Bed   Type of Assistance Needed Independent   Physical Assistance Level No physical assistance   Lying to Sitting on Side of Bed CARE Score 6   Sit to Stand   Type of Assistance Needed Independent   Physical Assistance Level No physical assistance   Sit to Stand CARE Score 6   Bed-Chair Transfer   Type of Assistance Needed Independent   Physical Assistance Level No physical assistance   Chair/Bed-to-Chair Transfer CARE Score 6   Toileting Hygiene   Type of Assistance Needed Independent   Physical Assistance Level No physical assistance   Toileting Hygiene CARE Score 6   Toileting   Able to [address] down yes, up yes  Findings educ in use of bottom chantale which pt was interested in but reports he has a "poop phobia" thereofr it makes him nervous to use tool   Toilet Transfer   Type of Assistance Needed Independent   Physical Assistance Level No physical assistance   Toilet Transfer CARE Score 6   Toilet Transfer   Surface Assessed Raised Toilet   Transfer Technique Standard   Kitchen Mobility   Kitchen-Mobility Level Walker   Kitchen Activity Transport items; Retrieve items   Light Housekeeping   Light Housekeeping Level Walker   Light Housekeeping Level of Assistance Modified independent   Cognition   Overall Cognitive Status WFL   Arousal/Participation Alert; Cooperative   Attention Within functional limits   Orientation Level Oriented X4   Memory Within functional limits   Following Commands Follows all commands and directions without difficulty   Additional Activities   Additional Activities Comments fxl mobility short distances in room Mod I with RW   Activity Tolerance   Activity Tolerance Patient tolerated treatment well   Assessment   Treatment Assessment OT tx addressed ADL via basin level at pt request  Pt has met all OT goals  Pt c/o multiple concerns during ADL from sore pinky toe to "poop problems"  OT reinforcing to pt that he needs to remain active at home, HEP reviewed along with wall climbs   Pt provided with walker bag and bottom chantale this date and reviewed same  Pt paces self  Educ in [place] throughout session  Plan is for d/c to home today  Prognosis Good   Problem List Decreased range of motion; Impaired balance;Decreased endurance;Decreased strength;Pain   Plan   Treatment/Interventions ADL retraining;Functional transfer training;Patient/family training;Equipment eval/education; Bed mobility;Gait training; Compensatory technique education;Spoke to nursing;OT   Progress Improving as expected   Recommendation   Discharge Summary Pt has met OT goals  [facility] services advised  Pt has RW and tub seat at home  OT Therapy Minutes   OT Time In 0702   OT Time Out 0826   OT Total Time (minutes) 84   OT Mode of treatment - Individual (minutes) 84   OT Mode of treatment - Concurrent (minutes) 0   OT Mode of treatment - Group (minutes) 0   OT Mode of treatment - Co-treat (minutes) 0   OT Mode of Treatment - Total time(minutes) 84 minutes   OT Cumulative Minutes 210   Therapy Time missed   Time missed?  No

## 2022-06-30 NOTE — PROGRESS NOTES
NEPHROLOGY PROGRESS NOTE   Norina Kanner 55 y o  male MRN: [id number]  Unit/Bed#: -01 Encounter: [id number]    Assessment/Plan:    Norina Kanner is a 55 y o  male with CKD 3B recently hospitalized for sepsis secondary to strep and E coli bacteremia due to complicated UTI, recent left-sided hydro ureteral nephrosis secondary to obstructive calculus status post stent placement and eventual percutaneous nephrostomy tube  Now undergoing inpatient rehabilitation tentative discharge today     1  Acute kidney injury (POA) atop chronic kidney disease  ? Creatinine slightly increased, unclear etiology  Appears stable and is tentative for discharge  Urine studies pending  PVRs have been negligible  Recommend avoiding nephrotoxins, repeating kidney/bladder ultrasound upon discharge, follow-up with nephrology and urology  2  Stage IIIB chronic kidney disease with previous baseline creatinine around 1 7-1 9 mg/dL  3  Left knee pain  ? Had Reiki while inpatient and ortho consult as outpatient  4  Obstructive ureterolithiasis  ? S/p urological intervention  Recommend ultrasound as o/p if creatinine continues to rise   5  Recurring kidney stone  ? Continue low sodium, high volume intake to produce > 2 5 liters urine  Recommend litholink as outpatient        ROS  No physical complaints  States he is going home today  A complete 10 point review of systems have been performed and are otherwise negative         Historical Information   Past Medical History:   Diagnosis Date    Arthritis     Chronic kidney disease     Gout     Hyperlipidemia     Hypertension     Kidney stone     Obstructive pyelonephritis     Sepsis [facility])      Past Surgical History:   Procedure Laterality Date    FL RETROGRADE PYELOGRAM  8/4/2019    FL RETROGRADE PYELOGRAM  9/26/2019    FL RETROGRADE PYELOGRAM  4/22/2022    IR NEPHROSTOMY TUBE PLACEMENT  5/10/2022    NO PAST SURGERIES  03/23/2015    Last assessed    KY CYSTO/URETERO W/LITHOTRIPSY &INDWELL STENT INSRT Right 9/26/2019    Procedure: CYSTOSCOPY URETEROSCOPY WITH RETROGRADE PYELOGRAM AND INSERTION STENT URETERAL;  Surgeon: Mi Jimenez MD;  Location: [facility];  Service: Urology    MT CYSTO/URETERO W/LITHOTRIPSY &INDWELL STENT INSRT Left 6/9/2022    Procedure: CYSTOSCOPY LEFT URETEROSCOPY/LITHOTRIPSY HOLMIUM LASER,  AND EXCHANGE URETERAL STENT URETERAL, REMOVAL NEPHROSTOMY TUBE;  Surgeon: Yoshi Alberto MD;  [facility];  Service: Urology    MT CYSTOURETHROSCOPY,URETER CATHETER Right 8/4/2019    Procedure: CYSTOSCOPY RETROGRADE PYELOGRAM WITH INSERTION STENT URETERAL;  Surgeon: Yonatan Duke MD;  [facility];  Service: Urology    MT CYSTOURETHROSCOPY,URETER CATHETER Left 4/22/2022    Procedure: CYSTOSCOPY RETROGRADE PYELOGRAM WITH INSERTION STENT URETERAL;  Surgeon: Deepti Renteria MD;  [facility];  Service: Urology     Social History   Social History     Substance and Sexual Activity   Alcohol Use Not Currently    Alcohol/week: 0 0 standard drinks    Comment: rarely     Social History     Substance and Sexual Activity   Drug Use No     Social History     Tobacco Use   Smoking Status Never Smoker   Smokeless Tobacco Never Used       Family History:   Family History   Problem Relation Age of Onset    Cancer Mother     Heart disease Father     No Known Problems Sister        Medications:  Pertinent medications were reviewed  Current Facility-Administered Medications   Medication Dose Route Frequency Provider Last Rate    acetaminophen  650 mg Oral Q6H PRN Corin Roxane, PA-C      allopurinol  100 mg Oral Daily Corin Roxane, PA-C      amLODIPine  10 mg Oral Daily Corin Roxane, PA-C      cholecalciferol  400 Units Oral Daily Corin Roxane, PA-C      Diclofenac Sodium  2 g Topical 4x Daily PRN Corin Roxane, PA-C      metoprolol succinate  100 mg Oral Daily Corin Roxane, PA-C      ondansetron  4 mg Oral Q6H PRN Beverly Benz Brando, PA-[place]      potassium chloride  10 mEq Oral Daily Duayne Clipper, PA-C      senna  2 tablet Oral HS PRN Duayne Clipper, PA-C      tamsulosin  0 4 mg Oral Daily Duayne Clipper, PA-C      traMADol  50 mg Oral Q6H PRN Duayne Clipper, PA-C           No Known Allergies      Vitals:   /64 (BP Location: Right arm)   Pulse 76   Temp 98 4 °F (36 9 °C) (Tympanic)   Resp 16   Ht 5' 6" (1 676 m)   Wt (!) 140 kg (309 lb 8 4 oz)   SpO2 96%   BMI 49 96 kg/m²   Body mass index is 49 96 kg/m²  SpO2: 96 %,   SpO2 Activity: At Rest,   O2 Device: None (Room air)      Intake/Output Summary (Last 24 hours) at 6/30/2022 1056  Last data filed at 6/30/2022 0932  Gross per 24 hour   Intake 340 ml   Output 1750 ml   Net -1410 ml     Invasive Devices  Report    None                 Physical Exam  General: conscious, cooperative, in no acute distress  Eyes: conjunctivae pink, anicteric sclerae  ENT: lips and mucous membranes moist  Neck: supple, no JVD, no masses  Chest: clear breath sounds bilaterally, no crackles, ronchus or wheezings  CVS: S1 & S2, normal rate, regular rhythm  Abdomen: soft, non-tender, non-distended, normoactive bowel sounds obese  Extremities: trace edema of both legs  Skin: no rash  Neuro: awake, alert, oriented      Diagnostic Data:  Lab: I have personally reviewed pertinent lab results  ,   CBC:  Results from last 7 days   Lab Units 06/30/22  0538   WBC Thousand/uL 8 43   HEMOGLOBIN g/dL 7 4*   HEMATOCRIT % 25 3*   PLATELETS Thousands/uL 336      CMP:   Lab Results   Component Value Date    SODIUM 135 06/30/2022    K 3 7 06/30/2022     06/30/2022    CO2 24 06/30/2022    BUN 32 (H) 06/30/2022    CREATININE 2 52 (H) 06/30/2022    CALCIUM 9 8 06/30/2022    AST 22 06/30/2022    ALT 21 06/30/2022    ALKPHOS 68 06/30/2022    EGFR 27 06/30/2022   ,   PT/INR: No results found for: PT, INR,   Magnesium: No components found for: MAG,  Phosphorous: No results found for: PHOS    Microbiology:  @[facility],(urinecx:7)@        RITA Campos    Portions of the record may have been created with voice recognition software  Occasional wrong word or "sound a like" substitutions may have occurred due to the inherent limitations of voice recognition software  Read the chart carefully and recognize, using context, where substitutions have occurred

## 2022-07-01 ENCOUNTER — TRANSITIONAL CARE MANAGEMENT (OUTPATIENT)
Dept: INTERNAL MEDICINE CLINIC | Facility: CLINIC | Age: 57
End: 2022-07-01

## 2022-07-01 DIAGNOSIS — N17.9 AKI (ACUTE KIDNEY INJURY) (HCC): Primary | ICD-10-CM

## 2022-07-01 NOTE — PHYSICAL THERAPY NOTE
PT DISCHARGE SUMMARY:      Patient has met max benefit for inpatient ARC PT  Patient MOD I bed mobility, MOD I all transfers, MOD I ambulation up to 110' level and unlevel surfaces with walker, S curb step with walker, S negotiation of up to 14 steps with 2 handrails  Patient remains limited by decreased ROM/strength, decreased balance and safety, decreased endurance, and pain  For d/c to home with continued PT services 06/30/2022    Patient completed car transfer with S

## 2022-07-06 NOTE — TELEPHONE ENCOUNTER
Pt called and needed to change appt location   Pt is asking what will be done at appt    Pt call [facility]--[phone]

## 2022-07-06 NOTE — TELEPHONE ENCOUNTER
Called and spoke with patient  Appointment on 7/12/22 with Tyrell Soriano cancelled, as that was his appointment for H&P for surgery on 7/29, which was cancelled due to surgery being moved up  Patient aware he will follow up with our office in 6 months with a renal ultrasound prior to that visit  Patient provided with central scheduling phone number to schedule US for January  Aware our office will contact him when time to schedule January follow up, as schedule is not available that far out yet  He verbalized understanding

## 2022-07-07 ENCOUNTER — TELEPHONE (OUTPATIENT)
Dept: INTERNAL MEDICINE CLINIC | Facility: CLINIC | Age: 57
End: 2022-07-07

## 2022-07-07 ENCOUNTER — TELEPHONE (OUTPATIENT)
Dept: NEPHROLOGY | Facility: CLINIC | Age: 57
End: 2022-07-07

## 2022-07-07 NOTE — TELEPHONE ENCOUNTER
Abdoulaye Castillo from [facility] called  She is going out to [employer]  She is able to draw labs if we need anything      Her fax number for script is [phone]

## 2022-07-07 NOTE — TELEPHONE ENCOUNTER
Elsie Mcfarlane from [facility] called and left voicemail asking for lab slip to be faxed over to them  Faxed to [phone]

## 2022-07-09 ENCOUNTER — APPOINTMENT (OUTPATIENT)
Dept: LAB | Facility: HOSPITAL | Age: 57
End: 2022-07-09
Payer: COMMERCIAL

## 2022-07-09 DIAGNOSIS — N17.9 AKI (ACUTE KIDNEY INJURY) (HCC): ICD-10-CM

## 2022-07-09 LAB
ALBUMIN SERPL BCP-MCNC: 3 G/DL (ref 3.5–5)
ALP SERPL-CCNC: 59 U/L (ref 46–116)
ALT SERPL W P-5'-P-CCNC: 15 U/L (ref 12–78)
ANION GAP SERPL CALCULATED.3IONS-SCNC: 14 MMOL/L (ref 4–13)
AST SERPL W P-5'-P-CCNC: 11 U/L (ref 5–45)
BACTERIA UR QL AUTO: ABNORMAL /HPF
BILIRUB SERPL-MCNC: 0.38 MG/DL (ref 0.2–1)
BILIRUB UR QL STRIP: NEGATIVE
BUN SERPL-MCNC: 30 MG/DL (ref 5–25)
CALCIUM ALBUM COR SERPL-MCNC: 10.9 MG/DL (ref 8.3–10.1)
CALCIUM SERPL-MCNC: 10.1 MG/DL (ref 8.3–10.1)
CHLORIDE SERPL-SCNC: 104 MMOL/L (ref 100–108)
CLARITY UR: ABNORMAL
CO2 SERPL-SCNC: 21 MMOL/L (ref 21–32)
COLOR UR: YELLOW
CREAT SERPL-MCNC: 2.9 MG/DL (ref 0.6–1.3)
CREAT UR-MCNC: 86.1 MG/DL
CREAT UR-MCNC: 86.1 MG/DL
GFR SERPL CREATININE-BSD FRML MDRD: 23 ML/MIN/1.73SQ M
GLUCOSE SERPL-MCNC: 105 MG/DL (ref 65–140)
GLUCOSE UR STRIP-MCNC: NEGATIVE MG/DL
HGB UR QL STRIP.AUTO: ABNORMAL
KETONES UR STRIP-MCNC: NEGATIVE MG/DL
LEUKOCYTE ESTERASE UR QL STRIP: ABNORMAL
MICROALBUMIN UR-MCNC: 30.7 MG/L (ref 0–20)
MICROALBUMIN/CREAT 24H UR: 36 MG/G CREATININE (ref 0–30)
NITRITE UR QL STRIP: POSITIVE
NON-SQ EPI CELLS URNS QL MICRO: ABNORMAL /HPF
PH UR STRIP.AUTO: 6 [PH]
POTASSIUM SERPL-SCNC: 4 MMOL/L (ref 3.5–5.3)
PROT SERPL-MCNC: 9 G/DL (ref 6.4–8.2)
PROT UR STRIP-MCNC: ABNORMAL MG/DL
PROT UR-MCNC: 38 MG/DL
PROT/CREAT UR: 0.44 MG/G{CREAT} (ref 0–0.1)
RBC #/AREA URNS AUTO: ABNORMAL /HPF
SODIUM SERPL-SCNC: 139 MMOL/L (ref 136–145)
SP GR UR STRIP.AUTO: 1.01 (ref 1–1.03)
UROBILINOGEN UR QL STRIP.AUTO: 0.2 E.U./DL
WBC #/AREA URNS AUTO: ABNORMAL /HPF

## 2022-07-09 PROCEDURE — 84156 ASSAY OF PROTEIN URINE: CPT | Performed by: NURSE PRACTITIONER

## 2022-07-09 PROCEDURE — 80053 COMPREHEN METABOLIC PANEL: CPT

## 2022-07-09 PROCEDURE — 82043 UR ALBUMIN QUANTITATIVE: CPT | Performed by: NURSE PRACTITIONER

## 2022-07-09 PROCEDURE — 82570 ASSAY OF URINE CREATININE: CPT | Performed by: NURSE PRACTITIONER

## 2022-07-09 PROCEDURE — 81001 URINALYSIS AUTO W/SCOPE: CPT | Performed by: NURSE PRACTITIONER

## 2022-07-09 PROCEDURE — 36415 COLL VENOUS BLD VENIPUNCTURE: CPT

## 2022-07-13 DIAGNOSIS — R30.0 DYSURIA: Primary | ICD-10-CM

## 2022-07-13 DIAGNOSIS — N17.9 AKI (ACUTE KIDNEY INJURY) (HCC): ICD-10-CM

## 2022-07-14 ENCOUNTER — APPOINTMENT (OUTPATIENT)
Dept: LAB | Facility: CLINIC | Age: 57
End: 2022-07-14
Payer: COMMERCIAL

## 2022-07-14 DIAGNOSIS — R30.0 DYSURIA: ICD-10-CM

## 2022-07-14 DIAGNOSIS — N20.0 NEPHROLITHIASIS: ICD-10-CM

## 2022-07-14 PROCEDURE — 87086 URINE CULTURE/COLONY COUNT: CPT

## 2022-07-15 LAB — BACTERIA UR CULT: NORMAL

## 2022-07-19 ENCOUNTER — OFFICE VISIT (OUTPATIENT)
Dept: NEPHROLOGY | Facility: CLINIC | Age: 57
End: 2022-07-19
Payer: COMMERCIAL

## 2022-07-19 VITALS
OXYGEN SATURATION: 97 % | DIASTOLIC BLOOD PRESSURE: 76 MMHG | WEIGHT: 297 LBS | HEIGHT: 66 IN | BODY MASS INDEX: 47.73 KG/M2 | HEART RATE: 110 BPM | SYSTOLIC BLOOD PRESSURE: 128 MMHG

## 2022-07-19 DIAGNOSIS — E66.01 MORBID OBESITY (HCC): ICD-10-CM

## 2022-07-19 DIAGNOSIS — R43.2 DYSGEUSIA: ICD-10-CM

## 2022-07-19 DIAGNOSIS — I10 ESSENTIAL HYPERTENSION: ICD-10-CM

## 2022-07-19 DIAGNOSIS — D64.9 ANEMIA, UNSPECIFIED TYPE: ICD-10-CM

## 2022-07-19 DIAGNOSIS — N13.9 OBSTRUCTIVE UROPATHY: ICD-10-CM

## 2022-07-19 DIAGNOSIS — Z87.39 HISTORY OF GOUT: ICD-10-CM

## 2022-07-19 DIAGNOSIS — N18.32 STAGE 3B CHRONIC KIDNEY DISEASE (HCC): ICD-10-CM

## 2022-07-19 DIAGNOSIS — N17.9 ACUTE KIDNEY INJURY SUPERIMPOSED ON CKD (HCC): Primary | ICD-10-CM

## 2022-07-19 DIAGNOSIS — N18.9 ACUTE KIDNEY INJURY SUPERIMPOSED ON CKD (HCC): Primary | ICD-10-CM

## 2022-07-19 DIAGNOSIS — N20.0 NEPHROLITHIASIS: ICD-10-CM

## 2022-07-19 PROCEDURE — 99215 OFFICE O/P EST HI 40 MIN: CPT | Performed by: PHYSICIAN ASSISTANT

## 2022-07-19 PROCEDURE — 1111F DSCHRG MED/CURRENT MED MERGE: CPT | Performed by: PHYSICIAN ASSISTANT

## 2022-07-19 RX ORDER — ALLOPURINOL 300 MG/1
300 TABLET ORAL DAILY
Qty: 30 TABLET | Refills: 3 | Status: SHIPPED | OUTPATIENT
Start: 2022-07-19 | End: 2022-10-05

## 2022-07-19 NOTE — PROGRESS NOTES
Assessment & Plan:    1  Acute kidney injury superimposed on CKD stage 3  Assessment & Plan:  Lab Results   Component Value Date    EGFR 23 07/09/2022    EGFR 27 06/30/2022    EGFR 27 06/27/2022    CREATININE 2 90 (H) 07/09/2022    CREATININE 2 52 (H) 06/30/2022    CREATININE 2 49 (H) 06/27/2022   Renal function worsened since discharge  Patient is followed off RAAS inhibitor  Will need to follow-up with Urology  From the renal standpoint, will pursue stone risk profile for the prevention of further nephrolithiasis  Renally dose medications  Will likely need referral for kidney smart education at follow-up  Orders:  -     Basic metabolic panel; Future; Expected date: 09/05/2022  -     Basic metabolic panel; Future; Expected date: 07/26/2022    2  Stage 3b chronic kidney disease [facility])  Assessment & Plan:  Lab Results   Component Value Date    EGFR 23 07/09/2022    EGFR 27 06/30/2022    EGFR 27 06/27/2022    CREATININE 2 90 (H) 07/09/2022    CREATININE 2 52 (H) 06/30/2022    CREATININE 2 49 (H) 06/27/2022   Versus progression to stage IV in the setting of recurrent acute kidney injury    Orders:  -     Ambulatory referral to Nephrology  -     Basic metabolic panel; Future; Expected date: 09/05/2022    3  Essential hypertension  Assessment & Plan:  Blood pressure acceptable  Continue antihypertensives  4  Obstructive uropathy  Assessment & Plan:  Follow up with Urology  5  Morbid obesity ([address] )  Assessment & Plan: Body mass index is 47 94 kg/m²  BMI greater than 40 is associated with greater risk of progression of renal disease secondary to glomerular hyperfiltration  Recommend weight loss  6  Nephrolithiasis  Assessment & Plan:  Stone risk profile  Orders:  -     Stone risk profile; Future; Expected date: 09/05/2022    7  History of gout  Assessment & Plan:  Was uncontrolled on allopurinol 100 mg daily  Will increase to 300 mg daily  Monitor uric acid      Orders:  -     allopurinol (ZYLOPRIM) 300 mg tablet; Take 1 tablet (300 mg total) by mouth daily  -     Uric acid; Future; Expected date: 09/05/2022    8  Anemia, unspecified type  Assessment & Plan:  Monitor hemoglobin hematocrit    Orders:  -     CBC and differential; Future; Expected date: 09/05/2022    9  Dysgeusia  Assessment & Plan:  Check BMP in 1 week giving rising sCr  Also consider GERD  Orders:  -     Basic metabolic panel; Future; Expected date: 07/26/2022       The benefits, risks and alternatives to the treatment plan were discussed at this visit  Patient was advised of common adverse effects of any medical therapies prescribed  All questions were answered and discussed with the patient and any accompanying family members or caretakers  Subjective:      Patient ID: Katerine Baez is a 65 y o  male seen in the [place] office  HPI     Patient was hospitalized at [address] [place] [address]  from 06/17/2022 through 06/30/2022  He was previously hospitalized at [facility] for obstructive pyelonephritis with 3 mm stone with urethral stent placement  Hospitalization was significant for E coli bacteremia  Today, patient presents for follow-up of hospitalization  Feels "tired, worn out, run down "    Blood pressure 128/76 with a heart rate of 110  Amlodipine 10 mg daily, metoprolol succinate 100 mg daily  We reviewed and discussed results of labs during this hospitalization  Patient was discharged with a creatinine of 2 5 mg/dL with estimated GFR 27 mL/min on 06/30/2022  Outpatient labs performed 07/09/2022 reveal a worsening with a creatinine of 2 9 mg/dL with estimated GFR 23 mL/min  Baseline creatinine 1 4-1 5 mg/dL  PC ratio 0 44  Urine culture 07/15/2022 reveals 80-89,000 CFU per mL mixed contaminants  History obtained from patient      The following portions of the patient's history were reviewed and updated as appropriate: allergies, current medications, past family history, past medical history, past social history, past surgical history, and problem list     Review of Systems   Constitutional: Positive for activity change and fatigue  Negative for chills, diaphoresis and fever  HENT: Negative for mouth sores and trouble swallowing  Respiratory: Positive for shortness of breath (RAMÍREZ)  Negative for apnea, cough, chest tightness and wheezing  Cardiovascular: Positive for leg swelling  Negative for chest pain and palpitations  Gastrointestinal: Negative for abdominal distention, abdominal pain, blood in stool, constipation, diarrhea and nausea  Genitourinary: Negative for decreased urine volume, difficulty urinating, dysuria, enuresis, frequency, hematuria and urgency  "low flow"   Musculoskeletal: Positive for arthralgias and joint swelling  Negative for back pain  Skin: Negative for pallor, rash and wound  Neurological: Positive for weakness and light-headedness (upon standing)  Negative for dizziness, seizures, numbness and headaches  Hematological: Does not bruise/bleed easily  Objective:      /76   Pulse (!) 110   Ht 5' 6" (1 676 m)   Wt 135 kg (297 lb)   SpO2 97%   BMI 47 94 kg/m²          Physical Exam  Vitals and nursing note reviewed  Constitutional:       General: He is awake  He is not in acute distress  Appearance: Normal appearance  He is well-developed  He is morbidly obese  He is not ill-appearing, toxic-appearing or diaphoretic  Comments: Uses walker   HENT:      Head: Normocephalic and atraumatic  Jaw: There is normal jaw occlusion  Nose: Nose normal       Mouth/Throat:      Mouth: Mucous membranes are moist       Pharynx: Oropharynx is clear  No oropharyngeal exudate or posterior oropharyngeal erythema  Eyes:      General: Lids are normal  Vision grossly intact  Gaze aligned appropriately  No scleral icterus  Right eye: No discharge  Left eye: No discharge        Extraocular Movements: Extraocular movements intact  Conjunctiva/sclera: Conjunctivae normal       Pupils: Pupils are equal, round, and reactive to light  Neck:      Thyroid: No thyroid mass or thyromegaly  Trachea: Trachea and phonation normal    Cardiovascular:      Rate and Rhythm: Normal rate and regular rhythm  Heart sounds: Normal heart sounds, S1 normal and S2 normal  No murmur heard  No friction rub  No gallop  Comments: Adiposity, no pitting  Pulmonary:      Effort: Pulmonary effort is normal  No respiratory distress  Breath sounds: Normal breath sounds  No stridor  No wheezing, rhonchi or rales  Abdominal:      General: Abdomen is flat  Bowel sounds are normal  There is no distension  Palpations: Abdomen is soft  There is no mass  Tenderness: There is no abdominal tenderness  There is no guarding  Hernia: No hernia is present  Musculoskeletal:         General: Normal range of motion  Cervical back: Normal range of motion and neck supple  No rigidity or tenderness  Right lower leg: No edema  Left lower leg: No edema  Lymphadenopathy:      Cervical: No cervical adenopathy  Skin:     General: Skin is warm and dry  Coloration: Skin is not jaundiced  Findings: No bruising  Nails: There is no clubbing  Neurological:      General: No focal deficit present  Mental Status: He is alert and oriented to person, place, and time  Mental status is at baseline  Psychiatric:         Attention and Perception: Attention and perception normal          Mood and Affect: Mood and affect normal          Speech: Speech normal          Behavior: Behavior normal  Behavior is cooperative  Thought Content:  Thought content normal          Judgment: Judgment normal              Lab Results   Component Value Date     (L) 04/08/2015    SODIUM 139 07/09/2022    K 4 0 07/09/2022     07/09/2022    CO2 21 07/09/2022    ANIONGAP 7 04/08/2015    AGAP 14 (H) 07/09/2022    BUN 30 (H) 07/09/2022    CREATININE 2 90 (H) 07/09/2022    GLUC 105 07/09/2022    GLUF 84 06/30/2022    CALCIUM 10 1 07/09/2022    AST 11 07/09/2022    ALT 15 07/09/2022    ALKPHOS 59 07/09/2022    PROT 7 7 04/08/2015    TP 9 0 (H) 07/09/2022    BILITOT 0 7 04/08/2015    TBILI 0 38 07/09/2022    EGFR 23 07/09/2022      Lab Results   Component Value Date    CREATININE 2 90 (H) 07/09/2022    CREATININE 2 52 (H) 06/30/2022    CREATININE 2 49 (H) 06/27/2022    CREATININE 2 24 (H) 06/22/2022    CREATININE 2 23 (H) 06/21/2022    CREATININE 2 23 (H) 06/20/2022    CREATININE 2 50 (H) 06/18/2022    CREATININE 2 04 (H) 06/14/2022    CREATININE 2 06 (H) 06/13/2022    CREATININE 2 14 (H) 06/12/2022    CREATININE 2 00 (H) 06/11/2022    CREATININE 2 12 (H) 06/10/2022    CREATININE 1 72 (H) 06/09/2022    CREATININE 1 86 (H) 06/08/2022    CREATININE 1 74 (H) 06/07/2022      Lab Results   Component Value Date    COLORU Yellow 07/09/2022    CLARITYU Slightly Cloudy 07/09/2022    SPECGRAV 1 015 07/09/2022    PHUR 6 0 07/09/2022    LEUKOCYTESUR Large (A) 07/09/2022    NITRITE Positive (A) 07/09/2022    PROTEIN UA Trace (A) 07/09/2022    GLUCOSEU Negative 07/09/2022    KETONESU Negative 07/09/2022    UROBILINOGEN 0 2 07/09/2022    BILIRUBINUR Negative 07/09/2022    BLOODU Trace-lysed (A) 07/09/2022    RBCUA 1-2 (A) 07/09/2022    WBCUA Innumerable (A) 07/09/2022    EPIS Occasional 07/09/2022    BACTERIA Moderate (A) 07/09/2022      No results found for: LABPROT  No results found for: Jakub Naik  Lab Results   Component Value Date    WBC 8 43 06/30/2022    HGB 7 4 (L) 06/30/2022    HCT 25 3 (L) 06/30/2022    MCV 94 06/30/2022     06/30/2022      Lab Results   Component Value Date    HGB 7 4 (L) 06/30/2022    HGB 8 1 (L) 06/22/2022    HGB 8 3 (L) 06/21/2022    HGB 8 3 (L) 06/20/2022    HGB 8 3 (L) 06/18/2022      Lab Results   Component Value Date    IRON 42 (L) 05/14/2022    TIBC 174 (L) 05/14/2022    FERRITIN 1,619 (H) 05/14/2022      No results found for: PTHCALCIUM, [id number], PHOSPHORUS   Lab Results   Component Value Date    CHOLESTEROL 255 (H) 06/02/2021    HDL 48 06/02/2021    LDLCALC 173 (H) 06/02/2021    TRIG 169 (H) 06/02/2021      Lab Results   Component Value Date    URICACID 8 4 06/27/2022      Lab Results   Component Value Date    HGBA1C 5 4 03/24/2015      No results found for: TSHANTIBODY, F7ESRLJ, FREET4   No results found for: SHAKIR, DSDNAAB, RFIGM   Lab Results   Component Value Date    PROT 7 7 04/08/2015        Portions of the record may have been created with voice recognition software  Occasional wrong word or "sound a like" substitutions may have occurred due to the inherent limitations of voice recognition software  Read the chart carefully and recognize, using context, where substitutions have occurred  If you have any questions, please contact the dictating provider

## 2022-07-19 NOTE — ASSESSMENT & PLAN NOTE
Body mass index is 47 94 kg/m²  BMI greater than 40 is associated with greater risk of progression of renal disease secondary to glomerular hyperfiltration  Recommend weight loss

## 2022-07-19 NOTE — ASSESSMENT & PLAN NOTE
Lab Results   Component Value Date    EGFR 23 07/09/2022    EGFR 27 06/30/2022    EGFR 27 06/27/2022    CREATININE 2 90 (H) 07/09/2022    CREATININE 2 52 (H) 06/30/2022    CREATININE 2 49 (H) 06/27/2022   Renal function worsened since discharge  Patient is followed off RAAS inhibitor  Will need to follow-up with Urology  From the renal standpoint, will pursue stone risk profile for the prevention of further nephrolithiasis  Renally dose medications  Will likely need referral for kidney smart education at follow-up

## 2022-07-19 NOTE — PATIENT INSTRUCTIONS
Start allopurinol 300 mg start half tablet for one week, then go to full tablet  Call Urology to get follow up  Stone risk profile, 24 hour urine collection

## 2022-07-19 NOTE — ASSESSMENT & PLAN NOTE
Lab Results   Component Value Date    EGFR 23 07/09/2022    EGFR 27 06/30/2022    EGFR 27 06/27/2022    CREATININE 2 90 (H) 07/09/2022    CREATININE 2 52 (H) 06/30/2022    CREATININE 2 49 (H) 06/27/2022   Versus progression to stage IV in the setting of recurrent acute kidney injury

## 2022-07-20 ENCOUNTER — OFFICE VISIT (OUTPATIENT)
Dept: INTERNAL MEDICINE CLINIC | Facility: CLINIC | Age: 57
End: 2022-07-20
Payer: COMMERCIAL

## 2022-07-20 VITALS
BODY MASS INDEX: 47.68 KG/M2 | WEIGHT: 296.7 LBS | DIASTOLIC BLOOD PRESSURE: 78 MMHG | HEART RATE: 96 BPM | HEIGHT: 66 IN | OXYGEN SATURATION: 98 % | SYSTOLIC BLOOD PRESSURE: 110 MMHG | RESPIRATION RATE: 18 BRPM | TEMPERATURE: 97.2 F

## 2022-07-20 DIAGNOSIS — N13.9 OBSTRUCTIVE UROPATHY: ICD-10-CM

## 2022-07-20 DIAGNOSIS — N18.9 ACUTE KIDNEY INJURY SUPERIMPOSED ON CKD (HCC): Primary | ICD-10-CM

## 2022-07-20 DIAGNOSIS — N18.32 STAGE 3B CHRONIC KIDNEY DISEASE (HCC): ICD-10-CM

## 2022-07-20 DIAGNOSIS — N11.1 OBSTRUCTIVE PYELONEPHRITIS: ICD-10-CM

## 2022-07-20 DIAGNOSIS — D50.9 IRON DEFICIENCY ANEMIA, UNSPECIFIED IRON DEFICIENCY ANEMIA TYPE: ICD-10-CM

## 2022-07-20 DIAGNOSIS — N17.9 ACUTE KIDNEY INJURY SUPERIMPOSED ON CKD (HCC): Primary | ICD-10-CM

## 2022-07-20 DIAGNOSIS — E66.01 MORBID OBESITY WITH BMI OF 45.0-49.9, ADULT (HCC): ICD-10-CM

## 2022-07-20 DIAGNOSIS — N20.0 NEPHROLITHIASIS: ICD-10-CM

## 2022-07-20 PROCEDURE — 3725F SCREEN DEPRESSION PERFORMED: CPT | Performed by: NURSE PRACTITIONER

## 2022-07-20 PROCEDURE — 99214 OFFICE O/P EST MOD 30 MIN: CPT | Performed by: NURSE PRACTITIONER

## 2022-07-20 PROCEDURE — 1111F DSCHRG MED/CURRENT MED MERGE: CPT | Performed by: NURSE PRACTITIONER

## 2022-07-20 NOTE — PATIENT INSTRUCTIONS
Low Fat Diet   AMBULATORY CARE:   A low-fat diet  is an eating plan that is low in total fat, unhealthy fat, and cholesterol  You may need to follow a low-fat diet if you have trouble digesting or absorbing fat  You may also need to follow this diet if you have high cholesterol  You can also lower your cholesterol by increasing the amount of fiber in your diet  Soluble fiber is a type of fiber that helps to decrease cholesterol levels  Different types of fat in food:   · Limit unhealthy fats  A diet that is high in cholesterol, saturated fat, and trans fat may cause unhealthy cholesterol levels  Unhealthy cholesterol levels increase your risk of heart disease  ? Cholesterol:  Limit intake of cholesterol to less than 200 mg per day  Cholesterol is found in meat, eggs, and dairy  ? Saturated fat:  Limit saturated fat to less than 7% of your total daily calories  Ask your dietitian how many calories you need each day  Saturated fat is found in butter, cheese, ice cream, whole milk, and palm oil  Saturated fat is also found in meat, such as beef, pork, chicken skin, and processed meats  Processed meats include sausage, hot dogs, and bologna  ? Trans fat:  Avoid trans fat as much as possible  Trans fat is used in fried and baked foods  Foods that say trans fat free on the label may still have up to 0 5 grams of trans fat per serving  · Include healthy fats  Replace foods that are high in saturated and trans fat with foods high in healthy fats  This may help to decrease high cholesterol levels  ? Monounsaturated fats: These are found in avocados, nuts, and vegetable oils, such as olive, canola, and sunflower oil  ? Polyunsaturated fats: These can be found in vegetable oils, such as soybean or corn oil  Omega-3 fats can help to decrease the risk of heart disease  Omega-3 fats are found in fish, such as salmon, herring, trout, and tuna   Omega-3 fats can also be found in plant foods, such as walnuts, flaxseed, soybeans, and canola oil  Foods to limit or avoid:   · Grains:      ? Snacks that are made with partially hydrogenated oils, such as chips, regular crackers, and butter-flavored popcorn    ? High-fat baked goods, such as biscuits, croissants, doughnuts, pies, cookies, and pastries    · Dairy:      ? Whole milk, 2% milk, and yogurt and ice cream made with whole milk    ? Half and half creamer, heavy cream, and whipping cream    ? Cheese, cream cheese, and sour cream    · Meats and proteins:      ? High-fat cuts of meat (T-bone steak, regular hamburger, and ribs)    ? Fried meat, poultry (turkey and chicken), and fish    ? Poultry (chicken and turkey) with skin    ? Cold cuts (salami or bologna), hot dogs, yo, and sausage    ? Whole eggs and egg yolks    · Vegetables and fruits with added fat:      ? Fried vegetables or vegetables in butter or high-fat sauces, such as cream or cheese sauces    ? Fried fruit or fruit served with butter or cream    · Fats:      ? Butter, stick margarine, and shortening    ? Coconut, palm oil, and palm kernel oil    Foods to include:   · Grains:      ? Whole-grain breads, cereals, pasta, and brown rice    ? Low-fat crackers and pretzels    · Vegetables and fruits:      ? Fresh, frozen, or canned vegetables (no salt or low-sodium)    ? Fresh, frozen, dried, or canned fruit (canned in light syrup or fruit juice)    ? Avocado    · Low-fat dairy products:      ? Nonfat (skim) or 1% milk    ? Nonfat or low-fat cheese, yogurt, and cottage cheese    · Meats and proteins:      ? Chicken or turkey with no skin    ? Baked or broiled fish    ? Lean beef and pork (loin, round, extra lean hamburger)    ? Beans and peas, unsalted nuts, soy products    ? Egg whites and substitutes    ? Seeds and nuts    · Fats:      ? Unsaturated oil, such as canola, olive, peanut, soybean, or sunflower oil    ? Soft or liquid margarine and vegetable oil spread    ?  Low-fat salad dressing    Other ways to decrease fat:   · Read food labels before you buy foods  Choose foods that have less than 30% of calories from fat  Choose low-fat or fat-free dairy products  Remember that fat free does not mean calorie free  These foods still contain calories, and too many calories can lead to weight gain  · Trim fat from meat and avoid fried food  Trim all visible fat from meat before you cook it  Remove the skin from poultry  Do not l[place] meat, fish, or poultry  Bake, roast, boil, or broil these foods instead  Avoid fried foods  Eat a baked potato instead of [place] fries  Steam vegetables instead of sautéing them in butter  · Add less fat to foods  Use imitation yo bits on salads and baked potatoes instead of regular yo bits  Use fat-free or low-fat salad dressings instead of regular dressings  Use low-fat or nonfat butter-flavored topping instead of regular butter or margarine on popcorn and other foods  Ways to decrease fat in recipes:  Replace high-fat ingredients with low-fat or nonfat ones  This may cause baked goods to be drier than usual  You may need to use nonfat cooking spray on pans to prevent food from sticking  You also may need to change the amount of other ingredients, such as water, in the recipe  Try the following:  · Use low-fat or light margarine instead of regular margarine or shortening  · Use lean ground turkey breast or chicken, or lean ground beef (less than 5% fat) instead of hamburger  · Add 1 teaspoon of canola oil to 8 ounces of skim milk instead of using cream or half and half  · Use grated zucchini, carrots, or apples in breads instead of coconut  · Use blenderized, low-fat cottage cheese, plain tofu, or low-fat ricotta cheese instead of cream cheese  · Use 1 egg white and 1 teaspoon of canola oil, or use ¼ cup (2 ounces) of fat-free egg substitute instead of a whole egg       · Replace half of the oil that is called for in a recipe with applesauce when you bake  Use 3 tablespoons of cocoa powder and 1 tablespoon of canola oil instead of a square of baking chocolate  How to increase fiber:  Eat enough high-fiber foods to get 20 to 30 grams of fiber every day  Slowly increase your fiber intake to avoid stomach cramps, gas, and other problems  · Eat 3 ounces of whole-grain foods each day  An ounce is about 1 slice of bread  Eat whole-grain breads, such as whole-wheat bread  Whole wheat, whole-wheat flour, or other whole grains should be listed as the first ingredient on the food label  Replace white flour with whole-grain flour or use half of each in recipes  Whole-grain flour is heavier than white flour, so you may have to add more yeast or baking powder  · Eat a high-fiber cereal for breakfast   Oatmeal is a good source of soluble fiber  Look for cereals that have bran or fiber in the name  Choose whole-grain products, such as brown rice, barley, and whole-wheat pasta  · Eat more beans, peas, and lentils  For example, add beans to soups or salads  Eat at least 5 cups of fruits and vegetables each day  Eat fruits and vegetables with the peel because the peel is high in fiber  © Copyright [employer] 2022 Information is for End User's use only and may not be sold, redistributed or otherwise used for commercial purposes  All illustrations and images included in CareNotes® are the copyrighted property of A [employer] M [employer]  or [address] [place], [place] [address]mar[address]   The above information is an  only  It is not intended as medical advice for individual conditions or treatments  Talk to your doctor, nurse or pharmacist before following any medical regimen to see if it is safe and effective for you  Heart Healthy Diet   AMBULATORY CARE:   A heart healthy diet  is an eating plan low in unhealthy fats and sodium (salt)  The plan is high in healthy fats and fiber   A heart healthy diet helps improve your cholesterol levels and lowers your risk for heart disease and stroke  A dietitian will teach you how to read and understand food labels  Heart healthy diet guidelines to follow:   · Choose foods that contain healthy fats  ? Unsaturated fats  include monounsaturated and polyunsaturated fats  Unsaturated fat is found in foods such as soybean, canola, olive, corn, and safflower oils  It is also found in soft tub margarine that is made with liquid vegetable oil  ? Omega-3 fat  is found in certain fish, such as salmon, tuna, and trout, and in walnuts and flaxseed  Eat fish high in omega-3 fats at least 2 times a week  · Get 20 to 30 grams of fiber each day  Fruits, vegetables, whole-grain foods, and legumes (cooked beans) are good sources of fiber  · Limit or do not have unhealthy fats  ? Cholesterol  is found in animal foods, such as eggs and lobster, and in dairy products made from whole milk  Limit cholesterol to less than 200 mg each day  ? Saturated fat  is found in meats, such as yo and hamburger  It is also found in chicken or turkey skin, whole milk, and butter  Limit saturated fat to less than 7% of your total daily calories  ? Trans fat  is found in packaged foods, such as potato chips and cookies  It is also in hard margarine, some fried foods, and shortening  Do not eat foods that contain trans fats  · Limit sodium as directed  You may be told to limit sodium to 2,000 to 2,300 mg each day  Choose low-sodium or no-salt-added foods  Add little or no salt to food you prepare  Use herbs and spices in place of salt  Include the following in your heart healthy plan:  Ask your dietitian or healthcare provider how many servings to have from each of the following food groups:  · Grains:      ? Whole-wheat breads, cereals, and pastas, and brown rice    ? Low-fat, low-sodium crackers and chips    · Vegetables:      ? Broccoli, green beans, green peas, and spinach    ? Collards, kale, and lima beans    ?  Carrots, sweet potatoes, tomatoes, and peppers    ? Canned vegetables with no salt added    · Fruits:      ? Bananas, peaches, pears, and pineapple    ? Grapes, raisins, and dates    ? Oranges, tangerines, grapefruit, orange juice, and grapefruit juice    ? Apricots, mangoes, melons, and papaya    ? Raspberries and strawberries    ? Canned fruit with no added sugar    · Low-fat dairy:      ? Nonfat (skim) milk, 1% milk, and low-fat almond, cashew, or soy milks fortified with calcium    ? Low-fat cheese, regular or frozen yogurt, and cottage cheese    · Meats and proteins:      ? Lean cuts of beef and pork (loin, leg, round), skinless chicken and turkey    ? Legumes, soy products, egg whites, or nuts    Limit or do not include the following in your heart healthy plan:   · Unhealthy fats and oils:      ? Whole or 2% milk, cream cheese, sour cream, or cheese    ? High-fat cuts of beef (T-bone steaks, ribs), chicken or turkey with skin, and organ meats such as liver    ? Butter, stick margarine, shortening, and cooking oils such as coconut or palm oil    · Foods and liquids high in sodium:      ? Packaged foods, such as frozen dinners, cookies, macaroni and cheese, and cereals with more than 300 mg of sodium per serving    ? Vegetables with added sodium, such as instant potatoes, vegetables with added sauces, or regular canned vegetables    ? Cured or smoked meats, such as hot dogs, yo, and sausage    ? High-sodium ketchup, barbecue sauce, salad dressing, pickles, olives, soy sauce, or miso    · Foods and liquids high in sugar:      ? Candy, cake, cookies, pies, or doughnuts    ? Soft drinks (soda), sports drinks, or sweetened tea    ? Canned or dry mixes for cakes, soups, sauces, or gravies    Other healthy heart guidelines:   · Do not smoke  Nicotine and other chemicals in cigarettes and cigars can cause lung and heart damage  Ask your healthcare provider for information if you currently smoke and need help to quit  E-cigarettes or smokeless tobacco still contain nicotine  Talk to your healthcare provider before you use these products  · Limit or do not drink alcohol as directed  Alcohol can damage your heart and raise your blood pressure  Your healthcare provider may give you specific daily and weekly limits  The general recommended limit is 1 drink a day for women 21 or older and for men 72 or older  Do not have more than 3 drinks in a day or 7 in a week  The recommended limit is 2 drinks a day for men 24to 59years of age  Do not have more than 4 drinks in a day or 14 in a week  A drink of alcohol is 12 ounces of beer, 5 ounces of wine, or 1½ ounces of liquor  · Exercise regularly  Exercise can help you maintain a healthy weight and improve your blood pressure and cholesterol levels  Regular exercise can also decrease your risk for heart problems  Ask your healthcare provider about the best exercise plan for you  Do not start an exercise program without asking your healthcare provider  Follow up with your doctor or cardiologist as directed:  Write down your questions so you remember to ask them during your visits  © Copyright [employer] 2022 Information is for End User's use only and may not be sold, redistributed or otherwise used for commercial purposes  All illustrations and images included in CareNotes® are the copyrighted property of [employer] M [employer]  or [facility] Saman Sharma   The above information is an  only  It is not intended as medical advice for individual conditions or treatments  Talk to your doctor, nurse or pharmacist before following any medical regimen to see if it is safe and effective for you

## 2022-07-20 NOTE — PROGRESS NOTES
Assessment/Plan: Patient did see Nephrology with repeat labs due next week  Following up with Urology on the 27th  Will hold off on Ortho  Did advise to keep moving and use his incentive spirometer  He is deferring any depression medications  Patient is following up with Dr Sheri Dominguez on the 19th  Will repeat an iron panel  Did advise if any issues until then to call office  No problem-specific Assessment & Plan notes found for this encounter  Problem List Items Addressed This Visit        Genitourinary    Acute kidney injury superimposed on CKD stage 3 - Primary    Stage 3b chronic kidney disease ([address] )    Obstructive uropathy    Obstructive pyelonephritis    Nephrolithiasis       Other    Anemia    Relevant Orders    Iron Panel (Includes Ferritin, Iron Sat%, Iron, and TIBC)    Morbid obesity with BMI of 45 0-49 9, adult ([facility])            Subjective:      Patient ID: Kayden Shaw is a 65 y o  male  Kayden Shaw is a 55 y o  male with a PMH significant for CKD, HLD, HTN and arthritis who presented to the [facility] ED initially with worsening left-sided flank pain with generalized malaise/fatigue  He had a recent admission at [facility] for obstructive pyelonephritis secondary to a 3 mm stone with ureteral stent placement  Pt developed E  Coli bacteremia during hospitalization and discharged with amoxicillian until 5/4  In the ED patient was found to have lactic acidosis, leukocytosis and worsening ADRIANNE  CT revealed moderate perinephric stranding and mild hydronephrosis in the left kidney with multiple bilateral renal calculi  Patient transferred to [facility] to be evaluated by Urology  S/p perc nephrostomy tube drain replacement by IR on 5/10  Positive E  coli in one blood culture from 5/10 thought to be associated with UTI/Pyelonephritis  IV antibiotics initiated and then ultimately transitioned to PO antibiotics  During this stay, Ortho was consulted for ongoing left knee pain   His left knee was aspirated  Blood was noted but no effusion aspirated  Xray of the knee without acute osseous abnormality but did reveal tricompartmental osteoarthritis similar to a prior imaging the year prior  Ortho considering future joint steroid injection once acute medical comorbidities resolve  Patient arrived to [facility] on 5/19 after PT/OT recommendations  He progressed well however on 6/6 he developed sepsis meeting the criteria with elevation in WBCS, creatinine level, elevated temp and tachycardia  Patient also with c/o weakness, fatigue and diaphoresis  Pt was transferred to [facility] on 6/7 for urology evaluation  Urine and blood cultures obtaiHe He hned and IV antibiotics were initiated  Urine culture with gram negative rods of E  Coli/MDRO Citrobacter  Pt was recommended for inpatient clearance ureteroscopy  ID consulted and placed patient on Cipro PO x7 days (6/16)  Renal US unremarkable  6/9 patient went to OR with Urology for cystoscopy and left ureteroscopy/lithotripsy with ureteral stent exchange and removal of nephrostomy tube  Stent was then discontinued on 6/13  Pt is to follow up with urology as OP in 6 months with a renal US  Patient participated with PT/OT and was deemed appropriate to return to acute rehabilitation  He arrived to [facility] 6/17  He states he is still not feeling well yet and is very fatigued and SOB  He is trying to move around and stay positive  He does have PT coming out with him  He is seeing Urology on the 27th and did see Nephrology yesterday  He has not yet made an appointment with Ortho for his knee  He is feeling down and tearful at times but does not want to take another pill  He is having a bad taste in his mouth and is not sure what this is  He states it does feel metallic  He denies any bleeding  He offers no other issues        The following portions of the patient's history were reviewed and updated as appropriate: He  has a past medical history of Arthritis, Chronic kidney disease, Gout, Hyperlipidemia, Hypertension, Kidney stone, Obstructive pyelonephritis, and Sepsis ([facility] )  He   Patient Active Problem List    Diagnosis Date Noted    Morbid obesity with BMI of 45 0-49 9, adult ([facility] ) 07/20/2022    Nephrolithiasis 07/19/2022    Dysgeusia 07/19/2022    Left elbow pain 06/23/2022    Diarrhea 06/13/2022    Anemia 05/13/2022    Obstructive pyelonephritis 04/23/2022    Obstructive uropathy 04/21/2022    Mixed hyperlipidemia 06/02/2021    Morbid obesity 06/02/2021    Recurrent left knee pain 06/02/2021    Insomnia 09/16/2019    Essential hypertension 08/27/2019    Severe sepsis on admission 08/23/2019    Stage 3b chronic kidney disease ([facility] ) 08/23/2019    Acute kidney injury superimposed on CKD stage 3 08/06/2019    Bacteremia due to Escherichia coli 08/04/2019    Lactic acidosis 08/04/2019    History of gout 07/15/2019    Vitamin D deficiency 06/10/2015    Morbid obesity ([facility] ) 03/23/2015     He  has a past surgical history that includes No past surgeries (03/23/2015); [place] retrograde pyelogram (8/4/2019); pr cystourethroscopy,ureter catheter (Right, 8/4/2019); pr cysto/uretero w/lithotripsy &indwell stent insrt (Right, 9/26/2019); FL retrograde pyelogram (9/26/2019); FL retrograde pyelogram (4/22/2022); pr cystourethroscopy,ureter catheter (Left, 4/22/2022); IR nephrostomy tube placement (5/10/2022); and pr cysto/uretero w/lithotripsy &indwell stent insrt (Left, 6/9/2022)  His family history includes Cancer in his mother; Heart disease in his father; No Known Problems in his sister  He  reports that he has never smoked  He has never used smokeless tobacco  He reports previous alcohol use  He reports that he does not use drugs    Current Outpatient Medications   Medication Sig Dispense Refill    acetaminophen (TYLENOL) 325 mg tablet Take 2 tablets (650 mg total) by mouth every 6 (six) hours as needed for mild pain, headaches or fever 30 tablet 0    allopurinol (ZYLOPRIM) 300 mg tablet Take 1 tablet (300 mg total) by mouth daily 30 tablet 3    amLODIPine (NORVASC) 10 mg tablet Take 1 tablet (10 mg total) by mouth daily 60 tablet 1    cholecalciferol (VITAMIN D3) 400 units tablet Take 400 Units by mouth in the morning   metoprolol succinate (TOPROL-XL) 100 mg 24 hr tablet Take 1 tablet (100 mg total) by mouth daily 30 tablet 0    potassium chloride (K-DUR,KLOR-CON) 10 mEq tablet Take 1 tablet (10 mEq total) by mouth daily 30 tablet 0    tamsulosin (FLOMAX) 0 4 mg Take 1 capsule (0 4 mg total) by mouth daily with dinner 30 capsule 0     No current facility-administered medications for this visit  Current Outpatient Medications on File Prior to Visit   Medication Sig    acetaminophen (TYLENOL) 325 mg tablet Take 2 tablets (650 mg total) by mouth every 6 (six) hours as needed for mild pain, headaches or fever    allopurinol (ZYLOPRIM) 300 mg tablet Take 1 tablet (300 mg total) by mouth daily    amLODIPine (NORVASC) 10 mg tablet Take 1 tablet (10 mg total) by mouth daily    cholecalciferol (VITAMIN D3) 400 units tablet Take 400 Units by mouth in the morning   metoprolol succinate (TOPROL-XL) 100 mg 24 hr tablet Take 1 tablet (100 mg total) by mouth daily    potassium chloride (K-DUR,KLOR-CON) 10 mEq tablet Take 1 tablet (10 mEq total) by mouth daily    tamsulosin (FLOMAX) 0 4 mg Take 1 capsule (0 4 mg total) by mouth daily with dinner    [DISCONTINUED] Diclofenac Sodium (VOLTAREN) 1 % Apply 2 g topically 4 (four) times a day as needed (pain) (Patient not taking: Reported on 7/19/2022)     No current facility-administered medications on file prior to visit  He has No Known Allergies       Review of Systems   Constitutional: Positive for fatigue  Respiratory: Positive for shortness of breath  Musculoskeletal: Positive for arthralgias and myalgias  All other systems reviewed and are negative        Below is the patient's most recent value for Albumin, ALT, AST, BUN, Calcium, Chloride, Cholesterol, CO2, Creatinine, GFR, Glucose, HDL, Hematocrit, Hemoglobin, Hemoglobin A1C, LDL, Magnesium, Phosphorus, Platelets, Potassium, PSA, Sodium, Triglycerides, and WBC  Lab Results   Component Value Date    ALT 15 07/09/2022    AST 11 07/09/2022    BUN 30 (H) 07/09/2022    CALCIUM 10 1 07/09/2022     07/09/2022    CHOL 235 08/31/2015    CO2 21 07/09/2022    CREATININE 2 90 (H) 07/09/2022    HDL 48 06/02/2021    HCT 25 3 (L) 06/30/2022    HGB 7 4 (L) 06/30/2022    HGBA1C 5 4 03/24/2015    MG 1 8 (L) 06/21/2022    PHOS 3 7 06/21/2022     06/30/2022    K 4 0 07/09/2022    PSA 0 4 03/24/2015     (L) 04/08/2015    TRIG 169 (H) 06/02/2021    WBC 8 43 06/30/2022     Note: for a comprehensive list of the patient's lab results, access the Results Review activity  Objective:      /78   Pulse 96   Temp (!) 97 2 °F (36 2 °C)   Resp 18   Ht 5' 6" (1 676 m)   Wt 135 kg (296 lb 11 2 oz)   SpO2 98%   BMI 47 89 kg/m²          Physical Exam  Vitals reviewed  Constitutional:       Appearance: Normal appearance  He is obese  HENT:      Head: Normocephalic and atraumatic  Right Ear: Tympanic membrane, ear canal and external ear normal       Left Ear: Tympanic membrane, ear canal and external ear normal       Nose: Nose normal       Mouth/Throat:      Mouth: Mucous membranes are moist       Pharynx: Oropharynx is clear  Eyes:      Extraocular Movements: Extraocular movements intact  Conjunctiva/sclera: Conjunctivae normal       Pupils: Pupils are equal, round, and reactive to light  Cardiovascular:      Rate and Rhythm: Normal rate and regular rhythm  Pulses: Normal pulses  Pulmonary:      Effort: Pulmonary effort is normal       Breath sounds: Normal breath sounds  Abdominal:      General: Abdomen is flat  Bowel sounds are normal       Palpations: Abdomen is soft  Musculoskeletal:         General: Swelling present  Normal range of motion  Cervical back: Normal range of motion and neck supple  Skin:     General: Skin is warm and dry  Capillary Refill: Capillary refill takes less than 2 seconds  Neurological:      General: No focal deficit present  Mental Status: He is alert and oriented to person, place, and time  Mental status is at baseline  Psychiatric:         Mood and Affect: Mood normal          Behavior: Behavior normal          Thought Content: Thought content normal          Judgment: Judgment normal          BMI Counseling: Body mass index is 47 89 kg/m²  The BMI is above normal  Nutrition recommendations include reducing portion sizes, decreasing overall calorie intake, 3-5 servings of fruits/vegetables daily, reducing fast food intake, consuming healthier snacks, decreasing soda and/or juice intake, moderation in carbohydrate intake, increasing intake of lean protein, reducing intake of saturated fat and trans fat and reducing intake of cholesterol

## 2022-07-27 ENCOUNTER — OFFICE VISIT (OUTPATIENT)
Dept: UROLOGY | Facility: CLINIC | Age: 57
End: 2022-07-27
Payer: COMMERCIAL

## 2022-07-27 VITALS
WEIGHT: 297 LBS | DIASTOLIC BLOOD PRESSURE: 72 MMHG | BODY MASS INDEX: 47.94 KG/M2 | HEART RATE: 88 BPM | SYSTOLIC BLOOD PRESSURE: 118 MMHG

## 2022-07-27 DIAGNOSIS — N20.0 NEPHROLITHIASIS: Primary | ICD-10-CM

## 2022-07-27 DIAGNOSIS — N18.32 STAGE 3B CHRONIC KIDNEY DISEASE (CKD) (HCC): ICD-10-CM

## 2022-07-27 PROCEDURE — 1111F DSCHRG MED/CURRENT MED MERGE: CPT

## 2022-07-27 PROCEDURE — 99213 OFFICE O/P EST LOW 20 MIN: CPT

## 2022-07-27 PROCEDURE — 82360 CALCULUS ASSAY QUANT: CPT

## 2022-07-27 NOTE — PROGRESS NOTES
7/27/2022    No chief complaint on file  Assessment and Plan    65 y o  male     1  Left Renal Calculi  · S/p cystoscopy, left ureteroscopy, holmium laser lithotripsy, and exchange of left ureteral stent and removal of left nephrostomy tube by Dr Uriel Haji on 6/09/2022  · Reports passing of stone fragments and collected one for stone analysis  · Continue tamsulosin 0 4 mg daily  · Follow-up in 6 months with repeat Renal US and Xray KUB    2  Stage 3b chronic kidney disease  · Creatinine 2 90 (7/09/2022)  · Follows with Nephrology with last office visit on 7/19/2022  · Stone risk profile ordered by Nephrology      History of Present Illness  Sofya Holloway is a 65 y o  male presenting for hospital follow-up  Patient had origelodially presented to [address] [place] [address] Drive in April and was admitted for sepsis secondary to a 3 mm left mid ureteral obstructing stone and pyelonephritis  A left ureteral stent was placed on 4/22/2022  He was readmitted few weeks later for sepsis again, was found to have persistent hydronephrosis despite stent, he underwent nephrostomy tube placement by IR 5/10/22  Admissions have been complicated by sepsis and prolonged intubations  He was admitted again on 6/07 due to sepsis, CT scan from that time showed a decompressed left collecting system with ureteral stent and nephrostomy tube in correct position  He is s/p cystoscopy, left ureteroscopy, holmium laser lithotripsy, and exchange of left ureteral stent and removal of left nephrostomy tube by Dr Uriel Haji on 6/09/2022  He presents today reporting doing well since discharge  He has been able to pass 2 stone fragment and was able to collect one and brought it to the office today for stone analysis  Over the past week he reports a weeker urinary stream, but denies incomplete bladder emptying  He does take tamsulosin 0 4 mg daily but reports this has been prescribed for his stone disease and not BPH               Review of Systems Constitutional: Negative for chills and fever  HENT: Negative for congestion and sore throat  Respiratory: Negative for cough and shortness of breath  Cardiovascular: Negative for chest pain and leg swelling  Gastrointestinal: Negative for abdominal pain, constipation, diarrhea, nausea and vomiting  Genitourinary: Positive for decreased urine volume  Negative for difficulty urinating, dysuria, flank pain, frequency, hematuria and urgency  Musculoskeletal: Positive for gait problem (ambulates with walker)  Negative for back pain  Skin: Negative for wound  Allergic/Immunologic: Negative for immunocompromised state  Neurological: Negative for dizziness, weakness and numbness  Hematological: Does not bruise/bleed easily  Vitals  Vitals:    07/27/22 1449   BP: 118/72   Pulse: 88   Weight: 135 kg (297 lb)       Physical Exam  Vitals reviewed  Constitutional:       General: He is not in acute distress  Appearance: Normal appearance  He is not ill-appearing or toxic-appearing  HENT:      Head: Normocephalic and atraumatic  Eyes:      General: No scleral icterus  Conjunctiva/sclera: Conjunctivae normal    Cardiovascular:      Rate and Rhythm: Normal rate  Pulmonary:      Effort: Pulmonary effort is normal  No respiratory distress  Abdominal:      Palpations: Abdomen is soft  Tenderness: There is no abdominal tenderness  There is no right CVA tenderness or left CVA tenderness  Hernia: No hernia is present  Musculoskeletal:      Cervical back: Normal range of motion  Right lower leg: No edema  Left lower leg: No edema  Skin:     General: Skin is warm and dry  Coloration: Skin is not jaundiced or pale  Neurological:      General: No focal deficit present  Mental Status: He is alert and oriented to person, place, and time  Mental status is at baseline        Gait: Gait normal    Psychiatric:         Mood and Affect: Mood normal  Behavior: Behavior normal          Thought Content: Thought content normal          Judgment: Judgment normal          Past History  Past Medical History:   Diagnosis Date    Arthritis     Chronic kidney disease     Gout     Hyperlipidemia     Hypertension     Kidney stone     Obstructive pyelonephritis     Sepsis ([address] )      Social History     Socioeconomic History    Marital status: Single     Spouse name: None    Number of children: None    Years of education: None    Highest education level: None   Occupational History    None   Tobacco Use    Smoking status: Never Smoker    Smokeless tobacco: Never Used   Vaping Use    Vaping Use: Never used   Substance and Sexual Activity    Alcohol use: Not Currently     Alcohol/week: 0 0 standard drinks     Comment: rarely    Drug use: No    Sexual activity: Not Currently   Other Topics Concern    None   Social History Narrative    Caffeine use      Social Determinants of Health     Financial Resource Strain: Not on file   Food Insecurity: No Food Insecurity    Worried About Running Out of Food in the Last Year: Never true    Isis of Food in the Last Year: Never true   Transportation Needs: No Transportation Needs    Lack of Transportation (Medical): No    Lack of Transportation (Non-Medical):  No   Physical Activity: Not on file   Stress: Not on file   Social Connections: Not on file   Intimate Partner Violence: Not on file   Housing Stability: Low Risk     Unable to Pay for Housing in the Last Year: No    Number of Places Lived in the Last Year: 1    Unstable Housing in the Last Year: No     Social History     Tobacco Use   Smoking Status Never Smoker   Smokeless Tobacco Never Used     Family History   Problem Relation Age of Onset    Cancer Mother     Heart disease Father     No Known Problems Sister        The following portions of the patient's history were reviewed and updated as appropriate allergies, current medications, past medical history, past social history, past surgical history and problem list    [email or web address]  CT ABDOMEN AND PELVIS WITHOUT IV CONTRAST     INDICATION:   Sepsis  sepsis, known left PCn and ureteral stent      COMPARISON:  5/10/2022     TECHNIQUE:  CT examination of the abdomen and pelvis was performed without intravenous contrast  This examination was performed without intravenous contrast in the context of the critical nationwide Omnipaque shortage  Axial, sagittal, and coronal 2D   reformatted images were created from the source data and submitted for interpretation       Radiation dose length product (DLP) for this visit:  1320 21 mGy-cm   This examination, like all CT scans performed in the [facility], was performed utilizing techniques to minimize radiation dose exposure, including the use of   iterative reconstruction and automated exposure control       Enteric contrast was administered       FINDINGS:     ABDOMEN     LOWER CHEST:  Small hiatal hernia noted  No other clinically significant abnormality identified in the visualized lower chest      LIVER/BILIARY TREE:  Unremarkable      GALLBLADDER:  No calcified gallstones  No pericholecystic inflammatory change      SPLEEN:  Unremarkable      PANCREAS:  Unremarkable      ADRENAL GLANDS:  Unremarkable      KIDNEYS/URETERS:  Subcentimeter right mid pole and right renal lower pole nonobstructing calculi  Atrophic 6 cm right kidney      Small 11 cm left kidney  Left nephroureteral stent in addition to left posterior approach percutaneous nephrostomy tube  No left hydronephrosis  Few scattered subcentimeter left renal ankle nonobstructing calculi      STOMACH AND BOWEL:  Unremarkable      APPENDIX:  No findings to suggest appendicitis      ABDOMINOPELVIC CAVITY:  No ascites  No pneumoperitoneum    No lymphadenopathy      VESSELS:  Unremarkable for patient's age      PELVIS     REPRODUCTIVE ORGANS:  Unremarkable for patient's age      URINARY BLADDER:  2 mm calculus along the right dependent portion of the bladder      ABDOMINAL WALL/INGUINAL REGIONS:  Scattered wall thickening bilaterally in the pelvis with subcutaneous stranding could relate to cellulitis      OSSEOUS STRUCTURES:  No acute fracture or destructive osseous lesion      IMPRESSION:     Subcentimeter right mid pole and right renal lower pole nonobstructing calculi  Atrophic 6 cm right kidney      Small 11 cm left kidney  Left nephroureteral stent in addition to left posterior approach percutaneous nephrostomy tube  No left hydronephrosis  Few scattered subcentimeter left renal ankle nonobstructing calculi      2 mm calculus along the right dependent portion of the bladder  This could represent previously identified left mid ureteral calculus     Wall thickening in the bilateral pelvis laterally could relate to cellulitis in the appropriate clinical setting, correlate clinically  Results  No results found for this or any previous visit (from the past 1 hour(s)) ]  Lab Results   Component Value Date    PSA 0 4 03/24/2015     Lab Results   Component Value Date    GLUCOSE 125 05/10/2022    CALCIUM 10 1 07/09/2022     (L) 04/08/2015    K 4 0 07/09/2022    CO2 21 07/09/2022     07/09/2022    BUN 30 (H) 07/09/2022    CREATININE 2 90 (H) 07/09/2022     Lab Results   Component Value Date    WBC 8 43 06/30/2022    HGB 7 4 (L) 06/30/2022    HCT 25 3 (L) 06/30/2022    MCV 94 06/30/2022     06/30/2022       Please Note:  Voice dictation software has been used to create this document  There may be inadvertent transcriptions errors       Arsen Cain

## 2022-08-02 LAB
COLOR STONE: NORMAL
COM MFR STONE: 10 %
COMMENT-STONE3: NORMAL
COMPOSITION: NORMAL
LABORATORY COMMENT REPORT: NORMAL
PHOTO: NORMAL
SIZE STONE: NORMAL MM
SPEC SOURCE SUBJ: NORMAL
STONE ANALYSIS-IMP: NORMAL
STONE ANALYSIS-IMP: NORMAL
URATE MFR STONE: 90 %
WT STONE: 60 MG

## 2022-08-08 DIAGNOSIS — I10 ESSENTIAL HYPERTENSION: ICD-10-CM

## 2022-08-08 RX ORDER — METOPROLOL SUCCINATE 100 MG/1
100 TABLET, EXTENDED RELEASE ORAL DAILY
Qty: 30 TABLET | Refills: 0 | Status: SHIPPED | OUTPATIENT
Start: 2022-08-08 | End: 2022-09-07

## 2022-08-19 ENCOUNTER — OFFICE VISIT (OUTPATIENT)
Dept: FAMILY MEDICINE CLINIC | Facility: CLINIC | Age: 57
End: 2022-08-19
Payer: COMMERCIAL

## 2022-08-19 ENCOUNTER — APPOINTMENT (OUTPATIENT)
Dept: LAB | Facility: MEDICAL CENTER | Age: 57
End: 2022-08-19
Payer: COMMERCIAL

## 2022-08-19 VITALS
WEIGHT: 304.8 LBS | TEMPERATURE: 97.8 F | BODY MASS INDEX: 48.98 KG/M2 | SYSTOLIC BLOOD PRESSURE: 104 MMHG | DIASTOLIC BLOOD PRESSURE: 72 MMHG | HEIGHT: 66 IN | HEART RATE: 85 BPM | OXYGEN SATURATION: 99 %

## 2022-08-19 DIAGNOSIS — N18.9 ACUTE KIDNEY INJURY SUPERIMPOSED ON CKD (HCC): ICD-10-CM

## 2022-08-19 DIAGNOSIS — E78.2 MIXED HYPERLIPIDEMIA: ICD-10-CM

## 2022-08-19 DIAGNOSIS — Z87.39 HISTORY OF GOUT: ICD-10-CM

## 2022-08-19 DIAGNOSIS — N18.32 STAGE 3B CHRONIC KIDNEY DISEASE (HCC): ICD-10-CM

## 2022-08-19 DIAGNOSIS — D50.9 IRON DEFICIENCY ANEMIA, UNSPECIFIED IRON DEFICIENCY ANEMIA TYPE: ICD-10-CM

## 2022-08-19 DIAGNOSIS — I10 ESSENTIAL HYPERTENSION: ICD-10-CM

## 2022-08-19 DIAGNOSIS — N18.32 STAGE 3B CHRONIC KIDNEY DISEASE (HCC): Primary | ICD-10-CM

## 2022-08-19 DIAGNOSIS — D64.9 ANEMIA, UNSPECIFIED TYPE: ICD-10-CM

## 2022-08-19 DIAGNOSIS — N17.9 ACUTE KIDNEY INJURY SUPERIMPOSED ON CKD (HCC): ICD-10-CM

## 2022-08-19 DIAGNOSIS — R43.2 DYSGEUSIA: ICD-10-CM

## 2022-08-19 LAB
ANION GAP SERPL CALCULATED.3IONS-SCNC: 7 MMOL/L (ref 4–13)
BASOPHILS # BLD AUTO: 0.09 THOUSANDS/ΜL (ref 0–0.1)
BASOPHILS NFR BLD AUTO: 1 % (ref 0–1)
BUN SERPL-MCNC: 28 MG/DL (ref 5–25)
CALCIUM SERPL-MCNC: 9.6 MG/DL (ref 8.3–10.1)
CHLORIDE SERPL-SCNC: 109 MMOL/L (ref 96–108)
CO2 SERPL-SCNC: 21 MMOL/L (ref 21–32)
CREAT SERPL-MCNC: 2.1 MG/DL (ref 0.6–1.3)
EOSINOPHIL # BLD AUTO: 0.53 THOUSAND/ΜL (ref 0–0.61)
EOSINOPHIL NFR BLD AUTO: 5 % (ref 0–6)
ERYTHROCYTE [DISTWIDTH] IN BLOOD BY AUTOMATED COUNT: 17.8 % (ref 11.6–15.1)
FERRITIN SERPL-MCNC: 312 NG/ML (ref 8–388)
GFR SERPL CREATININE-BSD FRML MDRD: 34 ML/MIN/1.73SQ M
GLUCOSE SERPL-MCNC: 101 MG/DL (ref 65–140)
HCT VFR BLD AUTO: 40.8 % (ref 36.5–49.3)
HGB BLD-MCNC: 12 G/DL (ref 12–17)
IMM GRANULOCYTES # BLD AUTO: 0.06 THOUSAND/UL (ref 0–0.2)
IMM GRANULOCYTES NFR BLD AUTO: 1 % (ref 0–2)
IRON SATN MFR SERPL: 32 % (ref 20–50)
IRON SERPL-MCNC: 80 UG/DL (ref 65–175)
LDLC SERPL DIRECT ASSAY-MCNC: 143 MG/DL (ref 0–100)
LYMPHOCYTES # BLD AUTO: 3.08 THOUSANDS/ΜL (ref 0.6–4.47)
LYMPHOCYTES NFR BLD AUTO: 30 % (ref 14–44)
MCH RBC QN AUTO: 28.4 PG (ref 26.8–34.3)
MCHC RBC AUTO-ENTMCNC: 29.4 G/DL (ref 31.4–37.4)
MCV RBC AUTO: 97 FL (ref 82–98)
MONOCYTES # BLD AUTO: 0.61 THOUSAND/ΜL (ref 0.17–1.22)
MONOCYTES NFR BLD AUTO: 6 % (ref 4–12)
NEUTROPHILS # BLD AUTO: 5.87 THOUSANDS/ΜL (ref 1.85–7.62)
NEUTS SEG NFR BLD AUTO: 57 % (ref 43–75)
NRBC BLD AUTO-RTO: 0 /100 WBCS
PLATELET # BLD AUTO: 329 THOUSANDS/UL (ref 149–390)
PMV BLD AUTO: 9.8 FL (ref 8.9–12.7)
POTASSIUM SERPL-SCNC: 4 MMOL/L (ref 3.5–5.3)
RBC # BLD AUTO: 4.22 MILLION/UL (ref 3.88–5.62)
SODIUM SERPL-SCNC: 137 MMOL/L (ref 135–147)
TIBC SERPL-MCNC: 251 UG/DL (ref 250–450)
URATE SERPL-MCNC: 5.8 MG/DL (ref 3.5–8.5)
WBC # BLD AUTO: 10.24 THOUSAND/UL (ref 4.31–10.16)

## 2022-08-19 PROCEDURE — 83540 ASSAY OF IRON: CPT

## 2022-08-19 PROCEDURE — 36415 COLL VENOUS BLD VENIPUNCTURE: CPT

## 2022-08-19 PROCEDURE — 82728 ASSAY OF FERRITIN: CPT

## 2022-08-19 PROCEDURE — 83550 IRON BINDING TEST: CPT

## 2022-08-19 PROCEDURE — 83721 ASSAY OF BLOOD LIPOPROTEIN: CPT | Performed by: FAMILY MEDICINE

## 2022-08-19 PROCEDURE — 80048 BASIC METABOLIC PNL TOTAL CA: CPT

## 2022-08-19 PROCEDURE — 99214 OFFICE O/P EST MOD 30 MIN: CPT | Performed by: FAMILY MEDICINE

## 2022-08-19 PROCEDURE — 85025 COMPLETE CBC W/AUTO DIFF WBC: CPT

## 2022-08-19 PROCEDURE — 84550 ASSAY OF BLOOD/URIC ACID: CPT

## 2022-08-19 NOTE — PROGRESS NOTES
Assessment/Plan:  Patient will follow-up with his specialists as scheduled  He will get blood work done today  I added an LDL cholesterol to his blood work  We will see him back in 3 months or p r n     Problem List Items Addressed This Visit        Cardiovascular and Mediastinum    Essential hypertension       Genitourinary    Stage 3b chronic kidney disease ([facility] [address] ) - Primary       Other    Mixed hyperlipidemia    Relevant Orders    LDL cholesterol, direct           Diagnoses and all orders for this visit:    Stage 3b chronic kidney disease ([facility] [address] )    Mixed hyperlipidemia  -     LDL cholesterol, direct    Essential hypertension      No problem-specific Assessment & Plan notes found for this encounter  Subjective:      Patient ID: Franco Franco is a 65 y o  male  New patient to our office  Patient has a history of chronic kidney disease and nephrolithiasis  Patient has been following up with Nephrology and Urology  Patient currently getting home nursing and home PT  Patient not able to go back to work yet, because of leg weakness  He states they are slowly improving  Patient denies any chest pain or shortness of breath  The following portions of the patient's history were reviewed and updated as appropriate:   He has a past medical history of Arthritis, Chronic kidney disease, Gout, Hyperlipidemia, Hypertension, Kidney stone, Obstructive pyelonephritis, and Sepsis ([facility] )  ,  does not have any pertinent problems on file  ,   has a past surgical history that includes No past surgeries (03/23/2015); FL retrograde pyelogram (8/4/2019); pr cystourethroscopy,ureter catheter (Right, 8/4/2019); pr cysto/uretero w/lithotripsy &indwell stent insrt (Right, 9/26/2019); FL retrograde pyelogram (9/26/2019); FL retrograde pyelogram (4/22/2022); pr cystourethroscopy,ureter catheter (Left, 4/22/2022);  IR nephrostomy tube placement (5/10/2022); and pr cysto/uretero w/lithotripsy &indwell stent insrt (Left, 6/9/2022)  ,  family history includes Cancer in his mother; Heart disease in his father; No Known Problems in his sister  ,   reports that he has never smoked  He has never used smokeless tobacco  He reports previous alcohol use  He reports that he does not use drugs  ,  has No Known Allergies     Current Outpatient Medications   Medication Sig Dispense Refill    acetaminophen (TYLENOL) 325 mg tablet Take 2 tablets (650 mg total) by mouth every 6 (six) hours as needed for mild pain, headaches or fever 30 tablet 0    allopurinol (ZYLOPRIM) 300 mg tablet Take 1 tablet (300 mg total) by mouth daily 30 tablet 3    amLODIPine (NORVASC) 10 mg tablet Take 1 tablet (10 mg total) by mouth daily 60 tablet 1    cholecalciferol (VITAMIN D3) 400 units tablet Take 400 Units by mouth in the morning   metoprolol succinate (TOPROL-XL) 100 mg 24 hr tablet Take 1 tablet (100 mg total) by mouth daily 30 tablet 0    tamsulosin (FLOMAX) 0 4 mg Take 1 capsule (0 4 mg total) by mouth daily with dinner 30 capsule 0    potassium chloride (K-DUR,KLOR-CON) 10 mEq tablet Take 1 tablet (10 mEq total) by mouth daily (Patient not taking: Reported on 8/19/2022) 30 tablet 0     No current facility-administered medications for this visit  Review of Systems   Constitutional: Negative  Respiratory: Negative  Cardiovascular: Negative  Gastrointestinal: Negative  Genitourinary:        History of kidney disease and kidney stone         Objective:  Vitals:    08/19/22 1247   BP: 104/72   Pulse: 85   Temp: 97 8 °F (36 6 °C)   SpO2: 99%   Weight: (!) 138 kg (304 lb 12 8 oz)   Height: 5' 6" (1 676 m)     Body mass index is 49 2 kg/m²  Physical Exam  Vitals reviewed  Constitutional:       General: He is not in acute distress  Appearance: Normal appearance  He is well-developed  He is not ill-appearing, toxic-appearing or diaphoretic  HENT:      Head: Normocephalic and atraumatic     Eyes:      Conjunctiva/sclera: Conjunctivae normal    Cardiovascular:      Rate and Rhythm: Normal rate and regular rhythm  Heart sounds: Normal heart sounds  No murmur heard  No friction rub  No gallop  Pulmonary:      Effort: Pulmonary effort is normal  No respiratory distress  Breath sounds: Normal breath sounds  No wheezing, rhonchi or rales  Musculoskeletal:      Right lower leg: No edema  Left lower leg: No edema  Neurological:      General: No focal deficit present  Mental Status: He is alert and oriented to person, place, and time  Psychiatric:         Mood and Affect: Mood normal          Behavior: Behavior normal          Thought Content:  Thought content normal          Judgment: Judgment normal

## 2022-08-22 ENCOUNTER — TELEPHONE (OUTPATIENT)
Dept: FAMILY MEDICINE CLINIC | Facility: CLINIC | Age: 57
End: 2022-08-22

## 2022-08-22 DIAGNOSIS — E78.5 DYSLIPIDEMIA: Primary | ICD-10-CM

## 2022-08-22 RX ORDER — ATORVASTATIN CALCIUM 20 MG/1
20 TABLET, FILM COATED ORAL DAILY
Qty: 30 TABLET | Refills: 5 | Status: SHIPPED | OUTPATIENT
Start: 2022-08-22

## 2022-08-22 NOTE — TELEPHONE ENCOUNTER
Pt recently new to you and getting PT from [facility]  Last PCP was signing orders and pt is due for recertification  Asking if you would now sign the orders?

## 2022-09-07 DIAGNOSIS — I10 ESSENTIAL HYPERTENSION: ICD-10-CM

## 2022-09-07 RX ORDER — METOPROLOL SUCCINATE 100 MG/1
TABLET, EXTENDED RELEASE ORAL
Qty: 30 TABLET | Refills: 0 | Status: SHIPPED | OUTPATIENT
Start: 2022-09-07 | End: 2022-10-06

## 2022-09-08 ENCOUNTER — TELEPHONE (OUTPATIENT)
Dept: FAMILY MEDICINE CLINIC | Facility: CLINIC | Age: 57
End: 2022-09-08

## 2022-09-08 NOTE — TELEPHONE ENCOUNTER
Pt has quite a bit of pain and swelling, right hand  Noticed about 1 week ago  When he first noticed it he also had pain from right shoulder down  Has 1+ pitting edema dorsal aspect  Asking for recommendations or do you want patient to be seen in office?

## 2022-09-09 ENCOUNTER — OFFICE VISIT (OUTPATIENT)
Dept: FAMILY MEDICINE CLINIC | Facility: CLINIC | Age: 57
End: 2022-09-09
Payer: COMMERCIAL

## 2022-09-09 VITALS
HEIGHT: 66 IN | HEART RATE: 68 BPM | TEMPERATURE: 97.1 F | BODY MASS INDEX: 48.82 KG/M2 | WEIGHT: 303.8 LBS | OXYGEN SATURATION: 97 % | DIASTOLIC BLOOD PRESSURE: 86 MMHG | SYSTOLIC BLOOD PRESSURE: 130 MMHG

## 2022-09-09 DIAGNOSIS — M79.89 SWELLING OF RIGHT MIDDLE FINGER: Primary | ICD-10-CM

## 2022-09-09 PROCEDURE — 3725F SCREEN DEPRESSION PERFORMED: CPT | Performed by: FAMILY MEDICINE

## 2022-09-09 PROCEDURE — 99213 OFFICE O/P EST LOW 20 MIN: CPT | Performed by: FAMILY MEDICINE

## 2022-09-09 RX ORDER — CEPHALEXIN 500 MG/1
500 CAPSULE ORAL EVERY 8 HOURS SCHEDULED
Qty: 21 CAPSULE | Refills: 0 | Status: SHIPPED | OUTPATIENT
Start: 2022-09-09 | End: 2022-09-16

## 2022-09-09 NOTE — PROGRESS NOTES
Assessment/Plan: For his right middle finger infection, Rx for cephalexin, and I refer to Orthopedics ASAP  I told patient if it starts to get worse, or more painful, he needs to go to the ER  He understands  He will call us if he does not hear from Orthopedics  I told him to restart the atorvastatin  Follow-up as scheduled or p r n     Problem List Items Addressed This Visit    None     Visit Diagnoses     Swelling of right middle finger    -  Primary    Relevant Medications    cephalexin (KEFLEX) 500 mg capsule    Other Relevant Orders    Ambulatory Referral to Orthopedic Surgery           Diagnoses and all orders for this visit:    Swelling of right middle finger  -     cephalexin (KEFLEX) 500 mg capsule; Take 1 capsule (500 mg total) by mouth every 8 (eight) hours for 7 days  -     Ambulatory Referral to Orthopedic Surgery; Future      No problem-specific Assessment & Plan notes found for this encounter  Subjective:      Patient ID: Sofya Holloway is a 65 y o  male  Patient here today stating that over the weekend was carrying in groceries with his right hand  He had 5 bags  After that he had increased shoulder pain and right-sided thoracic and back pain  The shoulder pain and right side back and side pain has improved  Since then he has been having swelling in his right middle finger and hand  It is actually better today  No fever chills  Patient states in the past as had an infection in that right middle finger that had to be drained  Patient thought the pain in his right side was due to the atorvastatin so he stopped it  The following portions of the patient's history were reviewed and updated as appropriate:   He has a past medical history of Arthritis, Chronic kidney disease, Gout, Hyperlipidemia, Hypertension, Kidney stone, Obstructive pyelonephritis, and Sepsis ([facility] [address] )  ,  does not have any pertinent problems on file  ,   has a past surgical history that includes No past surgeries (03/23/2015); FL retrograde pyelogram (8/4/2019); pr cystourethroscopy,ureter catheter (Right, 8/4/2019); pr cysto/uretero w/lithotripsy &indwell stent insrt (Right, 9/26/2019); FL retrograde pyelogram (9/26/2019); FL retrograde pyelogram (4/22/2022); pr cystourethroscopy,ureter catheter (Left, 4/22/2022); IR nephrostomy tube placement (5/10/2022); and pr cysto/uretero w/lithotripsy &indwell stent insrt (Left, 6/9/2022)  ,  family history includes Cancer in his mother; Heart disease in his father; No Known Problems in his sister  ,   reports that he has never smoked  He has never used smokeless tobacco  He reports previous alcohol use  He reports that he does not use drugs  ,  has No Known Allergies     Current Outpatient Medications   Medication Sig Dispense Refill    acetaminophen (TYLENOL) 325 mg tablet Take 2 tablets (650 mg total) by mouth every 6 (six) hours as needed for mild pain, headaches or fever 30 tablet 0    allopurinol (ZYLOPRIM) 300 mg tablet Take 1 tablet (300 mg total) by mouth daily 30 tablet 3    amLODIPine (NORVASC) 10 mg tablet Take 1 tablet (10 mg total) by mouth daily 60 tablet 1    atorvastatin (LIPITOR) 20 mg tablet Take 1 tablet (20 mg total) by mouth daily 30 tablet 5    cephalexin (KEFLEX) 500 mg capsule Take 1 capsule (500 mg total) by mouth every 8 (eight) hours for 7 days 21 capsule 0    cholecalciferol (VITAMIN D3) 400 units tablet Take 400 Units by mouth in the morning   metoprolol succinate (TOPROL-XL) 100 mg 24 hr tablet TAKE 1 TABLET BY MOUTH EVERY DAY 30 tablet 0    tamsulosin (FLOMAX) 0 4 mg Take 1 capsule (0 4 mg total) by mouth daily with dinner 30 capsule 0    potassium chloride (K-DUR,KLOR-CON) 10 mEq tablet Take 1 tablet (10 mEq total) by mouth daily (Patient not taking: No sig reported) 30 tablet 0     No current facility-administered medications for this visit  Review of Systems   Constitutional: Negative  Respiratory: Negative      Cardiovascular: Negative  Gastrointestinal: Negative  Genitourinary: Negative  Musculoskeletal:        As per HPI         Objective:  Vitals:    09/09/22 1123   BP: 130/86   Pulse: 68   Temp: (!) 97 1 °F (36 2 °C)   SpO2: 97%   Weight: (!) 138 kg (303 lb 12 8 oz)   Height: 5' 6" (1 676 m)     Body mass index is 49 03 kg/m²  Physical Exam  Vitals reviewed  Constitutional:       General: He is not in acute distress  Appearance: Normal appearance  He is well-developed  He is not ill-appearing, toxic-appearing or diaphoretic  HENT:      Head: Normocephalic and atraumatic  Eyes:      Conjunctiva/sclera: Conjunctivae normal    Cardiovascular:      Rate and Rhythm: Normal rate and regular rhythm  Heart sounds: Normal heart sounds  No murmur heard  No friction rub  No gallop  Pulmonary:      Effort: Pulmonary effort is normal  No respiratory distress  Breath sounds: Normal breath sounds  No wheezing, rhonchi or rales  Musculoskeletal:         General: Swelling (Right middle finger, area of erythema on the distal palmar side) and tenderness ( right middle finger) present  Right lower leg: No edema  Left lower leg: No edema  Neurological:      General: No focal deficit present  Mental Status: He is alert and oriented to person, place, and time  Psychiatric:         Mood and Affect: Mood normal          Behavior: Behavior normal          Thought Content:  Thought content normal          Judgment: Judgment normal

## 2022-09-14 ENCOUNTER — APPOINTMENT (OUTPATIENT)
Dept: LAB | Facility: HOSPITAL | Age: 57
End: 2022-09-14
Attending: FAMILY MEDICINE
Payer: COMMERCIAL

## 2022-09-14 ENCOUNTER — APPOINTMENT (OUTPATIENT)
Dept: RADIOLOGY | Facility: MEDICAL CENTER | Age: 57
End: 2022-09-14
Payer: COMMERCIAL

## 2022-09-14 ENCOUNTER — OFFICE VISIT (OUTPATIENT)
Dept: OBGYN CLINIC | Facility: CLINIC | Age: 57
End: 2022-09-14
Payer: COMMERCIAL

## 2022-09-14 VITALS
WEIGHT: 305 LBS | HEIGHT: 66 IN | DIASTOLIC BLOOD PRESSURE: 71 MMHG | BODY MASS INDEX: 49.02 KG/M2 | SYSTOLIC BLOOD PRESSURE: 110 MMHG | HEART RATE: 64 BPM

## 2022-09-14 DIAGNOSIS — N18.30 CHRONIC KIDNEY DISEASE, STAGE 3 (HCC): ICD-10-CM

## 2022-09-14 DIAGNOSIS — M79.641 BILATERAL HAND PAIN: ICD-10-CM

## 2022-09-14 DIAGNOSIS — N11.1 OBSTRUCTIVE PYELONEPHRITIS: ICD-10-CM

## 2022-09-14 DIAGNOSIS — M79.89 SWELLING OF FINGER: ICD-10-CM

## 2022-09-14 DIAGNOSIS — M79.89 SWELLING OF RIGHT MIDDLE FINGER: ICD-10-CM

## 2022-09-14 DIAGNOSIS — M79.642 BILATERAL HAND PAIN: ICD-10-CM

## 2022-09-14 DIAGNOSIS — M79.89 SWELLING OF RIGHT MIDDLE FINGER: Primary | ICD-10-CM

## 2022-09-14 DIAGNOSIS — M79.89 SWELLING OF FINGER OF BOTH HANDS: ICD-10-CM

## 2022-09-14 LAB
ANION GAP SERPL CALCULATED.3IONS-SCNC: 12 MMOL/L (ref 4–13)
BASOPHILS # BLD AUTO: 0.1 THOUSANDS/ΜL (ref 0–0.1)
BASOPHILS NFR BLD AUTO: 1 % (ref 0–1)
BUN SERPL-MCNC: 30 MG/DL (ref 5–25)
CALCIUM SERPL-MCNC: 9.7 MG/DL (ref 8.3–10.1)
CHLORIDE SERPL-SCNC: 106 MMOL/L (ref 96–108)
CO2 SERPL-SCNC: 20 MMOL/L (ref 21–32)
CREAT SERPL-MCNC: 2.07 MG/DL (ref 0.6–1.3)
CRP SERPL QL: 11.5 MG/L
EOSINOPHIL # BLD AUTO: 0.48 THOUSAND/ΜL (ref 0–0.61)
EOSINOPHIL NFR BLD AUTO: 5 % (ref 0–6)
ERYTHROCYTE [DISTWIDTH] IN BLOOD BY AUTOMATED COUNT: 16.3 % (ref 11.6–15.1)
ERYTHROCYTE [SEDIMENTATION RATE] IN BLOOD: 100 MM/HOUR (ref 0–19)
GFR SERPL CREATININE-BSD FRML MDRD: 34 ML/MIN/1.73SQ M
GLUCOSE SERPL-MCNC: 99 MG/DL (ref 65–140)
HCT VFR BLD AUTO: 42.4 % (ref 36.5–49.3)
HGB BLD-MCNC: 13.2 G/DL (ref 12–17)
IMM GRANULOCYTES # BLD AUTO: 0.05 THOUSAND/UL (ref 0–0.2)
IMM GRANULOCYTES NFR BLD AUTO: 1 % (ref 0–2)
LYMPHOCYTES # BLD AUTO: 3.07 THOUSANDS/ΜL (ref 0.6–4.47)
LYMPHOCYTES NFR BLD AUTO: 30 % (ref 14–44)
MCH RBC QN AUTO: 29.6 PG (ref 26.8–34.3)
MCHC RBC AUTO-ENTMCNC: 31.1 G/DL (ref 31.4–37.4)
MCV RBC AUTO: 95 FL (ref 82–98)
MONOCYTES # BLD AUTO: 0.55 THOUSAND/ΜL (ref 0.17–1.22)
MONOCYTES NFR BLD AUTO: 5 % (ref 4–12)
NEUTROPHILS # BLD AUTO: 5.96 THOUSANDS/ΜL (ref 1.85–7.62)
NEUTS SEG NFR BLD AUTO: 58 % (ref 43–75)
NRBC BLD AUTO-RTO: 0 /100 WBCS
PLATELET # BLD AUTO: 358 THOUSANDS/UL (ref 149–390)
PMV BLD AUTO: 9.2 FL (ref 8.9–12.7)
POTASSIUM SERPL-SCNC: 4.1 MMOL/L (ref 3.5–5.3)
RBC # BLD AUTO: 4.46 MILLION/UL (ref 3.88–5.62)
SODIUM SERPL-SCNC: 138 MMOL/L (ref 135–147)
URATE SERPL-MCNC: 6.9 MG/DL (ref 3.5–8.5)
WBC # BLD AUTO: 10.21 THOUSAND/UL (ref 4.31–10.16)

## 2022-09-14 PROCEDURE — 36415 COLL VENOUS BLD VENIPUNCTURE: CPT

## 2022-09-14 PROCEDURE — 85025 COMPLETE CBC W/AUTO DIFF WBC: CPT

## 2022-09-14 PROCEDURE — 84550 ASSAY OF BLOOD/URIC ACID: CPT

## 2022-09-14 PROCEDURE — 73130 X-RAY EXAM OF HAND: CPT

## 2022-09-14 PROCEDURE — 99244 OFF/OP CNSLTJ NEW/EST MOD 40: CPT | Performed by: FAMILY MEDICINE

## 2022-09-14 PROCEDURE — 86140 C-REACTIVE PROTEIN: CPT

## 2022-09-14 PROCEDURE — 80048 BASIC METABOLIC PNL TOTAL CA: CPT

## 2022-09-14 PROCEDURE — 85652 RBC SED RATE AUTOMATED: CPT

## 2022-09-14 RX ORDER — TAMSULOSIN HYDROCHLORIDE 0.4 MG/1
0.4 CAPSULE ORAL
Qty: 30 CAPSULE | Refills: 5 | Status: SHIPPED | OUTPATIENT
Start: 2022-09-14

## 2022-09-14 NOTE — LETTER
September 15, 2022     DO [address]    Patient: Sugar Butts   YOB: 1965   Date of Visit: 9/14/2022       Dear Dr Manuel Fall: Thank you for referring Sugar Butts to me for evaluation  Below are my notes for this consultation  If you have questions, please do not hesitate to call me  I look forward to following your patient along with you  Sincerely,        Lu Joyce DO        CC: No Recipients  Lu Joyce DO  9/15/2022  1:07 PM  Signed     Subjective:  Chief Complaint   Patient presents with    Left Hand - Pain, Swelling    Right Hand - Swelling, Pain       Sugar Butts is a 65 y o  male presenting today for evaluation of bilateral finger swelling  Patient reports that symptoms of onset over the past 2-3 weeks  Denies any mechanism of injury but reports 1st noticing the tissue when carrying grocery bags to his house to 3 weeks ago  Patient reports significant swelling in fingers involving both hands with associated pain  Patient reports pain and difficulty with flexion of the right 3rd digit, right 2nd digit, and the left 3rd digit which is giving him the most problems today  Patient was seen by Dr Manuel Fall PCP for initial swelling of the right middle finger at which time was started on Keflex and referred to Orthopedics  Patient reports improvement in swelling and pain of the right and since initial evaluation however now reports swelling in the left 3rd digit and associated pain and swelling in the left 1st and 4th digit  Patient does report history of gout  Patient reports increased deer meat consumption  Denies any recent alcohol consumption  Currently on allopurinol for gout maintenance  Patient denies any fevers chills  Patient denies any open wounds or skin breakdowns  Patient denies any numbness or tingling in the hands  Reports that the pain is localized just fingers that are swollen              The following portions of the patient's history were reviewed and updated as appropriate: allergies, current medications, past family history, past medical history, past social history, past surgical history and problem list         Review of Systems   Constitutional: Negative for fever  Musculoskeletal: positive for hand pain and swelling  Skin: Negative for rash and ulcer  Neurological: Negative for numbness or tingling in hand       Objective:  /71 (BP Location: Left arm, Patient Position: Sitting, Cuff Size: Large)   Pulse 64   Ht 5' 6" (1 676 m)   Wt (!) 138 kg (305 lb)   BMI 49 23 kg/m²     Skin: no rashes, lesions, skin discolorations, lacerations  Vasculature: normal radial and ulnar pulse, normal skin color, normal capillary refill in extremity, no upper extremity edema  Neurologic: Neurologic exam is normal throughout upper extremities, Awake, alert, and oriented x3, no apparent distress  nontoxic appearing  Musculoskeletal:   Bilatera hand exam:  inspection-palpation:  Normal fusiform  finger swelling most greatly appreciated affecting the left 4th digit, left 3rd digit, left 2nd digit, right 3rd digit, right 2nd digit  Associated erythema extending to the MCP bilaterally  Not very warm to touch  No appreciable fluctuant masses  Palpation:  Generalized tenderness to palpation over left 4th digit, left 2nd digit, right 3rd digit  ROM:  patient's left 4th, left 3rd and right 3rd digits remain and a full extension position  Patient is able to move the fingers at the PIP and IP joints however motion is severely limited secondary to pain and swelling  Full passive extension of all 5 digits of bilateral hands does not reproduce pain  Tenderness to palpation is reproduced with palpation along the volar and dorsal aspects of all 5 digits bilaterally  Imaging:    See final report     Assessment/Plan:    1  Swelling of right middle finger  2  Bilateral hand pain  3   Swelling of finger of both hands  - Sedimentation rate, automated; Future  - C-reactive protein; Future  - Rheumatoid Factor; Future      >45min devoted to review of previous, pertinent medical records, imaging, discussion of treatment options, counseling and documentation  Radiographs of bilateral hands were reviewed independently with patient  No acute fractures or dislocations appreciated, mild increased intensity noted along the PIP joints  Notable soft tissue swelling diffusely bilateral digits  No notable osseous necrosis appreciated  Follow-up official read  Unclear etiology behind patient's bilateral finger swelling  Differential consideration include flexor tenosynovitis (less likely given lack of infectious appearance and lack of kanavel signs on exam), gout, and inflammatory autoimmune process/dactylitis  I am recommending the patient follow-up with stat blood work to assess for aforementioned differential concerns I have recommended to follow up in office on Friday to discuss results  Lab orders include CBC with differential, uric acid, ESR, CRp  Should sx's worsen or any concerns arise, they were advised to follow up sooner or seek more immediate medical attention  All of the patient's concerns were addressed and questions answered  They verbalized agreement with and understanding of the treatment plan

## 2022-09-15 ENCOUNTER — TELEPHONE (OUTPATIENT)
Dept: OBGYN CLINIC | Facility: HOSPITAL | Age: 57
End: 2022-09-15

## 2022-09-15 NOTE — PROGRESS NOTES
Subjective:  Chief Complaint   Patient presents with    Left Hand - Pain, Swelling    Right Hand - Swelling, Pain       Kristen Hicks is a 65 y o  male presenting today for evaluation of bilateral finger swelling  Patient reports that symptoms of onset over the past 2-3 weeks  Denies any mechanism of injury but reports 1st noticing the tissue when carrying grocery bags to his house to 3 weeks ago  Patient reports significant swelling in fingers involving both hands with associated pain  Patient reports pain and difficulty with flexion of the right 3rd digit, right 2nd digit, and the left 3rd digit which is giving him the most problems today  Patient was seen by Dr Noelle Marin PCP for initial swelling of the right middle finger at which time was started on Keflex and referred to Orthopedics  Patient reports improvement in swelling and pain of the right and since initial evaluation however now reports swelling in the left 3rd digit and associated pain and swelling in the left 1st and 4th digit  Patient does report history of gout  Patient reports increased deer meat consumption  Denies any recent alcohol consumption  Currently on allopurinol for gout maintenance  Patient denies any fevers chills  Patient denies any open wounds or skin breakdowns  Patient denies any numbness or tingling in the hands  Reports that the pain is localized just fingers that are swollen  The following portions of the patient's history were reviewed and updated as appropriate: allergies, current medications, past family history, past medical history, past social history, past surgical history and problem list         Review of Systems   Constitutional: Negative for fever  Musculoskeletal: positive for hand pain and swelling  Skin: Negative for rash and ulcer     Neurological: Negative for numbness or tingling in hand       Objective:  /71 (BP Location: Left arm, Patient Position: Sitting, Cuff Size: Large)   Pulse 64   Ht 5' 6" (1 676 m)   Wt (!) 138 kg (305 lb)   BMI 49 23 kg/m²     Skin: no rashes, lesions, skin discolorations, lacerations  Vasculature: normal radial and ulnar pulse, normal skin color, normal capillary refill in extremity, no upper extremity edema  Neurologic: Neurologic exam is normal throughout upper extremities, Awake, alert, and oriented x3, no apparent distress  nontoxic appearing  Musculoskeletal:   Bilatera hand exam:  inspection-palpation:  Normal fusiform  finger swelling most greatly appreciated affecting the left 4th digit, left 3rd digit, left 2nd digit, right 3rd digit, right 2nd digit  Associated erythema extending to the MCP bilaterally  Not very warm to touch  No appreciable fluctuant masses  Palpation:  Generalized tenderness to palpation over left 4th digit, left 2nd digit, right 3rd digit  ROM:  patient's left 4th, left 3rd and right 3rd digits remain and a full extension position  Patient is able to move the fingers at the PIP and IP joints however motion is severely limited secondary to pain and swelling  Full passive extension of all 5 digits of bilateral hands does not reproduce pain  Tenderness to palpation is reproduced with palpation along the volar and dorsal aspects of all 5 digits bilaterally  Imaging:    See final report     Assessment/Plan:    1  Swelling of right middle finger  2  Bilateral hand pain  3  Swelling of finger of both hands  - Sedimentation rate, automated; Future  - C-reactive protein; Future  - Rheumatoid Factor; Future      >45min devoted to review of previous, pertinent medical records, imaging, discussion of treatment options, counseling and documentation  Radiographs of bilateral hands were reviewed independently with patient  No acute fractures or dislocations appreciated, mild increased intensity noted along the PIP joints  Notable soft tissue swelling diffusely bilateral digits    No notable osseous necrosis appreciated  Follow-up official read  Unclear etiology behind patient's bilateral finger swelling  Differential consideration include flexor tenosynovitis (less likely given lack of infectious appearance and lack of kanavel signs on exam), gout, and inflammatory autoimmune process/dactylitis  I am recommending the patient follow-up with stat blood work to assess for aforementioned differential concerns I have recommended to follow up in office on Friday to discuss results  Lab orders include CBC with differential, uric acid, ESR, CRp  Should sx's worsen or any concerns arise, they were advised to follow up sooner or seek more immediate medical attention  All of the patient's concerns were addressed and questions answered  They verbalized agreement with and understanding of the treatment plan

## 2022-09-15 NOTE — TELEPHONE ENCOUNTER
DR Devang Bloom  RE: Appt for tomorrow(9/16/22)  CB:     Patient called stating he received a call from Dr Devang Bloom in regard to lab work and [id number]  Patient stated that Dr Devang Bloom told patient to come in tomorrow to discuss results  I am unable to unblock spots  Caller asked to speak to clinical team to schedule appointment for 9/16/22

## 2022-09-16 ENCOUNTER — OFFICE VISIT (OUTPATIENT)
Dept: OBGYN CLINIC | Facility: CLINIC | Age: 57
End: 2022-09-16
Payer: COMMERCIAL

## 2022-09-16 VITALS
DIASTOLIC BLOOD PRESSURE: 68 MMHG | HEART RATE: 56 BPM | BODY MASS INDEX: 49.98 KG/M2 | SYSTOLIC BLOOD PRESSURE: 100 MMHG | HEIGHT: 66 IN | WEIGHT: 311 LBS

## 2022-09-16 DIAGNOSIS — M10.9 GOUTY ARTHRITIS OF HAND: Primary | ICD-10-CM

## 2022-09-16 PROCEDURE — 99214 OFFICE O/P EST MOD 30 MIN: CPT | Performed by: FAMILY MEDICINE

## 2022-09-16 RX ORDER — PREDNISONE 20 MG/1
40 TABLET ORAL DAILY
Qty: 24 TABLET | Refills: 0 | Status: SHIPPED | OUTPATIENT
Start: 2022-09-16

## 2022-09-16 NOTE — PROGRESS NOTES
Subjective:  Chief Complaint   Patient presents with    Right Hand - Follow-up       Lali Hare is a 65 y o  male presenting for follow-up evaluation regarding bilateral fingers swelling  Patient was seen this past Wednesday with concern for possible gout flare versus inflammatory arthropathy  Blood work was obtained patient was instructed to follow-up in office today to discuss  Blood work tests revealed an elevated ESR and CRP level, normal CBC with no notable left shift, and normal uric acid level  Since being evaluated on Wednesday, patient has reported change in his diet to limit red meat and deer meat and has been cooperating fruits and vegetables  Patient has noticed a significant improvement in the finger swelling  Denies any fevers or chills  Reports improved finger flexion involving all digits  Does report a history of inability to completely flex the right 3rd digit after incision and drainage several years ago  Patient denies any fevers chills  Patient denies any open wounds or skin breakdowns  Patient denies any numbness or tingling in the hands  Reports that the pain is localized just fingers that are swollen  The following portions of the patient's history were reviewed and updated as appropriate: allergies, current medications, past family history, past medical history, past social history, past surgical history and problem list         Review of Systems   Constitutional: Negative for fever  Musculoskeletal: positive for hand pain and swelling  Skin: Negative for rash and ulcer     Neurological: Negative for numbness or tingling in hand       Objective:  /68 (BP Location: Left arm, Patient Position: Sitting, Cuff Size: Large)   Pulse 56   Ht 5' 6" (1 676 m)   Wt (!) 141 kg (311 lb)   BMI 50 20 kg/m²     Skin: no rashes, lesions, skin discolorations, lacerations  Vasculature: normal radial and ulnar pulse, normal skin color, normal capillary refill in extremity, no upper extremity edema  Neurologic: Neurologic exam is normal throughout upper extremities, Awake, alert, and oriented x3, no apparent distress  nontoxic appearing  Musculoskeletal:   Bilatera hand exam:  inspection-palpation:  fusiform  finger swelling affecting the left 4th digit, left 3rd digit, right 3rd digit which is much improved from initial evaluation 2 days ago  No associated erythema or warmth  No appreciable fluctuant masses  Palpation:  Minimal tenderness to palpation over  right 3rd digit  ROM:  full range of motion with all 5 digits bilaterally with the exception of the right 3rd digit which has historically had difficulty achieving full finger flexion  Full passive extension of all 5 digits of bilateral hands does not reproduce pain  Imaging:    See final report     Assessment/Plan:    1  Swelling of right middle finger  2  Bilateral hand pain  3  Swelling of finger of both hands    >45min devoted to review of previous, pertinent medical records, imaging, discussion of treatment options, counseling and documentation  Clinical impression at this time is the patient's bilateral her fingers swelling was most likely related to gout flare given improvement with dietary changes  Differential considerations do exist for possible inflammatory arthropathy however less likely  We discussed the nature of gouty arthritis at length and detailed the treatment approach  I am recommending the patient continue with oral prednisone 40 mg daily for the next 10 days  Patient advised to follow up in office in 2 weeks  Dietary modifications recommended to patient to avoid seafood-alcohol-red meat  Can continue with Tylenol as needed for pain  Follow up in 2 weeks  Should sx's worsen or any concerns arise, they were advised to follow up sooner or seek more immediate medical attention  All of the patient's concerns were addressed and questions answered   They verbalized agreement with and understanding of the treatment plan  If pain and swelling continues at follow-up visit we will consider referral to Rheumatology for evaluation of possible inflammatory etiology

## 2022-09-28 ENCOUNTER — TELEPHONE (OUTPATIENT)
Dept: FAMILY MEDICINE CLINIC | Facility: CLINIC | Age: 57
End: 2022-09-28

## 2022-09-28 DIAGNOSIS — R26.9 GAIT ABNORMALITY: ICD-10-CM

## 2022-09-28 DIAGNOSIS — E66.01 MORBID OBESITY WITH BMI OF 50.0-59.9, ADULT (HCC): ICD-10-CM

## 2022-09-28 DIAGNOSIS — M25.562 ACUTE PAIN OF LEFT KNEE: Primary | ICD-10-CM

## 2022-09-28 NOTE — TELEPHONE ENCOUNTER
Elpidioagueda Luke [facility] has discharged patient today for physical therapy with all goals met  They do suggest he continue treatment as an outpatient  Asking for a script for outpatient physical therapy at [place] [address] in [facility] for strength training, balance training and gait training?     Please call patient and have him schedule appointment with PT

## 2022-09-30 NOTE — PROGRESS NOTES
09/30/22 0932   Hello, [Guardians Name / Agustin Van, this is [Caller Kera Braxton from [facility], and our clinical care team wanted to check on you / your child after your recent visit to the hospital  It will only take 3-5 minutes  Is this a good time? Discharge Call Type/ Specific Diagnosis: ARC 90 Day Call   ARC 90 Day Discharge Call   Assessment Source 4   Call Complete for 90 Days call back?   Left Message

## 2022-10-03 ENCOUNTER — TELEPHONE (OUTPATIENT)
Dept: OBGYN CLINIC | Facility: HOSPITAL | Age: 57
End: 2022-10-03

## 2022-10-03 NOTE — TELEPHONE ENCOUNTER
Sesar Monaco @ [facility] called to have the last office note faxed over to him @: [phone]  I faxed office note

## 2022-10-05 ENCOUNTER — OFFICE VISIT (OUTPATIENT)
Dept: OBGYN CLINIC | Facility: CLINIC | Age: 57
End: 2022-10-05

## 2022-10-05 VITALS
HEART RATE: 93 BPM | BODY MASS INDEX: 50.62 KG/M2 | DIASTOLIC BLOOD PRESSURE: 82 MMHG | HEIGHT: 66 IN | WEIGHT: 315 LBS | SYSTOLIC BLOOD PRESSURE: 147 MMHG

## 2022-10-05 DIAGNOSIS — M10.9 GOUTY ARTHRITIS OF HAND: Primary | ICD-10-CM

## 2022-10-05 PROCEDURE — 99213 OFFICE O/P EST LOW 20 MIN: CPT | Performed by: FAMILY MEDICINE

## 2022-10-05 NOTE — PROGRESS NOTES
Subjective:  Chief Complaint   Patient presents with    Right Hand - Follow-up       Santa Stewart is a 65 y o  male presenting for follow-up evaluation regarding bilateral fingers swelling  Patient was started on prednisone course for 1 week  Patient reports the prednisone worked very well  Has completed his course  Reports that the finger swelling has completely resolved  Has achieved full range of motion in the fingers bilaterally  No pain reported at this time                The following portions of the patient's history were reviewed and updated as appropriate: allergies, current medications, past family history, past medical history, past social history, past surgical history and problem list         Review of Systems   Constitutional: Negative for fever  Musculoskeletal: positive for hand pain and swelling  Skin: Negative for rash and ulcer  Neurological: Negative for numbness or tingling in hand       Objective:  /82 (BP Location: Left arm, Patient Position: Sitting, Cuff Size: Large)   Pulse 93   Ht 5' 6" (1 676 m)   Wt (!) 153 kg (338 lb 3 2 oz)   BMI 54 59 kg/m²     Skin: no rashes, lesions, skin discolorations, lacerations  Vasculature: normal radial and ulnar pulse, normal skin color, normal capillary refill in extremity, no upper extremity edema  Neurologic: Neurologic exam is normal throughout upper extremities, Awake, alert, and oriented x3, no apparent distress  nontoxic appearing  Musculoskeletal:   Bilatera hand exam:  inspection-palpation:  No finger swelling or tenderness to palpation in bilateral hands   No associated erythema or warmth  No appreciable fluctuant masses  Palpation:  No tenderness to palpation  ROM:  full range of motion with finger flexion and extension with 5/5 strength in bilateral and            Imaging:    See final report     Assessment/Plan:    1  Gouty arthritis of hand  Patient has improved in terms of symptoms with prednisone course    I am recommending the patient continue with a gout friendly diet avoiding seafood, shellfish, red meat, alcohol    Did discuss follow-up with primary care doctor to discuss gout maintenance therapy

## 2022-10-06 DIAGNOSIS — I10 ESSENTIAL HYPERTENSION: ICD-10-CM

## 2022-10-06 RX ORDER — METOPROLOL SUCCINATE 100 MG/1
TABLET, EXTENDED RELEASE ORAL
Qty: 30 TABLET | Refills: 0 | Status: SHIPPED | OUTPATIENT
Start: 2022-10-06 | End: 2022-10-20

## 2022-10-11 PROBLEM — R65.20 SEVERE SEPSIS (HCC): Status: RESOLVED | Noted: 2019-08-23 | Resolved: 2022-10-11

## 2022-10-11 PROBLEM — A41.9 SEVERE SEPSIS (HCC): Status: RESOLVED | Noted: 2019-08-23 | Resolved: 2022-10-11

## 2022-10-11 PROBLEM — N11.1 OBSTRUCTIVE PYELONEPHRITIS: Status: RESOLVED | Noted: 2022-04-23 | Resolved: 2022-10-11

## 2022-10-20 DIAGNOSIS — I10 ESSENTIAL HYPERTENSION: ICD-10-CM

## 2022-10-20 RX ORDER — METOPROLOL SUCCINATE 100 MG/1
TABLET, EXTENDED RELEASE ORAL
Qty: 30 TABLET | Refills: 0 | Status: SHIPPED | OUTPATIENT
Start: 2022-10-20

## 2022-11-15 DIAGNOSIS — I10 ESSENTIAL HYPERTENSION: ICD-10-CM

## 2022-11-15 RX ORDER — METOPROLOL SUCCINATE 100 MG/1
TABLET, EXTENDED RELEASE ORAL
Qty: 90 TABLET | Refills: 1 | Status: SHIPPED | OUTPATIENT
Start: 2022-11-15

## 2022-11-22 ENCOUNTER — OFFICE VISIT (OUTPATIENT)
Dept: FAMILY MEDICINE CLINIC | Facility: CLINIC | Age: 57
End: 2022-11-22

## 2022-11-22 VITALS
WEIGHT: 315 LBS | OXYGEN SATURATION: 92 % | BODY MASS INDEX: 50.62 KG/M2 | SYSTOLIC BLOOD PRESSURE: 118 MMHG | TEMPERATURE: 97.8 F | HEART RATE: 78 BPM | HEIGHT: 66 IN | DIASTOLIC BLOOD PRESSURE: 74 MMHG

## 2022-11-22 DIAGNOSIS — G89.29 CHRONIC RIGHT SHOULDER PAIN: ICD-10-CM

## 2022-11-22 DIAGNOSIS — M25.511 CHRONIC RIGHT SHOULDER PAIN: ICD-10-CM

## 2022-11-22 DIAGNOSIS — M25.562 CHRONIC PAIN OF LEFT KNEE: ICD-10-CM

## 2022-11-22 DIAGNOSIS — N18.32 STAGE 3B CHRONIC KIDNEY DISEASE (HCC): Primary | ICD-10-CM

## 2022-11-22 DIAGNOSIS — I10 ESSENTIAL HYPERTENSION: ICD-10-CM

## 2022-11-22 DIAGNOSIS — G89.29 CHRONIC PAIN OF LEFT KNEE: ICD-10-CM

## 2022-11-22 PROBLEM — M25.522 LEFT ELBOW PAIN: Status: RESOLVED | Noted: 2022-06-23 | Resolved: 2022-11-22

## 2022-11-22 PROBLEM — N18.9 ACUTE KIDNEY INJURY SUPERIMPOSED ON CKD (HCC): Status: RESOLVED | Noted: 2019-08-06 | Resolved: 2022-11-22

## 2022-11-22 PROBLEM — N17.9 ACUTE KIDNEY INJURY SUPERIMPOSED ON CKD (HCC): Status: RESOLVED | Noted: 2019-08-06 | Resolved: 2022-11-22

## 2022-11-22 NOTE — PROGRESS NOTES
Name: Misbah Beckham      : 1965      MRN: [id number]  Encounter Provider: Wenceslao Borjas DO  Encounter Date: 2022   Encounter department: [address] [place] [address] Road   For his kidney disease, refer him Nephrology  For his chronic left knee and right shoulder pain, refer to Orthopedics  I also refer him to physical therapy  Follow-up in 3 months or p r n   1  Stage 3b chronic kidney disease ([address] )  -     Ambulatory Referral to Nephrology; Future    2  Chronic pain of left knee  -     Ambulatory Referral to Physical Therapy; Future  -     Ambulatory Referral to Orthopedic Surgery; Future    3  Chronic right shoulder pain  -     Ambulatory Referral to Physical Therapy; Future  -     Ambulatory Referral to Orthopedic Surgery; Future    4  Essential hypertension      Depression Screening and Follow-up Plan: Patient was screened for depression during today's encounter  They screened negative with a PHQ-2 score of 1  Subjective     Patient here today for follow-up  Patient still having trouble with his left knee and right shoulder  Would like to see Orthopedics and attend physical therapy  Otherwise patient is tolerating his medications well  He does need to follow-up Nephrology for his history of kidney disease  Review of Systems   Constitutional: Negative  Respiratory: Negative  Cardiovascular: Negative  Gastrointestinal: Negative  Genitourinary: Negative  Musculoskeletal: Positive for arthralgias         Past Medical History:   Diagnosis Date   • Acute kidney injury superimposed on CKD stage 3 2019   • Arthritis    • Chronic kidney disease    • Gout    • Hyperlipidemia    • Hypertension    • Kidney stone    • Left elbow pain 2022   • Obstructive pyelonephritis    • Sepsis [facility])      Past Surgical History:   Procedure Laterality Date   • FL RETROGRADE PYELOGRAM  2019   • FL RETROGRADE PYELOGRAM  2019   • FL RETROGRADE PYELOGRAM  2022   • IR NEPHROSTOMY TUBE PLACEMENT  5/10/2022   • NO PAST SURGERIES  03/23/2015    Last assessed   • MI CYSTO/URETERO W/LITHOTRIPSY &INDWELL STENT INSRT Right 9/26/2019    Procedure: CYSTOSCOPY URETEROSCOPY WITH RETROGRADE PYELOGRAM AND INSERTION STENT URETERAL;  Surgeon: Jess Willingham MD;  Location: [facility];  Service: Urology   • MI CYSTO/URETERO W/LITHOTRIPSY &INDWELL STENT INSRT Left 6/9/2022    Procedure: CYSTOSCOPY LEFT URETEROSCOPY/LITHOTRIPSY HOLMIUM LASER,  AND EXCHANGE URETERAL STENT URETERAL, REMOVAL NEPHROSTOMY TUBE;  Surgeon: Marybel Arias MD;  [facility];  Service: Urology   • MI CYSTOURETHROSCOPY,URETER CATHETER Right 8/4/2019    Procedure: CYSTOSCOPY RETROGRADE PYELOGRAM WITH INSERTION STENT URETERAL;  Surgeon: Aj Rodriguez MD;  [facility];  Service: Urology   • MI CYSTOURETHROSCOPY,URETER CATHETER Left 4/22/2022    Procedure: CYSTOSCOPY RETROGRADE PYELOGRAM WITH INSERTION STENT URETERAL;  Surgeon: Yomi Santiago MD;  [facility];  Service: Urology     Family History   Problem Relation Age of Onset   • Cancer Mother    • Heart disease Father    • No Known Problems Sister      Social History     Socioeconomic History   • Marital status: Single     Spouse name: None   • Number of children: None   • Years of education: None   • Highest education level: None   Occupational History   • None   Tobacco Use   • Smoking status: Never   • Smokeless tobacco: Never   Vaping Use   • Vaping Use: Never used   Substance and Sexual Activity   • Alcohol use: Not Currently     Alcohol/week: 0 0 standard drinks     Comment: rarely   • Drug use: No   • Sexual activity: Not Currently   Other Topics Concern   • None   Social History Narrative    Caffeine use      Social Determinants of Health     Financial Resource Strain: Not on file   Food Insecurity: No Food Insecurity   • Worried About Running Out of Food in the Last Year: Never true   • Ran Out of Food in the Last Year: Never true   Transportation Needs: No Transportation Needs   • Lack of Transportation (Medical): No   • Lack of Transportation (Non-Medical): No   Physical Activity: Not on file   Stress: Not on file   Social Connections: Not on file   Intimate Partner Violence: Not on file   Housing Stability: Low Risk    • Unable to Pay for Housing in the Last Year: No   • Number of Places Lived in the Last Year: 1   • Unstable Housing in the Last Year: No     Current Outpatient Medications on File Prior to Visit   Medication Sig   • acetaminophen (TYLENOL) 325 mg tablet Take 2 tablets (650 mg total) by mouth every 6 (six) hours as needed for mild pain, headaches or fever   • allopurinol (ZYLOPRIM) 300 mg tablet Take 1 tablet (300 mg total) by mouth daily   • atorvastatin (LIPITOR) 20 mg tablet Take 1 tablet (20 mg total) by mouth daily   • cholecalciferol (VITAMIN D3) 400 units tablet Take 400 Units by mouth in the morning  • metoprolol succinate (TOPROL-XL) 100 mg 24 hr tablet TAKE 1 TABLET BY MOUTH EVERY DAY   • tamsulosin (FLOMAX) 0 4 mg Take 1 capsule (0 4 mg total) by mouth daily with dinner   • [DISCONTINUED] amLODIPine (NORVASC) 10 mg tablet Take 1 tablet (10 mg total) by mouth daily (Patient not taking: Reported on 11/22/2022)   • [DISCONTINUED] potassium chloride (K-DUR,KLOR-CON) 10 mEq tablet Take 1 tablet (10 mEq total) by mouth daily (Patient not taking: Reported on 8/19/2022)   • [DISCONTINUED] predniSONE 20 mg tablet Take 2 tablets (40 mg total) by mouth daily (Patient not taking: Reported on 10/5/2022)     No Known Allergies  Immunization History   Administered Date(s) Administered   • COVID-19 [employer] ([employer]) vaccine 0 5 mL 11/05/2021       Objective     /74   Pulse 78   Temp 97 8 °F (36 6 °C)   Ht 5' 6" (1 676 m)   Wt (!) 164 kg (361 lb 6 4 oz)   SpO2 92%   BMI 58 33 kg/m²     Physical Exam  Vitals reviewed  Constitutional:       General: He is not in acute distress  Appearance: Normal appearance   He is well-developed  He is not ill-appearing, toxic-appearing or diaphoretic  HENT:      Head: Normocephalic and atraumatic  Eyes:      Conjunctiva/sclera: Conjunctivae normal    Cardiovascular:      Rate and Rhythm: Normal rate and regular rhythm  Heart sounds: Normal heart sounds  No murmur heard  No friction rub  No gallop  Pulmonary:      Effort: Pulmonary effort is normal  No respiratory distress  Breath sounds: Normal breath sounds  No wheezing, rhonchi or rales  Musculoskeletal:      Right lower leg: No edema  Left lower leg: No edema  Neurological:      General: No focal deficit present  Mental Status: He is alert and oriented to person, place, and time  Psychiatric:         Mood and Affect: Mood normal          Behavior: Behavior normal          Thought Content:  Thought content normal          Judgment: Judgment normal        Terrial Sejal, DO

## 2022-12-05 ENCOUNTER — EVALUATION (OUTPATIENT)
Dept: PHYSICAL THERAPY | Facility: CLINIC | Age: 57
End: 2022-12-05

## 2022-12-05 DIAGNOSIS — M25.511 CHRONIC RIGHT SHOULDER PAIN: ICD-10-CM

## 2022-12-05 DIAGNOSIS — G89.29 CHRONIC PAIN OF LEFT KNEE: Primary | ICD-10-CM

## 2022-12-05 DIAGNOSIS — G89.29 CHRONIC RIGHT SHOULDER PAIN: ICD-10-CM

## 2022-12-05 DIAGNOSIS — M25.562 CHRONIC PAIN OF LEFT KNEE: Primary | ICD-10-CM

## 2022-12-05 NOTE — LETTER
2022    DO Anoop Ple[address]    Patient: Katerine Baez   YOB: 1965   Date of Visit: 2022     Encounter Diagnosis     ICD-10-CM    1  Chronic pain of left knee  M25 562     G89 29       2  Chronic right shoulder pain  M25 511     G89 29           Dear Dr Chandan Orta: Thank you for your recent referral of Katerine Baez  Please review the attached evaluation summary from Kevin's recent visit  Please verify that you agree with the plan of care by signing the attached order  If you have any questions or concerns, please do not hesitate to call  I sincerely appreciate the opportunity to share in the care of one of your patients and hope to have another opportunity to work with you in the near future  Sincerely,    Mainor Bocanegra, PT      Referring Provider:      I certify that I have read the below Plan of Care and certify the need for these services furnished under this plan of treatment while under my care  Ramy Shine DO  [facility] [address]  [address] [place]          PT Evaluation     Today's date: 2022  Patient name: Katerine Baez  : 1965  MRN: [id number]  Referring provider: Marquis Rushing DO  Dx:   Encounter Diagnosis     ICD-10-CM    1  Chronic pain of left knee  M25 562     G89 29       2  Chronic right shoulder pain  M25 511     G89 29           Start Time: 1100  Stop Time: 1200  Total time in clinic (min): 60 minutes    Assessment  Assessment details: Patient is a 64 y/o male with chief c/o R shoulder and L knee pain  Patient wishes to focus on the R shoulder at this time prior to working on the L knee so we held on the evaluation of this body part at this time  He has noted tenderness along the inferior aspect of the R pec major tendon around R axilla   There was increased pain at end range of abduction and flexion both actively and passively but range was improved with passive assessment with less pain noted  Patient was educated on good postures and proper sleeping positions to take pressure off of the R shoulder  Impairments: abnormal or restricted ROM, activity intolerance, impaired physical strength, lacks appropriate home exercise program, pain with function, scapular dyskinesis and poor posture     Symptom irritability: moderateUnderstanding of Dx/Px/POC: good   Prognosis: good    Goals  STGs:  Patient will be independent with hep by 2-3 visits  Improve shoulder flexion to 140 degrees for improved tolerance with adls and work duties by 3-4 weeks  Decrease pain levels by 50% for improved tolerance with adls and work duties by 3-4 weeks  Improve L knee flexion to 130 degrees for improved tolerance with transfers and adls by 3-4 weeks  Improve L knee strength to 3+/5 for improved tolerance with adls and transfers by 3-4 weeks  Improve L knee extension to 0 degrees for improved tolerance with adls and transfers by 3-4 weeks  LTGs:  Improve FOTO score from 49 To 69 Indicating improved tolerance with activities involving the LE by discharge  Patient will demonstrate full, pain free strength and rom of the shoulder for improved tolerance with adls and work duties by discharge  Patient will be able to return to normal work duties by discharge  Sleep will be undisturbed from the shoulder by discharge  Improve L knee strength and rom to wnl for improved tolerance with adls, work duties, and recreation by discharge  Patient will be able to return to normal ambulation without deviation over all terrains without pain or limitation from the knee by discharge                 Plan  Plan details: Patient informed that from this point forward, to ensure adherence to the aforementioned plan of care, all or some of the treatment may be performed and carried out by a Physical Therapy Assistant (PTA) with supervision from a licensed Physical Therapist (PT) in accordance with [facility] Physical Therapy Practice Act  Patient will continue to benefit from skilled physical therapy to address the functional deficits that were identified during the evaluation today  We will continue to progress the therapy program to address these functional deficits and achieve the established goals  Patient would benefit from: skilled physical therapy  Planned modality interventions: cryotherapy  Planned therapy interventions: ADL retraining, body mechanics training, functional ROM exercises, flexibility, home exercise program, therapeutic exercise, therapeutic activities, stretching, strengthening, patient education, postural training, manual therapy and joint mobilization  Frequency: 2x week  Duration in weeks: 6  Plan of Care beginning date: 2022  Plan of Care expiration date: 2023  Treatment plan discussed with: patient        Subjective Evaluation    History of Present Illness  Mechanism of injury: Patient presents to out patient physical therapy with chief c/o L knee and R shoulder pain  Patient reports that back in April he was in the hospital for a kidney procedure when he came out of the procedure and his R shoulder was bothering him along with both of his legs  He feels that the R shoulder has continued to bother him and is limiting his daily activities  He feels that the left knee is sore on the inside but that he has more pain in the R shoulder  He has difficulties reaching across his body or above shoulder level  Pain  Current pain ratin  At best pain ratin  At worst pain ratin  Location: L knee/R shoulder  Quality: dull ache, sharp, tight, throbbing and discomfort  Relieving factors: rest  Aggravating factors: overhead activity, stair climbing, walking, standing and lifting    Social Support  Steps to enter house: yes  4  Stairs in house: yes   13  Lives in: multiple-level home  Lives with: alone    Working: disability    Hand dominance: right    Patient Goals  Patient goals for therapy: decreased pain, increased motion, increased strength, independence with ADLs/IADLs and improved balance  Patient goal: Patient wishes to improve the mobility and strength in his legs and R shoulder to return to normal daily activities  Objective     Palpation     Right   Hypertonic in the pectoralis major  Tenderness of the pectoralis major  Active Range of Motion   Left Shoulder   Flexion: 148 degrees   Extension: 48 degrees   Abduction: 140 degrees     Right Shoulder   Flexion: 120 degrees   Extension: 31 degrees   Abduction: 91 degrees     Passive Range of Motion   Left Shoulder   Normal passive range of motion    Right Shoulder   Flexion: 133 degrees   External rotation 0°: 82 degrees   Internal rotation 0°: 66 degrees     Strength/Myotome Testing     Left Shoulder   Normal muscle strength    Right Shoulder     Planes of Motion   Flexion: 4-   Extension: 4+   Abduction: 3+   Adduction: 4+   External rotation at 0°: 4-   Internal rotation at 0°: 4-     Tests     Right Shoulder   Positive empty can, Hawkin's, horn blower, Neer's and painful arc  Negative AC shear, belly press and drop arm               Precautions: None    Date 12/5       FOTO 49       Manuals        Shoulder PROM 8 min       STM/TPR R Pec major 5 min       Passive scapula ROM 5 min               Neuro Re-Ed        TB rows, NIXON        No Monies                                                Ther Ex        UBE Retro NV       Doorway Pec stretch                                                        Ther Activity                        Gait Training                        Modalities        MHP/CP PRN NT

## 2022-12-05 NOTE — PROGRESS NOTES
PT Evaluation     Today's date: 2022  Patient name: Franco Franco  : 1965  MRN: [id number]  Referring provider: Khalida Aguilera DO  Dx:   Encounter Diagnosis     ICD-10-CM    1  Chronic pain of left knee  M25 562     G89 29       2  Chronic right shoulder pain  M25 511     G89 29           Start Time: 1100  Stop Time: 1200  Total time in clinic (min): 60 minutes    Assessment  Assessment details: Patient is a 64 y/o male with chief c/o R shoulder and L knee pain  Patient wishes to focus on the R shoulder at this time prior to working on the L knee so we held on the evaluation of this body part at this time  He has noted tenderness along the inferior aspect of the R pec major tendon around R axilla  There was increased pain at end range of abduction and flexion both actively and passively but range was improved with passive assessment with less pain noted  Patient was educated on good postures and proper sleeping positions to take pressure off of the R shoulder  Impairments: abnormal or restricted ROM, activity intolerance, impaired physical strength, lacks appropriate home exercise program, pain with function, scapular dyskinesis and poor posture     Symptom irritability: moderateUnderstanding of Dx/Px/POC: good   Prognosis: good    Goals  STGs:  Patient will be independent with hep by 2-3 visits  Improve shoulder flexion to 140 degrees for improved tolerance with adls and work duties by 3-4 weeks  Decrease pain levels by 50% for improved tolerance with adls and work duties by 3-4 weeks  Improve L knee flexion to 130 degrees for improved tolerance with transfers and adls by 3-4 weeks  Improve L knee strength to 3+/5 for improved tolerance with adls and transfers by 3-4 weeks  Improve L knee extension to 0 degrees for improved tolerance with adls and transfers by 3-4 weeks  LTGs:  Improve FOTO score from 49 To 69 Indicating improved tolerance with activities involving the LE by discharge  Patient will demonstrate full, pain free strength and rom of the shoulder for improved tolerance with adls and work duties by discharge  Patient will be able to return to normal work duties by discharge  Sleep will be undisturbed from the shoulder by discharge  Improve L knee strength and rom to wnl for improved tolerance with adls, work duties, and recreation by discharge  Patient will be able to return to normal ambulation without deviation over all terrains without pain or limitation from the knee by discharge  Plan  Plan details: Patient informed that from this point forward, to ensure adherence to the aforementioned plan of care, all or some of the treatment may be performed and carried out by a Physical Therapy Assistant (PTA) with supervision from a licensed Physical Therapist (PT) in accordance with [place] [address]  Patient will continue to benefit from skilled physical therapy to address the functional deficits that were identified during the evaluation today  We will continue to progress the therapy program to address these functional deficits and achieve the established goals  Patient would benefit from: skilled physical therapy  Planned modality interventions: cryotherapy  Planned therapy interventions: ADL retraining, body mechanics training, functional ROM exercises, flexibility, home exercise program, therapeutic exercise, therapeutic activities, stretching, strengthening, patient education, postural training, manual therapy and joint mobilization  Frequency: 2x week  Duration in weeks: 6  Plan of Care beginning date: 12/5/2022  Plan of Care expiration date: 1/16/2023  Treatment plan discussed with: patient        Subjective Evaluation    History of Present Illness  Mechanism of injury: Patient presents to out patient physical therapy with chief c/o L knee and R shoulder pain   Patient reports that back in April he was in the hospital for a kidney procedure when he came out of the procedure and his R shoulder was bothering him along with both of his legs  He feels that the R shoulder has continued to bother him and is limiting his daily activities  He feels that the left knee is sore on the inside but that he has more pain in the R shoulder  He has difficulties reaching across his body or above shoulder level  Pain  Current pain ratin  At best pain ratin  At worst pain ratin  Location: L knee/R shoulder  Quality: dull ache, sharp, tight, throbbing and discomfort  Relieving factors: rest  Aggravating factors: overhead activity, stair climbing, walking, standing and lifting    Social Support  Steps to enter house: yes  4  Stairs in house: yes   13  Lives in: multiple-level home  Lives with: alone    Working: disability  Hand dominance: right    Patient Goals  Patient goals for therapy: decreased pain, increased motion, increased strength, independence with ADLs/IADLs and improved balance  Patient goal: Patient wishes to improve the mobility and strength in his legs and R shoulder to return to normal daily activities  Objective     Palpation     Right   Hypertonic in the pectoralis major  Tenderness of the pectoralis major       Active Range of Motion   Left Shoulder   Flexion: 148 degrees   Extension: 48 degrees   Abduction: 140 degrees     Right Shoulder   Flexion: 120 degrees   Extension: 31 degrees   Abduction: 91 degrees     Passive Range of Motion   Left Shoulder   Normal passive range of motion    Right Shoulder   Flexion: 133 degrees   External rotation 0°: 82 degrees   Internal rotation 0°: 66 degrees     Strength/Myotome Testing     Left Shoulder   Normal muscle strength    Right Shoulder     Planes of Motion   Flexion: 4-   Extension: 4+   Abduction: 3+   Adduction: 4+   External rotation at 0°: 4-   Internal rotation at 0°: 4-     Tests     Right Shoulder   Positive empty can, Hawkin's, horn blower, Neer's and painful arc  Negative AC shear, belly press and drop arm                Precautions: None    Date 12/5       FOTO 49       Manuals        Shoulder PROM 8 min       STM/TPR R Pec major 5 min       Passive scapula ROM 5 min               Neuro Re-Ed        TB rows, NIXON        No Monies                                                Ther Ex        UBE Retro NV       Doorway Pec stretch                                                        Ther Activity                        Gait Training                        Modalities        MHP/CP PRN NT

## 2022-12-07 ENCOUNTER — OFFICE VISIT (OUTPATIENT)
Dept: OBGYN CLINIC | Facility: CLINIC | Age: 57
End: 2022-12-07

## 2022-12-07 ENCOUNTER — TELEPHONE (OUTPATIENT)
Dept: FAMILY MEDICINE CLINIC | Facility: CLINIC | Age: 57
End: 2022-12-07

## 2022-12-07 ENCOUNTER — APPOINTMENT (OUTPATIENT)
Dept: RADIOLOGY | Facility: MEDICAL CENTER | Age: 57
End: 2022-12-07

## 2022-12-07 VITALS
OXYGEN SATURATION: 98 % | HEIGHT: 66 IN | SYSTOLIC BLOOD PRESSURE: 130 MMHG | DIASTOLIC BLOOD PRESSURE: 62 MMHG | BODY MASS INDEX: 50.62 KG/M2 | HEART RATE: 65 BPM | WEIGHT: 315 LBS

## 2022-12-07 DIAGNOSIS — G89.29 CHRONIC PAIN OF LEFT KNEE: ICD-10-CM

## 2022-12-07 DIAGNOSIS — M25.511 RIGHT SHOULDER PAIN, UNSPECIFIED CHRONICITY: ICD-10-CM

## 2022-12-07 DIAGNOSIS — M25.562 CHRONIC PAIN OF LEFT KNEE: ICD-10-CM

## 2022-12-07 DIAGNOSIS — M75.41 SUBACROMIAL IMPINGEMENT OF RIGHT SHOULDER: Primary | ICD-10-CM

## 2022-12-07 NOTE — PROGRESS NOTES
Subjective:  Chief Complaint   Patient presents with   • Right Shoulder - Pain     Patient started doing outpatient PT  When patient went in for a procedure on kidney after that is when it started hurting  Sofya Holloway is a 65 y o  male complains of right shoulder pain  Onset of the symptoms was several months ago  Mechanism of injury: started after procedure ofr kidney  Aggravating factors: lifting overhead  Treatment to date: started with physical therapy  Symptoms have progressed to a point and plateaued  The following portions of the patient's history were reviewed and updated as appropriate: allergies, current medications, past family history, past medical history, past social history, past surgical history and problem list     Occupation:      Review of Systems   Constitutional: Negative for fever  HENT: Negative for dental problem and headaches  Eyes: Negative for vision loss  Respiratory: Negative for cough and shortness of breath  Cardiovascular: Negative for leg swelling and palpitations  Gastrointestinal: Negative for constipation and diarrhea  Genitourinary: Negative for bladder incontinence and difficulty urinating  Musculoskeletal: Negative for back pain and difficulty walking  Skin: Negative for rash and ulcer  Neurological: Negative for dizziness and headaches  Hem/Lymph/Immuno: Negative for blood clots  Does not bruise/bleed easily  Psychiatric/Behavioral: Negative for confusion           Objective:  /62 (BP Location: Left arm, Patient Position: Sitting, Cuff Size: Large)   Pulse 65   Ht 5' 6" (1 676 m)   Wt (!) 166 kg (365 lb 9 6 oz)   SpO2 98%   BMI 59 01 kg/m²     Skin: no rashes, lesions, skin discolorations, lacerations  Vasculature: normal radial and ulnar pulse,  normal skin color, normal capillary refill in extremity, no upper extremity edema  Neurologic: Neurologic exam is normal throughout upper extremities, Awake, alert, and oriented x3, no apparent distress  Musculoskeletal:   right SHOULDER EXAM    Intact skin  No erythema, no induration, no signs of infection  No gross deformity    AROM FF: 150 degrees  AROM ER with arm at its side: 70 degrees  AROM IR: lower lumbar    Grind test: negative    Supraspinatus strength testin/5  Infraspinatus strength testin/5    Belly press: negative  Bear Hug test: negative    Empty can: positive  Biceps TTP: positive  Arc test: positive    Arroyo's test:negative  Scapular posturing: good        Imaging:    See final report     Assessment/Plan:  1  Subacromial impingement of right shoulder    - XR shoulder 2+ vw right; Future        > 45 min devoted to review of previous, pertinent medical records, imaging, discussion of treatment options, counseling and documentation  Radiographs of the right shoulder reviewed independently with patient revealing no acute fractures or dislocations  No degenerative changes were noted  Follow-up on official reading  We discussed the nature of subacromial impingement at length and detailed the treatment approach    Recommending formal PT-patient has started with physical therapy and was advised to continue for an additional 4 to 6 weeks  Discussed role for corticosteroid injection in office today for subacromial bursa however patient would like to hold on injection and trial physical therapy first   Advised that he can return to office if pain is not improving and we can consider corticosteroid injection at that time

## 2022-12-07 NOTE — TELEPHONE ENCOUNTER
Pt has applied for sepsis financial aid  Andreia Tellez he has spesis twice this year  He needs paper work filled out stating he has had sepsis in order to qualify  The paperwork that we sent back to them in October did not state patient had sepsis  They are going to be faxing over new paper work and asking if you would fill it out again with a sepsis diagnosis if patient has had that in the past?  It can only go back as far as 6 months

## 2022-12-09 ENCOUNTER — HOSPITAL ENCOUNTER (OUTPATIENT)
Dept: ULTRASOUND IMAGING | Facility: HOSPITAL | Age: 57
Discharge: HOME | End: 2022-12-09

## 2022-12-09 DIAGNOSIS — N20.0 RENAL CALCULUS: ICD-10-CM

## 2022-12-12 ENCOUNTER — OFFICE VISIT (OUTPATIENT)
Dept: PHYSICAL THERAPY | Facility: CLINIC | Age: 57
End: 2022-12-12

## 2022-12-12 DIAGNOSIS — M25.511 CHRONIC RIGHT SHOULDER PAIN: ICD-10-CM

## 2022-12-12 DIAGNOSIS — G89.29 CHRONIC RIGHT SHOULDER PAIN: ICD-10-CM

## 2022-12-12 DIAGNOSIS — G89.29 CHRONIC PAIN OF LEFT KNEE: Primary | ICD-10-CM

## 2022-12-12 DIAGNOSIS — M25.562 CHRONIC PAIN OF LEFT KNEE: Primary | ICD-10-CM

## 2022-12-12 NOTE — PROGRESS NOTES
Daily Note     Today's date: 2022  Patient name: Lalitha Crane  : 1965  MRN: [id number]  Referring provider: Nneka Valle DO  Dx:   Encounter Diagnosis     ICD-10-CM    1  Chronic pain of left knee  M25 562     G89 29       2  Chronic right shoulder pain  M25 511     G89 29           Start Time: 1100  Stop Time: 1200  Total time in clinic (min): 60 minutes    Subjective: Patient reports that he started using his walker again which seems to have helped decrease the pain in his R shoulder when walking  He notes that he did sleep well last night which he has not done so in a while  He continues to report anterior R shoulder pain with daily activities  Objective: See treatment diary below      Assessment: Tolerated treatment well  Patient demonstrated fatigue post treatment and would benefit from continued PT  Patient noted most difficulty during wand assisted scaption and flexion in seated  He reports no pain during table slide or passive flexion of the R UE  There continues to be tenderness around the anterior shoulder with palpation  He tolerated soft tissue mobilization well without increased pain today  Plan: Continue per plan of care  Progress treatment as tolerated         Precautions: None    Date       FOTO 49       Manuals        Shoulder PROM 8 min 8 min      STM/TPR R Pec major 5 min 5 min      Passive scapula ROM 5 min 5 min              Neuro Re-Ed        TB rows, NIXON        No Monies                                                Ther Ex        UBE Retro NV NuStep L3 7 min      Doorway Pec stretch        Wand flexion  15x      Wand scaption  15x      Table slides  5" 15x      Wand press in supine  15x                      Ther Activity                        Gait Training                        Modalities        MHP/CP PRN NT

## 2022-12-16 ENCOUNTER — OFFICE VISIT (OUTPATIENT)
Dept: PHYSICAL THERAPY | Facility: CLINIC | Age: 57
End: 2022-12-16

## 2022-12-16 DIAGNOSIS — G89.29 CHRONIC RIGHT SHOULDER PAIN: ICD-10-CM

## 2022-12-16 DIAGNOSIS — M25.562 CHRONIC PAIN OF LEFT KNEE: Primary | ICD-10-CM

## 2022-12-16 DIAGNOSIS — M25.511 CHRONIC RIGHT SHOULDER PAIN: ICD-10-CM

## 2022-12-16 DIAGNOSIS — G89.29 CHRONIC PAIN OF LEFT KNEE: Primary | ICD-10-CM

## 2022-12-16 NOTE — PROGRESS NOTES
Daily Note     Today's date: 2022  Patient name: Leigh Ann Montano  : 1965  MRN: [id number]  Referring provider: Cassie Mixon DO  Dx:   Encounter Diagnosis     ICD-10-CM    1  Chronic pain of left knee  M25 562     G89 29       2  Chronic right shoulder pain  M25 511     G89 29                      Subjective: Patient reports that his shoulder is sore at the anterior aspect today  He feels that reaching overhead continues to be difficult and cause pain in his shoulder  Objective: See treatment diary below      Assessment: Tolerated treatment well  Patient demonstrated fatigue post treatment and would benefit from continued PT Patient reports pain localized to the anterior aspect of the R shoulder with progression of strengthening interventions today  He felt fatigue with progression of strengthening and cuing was required for activation of associated musculature and to decrease compensation  Plan: Continue per plan of care  Progress treatment as tolerated         Precautions: None    Date      FOTO 49       Manuals        Shoulder PROM 8 min 8 min 10 min flex/IR     STM/TPR R Pec major 5 min 5 min      Passive scapula ROM 5 min 5 min      Grade 4 posterior joint mobilization   5 min     Neuro Re-Ed        TB rows, NIXON   CC P3 2x15     No Monies   Blue TB 10" 10x     Pec Dec   30# 2x15                                     Ther Ex        UBE Retro NV NuStep L3 7 min Nu Step L4 7 min     Doorway Pec stretch   Single arm 10" 10x     Wand flexion  15x Supine 2# 20x     Wand scaption  15x      Table slides  5" 15x      Wand press in supine  15x      Supine chest press   7# DB 2x10             Ther Activity                        Gait Training                        Modalities        MHP/CP PRN NT

## 2022-12-21 ENCOUNTER — OFFICE VISIT (OUTPATIENT)
Dept: PHYSICAL THERAPY | Facility: CLINIC | Age: 57
End: 2022-12-21

## 2022-12-21 DIAGNOSIS — M25.511 CHRONIC RIGHT SHOULDER PAIN: ICD-10-CM

## 2022-12-21 DIAGNOSIS — G89.29 CHRONIC PAIN OF LEFT KNEE: Primary | ICD-10-CM

## 2022-12-21 DIAGNOSIS — G89.29 CHRONIC RIGHT SHOULDER PAIN: ICD-10-CM

## 2022-12-21 DIAGNOSIS — M25.562 CHRONIC PAIN OF LEFT KNEE: Primary | ICD-10-CM

## 2022-12-21 NOTE — LETTER
2022    DO Pooja Lorenzo [address]    Patient: Barbara Houser   YOB: 1965   Date of Visit: 2022     Encounter Diagnosis     ICD-10-CM    1  Chronic pain of left knee  M25 562     G89 29       2  Chronic right shoulder pain  M25 511     G89 29           Dear Dr Vel Mayer: Thank you for your recent referral of Barbara Houser  Please review the attached evaluation summary from Kevin's recent visit  Please verify that you agree with the plan of care by signing the attached order  If you have any questions or concerns, please do not hesitate to call  I sincerely appreciate the opportunity to share in the care of one of your patients and hope to have another opportunity to work with you in the near future  Sincerely,    Faina Kwan, PT      Referring Provider:      I certify that I have read the below Plan of Care and certify the need for these services furnished under this plan of treatment while under my care  Jim Fletcher DO  [facility] [address]  [address]          PT Re-Evaluation     Today's date: 2022  Patient name: Barbara Houser  : 1965  MRN: [id number]  Referring provider: Olman Smith DO  Dx:   Encounter Diagnosis     ICD-10-CM    1  Chronic pain of left knee  M25 562     G89 29       2  Chronic right shoulder pain  M25 511     G89 29           Start Time: 1030  Stop Time: 1130  Total time in clinic (min): 60 minutes    Assessment  Assessment details: Patient has completed 4 visits of physical therapy  He reports an improvement in symptoms 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  There is improving mobility noted with less pain for flexion and ER but he continues to repot anterior shoulder pain at end range of IR  There is also pain noted with resisted shoulder ER but no longer with abduction and flexion   He continues to present with rounded shoulder posture  Impairments: abnormal or restricted ROM, activity intolerance, impaired physical strength, lacks appropriate home exercise program, pain with function, scapular dyskinesis and poor posture     Symptom irritability: moderateUnderstanding of Dx/Px/POC: good   Prognosis: good    Goals  STGs:  Patient will be independent with hep by 2-3 visits  Improve shoulder flexion to 140 degrees for improved tolerance with adls and work duties by 3-4 weeks  Decrease pain levels by 50% for improved tolerance with adls and work duties by 3-4 weeks  Improve L knee flexion to 130 degrees for improved tolerance with transfers and adls by 3-4 weeks  Improve L knee strength to 3+/5 for improved tolerance with adls and transfers by 3-4 weeks  Improve L knee extension to 0 degrees for improved tolerance with adls and transfers by 3-4 weeks  LTGs:  Improve FOTO score from 49 To 69 Indicating improved tolerance with activities involving the LE by discharge  Patient will demonstrate full, pain free strength and rom of the shoulder for improved tolerance with adls and work duties by discharge  Patient will be able to return to normal work duties by discharge  Sleep will be undisturbed from the shoulder by discharge  Improve L knee strength and rom to wnl for improved tolerance with adls, work duties, and recreation by discharge  Patient will be able to return to normal ambulation without deviation over all terrains without pain or limitation from the knee by discharge  Plan  Plan details: Patient informed that from this point forward, to ensure adherence to the aforementioned plan of care, all or some of the treatment may be performed and carried out by a Physical Therapy Assistant (PTA) with supervision from a licensed Physical Therapist (PT) in accordance with [address]    Patient will continue to benefit from skilled physical therapy to address the functional deficits that were identified during the evaluation today  We will continue to progress the therapy program to address these functional deficits and achieve the established goals  Patient would benefit from: skilled physical therapy  Planned modality interventions: cryotherapy  Planned therapy interventions: ADL retraining, body mechanics training, functional ROM exercises, flexibility, home exercise program, therapeutic exercise, therapeutic activities, stretching, strengthening, patient education, postural training, manual therapy and joint mobilization  Frequency: 2x week  Duration in weeks: 4  Plan of Care beginning date: 2022  Plan of Care expiration date: 2023  Treatment plan discussed with: patient        Subjective Evaluation    History of Present Illness  Mechanism of injury: Patient presents to out patient physical therapy with chief c/o L knee and R shoulder pain  Patient reports that back in April he was in the hospital for a kidney procedure when he came out of the procedure and his R shoulder was bothering him along with both of his legs  He feels that the R shoulder has continued to bother him and is limiting his daily activities  He feels that the left knee is sore on the inside but that he has more pain in the R shoulder  He has difficulties reaching across his body or above shoulder level  Update 2022:  Patient reports that he felt that he was making progress regarding the R shoulder 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  He feels that the anterior shoulder is sore again today because of having to shift the transmission  He feels difficulties with reaching above shoulder level  He feels that performing personal hygiene is improving with less pain in his shoulder     Pain  Current pain ratin  At best pain ratin  At worst pain ratin  Location: L knee/R shoulder  Quality: dull ache, sharp, tight, throbbing and discomfort  Relieving factors: rest  Aggravating factors: overhead activity, stair climbing, walking, standing and lifting    Social Support  Steps to enter house: yes  4  Stairs in house: yes   13  Lives in: multiple-level home  Lives with: alone    Working: disability  Hand dominance: right    Patient Goals  Patient goals for therapy: decreased pain, increased motion, increased strength, independence with ADLs/IADLs and improved balance  Patient goal: Patient wishes to improve the mobility and strength in his legs and R shoulder to return to normal daily activities  Objective     Palpation     Right   Hypertonic in the pectoralis major  Tenderness of the pectoralis major  Active Range of Motion   Left Shoulder   Flexion: 148 degrees   Extension: 48 degrees   Abduction: 140 degrees     Right Shoulder   Flexion: 120 degrees   Extension: 31 degrees   Abduction: 91 degrees     Passive Range of Motion   Left Shoulder   Normal passive range of motion    Right Shoulder   Flexion: 154 degrees   External rotation 0°: 85 degrees   Internal rotation 0°: 65 degrees with pain    Strength/Myotome Testing     Left Shoulder   Normal muscle strength    Right Shoulder     Planes of Motion   Flexion: 4+   Extension: 4+   Abduction: 4+   Adduction: 4+   External rotation at 0°: 3+   Internal rotation at 0°: 4     Tests     Right Shoulder   Positive empty can, Hawkin's, horn blower, lift-off and Neer's  Negative AC shear, belly press, drop arm and painful arc  Precautions: None    Date 12/5 12/12 12/16 12/21 Reassess    FOTO 49       Manuals        Shoulder PROM 8 min 8 min 10 min flex/IR 10 min flex/IR    STM/TPR R Pec major 5 min 5 min      Passive scapula ROM 5 min 5 min      Grade 4 posterior joint mobilization   5 min 5 min    Neuro Re-Ed        TB rows, NIXON   CC P3 2x15 CC P3/4 2x15 ea       No Monies   Blue TB 10" 10x Green TB 10" 10x    Pec Dec   30# 2x15 45# 2x15    TB ER, IR Green 15x ea                               Ther Ex        NuStep NV NuStep L3 7 min Nu Step L4 7 min L4 10 min    Doorway Pec stretch   Single arm 10" 10x 10" 10x    Wand flexion  15x Supine 2# 20x 20x    Wand scaption  15x      Table slides  5" 15x      Wand press in supine  15x  20x    Supine chest press   7# DB 2x10 5# 2x10            Ther Activity                        Gait Training                        Modalities        MHP/CP PRN NT

## 2022-12-21 NOTE — PROGRESS NOTES
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  There is improving mobility noted with less pain for flexion and ER but he continues to repot anterior shoulder pain at end range of IR  There is also pain noted with resisted shoulder ER but no longer with abduction and flexion  He continues to present with rounded shoulder posture  Impairments: abnormal or restricted ROM, activity intolerance, impaired physical strength, lacks appropriate home exercise program, pain with function, scapular dyskinesis and poor posture     Symptom irritability: moderateUnderstanding of Dx/Px/POC: good   Prognosis: good    Goals  STGs:  Patient will be independent with hep by 2-3 visits  Improve shoulder flexion to 140 degrees for improved tolerance with adls and work duties by 3-4 weeks  Decrease pain levels by 50% for improved tolerance with adls and work duties by 3-4 weeks  Improve L knee flexion to 130 degrees for improved tolerance with transfers and adls by 3-4 weeks  Improve L knee strength to 3+/5 for improved tolerance with adls and transfers by 3-4 weeks  Improve L knee extension to 0 degrees for improved tolerance with adls and transfers by 3-4 weeks  LTGs:  Improve FOTO score from 49 To 69 Indicating improved tolerance with activities involving the LE by discharge     Patient will demonstrate full, pain free strength and rom of the shoulder for improved tolerance with adls and work duties by discharge  Patient will be able to return to normal work duties by discharge  Sleep will be undisturbed from the shoulder by discharge  Improve L knee strength and rom to wnl for improved tolerance with adls, work duties, and recreation by discharge  Patient will be able to return to normal ambulation without deviation over all terrains without pain or limitation from the knee by discharge  Plan  Plan details: Patient informed that from this point forward, to ensure adherence to the aforementioned plan of care, all or some of the treatment may be performed and carried out by a Physical Therapy Assistant (PTA) with supervision from a licensed Physical Therapist (PT) in accordance with [place] [address]  Patient will continue to benefit from skilled physical therapy to address the functional deficits that were identified during the evaluation today  We will continue to progress the therapy program to address these functional deficits and achieve the established goals  Patient would benefit from: skilled physical therapy  Planned modality interventions: cryotherapy  Planned therapy interventions: ADL retraining, body mechanics training, functional ROM exercises, flexibility, home exercise program, therapeutic exercise, therapeutic activities, stretching, strengthening, patient education, postural training, manual therapy and joint mobilization  Frequency: 2x week  Duration in weeks: 4  Plan of Care beginning date: 12/21/2022  Plan of Care expiration date: 1/18/2023  Treatment plan discussed with: patient        Subjective Evaluation    History of Present Illness  Mechanism of injury: Patient presents to out patient physical therapy with chief c/o L knee and R shoulder pain  Patient reports that back in April he was in the hospital for a kidney procedure when he came out of the procedure and his R shoulder was bothering him along with both of his legs   He feels that the R shoulder has continued to bother him and is limiting his daily activities  He feels that the left knee is sore on the inside but that he has more pain in the R shoulder  He has difficulties reaching across his body or above shoulder level  Update 2022:  Patient reports that he felt that he was making progress regarding the R shoulder 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  He feels that the anterior shoulder is sore again today because of having to shift the transmission  He feels difficulties with reaching above shoulder level  He feels that performing personal hygiene is improving with less pain in his shoulder  Pain  Current pain ratin  At best pain ratin  At worst pain ratin  Location: L knee/R shoulder  Quality: dull ache, sharp, tight, throbbing and discomfort  Relieving factors: rest  Aggravating factors: overhead activity, stair climbing, walking, standing and lifting    Social Support  Steps to enter house: yes  4  Stairs in house: yes   13  Lives in: multiple-level home  Lives with: alone    Working: disability  Hand dominance: right    Patient Goals  Patient goals for therapy: decreased pain, increased motion, increased strength, independence with ADLs/IADLs and improved balance  Patient goal: Patient wishes to improve the mobility and strength in his legs and R shoulder to return to normal daily activities  Objective     Palpation     Right   Hypertonic in the pectoralis major  Tenderness of the pectoralis major       Active Range of Motion   Left Shoulder   Flexion: 148 degrees   Extension: 48 degrees   Abduction: 140 degrees     Right Shoulder   Flexion: 120 degrees   Extension: 31 degrees   Abduction: 91 degrees     Passive Range of Motion   Left Shoulder   Normal passive range of motion    Right Shoulder   Flexion: 154 degrees   External rotation 0°: 85 degrees   Internal rotation 0°: 65 degrees with pain    Strength/Myotome Testing     Left Shoulder   Normal muscle strength    Right Shoulder     Planes of Motion   Flexion: 4+   Extension: 4+   Abduction: 4+   Adduction: 4+   External rotation at 0°: 3+   Internal rotation at 0°: 4     Tests     Right Shoulder   Positive empty can, Hawkin's, horn blower, lift-off and Neer's  Negative AC shear, belly press, drop arm and painful arc  Precautions: None    Date 12/5 12/12 12/16 12/21 Reassess    FOTO 49       Manuals        Shoulder PROM 8 min 8 min 10 min flex/IR 10 min flex/IR    STM/TPR R Pec major 5 min 5 min      Passive scapula ROM 5 min 5 min      Grade 4 posterior joint mobilization   5 min 5 min    Neuro Re-Ed        TB rows, NIXON   CC P3 2x15 CC P3/4 2x15 ea  No Monies   Blue TB 10" 10x Green TB 10" 10x    Pec Dec   30# 2x15 45# 2x15    TB ER, IR    Green 15x ea                               Ther Ex        NuStep NV NuStep L3 7 min Nu Step L4 7 min L4 10 min    Doorway Pec stretch   Single arm 10" 10x 10" 10x    Wand flexion  15x Supine 2# 20x 20x    Wand scaption  15x      Table slides  5" 15x      Wand press in supine  15x  20x    Supine chest press   7# DB 2x10 5# 2x10            Ther Activity                        Gait Training                        Modalities        MHP/CP PRN NT

## 2022-12-28 ENCOUNTER — OFFICE VISIT (OUTPATIENT)
Dept: PHYSICAL THERAPY | Facility: CLINIC | Age: 57
End: 2022-12-28

## 2022-12-28 DIAGNOSIS — M25.511 CHRONIC RIGHT SHOULDER PAIN: ICD-10-CM

## 2022-12-28 DIAGNOSIS — G89.29 CHRONIC RIGHT SHOULDER PAIN: ICD-10-CM

## 2022-12-28 DIAGNOSIS — G89.29 CHRONIC PAIN OF LEFT KNEE: Primary | ICD-10-CM

## 2022-12-28 DIAGNOSIS — M25.562 CHRONIC PAIN OF LEFT KNEE: Primary | ICD-10-CM

## 2022-12-28 NOTE — PROGRESS NOTES
Daily Note     Today's date: 2022  Patient name: Barbara Velazco  : 1965  MRN: [id number]  Referring provider: Paul Khalil DO  Dx:   Encounter Diagnosis     ICD-10-CM    1  Chronic pain of left knee  M25 562     G89 29       2  Chronic right shoulder pain  M25 511     G89 29           Start Time: 1045  Stop Time: 1150  Total time in clinic (min): 65 minutes    Subjective: Patient reports that he is noticing that he is in pain less often  He continues to feel limited with his daily activities as prolonged use of the arm does increase pain but he feels as though his motion and tolerance for activity is improving  Objective: See treatment diary below      Assessment: Tolerated treatment well  Patient demonstrated fatigue post treatment, exhibited good technique with therapeutic exercises and would benefit from continued PT Patient was able to tolerate full passive rom during stretching today without pain at end range  He noted fatigue with strengthening interventions especially ER with resistance but is demonstrating improvements during his session  Plan: Continue per plan of care  Progress treatment as tolerated  Precautions: None    Date  Reassess    FOTO 49       Manuals        Shoulder PROM 8 min 8 min 10 min flex/IR 10 min flex/IR 10 min   STM/TPR R Pec major 5 min 5 min      Passive scapula ROM 5 min 5 min      Grade 4 posterior joint mobilization   5 min 5 min    Neuro Re-Ed        TB rows, NIXON   CC P3 2x15 CC P3/4 2x15 ea  CC P4 2x20/15 ea  No Monies   Blue TB 10" 10x Green TB 10" 10x 10" 10x   Pec Dec   30# 2x15 45# 2x15 45# 2x20   TB ER, IR    Green 15x ea    Orange/Green 20x                           Ther Ex        NuStep NV NuStep L3 7 min Nu Step L4 7 min L4 10 min L4 10 min   Doorway Pec stretch   Single arm 10" 10x 10" 10x 10" 10x   Wand flexion  15x Supine 2# 20x 20x 20x   Wand scaption  15x      Table slides  5" 15x      Wand press in supine  15x  20x 20x   Supine chest press   7# DB 2x10 5# 2x10 4# 2x10           Ther Activity                        Gait Training                        Modalities        MHP/CP PRN NT

## 2023-01-04 ENCOUNTER — APPOINTMENT (OUTPATIENT)
Dept: PHYSICAL THERAPY | Facility: CLINIC | Age: 58
End: 2023-01-04

## 2023-01-11 ENCOUNTER — OFFICE VISIT (OUTPATIENT)
Dept: PHYSICAL THERAPY | Facility: CLINIC | Age: 58
End: 2023-01-11

## 2023-01-11 DIAGNOSIS — M25.562 CHRONIC PAIN OF LEFT KNEE: Primary | ICD-10-CM

## 2023-01-11 DIAGNOSIS — G89.29 CHRONIC PAIN OF LEFT KNEE: Primary | ICD-10-CM

## 2023-01-11 DIAGNOSIS — G89.29 CHRONIC RIGHT SHOULDER PAIN: ICD-10-CM

## 2023-01-11 DIAGNOSIS — M25.511 CHRONIC RIGHT SHOULDER PAIN: ICD-10-CM

## 2023-01-11 NOTE — PROGRESS NOTES
Daily Note     Today's date: 2023  Patient name: Sydney Krishnan  : 1965  MRN: [id number]  Referring provider: Severino Dawson DO  Dx:   Encounter Diagnosis     ICD-10-CM    1  Chronic pain of left knee  M25 562     G89 29       2  Chronic right shoulder pain  M25 511     G89 29           Start Time: 1100  Stop Time: 1145  Total time in clinic (min): 45 minutes    Subjective: Patient reports that he over did his exercises last week and had both of his arm sore for 2 days  He notes that the pain did subside since but they are still sore in the anterior aspect  He continues to have difficulties with lifting  Objective: See treatment diary below      Assessment: Tolerated treatment well  Patient demonstrated fatigue post treatment, exhibited good technique with therapeutic exercises and would benefit from continued PT Patient noted fatigue and mild soreness to the anterior shoulder  Continued to focus on progression of strengthening today  He is noting improved tolerance for passive stretching with less pain noted at end range  He reported a sensation of mild upset stomach toward the end of the session today to chose to hold on the rest of the exercises  Plan: Continue per plan of care  Progress treatment as tolerated  Precautions: None    Date  Reassess    FOTO        Manuals        Shoulder PROM 10 min 8 min 10 min flex/IR 10 min flex/IR 10 min   STM/TPR R Pec major  5 min      Passive scapula ROM  5 min      Grade 4 posterior joint mobilization   5 min 5 min    Neuro Re-Ed        TB rows, NIXON CC Single arm P1 2x15  CC P3 2x15 CC P3/4 2x15 ea  CC P4 2x20/15 ea  No Monies   Blue TB 10" 10x Green TB 10" 10x 10" 10x   Pec Dec 30# 2x15  30# 2x15 45# 2x15 45# 2x20   TB ER, IR    Green 15x ea    Orange/Green 20x                           Ther Ex        NuStep L5 10 min NuStep L3 7 min Nu Step L4 7 min L4 10 min L4 10 min   Doorway Pec stretch   Single arm 10" 10x 10" 10x 10" 10x   Wand flexion 20x 15x Supine 2# 20x 20x 20x   Wand scaption 20x 15x      Table slides  5" 15x      Wand press in supine 20x 15x  20x 20x   Supine chest press 4# 2x10  7# DB 2x10 5# 2x10 4# 2x10           Ther Activity                        Gait Training                        Modalities        MHP/CP PRN

## 2023-01-12 DIAGNOSIS — N18.30 CHRONIC KIDNEY DISEASE, STAGE 3 (HCC): ICD-10-CM

## 2023-01-12 DIAGNOSIS — N11.1 OBSTRUCTIVE PYELONEPHRITIS: ICD-10-CM

## 2023-01-12 RX ORDER — TAMSULOSIN HYDROCHLORIDE 0.4 MG/1
CAPSULE ORAL
Qty: 90 CAPSULE | Refills: 2 | Status: SHIPPED | OUTPATIENT
Start: 2023-01-12

## 2023-01-13 ENCOUNTER — TELEPHONE (OUTPATIENT)
Dept: FAMILY MEDICINE CLINIC | Facility: CLINIC | Age: 58
End: 2023-01-13

## 2023-01-13 NOTE — TELEPHONE ENCOUNTER
Record requests sent to [facility] 10/4/22,10/05/22,10/27/22,&12/15/22  Marked as completed by 12/28/22 from [id number]   Mick Cesar   pt requesting why he keeps getting requests from [facility]     gave him [facility] Number [id number] opt 1

## 2023-01-18 ENCOUNTER — OFFICE VISIT (OUTPATIENT)
Dept: PHYSICAL THERAPY | Facility: CLINIC | Age: 58
End: 2023-01-18

## 2023-01-18 ENCOUNTER — OFFICE VISIT (OUTPATIENT)
Dept: OBGYN CLINIC | Facility: CLINIC | Age: 58
End: 2023-01-18

## 2023-01-18 VITALS
BODY MASS INDEX: 50.62 KG/M2 | WEIGHT: 315 LBS | HEIGHT: 66 IN | DIASTOLIC BLOOD PRESSURE: 70 MMHG | SYSTOLIC BLOOD PRESSURE: 130 MMHG

## 2023-01-18 DIAGNOSIS — M75.41 SUBACROMIAL IMPINGEMENT OF RIGHT SHOULDER: Primary | ICD-10-CM

## 2023-01-18 DIAGNOSIS — G89.29 CHRONIC RIGHT SHOULDER PAIN: ICD-10-CM

## 2023-01-18 DIAGNOSIS — M25.511 CHRONIC RIGHT SHOULDER PAIN: ICD-10-CM

## 2023-01-18 DIAGNOSIS — M25.562 CHRONIC PAIN OF LEFT KNEE: Primary | ICD-10-CM

## 2023-01-18 DIAGNOSIS — G89.29 CHRONIC PAIN OF LEFT KNEE: Primary | ICD-10-CM

## 2023-01-18 RX ORDER — TRIAMCINOLONE ACETONIDE 40 MG/ML
40 INJECTION, SUSPENSION INTRA-ARTICULAR; INTRAMUSCULAR
Status: COMPLETED | OUTPATIENT
Start: 2023-01-18 | End: 2023-01-18

## 2023-01-18 RX ORDER — BUPIVACAINE HYDROCHLORIDE 2.5 MG/ML
4 INJECTION, SOLUTION INFILTRATION; PERINEURAL
Status: COMPLETED | OUTPATIENT
Start: 2023-01-18 | End: 2023-01-18

## 2023-01-18 RX ADMIN — BUPIVACAINE HYDROCHLORIDE 4 ML: 2.5 INJECTION, SOLUTION INFILTRATION; PERINEURAL at 12:01

## 2023-01-18 RX ADMIN — TRIAMCINOLONE ACETONIDE 40 MG: 40 INJECTION, SUSPENSION INTRA-ARTICULAR; INTRAMUSCULAR at 12:01

## 2023-01-18 NOTE — PROGRESS NOTES
Daily Note     Today's date: 2023  Patient name: Barbara Houser  : 1965  MRN: [id number]  Referring provider: Olman Smith DO  Dx:   Encounter Diagnosis     ICD-10-CM    1  Chronic pain of left knee  M25 562     G89 29       2  Chronic right shoulder pain  M25 511     G89 29           Start Time: 1400  Stop Time: 1445  Total time in clinic (min): 45 minutes    Subjective: Pt returns from MD F/U ; he states he received a cortisone injection to his R shld  Objective: See treatment diary below  PTA modified program due non-related issue  CP after tx    Assessment: Tolerated treatment fair  Patient would benefit from continued PT      Plan: Continue per plan of care  Precautions: None    Date  Reassess    FOTO        Manuals        Shoulder PROM 10 min ds 10 min flex/IR 10 min flex/IR 10 min   STM/TPR R Pec major        Passive scapula ROM        Grade 4 posterior joint mobilization   5 min 5 min    Neuro Re-Ed         Rows, NIXON CC Single arm P1 2x15 P1  2/15, row 1/15 CC P3 2x15 CC P3/4 2x15 ea  CC P4 2x20/15 ea  No Monies   Blue TB 10" 10x Green TB 10" 10x 10" 10x   Pec Dec 30# 2x15 30# 2/15 30# 2x15 45# 2x15 45# 2x20   TB ER, IR    Green 15x ea    Orange/Green 20x                           Ther Ex        NuStep L5 10 min 9m L3 Nu Step L4 7 min L4 10 min L4 10 min   Doorway Pec stretch   Single arm 10" 10x 10" 10x 10" 10x   Wand flexion 20x 15x Supine 2# 20x 20x 20x   Wand scaption 20x 15x      Table slides        Wand press in supine 20x 15x   20x 20x   Supine chest press 4# 2x10 4# 2/10 7# DB 2x10 5# 2x10 4# 2x10           Ther Activity                Gait Training                Modalities        MHP/CP PRN  CP 10m

## 2023-01-18 NOTE — PROGRESS NOTES
Subjective:  Chief Complaint   Patient presents with   • Right Shoulder - Follow-up, Pain       Miguel Sorto is a 65 y o  male presenting for follow-up visit in regards to right shoulder pain  Patient was referred for physical therapy for subacromial impingement  Patient states improvement with physical therapy but still has some intermittent pain  The following portions of the patient's history were reviewed and updated as appropriate: allergies, current medications, past family history, past medical history, past social history, past surgical history and problem list     Occupation:      Review of Systems   Constitutional: Negative for fever  HENT: Negative for dental problem and headaches  Eyes: Negative for vision loss  Respiratory: Negative for cough and shortness of breath  Cardiovascular: Negative for leg swelling and palpitations  Gastrointestinal: Negative for constipation and diarrhea  Genitourinary: Negative for bladder incontinence and difficulty urinating  Musculoskeletal: Negative for back pain and difficulty walking  Skin: Negative for rash and ulcer  Neurological: Negative for dizziness and headaches  Hem/Lymph/Immuno: Negative for blood clots  Does not bruise/bleed easily  Psychiatric/Behavioral: Negative for confusion  Objective:  /70 (BP Location: Left arm, Patient Position: Sitting, Cuff Size: Adult)   Ht 5' 6" (1 676 m)   Wt (!) 168 kg (370 lb)   BMI 59 72 kg/m²     Skin: no rashes, lesions, skin discolorations, lacerations  Vasculature: normal radial and ulnar pulse,  normal skin color, normal capillary refill in extremity, no upper extremity edema  Neurologic: Neurologic exam is normal throughout upper extremities, Awake, alert, and oriented x3, no apparent distress  Musculoskeletal:   right SHOULDER EXAM    Intact skin    No erythema, no induration, no signs of infection  No gross deformity    AROM FF: 150 degrees  AROM ER with arm at its side: 70 degrees  AROM IR: lower lumbar    Grind test: negative    Supraspinatus strength testin/5  Infraspinatus strength testin/5    Belly press: negative  Bear Hug test: negative    Empty can: positive  Biceps TTP: positive  Arc test: positive    Audubon's test:negative  Scapular posturing: good        Imaging:       Assessment/Plan:  1  Subacromial impingement of right shoulder    Large joint arthrocentesis: R subacromial bursa  Universal Protocol:  Consent: Verbal consent obtained  Risks and benefits: risks, benefits and alternatives were discussed  Consent given by: patient    Supporting Documentation  Indications: pain   Procedure Details  Location: shoulder - R subacromial bursa  Needle size: 22 G  Ultrasound guidance: no  Approach: lateral  Medications administered: 4 mL bupivacaine 0 25 %; 40 mg triamcinolone acetonide 40 mg/mL    Patient tolerance: patient tolerated the procedure well with no immediate complications    Risks and benefits of CSI were discussed with patient extensively  Risks were highlighted which included but were not limited to infection, pain, local site swelling, and chance that injection may not be effective  Patient was also counseled regarding glucose elevation days after receiving CSI and to be mindful of diet and check sugars daily  Patient agreeable to proceed with CSI after counseling           Follow-up and 4 weeks for reevaluation

## 2023-01-19 ENCOUNTER — APPOINTMENT (OUTPATIENT)
Dept: PHYSICAL THERAPY | Facility: CLINIC | Age: 58
End: 2023-01-19

## 2023-01-25 ENCOUNTER — OFFICE VISIT (OUTPATIENT)
Dept: PHYSICAL THERAPY | Facility: CLINIC | Age: 58
End: 2023-01-25

## 2023-01-25 DIAGNOSIS — G89.29 CHRONIC PAIN OF LEFT KNEE: ICD-10-CM

## 2023-01-25 DIAGNOSIS — M25.562 CHRONIC PAIN OF LEFT KNEE: ICD-10-CM

## 2023-01-25 DIAGNOSIS — G89.29 CHRONIC RIGHT SHOULDER PAIN: Primary | ICD-10-CM

## 2023-01-25 DIAGNOSIS — M25.511 CHRONIC RIGHT SHOULDER PAIN: Primary | ICD-10-CM

## 2023-01-25 NOTE — LETTER
2023    DO Pooaj Deluca [address]    Patient: Vero Jalloh   YOB: 1965   Date of Visit: 2023     Encounter Diagnosis     ICD-10-CM    1  Chronic right shoulder pain  M25 511     G89 29       2  Chronic pain of left knee  M25 562     G89 29           Dear Dr Landin Robinson: Thank you for your recent referral of Vero Jalloh  Please review the attached evaluation summary from Kevin's recent visit  Please verify that you agree with the plan of care by signing the attached order  If you have any questions or concerns, please do not hesitate to call  I sincerely appreciate the opportunity to share in the care of one of your patients and hope to have another opportunity to work with you in the near future  Sincerely,    Chadd Lynn, PT      Referring Provider:      I certify that I have read the below Plan of Care and certify the need for these services furnished under this plan of treatment while under my care  DO Nam Deluca [id number]  [address]          PT Re-Evaluation     Today's date: 2023  Patient name: Vero Jalloh  : 1965  MRN: [id number]  Referring provider: Neo Smith DO  Dx:   Encounter Diagnosis     ICD-10-CM    1  Chronic right shoulder pain  M25 511     G89 29       2  Chronic pain of left knee  M25 562     G89 29           Start Time: 1000  Stop Time: 1100  Total time in clinic (min): 60 minutes    Assessment  Assessment details: Patient has attended 8 physical therapy visits to date  He is demonstrating improved mobility of the R shoulder  There continues to pain tenderness located to the anterior aspect of the R shoulder around the long head of the biceps tendon  He reports most pain with resisted ER during strength assessment today   He continues to show improved tolerance for therapy interventions which are focused on improving upon the deficits which continue to be present  He continues to present with rounded shoulders in both sitting and standing postures  Fatigue is present with progression of strengthening interventions but motion is fairly pain free  Impairments: abnormal or restricted ROM, activity intolerance, impaired physical strength, lacks appropriate home exercise program, pain with function, scapular dyskinesis and poor posture     Symptom irritability: moderateUnderstanding of Dx/Px/POC: good   Prognosis: good    Goals  STGs:  Patient will be independent with hep by 2-3 visits  Improve shoulder flexion to 140 degrees for improved tolerance with adls and work duties by 3-4 weeks  Decrease pain levels by 50% for improved tolerance with adls and work duties by 3-4 weeks  Improve L knee flexion to 130 degrees for improved tolerance with transfers and adls by 3-4 weeks  Improve L knee strength to 3+/5 for improved tolerance with adls and transfers by 3-4 weeks  Improve L knee extension to 0 degrees for improved tolerance with adls and transfers by 3-4 weeks  LTGs:  Improve FOTO score from 49 To 69 Indicating improved tolerance with activities involving the LE by discharge  Patient will demonstrate full, pain free strength and rom of the shoulder for improved tolerance with adls and work duties by discharge  Patient will be able to return to normal work duties by discharge  Sleep will be undisturbed from the shoulder by discharge  Improve L knee strength and rom to wnl for improved tolerance with adls, work duties, and recreation by discharge  Patient will be able to return to normal ambulation without deviation over all terrains without pain or limitation from the knee by discharge                 Plan  Plan details: Patient informed that from this point forward, to ensure adherence to the aforementioned plan of care, all or some of the treatment may be performed and carried out by a Physical Therapy Assistant (PTA) with supervision from a licensed Physical Therapist (PT) in accordance with [place] [address]  Patient will continue to benefit from skilled physical therapy to address the functional deficits that were identified during the evaluation today  We will continue to progress the therapy program to address these functional deficits and achieve the established goals  Patient would benefit from: skilled physical therapy  Planned modality interventions: cryotherapy  Planned therapy interventions: ADL retraining, body mechanics training, functional ROM exercises, flexibility, home exercise program, therapeutic exercise, therapeutic activities, stretching, strengthening, patient education, postural training, manual therapy and joint mobilization  Frequency: 2x week  Duration in weeks: 6  Plan of Care beginning date: 2022  Plan of Care expiration date: 2023  Treatment plan discussed with: patient        Subjective Evaluation    History of Present Illness  Mechanism of injury: Patient presents to out patient physical therapy with chief c/o L knee and R shoulder pain  Patient reports that back in April he was in the hospital for a kidney procedure when he came out of the procedure and his R shoulder was bothering him along with both of his legs  He feels that the R shoulder has continued to bother him and is limiting his daily activities  He feels that the left knee is sore on the inside but that he has more pain in the R shoulder  He has difficulties reaching across his body or above shoulder level  Update 2023:  Patient reports that he is having more range during his daily activities  He continues with pain to the anterior aspect of the shoulder with palpation and with prolonged use of the arm  He has difficulties lifting his groceries onto the counter but carrying them at his side seems to be non symptom provoking     Pain  Current pain ratin  At best pain rating: 0  At worst pain ratin  Location: L knee/R shoulder  Quality: dull ache, sharp, tight, throbbing and discomfort  Relieving factors: rest  Aggravating factors: overhead activity, stair climbing, walking, standing and lifting    Social Support  Steps to enter house: yes  4  Stairs in house: yes   13  Lives in: multiple-level home  Lives with: alone    Working: disability  Hand dominance: right    Patient Goals  Patient goals for therapy: decreased pain, increased motion, increased strength, independence with ADLs/IADLs and improved balance  Patient goal: Patient wishes to improve the mobility and strength in his legs and R shoulder to return to normal daily activities  Objective     Palpation     Right   Hypertonic in the pectoralis major  Tenderness of the pectoralis major  Active Range of Motion   Left Shoulder   Flexion: 148 degrees   Extension: 48 degrees   Abduction: 140 degrees     Right Shoulder   Flexion: 160 degrees   Extension: 42 degrees   Abduction: 144 degrees     Passive Range of Motion   Left Shoulder   Normal passive range of motion    Right Shoulder   Flexion: 162 degrees   External rotation 0°: 86 degrees   Internal rotation 0°: 66 degrees     Strength/Myotome Testing     Left Shoulder   Normal muscle strength    Right Shoulder     Planes of Motion   Flexion: 4   Extension: 4+   Abduction: 4   Adduction: 4+   External rotation at 0°: 4-   Internal rotation at 0°: 4+     Tests     Right Shoulder   Positive Hawkin's and painful arc  Negative AC shear, belly press, drop arm, empty can, horn blower and Neer's  Precautions: None    Date  Reassess  Reassess    FOTO   60 SC     Manuals        Shoulder PROM 10 min ds 10 min SC 10 min flex/IR 10 min   STM/TPR R Pec major        Passive scapula ROM        Grade 4 posterior joint mobilization    5 min    Neuro Re-Ed         Rows, NIXON CC Single arm P1 2x15 P1  2/15, row 1/15 P1 2x15 CC P3/4 2x15 ea    CC P4 2x20/15 ea  No Monies    Green TB 10" 10x 10" 10x   Pec Dec 30# 2x15 30# 2/15 30# 2/15 45# 2x15 45# 2x20   TB ER, IR    Green 15x ea    Orange/Green 20x                           Ther Ex        NuStep L5 10 min 9m L3 L5 10 min L4 10 min L4 10 min   Doorway Pec stretch    10" 10x 10" 10x   Wand flexion 20x 15x 2x 10x 20x 20x   Wand scaption 20x 15x      Table slides        Wand press in supine 20x 15x  NP 20x 20x   Supine chest press 4# 2x10 4# 2/10 5# 2x10 5# 2x10 4# 2x10           Ther Activity                Gait Training                Modalities        MHP/CP PRN  CP 10m

## 2023-01-25 NOTE — PROGRESS NOTES
PT Re-Evaluation     Today's date: 2023  Patient name: Elizabeth Esteban  : 1965  MRN: [id number]  Referring provider: Cora Palomo DO  Dx:   Encounter Diagnosis     ICD-10-CM    1  Chronic right shoulder pain  M25 511     G89 29       2  Chronic pain of left knee  M25 562     G89 29           Start Time: 1000  Stop Time: 1100  Total time in clinic (min): 60 minutes    Assessment  Assessment details: Patient has attended 8 physical therapy visits to date  He is demonstrating improved mobility of the R shoulder  There continues to pain tenderness located to the anterior aspect of the R shoulder around the long head of the biceps tendon  He reports most pain with resisted ER during strength assessment today  He continues to show improved tolerance for therapy interventions which are focused on improving upon the deficits which continue to be present  He continues to present with rounded shoulders in both sitting and standing postures  Fatigue is present with progression of strengthening interventions but motion is fairly pain free  Impairments: abnormal or restricted ROM, activity intolerance, impaired physical strength, lacks appropriate home exercise program, pain with function, scapular dyskinesis and poor posture     Symptom irritability: moderateUnderstanding of Dx/Px/POC: good   Prognosis: good    Goals  STGs:  Patient will be independent with hep by 2-3 visits  Improve shoulder flexion to 140 degrees for improved tolerance with adls and work duties by 3-4 weeks  Decrease pain levels by 50% for improved tolerance with adls and work duties by 3-4 weeks  Improve L knee flexion to 130 degrees for improved tolerance with transfers and adls by 3-4 weeks  Improve L knee strength to 3+/5 for improved tolerance with adls and transfers by 3-4 weeks  Improve L knee extension to 0 degrees for improved tolerance with adls and transfers by 3-4 weeks       LTGs:  Improve FOTO score from 49 To 69 Indicating improved tolerance with activities involving the LE by discharge  Patient will demonstrate full, pain free strength and rom of the shoulder for improved tolerance with adls and work duties by discharge  Patient will be able to return to normal work duties by discharge  Sleep will be undisturbed from the shoulder by discharge  Improve L knee strength and rom to wnl for improved tolerance with adls, work duties, and recreation by discharge  Patient will be able to return to normal ambulation without deviation over all terrains without pain or limitation from the knee by discharge  Plan  Plan details: Patient informed that from this point forward, to ensure adherence to the aforementioned plan of care, all or some of the treatment may be performed and carried out by a Physical Therapy Assistant (PTA) with supervision from a licensed Physical Therapist (PT) in accordance with [address] [address]  Patient will continue to benefit from skilled physical therapy to address the functional deficits that were identified during the evaluation today  We will continue to progress the therapy program to address these functional deficits and achieve the established goals            Patient would benefit from: skilled physical therapy  Planned modality interventions: cryotherapy  Planned therapy interventions: ADL retraining, body mechanics training, functional ROM exercises, flexibility, home exercise program, therapeutic exercise, therapeutic activities, stretching, strengthening, patient education, postural training, manual therapy and joint mobilization  Frequency: 2x week  Duration in weeks: 6  Plan of Care beginning date: 12/5/2022  Plan of Care expiration date: 1/16/2023  Treatment plan discussed with: patient        Subjective Evaluation    History of Present Illness  Mechanism of injury: Patient presents to out patient physical therapy with chief c/o L knee and R shoulder pain  Patient reports that back in April he was in the hospital for a kidney procedure when he came out of the procedure and his R shoulder was bothering him along with both of his legs  He feels that the R shoulder has continued to bother him and is limiting his daily activities  He feels that the left knee is sore on the inside but that he has more pain in the R shoulder  He has difficulties reaching across his body or above shoulder level  Update 2023:  Patient reports that he is having more range during his daily activities  He continues with pain to the anterior aspect of the shoulder with palpation and with prolonged use of the arm  He has difficulties lifting his groceries onto the counter but carrying them at his side seems to be non symptom provoking  Pain  Current pain ratin  At best pain ratin  At worst pain ratin  Location: L knee/R shoulder  Quality: dull ache, sharp, tight, throbbing and discomfort  Relieving factors: rest  Aggravating factors: overhead activity, stair climbing, walking, standing and lifting    Social Support  Steps to enter house: yes  4  Stairs in house: yes   13  Lives in: multiple-level home  Lives with: alone    Working: disability  Hand dominance: right    Patient Goals  Patient goals for therapy: decreased pain, increased motion, increased strength, independence with ADLs/IADLs and improved balance  Patient goal: Patient wishes to improve the mobility and strength in his legs and R shoulder to return to normal daily activities  Objective     Palpation     Right   Hypertonic in the pectoralis major  Tenderness of the pectoralis major       Active Range of Motion   Left Shoulder   Flexion: 148 degrees   Extension: 48 degrees   Abduction: 140 degrees     Right Shoulder   Flexion: 160 degrees   Extension: 42 degrees   Abduction: 144 degrees     Passive Range of Motion   Left Shoulder   Normal passive range of motion    Right Shoulder Flexion: 162 degrees   External rotation 0°: 86 degrees   Internal rotation 0°: 66 degrees     Strength/Myotome Testing     Left Shoulder   Normal muscle strength    Right Shoulder     Planes of Motion   Flexion: 4   Extension: 4+   Abduction: 4   Adduction: 4+   External rotation at 0°: 4-   Internal rotation at 0°: 4+     Tests     Right Shoulder   Positive Hawkin's and painful arc  Negative AC shear, belly press, drop arm, empty can, horn blower and Neer's  Precautions: None    Date 1/11 1/18 1/25 Reassess 12/21 Reassess 12/27   FOTO   60 SC     Manuals        Shoulder PROM 10 min ds 10 min SC 10 min flex/IR 10 min   STM/TPR R Pec major        Passive scapula ROM        Grade 4 posterior joint mobilization    5 min    Neuro Re-Ed         Rows, NIXON CC Single arm P1 2x15 P1  2/15, row 1/15 P1 2x15 CC P3/4 2x15 ea  CC P4 2x20/15 ea  No Monies    Green TB 10" 10x 10" 10x   Pec Dec 30# 2x15 30# 2/15 30# 2/15 45# 2x15 45# 2x20   TB ER, IR    Green 15x ea    Orange/Green 20x                           Ther Ex        NuStep L5 10 min 9m L3 L5 10 min L4 10 min L4 10 min   Doorway Pec stretch    10" 10x 10" 10x   Wand flexion 20x 15x 2x 10x 20x 20x   Wand scaption 20x 15x      Table slides        Wand press in supine 20x 15x  NP 20x 20x   Supine chest press 4# 2x10 4# 2/10 5# 2x10 5# 2x10 4# 2x10           Ther Activity                Gait Training                Modalities        MHP/CP PRN  CP 10m

## 2023-01-30 ENCOUNTER — TELEPHONE (OUTPATIENT)
Dept: NEPHROLOGY | Facility: CLINIC | Age: 58
End: 2023-01-30

## 2023-01-30 ENCOUNTER — OFFICE VISIT (OUTPATIENT)
Dept: NEPHROLOGY | Facility: CLINIC | Age: 58
End: 2023-01-30

## 2023-01-30 ENCOUNTER — APPOINTMENT (OUTPATIENT)
Dept: LAB | Facility: MEDICAL CENTER | Age: 58
End: 2023-01-30

## 2023-01-30 VITALS
OXYGEN SATURATION: 95 % | WEIGHT: 315 LBS | HEIGHT: 66 IN | HEART RATE: 62 BPM | DIASTOLIC BLOOD PRESSURE: 70 MMHG | BODY MASS INDEX: 50.62 KG/M2 | SYSTOLIC BLOOD PRESSURE: 132 MMHG

## 2023-01-30 DIAGNOSIS — M10.9 GOUT, UNSPECIFIED CAUSE, UNSPECIFIED CHRONICITY, UNSPECIFIED SITE: ICD-10-CM

## 2023-01-30 DIAGNOSIS — M79.89 SWELLING OF RIGHT MIDDLE FINGER: ICD-10-CM

## 2023-01-30 DIAGNOSIS — M79.89 SWELLING OF FINGER OF BOTH HANDS: ICD-10-CM

## 2023-01-30 DIAGNOSIS — E66.01 CLASS 3 SEVERE OBESITY WITH BODY MASS INDEX (BMI) OF 60.0 TO 69.9 IN ADULT, UNSPECIFIED OBESITY TYPE, UNSPECIFIED WHETHER SERIOUS COMORBIDITY PRESENT (HCC): ICD-10-CM

## 2023-01-30 DIAGNOSIS — N18.32 STAGE 3B CHRONIC KIDNEY DISEASE (HCC): Primary | ICD-10-CM

## 2023-01-30 DIAGNOSIS — N20.0 URIC ACID NEPHROLITHIASIS: ICD-10-CM

## 2023-01-30 DIAGNOSIS — I10 ESSENTIAL HYPERTENSION: ICD-10-CM

## 2023-01-30 LAB
ALBUMIN SERPL BCP-MCNC: 3.5 G/DL (ref 3.5–5)
ALP SERPL-CCNC: 77 U/L (ref 46–116)
ALT SERPL W P-5'-P-CCNC: 21 U/L (ref 12–78)
ANION GAP SERPL CALCULATED.3IONS-SCNC: 4 MMOL/L (ref 4–13)
AST SERPL W P-5'-P-CCNC: 12 U/L (ref 5–45)
BACTERIA UR QL AUTO: ABNORMAL /HPF
BILIRUB SERPL-MCNC: 0.35 MG/DL (ref 0.2–1)
BILIRUB UR QL STRIP: NEGATIVE
BUN SERPL-MCNC: 37 MG/DL (ref 5–25)
CALCIUM SERPL-MCNC: 9.5 MG/DL (ref 8.3–10.1)
CHLORIDE SERPL-SCNC: 109 MMOL/L (ref 96–108)
CLARITY UR: CLEAR
CO2 SERPL-SCNC: 26 MMOL/L (ref 21–32)
COLOR UR: ABNORMAL
CREAT SERPL-MCNC: 1.9 MG/DL (ref 0.6–1.3)
CREAT UR-MCNC: 79.1 MG/DL
ERYTHROCYTE [DISTWIDTH] IN BLOOD BY AUTOMATED COUNT: 13.7 % (ref 11.6–15.1)
EST. AVERAGE GLUCOSE BLD GHB EST-MCNC: 114 MG/DL
GFR SERPL CREATININE-BSD FRML MDRD: 38 ML/MIN/1.73SQ M
GLUCOSE P FAST SERPL-MCNC: 89 MG/DL (ref 65–99)
GLUCOSE UR STRIP-MCNC: NEGATIVE MG/DL
HBA1C MFR BLD: 5.6 %
HCT VFR BLD AUTO: 49.2 % (ref 36.5–49.3)
HGB BLD-MCNC: 15.1 G/DL (ref 12–17)
HGB UR QL STRIP.AUTO: NEGATIVE
KETONES UR STRIP-MCNC: NEGATIVE MG/DL
LEUKOCYTE ESTERASE UR QL STRIP: ABNORMAL
MCH RBC QN AUTO: 30.3 PG (ref 26.8–34.3)
MCHC RBC AUTO-ENTMCNC: 30.7 G/DL (ref 31.4–37.4)
MCV RBC AUTO: 99 FL (ref 82–98)
MICROALBUMIN UR-MCNC: 20.4 MG/L (ref 0–20)
MICROALBUMIN/CREAT 24H UR: 26 MG/G CREATININE (ref 0–30)
NITRITE UR QL STRIP: NEGATIVE
NON-SQ EPI CELLS URNS QL MICRO: ABNORMAL /HPF
PH UR STRIP.AUTO: 6.5 [PH]
PLATELET # BLD AUTO: 207 THOUSANDS/UL (ref 149–390)
PMV BLD AUTO: 10.6 FL (ref 8.9–12.7)
POTASSIUM SERPL-SCNC: 4.8 MMOL/L (ref 3.5–5.3)
PROT SERPL-MCNC: 7.6 G/DL (ref 6.4–8.4)
PROT UR STRIP-MCNC: NEGATIVE MG/DL
PTH-INTACT SERPL-MCNC: 68.3 PG/ML (ref 18.4–80.1)
RBC # BLD AUTO: 4.99 MILLION/UL (ref 3.88–5.62)
RBC #/AREA URNS AUTO: ABNORMAL /HPF
SODIUM SERPL-SCNC: 139 MMOL/L (ref 135–147)
SP GR UR STRIP.AUTO: 1.01 (ref 1–1.03)
URATE SERPL-MCNC: 9.2 MG/DL (ref 3.5–8.5)
UROBILINOGEN UR STRIP-ACNC: <2 MG/DL
WBC # BLD AUTO: 10.89 THOUSAND/UL (ref 4.31–10.16)
WBC #/AREA URNS AUTO: ABNORMAL /HPF

## 2023-01-30 NOTE — PROGRESS NOTES
Assessment & Plan:    1  Stage 3b chronic kidney disease ([facility] [address] )  -     SHAKIR BY MULTIPLEX IMMUNOASSAY,REFLEX TO 5-BIOMARKER PROFILE; Future; Expected date: 01/31/2023  -     Rheumatoid Factor; Future; Expected date: 01/31/2023  -     Comprehensive metabolic panel; Future; Expected date: 01/31/2023  -     PTH, intact; Future; Expected date: 01/31/2023  -     Protein electrophoresis, serum; Future; Expected date: 01/31/2023  -     Uric acid; Future; Expected date: 01/31/2023  -     CBC and Platelet; Future; Expected date: 01/31/2023  -     Urinalysis with microscopic; Future; Expected date: 01/31/2023  -     Microalbumin / creatinine urine ratio; Future; Expected date: 01/31/2023  -     Hemoglobin A1C; Future; Expected date: 01/31/2023  -     PTH, intact; Future; Expected date: 05/16/2023  -     Comprehensive metabolic panel; Future; Expected date: 05/16/2023  -     Uric acid; Future; Expected date: 05/16/2023  -     Urinalysis with microscopic; Future; Expected date: 05/16/2023  -     Ambulatory referral to ckd education program; Future; Expected date: 02/28/2023    2  Uric acid nephrolithiasis    3  Gout, unspecified cause, unspecified chronicity, unspecified site    4  Essential hypertension    5  Class 3 severe obesity with body mass index (BMI) of 60 0 to 69 9 in adult, unspecified obesity type, unspecified whether serious comorbidity present (HCC)       1  CKD3B/4 A2    Baseline Cr 1 7-1 9 mg/gdL in the past prior to ADRIANNE event  Anticipate baseline In low 2s now  Creatinine trends stable in September at 2 0  Most recent eGFR 34 ml/min  ADRIANNE event in June-July  Cr 2 9 on 7/9/22  Etiology of renal disease not biopsy proven, but potentially 2/2 obstructive uropathy, HTN nephrosclerosis, FSFS from morbid obesity  Will check a limited serologic workup as outlined below for completeness  Appears euvolemic  Recommend  1  Reviewed CKD stages, Cr and eGFR trends  2  Kidney smart education referral  3  Encourage hydration to quart  Advised low sodium diet  4  Check labs today, should be following every 3-4 months  Check CMP/CBC/SHAKIR/RF/Uric acid/UA/PTH/microalbumin to cr ratio  He had hand swelling bilaterally that was attributed to gout and responded to colchicine  Check A1C and spep as well with CKD diagnosis  Check RF/SHAKIR with arthralgia/joint swelling and CKD  5  No changes made to medications at this time  6  AVOID NSAIDS  2  Uric acid nephrolithiasis  Etiology of uric acid typically due to low urine volume, acidic pH and diabetes with RTA  Will check A1C  Evaluate urine pH  Refused to complete stone risk profile from July  Advise low sodium diet  3  Gout, episode several months ago with bilateral hand swelling/pain  Treated with colchicine with improvement  Check uric acid  For completeness, check autoimmune workup with SHAKIR/ RF  Reduce high fructose corn syrup  Minimize etoh use--very rarely has a drink  4  Essential HTN  Goal <130/80  Under reasonable control at present  Does not monitor BP at home  Maintained on toprol 100mg/day  Evaluate proteinuria trends and need for ACE/ARB (if renal function could tolerate)  5  Class 3 obesity, advised weight loss  He is trying nutrisystem  CKD education referral as well  May need formal dietician/weight management consultation  Defer to primary  The benefits, risks and alternatives to the treatment plan were discussed at this visit  Patient was advised of common adverse effects of any medical therapies prescribed  All questions were answered and discussed with the patient and any accompanying family members or caretakers  Subjective:      Patient ID: Meryle Ebbs is a 65 y o  male presenting for follow up in the [place] office  Last seen in July by Brianne Brown and myself  He was recommended to have a litholink checked for stone risks  UOP 2-2 5 L per day advised to reduced stone burden     His allopurinol was started again  He was recommended evaluation to kidney smart, but he does not think he spoke with anyone regarding this topic  HPI    In interim since last visit, denies any new medical conditions, surgeries or allergies  He has been going to PT and followed with orthopedics for right shoulder injury  Required injection into shoulder  Only takes tylenol for pain  Does not take NSAIDs  Three months thinks he had a gout flare in his hands  Had in both hands  He was treated with colchicine which improved symptoms  He is taking allopurinol 300mg/day  Does not monitor BP at home  /70, BP in doctor's office always under good control  Only on Toprol 100mg/day  Last blood work in September  Cr 2 07 at that time with eGFR 34 ml/min  Denies f/c/ n/v/d/cp/sob/n/v/LUTS  Gets winded easily with exercise  Weight gain, he does not eat well  Does not actively participate in weight management programs  Sed rate 100 in September  Rheumatoid factor ordered but not completed  Denies autoimmune diseases  Did not have stone risk profile  He had uric acid stone in July  Kidney US completed 12/9/22 he has a 3 mm stone in lower pole left kidney  No hydro  Normal size kidneys  The following portions of the patient's history were reviewed and updated as appropriate: allergies, current medications, past family history, past medical history, past social history, past surgical history, and problem list     Review of Systems   Respiratory: Negative  Cardiovascular: Negative  Gastrointestinal: Negative  Genitourinary: Negative  Musculoskeletal: Positive for arthralgias and joint swelling  Neurological: Negative  All other systems reviewed and are negative          Objective:      /70 (BP Location: Left arm, Patient Position: Sitting, Cuff Size: Large)   Pulse 62   Ht 5' 6" (1 676 m)   Wt (!) 171 kg (376 lb)   SpO2 95%   BMI 60 69 kg/m²          Physical Exam  Vitals and nursing note reviewed  Constitutional:       General: He is not in acute distress  Appearance: He is obese  He is not ill-appearing  HENT:      Head: Atraumatic  Nose: Nose normal       Mouth/Throat:      Mouth: Mucous membranes are moist       Pharynx: Oropharynx is clear  Eyes:      Extraocular Movements: Extraocular movements intact  Conjunctiva/sclera: Conjunctivae normal    Cardiovascular:      Rate and Rhythm: Normal rate and regular rhythm  Heart sounds: No friction rub  Pulmonary:      Breath sounds: No wheezing, rhonchi or rales  Abdominal:      General: Abdomen is flat  Bowel sounds are normal  There is no distension  Palpations: Abdomen is soft  Tenderness: There is no abdominal tenderness  There is no guarding  Musculoskeletal:      Right lower leg: No edema  Left lower leg: No edema  Skin:     General: Skin is warm and dry  Capillary Refill: Capillary refill takes less than 2 seconds  Neurological:      General: No focal deficit present  Mental Status: He is alert and oriented to person, place, and time  Mental status is at baseline  Cranial Nerves: No cranial nerve deficit     Psychiatric:         Mood and Affect: Mood normal          Behavior: Behavior normal              Lab Results   Component Value Date     (L) 04/08/2015    SODIUM 138 09/14/2022    K 4 1 09/14/2022     09/14/2022    CO2 20 (L) 09/14/2022    ANIONGAP 7 04/08/2015    AGAP 12 09/14/2022    BUN 30 (H) 09/14/2022    CREATININE 2 07 (H) 09/14/2022    GLUC 99 09/14/2022    GLUF 84 06/30/2022    CALCIUM 9 7 09/14/2022    AST 11 07/09/2022    ALT 15 07/09/2022    ALKPHOS 59 07/09/2022    PROT 7 7 04/08/2015    TP 9 0 (H) 07/09/2022    BILITOT 0 7 04/08/2015    TBILI 0 38 07/09/2022    EGFR 34 09/14/2022      Lab Results   Component Value Date    CREATININE 2 07 (H) 09/14/2022    CREATININE 2 10 (H) 08/19/2022    CREATININE 2 90 (H) 07/09/2022    CREATININE 2 52 (H) 06/30/2022    CREATININE 2 49 (H) 06/27/2022    CREATININE 2 24 (H) 06/22/2022    CREATININE 2 23 (H) 06/21/2022    CREATININE 2 23 (H) 06/20/2022    CREATININE 2 50 (H) 06/18/2022    CREATININE 2 04 (H) 06/14/2022    CREATININE 2 06 (H) 06/13/2022    CREATININE 2 14 (H) 06/12/2022    CREATININE 2 00 (H) 06/11/2022    CREATININE 2 12 (H) 06/10/2022    CREATININE 1 72 (H) 06/09/2022      Lab Results   Component Value Date    COLORU Yellow 07/09/2022    CLARITYU Slightly Cloudy 07/09/2022    SPECGRAV 1 015 07/09/2022    PHUR 6 0 07/09/2022    LEUKOCYTESUR Large (A) 07/09/2022    NITRITE Positive (A) 07/09/2022    PROTEIN UA Trace (A) 07/09/2022    GLUCOSEU Negative 07/09/2022    KETONESU Negative 07/09/2022    UROBILINOGEN 0 2 07/09/2022    BILIRUBINUR Negative 07/09/2022    BLOODU Trace-lysed (A) 07/09/2022    RBCUA 1-2 (A) 07/09/2022    WBCUA Innumerable (A) 07/09/2022    EPIS Occasional 07/09/2022    BACTERIA Moderate (A) 07/09/2022      No results found for: LABPROT  No results found for: Joyce Euceda  Lab Results   Component Value Date    WBC 10 21 (H) 09/14/2022    HGB 13 2 09/14/2022    HCT 42 4 09/14/2022    MCV 95 09/14/2022     09/14/2022      Lab Results   Component Value Date    HGB 13 2 09/14/2022    HGB 12 0 08/19/2022    HGB 7 4 (L) 06/30/2022    HGB 8 1 (L) 06/22/2022    HGB 8 3 (L) 06/21/2022      Lab Results   Component Value Date    IRON 80 08/19/2022    TIBC 251 08/19/2022    FERRITIN 312 08/19/2022      No results found for: PTHCALCIUM, [id number], PHOSPHORUS   Lab Results   Component Value Date    CHOLESTEROL 255 (H) 06/02/2021    HDL 48 06/02/2021    LDLCALC 173 (H) 06/02/2021    TRIG 169 (H) 06/02/2021      Lab Results   Component Value Date    URICACID 6 9 09/14/2022      Lab Results   Component Value Date    HGBA1C 5 4 03/24/2015      No results found for: TSHANTIBODY, I7PKPIZ, FREET4   No results found for: SHAKIR, DSDNAAB, RFIGM   Lab Results   Component Value Date PROT 7 7 04/08/2015        Portions of the record may have been created with voice recognition software  Occasional wrong word or "sound a like" substitutions may have occurred due to the inherent limitations of voice recognition software  Read the chart carefully and recognize, using context, where substitutions have occurred  If you have any questions, please contact the dictating provider

## 2023-01-30 NOTE — PATIENT INSTRUCTIONS
Your most recent kidney function was stable at 34%  Recommend checking blood work every 3-4 months  Check blood work in the next several weeks and then prior to next visit  Uric acid stone form in acidic urine and if urine volume if less than 2 liter per day  Recommend to drink at least 2 quarts per day of water  You can add lemon juice to water-- 4 ounces of lemon juice per day  This may be more effective than lemonade  Follow up in 3-4 months  No medication changes made at this visit  Referral to kidney disease education

## 2023-01-31 ENCOUNTER — TELEPHONE (OUTPATIENT)
Dept: NEPHROLOGY | Facility: CLINIC | Age: 58
End: 2023-01-31

## 2023-01-31 LAB
ANA SER QL IA: NEGATIVE
RHEUMATOID FACT SER QL LA: NEGATIVE

## 2023-02-01 ENCOUNTER — OFFICE VISIT (OUTPATIENT)
Dept: PHYSICAL THERAPY | Facility: CLINIC | Age: 58
End: 2023-02-01

## 2023-02-01 DIAGNOSIS — G89.29 CHRONIC RIGHT SHOULDER PAIN: Primary | ICD-10-CM

## 2023-02-01 DIAGNOSIS — G89.29 CHRONIC PAIN OF LEFT KNEE: ICD-10-CM

## 2023-02-01 DIAGNOSIS — M25.562 CHRONIC PAIN OF LEFT KNEE: ICD-10-CM

## 2023-02-01 DIAGNOSIS — M25.511 CHRONIC RIGHT SHOULDER PAIN: Primary | ICD-10-CM

## 2023-02-01 NOTE — PROGRESS NOTES
Daily Note     Today's date: 2023  Patient name: Adalgisa Damon  : 1965  MRN: [id number]  Referring provider: Merna Coto DO  Dx:   Encounter Diagnosis     ICD-10-CM    1  Chronic right shoulder pain  M25 511     G89 29       2  Chronic pain of left knee  M25 562     G89 29           Start Time: 1000  Stop Time: 1100  Total time in clinic (min): 60 minutes    Subjective: Patient reports that his shoulder is stiff today  He feels that the past week he has been doing pretty well other than the stiffness  He feels that the L knee is bothering him the most entering therapy today  Objective: See treatment diary below      Assessment: Tolerated treatment well  Patient demonstrated fatigue post treatment, exhibited good technique with therapeutic exercises and would benefit from continued PT  Patient noted anterior shoulder discomfort with strengthening interventions today with reports of a grinding sensation to the R shoulder during semi supine DB press  He continues to demonstrate adequate mobility of the R shoulder for daily activities but is lacking functional strength and endurance  Plan: Continue per plan of care  Progress treatment as tolerated  Precautions: None    Date  Reassess    FOTO   60 SC     Manuals        Shoulder PROM 10 min ds 10 min SC 8 min SC 10 min   STM/TPR R Pec major        Passive scapula ROM        Grade 4 posterior joint mobilization        Neuro Re-Ed         Rows, NIXON CC Single arm P1 2x15 P1  215, row 1/15 P1 2x15 P2 2x15 CC P4 2x20/15 ea      No Monies     10" 10x   Pec Dec 30# 2x15 30# 2/15 30# 2/15 35# 2x15 45# 2x20   TB ER, IR     Lavon/Green 20x   Eccentric biceps curls    5#/10# 10x ea    Body Blade    15" 4x 2 positions            Ther Ex        NuStep L5 10 min 9m L3 L5 10 min L5 8 min L4 10 min   Doorway Pec stretch     10" 10x   Wand flexion 20x 15x 2x 10x 2# 2x10x 20x   Wand scaption 20x 15x      Table slides        Wand press in supine 20x 15x  NP  20x   Supine chest press 4# 2x10 4# 2/10 5# 2x10 5# 2x10 4# 2x10           Ther Activity                Gait Training                Modalities        MHP/CP PRN  CP 10m

## 2023-02-07 ENCOUNTER — OFFICE VISIT (OUTPATIENT)
Dept: PHYSICAL THERAPY | Facility: CLINIC | Age: 58
End: 2023-02-07

## 2023-02-07 DIAGNOSIS — M25.562 CHRONIC PAIN OF LEFT KNEE: ICD-10-CM

## 2023-02-07 DIAGNOSIS — G89.29 CHRONIC PAIN OF LEFT KNEE: ICD-10-CM

## 2023-02-07 DIAGNOSIS — G89.29 CHRONIC RIGHT SHOULDER PAIN: Primary | ICD-10-CM

## 2023-02-07 DIAGNOSIS — M25.511 CHRONIC RIGHT SHOULDER PAIN: Primary | ICD-10-CM

## 2023-02-07 NOTE — PROGRESS NOTES
Daily Note     Today's date: 2023  Patient name: Kristen Bernard  : 1965  MRN: [id number]  Referring provider: Dwayne Montana DO  Dx:   Encounter Diagnosis     ICD-10-CM    1  Chronic right shoulder pain  M25 511     G89 29       2  Chronic pain of left knee  M25 562     G89 29           Start Time: 1100  Stop Time: 1200  Total time in clinic (min): 60 minutes    Subjective: Pt comments on slight increased R shoulder discomfort  Objective: See treatment diary below      Assessment: Tolerated treatment well  Patient exhibited good technique with therapeutic exercises      Plan: Continue per plan of care  Precautions: None    Date  Reassess    FOTO   60 SC     Manuals        Shoulder PROM 10 min ds 10 min SC 8 min SC ds   STM/TPR R Pec major        Passive scapula ROM        Grade 4 posterior joint mobilization        Neuro Re-Ed         Rows, NIXON CC Single arm P1 2x15 P1  215, row 15 P1 2x15 P2 2x15 P4 2/15      No Monies        Pec Dec 30# 2x15 30# 2/15 30# 2/15 35# 2x15 35# 2/15   TB ER, IR        Eccentric biceps curls    5#/10# 10x ea 5#  10x ea   Body Blade 2 pos    15" 4x 2 positions 4x 15" ea   Ther Ex        NuStep L5 10 min 9m L3 L5 10 min L5 8 min 10m L5   Doorway Pec stretch        Wand flexion 20x 15x 2x 10x 2# 2x10x 2# 2/10   Wand scaption 20x 15x      Table slides        Wand press in supine 20x 15x  NP     Supine chest press 4# 2x10 4# 2/10 5# 2x10 5# 2x10 5# 2/10           Ther Activity                Gait Training                Modalities        MHP/CP PRN  CP 10m   CP 10m

## 2023-02-08 ENCOUNTER — APPOINTMENT (OUTPATIENT)
Dept: PHYSICAL THERAPY | Facility: CLINIC | Age: 58
End: 2023-02-08

## 2023-02-10 LAB — PROT PATTERN SERPL ELPH-IMP: NORMAL

## 2023-02-15 ENCOUNTER — EVALUATION (OUTPATIENT)
Dept: PHYSICAL THERAPY | Facility: CLINIC | Age: 58
End: 2023-02-15

## 2023-02-15 DIAGNOSIS — G89.29 CHRONIC PAIN OF LEFT KNEE: ICD-10-CM

## 2023-02-15 DIAGNOSIS — M25.511 CHRONIC RIGHT SHOULDER PAIN: Primary | ICD-10-CM

## 2023-02-15 DIAGNOSIS — G89.29 CHRONIC RIGHT SHOULDER PAIN: Primary | ICD-10-CM

## 2023-02-15 DIAGNOSIS — M25.562 CHRONIC PAIN OF LEFT KNEE: ICD-10-CM

## 2023-02-15 NOTE — LETTER
February 15, 2023    Jose Ken DO  [address]    Patient: Miugel Sorto   YOB: 1965   Date of Visit: 2/15/2023     Encounter Diagnosis     ICD-10-CM    1  Chronic right shoulder pain  M25 511     G89 29       2  Chronic pain of left knee  M25 562     G89 29           Dear Dr Coco Hamilton: Thank you for your recent referral of Miguel Sorto  Please review the attached evaluation summary from Kevin's recent visit  Please verify that you agree with the plan of care by signing the attached order  If you have any questions or concerns, please do not hesitate to call  I sincerely appreciate the opportunity to share in the care of one of your patients and hope to have another opportunity to work with you in the near future  Sincerely,    Shahab Gifford, PT      Referring Provider:      I certify that I have read the below Plan of Care and certify the need for these services furnished under this plan of treatment while under my care  Jose Ken DO  [facility] [address]  [address] [place]          PT Re-Evaluation     Today's date: 2/15/2023  Patient name: Miguel Sorto  : 1965  MRN: [id number]  Referring provider: Yimi Phillip DO  Dx:   Encounter Diagnosis     ICD-10-CM    1  Chronic right shoulder pain  M25 511     G89 29       2  Chronic pain of left knee  M25 562     G89 29           Start Time: 1000  Stop Time: 1100  Total time in clinic (min): 60 minutes    Assessment  Assessment details: Patient continues to demonstrate increased pain at shoulder level of flexion and abduction  He reports greatest pain with resisted ER and flexion to the anterior aspect of the R shoulder  There was better tolerance for rows with the forearm pronated today  He reported fatigue with isometric and eccentric biceps exercises today   There continues to be good tolerance for therapy interventions but a need for rest periods are required secondary to fatigue and increased discomfort  Impairments: abnormal or restricted ROM, activity intolerance, impaired physical strength, lacks appropriate home exercise program, pain with function, scapular dyskinesis and poor posture     Symptom irritability: moderateUnderstanding of Dx/Px/POC: good   Prognosis: good    Goals  STGs:  Patient will be independent with hep by 2-3 visits  - MET  Improve shoulder flexion to 140 degrees for improved tolerance with adls and work duties by 3-4 weeks  - MET  Decrease pain levels by 50% for improved tolerance with adls and work duties by 3-4 weeks  - Progressing      LTGs:  Improve FOTO score from 49 To 69 Indicating improved tolerance with activities involving the LE by discharge  - progressing  Patient will demonstrate full, pain free strength and rom of the shoulder for improved tolerance with adls and work duties by discharge  - Not MET  Patient will be able to return to normal work duties by discharge  - Not MET  Sleep will be undisturbed from the shoulder by discharge  - Not MET                 Plan  Plan details: Patient informed that from this point forward, to ensure adherence to the aforementioned plan of care, all or some of the treatment may be performed and carried out by a Physical Therapy Assistant (PTA) with supervision from a licensed Physical Therapist (PT) in accordance with [address]  Patient will continue to benefit from skilled physical therapy to address the functional deficits that were identified during the evaluation today  We will continue to progress the therapy program to address these functional deficits and achieve the established goals            Patient would benefit from: skilled physical therapy  Planned modality interventions: cryotherapy  Planned therapy interventions: ADL retraining, body mechanics training, functional ROM exercises, flexibility, home exercise program, therapeutic exercise, therapeutic activities, stretching, strengthening, patient education, postural training, manual therapy and joint mobilization  Frequency: 2x week  Duration in weeks: 6  Plan of Care beginning date: 2/15/2023  Plan of Care expiration date: 3/15/2023  Treatment plan discussed with: patient        Subjective Evaluation    History of Present Illness  Mechanism of injury: Patient presents to out patient physical therapy with chief c/o L knee and R shoulder pain  Patient reports that back in April he was in the hospital for a kidney procedure when he came out of the procedure and his R shoulder was bothering him along with both of his legs  He feels that the R shoulder has continued to bother him and is limiting his daily activities  He feels that the left knee is sore on the inside but that he has more pain in the R shoulder  He has difficulties reaching across his body or above shoulder level  Update 2023:  Patient reports that he is having more range during his daily activities  He continues with pain to the anterior aspect of the shoulder with palpation and with prolonged use of the arm  He has difficulties lifting his groceries onto the counter but carrying them at his side seems to be non symptom provoking  Update 2/15/2023:  Patient reports that overall his shoulder is improve but he continues to have pain to the anterior aspect  He feels limited with lifting to shoulder level but the pain eases as he gets further into flexion and also when at rest  He notes that the arm will fatigue easier than the contralateral side and at times feel weak  He feels that his mobility has improve when assisted but he is still limited with his active motion of the arm     Pain  Current pain ratin  At best pain ratin  At worst pain ratin  Location: L knee/R shoulder  Quality: dull ache, sharp, tight, throbbing and discomfort  Relieving factors: rest  Aggravating factors: overhead activity, stair climbing, walking, standing and lifting    Social Support  Steps to enter house: yes  4  Stairs in house: yes   13  Lives in: multiple-level home  Lives with: alone    Working: disability  Hand dominance: right    Patient Goals  Patient goals for therapy: decreased pain, increased motion, increased strength, independence with ADLs/IADLs and improved balance  Patient goal: Patient wishes to improve the mobility and strength in his legs and R shoulder to return to normal daily activities  Objective     Palpation     Right   Tenderness of the biceps  Active Range of Motion   Left Shoulder   Flexion: 148 degrees   Extension: 48 degrees   Abduction: 140 degrees     Right Shoulder   Flexion: 130 degrees   Extension: 45 degrees   Abduction: 118 degrees     Passive Range of Motion   Left Shoulder   Normal passive range of motion    Right Shoulder   Flexion: 155 degrees   External rotation 0°: 86 degrees   Internal rotation 0°: 66 degrees     Strength/Myotome Testing     Left Shoulder   Normal muscle strength    Right Shoulder     Planes of Motion   Flexion: 4+   Extension: 4+   Abduction: 4+   Adduction: 4+   External rotation at 0°: 4-   Internal rotation at 0°: 4+     Tests     Right Shoulder   Positive Hawkin's and painful arc  Negative AC shear, belly press, drop arm, empty can, horn blower and Neer's  Precautions: None    Date 2/15 Reassess 1/18 1/25 Reassess 2/1 2/7   FOTO 50 SC  60 SC     Manuals        Shoulder PROM 8 min SC ds 10 min SC 8 min SC ds   STM/TPR R Pec major        Passive scapula ROM        Grade 4 posterior joint mobilization        Neuro Re-Ed         Rows, NIXON P3 2x15 Single arm P1  2/15, row 1/15 P1 2x15 P2 2x15 P4 2/15      No Monies        Pec Dec 35# 2x15 30# 2/15 30# 2/15 35# 2x15 35# 2/15   TB ER, IR        Eccentric biceps curls 5# 2x10   5#/10# 10x ea 5#  10x ea   Body Blade 2 pos 15" 4x ea   15" 4x 2 positions 4x 15" ea   Ther Ex        NuStep L6 10 min 9m L3 L5 10 min L5 8 min 10m L5   Doorway Pec stretch        Wand flexion  15x 2x 10x 2# 2x10x 2# 2/10   Wand scaption  15x      Table slides        Wand press in supine  15x  NP     Supine chest press  4# 2/10 5# 2x10 5# 2x10 5# 2/10           Ther Activity                Gait Training                Modalities        MHP/CP PRN  CP 10m   CP 10m

## 2023-02-15 NOTE — PROGRESS NOTES
PT Re-Evaluation     Today's date: 2/15/2023  Patient name: Naseem Major  : 1965  MRN: [id number]  Referring provider: Osmin Bautista DO  Dx:   Encounter Diagnosis     ICD-10-CM    1  Chronic right shoulder pain  M25 511     G89 29       2  Chronic pain of left knee  M25 562     G89 29           Start Time: 1000  Stop Time: 1100  Total time in clinic (min): 60 minutes    Assessment  Assessment details: Patient continues to demonstrate increased pain at shoulder level of flexion and abduction  He reports greatest pain with resisted ER and flexion to the anterior aspect of the R shoulder  There was better tolerance for rows with the forearm pronated today  He reported fatigue with isometric and eccentric biceps exercises today  There continues to be good tolerance for therapy interventions but a need for rest periods are required secondary to fatigue and increased discomfort  Impairments: abnormal or restricted ROM, activity intolerance, impaired physical strength, lacks appropriate home exercise program, pain with function, scapular dyskinesis and poor posture     Symptom irritability: moderateUnderstanding of Dx/Px/POC: good   Prognosis: good    Goals  STGs:  Patient will be independent with hep by 2-3 visits  - MET  Improve shoulder flexion to 140 degrees for improved tolerance with adls and work duties by 3-4 weeks  - MET  Decrease pain levels by 50% for improved tolerance with adls and work duties by 3-4 weeks  - Progressing      LTGs:  Improve FOTO score from 49 To 69 Indicating improved tolerance with activities involving the LE by discharge  - progressing  Patient will demonstrate full, pain free strength and rom of the shoulder for improved tolerance with adls and work duties by discharge  - Not MET  Patient will be able to return to normal work duties by discharge  - Not MET  Sleep will be undisturbed from the shoulder by discharge   - Not MET                 Plan  Plan details: Patient informed that from this point forward, to ensure adherence to the aforementioned plan of care, all or some of the treatment may be performed and carried out by a Physical Therapy Assistant (PTA) with supervision from a licensed Physical Therapist (PT) in accordance with [address] [address]  Patient will continue to benefit from skilled physical therapy to address the functional deficits that were identified during the evaluation today  We will continue to progress the therapy program to address these functional deficits and achieve the established goals  Patient would benefit from: skilled physical therapy  Planned modality interventions: cryotherapy  Planned therapy interventions: ADL retraining, body mechanics training, functional ROM exercises, flexibility, home exercise program, therapeutic exercise, therapeutic activities, stretching, strengthening, patient education, postural training, manual therapy and joint mobilization  Frequency: 2x week  Duration in weeks: 6  Plan of Care beginning date: 2/15/2023  Plan of Care expiration date: 3/15/2023  Treatment plan discussed with: patient        Subjective Evaluation    History of Present Illness  Mechanism of injury: Patient presents to out patient physical therapy with chief c/o L knee and R shoulder pain  Patient reports that back in April he was in the hospital for a kidney procedure when he came out of the procedure and his R shoulder was bothering him along with both of his legs  He feels that the R shoulder has continued to bother him and is limiting his daily activities  He feels that the left knee is sore on the inside but that he has more pain in the R shoulder  He has difficulties reaching across his body or above shoulder level  Update 1/25/2023:  Patient reports that he is having more range during his daily activities   He continues with pain to the anterior aspect of the shoulder with palpation and with prolonged use of the arm  He has difficulties lifting his groceries onto the counter but carrying them at his side seems to be non symptom provoking  Update 2/15/2023:  Patient reports that overall his shoulder is improve but he continues to have pain to the anterior aspect  He feels limited with lifting to shoulder level but the pain eases as he gets further into flexion and also when at rest  He notes that the arm will fatigue easier than the contralateral side and at times feel weak  He feels that his mobility has improve when assisted but he is still limited with his active motion of the arm  Pain  Current pain ratin  At best pain ratin  At worst pain ratin  Location: L knee/R shoulder  Quality: dull ache, sharp, tight, throbbing and discomfort  Relieving factors: rest  Aggravating factors: overhead activity, stair climbing, walking, standing and lifting    Social Support  Steps to enter house: yes  4  Stairs in house: yes   13  Lives in: multiple-level home  Lives with: alone    Working: disability  Hand dominance: right    Patient Goals  Patient goals for therapy: decreased pain, increased motion, increased strength, independence with ADLs/IADLs and improved balance  Patient goal: Patient wishes to improve the mobility and strength in his legs and R shoulder to return to normal daily activities  Objective     Palpation     Right   Tenderness of the biceps       Active Range of Motion   Left Shoulder   Flexion: 148 degrees   Extension: 48 degrees   Abduction: 140 degrees     Right Shoulder   Flexion: 130 degrees   Extension: 45 degrees   Abduction: 118 degrees     Passive Range of Motion   Left Shoulder   Normal passive range of motion    Right Shoulder   Flexion: 155 degrees   External rotation 0°: 86 degrees   Internal rotation 0°: 66 degrees     Strength/Myotome Testing     Left Shoulder   Normal muscle strength    Right Shoulder     Planes of Motion   Flexion: 4+   Extension: 4+   Abduction: 4+ Adduction: 4+   External rotation at 0°: 4-   Internal rotation at 0°: 4+     Tests     Right Shoulder   Positive Hawkin's and painful arc  Negative AC shear, belly press, drop arm, empty can, horn blower and Neer's  Precautions: None    Date 2/15 Reassess 1/18 1/25 Reassess 2/1 2/7   FOTO 50 SC  60 SC     Manuals        Shoulder PROM 8 min SC ds 10 min SC 8 min SC ds   STM/TPR R Pec major        Passive scapula ROM        Grade 4 posterior joint mobilization        Neuro Re-Ed         Rows, NIXON P3 2x15 Single arm P1  2/15, row 1/15 P1 2x15 P2 2x15 P4 2/15      No Monies        Pec Dec 35# 2x15 30# 2/15 30# 2/15 35# 2x15 35# 2/15   TB ER, IR        Eccentric biceps curls 5# 2x10   5#/10# 10x ea 5#  10x ea   Body Blade 2 pos 15" 4x ea   15" 4x 2 positions 4x 15" ea   Ther Ex        NuStep L6 10 min 9m L3 L5 10 min L5 8 min 10m L5   Doorway Pec stretch        Wand flexion  15x 2x 10x 2# 2x10x 2# 2/10   Wand scaption  15x      Table slides        Wand press in supine  15x  NP     Supine chest press  4# 2/10 5# 2x10 5# 2x10 5# 2/10           Ther Activity                Gait Training                Modalities        MHP/CP PRN  CP 10m   CP 10m

## 2023-02-22 ENCOUNTER — OFFICE VISIT (OUTPATIENT)
Dept: FAMILY MEDICINE CLINIC | Facility: CLINIC | Age: 58
End: 2023-02-22

## 2023-02-22 ENCOUNTER — OFFICE VISIT (OUTPATIENT)
Dept: OBGYN CLINIC | Facility: CLINIC | Age: 58
End: 2023-02-22

## 2023-02-22 VITALS
DIASTOLIC BLOOD PRESSURE: 78 MMHG | SYSTOLIC BLOOD PRESSURE: 127 MMHG | HEART RATE: 69 BPM | HEIGHT: 66 IN | WEIGHT: 315 LBS | BODY MASS INDEX: 50.62 KG/M2

## 2023-02-22 VITALS
DIASTOLIC BLOOD PRESSURE: 82 MMHG | BODY MASS INDEX: 50.62 KG/M2 | RESPIRATION RATE: 95 BRPM | TEMPERATURE: 97.5 F | WEIGHT: 315 LBS | HEIGHT: 66 IN | HEART RATE: 70 BPM | SYSTOLIC BLOOD PRESSURE: 116 MMHG

## 2023-02-22 DIAGNOSIS — M75.41 SUBACROMIAL IMPINGEMENT OF RIGHT SHOULDER: Primary | ICD-10-CM

## 2023-02-22 DIAGNOSIS — E78.2 MIXED HYPERLIPIDEMIA: ICD-10-CM

## 2023-02-22 DIAGNOSIS — E66.01 MORBID OBESITY WITH BMI OF 60.0-69.9, ADULT (HCC): ICD-10-CM

## 2023-02-22 DIAGNOSIS — I10 ESSENTIAL HYPERTENSION: Primary | ICD-10-CM

## 2023-02-22 PROBLEM — E78.5 DYSLIPIDEMIA: Status: RESOLVED | Noted: 2022-08-22 | Resolved: 2023-02-22

## 2023-02-22 NOTE — PROGRESS NOTES
Subjective:  Chief Complaint   Patient presents with   • Right Shoulder - Follow-up       Ten Mcdaniel is a 65 y o  male presenting for follow-up visit in regards to right shoulder pain  Patient was diagnosed with subacromial bursitis and provided with a corticosteroid injection at last visit approximately 1 month ago  Patient states that the injection gave him significant relief however has now worn off over the past week  He has been continuing with physical therapy and reports increased range of motion however still reports some strength deficits and pain  The following portions of the patient's history were reviewed and updated as appropriate: allergies, current medications, past family history, past medical history, past social history, past surgical history and problem list     Occupation:      Review of Systems   Constitutional: Negative for fever  HENT: Negative for dental problem and headaches  Eyes: Negative for vision loss  Respiratory: Negative for cough and shortness of breath  Cardiovascular: Negative for leg swelling and palpitations  Gastrointestinal: Negative for constipation and diarrhea  Genitourinary: Negative for bladder incontinence and difficulty urinating  Musculoskeletal: Negative for back pain and difficulty walking  Skin: Negative for rash and ulcer  Neurological: Negative for dizziness and headaches  Hem/Lymph/Immuno: Negative for blood clots  Does not bruise/bleed easily  Psychiatric/Behavioral: Negative for confusion           Objective:  /78 (BP Location: Left arm, Patient Position: Sitting, Cuff Size: Adult)   Pulse 69   Ht 5' 6" (1 676 m)   Wt (!) 171 kg (377 lb 12 8 oz)   BMI 60 98 kg/m²     Skin: no rashes, lesions, skin discolorations, lacerations  Vasculature: normal radial and ulnar pulse,  normal skin color, normal capillary refill in extremity, no upper extremity edema  Neurologic: Neurologic exam is normal throughout upper extremities, Awake, alert, and oriented x3, no apparent distress  Musculoskeletal:   right SHOULDER EXAM    Intact skin  No erythema, no induration, no signs of infection  No gross deformity    AROM FF: 150 degrees  AROM ER with arm at its side: 70 degrees  AROM IR: lower lumbar    Grind test: negative    Supraspinatus strength testin/5  Infraspinatus strength testin/5    Belly press: negative  Bear Hug test: negative    Empty can: positive  Biceps TTP: positive  Arc test: positive    Sanford's test:negative  Scapular posturing: good        Imaging:       Assessment/Plan:  1  Subacromial impingement of right shoulder  Clinical suspicion continues to remain that patient has subacromial impingement as evidenced by positive impingement signs  His exam does not reveal any weakness on rotator cuff testing however he does continue to persist with pain symptoms despite PT and corticosteroid injection  We discussed the role for MRI of the right shoulder to evaluate for possible rotator cuff injury however patient would like to hold off at this time  We will like to trial continued work with physical therapy and if pain symptoms fail to improve he will consider MRI at that time    We will plan to follow-up again in 3 months for reevaluation

## 2023-02-22 NOTE — PROGRESS NOTES
Name: Sydney Krishnan      : 1965      MRN: [id number]  Encounter Provider: Sukhdev Almaguer DO  Encounter Date: 2023   Encounter department: [address] [place] [address]    Assessment & Plan   Referral made to weight management  I encouraged him to take his atorvastatin daily  Follow-up with specialist as scheduled  I will see him back in 4 months or as needed  1  Essential hypertension    2  Mixed hyperlipidemia    3  Morbid obesity with BMI of 60 0-69 9, adult ([facility] [address] )  -     Ambulatory referral to Weight Management; Future    BMI Counseling: Body mass index is 61 33 kg/m²  The BMI is above normal  Nutrition recommendations include decreasing portion sizes, encouraging healthy choices of fruits and vegetables, decreasing fast food intake, consuming healthier snacks, limiting drinks that contain sugar, moderation in carbohydrate intake, increasing intake of lean protein, reducing intake of saturated and trans fat and reducing intake of cholesterol  No pharmacotherapy was ordered  Patient referred to weight management  Rationale for BMI follow-up plan is due to patient being overweight or obese  Depression Screening and Follow-up Plan: Patient was screened for depression during today's encounter  They screened negative with a PHQ-2 score of 1  Subjective     Patient here today for follow-up  Denies any chest pain or shortness of breath  He would like to see weight management to help him lose weight  He has been doing physical therapy for shoulder pain  Seen orthopedics today  Saw nephrology for his kidney dysfunction  He admits that he does not take his atorvastatin daily  Review of Systems   Constitutional: Negative  Respiratory: Negative  Cardiovascular: Negative  Gastrointestinal: Negative  Genitourinary: Negative          Past Medical History:   Diagnosis Date   • Acute kidney injury superimposed on CKD stage 3 2019   • Arthritis    • Chronic kidney disease    • Gout    • Hyperlipidemia    • Hypertension    • Kidney stone    • Left elbow pain 6/23/2022   • Obstructive pyelonephritis    • Sepsis [facility])      Past Surgical History:   Procedure Laterality Date   • FL RETROGRADE PYELOGRAM  8/4/2019   • FL RETROGRADE PYELOGRAM  9/26/2019   • FL RETROGRADE PYELOGRAM  4/22/2022   • IR NEPHROSTOMY TUBE PLACEMENT  5/10/2022   • NO PAST SURGERIES  03/23/2015    Last assessed   • PA CYSTO BLADDER W/URETERAL CATHETERIZATION Right 8/4/2019    Procedure: CYSTOSCOPY RETROGRADE PYELOGRAM WITH INSERTION STENT URETERAL;  Surgeon: Ollie Damico MD;  [facility];  Service: Urology   • PA CYSTO BLADDER W/URETERAL CATHETERIZATION Left 4/22/2022    Procedure: CYSTOSCOPY RETROGRADE PYELOGRAM WITH INSERTION STENT URETERAL;  Surgeon: Linda Levy MD;  Location:  MAIN OR;  Service: Urology   • PA CYSTO/URETERO W/LITHOTRIPSY &INDWELL STENT INSRT Right 9/26/2019    Procedure: CYSTOSCOPY URETEROSCOPY WITH RETROGRADE PYELOGRAM AND INSERTION STENT URETERAL;  Surgeon: Conchita Anderson MD;  Location: [facility];  Service: Urology   • PA CYSTO/URETERO W/LITHOTRIPSY &INDWELL STENT INSRT Left 6/9/2022    Procedure: CYSTOSCOPY LEFT URETEROSCOPY/LITHOTRIPSY HOLMIUM LASER,  AND EXCHANGE URETERAL STENT URETERAL, REMOVAL NEPHROSTOMY TUBE;  Surgeon: Bradley Pearson MD;  Location:  MAIN OR;  Service: Urology     Family History   Problem Relation Age of Onset   • Cancer Mother    • Heart disease Father    • No Known Problems Sister      Social History     Socioeconomic History   • Marital status: Single     Spouse name: None   • Number of children: None   • Years of education: None   • Highest education level: None   Occupational History   • None   Tobacco Use   • Smoking status: Never   • Smokeless tobacco: Never   Vaping Use   • Vaping Use: Never used   Substance and Sexual Activity   • Alcohol use: Not Currently     Alcohol/week: 0 0 standard drinks     Comment: rarely   • Drug use: No   • Sexual activity: Not Currently   Other Topics Concern   • None   Social History Narrative    Caffeine use      Social Determinants of Health     Financial Resource Strain: Not on file   Food Insecurity: No Food Insecurity   • Worried About Running Out of Food in the Last Year: Never true   • Ran Out of Food in the Last Year: Never true   Transportation Needs: No Transportation Needs   • Lack of Transportation (Medical): No   • Lack of Transportation (Non-Medical): No   Physical Activity: Not on file   Stress: Not on file   Social Connections: Not on file   Intimate Partner Violence: Not on file   Housing Stability: Low Risk    • Unable to Pay for Housing in the Last Year: No   • Number of Places Lived in the Last Year: 1   • Unstable Housing in the Last Year: No     Current Outpatient Medications on File Prior to Visit   Medication Sig   • acetaminophen (TYLENOL) 325 mg tablet Take 2 tablets (650 mg total) by mouth every 6 (six) hours as needed for mild pain, headaches or fever   • allopurinol (ZYLOPRIM) 300 mg tablet Take 1 tablet (300 mg total) by mouth daily   • atorvastatin (LIPITOR) 20 mg tablet Take 1 tablet (20 mg total) by mouth daily   • cholecalciferol (VITAMIN D3) 400 units tablet Take 400 Units by mouth in the morning  • metoprolol succinate (TOPROL-XL) 100 mg 24 hr tablet TAKE 1 TABLET BY MOUTH EVERY DAY   • tamsulosin (FLOMAX) 0 4 mg TAKE 1 CAPSULE BY MOUTH EVERY DAY WITH DINNER     No Known Allergies  Immunization History   Administered Date(s) Administered   • COVID-19 J&J (Espinoza) vaccine 0 5 mL 11/05/2021       Objective     /82   Pulse 70   Temp 97 5 °F (36 4 °C)   Resp (!) 95   Ht 5' 6" (1 676 m)   Wt (!) 172 kg (380 lb)   BMI 61 33 kg/m²     Physical Exam  Vitals reviewed  Constitutional:       General: He is not in acute distress  Appearance: Normal appearance  He is well-developed  He is not ill-appearing, toxic-appearing or diaphoretic     HENT:      Head: Normocephalic and atraumatic  Eyes:      Conjunctiva/sclera: Conjunctivae normal    Cardiovascular:      Rate and Rhythm: Normal rate and regular rhythm  Heart sounds: Normal heart sounds  No murmur heard  No friction rub  No gallop  Pulmonary:      Effort: Pulmonary effort is normal  No respiratory distress  Breath sounds: Normal breath sounds  No wheezing, rhonchi or rales  Musculoskeletal:      Right lower leg: No edema  Left lower leg: No edema  Neurological:      General: No focal deficit present  Mental Status: He is alert and oriented to person, place, and time  Psychiatric:         Mood and Affect: Mood normal          Behavior: Behavior normal          Thought Content: Thought content normal          Judgment: Judgment normal        Isaias Haque DO  BMI Counseling: Body mass index is 61 33 kg/m²  The BMI is above normal  Nutrition recommendations include reducing portion sizes, decreasing overall calorie intake, 3-5 servings of fruits/vegetables daily, reducing fast food intake, consuming healthier snacks, decreasing soda and/or juice intake, moderation in carbohydrate intake, increasing intake of lean protein, reducing intake of saturated fat and trans fat and reducing intake of cholesterol  Patient referred to weight management due to patient being morbidly obese

## 2023-02-22 NOTE — PATIENT INSTRUCTIONS
Heart Healthy Diet   AMBULATORY CARE:   A heart healthy diet  is an eating plan low in unhealthy fats and sodium (salt)  The plan is high in healthy fats and fiber  A heart healthy diet helps improve your cholesterol levels and lowers your risk for heart disease and stroke  A dietitian will teach you how to read and understand food labels  Heart healthy diet guidelines to follow:   • Choose foods that contain healthy fats:      ? Unsaturated fats  include monounsaturated and polyunsaturated fats  Unsaturated fat is found in foods such as soybean, canola, olive, corn, and safflower oils  It is also found in soft tub margarine that is made with liquid vegetable oil  ? Omega-3 fat  is found in certain fish, such as salmon, tuna, and trout, and in walnuts and flaxseed  Eat fish high in omega-3 fats at least 2 times a week  • Limit or do not have unhealthy fats:      ? Cholesterol  is found in animal foods, such as eggs and lobster, and in dairy products made from whole milk  Limit cholesterol to less than 200 mg each day  ? Saturated fat  is found in meats, such as yo and hamburger  It is also found in chicken or turkey skin, whole milk, and butter  Limit saturated fat to less than 7% of your total daily calories  ? Trans fat  is found in packaged foods, such as potato chips and cookies  It is also in hard margarine, some fried foods, and shortening  Do not eat foods that contain trans fats  • Get 20 to 30 grams of fiber each day  Fruits, vegetables, whole-grain foods, and legumes (cooked beans) are good sources of fiber  • Limit sodium as directed  You may be told to limit sodium, such as to 2,000 mg or less each day  Choose low-sodium or no-salt-added foods  Add little or no salt to food you prepare  Use herbs and spices in place of salt         Include the following in your heart healthy plan:  Ask your dietitian or healthcare provider how many servings to have each day from the following food groups:  • Grains:      ? Whole-wheat breads, cereals, and pastas, and brown rice    ? Low-fat, low-sodium crackers and chips    • Vegetables:      ? Broccoli, green beans, green peas, and spinach    ? Collards, kale, and lima beans    ? Carrots, sweet potatoes, tomatoes, and peppers    ? Canned vegetables with no salt added    • Fruits:      ? Bananas, peaches, pears, and pineapple    ? Grapes, raisins, and dates    ? Oranges, tangerines, grapefruit, orange juice, and grapefruit juice    ? Apricots, mangoes, melons, and papaya    ? Raspberries and strawberries    ? Canned fruit with no added sugar    • Low-fat dairy:      ? Nonfat (skim) milk, 1% milk, and low-fat almond, cashew, or soy milks fortified with calcium    ? Low-fat cheese, regular or frozen yogurt, and cottage cheese    • Meats and proteins:      ? Lean cuts of beef and pork (loin, leg, round), skinless chicken and turkey    ? Legumes, soy products, egg whites, or nuts    Limit or do not include the following in your heart healthy plan:   • Foods and liquids that contain unhealthy fats and oils:      ? Whole or 2% milk, cream cheese, sour cream, or cheese    ? High-fat cuts of beef (T-bone steaks, ribs), chicken or turkey with skin, and organ meats such as liver    ? Butter, stick margarine, shortening, and cooking oils such as coconut or palm oil    • Foods and liquids high in sodium:      ? Packaged foods, such as frozen dinners, cookies, macaroni and cheese, and cereals with more than 300 mg of sodium per serving    ? Vegetables with added sodium, such as instant potatoes, vegetables with added sauces, or regular canned vegetables    ? Cured or smoked meats, such as hot dogs, yo, and sausage    ? High-sodium ketchup, barbecue sauce, salad dressing, pickles, olives, soy sauce, or miso    • Foods and liquids high in sugar:      ? Candy, cake, cookies, pies, or doughnuts    ?  Soft drinks (soda), sports drinks, or sweetened tea    ? Canned or dry mixes for cakes, soups, sauces, or gravies    Other healthy heart guidelines:   • Do not smoke  Nicotine and other chemicals in cigarettes and cigars can cause lung and heart damage  Ask your healthcare provider for information if you currently smoke and need help to quit  E-cigarettes or smokeless tobacco still contain nicotine  Talk to your provider before you use these products  • Limit or do not drink alcohol as directed  Alcohol can damage your heart and raise your blood pressure  Your healthcare provider may give you specific daily and weekly limits  The general recommended limit is 1 drink a day for women 21 or older and for men 72 or older  Do not have more than 3 drinks within 24 hours or 7 within a week  The recommended limit is 2 drinks a day for men 24to 59years of age  Do not have more than 4 drinks within 24 hours or 14 within a week  A drink of alcohol is 12 ounces of beer, 5 ounces of wine, or 1½ ounces of liquor  • Maintain a healthy weight  Extra body weight makes your heart work harder  Ask your provider what a healthy weight is for you  He or she can help you create a safe weight loss plan, if needed  • Exercise regularly  Exercise can help you maintain a healthy weight and improve your blood pressure and cholesterol levels  Regular exercise can also decrease your risk for heart problems  Ask your provider about the best exercise plan for you  Do not start an exercise program without asking your provider  Follow up with your doctor or cardiologist as directed:  Write down your questions so you remember to ask them during your visits  © Copyright [employer] 2022 Information is for End User's use only and may not be sold, redistributed or otherwise used for commercial purposes  The above information is an  only  It is not intended as medical advice for individual conditions or treatments   Talk to your doctor, nurse or pharmacist before following any medical regimen to see if it is safe and effective for you

## 2023-02-27 ENCOUNTER — OFFICE VISIT (OUTPATIENT)
Dept: BARIATRICS | Facility: CLINIC | Age: 58
End: 2023-02-27

## 2023-02-27 VITALS
WEIGHT: 315 LBS | SYSTOLIC BLOOD PRESSURE: 112 MMHG | HEIGHT: 66 IN | TEMPERATURE: 98.4 F | DIASTOLIC BLOOD PRESSURE: 60 MMHG | BODY MASS INDEX: 50.62 KG/M2 | OXYGEN SATURATION: 93 % | HEART RATE: 79 BPM

## 2023-02-27 DIAGNOSIS — E66.01 MORBID OBESITY WITH BMI OF 60.0-69.9, ADULT (HCC): ICD-10-CM

## 2023-02-27 NOTE — PROGRESS NOTES
Assessment/Plan:  Prince Herrera was seen today for consult  Diagnoses and all orders for this visit:    Morbid obesity with BMI of 60 0-69 9, adult ([facility] [address] )  -     Ambulatory referral to Weight Management    Patient is going to follow conservative program   He will meet with the dietitian but is unable to do so at this time due to financial constraints  He informs me he will meet with the dietitian in 1 month  In the meantime I also provided him with a sample menu in the 2000 to 2200-calorie range as this should be a good guide to help with food selection at the grocery store  Other recommendations as below  - Discussed options of HealthyCORE-Intensive Lifestyle Intervention Program, Very Low Calorie Diet-VLCD and Conservative Program and the role of weight loss medications  - Explained the importance of making lifestyle changes first before starting any anti-obesity medications  Patient should demonstrate lifestyle changes first before anti-obesity medication can be initiated  - Patient is interested in pursuing Conservative Program  - Initial weight loss goal of 5-10% weight loss for improved health  - Weight loss can improve patient's co-morbid conditions and/or prevent weight-related complications  Goals:  Do not skip any meals! Food log (ie ) www myfitnesspal com,sparkpeople  com,loseit com,calorieking  com,etc  baritastic (use skinnytaste  com, dietdoctor  com or smartphone [employer] for recipes)  No sugary beverages  At least 64oz of water daily  Increase physical activity by 10 minutes daily  Gradually increase physical activity to a goal of 5 days per week for 30 minutes of MODERATE intensity PLUS 2 days per week of FULL BODY resistance training (use smartphone apps [employer], Home Workout, etc )    Total time spent: 30 min, with >50% face-to-face time spent counseling patient on nonsurgical interventions for the treatment of excess weight   Discussed in detail nonsurgical options including intensive lifestyle intervention program, very low-calorie diet program and conservative program   Discussed the role of weight loss medications  Counseled patient on diet behavior and exercise modification for weight loss  Follow up in approximately 3 months with Non-Surgical Physician/Advanced Practitioner  Subjective:   Chief Complaint   Patient presents with   • Consult     S/B 6/8  Patient ID: Burak Garcia  is a 65 y o  male with excess weight/obesity here to pursue weight management  Previous notes and records have been reviewed      Past Medical History:   Diagnosis Date   • Acute kidney injury superimposed on CKD stage 3 8/6/2019   • Arthritis    • Chronic kidney disease    • Gout    • Hyperlipidemia    • Hypertension    • Kidney stone    • Left elbow pain 6/23/2022   • Obstructive pyelonephritis    • Sepsis [facility])      Past Surgical History:   Procedure Laterality Date   • FL RETROGRADE PYELOGRAM  08/04/2019   • FL RETROGRADE PYELOGRAM  09/26/2019   • FL RETROGRADE PYELOGRAM  04/22/2022   • IR NEPHROSTOMY TUBE PLACEMENT  05/10/2022   • KIDNEY SURGERY     • NO PAST SURGERIES  03/23/2015    Last assessed   • CT CYSTO BLADDER W/URETERAL CATHETERIZATION Right 08/04/2019    Procedure: CYSTOSCOPY RETROGRADE PYELOGRAM WITH INSERTION STENT URETERAL;  Surgeon: Mick Marroquin MD;  [facility];  Service: Urology   • CT CYSTO BLADDER W/URETERAL CATHETERIZATION Left 04/22/2022    Procedure: CYSTOSCOPY RETROGRADE PYELOGRAM WITH INSERTION STENT URETERAL;  Surgeon: Kisha Hazel MD;  Location:  MAIN OR;  Service: Urology   • CT CYSTO/URETERO W/LITHOTRIPSY &INDWELL STENT INSRT Right 09/26/2019    Procedure: CYSTOSCOPY URETEROSCOPY WITH RETROGRADE PYELOGRAM AND INSERTION STENT URETERAL;  Surgeon: Nabeel Tavera MD;  Location: [facility];  Service: Urology   • CT CYSTO/URETERO W/LITHOTRIPSY &INDWELL STENT INSRT Left 06/09/2022    Procedure: CYSTOSCOPY LEFT URETEROSCOPY/LITHOTRIPSY HOLMIUM LASER, AND EXCHANGE URETERAL STENT URETERAL, REMOVAL NEPHROSTOMY TUBE;  Surgeon: Laci Wright MD;  [facility];  Service: Urology       HPI:  Patient presents for weight management consultation  He is not interested in surgical weight loss  Patient tells me he was able to lose a lot of weight in 2007 with [place] weight loss  He is looking for guidance with food selection to help him achieve his weight loss goals  Wt Readings from Last 20 Encounters:   02/27/23 (!) 173 kg (380 lb 6 4 oz)   02/22/23 (!) 171 kg (377 lb 12 8 oz)   02/22/23 (!) 172 kg (380 lb)   01/30/23 (!) 171 kg (376 lb)   01/18/23 (!) 168 kg (370 lb)   12/07/22 (!) 166 kg (365 lb 9 6 oz)   11/22/22 (!) 164 kg (361 lb 6 4 oz)   10/05/22 (!) 153 kg (338 lb 3 2 oz)   09/16/22 (!) 141 kg (311 lb)   09/14/22 (!) 138 kg (305 lb)   09/09/22 (!) 138 kg (303 lb 12 8 oz)   08/19/22 (!) 138 kg (304 lb 12 8 oz)   07/27/22 135 kg (297 lb)   07/20/22 135 kg (296 lb 11 2 oz)   07/19/22 135 kg (297 lb)   06/30/22 (!) 140 kg (309 lb 8 4 oz)   06/07/22 (!) 145 kg (319 lb 10 7 oz)   06/06/22 (!) 145 kg (318 lb 12 6 oz)   05/19/22 (!) 140 kg (309 lb 11 9 oz)   05/10/22 (!) 161 kg (355 lb)     Obesity/Excess Weight:  Severity: Very Severe  Onset:  Since childhood    Modifiers: Diet and Exercise and Commercial Weight Loss Programs-ie  Weight Watchers, Loral Darren, Nutrisystem, etc   Contributing factors: Poor Food Choices and Insufficient Physical Activity  Associated symptoms: comorbid conditions      The following portions of the patient's history were reviewed and updated as appropriate: allergies, current medications, past family history, past medical history, past social history, past surgical history, and problem list     Review of Systems   Constitutional: Negative for fever  Respiratory: Negative for shortness of breath  Cardiovascular: Negative for chest pain  Gastrointestinal: Negative for abdominal pain and blood in stool     Genitourinary: Negative for dysuria and hematuria  Musculoskeletal: Positive for arthralgias and myalgias  Skin: Negative for rash  Neurological: Negative for headaches  Psychiatric/Behavioral: Negative for dysphoric mood  The patient is not nervous/anxious  Objective:  /60 (BP Location: Left arm, Cuff Size: Large)   Pulse 79   Temp 98 4 °F (36 9 °C) (Temporal)   Ht 5' 6" (1 676 m)   Wt (!) 173 kg (380 lb 6 4 oz)   SpO2 93%   BMI 61 40 kg/m²   Constitutional: Well-developed, well-nourished and Obese Body mass index is 61 4 kg/m²  Di Hoist HEENT: No conjunctival injection  Pulmonary: No increased work of breathing or signs of respiratory distress  CV: Well-perfused  GI: Obese  Non-distended  MSK: No edema   Neuro: Oriented to person, place and time  Normal Speech  Normal gait  Psych: Normal affect and mood  Labs and Imaging  Recent labs and imaging have been personally reviewed    Lab Results   Component Value Date    WBC 10 89 (H) 01/30/2023    HGB 15 1 01/30/2023    HCT 49 2 01/30/2023    MCV 99 (H) 01/30/2023     01/30/2023     Lab Results   Component Value Date     (L) 04/08/2015    SODIUM 139 01/30/2023    K 4 8 01/30/2023     (H) 01/30/2023    CO2 26 01/30/2023    ANIONGAP 7 04/08/2015    AGAP 4 01/30/2023    BUN 37 (H) 01/30/2023    CREATININE 1 90 (H) 01/30/2023    GLUC 99 09/14/2022    GLUF 89 01/30/2023    CALCIUM 9 5 01/30/2023    AST 12 01/30/2023    ALT 21 01/30/2023    ALKPHOS 77 01/30/2023    PROT 7 7 04/08/2015    TP 7 6 01/30/2023    BILITOT 0 7 04/08/2015    TBILI 0 35 01/30/2023    EGFR 38 01/30/2023     Lab Results   Component Value Date    HGBA1C 5 6 01/30/2023     Lab Results   Component Value Date    PKF0RFJNCZON 2 610 06/02/2021     Lab Results   Component Value Date    CHOLESTEROL 255 (H) 06/02/2021     Lab Results   Component Value Date    HDL 48 06/02/2021     Lab Results   Component Value Date    TRIG 169 (H) 06/02/2021     Lab Results   Component Value Date [facility] 173 (H) 06/02/2021

## 2023-03-05 DIAGNOSIS — E78.5 DYSLIPIDEMIA: ICD-10-CM

## 2023-03-06 DIAGNOSIS — Z87.39 HISTORY OF GOUT: ICD-10-CM

## 2023-03-06 RX ORDER — ALLOPURINOL 300 MG/1
300 TABLET ORAL DAILY
Qty: 30 TABLET | Refills: 3 | Status: SHIPPED | OUTPATIENT
Start: 2023-03-06 | End: 2023-04-05

## 2023-03-06 RX ORDER — ATORVASTATIN CALCIUM 20 MG/1
TABLET, FILM COATED ORAL
Qty: 30 TABLET | Refills: 3 | Status: SHIPPED | OUTPATIENT
Start: 2023-03-06

## 2023-03-07 ENCOUNTER — APPOINTMENT (OUTPATIENT)
Dept: PHYSICAL THERAPY | Facility: CLINIC | Age: 58
End: 2023-03-07

## 2023-03-13 ENCOUNTER — OFFICE VISIT (OUTPATIENT)
Dept: PHYSICAL THERAPY | Facility: CLINIC | Age: 58
End: 2023-03-13

## 2023-03-13 DIAGNOSIS — M25.562 CHRONIC PAIN OF LEFT KNEE: ICD-10-CM

## 2023-03-13 DIAGNOSIS — G89.29 CHRONIC PAIN OF LEFT KNEE: ICD-10-CM

## 2023-03-13 DIAGNOSIS — M25.511 CHRONIC RIGHT SHOULDER PAIN: Primary | ICD-10-CM

## 2023-03-13 DIAGNOSIS — G89.29 CHRONIC RIGHT SHOULDER PAIN: Primary | ICD-10-CM

## 2023-03-13 NOTE — PROGRESS NOTES
Daily Note     Today's date: 3/13/2023  Patient name: Daniel Velazco  : 1965  MRN: [id number]  Referring provider: Greg Sevilla DO  Dx:   Encounter Diagnosis     ICD-10-CM    1  Chronic right shoulder pain  M25 511     G89 29       2  Chronic pain of left knee  M25 562     G89 29           Start Time: 1000  Stop Time: 1045  Total time in clinic (min): 45 minutes    Subjective: Pt comments on less c/o with L knee, continued anterior R shoulder soreness continues  He does note increased mily to UE home ADL's  Objective: See treatment diary below      Assessment: Tolerated treatment well  Patient exhibited good technique with therapeutic exercises      Plan: Continue per plan of care  Precautions: None    Date 2/15 Reassess 3/13 1/25 Reassess    FOTO 50 SC  60 SC     Manuals        Shoulder PROM 8 min SC ds 10 min SC 8 min SC ds   STM/TPR R Pec major        Passive scapula ROM        Grade 4 posterior joint mobilization        Neuro Re-Ed         Rows, NIXON P3 2x15 Single arm P3 2/20 ea P1 2x15 P2 2x15 P4 215      Pec Dec 35# 2x15 35# 2/15 30# 2/15 35# 2x15 35# 2/15   Eccentric biceps curls 5# 2x10 5# 2/10  5#/10# 10x ea 5#  10x ea   Body Blade 2 pos 15" 4x ea 4x 15"  15" 4x 2 positions 4x 15" ea   Ther Ex        NuStep L6 10 min 6m L6 L5 10 min L5 8 min 10m L5   Wand flexion   2x 10x 2# 2x10x 2# 2/10   Wand scaption        Wand press in supine   NP     Supine chest press  15# 2/20 5# 2x10 5# 2x10 5# 2/10           Ther Activity                Gait Training                Modalities        MHP/CP PRN     CP 10m

## 2023-03-16 ENCOUNTER — OFFICE VISIT (OUTPATIENT)
Dept: PHYSICAL THERAPY | Facility: CLINIC | Age: 58
End: 2023-03-16

## 2023-03-16 DIAGNOSIS — M25.511 CHRONIC RIGHT SHOULDER PAIN: Primary | ICD-10-CM

## 2023-03-16 DIAGNOSIS — M25.562 CHRONIC PAIN OF LEFT KNEE: ICD-10-CM

## 2023-03-16 DIAGNOSIS — G89.29 CHRONIC PAIN OF LEFT KNEE: ICD-10-CM

## 2023-03-16 DIAGNOSIS — G89.29 CHRONIC RIGHT SHOULDER PAIN: Primary | ICD-10-CM

## 2023-03-16 NOTE — PROGRESS NOTES
Daily Note     Today's date: 3/16/2023  Patient name: Jamey Sandoval  : 1965  MRN: [id number]  Referring provider: Marita Lockwood DO  Dx:   Encounter Diagnosis     ICD-10-CM    1  Chronic right shoulder pain  M25 511     G89 29       2  Chronic pain of left knee  M25 562     G89 29           Start Time: 1000  Stop Time: 1100  Total time in clinic (min): 60 minutes    Subjective: Patient reports that his shoulder is feeling a little better but continues bother him when lifting and reaching above shoulder level  He will be continuing with a home program at this time  Objective: See treatment diary below      Assessment: Tolerated treatment well  Patient demonstrated fatigue post treatment, exhibited good technique with therapeutic exercises and would benefit from continued PT Patient continues to be challenged with therapy progression for UE strengthening  We reviewed all exercises today that he will continue with for a home program at this time  Plan: D/C to HEP     Precautions: None    Date 2/15 Reassess 3/13 3/16 DC    FOTO 50 SC       Manuals        Shoulder PROM 8 min SC ds  8 min SC ds   STM/TPR R Pec major        Passive scapula ROM        Grade 4 posterior joint mobilization        Neuro Re-Ed         Rows, NIXON P3 2x15 Single arm P3 2/20 ea P3 2x20 ea P2 2x15 P4 2/15      Pec Dec 35# 2x15 35# 2/15 45# 2x15 35# 2x15 35# 2/15   Eccentric biceps curls 5# 2x10 5# 2/10 5# 2x15 5#/10# 10x ea 5#  10x ea   Body Blade 2 pos 15" 4x ea 4x 15" 15" 4x ea 15" 4x 2 positions 4x 15" ea   Ther Ex        NuStep L6 10 min 6m L6 L5 8 min L5 8 min 10m L5   Wand flexion    2# 2x10x 2# 2/10   Wand scaption        Wand press in supine        Supine chest press  15# 2/20 Machine 15# 2x15 5# 2x10 5# 2/10           Ther Activity                Gait Training                Modalities        MHP/CP PRN     CP 10m

## 2023-03-27 ENCOUNTER — OFFICE VISIT (OUTPATIENT)
Dept: BARIATRICS | Facility: CLINIC | Age: 58
End: 2023-03-27

## 2023-03-27 VITALS — BODY MASS INDEX: 50.62 KG/M2 | HEIGHT: 66 IN | WEIGHT: 315 LBS

## 2023-03-27 DIAGNOSIS — R63.5 ABNORMAL WEIGHT GAIN: ICD-10-CM

## 2023-03-27 NOTE — PROGRESS NOTES
"Weight Management Medical Nutrition Assessment  Carlos Lal presented today for meal planning session  Today he weighed 388# (with shoes and cane) which reflects gain of 7 6# since initial bariatrician appointment 2/27/23  Patient stated that he has always been overweight, but that he began to gain more weight about one year ago after being in the hospital for a kidney stone  At this time he stopped working, became less active, and was drinking a lot of regular soda  Endorsed arthritis in his knee and that he is currentlyhaving a gout attack in first finger  He noted that he lost 100# in the past on [place] [facility]  He denied ever feeling full  Gets free food from a [employer] once a month  Stated he feels a compulsion to eat all the food in his house once he buys/receives  Eats large portions of food (finishes a whole pizza in one day; ate 1/2# pistachios last night)  Turns to food for comfort  Will refer for behavioral health  Mom supportive  Tried Nutrisystem over the past year, but he found this would be too much food at one time for him  Stated it is difficult to stand and cooks  Prefers easy-to-make items and using the microwave  Does not eat in the middle of the night, but stated he does wake up frequently d/t drink a lot of fluid at night and during the night as he is fearful to get another kidney stone  Drinks alcohol rarely  Interrupted sleep likely affecting his eating habits  Suggested he drink his fluid only during the day  Provided patient with and reviewed calorie-controlled, balanced meal plan  RD follow-up appointment scheduled for 5/1/23      Patient seen by Medical Provider in past 6 months:  yes  Requested to schedule appointment with Medical Provider: Yes      Anthropometric Measurements  Start Weight (#): 380 4# (2/27/23)  Current Weight (#): 388#  TBW % Change from start weight: 2%  Ideal Body Weight (#): 142# (64 5 kg)  Goal Weight (#): wants to be able to bend over and \"do normal people things\"  Highest: " "present  Lowest: ~200# (after [place] [facility])    Weight Loss History  Previous weight loss attempts: [place] Weight [facility], Nutrisystem, various shakes, pills, etc    Food and Nutrition Related History  Wake up: 8 AM   Bed Time: varies - 9 PM - 3 AM    Food Recall  Breakfast: 10-11 AM- Chips (may eat whole bag through the course of the day) or pretzels or corn curls or chocolate or meat; calls himself a grazer   Snack: grazes   Lunch: \"whatever I might get my hands on\"; hoagie, chips, chocolate chip cookies   Snack: as aboe  Dinner: as above  Snack: as above      Beverages: water, flavored water (carbonated)- 3-4 16 oz glasses with ice; eats a lost of ice; now trying Hint (just got yesterday)  Volume of beverage intake: >=80 oz    Weekends:  same   Cravings: easy to prepare foods  Trouble area of day: all day    Frequency of Eating out: has been more recently lately (pizza for example)  Food restrictions: none   Cooking: self   Food Shopping: self    Physical Activity Intake  Activity: none  Frequency: n/a  Physical limitations/barriers to exercise: weight and arthritis in knees    Estimated Needs  Energy  SECA: BMR: n/a     X 1 3 -1000 =  Bear Leanne Energy Needs: BMR :9290  1-2# loss weekly sedentary: 6272-0537            1-2# loss weekly lightly active: 3258-2021  Maintenance calories for sedentary activity level: 3039  Protein: ~95 g    (1 2-1 5g/kg IBW)  Fluid: >=108 oz    (35mL/kg IBW)    Nutrition Diagnosis  Yes; Overweight/obesity  related to Excess energy intake as evidenced by  BMI more than normative standard for age and sex (obesity-grade III 36+)       Nutrition Intervention    Nutrition Prescription  Calories:8679-0908  Protein: ~95 g  Fluid: >=108 oz    Meal Plan (Danny/Pro/Carb)  Provided patient with pre-printed sample 8138-0275 kcal meal plan       Nutrition Education:    Healthy Core Manual- n/a  Calorie controlled menu  Lean protein food choices  Healthy snack options  Food journaling " tips      Nutrition Counseling:  Strategies: meal planning, portion sizes, healthy snack choices, hydration, fiber intake, protein intake, exercise, food journal      Monitoring and Evaluation:  Evaluation criteria:  Energy Intake  Meet protein needs  Maintain adequate hydration  Monitor weekly weight  Meal planning/preparation  Food journal   Decreased portions at mealtimes and snacks  Physical activity     Barriers to learning:none  Readiness to change: Preparation:  (Getting ready to change)   Comprehension: good  Expected Compliance: good

## 2023-04-04 ENCOUNTER — OFFICE VISIT (OUTPATIENT)
Dept: BARIATRICS | Facility: CLINIC | Age: 58
End: 2023-04-04

## 2023-04-04 VITALS — BODY MASS INDEX: 60.37 KG/M2 | WEIGHT: 315 LBS

## 2023-04-04 DIAGNOSIS — E66.01 MORBID OBESITY WITH BMI OF 60.0-69.9, ADULT (HCC): Primary | ICD-10-CM

## 2023-04-04 NOTE — PROGRESS NOTES
Patient's name and  were verified during visit  Provider explained that [employer] is a HIPPA compliant platform and to further protect their confidentiality the provider was alone and the office door was closed  Patient consented to the virtual video visit  This visit is free  Patient presents for [address] Evaluation as a part of Medical Weight Management Program, current self reported weight 374lbs  Eating behaviors/food choices: Made some changes since meeting with RD, not allowing snacks in his home, focusing on proteins and vegetables, avoiding all sugar  Happy with initial weight loss per his home scale  Portions still larger, eating whole bag of frozen vegetables or entire container of precut pineapple  Discussed looking up calories and reading labels, he is eager to learn more about nutrition so he can make healthy food choices  patient has logged foods in the past and likes using excel spreadsheets to keep track  Plans to do this in order to make informed decisions about foods he chooses  Activity/Exercise: Activity is limited due to weight, chronic episodes of gout and chronic knee pain  Does some walking around his house but doesn't get outside much due to pain and possible shame from the weight he has gained  Discussed seated exercises as an option, encouraged him to get out resistance bands and try PT exercises he used to do  Sleep/Rest:  Sleep still disrupted, frequent bathroom use in the evening, patient worried about kidney function and becoming dehydrated so he tries to drink throughout the night  Reviewed impact sleep deprivation can have on weight management and overall health, encouraged to get quality sleep when possible  Mental Health/Wellness:  Patient identifies bored and mindless eating as contributing factors to weight management   Not a lot of insight into triggers of emotional eating, just recognizes that he has cravings that he gives in to and wants guidance on how to stop that behaviors  Encouraged mindfulness, suggested practicing stop, think, act when feeling the desire to eat so he can think about the choice he wants to make  Suggested labeling any feelings he's having, tuning into cues of hunger, whether they are head hunger or actual stomach hunger and having healthy food options available to eat if biologically hungry  Suggested doing some type of activity and/or getting otuside for natural light which can improve mood, increase energy and distract from mindless eating  Suggested breaking up eating associations such as snacking in front of the TV, setting the expectation that he will only eat snacks in the kitchen      Goals:    - when having a craving, stop and think about why you want certain foods, choose foods that have good nutrition or choose an activity that will bring nick or calm  - Avoid eating to soothe mood, try to problem solve those feelings and find non-food coping skills  - break up food associations such as eating while watching TV by setting boundaries, only eat food in kitchen instead of in front of TV  - read food labels to learn about calorie and nutrition in food, look up foods on [employer] that don't have labels  - put together spreadsheet to track foods and calories  - continue getting in fluids  - get out resistance bands, try to do seated activities to be more active  - get outside for natural light which can help lift mood and energy    Next Appointment:  With RD on 5/1

## 2023-04-04 NOTE — PATIENT INSTRUCTIONS
- when having a craving, stop and think about why you want certain foods, choose foods that have good nutrition or choose an activity that will bring nick or calm  - Avoid eating to soothe mood, try to problem solve those feelings and find non-food coping skills  - break up food associations such as eating while watching TV by setting boundaries, only eat food in kitchen instead of in front of TV  - read food labels to learn about calorie and nutrition in food, look up foods on [employer] that don't have labels  - put together spreadsheet to track foods and calories  - continue getting in fluids  - get out resistance bands, try to do seated activities to be more active  - get outside for natural light which can help lift mood and energy

## 2023-05-01 ENCOUNTER — OFFICE VISIT (OUTPATIENT)
Dept: BARIATRICS | Facility: CLINIC | Age: 58
End: 2023-05-01

## 2023-05-01 VITALS — HEIGHT: 66 IN | WEIGHT: 315 LBS | BODY MASS INDEX: 50.62 KG/M2

## 2023-05-01 DIAGNOSIS — R63.5 ABNORMAL WEIGHT GAIN: ICD-10-CM

## 2023-05-01 NOTE — PROGRESS NOTES
"Weight Management Medical Nutrition Assessment  Nessa Becerraolph presented today for follow-up meal-planning session  Today he weighed 392 8# (with shoes) which reflects gain of 4 8# since initial meal planning session 3/27/23  He admitted that he has still been turning to food when stressed, but has been managing to \"eat differently  \" He endorsed cutting out soda, chips, pretzels, cookies, and ice cream  Stated he has been eating reduced fat Wheat Thins and lightly salted potato chips, but not eating whole bag in one sitting as he had been accustomed  Eating fish, mostly chicken, pork chops, and sausage patties occasionally  Becoming more aware of the salty taste in some foods now that he has been eating less Na (as per suggestion of nephrologist; no protein restriction recommended)  Not adding salt to food  Not eating fruits/vegetables, but stated that he is not sure why despite having them on hand  Buying frozen yogurt instead of ice cream, but feels he should stop doing this and instead buy regular yogurt with which RD concurred  Goals include eating one serving of meat at meals rather than 2, having at least one serving of vegetables at meals, and eating [place] yogurt instead of frozen yogurt  RD suggested that he food log, which he stated he did trying doing, but did not follow through with same  He did have behavioral health session with   Despite gaining weight, commended [employer] for the changes he has made thus far  Follow-up meal planning session scheduled for 6/12/23        Patient seen by Medical Provider in past 6 months:  yes  Requested to schedule appointment with Medical Provider: Yes      Anthropometric Measurements  Start Weight (#): 380 4# (2/27/23)  Current Weight (#): 392 8#  TBW % Change from start weight: 3 3%  Ideal Body Weight (#): 142# (64 5 kg)  Goal Weight (#): wants to be able to bend over and \"do normal people things\"  Highest: present  Lowest: ~200# (after [facility])    Weight Loss " "History  Previous weight loss attempts: [facility], Nutrisystem, various shakes, pills, etc    Food and Nutrition Related History  Wake up: 4 AM-8 AM   Bed Time: varies - 9 PM - 3 AM    Food Recall  Breakfast: 12 PM: Meat (2 pieces)  Snack: \"grazes\"  Lunch: Meat or fish    Snack: as above  Dinner: as above  Snack: as above      Beverages: drinking 3-4 28 oz bottles of water with flavoring drops; states he gets jittery with red dye water,   Volume of beverage intake: >=84 oz    Weekends:  same   Cravings: easy to prepare foods  Trouble area of day: all day    Frequency of Eating out: occasionally  Food restrictions: none   Cooking: self   Food Shopping: self    Physical Activity Intake  Activity: none  Frequency: n/a  Physical limitations/barriers to exercise: weight and arthritis in knees    Estimated Needs  Energy  SECA: BMR: n/a     X 1 3 -1000 =  Bear Leanne Energy Needs: BMR :6437  1-2# loss weekly sedentary: 7022-2693            1-2# loss weekly lightly active: 2936-4513  Maintenance calories for sedentary activity level: 3039  Protein: ~95 g    (1 2-1 5g/kg IBW)  Fluid: >=108 oz    (35mL/kg IBW)    Nutrition Diagnosis  Yes; Overweight/obesity  related to Excess energy intake as evidenced by  BMI more than normative standard for age and sex (obesity-grade III 36+)       Nutrition Intervention    Nutrition Prescription  Calories:1596-3247  Protein: ~95 g  Fluid: >=108 oz    Meal Plan (Danny/Pro/Carb)  Provided patient with pre-printed sample 9480-7843 kcal meal plan       Nutrition Education:    Healthy Core Manual- n/a  Calorie controlled menu  Lean protein food choices  Healthy snack options  Food journaling tips      Nutrition Counseling:  Strategies: meal planning, portion sizes, healthy snack choices, hydration, fiber intake, protein intake, exercise, food journal      Monitoring and Evaluation:  Evaluation criteria:  Energy Intake  Meet protein needs  Maintain adequate hydration  Monitor weekly " weight  Meal planning/preparation  Food journal   Decreased portions at mealtimes and snacks  Physical activity     Barriers to learning:none  Readiness to change: changing behavior  Comprehension: good  Expected Compliance: good

## 2023-05-16 DIAGNOSIS — I10 ESSENTIAL HYPERTENSION: ICD-10-CM

## 2023-05-16 RX ORDER — METOPROLOL SUCCINATE 100 MG/1
TABLET, EXTENDED RELEASE ORAL
Qty: 90 TABLET | Refills: 1 | Status: SHIPPED | OUTPATIENT
Start: 2023-05-16

## 2023-05-24 ENCOUNTER — OFFICE VISIT (OUTPATIENT)
Dept: OBGYN CLINIC | Facility: CLINIC | Age: 58
End: 2023-05-24

## 2023-05-24 VITALS
WEIGHT: 315 LBS | SYSTOLIC BLOOD PRESSURE: 149 MMHG | HEART RATE: 82 BPM | BODY MASS INDEX: 50.62 KG/M2 | DIASTOLIC BLOOD PRESSURE: 79 MMHG | HEIGHT: 66 IN

## 2023-05-24 DIAGNOSIS — M75.41 SUBACROMIAL IMPINGEMENT OF RIGHT SHOULDER: Primary | ICD-10-CM

## 2023-05-24 NOTE — PROGRESS NOTES
"     Subjective:  Chief Complaint   Patient presents with   • Right Shoulder - Follow-up       Nancy Morillo is a 65 y o  male presenting for follow-up visit in regards to right shoulder pain  Patient was diagnosed with subacromial bursitis and provided with a subacromial injection approximately 3 months ago  He states that the pain symptoms are nonexistent and the has not had any reproduction of pain symptoms since injection and completion of physical therapy  States occasionally it does become painful if he overdoes it with the shoulder but he has been mindful with his daily activity      The following portions of the patient's history were reviewed and updated as appropriate: allergies, current medications, past family history, past medical history, past social history, past surgical history and problem list     Occupation:      Review of Systems   Constitutional: Negative for fever  HENT: Negative for dental problem and headaches  Eyes: Negative for vision loss  Respiratory: Negative for cough and shortness of breath  Cardiovascular: Negative for leg swelling and palpitations  Gastrointestinal: Negative for constipation and diarrhea  Genitourinary: Negative for bladder incontinence and difficulty urinating  Musculoskeletal: Negative for back pain and difficulty walking  Skin: Negative for rash and ulcer  Neurological: Negative for dizziness and headaches  Hem/Lymph/Immuno: Negative for blood clots  Does not bruise/bleed easily  Psychiatric/Behavioral: Negative for confusion  Objective:  /79   Pulse 82   Ht 5' 6\" (1 676 m)   Wt (!) 177 kg (390 lb)   BMI 62 95 kg/m²     Skin: no rashes, lesions, skin discolorations, lacerations  Vasculature: normal radial and ulnar pulse,  normal skin color, normal capillary refill in extremity, no upper extremity edema  Neurologic: Neurologic exam is normal throughout upper extremities, Awake, alert, and oriented x3, no apparent distress   " Musculoskeletal:   right SHOULDER EXAM    Intact skin  No erythema, no induration, no signs of infection  No gross deformity    AROM FF: 150 degrees  AROM ER with arm at its side: 70 degrees  AROM IR: lower lumbar    Grind test: negative    Supraspinatus strength testin/5  Infraspinatus strength testin/5    Belly press: negative  Bear Hug test: negative    Empty can: Negative  Biceps TTP: Negative  Arc test: negative itive    Calvin's test:negative  Scapular posturing: good        Imaging:       Assessment/Plan:  1  Subacromial impingement of right shoulder   patient reports significant improvement in terms of right shoulder pain symptoms  We discussed repeat injection however given his lack of pain symptoms in office today we will hold on repeat injection until indicated    Advised him to continue with home exercise program   Follow-up as needed if pain symptoms recur

## 2023-06-05 ENCOUNTER — OFFICE VISIT (OUTPATIENT)
Dept: BARIATRICS | Facility: CLINIC | Age: 58
End: 2023-06-05
Payer: COMMERCIAL

## 2023-06-05 VITALS
HEIGHT: 66 IN | SYSTOLIC BLOOD PRESSURE: 137 MMHG | OXYGEN SATURATION: 97 % | TEMPERATURE: 98.4 F | HEART RATE: 90 BPM | WEIGHT: 315 LBS | BODY MASS INDEX: 50.62 KG/M2 | DIASTOLIC BLOOD PRESSURE: 72 MMHG

## 2023-06-05 DIAGNOSIS — I10 ESSENTIAL HYPERTENSION: ICD-10-CM

## 2023-06-05 DIAGNOSIS — E66.01 CLASS 3 OBESITY (HCC): Primary | ICD-10-CM

## 2023-06-05 DIAGNOSIS — N18.32 STAGE 3B CHRONIC KIDNEY DISEASE (HCC): ICD-10-CM

## 2023-06-05 PROBLEM — E66.813 CLASS 3 OBESITY: Status: ACTIVE | Noted: 2021-06-02

## 2023-06-05 PROCEDURE — 99214 OFFICE O/P EST MOD 30 MIN: CPT | Performed by: PHYSICIAN ASSISTANT

## 2023-06-05 NOTE — ASSESSMENT & PLAN NOTE
-Patient is pursuing Conservative Program  -Initial weight loss goal of 5-10% weight loss for improved health  -Not interested in bariatric surgery  -Options for weight loss medications limited due to cost and comorbid conditions  -Screening labs: CMP reviewed from 1/30/23- eGFR 38  -Recommend pre-planning snacks and minimize grazing  -Dietary recall reviewed, suggestions provided   See AVS    Initial: 380  Current: 389  Change: +9  Goal:

## 2023-06-05 NOTE — PROGRESS NOTES
Assessment/Plan:    Class 3 obesity (HCC)  -Patient is pursuing Conservative Program  -Initial weight loss goal of 5-10% weight loss for improved health  -Not interested in bariatric surgery  -Options for weight loss medications limited due to cost and comorbid conditions  -Screening labs: CMP reviewed from 1/30/23- eGFR 38  -Recommend pre-planning snacks and minimize grazing  -Dietary recall reviewed, suggestions provided  See AVS    Initial: 380  Current: 389  Change: +9  Goal:    Essential hypertension  -c/w PCP  -weight loss/dietary changes    Follow up in approximately 3 months with Non-Surgical Physician/Advanced Practitioner  Goals:  Food log (ie ) www [email or web address] com,sparkpeople  com,loseit com,calorieking  com,etc  baritastic  No sugary beverages  At least 64oz of water daily  Increase physical activity by 10 minutes daily   Gradually increase physical activity to a goal of 5 days per week for 30 minutes of MODERATE intensity PLUS 2 days per week of FULL BODY resistance training  5-10 servings of fruits and vegetables per day and 25-35 grams of dietary fiber per day, gradually increasing   If choosing starch pick whole grain/complex carbs and keep to 1/2-3/4 cup: oatmeal, brown rice, quinoa, whole wheat pasta, potatoes, sweet potato, chickpea noodles, red lentil noodles  Try eating one serving of fruit or one serving of vegetable per day  Keep nuts to 1/4 cup per serving  Measure all portions: creamers, sugars, cooking oils/butters, condiments, dressings, etc and log calories  Measure all portions: creamers, sugars, cooking oils/butters, condiments, dressings, etc and log calories  Try to have one snack between meal instead of grazing  Pre-portion your meals     Diagnoses and all orders for this visit:    Class 3 obesity ([address] )    Stage 3b chronic kidney disease ([address] )    Body mass index 60 0-69 9, adult ([address] )    Essential hypertension        Subjective:   Chief Complaint   Patient presents with   • "Follow-up     Patient ID: Yissel Roberts  is a 65 y o  male with excess weight/obesity here to pursue weight management  Patient is pursuing Conservative Program      HPI  The patient presents for MW follow up  Has been cutting out sugar and soda  Eating more vegetables, but [facility]  Often canned veggies and meat, also pasta  Gets diarrhea from some vegetables, so frequency of these variable  Struggles with portion control  Reports he buys fruit, but doesn't eat it and he is \"not sure why\" Reports \"losing interest\" in fruit  Reports having a jar of peanut butter  over a few days -- reviewed portion recs    B: 310 nutrition  L: chicken navid  D: protein +/- veg +/- starch  Reports he is a\"grazer\"    1 gallon of water per day    The following portions of the patient's history were reviewed and updated as appropriate: allergies, current medications, past family history, past medical history, past social history, past surgical history and problem list     Review of Systems   Gastrointestinal: Positive for diarrhea (with some vegetables)  Objective:    /72   Pulse 90   Temp 98 4 °F (36 9 °C)   Ht 5' 6\" (1 676 m)   Wt (!) 176 kg (389 lb)   SpO2 97%   BMI 62 79 kg/m²      Physical Exam  Vitals and nursing note reviewed  Constitutional   General appearance: Abnormal   well developed and obese  Pulmonary   Respiratory effort: No increased work of breathing or signs of respiratory distress  Abdomen   Abdomen: Abnormal   The abdomen was obese     Musculoskeletal   Gait and station: ambulates with gait   Psychiatric   Orientation to person, place and time: Normal     Affect: appropriate  "

## 2023-06-05 NOTE — PATIENT INSTRUCTIONS
If choosing starch pick whole grain/complex carbs and keep to 1/2-3/4 cup: oatmeal, brown rice, quinoa, whole wheat pasta, potatoes, sweet potato, chickpea noodles, red lentil noodles -- Try to keep white rice to 1/2 cup  Try eating one serving of fruit or one serving of vegetable per day  Keep nuts to 1/4 cup per serving  Measure all portions: creamers, sugars, cooking oils/butters, condiments, cheeses, dressings, etc and log calories  Measure all portions: creamers, sugars, cooking oils/butters, condiments, dressings, nut butter etc and log calories   Try only having 1-2 tbsp of peanut butter  Try to have one snack between meal instead of grazing  Pre-portion your meals  Eat only 1/2 cup of cooked veg at a time (can use the rest for leftovers)

## 2023-06-12 ENCOUNTER — OFFICE VISIT (OUTPATIENT)
Dept: BARIATRICS | Facility: CLINIC | Age: 58
End: 2023-06-12

## 2023-06-12 VITALS — WEIGHT: 315 LBS | HEIGHT: 66 IN | BODY MASS INDEX: 50.62 KG/M2

## 2023-06-12 DIAGNOSIS — R63.5 ABNORMAL WEIGHT GAIN: ICD-10-CM

## 2023-06-12 PROCEDURE — RECHECK

## 2023-06-12 PROCEDURE — WMDI30

## 2023-06-12 NOTE — PROGRESS NOTES
Weight Management Medical Nutrition Assessment  Phoebe Michaels presented today for follow-up meal-planning session  Today he weighed 396# (with shoes) which reflects a gain of 3 2# since last RD appointment, but gain of 7# since [facility] provider appointment 6/5/23  He still struggles with controlling his portions  He admitted to recently making homemade pork and beans (using free food he was given from [employer]) which he had been eating the past week  This was likely high in Na which and suspected to have contributed to his weight gain  Experiences leg pain which makes walking difficult  Noted that he had a bad week last week and overate as a result as his eating for comfort remains an issue  Trying to eat more fruit  Endorsed losing interest in eating various food items at times, likely because he overeats them or eats them too often once he decides he is in the mood for a particular food  Feels he is getting more sleep than what he had been (purposely drinking less water at night)  Has not been consistently food- logging  Encouraged him to read labels  Stated he is using light dressings  Did not meet goal of limiting meats to one serving, but is continuting to limit it to 2  Does not much care for yogurt, but stated that he is not buying frozen yogurt  Finds that chewing ice helps him eat less  He did increase his vegetable intake  Feels he is eating healthier items, but that his overall quanitity is still too much  Stated that he discover Peanut M & Ms don't taste as good to him as they used to  Advised to use measuring cups  Endorsed keeping no sugary snacks in the house  Noticed that he is getting full when eating; had not previously found this  Noticing that when he is his in his truck his back is against the seat and his head hits the head rest which was not previously the case and which he views as a sign of progress despite the #s on the scale increasing   He also noticed that the pants he was wearing today are fitting more "lossely  Trying to avoid eating after 6 or 7 PM  Noticing that he is now waking up hungry which didn't happen in the past   His goal is to consume at least 2 servings of fruit daily  Suggested he consider doing chair exercises  Despite gaining weight, Jose Roberto Terrell has made many appropriate changes per our discussion  Encouraged him to continue same and to find non-food alternatives to emotional-eating (suggestions made)  Provided goal list including appropirate portions sizes for various foods per his request and 150-200 kcal snack list  Next appointment with RD 7/17/23            Patient seen by Medical Provider in past 6 months:  yes  Requested to schedule appointment with Medical Provider: Yes      Anthropometric Measurements  Start Weight (#): 380 4# (2/27/23)  Current Weight (#): 396#  TBW % Change from start weight: gain of gained 3 9%  Ideal Body Weight (#): 142# (64 5 kg)  Goal Weight (#): wants to be able to bend over and \"do normal people things\"  Highest: present  Lowest: ~200# (after [facility])    Weight Loss History  Previous weight loss attempts: [facility], Nutrisystem, various shakes, pills, etc    Food and Nutrition Related History  Wake up: 4 AM-8 AM   Bed Time: varies - 9 PM - 3 AM    Food Recall  Breakfast: 5-8 AM: 310 Nutrition Shake  Snack: not usually   Lunch: 1-3 PM- chicken, can of green beans; or fish and canned potatoes; eats little hamburger; may have coleslaw mix sauteed with onion and mushrooms  Snack: not usually  Dinner: as above  Snack: may have fruit      Beverages: drinking 3-4 28 oz bottles of water with flavoring drops; states he gets jittery with red dye water, trying to drink more plain water and cut down on flavored water  Volume of beverage intake: >=128 oz    Weekends:  same   Cravings: easy to prepare foods  Trouble area of day: all day    Frequency of Eating out: occasionally  Food restrictions: none   Cooking: self   Food Shopping: self    Physical Activity " Intake  Activity: none  Frequency: n/a  Physical limitations/barriers to exercise: weight and arthritis in knees    Estimated Needs  Energy  SECA: BMR: n/a     X 1 3 -1000 =  Zamzam Guerrero Energy Needs: BMR :4972  1-2# loss weekly sedentary: 6150-6418            1-2# loss weekly lightly active: 0706-7609  Maintenance calories for sedentary activity level: 3039  Protein: ~95 g    (1 2-1 5g/kg IBW)  Fluid: >=108 oz    (35mL/kg IBW)    Nutrition Diagnosis  Yes; Overweight/obesity  related to Excess energy intake as evidenced by  BMI more than normative standard for age and sex (obesity-grade III 36+)       Nutrition Intervention    Nutrition Prescription  Calories:2294-2779  Protein: ~95 g  Fluid: >=108 oz    Meal Plan (Danny/Pro/Carb)  Provided patient with pre-printed sample 9797-1300 kcal meal plan       Nutrition Education:    Healthy Core Manual- n/a  Calorie controlled menu  Lean protein food choices  Healthy snack options  Food journaling tips      Nutrition Counseling:  Strategies: meal planning, portion sizes, healthy snack choices, hydration, fiber intake, protein intake, exercise, food journal      Monitoring and Evaluation:  Evaluation criteria:  Energy Intake  Meet protein needs  Maintain adequate hydration  Monitor weekly weight  Meal planning/preparation  Food journal   Decreased portions at mealtimes and snacks  Physical activity     Barriers to learning:none  Readiness to change: changing behavior  Comprehension: good  Expected Compliance: good

## 2023-07-17 ENCOUNTER — OFFICE VISIT (OUTPATIENT)
Dept: BARIATRICS | Facility: CLINIC | Age: 58
End: 2023-07-17

## 2023-07-17 VITALS — HEIGHT: 66 IN | BODY MASS INDEX: 50.62 KG/M2 | WEIGHT: 315 LBS

## 2023-07-17 DIAGNOSIS — E78.5 DYSLIPIDEMIA: ICD-10-CM

## 2023-07-17 DIAGNOSIS — R63.5 ABNORMAL WEIGHT GAIN: ICD-10-CM

## 2023-07-17 DIAGNOSIS — Z87.39 HISTORY OF GOUT: ICD-10-CM

## 2023-07-17 PROCEDURE — RECHECK

## 2023-07-17 PROCEDURE — WMDI30

## 2023-07-17 RX ORDER — ATORVASTATIN CALCIUM 20 MG/1
TABLET, FILM COATED ORAL
Qty: 30 TABLET | Refills: 3 | Status: SHIPPED | OUTPATIENT
Start: 2023-07-17

## 2023-07-17 RX ORDER — ALLOPURINOL 300 MG/1
TABLET ORAL
Qty: 30 TABLET | Refills: 3 | Status: SHIPPED | OUTPATIENT
Start: 2023-07-17

## 2023-07-17 NOTE — PROGRESS NOTES
Weight Management Medical Nutrition Assessment  Peyton Beck presented today for follow-up meal-planning session. Today he weighed 391.6# (wearing shoes) which reflects a loss 4.4# since last RD appointment 6/12/23. Stated he is learning to eat less meat as it affects his gout. Noted that at home at one point he weighed 380#, but that his family recently held a birthday celebration for him which triggered him to eat more than normal. He stated his family also gave him food to take home; he did eat this food. Feels he is eating "way healthier" than he did at first session with RD, but that at times he eats thing he classifies as "stupid." Adding lemon to water. Drinking little flavored water. Did not meet goal of consuming 2 fruits daily, as fruit is not something he much cares for. Not food-logging, but he endorsed "keeping in my head what I am doing." Feels his clothes are fitting better (thinks T-shirts are getting longer). Stated he gets worn out fast, particularly in the heat. Reading food labels for Na content. Endorsed eating less now that he did previously. Noted that he consciously stopped eating bag of chips yesterday and put the bag away. Still getting winded when walking, but has moments where it is improved. Still avoiding eating late at night. Sleeping soundly for 2 hours, then wakes up to use bathroom. In bed for 6 hours total. Felt hungry when waking up in the morning. Still keeping sweets out of house. Suggested again researching chair exercises on his phone. Trying to sit less. Eating less ice cream. Suggested when having frozen pizza, he eat a large salad first. Finds himself turning to food with when dealing with issues with his father. Encouraged him to explore alternate snacks to consume and other ways to deal with his feelings. RD follow-up session scheduled for 8/21/23.         Patient seen by Medical Provider in past 6 months:  yes  Requested to schedule appointment with Medical Provider: Yes      Anthropometric Measurements  Start Weight (#): 380.4# (2/27/23)  Current Weight (#): 391.6#  TBW % Change from start weight: gain of gained 1.2%  Ideal Body Weight (#): 142# (64.5 kg)  Goal Weight (#): wants to be able to bend over and "do normal people things"  Highest: present  Lowest: ~200# (after [facility])    Weight Loss History  Previous weight loss attempts: [facility], Nutrisystem, various shakes, pills, etc.   Food and Nutrition Related History  Wake up: 4 AM-8 AM   Bed Time: varies - 12-12:30 AM     Food Recall  Breakfast: 7-8 AM: only eating 3 baby carrots   Snack: not usually   Lunch: noon- PM- chicken navid (with anthony onion on 2 slices of bread) or chicken leg, peas or corn or green beans (states [place] blend of mixed veg causes GI distress)  Snack: not usually  Dinner: 5-7 PM- similar to lunch  Snack: carrots or NutriSystem snack he has leftover       Beverages: drinking 3-4 28 oz bottles of water; states he gets jittery with red dye water, trying to drink more plain water and cut down on flavored water  Volume of beverage intake: >=128 oz    Weekends:  same   Cravings: easy to prepare foods  Trouble area of day: all day    Frequency of Eating out: occasionally  Food restrictions: none   Cooking: self   Food Shopping: self    Physical Activity Intake  Activity: household activity, yardwork walking around stores at times  Frequency: n/a  Physical limitations/barriers to exercise: weight and arthritis in knees    Estimated Needs  Energy  SECA: BMR: n/a     X 1.3 -1000 =  Bear Leanne Energy Needs: BMR :8257  1-2# loss weekly sedentary: 9103-1426            1-2# loss weekly lightly active: 3542-3580  Maintenance calories for sedentary activity level: 3039  Protein: ~95 g    (1.2-1.5g/kg IBW)  Fluid: >=108 oz    (35mL/kg IBW)    Nutrition Diagnosis  Yes;     Overweight/obesity  related to Excess energy intake as evidenced by  BMI more than normative standard for age and sex (obesity-grade III 40+)       Nutrition Intervention    Nutrition Prescription  Calories:8123-4859  Protein: ~95 g  Fluid: >=108 oz    Meal Plan (Danny/Pro/Carb)  Provided patient with pre-printed sample 0799-8936 kcal meal plan.      Nutrition Education:    Healthy Core Manual- n/a  Calorie controlled menu  Lean protein food choices  Healthy snack options  Food journaling tips      Nutrition Counseling:  Strategies: meal planning, portion sizes, healthy snack choices, hydration, fiber intake, protein intake, exercise, food journal      Monitoring and Evaluation:  Evaluation criteria:  Energy Intake  Meet protein needs  Maintain adequate hydration  Monitor weekly weight  Meal planning/preparation  Food journal   Decreased portions at mealtimes and snacks  Physical activity     Barriers to learning:none  Readiness to change: changing behavior  Comprehension: good  Expected Compliance: good

## 2023-08-09 DIAGNOSIS — Z87.39 HISTORY OF GOUT: ICD-10-CM

## 2023-08-09 DIAGNOSIS — E78.5 DYSLIPIDEMIA: ICD-10-CM

## 2023-08-09 RX ORDER — ALLOPURINOL 300 MG/1
TABLET ORAL
Qty: 90 TABLET | Refills: 2 | Status: SHIPPED | OUTPATIENT
Start: 2023-08-09

## 2023-08-09 RX ORDER — ATORVASTATIN CALCIUM 20 MG/1
TABLET, FILM COATED ORAL
Qty: 90 TABLET | Refills: 2 | Status: SHIPPED | OUTPATIENT
Start: 2023-08-09

## 2023-08-21 ENCOUNTER — OFFICE VISIT (OUTPATIENT)
Dept: BARIATRICS | Facility: CLINIC | Age: 58
End: 2023-08-21

## 2023-08-21 VITALS — WEIGHT: 315 LBS | HEIGHT: 66 IN | BODY MASS INDEX: 50.62 KG/M2

## 2023-08-21 DIAGNOSIS — R63.5 ABNORMAL WEIGHT GAIN: ICD-10-CM

## 2023-08-21 PROCEDURE — RECHECK

## 2023-08-21 PROCEDURE — WMDI30

## 2023-08-21 NOTE — PROGRESS NOTES
Weight Management Medical Nutrition Assessment  Guy Thapa presented today for meal planning session. Today he weighed 396.4# (wearing shoes as is his norm) which reflects a gain of 4.8# since last session 7/17/23. He stated salt has been his problem lately; made soup with broth that contained a lot of salt and endorsed eating as much as a pot over the course of a day. He admitted he did not read the food label for sodium content. Stated he sometimes has smoothies (he feels this is the best way for him to consume fruit), but also that they tend to cause diarrhea. Suggested he limit the amount of fruit he adds. He noted he is "still not in the right frame of mind to eat small portions." Stated he does realize that he gets full, however. Has been drinking less water lately b/c the water he usually gets (free and reportedly treated) was found to be contaminated making him ill. Now getting water from friend or buying it and would like to find a more affordable means of obtaining water; suggestions made. As emotional-eating is still his main issue, discussed this issue in detail and provided handout on same. His goal is to try different foods, such as rice and beans in portion-sized amounts. Suggested he put the picture of the weight management plate on his refrigerator to help him think more about appropriate meal balance, that he limit his sodium intake to 2000 mg daily (handout previously provided), that he read food labels, practice saying "No, thank you" in response to well-meaning family members and friends who pressure him to eat foods he is trying to avoid and to which he has difficulty saying, "No," that he bring a water bottle with him when visiting others to help prevent them from offering him caloric beverages, that he measuring and weigh his foods, and that he food-log.         Patient seen by Medical Provider in past 6 months:  yes  Requested to schedule appointment with Medical Provider: Yes      Anthropometric Measurements  Start Weight (#): 380.4# (2/27/23)  Current Weight (#): 396.4#  TBW % Change from start weight: gain of gained 4.2%  Ideal Body Weight (#): 142# (64.5 kg)  Goal Weight (#): wants to be able to bend over and "do normal people things"  Highest: present  Lowest: ~200# (after [facility])    Weight Loss History  Previous weight loss attempts: [facility], Nutrisystem, various shakes, pills, etc.     Food and Nutrition Related History  Wake up: 4 AM-8 AM   Bed Time: varies - 12-12:30 AM     Food Recall  Gets a lot of food items from [employer] food pantry  Breakfast: 7-8 AM: maybe cabbage soup  Snack: not usually   Lunch: noon- PM- chicken navid on a roll  Snack: not usually  Dinner: 5-7 PM- similar to lunch or may have more soup  Snack: not identified      Beverages: drinking 3-4 28 oz bottles of water; states he gets jittery with red dye water, trying to drink more plain water and cut down on flavored water  Volume of beverage intake: >=128 oz    Weekends:  same   Cravings: easy to prepare foods  Trouble area of day: all day    Frequency of Eating out: occasionally  Food restrictions: none   Cooking: self   Food Shopping: self    Physical Activity Intake  Activity: walking outside daily, household activity, yardwork, walking around stores at times  Frequency: n/a  Physical limitations/barriers to exercise: weight and arthritis in knees    Estimated Needs  Energy  SECA: BMR: n/a     X 1.3 -1000 =  [employer] Needs: BMR :7785  1-2# loss weekly sedentary: 2613-0125            1-2# loss weekly lightly active: 2536-2154  Maintenance calories for sedentary activity level: 3039  Protein: ~95 g    (1.2-1.5g/kg IBW)  Fluid: >=108 oz    (35mL/kg IBW)    Nutrition Diagnosis  Yes;     Overweight/obesity  related to Excess energy intake as evidenced by  BMI more than normative standard for age and sex (obesity-grade III 36+)       Nutrition Intervention    Nutrition Prescription  Calories:2784-8892  Protein: ~95 g  Fluid: >=108 oz    Meal Plan (Danny/Pro/Carb)  Provided patient with pre-printed sample 8394-6560 kcal meal plan.      Nutrition Education:    Healthy Core Manual- n/a  Calorie controlled menu  Lean protein food choices  Healthy snack options  Food journaling tips      Nutrition Counseling:  Strategies: meal planning, portion sizes, healthy snack choices, hydration, fiber intake, protein intake, exercise, food journal      Monitoring and Evaluation:  Evaluation criteria:  Energy Intake  Meet protein needs  Maintain adequate hydration  Monitor weekly weight  Meal planning/preparation  Food journal   Decreased portions at mealtimes and snacks  Physical activity     Barriers to learning:none  Readiness to change: changing behavior  Comprehension: good  Expected Compliance: good

## 2023-09-25 ENCOUNTER — OFFICE VISIT (OUTPATIENT)
Dept: BARIATRICS | Facility: CLINIC | Age: 58
End: 2023-09-25

## 2023-09-25 VITALS — WEIGHT: 315 LBS | HEIGHT: 66 IN | BODY MASS INDEX: 50.62 KG/M2

## 2023-09-25 DIAGNOSIS — R63.5 ABNORMAL WEIGHT GAIN: ICD-10-CM

## 2023-09-25 PROCEDURE — WMDI30

## 2023-09-25 PROCEDURE — RECHECK

## 2023-09-25 NOTE — PROGRESS NOTES
Called to get an appointment to establish care for sleep. Patient was told that xanax, klonopin or valium would not be prescribed. He expressed understanding and when told he would be weaned off he was still willing to schedule and in agreement.   Weight Management Medical Nutrition Assessment  Salvador Martinez presented today for follow-up meal planning session. Today he weighed 389.6# (wearing shoes as is his norm) which reflects a loss of 6.8# since last session 8/21/23. Salvador Martinez reported he has been food-logging and reading food labels for sodium content, and as such, stopped using broth. Noted that he gets sore when he does too much walking. Discussed diet recall. Suggest having only one protein source at breakfast; this is his goal. Suggested he move as much as physically possible without detriment. Discussed sources of soluble fiber per Kevin's request. Will email patient to schedule follow-up session.         Patient seen by Medical Provider in past 6 months:  yes  Requested to schedule appointment with Medical Provider: Yes      Anthropometric Measurements  Start Weight (#): 380.4# (2/27/23)  Current Weight (#): 389.6#  TBW % Change from start weight: gained 2.4%  Ideal Body Weight (#): 142# (64.5 kg)  Goal Weight (#): wants to be able to bend over and "do normal people things"  Highest: present  Lowest: ~200# (after [facility])    Weight Loss History  Previous weight loss attempts: [facility], Nutrisystem, various shakes, pills, etc.     Food and Nutrition Related History  Wake up: 8-10 AM   Bed Time: varies - 12-2 AM     Food Recall  Gets a lot of food items from [employer] [facility]  Breakfast: 7-8 AM: 2 eggs, hamburger navid or chicken breast or tuna; multiple chopped up vegetable scrambled with the eggs  Snack: not usually   Lunch: noon- PM- cabbage, onion, mushrooms, corn, hamburger   Snack: not usually  Dinner: 3:30-4:00 PM- lightest meal of that day; eggs with vegetables  Snack: 6:00 PM- carrots, chocolate chips, nuts and seeds trail mix       Beverages: drinking  oz water daily, some with flavor drops  3-4 28 oz bottles of water; states he gets jittery with red dye water, trying to drink more plain water and cut down on flavored water  Volume of beverage intake: >=128 oz    Weekends:  same   Cravings: easy to prepare foods  Trouble area of day: all day    Frequency of Eating out: occasionally  Food restrictions: none   Cooking: self   Food Shopping: self    Physical Activity Intake  Activity: getting out of the house unless it rains;  household activity, yardwork, walking around stores at times  Frequency: n/a  Physical limitations/barriers to exercise: weight and arthritis in knees    Estimated Needs  Energy  SECA: BMR: n/a     X 1.3 -1000 =  [employer] Needs: BMR :7917  1-2# loss weekly sedentary: 9951-4750            1-2# loss weekly lightly active: 4912-6232  Maintenance calories for sedentary activity level: 3039  Protein: ~95 g    (1.2-1.5g/kg IBW)  Fluid: >=108 oz    (35mL/kg IBW)    Nutrition Diagnosis  Yes; Overweight/obesity  related to Excess energy intake as evidenced by  BMI more than normative standard for age and sex (obesity-grade III 36+)       Nutrition Intervention    Nutrition Prescription  Calories:8232-7085  Protein: ~95 g  Fluid: >=108 oz    Meal Plan (Danny/Pro/Carb)  Provided patient with pre-printed sample 7553-4835 kcal meal plan.      Nutrition Education:    Healthy Core Manual- n/a  Calorie controlled menu  Lean protein food choices  Healthy snack options  Food journaling tips      Nutrition Counseling:  Strategies: meal planning, portion sizes, healthy snack choices, hydration, fiber intake, protein intake, exercise, food journal      Monitoring and Evaluation:  Evaluation criteria:  Energy Intake  Meet protein needs  Maintain adequate hydration  Monitor weekly weight  Meal planning/preparation  Food journal   Decreased portions at mealtimes and snacks  Physical activity     Barriers to learning:none  Readiness to change: changing behavior  Comprehension: good  Expected Compliance: good

## 2023-10-09 ENCOUNTER — APPOINTMENT (OUTPATIENT)
Dept: LAB | Facility: MEDICAL CENTER | Age: 58
End: 2023-10-09
Payer: COMMERCIAL

## 2023-10-09 ENCOUNTER — OFFICE VISIT (OUTPATIENT)
Dept: BARIATRICS | Facility: CLINIC | Age: 58
End: 2023-10-09
Payer: COMMERCIAL

## 2023-10-09 VITALS
HEIGHT: 66 IN | OXYGEN SATURATION: 93 % | HEART RATE: 74 BPM | DIASTOLIC BLOOD PRESSURE: 60 MMHG | WEIGHT: 315 LBS | SYSTOLIC BLOOD PRESSURE: 118 MMHG | BODY MASS INDEX: 50.62 KG/M2 | TEMPERATURE: 98.4 F

## 2023-10-09 DIAGNOSIS — I10 ESSENTIAL HYPERTENSION: ICD-10-CM

## 2023-10-09 DIAGNOSIS — R63.5 ABNORMAL WEIGHT GAIN: Primary | ICD-10-CM

## 2023-10-09 DIAGNOSIS — E78.2 MIXED HYPERLIPIDEMIA: ICD-10-CM

## 2023-10-09 DIAGNOSIS — R63.5 ABNORMAL WEIGHT GAIN: ICD-10-CM

## 2023-10-09 LAB
ALBUMIN SERPL BCP-MCNC: 4.2 G/DL (ref 3.5–5)
ALP SERPL-CCNC: 54 U/L (ref 34–104)
ALT SERPL W P-5'-P-CCNC: 20 U/L (ref 7–52)
ANION GAP SERPL CALCULATED.3IONS-SCNC: 11 MMOL/L
AST SERPL W P-5'-P-CCNC: 21 U/L (ref 13–39)
BILIRUB SERPL-MCNC: 0.48 MG/DL (ref 0.2–1)
BUN SERPL-MCNC: 33 MG/DL (ref 5–25)
CALCIUM SERPL-MCNC: 9.6 MG/DL (ref 8.4–10.2)
CHLORIDE SERPL-SCNC: 108 MMOL/L (ref 96–108)
CO2 SERPL-SCNC: 21 MMOL/L (ref 21–32)
CREAT SERPL-MCNC: 1.85 MG/DL (ref 0.6–1.3)
EST. AVERAGE GLUCOSE BLD GHB EST-MCNC: 126 MG/DL
GFR SERPL CREATININE-BSD FRML MDRD: 39 ML/MIN/1.73SQ M
GLUCOSE P FAST SERPL-MCNC: 98 MG/DL (ref 65–99)
HBA1C MFR BLD: 6 %
INSULIN SERPL-ACNC: 16.97 UIU/ML (ref 1.9–23)
POTASSIUM SERPL-SCNC: 4.3 MMOL/L (ref 3.5–5.3)
PROT SERPL-MCNC: 7.4 G/DL (ref 6.4–8.4)
SODIUM SERPL-SCNC: 140 MMOL/L (ref 135–147)
TSH SERPL DL<=0.05 MIU/L-ACNC: 2.27 UIU/ML (ref 0.45–4.5)

## 2023-10-09 PROCEDURE — 84443 ASSAY THYROID STIM HORMONE: CPT

## 2023-10-09 PROCEDURE — 99214 OFFICE O/P EST MOD 30 MIN: CPT | Performed by: PHYSICIAN ASSISTANT

## 2023-10-09 PROCEDURE — 83036 HEMOGLOBIN GLYCOSYLATED A1C: CPT

## 2023-10-09 PROCEDURE — 36415 COLL VENOUS BLD VENIPUNCTURE: CPT

## 2023-10-09 PROCEDURE — 80053 COMPREHEN METABOLIC PANEL: CPT

## 2023-10-09 PROCEDURE — 83525 ASSAY OF INSULIN: CPT

## 2023-10-09 NOTE — PROGRESS NOTES
Assessment/Plan:    Class 3 obesity (HCC)  -Patient is pursuing Conservative Program  -Initial weight loss goal of 5-10% weight loss for improved health  -Not interested in bariatric surgery  -Options for weight loss medications limited due to cost and concomitant  conditions  -Screening labs: CMP reviewed from 1/30/23- eGFR 38  -Update labs, consider phentermine or GLP-1 pending lab results/clearance with nephrologist. Encouraged him to f/u with nephrologist   -Nutrition questions answered: fiber, protein, simple vs complex carbs  -Reviewed risks of untreated/undiagnosed sleep apnea- offered referral for sleep study, but coverage/cost is an issue.  -sleep recs charles     Initial: 380  Current: 388 (-1 lbs since last visit)   Change: +8  Goal: under 300 lbs    Follow up in approximately 4 months with Non-Surgical Physician/Advanced Practitioner. Goals:  Food log (ie.) www.[email or web address],[email or web address],[employer]. com,etc. baritastic  No sugary beverages. At least 64oz of water daily. Increase physical activity by 10 minutes daily. Gradually increase physical activity to a goal of 5 days per week for 30 minutes of MODERATE intensity PLUS 2 days per week of FULL BODY resistance training  5-10 servings of fruits and vegetables per day and 25-35 grams of dietary fiber per day, gradually increasing  See AVS     Diagnoses and all orders for this visit:    Abnormal weight gain  -     Hemoglobin A1C; Future  -     Comprehensive metabolic panel; Future  -     Insulin, fasting; Future  -     TSH, 3rd generation with Free T4 reflex; Future    Mixed hyperlipidemia  -     Hemoglobin A1C; Future  -     Comprehensive metabolic panel; Future  -     Insulin, fasting; Future  -     TSH, 3rd generation with Free T4 reflex; Future    Essential hypertension  -     Hemoglobin A1C; Future  -     Comprehensive metabolic panel; Future  -     Insulin, fasting;  Future  -     TSH, 3rd generation with Free T4 reflex; Future    Body mass index 60.0-69.9, adult (HCC)  -     Hemoglobin A1C; Future  -     Comprehensive metabolic panel; Future  -     Insulin, fasting; Future  -     TSH, 3rd generation with Free T4 reflex; Future        Subjective:   Chief Complaint   Patient presents with   • Follow-up     Patient ID: Kevin Jerome  is a 63 y.o. male with excess weight/obesity here to pursue weight management. Patient is pursuing Conservative Program.     HPI  The patient presents for MW follow up. Feels he is starting to learn more nutrition and dietary choices. Trying to find and make healthier recipes. Has been avoiding bread as he feels he struggles with portion control of bread    Trying to reduce carbs but having things like 2 eggs and 2 hot dogs for breakfast    Continues with RD    The following portions of the patient's history were reviewed and updated as appropriate: allergies, current medications, past family history, past medical history, past social history, past surgical history and problem list.    Review of Systems   Cardiovascular: Negative for chest pain and palpitations. Psychiatric/Behavioral: Negative. Objective:    /60 (BP Location: Right arm, Cuff Size: Large)   Pulse 74   Temp 98.4 °F (36.9 °C) (Temporal)   Ht 5' 6" (1.676 m)   Wt (!) 176 kg (388 lb 6.4 oz)   SpO2 93%   BMI 62.69 kg/m²      Physical Exam  Vitals and nursing note reviewed. Constitutional   General appearance: Abnormal.  well developed and obese. Pulmonary   Respiratory effort: No increased work of breathing or signs of respiratory distress. Abdomen   Abdomen: Abnormal.  The abdomen was obese. Musculoskeletal   Gait and station: Normal.    Psychiatric   Orientation to person, place and time: Normal.    Affect: appropriate    40 minute visit, >50% time spent counseling patient on diet behavior and exercise modification for weight loss.

## 2023-10-09 NOTE — ASSESSMENT & PLAN NOTE
-Patient is pursuing Conservative Program  -Initial weight loss goal of 5-10% weight loss for improved health  -Not interested in bariatric surgery  -Options for weight loss medications limited due to cost and concomitant  conditions  -Screening labs: CMP reviewed from 1/30/23- eGFR 38  -Update labs, consider phentermine or GLP-1 pending lab results/clearance with nephrologist. Encouraged him to f/u with nephrologist   -Nutrition questions answered: fiber, protein, simple vs complex carbs  -Reviewed risks of untreated/undiagnosed sleep apnea- offered referral for sleep study, but coverage/cost is an issue.  -sleep chantale soto     Initial: 380  Current: 388 (-1 lbs since last visit)   Change: +8  Goal: under 300 lbs

## 2023-10-09 NOTE — PATIENT INSTRUCTIONS
If choosing starch pick whole grain/complex carbs and keep to 1/2-3/4 cup: oatmeal, brown rice, quinoa, whole wheat pasta, potatoes, sweet potato, chickpea noodles, red lentil noodles  Measure all portions: creamers, sugars, cooking oils/butters, condiments, dressings, etc and log calories; WATCH OIL, one tbsp = 140 calories; can sautee with broth/water  Try having a small baked potato (can slice) with your eggs OR have 1 meat + fruit/veg with breakfast    1 cup of oats + 1 mashed banana + 1 tbsp flax seed (optional) + 1 tbsp of cacao powder (optional) and bake for 20 minutes; can add raisins/cranberries    Recommend checking lab coverage before having labs drawn    For smoothie: can do blueberry 1/2 banana + 1/2 c greek yogurt + avocado; can add 1/4-1/2 cup oats - blend with water or milk    Follow up with kidney doctor!  Discuss with them the medications we talked about (they can refer to my note)

## 2023-10-12 DIAGNOSIS — N18.30 CHRONIC KIDNEY DISEASE, STAGE 3 (HCC): ICD-10-CM

## 2023-10-12 DIAGNOSIS — N11.1 OBSTRUCTIVE PYELONEPHRITIS: ICD-10-CM

## 2023-10-12 RX ORDER — TAMSULOSIN HYDROCHLORIDE 0.4 MG/1
CAPSULE ORAL
Qty: 90 CAPSULE | Refills: 2 | Status: SHIPPED | OUTPATIENT
Start: 2023-10-12

## 2023-11-06 ENCOUNTER — OFFICE VISIT (OUTPATIENT)
Dept: BARIATRICS | Facility: CLINIC | Age: 58
End: 2023-11-06
Payer: COMMERCIAL

## 2023-11-06 VITALS — WEIGHT: 315 LBS | BODY MASS INDEX: 50.62 KG/M2 | HEIGHT: 66 IN

## 2023-11-06 DIAGNOSIS — R63.5 ABNORMAL WEIGHT GAIN: Primary | ICD-10-CM

## 2023-11-06 PROCEDURE — WMDI30

## 2023-11-06 PROCEDURE — RECHECK

## 2023-11-06 NOTE — PROGRESS NOTES
Weight Management Medical Nutrition Assessment  Naima Nguyen presented today for follow-up meal planning session. Today he weighed 386.2# (wearing shoes as is his norm) which reflects a loss of 3.4# since last session 9/25/23. Stated he stopped eating bread. Noted that venison (if he has too often) causes gout which recently occurred. Endorsed that his energy level is good. In fact, stated that he felt good enough to mow his lawn with push mower recently, but then legs felt sore afterward. Reported he moves his bowels 1-2x daily. Food-logging. Reviewed 10/9/23 labs: BUN 33, Creat 1.85, GFR 39, A1c 6 (prediabetes). Encouraged increased activity level within his physical limits. Also encouraged him to measure his portion sizes which is his goal. Provided him with and reviewed handouts CHO Counting for People with DM and DM Label-Reading. Also provided Rec[employer] Resources handout. Offered RD bundle visit, but Naima Nguyen stated it was not financially feasible at this time. Body composition scheduled for 12/18/23.       Patient seen by Medical Provider in past 6 months:  yes  Requested to schedule appointment with Medical Provider: Yes      Anthropometric Measurements  Start Weight (#): 380.4# (2/27/23)  Current Weight (#): 386.2#  TBW % Change from start weight: gained 2.4%, but lost 0.87%since 9/25/23  Ideal Body Weight (#): 142# (64.5 kg)  Goal Weight (#): wants to be able to bend over and "do normal people things"  Highest: present  Lowest: ~200# (after [facility])    Weight Loss History  Previous weight loss attempts: [facility], Nutrisystem, various shakes, pills, etc.     Food and Nutrition Related History  Wake up: 9-10 AM   Bed Time: varies - 12-2 AM     Food Recall  Gets a lot of food items from [employer] food pantry  States doing below pattern since our last session 9/25/23; happy with current regimen     Breakfast: 10 AM: 2-4 eggs with vegetables and one light string-cheese  or may just have eggs and string cheese  Snack: not usually    Lunch: skipping   Snack: not usually  Dinner: 5 PM- lightest meal of that day; eating a lot of salad (garden or )- ordering out a lot lately; may have chicken or hamburger with garden salad; if does not have salad, has mixed vegetables (carrots, corn, peas)  Snack: none      Beverages: drinking 120 oz water daily, some with flavor drops maybe lemon packets  Volume of beverage intake: >=120 oz    Weekends:  same   Cravings: easy to prepare foods  Trouble area of day: all day    Frequency of Eating out: occasionally  Food restrictions: none   Cooking: self   Food Shopping: self (also gets food from [employer])    Physical Activity Intake  Activity: getting out of the house daily (makes a point to do so) and walk;  household activity, yardwork, walking around stores at times  Frequency: usually daily  Physical limitations/barriers to exercise: weight and arthritis in knees    Estimated Needs  Energy  SECA: BMR: n/a     X 1.3 -1000 =  [employer] Energy Needs: BMR :1854  1-2# loss weekly sedentary: 4343-4463            1-2# loss weekly lightly active: 0045-1753  Maintenance calories for sedentary activity level: 3039  Protein: ~95 g    (1.2-1.5g/kg IBW)  Fluid: >=108 oz    (35mL/kg IBW)    Nutrition Diagnosis  Yes; Overweight/obesity  related to Excess energy intake as evidenced by  BMI more than normative standard for age and sex (obesity-grade III 36+)       Nutrition Intervention    Nutrition Prescription  Calories:7918-0604  Protein: ~95 g  Fluid: >=108 oz    Meal Plan (Danny/Pro/Carb)  Provided patient with pre-printed sample 8682-4588 kcal meal plan.      Nutrition Education:    Healthy Core Manual- n/a  Calorie controlled menu  Lean protein food choices  Healthy snack options  Food journaling tips      Nutrition Counseling:  Strategies: meal planning, portion sizes, healthy snack choices, hydration, fiber intake, protein intake, exercise, food journal      Monitoring and Evaluation:  Evaluation criteria:  Energy Intake  Meet protein needs  Maintain adequate hydration  Monitor weekly weight  Meal planning/preparation  Food journal   Decreased portions at mealtimes and snacks  Physical activity     Barriers to learning:none  Readiness to change: changing behavior  Comprehension: good  Expected Compliance: good

## 2023-11-15 ENCOUNTER — OFFICE VISIT (OUTPATIENT)
Dept: FAMILY MEDICINE CLINIC | Facility: CLINIC | Age: 58
End: 2023-11-15
Payer: COMMERCIAL

## 2023-11-15 ENCOUNTER — APPOINTMENT (OUTPATIENT)
Dept: LAB | Facility: MEDICAL CENTER | Age: 58
End: 2023-11-15
Payer: COMMERCIAL

## 2023-11-15 VITALS
TEMPERATURE: 98.4 F | HEIGHT: 66 IN | DIASTOLIC BLOOD PRESSURE: 96 MMHG | SYSTOLIC BLOOD PRESSURE: 140 MMHG | OXYGEN SATURATION: 96 % | BODY MASS INDEX: 50.62 KG/M2 | WEIGHT: 315 LBS

## 2023-11-15 DIAGNOSIS — E78.2 MIXED HYPERLIPIDEMIA: ICD-10-CM

## 2023-11-15 DIAGNOSIS — Z12.5 PROSTATE CANCER SCREENING: ICD-10-CM

## 2023-11-15 DIAGNOSIS — F51.01 PRIMARY INSOMNIA: ICD-10-CM

## 2023-11-15 DIAGNOSIS — E55.9 VITAMIN D DEFICIENCY: ICD-10-CM

## 2023-11-15 DIAGNOSIS — Z87.39 HISTORY OF GOUT: ICD-10-CM

## 2023-11-15 DIAGNOSIS — I10 ESSENTIAL HYPERTENSION: ICD-10-CM

## 2023-11-15 DIAGNOSIS — N18.32 STAGE 3B CHRONIC KIDNEY DISEASE (HCC): Primary | ICD-10-CM

## 2023-11-15 LAB
25(OH)D3 SERPL-MCNC: 25.6 NG/ML (ref 30–100)
CHOLEST SERPL-MCNC: 247 MG/DL
HDLC SERPL-MCNC: 34 MG/DL
LDLC SERPL CALC-MCNC: 168 MG/DL (ref 0–100)
PSA SERPL-MCNC: 0.26 NG/ML (ref 0–4)
TRIGL SERPL-MCNC: 227 MG/DL
TSH SERPL DL<=0.05 MIU/L-ACNC: 1.96 UIU/ML (ref 0.45–4.5)
URATE SERPL-MCNC: 7.2 MG/DL (ref 3.5–8.5)

## 2023-11-15 PROCEDURE — 80061 LIPID PANEL: CPT | Performed by: FAMILY MEDICINE

## 2023-11-15 PROCEDURE — G0103 PSA SCREENING: HCPCS | Performed by: FAMILY MEDICINE

## 2023-11-15 PROCEDURE — 84550 ASSAY OF BLOOD/URIC ACID: CPT | Performed by: FAMILY MEDICINE

## 2023-11-15 PROCEDURE — 36415 COLL VENOUS BLD VENIPUNCTURE: CPT | Performed by: FAMILY MEDICINE

## 2023-11-15 PROCEDURE — 84443 ASSAY THYROID STIM HORMONE: CPT | Performed by: FAMILY MEDICINE

## 2023-11-15 PROCEDURE — 82306 VITAMIN D 25 HYDROXY: CPT | Performed by: FAMILY MEDICINE

## 2023-11-15 PROCEDURE — 99214 OFFICE O/P EST MOD 30 MIN: CPT | Performed by: FAMILY MEDICINE

## 2023-11-15 RX ORDER — SERTRALINE HYDROCHLORIDE 25 MG/1
25 TABLET, FILM COATED ORAL DAILY
Qty: 30 TABLET | Refills: 5 | Status: SHIPPED | OUTPATIENT
Start: 2023-11-15 | End: 2024-05-13

## 2023-11-15 NOTE — PROGRESS NOTES
Name: Deanna Stevenson      : 1965      MRN: [id number]  Encounter Provider: Naomi Page DO  Encounter Date: 11/15/2023   Encounter department: [facility]   Lab work ordered. Referral made to nephrology. For his insomnia, I will start him on sertraline, 25 mg at bedtime. Talked about sleep hygiene. Told him to get out of bed if he is not asleep in 15 to 20 minutes. Told him to write down what ever he is thinking about so it could be "off his mind". Call if any problems. Follow-up here in 4 months or as needed. 1. Stage 3b chronic kidney disease ([address])  -     Ambulatory Referral to Nephrology; Future  -     TSH, 3rd generation with Free T4 reflex    2. Essential hypertension  -     TSH, 3rd generation with Free T4 reflex    3. Vitamin D deficiency  -     Vitamin D 25 hydroxy    4. Primary insomnia  -     sertraline (ZOLOFT) 25 mg tablet; Take 1 tablet (25 mg total) by mouth daily  -     TSH, 3rd generation with Free T4 reflex    5. Mixed hyperlipidemia  -     Lipid Panel with Direct LDL reflex    6. Prostate cancer screening  -     PSA, Total Screen    7. History of gout  -     Uric acid        Depression Screening and Follow-up Plan: Patient was screened for depression during today's encounter. They screened negative with a PHQ-2 score of 0. Subjective     Patient here today for follow-up. He denies any chest pain or shortness of breath. Patient has not followed up with nephrology in quite a while. Patient has trouble falling asleep. He is always thinking about things. Review of Systems   Constitutional: Negative. Respiratory: Negative. Cardiovascular: Negative. Gastrointestinal: Negative. Genitourinary: Negative. Psychiatric/Behavioral:  Positive for sleep disturbance.         Past Medical History:   Diagnosis Date   • Acute kidney injury superimposed on CKD stage 3 2019   • Arthritis    • Chronic kidney disease    • Gout    • Hyperlipidemia    • Hypertension    • Kidney stone    • Left elbow pain 6/23/2022   • Obstructive pyelonephritis    • Sepsis [facility])      Past Surgical History:   Procedure Laterality Date   • FL RETROGRADE PYELOGRAM  08/04/2019   • FL RETROGRADE PYELOGRAM  09/26/2019   • FL RETROGRADE PYELOGRAM  04/22/2022   • IR NEPHROSTOMY TUBE PLACEMENT  05/10/2022   • KIDNEY SURGERY     • NO PAST SURGERIES  03/23/2015    Last assessed   • GA CYSTO BLADDER W/URETERAL CATHETERIZATION Right 08/04/2019    Procedure: CYSTOSCOPY RETROGRADE PYELOGRAM WITH INSERTION STENT URETERAL;  Surgeon: Tommy Ramirze MD;  [facility];  Service: Urology   • GA CYSTO BLADDER W/URETERAL CATHETERIZATION Left 04/22/2022    Procedure: CYSTOSCOPY RETROGRADE PYELOGRAM WITH INSERTION STENT URETERAL;  Surgeon: Siddharth Byers MD;  Location:  MAIN OR;  Service: Urology   • GA CYSTO/URETERO W/LITHOTRIPSY &INDWELL STENT INSRT Right 09/26/2019    Procedure: CYSTOSCOPY URETEROSCOPY WITH RETROGRADE PYELOGRAM AND INSERTION STENT URETERAL;  Surgeon: Shakira Haji MD;  Location: [facility];  Service: Urology   • GA CYSTO/URETERO W/LITHOTRIPSY &INDWELL STENT INSRT Left 06/09/2022    Procedure: CYSTOSCOPY LEFT URETEROSCOPY/LITHOTRIPSY HOLMIUM LASER,  AND EXCHANGE URETERAL STENT URETERAL, REMOVAL NEPHROSTOMY TUBE;  Surgeon: Elías Henderson MD;  [facility];  Service: Urology     Family History   Problem Relation Age of Onset   • Cancer Mother         Cervical cancer survivor, no more problems. • Heart disease Father    • No Known Problems Sister      Social History     Socioeconomic History   • Marital status: Single     Spouse name: None   • Number of children: None   • Years of education: None   • Highest education level: None   Occupational History   • None   Tobacco Use   • Smoking status: Never   • Smokeless tobacco: Never   • Tobacco comments:     never smoked.    Vaping Use   • Vaping Use: Never used   Substance and Sexual Activity   • Alcohol use: Never     Comment: rarely   • Drug use: No   • Sexual activity: Not Currently   Other Topics Concern   • None   Social History Narrative    Caffeine use      Social Determinants of Health     Financial Resource Strain: Not on file   Food Insecurity: No Food Insecurity (6/8/2022)    Hunger Vital Sign    • Worried About Running Out of Food in the Last Year: Never true    • Ran Out of Food in the Last Year: Never true   Transportation Needs: No Transportation Needs (6/8/2022)    PRAPARE - Transportation    • Lack of Transportation (Medical): No    • Lack of Transportation (Non-Medical): No   Physical Activity: Not on file   Stress: Not on file   Social Connections: Not on file   Intimate Partner Violence: Not on file   Housing Stability: Low Risk  (6/8/2022)    Housing Stability Vital Sign    • Unable to Pay for Housing in the Last Year: No    • Number of Places Lived in the Last Year: 1    • Unstable Housing in the Last Year: No     Current Outpatient Medications on File Prior to Visit   Medication Sig   • acetaminophen (TYLENOL) 325 mg tablet Take 2 tablets (650 mg total) by mouth every 6 (six) hours as needed for mild pain, headaches or fever   • allopurinol (ZYLOPRIM) 300 mg tablet TAKE 1 TABLET BY MOUTH EVERY DAY   • atorvastatin (LIPITOR) 20 mg tablet TAKE 1 TABLET BY MOUTH EVERY DAY   • cholecalciferol (VITAMIN D3) 400 units tablet Take 400 Units by mouth in the morning. • metoprolol succinate (TOPROL-XL) 100 mg 24 hr tablet TAKE 1 TABLET BY MOUTH EVERY DAY   • tamsulosin (FLOMAX) 0.4 mg TAKE 1 CAPSULE BY MOUTH EVERY DAY WITH DINNER     No Known Allergies  Immunization History   Administered Date(s) Administered   • COVID-19 [employer] ([employer]) vaccine 0.5 mL 11/05/2021       Objective     /96   Temp 98.4 °F (36.9 °C)   Ht 5' 6" (1.676 m)   Wt (!) 175 kg (385 lb 12.8 oz)   SpO2 96%   BMI 62.27 kg/m²     Physical Exam  Vitals reviewed.    Constitutional:       General: He is not in acute distress. Appearance: Normal appearance. He is well-developed. He is not ill-appearing, toxic-appearing or diaphoretic. HENT:      Head: Normocephalic and atraumatic. Eyes:      Conjunctiva/sclera: Conjunctivae normal.   Cardiovascular:      Rate and Rhythm: Normal rate and regular rhythm. Heart sounds: Normal heart sounds. No murmur heard. No friction rub. No gallop. Pulmonary:      Effort: Pulmonary effort is normal. No respiratory distress. Breath sounds: Normal breath sounds. No wheezing, rhonchi or rales. Musculoskeletal:      Right lower leg: No edema. Left lower leg: No edema. Neurological:      General: No focal deficit present. Mental Status: He is alert and oriented to person, place, and time. Psychiatric:         Mood and Affect: Mood normal.         Behavior: Behavior normal.         Thought Content:  Thought content normal.         Judgment: Judgment normal.       Lovette Given, DO

## 2023-11-16 ENCOUNTER — OFFICE VISIT (OUTPATIENT)
Dept: NEPHROLOGY | Facility: CLINIC | Age: 58
End: 2023-11-16
Payer: COMMERCIAL

## 2023-11-16 VITALS
HEART RATE: 88 BPM | WEIGHT: 315 LBS | HEIGHT: 66 IN | BODY MASS INDEX: 50.62 KG/M2 | OXYGEN SATURATION: 98 % | SYSTOLIC BLOOD PRESSURE: 142 MMHG | DIASTOLIC BLOOD PRESSURE: 92 MMHG

## 2023-11-16 DIAGNOSIS — Z87.39 HISTORY OF GOUT: ICD-10-CM

## 2023-11-16 DIAGNOSIS — N13.9 OBSTRUCTIVE UROPATHY: ICD-10-CM

## 2023-11-16 DIAGNOSIS — I10 ESSENTIAL HYPERTENSION: ICD-10-CM

## 2023-11-16 DIAGNOSIS — E55.9 VITAMIN D DEFICIENCY: ICD-10-CM

## 2023-11-16 DIAGNOSIS — N18.32 STAGE 3B CHRONIC KIDNEY DISEASE (HCC): Primary | ICD-10-CM

## 2023-11-16 DIAGNOSIS — N20.0 NEPHROLITHIASIS: ICD-10-CM

## 2023-11-16 DIAGNOSIS — E66.01 CLASS 3 OBESITY (HCC): ICD-10-CM

## 2023-11-16 PROCEDURE — 99214 OFFICE O/P EST MOD 30 MIN: CPT | Performed by: INTERNAL MEDICINE

## 2023-11-16 NOTE — ASSESSMENT & PLAN NOTE
Lab Results   Component Value Date    EGFR 39 10/09/2023    EGFR 38 01/30/2023    EGFR 34 09/14/2022    CREATININE 1.85 (H) 10/09/2023    CREATININE 1.90 (H) 01/30/2023    CREATININE 2.07 (H) 09/14/2022   He is at baseline creatinine of 1.85 mg/dL. 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 He has extremely borderline prediabetes which is probably nonoperative here.   He does have a history of nephrolithiasis but he is structurally normal kidneys

## 2023-11-16 NOTE — ASSESSMENT & PLAN NOTE
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 I could not find the stone analysis results in July 2022. He is avoiding salt and drinking 120 ounces a day. He has  Urine output and has not had a stone attack in over a year.   He did not complete the stone risk profile

## 2023-11-16 NOTE — ASSESSMENT & PLAN NOTE
Following with weight management and learning dietary choices. I discussed GLP 2 agent. He would benefit from [facility] KT which has been shown to decrease the risk of cardiovascular disease and strokes  He is concerned about insurance coverage for these agents.   I suggested that he go online and look for patient assistance

## 2023-11-16 NOTE — PROGRESS NOTES
MD for approval.     [facility] [place], [place]    Name: Armen Begum  YOB: 1965      Assessment/Plan:           Problem List Items Addressed This Visit          Cardiovascular and Mediastinum    Essential hypertension     Controlled            Genitourinary    Stage 3b chronic kidney disease [facility]) - Primary     Lab Results   Component Value Date    EGFR 39 10/09/2023    EGFR 38 01/30/2023    EGFR 34 09/14/2022    CREATININE 1.85 (H) 10/09/2023    CREATININE 1.90 (H) 01/30/2023    CREATININE 2.07 (H) 09/14/2022   He is at baseline creatinine of 1.85 mg/dL. 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 He has extremely borderline prediabetes which is probably nonoperative here. 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 I could not find the stone analysis results in July 2022. He is avoiding salt and drinking 120 ounces a day. He has  Urine output and has not had a stone attack in over a year. He did not complete the stone risk profile            Other    History of gout     Just recovering from an attack of gout in his first DIP of his left hand         Class 3 obesity ([address])     Following with weight management and learning dietary choices. I discussed GLP 2 agent. He would benefit from [facility] KT which has been shown to decrease the risk of cardiovascular disease and strokes  He is concerned about insurance coverage for these agents. I suggested that he go online and look for patient assistance              Subjective:      Patient ID: Armen Begum is a 63 y.o. male. referred by Dr Svetlana Nieves    HPI has a history of chronic kidney disease stage IIIb A2 with a baseline creatinine of 1.7 to 1.9 mg/dL, nephrolithiasis with a last stone attack 1 year ago requiring a stent.   He thinks the stone was calcium oxalate. Results from 7/2022 does not mention stone type . He had a history of acute kidney injury in July 2022 with a peak creatinine of 2.9 with slow improvement. He has a history of gout and still has swelling on his left thumb DIP which is improving. History of hypertension and morbid obesity. He is following with weight management with slow weight loss. Does not take NSAID  He drinks 120 ounces a day and voids q 2-3 hrs. Has periodic nocturia and drinks water during the night    The following portions of the patient's history were reviewed and updated as appropriate: allergies, current medications, past family history, past medical history, past social history, past surgical history and problem list.    Review of Systems   Constitutional:  Negative for fatigue. HENT:  Negative for hearing loss. Eyes:  Negative for visual disturbance. Respiratory:  Negative for cough and shortness of breath. Cardiovascular:  Negative for chest pain, palpitations and leg swelling. Gastrointestinal:  Negative for abdominal pain, blood in stool, constipation and diarrhea. Genitourinary:  Negative for decreased urine volume, difficulty urinating, dysuria, hematuria and urgency. Musculoskeletal:  Positive for arthralgias and gait problem. Neurological:  Negative for dizziness and weakness. Hematological:  Does not bruise/bleed easily. Social History     Socioeconomic History    Marital status: Single     Spouse name: None    Number of children: None    Years of education: None    Highest education level: None   Occupational History    None   Tobacco Use    Smoking status: Never    Smokeless tobacco: Never    Tobacco comments:     never smoked.    Vaping Use    Vaping Use: Never used   Substance and Sexual Activity    Alcohol use: Never     Comment: rarely    Drug use: No    Sexual activity: Not Currently   Other Topics Concern    None   Social History Narrative    Caffeine use Social Determinants of Health     Financial Resource Strain: Not on file   Food Insecurity: No Food Insecurity (6/8/2022)    Hunger Vital Sign     Worried About Running Out of Food in the Last Year: Never true     Ran Out of Food in the Last Year: Never true   Transportation Needs: No Transportation Needs (6/8/2022)    PRAPARE - Transportation     Lack of Transportation (Medical): No     Lack of Transportation (Non-Medical):  No   Physical Activity: Not on file   Stress: Not on file   Social Connections: Not on file   Intimate Partner Violence: Not on file   Housing Stability: Low Risk  (6/8/2022)    Housing Stability Vital Sign     Unable to Pay for Housing in the Last Year: No     Number of Places Lived in the Last Year: 1     Unstable Housing in the Last Year: No     Past Medical History:   Diagnosis Date    Acute kidney injury superimposed on CKD stage 3 8/6/2019    Arthritis     Chronic kidney disease     Gout     Hyperlipidemia     Hypertension     Kidney stone     Left elbow pain 6/23/2022    Obstructive pyelonephritis     Sepsis [facility])      Past Surgical History:   Procedure Laterality Date    FL RETROGRADE PYELOGRAM  08/04/2019    FL RETROGRADE PYELOGRAM  09/26/2019    FL RETROGRADE PYELOGRAM  04/22/2022    IR NEPHROSTOMY TUBE PLACEMENT  05/10/2022    KIDNEY SURGERY      NO PAST SURGERIES  03/23/2015    Last assessed    MD CYSTO BLADDER W/URETERAL CATHETERIZATION Right 08/04/2019    Procedure: CYSTOSCOPY RETROGRADE PYELOGRAM WITH INSERTION STENT URETERAL;  Surgeon: Radha Pineda MD;  [facility];  Service: Urology    MD CYSTO BLADDER W/URETERAL CATHETERIZATION Left 04/22/2022    Procedure: CYSTOSCOPY RETROGRADE PYELOGRAM WITH INSERTION STENT URETERAL;  Surgeon: Ellen Brown MD;  [facility];  Service: Urology    MD CYSTO/URETERO W/LITHOTRIPSY &INDWELL STENT INSRT Right 09/26/2019    Procedure: CYSTOSCOPY URETEROSCOPY WITH RETROGRADE PYELOGRAM AND INSERTION STENT URETERAL; Surgeon: Jenny Mcfarland MD;  Location: [facility];  Service: Urology    MN CYSTO/URETERO W/LITHOTRIPSY &INDWELL STENT INSRT Left 06/09/2022    Procedure: CYSTOSCOPY LEFT URETEROSCOPY/LITHOTRIPSY HOLMIUM LASER,  AND EXCHANGE URETERAL STENT URETERAL, REMOVAL NEPHROSTOMY TUBE;  Surgeon: Kyung Almanzar MD;  Location:  MAIN OR;  Service: Urology       Current Outpatient Medications:     acetaminophen (TYLENOL) 325 mg tablet, Take 2 tablets (650 mg total) by mouth every 6 (six) hours as needed for mild pain, headaches or fever, Disp: 30 tablet, Rfl: 0    allopurinol (ZYLOPRIM) 300 mg tablet, TAKE 1 TABLET BY MOUTH EVERY DAY, Disp: 90 tablet, Rfl: 2    atorvastatin (LIPITOR) 20 mg tablet, TAKE 1 TABLET BY MOUTH EVERY DAY, Disp: 90 tablet, Rfl: 2    cholecalciferol (VITAMIN D3) 400 units tablet, Take 400 Units by mouth in the morning., Disp: , Rfl:     metoprolol succinate (TOPROL-XL) 100 mg 24 hr tablet, TAKE 1 TABLET BY MOUTH EVERY DAY, Disp: 30 tablet, Rfl: 0    sertraline (ZOLOFT) 25 mg tablet, Take 1 tablet (25 mg total) by mouth daily, Disp: 30 tablet, Rfl: 5    tamsulosin (FLOMAX) 0.4 mg, TAKE 1 CAPSULE BY MOUTH EVERY DAY WITH DINNER, Disp: 90 capsule, Rfl: 2    Lab Results   Component Value Date     (L) 04/08/2015    SODIUM 140 10/09/2023    K 4.3 10/09/2023     10/09/2023    CO2 21 10/09/2023    ANIONGAP 7 04/08/2015    AGAP 11 10/09/2023    BUN 33 (H) 10/09/2023    CREATININE 1.85 (H) 10/09/2023    GLUC 99 09/14/2022    GLUF 98 10/09/2023    CALCIUM 9.6 10/09/2023    AST 21 10/09/2023    ALT 20 10/09/2023    ALKPHOS 54 10/09/2023    PROT 7.7 04/08/2015    TP 7.4 10/09/2023    BILITOT 0.7 04/08/2015    TBILI 0.48 10/09/2023    EGFR 39 10/09/2023     Lab Results   Component Value Date    WBC 10.89 (H) 01/30/2023    HGB 15.1 01/30/2023    HCT 49.2 01/30/2023    MCV 99 (H) 01/30/2023     01/30/2023     Lab Results   Component Value Date    CHOLESTEROL 247 (H) 11/15/2023    CHOLESTEROL 255 (H) 06/02/2021    CHOLESTEROL 228 (H) 01/17/2020     Lab Results   Component Value Date    HDL 34 (L) 11/15/2023    HDL 48 06/02/2021    HDL 39 (L) 01/17/2020     Lab Results   Component Value Date    LDLCALC 168 (H) 11/15/2023    LDLCALC 173 (H) 06/02/2021    LDLCALC 166 (H) 01/17/2020     Lab Results   Component Value Date    TRIG 227 (H) 11/15/2023    TRIG 169 (H) 06/02/2021    TRIG 117 01/17/2020     No results found for: "CHOLHDL"  Lab Results   Component Value Date    EZN7QMIPGEXV 1.963 11/15/2023     Lab Results   Component Value Date    PTH 68.3 01/30/2023    CALCIUM 9.6 10/09/2023    PHOS 3.7 06/21/2022     Lab Results   Component Value Date    SPEP See scanned Document 01/30/2023     No results found for: "Mohit Check", "Lurlene Givoani"      COMPARISON: CT abdomen pelvis 6/7/2022     TECHNIQUE:   Ultrasound of the retroperitoneum was performed with a curvilinear transducer utilizing volumetric sweeps and still imaging techniques. FINDINGS: 6/12/2022     KIDNEYS:  Symmetric and normal size. Right kidney:  9.7 x 5.2 x 5.0 cm. Volume 132.0 mL  Left kidney:  13.7 x 8.9 x 7.6 cm. Volume 483.6 mL     Right kidney  Normal echogenicity and contour. No mass is identified. No hydronephrosis. No shadowing calculi. No perinephric fluid collections. Left kidney  Normal echogenicity and contour. No mass is identified. Mild fullness of the left renal collecting system without maria fernanda hydronephrosis. Resistive indices within normal limits. Nephroureteral stent noted in the left renal collecting system with visualization limited. No shadowing calculi. No perinephric fluid collections. URETERS:  Nonvisualized. Objective:      /92 (BP Location: Left arm, Patient Position: Sitting, Cuff Size: Large)   Pulse 88   Ht 5' 6" (1.676 m)   Wt (!) 175 kg (386 lb)   SpO2 98%   BMI 62.30 kg/m²          Physical Exam  Constitutional:       General: He is not in acute distress. Appearance: He is obese.  He is not toxic-appearing. HENT:      Head: Normocephalic and atraumatic. Right Ear: External ear normal.      Left Ear: External ear normal.   Neck:      Vascular: No carotid bruit. Cardiovascular:      Rate and Rhythm: Normal rate and regular rhythm. Pulmonary:      Effort: Pulmonary effort is normal. No respiratory distress. Breath sounds: Normal breath sounds. No wheezing or rales. Abdominal:      General: Bowel sounds are normal.      Palpations: Abdomen is soft. Musculoskeletal:      Cervical back: Normal range of motion. Right lower leg: No edema. Left lower leg: No edema. Lymphadenopathy:      Cervical: No cervical adenopathy. Skin:     General: Skin is warm and dry. Neurological:      General: No focal deficit present. Mental Status: He is alert. Gait: Gait abnormal.   Psychiatric:         Mood and Affect: Mood normal.         Behavior: Behavior normal.         Thought Content:  Thought content normal.         Judgment: Judgment normal.

## 2023-11-17 DIAGNOSIS — E78.2 MIXED HYPERLIPIDEMIA: Primary | ICD-10-CM

## 2023-11-17 RX ORDER — ROSUVASTATIN CALCIUM 10 MG/1
10 TABLET, COATED ORAL DAILY
Qty: 30 TABLET | Refills: 5 | Status: SHIPPED | OUTPATIENT
Start: 2023-11-17

## 2023-11-20 ENCOUNTER — TELEPHONE (OUTPATIENT)
Dept: FAMILY MEDICINE CLINIC | Facility: CLINIC | Age: 58
End: 2023-11-20

## 2023-11-24 ENCOUNTER — TELEPHONE (OUTPATIENT)
Dept: FAMILY MEDICINE CLINIC | Facility: CLINIC | Age: 58
End: 2023-11-24

## 2023-11-24 NOTE — TELEPHONE ENCOUNTER
Rousuvastatin denied per insurance. Explained that patient tried atorvastatin. Must try and fail atorvastatin 20 mg since per insurance pt has only tried 10 mg.

## 2023-11-27 NOTE — TELEPHONE ENCOUNTER
Patient states he has tried the 20mg and had side effects from it, looks like it is in his chart under D/C.  Does not want to try it again

## 2023-11-28 ENCOUNTER — TELEPHONE (OUTPATIENT)
Dept: FAMILY MEDICINE CLINIC | Facility: CLINIC | Age: 58
End: 2023-11-28

## 2023-11-28 NOTE — TELEPHONE ENCOUNTER
[employer] is denying CRESTOR 10 mg. Spoke to pharmacy. BOTh generic and brand of this medication flag for auth. Pharmacist was nice enough to work thru some senarios for [employer], out of pocket WITH A DISCOUNT CARD> one month $ 16.12, three months $ 26.36.  Spoke to patient, who stated he has [address] 12/13/2023. Other thought is to try and get it thru [address] $ 4 program: which would cover for $9 the following:    FENOFIBRATE 145 mg 30days  GEMFIBROZIL 600 mg for 60 days  SIMVASTATIN 10mg, 20 mg, 40 mg   for 30 days    Pls advise. I will do a denial letter if you would prefer to stick with CRESTOR 10 mg.

## 2023-11-29 ENCOUNTER — TELEPHONE (OUTPATIENT)
Dept: FAMILY MEDICINE CLINIC | Facility: CLINIC | Age: 58
End: 2023-11-29

## 2023-11-29 NOTE — TELEPHONE ENCOUNTER
Please submitt a letter of necessity - why ATORVASTATIN was changed, medication failure? Pls type up and notify me when letter completed.   I will fax with labs, O V notes for appeal.  Thank you

## 2023-12-01 ENCOUNTER — TELEPHONE (OUTPATIENT)
Dept: FAMILY MEDICINE CLINIC | Facility: CLINIC | Age: 58
End: 2023-12-01

## 2023-12-05 ENCOUNTER — TELEPHONE (OUTPATIENT)
Dept: FAMILY MEDICINE CLINIC | Facility: CLINIC | Age: 58
End: 2023-12-05

## 2023-12-05 DIAGNOSIS — E78.2 MIXED HYPERLIPIDEMIA: Primary | ICD-10-CM

## 2023-12-05 NOTE — TELEPHONE ENCOUNTER
Appeal letter to have decision to cover Brandon Gage denied after all. .  Pt has not returned my call to discuss. Please advise.

## 2023-12-06 RX ORDER — ATORVASTATIN CALCIUM 20 MG/1
20 TABLET, FILM COATED ORAL DAILY
Qty: 30 TABLET | Refills: 5 | Status: SHIPPED | OUTPATIENT
Start: 2023-12-06

## 2023-12-06 NOTE — TELEPHONE ENCOUNTER
Please call patient. Insurance will not cover rosuvastatin. He may continue the atorvastatin 20 mg daily.   I will put in a refill

## 2023-12-07 DIAGNOSIS — F51.01 PRIMARY INSOMNIA: ICD-10-CM

## 2023-12-07 DIAGNOSIS — I10 ESSENTIAL HYPERTENSION: ICD-10-CM

## 2023-12-07 RX ORDER — SERTRALINE HYDROCHLORIDE 25 MG/1
25 TABLET, FILM COATED ORAL DAILY
Qty: 90 TABLET | Refills: 2 | Status: SHIPPED | OUTPATIENT
Start: 2023-12-07 | End: 2024-06-04

## 2023-12-07 RX ORDER — METOPROLOL SUCCINATE 100 MG/1
TABLET, EXTENDED RELEASE ORAL
Qty: 90 TABLET | Refills: 1 | Status: SHIPPED | OUTPATIENT
Start: 2023-12-07

## 2023-12-18 ENCOUNTER — OFFICE VISIT (OUTPATIENT)
Dept: BARIATRICS | Facility: CLINIC | Age: 58
End: 2023-12-18
Payer: COMMERCIAL

## 2023-12-18 VITALS — HEIGHT: 66 IN | BODY MASS INDEX: 50.62 KG/M2 | WEIGHT: 315 LBS

## 2023-12-18 DIAGNOSIS — R63.5 ABNORMAL WEIGHT GAIN: Primary | ICD-10-CM

## 2023-12-18 PROCEDURE — RECHECK

## 2023-12-18 PROCEDURE — WEIGHT

## 2023-12-18 NOTE — PROGRESS NOTES
"Weight Management Medical Nutrition Assessment  Kevin presented today for body composition session. Today on [place] scale he weighed 380.6# which reflects a loss of 5.6# since last session 11/6/23. Reviewed body composition results with Kevin and provided him with copy of same as well as with Tanita results explanation sheet. Kevin stated that he feels beef steak (which he eats only about once weekly helps with his weight loss). He thinks fish also helps. Taking cholesterol medication. Does not think parents have high cholesterol. Has been food-logging using zachary on his phone (he is not sure what zachary it is). Takes cans of peas, corn, carrots, and green beans and mixes it together and eats one cup of this with his protein source as meal. Not using salt shaker at all. Finds everyday activity such taking shower very strenuous. While he admits to still emotionally-eating at time, feels this is improving. Craves sugar at times- suspect this may be d/t sleep schedule. Provided patient with handouts on LDL Cholesterol Lowering Nutrition Therapy and Holiday Eating. Advised to read food labels for saturated fat content. Discussed chair exercises for increased movement. Suggested he consider decreasing his egg intake to one daily with addition of egg whites. Kevin's goal is to exercise more. Meal planning session scheduled for 1/22/24.       Patient seen by Medical Provider in past 6 months:  yes  Requested to schedule appointment with Medical Provider: Yes      Anthropometric Measurements  Start Weight (#): 380.4# (2/27/23)  Current Weight (#): 380.6#  TBW % Change from start weight: 0% (1.5% since last session)  Ideal Body Weight (#): 142# (64.5 kg)  Goal Weight (#): wants to be able to bend over and \"do normal people things\"  Highest: present  Lowest: ~200# (after [facility])    Weight Loss History  Previous weight loss attempts: [facility], Nutrisystem, various shakes, pills, etc.     Food and Nutrition Related " History  Wake up: 9-10 AM   Bed Time: varies - 12-2 AM     Food Recall  Gets a lot of food items from [employer] food pant[facility]  States doing below pattern since our last session 9/25/23; happy with current regimen     Breakfast: 10 AM: 3 eggs (this satisfies him)  Snack: not usually    Lunch: protein (4-6 oz), 1 cup of vegetable mix as noted above in narrative  Snack: not usually  Dinner: 5 PM- as above in lunch  Snack: sometimes- on red licorice (small pack)      Beverages: drinking 120 oz water daily, some with flavor drops maybe lemon packets  Volume of beverage intake: >=120 oz    Weekends:  same   Cravings: easy to prepare foods  Trouble area of day: all day    Frequency of Eating out: occasionally  Food restrictions: none   Cooking: self   Food Shopping: self (also gets food from [facility])    Physical Activity Intake  Activity: getting out of the house daily (makes a point to do so) and walk as long as weather ok;  household activity, yardwork, walking around stores at times  Frequency: usually daily  Physical limitations/barriers to exercise: weight and arthritis in knees    Estimated Needs  Energy  SECA: BMR: n/a     X 1.3 -1000 =  [place] [employer] Energy Needs: BMR :2533  1-2# loss weekly sedentary: 7988-7781            1-2# loss weekly lightly active: 9141-6462  Maintenance calories for sedentary activity level: 3039  Protein: ~95 g    (1.2-1.5g/kg IBW)  Fluid: >=108 oz    (35mL/kg IBW)    Nutrition Diagnosis  Yes;    Overweight/obesity  related to Excess energy intake as evidenced by  BMI more than normative standard for age and sex (obesity-grade III 40+)       Nutrition Intervention    Nutrition Prescription  Calories:0661-7554  Protein: ~95 g  Fluid: >=108 oz    Meal Plan (Danny/Pro/Carb)  Provided patient with pre-printed sample 9236-3959 kcal meal plan.     Nutrition Education:    Healthy Core Manual- n/a  Calorie controlled menu  Lean protein food choices  Healthy snack options  Food journaling  tips      Nutrition Counseling:  Strategies: meal planning, portion sizes, healthy snack choices, hydration, fiber intake, protein intake, exercise, food journal      Monitoring and Evaluation:  Evaluation criteria:  Energy Intake  Meet protein needs  Maintain adequate hydration  Monitor weekly weight  Meal planning/preparation  Food journal   Decreased portions at mealtimes and snacks  Physical activity     Barriers to learning:none  Readiness to change: changing behavior  Comprehension: good  Expected Compliance: good

## 2024-01-22 ENCOUNTER — OFFICE VISIT (OUTPATIENT)
Dept: BARIATRICS | Facility: CLINIC | Age: 59
End: 2024-01-22

## 2024-01-22 VITALS — BODY MASS INDEX: 50.62 KG/M2 | HEIGHT: 66 IN | WEIGHT: 315 LBS

## 2024-01-22 DIAGNOSIS — R63.5 ABNORMAL WEIGHT GAIN: Primary | ICD-10-CM

## 2024-01-22 PROCEDURE — RECHECK

## 2024-01-22 PROCEDURE — WMDI30

## 2024-01-22 NOTE — PROGRESS NOTES
"Weight Management Medical Nutrition Assessment  Kevin presented today for meal planning session. Today he weighed 382.6# which reflects a gain of 2# since last appointment 12/18/23 (different scale used). Stated he has increased his activity by shoveling snow more and has been walking more in stores (his goal was to move more). Noted that after [place] \"was horrible\" b/c family gifted him with goodies which he ate.  Struggles with telling people that he does not want foods they offer them, especially when purchased or prepared specifically for him. Stated he has difficulty wasting food and prefers to eat it instead. Frustrated that his family continues to provide him with inappropriate foods. Suggested writing his family members a letter or noted explaining why he does not want the food they are providing him with as he noted they ignore him when he tries to verbalize this. Is food logging using Simple zachary. No RD follow-up scheduled at this time. Has upcoming appointments with [facility] provider and nephrologist. Provided revised meal plan for better meal balance. Suggested eating some fruit and seeking a counselor to handle ongoing behavioral/emotion issues.        Patient seen by Medical Provider in past 6 months:  yes  Requested to schedule appointment with Medical Provider: Yes      Anthropometric Measurements  Start Weight (#): 380.4# (2/27/23)  Current Weight (#): 382.6#  TBW % Change from start weight: gained 0.6%  Ideal Body Weight (#): 142# (64.5 kg)  Goal Weight (#): wants to be able to bend over and \"do normal people things\"  Highest: present  Lowest: ~200# (after [facility])    Weight Loss History  Previous weight loss attempts: [facility], Nutrisystem, various shakes, pills, etc.     Food and Nutrition Related History  Wake up: 9-10 AM   Bed Time: varies - 12-2 AM     Food Recall  Gets a lot of food items from [employer] food pant[facility]  States doing below pattern since our last session 9/25/23; happy with " current regimen     Breakfast: 12-1 PM: 2-3 eggs (this satisfies him) and 9-11 oz steak  Snack: not usually    Lunch: skipped   Snack: not usually  Dinner: 5 PM- 1-2 cheese sticks and 1 oz mixed nuts  Snack: sometimes- on red licorice (small pack)      Beverages: drinking 120 oz water daily, some with flavor drops maybe lemon packets  Volume of beverage intake: >=120 oz    Weekends:  same   Cravings: easy to prepare foods  Trouble area of day: all day    Frequency of Eating out: occasionally  Food restrictions: none   Cooking: self   Food Shopping: self (also gets food from [facility])    Physical Activity Intake  Activity: getting out of the house daily (makes a point to do so) and walk as long as weather ok;  household activity, yardwork, walking around stores at times  Frequency: usually daily  Physical limitations/barriers to exercise: weight and arthritis in knees    Estimated Needs  Energy  SECA: BMR: n/a     X 1.3 -1000 =  [place] Jeor Energy Needs: BMR :2533  1-2# loss weekly sedentary: 8884-8366            1-2# loss weekly lightly active: 3623-0632  Maintenance calories for sedentary activity level: 3039  Protein: ~95 g    (1.2-1.5g/kg IBW)  Fluid: >=108 oz    (35mL/kg IBW)    Nutrition Diagnosis  Yes;    Overweight/obesity  related to Excess energy intake as evidenced by  BMI more than normative standard for age and sex (obesity-grade III 40+)       Nutrition Intervention    Nutrition Prescription  Calories: 1000 (he has been eating less than this per diet recall)  Protein: ~95 g  Fluid: >=108 oz    Meal Plan (Danny/Pro/Carb)  Provided Kevin with revised meal plan consisting of 2 sensible meals daily and balanced snack prn.      Nutrition Education:    Healthy Core Manual- n/a  Calorie controlled menu  Lean protein food choices  Healthy snack options (200 kcal snack list provided)  Food journaling tips      Nutrition Counseling:  Strategies: meal planning, portion sizes, healthy snack choices, hydration, fiber  intake, protein intake, exercise, food journal      Monitoring and Evaluation:  Evaluation criteria:  Energy Intake  Meet protein needs  Maintain adequate hydration  Monitor weekly weight  Meal planning/preparation  Food journal   Decreased portions at mealtimes and snacks  Physical activity     Barriers to learning:none  Readiness to change: changing behavior  Comprehension: good  Expected Compliance: good

## 2024-02-20 DIAGNOSIS — E78.2 MIXED HYPERLIPIDEMIA: ICD-10-CM

## 2024-02-20 RX ORDER — ATORVASTATIN CALCIUM 20 MG/1
20 TABLET, FILM COATED ORAL DAILY
Qty: 90 TABLET | Refills: 1 | Status: SHIPPED | OUTPATIENT
Start: 2024-02-20

## 2024-02-26 ENCOUNTER — OFFICE VISIT (OUTPATIENT)
Dept: BARIATRICS | Facility: CLINIC | Age: 59
End: 2024-02-26
Payer: COMMERCIAL

## 2024-02-26 VITALS
SYSTOLIC BLOOD PRESSURE: 132 MMHG | DIASTOLIC BLOOD PRESSURE: 90 MMHG | TEMPERATURE: 98.6 F | BODY MASS INDEX: 50.62 KG/M2 | RESPIRATION RATE: 18 BRPM | HEART RATE: 84 BPM | OXYGEN SATURATION: 97 % | HEIGHT: 66 IN | WEIGHT: 315 LBS

## 2024-02-26 DIAGNOSIS — R73.03 PREDIABETES: ICD-10-CM

## 2024-02-26 DIAGNOSIS — E66.01 CLASS 3 OBESITY (HCC): Primary | ICD-10-CM

## 2024-02-26 PROCEDURE — 99214 OFFICE O/P EST MOD 30 MIN: CPT | Performed by: PHYSICIAN ASSISTANT

## 2024-02-26 NOTE — ASSESSMENT & PLAN NOTE
-Patient is pursuing Conservative Program  -Initial weight loss goal of 5-10% weight loss for improved health  -Not interested in bariatric surgery  -Options for weight loss medications limited due to cost and concomitant  conditions  -Screening labs: CMP reviewed from 1/30/23- eGFR 38  -AVOID metformin and Topamax: kidney stone, eGFR  -considered for AOM due to weight regain following stopping. This was addressed. He prefers to continue working on lifestyle changes alone before considering Ozempic or Mounjaro  Patient denies personal and family history of MEN2 tumors and medullary thyroid/thyroid carcinoma. Patient denies personal history of pancreatitis.   -dietary questions answered: carbs, calories, fruits  -discussed regular weight checks. For him, may be more beneficial to do a few times per week then bid    Initial: 380  Current: 385 (-3 lbs since last visit)   Change: +5  Goal: under 300 lbs

## 2024-02-26 NOTE — PATIENT INSTRUCTIONS
When having eggs and meats: 2 eggs + small meat + fruit OR vegetable  Dinner: 4-6 oz lean protein + 1 cup of vegetable + 3/4 cup of fiber rich starch   If choosing starch pick whole grain/complex carbs and keep to 1/2-3/4 cup: oatmeal, brown rice, quinoa, whole wheat pasta, potatoes, sweet potato, chickpea noodles, red lentil noodles

## 2024-02-26 NOTE — PROGRESS NOTES
Assessment/Plan:    Class 3 obesity (HCC)  -Patient is pursuing Conservative Program  -Initial weight loss goal of 5-10% weight loss for improved health  -Not interested in bariatric surgery  -Options for weight loss medications limited due to cost and concomitant  conditions  -Screening labs: CMP reviewed from 1/30/23- eGFR 38  -AVOID metformin and Topamax: kidney stone, eGFR  -considered for AOM due to weight regain following stopping. This was addressed. He prefers to continue working on lifestyle changes alone before considering Ozempic or Mounjaro  Patient denies personal and family history of MEN2 tumors and medullary thyroid/thyroid carcinoma. Patient denies personal history of pancreatitis.   -dietary questions answered: carbs, calories, fruits  -discussed regular weight checks. For him, may be more beneficial to do a few times per week then bid    Initial: 380  Current: 385 (-3 lbs since last visit)   Change: +5  Goal: under 300 lbs    Follow up in approximately  4 months  with Non-Surgical Physician/Advanced Practitioner.    Goals:  Food log (ie.) www.mAPPn.com,sparkpeople.com,YouGotListingsit.com,calorieking.com,etc. baritastic  No sugary beverages. At least 64oz of water daily.  Increase physical activity by 10 minutes daily. Gradually increase physical activity to a goal of 5 days per week for 30 minutes of MODERATE intensity PLUS 2 days per week of FULL BODY resistance training  5-10 servings of fruits and vegetables per day and 25-35 grams of dietary fiber per day, gradually increasing     Diagnoses and all orders for this visit:    Class 3 obesity (HCC)    Body mass index 60.0-69.9, adult (HCC)    Prediabetes  Comments:  would like to put more effort to lifestyle changes before starting GLP-1; reviewed potential benefits of adding        Subjective:   Chief Complaint   Patient presents with    Follow-up     Follow up for weight     Patient ID: Kevin Cao  is a 58 y.o. male with excess weight/obesity  "here to pursue weight management.  Patient is pursuing Conservative Program.     HPI  The patient presents for MW follow up.     Finding hard to implement recommended changes as family/support often is offering food/high calorie foods. Tries to avoid these situations. Discussed for these social events trying to have a small portion.     The following portions of the patient's history were reviewed and updated as appropriate: allergies, current medications, past family history, past medical history, past social history, past surgical history, and problem list.    Review of Systems   Musculoskeletal:         Reports has not had any recent gout attacks     Objective:    /90 (BP Location: Right arm, Patient Position: Sitting, Cuff Size: Extra-Large)   Pulse 84   Temp 98.6 °F (37 °C)   Resp 18   Ht 5' 6\" (1.676 m)   Wt (!) 175 kg (385 lb 12.8 oz)   SpO2 97%   BMI 62.27 kg/m²      Physical Exam  Vitals and nursing note reviewed.     Constitutional   General appearance: Abnormal.  well developed and obese.   Pulmonary   Respiratory effort: No increased work of breathing or signs of respiratory distress.   Abdomen   Abdomen: Abnormal.  The abdomen was obese.   Musculoskeletal   Gait and station: Normal.    Psychiatric   Orientation to person, place and time: Normal.    Affect: appropriate  "

## 2024-03-18 ENCOUNTER — OFFICE VISIT (OUTPATIENT)
Dept: FAMILY MEDICINE CLINIC | Facility: CLINIC | Age: 59
End: 2024-03-18
Payer: COMMERCIAL

## 2024-03-18 VITALS
SYSTOLIC BLOOD PRESSURE: 146 MMHG | BODY MASS INDEX: 50.62 KG/M2 | OXYGEN SATURATION: 98 % | HEIGHT: 66 IN | WEIGHT: 315 LBS | TEMPERATURE: 98.8 F | HEART RATE: 97 BPM | DIASTOLIC BLOOD PRESSURE: 110 MMHG

## 2024-03-18 DIAGNOSIS — E66.01 MORBID OBESITY WITH BMI OF 60.0-69.9, ADULT (HCC): ICD-10-CM

## 2024-03-18 DIAGNOSIS — I10 ESSENTIAL HYPERTENSION: ICD-10-CM

## 2024-03-18 DIAGNOSIS — R73.9 HYPERGLYCEMIA: ICD-10-CM

## 2024-03-18 DIAGNOSIS — E78.2 MIXED HYPERLIPIDEMIA: ICD-10-CM

## 2024-03-18 DIAGNOSIS — E66.01 MORBID OBESITY WITH BMI OF 60.0-69.9, ADULT (HCC): Primary | ICD-10-CM

## 2024-03-18 DIAGNOSIS — N18.32 STAGE 3B CHRONIC KIDNEY DISEASE (HCC): ICD-10-CM

## 2024-03-18 PROCEDURE — 99214 OFFICE O/P EST MOD 30 MIN: CPT | Performed by: FAMILY MEDICINE

## 2024-03-18 RX ORDER — TIRZEPATIDE 2.5 MG/.5ML
2.5 INJECTION, SOLUTION SUBCUTANEOUS WEEKLY
Qty: 2 ML | Refills: 0 | Status: SHIPPED | OUTPATIENT
Start: 2024-03-18 | End: 2024-03-18

## 2024-03-18 NOTE — PROGRESS NOTES
Name: Kevin Cao      : 1965      MRN: [id number]  Encounter Provider: Ramon Hinson DO  Encounter Date: 3/18/2024   Encounter department: [facility]    Assessment & Plan   Discussed diet.  Told him to decrease the carbohydrates, and also should decrease his portions.  Should discuss diet more with bariatrics.  Rx put in for Zepbound to help him lose weight.  Also asked him to monitor his blood pressure.  He will be following up with nephrology next month.  He will get lab work before that visit.  I will see him back in 2 months or as needed  1. Morbid obesity with BMI of 60.0-69.9, adult (HCC)  -     Zepbound 2.5 MG/0.5ML auto-injector; Inject 0.5 mL (2.5 mg total) under the skin once a week    2. Stage 3b chronic kidney disease (HCC)  -     Zepbound 2.5 MG/0.5ML auto-injector; Inject 0.5 mL (2.5 mg total) under the skin once a week    3. Essential hypertension  -     Zepbound 2.5 MG/0.5ML auto-injector; Inject 0.5 mL (2.5 mg total) under the skin once a week    4. Mixed hyperlipidemia  -     Zepbound 2.5 MG/0.5ML auto-injector; Inject 0.5 mL (2.5 mg total) under the skin once a week  -     Lipid Panel with Direct LDL reflex    5. Hyperglycemia  -     Hemoglobin A1C        Depression Screening and Follow-up Plan: Patient was screened for depression during today's encounter. They screened negative with a PHQ-2 score of 0.        Subjective     HPI  Review of Systems   Constitutional: Negative.    Respiratory: Negative.     Cardiovascular: Negative.    Gastrointestinal: Negative.    Genitourinary: Negative.        Past Medical History:   Diagnosis Date   • Acute kidney injury superimposed on CKD stage 3 2019   • Arthritis    • Chronic kidney disease    • Gout    • Hyperlipidemia    • Hypertension    • Kidney stone    • Left elbow pain 2022   • Obstructive pyelonephritis    • Sepsis (HCC)      Past Surgical History:   Procedure Laterality Date   • FL RETROGRADE PYELOGRAM  2019   •  FL RETROGRADE PYELOGRAM  09/26/2019   • FL RETROGRADE PYELOGRAM  04/22/2022   • IR NEPHROSTOMY TUBE PLACEMENT  05/10/2022   • KIDNEY SURGERY     • NO PAST SURGERIES  03/23/2015    Last assessed   • IA CYSTO BLADDER W/URETERAL CATHETERIZATION Right 08/04/2019    Procedure: CYSTOSCOPY RETROGRADE PYELOGRAM WITH INSERTION STENT URETERAL;  Surgeon: Eris Biggs MD;  [facility];  Service: Urology   • IA CYSTO BLADDER W/URETERAL CATHETERIZATION Left 04/22/2022    Procedure: CYSTOSCOPY RETROGRADE PYELOGRAM WITH INSERTION STENT URETERAL;  Surgeon: Tony Batista MD;  [facility];  Service: Urology   • IA CYSTO/URETERO W/LITHOTRIPSY &INDWELL STENT INSRT Right 09/26/2019    Procedure: CYSTOSCOPY URETEROSCOPY WITH RETROGRADE PYELOGRAM AND INSERTION STENT URETERAL;  Surgeon: Oj Unger MD;  Location: [facility];  Service: Urology   • IA CYSTO/URETERO W/LITHOTRIPSY &INDWELL STENT INSRT Left 06/09/2022    Procedure: CYSTOSCOPY LEFT URETEROSCOPY/LITHOTRIPSY HOLMIUM LASER,  AND EXCHANGE URETERAL STENT URETERAL, REMOVAL NEPHROSTOMY TUBE;  Surgeon: Festus Ley MD;  [facility];  Service: Urology     Family History   Problem Relation Age of Onset   • Cancer Mother         Cervical cancer survivor, no more problems.   • Heart disease Father    • No Known Problems Sister      Social History     Socioeconomic History   • Marital status: Single     Spouse name: None   • Number of children: None   • Years of education: None   • Highest education level: None   Occupational History   • None   Tobacco Use   • Smoking status: Never   • Smokeless tobacco: Never   • Tobacco comments:     never smoked.   Vaping Use   • Vaping status: Never Used   Substance and Sexual Activity   • Alcohol use: Never     Comment: rarely   • Drug use: No   • Sexual activity: Not Currently   Other Topics Concern   • None   Social History Narrative    Caffeine use      Social Determinants of Health     Financial  "Resource Strain: Not on file   Food Insecurity: No Food Insecurity (6/8/2022)    Hunger Vital Sign    • Worried About Running Out of Food in the Last Year: Never true    • Ran Out of Food in the Last Year: Never true   Transportation Needs: No Transportation Needs (6/8/2022)    PRAPARE - Transportation    • Lack of Transportation (Medical): No    • Lack of Transportation (Non-Medical): No   Physical Activity: Not on file   Stress: Not on file   Social Connections: Not on file   Intimate Partner Violence: Not on file   Housing Stability: Low Risk  (6/8/2022)    Housing Stability Vital Sign    • Unable to Pay for Housing in the Last Year: No    • Number of Places Lived in the Last Year: 1    • Unstable Housing in the Last Year: No     Current Outpatient Medications on File Prior to Visit   Medication Sig   • acetaminophen (TYLENOL) 325 mg tablet Take 2 tablets (650 mg total) by mouth every 6 (six) hours as needed for mild pain, headaches or fever   • allopurinol (ZYLOPRIM) 300 mg tablet TAKE 1 TABLET BY MOUTH EVERY DAY   • atorvastatin (LIPITOR) 20 mg tablet TAKE 1 TABLET BY MOUTH EVERY DAY   • cholecalciferol (VITAMIN D3) 400 units tablet Take 400 Units by mouth in the morning.   • metoprolol succinate (TOPROL-XL) 100 mg 24 hr tablet TAKE 1 TABLET BY MOUTH EVERY DAY   • sertraline (ZOLOFT) 25 mg tablet TAKE 1 TABLET (25 MG TOTAL) BY MOUTH DAILY.   • tamsulosin (FLOMAX) 0.4 mg TAKE 1 CAPSULE BY MOUTH EVERY DAY WITH DINNER     No Known Allergies  Immunization History   Administered Date(s) Administered   • COVID-19 J&J (Espinoza) vaccine 0.5 mL 11/05/2021       Objective     BP (!) 146/110 (BP Location: Left arm, Patient Position: Sitting, Cuff Size: Large)   Pulse 97   Temp 98.8 °F (37.1 °C)   Ht 5' 6\" (1.676 m)   Wt (!) 176 kg (388 lb 9.6 oz)   SpO2 98%   BMI 62.72 kg/m²     Physical Exam  Vitals reviewed.   Constitutional:       General: He is not in acute distress.     Appearance: Normal appearance. He is " well-developed. He is not ill-appearing, toxic-appearing or diaphoretic.   HENT:      Head: Normocephalic and atraumatic.   Eyes:      Conjunctiva/sclera: Conjunctivae normal.   Cardiovascular:      Rate and Rhythm: Normal rate and regular rhythm.      Heart sounds: Normal heart sounds. No murmur heard.     No friction rub. No gallop.   Pulmonary:      Effort: Pulmonary effort is normal. No respiratory distress.      Breath sounds: Normal breath sounds. No wheezing, rhonchi or rales.   Musculoskeletal:      Right lower leg: No edema.      Left lower leg: No edema.   Neurological:      General: No focal deficit present.      Mental Status: He is alert and oriented to person, place, and time.   Psychiatric:         Mood and Affect: Mood normal.         Behavior: Behavior normal.         Thought Content: Thought content normal.         Judgment: Judgment normal.       Ramon Hinson,

## 2024-03-20 ENCOUNTER — TELEPHONE (OUTPATIENT)
Dept: FAMILY MEDICINE CLINIC | Facility: CLINIC | Age: 59
End: 2024-03-20

## 2024-03-20 DIAGNOSIS — E78.2 MIXED HYPERLIPIDEMIA: Primary | ICD-10-CM

## 2024-03-20 RX ORDER — ROSUVASTATIN CALCIUM 10 MG/1
10 TABLET, COATED ORAL DAILY
Qty: 30 TABLET | Refills: 5 | Status: SHIPPED | OUTPATIENT
Start: 2024-03-20

## 2024-03-20 NOTE — TELEPHONE ENCOUNTER
Pt aware and states he was taking the Rosuvastatin and was not having any side effects but now taking the Atorvastatin again and is having side effects.  He would like to go back on the rosuvastatin

## 2024-03-20 NOTE — TELEPHONE ENCOUNTER
The atorvastatin is the same cholesterol pill that he has been on for years.  It is the same dose also.  He could try taking coenzyme Q 10 over-the-counter with it to help eliminate symptoms.  May take 100 or 200 mg daily

## 2024-03-20 NOTE — TELEPHONE ENCOUNTER
Pt seen 2 days ago and started on Atorvastatin.  Said he has already started having side effects like diarrhea, and not urinating as much.  Wants to go back to the old statin he was on.  Is that possible?

## 2024-05-06 ENCOUNTER — TELEPHONE (OUTPATIENT)
Dept: NEPHROLOGY | Facility: CLINIC | Age: 59
End: 2024-05-06

## 2024-05-15 ENCOUNTER — APPOINTMENT (OUTPATIENT)
Dept: LAB | Facility: MEDICAL CENTER | Age: 59
End: 2024-05-15
Payer: COMMERCIAL

## 2024-05-15 DIAGNOSIS — E55.9 VITAMIN D DEFICIENCY: ICD-10-CM

## 2024-05-15 DIAGNOSIS — Z87.39 HISTORY OF GOUT: ICD-10-CM

## 2024-05-15 DIAGNOSIS — N20.0 NEPHROLITHIASIS: ICD-10-CM

## 2024-05-15 DIAGNOSIS — N18.32 STAGE 3B CHRONIC KIDNEY DISEASE (HCC): ICD-10-CM

## 2024-05-15 LAB
25(OH)D3 SERPL-MCNC: 22.2 NG/ML (ref 30–100)
ALBUMIN SERPL BCP-MCNC: 4 G/DL (ref 3.5–5)
ALP SERPL-CCNC: 60 U/L (ref 34–104)
ALT SERPL W P-5'-P-CCNC: 18 U/L (ref 7–52)
ANION GAP SERPL CALCULATED.3IONS-SCNC: 11 MMOL/L (ref 4–13)
AST SERPL W P-5'-P-CCNC: 19 U/L (ref 13–39)
BACTERIA UR QL AUTO: ABNORMAL /HPF
BASOPHILS # BLD AUTO: 0.11 THOUSANDS/ÂΜL (ref 0–0.1)
BASOPHILS NFR BLD AUTO: 1 % (ref 0–1)
BILIRUB SERPL-MCNC: 0.37 MG/DL (ref 0.2–1)
BILIRUB UR QL STRIP: NEGATIVE
BUN SERPL-MCNC: 24 MG/DL (ref 5–25)
CALCIUM SERPL-MCNC: 9.1 MG/DL (ref 8.4–10.2)
CHLORIDE SERPL-SCNC: 106 MMOL/L (ref 96–108)
CHOLEST SERPL-MCNC: 232 MG/DL
CLARITY UR: CLEAR
CO2 SERPL-SCNC: 25 MMOL/L (ref 21–32)
COLOR UR: ABNORMAL
CREAT SERPL-MCNC: 1.94 MG/DL (ref 0.6–1.3)
CREAT UR-MCNC: 161.8 MG/DL
CREAT UR-MCNC: 161.8 MG/DL
EOSINOPHIL # BLD AUTO: 0.47 THOUSAND/ÂΜL (ref 0–0.61)
EOSINOPHIL NFR BLD AUTO: 5 % (ref 0–6)
ERYTHROCYTE [DISTWIDTH] IN BLOOD BY AUTOMATED COUNT: 13.7 % (ref 11.6–15.1)
EST. AVERAGE GLUCOSE BLD GHB EST-MCNC: 128 MG/DL
GFR SERPL CREATININE-BSD FRML MDRD: 37 ML/MIN/1.73SQ M
GLUCOSE P FAST SERPL-MCNC: 90 MG/DL (ref 65–99)
GLUCOSE UR STRIP-MCNC: NEGATIVE MG/DL
HBA1C MFR BLD: 6.1 %
HCT VFR BLD AUTO: 49.2 % (ref 36.5–49.3)
HDLC SERPL-MCNC: 39 MG/DL
HGB BLD-MCNC: 15.6 G/DL (ref 12–17)
HGB UR QL STRIP.AUTO: NEGATIVE
IMM GRANULOCYTES # BLD AUTO: 0.06 THOUSAND/UL (ref 0–0.2)
IMM GRANULOCYTES NFR BLD AUTO: 1 % (ref 0–2)
KETONES UR STRIP-MCNC: NEGATIVE MG/DL
LDLC SERPL CALC-MCNC: 155 MG/DL (ref 0–100)
LEUKOCYTE ESTERASE UR QL STRIP: ABNORMAL
LYMPHOCYTES # BLD AUTO: 2.08 THOUSANDS/ÂΜL (ref 0.6–4.47)
LYMPHOCYTES NFR BLD AUTO: 24 % (ref 14–44)
MAGNESIUM SERPL-MCNC: 2.1 MG/DL (ref 1.9–2.7)
MCH RBC QN AUTO: 31.2 PG (ref 26.8–34.3)
MCHC RBC AUTO-ENTMCNC: 31.7 G/DL (ref 31.4–37.4)
MCV RBC AUTO: 98 FL (ref 82–98)
MICROALBUMIN UR-MCNC: 9 MG/L
MICROALBUMIN/CREAT 24H UR: 6 MG/G CREATININE (ref 0–30)
MONOCYTES # BLD AUTO: 0.53 THOUSAND/ÂΜL (ref 0.17–1.22)
MONOCYTES NFR BLD AUTO: 6 % (ref 4–12)
NEUTROPHILS # BLD AUTO: 5.51 THOUSANDS/ÂΜL (ref 1.85–7.62)
NEUTS SEG NFR BLD AUTO: 63 % (ref 43–75)
NITRITE UR QL STRIP: NEGATIVE
NON-SQ EPI CELLS URNS QL MICRO: ABNORMAL /HPF
NRBC BLD AUTO-RTO: 0 /100 WBCS
OSMOLALITY UR: 585 MMOL/KG
PH UR STRIP.AUTO: 6 [PH]
PLATELET # BLD AUTO: 216 THOUSANDS/UL (ref 149–390)
PMV BLD AUTO: 11 FL (ref 8.9–12.7)
POTASSIUM SERPL-SCNC: 4.1 MMOL/L (ref 3.5–5.3)
PROT SERPL-MCNC: 7.1 G/DL (ref 6.4–8.4)
PROT UR STRIP-MCNC: NEGATIVE MG/DL
PROT UR-MCNC: 10 MG/DL
PROT/CREAT UR: 0.06 MG/G{CREAT} (ref 0–0.1)
RBC # BLD AUTO: 5 MILLION/UL (ref 3.88–5.62)
RBC #/AREA URNS AUTO: ABNORMAL /HPF
SODIUM SERPL-SCNC: 142 MMOL/L (ref 135–147)
SP GR UR STRIP.AUTO: 1.02 (ref 1–1.03)
TRIGL SERPL-MCNC: 191 MG/DL
URATE SERPL-MCNC: 6.5 MG/DL (ref 3.5–8.5)
UROBILINOGEN UR STRIP-ACNC: <2 MG/DL
WBC # BLD AUTO: 8.76 THOUSAND/UL (ref 4.31–10.16)
WBC #/AREA URNS AUTO: ABNORMAL /HPF

## 2024-05-15 PROCEDURE — 85025 COMPLETE CBC W/AUTO DIFF WBC: CPT

## 2024-05-15 PROCEDURE — 83735 ASSAY OF MAGNESIUM: CPT

## 2024-05-15 PROCEDURE — 82306 VITAMIN D 25 HYDROXY: CPT

## 2024-05-15 PROCEDURE — 80053 COMPREHEN METABOLIC PANEL: CPT

## 2024-05-15 PROCEDURE — 36415 COLL VENOUS BLD VENIPUNCTURE: CPT

## 2024-05-15 PROCEDURE — 84550 ASSAY OF BLOOD/URIC ACID: CPT

## 2024-05-15 RX ORDER — ALLOPURINOL 300 MG/1
TABLET ORAL
Qty: 90 TABLET | Refills: 1 | Status: SHIPPED | OUTPATIENT
Start: 2024-05-15

## 2024-05-16 DIAGNOSIS — R73.09 ELEVATED GLUCOSE: ICD-10-CM

## 2024-05-16 DIAGNOSIS — E78.2 MIXED HYPERLIPIDEMIA: ICD-10-CM

## 2024-05-16 DIAGNOSIS — N18.32 STAGE 3B CHRONIC KIDNEY DISEASE (HCC): Primary | ICD-10-CM

## 2024-05-16 DIAGNOSIS — I10 ESSENTIAL HYPERTENSION: ICD-10-CM

## 2024-05-16 RX ORDER — DAPAGLIFLOZIN 5 MG/1
5 TABLET, FILM COATED ORAL DAILY
Qty: 30 TABLET | Refills: 3 | Status: SHIPPED | OUTPATIENT
Start: 2024-05-16 | End: 2024-05-22 | Stop reason: ALTCHOICE

## 2024-05-16 RX ORDER — ROSUVASTATIN CALCIUM 20 MG/1
20 TABLET, COATED ORAL DAILY
Qty: 30 TABLET | Refills: 3 | Status: SHIPPED | OUTPATIENT
Start: 2024-05-16

## 2024-05-20 ENCOUNTER — OFFICE VISIT (OUTPATIENT)
Dept: NEPHROLOGY | Facility: CLINIC | Age: 59
End: 2024-05-20
Payer: COMMERCIAL

## 2024-05-20 VITALS
BODY MASS INDEX: 50.62 KG/M2 | WEIGHT: 315 LBS | HEIGHT: 66 IN | OXYGEN SATURATION: 96 % | DIASTOLIC BLOOD PRESSURE: 90 MMHG | SYSTOLIC BLOOD PRESSURE: 132 MMHG | HEART RATE: 71 BPM

## 2024-05-20 DIAGNOSIS — N18.32 STAGE 3B CHRONIC KIDNEY DISEASE (HCC): Primary | ICD-10-CM

## 2024-05-20 DIAGNOSIS — E66.01 MORBID OBESITY (HCC): ICD-10-CM

## 2024-05-20 DIAGNOSIS — N20.0 NEPHROLITHIASIS: ICD-10-CM

## 2024-05-20 DIAGNOSIS — I10 ESSENTIAL HYPERTENSION: ICD-10-CM

## 2024-05-20 DIAGNOSIS — Z87.39 HISTORY OF GOUT: ICD-10-CM

## 2024-05-20 PROCEDURE — 99214 OFFICE O/P EST MOD 30 MIN: CPT | Performed by: INTERNAL MEDICINE

## 2024-05-20 NOTE — PROGRESS NOTES
Assessment & Plan:    1. Stage 3b chronic kidney disease (HCC)  -     Uric acid; Future; Expected date: 10/23/2024  -     Urinalysis with microscopic; Future; Expected date: 10/23/2024  -     PTH, intact; Future; Expected date: 10/23/2024  -     Comprehensive metabolic panel; Future; Expected date: 10/23/2024  -     Albumin / creatinine urine ratio; Future; Expected date: 10/23/2024  -     Magnesium; Future; Expected date: 10/23/2024  -     Phosphorus; Future; Expected date: 10/23/2024  -     CBC and differential; Future; Expected date: 10/23/2024  -     Vitamin D 25 hydroxy; Future; Expected date: 10/23/2024  2. Morbid obesity (HCC)  3. Essential hypertension  4. Nephrolithiasis  5. 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  5/15/24 Cr 1.94 with eGFR 37 ML/MIN. Metabolic parameters stable.  Alb/cr ratio 6 mg/g cr.  Reviewed CKD stages, Cr and eGFR trends and eGFR meaning in simple terms.  Patient recently prescribed Farxiga, if proves to be cost prohibitive may look into Brenzaavy off label, although unclear if his insurance would cover without DM type 2 diagnosis. A1C 6.1 in prediabetic range.  Follow renal indices in 6 months.   Vitamin D insufficiency noted-start 2000 Iuday.  Check chemistry in 1 month following addition of Farxiga.  Goal BP<130/80.  Goal TCO2 >22.  Continue uric acid monitoring  Continue losartan for CKD progression.    2. Morbid obesity   Encourage weight loss and fu with weight management.  GLP addition would be helpful as he is at increased cardiovascular risk. Would defer to weight management in this regard.     3. Essential HTN   BP reasonably well controlled but DBP a little high continue to monitor, may improve with SGLT-2I addition.    4. Nephrolithiasis, ? Calcium vs uric acid.  Continue optimal uric acid management.  Maintain 2-3 gram sodium diet. Encourage hydration ~ ounces per day.  Denies recent  "stone attack. No recent stone risk profile.    5. History of gout, no recent flares.  Uric acid improving to 6.5, continue allopurinol 300mg/day. Check uric acid q 6 months.    The benefits, risks and alternatives to the treatment plan were discussed at this visit. Patient was advised of common adverse effects of any medical therapies prescribed. All questions were answered and discussed with the patient and any accompanying family members or caretakers.      Subjective:      Patient ID: Kevin Cao is a 58 y.o. male presenting for CKD management in the [place] office. Patient last seen 11/16/23. Patient has baseline 1.8-2.0 attributed to FSGS from DM/ potential EVERARDO from obstructive issues.    HPI    In the interim since last visit, denies any new medical conditions or surgeries. He is focused on losing weight and was prescribed Farxiga in the past couple days, has not started taking yet.    Does not check BG/BP at home.    He checks O2/HR which has been stable.    RAMÍREZ when walking out of bed which he attributes to his inactivity.    No hx thyroid cancer/ pancreatitic issues.    Denies passing a stones/ no flank pain. Denies LUTS.     Denies recent gout flare and is taking allopurinol for maintenance therapy.      The following portions of the patient's history were reviewed and updated as appropriate: allergies, current medications, past family history, past medical history, past social history, past surgical history, and problem list.    Review of Systems   Respiratory: Negative.     Cardiovascular: Negative.    Gastrointestinal: Negative.    Genitourinary: Negative.    Neurological: Negative.    All other systems reviewed and are negative.        Objective:      /90 (BP Location: Left arm, Patient Position: Sitting, Cuff Size: Large)   Pulse 71   Ht 5' 6\" (1.676 m)   Wt (!) 181 kg (399 lb 6.4 oz)   SpO2 96%   BMI 64.46 kg/m²          Physical Exam  Vitals reviewed.   Constitutional:       General: He is " not in acute distress.     Appearance: He is obese. He is not ill-appearing.   HENT:      Nose: Nose normal.      Mouth/Throat:      Mouth: Mucous membranes are moist.      Pharynx: Oropharynx is clear.   Cardiovascular:      Rate and Rhythm: Normal rate and regular rhythm.   Pulmonary:      Breath sounds: No wheezing, rhonchi or rales.   Abdominal:      Palpations: Abdomen is soft.      Tenderness: There is no abdominal tenderness. There is no guarding.   Musculoskeletal:      Right lower leg: No edema.   Skin:     General: Skin is warm.      Coloration: Skin is not jaundiced.      Findings: No bruising.   Neurological:      General: No focal deficit present.      Mental Status: He is alert and oriented to person, place, and time. Mental status is at baseline.      Cranial Nerves: No cranial nerve deficit.   Psychiatric:         Mood and Affect: Mood normal.         Behavior: Behavior normal.             Lab Results   Component Value Date     (L) 04/08/2015    SODIUM 142 05/15/2024    K 4.1 05/15/2024     05/15/2024    CO2 25 05/15/2024    ANIONGAP 7 04/08/2015    AGAP 11 05/15/2024    BUN 24 05/15/2024    CREATININE 1.94 (H) 05/15/2024    GLUC 99 09/14/2022    GLUF 90 05/15/2024    CALCIUM 9.1 05/15/2024    AST 19 05/15/2024    ALT 18 05/15/2024    ALKPHOS 60 05/15/2024    PROT 7.7 04/08/2015    TP 7.1 05/15/2024    BILITOT 0.7 04/08/2015    TBILI 0.37 05/15/2024    EGFR 37 05/15/2024      Lab Results   Component Value Date    CREATININE 1.94 (H) 05/15/2024    CREATININE 1.85 (H) 10/09/2023    CREATININE 1.90 (H) 01/30/2023    CREATININE 2.07 (H) 09/14/2022    CREATININE 2.10 (H) 08/19/2022    CREATININE 2.90 (H) 07/09/2022    CREATININE 2.52 (H) 06/30/2022    CREATININE 2.49 (H) 06/27/2022    CREATININE 2.24 (H) 06/22/2022    CREATININE 2.23 (H) 06/21/2022    CREATININE 2.23 (H) 06/20/2022    CREATININE 2.50 (H) 06/18/2022    CREATININE 2.04 (H) 06/14/2022    CREATININE 2.06 (H) 06/13/2022     "CREATININE 2.14 (H) 06/12/2022      Lab Results   Component Value Date    COLORU Light Yellow 05/15/2024    CLARITYU Clear 05/15/2024    SPECGRAV 1.016 05/15/2024    PHUR 6.0 05/15/2024    LEUKOCYTESUR Small (A) 05/15/2024    NITRITE Negative 05/15/2024    PROTEIN UA Negative 05/15/2024    GLUCOSEU Negative 05/15/2024    KETONESU Negative 05/15/2024    UROBILINOGEN <2.0 05/15/2024    BILIRUBINUR Negative 05/15/2024    BLOODU Negative 05/15/2024    RBCUA None Seen 05/15/2024    WBCUA 4-10 (A) 05/15/2024    EPIS Occasional 05/15/2024    BACTERIA None Seen 05/15/2024      No results found for: \"LABPROT\"  No results found for: \"MICROALBUR\", \"BGRK77WRX\"  Lab Results   Component Value Date    WBC 8.76 05/15/2024    HGB 15.6 05/15/2024    HCT 49.2 05/15/2024    MCV 98 05/15/2024     05/15/2024      Lab Results   Component Value Date    HGB 15.6 05/15/2024    HGB 15.1 01/30/2023    HGB 13.2 09/14/2022    HGB 12.0 08/19/2022    HGB 7.4 (L) 06/30/2022      Lab Results   Component Value Date    IRON 80 08/19/2022    TIBC 251 08/19/2022    FERRITIN 312 08/19/2022      No results found for: \"PTHCALCIUM\", \"UOLZ27YKLWIV\", \"PHOSPHORUS\"   Lab Results   Component Value Date    CHOLESTEROL 232 (H) 05/15/2024    HDL 39 (L) 05/15/2024    LDLCALC 155 (H) 05/15/2024    TRIG 191 (H) 05/15/2024      Lab Results   Component Value Date    URICACID 6.5 05/15/2024      Lab Results   Component Value Date    HGBA1C 6.1 (H) 05/15/2024      No results found for: \"TSHANTIBODY\", \"P2YRGAF\", \"FREET4\"   Lab Results   Component Value Date    SHAKIR Negative 01/30/2023      Lab Results   Component Value Date    PROT 7.7 04/08/2015        Portions of the record may have been created with voice recognition software. Occasional wrong word or \"sound a like\" substitutions may have occurred due to the inherent limitations of voice recognition software. Read the chart carefully and recognize, using context, where substitutions have occurred. If you have any " questions, please contact the dictating provider.

## 2024-05-20 NOTE — PATIENT INSTRUCTIONS
If you find that Farxiga is still expensive despite saving.   There is a medication Brenzavvy that is 50 dollar a month that is just like Farxiga, if Farxiga is not affordable. Call the office if needed.   Recommend 2000 iu Vitamin D3 a day.   Check blood work in 1 month after starting Farxiga or other medication  Your kidney function is stable at 37%.  Talk with weight management about a medication like Wegovy.   Recommend drinking 100 ounces per day, little bit less than 1 galloon.

## 2024-05-21 ENCOUNTER — TELEPHONE (OUTPATIENT)
Age: 59
End: 2024-05-21

## 2024-05-21 NOTE — TELEPHONE ENCOUNTER
PCP office called stating the pt relayed the Farxiga is too expensive and would like to try Brenzavvy which is less expensive. Please review and contact pt.

## 2024-05-21 NOTE — TELEPHONE ENCOUNTER
Patient called stated he had seen Venessa Siddiqui Yesterday ( Nephrologist ) Farxiga is still expensive for patient. He would like to try Brenzavvy since it is less expensive. Patient is now taking VitD3 2000 IU daily  Patient stated Venessa can prescribe medication above. Patient is in agreeable with starting on Brenzavvy.    Patient wanted to inform his provider.

## 2024-05-22 ENCOUNTER — TELEPHONE (OUTPATIENT)
Age: 59
End: 2024-05-22

## 2024-05-22 DIAGNOSIS — N18.32 STAGE 3B CHRONIC KIDNEY DISEASE (HCC): Primary | ICD-10-CM

## 2024-05-22 RX ORDER — BEXAGLIFLOZIN 20 MG
20 TABLET ORAL EVERY MORNING
Qty: 30 TABLET | Refills: 1 | Status: SHIPPED | OUTPATIENT
Start: 2024-05-22 | End: 2024-05-30 | Stop reason: ALTCHOICE

## 2024-05-22 NOTE — PROGRESS NOTES
Brenzavvy prescription sent in to patient's pharmacy.    Trilobed Flap Text: The defect edges were debeveled with a #15 scalpel blade.  Given the location of the defect and the proximity to free margins a trilobed flap was deemed most appropriate.  Using a sterile surgical marker, an appropriate trilobed flap drawn around the defect.    The area thus outlined was incised deep to adipose tissue with a #15 scalpel blade.  The skin margins were undermined to an appropriate distance in all directions utilizing iris scissors.

## 2024-05-22 NOTE — TELEPHONE ENCOUNTER
PA for BEXAGLIFLOZIN    Submitted via    []CMM-KEY   [x][employer]-Case ID #  Case ID: [id number]   []Faxed to plan   []Other website   []Phone call Case ID #     Office notes sent, clinical questions answered. Awaiting determination    Turnaround time for your insurance to make a decision on your Prior Authorization can take 7-21 business days.

## 2024-05-23 NOTE — TELEPHONE ENCOUNTER
Plan has denied Bexagliflozin. They want trial and failure of both Jardiance and Farxiga.    Denial in Media for ref

## 2024-05-23 NOTE — TELEPHONE ENCOUNTER
Indu from [facility] called back to give an update on the prior auth for Liseth. She said it was denied and that they are faxing the denial letter over.

## 2024-05-24 ENCOUNTER — OFFICE VISIT (OUTPATIENT)
Dept: FAMILY MEDICINE CLINIC | Facility: CLINIC | Age: 59
End: 2024-05-24
Payer: COMMERCIAL

## 2024-05-24 VITALS
WEIGHT: 315 LBS | HEIGHT: 66 IN | SYSTOLIC BLOOD PRESSURE: 172 MMHG | TEMPERATURE: 89 F | BODY MASS INDEX: 50.62 KG/M2 | OXYGEN SATURATION: 97 % | HEART RATE: 89 BPM | DIASTOLIC BLOOD PRESSURE: 100 MMHG

## 2024-05-24 DIAGNOSIS — I10 ESSENTIAL HYPERTENSION: Primary | ICD-10-CM

## 2024-05-24 DIAGNOSIS — N18.32 STAGE 3B CHRONIC KIDNEY DISEASE (HCC): ICD-10-CM

## 2024-05-24 DIAGNOSIS — E78.2 MIXED HYPERLIPIDEMIA: ICD-10-CM

## 2024-05-24 PROCEDURE — 99214 OFFICE O/P EST MOD 30 MIN: CPT | Performed by: FAMILY MEDICINE

## 2024-05-24 RX ORDER — LOSARTAN POTASSIUM 50 MG/1
50 TABLET ORAL DAILY
Qty: 30 TABLET | Refills: 5 | Status: SHIPPED | OUTPATIENT
Start: 2024-05-24

## 2024-05-24 NOTE — PROGRESS NOTES
Ambulatory Visit  Name: Kevin Cao      : 1965      MRN: [id number]  Encounter Provider: Ramon Hinson DO  Encounter Date: 2024   Encounter department: [facility]    Assessment & Plan   For his blood pressure, I will start him on losartan 50 mg a day.  He will decrease his salt intake.  Follow-up with other specialist as scheduled.  Follow-up here in 3 months or.  1. Essential hypertension  -     losartan (Cozaar) 50 mg tablet; Take 1 tablet (50 mg total) by mouth daily  2. Stage 3b chronic kidney disease (HCC)  3. Mixed hyperlipidemia         History of Present Illness     Patient here today for follow-up.  Denies any chest pain or shortness of breath.  He did see the kidney doctor who just prescribed him Bexagliflozin, but he has not picked it up yet.  He admits that he had a lot of salt intake yesterday, he ate an Italian hoagie and 2 packs of cheese its.      Review of Systems   Constitutional: Negative.    Respiratory: Negative.     Cardiovascular: Negative.    Gastrointestinal: Negative.    Genitourinary: Negative.      Past Medical History:   Diagnosis Date   • Acute kidney injury superimposed on CKD stage 3 2019   • Arthritis    • Chronic kidney disease    • Gout    • Hyperlipidemia    • Hypertension    • Kidney stone    • Left elbow pain 2022   • Obstructive pyelonephritis    • Sepsis (HCC)      Past Surgical History:   Procedure Laterality Date   • FL RETROGRADE PYELOGRAM  2019   • FL RETROGRADE PYELOGRAM  2019   • FL RETROGRADE PYELOGRAM  2022   • IR NEPHROSTOMY TUBE PLACEMENT  05/10/2022   • KIDNEY STONE SURGERY  8/3/2019   • KIDNEY SURGERY     • NO PAST SURGERIES  2015    Last assessed   • TN CYSTO BLADDER W/URETERAL CATHETERIZATION Right 2019    Procedure: CYSTOSCOPY RETROGRADE PYELOGRAM WITH INSERTION STENT URETERAL;  Surgeon: Eris Biggs MD;  [facility];  Service: Urology   • TN CYSTO BLADDER W/URETERAL  CATHETERIZATION Left 04/22/2022    Procedure: CYSTOSCOPY RETROGRADE PYELOGRAM WITH INSERTION STENT URETERAL;  Surgeon: Tony Batista MD;  [facility];  Service: Urology   • GA CYSTO/URETERO W/LITHOTRIPSY &INDWELL STENT INSRT Right 09/26/2019    Procedure: CYSTOSCOPY URETEROSCOPY WITH RETROGRADE PYELOGRAM AND INSERTION STENT URETERAL;  Surgeon: Oj Unger MD;  Location: [facility];  Service: Urology   • GA CYSTO/URETERO W/LITHOTRIPSY &INDWELL STENT INSRT Left 06/09/2022    Procedure: CYSTOSCOPY LEFT URETEROSCOPY/LITHOTRIPSY HOLMIUM LASER,  AND EXCHANGE URETERAL STENT URETERAL, REMOVAL NEPHROSTOMY TUBE;  Surgeon: Festus Ley MD;  [facility];  Service: Urology     Family History   Problem Relation Age of Onset   • Cancer Mother         Cervical cancer survivor, no more problems.   • Heart disease Father    • No Known Problems Sister      Social History     Tobacco Use   • Smoking status: Never   • Smokeless tobacco: Never   • Tobacco comments:     never smoked.   Vaping Use   • Vaping status: Never Used   Substance and Sexual Activity   • Alcohol use: Never     Comment: rarely   • Drug use: No   • Sexual activity: Not Currently     Current Outpatient Medications on File Prior to Visit   Medication Sig   • acetaminophen (TYLENOL) 325 mg tablet Take 2 tablets (650 mg total) by mouth every 6 (six) hours as needed for mild pain, headaches or fever   • allopurinol (ZYLOPRIM) 300 mg tablet TAKE 1 TABLET BY MOUTH EVERY DAY   • cholecalciferol (VITAMIN D3) 400 units tablet Take 400 Units by mouth daily OTC   • metoprolol succinate (TOPROL-XL) 100 mg 24 hr tablet TAKE 1 TABLET BY MOUTH EVERY DAY   • rosuvastatin (CRESTOR) 20 MG tablet Take 1 tablet (20 mg total) by mouth daily   • sertraline (ZOLOFT) 25 mg tablet TAKE 1 TABLET (25 MG TOTAL) BY MOUTH DAILY.   • tamsulosin (FLOMAX) 0.4 mg TAKE 1 CAPSULE BY MOUTH EVERY DAY WITH DINNER   • Bexagliflozin 20 MG TABS Take 20 mg by mouth every morning  "(Patient not taking: Reported on 5/24/2024)     No Known Allergies  Immunization History   Administered Date(s) Administered   • COVID-19 J&J (Crowdlinker) vaccine 0.5 mL 11/05/2021     Objective     BP (!) 172/100   Pulse 89   Temp (!) 89 °F (31.7 °C)   Ht 5' 6\" (1.676 m)   Wt (!) 180 kg (396 lb 12.8 oz)   SpO2 97%   BMI 64.05 kg/m²     Physical Exam  Vitals reviewed.   Constitutional:       General: He is not in acute distress.     Appearance: Normal appearance. He is well-developed. He is not diaphoretic.   HENT:      Head: Normocephalic and atraumatic.   Eyes:      Conjunctiva/sclera: Conjunctivae normal.   Cardiovascular:      Rate and Rhythm: Normal rate and regular rhythm.      Heart sounds: Normal heart sounds. No murmur heard.     No friction rub. No gallop.   Pulmonary:      Effort: Pulmonary effort is normal. No respiratory distress.      Breath sounds: Normal breath sounds. No wheezing, rhonchi or rales.   Musculoskeletal:      Right lower leg: No edema.      Left lower leg: No edema.   Neurological:      General: No focal deficit present.      Mental Status: He is alert and oriented to person, place, and time.   Psychiatric:         Mood and Affect: Mood normal.         Behavior: Behavior normal.         Thought Content: Thought content normal.         Judgment: Judgment normal.       Administrative Statements         "

## 2024-05-24 NOTE — TELEPHONE ENCOUNTER
Kevin contacted Dr yang via [employer] regarding the denial and asked for her advice on if there is another medication she can suggest since his insurance will not cover the medication requested at present.

## 2024-05-30 DIAGNOSIS — N18.30 STAGE 3 CHRONIC KIDNEY DISEASE, UNSPECIFIED WHETHER STAGE 3A OR 3B CKD (HCC): Primary | ICD-10-CM

## 2024-06-12 DIAGNOSIS — E78.2 MIXED HYPERLIPIDEMIA: ICD-10-CM

## 2024-06-12 RX ORDER — ROSUVASTATIN CALCIUM 20 MG/1
20 TABLET, COATED ORAL DAILY
Qty: 90 TABLET | Refills: 1 | Status: SHIPPED | OUTPATIENT
Start: 2024-06-12

## 2024-06-16 DIAGNOSIS — I10 ESSENTIAL HYPERTENSION: ICD-10-CM

## 2024-06-16 RX ORDER — LOSARTAN POTASSIUM 50 MG/1
50 TABLET ORAL DAILY
Qty: 90 TABLET | Refills: 1 | Status: SHIPPED | OUTPATIENT
Start: 2024-06-16

## 2024-06-26 DIAGNOSIS — I10 ESSENTIAL HYPERTENSION: ICD-10-CM

## 2024-06-26 RX ORDER — METOPROLOL SUCCINATE 100 MG/1
TABLET, EXTENDED RELEASE ORAL
Qty: 90 TABLET | Refills: 1 | Status: SHIPPED | OUTPATIENT
Start: 2024-06-26

## 2024-07-06 DIAGNOSIS — N11.1 OBSTRUCTIVE PYELONEPHRITIS: ICD-10-CM

## 2024-07-06 DIAGNOSIS — N18.30 CHRONIC KIDNEY DISEASE, STAGE 3 (HCC): ICD-10-CM

## 2024-07-06 RX ORDER — TAMSULOSIN HYDROCHLORIDE 0.4 MG/1
CAPSULE ORAL
Qty: 90 CAPSULE | Refills: 1 | Status: SHIPPED | OUTPATIENT
Start: 2024-07-06

## 2024-07-15 ENCOUNTER — OFFICE VISIT (OUTPATIENT)
Dept: BARIATRICS | Facility: CLINIC | Age: 59
End: 2024-07-15
Payer: COMMERCIAL

## 2024-07-15 VITALS
BODY MASS INDEX: 50.62 KG/M2 | WEIGHT: 315 LBS | TEMPERATURE: 98.5 F | OXYGEN SATURATION: 95 % | HEIGHT: 66 IN | HEART RATE: 71 BPM | SYSTOLIC BLOOD PRESSURE: 132 MMHG | DIASTOLIC BLOOD PRESSURE: 86 MMHG | RESPIRATION RATE: 18 BRPM

## 2024-07-15 DIAGNOSIS — I10 ESSENTIAL HYPERTENSION: ICD-10-CM

## 2024-07-15 DIAGNOSIS — N18.32 STAGE 3B CHRONIC KIDNEY DISEASE (HCC): ICD-10-CM

## 2024-07-15 DIAGNOSIS — R73.03 PREDIABETES: ICD-10-CM

## 2024-07-15 DIAGNOSIS — E78.2 MIXED HYPERLIPIDEMIA: ICD-10-CM

## 2024-07-15 DIAGNOSIS — E66.01 MORBID OBESITY WITH BMI OF 60.0-69.9, ADULT (HCC): Primary | ICD-10-CM

## 2024-07-15 PROCEDURE — 99214 OFFICE O/P EST MOD 30 MIN: CPT | Performed by: PHYSICIAN ASSISTANT

## 2024-07-15 NOTE — PROGRESS NOTES
Assessment/Plan:    Morbid obesity with BMI of 60.0-69.9, adult (HCC)  -Patient is pursuing Conservative Program  -Initial weight loss goal of 5-10% weight loss for improved health  -Not interested in bariatric surgery  -Options for weight loss medications limited due to cost and concomitant  conditions  -Screening labs: CMP reviewed from 1/30/23- eGFR 38  -AVOID metformin and Topamax: kidney stone, eGFR  Patient denies personal and family history of MEN2 tumors and medullary thyroid/thyroid carcinoma. Patient denies personal history of pancreatitis. Would benefit from Ozempic for CV prevention. High CV risks: worsening cholesterol, HTN, prediabetes, CK. He prefers to AVOID needles  -dietary recall reviewed, suggestions provided      Initial: 380  Current: 405 (+20 lbs since last visit)   Change: +25  Goal: under 300 lbs  Discussed possible benefits of injectables on weight and health - he prefers to avoid; Not interested in bariatric surgery, feels responded well to [place] weight loss and would like to find something to follow that is similar that he would experience weight loss. He is concerned for weight regain with surgery as he usually has weight regain following his previous attempts. We discussed the metabolic effects form surgery.     Follow up in approximately 6 months with Non-Surgical Physician/Advanced Practitioner.    Goals:  Food log (ie.) [email or web address],etc. baritastic  No sugary beverages. At least 64oz of water daily.  Increase physical activity by 10 minutes daily. Gradually increase physical activity to a goal of 5 days per week for 30 minutes of MODERATE intensity PLUS 2 days per week of FULL BODY resistance training  5-10 servings of fruits and vegetables per day and 25-35 grams of dietary fiber per day, gradually increasing     Diagnoses and all orders for this visit:    Morbid obesity with BMI of 60.0-69.9, adult (HCC)    Stage 3b chronic kidney  "disease (HCC)    Essential hypertension    Prediabetes    Mixed hyperlipidemia        Subjective:   Chief Complaint   Patient presents with    Follow-up     Follow up for weight management.     Patient ID: Kevin Cao  is a 59 y.o. male with excess weight/obesity here to pursue weight management.  Patient is pursuing Conservative Program.   HPI  The patient presents for [facility] follow up.     Food logging:    B: 3 eggs   S: denies  L: skips  S: denies  D: protein +/- potato/bread +/- veg OR tomato sandwich; Friday has pizza (4 slices)   S: ice cream or frozen yogurt     Exercise: no limited - pain, tries to walk through stores, cut grass   Hydration: 1 gallon of water per day (adds zero calorie packs)   Denies alcohol     The following portions of the patient's history were reviewed and updated as appropriate: allergies, current medications, past family history, past medical history, past social history, past surgical history, and problem list.    Review of Systems   Psychiatric/Behavioral:          Doesn't find the Zoloft effective for helping him feeling sad with things he watches on TV; denies SI/HI- recommend f/u with PCP       Objective:    /86 (BP Location: Left arm, Patient Position: Sitting, Cuff Size: Large)   Pulse 71   Temp 98.5 °F (36.9 °C) (Temporal)   Resp 18   Ht 5' 6\" (1.676 m)   Wt (!) 184 kg (405 lb 9.6 oz)   SpO2 95%   BMI 65.47 kg/m²      Physical Exam  Vitals and nursing note reviewed.     Constitutional   General appearance: Abnormal.  well developed and obese.   Pulmonary   Respiratory effort: No increased work of breathing or signs of respiratory distress.    Abdomen   Abdomen: Abnormal.  The abdomen was obese.   Musculoskeletal   Gait and station: ambulates with cane   Psychiatric   Orientation to person, place and time: Normal.    Affect: appropriate    "

## 2024-07-15 NOTE — PATIENT INSTRUCTIONS
If choosing starch pick whole grain/complex carbs and keep to 1/2-3/4 cup: oatmeal, brown rice, quinoa, whole wheat pasta, potatoes, sweet potato, chickpea noodles, red lentil noodles  Measure all portions: creamers, sugars, cooking oils/butters, condiments, dressings, etc and log calories    Increase vegetables - try to have at dinner, consider adding vegetable or fruit to breakfast  Try having a fruit and/or vegetable midday- try to have a protein with it:  greek yogurt, meat, string cheese, 1 tbsp of peanut butter

## 2024-07-15 NOTE — ASSESSMENT & PLAN NOTE
-Patient is pursuing Conservative Program  -Initial weight loss goal of 5-10% weight loss for improved health  -Not interested in bariatric surgery  -Options for weight loss medications limited due to cost and concomitant  conditions  -Screening labs: CMP reviewed from 1/30/23- eGFR 38  -AVOID metformin and Topamax: kidney stone, eGFR  Patient denies personal and family history of MEN2 tumors and medullary thyroid/thyroid carcinoma. Patient denies personal history of pancreatitis. Would benefit from Ozempic for CV prevention. High CV risks: worsening cholesterol, HTN, prediabetes, CK. He prefers to AVOID needles  -dietary recall reviewed, suggestions provided      Initial: 380  Current: 405 (+20 lbs since last visit)   Change: +25  Goal: under 300 lbs

## 2024-09-29 DIAGNOSIS — Z87.39 HISTORY OF GOUT: ICD-10-CM

## 2024-09-30 RX ORDER — ALLOPURINOL 300 MG/1
TABLET ORAL
Qty: 90 TABLET | Refills: 1 | Status: SHIPPED | OUTPATIENT
Start: 2024-09-30

## 2024-10-09 ENCOUNTER — TELEPHONE (OUTPATIENT)
Age: 59
End: 2024-10-09

## 2024-11-06 ENCOUNTER — TELEPHONE (OUTPATIENT)
Dept: NEPHROLOGY | Facility: CLINIC | Age: 59
End: 2024-11-06

## 2024-11-08 ENCOUNTER — APPOINTMENT (OUTPATIENT)
Dept: LAB | Facility: MEDICAL CENTER | Age: 59
End: 2024-11-08
Payer: MEDICARE

## 2024-11-08 DIAGNOSIS — N18.32 STAGE 3B CHRONIC KIDNEY DISEASE (HCC): ICD-10-CM

## 2024-11-08 LAB
25(OH)D3 SERPL-MCNC: 19.9 NG/ML (ref 30–100)
ALBUMIN SERPL BCG-MCNC: 4 G/DL (ref 3.5–5)
ALP SERPL-CCNC: 62 U/L (ref 34–104)
ALT SERPL W P-5'-P-CCNC: 28 U/L (ref 7–52)
ANION GAP SERPL CALCULATED.3IONS-SCNC: 10 MMOL/L (ref 4–13)
AST SERPL W P-5'-P-CCNC: 22 U/L (ref 13–39)
BACTERIA UR QL AUTO: ABNORMAL /HPF
BASOPHILS # BLD AUTO: 0.08 THOUSANDS/ÂΜL (ref 0–0.1)
BASOPHILS NFR BLD AUTO: 1 % (ref 0–1)
BILIRUB SERPL-MCNC: 0.53 MG/DL (ref 0.2–1)
BILIRUB UR QL STRIP: NEGATIVE
BUN SERPL-MCNC: 24 MG/DL (ref 5–25)
CALCIUM SERPL-MCNC: 9.3 MG/DL (ref 8.4–10.2)
CHLORIDE SERPL-SCNC: 104 MMOL/L (ref 96–108)
CLARITY UR: CLEAR
CO2 SERPL-SCNC: 24 MMOL/L (ref 21–32)
COLOR UR: ABNORMAL
CREAT SERPL-MCNC: 1.84 MG/DL (ref 0.6–1.3)
CREAT UR-MCNC: 143 MG/DL
EOSINOPHIL # BLD AUTO: 0.49 THOUSAND/ÂΜL (ref 0–0.61)
EOSINOPHIL NFR BLD AUTO: 5 % (ref 0–6)
ERYTHROCYTE [DISTWIDTH] IN BLOOD BY AUTOMATED COUNT: 13.8 % (ref 11.6–15.1)
GFR SERPL CREATININE-BSD FRML MDRD: 39 ML/MIN/1.73SQ M
GLUCOSE P FAST SERPL-MCNC: 180 MG/DL (ref 65–99)
GLUCOSE UR STRIP-MCNC: NEGATIVE MG/DL
HCT VFR BLD AUTO: 48 % (ref 36.5–49.3)
HGB BLD-MCNC: 15 G/DL (ref 12–17)
HGB UR QL STRIP.AUTO: NEGATIVE
IMM GRANULOCYTES # BLD AUTO: 0.05 THOUSAND/UL (ref 0–0.2)
IMM GRANULOCYTES NFR BLD AUTO: 1 % (ref 0–2)
KETONES UR STRIP-MCNC: NEGATIVE MG/DL
LEUKOCYTE ESTERASE UR QL STRIP: ABNORMAL
LYMPHOCYTES # BLD AUTO: 1.71 THOUSANDS/ÂΜL (ref 0.6–4.47)
LYMPHOCYTES NFR BLD AUTO: 18 % (ref 14–44)
MAGNESIUM SERPL-MCNC: 1.8 MG/DL (ref 1.9–2.7)
MCH RBC QN AUTO: 31.1 PG (ref 26.8–34.3)
MCHC RBC AUTO-ENTMCNC: 31.3 G/DL (ref 31.4–37.4)
MCV RBC AUTO: 99 FL (ref 82–98)
MICROALBUMIN UR-MCNC: 8.8 MG/L
MICROALBUMIN/CREAT 24H UR: 6 MG/G CREATININE (ref 0–30)
MONOCYTES # BLD AUTO: 0.55 THOUSAND/ÂΜL (ref 0.17–1.22)
MONOCYTES NFR BLD AUTO: 6 % (ref 4–12)
MUCOUS THREADS UR QL AUTO: ABNORMAL
NEUTROPHILS # BLD AUTO: 6.67 THOUSANDS/ÂΜL (ref 1.85–7.62)
NEUTS SEG NFR BLD AUTO: 69 % (ref 43–75)
NITRITE UR QL STRIP: NEGATIVE
NON-SQ EPI CELLS URNS QL MICRO: ABNORMAL /HPF
NRBC BLD AUTO-RTO: 0 /100 WBCS
PH UR STRIP.AUTO: 6 [PH]
PHOSPHATE SERPL-MCNC: 3.2 MG/DL (ref 2.7–4.5)
PLATELET # BLD AUTO: 208 THOUSANDS/UL (ref 149–390)
PMV BLD AUTO: 10.9 FL (ref 8.9–12.7)
POTASSIUM SERPL-SCNC: 4.2 MMOL/L (ref 3.5–5.3)
PROT SERPL-MCNC: 7 G/DL (ref 6.4–8.4)
PROT UR STRIP-MCNC: ABNORMAL MG/DL
PTH-INTACT SERPL-MCNC: 56.9 PG/ML (ref 12–88)
RBC # BLD AUTO: 4.83 MILLION/UL (ref 3.88–5.62)
RBC #/AREA URNS AUTO: ABNORMAL /HPF
SODIUM SERPL-SCNC: 138 MMOL/L (ref 135–147)
SP GR UR STRIP.AUTO: 1.02 (ref 1–1.03)
URATE SERPL-MCNC: 6.4 MG/DL (ref 3.5–8.5)
UROBILINOGEN UR STRIP-ACNC: <2 MG/DL
WBC # BLD AUTO: 9.55 THOUSAND/UL (ref 4.31–10.16)
WBC #/AREA URNS AUTO: ABNORMAL /HPF

## 2024-11-08 PROCEDURE — 85025 COMPLETE CBC W/AUTO DIFF WBC: CPT

## 2024-11-08 PROCEDURE — 82043 UR ALBUMIN QUANTITATIVE: CPT

## 2024-11-08 PROCEDURE — 83735 ASSAY OF MAGNESIUM: CPT

## 2024-11-08 PROCEDURE — 83970 ASSAY OF PARATHORMONE: CPT

## 2024-11-08 PROCEDURE — 80053 COMPREHEN METABOLIC PANEL: CPT

## 2024-11-08 PROCEDURE — 82570 ASSAY OF URINE CREATININE: CPT

## 2024-11-08 PROCEDURE — 84550 ASSAY OF BLOOD/URIC ACID: CPT

## 2024-11-08 PROCEDURE — 81001 URINALYSIS AUTO W/SCOPE: CPT

## 2024-11-08 PROCEDURE — 82306 VITAMIN D 25 HYDROXY: CPT

## 2024-11-08 PROCEDURE — 84100 ASSAY OF PHOSPHORUS: CPT

## 2024-11-08 PROCEDURE — 36415 COLL VENOUS BLD VENIPUNCTURE: CPT

## 2024-11-11 ENCOUNTER — TELEPHONE (OUTPATIENT)
Age: 59
End: 2024-11-11

## 2024-11-11 NOTE — TELEPHONE ENCOUNTER
I called and spoke with Kevin regarding the following:      ----- Message from Venessa Siddiqui DO sent at 11/11/2024  9:48 AM EST -----  Please call patient, please ask about UTI symptoms, Remainder of labs will be discussed at upcoming visit      He is aware of urine results. He denies any symptoms of a UTI. He is aware remainder of labs will be discussed at his upcoming appt.

## 2024-11-15 ENCOUNTER — OFFICE VISIT (OUTPATIENT)
Dept: NEPHROLOGY | Facility: CLINIC | Age: 59
End: 2024-11-15
Payer: MEDICARE

## 2024-11-15 VITALS
WEIGHT: 315 LBS | BODY MASS INDEX: 50.62 KG/M2 | RESPIRATION RATE: 16 BRPM | OXYGEN SATURATION: 97 % | SYSTOLIC BLOOD PRESSURE: 137 MMHG | TEMPERATURE: 99.5 F | HEART RATE: 89 BPM | HEIGHT: 66 IN | DIASTOLIC BLOOD PRESSURE: 89 MMHG

## 2024-11-15 DIAGNOSIS — N18.32 STAGE 3B CHRONIC KIDNEY DISEASE (HCC): ICD-10-CM

## 2024-11-15 DIAGNOSIS — E55.9 VITAMIN D DEFICIENCY: Primary | ICD-10-CM

## 2024-11-15 PROCEDURE — 99214 OFFICE O/P EST MOD 30 MIN: CPT

## 2024-11-15 RX ORDER — CHOLECALCIFEROL (VITAMIN D3) 125 MCG
1 CAPSULE ORAL DAILY
Qty: 30 CAPSULE | Refills: 2 | Status: SHIPPED | OUTPATIENT
Start: 2024-11-15

## 2024-11-15 NOTE — PROGRESS NOTES
OFFICE FOLLOW UP - Nephrology   Kevin Cao 59 y.o. male MRN: [id number]         Interim HPI:   Kevin Cao has a PMH of obesity, hypertension, nephrolithiasis, gout and returns today in the renal clinic for the continued management of CKD.   Patient was last seen on May 20, 2024 with Dr. Siddiqui and was stable from renal standpoint. Since the last visit, there has been no ER visits or hospitilizations. Patient has no complaints at this time and is feeling well. Patient denies any chest pain, shortness of breath or swelling. The last blood work was done on 11/8/24  which we have reviewed together.        ASSESSMENT and PLAN:  Kevin was seen today for chronic kidney disease.    Diagnoses and all orders for this visit:    Vitamin D deficiency  -     Cholecalciferol (Vitamin D) 125 MCG (5000 UT) CAPS; Take 1 capsule by mouth daily    Stage 3b chronic kidney disease (HCC)  -     CBC; Future  -     Comprehensive metabolic panel; Future  -     Magnesium; Future  -     Phosphorus; Future  -     Urinalysis with microscopic; Future  -     Vitamin D 25 hydroxy; Future  -     Albumin / creatinine urine ratio; Future        Chronic kidney disease stage 3:  Etiology: Obesity, hypertension, diabetes, obstruction uropathy  Baseline creatinine 1.8 to 2 mg/dL  Current creatinine 1.84 mg/dL  Avoid NSAIDs  Urine albumin creatinine ratio 6 mg/g creatinine    Hypertension:  Current /89  Currently on losartan 50 mg daily, metoprolol 100 mg daily  Low sodium diet recommended  Home BP monitoring recommended  Recommend diet and low impact exercise weight loss and health benefits    Vitamin D deficiency  Patient was started on 2000 units per day at last visit. He is still deficient and will start on 5000 units per day.     Nephrolithiasis  No recent stone attacks. Continue with adequate hydration, low sodium diet, low oxalate diet    Chronic gout  Remains on allopurinol 300 mg daily    Acid/Base/Electrolytes:  Hypomagnesemia - educated  patient will increase intake of magnesium rich foods.       Age related screening:   Your primary caregiver may do yearly screening for colorectal cancer. It is recommended in all men and women over 45 years old. You may have screening earlier if you have colon disease or a family history of colorectal cancer.       Patient Instructions   It was nice seeing you today. Your kidney function is stable. Please follow up with us in 6 months. I have ordered lab work for you to get done before then. In the meantime, please avoid NSAIDs and have a healthy diet and exercise.  Start taking 2000 units vitamin D daily, then start the 5000 units daily. For your low magnesium level, start to eat more green leafy vegetables, nuts and legumes.           ROS:   Review of Systems   Constitutional:  Negative for chills and fever.   HENT:  Negative for rhinorrhea and sore throat.    Respiratory:  Negative for cough and shortness of breath.    Cardiovascular:  Negative for chest pain and palpitations.   Gastrointestinal:  Negative for abdominal pain, diarrhea and nausea.   Genitourinary:  Negative for dysuria and hematuria.   Neurological:  Negative for dizziness and headaches.        Allergies: Patient has no known allergies.    Medications:   Current Outpatient Medications:     acetaminophen (TYLENOL) 325 mg tablet, Take 2 tablets (650 mg total) by mouth every 6 (six) hours as needed for mild pain, headaches or fever, Disp: 30 tablet, Rfl: 0    allopurinol (ZYLOPRIM) 300 mg tablet, TAKE 1 TABLET BY MOUTH EVERY DAY, Disp: 90 tablet, Rfl: 1    Cholecalciferol (Vitamin D) 125 MCG (5000 UT) CAPS, Take 1 capsule by mouth daily, Disp: 30 capsule, Rfl: 2    cholecalciferol (VITAMIN D3) 400 units tablet, Take 400 Units by mouth daily OTC, Disp: , Rfl:     losartan (COZAAR) 50 mg tablet, TAKE 1 TABLET BY MOUTH EVERY DAY, Disp: 90 tablet, Rfl: 1    metoprolol succinate (TOPROL-XL) 100 mg 24 hr tablet, TAKE 1 TABLET BY MOUTH EVERY DAY, Disp: 90  tablet, Rfl: 1    rosuvastatin (CRESTOR) 20 MG tablet, TAKE 1 TABLET BY MOUTH EVERY DAY, Disp: 90 tablet, Rfl: 1    tamsulosin (FLOMAX) 0.4 mg, TAKE 1 CAPSULE BY MOUTH EVERY DAY WITH DINNER, Disp: 90 capsule, Rfl: 1    sertraline (ZOLOFT) 25 mg tablet, TAKE 1 TABLET (25 MG TOTAL) BY MOUTH DAILY., Disp: 90 tablet, Rfl: 2    Past Medical History:   Diagnosis Date    Acute kidney injury superimposed on CKD stage 3 8/6/2019    Arthritis     Chronic kidney disease     Gout     Hyperlipidemia     Hypertension     Kidney stone     Left elbow pain 6/23/2022    Obstructive pyelonephritis     Sepsis (HCC)      Past Surgical History:   Procedure Laterality Date    FL RETROGRADE PYELOGRAM  08/04/2019    FL RETROGRADE PYELOGRAM  09/26/2019    FL RETROGRADE PYELOGRAM  04/22/2022    IR NEPHROSTOMY TUBE PLACEMENT  05/10/2022    KIDNEY STONE SURGERY  8/3/2019    KIDNEY SURGERY      NO PAST SURGERIES  03/23/2015    Last assessed    SD CYSTO BLADDER W/URETERAL CATHETERIZATION Right 08/04/2019    Procedure: CYSTOSCOPY RETROGRADE PYELOGRAM WITH INSERTION STENT URETERAL;  Surgeon: Eris Biggs MD;  [facility];  Service: Urology    SD CYSTO BLADDER W/URETERAL CATHETERIZATION Left 04/22/2022    Procedure: CYSTOSCOPY RETROGRADE PYELOGRAM WITH INSERTION STENT URETERAL;  Surgeon: Tony Batista MD;  Location:  MAIN OR;  Service: Urology    SD CYSTO/URETERO W/LITHOTRIPSY &INDWELL STENT INSRT Right 09/26/2019    Procedure: CYSTOSCOPY URETEROSCOPY WITH RETROGRADE PYELOGRAM AND INSERTION STENT URETERAL;  Surgeon: Oj Unger MD;  Location: [facility];  Service: Urology    SD CYSTO/URETERO W/LITHOTRIPSY &INDWELL STENT INSRT Left 06/09/2022    Procedure: CYSTOSCOPY LEFT URETEROSCOPY/LITHOTRIPSY HOLMIUM LASER,  AND EXCHANGE URETERAL STENT URETERAL, REMOVAL NEPHROSTOMY TUBE;  Surgeon: Festus Ley MD;  [facility];  Service: Urology     Family History   Problem Relation Age of Onset    Cancer Mother          "Cervical cancer survivor, no more problems.    Heart disease Father     No Known Problems Sister       reports that he has never smoked. He has never used smokeless tobacco. He reports that he does not drink alcohol and does not use drugs.      Physical Exam:   Vitals:    11/15/24 1048   BP: 137/89   Patient Position: Sitting   Cuff Size: Large   Pulse: 89   Resp: 16   Temp: 99.5 °F (37.5 °C)   TempSrc: Temporal   SpO2: 97%   Weight: (!) 191 kg (420 lb)   Height: 5' 6\" (1.676 m)     Body mass index is 67.79 kg/m².  Physical Exam  Constitutional:       General: He is not in acute distress.     Appearance: He is obese.   HENT:      Head: Normocephalic and atraumatic.   Cardiovascular:      Rate and Rhythm: Normal rate and regular rhythm.      Pulses: Normal pulses.      Heart sounds: No murmur heard.  Pulmonary:      Effort: Pulmonary effort is normal. No respiratory distress.      Breath sounds: Normal breath sounds.   Abdominal:      General: Bowel sounds are normal.      Palpations: Abdomen is soft.      Tenderness: There is no abdominal tenderness.   Musculoskeletal:      Right lower leg: No edema.      Left lower leg: No edema.   Skin:     General: Skin is warm and dry.   Neurological:      General: No focal deficit present.      Mental Status: He is alert.   Psychiatric:         Mood and Affect: Mood normal.         Behavior: Behavior normal.            Lab Results:  Results for orders placed or performed in visit on 11/08/24   Uric acid    Collection Time: 11/08/24  9:21 AM   Result Value Ref Range    Uric Acid 6.4 3.5 - 8.5 mg/dL   Urinalysis with microscopic    Collection Time: 11/08/24  9:21 AM   Result Value Ref Range    Color, UA Light Yellow     Clarity, UA Clear     Specific Gravity, UA 1.016 1.003 - 1.030    pH, UA 6.0 4.5, 5.0, 5.5, 6.0, 6.5, 7.0, 7.5, 8.0    Leukocytes, UA Moderate (A) Negative    Nitrite, UA Negative Negative    Protein, UA Trace (A) Negative mg/dl    Glucose, UA Negative Negative " mg/dl    Ketones, UA Negative Negative mg/dl    Urobilinogen, UA <2.0 <2.0 mg/dl mg/dl    Bilirubin, UA Negative Negative    Occult Blood, UA Negative Negative    RBC, UA 2-4 (A) None Seen, 1-2 /hpf    WBC, UA 30-50 (A) None Seen, 1-2 /hpf    Epithelial Cells Occasional None Seen, Occasional /hpf    Bacteria, UA Occasional None Seen, Occasional /hpf    MUCUS THREADS Occasional (A) None Seen   PTH, intact    Collection Time: 11/08/24  9:21 AM   Result Value Ref Range    PTH 56.9 12.0 - 88.0 pg/mL   Comprehensive metabolic panel    Collection Time: 11/08/24  9:21 AM   Result Value Ref Range    Sodium 138 135 - 147 mmol/L    Potassium 4.2 3.5 - 5.3 mmol/L    Chloride 104 96 - 108 mmol/L    CO2 24 21 - 32 mmol/L    ANION GAP 10 4 - 13 mmol/L    BUN 24 5 - 25 mg/dL    Creatinine 1.84 (H) 0.60 - 1.30 mg/dL    Glucose, Fasting 180 (H) 65 - 99 mg/dL    Calcium 9.3 8.4 - 10.2 mg/dL    AST 22 13 - 39 U/L    ALT 28 7 - 52 U/L    Alkaline Phosphatase 62 34 - 104 U/L    Total Protein 7.0 6.4 - 8.4 g/dL    Albumin 4.0 3.5 - 5.0 g/dL    Total Bilirubin 0.53 0.20 - 1.00 mg/dL    eGFR 39 ml/min/1.73sq m   Albumin / creatinine urine ratio    Collection Time: 11/08/24  9:21 AM   Result Value Ref Range    Creatinine, Ur 143.0 Reference range not established. mg/dL    Albumin,U,Random 8.8 <20.0 mg/L    Albumin Creat Ratio 6 0 - 30 mg/g creatinine   Magnesium    Collection Time: 11/08/24  9:21 AM   Result Value Ref Range    Magnesium 1.8 (L) 1.9 - 2.7 mg/dL   Phosphorus    Collection Time: 11/08/24  9:21 AM   Result Value Ref Range    Phosphorus 3.2 2.7 - 4.5 mg/dL   CBC and differential    Collection Time: 11/08/24  9:21 AM   Result Value Ref Range    WBC 9.55 4.31 - 10.16 Thousand/uL    RBC 4.83 3.88 - 5.62 Million/uL    Hemoglobin 15.0 12.0 - 17.0 g/dL    Hematocrit 48.0 36.5 - 49.3 %    MCV 99 (H) 82 - 98 fL    MCH 31.1 26.8 - 34.3 pg    MCHC 31.3 (L) 31.4 - 37.4 g/dL    RDW 13.8 11.6 - 15.1 %    MPV 10.9 8.9 - 12.7 fL    Platelets  "208 149 - 390 Thousands/uL    nRBC 0 /100 WBCs    Segmented % 69 43 - 75 %    Immature Grans % 1 0 - 2 %    Lymphocytes % 18 14 - 44 %    Monocytes % 6 4 - 12 %    Eosinophils Relative 5 0 - 6 %    Basophils Relative 1 0 - 1 %    Absolute Neutrophils 6.67 1.85 - 7.62 Thousands/µL    Absolute Immature Grans 0.05 0.00 - 0.20 Thousand/uL    Absolute Lymphocytes 1.71 0.60 - 4.47 Thousands/µL    Absolute Monocytes 0.55 0.17 - 1.22 Thousand/µL    Eosinophils Absolute 0.49 0.00 - 0.61 Thousand/µL    Basophils Absolute 0.08 0.00 - 0.10 Thousands/µL   Vitamin D 25 hydroxy    Collection Time: 11/08/24  9:21 AM   Result Value Ref Range    Vit D, 25-Hydroxy 19.9 (L) 30.0 - 100.0 ng/mL             Invalid input(s): \"ALBUMIN\"        Portions of the record may have been created with voice recognition software. Occasional wrong word or \"sound a like\" substitutions may have occurred due to the inherent limitations of voice recognition software. Read the chart carefully and recognize,    "

## 2024-11-15 NOTE — PATIENT INSTRUCTIONS
It was nice seeing you today. Your kidney function is stable. Please follow up with us in 6 months. I have ordered lab work for you to get done before then. In the meantime, please avoid NSAIDs and have a healthy diet and exercise.  Start taking 2000 units vitamin D daily, then start the 5000 units daily. For your low magnesium level, start to eat more green leafy vegetables, nuts and legumes.

## 2024-12-12 ENCOUNTER — OFFICE VISIT (OUTPATIENT)
Dept: FAMILY MEDICINE CLINIC | Facility: CLINIC | Age: 59
End: 2024-12-12
Payer: MEDICARE

## 2024-12-12 VITALS
TEMPERATURE: 99.2 F | OXYGEN SATURATION: 96 % | WEIGHT: 315 LBS | HEART RATE: 100 BPM | SYSTOLIC BLOOD PRESSURE: 162 MMHG | DIASTOLIC BLOOD PRESSURE: 100 MMHG | BODY MASS INDEX: 50.62 KG/M2 | HEIGHT: 66 IN

## 2024-12-12 DIAGNOSIS — Z00.00 MEDICARE ANNUAL WELLNESS VISIT, INITIAL: Primary | ICD-10-CM

## 2024-12-12 DIAGNOSIS — E55.9 VITAMIN D DEFICIENCY: ICD-10-CM

## 2024-12-12 DIAGNOSIS — N13.9 OBSTRUCTIVE UROPATHY: ICD-10-CM

## 2024-12-12 DIAGNOSIS — Z12.5 SCREENING FOR PROSTATE CANCER: ICD-10-CM

## 2024-12-12 DIAGNOSIS — E78.2 MIXED HYPERLIPIDEMIA: ICD-10-CM

## 2024-12-12 DIAGNOSIS — N20.0 NEPHROLITHIASIS: ICD-10-CM

## 2024-12-12 DIAGNOSIS — R73.03 PREDIABETES: ICD-10-CM

## 2024-12-12 DIAGNOSIS — Z12.5 SCREENING PSA (PROSTATE SPECIFIC ANTIGEN): ICD-10-CM

## 2024-12-12 DIAGNOSIS — M10.9 GOUTY ARTHRITIS: ICD-10-CM

## 2024-12-12 DIAGNOSIS — N18.32 STAGE 3B CHRONIC KIDNEY DISEASE (HCC): ICD-10-CM

## 2024-12-12 DIAGNOSIS — I10 ESSENTIAL HYPERTENSION: ICD-10-CM

## 2024-12-12 PROCEDURE — G0402 INITIAL PREVENTIVE EXAM: HCPCS | Performed by: FAMILY MEDICINE

## 2024-12-12 NOTE — PATIENT INSTRUCTIONS
Medicare Preventive Visit Patient Instructions  Thank you for completing your Welcome to Medicare Visit or Medicare Annual Wellness Visit today. Your next wellness visit will be due in one year (12/13/2025).  The screening/preventive services that you may require over the next 5-10 years are detailed below. Some tests may not apply to you based off risk factors and/or age. Screening tests ordered at today's visit but not completed yet may show as past due. Also, please note that scanned in results may not display below.  Preventive Screenings:  Service Recommendations Previous Testing/Comments   Colorectal Cancer Screening  Colonoscopy    Fecal Occult Blood Test (FOBT)/Fecal Immunochemical Test (FIT)  Fecal DNA/Cologuard Test  Flexible Sigmoidoscopy Age: 45-75 years old   Colonoscopy: every 10 years (May be performed more frequently if at higher risk)  OR  FOBT/FIT: every 1 year  OR  Cologuard: every 3 years  OR  Sigmoidoscopy: every 5 years  Screening may be recommended earlier than age 45 if at higher risk for colorectal cancer. Also, an individualized decision between you and your healthcare provider will decide whether screening between the ages of 76-85 would be appropriate. Colonoscopy: Not on file  FOBT/FIT: Not on file  Cologuard: Not on file  Sigmoidoscopy: Not on file          Prostate Cancer Screening Individualized decision between patient and health care provider in men between ages of 55-69   Medicare will cover every 12 months beginning on the day after your 50th birthday PSA: 0.26 ng/mL           Hepatitis C Screening Once for adults born between 1945 and 1965  More frequently in patients at high risk for Hepatitis C Hep C Antibody: Not on file        Diabetes Screening 1-2 times per year if you're at risk for diabetes or have pre-diabetes Fasting glucose: 180 mg/dL (11/8/2024)  A1C: 6.1 % (5/15/2024)  Screening Current   Cholesterol Screening Once every 5 years if you don't have a lipid disorder.  May order more often based on risk factors. Lipid panel: 05/15/2024  Screening Not Indicated  History Lipid Disorder      Other Preventive Screenings Covered by Medicare:  Abdominal Aortic Aneurysm (AAA) Screening: covered once if your at risk. You're considered to be at risk if you have a family history of AAA or a male between the age of 65-75 who smoking at least 100 cigarettes in your lifetime.  Lung Cancer Screening: covers low dose CT scan once per year if you meet all of the following conditions: (1) Age 55-77; (2) No signs or symptoms of lung cancer; (3) Current smoker or have quit smoking within the last 15 years; (4) You have a tobacco smoking history of at least 20 pack years (packs per day x number of years you smoked); (5) You get a written order from a healthcare provider.  Glaucoma Screening: covered annually if you're considered high risk: (1) You have diabetes OR (2) Family history of glaucoma OR (3)  aged 50 and older OR (4)  American aged 65 and older  Osteoporosis Screening: covered every 2 years if you meet one of the following conditions: (1) Have a vertebral abnormality; (2) On glucocorticoid therapy for more than 3 months; (3) Have primary hyperparathyroidism; (4) On osteoporosis medications and need to assess response to drug therapy.  HIV Screening: covered annually if you're between the age of 15-65. Also covered annually if you are younger than 15 and older than 65 with risk factors for HIV infection. For pregnant patients, it is covered up to 3 times per pregnancy.    Immunizations:  Immunization Recommendations   Influenza Vaccine Annual influenza vaccination during flu season is recommended for all persons aged >= 6 months who do not have contraindications   Pneumococcal Vaccine   * Pneumococcal conjugate vaccine = PCV13 (Prevnar 13), PCV15 (Vaxneuvance), PCV20 (Prevnar 20)  * Pneumococcal polysaccharide vaccine = PPSV23 (Pneumovax) Adults 19-65 yo with certain  risk factors or if 65+ yo  If never received any pneumonia vaccine: recommend Prevnar 20 (PCV20)  Give PCV20 if previously received 1 dose of PCV13 or PPSV23   Hepatitis B Vaccine 3 dose series if at intermediate or high risk (ex: diabetes, end stage renal disease, liver disease)   Respiratory syncytial virus (RSV) Vaccine - COVERED BY MEDICARE PART D  * RSVPreF3 (Arexvy) CDC recommends that adults 60 years of age and older may receive a single dose of RSV vaccine using shared clinical decision-making (SCDM)   Tetanus (Td) Vaccine - COST NOT COVERED BY MEDICARE PART B Following completion of primary series, a booster dose should be given every 10 years to maintain immunity against tetanus. Td may also be given as tetanus wound prophylaxis.   Tdap Vaccine - COST NOT COVERED BY MEDICARE PART B Recommended at least once for all adults. For pregnant patients, recommended with each pregnancy.   Shingles Vaccine (Shingrix) - COST NOT COVERED BY MEDICARE PART B  2 shot series recommended in those 19 years and older who have or will have weakened immune systems or those 50 years and older     Health Maintenance Due:      Topic Date Due   • Hepatitis C Screening  Never done   • HIV Screening  Never done   • Colorectal Cancer Screening  01/12/2025 (Originally 7/5/2011)     Immunizations Due:      Topic Date Due   • COVID-19 Vaccine (2 - 2024-25 season) 09/01/2024     Advance Directives   What are advance directives?  Advance directives are legal documents that state your wishes and plans for medical care. These plans are made ahead of time in case you lose your ability to make decisions for yourself. Advance directives can apply to any medical decision, such as the treatments you want, and if you want to donate organs.   What are the types of advance directives?  There are many types of advance directives, and each state has rules about how to use them. You may choose a combination of any of the following:  Living will:  This  is a written record of the treatment you want. You can also choose which treatments you do not want, which to limit, and which to stop at a certain time. This includes surgery, medicine, IV fluid, and tube feedings.   Durable power of  for healthcare (DPAHC):  This is a written record that states who you want to make healthcare choices for you when you are unable to make them for yourself. This person, called a proxy, is usually a family member or a friend. You may choose more than 1 proxy.  Do not resuscitate (DNR) order:  A DNR order is used in case your heart stops beating or you stop breathing. It is a request not to have certain forms of treatment, such as CPR. A DNR order may be included in other types of advance directives.  Medical directive:  This covers the care that you want if you are in a coma, near death, or unable to make decisions for yourself. You can list the treatments you want for each condition. Treatment may include pain medicine, surgery, blood transfusions, dialysis, IV or tube feedings, and a ventilator (breathing machine).  Values history:  This document has questions about your views, beliefs, and how you feel and think about life. This information can help others choose the care that you would choose.  Why are advance directives important?  An advance directive helps you control your care. Although spoken wishes may be used, it is better to have your wishes written down. Spoken wishes can be misunderstood, or not followed. Treatments may be given even if you do not want them. An advance directive may make it easier for your family to make difficult choices about your care.   Weight Management   Why it is important to manage your weight:  Being overweight increases your risk of health conditions such as heart disease, high blood pressure, type 2 diabetes, and certain types of cancer. It can also increase your risk for osteoarthritis, sleep apnea, and other respiratory problems. Aim  for a slow, steady weight loss. Even a small amount of weight loss can lower your risk of health problems.  How to lose weight safely:  A safe and healthy way to lose weight is to eat fewer calories and get regular exercise. You can lose up about 1 pound a week by decreasing the number of calories you eat by 500 calories each day.   Healthy meal plan for weight management:  A healthy meal plan includes a variety of foods, contains fewer calories, and helps you stay healthy. A healthy meal plan includes the following:  Eat whole-grain foods more often.  A healthy meal plan should contain fiber. Fiber is the part of grains, fruits, and vegetables that is not broken down by your body. Whole-grain foods are healthy and provide extra fiber in your diet. Some examples of whole-grain foods are whole-wheat breads and pastas, oatmeal, brown rice, and bulgur.  Eat a variety of vegetables every day.  Include dark, leafy greens such as spinach, kale, gonzalo greens, and mustard greens. Eat yellow and orange vegetables such as carrots, sweet potatoes, and winter squash.   Eat a variety of fruits every day.  Choose fresh or canned fruit (canned in its own juice or light syrup) instead of juice. Fruit juice has very little or no fiber.  Eat low-fat dairy foods.  Drink fat-free (skim) milk or 1% milk. Eat fat-free yogurt and low-fat cottage cheese. Try low-fat cheeses such as mozzarella and other reduced-fat cheeses.  Choose meat and other protein foods that are low in fat.  Choose beans or other legumes such as split peas or lentils. Choose fish, skinless poultry (chicken or turkey), or lean cuts of red meat (beef or pork). Before you cook meat or poultry, cut off any visible fat.   Use less fat and oil.  Try baking foods instead of frying them. Add less fat, such as margarine, sour cream, regular salad dressing and mayonnaise to foods. Eat fewer high-fat foods. Some examples of high-fat foods include french fries, doughnuts, ice  cream, and cakes.  Eat fewer sweets.  Limit foods and drinks that are high in sugar. This includes candy, cookies, regular soda, and sweetened drinks.  Exercise:  Exercise at least 30 minutes per day on most days of the week. Some examples of exercise include walking, biking, dancing, and swimming. You can also fit in more physical activity by taking the stairs instead of the elevator or parking farther away from stores. Ask your healthcare provider about the best exercise plan for you.      © Copyright [employer] 2018 Information is for End User's use only and may not be sold, redistributed or otherwise used for commercial purposes. All illustrations and images included in CareNotes® are the copyrighted property of [employer] or [employer]

## 2024-12-12 NOTE — ASSESSMENT & PLAN NOTE
Orders:    CBC and differential; Future    Comprehensive metabolic panel; Future    Hemoglobin A1C; Future    TSH, 3rd generation with Free T4 reflex; Future    Lipid panel; Future    Vitamin D 25 hydroxy; Future    PSA, Total Screen; Future

## 2024-12-12 NOTE — ASSESSMENT & PLAN NOTE
Lab Results   Component Value Date    EGFR 39 11/08/2024    EGFR 37 05/15/2024    EGFR 39 10/09/2023    CREATININE 1.84 (H) 11/08/2024    CREATININE 1.94 (H) 05/15/2024    CREATININE 1.85 (H) 10/09/2023   And is followed by nephrology.  He has some baseline creatinine of 1.85.  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  He has extremely borderline prediabetes which is probably not on    Here he does have a history of nephrolithiasis but he is structurally normal kidneys.  Patient is aware not to take any NSAIDs.

## 2024-12-12 NOTE — ASSESSMENT & PLAN NOTE
Is currently tolerating rosuvastatin 20 mg daily.  Last cholesterol on file was May 15th 2024.    Total cholesterol 232, triglycerides 191, HDL 39 and

## 2024-12-12 NOTE — ASSESSMENT & PLAN NOTE
Pressure is elevated today.  Past readings were normal.  Patient states he had a lot of salt this morning and did not take his medications.  The patient will return in 1 month's time for a repeat blood pressure check.  Patient advised to limit sodium intake and to take his medications before having his blood pressure check.  Orders:    CBC and differential; Future    Comprehensive metabolic panel; Future    Hemoglobin A1C; Future    TSH, 3rd generation with Free T4 reflex; Future    Lipid panel; Future    Vitamin D 25 hydroxy; Future    PSA, Total Screen; Future

## 2024-12-12 NOTE — ASSESSMENT & PLAN NOTE
Lab Results   Component Value Date    EGFR 39 11/08/2024    EGFR 37 05/15/2024    EGFR 39 10/09/2023    CREATININE 1.84 (H) 11/08/2024    CREATININE 1.94 (H) 05/15/2024    CREATININE 1.85 (H) 10/09/2023       Orders:    CBC and differential; Future    Comprehensive metabolic panel; Future    Hemoglobin A1C; Future    TSH, 3rd generation with Free T4 reflex; Future    Lipid panel; Future    Vitamin D 25 hydroxy; Future    PSA, Total Screen; Future

## 2024-12-12 NOTE — PROGRESS NOTES
Name: Kevin Cao      : 1965      MRN: [id number]  Encounter Provider: Luiz Morgan DO  Encounter Date: 2024   Encounter department: [facility]    Assessment & Plan  Nephrolithiasis    Orders:    CBC and differential; Future    Comprehensive metabolic panel; Future    Hemoglobin A1C; Future    TSH, 3rd generation with Free T4 reflex; Future    Lipid panel; Future    Vitamin D 25 hydroxy; Future    PSA, Total Screen; Future    Uric acid; Future    Obstructive uropathy    Orders:    CBC and differential; Future    Comprehensive metabolic panel; Future    Hemoglobin A1C; Future    TSH, 3rd generation with Free T4 reflex; Future    Lipid panel; Future    Vitamin D 25 hydroxy; Future    PSA, Total Screen; Future    Stage 3b chronic kidney disease (HCC)  Lab Results   Component Value Date    EGFR 39 2024    EGFR 37 05/15/2024    EGFR 39 10/09/2023    CREATININE 1.84 (H) 2024    CREATININE 1.94 (H) 05/15/2024    CREATININE 1.85 (H) 10/09/2023       Orders:    CBC and differential; Future    Comprehensive metabolic panel; Future    Hemoglobin A1C; Future    TSH, 3rd generation with Free T4 reflex; Future    Lipid panel; Future    Vitamin D 25 hydroxy; Future    PSA, Total Screen; Future    Mixed hyperlipidemia    Orders:    CBC and differential; Future    Comprehensive metabolic panel; Future    Hemoglobin A1C; Future    TSH, 3rd generation with Free T4 reflex; Future    Lipid panel; Future    Vitamin D 25 hydroxy; Future    PSA, Total Screen; Future    Vitamin D deficiency    Orders:    CBC and differential; Future    Comprehensive metabolic panel; Future    Hemoglobin A1C; Future    TSH, 3rd generation with Free T4 reflex; Future    Lipid panel; Future    Vitamin D 25 hydroxy; Future    PSA, Total Screen; Future    Essential hypertension  Pressure is elevated today.  Past readings were normal.  Patient states he had a lot of salt this morning and did not take his medications.   The patient will return in 1 month's time for a repeat blood pressure check.  Patient advised to limit sodium intake and to take his medications before having his blood pressure check.  Orders:    CBC and differential; Future    Comprehensive metabolic panel; Future    Hemoglobin A1C; Future    TSH, 3rd generation with Free T4 reflex; Future    Lipid panel; Future    Vitamin D 25 hydroxy; Future    PSA, Total Screen; Future    Gouty arthritis    Orders:    CBC and differential; Future    Comprehensive metabolic panel; Future    Hemoglobin A1C; Future    TSH, 3rd generation with Free T4 reflex; Future    Lipid panel; Future    Vitamin D 25 hydroxy; Future    PSA, Total Screen; Future    Uric acid; Future    Prediabetes    Orders:    CBC and differential; Future    Comprehensive metabolic panel; Future    Hemoglobin A1C; Future    TSH, 3rd generation with Free T4 reflex; Future    Lipid panel; Future    Vitamin D 25 hydroxy; Future    PSA, Total Screen; Future    Screening for prostate cancer    Orders:    CBC and differential; Future    Comprehensive metabolic panel; Future    Hemoglobin A1C; Future    TSH, 3rd generation with Free T4 reflex; Future    Lipid panel; Future    Vitamin D 25 hydroxy; Future    PSA, Total Screen; Future    Screening PSA (prostate specific antigen)    Orders:    CBC and differential; Future    Comprehensive metabolic panel; Future    Hemoglobin A1C; Future    TSH, 3rd generation with Free T4 reflex; Future    Lipid panel; Future    Vitamin D 25 hydroxy; Future    PSA, Total Screen; Future    Medicare annual wellness visit, initial    Orders:    ECG 12 lead; Future       Preventive health issues were discussed with patient, and age appropriate screening tests were ordered as noted in patient's After Visit Summary. Personalized health advice and appropriate referrals for health education or preventive services given if needed, as noted in patient's After Visit Summary.    History of Present  Illness     HPI   Patient Care Team:  Luiz Morgan DO as PCP - General (Family Medicine)  Venessa Siddiqui DO (Nephrology)  Chaparro Franklin PA-C as Medical Student (Nephrology)    Review of Systems  Medical History Reviewed by provider this encounter:       Annual Wellness Visit Questionnaire   Kevin is here for his Welcome to Medicare visit.     Health Risk Assessment:   Patient rates overall health as poor. Patient feels that their physical health rating is same. Patient is dissatisfied with their life. Eyesight was rated as same. Hearing was rated as same. Patient feels that their emotional and mental health rating is same. Patients states they are sometimes angry. Patient states they are sometimes unusually tired/fatigued. Pain experienced in the last 7 days has been none. Patient states that he has experienced weight loss or gain in last 6 months.     Depression Screening:   PHQ-2 Score: 0      Fall Risk Screening:   In the past year, patient has experienced: no history of falling in past year      Home Safety:  Patient has trouble with stairs inside or outside of their home. Patient has working smoke alarms and has working carbon monoxide detector. Home safety hazards include: loose rugs on the floor and poor household lighting.     Nutrition:   Current diet is Regular.     Medications:   Patient is not currently taking any over-the-counter supplements. Patient is able to manage medications.     Activities of Daily Living (ADLs)/Instrumental Activities of Daily Living (IADLs):   Walk and transfer into and out of bed and chair?: Yes  Dress and groom yourself?: Yes    Bathe or shower yourself?: Yes    Feed yourself? Yes  Do your laundry/housekeeping?: Yes  Manage your money, pay your bills and track your expenses?: Yes  Make your own meals?: Yes    Do your own shopping?: Yes    Previous Hospitalizations:   Any hospitalizations or ED visits within the last 12 months?: No      Advance Care Planning:   Living  "will: No      PREVENTIVE SCREENINGS      Cardiovascular Screening:    General: Screening Not Indicated and History Lipid Disorder      Diabetes Screening:     General: Screening Current      Lung Cancer Screening:     General: Screening Not Indicated    Screening, Brief Intervention, and Referral to Treatment (SBIRT)    Screening  Typical number of drinks in a day: 0  Typical number of drinks in a week: 0  Interpretation: Low risk drinking behavior.    Single Item Drug Screening:  How often have you used an illegal drug (including marijuana) or a prescription medication for non-medical reasons in the past year? never    Single Item Drug Screen Score: 0  Interpretation: Negative screen for possible drug use disorder    Social Drivers of Health     Food Insecurity: No Food Insecurity (12/12/2024)    Hunger Vital Sign     Worried About Running Out of Food in the Last Year: Never true     Ran Out of Food in the Last Year: Never true   Transportation Needs: No Transportation Needs (12/12/2024)    PRAPARE - Transportation     Lack of Transportation (Medical): No     Lack of Transportation (Non-Medical): No   Housing Stability: Low Risk  (12/12/2024)    Housing Stability Vital Sign     Unable to Pay for Housing in the Last Year: No     Number of Times Moved in the Last Year: 0     Homeless in the Last Year: No   Utilities: Not At Risk (12/12/2024)    [facility] Utilities     Threatened with loss of utilities: No     Vision Screening    Right eye Left eye Both eyes   Without correction 20/25 20/25 20/20   With correction          Objective   /100   Pulse 100   Temp 99.2 °F (37.3 °C)   Ht 5' 6\" (1.676 m)   Wt (!) 194 kg (428 lb 3.2 oz)   SpO2 96%   BMI 69.11 kg/m²     Physical Exam  Vitals and nursing note reviewed.   Constitutional:       General: He is not in acute distress.     Appearance: Normal appearance. He is well-developed.   HENT:      Head: Normocephalic and atraumatic.      Right Ear: Tympanic membrane " normal.      Left Ear: Tympanic membrane normal.      Mouth/Throat:      Mouth: Mucous membranes are moist.      Pharynx: Oropharynx is clear.   Eyes:      Extraocular Movements: Extraocular movements intact.      Conjunctiva/sclera: Conjunctivae normal.      Pupils: Pupils are equal, round, and reactive to light.   Cardiovascular:      Rate and Rhythm: Normal rate and regular rhythm.      Heart sounds: Normal heart sounds. No murmur heard.     No friction rub.   Pulmonary:      Effort: Pulmonary effort is normal. No respiratory distress.      Breath sounds: Normal breath sounds.   Abdominal:      General: Bowel sounds are normal.      Palpations: Abdomen is soft.      Tenderness: There is no abdominal tenderness. There is no guarding or rebound.      Hernia: No hernia is present.   Musculoskeletal:         General: No swelling.      Cervical back: Neck supple.   Skin:     General: Skin is warm and dry.      Capillary Refill: Capillary refill takes less than 2 seconds.   Neurological:      General: No focal deficit present.      Mental Status: He is alert and oriented to person, place, and time.      Gait: Gait normal.   Psychiatric:         Mood and Affect: Mood normal.         Behavior: Behavior normal.         Thought Content: Thought content normal.

## 2024-12-12 NOTE — ASSESSMENT & PLAN NOTE
Last hemoglobin A1c on 5/15/2024 was 6.1.  This was gradually increasing.     Recheck hemoglobin A1c to make sure it has not increased.

## 2024-12-12 NOTE — PROGRESS NOTES
Name: Kevin Cao      : 1965      MRN: [id number]  Encounter Provider: Luiz Morgan DO  Encounter Date: 2024   Encounter department: [facility]  :  Assessment & Plan  Nephrolithiasis  The patient thinks that he has calcium oxalate stones.  I could not find stone analysis results in 2022.  He is doing his best to avoid salt and drinking 120 ounces a day.  He has urine output and has not had a stone attack in over a year.  He did not complete the stone risk profile.  Patient remains on Flomax 0.4 mg       Obstructive uropathy  Resolved       Stage 3b chronic kidney disease (HCC)  Lab Results   Component Value Date    EGFR 39 2024    EGFR 37 05/15/2024    EGFR 39 10/09/2023    CREATININE 1.84 (H) 2024    CREATININE 1.94 (H) 05/15/2024    CREATININE 1.85 (H) 10/09/2023   And is followed by nephrology.  He has some baseline creatinine of 1.85.  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  He has extremely borderline prediabetes which is probably not on    Here he does have a history of nephrolithiasis but he is structurally normal kidneys.  Patient is aware not to take any NSAIDs.         Mixed hyperlipidemia  Is currently tolerating rosuvastatin 20 mg daily.  Last cholesterol on file was May 15th 2024.    Total cholesterol 232, triglycerides 191, HDL 39 and        Vitamin D deficiency  Only on vitamin D replacement.  Will recheck level.       Essential hypertension  Patient is currently on losartan 50 mg daily, Toprol- mg daily.       Gouty arthritis  Patient with a history of gouty arthritis.  Currently on allopurinol 300 mg daily to suppress uric acid.  Patient does have kidney stones.  The records indicate they are calcium oxalate but I could not find the exact stone profile.     Recheck uric acid level.  Currently on allopurinol  Prediabetes  Last hemoglobin A1c on 5/15/2024 was 6.1.  This was gradually increasing.    "  Recheck hemoglobin A1c to make sure it has not increased.  Screening for prostate cancer  Screening PSA.       Screening PSA (prostate specific antigen)  Book it, needs screening PSA              History of Present Illness     Patient is a 59-year-old male who presents today for his annual Medicare wellness visit.  I have reviewed the patient's past medical history, surgical history, medication list, allergy list and social history.  I did my best to review past consultations, diagnostic testing and lab work.      Review of Systems   Constitutional:  Negative for chills and fever.   HENT:  Negative for ear pain and sore throat.    Eyes:  Negative for pain and visual disturbance.   Respiratory:  Negative for cough and shortness of breath.    Cardiovascular:  Negative for chest pain and palpitations.   Gastrointestinal:  Negative for abdominal pain and vomiting.   Genitourinary:  Negative for dysuria and hematuria.   Musculoskeletal:  Negative for arthralgias and back pain.   Skin:  Negative for color change and rash.   Neurological:  Negative for seizures and syncope.   Psychiatric/Behavioral: Negative.     All other systems reviewed and are negative.      Objective   /100   Pulse 100   Temp 99.2 °F (37.3 °C)   Ht 5' 6\" (1.676 m)   Wt (!) 194 kg (428 lb 3.2 oz)   SpO2 96%   BMI 69.11 kg/m²      Physical Exam  Vitals and nursing note reviewed.   Constitutional:       General: He is not in acute distress.     Appearance: Normal appearance. He is well-developed.   HENT:      Head: Normocephalic and atraumatic.      Right Ear: Tympanic membrane normal.      Left Ear: Tympanic membrane normal.      Nose: Nose normal.      Mouth/Throat:      Mouth: Mucous membranes are moist.      Pharynx: Oropharynx is clear.   Eyes:      General: No scleral icterus.        Right eye: No discharge.         Left eye: No discharge.      Extraocular Movements: Extraocular movements intact.      Conjunctiva/sclera: Conjunctivae " normal.      Pupils: Pupils are equal, round, and reactive to light.   Cardiovascular:      Rate and Rhythm: Normal rate and regular rhythm.      Heart sounds: Normal heart sounds. No murmur heard.     No friction rub.   Pulmonary:      Effort: Pulmonary effort is normal. No respiratory distress.      Breath sounds: Normal breath sounds.   Abdominal:      General: Bowel sounds are normal.      Palpations: Abdomen is soft.      Tenderness: There is no abdominal tenderness. There is no guarding or rebound.      Hernia: No hernia is present.   Musculoskeletal:         General: No swelling.      Cervical back: Neck supple.   Skin:     General: Skin is warm and dry.      Capillary Refill: Capillary refill takes less than 2 seconds.   Neurological:      General: No focal deficit present.      Mental Status: He is alert and oriented to person, place, and time.      Gait: Gait normal.   Psychiatric:         Mood and Affect: Mood normal.         Behavior: Behavior normal.         Thought Content: Thought content normal.         Judgment: Judgment normal.

## 2024-12-12 NOTE — ASSESSMENT & PLAN NOTE
Orders:    CBC and differential; Future    Comprehensive metabolic panel; Future    Hemoglobin A1C; Future    TSH, 3rd generation with Free T4 reflex; Future    Lipid panel; Future    Vitamin D 25 hydroxy; Future    PSA, Total Screen; Future    Uric acid; Future

## 2024-12-12 NOTE — ASSESSMENT & PLAN NOTE
The patient thinks that he has calcium oxalate stones.  I could not find stone analysis results in July 2022.  He is doing his best to avoid salt and drinking 120 ounces a day.  He has urine output and has not had a stone attack in over a year.  He did not complete the stone risk profile.  Patient remains on Flomax 0.4 mg

## 2025-01-03 ENCOUNTER — TELEPHONE (OUTPATIENT)
Age: 60
End: 2025-01-03

## 2025-01-03 DIAGNOSIS — I10 ESSENTIAL HYPERTENSION: ICD-10-CM

## 2025-01-03 DIAGNOSIS — N11.1 OBSTRUCTIVE PYELONEPHRITIS: ICD-10-CM

## 2025-01-03 DIAGNOSIS — N18.30 CHRONIC KIDNEY DISEASE, STAGE 3 (HCC): ICD-10-CM

## 2025-01-03 RX ORDER — TAMSULOSIN HYDROCHLORIDE 0.4 MG/1
0.4 CAPSULE ORAL
Qty: 90 CAPSULE | Refills: 1 | Status: SHIPPED | OUTPATIENT
Start: 2025-01-03

## 2025-01-03 RX ORDER — METOPROLOL SUCCINATE 100 MG/1
100 TABLET, EXTENDED RELEASE ORAL DAILY
Qty: 90 TABLET | Refills: 1 | Status: SHIPPED | OUTPATIENT
Start: 2025-01-03

## 2025-01-03 RX ORDER — LOSARTAN POTASSIUM 50 MG/1
50 TABLET ORAL DAILY
Qty: 90 TABLET | Refills: 1 | Status: SHIPPED | OUTPATIENT
Start: 2025-01-03

## 2025-01-03 NOTE — TELEPHONE ENCOUNTER
Patient states he is experiencing diarrhea since staring the increased dosage of Vitamin D.    Patient would like to know if this is a known side effect, and what could be done about it.

## 2025-01-03 NOTE — TELEPHONE ENCOUNTER
Patient request refills of the following:    Losartan 50 mg;  Metoprolol Succinate 100 mg; and  Tamsulosin 0.4 mg    To be filled with [id number].    Previous Dr. Hinson patient now followed by Dr. Luiz Morgan.    Requested Prescriptions     Pending Prescriptions Disp Refills    losartan (COZAAR) 50 mg tablet 90 tablet 1     Sig: Take 1 tablet (50 mg total) by mouth daily    metoprolol succinate (TOPROL-XL) 100 mg 24 hr tablet 90 tablet 1     Sig: Take 1 tablet (100 mg total) by mouth daily    tamsulosin (FLOMAX) 0.4 mg 90 capsule 1     Sig: Take 1 capsule (0.4 mg total) by mouth daily with dinner

## 2025-01-10 NOTE — PROGRESS NOTES
Assessment/Plan:    Class 3 obesity  -Patient is pursuing Conservative Program  -Initial weight loss goal of 5-10% weight loss for improved health  -Not interested in bariatric surgery  -Options for weight loss medications limited due to cost and concomitant  conditions  -Screening labs: CMP reviewed from 1/30/23- eGFR 38  -AVOID metformin and Topamax: kidney stone, eGFR  Patient denies personal and family history of MEN2 tumors and medullary thyroid/thyroid carcinoma. Patient denies personal history of pancreatitis. Would benefit from Ozempic for CV prevention. High CV risks: worsening cholesterol, HTN, prediabetes, CK. He prefers to AVOID needles; he asked for oral version- discussed rybelsus- he is open to this- will consult nephrology to confirm they have no reservation for this  -dietary recall reviewed, suggestions provided      Initial: 380  Current: 418.3 (+13.3 lbs since last visit)   Change: +38.3  Goal: under 300 lbs  Discussed possible benefits of injectables on weight and health - he prefers to avoid; Not interested in bariatric surgery, feels responded well to [place] weight loss and would like to find something to follow that is similar that he would experience weight loss. He is concerned for weight regain with surgery as he usually has weight regain following his previous attempts. We discussed the metabolic effects form surgery.     Follow up in approximately 6 months with Non-Surgical Physician/Advanced Practitioner.    Goals:  See AVS     Diagnoses and all orders for this visit:    Class 3 obesity    Body mass index 60.0-69.9, adult (HCC)    Essential hypertension    Prediabetes    Mixed hyperlipidemia        Subjective:   Chief Complaint   Patient presents with    Follow-up     6 month follow-up of weight management.     Patient ID: Kevin Cao  is a 59 y.o. male with excess weight/obesity here to pursue weight management.  Patient is pursuing Conservative Program.     HPI  The patient presents for  "MWM follow up.     Experienced weight gain since last visit, but is down 10 lbs since his last visit with his PCP in December. Has been following time restricted eating       L- 12 pm: chicken navid (plain, but would like to start having on a bun) - suggested wheat   S: chocolate- small portion   D- 8 pm: meat + vegetable   S: no    Exercise: walks to appointments/stores   Hydration: 1 gallon of water   Sleep: interrupted due to pain/discomfort     The following portions of the patient's history were reviewed and updated as appropriate: allergies, current medications, past family history, past medical history, past social history, past surgical history, and problem list.    Review of Systems   Cardiovascular:  Negative for chest pain and palpitations.   Psychiatric/Behavioral: Negative.       Objective:    /74 (Patient Position: Sitting, Cuff Size: Large)   Pulse 68   Temp 98 °F (36.7 °C) (Temporal)   Ht 5' 6\" (1.676 m)   Wt (!) 190 kg (418 lb 6.4 oz)   SpO2 98%   BMI 67.53 kg/m²      Physical Exam  Vitals and nursing note reviewed.     Constitutional   General appearance: Abnormal.  well developed and obese.   Pulmonary   Respiratory effort: No increased work of breathing or signs of respiratory distress.    Abdomen   Abdomen: Abnormal.  The abdomen was obese.  Musculoskeletal   Gait and station: Normal.    Psychiatric   Orientation to person, place and time: Normal.    Affect: appropriate    "

## 2025-01-13 ENCOUNTER — OFFICE VISIT (OUTPATIENT)
Age: 60
End: 2025-01-13
Payer: MEDICARE

## 2025-01-13 VITALS
DIASTOLIC BLOOD PRESSURE: 74 MMHG | HEIGHT: 66 IN | HEART RATE: 68 BPM | BODY MASS INDEX: 50.62 KG/M2 | OXYGEN SATURATION: 98 % | TEMPERATURE: 98 F | WEIGHT: 315 LBS | SYSTOLIC BLOOD PRESSURE: 132 MMHG

## 2025-01-13 DIAGNOSIS — E78.2 MIXED HYPERLIPIDEMIA: ICD-10-CM

## 2025-01-13 DIAGNOSIS — E66.813 CLASS 3 OBESITY: Primary | ICD-10-CM

## 2025-01-13 DIAGNOSIS — R73.03 PREDIABETES: ICD-10-CM

## 2025-01-13 DIAGNOSIS — I10 ESSENTIAL HYPERTENSION: ICD-10-CM

## 2025-01-13 PROCEDURE — 99213 OFFICE O/P EST LOW 20 MIN: CPT | Performed by: PHYSICIAN ASSISTANT

## 2025-01-13 NOTE — ASSESSMENT & PLAN NOTE
-Patient is pursuing Conservative Program  -Initial weight loss goal of 5-10% weight loss for improved health  -Not interested in bariatric surgery  -Options for weight loss medications limited due to cost and concomitant  conditions  -Screening labs: CMP reviewed from 1/30/23- eGFR 38  -AVOID metformin and Topamax: kidney stone, eGFR  Patient denies personal and family history of MEN2 tumors and medullary thyroid/thyroid carcinoma. Patient denies personal history of pancreatitis. Would benefit from Ozempic for CV prevention. High CV risks: worsening cholesterol, HTN, prediabetes, CK. He prefers to AVOID needles; he asked for oral version- discussed rybelsus- he is open to this- will consult nephrology to confirm they have no reservation for this  -dietary recall reviewed, suggestions provided      Initial: 380  Current: 418.3 (+13.3 lbs since last visit)   Change: +38.3  Goal: under 300 lbs  Discussed possible benefits of injectables on weight and health - he prefers to avoid; Not interested in bariatric surgery, feels responded well to [place] weight loss and would like to find something to follow that is similar that he would experience weight loss. He is concerned for weight regain with surgery as he usually has weight regain following his previous attempts. We discussed the metabolic effects form surgery.

## 2025-01-13 NOTE — PATIENT INSTRUCTIONS
Measure all portions: creamers, sugars, cooking oils/butters, condiments, dressings, etc and log calories   If choosing starch pick whole grain/complex carbs and keep to 1/2 cup: oatmeal, brown rice, quinoa, whole wheat pasta, potatoes, sweet potato, chickpea noodles, red lentil noodles  Choose whole grain/wheat over white    Try to have vegetable at lunch- can be fresh, frozen, or canned  Can also add fruit

## 2025-01-17 DIAGNOSIS — E66.813 CLASS 3 OBESITY: ICD-10-CM

## 2025-01-17 DIAGNOSIS — R73.03 PREDIABETES: Primary | ICD-10-CM

## 2025-01-17 DIAGNOSIS — I10 ESSENTIAL HYPERTENSION: ICD-10-CM

## 2025-01-17 DIAGNOSIS — N18.32 STAGE 3B CHRONIC KIDNEY DISEASE (HCC): ICD-10-CM

## 2025-01-17 DIAGNOSIS — E78.2 MIXED HYPERLIPIDEMIA: ICD-10-CM

## 2025-01-20 ENCOUNTER — TELEPHONE (OUTPATIENT)
Dept: FAMILY MEDICINE CLINIC | Facility: CLINIC | Age: 60
End: 2025-01-20

## 2025-01-28 ENCOUNTER — OFFICE VISIT (OUTPATIENT)
Dept: URGENT CARE | Facility: MEDICAL CENTER | Age: 60
End: 2025-01-28
Payer: MEDICARE

## 2025-01-28 ENCOUNTER — APPOINTMENT (OUTPATIENT)
Dept: RADIOLOGY | Facility: MEDICAL CENTER | Age: 60
End: 2025-01-28
Payer: MEDICARE

## 2025-01-28 VITALS
HEART RATE: 91 BPM | TEMPERATURE: 98.4 F | RESPIRATION RATE: 16 BRPM | DIASTOLIC BLOOD PRESSURE: 76 MMHG | OXYGEN SATURATION: 97 % | SYSTOLIC BLOOD PRESSURE: 137 MMHG

## 2025-01-28 DIAGNOSIS — M10.9 ACUTE GOUT OF RIGHT KNEE, UNSPECIFIED CAUSE: ICD-10-CM

## 2025-01-28 DIAGNOSIS — M25.561 ACUTE PAIN OF RIGHT KNEE: Primary | ICD-10-CM

## 2025-01-28 DIAGNOSIS — M25.561 ACUTE PAIN OF RIGHT KNEE: ICD-10-CM

## 2025-01-28 PROCEDURE — G0463 HOSPITAL OUTPT CLINIC VISIT: HCPCS

## 2025-01-28 PROCEDURE — 99204 OFFICE O/P NEW MOD 45 MIN: CPT

## 2025-01-28 PROCEDURE — 73562 X-RAY EXAM OF KNEE 3: CPT

## 2025-01-28 RX ORDER — PREDNISONE 10 MG/1
TABLET ORAL
Qty: 21 TABLET | Refills: 0 | Status: SHIPPED | OUTPATIENT
Start: 2025-01-28 | End: 2025-02-07

## 2025-01-28 NOTE — PROGRESS NOTES
[facility]        NAME: Kevin Cao is a 59 y.o. male  : 1965    MRN: [id number]  DATE: 2025  TIME: 12:46 PM    Assessment and Plan   Acute pain of right knee [M25.561]  1. Acute pain of right knee  XR knee 3 vw right non injury    predniSONE 10 mg tablet      2. Acute gout of right knee, unspecified cause  predniSONE 10 mg tablet            Patient Instructions       Follow up with PCP in 3-5 days.  Proceed to  ER if symptoms worsen.    If tests are performed, our office will contact you with results only if changes need to made to the care plan discussed with you at the visit. [facility]    Chief Complaint     Chief Complaint   Patient presents with   • RLE PAIN     Started last week. No traumatic events. Has been drinking a lot of soda lately and is prone to gout. Patient thinks he has gout in the knee. OTC Tylenol taken this am.          History of Present Illness       59 year old morbidly obese male. Patient is here with right outter knee pain which started about 1 week ago. Denies any injury. He does typically walk with a cane which he has been using. It reports it gets worse when he lays in bed. He has slight improvement when he is laying in his recliner. He has not had any fever or chills. Denies any increased shortness of breath from his baseline. He has chronic swelling of his legs and reports that when goes to bed the swelling subsides, he reports no increased swelling from his baseline. He reports about 1 week ago he drank a 2L bottle of soda in a matter of 2 days and this was around that time that his symptoms started. He does have known arthritis of the left knee.  He also does have a history of gout. He does not have any erythema or warmth of the joint.     The patient's prior available electronic records were briefly reviewed including recent labs/visits/hospitalizations.  Recent labs whether done in November with a  BUN/creatinine of 24 and 1.84 respectively.  eGFR 39 at this time.     Provider Radiology Interpretation (preliminary) Final results will be as per official Radiology Report when available:   No acute abnormalities.     Symptoms consistent with mild inflammatory vs mild gout flare. Will treat with steroids. Patient's BP controlled on second reading. Prior concerns for diabetes noted in his chart but he dose not take any medications for diabetes. He is aware that the steroids could cause elevated blood glucose levels. I have also discussed importance of follow up with PCP.       Review of Systems   Review of Systems   Constitutional:  Negative for appetite change, chills, fatigue and fever.   Respiratory:  Negative for cough, chest tightness, shortness of breath and wheezing.    Cardiovascular:  Positive for leg swelling.        Chronic leg swelling. No more than normal. No more than comparatively to the other leg.    Musculoskeletal:  Positive for arthralgias, gait problem and joint swelling.   Skin:  Negative for color change, rash and wound.         Current Medications       Current Outpatient Medications:   •  acetaminophen (TYLENOL) 325 mg tablet, Take 2 tablets (650 mg total) by mouth every 6 (six) hours as needed for mild pain, headaches or fever, Disp: 30 tablet, Rfl: 0  •  allopurinol (ZYLOPRIM) 300 mg tablet, TAKE 1 TABLET BY MOUTH EVERY DAY, Disp: 90 tablet, Rfl: 1  •  losartan (COZAAR) 50 mg tablet, Take 1 tablet (50 mg total) by mouth daily, Disp: 90 tablet, Rfl: 1  •  metoprolol succinate (TOPROL-XL) 100 mg 24 hr tablet, Take 1 tablet (100 mg total) by mouth daily, Disp: 90 tablet, Rfl: 1  •  predniSONE 10 mg tablet, Take 4 tablets (40 mg total) by mouth daily for 2 days, THEN 3 tablets (30 mg total) daily for 2 days, THEN 2 tablets (20 mg total) daily for 2 days, THEN 1 tablet (10 mg total) daily for 2 days, THEN 0.5 tablets (5 mg total) daily for 2 days., Disp: 21 tablet, Rfl: 0  •  rosuvastatin  (CRESTOR) 20 MG tablet, TAKE 1 TABLET BY MOUTH EVERY DAY, Disp: 90 tablet, Rfl: 1  •  tamsulosin (FLOMAX) 0.4 mg, Take 1 capsule (0.4 mg total) by mouth daily with dinner, Disp: 90 capsule, Rfl: 1  •  Cholecalciferol (Vitamin D) 125 MCG (5000 UT) CAPS, Take 1 capsule by mouth daily (Patient not taking: Reported on 1/28/2025), Disp: 30 capsule, Rfl: 2  •  cholecalciferol (VITAMIN D3) 400 units tablet, Take 400 Units by mouth daily OTC (Patient not taking: Reported on 1/13/2025), Disp: , Rfl:   •  semaglutide (Rybelsus) 3 MG tablet, Take 1 tablet (3 mg total) by mouth daily before breakfast Then increase to 7 mg (Patient not taking: Reported on 1/28/2025), Disp: 30 tablet, Rfl: 0  •  semaglutide (Rybelsus) 7 MG tablet, Take 1 tablet (7 mg total) by mouth daily before breakfast (Patient not taking: Reported on 1/28/2025), Disp: 90 tablet, Rfl: 0  •  sertraline (ZOLOFT) 25 mg tablet, TAKE 1 TABLET (25 MG TOTAL) BY MOUTH DAILY., Disp: 90 tablet, Rfl: 2    Current Allergies     Allergies as of 01/28/2025   • (No Known Allergies)            The following portions of the patient's history were reviewed and updated as appropriate: allergies, current medications, past family history, past medical history, past social history, past surgical history and problem list.     Past Medical History:   Diagnosis Date   • Acute kidney injury superimposed on CKD stage 3 8/6/2019   • Arthritis    • Chronic kidney disease    • Gout    • Hyperlipidemia    • Hypertension    • Kidney stone    • Left elbow pain 6/23/2022   • Obstructive pyelonephritis    • Sepsis (HCC)        Past Surgical History:   Procedure Laterality Date   • FL RETROGRADE PYELOGRAM  08/04/2019   • FL RETROGRADE PYELOGRAM  09/26/2019   • FL RETROGRADE PYELOGRAM  04/22/2022   • IR NEPHROSTOMY TUBE PLACEMENT  05/10/2022   • KIDNEY STONE SURGERY  8/3/2019   • KIDNEY SURGERY     • NO PAST SURGERIES  03/23/2015    Last assessed   • NM CYSTO/URETERO W/LITHOTRIPSY &INDWELL STENT  INSRT Right 09/26/2019    Procedure: CYSTOSCOPY URETEROSCOPY WITH RETROGRADE PYELOGRAM AND INSERTION STENT URETERAL;  Surgeon: Oj Unger MD;  Location: [facility];  Service: Urology   • ID CYSTO/URETERO W/LITHOTRIPSY &INDWELL STENT INSRT Left 06/09/2022    Procedure: CYSTOSCOPY LEFT URETEROSCOPY/LITHOTRIPSY HOLMIUM LASER,  AND EXCHANGE URETERAL STENT URETERAL, REMOVAL NEPHROSTOMY TUBE;  Surgeon: Festus Ley MD;  [facility];  Service: Urology   • ID CYSTOURETHROSCOPY W/URETERAL CATHETERIZATION Right 08/04/2019    Procedure: CYSTOSCOPY RETROGRADE PYELOGRAM WITH INSERTION STENT URETERAL;  Surgeon: Eris Biggs MD;  [facility];  Service: Urology   • ID CYSTOURETHROSCOPY W/URETERAL CATHETERIZATION Left 04/22/2022    Procedure: CYSTOSCOPY RETROGRADE PYELOGRAM WITH INSERTION STENT URETERAL;  Surgeon: Tony Batista MD;  [facility];  Service: Urology       Family History   Problem Relation Age of Onset   • Cancer Mother         Cervical cancer survivor, no more problems.   • Heart disease Father    • No Known Problems Sister          Medications have been verified.        Objective   /76   Pulse 91   Temp 98.4 °F (36.9 °C)   Resp 16   SpO2 97%        Physical Exam     Physical Exam  Vitals and nursing note reviewed.   Constitutional:       General: He is not in acute distress.     Appearance: He is morbidly obese. He is not ill-appearing.   Cardiovascular:      Rate and Rhythm: Normal rate.      Pulses: Normal pulses.   Pulmonary:      Effort: Pulmonary effort is normal.   Musculoskeletal:         General: Tenderness present.      Right knee: No ecchymosis. Normal range of motion. Tenderness present. Normal pulse.      Comments: Mild pitting edema in b/l lower extremities. ROM intact but reports increased discomfort with bending knee or when fully extending the knee.    Skin:     General: Skin is warm and dry.      Capillary Refill: Capillary refill takes less  than 2 seconds.      Findings: No abrasion, erythema or wound.   Neurological:      General: No focal deficit present.      Mental Status: He is alert.

## 2025-01-28 NOTE — PATIENT INSTRUCTIONS
Please follow up with your primary provider in the next several days. Should you have any worsening of symptoms, or lack of improvement please be re-evaluated. If needed for significant concerns, consider 911 or ER evaluation.   Your xray was read by the provider you saw today in the office as a preliminary result. Your xray will be reviewed and an official reading will be provided by a Radiologist. If you have access to My Chart you can see these results there. Also if there is any significant findings you will be contacted with those results.

## 2025-03-27 DIAGNOSIS — I10 ESSENTIAL HYPERTENSION: ICD-10-CM

## 2025-03-28 RX ORDER — LOSARTAN POTASSIUM 50 MG/1
50 TABLET ORAL DAILY
Qty: 90 TABLET | Refills: 1 | Status: SHIPPED | OUTPATIENT
Start: 2025-03-28

## 2025-04-02 DIAGNOSIS — Z87.39 HISTORY OF GOUT: ICD-10-CM

## 2025-04-02 RX ORDER — ALLOPURINOL 300 MG/1
300 TABLET ORAL DAILY
Qty: 90 TABLET | Refills: 1 | Status: SHIPPED | OUTPATIENT
Start: 2025-04-02

## 2025-04-26 NOTE — CASE MANAGEMENT
Pt expressing concern that he is unable to manage his nursing needs, can not physically reach to empty drains & has no support in the home setting  Pt is agreeable to SNF placement  Referrals placed  [facility] accepted Pt pending bed availability  SW contacted Pt's insurance to seek auth for SNF; reference number [id number], clinicals being faxed to [phone], seeking admission to [facility] for tomorrow, 6/1  [facility] transport secured for 2 PM for 6/1  SW will continue to monitor & assist as needed with Tx & DC planning  Normal

## 2025-04-28 NOTE — PROGRESS NOTES
05/26/22 0659   Pain Assessment   Pain Assessment Tool 0-10   Pain Score 8   Pain Location/Orientation Orientation: Left; Location: Knee;Orientation: Right;Location: Shoulder  (R ankle)   Pain Onset/Description Onset: Ongoing   Patient's Stated Pain Goal No pain   Hospital Pain Intervention(s) Repositioned; Ambulation/increased activity  (RN made aware meds given prior to treatment)   Multiple Pain Sites Yes   Restrictions/Precautions   Precautions Bed/chair alarms; Fall Risk;Pain   Weight Bearing Restrictions Yes   LLE Weight Bearing Per Order WBAT   Cognition   Overall Cognitive Status WFL   Arousal/Participation Alert; Cooperative   Attention Within functional limits   Orientation Level Oriented X4   Memory Within functional limits   Following Commands Follows all commands and directions without difficulty   Subjective   Subjective "I'm better today, but I just can't bend my knee "   Roll Left and Right   Type of Assistance Needed Independent   Physical Assistance Level No physical assistance   Comment Mod I BSR's   Roll Left and Right CARE Score 6   Sit to Lying   Reason if not Attempted Activity not applicable   Sit to Lying CARE Score 9   Lying to Sitting on Side of Bed   Type of Assistance Needed Independent   Physical Assistance Level No physical assistance   Comment Mod I HOB and BSR   Lying to Sitting on Side of Bed CARE Score 6   Sit to Stand   Type of Assistance Needed Incidental touching   Physical Assistance Level 25% or less   Comment CG  (pain increased in L knee at EOB)   Sit to Stand CARE Score 3   Bed-Chair Transfer   Type of Assistance Needed Supervision   Physical Assistance Level No physical assistance   Comment RW   Chair/Bed-to-Chair Transfer CARE Score 4   Transfer Bed/Chair/Wheelchair   Limitations Noted In Balance;Confidence; Endurance;Pain Management;UE Strength;LE Strength   Adaptive Equipment Roller Walker   Stand Pivot Supervision   Sit to [employer]   Stand to [employer] Supine to Sit Modified Independent   Car Transfer   Reason if not Attempted Environmental limitations   Car Transfer CARE Score 10   Walk 10 Feet   Type of Assistance Needed Supervision   Physical Assistance Level No physical assistance   Comment RW   Walk 10 Feet CARE Score 4   Walk 50 Feet with Two Turns   Type of Assistance Needed Supervision   Physical Assistance Level No physical assistance   Comment RW   Walk 50 Feet with Two Turns CARE Score 4   Walk 150 Feet   Reason if not Attempted Safety concerns   Walk 150 Feet CARE Score 88   Walking 10 Feet on Uneven Surfaces   Reason if not Attempted Safety concerns   Walking 10 Feet on Uneven Surfaces CARE Score 88   Ambulation   Does the patient walk? 2  Yes   Primary Mode of Locomotion Prior to Admission Walk   Distance Walked (feet) 64 ft  (15ft and 10 ft)   Assist Device Roller Walker   Gait Pattern Antalgic; Slow Julieta; Forward Flexion; Wide TEO;Step to;Decreased L stance; Improper weight shift   Limitations Noted In Balance;Posture; Safety;Speed;Strength   Provided Assistance with: Direction   Walk Assist Level Supervision   Findings pain meds received prior to treatment and able to tolerate  to ambulate   Wheel 50 Feet with Two Turns   Type of Assistance Needed Supervision   Physical Assistance Level No physical assistance   Comment moves slowly due to R shoulder pain   Wheel 50 Feet with Two Turns CARE Score 4   Wheel 150 Feet   Reason if not Attempted Activity not applicable   Wheel 765 Feet CARE Score 9   Wheelchair mobility   Does the patient use a wheelchair? 1  Yes   Type of Wheelchair Used 1  Manual   Method Right upper extremity; Left upper extremity   Assistance Provided For Remove Leg Rest;Replace Leg Rest;Locking Brakes   Distance Level Surface (feet) 133 ft   Curb or Single Stair   Style negotiated Curb   Type of Assistance Needed Incidental touching   Physical Assistance Level 25% or less   Comment BHR and VC's   1 Step (Curb) CARE Score 3   4 Steps Type of Assistance Needed Incidental touching   Physical Assistance Level 25% or less   Comment BHR and VC's   4 Steps CARE Score 3   12 Steps   Reason if not Attempted Safety concerns   12 Steps CARE Score 88   Stairs   Type Stairs   # of Steps 7  (one direction)   Weight Bearing Precautions WBAT   Assist Devices Bilateral Rail   Picking Up Object   Reason if not Attempted Safety concerns   Picking Up Object CARE Score 88   Toilet Transfer   Reason if not Attempted Activity not applicable   Toilet Transfer CARE Score 9   Therapeutic Interventions   Strengthening seated ther ex BLE   Flexibility MT, stretching of L gastroc, hamstrings, and quads x 3 and held 15 seconds each   Balance gait and transfer training   Other stair training and w/c mobility   Other Comments   Comments Recliner provided in room to help increase OOB time and allow pt  to raise and lower BLE and allow more knee flexion time to pt,s tolerance  Assessment   Treatment Assessment Pt  perfromed bed mobility tasks, transfer trainng, gait training, seated ther ex BLE, MT performed to LLE gastroc, hamstrings, and quads to improve ROM (L knee flexion 34-50degrees before and after), stair training,  w/c mobility, and education provided as needed for safety and direction to improve functional mobility and activity tolerance  Pt  had improvements in pain and ambulation distance from last session  Continue current POC and progress as able  Problem List Decreased strength;Decreased range of motion;Decreased endurance; Impaired balance;Decreased mobility;Obesity;Orthopedic restrictions;Pain   PT Barriers   Functional Limitation Walking;Transfers;Standing;Stair negotiation   Plan   Treatment/Interventions Functional transfer training;LE strengthening/ROM; Elevations; Therapeutic exercise; Endurance training;Patient/family training;Equipment eval/education; Bed mobility;Gait training;Spoke to nursing   Progress Progressing toward goals   PT Therapy Minutes PT Time In 0659   PT Time Out 0823   PT Total Time (minutes) 84   PT Mode of treatment - Individual (minutes) 84   PT Mode of treatment - Concurrent (minutes) 0   PT Mode of treatment - Group (minutes) 0   PT Mode of treatment - Co-treat (minutes) 0   PT Mode of Treatment - Total time(minutes) 84 minutes   PT Cumulative Minutes 548   Therapy Time missed   Time missed?  No yes

## 2025-05-09 ENCOUNTER — APPOINTMENT (OUTPATIENT)
Dept: LAB | Facility: MEDICAL CENTER | Age: 60
End: 2025-05-09
Payer: MEDICARE

## 2025-05-09 DIAGNOSIS — E55.9 VITAMIN D DEFICIENCY: ICD-10-CM

## 2025-05-09 DIAGNOSIS — N13.9 OBSTRUCTIVE UROPATHY: ICD-10-CM

## 2025-05-09 DIAGNOSIS — I10 ESSENTIAL HYPERTENSION: ICD-10-CM

## 2025-05-09 DIAGNOSIS — N18.32 STAGE 3B CHRONIC KIDNEY DISEASE (HCC): ICD-10-CM

## 2025-05-09 DIAGNOSIS — R73.03 PREDIABETES: ICD-10-CM

## 2025-05-09 DIAGNOSIS — Z12.5 SCREENING PSA (PROSTATE SPECIFIC ANTIGEN): ICD-10-CM

## 2025-05-09 DIAGNOSIS — M10.9 GOUTY ARTHRITIS: ICD-10-CM

## 2025-05-09 DIAGNOSIS — E78.2 MIXED HYPERLIPIDEMIA: ICD-10-CM

## 2025-05-09 DIAGNOSIS — N20.0 NEPHROLITHIASIS: ICD-10-CM

## 2025-05-09 DIAGNOSIS — Z12.5 SCREENING FOR PROSTATE CANCER: ICD-10-CM

## 2025-05-09 LAB
25(OH)D3 SERPL-MCNC: 22 NG/ML (ref 30–100)
ALBUMIN SERPL BCG-MCNC: 3.9 G/DL (ref 3.5–5)
ALP SERPL-CCNC: 71 U/L (ref 34–104)
ALT SERPL W P-5'-P-CCNC: 20 U/L (ref 7–52)
ANION GAP SERPL CALCULATED.3IONS-SCNC: 11 MMOL/L (ref 4–13)
AST SERPL W P-5'-P-CCNC: 18 U/L (ref 13–39)
BACTERIA UR QL AUTO: ABNORMAL /HPF
BASOPHILS # BLD AUTO: 0.06 THOUSANDS/ÂΜL (ref 0–0.1)
BASOPHILS NFR BLD AUTO: 1 % (ref 0–1)
BILIRUB SERPL-MCNC: 0.29 MG/DL (ref 0.2–1)
BILIRUB UR QL STRIP: NEGATIVE
BUN SERPL-MCNC: 22 MG/DL (ref 5–25)
CALCIUM SERPL-MCNC: 9 MG/DL (ref 8.4–10.2)
CHLORIDE SERPL-SCNC: 107 MMOL/L (ref 96–108)
CHOLEST SERPL-MCNC: 235 MG/DL (ref ?–200)
CLARITY UR: ABNORMAL
CO2 SERPL-SCNC: 24 MMOL/L (ref 21–32)
COLOR UR: ABNORMAL
CREAT SERPL-MCNC: 1.83 MG/DL (ref 0.6–1.3)
CREAT UR-MCNC: 131.3 MG/DL
EOSINOPHIL # BLD AUTO: 0.53 THOUSAND/ÂΜL (ref 0–0.61)
EOSINOPHIL NFR BLD AUTO: 5 % (ref 0–6)
ERYTHROCYTE [DISTWIDTH] IN BLOOD BY AUTOMATED COUNT: 14 % (ref 11.6–15.1)
EST. AVERAGE GLUCOSE BLD GHB EST-MCNC: 160 MG/DL
GFR SERPL CREATININE-BSD FRML MDRD: 39 ML/MIN/1.73SQ M
GLUCOSE P FAST SERPL-MCNC: 130 MG/DL (ref 65–99)
GLUCOSE UR STRIP-MCNC: NEGATIVE MG/DL
HBA1C MFR BLD: 7.2 %
HCT VFR BLD AUTO: 48.2 % (ref 36.5–49.3)
HDLC SERPL-MCNC: 41 MG/DL
HGB BLD-MCNC: 15.1 G/DL (ref 12–17)
HGB UR QL STRIP.AUTO: NEGATIVE
IMM GRANULOCYTES # BLD AUTO: 0.05 THOUSAND/UL (ref 0–0.2)
IMM GRANULOCYTES NFR BLD AUTO: 1 % (ref 0–2)
KETONES UR STRIP-MCNC: NEGATIVE MG/DL
LDLC SERPL CALC-MCNC: 159 MG/DL (ref 0–100)
LEUKOCYTE ESTERASE UR QL STRIP: ABNORMAL
LYMPHOCYTES # BLD AUTO: 2.44 THOUSANDS/ÂΜL (ref 0.6–4.47)
LYMPHOCYTES NFR BLD AUTO: 25 % (ref 14–44)
MAGNESIUM SERPL-MCNC: 1.9 MG/DL (ref 1.9–2.7)
MCH RBC QN AUTO: 31.6 PG (ref 26.8–34.3)
MCHC RBC AUTO-ENTMCNC: 31.3 G/DL (ref 31.4–37.4)
MCV RBC AUTO: 101 FL (ref 82–98)
MICROALBUMIN UR-MCNC: 21.1 MG/L
MICROALBUMIN/CREAT 24H UR: 16 MG/G CREATININE (ref 0–30)
MONOCYTES # BLD AUTO: 0.51 THOUSAND/ÂΜL (ref 0.17–1.22)
MONOCYTES NFR BLD AUTO: 5 % (ref 4–12)
MUCOUS THREADS UR QL AUTO: ABNORMAL
NEUTROPHILS # BLD AUTO: 6.24 THOUSANDS/ÂΜL (ref 1.85–7.62)
NEUTS SEG NFR BLD AUTO: 63 % (ref 43–75)
NITRITE UR QL STRIP: NEGATIVE
NON-SQ EPI CELLS URNS QL MICRO: ABNORMAL /HPF
NONHDLC SERPL-MCNC: 194 MG/DL
NRBC BLD AUTO-RTO: 0 /100 WBCS
PH UR STRIP.AUTO: 6.5 [PH]
PHOSPHATE SERPL-MCNC: 2.5 MG/DL (ref 2.7–4.5)
PLATELET # BLD AUTO: 215 THOUSANDS/UL (ref 149–390)
PMV BLD AUTO: 11 FL (ref 8.9–12.7)
POTASSIUM SERPL-SCNC: 3.7 MMOL/L (ref 3.5–5.3)
PROT SERPL-MCNC: 7 G/DL (ref 6.4–8.4)
PROT UR STRIP-MCNC: ABNORMAL MG/DL
PSA SERPL-MCNC: 0.27 NG/ML (ref 0–4)
RBC # BLD AUTO: 4.78 MILLION/UL (ref 3.88–5.62)
RBC #/AREA URNS AUTO: ABNORMAL /HPF
SODIUM SERPL-SCNC: 142 MMOL/L (ref 135–147)
SP GR UR STRIP.AUTO: 1.02 (ref 1–1.03)
TRIGL SERPL-MCNC: 177 MG/DL (ref ?–150)
TSH SERPL DL<=0.05 MIU/L-ACNC: 2.78 UIU/ML (ref 0.45–4.5)
URATE SERPL-MCNC: 6.6 MG/DL (ref 3.5–8.5)
UROBILINOGEN UR STRIP-ACNC: <2 MG/DL
WBC # BLD AUTO: 9.83 THOUSAND/UL (ref 4.31–10.16)
WBC #/AREA URNS AUTO: ABNORMAL /HPF

## 2025-05-09 PROCEDURE — 83036 HEMOGLOBIN GLYCOSYLATED A1C: CPT

## 2025-05-09 PROCEDURE — 36415 COLL VENOUS BLD VENIPUNCTURE: CPT

## 2025-05-09 PROCEDURE — G0103 PSA SCREENING: HCPCS

## 2025-05-09 PROCEDURE — 81001 URINALYSIS AUTO W/SCOPE: CPT

## 2025-05-09 PROCEDURE — 82043 UR ALBUMIN QUANTITATIVE: CPT

## 2025-05-09 PROCEDURE — 84550 ASSAY OF BLOOD/URIC ACID: CPT

## 2025-05-09 PROCEDURE — 82570 ASSAY OF URINE CREATININE: CPT

## 2025-05-09 PROCEDURE — 85025 COMPLETE CBC W/AUTO DIFF WBC: CPT

## 2025-05-09 PROCEDURE — 84100 ASSAY OF PHOSPHORUS: CPT

## 2025-05-09 PROCEDURE — 80061 LIPID PANEL: CPT

## 2025-05-09 PROCEDURE — 82306 VITAMIN D 25 HYDROXY: CPT

## 2025-05-09 PROCEDURE — 80053 COMPREHEN METABOLIC PANEL: CPT

## 2025-05-09 PROCEDURE — 83735 ASSAY OF MAGNESIUM: CPT

## 2025-05-09 PROCEDURE — 84443 ASSAY THYROID STIM HORMONE: CPT

## 2025-05-12 ENCOUNTER — RESULTS FOLLOW-UP (OUTPATIENT)
Dept: FAMILY MEDICINE CLINIC | Facility: CLINIC | Age: 60
End: 2025-05-12

## 2025-05-13 ENCOUNTER — RESULTS FOLLOW-UP (OUTPATIENT)
Dept: OTHER | Facility: HOSPITAL | Age: 60
End: 2025-05-13

## 2025-05-16 ENCOUNTER — OFFICE VISIT (OUTPATIENT)
Dept: NEPHROLOGY | Facility: CLINIC | Age: 60
End: 2025-05-16
Payer: MEDICARE

## 2025-05-16 VITALS
DIASTOLIC BLOOD PRESSURE: 89 MMHG | OXYGEN SATURATION: 98 % | HEART RATE: 88 BPM | TEMPERATURE: 98.3 F | WEIGHT: 315 LBS | HEIGHT: 66 IN | SYSTOLIC BLOOD PRESSURE: 159 MMHG | BODY MASS INDEX: 50.62 KG/M2 | RESPIRATION RATE: 16 BRPM

## 2025-05-16 DIAGNOSIS — I10 ESSENTIAL HYPERTENSION: ICD-10-CM

## 2025-05-16 DIAGNOSIS — N18.32 STAGE 3B CHRONIC KIDNEY DISEASE (HCC): Primary | ICD-10-CM

## 2025-05-16 DIAGNOSIS — E83.39 HYPOPHOSPHATEMIA: ICD-10-CM

## 2025-05-16 DIAGNOSIS — E55.9 VITAMIN D INSUFFICIENCY: ICD-10-CM

## 2025-05-16 PROCEDURE — 99214 OFFICE O/P EST MOD 30 MIN: CPT

## 2025-05-16 NOTE — ASSESSMENT & PLAN NOTE
Continue losartan and metoprolol  Initial blood pressure 159/89 is elevated at this time due to recent physical activity/walking

## 2025-05-16 NOTE — ASSESSMENT & PLAN NOTE
Lab Results   Component Value Date    EGFR 39 05/09/2025    EGFR 39 11/08/2024    EGFR 37 05/15/2024    CREATININE 1.83 (H) 05/09/2025    CREATININE 1.84 (H) 11/08/2024    CREATININE 1.94 (H) 05/15/2024   Baseline creatinine around 1.8 to 2.0 mg/dL  Current creatinine 1.83 mg/dL  Albumin creatinine ratio is well-controlled at 16 mg/g  Continue losartan 50 mg for CKD benefit  UA shows innumerable WBCs but he denies symptoms   Orders:    CBC; Future    Comprehensive metabolic panel; Future    Phosphorus; Future    Urinalysis with microscopic; Future    Albumin / creatinine urine ratio; Future

## 2025-05-16 NOTE — PATIENT INSTRUCTIONS
It was nice seeing you today. Your kidney function is stable. Please follow up with us in 6 months. I have ordered lab work for you to get done before then. In the meantime, please avoid NSAIDs and have a healthy diet and exercise.  Eat nuts and dairy to increase phosphorus

## 2025-05-16 NOTE — PROGRESS NOTES
Name: Kevin Cao      : 1965      MRN: [id number]  Encounter Provider: Chaparro Franklin PA-C  Encounter Date: 2025   Encounter department: [facility]  :  Assessment & Plan  Stage 3b chronic kidney disease (HCC)  Lab Results   Component Value Date    EGFR 39 2025    EGFR 39 2024    EGFR 37 05/15/2024    CREATININE 1.83 (H) 2025    CREATININE 1.84 (H) 2024    CREATININE 1.94 (H) 05/15/2024   Baseline creatinine around 1.8 to 2.0 mg/dL  Current creatinine 1.83 mg/dL  Albumin creatinine ratio is well-controlled at 16 mg/g  Continue losartan 50 mg for CKD benefit  UA shows innumerable WBCs but he denies symptoms   Orders:    CBC; Future    Comprehensive metabolic panel; Future    Phosphorus; Future    Urinalysis with microscopic; Future    Albumin / creatinine urine ratio; Future    Hypophosphatemia  Mild hypophosphatemia was noted on lab work.  He was encouraged to increase intake of dairy products and nuts       Vitamin D insufficiency  Vitamin D level slightly improved, however, still insufficient.  Continue with vitamin D 1000 units twice a day       Essential hypertension  Continue losartan and metoprolol  Initial blood pressure 159/89 is elevated at this time due to recent physical activity/walking           Patient Instructions   It was nice seeing you today. Your kidney function is stable. Please follow up with us in 6 months. I have ordered lab work for you to get done before then. In the meantime, please avoid NSAIDs and have a healthy diet and exercise.  Eat nuts and dairy to increase phosphorus     It was a pleasure evaluating your patient in the office today. Thank you for allowing our team to participate in the care of  Kevin Cao. Please do not hesitate to contact our team if further issues/questions shall arise in the interim.     History of Present Illness   HPI  Kevin Cao is a 59 y.o. male who presents to nephrology  "office for follow-up visit.  He was last seen November 2024 and was deemed to be stable from the renal standpoint    Pertinent Medical History     Review of Systems   Constitutional:  Negative for chills and fever.   HENT:  Negative for rhinorrhea and sore throat.    Respiratory:  Negative for cough and shortness of breath.    Cardiovascular:  Negative for chest pain and palpitations.   Gastrointestinal:  Negative for abdominal pain and nausea.   Genitourinary:  Negative for dysuria and hematuria.   Neurological:  Negative for dizziness and headaches.     Medical History Reviewed by provider this encounter:  Tobacco  Allergies  Meds  Problems  Med Hx  Surg Hx  Fam Hx     .       Medications Ordered Prior to Encounter[1]  Objective   /89 (BP Location: Left arm, Patient Position: Sitting, Cuff Size: Standard)   Pulse 88   Temp 98.3 °F (36.8 °C) (Temporal)   Resp 16   Ht 5' 6\" (1.676 m)   Wt (!) 187 kg (413 lb)   SpO2 98%   BMI 66.66 kg/m²      Physical Exam  Vitals and nursing note reviewed.   Constitutional:       General: He is not in acute distress.     Appearance: He is well-developed.   HENT:      Head: Normocephalic and atraumatic.     Eyes:      Conjunctiva/sclera: Conjunctivae normal.       Cardiovascular:      Rate and Rhythm: Normal rate and regular rhythm.      Heart sounds: No murmur heard.  Pulmonary:      Effort: Pulmonary effort is normal. No respiratory distress.      Breath sounds: Normal breath sounds.   Abdominal:      Palpations: Abdomen is soft.      Tenderness: There is no abdominal tenderness.     Musculoskeletal:         General: No swelling.      Cervical back: Neck supple.     Skin:     General: Skin is warm and dry.      Capillary Refill: Capillary refill takes less than 2 seconds.     Neurological:      Mental Status: He is alert.     Psychiatric:         Mood and Affect: Mood normal.           Laboratory Results:        Invalid input(s): \"ALBUMIN\"    Results for orders " placed or performed in visit on 05/09/25   Comprehensive metabolic panel   Result Value Ref Range    Sodium 142 135 - 147 mmol/L    Potassium 3.7 3.5 - 5.3 mmol/L    Chloride 107 96 - 108 mmol/L    CO2 24 21 - 32 mmol/L    ANION GAP 11 4 - 13 mmol/L    BUN 22 5 - 25 mg/dL    Creatinine 1.83 (H) 0.60 - 1.30 mg/dL    Glucose, Fasting 130 (H) 65 - 99 mg/dL    Calcium 9.0 8.4 - 10.2 mg/dL    AST 18 13 - 39 U/L    ALT 20 7 - 52 U/L    Alkaline Phosphatase 71 34 - 104 U/L    Total Protein 7.0 6.4 - 8.4 g/dL    Albumin 3.9 3.5 - 5.0 g/dL    Total Bilirubin 0.29 0.20 - 1.00 mg/dL    eGFR 39 ml/min/1.73sq m   Magnesium   Result Value Ref Range    Magnesium 1.9 1.9 - 2.7 mg/dL   Phosphorus   Result Value Ref Range    Phosphorus 2.5 (L) 2.7 - 4.5 mg/dL   Urinalysis with microscopic   Result Value Ref Range    Color, UA Light Yellow     Clarity, UA Turbid     Specific Gravity, UA 1.016 1.003 - 1.030    pH, UA 6.5 4.5, 5.0, 5.5, 6.0, 6.5, 7.0, 7.5, 8.0    Leukocytes, UA Large (A) Negative    Nitrite, UA Negative Negative    Protein, UA Trace (A) Negative mg/dl    Glucose, UA Negative Negative mg/dl    Ketones, UA Negative Negative mg/dl    Urobilinogen, UA <2.0 <2.0 mg/dl mg/dl    Bilirubin, UA Negative Negative    Occult Blood, UA Negative Negative    RBC, UA None Seen None Seen, 1-2 /hpf    WBC, UA Innumerable (A) None Seen, 1-2 /hpf    Epithelial Cells Occasional None Seen, Occasional /hpf    Bacteria, UA Occasional None Seen, Occasional /hpf    MUCUS THREADS Occasional (A) None Seen   Vitamin D 25 hydroxy   Result Value Ref Range    Vit D, 25-Hydroxy 22.0 (L) 30.0 - 100.0 ng/mL   Albumin / creatinine urine ratio   Result Value Ref Range    Creatinine, Ur 131.3 Reference range not established. mg/dL    Albumin,U,Random 21.1 (H) <20.0 mg/L    Albumin Creat Ratio 16 0 - 30 mg/g creatinine   CBC and differential   Result Value Ref Range    WBC 9.83 4.31 - 10.16 Thousand/uL    RBC 4.78 3.88 - 5.62 Million/uL    Hemoglobin 15.1  12.0 - 17.0 g/dL    Hematocrit 48.2 36.5 - 49.3 %     (H) 82 - 98 fL    MCH 31.6 26.8 - 34.3 pg    MCHC 31.3 (L) 31.4 - 37.4 g/dL    RDW 14.0 11.6 - 15.1 %    MPV 11.0 8.9 - 12.7 fL    Platelets 215 149 - 390 Thousands/uL    nRBC 0 /100 WBCs    Segmented % 63 43 - 75 %    Immature Grans % 1 0 - 2 %    Lymphocytes % 25 14 - 44 %    Monocytes % 5 4 - 12 %    Eosinophils Relative 5 0 - 6 %    Basophils Relative 1 0 - 1 %    Absolute Neutrophils 6.24 1.85 - 7.62 Thousands/µL    Absolute Immature Grans 0.05 0.00 - 0.20 Thousand/uL    Absolute Lymphocytes 2.44 0.60 - 4.47 Thousands/µL    Absolute Monocytes 0.51 0.17 - 1.22 Thousand/µL    Eosinophils Absolute 0.53 0.00 - 0.61 Thousand/µL    Basophils Absolute 0.06 0.00 - 0.10 Thousands/µL   Hemoglobin A1C   Result Value Ref Range    Hemoglobin A1C 7.2 (H) Normal 4.0-5.6%; PreDiabetic 5.7-6.4%; Diabetic >=6.5%; Glycemic control for adults with diabetes <7.0% %     mg/dl   TSH, 3rd generation with Free T4 reflex   Result Value Ref Range    TSH 3RD GENERATON 2.779 0.450 - 4.500 uIU/mL   Lipid panel   Result Value Ref Range    Cholesterol 235 (H) See Comment mg/dL    Triglycerides 177 (H) See Comment mg/dL    HDL, Direct 41 >=40 mg/dL    LDL Calculated 159 (H) 0 - 100 mg/dL    Non-HDL-Chol (CHOL-HDL) 194 mg/dl   PSA, Total Screen   Result Value Ref Range    PSA 0.271 0.000 - 4.000 ng/mL   Uric acid   Result Value Ref Range    Uric Acid 6.6 3.5 - 8.5 mg/dL                [1]   Current Outpatient Medications on File Prior to Visit   Medication Sig Dispense Refill    acetaminophen (TYLENOL) 325 mg tablet Take 2 tablets (650 mg total) by mouth every 6 (six) hours as needed for mild pain, headaches or fever 30 tablet 0    cholecalciferol (VITAMIN D3) 400 units tablet Take 400 Units by mouth daily OTC      metoprolol succinate (TOPROL-XL) 100 mg 24 hr tablet Take 1 tablet (100 mg total) by mouth daily 90 tablet 1    rosuvastatin (CRESTOR) 20 MG tablet TAKE 1 TABLET BY  MOUTH EVERY DAY 90 tablet 1    tamsulosin (FLOMAX) 0.4 mg Take 1 capsule (0.4 mg total) by mouth daily with dinner 90 capsule 1    allopurinol (ZYLOPRIM) 300 mg tablet TAKE 1 TABLET BY MOUTH EVERY DAY (Patient not taking: Reported on 5/16/2025) 90 tablet 1    Cholecalciferol (Vitamin D) 125 MCG (5000 UT) CAPS Take 1 capsule by mouth daily (Patient not taking: Reported on 1/13/2025) 30 capsule 2    losartan (COZAAR) 50 mg tablet TAKE 1 TABLET BY MOUTH EVERY DAY (Patient not taking: Reported on 5/16/2025) 90 tablet 1    semaglutide (Rybelsus) 3 MG tablet Take 1 tablet (3 mg total) by mouth daily before breakfast Then increase to 7 mg (Patient not taking: Reported on 1/28/2025) 30 tablet 0    semaglutide (Rybelsus) 7 MG tablet Take 1 tablet (7 mg total) by mouth daily before breakfast (Patient not taking: Reported on 1/28/2025) 90 tablet 0    sertraline (ZOLOFT) 25 mg tablet TAKE 1 TABLET (25 MG TOTAL) BY MOUTH DAILY. 90 tablet 2     No current facility-administered medications on file prior to visit.

## 2025-06-03 ENCOUNTER — OFFICE VISIT (OUTPATIENT)
Dept: FAMILY MEDICINE CLINIC | Facility: CLINIC | Age: 60
End: 2025-06-03
Payer: MEDICARE

## 2025-06-03 VITALS
HEART RATE: 107 BPM | TEMPERATURE: 98.3 F | RESPIRATION RATE: 18 BRPM | BODY MASS INDEX: 50.62 KG/M2 | WEIGHT: 315 LBS | SYSTOLIC BLOOD PRESSURE: 138 MMHG | HEIGHT: 66 IN | DIASTOLIC BLOOD PRESSURE: 78 MMHG | OXYGEN SATURATION: 96 %

## 2025-06-03 DIAGNOSIS — J06.9 URI, ACUTE: ICD-10-CM

## 2025-06-03 DIAGNOSIS — Z87.442 HISTORY OF NEPHROLITHIASIS: ICD-10-CM

## 2025-06-03 DIAGNOSIS — E78.2 MIXED HYPERLIPIDEMIA: Primary | ICD-10-CM

## 2025-06-03 DIAGNOSIS — R73.03 PREDIABETES: ICD-10-CM

## 2025-06-03 DIAGNOSIS — I10 ESSENTIAL HYPERTENSION: ICD-10-CM

## 2025-06-03 DIAGNOSIS — N18.32 STAGE 3B CHRONIC KIDNEY DISEASE (HCC): ICD-10-CM

## 2025-06-03 PROBLEM — E87.20 LACTIC ACIDOSIS: Status: RESOLVED | Noted: 2019-08-04 | Resolved: 2025-06-03

## 2025-06-03 PROBLEM — Z93.6 OTHER ARTIFICIAL OPENINGS OF URINARY TRACT STATUS (HCC): Status: ACTIVE | Noted: 2025-06-03

## 2025-06-03 PROCEDURE — G2211 COMPLEX E/M VISIT ADD ON: HCPCS | Performed by: INTERNAL MEDICINE

## 2025-06-03 PROCEDURE — 99214 OFFICE O/P EST MOD 30 MIN: CPT | Performed by: INTERNAL MEDICINE

## 2025-06-03 RX ORDER — EZETIMIBE 10 MG/1
10 TABLET ORAL DAILY
Qty: 30 TABLET | Refills: 5 | Status: SHIPPED | OUTPATIENT
Start: 2025-06-03 | End: 2025-11-30

## 2025-06-03 RX ORDER — AMOXICILLIN 500 MG/1
500 CAPSULE ORAL EVERY 8 HOURS SCHEDULED
Qty: 21 CAPSULE | Refills: 0 | Status: SHIPPED | OUTPATIENT
Start: 2025-06-03 | End: 2025-06-10

## 2025-06-03 RX ORDER — VITAMIN B COMPLEX
1000 TABLET ORAL DAILY
COMMUNITY

## 2025-06-03 NOTE — ASSESSMENT & PLAN NOTE
Orders:    ezetimibe (ZETIA) 10 mg tablet; Take 1 tablet (10 mg total) by mouth daily  Reviewed recent labs Pt will trial zetia as unable to tolerate statin  Pt is working on weight loss via diet changes as well

## 2025-06-03 NOTE — ASSESSMENT & PLAN NOTE
Orders:    Hemoglobin A1C; Future  Pt deferred Jardiance rx as he wants to further improve diet and weight loss efforts prior to starting new medication

## 2025-06-03 NOTE — PROGRESS NOTES
Name: Kevin Cao      : 1965      MRN: [id number]  Encounter Provider: Yen Ann DO  Encounter Date: 6/3/2025   Encounter department: [facility]  :  Assessment & Plan  Mixed hyperlipidemia    Orders:    ezetimibe (ZETIA) 10 mg tablet; Take 1 tablet (10 mg total) by mouth daily  Reviewed recent labs Pt will trial zetia as unable to tolerate statin  Pt is working on weight loss via diet changes as well   Stage 3b chronic kidney disease (HCC)  Lab Results   Component Value Date    EGFR 39 2025    EGFR 39 2024    EGFR 37 05/15/2024    CREATININE 1.83 (H) 2025    CREATININE 1.84 (H) 2024    CREATININE 1.94 (H) 05/15/2024   Stay hydrated Avoid nsaids Has followup with nephrology         Essential hypertension  Continue current bp rx Lo sodium diet Pt is improving diet to faciliatet further weight loss and follows with wt mgmt        URI, acute    Orders:    amoxicillin (AMOXIL) 500 mg capsule; Take 1 capsule (500 mg total) by mouth every 8 (eight) hours for 7 days  Stay hydrated Amoxil Zyrtec daily in pm   History of nephrolithiasis    Orders:    Ambulatory Referral to Urology; Future    Prediabetes    Orders:    Hemoglobin A1C; Future  Pt deferred Jardiance rx as he wants to further improve diet and weight loss efforts prior to starting new medication   Rto 3 months/prn    Pt deferred colonoscopy and unable to complete cologard      History of Present Illness   HPI  Pt transferring care I have cared for his Mom who is now in long term care following fall Pt has been working on diet changes to help with weight loss efforts He is happy he has lost 8 pounds in a month or so No chest pain or sob He has congestion and pnd, thick mucous for one week No fever or chills No cough He took zyrtec last pm which helped his sleep He lives alone and has been trying to limit take out and processed foods His Mom is now in NH long term He had recent labs to review today He does follow  with weight mgmt and has an appt this summer   Review of Systems   Constitutional:  Negative for chills and fever.   HENT:  Positive for congestion and postnasal drip.    Respiratory:  Negative for cough, shortness of breath and wheezing.    Cardiovascular:  Negative for chest pain, palpitations and leg swelling.   Gastrointestinal: Negative.    Genitourinary: Negative.    Musculoskeletal:  Positive for arthralgias and gait problem.   Neurological:  Negative for dizziness, light-headedness and headaches.   Psychiatric/Behavioral:  Positive for sleep disturbance. The patient is not nervous/anxious.      Past Medical History[1]  Past Surgical History[2]  Social History     Socioeconomic History    Marital status: Single     Spouse name: Not on file    Number of children: Not on file    Years of education: Not on file    Highest education level: Not on file   Occupational History    Not on file   Tobacco Use    Smoking status: Never    Smokeless tobacco: Never    Tobacco comments:     never smoked.   Vaping Use    Vaping status: Never Used   Substance and Sexual Activity    Alcohol use: Never     Comment: rarely    Drug use: No    Sexual activity: Not Currently   Other Topics Concern    Not on file   Social History Narrative    Caffeine use      Social Drivers of Health     Financial Resource Strain: Not on file   Food Insecurity: No Food Insecurity (12/12/2024)    Nursing - Inadequate Food Risk Classification     Worried About Running Out of Food in the Last Year: Never true     Ran Out of Food in the Last Year: Never true     Ran Out of Food in the Last Year: Not on file   Transportation Needs: No Transportation Needs (12/12/2024)    PRAPARE - Transportation     Lack of Transportation (Medical): No     Lack of Transportation (Non-Medical): No   Physical Activity: Not on file   Stress: Not on file   Social Connections: Unknown (6/18/2024)    Received from [facility]     How often do you feel  "lonely or isolated from those around you? (Adult - for ages 18 years and over): Not on file   Intimate Partner Violence: Not on file   Housing Stability: Low Risk  (12/12/2024)    Housing Stability Vital Sign     Unable to Pay for Housing in the Last Year: No     Number of Times Moved in the Last Year: 0     Homeless in the Last Year: No     No Known Allergies    Objective   /78   Pulse (!) 107   Temp 98.3 °F (36.8 °C) (Temporal)   Resp 18   Ht 5' 6\" (1.676 m)   Wt (!) 184 kg (405 lb)   SpO2 96%   BMI 65.37 kg/m²      Physical Exam  Vitals and nursing note reviewed.   Constitutional:       General: He is not in acute distress.     Appearance: Normal appearance. He is not ill-appearing, toxic-appearing or diaphoretic.   HENT:      Head: Normocephalic and atraumatic.      Right Ear: External ear normal.      Left Ear: External ear normal.      Nose: Nose normal.      Mouth/Throat:      Mouth: Mucous membranes are moist.     Eyes:      General: No scleral icterus.      Cardiovascular:      Rate and Rhythm: Normal rate and regular rhythm.      Pulses: Normal pulses.   Pulmonary:      Effort: Pulmonary effort is normal. No respiratory distress.      Breath sounds: Normal breath sounds. No wheezing.   Abdominal:      General: There is no distension.      Palpations: Abdomen is soft.      Tenderness: There is no abdominal tenderness.     Musculoskeletal:      Cervical back: Normal range of motion and neck supple.      Right lower leg: No edema.      Left lower leg: No edema.   Lymphadenopathy:      Cervical: No cervical adenopathy.     Skin:     General: Skin is warm and dry.      Coloration: Skin is not jaundiced or pale.     Neurological:      General: No focal deficit present.      Mental Status: He is alert and oriented to person, place, and time. Mental status is at baseline.      Cranial Nerves: No cranial nerve deficit.     Psychiatric:         Mood and Affect: Mood normal.         Behavior: Behavior " normal.         Thought Content: Thought content normal.         Judgment: Judgment normal.                [1]   Past Medical History:  Diagnosis Date    Acute kidney injury superimposed on CKD stage 3 8/6/2019    Arthritis     Chronic kidney disease     Gout     Hyperlipidemia     Hypertension     Kidney stone     Left elbow pain 6/23/2022    Obstructive pyelonephritis     Sepsis (HCC)    [2]   Past Surgical History:  Procedure Laterality Date    FL RETROGRADE PYELOGRAM  08/04/2019    FL RETROGRADE PYELOGRAM  09/26/2019    FL RETROGRADE PYELOGRAM  04/22/2022    IR NEPHROSTOMY TUBE PLACEMENT  05/10/2022    KIDNEY STONE SURGERY  8/3/2019    KIDNEY SURGERY      NO PAST SURGERIES  03/23/2015    Last assessed    MA CYSTO/URETERO W/LITHOTRIPSY &INDWELL STENT INSRT Right 09/26/2019    Procedure: CYSTOSCOPY URETEROSCOPY WITH RETROGRADE PYELOGRAM AND INSERTION STENT URETERAL;  Surgeon: Oj Unger MD;  Location: [facility];  Service: Urology    MA CYSTO/URETERO W/LITHOTRIPSY &INDWELL STENT INSRT Left 06/09/2022    Procedure: CYSTOSCOPY LEFT URETEROSCOPY/LITHOTRIPSY HOLMIUM LASER,  AND EXCHANGE URETERAL STENT URETERAL, REMOVAL NEPHROSTOMY TUBE;  Surgeon: Festus Ley MD;  [facility];  Service: Urology    MA CYSTOURETHROSCOPY W/URETERAL CATHETERIZATION Right 08/04/2019    Procedure: CYSTOSCOPY RETROGRADE PYELOGRAM WITH INSERTION STENT URETERAL;  Surgeon: Eris Biggs MD;  [facility];  Service: Urology    MA CYSTOURETHROSCOPY W/URETERAL CATHETERIZATION Left 04/22/2022    Procedure: CYSTOSCOPY RETROGRADE PYELOGRAM WITH INSERTION STENT URETERAL;  Surgeon: Tony Batista MD;  [facility];  Service: Urology

## 2025-06-03 NOTE — ASSESSMENT & PLAN NOTE
Lab Results   Component Value Date    EGFR 39 05/09/2025    EGFR 39 11/08/2024    EGFR 37 05/15/2024    CREATININE 1.83 (H) 05/09/2025    CREATININE 1.84 (H) 11/08/2024    CREATININE 1.94 (H) 05/15/2024   Stay hydrated Avoid nsaids Has followup with nephrology

## 2025-06-03 NOTE — ASSESSMENT & PLAN NOTE
Continue current bp rx Lo sodium diet Pt is improving diet to faciliatet further weight loss and follows with wt mgmt

## 2025-06-05 ENCOUNTER — TELEPHONE (OUTPATIENT)
Age: 60
End: 2025-06-05

## 2025-06-05 NOTE — TELEPHONE ENCOUNTER
Spoke with established pt regarding referral/scheduling he refused services time of call,if needed will call [phone]

## 2025-06-25 DIAGNOSIS — E78.2 MIXED HYPERLIPIDEMIA: ICD-10-CM

## 2025-06-26 RX ORDER — EZETIMIBE 10 MG/1
10 TABLET ORAL DAILY
Qty: 90 TABLET | Refills: 1 | Status: SHIPPED | OUTPATIENT
Start: 2025-06-26

## 2025-06-29 DIAGNOSIS — N11.1 OBSTRUCTIVE PYELONEPHRITIS: ICD-10-CM

## 2025-06-29 DIAGNOSIS — N18.30 CHRONIC KIDNEY DISEASE, STAGE 3 (HCC): ICD-10-CM

## 2025-07-01 RX ORDER — TAMSULOSIN HYDROCHLORIDE 0.4 MG/1
0.4 CAPSULE ORAL
Qty: 90 CAPSULE | Refills: 1 | Status: SHIPPED | OUTPATIENT
Start: 2025-07-01

## 2025-07-12 DIAGNOSIS — I10 ESSENTIAL HYPERTENSION: ICD-10-CM

## 2025-07-15 RX ORDER — METOPROLOL SUCCINATE 100 MG/1
100 TABLET, EXTENDED RELEASE ORAL DAILY
Qty: 90 TABLET | Refills: 1 | Status: SHIPPED | OUTPATIENT
Start: 2025-07-15

## 2025-08-04 ENCOUNTER — OFFICE VISIT (OUTPATIENT)
Age: 60
End: 2025-08-04
Payer: MEDICARE

## 2025-08-04 VITALS
DIASTOLIC BLOOD PRESSURE: 92 MMHG | BODY MASS INDEX: 50.62 KG/M2 | OXYGEN SATURATION: 94 % | HEIGHT: 66 IN | SYSTOLIC BLOOD PRESSURE: 144 MMHG | HEART RATE: 61 BPM | TEMPERATURE: 98.5 F | WEIGHT: 315 LBS

## 2025-08-04 DIAGNOSIS — E66.813 CLASS 3 OBESITY: Primary | ICD-10-CM

## 2025-08-04 DIAGNOSIS — E11.22 TYPE 2 DIABETES MELLITUS WITH STAGE 3B CHRONIC KIDNEY DISEASE, WITHOUT LONG-TERM CURRENT USE OF INSULIN (HCC): ICD-10-CM

## 2025-08-04 DIAGNOSIS — N18.32 TYPE 2 DIABETES MELLITUS WITH STAGE 3B CHRONIC KIDNEY DISEASE, WITHOUT LONG-TERM CURRENT USE OF INSULIN (HCC): ICD-10-CM

## 2025-08-04 PROCEDURE — 99214 OFFICE O/P EST MOD 30 MIN: CPT | Performed by: PHYSICIAN ASSISTANT

## (undated) DEVICE — SPECIMEN CONTAINER STERILE PEEL PACK

## (undated) DEVICE — TUBING SUCTION 5MM X 12 FT

## (undated) DEVICE — INFUSER BAG 3000ML

## (undated) DEVICE — 3M™ TEGADERM™ TRANSPARENT FILM DRESSING FRAME STYLE, 1624W, 2-3/8 IN X 2-3/4 IN (6 CM X 7 CM), 100/CT 4CT/CASE: Brand: 3M™ TEGADERM™

## (undated) DEVICE — PACK TUR

## (undated) DEVICE — GUIDEWIRE STRGHT TIP 0.035 IN  SOLO PLUS

## (undated) DEVICE — CHLORHEXIDINE 4PCT 4 OZ

## (undated) DEVICE — GLOVE SRG BIOGEL 7

## (undated) DEVICE — SYRINGE 10ML LL

## (undated) DEVICE — GLOVE SRG BIOGEL ECLIPSE 7.5

## (undated) DEVICE — URO CATCHER BAG STERILE 0-UC32

## (undated) DEVICE — GLOVE INDICATOR PI UNDERGLOVE SZ 8 BLUE

## (undated) DEVICE — BAG URINE DRAINAGE 2000ML ANTI RFLX LF

## (undated) DEVICE — BASKET SPECIMEN RETRIVAL 1.9FR 120CM

## (undated) DEVICE — GAUZE SPONGES,16 PLY: Brand: CURITY

## (undated) DEVICE — CATH URETERAL 5FR X 70 CM FLEX TIP POLYUR BARD

## (undated) DEVICE — INVIEW CLEAR LEGGINGS: Brand: CONVERTORS

## (undated) DEVICE — STERILE CYSTO PACK: Brand: CARDINAL HEALTH

## (undated) DEVICE — PREMIUM DRY TRAY LF: Brand: MEDLINE INDUSTRIES, INC.

## (undated) DEVICE — TRANSPOSAL ULTRAFLEX DUO/QUAD ULTRA CART MANIFOLD

## (undated) DEVICE — ALL PURPOSE SPONGES,NON-WOVEN, 3 PLY: Brand: CURITY

## (undated) DEVICE — GLOVE INDICATOR PI UNDERGLOVE SZ 7 BLUE

## (undated) DEVICE — LUBRICANT SURGILUBE TUBE 4 OZ  FLIP TOP

## (undated) DEVICE — GLOVE SRG BIOGEL 8

## (undated) DEVICE — ABDOMINAL PAD: Brand: DERMACEA

## (undated) DEVICE — CATH FOLEY 18FR 5ML 2 WAY SILICONE ELASTIMER